# Patient Record
Sex: MALE | Race: WHITE | Employment: OTHER | URBAN - METROPOLITAN AREA
[De-identification: names, ages, dates, MRNs, and addresses within clinical notes are randomized per-mention and may not be internally consistent; named-entity substitution may affect disease eponyms.]

---

## 2017-01-12 ENCOUNTER — OFFICE VISIT (OUTPATIENT)
Dept: NEUROLOGY | Age: 67
End: 2017-01-12

## 2017-01-12 VITALS
HEART RATE: 75 BPM | HEIGHT: 70 IN | OXYGEN SATURATION: 98 % | SYSTOLIC BLOOD PRESSURE: 130 MMHG | WEIGHT: 257 LBS | BODY MASS INDEX: 36.79 KG/M2 | DIASTOLIC BLOOD PRESSURE: 70 MMHG

## 2017-01-12 DIAGNOSIS — G25.0 BENIGN ESSENTIAL TREMOR: Primary | ICD-10-CM

## 2017-01-12 RX ORDER — PRIMIDONE 250 MG/1
250 TABLET ORAL 2 TIMES DAILY
Qty: 180 TAB | Refills: 3 | Status: SHIPPED | OUTPATIENT
Start: 2017-01-12 | End: 2018-02-13 | Stop reason: SDUPTHER

## 2017-01-12 RX ORDER — CLONAZEPAM 0.5 MG/1
0.5 TABLET ORAL
Qty: 20 TAB | Refills: 5 | Status: SHIPPED | OUTPATIENT
Start: 2017-01-12 | End: 2018-03-04

## 2017-01-12 RX ORDER — GABAPENTIN 300 MG/1
600 CAPSULE ORAL 2 TIMES DAILY
Qty: 360 CAP | Refills: 1 | Status: SHIPPED | OUTPATIENT
Start: 2017-01-12 | End: 2017-03-20 | Stop reason: SDUPTHER

## 2017-01-12 NOTE — LETTER
0 [facility] Essential tremor HPI He is here for f/u of tremor. August 1st he had his hip replaced on the right. He then had a fall and broke his femur. He had to have the hip redone and steel bands on his femur. He was out of work for three months. He went back to work Nov 7th. He reports his pain is not as severe. He was on narcotics but was able to wean off of this now. He does have essential tremor. He is on primidone 250mg BID. He ran out of his meds and had a major tremor. He takes klonopin as needed. He needs refills. He is on gabapentin 600mg BID. No side effects from this. Recap: 
He says he know has a jerk like reaction of the hands when trying to text. He is also dropping a lot more. His tremor is ok. It might be more obvious when he is trying to turn a page of the paper, etc.  
Primidone 250mg BID and gabapentin 600mg qhs. Klonopin is as needed but he hasn't done this much. CURRENT MEDS Current Outpatient Prescriptions Medication Sig Dispense Refill  folic acid (FOLVITE) 1 mg tablet TAKE 1 TABLET DAILY 90 Tab 3  
 lovastatin (MEVACOR) 20 mg tablet TAKE 1 TABLET IN THE EVENING 90 Tab 0  
 lisinopril (PRINIVIL, ZESTRIL) 20 mg tablet TAKE 1 TABLET DAILY 90 Tab 2  
 ONETOUCH ULTRA TEST strip  metFORMIN ER (GLUCOPHAGE XR) 500 mg tablet Take 3 Tabs by mouth daily (with dinner). 270 Tab 0  
 melatonin 3 mg tablet Take 1 Tab by mouth daily (after dinner). For sleep. Take about 3 hours prior to bedtime 30 Tab 5  
 HYDROcodone-acetaminophen (NORCO) 5-325 mg per tablet Take 1 Tab by mouth every twelve (12) hours as needed for Pain. Max Daily Amount: 2 Tabs. 60 Tab 0  JANUVIA 100 mg tablet TAKE 1 TABLET DAILY 90 Tab 0  BD INSULIN PEN NEEDLE UF SHORT 31 gauge x 5/16\" ndle USE TO INJECT UNDER THE SKIN THREE TIMES A  Pen Needle 1  
 clobetasol (TEMOVATE) 0.05 % ointment Apply  to affected area two (2) times daily as needed.  60 g 2  
  sertraline (ZOLOFT) 100 mg tablet TAKE 1 TABLET DAILY 90 Tab 0  
 insulin glargine (LANTUS SOLOSTAR) 100 unit/mL (3 mL) pen 20 Units by SubCUTAneous route two (2) times a day. 1 Each 0  
 JARDIANCE 25 mg tablet  senna-docusate (PERICOLACE) 8.6-50 mg per tablet Take 1 Tab by mouth two (2) times daily as needed for Constipation.  DIETARY SUPPLEMENT PO Take 6 Tabs by mouth daily. CATAPLEX B (STANDARD PROCESS CO)  gabapentin (NEURONTIN) 300 mg capsule Take 600 mg by mouth two (2) times a day. Take 2 caps two ( 2 ) times a day  primidone (MYSOLINE) 250 mg tablet Take 1 Tab by mouth two (2) times a day. 180 Tab 3  
 VITAMIN D2 50,000 unit capsule TAKE 1 CAPSULE EVERY 7 DAYS (Patient taking differently: TAKE 1 CAPSULE EVERY 7 DAYS (taking on Thurs.)) 12 Cap 3  
 
 
ROS Constitutional: Denies recent weight change, fever, chills, sweats ENT/Mouth: 
Eye:  Denies hearing loss, ringing in the ears, Denies vision changes,  
Cardiovascular: 
Respiratory:   Denies chest pain/angina pectoris Denies shortness of breath, cough, wheezing Gastrointestinal: Denies nausea, vomiting, constipation, frequent diarrhea,  
Musculoskeletal:   + joint pain, stiffness/swelling Integument:   Denies rash/itching Neurological:  Per HPI, +falls Psychiatric:   Denies anxiety or depression PREVIOUS WORKUP No CNS imaging LABS Results for orders placed or performed in visit on 11/01/16 C DIFFICILE TOXIN GENE, NELLIE Result Value Ref Range C. difficile toxin gene, NELLIE Negative Negative PHYSICAL EXAM 
Exam: 
Visit Vitals  /70  Pulse 75  Ht 5' 10\" (1.778 m)  Wt 257 lb (116.6 kg)  SpO2 98%  BMI 36.88 kg/m2 Gen: 
CV: RRR Lungs: non labored breathing Abd: non distending Neuro: A&O x 3, no dysarthria or aphasia CN II-XII: PERRL, EOMI, face symmetric, tongue/palate midline Motor: strength 5/5 all four ext, no tremor today Sensory: intact to LT Gait: normal 
 
 ASSESSMENT This is a 73 y/o male who presents for fu of benign essential tremor. His tremor is bilateral and with action. He is doing well with gabapentin and primidone. He takes Klonopin as needed. He did have to go off primidone due to being out of the medication and noticed significant increase in tremor secondary to this. PLAN 1. Cont primidone to 250mg tabs BID 2. Continue gabapentin to 600mg BID 3. Klonopin prn. Refill given 4. Off all narcotics and is doing home exercises from physical therapy. F/U 6 months Louie Marques MD 
 
CC: Chris Ramires MD 
Fax: [phone] This note will not be viewable in [address] Prescription for Klonopin was faxed to [facility].

## 2017-01-12 NOTE — PROGRESS NOTES
NEUROLOGY FOLLOWUP    CHIEF COMPLAINT  Essential tremor    HPI   He is here for f/u of tremor. August 1st he had his hip replaced on the right. He then had a fall and broke his femur. He had to have the hip redone and steel bands on his femur. He was out of work for three months. He went back to work Nov 7th. He reports his pain is not as severe. He was on narcotics but was able to wean off of this now. He does have essential tremor. He is on primidone 250mg BID. He ran out of his meds and had a major tremor. He takes klonopin as needed. He needs refills. He is on gabapentin 600mg BID. No side effects from this. Recap:  He says he know has a jerk like reaction of the hands when trying to text. He is also dropping a lot more. His tremor is ok. It might be more obvious when he is trying to turn a page of the paper, etc.   Primidone 250mg BID and gabapentin 600mg qhs. Klonopin is as needed but he hasn't done this much. CURRENT MEDS  Current Outpatient Prescriptions   Medication Sig Dispense Refill    folic acid (FOLVITE) 1 mg tablet TAKE 1 TABLET DAILY 90 Tab 3    lovastatin (MEVACOR) 20 mg tablet TAKE 1 TABLET IN THE EVENING 90 Tab 0    lisinopril (PRINIVIL, ZESTRIL) 20 mg tablet TAKE 1 TABLET DAILY 90 Tab 2    ONETOUCH ULTRA TEST strip       metFORMIN ER (GLUCOPHAGE XR) 500 mg tablet Take 3 Tabs by mouth daily (with dinner). 270 Tab 0    melatonin 3 mg tablet Take 1 Tab by mouth daily (after dinner). For sleep. Take about 3 hours prior to bedtime 30 Tab 5    HYDROcodone-acetaminophen (NORCO) 5-325 mg per tablet Take 1 Tab by mouth every twelve (12) hours as needed for Pain. Max Daily Amount: 2 Tabs. 60 Tab 0    JANUVIA 100 mg tablet TAKE 1 TABLET DAILY 90 Tab 0    BD INSULIN PEN NEEDLE UF SHORT 31 gauge x 5/16\" ndle USE TO INJECT UNDER THE SKIN THREE TIMES A  Pen Needle 1    clobetasol (TEMOVATE) 0.05 % ointment Apply  to affected area two (2) times daily as needed.  60 g 2    sertraline (ZOLOFT) 100 mg tablet TAKE 1 TABLET DAILY 90 Tab 0    insulin glargine (LANTUS SOLOSTAR) 100 unit/mL (3 mL) pen 20 Units by SubCUTAneous route two (2) times a day. 1 Each 0    JARDIANCE 25 mg tablet       senna-docusate (PERICOLACE) 8.6-50 mg per tablet Take 1 Tab by mouth two (2) times daily as needed for Constipation.  DIETARY SUPPLEMENT PO Take 6 Tabs by mouth daily. CATAPLEX B (STANDARD PROCESS CO)      gabapentin (NEURONTIN) 300 mg capsule Take 600 mg by mouth two (2) times a day. Take 2 caps two ( 2 ) times a day      primidone (MYSOLINE) 250 mg tablet Take 1 Tab by mouth two (2) times a day.  180 Tab 3    VITAMIN D2 50,000 unit capsule TAKE 1 CAPSULE EVERY 7 DAYS (Patient taking differently: TAKE 1 CAPSULE EVERY 7 DAYS (taking on Thurs.)) 12 Cap 3       ROS  Constitutional: Denies recent weight change, fever, chills, sweats   ENT/Mouth:  Eye:  Denies hearing loss, ringing in the ears,   Denies vision changes,   Cardiovascular:  Respiratory:   Denies chest pain/angina pectoris  Denies shortness of breath, cough, wheezing   Gastrointestinal: Denies nausea, vomiting, constipation, frequent diarrhea,   Musculoskeletal:   + joint pain, stiffness/swelling   Integument:   Denies rash/itching   Neurological:  Per HPI, +falls   Psychiatric:   Denies anxiety or depression       PREVIOUS WORKUP  No CNS imaging    LABS  Results for orders placed or performed in visit on 11/01/16   C DIFFICILE TOXIN GENE, NELLIE   Result Value Ref Range    C. difficile toxin gene, NELLIE Negative Negative       PHYSICAL EXAM  Exam:  Visit Vitals    /70    Pulse 75    Ht 5' 10\" (1.778 m)    Wt 257 lb (116.6 kg)    SpO2 98%    BMI 36.88 kg/m2     Gen:  CV: RRR  Lungs: non labored breathing  Abd: non distending  Neuro: A&O x 3, no dysarthria or aphasia  CN II-XII: PERRL, EOMI, face symmetric, tongue/palate midline  Motor: strength 5/5 all four ext, no tremor today  Sensory: intact to LT  Gait: normal    ASSESSMENT   This is a 75 y/o male who presents for fu of benign essential tremor. His tremor is bilateral and with action. He is doing well with gabapentin and primidone. He takes Klonopin as needed. He did have to go off primidone due to being out of the medication and noticed significant increase in tremor secondary to this. PLAN  1. Cont primidone to 250mg tabs BID  2. Continue gabapentin to 600mg BID  3. Klonopin prn. Refill given  4. Off all narcotics and is doing home exercises from physical therapy. F/U 6 months    Anthony Yen MD    CC: Bola Hood MD  Fax: [phone]    This note will not be viewable in [address]

## 2017-01-26 RX ORDER — INSULIN GLARGINE 100 [IU]/ML
INJECTION, SOLUTION SUBCUTANEOUS
Qty: 45 ML | Refills: 0 | Status: SHIPPED | OUTPATIENT
Start: 2017-01-26 | End: 2017-03-21 | Stop reason: SDUPTHER

## 2017-01-26 NOTE — TELEPHONE ENCOUNTER
Received request for refill for Klonpoin for 90 day supply from [facility] .    This was sent to Dr. Cole Centers

## 2017-01-27 RX ORDER — SERTRALINE HYDROCHLORIDE 100 MG/1
TABLET, FILM COATED ORAL
Qty: 90 TAB | Refills: 1 | Status: SHIPPED | OUTPATIENT
Start: 2017-01-27 | End: 2017-03-20 | Stop reason: SDUPTHER

## 2017-02-02 ENCOUNTER — OFFICE VISIT (OUTPATIENT)
Dept: FAMILY MEDICINE CLINIC | Age: 67
End: 2017-02-02

## 2017-02-02 VITALS
TEMPERATURE: 97.7 F | WEIGHT: 258.6 LBS | BODY MASS INDEX: 37.02 KG/M2 | OXYGEN SATURATION: 97 % | SYSTOLIC BLOOD PRESSURE: 152 MMHG | HEART RATE: 77 BPM | DIASTOLIC BLOOD PRESSURE: 69 MMHG | HEIGHT: 70 IN | RESPIRATION RATE: 18 BRPM

## 2017-02-02 DIAGNOSIS — S91.209A TOENAIL AVULSION, INITIAL ENCOUNTER: ICD-10-CM

## 2017-02-02 DIAGNOSIS — I10 ESSENTIAL HYPERTENSION: ICD-10-CM

## 2017-02-02 DIAGNOSIS — E78.00 HIGH CHOLESTEROL: ICD-10-CM

## 2017-02-02 DIAGNOSIS — I35.0 AORTIC VALVE STENOSIS, UNSPECIFIED ETIOLOGY: ICD-10-CM

## 2017-02-02 DIAGNOSIS — Z79.4 CONTROLLED TYPE 2 DIABETES MELLITUS WITH OTHER SPECIFIED COMPLICATION, WITH LONG-TERM CURRENT USE OF INSULIN (HCC): Primary | ICD-10-CM

## 2017-02-02 DIAGNOSIS — E11.69 CONTROLLED TYPE 2 DIABETES MELLITUS WITH OTHER SPECIFIED COMPLICATION, WITH LONG-TERM CURRENT USE OF INSULIN (HCC): Primary | ICD-10-CM

## 2017-02-02 DIAGNOSIS — G44.209 ACUTE NON INTRACTABLE TENSION-TYPE HEADACHE: ICD-10-CM

## 2017-02-02 LAB — HBA1C MFR BLD HPLC: 7.3 %

## 2017-02-02 RX ORDER — HYDROCODONE BITARTRATE AND ACETAMINOPHEN 5; 325 MG/1; MG/1
1 TABLET ORAL
Qty: 20 TAB | Refills: 0 | Status: SHIPPED | OUTPATIENT
Start: 2017-02-02 | End: 2017-03-22

## 2017-02-02 RX ORDER — FLUTICASONE PROPIONATE 50 MCG
2 SPRAY, SUSPENSION (ML) NASAL DAILY
Qty: 1 BOTTLE | Refills: 0
Start: 2017-02-02 | End: 2017-09-18 | Stop reason: ALTCHOICE

## 2017-02-02 RX ORDER — CEPHALEXIN 500 MG/1
500 CAPSULE ORAL 4 TIMES DAILY
Qty: 40 CAP | Refills: 0 | Status: SHIPPED | OUTPATIENT
Start: 2017-02-02 | End: 2017-02-12

## 2017-02-02 RX ORDER — GUAIFENESIN 100 MG/5ML
81 LIQUID (ML) ORAL DAILY
Qty: 30 TAB | Refills: 11
Start: 2017-02-02 | End: 2020-04-06

## 2017-02-02 RX ORDER — METFORMIN HYDROCHLORIDE 500 MG/1
1000 TABLET, EXTENDED RELEASE ORAL
Qty: 180 TAB | Refills: 1 | Status: SHIPPED | OUTPATIENT
Start: 2017-02-02 | End: 2017-11-28 | Stop reason: SDUPTHER

## 2017-02-02 RX ORDER — BUTALBITAL, ACETAMINOPHEN AND CAFFEINE 50; 325; 40 MG/1; MG/1; MG/1
1 TABLET ORAL
Qty: 15 TAB | Refills: 0 | Status: SHIPPED | OUTPATIENT
Start: 2017-02-02 | End: 2017-05-04 | Stop reason: SDUPTHER

## 2017-02-02 NOTE — PROGRESS NOTES
Chief Complaint   Patient presents with    Diabetes   Discuss headaches  Left foot toenail dark  Room 5

## 2017-02-02 NOTE — PROGRESS NOTES
Subjective:     Chief Complaint   Patient presents with    Diabetes      He  is a 74 y.o. male who presents for evaluation of:  Since last appt, c/o HAs. Started with HA 2 days ago. No measured fever or chills. No URI sx. Solely in R frontal area. Had similar sx a few years ago and improved with Fioricet. Diabetes Mellitus, HTN, HLD:  Taking meds, home glucose monitoring: is performed regularly - 100-170s  Reports no polyuria or polydipsia, no chest pain, dyspnea or TIA's, no numbness, tingling or pain in extremities, last eye exam approximately < 1 yr ago. Not exercising d/t hip surgery  Compliant with diabetic diet. Avoids sodas and sweet teas. Avoids fast food. Limits carbs. Pt is a non smoker.     Lab Results   Component Value Date/Time    Hemoglobin A1c (POC) 7.3 02/02/2017 02:45 PM    Hemoglobin A1c (POC) 8.0 06/27/2016 02:39 PM    Hemoglobin A1c 6.7 10/04/2016 10:08 AM    Microalbumin/Creat ratio (mg/g creat) 13 11/01/2010 07:07 AM    Microalb/Creat ratio (ug/mg creat.) <10.5 06/27/2016 02:33 PM    Microalbumin,urine random 0.81 11/01/2010 07:07 AM    LDL, calculated 85 10/04/2016 10:08 AM    Creatinine 0.75 10/04/2016 10:08 AM      Lab Results   Component Value Date/Time    GFR est  10/04/2016 10:08 AM    GFR est non-AA 94 10/04/2016 10:08 AM      Lab Results   Component Value Date/Time    TSH 1.870 10/04/2016 10:08 AM       ROS  Gen - no fever/chills  Resp - no dyspnea or cough  CV - no chest pain or WANG  Rest per HPI    Past Medical History   Diagnosis Date    Anemia due to blood loss     Hinson's esophagus with esophagitis     Benign essential tremor     Cancer ([facility] [address] 2012     BCC    Depression     DJD (degenerative joint disease) of hip      bilat    DM (diabetes mellitus) ([facility] [address] 3/17/2010    ED (erectile dysfunction) of organic origin     Fall on or from sidewalk curb 9/24/2015    Gastritis and duodenitis     GERD (gastroesophageal reflux disease)      resolved after discontinuing diclofenac    Hematuria 6/2016    High cholesterol 3/17/2010    HTN (hypertension) 3/17/2010    Morbid obesity [facility] [address]     Murmur, cardiac 2016    PUD (peptic ulcer disease)     Shingles 6/2016     RESOLVING- NO RASH (HEAD)    Sleep apnea      CPAP     Past Surgical History   Procedure Laterality Date    Endoscopy, colon, diagnostic      Hx cholecystectomy  1995    Hx gi  1980     gastroplasty    Hx hernia repair  1995    Hx tonsillectomy  1950    Hx orthopaedic  2011     rot cuff repair    Pr total hip arthroplasty  05/2010     right    Pr total hip arthroplasty  08/01/2016     left     Current Outpatient Prescriptions on File Prior to Visit   Medication Sig Dispense Refill    sertraline (ZOLOFT) 100 mg tablet TAKE 1 TABLET DAILY 90 Tab 1    LANTUS SOLOSTAR 100 unit/mL (3 mL) pen INJECT 20 UNITS UNDER THE SKIN IN THE MORNING AND 18 UNITS AT NIGHT 45 mL 0    clonazePAM (KLONOPIN) 0.5 mg tablet Take 1 Tab by mouth nightly as needed. Max Daily Amount: 0.5 mg. 20 Tab 5    primidone (MYSOLINE) 250 mg tablet Take 1 Tab by mouth two (2) times a day. 180 Tab 3    gabapentin (NEURONTIN) 300 mg capsule Take 2 Caps by mouth two (2) times a day. Take 2 caps two ( 2 ) times a day 076 Cap 1    folic acid (FOLVITE) 1 mg tablet TAKE 1 TABLET DAILY 90 Tab 3    lovastatin (MEVACOR) 20 mg tablet TAKE 1 TABLET IN THE EVENING 90 Tab 0    lisinopril (PRINIVIL, ZESTRIL) 20 mg tablet TAKE 1 TABLET DAILY 90 Tab 2    ONETOUCH ULTRA TEST strip       melatonin 3 mg tablet Take 1 Tab by mouth daily (after dinner). For sleep. Take about 3 hours prior to bedtime 30 Tab 5    JANUVIA 100 mg tablet TAKE 1 TABLET DAILY 90 Tab 0    BD INSULIN PEN NEEDLE UF SHORT 31 gauge x 5/16\" ndle USE TO INJECT UNDER THE SKIN THREE TIMES A  Pen Needle 1    clobetasol (TEMOVATE) 0.05 % ointment Apply  to affected area two (2) times daily as needed.  60 g 2    JARDIANCE 25 mg tablet       senna-docusate (PERICOLACE) 8.6-50 mg per tablet Take 1 Tab by mouth two (2) times daily as needed for Constipation.  DIETARY SUPPLEMENT PO Take 6 Tabs by mouth daily. CATAPLEX B (STANDARD PROCESS CO)      VITAMIN D2 50,000 unit capsule TAKE 1 CAPSULE EVERY 7 DAYS (Patient taking differently: TAKE 1 CAPSULE EVERY 7 DAYS (taking on Thurs.)) 12 Cap 3     No current facility-administered medications on file prior to visit. Objective:     Vitals:    02/02/17 1436   BP: 152/69   Pulse: 77   Resp: 18   Temp: 97.7 °F (36.5 °C)   TempSrc: Oral   SpO2: 97%   Weight: 258 lb 9.6 oz (117.3 kg)   Height: 5' 10\" (1.778 m)     Physical Examination:  General appearance - alert, well appearing, and in no distress  Eyes -sclera anicteric  Neck - supple, no significant adenopathy, no thyromegaly  Chest - clear to auscultation, no wheezes, rales or rhonchi, symmetric air entry  Heart - normal rate, regular rhythm, normal S1, S2,2/6 LION rad to carotids  Neurological - alert, oriented, no focal findings or movement disorder noted  Extr -1+ pitting edema in LLE (improving), mildly ttp a ant left shin  Feet - + left foot with great toenail nearly pulled off except for medial edge. Some bleeding and bruising with mild surrounding erythema noted    Procedure:  After verbal consent was obtained, risks and benefits of the procedure were discussed with the patient and alternatives discussed. Cleansed with alcohol pads. Injected 2% lidocaine 4 ml removed medial edge of great left toenail. The procedure was well tolerated with no complications      Assessment/ Plan:   John Paul Ramos was seen today for diabetes. Diagnoses and all orders for this visit:    Controlled type 2 diabetes mellitus with other specified complication, with long-term current use of insulin ([address] - near goal.  -     AMB POC HEMOGLOBIN A1C  -      DIABETES FOOT EXAM  -     metFORMIN ER (GLUCOPHAGE XR) 500 mg tablet; Take 2 Tabs by mouth daily (with dinner).  Please disp generic form    Essential hypertension - bp too high but in pain from toenail, pt left prior to rechecking    High cholesterol    Acute non intractable tension-type headache - suspect sinus related so tx with flonase. Avoiding nsaids d/t PUD hx so will acutely use Fioricet  -     butalbital-acetaminophen-caffeine (FIORICET, ESGIC) -40 mg per tablet; Take 1 Tab by mouth every four (4) hours as needed for Pain. Max Daily Amount: 6 Tabs. -     fluticasone (FLONASE) 50 mcg/actuation nasal spray; 2 Sprays by Both Nostrils route daily. Toenail avulsion, initial encounter - removed remainder of nail today. Leidy very well.  -     HYDROcodone-acetaminophen (NORCO) 5-325 mg per tablet; Take 1 Tab by mouth every four (4) hours as needed for Pain. Max Daily Amount: 6 Tabs. -     cephALEXin (KEFLEX) 500 mg capsule; Take 1 Cap by mouth four (4) times daily for 10 days.  -     TN DESTRUC BENIGN LESION, UP TO 14 LESIONS    Aortic valve stenosis, unspecified etiology - pt never called about EULALIO after TTE was unable to visualize valves. LION still present so getting EULALIO.  -     ECHO TRANSESOPHAGEAL (EULALIO) W OR WO CONTR; Future    Other orders  -     aspirin 81 mg chewable tablet; Take 1 Tab by mouth daily. I have discussed the diagnosis with the patient and the intended plan as seen in the above orders. The patient has received an after-visit summary and questions were answered concerning future plans. I have discussed medication side effects and warnings with the patient as well. The patient verbalizes understanding and agreement with the plan. Follow-up Disposition:  Return in about 3 months (around 5/2/2017), or if symptoms worsen or fail to improve.      [facility]  OFFICE PROCEDURE PROGRESS NOTE        Chart reviewed for the following:   Mary Kay Salas MD, have reviewed the History, Physical and updated the Allergic reactions for Lyle Das     TIME OUT performed immediately prior to start of procedure:   Marvin Zee MD, have performed the following reviews on Lyle Das prior to the start of the procedure:            * Patient was identified by name and date of birth   * Agreement on procedure being performed was verified  * Risks and Benefits explained to the patient  * Procedure site verified and marked as necessary  * Patient was positioned for comfort  * Consent was signed and verified     Time: 3:20pm  Date of procedure: 2/2/2017    Procedure performed by:  Peggy High MD  Patient assisted by: self  How tolerated by patient: tolerated the procedure well with no complications  Post Procedural Pain Scale: 0 - No Hurt

## 2017-02-02 NOTE — MR AVS SNAPSHOT
Visit Information Date & Time Provider Department Dept. Phone Encounter #  
 2/2/2017  2:20 PM Peggy High MD [facility] at [address] [id number] Follow-up Instructions Return in about 3 months (around 5/2/2017), or if symptoms worsen or fail to improve. Your Appointments 7/24/2017  3:00 PM  
Follow Up with Irineo Ormond, MD  
Neurology Clinic at [facility] Appt Note: Follow up $0CP tdb 1/12/17  
 [facility], 
[address] [place] [address], [address] [address]  
[address] [facility], [address] [place] [address], [address] [facility] [address] [facility] [address] [address] Upcoming Health Maintenance Date Due COLONOSCOPY 4/14/2015 FOOT EXAM Q1 1/26/2017 HEMOGLOBIN A1C Q6M 4/4/2017 EYE EXAM RETINAL OR DILATED Q1 4/11/2017 MICROALBUMIN Q1 6/27/2017 LIPID PANEL Q1 10/4/2017 Pneumococcal 65+ Low/Medium Risk (2 of 2 - PPSV23) 12/5/2017 MEDICARE YEARLY EXAM 2/3/2018 GLAUCOMA SCREENING Q2Y 4/11/2018 DTaP/Tdap/Td series (2 - Td) 1/31/2021 Allergies as of 2/2/2017  Review Complete On: 2/2/2017 By: Peggy High MD  
  
 Severity Noted Reaction Type Reactions Nsaids (Non-steroidal Anti-inflammatory Drug) Low 01/26/2016    Other (comments) Hx of PUD and Hinson's Esophagus Current Immunizations  Reviewed on 12/5/2016 Name Date Influenza High Dose Vaccine PF 9/22/2016 Influenza Vaccine 10/3/2013 Influenza Vaccine Split 10/28/2012 TDAP Vaccine 1/31/2011 Not reviewed this visit You Were Diagnosed With   
  
 Codes Comments Controlled type 2 diabetes mellitus with other specified complication, with long-term current use of insulin ([facility]    -  Primary ICD-10-CM: E11.69, Z79.4 Essential hypertension     ICD-10-CM: I10 
ICD-9-CM: 401.9 High cholesterol     ICD-10-CM: E78.00 ICD-9-CM: 272.0 Acute non intractable tension-type headache     ICD-10-CM: I99.932 ICD-9-CM: 339.10 Toenail avulsion, initial encounter     ICD-10-CM: A46.541F ICD-9-CM: 893.0 Vitals BP Pulse Temp Resp Height(growth percentile) Weight(growth percentile) 152/69 (BP 1 Location: Left arm, BP Patient Position: Sitting) 77 97.7 °F (36.5 °C) (Oral) 18 5' 10\" (1.778 m) 258 lb 9.6 oz (117.3 kg) SpO2 BMI Smoking Status 97% 37.11 kg/m2 Former Smoker Vitals History BMI and BSA Data Body Mass Index Body Surface Area  
 37.11 kg/m 2 2.41 m 2 Preferred Pharmacy Pharmacy Name Phone [address] [facility] [phone] Your Updated Medication List  
  
   
This list is accurate as of: 2/2/17  3:26 PM.  Always use your most recent med list.  
  
  
  
  
 aspirin 81 mg chewable tablet Take 1 Tab by mouth daily. BD INSULIN PEN NEEDLE UF SHORT 31 gauge x 5/16\" Ndle Generic drug:  Insulin Needles (Disposable) USE TO INJECT UNDER THE SKIN THREE TIMES A DAY  
  
 butalbital-acetaminophen-caffeine -40 mg per tablet Commonly known as:  Terry Weiner Take 1 Tab by mouth every four (4) hours as needed for Pain. Max Daily Amount: 6 Tabs. cephALEXin 500 mg capsule Commonly known as:  Devoria Hertz Take 1 Cap by mouth four (4) times daily for 10 days. clobetasol 0.05 % ointment Commonly known as:  Catalina Lunch Apply  to affected area two (2) times daily as needed. clonazePAM 0.5 mg tablet Commonly known as:  Clark Altglenny Take 1 Tab by mouth nightly as needed. Max Daily Amount: 0.5 mg. DIETARY SUPPLEMENT PO Take 6 Tabs by mouth daily. CATAPLEX B (STANDARD PROCESS CO)  
  
 fluticasone 50 mcg/actuation nasal spray Commonly known as:  Montine Fox 2 Sprays by Both Nostrils route daily. folic acid 1 mg tablet Commonly known as:  FOLVITE  
TAKE 1 TABLET DAILY  
  
 gabapentin 300 mg capsule Commonly known as:  NEURONTIN  
 Take 2 Caps by mouth two (2) times a day. Take 2 caps two ( 2 ) times a day HYDROcodone-acetaminophen 5-325 mg per tablet Commonly known as:  Manny Phillips Take 1 Tab by mouth every four (4) hours as needed for Pain. Max Daily Amount: 6 Tabs. JANUVIA 100 mg tablet Generic drug:  SITagliptin TAKE 1 TABLET DAILY JARDIANCE 25 mg tablet Generic drug:  empagliflozin LANTUS SOLOSTAR 100 unit/mL (3 mL) pen Generic drug:  insulin glargine INJECT 20 UNITS UNDER THE SKIN IN THE MORNING AND 18 UNITS AT NIGHT  
  
 lisinopril 20 mg tablet Commonly known as:  PRINIVIL, ZESTRIL  
TAKE 1 TABLET DAILY lovastatin 20 mg tablet Commonly known as:  MEVACOR  
TAKE 1 TABLET IN THE EVENING  
  
 melatonin 3 mg tablet Take 1 Tab by mouth daily (after dinner). For sleep. Take about 3 hours prior to bedtime  
  
 metFORMIN  mg tablet Commonly known as:  GLUCOPHAGE XR Take 2 Tabs by mouth daily (with dinner). Please disp generic form ONETOUCH ULTRA TEST strip Generic drug:  glucose blood VI test strips  
  
 primidone 250 mg tablet Commonly known as: MYSOLINE Take 1 Tab by mouth two (2) times a day. senna-docusate 8.6-50 mg per tablet Commonly known as:  Magdalene Folks Take 1 Tab by mouth two (2) times daily as needed for Constipation. sertraline 100 mg tablet Commonly known as:  ZOLOFT  
TAKE 1 TABLET DAILY  
  
 VITAMIN D2 50,000 unit capsule Generic drug:  ergocalciferol TAKE 1 CAPSULE EVERY 7 DAYS Prescriptions Printed Refills  
 butalbital-acetaminophen-caffeine (FIORICET, ESGIC) -40 mg per tablet 0 Sig: Take 1 Tab by mouth every four (4) hours as needed for Pain. Max Daily Amount: 6 Tabs. Class: Print Route: Oral  
 HYDROcodone-acetaminophen (NORCO) 5-325 mg per tablet 0 Sig: Take 1 Tab by mouth every four (4) hours as needed for Pain. Max Daily Amount: 6 Tabs. Class: Print  Route: Oral  
 cephALEXin (KEFLEX) 500 mg capsule 0 Sig: Take 1 Cap by mouth four (4) times daily for 10 days. Class: Print Route: Oral  
  
Prescriptions Sent to Pharmacy Refills  
 metFORMIN ER (GLUCOPHAGE XR) 500 mg tablet 1 Sig: Take 2 Tabs by mouth daily (with dinner). Please disp generic form Class: Normal  
 Pharmacy: [address], [address] [place] [address] #: [phone] Route: Oral  
  
We Performed the Following AMB POC HEMOGLOBIN A1C [77104 CPT(R)] HM DIABETES FOOT EXAM [HM7 Custom] Follow-up Instructions Return in about 3 months (around 5/2/2017), or if symptoms worsen or fail to improve. Patient Instructions Please drop metformin down to 1 tab daily with dinner to help reduce diarrhea symptoms - try for 2 weeks & if no difference, can go back to 2 pills. Introducing [address]! Dear Charmayne Combes: Thank you for requesting a Qlika account. Our records indicate that you already have an active Qlika account. You can access your account anytime at [email or web address] Did you know that you can access your hospital and ER discharge instructions at any time in Qlika? You can also review all of your test results from your hospital stay or ER visit. Additional Information If you have questions, please visit the Frequently Asked Questions section of the Qlika website at [email or web address] Remember, Qlika is NOT to be used for urgent needs. For medical emergencies, dial 911. Now available from your iPhone and Android! Please provide this summary of care documentation to your next provider. Your primary care clinician is listed as Farideh Means. If you have any questions after today's visit, please call [phone].

## 2017-02-02 NOTE — PATIENT INSTRUCTIONS
Please drop metformin down to 1 tab daily with dinner to help reduce diarrhea symptoms - try for 2 weeks & if no difference, can go back to 2 pills.

## 2017-02-03 ENCOUNTER — TELEPHONE (OUTPATIENT)
Dept: FAMILY MEDICINE CLINIC | Age: 67
End: 2017-02-03

## 2017-02-03 DIAGNOSIS — I35.0 AORTIC VALVE STENOSIS, UNSPECIFIED ETIOLOGY: Primary | ICD-10-CM

## 2017-02-06 ENCOUNTER — TELEPHONE (OUTPATIENT)
Dept: FAMILY MEDICINE CLINIC | Age: 67
End: 2017-02-06

## 2017-02-06 NOTE — TELEPHONE ENCOUNTER
Coordination of care states they do not schedule   Echo trans . .....   That was requested     It needs to be coordinated w/Cardiology     Suresh Gomez  - doesn't need a call back as she is just in scheduling

## 2017-02-27 RX ORDER — SITAGLIPTIN 100 MG/1
TABLET, FILM COATED ORAL
Qty: 90 TAB | Refills: 1 | Status: SHIPPED | OUTPATIENT
Start: 2017-02-27 | End: 2017-08-23 | Stop reason: SDUPTHER

## 2017-03-09 ENCOUNTER — OFFICE VISIT (OUTPATIENT)
Dept: CARDIOLOGY CLINIC | Age: 67
End: 2017-03-09

## 2017-03-09 VITALS
WEIGHT: 260 LBS | OXYGEN SATURATION: 98 % | RESPIRATION RATE: 18 BRPM | DIASTOLIC BLOOD PRESSURE: 80 MMHG | HEART RATE: 70 BPM | BODY MASS INDEX: 37.22 KG/M2 | SYSTOLIC BLOOD PRESSURE: 120 MMHG | HEIGHT: 70 IN

## 2017-03-09 DIAGNOSIS — I10 ESSENTIAL HYPERTENSION: ICD-10-CM

## 2017-03-09 DIAGNOSIS — E78.00 HIGH CHOLESTEROL: ICD-10-CM

## 2017-03-09 DIAGNOSIS — R01.1 SYSTOLIC MURMUR: Primary | ICD-10-CM

## 2017-03-09 NOTE — PROGRESS NOTES
3/9/2017 10:38 AM      Subjective:     Giancarlo Manning is referred for EULALIO. Systolic murmur was noticed by PCP on exam and on TTE valves not very visualized. He denies any CP, syncope. Slight reduction in functional ability, however also had recent hip surgery and thinks it could also be related to it. Visit Vitals    /80 (BP 1 Location: Right arm)    Pulse 70    Resp 18    Ht 5' 10\" (1.778 m)    Wt 260 lb (117.9 kg)    SpO2 98%    BMI 37.31 kg/m2     Current Outpatient Prescriptions   Medication Sig    JANUVIA 100 mg tablet TAKE 1 TABLET DAILY    metFORMIN ER (GLUCOPHAGE XR) 500 mg tablet Take 2 Tabs by mouth daily (with dinner). Please disp generic form    butalbital-acetaminophen-caffeine (FIORICET, ESGIC) -40 mg per tablet Take 1 Tab by mouth every four (4) hours as needed for Pain. Max Daily Amount: 6 Tabs.  aspirin 81 mg chewable tablet Take 1 Tab by mouth daily.  sertraline (ZOLOFT) 100 mg tablet TAKE 1 TABLET DAILY    LANTUS SOLOSTAR 100 unit/mL (3 mL) pen INJECT 20 UNITS UNDER THE SKIN IN THE MORNING AND 18 UNITS AT NIGHT    clonazePAM (KLONOPIN) 0.5 mg tablet Take 1 Tab by mouth nightly as needed. Max Daily Amount: 0.5 mg.    primidone (MYSOLINE) 250 mg tablet Take 1 Tab by mouth two (2) times a day.  gabapentin (NEURONTIN) 300 mg capsule Take 2 Caps by mouth two (2) times a day. Take 2 caps two ( 2 ) times a day    folic acid (FOLVITE) 1 mg tablet TAKE 1 TABLET DAILY    lovastatin (MEVACOR) 20 mg tablet TAKE 1 TABLET IN THE EVENING    lisinopril (PRINIVIL, ZESTRIL) 20 mg tablet TAKE 1 TABLET DAILY    ONETOUCH ULTRA TEST strip     melatonin 3 mg tablet Take 1 Tab by mouth daily (after dinner). For sleep. Take about 3 hours prior to bedtime    BD INSULIN PEN NEEDLE UF SHORT 31 gauge x 5/16\" ndle USE TO INJECT UNDER THE SKIN THREE TIMES A DAY    clobetasol (TEMOVATE) 0.05 % ointment Apply  to affected area two (2) times daily as needed.     JARDIANCE 25 mg tablet     senna-docusate (PERICOLACE) 8.6-50 mg per tablet Take 1 Tab by mouth two (2) times daily as needed for Constipation.  DIETARY SUPPLEMENT PO Take 6 Tabs by mouth daily. CATAPLEX B (STANDARD PROCESS CO)    VITAMIN D2 50,000 unit capsule TAKE 1 CAPSULE EVERY 7 DAYS (Patient taking differently: TAKE 1 CAPSULE EVERY 7 DAYS (taking on Thurs. ))    fluticasone (FLONASE) 50 mcg/actuation nasal spray 2 Sprays by Both Nostrils route daily.  HYDROcodone-acetaminophen (NORCO) 5-325 mg per tablet Take 1 Tab by mouth every four (4) hours as needed for Pain. Max Daily Amount: 6 Tabs. No current facility-administered medications for this visit.           Objective:      Visit Vitals    /80 (BP 1 Location: Right arm)    Pulse 70    Resp 18    Ht 5' 10\" (1.778 m)    Wt 260 lb (117.9 kg)    SpO2 98%    BMI 37.31 kg/m2       Data Review:     EKG: Normal sinus rhythm, no acute st/t changes    Reviewed and/or ordered active problem list, medication list tests    Past Medical History:   Diagnosis Date    Anemia due to blood loss     Hinson's esophagus with esophagitis     Benign essential tremor     Cancer ([facility] 2012    BCC    Depression     DJD (degenerative joint disease) of hip     bilat    DM (diabetes mellitus) ([facility] 3/17/2010    ED (erectile dysfunction) of organic origin     Fall on or from sidewalk curb 9/24/2015    Gastritis and duodenitis     GERD (gastroesophageal reflux disease)     resolved after discontinuing diclofenac    Hematuria 6/2016    High cholesterol 3/17/2010    HTN (hypertension) 3/17/2010    Morbid obesity ([address]     Murmur, cardiac 2016    PUD (peptic ulcer disease)     Shingles 6/2016    RESOLVING- NO RASH (HEAD)    Sleep apnea     CPAP      Past Surgical History:   Procedure Laterality Date    ENDOSCOPY, COLON, DIAGNOSTIC      HX CHOLECYSTECTOMY  1995    HX GI  1980    gastroplasty    HX HERNIA REPAIR  1995    HX ORTHOPAEDIC  2011 rot cuff repair    HX TONSILLECTOMY  1950    TOTAL HIP ARTHROPLASTY  05/2010    right    TOTAL HIP ARTHROPLASTY  08/01/2016    left     Allergies   Allergen Reactions    Nsaids (Non-Steroidal Anti-Inflammatory Drug) Other (comments)     Hx of PUD and Hinson's Esophagus      Family History   Problem Relation Age of Onset    Cancer Father      multiple myeloma    Stroke Father     Dementia Mother     No Known Problems Brother     COPD Brother     Cancer Maternal Grandmother      COLON    Anesth Problems Neg Hx       Social History     Social History    Marital status:      Spouse name: N/A    Number of children: N/A    Years of education: N/A     Occupational History    Not on file. Social History Main Topics    Smoking status: Former Smoker     Packs/day: 2.00     Years: 15.00     Quit date: 3/1/1979    Smokeless tobacco: Never Used    Alcohol use No    Drug use: No    Sexual activity: Yes     Partners: Female     Birth control/ protection: None     Other Topics Concern    Not on file     Social History Narrative       Review of Systems     General: Not Present- Anorexia, Chills, Dietary Changes, Fever, Medication Changes, Night Sweats, Weight Gain > 10lbs. and Weight Loss > 10lbs. .  Skin: Not Present- Bruising and Excessive Sweating. HEENT: Not Present- Headache, Visual Loss and Vertigo. Respiratory: Not Present- Cough, Difficulty Breathing, Snoring and Wheezing. Cardiovascular: Not Present- Abnormal Blood Pressure, Chest Pain, Claudications, Difficulty Breathing On Exertion, Edema, Fainting / Blacking Out, Irregular Heart Beat, Night Cramps, Orthopnea, Palpitations, Paroxysmal Nocturnal Dyspnea, Rapid Heart Rate, Shortness of Breath and Swelling of Extremities. Gastrointestinal: Not Present- Black, Tarry Stool, Bloody Stool, Diarrhea, Hematemesis, Rectal Bleeding and Vomiting. Musculoskeletal: Not Present- Muscle Pain and Muscle Weakness.   Neurological: Not Present- Dizziness. Psychiatric: Not Present- Depression. Endocrine: Not Present- Cold Intolerance, Heat Intolerance and Thyroid Problems. Hematology: Not Present- Abnormal Bleeding, Anemia, Blood Clots and Easy Bruising.       Physical Exam   The physical exam findings are as follows:       General   Mental Status - Alert. General Appearance - Cooperative and Well groomed. Not in acute distress. Orientation - Oriented to time, Oriented to place and Oriented to person. Build & Nutrition - Well developed. HEENT  Head - Normal.  Eye - Normal.      Neck   Carotid Arteries - normal upstroke. No Bruits. Chest and Lung Exam   Inspection:   Chest Wall: - Normal. Accessory muscles - No use of accessory muscles in breathing. Auscultation:   Breath sounds: - Normal.      Cardiovascular   Inspection: Jugular vein - Bilateral - Inspection Normal.  Palpation/Percussion:   Apical Impulse: - Normal.  Auscultation: Rhythm - Regular. Heart Sounds - S1 WNL and S2 WNL. No S3 or S4. Murmurs & Other Heart Sounds: Auscultation of the heart reveals - 2/6 systolic murmur over aortic area and apex. Intact S2. Abdomen   Palpation/Percussion: Palpation and Percussion of the abdomen reveal - No Palpable abdominal masses. Auscultation: Auscultation of the abdomen reveals - Bowel sounds normal.      Peripheral Vascular   Upper Extremity: Inspection - Bilateral - No Cyanotic nailbeds or Digital clubbing. Palpation: Radial pulse - Bilateral - Normal.  Lower Extremity:   Palpation: Femoral pulse - Bilateral - Normal. Dorsalis pedis pulse - Bilateral - Normal. Posterior tibia pulse - Bilateral - Normal. Edema - Bilateral - No edema. Auscultation - No Bilateral Groin Bruits. Neurologic   Mental Status: Affect - normal.  Motor: - Normal. Gait - Normal.        Assessment:       ICD-10-CM ICD-9-CM    1. Systolic murmur D74.2 196.0 EULALIO DOPPLER   2.  Essential hypertension I10 401.9 AMB POC EKG ROUTINE W/ 12 LEADS, INTER & REP      EULALIO DOPPLER   3. High cholesterol E78.00 272.0        Plan:     1. Systolic murmur: TTE report reviewed. EULALIO d/w pt. Only symptom is slightly reduced functional capacity, especially since hip surgery. D/w pt.   2. BP controlled. 3. On statin. Last FLP reviewed.

## 2017-03-09 NOTE — MR AVS SNAPSHOT
Visit Information Date & Time Provider Department Dept. Phone Encounter #  
 3/9/2017 10:15 AM Pamela Tboar, [facility] [phone] [id number] Your Appointments 3/20/2017  4:20 PM  
ROUTINE CARE with Ravi Zafar MD  
[facility]) Appt Note: personal, rather discuss reason w/Dr. Kandis Lundborg [address] [address] [address]  
  
    
 5/4/2017  3:30 PM  
ROUTINE CARE with Ravi Zafar MD  
[facility]) Appt Note: 3m fu  
 [address] [phone]  
[phone]  
  
    
 7/24/2017  3:00 PM  
Follow Up with Josef Romero MD  
Neurology [facility] at [facility]) Appt Note: Follow up $0CP tdb 1/12/17  
 [address] [place] [address], 
[address] [place] [address], [address] [address]  
[address] [facility], [address] [place] [address], [address] [address] Upcoming Health Maintenance Date Due COLONOSCOPY 4/14/2015 EYE EXAM RETINAL OR DILATED Q1 4/11/2017 MICROALBUMIN Q1 6/27/2017 HEMOGLOBIN A1C Q6M 8/2/2017 LIPID PANEL Q1 10/4/2017 Pneumococcal 65+ Low/Medium Risk (2 of 2 - PPSV23) 12/5/2017 FOOT EXAM Q1 2/2/2018 MEDICARE YEARLY EXAM 2/3/2018 GLAUCOMA SCREENING Q2Y 4/11/2018 DTaP/Tdap/Td series (2 - Td) 1/31/2021 Allergies as of 3/9/2017  Review Complete On: 3/9/2017 By: Pamela Tobar MD  
  
 Severity Noted Reaction Type Reactions Nsaids (Non-steroidal Anti-inflammatory Drug) Low 01/26/2016    Other (comments) Hx of PUD and Hinson's Esophagus Current Immunizations  Reviewed on 12/5/2016 Name Date Influenza High Dose Vaccine PF 9/22/2016 Influenza Vaccine 10/3/2013 Influenza Vaccine Split 10/28/2012 TDAP Vaccine 1/31/2011 Not reviewed this visit You Were Diagnosed With   
  
 Codes Comments Systolic murmur    -  Primary ICD-10-CM: R01.1 ICD-9-CM: 785.2 Essential hypertension     ICD-10-CM: I10 
ICD-9-CM: 401.9 High cholesterol     ICD-10-CM: E78.00 ICD-9-CM: 272.0 Vitals BP Pulse Resp Height(growth percentile) Weight(growth percentile) SpO2  
 120/80 (BP 1 Location: Right arm) 70 18 5' 10\" (1.778 m) 260 lb (117.9 kg) 98% BMI Smoking Status 37.31 kg/m2 Former Smoker Vitals History BMI and BSA Data Body Mass Index Body Surface Area  
 37.31 kg/m 2 2.41 m 2 Preferred Pharmacy Pharmacy Name Phone [address]pez [facility] [phone] Your Updated Medication List  
  
   
This list is accurate as of: 3/9/17 10:38 AM.  Always use your most recent med list.  
  
  
  
  
 aspirin 81 mg chewable tablet Take 1 Tab by mouth daily. BD INSULIN PEN NEEDLE UF SHORT 31 gauge x 5/16\" Ndle Generic drug:  Insulin Needles (Disposable) USE TO INJECT UNDER THE SKIN THREE TIMES A DAY  
  
 butalbital-acetaminophen-caffeine -40 mg per tablet Commonly known as:  Sebas Isreal Take 1 Tab by mouth every four (4) hours as needed for Pain. Max Daily Amount: 6 Tabs. clobetasol 0.05 % ointment Commonly known as:  Mayers Siskin Apply  to affected area two (2) times daily as needed. clonazePAM 0.5 mg tablet Commonly known as:  Karrin Reamer Take 1 Tab by mouth nightly as needed. Max Daily Amount: 0.5 mg. DIETARY SUPPLEMENT PO Take 6 Tabs by mouth daily. CATAPLEX B (STANDARD PROCESS CO)  
  
 fluticasone 50 mcg/actuation nasal spray Commonly known as:  Ronni Burbank 2 Sprays by Both Nostrils route daily. folic acid 1 mg tablet Commonly known as:  FOLVITE  
TAKE 1 TABLET DAILY  
  
 gabapentin 300 mg capsule Commonly known as:  NEURONTIN  
 Take 2 Caps by mouth two (2) times a day. Take 2 caps two ( 2 ) times a day HYDROcodone-acetaminophen 5-325 mg per tablet Commonly known as:  Barnet Cuff Take 1 Tab by mouth every four (4) hours as needed for Pain. Max Daily Amount: 6 Tabs. JANUVIA 100 mg tablet Generic drug:  SITagliptin TAKE 1 TABLET DAILY JARDIANCE 25 mg tablet Generic drug:  empagliflozin LANTUS SOLOSTAR 100 unit/mL (3 mL) pen Generic drug:  insulin glargine INJECT 20 UNITS UNDER THE SKIN IN THE MORNING AND 18 UNITS AT NIGHT  
  
 lisinopril 20 mg tablet Commonly known as:  PRINIVIL, ZESTRIL  
TAKE 1 TABLET DAILY lovastatin 20 mg tablet Commonly known as:  MEVACOR  
TAKE 1 TABLET IN THE EVENING  
  
 melatonin 3 mg tablet Take 1 Tab by mouth daily (after dinner). For sleep. Take about 3 hours prior to bedtime  
  
 metFORMIN  mg tablet Commonly known as:  GLUCOPHAGE XR Take 2 Tabs by mouth daily (with dinner). Please disp generic form ONETOUCH ULTRA TEST strip Generic drug:  glucose blood VI test strips  
  
 primidone 250 mg tablet Commonly known as: MYSOLINE Take 1 Tab by mouth two (2) times a day. senna-docusate 8.6-50 mg per tablet Commonly known as:  Juanda Charlotte Take 1 Tab by mouth two (2) times daily as needed for Constipation. sertraline 100 mg tablet Commonly known as:  ZOLOFT  
TAKE 1 TABLET DAILY  
  
 VITAMIN D2 50,000 unit capsule Generic drug:  ergocalciferol TAKE 1 CAPSULE EVERY 7 DAYS We Performed the Following AMB POC EKG ROUTINE W/ 12 LEADS, INTER & REP [78782 CPT(R)] To-Do List   
 03/09/2017 ECHO:  EULALIO DOPPLER Introducing [facility]! Dear Fartun Reddy: Thank you for requesting a Simplesurance account. Our records indicate that you already have an active Simplesurance account. You can access your account anytime at [email or web address] Did you know that you can access your hospital and ER discharge instructions at any time in RidePost? You can also review all of your test results from your hospital stay or ER visit. Additional Information If you have questions, please visit the Frequently Asked Questions section of the RidePost website at [email or web address] Remember, RidePost is NOT to be used for urgent needs. For medical emergencies, dial 911. Now available from your iPhone and Android! Please provide this summary of care documentation to your next provider. Your primary care clinician is listed as Isidro Gordon. If you have any questions after today's visit, please call [phone].

## 2017-03-20 ENCOUNTER — OFFICE VISIT (OUTPATIENT)
Dept: FAMILY MEDICINE CLINIC | Age: 67
End: 2017-03-20

## 2017-03-20 VITALS
SYSTOLIC BLOOD PRESSURE: 128 MMHG | HEART RATE: 62 BPM | DIASTOLIC BLOOD PRESSURE: 61 MMHG | RESPIRATION RATE: 18 BRPM | TEMPERATURE: 97.6 F | BODY MASS INDEX: 37.39 KG/M2 | OXYGEN SATURATION: 96 % | HEIGHT: 70 IN | WEIGHT: 261.2 LBS

## 2017-03-20 DIAGNOSIS — F33.1 MODERATE EPISODE OF RECURRENT MAJOR DEPRESSIVE DISORDER (HCC): ICD-10-CM

## 2017-03-20 DIAGNOSIS — F43.21 GRIEF: Primary | ICD-10-CM

## 2017-03-20 RX ORDER — SERTRALINE HYDROCHLORIDE 100 MG/1
TABLET, FILM COATED ORAL
Qty: 135 TAB | Refills: 0 | Status: SHIPPED | OUTPATIENT
Start: 2017-03-20 | End: 2017-08-31 | Stop reason: SDUPTHER

## 2017-03-20 RX ORDER — GABAPENTIN 300 MG/1
600 CAPSULE ORAL
Qty: 1 CAP | Refills: 0
Start: 2017-03-20 | End: 2018-09-25 | Stop reason: SDUPTHER

## 2017-03-20 RX ORDER — ZOLPIDEM TARTRATE 10 MG/1
10 TABLET ORAL
Qty: 30 TAB | Refills: 0 | Status: SHIPPED | OUTPATIENT
Start: 2017-03-20 | End: 2017-05-04 | Stop reason: SDUPTHER

## 2017-03-20 NOTE — PROGRESS NOTES
Chief Complaint   Patient presents with    Depression     Worse last two months   Having problems at work ,paying bills twice  Room 2

## 2017-03-20 NOTE — MR AVS SNAPSHOT
Visit Information Date & Time Provider Department Dept. Phone Encounter #  
 3/20/2017  4:20 PM Naveed Nash MD [facility] at [address] [id number] Follow-up Instructions Return in about 4 weeks (around 4/17/2017), or if symptoms worsen or fail to improve. Your Appointments 5/4/2017  3:30 PM  
ROUTINE CARE with Naveed Nash MD  
[facility] at [facility] [address]) Appt Note: 3m fu  
 [address] [address]  
[facility]  
  
    
 7/24/2017  3:00 PM  
Follow Up with Ирина Perla MD  
[facility] at [facility] [address]) Appt Note: Follow up $0CP tdb 1/12/17  
 [address] [place] [address], 
[address], [address] [address]  
[address], [address], [address] [address] Upcoming Health Maintenance Date Due COLONOSCOPY 4/14/2015 EYE EXAM RETINAL OR DILATED Q1 4/11/2017 MICROALBUMIN Q1 6/27/2017 HEMOGLOBIN A1C Q6M 8/2/2017 LIPID PANEL Q1 10/4/2017 Pneumococcal 65+ Low/Medium Risk (2 of 2 - PPSV23) 12/5/2017 FOOT EXAM Q1 2/2/2018 MEDICARE YEARLY EXAM 2/3/2018 GLAUCOMA SCREENING Q2Y 4/11/2018 DTaP/Tdap/Td series (2 - Td) 1/31/2021 Allergies as of 3/20/2017  Review Complete On: 3/20/2017 By: Naveed Nash MD  
  
 Severity Noted Reaction Type Reactions Nsaids (Non-steroidal Anti-inflammatory Drug) Low 01/26/2016    Other (comments) Hx of PUD and Hinson's Esophagus Current Immunizations  Reviewed on 12/5/2016 Name Date Influenza High Dose Vaccine PF 9/22/2016 Influenza Vaccine 10/3/2013 Influenza Vaccine Split 10/28/2012 TDAP Vaccine 1/31/2011 Not reviewed this visit You Were Diagnosed With   
  
 Codes Comments Grief    -  Primary ICD-10-CM: N65.99 ICD-9-CM: 309.0 Moderate episode of recurrent major depressive disorder (HCC)     ICD-10-CM: F33.1 ICD-9-CM: 296.32 Vitals BP Pulse Temp Resp Height(growth percentile) Weight(growth percentile) 128/61 (BP 1 Location: Left arm, BP Patient Position: Sitting) 62 97.6 °F (36.4 °C) (Oral) 18 5' 10\" (1.778 m) 261 lb 3.2 oz (118.5 kg) SpO2 BMI Smoking Status 96% 37.48 kg/m2 Former Smoker BMI and BSA Data Body Mass Index Body Surface Area  
 37.48 kg/m 2 2.42 m 2 Preferred Pharmacy Pharmacy Name Phone [address], [facility] [phone] Your Updated Medication List  
  
   
This list is accurate as of: 3/20/17  4:58 PM.  Always use your most recent med list.  
  
  
  
  
 aspirin 81 mg chewable tablet Take 1 Tab by mouth daily. BD INSULIN PEN NEEDLE UF SHORT 31 gauge x 5/16\" Ndle Generic drug:  Insulin Needles (Disposable) USE TO INJECT UNDER THE SKIN THREE TIMES A DAY  
  
 butalbital-acetaminophen-caffeine -40 mg per tablet Commonly known as:  Kevin Arteaga Take 1 Tab by mouth every four (4) hours as needed for Pain. Max Daily Amount: 6 Tabs. clobetasol 0.05 % ointment Commonly known as:  Linzie Rubins Apply  to affected area two (2) times daily as needed. clonazePAM 0.5 mg tablet Commonly known as:  Kiko [place] Take 1 Tab by mouth nightly as needed. Max Daily Amount: 0.5 mg. DIETARY SUPPLEMENT PO Take 6 Tabs by mouth daily. CATAPLEX B (STANDARD PROCESS CO)  
  
 fluticasone 50 mcg/actuation nasal spray Commonly known as:  Marsh Fails 2 Sprays by Both Nostrils route daily. folic acid 1 mg tablet Commonly known as:  FOLVITE  
TAKE 1 TABLET DAILY  
  
 gabapentin 300 mg capsule Commonly known as:  NEURONTIN Take 2 Caps by mouth two (2) times a day. Take 2 caps two ( 2 ) times a day HYDROcodone-acetaminophen 5-325 mg per tablet Commonly known as:  Alex Brito  
 Take 1 Tab by mouth every four (4) hours as needed for Pain. Max Daily Amount: 6 Tabs. JANUVIA 100 mg tablet Generic drug:  SITagliptin TAKE 1 TABLET DAILY JARDIANCE 25 mg tablet Generic drug:  empagliflozin LANTUS SOLOSTAR 100 unit/mL (3 mL) pen Generic drug:  insulin glargine INJECT 20 UNITS UNDER THE SKIN IN THE MORNING AND 18 UNITS AT NIGHT  
  
 lisinopril 20 mg tablet Commonly known as:  PRINIVIL, ZESTRIL  
TAKE 1 TABLET DAILY lovastatin 20 mg tablet Commonly known as:  MEVACOR  
TAKE 1 TABLET IN THE EVENING  
  
 melatonin 3 mg tablet Take 1 Tab by mouth daily (after dinner). For sleep. Take about 3 hours prior to bedtime  
  
 metFORMIN  mg tablet Commonly known as:  GLUCOPHAGE XR Take 2 Tabs by mouth daily (with dinner). Please disp generic form ONETOUCH ULTRA TEST strip Generic drug:  glucose blood VI test strips  
  
 primidone 250 mg tablet Commonly known as: MYSOLINE Take 1 Tab by mouth two (2) times a day. senna-docusate 8.6-50 mg per tablet Commonly known as:  Bertha Holms Take 1 Tab by mouth two (2) times daily as needed for Constipation. sertraline 100 mg tablet Commonly known as:  ZOLOFT  
TAKE 1.5 TABLET DAILY  
  
 VITAMIN D2 50,000 unit capsule Generic drug:  ergocalciferol TAKE 1 CAPSULE EVERY 7 DAYS  
  
 zolpidem 10 mg tablet Commonly known as:  AMBIEN Take 1 Tab by mouth nightly as needed for Sleep. Max Daily Amount: 10 mg.  
  
  
  
  
Prescriptions Printed Refills  
 zolpidem (AMBIEN) 10 mg tablet 0 Sig: Take 1 Tab by mouth nightly as needed for Sleep. Max Daily Amount: 10 mg.  
 Class: Print Route: Oral  
  
Prescriptions Sent to Pharmacy Refills  
 sertraline (ZOLOFT) 100 mg tablet 0 Sig: TAKE 1.5 TABLET DAILY Class: Normal  
 Pharmacy: [address], [address] [place] [address] #: [phone] Follow-up Instructions Return in about 4 weeks (around 4/17/2017), or if symptoms worsen or fail to improve. Introducing [facility]! Dear Austin Ramirez: Thank you for requesting a [employer] account. Our records indicate that you already have an active [employer] account. You can access your account anytime at [email or web address]. [employer] Did you know that you can access your hospital and ER discharge instructions at any time in [employer]? You can also review all of your test results from your hospital stay or ER visit. Additional Information If you have questions, please visit the Frequently Asked Questions section of the [employer] website at [email or web address] Remember, [employer] is NOT to be used for urgent needs. For medical emergencies, dial 911. Now available from your iPhone and Android! Please provide this summary of care documentation to your next provider. Your primary care clinician is listed as Naveed Nash. If you have any questions after today's visit, please call [phone].

## 2017-03-20 NOTE — PROGRESS NOTES
Subjective:     Chief Complaint   Patient presents with    Depression     Worse last two months        He  is a 74 y.o. male who presents for evaluation of:  Depression - Feeling a lot of sadness recently. Having trouble at work with 2 recent problems on projects - he does not normally have these issues. Wants to sleep all day. Has ct to have feelings of grief. Anniversary and wife's birthday are coming up. Has been 1.5 yrs since wife passed away. Does report his Neurologist recently increased his gabapentin as well to help with pain control which has helped pain.     ROS  Gen - no fever/chills  Resp - no dyspnea or cough  CV - no chest pain or WANG  Rest per HPI    Past Medical History:   Diagnosis Date    Anemia due to blood loss     Hinson's esophagus with esophagitis     Benign essential tremor     [facility] 2012    BCC    Depression     DJD (degenerative joint disease) of hip     bilat    DM (diabetes mellitus) ([facility] 3/17/2010    ED (erectile dysfunction) of organic origin     Fall on or from sidewalk curb 9/24/2015    Gastritis and duodenitis     GERD (gastroesophageal reflux disease)     resolved after discontinuing diclofenac    Hematuria 6/2016    High cholesterol 3/17/2010    HTN (hypertension) 3/17/2010    Morbid obesity ([facility]     Murmur, cardiac 2016    PUD (peptic ulcer disease)     Shingles 6/2016    RESOLVING- NO RASH (HEAD)    Sleep apnea     CPAP     Past Surgical History:   Procedure Laterality Date    ENDOSCOPY, COLON, DIAGNOSTIC      HX CHOLECYSTECTOMY  1995    HX GI  1980    gastroplasty    HX HERNIA REPAIR  1995    HX ORTHOPAEDIC  2011    rot cuff repair    HX TONSILLECTOMY  1950    TOTAL HIP ARTHROPLASTY  05/2010    right    TOTAL HIP ARTHROPLASTY  08/01/2016    left     Current Outpatient Prescriptions on File Prior to Visit   Medication Sig Dispense Refill    JANUVIA 100 mg tablet TAKE 1 TABLET DAILY 90 Tab 1    metFORMIN ER (GLUCOPHAGE XR) 500 mg tablet Take 2 Tabs by mouth daily (with dinner). Please disp generic form 180 Tab 1    butalbital-acetaminophen-caffeine (FIORICET, ESGIC) -40 mg per tablet Take 1 Tab by mouth every four (4) hours as needed for Pain. Max Daily Amount: 6 Tabs. 15 Tab 0    fluticasone (FLONASE) 50 mcg/actuation nasal spray 2 Sprays by Both Nostrils route daily. 1 Bottle 0    aspirin 81 mg chewable tablet Take 1 Tab by mouth daily. 30 Tab 11    LANTUS SOLOSTAR 100 unit/mL (3 mL) pen INJECT 20 UNITS UNDER THE SKIN IN THE MORNING AND 18 UNITS AT NIGHT 45 mL 0    clonazePAM (KLONOPIN) 0.5 mg tablet Take 1 Tab by mouth nightly as needed. Max Daily Amount: 0.5 mg. 20 Tab 5    primidone (MYSOLINE) 250 mg tablet Take 1 Tab by mouth two (2) times a day. 494 Tab 3    folic acid (FOLVITE) 1 mg tablet TAKE 1 TABLET DAILY 90 Tab 3    lovastatin (MEVACOR) 20 mg tablet TAKE 1 TABLET IN THE EVENING 90 Tab 0    lisinopril (PRINIVIL, ZESTRIL) 20 mg tablet TAKE 1 TABLET DAILY 90 Tab 2    ONETOUCH ULTRA TEST strip       melatonin 3 mg tablet Take 1 Tab by mouth daily (after dinner). For sleep. Take about 3 hours prior to bedtime 30 Tab 5    BD INSULIN PEN NEEDLE UF SHORT 31 gauge x 5/16\" ndle USE TO INJECT UNDER THE SKIN THREE TIMES A  Pen Needle 1    clobetasol (TEMOVATE) 0.05 % ointment Apply  to affected area two (2) times daily as needed. 60 g 2    JARDIANCE 25 mg tablet       senna-docusate (PERICOLACE) 8.6-50 mg per tablet Take 1 Tab by mouth two (2) times daily as needed for Constipation.  DIETARY SUPPLEMENT PO Take 6 Tabs by mouth daily. CATAPLEX B (STANDARD PROCESS CO)      VITAMIN D2 50,000 unit capsule TAKE 1 CAPSULE EVERY 7 DAYS (Patient taking differently: TAKE 1 CAPSULE EVERY 7 DAYS (taking on Thurs.)) 12 Cap 3    HYDROcodone-acetaminophen (NORCO) 5-325 mg per tablet Take 1 Tab by mouth every four (4) hours as needed for Pain. Max Daily Amount: 6 Tabs.  20 Tab 0     No current facility-administered medications on file prior to visit. Objective:     Vitals:    17 1634   BP: 128/61   Pulse: 62   Resp: 18   Temp: 97.6 °F (36.4 °C)   TempSrc: Oral   SpO2: 96%   Weight: 261 lb 3.2 oz (118.5 kg)   Height: 5' 10\" (1.778 m)     Physical Examination:  General appearance - alert, well appearing, and in no distress  Eyes -sclera anicteric  Neck - supple, no significant adenopathy, no thyromegaly, no bruits  Chest - clear to auscultation, no wheezes, rales or rhonchi, symmetric air entry  Heart - normal rate, regular rhythm, normal S1, S2, no murmurs, rubs, clicks or gallops  Neurological - alert, oriented, no focal findings or movement disorder noted  Psych - tearful when talking about  wife    Assessment/ Plan:   Xi Maria was seen today for depression. Diagnoses and all orders for this visit:    Grief  Moderate episode of recurrent major depressive disorder ([facility] [address] [facility]  - Worse recently. Will incr Zoloft and use ambien acutely to help with sleep. Also suspect work and attn issues are partly related to higher dose of gabapentin during day so planning to cut this down during day to help with fatigue. -     sertraline (ZOLOFT) 100 mg tablet; TAKE 1.5 TABLET DAILY  -     zolpidem (AMBIEN) 10 mg tablet; Take 1 Tab by mouth nightly as needed for Sleep. Max Daily Amount: 10 mg.  -     gabapentin (NEURONTIN) 300 mg capsule; Take 2 Caps by mouth nightly. I spent > 50% of the 25 min visit counseling and educating about grief and depression    I have discussed the diagnosis with the patient and the intended plan as seen in the above orders. The patient has received an after-visit summary and questions were answered concerning future plans. I have discussed medication side effects and warnings with the patient as well. The patient verbalizes understanding and agreement with the plan. Follow-up Disposition:  Return in about 4 weeks (around 2017), or if symptoms worsen or fail to improve.

## 2017-03-21 RX ORDER — INSULIN GLARGINE 100 [IU]/ML
INJECTION, SOLUTION SUBCUTANEOUS
Qty: 45 ML | Refills: 0 | Status: SHIPPED | OUTPATIENT
Start: 2017-03-21 | End: 2017-07-26 | Stop reason: ALTCHOICE

## 2017-03-22 ENCOUNTER — HOSPITAL ENCOUNTER (OUTPATIENT)
Dept: NON INVASIVE DIAGNOSTICS | Age: 67
Discharge: HOME | End: 2017-03-22
Payer: COMMERCIAL

## 2017-03-22 VITALS
SYSTOLIC BLOOD PRESSURE: 151 MMHG | RESPIRATION RATE: 16 BRPM | HEART RATE: 73 BPM | OXYGEN SATURATION: 95 % | WEIGHT: 260 LBS | HEIGHT: 70 IN | BODY MASS INDEX: 37.22 KG/M2 | DIASTOLIC BLOOD PRESSURE: 62 MMHG

## 2017-03-22 DIAGNOSIS — I35.0 AORTIC VALVE STENOSIS, UNSPECIFIED ETIOLOGY: ICD-10-CM

## 2017-03-22 PROCEDURE — 99152 MOD SED SAME PHYS/QHP 5/>YRS: CPT

## 2017-03-22 PROCEDURE — 93325 DOPPLER ECHO COLOR FLOW MAPG: CPT

## 2017-03-22 PROCEDURE — 74011000250 HC RX REV CODE- 250: Performed by: INTERNAL MEDICINE

## 2017-03-22 PROCEDURE — 74011000250 HC RX REV CODE- 250

## 2017-03-22 PROCEDURE — 74011250636 HC RX REV CODE- 250/636

## 2017-03-22 RX ORDER — FENTANYL CITRATE 50 UG/ML
INJECTION, SOLUTION INTRAMUSCULAR; INTRAVENOUS
Status: COMPLETED
Start: 2017-03-22 | End: 2017-03-22

## 2017-03-22 RX ORDER — FENTANYL CITRATE 50 UG/ML
25-50 INJECTION, SOLUTION INTRAMUSCULAR; INTRAVENOUS
Status: DISCONTINUED | OUTPATIENT
Start: 2017-03-22 | End: 2017-03-22 | Stop reason: ALTCHOICE

## 2017-03-22 RX ORDER — LIDOCAINE HYDROCHLORIDE 20 MG/ML
15 SOLUTION OROPHARYNGEAL ONCE
Status: COMPLETED | OUTPATIENT
Start: 2017-03-22 | End: 2017-03-22

## 2017-03-22 RX ORDER — MIDAZOLAM HYDROCHLORIDE 1 MG/ML
.5-2 INJECTION, SOLUTION INTRAMUSCULAR; INTRAVENOUS
Status: DISCONTINUED | OUTPATIENT
Start: 2017-03-22 | End: 2017-03-22 | Stop reason: ALTCHOICE

## 2017-03-22 RX ORDER — MIDAZOLAM HYDROCHLORIDE 1 MG/ML
INJECTION, SOLUTION INTRAMUSCULAR; INTRAVENOUS
Status: COMPLETED
Start: 2017-03-22 | End: 2017-03-22

## 2017-03-22 RX ORDER — LIDOCAINE HYDROCHLORIDE 20 MG/ML
SOLUTION OROPHARYNGEAL
Status: COMPLETED
Start: 2017-03-22 | End: 2017-03-22

## 2017-03-22 RX ADMIN — FENTANYL CITRATE 50 MCG: 50 INJECTION, SOLUTION INTRAMUSCULAR; INTRAVENOUS at 11:04

## 2017-03-22 RX ADMIN — MIDAZOLAM HYDROCHLORIDE 1 MG: 1 INJECTION, SOLUTION INTRAMUSCULAR; INTRAVENOUS at 11:05

## 2017-03-22 RX ADMIN — MIDAZOLAM HYDROCHLORIDE 1 MG: 1 INJECTION, SOLUTION INTRAMUSCULAR; INTRAVENOUS at 11:09

## 2017-03-22 RX ADMIN — BENZOCAINE, BUTAMBEN, AND TETRACAINE HYDROCHLORIDE 1 SPRAY: .028; .004; .004 AEROSOL, SPRAY TOPICAL at 11:06

## 2017-03-22 RX ADMIN — MIDAZOLAM HYDROCHLORIDE 1 MG: 1 INJECTION, SOLUTION INTRAMUSCULAR; INTRAVENOUS at 11:04

## 2017-03-22 RX ADMIN — LIDOCAINE HYDROCHLORIDE 15 ML: 20 SOLUTION ORAL; TOPICAL at 11:05

## 2017-03-22 RX ADMIN — LIDOCAINE HYDROCHLORIDE 15 ML: 20 SOLUTION OROPHARYNGEAL at 11:05

## 2017-03-22 NOTE — PROGRESS NOTES
Pt awake and alert. Pt dangled and stood at bedside. Vital signs stable. No complaints of dizziness. Discharge instructions reviewed with patient and family.  Pt discharged home with family

## 2017-03-22 NOTE — PROGRESS NOTES
Patient arrived to Non-Invasive Cardiology Lab for Out Patient Procedure. Staff introduced to patient. Patient identifiers verified with Name and Date of Birth. Procedure verified with patient. Consent forms reviewed and signed by patient or authorized representative and verified. Allergies verified. Patient informed of procedure and plan of care. Questions answered with review. Patient on cardiac monitor, non-invasive blood pressure, SPO2 monitor. On RA. Patient is A&Ox3. Patient reports no complaints. Patient on stretcher, in low position, with side rails up. Patient instructed to call for assistance as needed. Family in waiting room.  ASA 2 per MD

## 2017-03-22 NOTE — DISCHARGE INSTRUCTIONS
DISCHARGE SUMMARY from Ritchie Johnson RN        The following personal items collected during your admission are returned to you:   Clothing:  Shirt    PATIENT INSTRUCTIONS:  Stay on all the same medications      Start with sips of water and advance diet as the feeling in your throat returns to normal. Avoid any scalding liquids for the next 2 hours. What to do at Home:  Recommended activity: No driving today      The discharge information has been reviewed with the PATIENT . The PATIENT  verbalized understanding.

## 2017-03-23 NOTE — PROGRESS NOTES
Switchcam message sent for echo - tiny PFO noted with R to L shunting. Mild Pulm HTN. No major concerns. Can discuss further with Dr. Devika Hutson in follow up.

## 2017-03-30 RX ORDER — LOVASTATIN 20 MG/1
TABLET ORAL
Qty: 90 TAB | Refills: 1 | Status: SHIPPED | COMMUNITY
Start: 2017-03-30 | End: 2017-09-26 | Stop reason: SDUPTHER

## 2017-04-20 RX ORDER — ERGOCALCIFEROL 1.25 MG/1
CAPSULE ORAL
Qty: 12 CAP | Refills: 2 | Status: SHIPPED | OUTPATIENT
Start: 2017-04-20 | End: 2018-06-20 | Stop reason: SDUPTHER

## 2017-05-01 ENCOUNTER — PATIENT MESSAGE (OUTPATIENT)
Dept: FAMILY MEDICINE CLINIC | Age: 67
End: 2017-05-01

## 2017-05-01 DIAGNOSIS — G44.209 ACUTE NON INTRACTABLE TENSION-TYPE HEADACHE: ICD-10-CM

## 2017-05-01 DIAGNOSIS — F33.1 MODERATE EPISODE OF RECURRENT MAJOR DEPRESSIVE DISORDER (HCC): ICD-10-CM

## 2017-05-01 DIAGNOSIS — F43.21 GRIEF: ICD-10-CM

## 2017-05-08 RX ORDER — BUTALBITAL, ACETAMINOPHEN AND CAFFEINE 50; 325; 40 MG/1; MG/1; MG/1
1 TABLET ORAL
Qty: 15 TAB | Refills: 0 | Status: SHIPPED | OUTPATIENT
Start: 2017-05-08 | End: 2017-07-05 | Stop reason: SDUPTHER

## 2017-05-08 RX ORDER — ZOLPIDEM TARTRATE 10 MG/1
10 TABLET ORAL
Qty: 30 TAB | Refills: 0 | Status: SHIPPED | OUTPATIENT
Start: 2017-05-08 | End: 2017-07-05 | Stop reason: SDUPTHER

## 2017-05-10 ENCOUNTER — DOCUMENTATION ONLY (OUTPATIENT)
Dept: FAMILY MEDICINE CLINIC | Age: 67
End: 2017-05-10

## 2017-05-12 RX ORDER — BLOOD SUGAR DIAGNOSTIC
STRIP MISCELLANEOUS
Qty: 100 STRIP | Refills: 3 | Status: SHIPPED | COMMUNITY
Start: 2017-05-12 | End: 2018-02-24

## 2017-05-29 RX ORDER — PEN NEEDLE, DIABETIC 31 GX5/16"
NEEDLE, DISPOSABLE MISCELLANEOUS
Qty: 300 PACKAGE | Refills: 1 | Status: SHIPPED | COMMUNITY
Start: 2017-05-29 | End: 2017-11-26 | Stop reason: SDUPTHER

## 2017-06-19 ENCOUNTER — OFFICE VISIT (OUTPATIENT)
Dept: NEUROLOGY | Age: 67
End: 2017-06-19

## 2017-06-19 VITALS
BODY MASS INDEX: 37.22 KG/M2 | RESPIRATION RATE: 20 BRPM | WEIGHT: 260 LBS | DIASTOLIC BLOOD PRESSURE: 70 MMHG | SYSTOLIC BLOOD PRESSURE: 150 MMHG | HEIGHT: 70 IN

## 2017-06-19 DIAGNOSIS — G25.0 BENIGN ESSENTIAL TREMOR: Primary | ICD-10-CM

## 2017-06-19 DIAGNOSIS — M54.50 ACUTE MIDLINE LOW BACK PAIN WITHOUT SCIATICA: ICD-10-CM

## 2017-06-19 NOTE — PATIENT INSTRUCTIONS
[facility]   Statement to Patients  April 1, 2014      In an effort to ensure the large volume of patient prescription refills is processed in the most efficient and expeditious manner, we are asking our patients to assist us by calling your Pharmacy for all prescription refills, this will include also your  Mail Order Pharmacy. The pharmacy will contact our office electronically to continue the refill process. Please do not wait until the last minute to call your pharmacy. We need at least 48 hours (2days) to fill prescriptions. We also encourage you to call your pharmacy before going to  your prescription to make sure it is ready. With regard to controlled substance prescription refill requests (narcotic refills) that need to be picked up at our office, we ask your cooperation by providing us with at least 72 hours (3days) notice that you will need a refill. We will not refill narcotic prescription refill requests after 4:00pm on any weekday, Monday through Thursday, or after 2:00pm on Fridays, or on the weekends. We encourage everyone to explore another way of getting your prescription refill request processed using giddy, our patient web portal through our electronic medical record system. giddy is an efficient and effective way to communicate your medication request directly to the office and  downloadable as an bhupinder on your smart phone . giddy also features a review functionality that allows you to view your medication list as well as leave messages for your physician. Are you ready to get connected? If so please review the attatched instructions or speak to any of our staff to get you set up right away! Thank you so much for your cooperation. Should you have any questions please contact our Practice Administrator. The Physicians and Staff,  [facility] [id number] [address]  What is a living will?   A living will is a legal form you use to write down the kind of care you want at the end of your life. It is used by the health professionals who will treat you if you aren't able to decide for yourself. If you put your wishes in writing, your loved ones and others will know what kind of care you want. They won't need to guess. This can ease your mind and be helpful to others. A living will is not the same as an estate or property will. An estate will explains what you want to happen with your money and property after you die. Is a living will a legal document? A living will is a legal document. Each state has its own laws about living jones. If you move to another state, make sure that your living will is legal in the state where you now live. Or you might use a universal form that has been approved by many states. This kind of form can sometimes be completed and stored online. Your electronic copy will then be available wherever you have a connection to the Internet. In most cases, doctors will respect your wishes even if you have a form from a different state. · You don't need an  to complete a living will. But legal advice can be helpful if your state's laws are unclear, your health history is complicated, or your family can't agree on what should be in your living will. · You can change your living will at any time. Some people find that their wishes about end-of-life care change as their health changes. · In addition to making a living will, think about completing a medical power of  form. This form lets you name the person you want to make end-of-life treatment decisions for you (your \"health care agent\") if you're not able to. Many hospitals and nursing homes will give you the forms you need to complete a living will and a medical power of . · Your living will is used only if you can't make or communicate decisions for yourself anymore.  If you become able to make decisions again, you can accept or refuse any treatment, no matter what you wrote in your living will. · Your state may offer an online registry. This is a place where you can store your living will online so the doctors and nurses who need to treat you can find it right away. What should you think about when creating a living will? Talk about your end-of-life wishes with your family members and your doctor. Let them know what you want. That way the people making decisions for you won't be surprised by your choices. Think about these questions as you make your living will:  · Do you know enough about life support methods that might be used? If not, talk to your doctor so you know what might be done if you can't breathe on your own, your heart stops, or you're unable to swallow. · What things would you still want to be able to do after you receive life-support methods? Would you want to be able to walk? To speak? To eat on your own? To live without the help of machines? · If you have a choice, where do you want to be cared for? In your home? At a hospital or nursing home? · Do you want certain Presybeterian practices performed if you become very ill? · If you have a choice at the end of your life, where would you prefer to die? At home? In a hospital or nursing home? Somewhere else? · Would you prefer to be buried or cremated? · Do you want your organs to be donated after you die? What should you do with your living will? · Make sure that your family members and your health care agent have copies of your living will. · Give your doctor a copy of your living will to keep in your medical record. If you have more than one doctor, make sure that each one has a copy. · You may want to put a copy of your living will where it can be easily found. Where can you learn more? Go to http://russ-kiesha.info/. Enter X309 in the search box to learn more about \"Learning About Living Perjeny. \"  Current as of: February 24, 2016  Content Version: 11.2  © 0584-8679 [employer]. Care instructions adapted under license by [employer] (which disclaims liability or warranty for this information). If you have questions about a medical condition or this instruction, always ask your healthcare professional. [facility] [address] any warranty or liability for your use of this information. Advance Directives: Care Instructions  Your Care Instructions  An advance directive is a legal way to state your wishes at the end of your life. It tells your family and your doctor what to do if you can no longer say what you want. There are two main types of advance directives. You can change them any time that your wishes change. · A living will tells your family and your doctor your wishes about life support and other treatment. · A durable power of  for health care lets you name a person to make treatment decisions for you when you can't speak for yourself. This person is called a health care agent. If you do not have an advance directive, decisions about your medical care may be made by a doctor or a  who doesn't know you. It may help to think of an advance directive as a gift to the people who care for you. If you have one, they won't have to make tough decisions by themselves. Follow-up care is a key part of your treatment and safety. Be sure to make and go to all appointments, and call your doctor if you are having problems. It's also a good idea to know your test results and keep a list of the medicines you take. How can you care for yourself at home? · Discuss your wishes with your loved ones and your doctor. This way, there are no surprises. · Many states have a unique form. Or you might use a universal form that has been approved by many states. This kind of form can sometimes be completed and stored online.  Your electronic copy will then be available wherever you have a connection to the Internet. In most cases, doctors will respect your wishes even if you have a form from a different state. · You don't need a  to do an advance directive. But you may want to get legal advice. · Think about these questions when you prepare an advance directive:  ¨ Who do you want to make decisions about your medical care if you are not able to? Many people choose a family member or close friend. ¨ Do you know enough about life support methods that might be used? If not, talk to your doctor so you understand. ¨ What are you most afraid of that might happen? You might be afraid of having pain, losing your independence, or being kept alive by machines. ¨ Where would you prefer to die? Choices include your home, a hospital, or a nursing home. ¨ Would you like to have information about hospice care to support you and your family? ¨ Do you want to donate organs when you die? ¨ Do you want certain Scientology practices performed before you die? If so, put your wishes in the advance directive. · Read your advance directive every year, and make changes as needed. When should you call for help? Be sure to contact your doctor if you have any questions. Where can you learn more? Go to http://russ-kiesha.info/. Enter R264 in the search box to learn more about \"Advance Directives: Care Instructions. \"  Current as of: November 17, 2016  Content Version: 11.2  © 5582-9286 [employer]. Care instructions adapted under license by [employer] (which disclaims liability or warranty for this information). If you have questions about a medical condition or this instruction, always ask your healthcare professional. [address] any warranty or liability for your use of this information. Acute Low Back Pain: Exercises  Your Care Instructions  Here are some examples of typical rehabilitation exercises for your condition.  Start each exercise slowly. Ease off the exercise if you start to have pain. Your doctor or physical therapist will tell you when you can start these exercises and which ones will work best for you. When you are not being active, find a comfortable position for rest. Some people are comfortable on the floor or a medium-firm bed with a small pillow under their head and another under their knees. Some people prefer to lie on their side with a pillow between their knees. Don't stay in one position for too long. Take short walks (10 to 20 minutes) every 2 to 3 hours. Avoid slopes, hills, and stairs until you feel better. Walk only distances you can manage without pain, especially leg pain. How to do the exercises  Back stretches    1. Get down on your hands and knees on the floor. 2. Relax your head and allow it to droop. Round your back up toward the ceiling until you feel a nice stretch in your upper, middle, and lower back. Hold this stretch for as long as it feels comfortable, or about 15 to 30 seconds. 3. Return to the starting position with a flat back while you are on your hands and knees. 4. Let your back sway by pressing your stomach toward the floor. Lift your buttocks toward the ceiling. 5. Hold this position for 15 to 30 seconds. 6. Repeat 2 to 4 times. Follow-up care is a key part of your treatment and safety. Be sure to make and go to all appointments, and call your doctor if you are having problems. It's also a good idea to know your test results and keep a list of the medicines you take. Where can you learn more? Go to http://russ-kiesha.info/. Enter A917 in the search box to learn more about \"Acute Low Back Pain: Exercises. \"  Current as of: May 23, 2016  Content Version: 11.2  © 2372-8633 [employer] Care instructions adapted under license by [employer] (which disclaims liability or warranty for this information).  If you have questions about a medical condition or this instruction, always ask your healthcare professional. [place] [address] any warranty or liability for your use of this information.

## 2017-06-19 NOTE — LETTER
[facility] Essential tremor HPI Pt is here for f/u of tremor. He is on primidone 250mg qhs and gabapentin 600mg at night. He reports he has gone back to grief therapy. He feels this is helping. He lost his wife 1 year and 10 months ago. He was having issues with concentrating and functioning at work. He messed up some big projects. He did take some ambien to help sleep. He cut back on his gabapentin to 600mg at night. He reports today he is having some back pain. He still gets pain on the left leg pain from his femur fracture. Recap: 
He is here for f/u of tremor. August 1st he had his hip replaced on the right. He then had a fall and broke his femur. He had to have the hip redone and steel bands on his femur. He was out of work for three months. He went back to work Nov 7th. He reports his pain is not as severe. He was on narcotics but was able to wean off of this now. He does have essential tremor. He is on primidone 250mg BID. He ran out of his meds and had a major tremor. He takes klonopin as needed. He needs refills. He is on gabapentin 600mg BID. No side effects from this. CURRENT MEDS Current Outpatient Prescriptions Medication Sig Dispense Refill  BD INSULIN PEN NEEDLE UF SHORT 31 gauge x 5/16\" ndle USE TO INJECT UNDER THE SKIN THREE TIMES A  Package 1  
 ONETOUCH ULTRA TEST strip USE AS DIRECTED BY PHYSICIAN 100 Strip 3  
 butalbital-acetaminophen-caffeine (FIORICET, ESGIC) -40 mg per tablet Take 1 Tab by mouth every four (4) hours as needed for Pain. Max Daily Amount: 6 Tabs. 15 Tab 0  
 zolpidem (AMBIEN) 10 mg tablet Take 1 Tab by mouth nightly as needed for Sleep.  Max Daily Amount: 10 mg. 30 Tab 0  
 VITAMIN D2 50,000 unit capsule TAKE 1 CAPSULE EVERY 7 DAYS 12 Cap 2  
 lovastatin (MEVACOR) 20 mg tablet TAKE 1 TABLET IN THE EVENING 90 Tab 1  
 insulin glargine (LANTUS SOLOSTAR) 100 unit/mL (3 mL) pen INJECT 20 UNITS UNDER THE SKIN IN THE MORNING AND 18 UNITS AT NIGHT 45 mL 0  
 sertraline (ZOLOFT) 100 mg tablet TAKE 1.5 TABLET DAILY 135 Tab 0  
 gabapentin (NEURONTIN) 300 mg capsule Take 2 Caps by mouth nightly. 1 Cap 0  JANUVIA 100 mg tablet TAKE 1 TABLET DAILY 90 Tab 1  
 metFORMIN ER (GLUCOPHAGE XR) 500 mg tablet Take 2 Tabs by mouth daily (with dinner). Please disp generic form 180 Tab 1  
 fluticasone (FLONASE) 50 mcg/actuation nasal spray 2 Sprays by Both Nostrils route daily. 1 Bottle 0  
 aspirin 81 mg chewable tablet Take 1 Tab by mouth daily. 30 Tab 11  
 clonazePAM (KLONOPIN) 0.5 mg tablet Take 1 Tab by mouth nightly as needed. Max Daily Amount: 0.5 mg. 20 Tab 5  primidone (MYSOLINE) 250 mg tablet Take 1 Tab by mouth two (2) times a day. 164 Tab 3  
 folic acid (FOLVITE) 1 mg tablet TAKE 1 TABLET DAILY 90 Tab 3  
 lisinopril (PRINIVIL, ZESTRIL) 20 mg tablet TAKE 1 TABLET DAILY 90 Tab 2  
 ONETOUCH ULTRA TEST strip  melatonin 3 mg tablet Take 1 Tab by mouth daily (after dinner). For sleep. Take about 3 hours prior to bedtime 30 Tab 5  clobetasol (TEMOVATE) 0.05 % ointment Apply  to affected area two (2) times daily as needed. 60 g 2  JARDIANCE 25 mg tablet  senna-docusate (PERICOLACE) 8.6-50 mg per tablet Take 1 Tab by mouth two (2) times daily as needed for Constipation.  DIETARY SUPPLEMENT PO Take 6 Tabs by mouth daily. CATAPLEX B (STANDARD PROCESS CO) ROS Constitutional: Denies recent weight change, fever, chills, sweats ENT/Mouth: 
Eye:  Denies hearing loss, ringing in the ears, Denies vision changes,  
Cardiovascular: 
Respiratory:   Denies chest pain/angina pectoris Denies shortness of breath, cough, wheezing Gastrointestinal: Denies nausea, vomiting, constipation, frequent diarrhea,  
Musculoskeletal:   + joint pain, stiffness/swelling, positive back pain Integument:   Denies rash/itching Neurological:  Per HPI, +falls Psychiatric:   Denies anxiety or depression PREVIOUS WORKUP No CNS imaging LABS Results for orders placed or performed in visit on 02/02/17 AMB POC HEMOGLOBIN A1C Result Value Ref Range Hemoglobin A1c (POC) 7.3 % PHYSICAL EXAM 
Exam: 
Visit Vitals  /70  Resp 20  
 Ht 5' 10\" (1.778 m)  Wt 117.9 kg (260 lb)  BMI 37.31 kg/m2 Gen: 
CV: RRR Lungs: non labored breathing Abd: non distending Neuro: A&O x 3, no dysarthria or aphasia CN II-XII: PERRL, EOMI, face symmetric, tongue/palate midline Motor: strength 5/5 all four ext, no tremor today Sensory: intact to LT Gait: normal 
 
ASSESSMENT This is a 73 y/o male who presents for fu of benign essential tremor. His tremor is bilateral and with action. He is doing well with gabapentin and primidone. Currently he is taking his primidone and gabapentin only at night and doing well with this. Today he has a new issue of low back pain. This seems to be musculoskeletal.  Will initiate exercises for this. PLAN 1. Cont primidone to 250mg tabs qhs 
2. Continue gabapentin to 600mg qhs 
3. Klonopin prn. Refill given 4. Low back exercises Follow-up one year or sooner if needed Monserrat Carl MD 
 
CC: Gilles Tello MD 
Fax: [phone] This note was created using voice recognition software. Despite editing, there may be syntax errors. This note will not be viewable in [address] Reviewed record in preparation for visit and have necessary documentation Pt did not bring medication to office visit for review Medication list reviewed and reconciled with patient Information was given to pt on Advanced Directives, Living Will 
opportunity was given for questions

## 2017-06-19 NOTE — MR AVS SNAPSHOT
Visit Information Date & Time Provider Department Dept. Phone Encounter #  
 6/19/2017  9:40 AM Dorene Willoughby MD [address] [place] [address] Neurology Clinic [phone] [id number] Your Appointments 7/26/2017 11:30 AM  
ROUTINE CARE with Yuli Baltazar MD  
[facility] at [facility]) Appt Note: 3m fu; Called and left vms on both numbers to reschedule 5/4/17 appt; rah 4/28/17; r/s due to provider on vacation; r/s due to provider on vacation [facility] [phone]  
[facility] Upcoming Health Maintenance Date Due COLONOSCOPY 4/14/2015 EYE EXAM RETINAL OR DILATED Q1 4/11/2017 MICROALBUMIN Q1 6/27/2017 INFLUENZA AGE 9 TO ADULT 8/1/2017 HEMOGLOBIN A1C Q6M 8/2/2017 LIPID PANEL Q1 10/4/2017 Pneumococcal 65+ Low/Medium Risk (2 of 2 - PPSV23) 12/5/2017 FOOT EXAM Q1 2/2/2018 MEDICARE YEARLY EXAM 2/3/2018 GLAUCOMA SCREENING Q2Y 4/11/2018 DTaP/Tdap/Td series (2 - Td) 1/31/2021 Allergies as of 6/19/2017  Review Complete On: 3/22/2017 By: Montine Dakin, RN Severity Noted Reaction Type Reactions Nsaids (Non-steroidal Anti-inflammatory Drug) Low 01/26/2016    Other (comments) Hx of PUD and Hinson's Esophagus Current Immunizations  Reviewed on 12/5/2016 Name Date Influenza High Dose Vaccine PF 9/22/2016 Influenza Vaccine 10/3/2013 Influenza Vaccine Split 10/28/2012 TDAP Vaccine 1/31/2011 Not reviewed this visit Vitals BP Resp Height(growth percentile) Weight(growth percentile) BMI Smoking Status 150/70 20 5' 10\" (1.778 m) 260 lb (117.9 kg) 37.31 kg/m2 Former Smoker Vitals History BMI and BSA Data Body Mass Index Body Surface Area  
 37.31 kg/m 2 2.41 m 2 [phone] Your Updated Medication List  
  
   
This list is accurate as of: 6/19/17 10:08 AM.  Always use your most recent med list.  
  
  
  
  
 aspirin 81 mg chewable tablet Take 1 Tab by mouth daily. BD INSULIN PEN NEEDLE UF SHORT 31 gauge x 5/16\" Ndle Generic drug:  Insulin Needles (Disposable) USE TO INJECT UNDER THE SKIN THREE TIMES A DAY  
  
 butalbital-acetaminophen-caffeine -40 mg per tablet Commonly known as:  Geryl Gucci Take 1 Tab by mouth every four (4) hours as needed for Pain. Max Daily Amount: 6 Tabs. clobetasol 0.05 % ointment Commonly known as:  Saratha Rook Apply  to affected area two (2) times daily as needed. clonazePAM 0.5 mg tablet Commonly known as:  Ayesha Babak Take 1 Tab by mouth nightly as needed. Max Daily Amount: 0.5 mg. DIETARY SUPPLEMENT PO Take 6 Tabs by mouth daily. CATAPLEX B (STANDARD PROCESS CO)  
  
 fluticasone 50 mcg/actuation nasal spray Commonly known as:  Jonatan Marrow 2 Sprays by Both Nostrils route daily. folic acid 1 mg tablet Commonly known as:  FOLVITE  
TAKE 1 TABLET DAILY  
  
 gabapentin 300 mg capsule Commonly known as:  NEURONTIN Take 2 Caps by mouth nightly. insulin glargine 100 unit/mL (3 mL) Inpn Commonly known as:  LANTUS SOLOSTAR INJECT 20 UNITS UNDER THE SKIN IN THE MORNING AND 18 UNITS AT NIGHT  
  
 JANUVIA 100 mg tablet Generic drug:  SITagliptin TAKE 1 TABLET DAILY JARDIANCE 25 mg tablet Generic drug:  empagliflozin  
  
 lisinopril 20 mg tablet Commonly known as:  PRINIVIL, ZESTRIL  
TAKE 1 TABLET DAILY lovastatin 20 mg tablet Commonly known as:  MEVACOR  
TAKE 1 TABLET IN THE EVENING  
  
 melatonin 3 mg tablet Take 1 Tab by mouth daily (after dinner). For sleep. Take about 3 hours prior to bedtime  
  
 metFORMIN  mg tablet Commonly known as:  GLUCOPHAGE XR  
 Take 2 Tabs by mouth daily (with dinner). Please disp generic form * ONETOUCH ULTRA TEST strip Generic drug:  glucose blood VI test strips * ONETOUCH ULTRA TEST strip Generic drug:  glucose blood VI test strips USE AS DIRECTED BY PHYSICIAN  
  
 primidone 250 mg tablet Commonly known as: MYSOLINE Take 1 Tab by mouth two (2) times a day. senna-docusate 8.6-50 mg per tablet Commonly known as:  Corona Lonnie Take 1 Tab by mouth two (2) times daily as needed for Constipation. sertraline 100 mg tablet Commonly known as:  ZOLOFT  
TAKE 1.5 TABLET DAILY  
  
 VITAMIN D2 50,000 unit capsule Generic drug:  ergocalciferol TAKE 1 CAPSULE EVERY 7 DAYS  
  
 zolpidem 10 mg tablet Commonly known as:  AMBIEN Take 1 Tab by mouth nightly as needed for Sleep. Max Daily Amount: 10 mg.  
  
 * Notice: This list has 2 medication(s) that are the same as other medications prescribed for you. Read the directions carefully, and ask your doctor or other care provider to review them with you. Patient Instructions PRESCRIPTION REFILL POLICY [facility] Statement to Patients April 1, 2014 In an effort to ensure the large volume of patient prescription refills is processed in the most efficient and expeditious manner, we are asking our patients to assist us by calling your Pharmacy for all prescription refills, this will include also your  Mail Order Pharmacy. The pharmacy will contact our office electronically to continue the refill process. Please do not wait until the last minute to call your pharmacy. We need at least 48 hours (2days) to fill prescriptions. We also encourage you to call your pharmacy before going to  your prescription to make sure it is ready.   
 
With regard to controlled substance prescription refill requests (narcotic refills) that need to be picked up at our office, we ask your cooperation by providing us with at least 72 hours (3days) notice that you will need a refill. We will not refill narcotic prescription refill requests after 4:00pm on any weekday, Monday through Thursday, or after 2:00pm on Fridays, or on the weekends. We encourage everyone to explore another way of getting your prescription refill request processed using KartMe, our patient web portal through our electronic medical record system. KartMe is an efficient and effective way to communicate your medication request directly to the office and  downloadable as an bhupinder on your smart phone . KartMe also features a review functionality that allows you to view your medication list as well as leave messages for your physician. Are you ready to get connected? If so please review the attatched instructions or speak to any of our staff to get you set up right away! Thank you so much for your cooperation. Should you have any questions please contact our Practice Administrator. The Physicians and Staff,  [facility] Dagmar Craft [address] What is a living will? A living will is a legal form you use to write down the kind of care you want at the end of your life. It is used by the health professionals who will treat you if you aren't able to decide for yourself. If you put your wishes in writing, your loved ones and others will know what kind of care you want. They won't need to guess. This can ease your mind and be helpful to others. A living will is not the same as an estate or property will. An estate will explains what you want to happen with your money and property after you die. Is a living will a legal document? A living will is a legal document. Each state has its own laws about living jones. If you move to another state, make sure that your living will is legal in the state where you now live. Or you might use a universal form that has been approved by many states.  This kind of form can sometimes be completed and stored online. Your electronic copy will then be available wherever you have a connection to the Internet. In most cases, doctors will respect your wishes even if you have a form from a different state. · You don't need an  to complete a living will. But legal advice can be helpful if your state's laws are unclear, your health history is complicated, or your family can't agree on what should be in your living will. · You can change your living will at any time. Some people find that their wishes about end-of-life care change as their health changes. · In addition to making a living will, think about completing a medical power of  form. This form lets you name the person you want to make end-of-life treatment decisions for you (your \"health care agent\") if you're not able to. Many hospitals and nursing homes will give you the forms you need to complete a living will and a medical power of . · Your living will is used only if you can't make or communicate decisions for yourself anymore. If you become able to make decisions again, you can accept or refuse any treatment, no matter what you wrote in your living will. · Your state may offer an online registry. This is a place where you can store your living will online so the doctors and nurses who need to treat you can find it right away. What should you think about when creating a living will? Talk about your end-of-life wishes with your family members and your doctor. Let them know what you want. That way the people making decisions for you won't be surprised by your choices. Think about these questions as you make your living will: · Do you know enough about life support methods that might be used? If not, talk to your doctor so you know what might be done if you can't breathe on your own, your heart stops, or you're unable to swallow.  
· What things would you still want to be able to do after you receive life-support methods? Would you want to be able to walk? To speak? To eat on your own? To live without the help of machines? · If you have a choice, where do you want to be cared for? In your home? At a hospital or nursing home? · Do you want certain Judaism practices performed if you become very ill? · If you have a choice at the end of your life, where would you prefer to die? At home? In a hospital or nursing home? Somewhere else? · Would you prefer to be buried or cremated? · Do you want your organs to be donated after you die? What should you do with your living will? · Make sure that your family members and your health care agent have copies of your living will. · Give your doctor a copy of your living will to keep in your medical record. If you have more than one doctor, make sure that each one has a copy. · You may want to put a copy of your living will where it can be easily found. Where can you learn more? Go to http://russ-kiesha.info/. Enter S352 in the search box to learn more about \"Learning About Living Galileo Pennington. \" Current as of: February 24, 2016 Content Version: 11.2 © 9306-0730 [email or web address]. Care instructions adapted under license by [employer] (which disclaims liability or warranty for this information). If you have questions about a medical condition or this instruction, always ask your healthcare professional. [place] [address] any warranty or liability for your use of this information. Advance Directives: Care Instructions Your Care Instructions An advance directive is a legal way to state your wishes at the end of your life. It tells your family and your doctor what to do if you can no longer say what you want. There are two main types of advance directives. You can change them any time that your wishes change.  
· A living will tells your family and your doctor your wishes about life support and other treatment. · A durable power of  for health care lets you name a person to make treatment decisions for you when you can't speak for yourself. This person is called a health care agent. If you do not have an advance directive, decisions about your medical care may be made by a doctor or a  who doesn't know you. It may help to think of an advance directive as a gift to the people who care for you. If you have one, they won't have to make tough decisions by themselves. Follow-up care is a key part of your treatment and safety. Be sure to make and go to all appointments, and call your doctor if you are having problems. It's also a good idea to know your test results and keep a list of the medicines you take. How can you care for yourself at home? · Discuss your wishes with your loved ones and your doctor. This way, there are no surprises. · Many states have a unique form. Or you might use a universal form that has been approved by many states. This kind of form can sometimes be completed and stored online. Your electronic copy will then be available wherever you have a connection to the Internet. In most cases, doctors will respect your wishes even if you have a form from a different state. · You don't need a  to do an advance directive. But you may want to get legal advice. · Think about these questions when you prepare an advance directive: ¨ Who do you want to make decisions about your medical care if you are not able to? Many people choose a family member or close friend. ¨ Do you know enough about life support methods that might be used? If not, talk to your doctor so you understand. ¨ What are you most afraid of that might happen? You might be afraid of having pain, losing your independence, or being kept alive by machines. ¨ Where would you prefer to die? Choices include your home, a hospital, or a nursing home. ¨ Would you like to have information about hospice care to support you and your family? ¨ Do you want to donate organs when you die? ¨ Do you want certain Faith practices performed before you die? If so, put your wishes in the advance directive. · Read your advance directive every year, and make changes as needed. When should you call for help? Be sure to contact your doctor if you have any questions. Where can you learn more? Go to http://russ-kiesha.info/. Enter R264 in the search box to learn more about \"Advance Directives: Care Instructions. \" Current as of: November 17, 2016 Content Version: 11.2 © 3386-8555 LeBUZZ. Care instructions adapted under license by [employer]aitment (which disclaims liability or warranty for this information). If you have questions about a medical condition or this instruction, always ask your healthcare professional. [place] [address] any warranty or liability for your use of this information. Acute Low Back Pain: Exercises Your Care Instructions Here are some examples of typical rehabilitation exercises for your condition. Start each exercise slowly. Ease off the exercise if you start to have pain. Your doctor or physical therapist will tell you when you can start these exercises and which ones will work best for you. When you are not being active, find a comfortable position for rest. Some people are comfortable on the floor or a medium-firm bed with a small pillow under their head and another under their knees. Some people prefer to lie on their side with a pillow between their knees. Don't stay in one position for too long. Take short walks (10 to 20 minutes) every 2 to 3 hours. Avoid slopes, hills, and stairs until you feel better. Walk only distances you can manage without pain, especially leg pain. How to do the exercises Back stretches 1. Get down on your hands and knees on the floor. 2. Relax your head and allow it to droop. Round your back up toward the ceiling until you feel a nice stretch in your upper, middle, and lower back. Hold this stretch for as long as it feels comfortable, or about 15 to 30 seconds. 3. Return to the starting position with a flat back while you are on your hands and knees. 4. Let your back sway by pressing your stomach toward the floor. Lift your buttocks toward the ceiling. 5. Hold this position for 15 to 30 seconds. 6. Repeat 2 to 4 times. Follow-up care is a key part of your treatment and safety. Be sure to make and go to all appointments, and call your doctor if you are having problems. It's also a good idea to know your test results and keep a list of the medicines you take. Where can you learn more? Go to http://russ-kiesha.info/. Enter W866 in the search box to learn more about \"Acute Low Back Pain: Exercises. \" Current as of: May 23, 2016 Content Version: 11.2 © 8173-0167 Healthwise, Incorporated. Care instructions adapted under license by [employer] (which disclaims liability or warranty for this information). If you have questions about a medical condition or this instruction, always ask your healthcare professional. [address] any warranty or liability for your use of this information. Introducing [facility]! Dear Steph Banegas: Thank you for requesting a nDreams account. Our records indicate that you already have an active nDreams account. You can access your account anytime at [email or web address] Did you know that you can access your hospital and ER discharge instructions at any time in nDreams? You can also review all of your test results from your hospital stay or ER visit. Additional Information If you have questions, please visit the Frequently Asked Questions section of the nDreams website at [email or web address] Remember, Momoxhart is NOT to be used for urgent needs. For medical emergencies, dial 911. Now available from your iPhone and Android! Please provide this summary of care documentation to your next provider. Your primary care clinician is listed as Fernanda Alfred. If you have any questions after today's visit, please call [phone].

## 2017-06-19 NOTE — PROGRESS NOTES
NEUROLOGY FOLLOWUP    CHIEF COMPLAINT  Essential tremor    HPI    Pt is here for f/u of tremor. He is on primidone 250mg qhs and gabapentin 600mg at night. He reports he has gone back to grief therapy. He feels this is helping. He lost his wife 1 year and 10 months ago. He was having issues with concentrating and functioning at work. He messed up some big projects. He did take some ambien to help sleep. He cut back on his gabapentin to 600mg at night. He reports today he is having some back pain. He still gets pain on the left leg pain from his femur fracture. Recap:  He is here for f/u of tremor. August 1st he had his hip replaced on the right. He then had a fall and broke his femur. He had to have the hip redone and steel bands on his femur. He was out of work for three months. He went back to work Nov 7th. He reports his pain is not as severe. He was on narcotics but was able to wean off of this now. He does have essential tremor. He is on primidone 250mg BID. He ran out of his meds and had a major tremor. He takes klonopin as needed. He needs refills. He is on gabapentin 600mg BID. No side effects from this. CURRENT MEDS  Current Outpatient Prescriptions   Medication Sig Dispense Refill    BD INSULIN PEN NEEDLE UF SHORT 31 gauge x 5/16\" ndle USE TO INJECT UNDER THE SKIN THREE TIMES A  Package 1    ONETOUCH ULTRA TEST strip USE AS DIRECTED BY PHYSICIAN 100 Strip 3    butalbital-acetaminophen-caffeine (FIORICET, ESGIC) -40 mg per tablet Take 1 Tab by mouth every four (4) hours as needed for Pain. Max Daily Amount: 6 Tabs. 15 Tab 0    zolpidem (AMBIEN) 10 mg tablet Take 1 Tab by mouth nightly as needed for Sleep.  Max Daily Amount: 10 mg. 30 Tab 0    VITAMIN D2 50,000 unit capsule TAKE 1 CAPSULE EVERY 7 DAYS 12 Cap 2    lovastatin (MEVACOR) 20 mg tablet TAKE 1 TABLET IN THE EVENING 90 Tab 1    insulin glargine (LANTUS SOLOSTAR) 100 unit/mL (3 mL) pen INJECT 20 UNITS UNDER THE SKIN IN THE MORNING AND 18 UNITS AT NIGHT 45 mL 0    sertraline (ZOLOFT) 100 mg tablet TAKE 1.5 TABLET DAILY 135 Tab 0    gabapentin (NEURONTIN) 300 mg capsule Take 2 Caps by mouth nightly. 1 Cap 0    JANUVIA 100 mg tablet TAKE 1 TABLET DAILY 90 Tab 1    metFORMIN ER (GLUCOPHAGE XR) 500 mg tablet Take 2 Tabs by mouth daily (with dinner). Please disp generic form 180 Tab 1    fluticasone (FLONASE) 50 mcg/actuation nasal spray 2 Sprays by Both Nostrils route daily. 1 Bottle 0    aspirin 81 mg chewable tablet Take 1 Tab by mouth daily. 30 Tab 11    clonazePAM (KLONOPIN) 0.5 mg tablet Take 1 Tab by mouth nightly as needed. Max Daily Amount: 0.5 mg. 20 Tab 5    primidone (MYSOLINE) 250 mg tablet Take 1 Tab by mouth two (2) times a day. 323 Tab 3    folic acid (FOLVITE) 1 mg tablet TAKE 1 TABLET DAILY 90 Tab 3    lisinopril (PRINIVIL, ZESTRIL) 20 mg tablet TAKE 1 TABLET DAILY 90 Tab 2    ONETOUCH ULTRA TEST strip       melatonin 3 mg tablet Take 1 Tab by mouth daily (after dinner). For sleep. Take about 3 hours prior to bedtime 30 Tab 5    clobetasol (TEMOVATE) 0.05 % ointment Apply  to affected area two (2) times daily as needed. 60 g 2    JARDIANCE 25 mg tablet       senna-docusate (PERICOLACE) 8.6-50 mg per tablet Take 1 Tab by mouth two (2) times daily as needed for Constipation.  DIETARY SUPPLEMENT PO Take 6 Tabs by mouth daily.  CATAPLEX B (STANDARD PROCESS CO)         ROS  Constitutional: Denies recent weight change, fever, chills, sweats   ENT/Mouth:  Eye:  Denies hearing loss, ringing in the ears,   Denies vision changes,   Cardiovascular:  Respiratory:   Denies chest pain/angina pectoris  Denies shortness of breath, cough, wheezing   Gastrointestinal: Denies nausea, vomiting, constipation, frequent diarrhea,   Musculoskeletal:   + joint pain, stiffness/swelling, positive back pain    Integument:   Denies rash/itching   Neurological:  Per HPI, +falls   Psychiatric:   Denies anxiety or depression       PREVIOUS WORKUP  No CNS imaging    LABS  Results for orders placed or performed in visit on 02/02/17   AMB POC HEMOGLOBIN A1C   Result Value Ref Range    Hemoglobin A1c (POC) 7.3 %       PHYSICAL EXAM  Exam:  Visit Vitals    /70    Resp 20    Ht 5' 10\" (1.778 m)    Wt 117.9 kg (260 lb)    BMI 37.31 kg/m2     Gen:  CV: RRR  Lungs: non labored breathing  Abd: non distending  Neuro: A&O x 3, no dysarthria or aphasia  CN II-XII: PERRL, EOMI, face symmetric, tongue/palate midline  Motor: strength 5/5 all four ext, no tremor today  Sensory: intact to LT  Gait: normal    ASSESSMENT   This is a 75 y/o male who presents for fu of benign essential tremor. His tremor is bilateral and with action. He is doing well with gabapentin and primidone. Currently he is taking his primidone and gabapentin only at night and doing well with this. Today he has a new issue of low back pain. This seems to be musculoskeletal.  Will initiate exercises for this. PLAN  1. Cont primidone to 250mg tabs qhs  2. Continue gabapentin to 600mg qhs  3. Klonopin prn. Refill given  4. Low back exercises    Follow-up one year or sooner if needed    Tina Smith MD    CC: Nawaf Quintana MD  Fax: [phone]    This note was created using voice recognition software. Despite editing, there may be syntax errors. This note will not be viewable in [address]

## 2017-07-03 RX ORDER — EMPAGLIFLOZIN 25 MG/1
TABLET, FILM COATED ORAL
Qty: 30 TAB | Refills: 10 | Status: SHIPPED | OUTPATIENT
Start: 2017-07-03 | End: 2018-07-10 | Stop reason: SDUPTHER

## 2017-07-05 DIAGNOSIS — F43.21 GRIEF: ICD-10-CM

## 2017-07-05 DIAGNOSIS — F33.1 MODERATE EPISODE OF RECURRENT MAJOR DEPRESSIVE DISORDER (HCC): ICD-10-CM

## 2017-07-05 DIAGNOSIS — G44.209 ACUTE NON INTRACTABLE TENSION-TYPE HEADACHE: ICD-10-CM

## 2017-07-06 RX ORDER — BUTALBITAL, ACETAMINOPHEN AND CAFFEINE 50; 325; 40 MG/1; MG/1; MG/1
TABLET ORAL
Qty: 15 TAB | Refills: 0 | Status: SHIPPED | OUTPATIENT
Start: 2017-07-06 | End: 2017-09-08 | Stop reason: SDUPTHER

## 2017-07-06 RX ORDER — ZOLPIDEM TARTRATE 10 MG/1
TABLET ORAL
Qty: 30 TAB | Refills: 0 | Status: SHIPPED | OUTPATIENT
Start: 2017-07-06 | End: 2017-08-22 | Stop reason: ALTCHOICE

## 2017-07-07 ENCOUNTER — DOCUMENTATION ONLY (OUTPATIENT)
Dept: FAMILY MEDICINE CLINIC | Age: 67
End: 2017-07-07

## 2017-07-26 ENCOUNTER — OFFICE VISIT (OUTPATIENT)
Dept: FAMILY MEDICINE CLINIC | Age: 67
End: 2017-07-26

## 2017-07-26 VITALS
TEMPERATURE: 98.2 F | RESPIRATION RATE: 18 BRPM | HEIGHT: 70 IN | BODY MASS INDEX: 38.6 KG/M2 | SYSTOLIC BLOOD PRESSURE: 125 MMHG | HEART RATE: 66 BPM | OXYGEN SATURATION: 98 % | DIASTOLIC BLOOD PRESSURE: 52 MMHG | WEIGHT: 269.6 LBS

## 2017-07-26 DIAGNOSIS — M16.12 PRIMARY OSTEOARTHRITIS OF LEFT HIP: ICD-10-CM

## 2017-07-26 DIAGNOSIS — Q21.12 PFO (PATENT FORAMEN OVALE): ICD-10-CM

## 2017-07-26 DIAGNOSIS — I10 ESSENTIAL HYPERTENSION: ICD-10-CM

## 2017-07-26 DIAGNOSIS — D64.9 ANEMIA, UNSPECIFIED TYPE: ICD-10-CM

## 2017-07-26 DIAGNOSIS — B35.6 TINEA CRURIS: ICD-10-CM

## 2017-07-26 DIAGNOSIS — F51.01 PRIMARY INSOMNIA: ICD-10-CM

## 2017-07-26 DIAGNOSIS — E78.00 HIGH CHOLESTEROL: ICD-10-CM

## 2017-07-26 LAB — HBA1C MFR BLD HPLC: 8.6 %

## 2017-07-26 RX ORDER — ACETAMINOPHEN AND CODEINE PHOSPHATE 300; 30 MG/1; MG/1
1 TABLET ORAL
Qty: 30 TAB | Refills: 0 | Status: SHIPPED | OUTPATIENT
Start: 2017-07-26 | End: 2017-09-08 | Stop reason: SDUPTHER

## 2017-07-26 NOTE — PROGRESS NOTES
Subjective:     Chief Complaint   Patient presents with    Diabetes     4 month follow-up      He  is a 69 y.o. male who presents for evaluation of:    Diabetes Mellitus, HTN, HLD:  Taking meds, home glucose monitoring: is performed regularly - 100-170s  Reports no polyuria or polydipsia, no chest pain, dyspnea or TIA's, no numbness, tingling or pain in extremities, last eye exam approximately < 1 yr ago. Not exercising d/t hip surgery  Compliant with diabetic diet. Avoids sodas and sweet teas. Avoids fast food. Limits carbs. Pt is a non smoker.     Lab Results   Component Value Date/Time    Hemoglobin A1c (POC) 8.6 07/26/2017 12:24 PM    Hemoglobin A1c (POC) 7.3 02/02/2017 02:45 PM    Hemoglobin A1c 6.7 10/04/2016 10:08 AM    Microalbumin/Creat ratio (mg/g creat) 13 11/01/2010 07:07 AM    Microalb/Creat ratio (ug/mg creat.) <8.8 07/26/2017 12:55 PM    Microalbumin,urine random 0.81 11/01/2010 07:07 AM    LDL, calculated 58 07/26/2017 01:24 PM    Creatinine 0.89 07/26/2017 01:24 PM      Lab Results   Component Value Date/Time    GFR est  07/26/2017 01:24 PM    GFR est non-AA 87 07/26/2017 01:24 PM      Lab Results   Component Value Date/Time    TSH 1.380 07/26/2017 01:24 PM       ROS  Gen - no fever/chills  Resp - no dyspnea or cough  CV - no chest pain or WANG  Rest per HPI    Past Medical History:   Diagnosis Date    Anemia due to blood loss     Hinson's esophagus with esophagitis     Benign essential tremor     Cancer ([facility] 2012    BCC    Depression     DJD (degenerative joint disease) of hip     bilat    DM (diabetes mellitus) ([facility] 3/17/2010    ED (erectile dysfunction) of organic origin     Fall on or from sidewalk curb 9/24/2015    Gastritis and duodenitis     GERD (gastroesophageal reflux disease)     resolved after discontinuing diclofenac    Hematuria 6/2016    High cholesterol 3/17/2010    HTN (hypertension) 3/17/2010    Morbid obesity ([facility]     Murmur, cardiac 2016    PFO (patent foramen ovale) 7/26/2017    PUD (peptic ulcer disease)     Shingles 6/2016    RESOLVING- NO RASH (HEAD)    Sleep apnea     CPAP     Past Surgical History:   Procedure Laterality Date    ENDOSCOPY, COLON, DIAGNOSTIC      HX CHOLECYSTECTOMY  1995    HX GI  1980    gastroplasty    HX HERNIA REPAIR  1995    HX ORTHOPAEDIC  2011    rot cuff repair    HX TONSILLECTOMY  1950    TOTAL HIP ARTHROPLASTY  05/2010    right    TOTAL HIP ARTHROPLASTY  08/01/2016    left     Current Outpatient Prescriptions on File Prior to Visit   Medication Sig Dispense Refill    zolpidem (AMBIEN) 10 mg tablet TAKE 1 TABLET BY MOUTH EACH NIGHT AT BEDTIME AS NEEDED FOR SLEEP. MAX 1 TAB PER DAY 30 Tab 0    butalbital-acetaminophen-caffeine (FIORICET, ESGIC) -40 mg per tablet TAKE 1 TABLET BY MOUTH EVERY 4 HOURS AS NEEDED FOR PAIN. MAX 6 TABS PER DAY 15 Tab 0    JARDIANCE 25 mg tablet TAKE 1 TABLET DAILY 30 Tab 10    BD INSULIN PEN NEEDLE UF SHORT 31 gauge x 5/16\" ndle USE TO INJECT UNDER THE SKIN THREE TIMES A  Package 1    ONETOUCH ULTRA TEST strip USE AS DIRECTED BY PHYSICIAN 100 Strip 3    VITAMIN D2 50,000 unit capsule TAKE 1 CAPSULE EVERY 7 DAYS 12 Cap 2    lovastatin (MEVACOR) 20 mg tablet TAKE 1 TABLET IN THE EVENING 90 Tab 1    sertraline (ZOLOFT) 100 mg tablet TAKE 1.5 TABLET DAILY 135 Tab 0    gabapentin (NEURONTIN) 300 mg capsule Take 2 Caps by mouth nightly. 1 Cap 0    JANUVIA 100 mg tablet TAKE 1 TABLET DAILY 90 Tab 1    metFORMIN ER (GLUCOPHAGE XR) 500 mg tablet Take 2 Tabs by mouth daily (with dinner). Please disp generic form 180 Tab 1    fluticasone (FLONASE) 50 mcg/actuation nasal spray 2 Sprays by Both Nostrils route daily. 1 Bottle 0    aspirin 81 mg chewable tablet Take 1 Tab by mouth daily. 30 Tab 11    clonazePAM (KLONOPIN) 0.5 mg tablet Take 1 Tab by mouth nightly as needed.  Max Daily Amount: 0.5 mg. 20 Tab 5    primidone (MYSOLINE) 250 mg tablet Take 1 Tab by mouth two (2) times a day. 566 Tab 3    folic acid (FOLVITE) 1 mg tablet TAKE 1 TABLET DAILY 90 Tab 3    lisinopril (PRINIVIL, ZESTRIL) 20 mg tablet TAKE 1 TABLET DAILY 90 Tab 2    ONETOUCH ULTRA TEST strip       melatonin 3 mg tablet Take 1 Tab by mouth daily (after dinner). For sleep. Take about 3 hours prior to bedtime 30 Tab 5    clobetasol (TEMOVATE) 0.05 % ointment Apply  to affected area two (2) times daily as needed. 60 g 2    senna-docusate (PERICOLACE) 8.6-50 mg per tablet Take 1 Tab by mouth two (2) times daily as needed for Constipation.  DIETARY SUPPLEMENT PO Take 6 Tabs by mouth daily. CATAPLEX B (STANDARD PROCESS CO)       No current facility-administered medications on file prior to visit. Objective:     Vitals:    07/26/17 1205   BP: 125/52   Pulse: 66   Resp: 18   Temp: 98.2 °F (36.8 °C)   TempSrc: Oral   SpO2: 98%   Weight: 269 lb 9.6 oz (122.3 kg)   Height: 5' 10\" (1.778 m)     Physical Examination:  General appearance - alert, well appearing, and in no distress  Eyes -sclera anicteric  Neck - supple, no significant adenopathy, no thyromegaly  Chest - clear to auscultation, no wheezes, rales or rhonchi, symmetric air entry  Heart - normal rate, regular rhythm, normal S1, S2,2/6 LION rad to carotids  Neurological - alert, oriented, no focal findings or movement disorder noted  Extr -1+ pitting edema in LLE (improving), mildly ttp a ant left shin    Assessment/ Plan:   Diagnoses and all orders for this visit:    1. Uncontrolled type 2 diabetes mellitus without complication, without long-term current use of insulin (HCC) - control much worse recently. Should be improving after making changes already. Will also switch over to [facility] to help decrease weight and limit insulin burden.   -     AMB POC HEMOGLOBIN A1C  -     MICROALBUMIN, UR, RAND W/ MICROALBUMIN/CREA RATIO  -     insulin glargine-lixisenatide (SOLIQUA 100/33) 100 unit-33 mcg/mL inpn; 40 Units by SubCUTAneous route Daily (before breakfast). -     METABOLIC PANEL, COMPREHENSIVE  -     LIPID PANEL  -     TSH 3RD GENERATION    2. Essential hypertension - controlled  -     METABOLIC PANEL, COMPREHENSIVE    3. High cholesterol - stable  -     METABOLIC PANEL, COMPREHENSIVE  -     LIPID PANEL  -     TSH 3RD GENERATION    4. Primary osteoarthritis of left hip - will try APAP #3 to help with pain control in short term. -     acetaminophen-codeine (TYLENOL #3) 300-30 mg per tablet; Take 1 Tab by mouth every six (6) hours as needed for Pain. Max Daily Amount: 4 Tabs. 5. Anemia, unspecified type - rechecking labs  -     CBC W/O DIFF    6. PFO (patent foramen ovale) - tiny PFO with R to L shunting, no sx - murmur stable    7. Primary insomnia - chronic issue. Will try Belsomra if not too $$$.  Would consider restarting Ambien since. -     suvorexant (BELSOMRA) 15 mg tablet; Take 1 Tab by mouth nightly as needed for Insomnia. Max Daily Amount: 15 mg.    8. Tinea cruris  -     ketoconazole (NIZORAL) 2 % topical cream; Apply  to affected area two (2) times a day. Other orders  -     DIABETES PATIENT EDUCATION      I have discussed the diagnosis with the patient and the intended plan as seen in the above orders. The patient has received an after-visit summary and questions were answered concerning future plans. I have discussed medication side effects and warnings with the patient as well. The patient verbalizes understanding and agreement with the plan. Follow-up Disposition:  Return in about 6 weeks (around 9/6/2017), or if symptoms worsen or fail to improve, for diabetes.

## 2017-07-26 NOTE — MR AVS SNAPSHOT
Visit Information Date & Time Provider Department Dept. Phone Encounter #  
 7/26/2017 11:30 AM Trina Sheldon MD [facility] at [address] [id number] Follow-up Instructions Return in about 6 weeks (around 9/6/2017), or if symptoms worsen or fail to improve, for diabetes. Upcoming Health Maintenance Date Due COLONOSCOPY 4/14/2015 EYE EXAM RETINAL OR DILATED Q1 4/11/2017 MICROALBUMIN Q1 6/27/2017 HEMOGLOBIN A1C Q6M 8/2/2017 INFLUENZA AGE 9 TO ADULT 8/1/2017 LIPID PANEL Q1 10/4/2017 Pneumococcal 65+ Low/Medium Risk (2 of 2 - PPSV23) 12/5/2017 FOOT EXAM Q1 2/2/2018 MEDICARE YEARLY EXAM 2/3/2018 GLAUCOMA SCREENING Q2Y 4/11/2018 DTaP/Tdap/Td series (2 - Td) 1/31/2021 Allergies as of 7/26/2017  Review Complete On: 7/26/2017 By: Kyrie Yuan LPN Severity Noted Reaction Type Reactions Nsaids (Non-steroidal Anti-inflammatory Drug) Low 01/26/2016    Other (comments) Hx of PUD and Hinson's Esophagus Current Immunizations  Reviewed on 12/5/2016 Name Date Influenza High Dose Vaccine PF 9/22/2016 Influenza Vaccine 10/3/2013 Influenza Vaccine Split 10/28/2012 TDAP Vaccine 1/31/2011 Not reviewed this visit You Were Diagnosed With   
  
 Codes Comments Uncontrolled type 2 diabetes mellitus without complication, without long-term current use of insulin ([facility]    -  Primary ICD-10-CM: E11.65 ICD-9-CM: 250.02 Essential hypertension     ICD-10-CM: I10 
ICD-9-CM: 401.9 High cholesterol     ICD-10-CM: E78.00 ICD-9-CM: 272.0 Primary osteoarthritis of left hip     ICD-10-CM: M16.12 
ICD-9-CM: 715.15 Anemia, unspecified type     ICD-10-CM: D64.9 ICD-9-CM: 473. 9 Vitals BP Pulse Temp Resp Height(growth percentile) Weight(growth percentile)  125/52 (BP 1 Location: Left arm, BP Patient Position: Sitting) 66 98.2 °F (36.8 °C) (Oral) 18 5' 10\" (1.778 m) 269 lb 9.6 oz (122.3 kg) SpO2 BMI Smoking Status 98% 38.68 kg/m2 Former Smoker Vitals History BMI and BSA Data Body Mass Index Body Surface Area  
 38.68 kg/m 2 2.46 m 2 Preferred Pharmacy [facility] [address] [place] [facility] [place] [phone] Your Updated Medication List  
  
   
This list is accurate as of: 7/26/17 12:40 PM.  Always use your most recent med list.  
  
  
  
  
 aspirin 81 mg chewable tablet Take 1 Tab by mouth daily. BD INSULIN PEN NEEDLE UF SHORT 31 gauge x 5/16\" Ndle Generic drug:  Insulin Needles (Disposable) USE TO INJECT UNDER THE SKIN THREE TIMES A DAY  
  
 butalbital-acetaminophen-caffeine -40 mg per tablet Commonly known as:  FIORICET, ESGIC  
TAKE 1 TABLET BY MOUTH EVERY 4 HOURS AS NEEDED FOR PAIN. MAX 6 TABS PER DAY  
  
 clobetasol 0.05 % ointment Commonly known as:  Armando Monterroso Apply  to affected area two (2) times daily as needed. clonazePAM 0.5 mg tablet Commonly known as:  Trang Mortimer Take 1 Tab by mouth nightly as needed. Max Daily Amount: 0.5 mg. DIETARY SUPPLEMENT PO Take 6 Tabs by mouth daily. CATAPLEX B (STANDARD PROCESS CO)  
  
 fluticasone 50 mcg/actuation nasal spray Commonly known as:  Carlee Boise 2 Sprays by Both Nostrils route daily. folic acid 1 mg tablet Commonly known as:  FOLVITE  
TAKE 1 TABLET DAILY  
  
 gabapentin 300 mg capsule Commonly known as:  NEURONTIN Take 2 Caps by mouth nightly. insulin glargine-lixisenatide 100 unit-33 mcg/mL Inpn Commonly known as:  SOLIQUA 100/33  
40 Units by SubCUTAneous route Daily (before breakfast). JANUVIA 100 mg tablet Generic drug:  SITagliptin TAKE 1 TABLET DAILY JARDIANCE 25 mg tablet Generic drug:  empagliflozin TAKE 1 TABLET DAILY  
  
 lisinopril 20 mg tablet Commonly known as:  PRINIVIL, ZESTRIL  
TAKE 1 TABLET DAILY lovastatin 20 mg tablet Commonly known as:  MEVACOR  
TAKE 1 TABLET IN THE EVENING  
  
 melatonin 3 mg tablet Take 1 Tab by mouth daily (after dinner). For sleep. Take about 3 hours prior to bedtime  
  
 metFORMIN  mg tablet Commonly known as:  GLUCOPHAGE XR Take 2 Tabs by mouth daily (with dinner). Please disp generic form * ONETOUCH ULTRA TEST strip Generic drug:  glucose blood VI test strips * ONETOUCH ULTRA TEST strip Generic drug:  glucose blood VI test strips USE AS DIRECTED BY PHYSICIAN  
  
 primidone 250 mg tablet Commonly known as: MYSOLINE Take 1 Tab by mouth two (2) times a day. senna-docusate 8.6-50 mg per tablet Commonly known as:  Cecile Tuscarora Take 1 Tab by mouth two (2) times daily as needed for Constipation. sertraline 100 mg tablet Commonly known as:  ZOLOFT  
TAKE 1.5 TABLET DAILY  
  
 VITAMIN D2 50,000 unit capsule Generic drug:  ergocalciferol TAKE 1 CAPSULE EVERY 7 DAYS  
  
 zolpidem 10 mg tablet Commonly known as:  AMBIEN  
TAKE 1 TABLET BY MOUTH EACH NIGHT AT BEDTIME AS NEEDED FOR SLEEP. MAX 1 TAB PER DAY * Notice: This list has 2 medication(s) that are the same as other medications prescribed for you. Read the directions carefully, and ask your doctor or other care provider to review them with you. Prescriptions Printed Refills  
 insulin glargine-lixisenatide (SOLIQUA 100/33) 100 unit-33 mcg/mL inpn 5 Si Units by SubCUTAneous route Daily (before breakfast). Class: Print Route: SubCUTAneous We Performed the Following AMB POC HEMOGLOBIN A1C [58536 CPT(R)] CBC W/O DIFF [16648 CPT(R)] LIPID PANEL [32133 CPT(R)] METABOLIC PANEL, COMPREHENSIVE [77540 CPT(R)] MICROALBUMIN, UR, RAND W/ MICROALBUMIN/CREA RATIO [id number] CPT(R)] TSH 3RD GENERATION [42739 CPT(R)] Follow-up Instructions  Return in about 6 weeks (around 2017), or if symptoms worsen or fail to improve, for diabetes. Introducing hospitals & HEALTH SERVICES! Dear Robbi Lance: Thank you for requesting a [employer] account. Our records indicate that you already have an active [employer] account. You can access your account anytime at [email or web address] Did you know that you can access your hospital and ER discharge instructions at any time in [employer]? You can also review all of your test results from your hospital stay or ER visit. Additional Information If you have questions, please visit the Frequently Asked Questions section of the [employer] website at [email or web address] Remember, [employer] is NOT to be used for urgent needs. For medical emergencies, dial 911. Now available from your iPhone and Android! Please provide this summary of care documentation to your next provider. Your primary care clinician is listed as Rojelio Tracey. If you have any questions after today's visit, please call [phone].

## 2017-07-26 NOTE — PROGRESS NOTES
Chief Complaint   Patient presents with    Diabetes     4 month follow-up   Discuss right hip pain saw surgeon xrays done  Discuss Insomnia  Room 5

## 2017-07-27 LAB
ALBUMIN SERPL-MCNC: 4 G/DL (ref 3.6–4.8)
ALBUMIN/CREAT UR: <8.8 MG/G CREAT (ref 0–30)
ALBUMIN/GLOB SERPL: 1.4 {RATIO} (ref 1.2–2.2)
ALP SERPL-CCNC: 101 IU/L (ref 39–117)
ALT SERPL-CCNC: 15 IU/L (ref 0–44)
AST SERPL-CCNC: 24 IU/L (ref 0–40)
BILIRUB SERPL-MCNC: 0.5 MG/DL (ref 0–1.2)
BUN SERPL-MCNC: 15 MG/DL (ref 8–27)
BUN/CREAT SERPL: 17 (ref 10–24)
CALCIUM SERPL-MCNC: 8.8 MG/DL (ref 8.6–10.2)
CHLORIDE SERPL-SCNC: 104 MMOL/L (ref 96–106)
CHOLEST SERPL-MCNC: 141 MG/DL (ref 100–199)
CO2 SERPL-SCNC: 21 MMOL/L (ref 18–29)
CREAT SERPL-MCNC: 0.89 MG/DL (ref 0.76–1.27)
CREAT UR-MCNC: 34.1 MG/DL
ERYTHROCYTE [DISTWIDTH] IN BLOOD BY AUTOMATED COUNT: 16.5 % (ref 12.3–15.4)
GLOBULIN SER CALC-MCNC: 2.9 G/DL (ref 1.5–4.5)
GLUCOSE SERPL-MCNC: 131 MG/DL (ref 65–99)
HCT VFR BLD AUTO: 35 % (ref 37.5–51)
HDLC SERPL-MCNC: 63 MG/DL
HGB BLD-MCNC: 11.7 G/DL (ref 12.6–17.7)
LDLC SERPL CALC-MCNC: 58 MG/DL (ref 0–99)
Lab: NORMAL
MCH RBC QN AUTO: 28.1 PG (ref 26.6–33)
MCHC RBC AUTO-ENTMCNC: 33.4 G/DL (ref 31.5–35.7)
MCV RBC AUTO: 84 FL (ref 79–97)
MICROALBUMIN UR-MCNC: <3 UG/ML
PLATELET # BLD AUTO: 107 X10E3/UL (ref 150–379)
POTASSIUM SERPL-SCNC: 4.2 MMOL/L (ref 3.5–5.2)
PROT SERPL-MCNC: 6.9 G/DL (ref 6–8.5)
RBC # BLD AUTO: 4.17 X10E6/UL (ref 4.14–5.8)
SODIUM SERPL-SCNC: 138 MMOL/L (ref 134–144)
TRIGL SERPL-MCNC: 98 MG/DL (ref 0–149)
TSH SERPL DL<=0.005 MIU/L-ACNC: 1.38 UIU/ML (ref 0.45–4.5)
VLDLC SERPL CALC-MCNC: 20 MG/DL (ref 5–40)
WBC # BLD AUTO: 5.4 X10E3/UL (ref 3.4–10.8)

## 2017-07-27 RX ORDER — KETOCONAZOLE 20 MG/G
CREAM TOPICAL 2 TIMES DAILY
Qty: 15 G | Refills: 0 | Status: SHIPPED | OUTPATIENT
Start: 2017-07-27 | End: 2018-03-04

## 2017-07-28 ENCOUNTER — TELEPHONE (OUTPATIENT)
Dept: FAMILY MEDICINE CLINIC | Age: 67
End: 2017-07-28

## 2017-07-28 NOTE — TELEPHONE ENCOUNTER
Pt checking on authorizations needed for 2 meds : insulin glargine-lixisenatide (SOLIQUA 100/33) 100 unit-33 mcg/mL inpn   and   suvorexant (BELSOMRA) 15 mg           Best number to reach him is [phone]

## 2017-08-14 ENCOUNTER — TELEPHONE (OUTPATIENT)
Dept: FAMILY MEDICINE CLINIC | Age: 67
End: 2017-08-14

## 2017-08-14 ENCOUNTER — OFFICE VISIT (OUTPATIENT)
Dept: NEUROLOGY | Age: 67
End: 2017-08-14

## 2017-08-14 VITALS — HEART RATE: 73 BPM | SYSTOLIC BLOOD PRESSURE: 134 MMHG | DIASTOLIC BLOOD PRESSURE: 69 MMHG

## 2017-08-14 DIAGNOSIS — G62.9 NEUROPATHY: ICD-10-CM
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
## 2017-08-14 DIAGNOSIS — F98.8 ADD (ATTENTION DEFICIT DISORDER): ICD-10-CM

## 2017-08-14 DIAGNOSIS — G25.0 ESSENTIAL TREMOR: ICD-10-CM

## 2017-08-14 RX ORDER — ATOMOXETINE 40 MG/1
40 CAPSULE ORAL DAILY
Qty: 30 CAP | Refills: 5 | Status: SHIPPED | OUTPATIENT
Start: 2017-08-14 | End: 2018-02-24

## 2017-08-14 RX ORDER — LORAZEPAM 1 MG/1
1 TABLET ORAL ONCE
Qty: 2 TAB | Refills: 0 | Status: SHIPPED | OUTPATIENT
Start: 2017-08-14 | End: 2017-08-14

## 2017-08-14 NOTE — PROGRESS NOTES
NEUROLOGY FOLLOWUP    CHIEF COMPLAINT  Essential tremor    HPI  Mr. Ida Reddy is here today on an urgent visit secondary to having new onset falls. Since patient was last seen in June he has had 2 falls. Patient reports that he fell in the bathroom for one episode. Another procedure is bending over and fell down as well. Today he says he would have fallen if his bed had not been in the way. Patient reports that all falls have been forward. He has not had any severe dizziness associated with it. He did not have any loss of consciousness or trauma when he fell. Concerned by the symptoms because they are new. The only change in patient's medications he started taking Tylenol with codeine due to his left hip pain. He takes this only at night. Recall that he still continues on primidone and gabapentin for essential tremor. Patient is taking Klonopin as needed but has not taken this in several weeks. Patient is worried also about his memory. He says his short-term memory is not good. He has been having focusing issues and missing a project at work. Patient also has numbness of the feet due to his diabetes. Stroke risk factors include hypertension and diabetes. No vision changes or focal numbness or weakness otherwise. Patient does take aspirin 81 mg daily for stroke prevention. Recap:  Pt is here for f/u of tremor. He is on primidone 250mg qhs and gabapentin 600mg at night. He reports he has gone back to grief therapy. He feels this is helping. He lost his wife 1 year and 10 months ago. He was having issues with concentrating and functioning at work. He messed up some big projects. He did take some ambien to help sleep. He cut back on his gabapentin to 600mg at night. He reports today he is having some back pain. He still gets pain on the left leg pain from his femur fracture.     CURRENT MEDS  Current Outpatient Prescriptions   Medication Sig Dispense Refill    insulin glargine-lixisenatide (SOLIQUA 100/33) 100 unit-33 mcg/mL inpn 40 Units by SubCUTAneous route Daily (before breakfast). 4 Pen 5    acetaminophen-codeine (TYLENOL #3) 300-30 mg per tablet Take 1 Tab by mouth every six (6) hours as needed for Pain. Max Daily Amount: 4 Tabs. 30 Tab 0    suvorexant (BELSOMRA) 15 mg tablet Take 1 Tab by mouth nightly as needed for Insomnia. Max Daily Amount: 15 mg. 30 Tab 2    butalbital-acetaminophen-caffeine (FIORICET, ESGIC) -40 mg per tablet TAKE 1 TABLET BY MOUTH EVERY 4 HOURS AS NEEDED FOR PAIN. MAX 6 TABS PER DAY 15 Tab 0    JARDIANCE 25 mg tablet TAKE 1 TABLET DAILY 30 Tab 10    BD INSULIN PEN NEEDLE UF SHORT 31 gauge x 5/16\" ndle USE TO INJECT UNDER THE SKIN THREE TIMES A  Package 1    ONETOUCH ULTRA TEST strip USE AS DIRECTED BY PHYSICIAN 100 Strip 3    VITAMIN D2 50,000 unit capsule TAKE 1 CAPSULE EVERY 7 DAYS 12 Cap 2    lovastatin (MEVACOR) 20 mg tablet TAKE 1 TABLET IN THE EVENING 90 Tab 1    sertraline (ZOLOFT) 100 mg tablet TAKE 1.5 TABLET DAILY 135 Tab 0    gabapentin (NEURONTIN) 300 mg capsule Take 2 Caps by mouth nightly. 1 Cap 0    JANUVIA 100 mg tablet TAKE 1 TABLET DAILY 90 Tab 1    metFORMIN ER (GLUCOPHAGE XR) 500 mg tablet Take 2 Tabs by mouth daily (with dinner). Please disp generic form 180 Tab 1    aspirin 81 mg chewable tablet Take 1 Tab by mouth daily. 30 Tab 11    clonazePAM (KLONOPIN) 0.5 mg tablet Take 1 Tab by mouth nightly as needed. Max Daily Amount: 0.5 mg. 20 Tab 5    primidone (MYSOLINE) 250 mg tablet Take 1 Tab by mouth two (2) times a day. 441 Tab 3    folic acid (FOLVITE) 1 mg tablet TAKE 1 TABLET DAILY 90 Tab 3    lisinopril (PRINIVIL, ZESTRIL) 20 mg tablet TAKE 1 TABLET DAILY 90 Tab 2    ONETOUCH ULTRA TEST strip       melatonin 3 mg tablet Take 1 Tab by mouth daily (after dinner). For sleep. Take about 3 hours prior to bedtime 30 Tab 5    clobetasol (TEMOVATE) 0.05 % ointment Apply  to affected area two (2) times daily as needed.  61 g 2    ketoconazole (NIZORAL) 2 % topical cream Apply  to affected area two (2) times a day. 15 g 0    zolpidem (AMBIEN) 10 mg tablet TAKE 1 TABLET BY MOUTH EACH NIGHT AT BEDTIME AS NEEDED FOR SLEEP. MAX 1 TAB PER DAY 30 Tab 0    fluticasone (FLONASE) 50 mcg/actuation nasal spray 2 Sprays by Both Nostrils route daily. 1 Bottle 0    senna-docusate (PERICOLACE) 8.6-50 mg per tablet Take 1 Tab by mouth two (2) times daily as needed for Constipation.  DIETARY SUPPLEMENT PO Take 6 Tabs by mouth daily.  CATAPLEX B (STANDARD PROCESS CO)         ROS  Constitutional: Denies recent weight change, fever, chills, sweats   ENT/Mouth:  Eye:  Denies hearing loss, ringing in the ears,   Denies vision changes,   Cardiovascular:  Respiratory:   Denies chest pain/angina pectoris  Denies shortness of breath, cough, wheezing   Gastrointestinal: Denies nausea, vomiting, constipation, frequent diarrhea,   Musculoskeletal:   + joint pain, stiffness/swelling, positive back pain    Integument:   Denies rash/itching   Neurological:  Per HPI, +falls   Psychiatric:   Denies anxiety or depression       PREVIOUS WORKUP  No CNS imaging    LABS  Results for orders placed or performed in visit on 07/26/17   MICROALBUMIN, UR, RAND W/ MICROALBUMIN/CREA RATIO   Result Value Ref Range    Creatinine, urine 34.1 Not Estab. mg/dL    Microalbumin, urine <3.0 Not Estab. ug/mL    Microalb/Creat ratio (ug/mg creat.) <8.8 0.0 - 30.0 mg/g creat   METABOLIC PANEL, COMPREHENSIVE   Result Value Ref Range    Glucose 131 (H) 65 - 99 mg/dL    BUN 15 8 - 27 mg/dL    Creatinine 0.89 0.76 - 1.27 mg/dL    GFR est non-AA 87 >59 mL/min/1.73    GFR est  >59 mL/min/1.73    BUN/Creatinine ratio 17 10 - 24    Sodium 138 134 - 144 mmol/L    Potassium 4.2 3.5 - 5.2 mmol/L    Chloride 104 96 - 106 mmol/L    CO2 21 18 - 29 mmol/L    Calcium 8.8 8.6 - 10.2 mg/dL    Protein, total 6.9 6.0 - 8.5 g/dL    Albumin 4.0 3.6 - 4.8 g/dL    GLOBULIN, TOTAL 2.9 1.5 - 4.5 g/dL A-G Ratio 1.4 1.2 - 2.2    Bilirubin, total 0.5 0.0 - 1.2 mg/dL    Alk. phosphatase 101 39 - 117 IU/L    AST (SGOT) 24 0 - 40 IU/L    ALT (SGPT) 15 0 - 44 IU/L   LIPID PANEL   Result Value Ref Range    Cholesterol, total 141 100 - 199 mg/dL    Triglyceride 98 0 - 149 mg/dL    HDL Cholesterol 63 >39 mg/dL    VLDL, calculated 20 5 - 40 mg/dL    LDL, calculated 58 0 - 99 mg/dL   CBC W/O DIFF   Result Value Ref Range    WBC 5.4 3.4 - 10.8 x10E3/uL    RBC 4.17 4.14 - 5.80 x10E6/uL    HGB 11.7 (L) 12.6 - 17.7 g/dL    HCT 35.0 (L) 37.5 - 51.0 %    MCV 84 79 - 97 fL    MCH 28.1 26.6 - 33.0 pg    MCHC 33.4 31.5 - 35.7 g/dL    RDW 16.5 (H) 12.3 - 15.4 %    PLATELET 717 (L) 269 - 379 x10E3/uL   TSH 3RD GENERATION   Result Value Ref Range    TSH 1.380 0.450 - 4.500 uIU/mL   DIABETES PATIENT EDUCATION   Result Value Ref Range    PDF Image Not applicable    AMB POC HEMOGLOBIN A1C   Result Value Ref Range    Hemoglobin A1c (POC) 8.6 %       PHYSICAL EXAM  Exam:  Visit Vitals    /69    Pulse 73     Gen:  CV: RRR  Lungs: non labored breathing  Abd: non distending  Neuro: A&O x 3, no dysarthria or aphasia  CN II-XII: PERRL, EOMI, face symmetric, tongue/palate midline  Motor: strength 5/5 all four ext, no tremor today  Sensory: intact to LT  Gait: Antalgic due to left hip pain and unable to tandem    ASSESSMENT   This is a 70 y/o male who presents today for a new complaint of falls. He also has a new complaint of focusing issue/memory loss. Essential tremor remained stable on primidone and gabapentin. Given his new balance issues there is concern for stroke. Will do an MRI of the brain to evaluate for this. Pending this will do further testing. Additionally, I suspect he may have ADD. Patient is planning to retire at the end of this year, and wants to make sure he can focus until that time. Will initiate medication today but also still order neuropsych testing.   I am concerned that polypharmacy may be the etiology of patient's balance symptoms. PLAN  1. Cont primidone to 250mg tabs qhs  2. Continue gabapentin to 600mg qhs  3. MRI brain with and without contrast.  Pending these results may need to do blood vessel imaging and stroke workup  4. Referral to neuropsych for evaluation of ADD  5. Start Strattera 40 mg daily. Discussed with patient. Discussed side effects. Information given. 6.  Patient will stop codeine for the next week to see if this has any benefit on his balance    [facility] [address] [facility] but will call after MRI to see if further testing is warranted. Elvie Molina MD    CC: Sujata Varela MD  Fax: [phone]    This note was created using voice recognition software. Despite editing, there may be syntax errors. This note will not be viewable in [address]

## 2017-08-14 NOTE — TELEPHONE ENCOUNTER
Pt is checking on prior auth     It has never been received per [facility]     [facility]   [facility]ated in[facility]   Address: [address] [place] [address], [address] [place] [address] Street   [phone]      Pt # is [phone]

## 2017-08-14 NOTE — PATIENT INSTRUCTIONS
10 [facility]   Statement to Patients  April 1, 2014      In an effort to ensure the large volume of patient prescription refills is processed in the most efficient and expeditious manner, we are asking our patients to assist us by calling your Pharmacy for all prescription refills, this will include also your  Mail Order Pharmacy. The pharmacy will contact our office electronically to continue the refill process. Please do not wait until the last minute to call your pharmacy. We need at least 48 hours (2days) to fill prescriptions. We also encourage you to call your pharmacy before going to  your prescription to make sure it is ready. With regard to controlled substance prescription refill requests (narcotic refills) that need to be picked up at our office, we ask your cooperation by providing us with at least 72 hours (3days) notice that you will need a refill. We will not refill narcotic prescription refill requests after 4:00pm on any weekday, Monday through Thursday, or after 2:00pm on Fridays, or on the weekends. We encourage everyone to explore another way of getting your prescription refill request processed using [employer]Genius, our patient web portal through our electronic medical record system. rateGenius is an efficient and effective way to communicate your medication request directly to the office and  downloadable as an bhupinder on your smart phone . [employer]Genius also features a review functionality that allows you to view your medication list as well as leave messages for your physician. Are you ready to get connected? If so please review the attatched instructions or speak to any of our staff to get you set up right away! Thank you so much for your cooperation. Should you have any questions please contact our Practice Administrator.     The Physicians and Staff,  [facility]

## 2017-08-14 NOTE — LETTER
Patient states his balance is off. He has fallen twice in the past 2 weeks. 0 [facility] Essential tremor HPI Mr. Leonid Carnes is here today on an urgent visit secondary to having new onset falls. Since patient was last seen in June he has had 2 falls. Patient reports that he fell in the bathroom for one episode. Another procedure is bending over and fell down as well. Today he says he would have fallen if his bed had not been in the way. Patient reports that all falls have been forward. He has not had any severe dizziness associated with it. He did not have any loss of consciousness or trauma when he fell. Concerned by the symptoms because they are new. The only change in patient's medications he started taking Tylenol with codeine due to his left hip pain. He takes this only at night. Recall that he still continues on primidone and gabapentin for essential tremor. Patient is taking Klonopin as needed but has not taken this in several weeks. Patient is worried also about his memory. He says his short-term memory is not good. He has been having focusing issues and missing a project at work. Patient also has numbness of the feet due to his diabetes. Stroke risk factors include hypertension and diabetes. No vision changes or focal numbness or weakness otherwise. Patient does take aspirin 81 mg daily for stroke prevention. Recap: 
Pt is here for f/u of tremor. He is on primidone 250mg qhs and gabapentin 600mg at night. He reports he has gone back to grief therapy. He feels this is helping. He lost his wife 1 year and 10 months ago. He was having issues with concentrating and functioning at work. He messed up some big projects. He did take some ambien to help sleep. He cut back on his gabapentin to 600mg at night. He reports today he is having some back pain. He still gets pain on the left leg pain from his femur fracture.  
 
CURRENT MEDS 
 Current Outpatient Prescriptions Medication Sig Dispense Refill  insulin glargine-lixisenatide (SOLIQUA 100/33) 100 unit-33 mcg/mL inpn 40 Units by SubCUTAneous route Daily (before breakfast). 4 Pen 5  
 acetaminophen-codeine (TYLENOL #3) 300-30 mg per tablet Take 1 Tab by mouth every six (6) hours as needed for Pain. Max Daily Amount: 4 Tabs. 30 Tab 0  
 suvorexant (BELSOMRA) 15 mg tablet Take 1 Tab by mouth nightly as needed for Insomnia. Max Daily Amount: 15 mg. 30 Tab 2  
 butalbital-acetaminophen-caffeine (FIORICET, ESGIC) -40 mg per tablet TAKE 1 TABLET BY MOUTH EVERY 4 HOURS AS NEEDED FOR PAIN. MAX 6 TABS PER DAY 15 Tab 0  JARDIANCE 25 mg tablet TAKE 1 TABLET DAILY 30 Tab 10  
 BD INSULIN PEN NEEDLE UF SHORT 31 gauge x 5/16\" ndle USE TO INJECT UNDER THE SKIN THREE TIMES A  Package 1  
 ONETOUCH ULTRA TEST strip USE AS DIRECTED BY PHYSICIAN 100 Strip 3  
 VITAMIN D2 50,000 unit capsule TAKE 1 CAPSULE EVERY 7 DAYS 12 Cap 2  
 lovastatin (MEVACOR) 20 mg tablet TAKE 1 TABLET IN THE EVENING 90 Tab 1  
 sertraline (ZOLOFT) 100 mg tablet TAKE 1.5 TABLET DAILY 135 Tab 0  
 gabapentin (NEURONTIN) 300 mg capsule Take 2 Caps by mouth nightly. 1 Cap 0  JANUVIA 100 mg tablet TAKE 1 TABLET DAILY 90 Tab 1  
 metFORMIN ER (GLUCOPHAGE XR) 500 mg tablet Take 2 Tabs by mouth daily (with dinner). Please disp generic form 180 Tab 1  
 aspirin 81 mg chewable tablet Take 1 Tab by mouth daily. 30 Tab 11  
 clonazePAM (KLONOPIN) 0.5 mg tablet Take 1 Tab by mouth nightly as needed. Max Daily Amount: 0.5 mg. 20 Tab 5  primidone (MYSOLINE) 250 mg tablet Take 1 Tab by mouth two (2) times a day. 250 Tab 3  
 folic acid (FOLVITE) 1 mg tablet TAKE 1 TABLET DAILY 90 Tab 3  
 lisinopril (PRINIVIL, ZESTRIL) 20 mg tablet TAKE 1 TABLET DAILY 90 Tab 2  
 ONETOUCH ULTRA TEST strip  melatonin 3 mg tablet Take 1 Tab by mouth daily (after dinner).  For sleep. Take about 3 hours prior to bedtime 30 Tab 5  clobetasol (TEMOVATE) 0.05 % ointment Apply  to affected area two (2) times daily as needed. 60 g 2  
 ketoconazole (NIZORAL) 2 % topical cream Apply  to affected area two (2) times a day. 15 g 0  
 zolpidem (AMBIEN) 10 mg tablet TAKE 1 TABLET BY MOUTH EACH NIGHT AT BEDTIME AS NEEDED FOR SLEEP. MAX 1 TAB PER DAY 30 Tab 0  
 fluticasone (FLONASE) 50 mcg/actuation nasal spray 2 Sprays by Both Nostrils route daily. 1 Bottle 0  
 senna-docusate (PERICOLACE) 8.6-50 mg per tablet Take 1 Tab by mouth two (2) times daily as needed for Constipation.  DIETARY SUPPLEMENT PO Take 6 Tabs by mouth daily. CATAPLEX B (STANDARD PROCESS CO) ROS Constitutional: Denies recent weight change, fever, chills, sweats ENT/Mouth: 
Eye:  Denies hearing loss, ringing in the ears, Denies vision changes,  
Cardiovascular: 
Respiratory:   Denies chest pain/angina pectoris Denies shortness of breath, cough, wheezing Gastrointestinal: Denies nausea, vomiting, constipation, frequent diarrhea,  
Musculoskeletal:   + joint pain, stiffness/swelling, positive back pain Integument:   Denies rash/itching Neurological:  Per HPI, +falls Psychiatric:   Denies anxiety or depression PREVIOUS WORKUP No CNS imaging LABS Results for orders placed or performed in visit on 07/26/17 MICROALBUMIN, UR, RAND W/ MICROALBUMIN/CREA RATIO Result Value Ref Range Creatinine, urine 34.1 Not Estab. mg/dL Microalbumin, urine <3.0 Not Estab. ug/mL Microalb/Creat ratio (ug/mg creat.) <8.8 0.0 - 30.0 mg/g creat METABOLIC PANEL, COMPREHENSIVE Result Value Ref Range Glucose 131 (H) 65 - 99 mg/dL BUN 15 8 - 27 mg/dL Creatinine 0.89 0.76 - 1.27 mg/dL GFR est non-AA 87 >59 mL/min/1.73 GFR est  >59 mL/min/1.73  
 BUN/Creatinine ratio 17 10 - 24 Sodium 138 134 - 144 mmol/L Potassium 4.2 3.5 - 5.2 mmol/L  Chloride 104 96 - 106 mmol/L  
 CO2 21 18 - 29 mmol/L Calcium 8.8 8.6 - 10.2 mg/dL Protein, total 6.9 6.0 - 8.5 g/dL Albumin 4.0 3.6 - 4.8 g/dL GLOBULIN, TOTAL 2.9 1.5 - 4.5 g/dL A-G Ratio 1.4 1.2 - 2.2 Bilirubin, total 0.5 0.0 - 1.2 mg/dL Alk. phosphatase 101 39 - 117 IU/L  
 AST (SGOT) 24 0 - 40 IU/L  
 ALT (SGPT) 15 0 - 44 IU/L  
LIPID PANEL Result Value Ref Range Cholesterol, total 141 100 - 199 mg/dL Triglyceride 98 0 - 149 mg/dL HDL Cholesterol 63 >39 mg/dL VLDL, calculated 20 5 - 40 mg/dL LDL, calculated 58 0 - 99 mg/dL CBC W/O DIFF Result Value Ref Range WBC 5.4 3.4 - 10.8 x10E3/uL  
 RBC 4.17 4.14 - 5.80 x10E6/uL HGB 11.7 (L) 12.6 - 17.7 g/dL HCT 35.0 (L) 37.5 - 51.0 % MCV 84 79 - 97 fL  
 MCH 28.1 26.6 - 33.0 pg  
 MCHC 33.4 31.5 - 35.7 g/dL  
 RDW 16.5 (H) 12.3 - 15.4 % PLATELET 295 (L) 233 - 379 x10E3/uL TSH 3RD GENERATION Result Value Ref Range TSH 1.380 0.450 - 4.500 uIU/mL DIABETES PATIENT EDUCATION Result Value Ref Range PDF Image Not applicable AMB POC HEMOGLOBIN A1C Result Value Ref Range Hemoglobin A1c (POC) 8.6 % PHYSICAL EXAM 
Exam: 
Visit Vitals  /69  Pulse 73 Gen: 
CV: RRR Lungs: non labored breathing Abd: non distending Neuro: A&O x 3, no dysarthria or aphasia CN II-XII: PERRL, EOMI, face symmetric, tongue/palate midline Motor: strength 5/5 all four ext, no tremor today Sensory: intact to LT Gait: Antalgic due to left hip pain and unable to tandem ASSESSMENT This is a 68 y/o male who presents today for a new complaint of falls. He also has a new complaint of focusing issue/memory loss. Essential tremor remained stable on primidone and gabapentin. Given his new balance issues there is concern for stroke. Will do an MRI of the brain to evaluate for this. Pending this will do further testing. Additionally, I suspect he may have ADD.   Patient is planning to retire at the end of this year, and wants to make sure he can focus until that time. Will initiate medication today but also still order neuropsych testing. I am concerned that polypharmacy may be the etiology of patient's balance symptoms. PLAN 1. Cont primidone to 250mg tabs qhs 
2. Continue gabapentin to 600mg qhs 
3. MRI brain with and without contrast.  Pending these results may need to do blood vessel imaging and stroke workup 4. Referral to neuropsych for evaluation of ADD 5. Start Strattera 40 mg daily. Discussed with patient. Discussed side effects. Information given. 6.  Patient will stop codeine for the next week to see if this has any benefit on his balance Follow-up 3 months at [facility] but will call after MRI to see if further testing is warranted. Stephanie Shane MD 
 
CC: Ethel Kaur MD 
Fax: [phone] This note was created using voice recognition software. Despite editing, there may be syntax errors. This note will not be viewable in [address]

## 2017-08-14 NOTE — MR AVS SNAPSHOT
Visit Information Date & Time Provider Department Dept. Phone Encounter #  
 8/14/2017 11:40 AM Lisa Velazquez MD [facility] [phone] [id number] Your Appointments 9/18/2017 10:30 AM  
ROUTINE CARE with Ethel Kaur MD  
[facility]) Appt Note: 6wk fu  
 [facility] [address] [address]  
Gabriela Hughes  
  
    
 11/14/2017 11:30 AM  
Follow Up with Gilbert Piña NP [facility]) Appt Note: dizziness/balance [address] [facility], 
[address] [place] [address] Avenue, [address] [phone]  
[address] , [address], [address] [address] Upcoming Health Maintenance Date Due COLONOSCOPY 4/14/2015 EYE EXAM RETINAL OR DILATED Q1 4/11/2017 INFLUENZA AGE 9 TO ADULT 8/1/2017 Pneumococcal 65+ Low/Medium Risk (2 of 2 - PPSV23) 12/5/2017 HEMOGLOBIN A1C Q6M 1/26/2018 FOOT EXAM Q1 2/2/2018 MEDICARE YEARLY EXAM 2/3/2018 GLAUCOMA SCREENING Q2Y 4/11/2018 MICROALBUMIN Q1 7/26/2018 LIPID PANEL Q1 7/26/2018 DTaP/Tdap/Td series (2 - Td) 1/31/2021 Allergies as of 8/14/2017  Review Complete On: 8/14/2017 By: Lisa Velazquez MD  
  
 Severity Noted Reaction Type Reactions Nsaids (Non-steroidal Anti-inflammatory Drug) Low 01/26/2016    Other (comments) Hx of PUD and Hinson's Esophagus Current Immunizations  Reviewed on 12/5/2016 Name Date Influenza High Dose Vaccine PF 9/22/2016 Influenza Vaccine 10/3/2013 Influenza Vaccine Split 10/28/2012 TDAP Vaccine 1/31/2011 Not reviewed this visit You Were Diagnosed With   
  
 Codes Comments Falls, initial encounter    -  Primary ICD-10-CM: W19. [place] Mention ICD-9-CM: E888.9 ADD (attention deficit disorder)     ICD-10-CM: F98.8 ICD-9-CM: 314.00 Essential tremor     ICD-10-CM: G25.0 ICD-9-CM: 333.1 Neuropathy ([address]     ICD-10-CM: G62.9 ICD-9-CM: 802. 9 Vitals BP Pulse Smoking Status 134/69 68 Former Smoker Vitals History Preferred Pharmacy Pharmacy Name Phone [facility] [address] [place] [facility] Renetta Wooten [phone] Your Updated Medication List  
  
   
This list is accurate as of: 8/14/17 12:24 PM.  Always use your most recent med list.  
  
  
  
  
 acetaminophen-codeine 300-30 mg per tablet Commonly known as:  TYLENOL #3 Take 1 Tab by mouth every six (6) hours as needed for Pain. Max Daily Amount: 4 Tabs. aspirin 81 mg chewable tablet Take 1 Tab by mouth daily. atomoxetine 40 mg capsule Commonly known as:  STRATTERA Take 1 Cap by mouth daily. BD INSULIN PEN NEEDLE UF SHORT 31 gauge x 5/16\" Ndle Generic drug:  Insulin Needles (Disposable) USE TO INJECT UNDER THE SKIN THREE TIMES A DAY  
  
 butalbital-acetaminophen-caffeine -40 mg per tablet Commonly known as:  FIORICET, ESGIC  
TAKE 1 TABLET BY MOUTH EVERY 4 HOURS AS NEEDED FOR PAIN. MAX 6 TABS PER DAY  
  
 clobetasol 0.05 % ointment Commonly known as:  Regenia Elizabeth Apply  to affected area two (2) times daily as needed. clonazePAM 0.5 mg tablet Commonly known as:  Karene Mikayla Take 1 Tab by mouth nightly as needed. Max Daily Amount: 0.5 mg. DIETARY SUPPLEMENT PO Take 6 Tabs by mouth daily. CATAPLEX B (STANDARD PROCESS CO)  
  
 fluticasone 50 mcg/actuation nasal spray Commonly known as:  Eddie Teri 2 Sprays by Both Nostrils route daily. folic acid 1 mg tablet Commonly known as:  FOLVITE  
TAKE 1 TABLET DAILY  
  
 gabapentin 300 mg capsule Commonly known as:  NEURONTIN Take 2 Caps by mouth nightly. insulin glargine-lixisenatide 100 unit-33 mcg/mL Inpn Commonly known as:  SOLIQUA 100/33 40 Units by SubCUTAneous route Daily (before breakfast). JANUVIA 100 mg tablet Generic drug:  SITagliptin TAKE 1 TABLET DAILY JARDIANCE 25 mg tablet Generic drug:  empagliflozin TAKE 1 TABLET DAILY  
  
 ketoconazole 2 % topical cream  
Commonly known as:  NIZORAL Apply  to affected area two (2) times a day. lisinopril 20 mg tablet Commonly known as:  PRINIVIL, ZESTRIL  
TAKE 1 TABLET DAILY LORazepam 1 mg tablet Commonly known as:  ATIVAN Take 1 Tab by mouth once for 1 dose. Max Daily Amount: 2 mg.  
  
 lovastatin 20 mg tablet Commonly known as:  MEVACOR  
TAKE 1 TABLET IN THE EVENING  
  
 melatonin 3 mg tablet Take 1 Tab by mouth daily (after dinner). For sleep. Take about 3 hours prior to bedtime  
  
 metFORMIN  mg tablet Commonly known as:  GLUCOPHAGE XR Take 2 Tabs by mouth daily (with dinner). Please disp generic form * ONETOUCH ULTRA TEST strip Generic drug:  glucose blood VI test strips * ONETOUCH ULTRA TEST strip Generic drug:  glucose blood VI test strips USE AS DIRECTED BY PHYSICIAN  
  
 primidone 250 mg tablet Commonly known as: MYSOLINE Take 1 Tab by mouth two (2) times a day. senna-docusate 8.6-50 mg per tablet Commonly known as:  Wagner Akin Take 1 Tab by mouth two (2) times daily as needed for Constipation. sertraline 100 mg tablet Commonly known as:  ZOLOFT  
TAKE 1.5 TABLET DAILY  
  
 suvorexant 15 mg tablet Commonly known as:  Jani Hidden Take 1 Tab by mouth nightly as needed for Insomnia. Max Daily Amount: 15 mg. VITAMIN D2 50,000 unit capsule Generic drug:  ergocalciferol TAKE 1 CAPSULE EVERY 7 DAYS  
  
 zolpidem 10 mg tablet Commonly known as:  AMBIEN  
TAKE 1 TABLET BY MOUTH EACH NIGHT AT BEDTIME AS NEEDED FOR SLEEP. MAX 1 TAB PER DAY * Notice: This list has 2 medication(s) that are the same as other medications prescribed for you.  Read the directions carefully, and ask your doctor or other care provider to review them with you. Prescriptions Printed Refills LORazepam (ATIVAN) 1 mg tablet 0 Sig: Take 1 Tab by mouth once for 1 dose. Max Daily Amount: 2 mg. Class: Print Route: Oral  
  
Prescriptions Sent to Pharmacy Refills  
 atomoxetine (STRATTERA) 40 mg capsule 5 Sig: Take 1 Cap by mouth daily. Class: Normal  
 Pharmacy: [facility] [id number] [place] [facility] #: [phone] Route: Oral  
  
We Performed the Following REFERRAL TO PSYCHOLOGY [WDD86 Custom] To-Do List   
 08/15/2017 Imaging:  MRI BRAIN W WO CONT Referral Information Referral ID Referred By Referred To  
  
 [id number] Tiffanie Taylor [address] [place] [address] [address], [address] Phone: [phone] Fax: [phone] Visits Status Start Date End Date 1 New Request 8/14/17 8/14/18 If your referral has a status of pending review or denied, additional information will be sent to support the outcome of this decision. Patient Instructions PRESCRIPTION REFILL POLICY [facility] Statement to Patients April 1, 2014 In an effort to ensure the large volume of patient prescription refills is processed in the most efficient and expeditious manner, we are asking our patients to assist us by calling your Pharmacy for all prescription refills, this will include also your  Mail Order Pharmacy. The pharmacy will contact our office electronically to continue the refill process. Please do not wait until the last minute to call your pharmacy. We need at least 48 hours (2days) to fill prescriptions. We also encourage you to call your pharmacy before going to  your prescription to make sure it is ready.   
 
With regard to controlled substance prescription refill requests (narcotic refills) that need to be picked up at our office, we ask your cooperation by providing us with at least 72 hours (3days) notice that you will need a refill. We will not refill narcotic prescription refill requests after 4:00pm on any weekday, Monday through Thursday, or after 2:00pm on Fridays, or on the weekends. We encourage everyone to explore another way of getting your prescription refill request processed using [employer], our patient web portal through our electronic medical record system. [employer]br is an efficient and effective way to communicate your medication request directly to the office and  downloadable as an bhupinder on your smart phone . [employer]br also features a review functionality that allows you to view your medication list as well as leave messages for your physician. Are you ready to get connected? If so please review the attatched instructions or speak to any of our staff to get you set up right away! Thank you so much for your cooperation. Should you have any questions please contact our Practice Administrator. The Physicians and Staff,  [facility]! Dear Sharri Sadler: Thank you for requesting a Jobbr account. Our records indicate that you already have an active Jobbr account. You can access your account anytime at [email or web address] Did you know that you can access your hospital and ER discharge instructions at any time in Jobbr? You can also review all of your test results from your hospital stay or ER visit. Additional Information If you have questions, please visit the Frequently Asked Questions section of the Jobbr website at [email or web address] Remember, Jobbr is NOT to be used for urgent needs. For medical emergencies, dial 911. Now available from your iPhone and Android! Please provide this summary of care documentation to your next provider. Your primary care clinician is listed as Kim Guillen. If you have any questions after today's visit, please call [phone].

## 2017-08-15 NOTE — TELEPHONE ENCOUNTER
----- Message from Milan Cordero sent at 8/15/2017 12:15 PM EDT -----  Regarding: Dr. Hahn/ Telephone  Pt would like a callback to discuss MRI of his head. Pt stated he will need a Rx due to being claustrophobic. Pt also stated he can have the MRI performed at the [address] [place] [address] on 8/31.      [phone]

## 2017-08-16 RX ORDER — LORAZEPAM 1 MG/1
TABLET ORAL
Qty: 2 TAB | Refills: 0 | Status: SHIPPED | OUTPATIENT
Start: 2017-08-16 | End: 2017-09-18 | Stop reason: ALTCHOICE

## 2017-08-16 NOTE — TELEPHONE ENCOUNTER
Contacted patient and informed him we can give him something to take for testing. Patient voiced understanding and appreciation.

## 2017-08-22 RX ORDER — ESZOPICLONE 2 MG/1
2 TABLET, FILM COATED ORAL
Qty: 30 TAB | Refills: 1 | Status: SHIPPED | OUTPATIENT
Start: 2017-08-22 | End: 2018-02-24

## 2017-08-22 NOTE — TELEPHONE ENCOUNTER
Pls give pt a call [phone]    He is checking to see if prior auth has been done and sent back to [facility]     Re: [address]

## 2017-08-23 RX ORDER — SITAGLIPTIN 100 MG/1
TABLET, FILM COATED ORAL
Qty: 90 TAB | Refills: 0 | Status: SHIPPED | OUTPATIENT
Start: 2017-08-23 | End: 2017-11-21 | Stop reason: SDUPTHER

## 2017-08-30 ENCOUNTER — HOSPITAL ENCOUNTER (OUTPATIENT)
Dept: MRI IMAGING | Age: 67
Discharge: HOME | End: 2017-08-30
Attending: PSYCHIATRY & NEUROLOGY
Payer: COMMERCIAL
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
## 2017-08-30 DIAGNOSIS — G25.0 ESSENTIAL TREMOR: ICD-10-CM

## 2017-08-30 DIAGNOSIS — G62.9 NEUROPATHY: ICD-10-CM

## 2017-08-30 PROCEDURE — 74011250636 HC RX REV CODE- 250/636: Performed by: PSYCHIATRY & NEUROLOGY

## 2017-08-30 PROCEDURE — 70553 MRI BRAIN STEM W/O & W/DYE: CPT

## 2017-08-30 PROCEDURE — A9576 INJ PROHANCE MULTIPACK: HCPCS | Performed by: PSYCHIATRY & NEUROLOGY

## 2017-08-30 RX ADMIN — GADOTERIDOL 20 ML: 279.3 INJECTION, SOLUTION INTRAVENOUS at 12:03

## 2017-08-31 DIAGNOSIS — F43.21 GRIEF: ICD-10-CM

## 2017-08-31 DIAGNOSIS — F33.1 MODERATE EPISODE OF RECURRENT MAJOR DEPRESSIVE DISORDER (HCC): ICD-10-CM

## 2017-08-31 RX ORDER — SERTRALINE HYDROCHLORIDE 100 MG/1
TABLET, FILM COATED ORAL
Qty: 90 TAB | Refills: 1 | Status: ON HOLD | COMMUNITY
Start: 2017-08-31 | End: 2018-02-27 | Stop reason: SDUPTHER

## 2017-09-05 RX ORDER — LISINOPRIL 20 MG/1
TABLET ORAL
Qty: 90 TAB | Refills: 2 | Status: SHIPPED | OUTPATIENT
Start: 2017-09-05 | End: 2018-06-02 | Stop reason: SDUPTHER

## 2017-09-08 DIAGNOSIS — M16.12 PRIMARY OSTEOARTHRITIS OF LEFT HIP: ICD-10-CM

## 2017-09-08 DIAGNOSIS — G44.209 ACUTE NON INTRACTABLE TENSION-TYPE HEADACHE: ICD-10-CM

## 2017-09-08 NOTE — TELEPHONE ENCOUNTER
From: Sven Boas  To: Madhuri Scott MD  Sent: 9/8/2017 11:26 AM EDT  Subject: Medication Renewal Request    Original authorizing provider: MD Angelo Saucedo Cabrera Das would like a refill of the following medications:  butalbital-acetaminophen-caffeine (FIORICET, ESGIC) -40 mg per tablet Madhuri Scott MD]  acetaminophen-codeine (TYLENOL #3) 300-30 mg per tablet Madhuri Scott MD]    Preferred pharmacy: [address]:  I'm not sure if I sent this in yesterday but I would like to get these two prescriptions filled if possible the Tylenol 3 helps some better than nothing.  I haven't spoken to the neurologist yet but my results from the MRI appear to be normal as far as I can see and I guess we can discuss that when I see you on the lyrics to the 19th I'll double-check that have a great day Eric Patel

## 2017-09-14 RX ORDER — BUTALBITAL, ACETAMINOPHEN AND CAFFEINE 50; 325; 40 MG/1; MG/1; MG/1
1 TABLET ORAL
Qty: 30 TAB | Refills: 1 | Status: SHIPPED | OUTPATIENT
Start: 2017-09-14 | End: 2017-11-03 | Stop reason: SDUPTHER

## 2017-09-14 RX ORDER — ACETAMINOPHEN AND CODEINE PHOSPHATE 300; 30 MG/1; MG/1
1 TABLET ORAL
Qty: 30 TAB | Refills: 0 | Status: SHIPPED | OUTPATIENT
Start: 2017-09-14 | End: 2017-11-03 | Stop reason: SDUPTHER

## 2017-09-14 NOTE — TELEPHONE ENCOUNTER
Prescription for Generic Tylenol #3 and Generic Fioricet faxed to [facility] [phone]

## 2017-09-18 ENCOUNTER — OFFICE VISIT (OUTPATIENT)
Dept: FAMILY MEDICINE CLINIC | Age: 67
End: 2017-09-18

## 2017-09-18 VITALS
HEIGHT: 70 IN | SYSTOLIC BLOOD PRESSURE: 126 MMHG | TEMPERATURE: 97.5 F | RESPIRATION RATE: 18 BRPM | WEIGHT: 262.2 LBS | OXYGEN SATURATION: 98 % | DIASTOLIC BLOOD PRESSURE: 56 MMHG | BODY MASS INDEX: 37.54 KG/M2 | HEART RATE: 83 BPM

## 2017-09-18 DIAGNOSIS — R06.09 DOE (DYSPNEA ON EXERTION): ICD-10-CM

## 2017-09-18 DIAGNOSIS — I10 ESSENTIAL HYPERTENSION: ICD-10-CM

## 2017-09-18 DIAGNOSIS — Z23 ENCOUNTER FOR IMMUNIZATION: ICD-10-CM

## 2017-09-18 DIAGNOSIS — Z79.899 ENCOUNTER FOR LONG-TERM (CURRENT) USE OF MEDICATIONS: ICD-10-CM

## 2017-09-18 DIAGNOSIS — M16.11 PRIMARY OSTEOARTHRITIS OF RIGHT HIP: ICD-10-CM

## 2017-09-18 NOTE — PROGRESS NOTES
Subjective:     Chief Complaint   Patient presents with    Diabetes     6 week follow-up      He  is a 69 y.o. male who presents for evaluation of:    Since last appt, had sig balance issues with falls and saw Neurology. Had MRI brain which came back nml. Stopped Tylenol #3 x 2 weeks  in case this was causing disequilibrium but made no difference. He has started this back up to help with pain control. Using exercise bike with a  and this seems to help with his balance. Diabetes Mellitus, HTN, HLD: weight down from last visit. Taking medshas been able to start on Soliqua 40 units daily, continue metformin 1000 mg per day, continue Jardiance 25 mg per day, and continue Januvia 100 mg per day.  home glucose monitoring: is performed regularly - 100-150s. Occ 200s about 1-2 x per week after eating poorly the night before. Reports no polyuria or polydipsia, no chest pain, TIA's, no numbness, tingling or pain in extremities, last eye exam approximately < 1 yr ago. Not exercising d/t hip surgery  Compliant with diabetic diet. Avoids sodas and sweet teas. Avoids fast food. Limits carbs. Pt is a non smoker.     Lab Results   Component Value Date/Time    Hemoglobin A1c (POC) 8.6 07/26/2017 12:24 PM    Hemoglobin A1c (POC) 7.3 02/02/2017 02:45 PM    Hemoglobin A1c 6.7 10/04/2016 10:08 AM    Microalbumin/Creat ratio (mg/g creat) 13 11/01/2010 07:07 AM    Microalb/Creat ratio (ug/mg creat.) <8.8 07/26/2017 12:55 PM    Microalbumin,urine random 0.81 11/01/2010 07:07 AM    LDL, calculated 58 07/26/2017 01:24 PM    Creatinine 0.89 07/26/2017 01:24 PM      Lab Results   Component Value Date/Time    GFR est  07/26/2017 01:24 PM    GFR est non-AA 87 07/26/2017 01:24 PM      Lab Results   Component Value Date/Time    TSH 1.380 07/26/2017 01:24 PM       ROS  Gen - no fever/chills  Resp - no dyspnea or cough  CV - + WANG x 2-3 months, no chest pain, resolved with resting  Rest per HPI    Past Medical History:   Diagnosis Date    Anemia due to blood loss     Hinson's esophagus with esophagitis     Benign essential tremor     [facility]    [facility]    Depression     DJD (degenerative joint disease) of hip     bilat    DM [facility] mellitus) [facility] 3/17/2010    ED (erectile dysfunction) of organic origin     Fall on or from sidewalk curb 9/24/2015    Gastritis and duodenitis     GERD (gastroesophageal reflux disease)     resolved after discontinuing diclofenac    Hematuria 6/2016    High cholesterol 3/17/2010    HTN (hypertension) 3/17/2010    Morbid obesity ([facility]     Murmur, cardiac 2016    PFO (patent foramen ovale) 7/26/2017    PUD (peptic ulcer disease)     Shingles 6/2016    RESOLVING- NO RASH (HEAD)    Sleep apnea     CPAP     Past Surgical History:   Procedure Laterality Date    ENDOSCOPY, COLON, DIAGNOSTIC      HX CHOLECYSTECTOMY  1995    HX GI  1980    gastroplasty    HX HERNIA REPAIR  1995    HX ORTHOPAEDIC  2011    rot cuff repair    HX TONSILLECTOMY  1950    TOTAL HIP ARTHROPLASTY  05/2010    right    TOTAL HIP ARTHROPLASTY  08/01/2016    left     Current Outpatient Prescriptions on File Prior to Visit   Medication Sig Dispense Refill    butalbital-acetaminophen-caffeine (FIORICET, ESGIC) -40 mg per tablet Take 1 Tab by mouth every four (4) hours as needed for Pain. Max Daily Amount: 6 Tabs. 30 Tab 1    acetaminophen-codeine (TYLENOL #3) 300-30 mg per tablet Take 1 Tab by mouth every six (6) hours as needed for Pain. Max Daily Amount: 4 Tabs. 30 Tab 0    lisinopril (PRINIVIL, ZESTRIL) 20 mg tablet TAKE 1 TABLET DAILY 90 Tab 2    sertraline (ZOLOFT) 100 mg tablet TAKE 1 TABLET DAILY (Patient taking differently: TAKE 1.5 TABLET DAILY) 90 Tab 1    JANUVIA 100 mg tablet TAKE 1 TABLET DAILY 90 Tab 0    eszopiclone (LUNESTA) 2 mg tablet Take 1 Tab by mouth nightly. Max Daily Amount: 2 mg.  Indications: INSOMNIA 30 Tab 1    atomoxetine (STRATTERA) 40 mg capsule Take 1 Cap by mouth daily. 30 Cap 5    ketoconazole (NIZORAL) 2 % topical cream Apply  to affected area two (2) times a day. 15 g 0    insulin glargine-lixisenatide (SOLIQUA 100/33) 100 unit-33 mcg/mL inpn 40 Units by SubCUTAneous route Daily (before breakfast). 4 Pen 5    JARDIANCE 25 mg tablet TAKE 1 TABLET DAILY 30 Tab 10    BD INSULIN PEN NEEDLE UF SHORT 31 gauge x 5/16\" ndle USE TO INJECT UNDER THE SKIN THREE TIMES A  Package 1    ONETOUCH ULTRA TEST strip USE AS DIRECTED BY PHYSICIAN 100 Strip 3    VITAMIN D2 50,000 unit capsule TAKE 1 CAPSULE EVERY 7 DAYS 12 Cap 2    lovastatin (MEVACOR) 20 mg tablet TAKE 1 TABLET IN THE EVENING 90 Tab 1    gabapentin (NEURONTIN) 300 mg capsule Take 2 Caps by mouth nightly. 1 Cap 0    metFORMIN ER (GLUCOPHAGE XR) 500 mg tablet Take 2 Tabs by mouth daily (with dinner). Please disp generic form 180 Tab 1    aspirin 81 mg chewable tablet Take 1 Tab by mouth daily. 30 Tab 11    clonazePAM (KLONOPIN) 0.5 mg tablet Take 1 Tab by mouth nightly as needed. Max Daily Amount: 0.5 mg. 20 Tab 5    primidone (MYSOLINE) 250 mg tablet Take 1 Tab by mouth two (2) times a day. 186 Tab 3    folic acid (FOLVITE) 1 mg tablet TAKE 1 TABLET DAILY 90 Tab 3    ONETOUCH ULTRA TEST strip       melatonin 3 mg tablet Take 1 Tab by mouth daily (after dinner). For sleep. Take about 3 hours prior to bedtime 30 Tab 5    clobetasol (TEMOVATE) 0.05 % ointment Apply  to affected area two (2) times daily as needed. 60 g 2    senna-docusate (PERICOLACE) 8.6-50 mg per tablet Take 1 Tab by mouth two (2) times daily as needed for Constipation.  DIETARY SUPPLEMENT PO Take 6 Tabs by mouth daily. CATAPLEX B ([employer] [facility])       No current facility-administered medications on file prior to visit.          Objective:     Vitals:    09/18/17 1106   BP: 126/56   Pulse: 83   Resp: 18   Temp: 97.5 °F (36.4 °C)   TempSrc: Oral   SpO2: 98%   Weight: 262 lb 3.2 oz (118.9 kg)   Height: 5' 10\" (1.778 m)     Physical Examination:  General appearance - alert, well appearing, and in no distress  Eyes -sclera anicteric  Neck - supple, no significant adenopathy, no thyromegaly  Chest - clear to auscultation, no wheezes, rales or rhonchi, symmetric air entry  Heart - normal rate, regular rhythm, normal S1, S2,2/6 LION rad to carotids  Neurological - alert, oriented, no focal findings or movement disorder noted  Extr -1+ pitting edema in LLE (improving)    Assessment/ Plan:   Diagnoses and all orders for this visit:    1. Uncontrolled type 2 diabetes mellitus without complication, without long-term current use of insulin (HCC)improving since starting [place]. Weight is down 5 pounds since last appointment and sugars are slightly better. Continue metformin, Jardiance, and Januvia. Work on exercise and diet. If blood sugars are not better at next check, would consider increasing metformin dose. -     METABOLIC PANEL, BASIC; Future  -     HEMOGLOBIN A1C WITH EAG; Future    2. Essential hypertensioncontrolled  -     METABOLIC PANEL, BASIC; Future    3. Primary osteoarthritis of right hipchronic issue, have encouraged patient to continue working on exercise and strength training. Recommend physical therapy but he declines at this point. 4. Encounter for long-term (current) use of medications  -     METABOLIC PANEL, BASIC; Future  -     HEMOGLOBIN A1C WITH EAG; Future    5. Encounter for immunization  -     Influenza virus vaccine ([address]) 72 years and older    6. WANG (dyspnea on exertion) - diabetic, numerous risk factors, obese and unable to use treadmill. Will send for nuclear stress test given overall history  -     NM CARDIAC SPECT W STRS/REST MULT; Future    I have discussed the diagnosis with the patient and the intended plan as seen in the above orders. The patient has received an after-visit summary and questions were answered concerning future plans.   I have discussed medication side effects and warnings with the patient as well. The patient verbalizes understanding and agreement with the plan. Follow-up Disposition:  Return in about 3 months (around 12/18/2017), or if symptoms worsen or fail to improve.

## 2017-09-18 NOTE — MR AVS SNAPSHOT
Visit Information Date & Time Provider Department Dept. Phone Encounter #  
 9/18/2017 10:30 AM Gian Horton MD [facility] at [address] [id number] Follow-up Instructions Return in about 3 months (around 12/18/2017), or if symptoms worsen or fail to improve. Your Appointments 10/17/2017 10:00 AM  
New Patient with Vernon Ruiz PsyD [address] ([facility]) Appt Note: new patient eval for ADD/ [place] [address] [facility] [id number] [phone]  
  
   
 Brown Aideide  
  
    
 11/14/2017 11:30 AM  
Follow Up with Rustam Aragon NP [facility] at [facility] Appt Note: dizziness/balance [address], 
[address], [address] [phone]  
[address], [address], [address] [phone] Upcoming Health Maintenance Date Due COLONOSCOPY 4/14/2015 EYE EXAM RETINAL OR DILATED Q1 4/11/2017 INFLUENZA AGE 9 TO ADULT 8/1/2017 Pneumococcal 65+ Low/Medium Risk (2 of 2 - PPSV23) 12/5/2017 HEMOGLOBIN A1C Q6M 1/26/2018 FOOT EXAM Q1 2/2/2018 MEDICARE YEARLY EXAM 2/3/2018 GLAUCOMA SCREENING Q2Y 4/11/2018 MICROALBUMIN Q1 7/26/2018 LIPID PANEL Q1 7/26/2018 DTaP/Tdap/Td series (2 - Td) 1/31/2021 Allergies as of 9/18/2017  Review Complete On: 9/18/2017 By: Galileo Davila LPN Severity Noted Reaction Type Reactions Nsaids (Non-steroidal Anti-inflammatory Drug) Low 01/26/2016    Other (comments) Hx of PUD and Hinson's Esophagus Current Immunizations  Reviewed on 12/5/2016 Name Date Influenza High Dose Vaccine PF  Incomplete, 9/22/2016 Influenza Vaccine 10/3/2013 Influenza Vaccine Split 10/28/2012 TDAP Vaccine 1/31/2011 Not reviewed this visit You Were Diagnosed With   
  
 Codes Comments Uncontrolled type 2 diabetes mellitus without complication, without long-term current use of insulin ([facility]    -  Primary ICD-10-CM: E11.65 ICD-9-CM: 250.02 Essential hypertension     ICD-10-CM: I10 
ICD-9-CM: 401.9 Primary osteoarthritis of right hip     ICD-10-CM: M16.11 
ICD-9-CM: 715.15 Encounter for long-term (current) use of medications     ICD-10-CM: Z79.899 ICD-9-CM: V58.69 Encounter for immunization     ICD-10-CM: V60 ICD-9-CM: V03.89   
 WANG (dyspnea on exertion)     ICD-10-CM: R06.09 
ICD-9-CM: 786.09 Vitals BP Pulse Temp Resp Height(growth percentile) Weight(growth percentile) 126/56 (BP 1 Location: Left arm, BP Patient Position: Sitting) 83 97.5 °F (36.4 °C) (Oral) 18 5' 10\" (1.778 m) 262 lb 3.2 oz (118.9 kg) SpO2 BMI Smoking Status 98% 37.62 kg/m2 Former Smoker Vitals History BMI and BSA Data Body Mass Index Body Surface Area  
 37.62 kg/m 2 2.42 m 2 Preferred Pharmacy Pharmacy Name Phone [facility] [address] [place] [facility] Renetta Wooten [phone] Your Updated Medication List  
  
   
This list is accurate as of: 9/18/17 12:08 PM.  Always use your most recent med list.  
  
  
  
  
 acetaminophen-codeine 300-30 mg per tablet Commonly known as:  TYLENOL #3 Take 1 Tab by mouth every six (6) hours as needed for Pain. Max Daily Amount: 4 Tabs. aspirin 81 mg chewable tablet Take 1 Tab by mouth daily. atomoxetine 40 mg capsule Commonly known as:  STRATTERA Take 1 Cap by mouth daily. BD INSULIN PEN NEEDLE UF SHORT 31 gauge x 5/16\" Ndle Generic drug:  Insulin Needles (Disposable) USE TO INJECT UNDER THE SKIN THREE TIMES A DAY  
  
 butalbital-acetaminophen-caffeine -40 mg per tablet Commonly known as:  Donnalee Sizer Take 1 Tab by mouth every four (4) hours as needed for Pain. Max Daily Amount: 6 Tabs. clobetasol 0.05 % ointment Commonly known as:  Regenia Elizabeth Apply  to affected area two (2) times daily as needed. clonazePAM 0.5 mg tablet Commonly known as:  Alfaene Mikayla Take 1 Tab by mouth nightly as needed. Max Daily Amount: 0.5 mg. DIETARY SUPPLEMENT PO Take 6 Tabs by mouth daily. CATAPLEX B ([employer] [facility])  
  
 eszopiclone 2 mg tablet Commonly known as:  Oley Erm Take 1 Tab by mouth nightly. Max Daily Amount: 2 mg. Indications: INSOMNIA  
  
 folic acid 1 mg tablet Commonly known as:  FOLVITE  
TAKE 1 TABLET DAILY  
  
 gabapentin 300 mg capsule Commonly known as:  NEURONTIN Take 2 Caps by mouth nightly. insulin glargine-lixisenatide 100 unit-33 mcg/mL Inpn Commonly known as:  SOLIQUA 100/33  
40 Units by SubCUTAneous route Daily (before breakfast). JANUVIA 100 mg tablet Generic drug:  SITagliptin TAKE 1 TABLET DAILY JARDIANCE 25 mg tablet Generic drug:  empagliflozin TAKE 1 TABLET DAILY  
  
 ketoconazole 2 % topical cream  
Commonly known as:  NIZORAL Apply  to affected area two (2) times a day. lisinopril 20 mg tablet Commonly known as:  PRINIVIL, ZESTRIL  
TAKE 1 TABLET DAILY lovastatin 20 mg tablet Commonly known as:  MEVACOR  
TAKE 1 TABLET IN THE EVENING  
  
 melatonin 3 mg tablet Take 1 Tab by mouth daily (after dinner). For sleep. Take about 3 hours prior to bedtime  
  
 metFORMIN  mg tablet Commonly known as:  GLUCOPHAGE XR Take 2 Tabs by mouth daily (with dinner). Please disp generic form * ONETOUCH ULTRA TEST strip Generic drug:  glucose blood VI test strips * ONETOUCH ULTRA TEST strip Generic drug:  glucose blood VI test strips USE AS DIRECTED BY PHYSICIAN  
  
 primidone 250 mg tablet Commonly known as: MYSOLINE Take 1 Tab by mouth two (2) times a day. senna-docusate 8.6-50 mg per tablet Commonly known as:  Steva Eric  
 Take 1 Tab by mouth two (2) times daily as needed for Constipation. sertraline 100 mg tablet Commonly known as:  ZOLOFT  
TAKE 1 TABLET DAILY  
  
 VITAMIN D2 50,000 unit capsule Generic drug:  ergocalciferol TAKE 1 CAPSULE EVERY 7 DAYS * Notice: This list has 2 medication(s) that are the same as other medications prescribed for you. Read the directions carefully, and ask your doctor or other care provider to review them with you. We Performed the Following INFLUENZA VIRUS VACCINE, HIGH DOSE SEASONAL, PRESERVATIVE FREE [81886 CPT(R)] Follow-up Instructions Return in about 3 months (around 12/18/2017), or if symptoms worsen or fail to improve. To-Do List   
 09/18/2017 Imaging:  NM CARDIAC SPECT W STRS/REST MULT   
  
 10/30/2017 Lab:  HEMOGLOBIN A1C WITH EAG   
  
 10/30/2017 Lab:  METABOLIC PANEL, BASIC Introducing [facility]! Dear Jesus Pitts: Thank you for requesting a [employer] account. Our records indicate that you already have an active Control4 account. You can access your account anytime at [email or web address] Did you know that you can access your hospital and ER discharge instructions at any time in Control[employer]? You can also review all of your test results from your hospital stay or ER visit. Additional Information If you have questions, please visit the Frequently Asked Questions section of the Control4 website at [email or web address] Remember, Control4 is NOT to be used for urgent needs. For medical emergencies, dial 911. Now available from your iPhone and Android! Please provide this summary of care documentation to your next provider. Your primary care clinician is listed as Conner Smith. If you have any questions after today's visit, please call [phone].

## 2017-09-18 NOTE — PROGRESS NOTES
Chief Complaint   Patient presents with    Diabetes     6 week follow-up   Discuss right back pain radiating to flank x three weeks  Room 8

## 2017-09-26 ENCOUNTER — HOSPITAL ENCOUNTER (OUTPATIENT)
Dept: NON INVASIVE DIAGNOSTICS | Age: 67
Discharge: HOME | End: 2017-09-26
Attending: FAMILY MEDICINE
Payer: COMMERCIAL

## 2017-09-26 ENCOUNTER — HOSPITAL ENCOUNTER (OUTPATIENT)
Dept: NUCLEAR MEDICINE | Age: 67
Discharge: HOME | End: 2017-09-26
Attending: FAMILY MEDICINE
Payer: COMMERCIAL

## 2017-09-26 DIAGNOSIS — R06.09 DYSPNEA ON EXERTION: ICD-10-CM

## 2017-09-26 DIAGNOSIS — R06.09 DOE (DYSPNEA ON EXERTION): ICD-10-CM

## 2017-09-26 LAB
ATTENDING PHYSICIAN, CST07: NORMAL
DIAGNOSIS, 93000: NORMAL
DUKE TM SCORE RESULT, CST14: NORMAL
DUKE TREADMILL SCORE, CST13: 5456
ECG INTERP BEFORE EX, CST11: NORMAL
ECG INTERP DURING EX, CST12: NORMAL
FUNCTIONAL CAPACITY, CST17: NORMAL
KNOWN CARDIAC CONDITION, CST08: NORMAL
MAX. DIASTOLIC BP, CST04: 68 MMHG
MAX. HEART RATE, CST05: 82 BPM
MAX. SYSTOLIC BP, CST03: 157 MMHG
OVERALL BP RESPONSE TO EXERCISE, CST16: NORMAL
OVERALL HR RESPONSE TO EXERCISE, CST15: NORMAL
PEAK EX METS, CST10: 1 METS
PROTOCOL NAME, CST01: NORMAL
TEST INDICATION, CST09: NORMAL

## 2017-09-26 PROCEDURE — 74011250636 HC RX REV CODE- 250/636

## 2017-09-26 PROCEDURE — 93017 CV STRESS TEST TRACING ONLY: CPT

## 2017-09-26 PROCEDURE — 78452 HT MUSCLE IMAGE SPECT MULT: CPT

## 2017-09-26 RX ORDER — LOVASTATIN 20 MG/1
TABLET ORAL
Qty: 90 TAB | Refills: 1 | Status: SHIPPED | OUTPATIENT
Start: 2017-09-26 | End: 2018-02-05 | Stop reason: SDUPTHER

## 2017-09-26 RX ORDER — SODIUM CHLORIDE 0.9 % (FLUSH) 0.9 %
SYRINGE (ML) INJECTION
Status: COMPLETED
Start: 2017-09-26 | End: 2017-09-26

## 2017-09-26 RX ADMIN — Medication 10 ML: at 11:37

## 2017-09-26 RX ADMIN — REGADENOSON 0.4 MG: 0.08 INJECTION, SOLUTION INTRAVENOUS at 11:36

## 2017-10-17 ENCOUNTER — OFFICE VISIT (OUTPATIENT)
Dept: NEUROLOGY | Age: 67
End: 2017-10-17

## 2017-10-17 DIAGNOSIS — G31.84 MILD COGNITIVE IMPAIRMENT: Primary | ICD-10-CM

## 2017-10-17 DIAGNOSIS — R41.9 DEFICIT IN COMPREHENSION: ICD-10-CM

## 2017-10-17 DIAGNOSIS — R41.3 SHORT-TERM MEMORY LOSS: ICD-10-CM
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
## 2017-10-17 DIAGNOSIS — F43.21 ADJUSTMENT DISORDER WITH DEPRESSED MOOD: ICD-10-CM

## 2017-10-17 NOTE — PROGRESS NOTES
[address] [facility]  [address]   [place] [address] [facility] [place] [address] [address]   [phone] Office   [phone] Fax      Neuropsychology    Initial Diagnostic Interview Note      Referral:  Ravi Zafar MD, Dr. IqbalDaniellenatali Varela is a 69 y.o. right handed   male who was unaccompanied to the initial clinical interview on 10/17/17. Please refer to his medical records for details pertaining to his history. Briefly, the patient reported that he completed an Associate's Degree without history of repeating a grade or major learning problems  He works for the [employer]. He has noticed increased forgetfulness with short term information. He also notices problems with his focus and attention. Some times has difficulties coming up with words. Goes into a room and then forgets and finds himself doing something else. Two incidents cited recently of this. Slower to process information. More difficult to come up with names. This has been getting worse since his spouse passed away two years two months ago yesterday. IT was a sudden and unexpected. He was quite depressed for some time. He is doing better now, and has done counseling. Not on any meds for mood right now. Balance is poor. He tends to either stumble or occasionally fall. Rhonda Deiters was not an issue before. No background stroke, meningitis/encephalitis, IVELISSE Fever, Lupus, Lyme, CVA, TBI, etc.  Appetite is quite good. No changes in sense of taste or smell. Gets up several times a night to go to the bathroom. Uses Lunesta at night. His mother has dementia and is currently in a nursing home. AD likely. No major past history of psychiatric issues. No current legal stressors. He reads for relaxation and forgets the content of books he is reading.    He is independent for driving, medications, finances, day-to-day chores, etc.  34 year old son lives with him and helps with mortgage and food and patient has company there. He has three dogs as well. No previous neuropsych.        Neuropsychological Mental Status Exam (NMSE):  Historian: Good  Praxis: No UE apraxia  R/L Orientation: Intact to self and to other  Dress: within normal limits   Weight: Overweight  Appearance/Hygiene: within normal limits   Gait: within normal limits   Assistive Devices: Glasses  Mood: within normal limits   Affect: within normal limits   Comprehension: within normal limits   Thought Process: within normal limits   Expressive Language: within normal limits   Receptive Language: within normal limits   Motor:  No cognitive or motor perseveration  ETOH: Denied  Tobacco: Quit [id number]  Illicit: Denied  SI/HI: Denied  Psychosis: Denied  Insight: Within normal limits  Judgment: Within normal limits  Other Psych:      Past Medical History:   Diagnosis Date    Anemia due to blood loss     Hinson's esophagus with esophagitis     Benign essential tremor     [facility] 2012    BCC    Depression     DJD (degenerative joint disease) of hip     bilat    DM (diabetes mellitus) ([facility] 3/17/2010    ED (erectile dysfunction) of organic origin     Fall on or from sidewalk curb 9/24/2015    Gastritis and duodenitis     GERD (gastroesophageal reflux disease)     resolved after discontinuing diclofenac    Hematuria 6/2016    High cholesterol 3/17/2010    HTN (hypertension) 3/17/2010    Morbid obesity ([facility]     Murmur, cardiac 2016    PFO (patent foramen ovale) 7/26/2017    PUD (peptic ulcer disease)     Shingles 6/2016    RESOLVING- NO RASH (HEAD)    Sleep apnea     CPAP       Past Surgical History:   Procedure Laterality Date    ENDOSCOPY, COLON, DIAGNOSTIC      HX CHOLECYSTECTOMY  1995    HX GI  1980    gastroplasty    HX HERNIA REPAIR  1995    HX ORTHOPAEDIC  2011    rot cuff repair    HX TONSILLECTOMY  1950    TOTAL HIP ARTHROPLASTY  05/2010 right    TOTAL HIP ARTHROPLASTY  08/01/2016    left       Allergies   Allergen Reactions    Nsaids (Non-Steroidal Anti-Inflammatory Drug) Other (comments)     Hx of PUD and Hinson's Esophagus       Family History   Problem Relation Age of Onset    Cancer Father      multiple myeloma    Stroke Father     Dementia Mother     No Known Problems Brother     COPD Brother     Cancer Maternal Grandmother      COLON    Anesth Problems Neg Hx        Social History   Substance Use Topics    Smoking status: Former Smoker     Packs/day: 2.00     Years: 15.00     Quit date: 3/1/1979    Smokeless tobacco: Never Used    Alcohol use No       Current Outpatient Prescriptions   Medication Sig Dispense Refill    lovastatin (MEVACOR) 20 mg tablet TAKE 1 TABLET IN THE EVENING 90 Tab 1    butalbital-acetaminophen-caffeine (FIORICET, ESGIC) -40 mg per tablet Take 1 Tab by mouth every four (4) hours as needed for Pain. Max Daily Amount: 6 Tabs. 30 Tab 1    acetaminophen-codeine (TYLENOL #3) 300-30 mg per tablet Take 1 Tab by mouth every six (6) hours as needed for Pain. Max Daily Amount: 4 Tabs. 30 Tab 0    lisinopril (PRINIVIL, ZESTRIL) 20 mg tablet TAKE 1 TABLET DAILY 90 Tab 2    sertraline (ZOLOFT) 100 mg tablet TAKE 1 TABLET DAILY (Patient taking differently: TAKE 1.5 TABLET DAILY) 90 Tab 1    JANUVIA 100 mg tablet TAKE 1 TABLET DAILY 90 Tab 0    eszopiclone (LUNESTA) 2 mg tablet Take 1 Tab by mouth nightly. Max Daily Amount: 2 mg. Indications: INSOMNIA 30 Tab 1    atomoxetine (STRATTERA) 40 mg capsule Take 1 Cap by mouth daily. 30 Cap 5    ketoconazole (NIZORAL) 2 % topical cream Apply  to affected area two (2) times a day. 15 g 0    insulin glargine-lixisenatide (SOLIQUA 100/33) 100 unit-33 mcg/mL inpn 40 Units by SubCUTAneous route Daily (before breakfast).  4 Pen 5    JARDIANCE 25 mg tablet TAKE 1 TABLET DAILY 30 Tab 10    BD INSULIN PEN NEEDLE UF SHORT 31 gauge x 5/16\" ndle USE TO INJECT UNDER THE SKIN THREE TIMES A  Package 1    ONETOUCH ULTRA TEST strip USE AS DIRECTED BY PHYSICIAN 100 Strip 3    VITAMIN D2 50,000 unit capsule TAKE 1 CAPSULE EVERY 7 DAYS 12 Cap 2    gabapentin (NEURONTIN) 300 mg capsule Take 2 Caps by mouth nightly. 1 Cap 0    metFORMIN ER (GLUCOPHAGE XR) 500 mg tablet Take 2 Tabs by mouth daily (with dinner). Please disp generic form 180 Tab 1    aspirin 81 mg chewable tablet Take 1 Tab by mouth daily. 30 Tab 11    clonazePAM (KLONOPIN) 0.5 mg tablet Take 1 Tab by mouth nightly as needed. Max Daily Amount: 0.5 mg. 20 Tab 5    primidone (MYSOLINE) 250 mg tablet Take 1 Tab by mouth two (2) times a day. 430 Tab 3    folic acid (FOLVITE) 1 mg tablet TAKE 1 TABLET DAILY 90 Tab 3    ONETOUCH ULTRA TEST strip       melatonin 3 mg tablet Take 1 Tab by mouth daily (after dinner). For sleep. Take about 3 hours prior to bedtime 30 Tab 5    clobetasol (TEMOVATE) 0.05 % ointment Apply  to affected area two (2) times daily as needed. 60 g 2    senna-docusate (PERICOLACE) 8.6-50 mg per tablet Take 1 Tab by mouth two (2) times daily as needed for Constipation.  DIETARY SUPPLEMENT PO Take 6 Tabs by mouth daily. CATAPLEX B ([employer])           Plan:  Obtain authorization for testing from insurance company. Report to follow once testing, scoring, and interpretation completed. ? Organic based neurocognitive issues versus mood disorder or combination of same. ? Problems organic, functional, or both? This note will not be viewable in [address] 69year old with cognitive and emotional changes and increased falls and balance problems. Concerns include MCI, dementia, acute CVA related concerns, mood related interference, and/or polypharmacy. Will evaluate for organicity versus functional issues or combination of same.

## 2017-10-30 DIAGNOSIS — I10 ESSENTIAL HYPERTENSION: ICD-10-CM

## 2017-10-30 DIAGNOSIS — Z79.899 ENCOUNTER FOR LONG-TERM (CURRENT) USE OF MEDICATIONS: ICD-10-CM

## 2017-10-31 ENCOUNTER — PATIENT MESSAGE (OUTPATIENT)
Dept: FAMILY MEDICINE CLINIC | Age: 67
End: 2017-10-31

## 2017-10-31 DIAGNOSIS — M16.12 PRIMARY OSTEOARTHRITIS OF LEFT HIP: ICD-10-CM

## 2017-10-31 DIAGNOSIS — G44.209 ACUTE NON INTRACTABLE TENSION-TYPE HEADACHE: ICD-10-CM

## 2017-11-02 LAB
BUN SERPL-MCNC: 11 MG/DL (ref 8–27)
BUN/CREAT SERPL: 13 (ref 10–24)
CALCIUM SERPL-MCNC: 8.8 MG/DL (ref 8.6–10.2)
CHLORIDE SERPL-SCNC: 104 MMOL/L (ref 96–106)
CO2 SERPL-SCNC: 20 MMOL/L (ref 18–29)
CREAT SERPL-MCNC: 0.82 MG/DL (ref 0.76–1.27)
EST. AVERAGE GLUCOSE BLD GHB EST-MCNC: 229 MG/DL
GFR SERPLBLD CREATININE-BSD FMLA CKD-EPI: 104 ML/MIN/1.73
GFR SERPLBLD CREATININE-BSD FMLA CKD-EPI: 90 ML/MIN/1.73
GLUCOSE SERPL-MCNC: 176 MG/DL (ref 65–99)
HBA1C MFR BLD: 9.6 % (ref 4.8–5.6)
POTASSIUM SERPL-SCNC: 4.1 MMOL/L (ref 3.5–5.2)
SODIUM SERPL-SCNC: 141 MMOL/L (ref 134–144)

## 2017-11-02 NOTE — PROGRESS NOTES
Mychart message sent for labs - diabetes much worse, needs to follow up for management of this in next 4 weeks.

## 2017-11-06 RX ORDER — BUTALBITAL, ACETAMINOPHEN AND CAFFEINE 50; 325; 40 MG/1; MG/1; MG/1
1 TABLET ORAL
Qty: 30 TAB | Refills: 1 | Status: SHIPPED | OUTPATIENT
Start: 2017-11-06 | End: 2018-03-04

## 2017-11-06 RX ORDER — ACETAMINOPHEN AND CODEINE PHOSPHATE 300; 30 MG/1; MG/1
1 TABLET ORAL
Qty: 30 TAB | Refills: 0 | Status: SHIPPED | OUTPATIENT
Start: 2017-11-06 | End: 2018-01-16 | Stop reason: SDUPTHER

## 2017-11-21 ENCOUNTER — OFFICE VISIT (OUTPATIENT)
Dept: NEUROLOGY | Age: 67
End: 2017-11-21

## 2017-11-21 DIAGNOSIS — F41.9 ANXIETY AND DEPRESSION: ICD-10-CM

## 2017-11-21 DIAGNOSIS — G31.84 MILD COGNITIVE IMPAIRMENT: Primary | ICD-10-CM

## 2017-11-21 DIAGNOSIS — F90.0 ATTENTION DEFICIT HYPERACTIVITY DISORDER (ADHD), INATTENTIVE TYPE, MILD: Chronic | ICD-10-CM

## 2017-11-21 DIAGNOSIS — F32.A ANXIETY AND DEPRESSION: ICD-10-CM

## 2017-11-23 RX ORDER — SITAGLIPTIN 100 MG/1
TABLET, FILM COATED ORAL
Qty: 90 TAB | Refills: 0 | Status: SHIPPED | OUTPATIENT
Start: 2017-11-23 | End: 2018-02-19 | Stop reason: SDUPTHER

## 2017-11-26 RX ORDER — PEN NEEDLE, DIABETIC 31 GX5/16"
NEEDLE, DISPOSABLE MISCELLANEOUS
Qty: 270 PEN NEEDLE | Refills: 1 | Status: SHIPPED | OUTPATIENT
Start: 2017-11-26 | End: 2018-02-24

## 2017-11-28 DIAGNOSIS — Z79.4 CONTROLLED TYPE 2 DIABETES MELLITUS WITH OTHER SPECIFIED COMPLICATION, WITH LONG-TERM CURRENT USE OF INSULIN (HCC): ICD-10-CM

## 2017-11-28 DIAGNOSIS — E11.69 CONTROLLED TYPE 2 DIABETES MELLITUS WITH OTHER SPECIFIED COMPLICATION, WITH LONG-TERM CURRENT USE OF INSULIN (HCC): ICD-10-CM

## 2017-12-01 ENCOUNTER — TELEPHONE (OUTPATIENT)
Dept: FAMILY MEDICINE CLINIC | Age: 67
End: 2017-12-01

## 2017-12-01 NOTE — TELEPHONE ENCOUNTER
Pt changed his apptmnt from Dec to Jan '18    He would like to speak to [facility] and let her know why     Best number to reach him is W [phone]

## 2017-12-04 ENCOUNTER — DOCUMENTATION ONLY (OUTPATIENT)
Dept: FAMILY MEDICINE CLINIC | Age: 67
End: 2017-12-04

## 2017-12-04 NOTE — PROGRESS NOTES
Patient called and advised reschedule appointment until Jan 16,2018 due to running out of leave time.

## 2017-12-04 NOTE — PROGRESS NOTES
[address] [facility],5Th Floor  [address]   [place] [address] [facility]    [phone] Office   [phone] Fax      Neuropsychological Evaluation Report      Referral:  Karo Hinojosa MD,  Junior Nash is a 69 y.o. right handed   male who was unaccompanied to the initial clinical interview on 10/17/17. Please refer to his medical records for details pertaining to his history. Briefly, the patient reported that he completed an Associate's Degree without history of repeating a grade or major learning problems  He works for the [employer]. He has noticed increased forgetfulness with short term information. He also notices problems with his focus and attention. Some times has difficulties coming up with words. Goes into a room and then forgets and finds himself doing something else. Two incidents cited recently of this. Slower to process information. More difficult to come up with names. This has been getting worse since his spouse passed away two years two months ago yesterday. IT was a sudden and unexpected. He was quite depressed for some time. He is doing better now, and has done counseling. Not on any meds for mood right now. Balance is poor. He tends to either stumble or occasionally fall. Reginold Scooby was not an issue before. No background stroke, meningitis/encephalitis, IVELISSE Fever, Lupus, Lyme, CVA, TBI, etc.  Appetite is quite good. No changes in sense of taste or smell. Gets up several times a night to go to the bathroom. Uses Lunesta at night. His mother has dementia and is currently in a nursing home. AD likely. No major past history of psychiatric issues. No current legal stressors. He reads for relaxation and forgets the content of books he is reading.    He is independent for driving, medications, finances, day-to-day chores, etc.  27year old son lives with him and helps with mortgage and food and patient has company there. He has three dogs as well. No previous neuropsych.        Neuropsychological Mental Status Exam (NMSE):  Historian: Good  Praxis: No UE apraxia  R/L Orientation: Intact to self and to other  Dress: within normal limits   Weight: Overweight  Appearance/Hygiene: within normal limits   Gait: within normal limits   Assistive Devices: Glasses  Mood: within normal limits   Affect: within normal limits   Comprehension: within normal limits   Thought Process: within normal limits   Expressive Language: within normal limits   Receptive Language: within normal limits   Motor:  No cognitive or motor perseveration  ETOH: Denied  Tobacco: Quit 2577  Illicit: Denied  SI/HI: Denied  Psychosis: Denied  Insight: Within normal limits  Judgment: Within normal limits  Other Psych:      Past Medical History:   Diagnosis Date    Anemia due to blood loss     Hinson's esophagus with esophagitis     Benign essential tremor     Cancer ([facility] [address] 2012    BCC    Depression     DJD (degenerative joint disease) of hip     bilat    DM (diabetes mellitus) ([facility] [address] 3/17/2010    ED (erectile dysfunction) of organic origin     Fall on or from sidewalk curb 9/24/2015    Gastritis and duodenitis     GERD (gastroesophageal reflux disease)     resolved after discontinuing diclofenac    Hematuria 6/2016    High cholesterol 3/17/2010    HTN (hypertension) 3/17/2010    Morbid obesity ([facility] [address]     Murmur, cardiac 2016    PFO (patent foramen ovale) 7/26/2017    PUD (peptic ulcer disease)     Shingles 6/2016    RESOLVING- NO RASH (HEAD)    Sleep apnea     CPAP       Past Surgical History:   Procedure Laterality Date    ENDOSCOPY, COLON, DIAGNOSTIC      HX CHOLECYSTECTOMY  1995    HX GI  1980    gastroplasty    HX HERNIA REPAIR  1995    HX ORTHOPAEDIC  2011    rot cuff repair    HX TONSILLECTOMY  1950    TOTAL HIP ARTHROPLASTY  05/2010    right    TOTAL HIP ARTHROPLASTY  08/01/2016    left       Allergies   Allergen Reactions    Nsaids (Non-Steroidal Anti-Inflammatory Drug) Other (comments)     Hx of PUD and Hinson's Esophagus       Family History   Problem Relation Age of Onset    Cancer Father      multiple myeloma    Stroke Father     Dementia Mother     No Known Problems Brother     COPD Brother     Cancer Maternal Grandmother      COLON    Anesth Problems Neg Hx        Social History   Substance Use Topics    Smoking status: Former Smoker     Packs/day: 2.00     Years: 15.00     Quit date: 3/1/1979    Smokeless tobacco: Never Used    Alcohol use No       Current Outpatient Prescriptions   Medication Sig Dispense Refill    BD INSULIN PEN NEEDLE UF SHORT 31 gauge x 5/16\" ndle USE TO INJECT UNDER THE SKIN THREE TIMES A  Pen Needle 1    JANUVIA 100 mg tablet TAKE 1 TABLET DAILY 90 Tab 0    acetaminophen-codeine (TYLENOL #3) 300-30 mg per tablet Take 1 Tab by mouth every six (6) hours as needed for Pain. Max Daily Amount: 4 Tabs. 30 Tab 0    butalbital-acetaminophen-caffeine (FIORICET, ESGIC) -40 mg per tablet Take 1 Tab by mouth every four (4) hours as needed for Pain. Max Daily Amount: 6 Tabs. 30 Tab 1    lovastatin (MEVACOR) 20 mg tablet TAKE 1 TABLET IN THE EVENING 90 Tab 1    lisinopril (PRINIVIL, ZESTRIL) 20 mg tablet TAKE 1 TABLET DAILY 90 Tab 2    sertraline (ZOLOFT) 100 mg tablet TAKE 1 TABLET DAILY (Patient taking differently: TAKE 1.5 TABLET DAILY) 90 Tab 1    eszopiclone (LUNESTA) 2 mg tablet Take 1 Tab by mouth nightly. Max Daily Amount: 2 mg. Indications: INSOMNIA 30 Tab 1    atomoxetine (STRATTERA) 40 mg capsule Take 1 Cap by mouth daily. 30 Cap 5    ketoconazole (NIZORAL) 2 % topical cream Apply  to affected area two (2) times a day. 15 g 0    insulin glargine-lixisenatide (SOLIQUA 100/33) 100 unit-33 mcg/mL inpn 40 Units by SubCUTAneous route Daily (before breakfast).  4 Pen 5    JARDIANCE 25 mg tablet TAKE 1 TABLET DAILY 30 Tab 10    ONETOUCH ULTRA TEST strip USE AS DIRECTED BY PHYSICIAN 100 Strip 3    VITAMIN D2 50,000 unit capsule TAKE 1 CAPSULE EVERY 7 DAYS 12 Cap 2    gabapentin (NEURONTIN) 300 mg capsule Take 2 Caps by mouth nightly. 1 Cap 0    metFORMIN ER (GLUCOPHAGE XR) 500 mg tablet Take 2 Tabs by mouth daily (with dinner). Please disp generic form 180 Tab 1    aspirin 81 mg chewable tablet Take 1 Tab by mouth daily. 30 Tab 11    clonazePAM (KLONOPIN) 0.5 mg tablet Take 1 Tab by mouth nightly as needed. Max Daily Amount: 0.5 mg. 20 Tab 5    primidone (MYSOLINE) 250 mg tablet Take 1 Tab by mouth two (2) times a day. 462 Tab 3    folic acid (FOLVITE) 1 mg tablet TAKE 1 TABLET DAILY 90 Tab 3    ONETOUCH ULTRA TEST strip       melatonin 3 mg tablet Take 1 Tab by mouth daily (after dinner). For sleep. Take about 3 hours prior to bedtime 30 Tab 5    clobetasol (TEMOVATE) 0.05 % ointment Apply  to affected area two (2) times daily as needed. 60 g 2    senna-docusate (PERICOLACE) 8.6-50 mg per tablet Take 1 Tab by mouth two (2) times daily as needed for Constipation.  DIETARY SUPPLEMENT PO Take 6 Tabs by mouth daily. CATAPLEX B (STANDARD PROCESS CO)           Plan:  Obtain authorization for testing from insurance company. Report to follow once testing, scoring, and interpretation completed. ? Organic based neurocognitive issues versus mood disorder or combination of same. ? Problems organic, functional, or both? This note will not be viewable in [address] 69year old with cognitive and emotional changes and increased falls and balance problems. Concerns include MCI, dementia, acute CVA related concerns, mood related interference, and/or polypharmacy. Will evaluate for organicity versus functional issues or combination of same.      Neuropsychological Test Results  Patient Testing 11/21/17 & 12/4/17 (YASMEEN completed) Report Completed 12/4/17  A Psychometrist Assisted w/ portions of this evaluation while under my direct supervision    The following assessment procedures were performed:      Neuropsychologist Performed, Interpreted, & Reported: Neuropsychological Mental Status Exam, Revised Memory & Behavior Checklist, Mini Mental State Exam, Clock Drawing Test, Test Of Premorbid Functioning, Marc-Melzack Pain Questionnaire,  History Taking  & Clinical Interview With The Patient,Review Of Available Records. Psychometrist Administered & Neuropsychologist Interpreted & Neuropsychologist Reported: Finger Tapping Test, Grooved Pegboard Test,  Wechsler Adult Intelligence Scale - IV, Verbal Fluency Tests, Sebastián & Sebastián - Revised, Trailmaking Test Parts A & B, [place] Verbal Learning Test - 3, Edil Complex Figure Test, Fischer Depression Inventory - II, Fischer Anxiety Inventory, Personality Assessment Inventory. Computer Administered & Neuropsychologist Interpreted & Neuropsychologist Reported: Mindy Continuous Performance Test - III      Test Findings:  Test Findings:  Note:  The patients raw data have been compared with currently available norms which include demographic corrections for age, gender, and/or education. Sometimes, the patients scores are compared to demographically similar individuals as close to the patients age, education level, etc., as possible. \"Average\" is viewed as being +/- 1 standard deviation (SD) from the stated mean for a particular test score. \"Low average\" is viewed as being between 1 and 2 SD below the mean, and above average is viewed as being 1 and 2 SD above the mean. Scores falling in the borderline range (between 1-1/2 and 2 SD below the mean) are viewed with particular attention as to whether they are normal or abnormal neurocognitive test scores. Other methods of inference in analyzing the test data are also utilized, including the pattern and range of scores in the profile, bilateral motor functions, and the presence, if any, of pathognomonic signs. Behaviorally, the patient was friendly and cooperative and appeared motivated to perform well during this examination. He was late to the testing appointment so the YASMEEN was completed and scored on a second date (12/4/17), hence the delay in the writing of this report. Within this context, the results of this evaluation are viewed as a valid reflection of the patients actual neurocognitive and emotional status. His structured word list fluency, as assessed by the FAS Test, was within the mildly impaired range with a T score of 39. Category fluency was within the below average range with a T score of 44. Confrontation naming ability, as assessed by the [facility] Test - Revised, was within the above average range at 60/60 correct (T = 75). This pattern of performance is indicative of a patient who is at increased risk for day-to-day problems with verbal fluency and confrontation naming was normal.       The patient was administered the Mindy Continuous Performance Test - III, a computer-administered test of sustained attention, and review of the subscales within this instrument revealed mild concerns for inattentiveness without impulsivity. This pattern of performance is indicative of a patient who is at increased risk for day-to-day problems with sustained visual attention/concentration. The patient was administered the Wechsler Adult Intelligence Scale - IV. There was a clinically significant difference between his average range Working Memory Index score of 102 (55th %ile) and his borderline range Processing Speed Index score of 79 (8th %ile). This pattern of performance is indicative of a patient who is at increased risk for day-to-day problems with processing speed. His Verbal Comprehension Index score of 103 was within the average range and his Perceptual Reasoning Index score of 75 was borderline.    See Appendix I (scanned into media section of this EMR) for full breakdown of IQ test scores. Processing speed as well as perceptual reasoning are statistically weaker than would be expected based on his performance on a measure assessing premorbid functioning levels. Other IQ domain scores are within the average range. The patient was administered the [place] Verbal Learning Test  - 3 and generated a normal range (and positive) learning curve over five repeated auditory word list learning trials. An interference trial was within normal limits. Free and cued, short and long delayed recall were all within normal limits. Recognition and forced choice recall were within normal limits. This pattern of performance is not  indicative of a patient who is at increased risk for day-to-day problems with auditory learning and/or memory. The patients performance on the copy portion of the Edil Complex Figure Test was within normal limits. Recall for the complex design was within normal limits after both short and long  delays. Recognition recall was within normal limits. This pattern of performance is not indicative of a patient who is at increased risk for day-to-day problems with visual organization and visual delayed memory. Simple timed visual motor sequencing (Trailmaking Test Part A) was within the mildly impaired range with a T score of 38. His performance on a similar, but more complex task of timed visual motor sequencing (Trailmaking Test Part B) was within the mildly to moderately impaired range with a T score of 33. He made only two sequencing errors on this latter completed test.  Taken together, this pattern of performance is not indicative of a patient who is at increased risk for day-to-day problems with executive functioning. Motor speed for finger tapping was within the moderately impaired range for his dominant hand and below average for his nondominant hand. Fine motor dexterity was mildly to moderately impaired bilaterally.  This is a mixed pattern of performance which does not provide strong support for a particularly focal or lateralized issue. Neurologic correlation is indicated. The patient rated his current level of pain as \"4/5- Horrible\" on the Marc-Melzack Pain Questionnaire. He reported pain in his hips and buttocks. His Fischer Depression Inventory -II score of 14 was within the mildly depressed range. His Fischer Anxiety Inventory score of 14 reflected mild anxiety. The patient was administered the Personality Assessment Inventory and generated a valid profile for interpretation. Within this context, there is support for mild depression and anxiety. The personality profile is otherwise normal.      Impressions & Recommendations: This patient generated a predominantly normal range Neuropsychological Evaluation with respect to neurocognitive functioning. In this regard, the only impairments noted were for visual attention, bilateral motor dexterity, dominant handed motor speed, and on one (of two) test of verbal fluency. His processing speed and perceptual reasoning abilities are borderline. Thankfully, his auditory learning, auditory memory, visual organization, visual memory, working memory, verbal comprehension, and executive functioning abilities remain normal.  Emotionally, the patient reported mild depression and anxiety. In my opinion, this appears to be a case of chronic ADHD exacerbated by mild emotional distress issues, pain, and normal aging. There may be attentional, mood, and medication issues impacting his day-to-day memory. There is no evidence which would suggest a dementia type concern here. I hope he is reassured by these test results. Consider treating attention issues medically, and it would be best to refer him to psychotherapy first to see if counseling can mitigate the mild mood concerns. If not, consider an appropriate medication for anxiety and depression if this is not medically contraindicated.   Stay active. I do not see him as in need of day-to-day supervision at this time. I am not concerned about driving. I am not concerned about competency. Baseline now established. Follow up prn. Clinical correlation is, of course, indicated. I will discuss these findings with the patient when he follows up with me in the near future. A follow up Neuropsychological Evaluation is indicated on a prn basis, especially if there are any cognitive and/or emotional changes. DIAGNOSES:  The Referral Diagnosis Cognitive Difficulties - IS NOT SUPPORTED     Chronic ADHD     Major Depression - Mild     Anxiety Disorder - Mild      The above information is based upon information currently available to me. If there is any additional information of which I am currently unaware, I would be more than happy to review it upon having it made available to me. Thank you for the opportunity to see this interesting individual.     Sincerely,       Annette Bradley. Kristina Jimenez, EdS        dd  CC:  Jose Childs MD, Dr. Wendi Raman        2 units -[id number]-  1.75 hours. Record review. Review of history provided by patient. Review of collaborative information. Testing by Clinician. Review of raw data. Scoring. Report writing of individual tests administered by Clinician. Integration of individual tests administered by psychometrist (that were previously reported and billed under psychometry code below) with testing by clinician and review of records/history/collaborative information. Case Conceptualization, Report writing. Coordination Of Care. 5 units  -[id number] - 4.75 hours. Psychometrist test prep, administration, and scoring under clinician's direct supervision. Clinical interpretation of individual tests administered by psychometrist .  Clinician report of individual tests administered by psychometrist.    1 unit - 86482 -  40 minutes.  Computer testing and scoring and clinician's interpretation of computer-administered test(s)    \"Unit\" is defined by CPT/National Guidelines (31 - 60 minutes). Integral services including scoring of raw data, data interpretation, case conceptualization, report writing etcetera were initiated after the patient finished testing/raw data collected and was completed on the date the report was signed.

## 2017-12-06 RX ORDER — INSULIN GLARGINE 100 [IU]/ML
INJECTION, SOLUTION SUBCUTANEOUS
Qty: 45 ML | Refills: 0 | Status: SHIPPED | OUTPATIENT
Start: 2017-12-06 | End: 2018-01-16 | Stop reason: SDUPTHER

## 2017-12-06 RX ORDER — METFORMIN HYDROCHLORIDE 500 MG/1
TABLET, EXTENDED RELEASE ORAL
Qty: 180 TAB | Refills: 1 | Status: SHIPPED | OUTPATIENT
Start: 2017-12-06 | End: 2018-03-04

## 2017-12-21 DIAGNOSIS — L30.9 ECZEMA, UNSPECIFIED TYPE: ICD-10-CM

## 2017-12-21 DIAGNOSIS — M16.12 PRIMARY OSTEOARTHRITIS OF LEFT HIP: ICD-10-CM

## 2017-12-21 RX ORDER — CLOBETASOL PROPIONATE 0.5 MG/G
OINTMENT TOPICAL
Qty: 60 G | Refills: 2 | Status: SHIPPED | OUTPATIENT
Start: 2017-12-21 | End: 2018-03-04

## 2017-12-21 RX ORDER — ACETAMINOPHEN AND CODEINE PHOSPHATE 300; 30 MG/1; MG/1
1 TABLET ORAL
Qty: 30 TAB | Refills: 0 | Status: CANCELLED | OUTPATIENT
Start: 2017-12-21

## 2017-12-29 RX ORDER — FOLIC ACID 1 MG/1
TABLET ORAL
Qty: 90 TAB | Refills: 3 | Status: SHIPPED | OUTPATIENT
Start: 2017-12-29 | End: 2018-12-24 | Stop reason: SDUPTHER

## 2018-01-05 DIAGNOSIS — M16.12 PRIMARY OSTEOARTHRITIS OF LEFT HIP: ICD-10-CM

## 2018-01-13 RX ORDER — ACETAMINOPHEN AND CODEINE PHOSPHATE 300; 30 MG/1; MG/1
1 TABLET ORAL
Qty: 30 TAB | Refills: 0 | Status: CANCELLED | OUTPATIENT
Start: 2018-01-13

## 2018-01-16 ENCOUNTER — OFFICE VISIT (OUTPATIENT)
Dept: FAMILY MEDICINE CLINIC | Age: 68
End: 2018-01-16

## 2018-01-16 VITALS
OXYGEN SATURATION: 97 % | SYSTOLIC BLOOD PRESSURE: 124 MMHG | HEART RATE: 76 BPM | BODY MASS INDEX: 37.37 KG/M2 | TEMPERATURE: 97.9 F | WEIGHT: 261 LBS | DIASTOLIC BLOOD PRESSURE: 58 MMHG | HEIGHT: 70 IN | RESPIRATION RATE: 20 BRPM

## 2018-01-16 DIAGNOSIS — F33.9 RECURRENT DEPRESSION (HCC): ICD-10-CM

## 2018-01-16 DIAGNOSIS — Q21.12 PFO (PATENT FORAMEN OVALE): ICD-10-CM

## 2018-01-16 DIAGNOSIS — I10 ESSENTIAL HYPERTENSION: ICD-10-CM

## 2018-01-16 DIAGNOSIS — M16.12 PRIMARY OSTEOARTHRITIS OF LEFT HIP: ICD-10-CM

## 2018-01-16 DIAGNOSIS — R01.1 SYSTOLIC MURMUR: ICD-10-CM

## 2018-01-16 DIAGNOSIS — E78.00 HIGH CHOLESTEROL: ICD-10-CM

## 2018-01-16 PROBLEM — E11.40 TYPE 2 DIABETES MELLITUS WITH DIABETIC NEUROPATHY (HCC): Status: ACTIVE | Noted: 2018-01-16

## 2018-01-16 RX ORDER — INSULIN GLARGINE 100 [IU]/ML
INJECTION, SOLUTION SUBCUTANEOUS
Qty: 1 ML | Refills: 0
Start: 2018-01-16 | End: 2018-02-11 | Stop reason: SDUPTHER

## 2018-01-16 RX ORDER — ACETAMINOPHEN AND CODEINE PHOSPHATE 300; 30 MG/1; MG/1
1 TABLET ORAL
Qty: 30 TAB | Refills: 0 | Status: SHIPPED | OUTPATIENT
Start: 2018-01-16 | End: 2018-02-24

## 2018-01-16 NOTE — PROGRESS NOTES
Chief Complaint   Patient presents with    Diabetes     6 month follow-up   Follow-up Left Hip Pain  1. Have you been to the ER, urgent care clinic since your last visit? Hospitalized since your last visit? No    2. Have you seen or consulted any other health care providers outside of the [address] [place] [address] since your last visit? Include any pap smears or colon screening.  Yes Where: Neourologist,Psychiatrist   Recently Diagnosis with ADD  Room 4

## 2018-01-16 NOTE — PROGRESS NOTES
Subjective:     Chief Complaint   Patient presents with    Diabetes     6 month follow-up      He  is a 69 y.o. male who presents for evaluation of:    Since last appt, doing well. Had Neuropsych eval and appeared to have ADD/ADHD and doing well on Stratera. Still having sig balance issues but no further falls. Had MRI brain which came back nml. Balance issues seem most related to hip surgery and resultant fracture with fall. Using exercise bike with a  and this seems to help with his balance. Diabetes Mellitus, HTN, HLD: weight stable. Taking meds able to start on Soliqua 40 units daily but then cost became an issue - using Lantus 20 units in am and 18 units in pm, metformin 1000 mg per day, Jardiance 25 mg per day, and Januvia 100 mg per day.  home glucose monitoring: is not performed regularly - 100-150s. Occ 200s about 1-2 x per week after eating poorly the night before. Reports no polyuria or polydipsia, no chest pain, TIA's, no numbness, tingling or pain in extremities, last eye exam approximately < 1 yr ago. Not exercising d/t hip surgery  Compliant with diabetic diet. Avoids sodas and sweet teas. Avoids fast food. Limits carbs. Pt is a non smoker.     Lab Results   Component Value Date/Time    Hemoglobin A1c (POC) 8.6 07/26/2017 12:24 PM    Hemoglobin A1c (POC) 7.3 02/02/2017 02:45 PM    Hemoglobin A1c 9.6 11/01/2017 08:37 AM    Microalbumin/Creat ratio (mg/g creat) 13 11/01/2010 07:07 AM    Microalb/Creat ratio (ug/mg creat.) <8.8 07/26/2017 12:55 PM    Microalbumin,urine random 0.81 11/01/2010 07:07 AM    LDL, calculated 58 07/26/2017 01:24 PM    Creatinine 0.82 11/01/2017 08:37 AM      Lab Results   Component Value Date/Time    GFR est  11/01/2017 08:37 AM    GFR est non-AA 90 11/01/2017 08:37 AM      Lab Results   Component Value Date/Time    TSH 1.380 07/26/2017 01:24 PM       ROS  Gen - no fever/chills  Resp - no dyspnea or cough  CV - + WANG x 2-3 months, no chest pain, resolved with resting  Rest per HPI    Past Medical History:   Diagnosis Date    ADD (attention deficit disorder)     Anemia due to blood loss     Hinson's esophagus with esophagitis     Benign essential tremor     Cancer ([facility] 2012    BCC    Depression     DJD (degenerative joint disease) of hip     bilat    DM (diabetes mellitus) ([facility] 3/17/2010    ED (erectile dysfunction) of organic origin     Fall on or from sidewalk curb 9/24/2015    Gastritis and duodenitis     GERD (gastroesophageal reflux disease)     resolved after discontinuing diclofenac    Hematuria 6/2016    High cholesterol 3/17/2010    HTN (hypertension) 3/17/2010    Morbid obesity ([facility]     Murmur, cardiac 2016    PFO (patent foramen ovale) 7/26/2017    PUD (peptic ulcer disease)     Shingles 6/2016    RESOLVING- NO RASH (HEAD)    Sleep apnea     CPAP     Past Surgical History:   Procedure Laterality Date    ENDOSCOPY, COLON, DIAGNOSTIC      HX CHOLECYSTECTOMY  1995    HX GI  1980    gastroplasty    HX HERNIA REPAIR  1995    HX ORTHOPAEDIC  2011    rot cuff repair    HX TONSILLECTOMY  1950    TOTAL HIP ARTHROPLASTY  05/2010    right    TOTAL HIP ARTHROPLASTY  08/01/2016    left     Current Outpatient Prescriptions on File Prior to Visit   Medication Sig Dispense Refill    folic acid (FOLVITE) 1 mg tablet TAKE 1 TABLET DAILY 90 Tab 3    clobetasol (TEMOVATE) 0.05 % ointment APPLY TO THE AFFECTED AREA TWICE A DAY AS NEEDED 60 g 2    metFORMIN ER (GLUCOPHAGE XR) 500 mg tablet TAKE 2 TABLETS DAILY (WITH DINNER) 180 Tab 1    BD INSULIN PEN NEEDLE UF SHORT 31 gauge x 5/16\" ndle USE TO INJECT UNDER THE SKIN THREE TIMES A  Pen Needle 1    JANUVIA 100 mg tablet TAKE 1 TABLET DAILY 90 Tab 0    butalbital-acetaminophen-caffeine (FIORICET, ESGIC) -40 mg per tablet Take 1 Tab by mouth every four (4) hours as needed for Pain. Max Daily Amount: 6 Tabs.  30 Tab 1    lovastatin (MEVACOR) 20 mg tablet TAKE 1 TABLET IN THE EVENING 90 Tab 1    lisinopril (PRINIVIL, ZESTRIL) 20 mg tablet TAKE 1 TABLET DAILY 90 Tab 2    sertraline (ZOLOFT) 100 mg tablet TAKE 1 TABLET DAILY (Patient taking differently: TAKE 1.5 TABLET DAILY) 90 Tab 1    eszopiclone (LUNESTA) 2 mg tablet Take 1 Tab by mouth nightly. Max Daily Amount: 2 mg. Indications: INSOMNIA 30 Tab 1    atomoxetine (STRATTERA) 40 mg capsule Take 1 Cap by mouth daily. 30 Cap 5    ketoconazole (NIZORAL) 2 % topical cream Apply  to affected area two (2) times a day. 15 g 0    JARDIANCE 25 mg tablet TAKE 1 TABLET DAILY 30 Tab 10    ONETOUCH ULTRA TEST strip USE AS DIRECTED BY PHYSICIAN 100 Strip 3    VITAMIN D2 50,000 unit capsule TAKE 1 CAPSULE EVERY 7 DAYS 12 Cap 2    gabapentin (NEURONTIN) 300 mg capsule Take 2 Caps by mouth nightly. 1 Cap 0    aspirin 81 mg chewable tablet Take 1 Tab by mouth daily. 30 Tab 11    clonazePAM (KLONOPIN) 0.5 mg tablet Take 1 Tab by mouth nightly as needed. Max Daily Amount: 0.5 mg. 20 Tab 5    primidone (MYSOLINE) 250 mg tablet Take 1 Tab by mouth two (2) times a day. 180 Tab 3    ONETOUCH ULTRA TEST strip       melatonin 3 mg tablet Take 1 Tab by mouth daily (after dinner). For sleep. Take about 3 hours prior to bedtime 30 Tab 5    senna-docusate (PERICOLACE) 8.6-50 mg per tablet Take 1 Tab by mouth two (2) times daily as needed for Constipation.  DIETARY SUPPLEMENT PO Take 6 Tabs by mouth daily. CATAPLEX B ([employer])       No current facility-administered medications on file prior to visit.          Objective:     Vitals:    01/16/18 1046   BP: 124/58   Pulse: 76   Resp: 20   Temp: 97.9 °F (36.6 °C)   TempSrc: Oral   SpO2: 97%   Weight: 261 lb (118.4 kg)   Height: 5' 10\" (1.778 m)     Physical Examination:  General appearance - alert, well appearing, and in no distress  Eyes -sclera anicteric  Neck - supple, no significant adenopathy, no thyromegaly  Chest - clear to auscultation, no wheezes, rales or rhonchi, symmetric air entry  Heart - normal rate, regular rhythm, normal S1, S2,2/6 LION rad to carotids  Neurological - alert, oriented, no focal findings or movement disorder noted  Extr -1+ pitting edema in LLE (improving)    Assessment/ Plan:   Diagnoses and all orders for this visit:    1. Uncontrolled type 2 diabetes mellitus without complication, without long-term current use of insulin (HCC)incr Lantus to 22 units BID, Continue metformin, Jardiance, and Januvia. Work on exercise and diet. -     insulin glargine (LANTUS SOLOSTAR) 100 unit/mL (3 mL) inpn; INJECT 22 UNITS UNDER THE SKIN IN THE MORNING AND 22 UNITS AT NIGHT  -     METABOLIC PANEL, BASIC; Future  -     HEMOGLOBIN A1C WITH EAG; Future    2. Essential hypertension - well controlled  -     METABOLIC PANEL, BASIC; Future    3. High cholesterol - stable  -     METABOLIC PANEL, BASIC; Future  -     HEMOGLOBIN A1C WITH EAG; Future    4. Primary osteoarthritis of right hipchronic issue, have encouraged patient to continue working on exercise and strength training. Recommend physical therapy but he declines at this point.  -     acetaminophen-codeine (TYLENOL #3) 300-30 mg per tablet; Take 1 Tab by mouth every six (6) hours as needed for Pain. Max Daily Amount: 4 Tabs. 5. Recurrent depression ([address] - doing better    6. Systolic murmur  7. PFO (patent foramen ovale)  - Stable with no concerns    I have discussed the diagnosis with the patient and the intended plan as seen in the above orders. The patient has received an after-visit summary and questions were answered concerning future plans. I have discussed medication side effects and warnings with the patient as well. The patient verbalizes understanding and agreement with the plan. Follow-up Disposition:  Return in about 3 months (around 4/16/2018), or if symptoms worsen or fail to improve.

## 2018-01-16 NOTE — MR AVS SNAPSHOT
[address] [facility] 
[phone] Patient: Jaquelin Roberson MRN: [id number] FDS:[birth year] Visit Information Date & Time Provider Department Dept. Phone Encounter #  
 1/16/2018 10:30 AM Brian Cash MD [facility] at [address] [id number] Follow-up Instructions Return in about 3 months (around 4/16/2018), or if symptoms worsen or fail to improve. Upcoming Health Maintenance Date Due  
 EYE EXAM RETINAL OR DILATED Q1 4/11/2017 FOOT EXAM Q1 2/2/2018 Pneumococcal 65+ Low/Medium Risk (2 of 2 - PPSV23) 2/23/2018* MEDICARE YEARLY EXAM 2/3/2018 GLAUCOMA SCREENING Q2Y 4/11/2018 HEMOGLOBIN A1C Q6M 5/1/2018 MICROALBUMIN Q1 7/26/2018 LIPID PANEL Q1 7/26/2018 DTaP/Tdap/Td series (2 - Td) 1/31/2021 *Topic was postponed. The date shown is not the original due date. Allergies as of 1/16/2018  Review Complete On: 1/16/2018 By: Brian Cash MD  
  
 Severity Noted Reaction Type Reactions Nsaids (Non-steroidal Anti-inflammatory Drug) Low 01/26/2016    Other (comments) Hx of PUD and Hinson's Esophagus Current Immunizations  Reviewed on 12/5/2016 Name Date Influenza High Dose Vaccine PF 9/18/2017, 9/22/2016 Influenza Vaccine 10/3/2013 Influenza Vaccine Split 10/28/2012 TDAP Vaccine 1/31/2011 Not reviewed this visit You Were Diagnosed With   
  
 Codes Comments Uncontrolled type 2 diabetes mellitus without complication, without long-term current use of insulin ([facility]    -  Primary ICD-10-CM: E11.65 ICD-9-CM: 250.02 Essential hypertension     ICD-10-CM: I10 
ICD-9-CM: 401.9 High cholesterol     ICD-10-CM: E78.00 ICD-9-CM: 272.0 Primary osteoarthritis of left hip     ICD-10-CM: M16.12 
ICD-9-CM: 715.15 Recurrent depression ([facility]     ICD-10-CM: F33.9 ICD-9-CM: 296.30 Systolic murmur     DWL-10-UN: R01.1 ICD-9-CM: 785.2 PFO (patent foramen ovale)     ICD-10-CM: Q21.1 ICD-9-CM: 714. 5 Vitals BP Pulse Temp Resp Height(growth percentile) Weight(growth percentile) 124/58 (BP 1 Location: Left arm, BP Patient Position: Sitting) 76 97.9 °F (36.6 °C) (Oral) 20 5' 10\" (1.778 m) 261 lb (118.4 kg) SpO2 BMI Smoking Status 97% 37.45 kg/m2 Former Smoker Vitals History BMI and BSA Data Body Mass Index Body Surface Area  
 37.45 kg/m 2 2.42 m 2 Preferred Pharmacy Pharmacy Name [address] [facility] [phone] Your Updated Medication List  
  
   
This list is accurate as of: 1/16/18 11:41 AM.  Always use your most recent med list.  
  
  
  
  
 acetaminophen-codeine 300-30 mg per tablet Commonly known as:  TYLENOL #3 Take 1 Tab by mouth every six (6) hours as needed for Pain. Max Daily Amount: 4 Tabs. aspirin 81 mg chewable tablet Take 1 Tab by mouth daily. atomoxetine 40 mg capsule Commonly known as:  STRATTERA Take 1 Cap by mouth daily. BD INSULIN PEN NEEDLE UF SHORT 31 gauge x 5/16\" Ndle Generic drug:  Insulin Needles (Disposable) USE TO INJECT UNDER THE SKIN THREE TIMES A DAY  
  
 butalbital-acetaminophen-caffeine -40 mg per tablet Commonly known as:  Glory Mussel Take 1 Tab by mouth every four (4) hours as needed for Pain. Max Daily Amount: 6 Tabs. clobetasol 0.05 % ointment Commonly known as:  TEMOVATE  
APPLY TO THE AFFECTED AREA TWICE A DAY AS NEEDED  
  
 clonazePAM 0.5 mg tablet Commonly known as:  Edgar Piano Take 1 Tab by mouth nightly as needed. Max Daily Amount: 0.5 mg. DIETARY SUPPLEMENT PO Take 6 Tabs by mouth daily. CATAPLEX B ([employer])  
  
 eszopiclone 2 mg tablet Commonly known as:  Lyssa Phenes Take 1 Tab by mouth nightly. Max Daily Amount: 2 mg. Indications: INSOMNIA  
  
 folic acid 1 mg tablet Commonly known as:  FOLVITE  
TAKE 1 TABLET DAILY  
  
 gabapentin 300 mg capsule Commonly known as:  NEURONTIN Take 2 Caps by mouth nightly. insulin glargine 100 unit/mL (3 mL) Inpn Commonly known as:  LANTUS SOLOSTAR INJECT 22 UNITS UNDER THE SKIN IN THE MORNING AND 22 UNITS AT NIGHT  
  
 JANUVIA 100 mg tablet Generic drug:  SITagliptin TAKE 1 TABLET DAILY JARDIANCE 25 mg tablet Generic drug:  empagliflozin TAKE 1 TABLET DAILY  
  
 ketoconazole 2 % topical cream  
Commonly known as:  NIZORAL Apply  to affected area two (2) times a day. lisinopril 20 mg tablet Commonly known as:  PRINIVIL, ZESTRIL  
TAKE 1 TABLET DAILY lovastatin 20 mg tablet Commonly known as:  MEVACOR  
TAKE 1 TABLET IN THE EVENING  
  
 melatonin 3 mg tablet Take 1 Tab by mouth daily (after dinner). For sleep. Take about 3 hours prior to bedtime  
  
 metFORMIN  mg tablet Commonly known as:  GLUCOPHAGE XR  
TAKE 2 TABLETS DAILY (WITH DINNER) * ONETOUCH ULTRA TEST strip Generic drug:  glucose blood VI test strips * ONETOUCH ULTRA TEST strip Generic drug:  glucose blood VI test strips USE AS DIRECTED BY PHYSICIAN  
  
 primidone 250 mg tablet Commonly known as: MYSOLINE Take 1 Tab by mouth two (2) times a day. senna-docusate 8.6-50 mg per tablet Commonly known as:  Zoila Belch Take 1 Tab by mouth two (2) times daily as needed for Constipation. sertraline 100 mg tablet Commonly known as:  ZOLOFT  
TAKE 1 TABLET DAILY  
  
 VITAMIN D2 50,000 unit capsule Generic drug:  ergocalciferol TAKE 1 CAPSULE EVERY 7 DAYS * Notice: This list has 2 medication(s) that are the same as other medications prescribed for you. Read the directions carefully, and ask your doctor or other care provider to review them with you. Prescriptions Printed  Refills  
 acetaminophen-codeine (TYLENOL #3) 300-30 mg per tablet 0  
 Sig: Take 1 Tab by mouth every six (6) hours as needed for Pain. Max Daily Amount: 4 Tabs. Class: Print Route: Oral  
  
Follow-up Instructions Return in about 3 months (around 4/16/2018), or if symptoms worsen or fail to improve. To-Do List   
 02/06/2018 Lab:  HEMOGLOBIN A1C WITH EAG   
  
 02/06/2018 Lab:  METABOLIC PANEL, BASIC Introducing [facility]! Dear Trung Streeter: Thank you for requesting a [facility] [employer] account. Our records indicate that you already have an active [facility] [employer] account. You can access your account anytime at [email or web address] Did you know that you can access your hospital and ER discharge instructions at any time in [employer]? You can also review all of your test results from your hospital stay or ER visit. Additional Information If you have questions, please visit the Frequently Asked Questions section of the [employer] website at [email or web address] Remember, [facility] [employer] is NOT to be used for urgent needs. For medical emergencies, dial 911. Now available from your iPhone and Android! Please provide this summary of care documentation to your next provider. Your primary care clinician is listed as F[place]. If you have any questions after today's visit, please call [phone].

## 2018-02-02 NOTE — TELEPHONE ENCOUNTER
Pt needs new script for: lovastatin (MEVACOR) 20 mg tablet to [facility]       And a small supply locally to:  [facility] on [address]         Best number to reach him is [phone]

## 2018-02-05 RX ORDER — LOVASTATIN 20 MG/1
TABLET ORAL
Qty: 90 TAB | Refills: 1 | Status: SHIPPED | OUTPATIENT
Start: 2018-02-05 | End: 2018-08-04 | Stop reason: SDUPTHER

## 2018-02-05 RX ORDER — LOVASTATIN 20 MG/1
TABLET ORAL
Qty: 14 TAB | Refills: 0 | Status: SHIPPED | OUTPATIENT
Start: 2018-02-05 | End: 2018-02-22

## 2018-02-06 DIAGNOSIS — E78.00 HIGH CHOLESTEROL: ICD-10-CM

## 2018-02-06 DIAGNOSIS — I10 ESSENTIAL HYPERTENSION: ICD-10-CM

## 2018-02-07 LAB
BUN SERPL-MCNC: 11 MG/DL (ref 8–27)
BUN/CREAT SERPL: 12 (ref 10–24)
CALCIUM SERPL-MCNC: 9.1 MG/DL (ref 8.6–10.2)
CHLORIDE SERPL-SCNC: 102 MMOL/L (ref 96–106)
CO2 SERPL-SCNC: 21 MMOL/L (ref 18–29)
CREAT SERPL-MCNC: 0.91 MG/DL (ref 0.76–1.27)
EST. AVERAGE GLUCOSE BLD GHB EST-MCNC: 212 MG/DL
GFR SERPLBLD CREATININE-BSD FMLA CKD-EPI: 86 ML/MIN/1.73
GFR SERPLBLD CREATININE-BSD FMLA CKD-EPI: 99 ML/MIN/1.73
GLUCOSE SERPL-MCNC: 218 MG/DL (ref 65–99)
HBA1C MFR BLD: 9 % (ref 4.8–5.6)
POTASSIUM SERPL-SCNC: 4.2 MMOL/L (ref 3.5–5.2)
SODIUM SERPL-SCNC: 140 MMOL/L (ref 134–144)

## 2018-02-11 RX ORDER — INSULIN GLARGINE 100 [IU]/ML
INJECTION, SOLUTION SUBCUTANEOUS
Qty: 45 ML | Refills: 0 | Status: ON HOLD | OUTPATIENT
Start: 2018-02-11 | End: 2018-02-24

## 2018-02-13 RX ORDER — PRIMIDONE 250 MG/1
TABLET ORAL
Qty: 180 TAB | Refills: 3 | Status: SHIPPED | OUTPATIENT
Start: 2018-02-13 | End: 2019-11-07 | Stop reason: SDUPTHER

## 2018-02-13 NOTE — PROGRESS NOTES
[email or web address] message sent for labsdiabetes poorly controlled, asked to adjust insulin and make changes to lifestyle.

## 2018-02-19 ENCOUNTER — APPOINTMENT (OUTPATIENT)
Dept: GENERAL RADIOLOGY | Age: 68
End: 2018-02-19
Attending: EMERGENCY MEDICINE
Payer: COMMERCIAL

## 2018-02-19 ENCOUNTER — HOSPITAL ENCOUNTER (EMERGENCY)
Age: 68
Discharge: HOME | End: 2018-02-20
Attending: EMERGENCY MEDICINE
Payer: COMMERCIAL

## 2018-02-19 DIAGNOSIS — R50.9 FEVER OF UNKNOWN ORIGIN: Primary | ICD-10-CM

## 2018-02-19 DIAGNOSIS — R50.9 ACUTE FEBRILE ILLNESS: ICD-10-CM

## 2018-02-19 LAB
ALBUMIN SERPL-MCNC: 2.8 G/DL (ref 3.5–5)
ALBUMIN/GLOB SERPL: 0.6 {RATIO} (ref 1.1–2.2)
ALP SERPL-CCNC: 68 U/L (ref 45–117)
ALT SERPL-CCNC: 30 U/L (ref 12–78)
ANION GAP SERPL CALC-SCNC: 8 MMOL/L (ref 5–15)
APPEARANCE UR: CLEAR
AST SERPL-CCNC: 35 U/L (ref 15–37)
BACTERIA URNS QL MICRO: NEGATIVE /HPF
BASOPHILS # BLD: 0.1 K/UL (ref 0–0.1)
BASOPHILS NFR BLD: 1 % (ref 0–1)
BILIRUB SERPL-MCNC: 0.4 MG/DL (ref 0.2–1)
BILIRUB UR QL: NEGATIVE
BUN SERPL-MCNC: 13 MG/DL (ref 6–20)
BUN/CREAT SERPL: 14 (ref 12–20)
CALCIUM SERPL-MCNC: 8.2 MG/DL (ref 8.5–10.1)
CHLORIDE SERPL-SCNC: 102 MMOL/L (ref 97–108)
CO2 SERPL-SCNC: 25 MMOL/L (ref 21–32)
COLOR UR: ABNORMAL
CREAT SERPL-MCNC: 0.92 MG/DL (ref 0.7–1.3)
DEPRECATED S PYO AG THROAT QL EIA: NEGATIVE
DIFFERENTIAL METHOD BLD: ABNORMAL
EOSINOPHIL # BLD: 0.1 K/UL (ref 0–0.4)
EOSINOPHIL NFR BLD: 2 % (ref 0–7)
EPITH CASTS URNS QL MICRO: ABNORMAL /LPF
ERYTHROCYTE [DISTWIDTH] IN BLOOD BY AUTOMATED COUNT: 13.7 % (ref 11.5–14.5)
FLUAV AG NPH QL IA: NEGATIVE
FLUBV AG NOSE QL IA: NEGATIVE
GLOBULIN SER CALC-MCNC: 4.4 G/DL (ref 2–4)
GLUCOSE SERPL-MCNC: 276 MG/DL (ref 65–100)
GLUCOSE UR STRIP.AUTO-MCNC: >1000 MG/DL
HCT VFR BLD AUTO: 33.7 % (ref 36.6–50.3)
HGB BLD-MCNC: 11 G/DL (ref 12.1–17)
HGB UR QL STRIP: NEGATIVE
HYALINE CASTS URNS QL MICRO: ABNORMAL /LPF (ref 0–5)
IMM GRANULOCYTES # BLD: 0.1 K/UL (ref 0–0.04)
IMM GRANULOCYTES NFR BLD AUTO: 1 % (ref 0–0.5)
KETONES UR QL STRIP.AUTO: NEGATIVE MG/DL
LACTATE SERPL-SCNC: 1.2 MMOL/L (ref 0.4–2)
LEUKOCYTE ESTERASE UR QL STRIP.AUTO: NEGATIVE
LYMPHOCYTES # BLD: 0.6 K/UL (ref 0.8–3.5)
LYMPHOCYTES NFR BLD: 12 % (ref 12–49)
MCH RBC QN AUTO: 29.6 PG (ref 26–34)
MCHC RBC AUTO-ENTMCNC: 32.6 G/DL (ref 30–36.5)
MCV RBC AUTO: 90.6 FL (ref 80–99)
MONOCYTES # BLD: 0.5 K/UL (ref 0–1)
MONOCYTES NFR BLD: 9 % (ref 5–13)
NEUTS SEG # BLD: 3.9 K/UL (ref 1.8–8)
NEUTS SEG NFR BLD: 75 % (ref 32–75)
NITRITE UR QL STRIP.AUTO: NEGATIVE
NRBC # BLD: 0 K/UL (ref 0–0.01)
NRBC BLD-RTO: 0 PER 100 WBC
PH UR STRIP: 6.5 [PH] (ref 5–8)
PLATELET # BLD AUTO: 171 K/UL (ref 150–400)
PMV BLD AUTO: 10.6 FL (ref 8.9–12.9)
POTASSIUM SERPL-SCNC: 4.1 MMOL/L (ref 3.5–5.1)
PROT SERPL-MCNC: 7.2 G/DL (ref 6.4–8.2)
PROT UR STRIP-MCNC: NEGATIVE MG/DL
RBC # BLD AUTO: 3.72 M/UL (ref 4.1–5.7)
RBC #/AREA URNS HPF: ABNORMAL /HPF (ref 0–5)
RBC MORPH BLD: ABNORMAL
SODIUM SERPL-SCNC: 135 MMOL/L (ref 136–145)
SP GR UR REFRACTOMETRY: 1.03 (ref 1–1.03)
UA: UC IF INDICATED,UAUC: ABNORMAL
UROBILINOGEN UR QL STRIP.AUTO: 0.2 EU/DL (ref 0.2–1)
WBC # BLD AUTO: 5.3 K/UL (ref 4.1–11.1)
WBC URNS QL MICRO: ABNORMAL /HPF (ref 0–4)

## 2018-02-19 PROCEDURE — 93005 ELECTROCARDIOGRAM TRACING: CPT

## 2018-02-19 PROCEDURE — 71046 X-RAY EXAM CHEST 2 VIEWS: CPT

## 2018-02-19 PROCEDURE — 80053 COMPREHEN METABOLIC PANEL: CPT | Performed by: EMERGENCY MEDICINE

## 2018-02-19 PROCEDURE — 74011250636 HC RX REV CODE- 250/636: Performed by: EMERGENCY MEDICINE

## 2018-02-19 PROCEDURE — 74011250637 HC RX REV CODE- 250/637: Performed by: PHYSICIAN ASSISTANT

## 2018-02-19 PROCEDURE — 36415 COLL VENOUS BLD VENIPUNCTURE: CPT | Performed by: EMERGENCY MEDICINE

## 2018-02-19 PROCEDURE — 87077 CULTURE AEROBIC IDENTIFY: CPT | Performed by: EMERGENCY MEDICINE

## 2018-02-19 PROCEDURE — 83605 ASSAY OF LACTIC ACID: CPT | Performed by: EMERGENCY MEDICINE

## 2018-02-19 PROCEDURE — 87186 SC STD MICRODIL/AGAR DIL: CPT | Performed by: EMERGENCY MEDICINE

## 2018-02-19 PROCEDURE — 99285 EMERGENCY DEPT VISIT HI MDM: CPT

## 2018-02-19 PROCEDURE — 87804 INFLUENZA ASSAY W/OPTIC: CPT | Performed by: EMERGENCY MEDICINE

## 2018-02-19 PROCEDURE — 87070 CULTURE OTHR SPECIMN AEROBIC: CPT | Performed by: EMERGENCY MEDICINE

## 2018-02-19 PROCEDURE — 87040 BLOOD CULTURE FOR BACTERIA: CPT | Performed by: EMERGENCY MEDICINE

## 2018-02-19 PROCEDURE — 87880 STREP A ASSAY W/OPTIC: CPT | Performed by: EMERGENCY MEDICINE

## 2018-02-19 PROCEDURE — 96361 HYDRATE IV INFUSION ADD-ON: CPT

## 2018-02-19 PROCEDURE — 96360 HYDRATION IV INFUSION INIT: CPT

## 2018-02-19 PROCEDURE — 85025 COMPLETE CBC W/AUTO DIFF WBC: CPT | Performed by: EMERGENCY MEDICINE

## 2018-02-19 PROCEDURE — 81001 URINALYSIS AUTO W/SCOPE: CPT | Performed by: EMERGENCY MEDICINE

## 2018-02-19 RX ORDER — SODIUM CHLORIDE 0.9 % (FLUSH) 0.9 %
5-10 SYRINGE (ML) INJECTION AS NEEDED
Status: DISCONTINUED | OUTPATIENT
Start: 2018-02-19 | End: 2018-02-20 | Stop reason: HOSPADM

## 2018-02-19 RX ORDER — SITAGLIPTIN 100 MG/1
TABLET, FILM COATED ORAL
Qty: 90 TAB | Refills: 0 | Status: SHIPPED | OUTPATIENT
Start: 2018-02-19 | End: 2018-05-20 | Stop reason: SDUPTHER

## 2018-02-19 RX ORDER — ACETAMINOPHEN 500 MG
1000 TABLET ORAL
Status: COMPLETED | OUTPATIENT
Start: 2018-02-19 | End: 2018-02-19

## 2018-02-19 RX ADMIN — SODIUM CHLORIDE 3639 ML: 900 INJECTION, SOLUTION INTRAVENOUS at 22:36

## 2018-02-19 RX ADMIN — ACETAMINOPHEN 1000 MG: 500 TABLET ORAL at 21:33

## 2018-02-19 NOTE — LETTER
[address] 
[facility] EMERGENCY DEPT 
[address] [id number] 
[phone] Work/School Note Date: 2/19/2018 To Whom It May concern: 
 
Fercho Arevalo was seen and treated today in the emergency room by the following provider(s): 
Attending Provider: Ava Loo MD.   
 
Fercho Arevalo may return to work on 2/22/18.  
 
Sincerely, 
 
 
 
 
Ava Loo MD

## 2018-02-20 VITALS
SYSTOLIC BLOOD PRESSURE: 140 MMHG | HEART RATE: 78 BPM | HEIGHT: 70 IN | RESPIRATION RATE: 16 BRPM | DIASTOLIC BLOOD PRESSURE: 58 MMHG | TEMPERATURE: 99.6 F | BODY MASS INDEX: 38.28 KG/M2 | OXYGEN SATURATION: 97 % | WEIGHT: 267.42 LBS

## 2018-02-20 LAB
ATRIAL RATE: 83 BPM
CALCULATED P AXIS, ECG09: 31 DEGREES
CALCULATED R AXIS, ECG10: -7 DEGREES
CALCULATED T AXIS, ECG11: 19 DEGREES
DIAGNOSIS, 93000: NORMAL
P-R INTERVAL, ECG05: 146 MS
Q-T INTERVAL, ECG07: 386 MS
QRS DURATION, ECG06: 88 MS
QTC CALCULATION (BEZET), ECG08: 453 MS
VENTRICULAR RATE, ECG03: 83 BPM

## 2018-02-20 PROCEDURE — 96361 HYDRATE IV INFUSION ADD-ON: CPT

## 2018-02-20 NOTE — ED PROVIDER NOTES
EMERGENCY DEPARTMENT HISTORY AND PHYSICAL EXAM      Date: 2/19/2018  Patient Name: Janina Herrera    History of Presenting Illness     Chief Complaint   Patient presents with    Fever     Pt presents to ED for fever, [facility], [facility] for fever       History Provided By: Patient    HPI: Janina Herrera is a 69 y.o. male, pmhx DM, HTN, HCL, who presents ambulatory to the ED c/o persistent, intermittent fever x10 days. Pt reports associated generalized body aches. Pt states he was evaluated at [facility] today, where he was referred to the ED for evaluation after negative UA and labs. Pt states he was febrile of 103 F PTA, prompting his visit. He reports taking Aleve for his intermittent fevers with minimal relief, noting his last dose was yesterday. Pt specifically denies any congestion, cough, shortness of breath, chest pain, abdominal pain, nausea, vomiting, diarrhea, dysuria, or urinary frequency. PCP: Elana Hinson MD    PMHx: Significant for DM, HTN, HCL, DJD, PUD, Depression, Sleep apnea, GERD  PSHx: Significant for cholecystectomy, Hernia Repair, Orthopedic   Social Hx: -tobacco (Former), -EtOH, -Illicit Drugs     There are no other complaints, changes, or physical findings at this time.      Current Facility-Administered Medications   Medication Dose Route Frequency Provider Last Rate Last Dose    sodium chloride (NS) flush 5-10 mL  5-10 mL IntraVENous PRN Alona Salas MD         Current Outpatient Prescriptions   Medication Sig Dispense Refill    JANUVIA 100 mg tablet TAKE 1 TABLET DAILY 90 Tab 0    primidone (MYSOLINE) 250 mg tablet TAKE 1 TABLET TWICE A  Tab 3    LANTUS SOLOSTAR 100 unit/mL (3 mL) inpn INJECT 20 UNITS UNDER THE SKIN IN THE MORNING AND 18 UNITS AT NIGHT 45 mL 0    lovastatin (MEVACOR) 20 mg tablet TAKE 1 TABLET IN THE EVENING 90 Tab 1    acetaminophen-codeine (TYLENOL #3) 300-30 mg per tablet Take 1 Tab by mouth every six (6) hours as needed for Pain. Max Daily Amount: 4 Tabs. 30 Tab 0    folic acid (FOLVITE) 1 mg tablet TAKE 1 TABLET DAILY 90 Tab 3    clobetasol (TEMOVATE) 0.05 % ointment APPLY TO THE AFFECTED AREA TWICE A DAY AS NEEDED 60 g 2    butalbital-acetaminophen-caffeine (FIORICET, ESGIC) -40 mg per tablet Take 1 Tab by mouth every four (4) hours as needed for Pain. Max Daily Amount: 6 Tabs. 30 Tab 1    lisinopril (PRINIVIL, ZESTRIL) 20 mg tablet TAKE 1 TABLET DAILY 90 Tab 2    sertraline (ZOLOFT) 100 mg tablet TAKE 1 TABLET DAILY (Patient taking differently: TAKE 1.5 TABLET DAILY) 90 Tab 1    eszopiclone (LUNESTA) 2 mg tablet Take 1 Tab by mouth nightly. Max Daily Amount: 2 mg. Indications: INSOMNIA 30 Tab 1    atomoxetine (STRATTERA) 40 mg capsule Take 1 Cap by mouth daily. 30 Cap 5    ketoconazole (NIZORAL) 2 % topical cream Apply  to affected area two (2) times a day. 15 g 0    JARDIANCE 25 mg tablet TAKE 1 TABLET DAILY 30 Tab 10    ONETOUCH ULTRA TEST strip USE AS DIRECTED BY PHYSICIAN 100 Strip 3    VITAMIN D2 50,000 unit capsule TAKE 1 CAPSULE EVERY 7 DAYS 12 Cap 2    gabapentin (NEURONTIN) 300 mg capsule Take 2 Caps by mouth nightly. 1 Cap 0    aspirin 81 mg chewable tablet Take 1 Tab by mouth daily. 30 Tab 11    lovastatin (MEVACOR) 20 mg tablet Take one tablet in the evening 14 Tab 0    metFORMIN ER (GLUCOPHAGE XR) 500 mg tablet TAKE 2 TABLETS DAILY (WITH DINNER) 180 Tab 1    BD INSULIN PEN NEEDLE UF SHORT 31 gauge x 5/16\" ndle USE TO INJECT UNDER THE SKIN THREE TIMES A  Pen Needle 1    clonazePAM (KLONOPIN) 0.5 mg tablet Take 1 Tab by mouth nightly as needed. Max Daily Amount: 0.5 mg. 20 Tab 5    ONETOUCH ULTRA TEST strip       melatonin 3 mg tablet Take 1 Tab by mouth daily (after dinner). For sleep.   Take about 3 hours prior to bedtime 30 Tab 5    senna-docusate (PERICOLACE) 8.6-50 mg per tablet Take 1 Tab by mouth two (2) times daily as needed for Constipation.  DIETARY SUPPLEMENT PO Take 6 Tabs by mouth daily. CATAPLEX B (STANDARD PROCESS CO)         Past History     Past Medical History:  Past Medical History:   Diagnosis Date    ADD (attention deficit disorder)     Anemia due to blood loss     Hinson's esophagus with esophagitis     Benign essential tremor     Cancer ([facility] [address] 2012    BCC    Depression     DJD (degenerative joint disease) of hip     bilat    DM (diabetes mellitus) ([facility] [address] 3/17/2010    ED (erectile dysfunction) of organic origin     Fall on or from sidewalk curb 9/24/2015    Femur fracture ([facility] [address]     Gastritis and duodenitis     GERD (gastroesophageal reflux disease)     resolved after discontinuing diclofenac    Hematuria 6/2016    High cholesterol 3/17/2010    HTN (hypertension) 3/17/2010    Morbid obesity ([facility] [address]     Murmur, cardiac 2016    PFO (patent foramen ovale) 7/26/2017    PUD (peptic ulcer disease)     Shingles 6/2016    RESOLVING- NO RASH (HEAD)    Sleep apnea     CPAP       Past Surgical History:  Past Surgical History:   Procedure Laterality Date    ENDOSCOPY, COLON, DIAGNOSTIC      HX CHOLECYSTECTOMY  1995    HX GI  1980    gastroplasty    HX HERNIA REPAIR  1995    HX HIP REPLACEMENT      Left    HX ORTHOPAEDIC  2011    rot cuff repair    HX TONSILLECTOMY  1950    TOTAL HIP ARTHROPLASTY  05/2010    right    TOTAL HIP ARTHROPLASTY  08/01/2016    left       Family History:  Family History   Problem Relation Age of Onset   Enedina Cancer Father      multiple myeloma    Stroke Father     Dementia Mother     No Known Problems Brother     COPD Brother     Cancer Maternal Grandmother      COLON    Anesth Problems Neg Hx        Social History:  Social History   Substance Use Topics    Smoking status: Former Smoker     Packs/day: 2.00     Years: 15.00     Quit date: 3/1/1979    Smokeless tobacco: Never Used    Alcohol use No       Allergies:   Allergies   Allergen Reactions    Nsaids (Non-Steroidal Anti-Inflammatory Drug) Other (comments)     Hx of PUD and Hinson's Esophagus         Review of Systems   Review of Systems   Constitutional: Positive for fever. Negative for chills. HENT: Negative. Eyes: Negative. Respiratory: Negative for cough, chest tightness and shortness of breath. Cardiovascular: Negative for chest pain and leg swelling. Gastrointestinal: Negative for abdominal pain, diarrhea, nausea and vomiting. Endocrine: Negative. Genitourinary: Negative for difficulty urinating and dysuria. Musculoskeletal: Positive for myalgias. Skin: Negative. Neurological: Negative. Psychiatric/Behavioral: Negative. All other systems reviewed and are negative. Physical Exam   Physical Exam   Constitutional: He is oriented to person, place, and time. He appears well-developed and well-nourished. No distress. obese   HENT:   Head: Normocephalic and atraumatic. Nose: Nose normal.   Mouth/Throat: No oropharyngeal exudate. Eyes: Conjunctivae and EOM are normal. Pupils are equal, round, and reactive to light. Neck: Normal range of motion and full passive range of motion without pain. Neck supple. No JVD present. No spinous process tenderness and no muscular tenderness present. No rigidity. Normal range of motion present. No Brudzinski's sign and no Kernig's sign noted. Cardiovascular: Normal rate, regular rhythm, normal heart sounds and intact distal pulses. Exam reveals no friction rub. No murmur heard. Pulmonary/Chest: Effort normal and breath sounds normal. No stridor. No respiratory distress. He has no wheezes. He has no rales. Pt able to speak in full, unlabored sentences. Abdominal: Soft. Bowel sounds are normal. He exhibits no distension and no mass. There is no tenderness. There is no rigidity, no rebound and no guarding. Musculoskeletal: Normal range of motion. He exhibits no tenderness.    Neurological: He is alert and oriented to person, place, and time. No cranial nerve deficit. Skin: Skin is warm and dry. No rash noted. He is not diaphoretic. Psychiatric: He has a normal mood and affect. His speech is normal and behavior is normal. Judgment and thought content normal. Cognition and memory are normal.   Nursing note and vitals reviewed. Diagnostic Study Results     Labs -     Recent Results (from the past 12 hour(s))   URINALYSIS W/ REFLEX CULTURE    Collection Time: 02/19/18  7:30 PM   Result Value Ref Range    Color YELLOW/STRAW      Appearance CLEAR CLEAR      Specific gravity 1.026 1.003 - 1.030      pH (UA) 6.5 5.0 - 8.0      Protein NEGATIVE  NEG mg/dL    Glucose >1000 (A) NEG mg/dL    Ketone NEGATIVE  NEG mg/dL    Bilirubin NEGATIVE  NEG      Blood NEGATIVE  NEG      Urobilinogen 0.2 0.2 - 1.0 EU/dL    Nitrites NEGATIVE  NEG      Leukocyte Esterase NEGATIVE  NEG      WBC 0-4 0 - 4 /hpf    RBC 0-5 0 - 5 /hpf    Epithelial cells FEW FEW /lpf    Bacteria NEGATIVE  NEG /hpf    UA:UC IF INDICATED CULTURE NOT INDICATED BY UA RESULT CNI      Hyaline cast 0-2 0 - 5 /lpf   CBC WITH AUTOMATED DIFF    Collection Time: 02/19/18  7:56 PM   Result Value Ref Range    WBC 5.3 4.1 - 11.1 K/uL    RBC 3.72 (L) 4.10 - 5.70 M/uL    HGB 11.0 (L) 12.1 - 17.0 g/dL    HCT 33.7 (L) 36.6 - 50.3 %    MCV 90.6 80.0 - 99.0 FL    MCH 29.6 26.0 - 34.0 PG    MCHC 32.6 30.0 - 36.5 g/dL    RDW 13.7 11.5 - 14.5 %    PLATELET 109 192 - 889 K/uL    MPV 10.6 8.9 - 12.9 FL    NRBC 0.0 0  WBC    ABSOLUTE NRBC 0.00 0.00 - 0.01 K/uL    NEUTROPHILS 75 32 - 75 %    LYMPHOCYTES 12 12 - 49 %    MONOCYTES 9 5 - 13 %    EOSINOPHILS 2 0 - 7 %    BASOPHILS 1 0 - 1 %    IMMATURE GRANULOCYTES 1 (H) 0.0 - 0.5 %    ABS. NEUTROPHILS 3.9 1.8 - 8.0 K/UL    ABS. LYMPHOCYTES 0.6 (L) 0.8 - 3.5 K/UL    ABS. MONOCYTES 0.5 0.0 - 1.0 K/UL    ABS. EOSINOPHILS 0.1 0.0 - 0.4 K/UL    ABS. BASOPHILS 0.1 0.0 - 0.1 K/UL    ABS. IMM.  GRANS. 0.1 (H) 0.00 - 0.04 K/UL    DF SMEAR SCANNED      RBC COMMENTS NORMOCYTIC, NORMOCHROMIC     METABOLIC PANEL, COMPREHENSIVE    Collection Time: 02/19/18  7:56 PM   Result Value Ref Range    Sodium 135 (L) 136 - 145 mmol/L    Potassium 4.1 3.5 - 5.1 mmol/L    Chloride 102 97 - 108 mmol/L    CO2 25 21 - 32 mmol/L    Anion gap 8 5 - 15 mmol/L    Glucose 276 (H) 65 - 100 mg/dL    BUN 13 6 - 20 MG/DL    Creatinine 0.92 0.70 - 1.30 MG/DL    BUN/Creatinine ratio 14 12 - 20      GFR est AA >60 >60 ml/min/1.73m2    GFR est non-AA >60 >60 ml/min/1.73m2    Calcium 8.2 (L) 8.5 - 10.1 MG/DL    Bilirubin, total 0.4 0.2 - 1.0 MG/DL    ALT (SGPT) 30 12 - 78 U/L    AST (SGOT) 35 15 - 37 U/L    Alk. phosphatase 68 45 - 117 U/L    Protein, total 7.2 6.4 - 8.2 g/dL    Albumin 2.8 (L) 3.5 - 5.0 g/dL    Globulin 4.4 (H) 2.0 - 4.0 g/dL    A-G Ratio 0.6 (L) 1.1 - 2.2     EKG, 12 LEAD, INITIAL    Collection Time: 02/19/18  9:49 PM   Result Value Ref Range    Ventricular Rate 83 BPM    Atrial Rate 83 BPM    P-R Interval 146 ms    QRS Duration 88 ms    Q-T Interval 386 ms    QTC Calculation (Bezet) 453 ms    Calculated P Axis 31 degrees    Calculated R Axis -7 degrees    Calculated T Axis 19 degrees    Diagnosis       Normal sinus rhythm  Minimal voltage criteria for LVH, may be normal variant  Borderline ECG  When compared with ECG of 05-JUL-2016 09:20,  No significant change was found     LACTIC ACID    Collection Time: 02/19/18  9:56 PM   Result Value Ref Range    Lactic acid 1.2 0.4 - 2.0 MMOL/L   INFLUENZA A & B AG (RAPID TEST)    Collection Time: 02/19/18  9:56 PM   Result Value Ref Range    Influenza A Antigen NEGATIVE  NEG      Influenza B Antigen NEGATIVE  NEG     STREP AG SCREEN, GROUP A    Collection Time: 02/19/18  9:56 PM   Result Value Ref Range    Group A Strep Ag ID NEGATIVE  NEG         Radiologic Studies -     CXR Results  (Last 48 hours)               02/19/18 2127  XR CHEST PA LAT Final result    Impression:   impression: No acute findings.        Narrative:  Clinical indication: Fever. Frontal and lateral views of the chest obtained, comparison to 2010. There is   some mild chronic basilar changes bilaterally. The heart size is normal. There   is no acute consolidation. Medical Decision Making   I am the first provider for this patient. I reviewed the vital signs, available nursing notes, past medical history, past surgical history, family history and social history. Vital Signs-Reviewed the patient's vital signs. Patient Vitals for the past 12 hrs:   Temp Pulse Resp BP SpO2   02/20/18 0030 99.6 °F (37.6 °C) 78 16 150/62 98 %   02/20/18 0015 - 81 - 154/70 97 %   02/19/18 2345 - 78 - 141/61 97 %   02/19/18 2330 - 79 - 144/59 96 %   02/19/18 2315 - 78 - 143/63 97 %   02/19/18 2300 - 76 18 139/65 97 %   02/19/18 2245 - 79 18 150/61 97 %   02/19/18 2237 (!) 100.8 °F (38.2 °C) 81 18 140/50 97 %   02/19/18 2215 - - - 140/50 97 %   02/19/18 2200 - - - 142/51 95 %   02/19/18 2145 - - - 141/61 97 %   02/19/18 2133 (!) 102.6 °F (39.2 °C) 86 18 137/50 97 %   02/19/18 1917 (!) 101.5 °F (38.6 °C) 84 18 137/73 98 %       Pulse Oximetry Analysis - 98% on RA    Cardiac Monitor:   Rate: 84 bpm  Rhythm: Normal Sinus Rhythm      Records Reviewed: Nursing Notes and Old Medical Records    Provider Notes (Medical Decision Making):     DDX:  Uti, pna, flu, uri, dehydration, strep, meningitis, intraabdominal infection    Plan:  Labs, cultures, ua, flu swab, strep swab    Impression:  FUO    ED Course:   Initial assessment performed. The patients presenting problems have been discussed, and they are in agreement with the care plan formulated and outlined with them. I have encouraged them to ask questions as they arise throughout their visit.     I reviewed our electronic medical record system for any past medical records that were available that may contribute to the patients current condition, the nursing notes and and vital signs from today's visit    Nursing notes will be reviewed as they become available in realtime while the pt has been in the ED. Elroy Hammans, MD    EKG interpretation 7891: NSR, nl Axis, rate 83; , QRS 88, QTc 453; no acute ischemia; Elroy Hammans, MD    I personally reviewed pt's imaging. Official read by radiology listed below. Elroy Hammans, MD    PROGRESS NOTE:  11:55 PM  Pt reevaluated. Pt with improved temperature. No source for fever determined on ua/cxr/labs. Normal wbc. Pt with full, painless ROM of neck without ridigity, very low suspicion for meningitis. Will discuss possible LP for full eval.  Written by Chay Dolan, ED Scribe, as dictated by Elroy Hammans, MD    PROGRESS NOTE:  1:25 AM  Pt reevaluated. Pt reports to be feeling better. Discussed pros and cons of LP procedure and further evaluation for meningitis, Pt acknowledged understanding of information provide and defers LP at this time. Pt reports feeling better and feels ready for discharge. Discussed lab and imaging findings with pt and/or family, specifically noting unremarkable labs. Pt will follow up as instructed. All questions have been answered, pt voiced understanding and agreement with plan. If narcotics were prescribed, pt was advised not to drive or operate heavy machinery. If abx were prescribed, pt advised that diarrhea and rash are possible side effects of the medications. Specific return precautions provided in addition to instructions for pt to return to the ED immediately should sx worsen at any time. Elroy Hammans, MD    PLAN:  1. Current Discharge Medication List        2. Follow-up Information     Follow up With Details Comments Contact Info    Silvia Granger MD Schedule an appointment as soon as possible for a visit in 2 days  [address]  [phone]          Return to ED if worse     Disposition:    1:26 AM   The patient's results have been reviewed with family and/or caregiver.  They verbally convey their understanding and agreement of the patient's signs, symptoms, diagnosis, treatment and prognosis and additionally agree to follow up as recommended in the discharge instructions or to return to the Emergency Room should the patient's condition change prior to their follow-up appointment. The family and/or caregiver verbally agrees with the care-plan and all of their questions have been answered. The discharge instructions have also been provided to the them with educational information regarding the patient's diagnosis as well a list of reasons why the patient would want to return to the ER prior to their follow-up appointment should their condition change. Catherine Santoro MD      Diagnosis     Clinical Impression:   1. Fever of unknown origin    2. Acute febrile illness        Attestations: This note is prepared by Miguel Ryan, acting as Scribe for MD Catherine Lu MD : The scribe's documentation has been prepared under my direction and personally reviewed by me in its entirety. I confirm that the note above accurately reflects all work, treatment, procedures, and medical decision making performed by me. This note will not be viewable in [address]

## 2018-02-20 NOTE — ED NOTES
Discharge instructions given to patient by MD Myra Reynolds. Verbalized understanding of instructions. Patient discharged without difficulty. Patient discharged in stable condition via ambulation, declines wheelchair, accompanied by family.

## 2018-02-20 NOTE — ED NOTES
Assumed care of pt from triage at this time. Pt arrives with c/o fever x 1 week. Pt states he [facility]. Pt states body aches, denies any other symptoms-cough, N/V/D. Pt denies CP, SOB. Pt states completing Tamiflu as rx'd to him. Pt states taking Aleve for fevers, which has only intermittently been effective for treating fevers. Pt resting comfortably on the stretcher in a position of comfort.  Pt in no acute distress at this time.  Call bell within reach.  Side rails x 2.  Cardiac monitor x 3. Pt ANO x 4  Stretcher locked in the lowest position.  Pt aware of plan to await for MD/PA-C/NP assessment, and pt/family verbalizes understanding.  Will continue to monitor fever.

## 2018-02-20 NOTE — DISCHARGE INSTRUCTIONS
Learning About Fever  What is a fever? A fever is a high body temperature. It's one way your body fights being sick. A fever shows that the body is responding to infection or other illnesses, both minor and severe. A fever is a symptom, not an illness by itself. A fever can be a sign that you are ill, but most fevers are not caused by a serious problem. You may have a fever with a minor illness, such as a cold. But sometimes a very serious infection may cause little or no fever. It is important to look at other symptoms, other conditions you have, and how you feel in general. In children, notice how they act and see what symptoms they complain of. What is a normal body temperature? A normal body temperature is about 98. 6ºF. Some people have a normal temperature that is a little higher or a little lower than this. Your temperature may be a little lower in the morning than it is later in the day. It may go up during hot weather or when you exercise, wear heavy clothes, or take a hot bath. Your temperature may also be different depending on how you take it. A temperature taken in the mouth (oral) or under the arm may be a little lower than your core temperature (rectal). What is a fever temperature? A core temperature of 100.4°F or above is considered a fever. What can cause a fever? A fever may be caused by:  · Infections. This is the most common cause of a fever. Examples of infections that can cause a fever include the flu, a kidney infection, or pneumonia. · Some medicines. · Severe trauma or injury, such as a heart attack, stroke, heatstroke, or burns. · Other medical conditions, such as arthritis and some cancers. How can you treat a fever at home? · Ask your doctor if you can take an over-the-counter pain medicine, such as acetaminophen (Tylenol), ibuprofen (Advil, Motrin), or naproxen (Aleve). Be safe with medicines. Read and follow all instructions on the label.   · To prevent dehydration, drink plenty of fluids. Choose water and other caffeine-free clear liquids until you feel better. If you have kidney, heart, or liver disease and have to limit fluids, talk with your doctor before you increase the amount of fluids you drink. Follow-up care is a key part of your treatment and safety. Be sure to make and go to all appointments, and call your doctor if you are having problems. It's also a good idea to know your test results and keep a list of the medicines you take. Where can you learn more? Go to http://russ-kiesha.info/. Enter D693 in the search box to learn more about \"Learning About Fever. \"  Current as of: March 20, 2017  Content Version: 11.4  © 9588-4013 [employer]. Care instructions adapted under license by [employer] (which disclaims liability or warranty for this information). If you have questions about a medical condition or this instruction, always ask your healthcare professional. [address] any warranty or liability for your use of this information.

## 2018-02-21 ENCOUNTER — TELEPHONE (OUTPATIENT)
Dept: FAMILY MEDICINE CLINIC | Age: 68
End: 2018-02-21

## 2018-02-21 NOTE — TELEPHONE ENCOUNTER
----- Message from Eric Pickard sent at 2/21/2018  1:13 PM EST -----  Regarding: Dr. Mitchell Spotted Telephone  Patient needs to be seen. He has been to [facility] 3 times and [facility] ER on 02/18/18. He has symptoms of fatigue, fever every afternoon and its over 102 every night for 10 days. Please call him back at [phone].

## 2018-02-21 NOTE — PROGRESS NOTES
Upon chart review, no indication of antibiotic use at discharge.   Final results and sensitivities pending

## 2018-02-22 ENCOUNTER — APPOINTMENT (OUTPATIENT)
Dept: GENERAL RADIOLOGY | Age: 68
End: 2018-02-22
Attending: FAMILY MEDICINE

## 2018-02-22 ENCOUNTER — HOSPITAL ENCOUNTER (EMERGENCY)
Age: 68
Discharge: HOME | End: 2018-02-22
Attending: FAMILY MEDICINE

## 2018-02-22 VITALS
BODY MASS INDEX: 35.56 KG/M2 | HEIGHT: 71 IN | OXYGEN SATURATION: 98 % | TEMPERATURE: 97.7 F | HEART RATE: 75 BPM | DIASTOLIC BLOOD PRESSURE: 50 MMHG | RESPIRATION RATE: 18 BRPM | SYSTOLIC BLOOD PRESSURE: 94 MMHG | WEIGHT: 254 LBS

## 2018-02-22 DIAGNOSIS — N40.1 BENIGN PROSTATIC HYPERPLASIA WITH WEAK URINARY STREAM: ICD-10-CM

## 2018-02-22 DIAGNOSIS — R50.9 FEVER, UNSPECIFIED FEVER CAUSE: Primary | ICD-10-CM

## 2018-02-22 DIAGNOSIS — R39.12 BENIGN PROSTATIC HYPERPLASIA WITH WEAK URINARY STREAM: ICD-10-CM

## 2018-02-22 DIAGNOSIS — M25.551 PAIN OF RIGHT HIP JOINT: ICD-10-CM

## 2018-02-22 LAB
BACTERIA SPEC CULT: NORMAL
SERVICE CMNT-IMP: NORMAL

## 2018-02-22 RX ORDER — METHOCARBAMOL 750 MG/1
750 TABLET, FILM COATED ORAL
Qty: 20 TAB | Refills: 0 | Status: SHIPPED | OUTPATIENT
Start: 2018-02-22 | End: 2018-03-04

## 2018-02-22 RX ORDER — TAMSULOSIN HYDROCHLORIDE 0.4 MG/1
0.4 CAPSULE ORAL DAILY
Qty: 15 CAP | Refills: 0 | Status: SHIPPED | OUTPATIENT
Start: 2018-02-22 | End: 2018-03-09

## 2018-02-22 RX ORDER — TRAMADOL HYDROCHLORIDE 50 MG/1
50 TABLET ORAL
Qty: 15 TAB | Refills: 0 | Status: SHIPPED | OUTPATIENT
Start: 2018-02-22 | End: 2018-03-04

## 2018-02-22 RX ORDER — LEVOFLOXACIN 500 MG/1
500 TABLET, FILM COATED ORAL DAILY
Qty: 10 TAB | Refills: 0 | Status: ON HOLD | OUTPATIENT
Start: 2018-02-22 | End: 2018-02-24

## 2018-02-22 NOTE — DISCHARGE INSTRUCTIONS
Hip Arthritis: Exercises  Your Care Instructions  Here are some examples of exercises for hip arthritis. Start each exercise slowly. Ease off the exercise if you start to have pain. Your doctor or physical therapist will tell you when you can start these exercises and which ones will work best for you. How to do the exercises  Straight-leg raises to the outside    1. Lie on your side, with your affected hip on top. 2. Tighten the front thigh muscles of your top leg to keep your knee straight. 3. Keep your hip and your leg straight in line with the rest of your body, and keep your knee pointing forward. Do not drop your hip back. 4. Lift your top leg straight up toward the ceiling, about 12 inches off the floor. Hold for about 6 seconds, then slowly lower your leg. 5. Repeat 8 to 12 times. 6. Switch legs and repeat steps 1 through 5, even if only one hip is sore. Straight-leg raises to the inside    1. Lie on your side with your affected hip on the floor. 2. You can either prop up your other leg on a chair, or you can bend that knee and put that foot in front of your other knee. Do not drop your hip back. 3. Tighten the muscles on the front thigh of your bottom leg to straighten that knee. 4. Keep your kneecap pointing forward and your leg straight, and lift your bottom leg up toward the ceiling about 6 inches. Hold for about 6 seconds, then lower slowly. 5. Repeat 8 to 12 times. 6. Switch legs and repeat steps 1 through 5, even if only one hip is sore. Hip hike    1. Stand sideways on the bottom step of a staircase, and hold on to the banister or wall. 2. Keeping both knees straight, lift your good leg off the step and let it hang down. Then hike your good hip up to the same level as your affected hip or a little higher. 3. Repeat 8 to 12 times. 4. Switch legs and repeat steps 1 through 3, even if only one hip is sore. Bridging    1. Lie on your back with both knees bent.  Your knees should be bent about 90 degrees. 2. Then push your feet into the floor, squeeze your buttocks, and lift your hips off the floor until your shoulders, hips, and knees are all in a straight line. 3. Hold for about 6 seconds as you continue to breathe normally, and then slowly lower your hips back down to the floor and rest for up to 10 seconds. 4. Repeat 8 to 12 times. Hamstring stretch (lying down)    1. Lie flat on your back with your legs straight. If you feel discomfort in your back, place a small towel roll under your lower back. 2. Holding the back of your affected leg, lift your leg straight up and toward your body until you feel a stretch at the back of your thigh. 3. Hold the stretch for at least 30 seconds. 4. Repeat 2 to 4 times. 5. Switch legs and repeat steps 1 through 4, even if only one hip is sore. Standing quadriceps stretch    1. If you are not steady on your feet, hold on to a chair, counter, or wall. You can also lie on your stomach or your side to do this exercise. 2. Bend the knee of the leg you want to stretch, and reach behind you to grab the front of your foot or ankle with the hand on the same side. For example, if you are stretching your right leg, use your right hand. 3. Keeping your knees next to each other, pull your foot toward your buttock until you feel a gentle stretch across the front of your hip and down the front of your thigh. Your knee should be pointed directly to the ground, and not out to the side. 4. Hold the stretch for at least 15 to 30 seconds. 5. Repeat 2 to 4 times. 6. Switch legs and repeat steps 1 through 5, even if only one hip is sore. Hip rotator stretch    1. Lie on your back with both knees bent and your feet flat on the floor. 2. Put the ankle of your affected leg on your opposite thigh near your knee. 3. Use your hand to gently push your knee away from your body until you feel a gentle stretch around your hip.   4. Hold the stretch for 15 to 30 seconds. 5. Repeat 2 to 4 times. 6. Repeat steps 1 through 5, but this time use your hand to gently pull your knee toward your opposite shoulder. 7. Switch legs and repeat steps 1 through 6, even if only one hip is sore. Knee-to-chest    1. Lie on your back with your knees bent and your feet flat on the floor. 2. Bring your affected leg to your chest, keeping the other foot flat on the floor (or keeping the other leg straight, whichever feels better on your lower back). 3. Keep your lower back pressed to the floor. Hold for at least 15 to 30 seconds. 4. Relax, and lower the knee to the starting position. 5. Repeat 2 to 4 times. 6. Switch legs and repeat steps 1 through 5, even if only one hip is sore. 7. To get more stretch, put your other leg flat on the floor while pulling your knee to your chest.  Clamshell    1. Lie on your side, with your affected hip on top. Keep your feet and knees together and your knees bent. 2. Raise your top knee, but keep your feet together. Do not let your hips roll back. Your legs should open up like a clamshell. 3. Hold for 6 seconds. 4. Slowly lower your knee back down. Rest for 10 seconds. 5. Repeat 8 to 12 times. 6. Switch legs and repeat steps 1 through 5, even if only one hip is sore. Follow-up care is a key part of your treatment and safety. Be sure to make and go to all appointments, and call your doctor if you are having problems. It's also a good idea to know your test results and keep a list of the medicines you take. Where can you learn more? Go to http://russ-kiesha.info/. Enter Y898 in the search box to learn more about \"Hip Arthritis: Exercises. \"  Current as of: March 21, 2017  Content Version: 11.4  © 8622-3180 [employer]. Care instructions adapted under license by [employer] (which disclaims liability or warranty for this information).  If you have questions about a medical condition or this instruction, always ask your healthcare professional. [address] any warranty or liability for your use of this information. Prostatitis: Care Instructions  Your Care Instructions    The prostate gland is a small, walnut-shaped organ. It lies just below a man's bladder. It surrounds the urethra, the tube that carries urine through the penis and out of the body. Prostatitis is a painful condition caused by inflammation or infection of the prostate gland. Sometimes the condition is caused by bacteria, but often the cause is not known. Prostatitis caused by bacteria usually is treated with self-care and antibiotics. Follow-up care is a key part of your treatment and safety. Be sure to make and go to all appointments, and call your doctor if you are having problems. It's also a good idea to know your test results and keep a list of the medicines you take. How can you care for yourself at home? · If your doctor prescribed antibiotics, take them as directed. Do not stop taking them just because you feel better. You need to take the full course of antibiotics. · Take an over-the-counter pain medicine, such as acetaminophen (Tylenol), ibuprofen (Advil, Motrin), or naproxen (Aleve). Be safe with medicines. Read and follow all instructions on the label. · Take warm baths to help soothe pain. · Straining to pass stools can hurt when your prostate is inflamed. Avoid constipation. ¨ Include fruits, vegetables, beans, and whole grains in your diet each day. These foods are high in fiber. ¨ Drink plenty of fluids, enough so that your urine is light yellow or clear like water. If you have kidney, heart, or liver disease and have to limit fluids, talk with your doctor before you increase the amount of fluids you drink. ¨ Get some exercise every day. Build up slowly to 30 to 60 minutes a day on 5 or more days of the week. ¨ Take a fiber supplement, such as Citrucel or Metamucil, every day if needed.  Read and follow all instructions on the label. ¨ Schedule time each day for a bowel movement. Having a daily routine may help. Take your time and do not strain when having a bowel movement. · Avoid alcohol, caffeine, and spicy foods, especially if they make your symptoms worse. When should you call for help? Call your doctor now or seek immediate medical care if:  ? · You have symptoms of a urinary tract infection. These may include:  ¨ Pain or burning when you urinate. ¨ A frequent need to urinate without being able to pass much urine. ¨ Pain in the flank, which is just below the rib cage and above the waist on either side of the back. ¨ Blood in your urine. ¨ A fever. ? Watch closely for changes in your health, and be sure to contact your doctor if:  ? · You cannot empty your bladder completely. ? · You do not get better as expected. Where can you learn more? Go to http://russ-kiesha.info/. Enter D023 in the search box to learn more about \"Prostatitis: Care Instructions. \"  Current as of: March 14, 2017  Content Version: 11.4  © 5994-5806 [employer] Care instructions adapted under license by [employer] (which disclaims liability or warranty for this information). If you have questions about a medical condition or this instruction, always ask your healthcare professional. [place] [address] any warranty or liability for your use of this information.

## 2018-02-22 NOTE — UC PROVIDER NOTE
Patient is a 69 y.o. male presenting with fever and hip pain. The history is provided by the patient. Fever    This is a new problem. The current episode started more than 1 week ago (going on x 2 weeks). The problem occurs daily (mostly in evening ). The problem has not changed since onset. The maximum temperature noted was 100 - 100.9 F. Pertinent negatives include no chest pain, no diarrhea, no congestion, no headaches, no sore throat, no muscle aches, no cough, no shortness of breath, no mental status change and no rash. Urinary symptoms: of enlarged prostate foa while but recent penile pain  He has tried nothing for the symptoms. Hip Pain    This is a new problem. The current episode started more than 1 week ago. The problem occurs daily. The problem has not changed since onset. The pain is present in the right hip. The quality of the pain is described as aching. The pain is at a severity of 7/10. The pain is moderate. Associated symptoms include limited range of motion and stiffness. The symptoms are aggravated by standing and movement. He has tried nothing for the symptoms. There has been a history of trauma (h/o fall 1 weeks befor and again this morning ).         Past Medical History:   Diagnosis Date    ADD (attention deficit disorder)     Anemia due to blood loss     Hinson's esophagus with esophagitis     Benign essential tremor     Cancer ([address] 2012    BCC    Depression     DJD (degenerative joint disease) of hip     bilat    DM (diabetes mellitus) ([address] 3/17/2010    ED (erectile dysfunction) of organic origin     Fall on or from sidewalk curb 9/24/2015    Femur fracture ([address]     Gastritis and duodenitis     GERD (gastroesophageal reflux disease)     resolved after discontinuing diclofenac    Hematuria 6/2016    High cholesterol 3/17/2010    HTN (hypertension) 3/17/2010    Morbid obesity ([address]     Murmur, cardiac 2016    PFO (patent foramen ovale) 7/26/2017    PUD (peptic ulcer disease)     Shingles 6/2016    RESOLVING- NO RASH (HEAD)    Sleep apnea     CPAP        Past Surgical History:   Procedure Laterality Date    ENDOSCOPY, COLON, DIAGNOSTIC      HX CHOLECYSTECTOMY  1995    HX GI  1980    gastroplasty    HX HERNIA REPAIR  1995    HX HIP REPLACEMENT      Left    HX ORTHOPAEDIC  2011    rot cuff repair    HX TONSILLECTOMY  1950    TOTAL HIP ARTHROPLASTY  05/2010    right    TOTAL HIP ARTHROPLASTY  08/01/2016    left         Family History   Problem Relation Age of Onset   Sol Stephani Cancer Father      multiple myeloma    Stroke Father     Dementia Mother     No Known Problems Brother     COPD Brother     Cancer Maternal Grandmother      COLON    Anesth Problems Neg Hx         Social History     Social History    Marital status:      Spouse name: N/A    Number of children: N/A    Years of education: N/A     Occupational History    Not on file. Social History Main Topics    Smoking status: Former Smoker     Packs/day: 2.00     Years: 15.00     Quit date: 3/1/1979    Smokeless tobacco: Never Used    Alcohol use No    Drug use: No    Sexual activity: Yes     Partners: Female     Birth control/ protection: None     Other Topics Concern    Not on file     Social History Narrative                ALLERGIES: Nsaids (non-steroidal anti-inflammatory drug)    Review of Systems   Constitutional: Positive for activity change (decreased ) and fatigue. Negative for appetite change, chills and fever. HENT: Negative for congestion and sore throat. Respiratory: Negative for cough and shortness of breath. Cardiovascular: Negative for chest pain. Gastrointestinal: Negative for diarrhea. Genitourinary: Positive for difficulty urinating (and not able to void completely with weak stream ), dysuria, frequency, penile pain and urgency. Negative for discharge and testicular pain. Musculoskeletal: Positive for gait problem and stiffness. Skin: Negative for rash. Neurological: Negative for headaches. All other systems reviewed and are negative. Vitals:    02/22/18 1546   BP: 94/50   Pulse: 75   Resp: 18   Temp: 97.7 °F (36.5 °C)   SpO2: 98%   Weight: 115.2 kg (254 lb)   Height: 5' 10.5\" (1.791 m)       Physical Exam   Constitutional: No distress. HENT:   Right Ear: Tympanic membrane and ear canal normal.   Left Ear: Tympanic membrane and ear canal normal.   Nose: Nose normal.   Mouth/Throat: No oropharyngeal exudate, posterior oropharyngeal edema or posterior oropharyngeal erythema. Eyes: Conjunctivae are normal. Right eye exhibits no discharge. Left eye exhibits no discharge. Neck: Neck supple. Pulmonary/Chest: Effort normal and breath sounds normal. No respiratory distress. He has no wheezes. He has no rales. Abdominal: Soft. He exhibits no distension. There is no guarding. Musculoskeletal:        Right hip: He exhibits decreased range of motion and decreased strength. Limited exam  Due to pain and not able to get to exam table   Lymphadenopathy:     He has no cervical adenopathy. Skin: No rash noted. Nursing note and vitals reviewed. MDM     Differential Diagnosis; Clinical Impression; Plan:     CLINICAL IMPRESSION:  Fever, unspecified fever cause  (primary encounter diagnosis)  Benign prostatic hyperplasia with weak urinary stream  Pain of right hip joint      DDX- suspect  Ac prostatitis / prostate abscess    Plan:    Xray of hip/ pelvis- unremarkable     Start Flomax Levaquin  Follow with urologist    Tramadol and robaxin for hip pain- follow with ortho  Amount and/or Complexity of Data Reviewed:   Clinical lab tests:  Reviewed  Tests in the radiology section of CPT®:  Reviewed and ordered   Review and summarize past medical records:  Yes  Risk of Significant Complications, Morbidity, and/or Mortality:   Presenting problems: Moderate  Diagnostic procedures: Moderate  Management options:   Moderate  Progress:   Patient progress: Stable      Procedures

## 2018-02-22 NOTE — LETTER
[facility] 
[address] [place] [address] 
[phone] Work/School Note Date: 2/22/2018 To Whom It May concern: 
 
Ankita Ram was seen and treated today in the urgent care center by the following provider(s): 
Attending Provider: Ann Suero MD.   
 
Ankita Ram may return to work on 2/27/18. Sincerely, Ann Suero MD

## 2018-02-24 ENCOUNTER — HOSPITAL ENCOUNTER (INPATIENT)
Age: 68
LOS: 8 days | Discharge: NURSING, REHAB OR LONG-TERM CARE | DRG: 466 | End: 2018-03-04
Attending: EMERGENCY MEDICINE | Admitting: INTERNAL MEDICINE
Payer: COMMERCIAL

## 2018-02-24 ENCOUNTER — APPOINTMENT (OUTPATIENT)
Dept: GENERAL RADIOLOGY | Age: 68
DRG: 466 | End: 2018-02-24
Attending: EMERGENCY MEDICINE
Payer: COMMERCIAL

## 2018-02-24 DIAGNOSIS — G47.00 INSOMNIA, UNSPECIFIED TYPE: ICD-10-CM

## 2018-02-24 DIAGNOSIS — T84.51XA INFECTION ASSOCIATED WITH INTERNAL RIGHT HIP PROSTHESIS, INITIAL ENCOUNTER (HCC): ICD-10-CM

## 2018-02-24 DIAGNOSIS — F33.9 RECURRENT DEPRESSION (HCC): ICD-10-CM

## 2018-02-24 DIAGNOSIS — R78.81 BACTEREMIA: Primary | ICD-10-CM

## 2018-02-24 PROBLEM — R50.9 FUO (FEVER OF UNKNOWN ORIGIN): Status: ACTIVE | Noted: 2018-02-24

## 2018-02-24 LAB
ANION GAP SERPL CALC-SCNC: 8 MMOL/L (ref 5–15)
APPEARANCE UR: CLEAR
BACTERIA SPEC CULT: ABNORMAL
BACTERIA URNS QL MICRO: NEGATIVE /HPF
BASOPHILS # BLD: 0 K/UL (ref 0–0.1)
BASOPHILS NFR BLD: 0 % (ref 0–1)
BILIRUB UR QL: NEGATIVE
BUN SERPL-MCNC: 22 MG/DL (ref 6–20)
BUN/CREAT SERPL: 22 (ref 12–20)
CALCIUM SERPL-MCNC: 8.2 MG/DL (ref 8.5–10.1)
CHLORIDE SERPL-SCNC: 95 MMOL/L (ref 97–108)
CO2 SERPL-SCNC: 24 MMOL/L (ref 21–32)
COLOR UR: ABNORMAL
CREAT SERPL-MCNC: 0.98 MG/DL (ref 0.7–1.3)
CRP SERPL-MCNC: 30.5 MG/DL (ref 0–0.6)
DIFFERENTIAL METHOD BLD: ABNORMAL
EOSINOPHIL # BLD: 0 K/UL (ref 0–0.4)
EOSINOPHIL NFR BLD: 0 % (ref 0–7)
EPITH CASTS URNS QL MICRO: ABNORMAL /LPF
ERYTHROCYTE [DISTWIDTH] IN BLOOD BY AUTOMATED COUNT: 14 % (ref 11.5–14.5)
ERYTHROCYTE [SEDIMENTATION RATE] IN BLOOD: 70 MM/HR (ref 0–20)
GLUCOSE BLD STRIP.AUTO-MCNC: 153 MG/DL (ref 65–100)
GLUCOSE BLD STRIP.AUTO-MCNC: 195 MG/DL (ref 65–100)
GLUCOSE SERPL-MCNC: 194 MG/DL (ref 65–100)
GLUCOSE UR STRIP.AUTO-MCNC: >1000 MG/DL
HCT VFR BLD AUTO: 31.4 % (ref 36.6–50.3)
HGB BLD-MCNC: 10.5 G/DL (ref 12.1–17)
HGB UR QL STRIP: NEGATIVE
IMM GRANULOCYTES # BLD: 0 K/UL
IMM GRANULOCYTES NFR BLD AUTO: 0 %
INR BLD: 1.4 (ref 0.9–1.2)
KETONES UR QL STRIP.AUTO: 15 MG/DL
LACTATE SERPL-SCNC: 1.5 MMOL/L (ref 0.4–2)
LEUKOCYTE ESTERASE UR QL STRIP.AUTO: NEGATIVE
LYMPHOCYTES # BLD: 0.6 K/UL (ref 0.8–3.5)
LYMPHOCYTES NFR BLD: 4 % (ref 12–49)
MCH RBC QN AUTO: 30.3 PG (ref 26–34)
MCHC RBC AUTO-ENTMCNC: 33.4 G/DL (ref 30–36.5)
MCV RBC AUTO: 90.8 FL (ref 80–99)
METAMYELOCYTES NFR BLD MANUAL: 1 %
MONOCYTES # BLD: 1.1 K/UL (ref 0–1)
MONOCYTES NFR BLD: 8 % (ref 5–13)
MYELOCYTES NFR BLD MANUAL: 1 %
NEUTS BAND NFR BLD MANUAL: 11 % (ref 0–6)
NEUTS SEG # BLD: 12.1 K/UL (ref 1.8–8)
NEUTS SEG NFR BLD: 75 % (ref 32–75)
NITRITE UR QL STRIP.AUTO: NEGATIVE
NRBC # BLD: 0 K/UL (ref 0–0.01)
NRBC BLD-RTO: 0 PER 100 WBC
PH UR STRIP: 5.5 [PH] (ref 5–8)
PLATELET # BLD AUTO: 219 K/UL (ref 150–400)
PLATELET COMMENTS,PCOM: ABNORMAL
PMV BLD AUTO: 10.7 FL (ref 8.9–12.9)
POTASSIUM SERPL-SCNC: 4 MMOL/L (ref 3.5–5.1)
PROT UR STRIP-MCNC: NEGATIVE MG/DL
RBC # BLD AUTO: 3.46 M/UL (ref 4.1–5.7)
RBC #/AREA URNS HPF: ABNORMAL /HPF (ref 0–5)
RBC MORPH BLD: ABNORMAL
SERVICE CMNT-IMP: ABNORMAL
SODIUM SERPL-SCNC: 127 MMOL/L (ref 136–145)
SP GR UR REFRACTOMETRY: 1.02 (ref 1–1.03)
UR CULT HOLD, URHOLD: NORMAL
UROBILINOGEN UR QL STRIP.AUTO: 1 EU/DL (ref 0.2–1)
WBC # BLD AUTO: 14.1 K/UL (ref 4.1–11.1)
WBC URNS QL MICRO: ABNORMAL /HPF (ref 0–4)

## 2018-02-24 PROCEDURE — 81001 URINALYSIS AUTO W/SCOPE: CPT | Performed by: EMERGENCY MEDICINE

## 2018-02-24 PROCEDURE — 83605 ASSAY OF LACTIC ACID: CPT | Performed by: EMERGENCY MEDICINE

## 2018-02-24 PROCEDURE — 85025 COMPLETE CBC W/AUTO DIFF WBC: CPT | Performed by: EMERGENCY MEDICINE

## 2018-02-24 PROCEDURE — 86140 C-REACTIVE PROTEIN: CPT | Performed by: EMERGENCY MEDICINE

## 2018-02-24 PROCEDURE — 74011250636 HC RX REV CODE- 250/636: Performed by: EMERGENCY MEDICINE

## 2018-02-24 PROCEDURE — 85652 RBC SED RATE AUTOMATED: CPT | Performed by: EMERGENCY MEDICINE

## 2018-02-24 PROCEDURE — 86900 BLOOD TYPING SEROLOGIC ABO: CPT | Performed by: ANESTHESIOLOGY

## 2018-02-24 PROCEDURE — 74011250637 HC RX REV CODE- 250/637: Performed by: SPECIALIST

## 2018-02-24 PROCEDURE — 74011250636 HC RX REV CODE- 250/636: Performed by: INTERNAL MEDICINE

## 2018-02-24 PROCEDURE — 74011636637 HC RX REV CODE- 636/637: Performed by: INTERNAL MEDICINE

## 2018-02-24 PROCEDURE — 0S993ZX DRAINAGE OF RIGHT HIP JOINT, PERCUTANEOUS APPROACH, DIAGNOSTIC: ICD-10-PCS | Performed by: RADIOLOGY

## 2018-02-24 PROCEDURE — 86923 COMPATIBILITY TEST ELECTRIC: CPT | Performed by: ANESTHESIOLOGY

## 2018-02-24 PROCEDURE — 87205 SMEAR GRAM STAIN: CPT | Performed by: EMERGENCY MEDICINE

## 2018-02-24 PROCEDURE — 36415 COLL VENOUS BLD VENIPUNCTURE: CPT | Performed by: EMERGENCY MEDICINE

## 2018-02-24 PROCEDURE — 85610 PROTHROMBIN TIME: CPT

## 2018-02-24 PROCEDURE — 87077 CULTURE AEROBIC IDENTIFY: CPT | Performed by: EMERGENCY MEDICINE

## 2018-02-24 PROCEDURE — 74011000258 HC RX REV CODE- 258: Performed by: EMERGENCY MEDICINE

## 2018-02-24 PROCEDURE — 87040 BLOOD CULTURE FOR BACTERIA: CPT | Performed by: EMERGENCY MEDICINE

## 2018-02-24 PROCEDURE — 74011250637 HC RX REV CODE- 250/637: Performed by: INTERNAL MEDICINE

## 2018-02-24 PROCEDURE — 20610 DRAIN/INJ JOINT/BURSA W/O US: CPT

## 2018-02-24 PROCEDURE — 99284 EMERGENCY DEPT VISIT MOD MDM: CPT

## 2018-02-24 PROCEDURE — 65660000001 HC RM ICU INTERMED STEPDOWN

## 2018-02-24 PROCEDURE — 82962 GLUCOSE BLOOD TEST: CPT

## 2018-02-24 PROCEDURE — 87186 SC STD MICRODIL/AGAR DIL: CPT | Performed by: EMERGENCY MEDICINE

## 2018-02-24 PROCEDURE — 74011000250 HC RX REV CODE- 250

## 2018-02-24 PROCEDURE — 80048 BASIC METABOLIC PNL TOTAL CA: CPT | Performed by: EMERGENCY MEDICINE

## 2018-02-24 RX ORDER — VANCOMYCIN 2 GRAM/500 ML IN 0.9 % SODIUM CHLORIDE INTRAVENOUS
2000 ONCE
Status: COMPLETED | OUTPATIENT
Start: 2018-02-24 | End: 2018-02-24

## 2018-02-24 RX ORDER — ENOXAPARIN SODIUM 100 MG/ML
40 INJECTION SUBCUTANEOUS EVERY 24 HOURS
Status: DISCONTINUED | OUTPATIENT
Start: 2018-02-24 | End: 2018-02-28

## 2018-02-24 RX ORDER — CLONAZEPAM 0.5 MG/1
0.5 TABLET ORAL
Status: DISCONTINUED | OUTPATIENT
Start: 2018-02-24 | End: 2018-03-04 | Stop reason: HOSPADM

## 2018-02-24 RX ORDER — OXYCODONE HYDROCHLORIDE 5 MG/1
5 TABLET ORAL
Status: DISCONTINUED | OUTPATIENT
Start: 2018-02-24 | End: 2018-02-27 | Stop reason: SDUPTHER

## 2018-02-24 RX ORDER — ATOMOXETINE 10 MG/1
40 CAPSULE ORAL DAILY
Status: DISCONTINUED | OUTPATIENT
Start: 2018-02-25 | End: 2018-02-24

## 2018-02-24 RX ORDER — LOVASTATIN 20 MG/1
20 TABLET ORAL
Status: DISCONTINUED | OUTPATIENT
Start: 2018-02-24 | End: 2018-03-04 | Stop reason: HOSPADM

## 2018-02-24 RX ORDER — GUAIFENESIN 100 MG/5ML
81 LIQUID (ML) ORAL DAILY
Status: DISCONTINUED | OUTPATIENT
Start: 2018-02-25 | End: 2018-03-04 | Stop reason: HOSPADM

## 2018-02-24 RX ORDER — FOLIC ACID 1 MG/1
1 TABLET ORAL DAILY
Status: DISCONTINUED | OUTPATIENT
Start: 2018-02-25 | End: 2018-03-04 | Stop reason: HOSPADM

## 2018-02-24 RX ORDER — LISINOPRIL 20 MG/1
20 TABLET ORAL DAILY
Status: DISCONTINUED | OUTPATIENT
Start: 2018-02-25 | End: 2018-03-04 | Stop reason: HOSPADM

## 2018-02-24 RX ORDER — DEXTROSE 50 % IN WATER (D50W) INTRAVENOUS SYRINGE
12.5-25 AS NEEDED
Status: DISCONTINUED | OUTPATIENT
Start: 2018-02-24 | End: 2018-03-04 | Stop reason: HOSPADM

## 2018-02-24 RX ORDER — PRIMIDONE 250 MG/1
250 TABLET ORAL 2 TIMES DAILY
Status: DISCONTINUED | OUTPATIENT
Start: 2018-02-24 | End: 2018-03-04 | Stop reason: HOSPADM

## 2018-02-24 RX ORDER — LIDOCAINE HYDROCHLORIDE 10 MG/ML
INJECTION INFILTRATION; PERINEURAL
Status: COMPLETED
Start: 2018-02-24 | End: 2018-02-24

## 2018-02-24 RX ORDER — TAMSULOSIN HYDROCHLORIDE 0.4 MG/1
0.4 CAPSULE ORAL DAILY
Status: DISCONTINUED | OUTPATIENT
Start: 2018-02-25 | End: 2018-03-04 | Stop reason: HOSPADM

## 2018-02-24 RX ORDER — MAGNESIUM SULFATE 100 %
4 CRYSTALS MISCELLANEOUS AS NEEDED
Status: DISCONTINUED | OUTPATIENT
Start: 2018-02-24 | End: 2018-03-04 | Stop reason: HOSPADM

## 2018-02-24 RX ORDER — INSULIN GLARGINE 100 [IU]/ML
20 INJECTION, SOLUTION SUBCUTANEOUS DAILY
Status: DISCONTINUED | OUTPATIENT
Start: 2018-02-25 | End: 2018-02-25

## 2018-02-24 RX ORDER — ACETAMINOPHEN 325 MG/1
650 TABLET ORAL
Status: DISCONTINUED | OUTPATIENT
Start: 2018-02-24 | End: 2018-03-04 | Stop reason: HOSPADM

## 2018-02-24 RX ORDER — SERTRALINE HYDROCHLORIDE 50 MG/1
100 TABLET, FILM COATED ORAL DAILY
Status: DISCONTINUED | OUTPATIENT
Start: 2018-02-25 | End: 2018-03-04 | Stop reason: HOSPADM

## 2018-02-24 RX ORDER — INSULIN GLARGINE 100 [IU]/ML
18 INJECTION, SOLUTION SUBCUTANEOUS
Status: DISCONTINUED | OUTPATIENT
Start: 2018-02-24 | End: 2018-02-25

## 2018-02-24 RX ORDER — SODIUM CHLORIDE 9 MG/ML
75 INJECTION, SOLUTION INTRAVENOUS CONTINUOUS
Status: DISCONTINUED | OUTPATIENT
Start: 2018-02-24 | End: 2018-02-27

## 2018-02-24 RX ORDER — SODIUM CHLORIDE 0.9 % (FLUSH) 0.9 %
10 SYRINGE (ML) INJECTION
Status: COMPLETED | OUTPATIENT
Start: 2018-02-24 | End: 2018-02-24

## 2018-02-24 RX ORDER — GABAPENTIN 300 MG/1
600 CAPSULE ORAL
Status: DISCONTINUED | OUTPATIENT
Start: 2018-02-24 | End: 2018-03-04 | Stop reason: HOSPADM

## 2018-02-24 RX ORDER — VANCOMYCIN HYDROCHLORIDE
1250 EVERY 12 HOURS
Status: DISCONTINUED | OUTPATIENT
Start: 2018-02-25 | End: 2018-02-24

## 2018-02-24 RX ORDER — CEFAZOLIN SODIUM/WATER 2 G/20 ML
2 SYRINGE (ML) INTRAVENOUS EVERY 8 HOURS
Status: DISCONTINUED | OUTPATIENT
Start: 2018-02-24 | End: 2018-03-04 | Stop reason: HOSPADM

## 2018-02-24 RX ORDER — INSULIN GLARGINE 100 [IU]/ML
22 INJECTION, SOLUTION SUBCUTANEOUS 2 TIMES DAILY
Status: ON HOLD | COMMUNITY
End: 2018-03-04

## 2018-02-24 RX ADMIN — LIDOCAINE HYDROCHLORIDE 10 ML: 10 INJECTION, SOLUTION INFILTRATION; PERINEURAL at 14:40

## 2018-02-24 RX ADMIN — ACETAMINOPHEN 650 MG: 325 TABLET, FILM COATED ORAL at 22:38

## 2018-02-24 RX ADMIN — PIPERACILLIN SODIUM AND TAZOBACTAM SODIUM 3.38 G: 3; .375 INJECTION, POWDER, LYOPHILIZED, FOR SOLUTION INTRAVENOUS at 15:31

## 2018-02-24 RX ADMIN — Medication 10 ML: at 14:40

## 2018-02-24 RX ADMIN — SODIUM CHLORIDE 75 ML/HR: 900 INJECTION, SOLUTION INTRAVENOUS at 16:35

## 2018-02-24 RX ADMIN — PRIMIDONE 250 MG: 250 TABLET ORAL at 22:18

## 2018-02-24 RX ADMIN — HUMAN INSULIN 2 UNITS: 100 INJECTION, SOLUTION SUBCUTANEOUS at 16:35

## 2018-02-24 RX ADMIN — ENOXAPARIN SODIUM 40 MG: 40 INJECTION SUBCUTANEOUS at 16:34

## 2018-02-24 RX ADMIN — INSULIN GLARGINE 18 UNITS: 100 INJECTION, SOLUTION SUBCUTANEOUS at 22:18

## 2018-02-24 RX ADMIN — GABAPENTIN 600 MG: 300 CAPSULE ORAL at 22:18

## 2018-02-24 RX ADMIN — LOVASTATIN 20 MG: 20 TABLET ORAL at 22:18

## 2018-02-24 RX ADMIN — PIPERACILLIN AND TAZOBACTAM 10.12 G: 3; .375 INJECTION, POWDER, FOR SOLUTION INTRAVENOUS at 17:33

## 2018-02-24 RX ADMIN — OXYCODONE HYDROCHLORIDE 5 MG: 5 TABLET ORAL at 22:18

## 2018-02-24 RX ADMIN — VANCOMYCIN HYDROCHLORIDE 2000 MG: 10 INJECTION, POWDER, LYOPHILIZED, FOR SOLUTION INTRAVENOUS at 16:39

## 2018-02-24 RX ADMIN — Medication 2 G: at 19:17

## 2018-02-24 NOTE — H&P
[address]  ACUTE CARE HISTORY AND PHYSICAL    Rajiv Perry.  MR#: [id number]  : 1950  ACCOUNT #: [de-identified]   DATE OF SERVICE: 2018    CHIEF COMPLAINT:  High fevers and chills for the last 2 weeks, positive blood cultures for Staph lugdunensis infection. HISTORY OF PRESENT ILLNESS:  The patient is a 64-year-old male with a history of diabetes, hypertension, and obstructive sleep apnea. As per the patient, about 2 weeks back on  had fallen and hit his right hip. The patient has a history of right total hip and a left total hip arthroplasty done. The patient had complained of pain at that site. Two days later had noted had started spiking fevers and chills with night sweats. The fevers were spiking up to 102 and 103. Initially, he went to the urgent care, followed by going to [facility]. Blood cultures were taken and the patient was referred to the ortho clinic. The patient continued to have significant pain and swelling and discomfort in the right hip, was seen by the ortho urgent care, and was referred to the ER for further evaluation and care. The blood cultures that were sent at [facility] on 2018 was found to have  Staphylococcus lugdunensis growing. The patient is currently being admitted for the same. Continues to have fevers and chills and night sweats. Denies complaints of any cough, denies complaints of any shortness of breath, chest tightness or wheezing. Denies having any nausea, vomiting, diarrhea. PAST MEDICAL HISTORY:  Diabetes, hypertension, history of depression, obstructive sleep apnea, attention deficit disorder, DJD, morbid obesity, history of PFO (had a complete workup done in 2017 which was negative). ALLERGIES:  NSAIDs.     PAST SURGICAL HISTORY:  Significant for history of cholecystectomy , history of right total hip arthroplasty in  and left total hip arthroplasty in 2016. History of tonsillectomy. History of orthopedic rotator cuff repair. History of hernia repair. History of gastroplasty for morbid obesity. FAMILY HISTORY:  Father  of cancer, multiple myeloma, also had stroke. Mother had dementia. Brother had COPD. Maternal grandmother had colon CA. SOCIAL HISTORY:  The patient has not smoked for the last 40 years, and he has 2-pack-years of smoking. MEDICATIONS:  Mysoline 250 mg twice a day. The patient is taking Lantus, Mevacor 20 mg p.o. every day, folic acid 1 mg p.o. every day, Fioricet 1 tablet every 4 hours as needed, Zestril 20 mg tablets p.o. every day, Zoloft 100 mg tablet p.o. every day, Lunesta 2 mg p.o. at bedtime p.r.n., Strattera 40 mg capsule p.o. every day, Jardiance 25 mg tablet 1 tablet once a day, Neurontin 300 mg 2 caps p.o. at bedtime, and Klonopin 0.5 mg tablets twice a day. REVIEW OF SYSTEMS:  CONSTITUTIONAL:  Positive chills, positive fevers, positive night sweats. EYES:  Negative visual problems. Negative eye pains. HENT:  headaches negative sore throat. Negative ear aches. Negative post nasal drip. PULMONARY:  Negative cough. Negative wheezing. Negative shortness of breath. CARDIOVASCULAR:  Negative chest pains. Negative palpitations. ABDOMEN:  Negative abdominal pain. Negative vomiting. Negative diarrhea. GENITOURINARY:  Negative urgency. Negative frequency of urination. PHYSICAL EXAMINATION:  VITAL SIGNS:  The patient's blood pressure was 142/73, temp of 99.7, pulse rate of 92, sats of 99%, respirations of 16. HEENT:  Pupils were equal, reactive to light. No pallor, no icterus. NECK:  Supple. LUNGS:  Clear. CARDIOVASCULAR:  S1, S2 audible. Pansystolic murmur was noted at the apex. ABDOMEN:  Distended. Incisional scar was noted. EXTREMITIES:  Right hip was swollen, warm and tender to touch, exquisitely tender to touch. Range of motion was limited secondary to pain.   CENTRAL NERVOUS SYSTEM:  Grossly intact. LABORATORY DATA:  Showed a WBC count of 14.1 and a hemoglobin of 10.5, left shift of 90.8. Chemistry showed sodium of 127, chloride of 95, bicarbonate of 24, glucose of 194, BUN of 22, creatinine of 0.98, calcium of 8.2. ASSESSMENT AND PLAN:    1. Fever with bacteremia, most likely source is right hip versus endocarditis. Plan:  The patient started on IV vancomycin and Zosyn. ID consult has been taken. Cardiology consult has been taken, discussed with the cardiologist.  Has agreed to come and do a EULALIO with the patient. The patient will be admitted to the ICU. 2.  Hyponatremia. We will gently hydrate with IV normal saline. 3.  Diabetes mellitus. Continue with the sliding scale. Do Accu-Cheks 4 times. 4.  Hypertension. Continue with Zestril for now. 5.  ADD. Continue with the Strattera for now. .  6. Dyslipidemia. Continue with the Mevacor for now. 7.  THE PATIENT IS CURRENTLY FULL CODE. 8.  DVT prophylaxis. Lovenox was started.       MD Sandra Price / Cathleen Santos  D: 02/24/2018 14:50     T: 02/24/2018 15:41  JOB #: [id number]

## 2018-02-24 NOTE — PROGRESS NOTES
1545 TRANSFER - IN REPORT:    Verbal report received from MARK Hahn(name) on Fartun Dover  being received from ED(unit) for routine progression of care      Report consisted of patients Situation, Background, Assessment and   Recommendations(SBAR). Information from the following report(s) SBAR, Kardex, ED Summary, Procedure Summary, Intake/Output, MAR and Recent Results was reviewed with the receiving nurse. Opportunity for questions and clarification was provided. Assessment completed upon patients arrival to unit and care assumed. 1615 Pt arrives to unit on monitor. Primary Nurse Jovanna Krishna and MARK Mendoza performed a dual skin assessment on this patient No impairment noted  Geoff score is 21.  1645 Dr. Ting Gonzalez at bedside. 1648 On call ECHO tech paged. Per Dr. Ting Gonzalez OK for ECHO in AM. ECHO tech aware. [id number] ID consulted called to Dr Deirdre Espinoza, aware of pt admission. 1900 Dr. Venancio Karimi updated on pt status. Orders received for PRN pain medication. 2000 Bedside and Verbal shift change report given to Verna Darnell RN (oncoming nurse) by Giovanny Hernandez RN (offgoing nurse). Report included the following information SBAR, Kardex, ED Summary, Procedure Summary, Intake/Output, MAR and Recent Results.

## 2018-02-24 NOTE — PROGRESS NOTES
Day #1 of Zosyn  Indication:  Bloodstream infection  Current regimen:  3.375 g once  Abx regimen: Zosyn  Recent Labs      18   1212   WBC  14.1*   CREA  0.98   BUN  22*     Est CrCl: ~90 ml/min  Temp (24hrs), Av.2 °F (37.3 °C), Min:99.2 °F (37.3 °C), Max:99.2 °F (37.3 °C)      Plan: Change to 3.375 g once, then 10.125 g IV 24-hour continuous infusion     **close i-vent after initial review. Remove this text prior to posting to note.

## 2018-02-24 NOTE — PROGRESS NOTES
Pharmacist Note - Vancomycin Dosing    Consult provided for this 69 y.o. male for indication of  bacteremia, most likely source is right hip versus endocarditis. Antibiotic regimen(s): Vanc +Zosyn    Recent Labs      18   1212   WBC  14.1*   CREA  0.98   BUN  22*     Frequency of BMP: tomorrow AM x 1  Height: 179.1 cm  Weight: 115.2 kg  Est CrCl: 91 ml/min  Temp (24hrs), Av.6 °F (37.6 °C), Min:99.2 °F (37.3 °C), Max:100 °F (37.8 °C)    Cultures:   blood - Staph Lugdunensis in  (S-Vanc JOSE ANGEL 1)   blood - pending   join fluid - pending    Goal trough = 15 - 20 mcg/mL    Therapy will be initiated with a loading dose of 2000 mg IV x 1 to be followed by a maintenance dose of 1250 mg IV every 12 hours. Pharmacy to follow patient daily and order levels / make dose adjustments as appropriate.

## 2018-02-24 NOTE — CONSULTS
2/24/2018    Cardiology Consult  Note   [facility] M.D. , [place]   --------PCP:-Haroldo Mendez MD   -----Subjective:   Ruben Pillai is a 69 y.o. male   Consult requested from Hospitalist/Internal Medicine team  for fever, murmur  Pt has fever for few weeks, flu first, then fell hit head and hip  Got fever daily, went to [facility], then ER at [address] [facility] 2/4 positive  Pt with murmur but in talking to him, the murmur has been there for some time  Had Echo and EULALIO previously    Echo 9-23-15 =65%, mild LAE  EULALIO 3-22-17= tiny PPFO mild TR and mild PA HTN  [facility] 9-26-17 EF 56% no ischemia    Denies chest pain, edema, syncope or shortness of breath at rest   Has no tachycardia , palpitations or sense of arrythmia      Medical history notable for  has a past medical history of ADD (attention deficit disorder); Anemia due to blood loss; Hinson's esophagus with esophagitis; Benign essential tremor; Cancer ([address] (2012); Depression; DJD (degenerative joint disease) of hip; DM (diabetes mellitus) ([address] (3/17/2010); ED (erectile dysfunction) of organic origin; Fall on or from sidewalk curb (9/24/2015); Femur fracture ([address] Gastritis and duodenitis; GERD (gastroesophageal reflux disease); Hematuria (6/2016); High cholesterol (3/17/2010); HTN (hypertension) (3/17/2010); Morbid obesity ([address] Murmur, cardiac (2016); PFO (patent foramen ovale) (7/26/2017); PUD (peptic ulcer disease); Shingles (6/2016); and Sleep apnea. Social history notable for. reports that he quit smoking about 39 years ago. He has a 30.00 pack-year smoking history. He has never used smokeless tobacco. He reports that he does not drink alcohol or use illicit drugs.       has a past surgical history that includes endoscopy, colon, diagnostic; hx cholecystectomy (1995); hx gi (1980); hx hernia repair (1995); hx tonsillectomy (1950); hx orthopaedic (2011); pr total hip arthroplasty (05/2010); pr total hip arthroplasty (08/01/2016); and hx hip replacement. Family history notable for family history includes COPD in his brother; Cancer in his father and maternal grandmother; Dementia in his mother; No Known Problems in his brother; Stroke in his father. There is no history of Anesth Problems. Discussion/Plan/Impression:    Murmur, r/o SBE. get echo in am, murmur is not new, prev EULALIO with PFO , that is unlikely to cause this murmur, especially since \"tiny\" more likely aortic sclerosis without stenosis   Fever work up, in progress , note infection with hyponatremia and elevated INR  following     Cardiac Studies/Hx:  No specialty comments available. Past Medical History:   Diagnosis Date    ADD (attention deficit disorder)     Anemia due to blood loss     Hinson's esophagus with esophagitis     Benign essential tremor     Cancer ([address] 2012    BCC    Depression     DJD (degenerative joint disease) of hip     bilat    DM (diabetes mellitus) ([address] 3/17/2010    ED (erectile dysfunction) of organic origin     Fall on or from sidewalk curb 9/24/2015    Femur fracture ([address]     Gastritis and duodenitis     GERD (gastroesophageal reflux disease)     resolved after discontinuing diclofenac    Hematuria 6/2016    High cholesterol 3/17/2010    HTN (hypertension) 3/17/2010    Morbid obesity ([address]     Murmur, cardiac 2016    PFO (patent foramen ovale) 7/26/2017    PUD (peptic ulcer disease)     Shingles 6/2016    RESOLVING- NO RASH (HEAD)    Sleep apnea     CPAP        ROS-pertinents  negative except as above  The pertinent portions of the medical history,physician and nursing notes, meds,vitals , labs and Ins/Outs,are reviewed in the electronic record.     Social History   Substance Use Topics    Smoking status: Former Smoker     Packs/day: 2.00     Years: 15.00     Quit date: 3/1/1979    Smokeless tobacco: Never Used    Alcohol use No      Family History   Problem Relation Age of Onset    Cancer Father      multiple myeloma    Stroke Father     Dementia Mother     No Known Problems Brother     COPD Brother     Cancer Maternal Grandmother      COLON    Anesth Problems Neg Hx       Prior to Admission Medications   Prescriptions Last Dose Informant Patient Reported? Taking? DIETARY SUPPLEMENT PO   Yes No   Sig: Take 6 Tabs by mouth daily. CATAPLEX B (STANDARD PROCESS CO)   JANUVIA 100 mg tablet   No Yes   Sig: TAKE 1 TABLET DAILY   JARDIANCE 25 mg tablet   No Yes   Sig: TAKE 1 TABLET DAILY   LANTUS SOLOSTAR 100 unit/mL (3 mL) inpn   No Yes   Sig: INJECT 20 UNITS UNDER THE SKIN IN THE MORNING AND 18 UNITS AT NIGHT   VITAMIN D2 50,000 unit capsule 2018  No Yes   Sig: TAKE 1 CAPSULE EVERY 7 DAYS   aspirin 81 mg chewable tablet   No Yes   Sig: Take 1 Tab by mouth daily. butalbital-acetaminophen-caffeine (FIORICET, ESGIC) -40 mg per tablet   No Yes   Sig: Take 1 Tab by mouth every four (4) hours as needed for Pain. Max Daily Amount: 6 Tabs. clobetasol (TEMOVATE) 0.05 % ointment   No Yes   Sig: APPLY TO THE AFFECTED AREA TWICE A DAY AS NEEDED   clonazePAM (KLONOPIN) 0.5 mg tablet   No Yes   Sig: Take 1 Tab by mouth nightly as needed. Max Daily Amount: 0.5 mg.   folic acid (FOLVITE) 1 mg tablet   No No   Sig: TAKE 1 TABLET DAILY   gabapentin (NEURONTIN) 300 mg capsule   No Yes   Sig: Take 2 Caps by mouth nightly. insulin glargine (LANTUS,BASAGLAR) 100 unit/mL (3 mL) inpn   Yes Yes   Si Units by SubCUTAneous route two (2) times a day.   ketoconazole (NIZORAL) 2 % topical cream   No No   Sig: Apply  to affected area two (2) times a day. levoFLOXacin (LEVAQUIN) 500 mg tablet   No No   Sig: Take 1 Tab by mouth daily.    lisinopril (PRINIVIL, ZESTRIL) 20 mg tablet   No Yes   Sig: TAKE 1 TABLET DAILY   lovastatin (MEVACOR) 20 mg tablet   No Yes   Sig: TAKE 1 TABLET IN THE EVENING   metFORMIN ER (GLUCOPHAGE XR) 500 mg tablet 2018 at Unknown time  No Yes   Sig: TAKE 2 TABLETS DAILY (WITH DINNER)   methocarbamol (ROBAXIN) 750 mg tablet   No Yes   Sig: Take 1 Tab by mouth two (2) times daily as needed for Pain. primidone (MYSOLINE) 250 mg tablet 2/23/2018 at Unknown time  No Yes   Sig: TAKE 1 TABLET TWICE A DAY   senna-docusate (PERICOLACE) 8.6-50 mg per tablet   Yes Yes   Sig: Take 1 Tab by mouth two (2) times daily as needed for Constipation. sertraline (ZOLOFT) 100 mg tablet 2/23/2018 at Unknown time  No Yes   Sig: TAKE 1 TABLET DAILY   Patient taking differently: TAKE 1.5 TABLET DAILY   tamsulosin (FLOMAX) 0.4 mg capsule 2/23/2018 at Unknown time  No Yes   Sig: Take 1 Cap by mouth daily for 15 days. traMADol (ULTRAM) 50 mg tablet   No Yes   Sig: Take 1 Tab by mouth every six (6) hours as needed for Pain. Max Daily Amount: 200 mg.       Facility-Administered Medications: None     Allergies   Allergen Reactions    Nsaids (Non-Steroidal Anti-Inflammatory Drug) Other (comments)     Hx of PUD and Hinson's Esophagus      Objective:    Physical Exam:   Patient Vitals for the past 12 hrs:   Temp Pulse Resp BP SpO2   02/24/18 1630 98.8 °F (37.1 °C) 92 16 - 99 %   02/24/18 1530 100 °F (37.8 °C) 90 16 149/67 98 %   02/24/18 1330 99.7 °F (37.6 °C) 92 16 142/73 99 %   02/24/18 1300 - - - 123/57 98 %   02/24/18 1230 - - - 125/56 100 %   02/24/18 1132 99.2 °F (37.3 °C) 83 20 101/52 98 %      General:  alert, cooperative, no distress, appears stated age   [de-identified], Neck:  NC,AT- no JVD   Chest Wall: inspection normal - no chest wall deformities or tenderness, respiratory effort normal   Lung:   clear to auscultation bilaterally   Heart:    normal rate and regular rhythm, S1 and S2 normal, no gallops noted, systolic murmur 2/6 at 2nd left intercostal space and at 2nd right intercostal space, no JVD   Abdomen: nondistended   Extremities: extremities normal, atraumatic, no cyanosis or edema     Last 24hr Input/Output:    Intake/Output Summary (Last 24 hours) at 02/24/18 0643  Last data filed at 02/24/18 1559   Gross per 24 hour   Intake                0 ml   Output              650 ml   Net             -650 ml        Data Review:   Recent Results (from the past 24 hour(s))   CBC WITH AUTOMATED DIFF    Collection Time: 02/24/18 12:12 PM   Result Value Ref Range    WBC 14.1 (H) 4.1 - 11.1 K/uL    RBC 3.46 (L) 4.10 - 5.70 M/uL    HGB 10.5 (L) 12.1 - 17.0 g/dL    HCT 31.4 (L) 36.6 - 50.3 %    MCV 90.8 80.0 - 99.0 FL    MCH 30.3 26.0 - 34.0 PG    MCHC 33.4 30.0 - 36.5 g/dL    RDW 14.0 11.5 - 14.5 %    PLATELET 588 485 - 487 K/uL    MPV 10.7 8.9 - 12.9 FL    NRBC 0.0 0  WBC    ABSOLUTE NRBC 0.00 0.00 - 0.01 K/uL    NEUTROPHILS 75 32 - 75 %    BAND NEUTROPHILS 11 (H) 0 - 6 %    LYMPHOCYTES 4 (L) 12 - 49 %    MONOCYTES 8 5 - 13 %    EOSINOPHILS 0 0 - 7 %    BASOPHILS 0 0 - 1 %    METAMYELOCYTES 1 (H) 0 %    MYELOCYTES 1 (H) 0 %    IMMATURE GRANULOCYTES 0 %    ABS. NEUTROPHILS 12.1 (H) 1.8 - 8.0 K/UL    ABS. LYMPHOCYTES 0.6 (L) 0.8 - 3.5 K/UL    ABS. MONOCYTES 1.1 (H) 0.0 - 1.0 K/UL    ABS. EOSINOPHILS 0.0 0.0 - 0.4 K/UL    ABS. BASOPHILS 0.0 0.0 - 0.1 K/UL    ABS. IMM.  GRANS. 0.0 K/UL    DF MANUAL      PLATELET COMMENTS Large Platelets      RBC COMMENTS ANISOCYTOSIS  1+        RBC COMMENTS OVALOCYTES  PRESENT        RBC COMMENTS POLYCHROMASIA  PRESENT       METABOLIC PANEL, BASIC    Collection Time: 02/24/18 12:12 PM   Result Value Ref Range    Sodium 127 (L) 136 - 145 mmol/L    Potassium 4.0 3.5 - 5.1 mmol/L    Chloride 95 (L) 97 - 108 mmol/L    CO2 24 21 - 32 mmol/L    Anion gap 8 5 - 15 mmol/L    Glucose 194 (H) 65 - 100 mg/dL    BUN 22 (H) 6 - 20 MG/DL    Creatinine 0.98 0.70 - 1.30 MG/DL    BUN/Creatinine ratio 22 (H) 12 - 20      GFR est AA >60 >60 ml/min/1.73m2    GFR est non-AA >60 >60 ml/min/1.73m2    Calcium 8.2 (L) 8.5 - 10.1 MG/DL   LACTIC ACID    Collection Time: 02/24/18 12:12 PM   Result Value Ref Range    Lactic acid 1.5 0.4 - 2.0 MMOL/L   C REACTIVE PROTEIN, QT    Collection Time: 02/24/18 12:12 PM   Result Value Ref Range    C-Reactive protein 30.50 (H) 0.00 - 0.60 mg/dL   SED RATE (ESR)    Collection Time: 02/24/18 12:12 PM   Result Value Ref Range    Sed rate, automated 70 (H) 0 - 20 mm/hr   URINALYSIS W/MICROSCOPIC    Collection Time: 02/24/18  1:41 PM   Result Value Ref Range    Color YELLOW/STRAW      Appearance CLEAR CLEAR      Specific gravity 1.024 1.003 - 1.030      pH (UA) 5.5 5.0 - 8.0      Protein NEGATIVE  NEG mg/dL    Glucose >1000 (A) NEG mg/dL    Ketone 15 (A) NEG mg/dL    Bilirubin NEGATIVE  NEG      Blood NEGATIVE  NEG      Urobilinogen 1.0 0.2 - 1.0 EU/dL    Nitrites NEGATIVE  NEG      Leukocyte Esterase NEGATIVE  NEG      WBC 0-4 0 - 4 /hpf    RBC 0-5 0 - 5 /hpf    Epithelial cells FEW FEW /lpf    Bacteria NEGATIVE  NEG /hpf   URINE CULTURE HOLD SAMPLE    Collection Time: 02/24/18  1:41 PM   Result Value Ref Range    Urine culture hold        URINE ON HOLD IN MICROBIOLOGY DEPT FOR 3 DAYS. IF UNPRESERVED URINE IS SUBMITTED, IT CANNOT BE USED FOR ADDITIONAL TESTING AFTER 24 HRS, RECOLLECTION WILL BE REQUIRED.    POC INR    Collection Time: 02/24/18  3:40 PM   Result Value Ref Range    INR (POC) 1.4 (H) <1.2     GLUCOSE, POC    Collection Time: 02/24/18  4:30 PM   Result Value Ref Range    Glucose (POC) 153 (H) 65 - 100 mg/dL    Performed by Aisha Cardenas       Lab Results   Component Value Date/Time    Cholesterol, total 141 07/26/2017 01:24 PM    Cholesterol, total 153 10/04/2016 10:08 AM    Cholesterol, total 147 03/10/2016 07:57 AM    Cholesterol, total 140 12/10/2015 09:04 AM    Cholesterol, total 142 09/01/2015 08:11 AM    HDL Cholesterol 63 07/26/2017 01:24 PM    HDL Cholesterol 49 10/04/2016 10:08 AM    HDL Cholesterol 73 03/10/2016 07:57 AM    HDL Cholesterol 62 12/10/2015 09:04 AM    HDL Cholesterol 67 09/01/2015 08:11 AM    LDL, calculated 58 07/26/2017 01:24 PM    LDL, calculated 85 10/04/2016 10:08 AM    LDL, calculated 57 03/10/2016 07:57 AM    LDL, calculated 62 12/10/2015 09:04 AM    LDL, calculated 58 09/01/2015 08:11 AM    Triglyceride 98 07/26/2017 01:24 PM    Triglyceride 96 10/04/2016 10:08 AM    Triglyceride 83 03/10/2016 07:57 AM    Triglyceride 81 12/10/2015 09:04 AM    Triglyceride 85 09/01/2015 08:11 AM    CHOL/HDL Ratio 3.5 11/01/2010 07:07 AM    CHOL/HDL Ratio 3.8 07/29/2010 08:44 AM    CHOL/HDL Ratio 3.7 10/28/2009 01:48 PM    CHOL/HDL Ratio 3.5 07/22/2009 01:59 PM      Mark Goode MD 2/24/2018

## 2018-02-24 NOTE — ED TRIAGE NOTES
Referred by NADIR Lowry, ortho on call, d/t bilateral hip pain, R>L. Persistent low grade fever x 12 days.

## 2018-02-24 NOTE — PROGRESS NOTES
Patient seen and examined    IMPRESSION    1. Staph lugdenensis bacteremia in 2 out of 4 bottles    2. Right hip pain    3. Elevated CRP and ESR    4. DM    5. History of PFO    6. HTN     7. Sleep apnea    PLAN    1. Change vanco and zosyn to cefazolin 2 gm q12 hours     2. Needs 2d echo to evaluate for endocarditis     3.  Await cultures done today
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
## 2018-02-24 NOTE — CONSULTS
ORTHO CONSULT NOTE    Subjective:     Date of Consultation:  February 24, 2018  Referring Physician: Dr. Danielle Horne is a 69 y.o. male who is being seen for right hip pain. Dr. Cheryle Nakayama replaced his right hip in 2010 and this has done well. Injury occurred 2 weeks ago when he slipped and fell onto his right hip. He has had right lateral hip pain and progressive difficulty ambulating since that time. He has also felt ill and has experienced fever and chills during that time as well. He has started using a walker to get around but at this point can't walk comfortably even with the walker. He went to [id number] [facility] earlier this week and his blood cultures there were positive for MSSA. He was not aware of this and has not been treated. Workup has revealed elevated WBC (14.1), ESR (70), and CRP (30.50). Xrays from 2/22/18 were normal. IR has aspirated the right hip joint and cultures are pending.     Patient Active Problem List    Diagnosis Date Noted    Bacteremia 02/24/2018    FUO (fever of unknown origin) 02/24/2018    Recurrent depression ([address] 01/16/2018    Type 2 diabetes mellitus with diabetic neuropathy ([address] 01/16/2018    PFO (patent foramen ovale) 07/53/5494    Systolic murmur 11/85/5606    Jessica-prosthetic fracture of femur following total hip arthroplasty 08/25/2016    Osteoarthritis of right hip 08/01/2016    Benign essential tremor     Diabetes mellitus type 2, uncontrolled ([address] 06/03/2013    Hypogonadism male 08/29/2012    Encounter for long-term (current) use of medications 07/29/2010    Osteoarthritis of hip     Depression     ED (erectile dysfunction) of organic origin     GERD (gastroesophageal reflux disease)     PUD (peptic ulcer disease)     Hinson's esophagus with esophagitis     HTN (hypertension) 03/17/2010    High cholesterol 03/17/2010     Family History   Problem Relation Age of Onset    Cancer Father      multiple myeloma    Stroke Father     Dementia Mother     No Known Problems Brother     COPD Brother     Cancer Maternal Grandmother      COLON    Anesth Problems Neg Hx       Social History   Substance Use Topics    Smoking status: Former Smoker     Packs/day: 2.00     Years: 15.00     Quit date: 3/1/1979    Smokeless tobacco: Never Used    Alcohol use No     Past Medical History:   Diagnosis Date    ADD (attention deficit disorder)     Anemia due to blood loss     Hinson's esophagus with esophagitis     Benign essential tremor     Cancer ([facility] 2012    BCC    Depression     DJD (degenerative joint disease) of hip     bilat    DM (diabetes mellitus) ([facility] 3/17/2010    ED (erectile dysfunction) of organic origin     Fall on or from sidewalk curb 9/24/2015    Femur fracture ([facility]     Gastritis and duodenitis     GERD (gastroesophageal reflux disease)     resolved after discontinuing diclofenac    Hematuria 6/2016    High cholesterol 3/17/2010    HTN (hypertension) 3/17/2010    Morbid obesity ([facility]     Murmur, cardiac 2016    PFO (patent foramen ovale) 7/26/2017    PUD (peptic ulcer disease)     Shingles 6/2016    RESOLVING- NO RASH (HEAD)    Sleep apnea     CPAP      Past Surgical History:   Procedure Laterality Date    ENDOSCOPY, COLON, DIAGNOSTIC      HX CHOLECYSTECTOMY  1995    HX GI  1980    gastroplasty    HX HERNIA REPAIR  1995    HX HIP REPLACEMENT      Left    HX ORTHOPAEDIC  2011    rot cuff repair    HX TONSILLECTOMY  1950    TOTAL HIP ARTHROPLASTY  05/2010    right    TOTAL HIP ARTHROPLASTY  08/01/2016    left      Prior to Admission medications    Medication Sig Start Date End Date Taking? Authorizing Provider   levoFLOXacin (LEVAQUIN) 500 mg tablet Take 1 Tab by mouth daily. 2/22/18   Che Chen MD   tamsulosin (FLOMAX) 0.4 mg capsule Take 1 Cap by mouth daily for 15 days. 2/22/18 3/9/18  Che Chen MD   traMADol (ULTRAM) 50 mg tablet Take 1 Tab by mouth every six (6) hours as needed for Pain. Max Daily Amount: 200 mg. 2/22/18   Collette Catching, MD   methocarbamol (ROBAXIN) 750 mg tablet Take 1 Tab by mouth two (2) times daily as needed for Pain. 2/22/18   Collette Catching, MD   JANUVIA 100 mg tablet TAKE 1 TABLET DAILY 2/19/18   Yamila Vega MD   primidone (MYSOLINE) 250 mg tablet TAKE 1 TABLET TWICE A DAY 2/13/18   Harris Brown MD   LANTUS SOLOSTAR 100 unit/mL (3 mL) inpn INJECT 20 UNITS UNDER THE SKIN IN THE MORNING AND 18 UNITS AT NIGHT 2/11/18   Yamila Vega MD   lovastatin (MEVACOR) 20 mg tablet TAKE 1 TABLET IN THE EVENING 2/5/18   Yamila Vega MD   acetaminophen-codeine (TYLENOL #3) 300-30 mg per tablet Take 1 Tab by mouth every six (6) hours as needed for Pain. Max Daily Amount: 4 Tabs. 1/16/18   Yamila Vega MD   folic acid (FOLVITE) 1 mg tablet TAKE 1 TABLET DAILY 12/29/17   Yamila Vega MD   clobetasol (TEMOVATE) 0.05 % ointment APPLY TO THE AFFECTED AREA TWICE A DAY AS NEEDED 12/21/17   Yamila Vega MD   metFORMIN ER (GLUCOPHAGE XR) 500 mg tablet TAKE 2 TABLETS DAILY (WITH DINNER) 12/6/17   Yamila Vega MD   butalbital-acetaminophen-caffeine (FIORICET, ESGIC) -40 mg per tablet Take 1 Tab by mouth every four (4) hours as needed for Pain. Max Daily Amount: 6 Tabs. 11/6/17   Yamila Vega MD   lisinopril (PRINIVIL, ZESTRIL) 20 mg tablet TAKE 1 TABLET DAILY 9/5/17   Yamila Vega MD   sertraline (ZOLOFT) 100 mg tablet TAKE 1 TABLET DAILY  Patient taking differently: TAKE 1.5 TABLET DAILY 8/31/17   Yamila Vega MD   eszopiclone (LUNESTA) 2 mg tablet Take 1 Tab by mouth nightly. Max Daily Amount: 2 mg. Indications: INSOMNIA 8/22/17   Yamila Vega MD   atomoxetine (STRATTERA) 40 mg capsule Take 1 Cap by mouth daily. 8/14/17   Harris Brown MD   ketoconazole (NIZORAL) 2 % topical cream Apply  to affected area two (2) times a day.  7/27/17   Yamila Vega MD   JARDIANCE 25 mg tablet TAKE 1 TABLET DAILY 7/3/17   Yamila Vega MD   [facility] ULTRA TEST strip USE AS DIRECTED BY PHYSICIAN 5/12/17   Rachel Castillo MD   VITAMIN D2 50,000 unit capsule TAKE 1 CAPSULE EVERY 7 DAYS 4/20/17   Rachel Castillo MD   gabapentin (NEURONTIN) 300 mg capsule Take 2 Caps by mouth nightly. 3/20/17   Rachel Castillo MD   aspirin 81 mg chewable tablet Take 1 Tab by mouth daily. 2/2/17   Rachel Castillo MD   clonazePAM (KLONOPIN) 0.5 mg tablet Take 1 Tab by mouth nightly as needed. Max Daily Amount: 0.5 mg. 1/12/17   Michelle Mercedes MD   ONETOUCH ULTRA TEST strip  11/15/16   Historical Provider   melatonin 3 mg tablet Take 1 Tab by mouth daily (after dinner). For sleep. Take about 3 hours prior to bedtime 12/5/16   Rachel Castillo MD   senna-docusate (PERICOLACE) 8.6-50 mg per tablet Take 1 Tab by mouth two (2) times daily as needed for Constipation. Historical Provider   DIETARY SUPPLEMENT PO Take 6 Tabs by mouth daily. CATAPLEX B (STANDARD PROCESS CO)    Historical Provider     Current Facility-Administered Medications   Medication Dose Route Frequency    piperacillin-tazobactam (ZOSYN) 3.375 g in 0.9% sodium chloride (MBP/ADV) 100 mL  3.375 g IntraVENous NOW    piperacillin-tazobactam (ZOSYN) 10.125 g in  mL continuous 24 hr infusion  10.125 g IntraVENous Q24H    0.9% sodium chloride infusion  75 mL/hr IntraVENous CONTINUOUS    enoxaparin (LOVENOX) injection 40 mg  40 mg SubCUTAneous Q24H    vancomycin (VANCOCIN) 2000 mg in  ml infusion  2,000 mg IntraVENous ONCE    [START ON 2/25/2018] tamsulosin (FLOMAX) capsule 0.4 mg  0.4 mg Oral DAILY    [START ON 2/25/2018] SITagliptin (JANUVIA) tablet 100 mg  100 mg Oral DAILY    primidone (MYSOLINE) tablet 250 mg  250 mg Oral BID    . PHARMACY TO SUBSTITUTE PER PROTOCOL    Per Protocol    lovastatin (MEVACOR) tablet 20 mg  20 mg Oral QHS    [START ON 0/16/3077] folic acid (FOLVITE) tablet 1 mg  1 mg Oral DAILY    [START ON 2/25/2018] lisinopril (PRINIVIL, ZESTRIL) tablet 20 mg  20 mg Oral DAILY    [START ON 2/25/2018] sertraline (ZOLOFT) tablet 50 mg  50 mg Oral DAILY    [START ON 2/25/2018] atomoxetine (STRATTERA) capsule 40 mg  40 mg Oral DAILY    . PHARMACY TO SUBSTITUTE PER PROTOCOL    Per Protocol    gabapentin (NEURONTIN) capsule 600 mg  600 mg Oral QHS    [START ON 2/25/2018] aspirin chewable tablet 81 mg  81 mg Oral DAILY    clonazePAM (KlonoPIN) tablet 0.5 mg  0.5 mg Oral QHS PRN    insulin regular (NOVOLIN R, HUMULIN R) injection   SubCUTAneous AC&HS    glucose chewable tablet 16 g  4 Tab Oral PRN    dextrose (D50W) injection syrg 12.5-25 g  12.5-25 g IntraVENous PRN    glucagon (GLUCAGEN) injection 1 mg  1 mg IntraMUSCular PRN     Current Outpatient Prescriptions   Medication Sig    levoFLOXacin (LEVAQUIN) 500 mg tablet Take 1 Tab by mouth daily.  tamsulosin (FLOMAX) 0.4 mg capsule Take 1 Cap by mouth daily for 15 days.  traMADol (ULTRAM) 50 mg tablet Take 1 Tab by mouth every six (6) hours as needed for Pain. Max Daily Amount: 200 mg.    methocarbamol (ROBAXIN) 750 mg tablet Take 1 Tab by mouth two (2) times daily as needed for Pain.  JANUVIA 100 mg tablet TAKE 1 TABLET DAILY    primidone (MYSOLINE) 250 mg tablet TAKE 1 TABLET TWICE A DAY    LANTUS SOLOSTAR 100 unit/mL (3 mL) inpn INJECT 20 UNITS UNDER THE SKIN IN THE MORNING AND 18 UNITS AT NIGHT    lovastatin (MEVACOR) 20 mg tablet TAKE 1 TABLET IN THE EVENING    acetaminophen-codeine (TYLENOL #3) 300-30 mg per tablet Take 1 Tab by mouth every six (6) hours as needed for Pain. Max Daily Amount: 4 Tabs.  folic acid (FOLVITE) 1 mg tablet TAKE 1 TABLET DAILY    clobetasol (TEMOVATE) 0.05 % ointment APPLY TO THE AFFECTED AREA TWICE A DAY AS NEEDED    metFORMIN ER (GLUCOPHAGE XR) 500 mg tablet TAKE 2 TABLETS DAILY (WITH DINNER)    butalbital-acetaminophen-caffeine (FIORICET, ESGIC) -40 mg per tablet Take 1 Tab by mouth every four (4) hours as needed for Pain. Max Daily Amount: 6 Tabs.     lisinopril (PRINIVIL, ZESTRIL) 20 mg tablet TAKE 1 TABLET DAILY    sertraline (ZOLOFT) 100 mg tablet TAKE 1 TABLET DAILY (Patient taking differently: TAKE 1.5 TABLET DAILY)    eszopiclone (LUNESTA) 2 mg tablet Take 1 Tab by mouth nightly. Max Daily Amount: 2 mg. Indications: INSOMNIA    atomoxetine (STRATTERA) 40 mg capsule Take 1 Cap by mouth daily.  ketoconazole (NIZORAL) 2 % topical cream Apply  to affected area two (2) times a day.  JARDIANCE 25 mg tablet TAKE 1 TABLET DAILY    ONETOUCH ULTRA TEST strip USE AS DIRECTED BY PHYSICIAN    VITAMIN D2 50,000 unit capsule TAKE 1 CAPSULE EVERY 7 DAYS    gabapentin (NEURONTIN) 300 mg capsule Take 2 Caps by mouth nightly.  aspirin 81 mg chewable tablet Take 1 Tab by mouth daily.  clonazePAM (KLONOPIN) 0.5 mg tablet Take 1 Tab by mouth nightly as needed. Max Daily Amount: 0.5 mg.    ONETOUCH ULTRA TEST strip     melatonin 3 mg tablet Take 1 Tab by mouth daily (after dinner). For sleep. Take about 3 hours prior to bedtime    senna-docusate (PERICOLACE) 8.6-50 mg per tablet Take 1 Tab by mouth two (2) times daily as needed for Constipation.  DIETARY SUPPLEMENT PO Take 6 Tabs by mouth daily. CATAPLEX B (STANDARD PROCESS CO)      Allergies   Allergen Reactions    Nsaids (Non-Steroidal Anti-Inflammatory Drug) Other (comments)     Hx of PUD and Hinson's Esophagus        Review of Systems:  A comprehensive review of systems was negative except for that written in the HPI.     Mental Status: no dementia    Objective:     Patient Vitals for the past 8 hrs:   BP Temp Pulse Resp SpO2 Height Weight   18 1530 149/67 100 °F (37.8 °C) 90 16 98 % - -   18 1330 142/73 99.7 °F (37.6 °C) 92 16 99 % - -   18 1300 123/57 - - - 98 % - -   18 1230 125/56 - - - 100 % - -   18 1132 101/52 99.2 °F (37.3 °C) 83 20 98 % 5' 10.5\" (1.791 m) 115.2 kg (254 lb)     Temp (24hrs), Av.6 °F (37.6 °C), Min:99.2 °F (37.3 °C), Max:100 °F (37.8 °C)      PHYSICAL EXAM:  General: 67yo male, complaining of right hip pain, cooperative, appears stated age   CNS: alert/oriented, normal mood  Skin: well-healed posterior-lateral right hip incision, no redness, warmth  Extremities: no leg length discrepancy, tender lateral hip over the bursa, good ROM of hip with logroll IR/ER, no groin pain, unable to lift right leg off the table, pain with active hip motion  Vascular: strong pedal pulse, Capillary refill <2 secs in right foot  Neurologic: motor/sensation intact in right leg    Imaging Review:   XR HIP RT W OR WO PELV 2-3 VWS 2/22/2018  5:03 PM  INDICATION:   fall. Acute right hip pain  COMPARISON: 9/15/2016. FINDINGS: An AP view of the pelvis and a frogleg lateral view of the right hip  demonstrate no fracture, dislocation or other acute abnormality. The patient is  status post right total hip replacement. No hardware complication. IMPRESSION:  No acute abnormality    XR INJ ASP LARGE JOINT / BURSA 2/24/2018  2:56 PM  INDICATION: Right hip pain, prosthesis, bacteremia. The risks, benefits and alternatives of fluoroscopic-guided right hip aspiration  were discussed with the patient. After all questions were answered and informed  written consent was obtained, patient is prepped and draped in the standard  sterile fashion in a supine position. 1% lidocaine was used as local anesthetic. Using fluoroscopic guidance, a 22-gauge needle was advanced to the femoral head  component of the right prosthesis. No fluid could be aspirated. Approximately 10  cc of sterile normal saline was instilled and fluid was aspirated and sent to  microbiology laboratory for further evaluation. There is no complication. There  is no significant blood loss. Fluoroscopy dose is 15.4 mGy. Patient was  transferred to the ED in stable condition.   IMPRESSION: Fluoroscopic guided right hip aspiration, as described above    Labs:   Recent Results (from the past 24 hour(s))   CBC WITH AUTOMATED DIFF    Collection Time: 02/24/18 12:12 PM Result Value Ref Range    WBC 14.1 (H) 4.1 - 11.1 K/uL    RBC 3.46 (L) 4.10 - 5.70 M/uL    HGB 10.5 (L) 12.1 - 17.0 g/dL    HCT 31.4 (L) 36.6 - 50.3 %    MCV 90.8 80.0 - 99.0 FL    MCH 30.3 26.0 - 34.0 PG    MCHC 33.4 30.0 - 36.5 g/dL    RDW 14.0 11.5 - 14.5 %    PLATELET 361 908 - 769 K/uL    MPV 10.7 8.9 - 12.9 FL    NRBC 0.0 0  WBC    ABSOLUTE NRBC 0.00 0.00 - 0.01 K/uL    NEUTROPHILS 75 32 - 75 %    BAND NEUTROPHILS 11 (H) 0 - 6 %    LYMPHOCYTES 4 (L) 12 - 49 %    MONOCYTES 8 5 - 13 %    EOSINOPHILS 0 0 - 7 %    BASOPHILS 0 0 - 1 %    METAMYELOCYTES 1 (H) 0 %    MYELOCYTES 1 (H) 0 %    IMMATURE GRANULOCYTES 0 %    ABS. NEUTROPHILS 12.1 (H) 1.8 - 8.0 K/UL    ABS. LYMPHOCYTES 0.6 (L) 0.8 - 3.5 K/UL    ABS. MONOCYTES 1.1 (H) 0.0 - 1.0 K/UL    ABS. EOSINOPHILS 0.0 0.0 - 0.4 K/UL    ABS. BASOPHILS 0.0 0.0 - 0.1 K/UL    ABS. IMM.  GRANS. 0.0 K/UL    DF MANUAL      PLATELET COMMENTS Large Platelets      RBC COMMENTS ANISOCYTOSIS  1+        RBC COMMENTS OVALOCYTES  PRESENT        RBC COMMENTS POLYCHROMASIA  PRESENT       METABOLIC PANEL, BASIC    Collection Time: 02/24/18 12:12 PM   Result Value Ref Range    Sodium 127 (L) 136 - 145 mmol/L    Potassium 4.0 3.5 - 5.1 mmol/L    Chloride 95 (L) 97 - 108 mmol/L    CO2 24 21 - 32 mmol/L    Anion gap 8 5 - 15 mmol/L    Glucose 194 (H) 65 - 100 mg/dL    BUN 22 (H) 6 - 20 MG/DL    Creatinine 0.98 0.70 - 1.30 MG/DL    BUN/Creatinine ratio 22 (H) 12 - 20      GFR est AA >60 >60 ml/min/1.73m2    GFR est non-AA >60 >60 ml/min/1.73m2    Calcium 8.2 (L) 8.5 - 10.1 MG/DL   LACTIC ACID    Collection Time: 02/24/18 12:12 PM   Result Value Ref Range    Lactic acid 1.5 0.4 - 2.0 MMOL/L   C REACTIVE PROTEIN, QT    Collection Time: 02/24/18 12:12 PM   Result Value Ref Range    C-Reactive protein 30.50 (H) 0.00 - 0.60 mg/dL   SED RATE (ESR)    Collection Time: 02/24/18 12:12 PM   Result Value Ref Range    Sed rate, automated 70 (H) 0 - 20 mm/hr   URINALYSIS W/MICROSCOPIC Collection Time: 02/24/18  1:41 PM   Result Value Ref Range    Color YELLOW/STRAW      Appearance CLEAR CLEAR      Specific gravity 1.024 1.003 - 1.030      pH (UA) 5.5 5.0 - 8.0      Protein NEGATIVE  NEG mg/dL    Glucose >1000 (A) NEG mg/dL    Ketone 15 (A) NEG mg/dL    Bilirubin NEGATIVE  NEG      Blood NEGATIVE  NEG      Urobilinogen 1.0 0.2 - 1.0 EU/dL    Nitrites NEGATIVE  NEG      Leukocyte Esterase NEGATIVE  NEG      WBC 0-4 0 - 4 /hpf    RBC 0-5 0 - 5 /hpf    Epithelial cells FEW FEW /lpf    Bacteria NEGATIVE  NEG /hpf   URINE CULTURE HOLD SAMPLE    Collection Time: 02/24/18  1:41 PM   Result Value Ref Range    Urine culture hold        URINE ON HOLD IN MICROBIOLOGY DEPT FOR 3 DAYS. IF UNPRESERVED URINE IS SUBMITTED, IT CANNOT BE USED FOR ADDITIONAL TESTING AFTER 24 HRS, RECOLLECTION WILL BE REQUIRED.    POC INR    Collection Time: 02/24/18  3:40 PM   Result Value Ref Range    INR (POC) 1.4 (H) <1.2           Impression:     Patient Active Problem List    Diagnosis Date Noted    Bacteremia 02/24/2018    FUO (fever of unknown origin) 02/24/2018    Recurrent depression ([address] 01/16/2018    Type 2 diabetes mellitus with diabetic neuropathy ([address] 01/16/2018    PFO (patent foramen ovale) 80/87/3592    Systolic murmur 33/84/5691    Jessica-prosthetic fracture of femur following total hip arthroplasty 08/25/2016    Osteoarthritis of right hip 08/01/2016    Benign essential tremor     Diabetes mellitus type 2, uncontrolled ([address] 06/03/2013    Hypogonadism male 08/29/2012    Encounter for long-term (current) use of medications 07/29/2010    Osteoarthritis of hip     Depression     ED (erectile dysfunction) of organic origin     GERD (gastroesophageal reflux disease)     PUD (peptic ulcer disease)     Hinson's esophagus with esophagitis     HTN (hypertension) 03/17/2010    High cholesterol 03/17/2010     Active Problems:    Bacteremia (2/24/2018)      FUO (fever of unknown origin) (2/24/2018)        Plan:   Mr. Duy Easton is being admitted to Hospitalist service for treatment and further work-up of septicemia of unknown source. IR has aspirated the right hip joint. They did not encounter any fluid but sterile saline was injected and then aspirated and cultures are pending. Unclear at this time whether his hip pain is from his injury 2 weeks ago or represents a septic joint. Certainly this is worrisome since he has leukocytosis and elevated inflammatory markers with hip pain and positive blood cultures. He has been started on IV antibiotics and Infectious Disease consult is pending. Will follow-up his aspiration results closely. I have discussed with Dr. Pastor Burgos and he is prepared to intervene surgically if hip aspiration warrants this.       AMAN Lange

## 2018-02-24 NOTE — IP AVS SNAPSHOT
2700 [facility] [address] [place] [address] 
[phone] Patient: Marilee Rodriguez MRN: [id number] LUM:2/2/9630 About your hospitalization You were admitted on:  February 24, 2018 You last received care in the:  [address] [facility] You were discharged on:  March 4, 2018 Why you were hospitalized Your primary diagnosis was: Infected Prosthesis Of Right Hip (Hcc) Your diagnoses also included:  Bacteremia Due To Staphylococcus, Sepsis (Hcc), Endocarditis Of Mitral Valve, Obesity, Insomnia Follow-up Information Follow up With Details Comments Contact Info 1900 [facility] 210 [facility] [address] Alize Rosa MD In 2 weeks [facility] 
[address], MD In 2 weeks Orthopedic Surgery; postop follow up 6019 [facility]  S100 [address] [place] [address] 
[phone] Ashwini Briones MD In 3 weeks Infectious diseases; hospital follow up [address] [place] [address] [address] [facility] [address] [place] [address] 
[phone] Your Scheduled Appointments Tuesday April 17, 2018  3:10 PM EDT ROUTINE CARE with Alize Rosa MD  
[facility]) [address].  
[phone] Discharge Orders None A check danny indicates which time of day the medication should be taken. My Medications START taking these medications Instructions Each Dose to Equal  
 Morning Noon Evening Bedtime ceFAZolin (ANCEF) in sterile water 2 gram/20 mL IV syringe Your last dose was: Your next dose is:    
   
   
 20 mL by IntraVENous route every eight (8) hours. 2 g  
    
   
   
   
  
 famotidine 20 mg tablet Commonly known as:  PEPCID Your last dose was: Your next dose is: Take 1 Tab by mouth two (2) times a day. 20 mg  
    
   
   
   
  
 nystatin powder Commonly known as:  MYCOSTATIN Your last dose was: Your next dose is:    
   
   
 Apply 1 g to affected area two (2) times a day. APPLY TO yeast in groin 1 g  
    
   
   
   
  
 * oxyCODONE IR 5 mg immediate release tablet Commonly known as:  [place] Your last dose was: Your next dose is: Take 1 Tab by mouth every three (3) hours as needed. Max Daily Amount: 40 mg. Moderate pain  
 5 mg * oxyCODONE IR 5 mg immediate release tablet Commonly known as:  [place] Your last dose was: Your next dose is: Take 1.5 Tabs by mouth every three (3) hours as needed. Max Daily Amount: 60 mg. Severe pain 7.5 mg  
    
   
   
   
  
 polyethylene glycol 17 gram packet Commonly known as:  Verlon Books Start taking on:  3/5/2018 Your last dose was: Your next dose is: Take 1 Packet by mouth daily. Hold for loose stools/diarrhea  
 17 g  
    
   
   
   
  
 warfarin 4 mg tablet Commonly known as:  COUMADIN Your last dose was: Your next dose is: Take 1 Tab by mouth daily for 42 days. Indications: DEEP VEIN THROMBOSIS PREVENTION  
 4 mg * Notice: This list has 2 medication(s) that are the same as other medications prescribed for you. Read the directions carefully, and ask your doctor or other care provider to review them with you. CHANGE how you take these medications Instructions Each Dose to Equal  
 Morning Noon Evening Bedtime  
 insulin glargine 100 unit/mL (3 mL) Inpn Commonly known as:  Tamiko Block What changed:   
- how much to take - Another medication with the same name was removed. Continue taking this medication, and follow the directions you see here. Your last dose was: Your next dose is:    
   
   
 30 Units by SubCUTAneous route two (2) times a day. Indications: type 2 diabetes mellitus 30 Units  
    
   
   
   
  
 senna-docusate 8.6-50 mg per tablet Commonly known as:  Jodie Gustasfon What changed:   
- when to take this 
- reasons to take this 
- additional instructions Your last dose was: Your next dose is: Take 1 Tab by mouth two (2) times a day. Hold for loose stools/diarrhea 1 Tab  
    
   
   
   
  
 sertraline 100 mg tablet Commonly known as:  ZOLOFT What changed:  See the new instructions. Your last dose was: Your next dose is: TAKE 1 TABLET DAILY CONTINUE taking these medications Instructions Each Dose to Equal  
 Morning Noon Evening Bedtime  
 aspirin 81 mg chewable tablet Your last dose was: Your next dose is: Take 1 Tab by mouth daily. 81 mg  
    
   
   
   
  
 clonazePAM 0.5 mg tablet Commonly known as:  Siobhan Dan Your last dose was: Your next dose is: Take 1 Tab by mouth nightly as needed. Max Daily Amount: 0.5 mg. Indications: insomnia  
 0.5 mg  
    
   
   
   
  
 folic acid 1 mg tablet Commonly known as:  Google Your last dose was: Your next dose is: TAKE 1 TABLET DAILY  
     
   
   
   
  
 gabapentin 300 mg capsule Commonly known as:  NEURONTIN Your last dose was: Your next dose is: Take 2 Caps by mouth nightly. 600 mg JANUVIA 100 mg tablet Generic drug:  SITagliptin Your last dose was: Your next dose is: TAKE 1 TABLET DAILY JARDIANCE 25 mg tablet Generic drug:  empagliflozin Your last dose was: Your next dose is: TAKE 1 TABLET DAILY  
     
   
   
   
  
 lisinopril 20 mg tablet Commonly known as:  Shivam Wright  
   
 Your last dose was: Your next dose is: TAKE 1 TABLET DAILY lovastatin 20 mg tablet Commonly known as:  MEVACOR Your last dose was: Your next dose is: TAKE 1 TABLET IN THE EVENING  
     
   
   
   
  
 primidone 250 mg tablet Commonly known as: MYSOLINE Your last dose was: Your next dose is: TAKE 1 TABLET TWICE A DAY  
     
   
   
   
  
 tamsulosin 0.4 mg capsule Commonly known as:  FLOMAX Your last dose was: Your next dose is: Take 1 Cap by mouth daily for 15 days. 0.4 mg  
    
   
   
   
  
 VITAMIN D2 50,000 unit capsule Generic drug:  ergocalciferol Your last dose was: Your next dose is: TAKE 1 CAPSULE EVERY 7 DAYS  
     
   
   
   
  
  
STOP taking these medications BD INSULIN PEN NEEDLE UF SHORT 31 gauge x 5/16\" Ndle Generic drug:  Insulin Needles (Disposable) butalbital-acetaminophen-caffeine -40 mg per tablet Commonly known as:  FIORICET, ESGIC  
   
  
 clobetasol 0.05 % ointment Commonly known as:  TEMOVATE  
   
  
 DIETARY SUPPLEMENT PO  
   
  
 ketoconazole 2 % topical cream  
Commonly known as:  NIZORAL  
   
  
 metFORMIN  mg tablet Commonly known as:  GLUCOPHAGE XR  
   
  
 methocarbamol 750 mg tablet Commonly known as:  ROBAXIN  
   
  
 ONETOUCH ULTRA TEST strip Generic drug:  glucose blood VI test strips  
   
  
 traMADol 50 mg tablet Commonly known as:  ULTRAM  
   
  
  
  
Where to Get Your Medications These medications were sent to [facility] [place], [place]  [address], [address] [address] Phone:  [phone]  
  warfarin 4 mg tablet These medications were sent to [address], [address] [place] [address] Phone:  [phone] sertraline 100 mg tablet Information on where to get these meds will be given to you by the nurse or doctor. ! Ask your nurse or doctor about these medications ceFAZolin (ANCEF) in sterile water 2 gram/20 mL IV syringe  
 clonazePAM 0.5 mg tablet  
 famotidine 20 mg tablet  
 insulin glargine 100 unit/mL (3 mL) Inpn  
 nystatin powder  
 oxyCODONE IR 5 mg immediate release tablet  
 oxyCODONE IR 5 mg immediate release tablet  
 polyethylene glycol 17 gram packet  
 senna-docusate 8.6-50 mg per tablet Discharge Instructions ADDITIONAL CARE RECOMMENDATIONS:  
1. Take medications as prescribed. 2. Keep appointment(s) as recommended/scheduled. 3. Accuchecks qACHS with moderate/medium correction scale insulin. 4. Orders as per Orthopedic Surgery and ID. 5. Please check INR every 48 hours for goal INR 1.7-2.2 for postop VTE prophylaxis starting Mon 3/5. Warfarin x6 weeks (end date 4/15/18) 
 
~~~~~~~~~~~~~~~~~~~~~~~~~~~ Home IV Orders 1. 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 Routine PICC  Care 3. Antibiotic:  Cefazolin 2 gm q8 hours IV 4. Lab each Monday: CBC/diff/platelets CMP 
                      ESR 
                      CRP 5. Lab each Thursday: CBC/diff/platelets BUN Creatinine 
   
6. Fax lab to Dr. Keisha Rosales @ [phone]. 
7.  Call Dr. Keisha Rosales @ [phone] for fever >100.5, infection at PICC site, diarrhea, WBC > 15 or < 3, cr > 1.8 8. Duration of therapy: last day of therapy should be April 9, 2018 Please call Dr. Keisha Rosales @ [phone] before stopping therapy. 9.  Allergies: Allergies Allergen Reactions  Nsaids (Non-Steroidal Anti-Inflammatory Drug) Other (comments)  
    Hx of PUD and Hinson's Esophagus  
  
10. Make appointment in 3 weeks ~~~~~~~~~~~~~~~~~~~~~~~~~~~~~~~~~~~~~~~~~~ 
 
DIET: Diabetic Diet and no concentrated sweets ACTIVITY: PT/OT Eval and Treat; weight bearing as tolerated WOUND CARE: routine dressing care per Ortho instructions EQUIPMENT needed: as per Ortho, PT/OT After [address] for SNF/Rehab Discharge Instructions Anterior Approach Hip Replacement- Dr. Bee Welsh Patient Name  Mathieu Reyes Date of procedure  2/26/2018 Procedure  Procedure(s): 
INCISION AND DRAINAGE OF RIGHT HIP WITH POLY EXCHANGE  (original procedure was posterior) Surgeon  Surgeon(s) and Role: Finn Quintana MD - Primary PCP:  Aretha Pires MD  
Date of discharge: Follow up appointments 
-follow up with Dr. Bee Welsh in 2 weeks. Call [phone] to make an appointment. Activity 
- weight bearing with walker or crutches; discontinue as tolerated 
-avoid extreme movement of hip to prevent hip dislocation Incision Care 
the Aquacel (brown, waterproof) surgical dressing is to remain on the hip for 7 days. On the 7th day gently peel the dressing off by carefully lifting the edge and stretching it slightly to break the adhesive seal 
-You may now leave the incision open to air. Patient will have absorbable sutures in the incision. 
-If the Aquacel dressing comes loose/off before the 7th day, you may replace it with a dry sterile gauze dressing; change it daily. Once the incision is not draining, leave it open to air 
-May take a shower with the Aquacel dressing in place. Once the Aquacel is removed,  may shower and get the incision wet but do not submerge the incision under water in a bath tub, hot tub or swimming pool for 6 weeks after surgery. Preventing blood clots Coumadin for INR approx 2.0 for 1 month postop Medications 
-continue pain medication as prescribed in the hospital 
-continue home medications per medication reconciliation - place an ice bag on the hip for 15-20 minutes after exercise and as needed Diet 
-resume usual diet; encourage fluids; provide foods high in fiber 
-provide stool softeners/laxatives as needed Rehab/SNF Protocol (to be followed by the facility) **Anticipated length of stay is 10 days. If going to exceed 10 day length of stay; call Dr. Franny Mccray office at [phone] to discuss patients progress. Nursing 
-complete head to toe assessment, vital signs 
-medication reconciliation 
-review pain management 
-manage chronic medical conditions 
-remove Aquacel dressing at 7 days post-op Physical Therapy Weight bearing status: 
Precautions at Admission: Fall, WBAT, Contact Right Side Weight Bearing: As tolerated Mobility Status: 
Supine to Sit:  (NT; seated EOB upon arrival) Sit to Stand: Contact guard assistance Sit to Supine: Moderate assistance, Additional time (unable to successfully enter bed with leg ; manual A r) Bed to Chair:  (NT) 
 
Gait: 
Distance (ft): 45 Feet (ft) (x2) Ambulation - Level of Assistance: Contact guard assistance Assistive Device: Gait belt, Walker, rolling Gait Abnormalities: Antalgic, Decreased step clearance, Step to gait (improved gait quality; increased fluidity) ADL status overall composite: Toilet Transfer : Moderate assistance, Assist x1, Additional time Physical Therapy-anticipate 10 days length of stay 
-assessment and evaluation-bed mobility; functional transfers (bed, chair, bathroom, stairs); ambulation with equipment, safety and ability to get out of facility in the event of an emergency 
-review and maintain weight bearing as tolerated with walker or crutches; avoid extreme movement of hip to prevent hip dislocation 
-discuss pain management 
-review how to do ADLs. Refer to pages 46-50 of handbook.  
 
Exercise Program-refer to pages 38-45 of handbook 
-supine exercises-quad sets, hamstring sets, gluteal sets, hip abduction/adduction to neutral, hip internal rotation, heel slides (flexing the knee) -sitting exercises-ankle pumps, bicep curls (use weights as appropriate), triceps pushups, shoulder flexion (use weights as appropriate) -standing exercises holding onto supportive counter-toe raises, heel raises, mini-squats, heel/toe touches with knee bend, marching in place, hip abduction/adduction, hip external rotation, hip extension, hip abduction/extension/external rotation (concentration on gluteus placido), heel toe taps Oxycodone, Rapid Release (By mouth) Oxycodone Hydrochloride (lm-q-YGJ-done juanito-droe-KLOR-lizette) Treats moderate to severe pain. This medicine is a narcotic pain reliever. Brand Name(s): Oxaydo, Oxy IR, Roxicodone There may be other brand names for this medicine. When This Medicine Should Not Be Used: This medicine is not right for everyone. Do not use it if you had an allergic reaction to oxycodone, codeine, hydrocodone, dihydrocodeine, or morphine, or you have a stomach or bowel blockage. How to Use This Medicine:  
Capsule, Liquid, Tablet · Take your medicine as directed. Your dose may need to be changed several times to find what works best for you. · An overdose can be dangerous. Follow directions carefully so you do not get too much medicine at one time. · Oral liquid: Measure the oral liquid medicine with a marked measuring spoon, oral syringe, or medicine cup. · Oxaydo® tablet: Swallow it whole with enough water to swallow it completely. Do not break, crush, chew, or dissolve it. Do not wet the tablet before you put it in your mouth. · This medicine should come with a Medication Guide. Ask your pharmacist for a copy if you do not have one. · Missed dose: Take a dose as soon as you remember. If it is almost time for your next dose, wait until then and take a regular dose. Do not take extra medicine to make up for a missed dose. · Store the medicine in a closed container at room temperature, away from heat, moisture, and direct light. Store the medicine in a secure place to prevent others from getting it. Ask your pharmacist about the best way to dispose of medicine you do not use. Drugs and Foods to Avoid: Ask your doctor or pharmacist before using any other medicine, including over-the-counter medicines, vitamins, and herbal products. · Do not use this medicine if you are using or have used an MAO inhibitor within the past 14 days. · Some medicines can affect how oxycodone works. Tell your doctor if you are using any of the following: ¨ Amiodarone, carbamazepine, erythromycin, ketoconazole, phenytoin, quinidine, rifampin, ritonavir ¨ Diuretic (water pill) ¨ Medicine to treat depression or anxiety ¨ Medicine to treat migraine headaches ¨ Phenothiazine medicine · Tell your doctor if you use anything else that makes you sleepy. Some examples are allergy medicine, narcotic pain medicine, and alcohol. Tell your doctor if you are using buprenorphine, butorphanol, nalbuphine, pentazocine, or a muscle relaxer. · Do not drink alcohol while you are using this medicine. Warnings While Using This Medicine: · Tell your doctor if you are pregnant or breastfeeding, or if you have kidney disease, liver disease, heart disease, low blood pressure, lung disease or breathing problems (such as asthma, COPD), scoliosis, an enlarged prostate or trouble urinating, an underactive thyroid, [place] disease, gallbladder or pancreas problems, or digestion problems. Tell your doctor if you have a history of head injury, brain tumor, mental health problems, seizures, or alcohol or drug addiction. · This medicine may cause the following problems: 
¨ High risk of overdose, which can lead to death ¨ Respiratory depression (serious breathing problem that can be life-threatening) ¨ Serotonin syndrome, when used with certain medicines · This medicine may make you dizzy, drowsy, or faint. Do not drive or do anything else that could be dangerous until you know how this medicine affects you. Sit or lie down if you feel dizzy. Stand up carefully. · This medicine can be habit-forming. Do not use more than your prescribed dose. Call your doctor if you think your medicine is not working. · Do not stop using this medicine suddenly. Your doctor will need to slowly decrease your dose before you stop it completely. · This medicine may cause constipation, especially with long-term use. Ask your doctor if you should use a laxative to prevent and treat constipation. Drink plenty of liquids to help avoid constipation. · This medicine could cause infertility. Talk with your doctor before using this medicine if you plan to have children. · Keep all medicine out of the reach of children. Never share your medicine with anyone. Possible Side Effects While Using This Medicine:  
Call your doctor right away if you notice any of these side effects: · Allergic reaction: Itching or hives, swelling in your face or hands, swelling or tingling in your mouth or throat, chest tightness, trouble breathing · Anxiety, restlessness, fast heartbeat, fever, sweating, muscle spasms, twitching, nausea, vomiting, diarrhea, seeing or hearing things that are not there · Blue lips, fingernails, or skin, trouble breathing · Extreme dizziness or weakness, shallow breathing, slow heartbeat, sweating, cold or clammy skin, seizures · Lightheadedness, dizziness, fainting · Severe constipation, stomach pain If you notice these less serious side effects, talk with your doctor: · Mild constipation · Sleepiness, tiredness If you notice other side effects that you think are caused by this medicine, tell your doctor. Call your doctor for medical advice about side effects. You may report side effects to FDA at 1-677-TQB-2642 © 2017 [facility] Information is for End User's use only and may not be sold, redistributed or otherwise used for commercial purposes. The above information is an  only. It is not intended as medical advice for individual conditions or treatments. Talk to your doctor, nurse or pharmacist before following any medical regimen to see if it is safe and effective for you. 
  
 
 
DISCHARGE SUMMARY from Nurse The discharge information has been reviewed with the patient. The patient verbalized understanding. Discharge medications reviewed with the patient and appropriate educational materials and side effects teaching were provided. ___________________________________________________________________________________________________________________________________ Heart Valve Disease: Care Instructions Your Care Instructions Your heart is a muscular pump that has four chambers and four valves. The four valves are the mitral, aortic, tricuspid, and pulmonary valves. The valves open and close to keep blood flowing in the proper direction through your heart. When something is wrong with one of the valves, the blood cannot flow in and out of the heart properly. Problems with the heart valves can cause leaks (valve regurgitation) and blockages (valve stenosis). You can be born with heart valve disease, or it can develop over a number of years. Mild cases of heart valve disease may not cause problems, but more serious cases will weaken the heart and can lead to heart failure. Treatment with medicine can help relieve symptoms, but it will not fix the valve. You may need to have surgery to replace or repair the valve. Follow-up care is a key part of your treatment and safety. Be sure to make and go to all appointments, and call your doctor if you are having problems. It's also a good idea to know your test results and keep a list of the medicines you take. How can you care for yourself at home? · Take your medicines exactly as prescribed. Call your doctor if you think you are having a problem with your medicine. You will get more details on the specific medicines your doctor prescribes. · Eat a heart-healthy diet. For example, eat more fruits, vegetables, fish, lean meats, whole grains, and other high-fiber foods. Limit sodium, sugar, and alcohol. · Be active. Ask your doctor what type and level of exercise is safe for you. Walking is a good choice. You also may want to swim, bike, or do other activities. · Stay at a healthy weight. Lose weight if you need to. · Do not smoke. Smoking can cause more heart problems. If you need help quitting, talk to your doctor about stop-smoking programs and medicines. These can increase your chances of quitting for good. · Avoid colds and flu. Get a pneumococcal vaccine shot. If you have had one before, ask your doctor if you need another dose. Get a flu vaccine every year. · Take care of your teeth and gums. Get regular dental checkups. Good dental health is important because bacteria can spread from infected teeth and gums to the heart valves. · If you have an artificial valve, you may need to take antibiotics before you have certain dental or surgical procedures. When should you call for help? Call 911 anytime you think you may need emergency care. For example, call if: 
? · You have severe trouble breathing. ? · You cough up pink, foamy mucus and you have trouble breathing. ? · You have symptoms of a heart attack. These may include: ¨ Chest pain or pressure, or a strange feeling in the chest. 
¨ Sweating. ¨ Shortness of breath. ¨ Nausea or vomiting. ¨ Pain, pressure, or a strange feeling in the back, neck, jaw, or upper belly or in one or both shoulders or arms. ¨ Lightheadedness or sudden weakness. ¨ A fast or irregular heartbeat.  
After you call 911, the  may tell you to chew 1 adult-strength or 2 to 4 low-dose aspirin. Wait for an ambulance. Do not try to drive yourself. ? · You have symptoms of a stroke. These may include: 
¨ Sudden numbness, tingling, weakness, or loss of movement in your face, arm, or leg, especially on only one side of your body. ¨ Sudden vision changes. ¨ Sudden trouble speaking. ¨ Sudden confusion or trouble understanding simple statements. ¨ Sudden problems with walking or balance. ¨ A sudden, severe headache that is different from past headaches. ? · You passed out (lost consciousness). ?Call your doctor now or seek immediate medical care if: 
? · You have new or increased shortness of breath. ? · You are dizzy or lightheaded, or you feel like you may faint. ? · You have sudden weight gain, such as more than 2 to 3 pounds in a day or 5 pounds in a week. (Your doctor may suggest a different range of weight gain.) ? · You have increased swelling in your legs, ankles, or feet. ? Watch closely for changes in your health, and be sure to contact your doctor if you have any problems. Where can you learn more? Go to http://russ-kiesha.info/. Enter E442 in the search box to learn more about \"Heart Valve Disease: Care Instructions. \" Current as of: September 21, 2016 Content Version: 11.4 © 5389-2109 [employer]. Care instructions adapted under license by [employer] (which disclaims liability or warranty for this information). If you have questions about a medical condition or this instruction, always ask your healthcare professional. [place] [address] any warranty or liability for your use of this information. [email or web address] Announcement We are excited to announce that we are making your provider's discharge notes available to you in [email or web address].   You will see these notes when they are completed and signed by the physician that discharged you from your recent hospital stay. If you have any questions or concerns about any information you see in VendorShop, please call the Health Information Department where you were seen or reach out to your Primary Care Provider for more information about your plan of care. Introducing [facility]! Dear Lelo Villagran: Thank you for requesting a [employer] account. Our records indicate that you already have an active [employer]endorShop account. You can access your account anytime at [email or web address] Did you know that you can access your hospital and ER discharge instructions at any time in [employer]? You can also review all of your test results from your hospital stay or ER visit. Additional Information If you have questions, please visit the Frequently Asked Questions section of the [employer] website at [email or web address] Remember, [employer] is NOT to be used for urgent needs. For medical emergencies, dial 911. Now available from your iPhone and Android! Providers Seen During Your Hospitalization Provider Specialty Primary office phone Drea Sutherland DO Emergency Medicine [phone] Jolie Shannon MD Internal Medicine [phone] Rachna Eckert MD Internal Medicine [phone] Richardson Amanda MD Internal Medicine [phone] Your Primary Care Physician (PCP) Primary Care Physician Office Phone Office Fax Ortega Pacheco [phone] You are allergic to the following Allergen Reactions Nsaids (Non-Steroidal Anti-Inflammatory Drug) Other (comments) Hx of PUD and Hinson's Esophagus Recent Documentation Height Weight BMI Smoking Status 1.791 m 124.3 kg 38.76 kg/m2 Former Smoker Emergency Contacts Name Discharge Info Relation Home Work Mobile Marisol Ferreira [3] Daughter [21] [phone] Psychiatric DISCHARGE CAREGIVER [3] Son [22] [phone] Patient Belongings The following personal items are in your possession at time of discharge: 
  Dental Appliances: None  Visual Aid: Glasses, With patient      Home Medications: None   Jewelry: None  Clothing:  (no belongings to preop)    Other Valuables: Eyeglasses (with daughter) Please provide this summary of care documentation to your next provider. Signatures-by signing, you are acknowledging that this After Visit Summary has been reviewed with you and you have received a copy. Patient Signature:  ____________________________________________________________ Date:  ____________________________________________________________  
  
[facility] Provider Signature:  ____________________________________________________________ Date:  ____________________________________________________________

## 2018-02-24 NOTE — PROGRESS NOTES
Admission Medication Reconciliation:    Information obtained from:  Patient, RX Query    Comments/Recommendations: Updated PTA meds    1) Meds removed:  -Atomoxetine  -Luntesta  -levaquin    2) Pt takes insulin Solostar 22 units BID    3) Patient takes Vitamin D2 50,000 units on Thursdays       Significant PMH/Disease States:   Past Medical History:   Diagnosis Date    ADD (attention deficit disorder)     Anemia due to blood loss     Hinson's esophagus with esophagitis     Benign essential tremor     Cancer ([facility] 2012    BCC    Depression     DJD (degenerative joint disease) of hip     bilat    DM (diabetes mellitus) ([address] 3/17/2010    ED (erectile dysfunction) of organic origin     Fall on or from sidewalk curb 9/24/2015    Femur fracture ([facility] [address] [facility]     Gastritis and duodenitis     GERD (gastroesophageal reflux disease)     resolved after discontinuing diclofenac    Hematuria 6/2016    High cholesterol 3/17/2010    HTN (hypertension) 3/17/2010    Morbid obesity ([facility] [address]     Murmur, cardiac 2016    PFO (patent foramen ovale) 7/26/2017    PUD (peptic ulcer disease)     Shingles 6/2016    RESOLVING- NO RASH (HEAD)    Sleep apnea     CPAP       Chief Complaint for this Admission:    Chief Complaint   Patient presents with    Referral Request    Hip Pain     bilateral     Fever       Allergies:  Nsaids (non-steroidal anti-inflammatory drug)    Prior to Admission Medications:   Prior to Admission Medications   Prescriptions Last Dose Informant Patient Reported? Taking? DIETARY SUPPLEMENT PO   Yes No   Sig: Take 6 Tabs by mouth daily. CATAPLEX B (STANDARD PROCESS CO)   JANUVIA 100 mg tablet   No Yes   Sig: TAKE 1 TABLET DAILY   JARDIANCE 25 mg tablet   No Yes   Sig: TAKE 1 TABLET DAILY   VITAMIN D2 50,000 unit capsule 2/22/2018  No Yes   Sig: TAKE 1 CAPSULE EVERY 7 DAYS   aspirin 81 mg chewable tablet   No Yes   Sig: Take 1 Tab by mouth daily.    butalbital-acetaminophen-caffeine ([place] Sorrow) -40 mg per tablet   No Yes   Sig: Take 1 Tab by mouth every four (4) hours as needed for Pain. Max Daily Amount: 6 Tabs. clobetasol (TEMOVATE) 0.05 % ointment   No Yes   Sig: APPLY TO THE AFFECTED AREA TWICE A DAY AS NEEDED   clonazePAM (KLONOPIN) 0.5 mg tablet   No Yes   Sig: Take 1 Tab by mouth nightly as needed. Max Daily Amount: 0.5 mg.   folic acid (FOLVITE) 1 mg tablet   No No   Sig: TAKE 1 TABLET DAILY   gabapentin (NEURONTIN) 300 mg capsule   No Yes   Sig: Take 2 Caps by mouth nightly. insulin glargine (LANTUS,BASAGLAR) 100 unit/mL (3 mL) inpn   Yes Yes   Si Units by SubCUTAneous route two (2) times a day.   ketoconazole (NIZORAL) 2 % topical cream   No No   Sig: Apply  to affected area two (2) times a day. lisinopril (PRINIVIL, ZESTRIL) 20 mg tablet   No Yes   Sig: TAKE 1 TABLET DAILY   lovastatin (MEVACOR) 20 mg tablet   No Yes   Sig: TAKE 1 TABLET IN THE EVENING   metFORMIN ER (GLUCOPHAGE XR) 500 mg tablet 2018 at Unknown time  No Yes   Sig: TAKE 2 TABLETS DAILY (WITH DINNER)   methocarbamol (ROBAXIN) 750 mg tablet   No Yes   Sig: Take 1 Tab by mouth two (2) times daily as needed for Pain. primidone (MYSOLINE) 250 mg tablet 2018 at Unknown time  No Yes   Sig: TAKE 1 TABLET TWICE A DAY   senna-docusate (PERICOLACE) 8.6-50 mg per tablet   Yes Yes   Sig: Take 1 Tab by mouth two (2) times daily as needed for Constipation. sertraline (ZOLOFT) 100 mg tablet 2018 at Unknown time  No Yes   Sig: TAKE 1 TABLET DAILY   Patient taking differently: TAKE 1.5 TABLET DAILY   tamsulosin (FLOMAX) 0.4 mg capsule 2018 at Unknown time  No Yes   Sig: Take 1 Cap by mouth daily for 15 days. traMADol (ULTRAM) 50 mg tablet   No Yes   Sig: Take 1 Tab by mouth every six (6) hours as needed for Pain. Max Daily Amount: 200 mg.       Facility-Administered Medications: None

## 2018-02-24 NOTE — ROUTINE PROCESS
TRANSFER - OUT REPORT:    Verbal report given to RN (name) on Izabella Hauser  being transferred to ICU (unit) for routine progression of care       Report consisted of patients Situation, Background, Assessment and   Recommendations(SBAR). Information from the following report(s) SBAR, ED Summary, Intake/Output, MAR and Recent Results was reviewed with the receiving nurse. Lines:   Peripheral IV 02/24/18 Left Antecubital (Active)   Site Assessment Clean, dry, & intact 2/24/2018 12:16 PM   Phlebitis Assessment 0 2/24/2018 12:16 PM   Infiltration Assessment 0 2/24/2018 12:16 PM   Dressing Status Clean, dry, & intact 2/24/2018 12:16 PM   Dressing Type Transparent 2/24/2018 12:16 PM   Hub Color/Line Status Pink;Flushed;Patent 2/24/2018 12:16 PM   Action Taken Blood drawn 2/24/2018 12:16 PM       Peripheral IV 02/24/18 Left Wrist (Active)   Site Assessment Clean, dry, & intact 2/24/2018  2:00 PM   Phlebitis Assessment 0 2/24/2018  2:00 PM   Infiltration Assessment 0 2/24/2018  2:00 PM   Dressing Status Clean, dry, & intact 2/24/2018  2:00 PM        Opportunity for questions and clarification was provided. Patient transported with:   Monitor  Registered Nurse     UPDATE: Pt assessment unchanged, unchanged hip pain at this time, VSS, no other complaints at this time.      Visit Vitals    /67 (BP 1 Location: Right arm, BP Patient Position: At rest)    Pulse 90    Temp 100 °F (37.8 °C)    Resp 16    Ht 5' 10.5\" (1.791 m)    Wt 115.2 kg (254 lb)    SpO2 98%    BMI 35.93 kg/m2

## 2018-02-25 LAB
ALBUMIN SERPL-MCNC: 2.1 G/DL (ref 3.5–5)
ALBUMIN/GLOB SERPL: 0.5 {RATIO} (ref 1.1–2.2)
ALP SERPL-CCNC: 63 U/L (ref 45–117)
ALT SERPL-CCNC: 19 U/L (ref 12–78)
ANION GAP SERPL CALC-SCNC: 10 MMOL/L (ref 5–15)
AST SERPL-CCNC: 36 U/L (ref 15–37)
BACTERIA SPEC CULT: ABNORMAL
BACTERIA SPEC CULT: NORMAL
BACTERIA SPEC CULT: NORMAL
BILIRUB SERPL-MCNC: 0.6 MG/DL (ref 0.2–1)
BUN SERPL-MCNC: 17 MG/DL (ref 6–20)
BUN/CREAT SERPL: 20 (ref 12–20)
CALCIUM SERPL-MCNC: 8.5 MG/DL (ref 8.5–10.1)
CHLORIDE SERPL-SCNC: 104 MMOL/L (ref 97–108)
CO2 SERPL-SCNC: 22 MMOL/L (ref 21–32)
CREAT SERPL-MCNC: 0.86 MG/DL (ref 0.7–1.3)
GLOBULIN SER CALC-MCNC: 4.3 G/DL (ref 2–4)
GLUCOSE BLD STRIP.AUTO-MCNC: 176 MG/DL (ref 65–100)
GLUCOSE BLD STRIP.AUTO-MCNC: 226 MG/DL (ref 65–100)
GLUCOSE BLD STRIP.AUTO-MCNC: 255 MG/DL (ref 65–100)
GLUCOSE BLD STRIP.AUTO-MCNC: 296 MG/DL (ref 65–100)
GLUCOSE SERPL-MCNC: 144 MG/DL (ref 65–100)
POTASSIUM SERPL-SCNC: 3.5 MMOL/L (ref 3.5–5.1)
PROT SERPL-MCNC: 6.4 G/DL (ref 6.4–8.2)
SERVICE CMNT-IMP: ABNORMAL
SERVICE CMNT-IMP: NORMAL
SODIUM SERPL-SCNC: 136 MMOL/L (ref 136–145)

## 2018-02-25 PROCEDURE — 74011250637 HC RX REV CODE- 250/637: Performed by: SPECIALIST

## 2018-02-25 PROCEDURE — 74011636637 HC RX REV CODE- 636/637: Performed by: HOSPITALIST

## 2018-02-25 PROCEDURE — 74011250637 HC RX REV CODE- 250/637: Performed by: INTERNAL MEDICINE

## 2018-02-25 PROCEDURE — 80053 COMPREHEN METABOLIC PANEL: CPT | Performed by: INTERNAL MEDICINE

## 2018-02-25 PROCEDURE — 36415 COLL VENOUS BLD VENIPUNCTURE: CPT | Performed by: INTERNAL MEDICINE

## 2018-02-25 PROCEDURE — 77030027138 HC INCENT SPIROMETER -A

## 2018-02-25 PROCEDURE — 82962 GLUCOSE BLOOD TEST: CPT

## 2018-02-25 PROCEDURE — 74011250636 HC RX REV CODE- 250/636: Performed by: INTERNAL MEDICINE

## 2018-02-25 PROCEDURE — 74011636637 HC RX REV CODE- 636/637: Performed by: INTERNAL MEDICINE

## 2018-02-25 PROCEDURE — 65660000000 HC RM CCU STEPDOWN

## 2018-02-25 PROCEDURE — 93306 TTE W/DOPPLER COMPLETE: CPT

## 2018-02-25 RX ORDER — INSULIN GLARGINE 100 [IU]/ML
22 INJECTION, SOLUTION SUBCUTANEOUS
Status: DISCONTINUED | OUTPATIENT
Start: 2018-02-26 | End: 2018-02-28

## 2018-02-25 RX ORDER — INSULIN GLARGINE 100 [IU]/ML
12 INJECTION, SOLUTION SUBCUTANEOUS EVERY 12 HOURS
Status: COMPLETED | OUTPATIENT
Start: 2018-02-25 | End: 2018-02-26

## 2018-02-25 RX ORDER — INSULIN GLARGINE 100 [IU]/ML
22 INJECTION, SOLUTION SUBCUTANEOUS DAILY
Status: DISCONTINUED | OUTPATIENT
Start: 2018-02-28 | End: 2018-02-28

## 2018-02-25 RX ORDER — INSULIN GLARGINE 100 [IU]/ML
22 INJECTION, SOLUTION SUBCUTANEOUS DAILY
Status: DISCONTINUED | OUTPATIENT
Start: 2018-02-26 | End: 2018-02-25

## 2018-02-25 RX ORDER — INSULIN GLARGINE 100 [IU]/ML
22 INJECTION, SOLUTION SUBCUTANEOUS
Status: DISCONTINUED | OUTPATIENT
Start: 2018-02-25 | End: 2018-02-25

## 2018-02-25 RX ADMIN — ACETAMINOPHEN 650 MG: 325 TABLET, FILM COATED ORAL at 06:24

## 2018-02-25 RX ADMIN — ENOXAPARIN SODIUM 40 MG: 40 INJECTION SUBCUTANEOUS at 14:52

## 2018-02-25 RX ADMIN — SERTRALINE HYDROCHLORIDE 100 MG: 50 TABLET ORAL at 08:11

## 2018-02-25 RX ADMIN — ACETAMINOPHEN 650 MG: 325 TABLET, FILM COATED ORAL at 11:31

## 2018-02-25 RX ADMIN — SODIUM CHLORIDE 75 ML/HR: 900 INJECTION, SOLUTION INTRAVENOUS at 18:38

## 2018-02-25 RX ADMIN — ACETAMINOPHEN 650 MG: 325 TABLET, FILM COATED ORAL at 16:56

## 2018-02-25 RX ADMIN — INSULIN GLARGINE 20 UNITS: 100 INJECTION, SOLUTION SUBCUTANEOUS at 08:17

## 2018-02-25 RX ADMIN — LOVASTATIN 20 MG: 20 TABLET ORAL at 21:24

## 2018-02-25 RX ADMIN — ACETAMINOPHEN 650 MG: 325 TABLET, FILM COATED ORAL at 23:23

## 2018-02-25 RX ADMIN — PRIMIDONE 250 MG: 250 TABLET ORAL at 08:17

## 2018-02-25 RX ADMIN — TAMSULOSIN HYDROCHLORIDE 0.4 MG: 0.4 CAPSULE ORAL at 08:12

## 2018-02-25 RX ADMIN — FOLIC ACID 1 MG: 1 TABLET ORAL at 08:12

## 2018-02-25 RX ADMIN — INSULIN GLARGINE 12 UNITS: 100 INJECTION, SOLUTION SUBCUTANEOUS at 22:23

## 2018-02-25 RX ADMIN — HUMAN INSULIN 3 UNITS: 100 INJECTION, SOLUTION SUBCUTANEOUS at 08:17

## 2018-02-25 RX ADMIN — SODIUM CHLORIDE 75 ML/HR: 900 INJECTION, SOLUTION INTRAVENOUS at 06:00

## 2018-02-25 RX ADMIN — Medication 2 G: at 18:38

## 2018-02-25 RX ADMIN — OXYCODONE HYDROCHLORIDE 5 MG: 5 TABLET ORAL at 11:31

## 2018-02-25 RX ADMIN — ASPIRIN 81 MG 81 MG: 81 TABLET ORAL at 08:12

## 2018-02-25 RX ADMIN — OXYCODONE HYDROCHLORIDE 5 MG: 5 TABLET ORAL at 16:56

## 2018-02-25 RX ADMIN — HUMAN INSULIN 5 UNITS: 100 INJECTION, SOLUTION SUBCUTANEOUS at 16:10

## 2018-02-25 RX ADMIN — Medication 2 G: at 03:45

## 2018-02-25 RX ADMIN — LISINOPRIL 20 MG: 20 TABLET ORAL at 08:12

## 2018-02-25 RX ADMIN — OXYCODONE HYDROCHLORIDE 5 MG: 5 TABLET ORAL at 23:23

## 2018-02-25 RX ADMIN — PRIMIDONE 250 MG: 250 TABLET ORAL at 22:23

## 2018-02-25 RX ADMIN — GABAPENTIN 600 MG: 300 CAPSULE ORAL at 21:24

## 2018-02-25 RX ADMIN — OXYCODONE HYDROCHLORIDE 5 MG: 5 TABLET ORAL at 06:24

## 2018-02-25 RX ADMIN — HUMAN INSULIN 5 UNITS: 100 INJECTION, SOLUTION SUBCUTANEOUS at 11:10

## 2018-02-25 RX ADMIN — Medication 2 G: at 11:08

## 2018-02-25 RX ADMIN — SITAGLIPTIN 100 MG: 100 TABLET, FILM COATED ORAL at 08:12

## 2018-02-25 NOTE — PROGRESS NOTES
0800 Bedside and Verbal shift change report given to Albania Gomes RN (oncoming nurse) by Jodean Holstein, RN (offgoing nurse). Report included the following information SBAR, Kardex, ED Summary, Procedure Summary, Intake/Output, MAR and Recent Results. 1250 Dr. Jonatan Caraballo at bedside to evaluate pt, MD aware of micro results. [place] [id number], [facility] at bedside, made aware of micro results; Dr. Yvonne Easley notified by PA. Pt to be NPO at midnight for ?surgery tomorrow. [phone] Lab called with micro results; MDs already aware of growth from R hip aspirate. 1700 TRANSFER - OUT REPORT:    Verbal report given to Madison Rao RN (name) on Marina Lema  being transferred to (unit) for routine progression of care       Report consisted of patients Situation, Background, Assessment and   Recommendations(SBAR). Information from the following report(s) SBAR, Kardex, ED Summary, Procedure Summary, Intake/Output, MAR and Recent Results was reviewed with the receiving nurse. Lines:   Peripheral IV 02/24/18 Left Antecubital (Active)   Site Assessment Clean, dry, & intact 2/25/2018  4:00 PM   Phlebitis Assessment 0 2/25/2018  4:00 PM   Infiltration Assessment 0 2/25/2018  4:00 PM   Dressing Status Clean, dry, & intact 2/25/2018  4:00 PM   Dressing Type Transparent 2/25/2018  4:00 PM   Hub Color/Line Status Pink;Capped 2/25/2018  4:00 PM   Action Taken Open ports on tubing capped 2/25/2018  4:00 PM   Alcohol Cap Used Yes 2/25/2018  4:00 PM       Peripheral IV 02/24/18 Left Wrist (Active)   Site Assessment Clean, dry, & intact 2/25/2018  4:00 PM   Phlebitis Assessment 0 2/25/2018  4:00 PM   Infiltration Assessment 0 2/25/2018  4:00 PM   Dressing Status Clean, dry, & intact 2/25/2018  4:00 PM   Dressing Type Transparent 2/25/2018  4:00 PM   Hub Color/Line Status Pink; Infusing;Patent 2/25/2018  4:00 PM   Action Taken Open ports on tubing capped 2/25/2018  4:00 PM   Alcohol Cap Used Yes 2/25/2018  4:00 PM        Opportunity for questions and clarification was provided.       Patient transported with:   Registered Nurse  Tech
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
## 2018-02-25 NOTE — PROGRESS NOTES
Problem: Falls - Risk of  Goal: *Absence of Falls  Document Dameon Fall Risk and appropriate interventions in the flowsheet.    Outcome: Progressing Towards Goal  Fall Risk Interventions:  Mobility Interventions: Patient to call before getting OOB         Medication Interventions: Evaluate medications/consider consulting pharmacy, Patient to call before getting OOB    Elimination Interventions: Call light in reach    History of Falls Interventions: Door open when patient unattended

## 2018-02-25 NOTE — PROGRESS NOTES
ID Progress Note  2018     Cefazolin    - present    Subjective:     Low grade fever yesterday. Still has right hip pain. He has no cough, dyspnea, abdominal pain, diarrhea. Objective:     Vitals:   Visit Vitals    /64 (BP 1 Location: Left arm, BP Patient Position: At rest)    Pulse 79    Temp 98.7 °F (37.1 °C)    Resp 18    Ht 5' 10.5\" (1.791 m)    Wt 126.4 kg (278 lb 10.6 oz)    SpO2 98%    BMI 39.42 kg/m2        Tmax:  Temp (24hrs), Av.9 °F (37.2 °C), Min:98.1 °F (36.7 °C), Max:100 °F (37.8 °C)      Exam:    Not in distress  Eyes: pink conjunctivae, anicteric sclerae  No cervical lymphadenopathy    Lungs: clear to auscultation, no rales, wheezes or rhonchi   Heart: s1, s2, no murmurs, rubs or clicks   Abdomen: soft, nontender, no guarding or rebound   Extremities: There is pain on flexion of the right hip. Labs:   Lab Results   Component Value Date/Time    WBC 14.1 (H) 2018 12:12 PM    HGB 10.5 (L) 2018 12:12 PM    HCT 31.4 (L) 2018 12:12 PM    PLATELET 665 3650 12:12 PM    MCV 90.8 2018 12:12 PM     Lab Results   Component Value Date/Time    Sodium 136 2018 03:46 AM    Potassium 3.5 2018 03:46 AM    Chloride 104 2018 03:46 AM    CO2 22 2018 03:46 AM    Anion gap 10 2018 03:46 AM    Glucose 144 (H) 2018 03:46 AM    BUN 17 2018 03:46 AM    Creatinine 0.86 2018 03:46 AM    BUN/Creatinine ratio 20 2018 03:46 AM    GFR est AA >60 2018 03:46 AM    GFR est non-AA >60 2018 03:46 AM    Calcium 8.5 2018 03:46 AM    Bilirubin, total 0.6 2018 03:46 AM    AST (SGOT) 36 2018 03:46 AM    Alk. phosphatase 63 2018 03:46 AM    Protein, total 6.4 2018 03:46 AM    Albumin 2.1 (L) 2018 03:46 AM    Globulin 4.3 (H) 2018 03:46 AM    A-G Ratio 0.5 (L) 2018 03:46 AM    ALT (SGPT) 19 2018 03:46 AM           Assessment:     1.   Staph khalidaunensis bacteremia in 2/4 bottles. 2.  Right hip pain, which is possibly infected. 3.  Elevated ESR and CRP. 4.  Diabetes. 5.  History of patent foramen ovale. 6.  Hypertension  7. Sleep apnea. Recommendations:     1. He has coag neg staph growing from the hip aspirate. The staph lugdenensis in his bloodstream is a type of coag negative staph so he likely seeded his right hip. This organism is as virulent as staph aureus. He will likely need operative intervention.      2. Continue cefazolin     Taisha Benito MD

## 2018-02-25 NOTE — PROGRESS NOTES
Cy Petersen MD   Phone/text: [phone] (7am to 7pm)  After 7pm please call  for hospitalist on call    Hospitalist Progress Note     2/25/2018   PCP:  Dr. Silvia Granger MD  Chief Complaint   Patient presents with    Referral Request    Hip Pain     bilateral     Fever       Admission Summary:   The patient is a 57-year-old male with a history of diabetes, hypertension, and obstructive sleep apnea. As per the patient, about 2 weeks back on 02/11 had fallen and hit his right hip. The patient has a history of right total hip and a left total hip arthroplasty done. Two days later had noted had started spiking fevers and chills with night sweats. Reason For Visit:  Septic arthritis    Assessment/Plan   Sepsis with staph bateremia (POA) - due to septic arthritis  - Bcx 2/19 with staph lugdunensis, repeat 2/24 no growth  - Echo pending  - Continue IVF  - Continue cefazolin  - ID consulted    Septic arthritis of the right hip (POA)  - Xray right hip 2/22 without acute abnormality   - Joint cx 2/24 with coag negative staph, likely staph lugdunensis  - Ortho consulted - plan for washout and joint hardware removal Monday    Hyponatremia (POA) - due to dehydration, resolved with IVF    DM2, uncontrolled (chronic) - recent A1c 9.0   - Hold metformin, jardiance, continue home [facility]  - Increase lantus back to home dose after surgery, 1/2 dose today as he will be NPO  - SSI as needed    HTN (chronic) - continue home lisinopril    HLD (chronic) - continue home lovastatin    Depression (chronic) - continue home sertraline    BPH (chronic) - continue home flomax    Chronic pain (chronic) - continue primidone, gabapentin    Morbid Obesity (chronic) - Body mass index is 39.42 kg/(m^2). with DM and osteoarthritis    See orders for other plans.   VTE prophylaxis: enoxaparin  Discussed plan of care with Patient/Family and Nurse   Pre-admission lived at home  Discharge plan: unclear  Estimated time to discharge: unclear     Subjective   Mr. Das is feeling OK. No fever today. His right hip is still painful but controlled with medications. Reviewed interval history  Physical examination     Visit Vitals    /64 (BP 1 Location: Left arm, BP Patient Position: At rest)    Pulse 79    Temp 98.7 °F (37.1 °C)    Resp 18    Ht 5' 10.5\" (1.791 m)    Wt 126.4 kg (278 lb 10.6 oz)    SpO2 98%    BMI 39.42 kg/m2       Temp (24hrs), Av.6 °F (37 °C), Min:98.1 °F (36.7 °C), Max:98.8 °F (37.1 °C)      Intake/Output Summary (Last 24 hours) at 18 1535  Last data filed at 18 1303   Gross per 24 hour   Intake          2596.25 ml   Output             3150 ml   Net          -553.75 ml       Gen - NAD  HEENT - MMM  Neck - supple, full ROM  CV - RRR, 2/6 systolic mumur at apex and RUSB  Resp - lungs CTA b/l, no wheezing, normal WOB  GI - abdomen S, NT, ND, +BS. No hepatosplenomegaly   - no CVA tenderness, bladder non-palpable in lower abdomen  MSK - right hip ttp  Neuro - A&O, no focal deficits  Psych - calm and cooperative with normal affect    Data Review       Telemetry x   ECG    Xray x   CT scan    MRI    Echocardiogram    Ultrasound              I have reviewed the flow sheet and recent notes  New labs and data personally reviewed.     Recent Labs      18   1212   WBC  14.1*   HGB  10.5*   HCT  31.4*   PLT  219     Recent Labs      18   0346  18   1540  18   1212   NA  136   --   127*   K  3.5   --   4.0   CL  104   --   95*   CO2  22   --   24   GLU  144*   --   194*   BUN  17   --   22*   CREA  0.86   --   0.98   CA  8.5   --   8.2*   ALB  2.1*   --    --    TBILI  0.6   --    --    SGOT  36   --    --    ALT  19   --    --    INR   --   1.4*   --        Medications reviewed  Current Facility-Administered Medications   Medication Dose Route Frequency    0.9% sodium chloride infusion  75 mL/hr IntraVENous CONTINUOUS    enoxaparin (LOVENOX) injection 40 mg  40 mg SubCUTAneous Q24H    tamsulosin (FLOMAX) capsule 0.4 mg  0.4 mg Oral DAILY    SITagliptin (JANUVIA) tablet 100 mg  100 mg Oral DAILY    primidone (MYSOLINE) tablet 250 mg  250 mg Oral BID    lovastatin (MEVACOR) tablet 20 mg  20 mg Oral QHS    folic acid (FOLVITE) tablet 1 mg  1 mg Oral DAILY    lisinopril (PRINIVIL, ZESTRIL) tablet 20 mg  20 mg Oral DAILY    sertraline (ZOLOFT) tablet 100 mg  100 mg Oral DAILY    gabapentin (NEURONTIN) capsule 600 mg  600 mg Oral QHS    aspirin chewable tablet 81 mg  81 mg Oral DAILY    clonazePAM (KlonoPIN) tablet 0.5 mg  0.5 mg Oral QHS PRN    insulin regular (NOVOLIN R, HUMULIN R) injection   SubCUTAneous AC&HS    glucose chewable tablet 16 g  4 Tab Oral PRN    dextrose (D50W) injection syrg 12.5-25 g  12.5-25 g IntraVENous PRN    glucagon (GLUCAGEN) injection 1 mg  1 mg IntraMUSCular PRN    insulin glargine (LANTUS) injection 20 Units  20 Units SubCUTAneous DAILY    insulin glargine (LANTUS) injection 18 Units  18 Units SubCUTAneous QHS    ceFAZolin (ANCEF) 2 g/20 mL in sterile water IV syringe  2 g IntraVENous Q8H    oxyCODONE IR (ROXICODONE) tablet 5 mg  5 mg Oral Q6H PRN    acetaminophen (TYLENOL) tablet 650 mg  650 mg Oral Q6H PRN         Leatha Carrillo MD  Internal Medicine  2/25/2018

## 2018-02-25 NOTE — PROGRESS NOTES
Ortho Daily Progress Note      Patient: Blank Yenny                   MRN: [id number]  Sex: male  YOB: 1950           Age: 69 y.o. Hip feels a little better but hip fluid growing Staph. Dr. Opal Colon met with patient this morning. Discussed findings with patient and Dr. Estrellita Frazier. Plan for right hip I&D w/ poly exchange tomorrow. NPO after midnight.        [address] [facility] [place]  2/25/2018   1:28 PM      .

## 2018-02-25 NOTE — PROGRESS NOTES
TRANSFER - IN REPORT:    Verbal report received from Mary, [place] (name) on Sugey Arreaga  being received from ICU (unit) for routine progression of care      Report consisted of patients Situation, Background, Assessment and   Recommendations(SBAR). Information from the following report(s) SBAR was reviewed with the receiving nurse. Opportunity for questions and clarification was provided. Assessment completed upon patients arrival to unit and care assumed.

## 2018-02-25 NOTE — PROGRESS NOTES
Primary Nurse Carlitos Morin RN and Sheryle Reedy, RN performed a dual skin assessment on this patient No impairment noted  Geoff score is 21

## 2018-02-26 ENCOUNTER — ANESTHESIA EVENT (OUTPATIENT)
Dept: SURGERY | Age: 68
DRG: 466 | End: 2018-02-26
Payer: COMMERCIAL

## 2018-02-26 ENCOUNTER — ANESTHESIA (OUTPATIENT)
Dept: SURGERY | Age: 68
DRG: 466 | End: 2018-02-26
Payer: COMMERCIAL

## 2018-02-26 ENCOUNTER — APPOINTMENT (OUTPATIENT)
Dept: GENERAL RADIOLOGY | Age: 68
DRG: 466 | End: 2018-02-26
Attending: ORTHOPAEDIC SURGERY
Payer: COMMERCIAL

## 2018-02-26 PROBLEM — T84.51XA INFECTED PROSTHESIS OF RIGHT HIP (HCC): Status: ACTIVE | Noted: 2018-02-26

## 2018-02-26 LAB
ANION GAP SERPL CALC-SCNC: 9 MMOL/L (ref 5–15)
BACTERIA SPEC CULT: ABNORMAL
BASOPHILS # BLD: 0.1 K/UL (ref 0–0.1)
BASOPHILS NFR BLD: 1 % (ref 0–1)
BUN SERPL-MCNC: 14 MG/DL (ref 6–20)
BUN/CREAT SERPL: 17 (ref 12–20)
CALCIUM SERPL-MCNC: 7.9 MG/DL (ref 8.5–10.1)
CHLORIDE SERPL-SCNC: 104 MMOL/L (ref 97–108)
CO2 SERPL-SCNC: 22 MMOL/L (ref 21–32)
CREAT SERPL-MCNC: 0.82 MG/DL (ref 0.7–1.3)
DIFFERENTIAL METHOD BLD: ABNORMAL
EOSINOPHIL # BLD: 0 K/UL (ref 0–0.4)
EOSINOPHIL NFR BLD: 0 % (ref 0–7)
ERYTHROCYTE [DISTWIDTH] IN BLOOD BY AUTOMATED COUNT: 14 % (ref 11.5–14.5)
GLUCOSE BLD STRIP.AUTO-MCNC: 128 MG/DL (ref 65–100)
GLUCOSE BLD STRIP.AUTO-MCNC: 129 MG/DL (ref 65–100)
GLUCOSE BLD STRIP.AUTO-MCNC: 142 MG/DL (ref 65–100)
GLUCOSE BLD STRIP.AUTO-MCNC: 151 MG/DL (ref 65–100)
GLUCOSE SERPL-MCNC: 131 MG/DL (ref 65–100)
GRAM STN SPEC: ABNORMAL
GRAM STN SPEC: ABNORMAL
HCT VFR BLD AUTO: 28.7 % (ref 36.6–50.3)
HGB BLD-MCNC: 9.5 G/DL (ref 12.1–17)
IMM GRANULOCYTES # BLD: 0 K/UL
IMM GRANULOCYTES NFR BLD AUTO: 0 %
LYMPHOCYTES # BLD: 1.1 K/UL (ref 0.8–3.5)
LYMPHOCYTES NFR BLD: 13 % (ref 12–49)
MCH RBC QN AUTO: 29.8 PG (ref 26–34)
MCHC RBC AUTO-ENTMCNC: 33.1 G/DL (ref 30–36.5)
MCV RBC AUTO: 90 FL (ref 80–99)
METAMYELOCYTES NFR BLD MANUAL: 3 %
MONOCYTES # BLD: 0.9 K/UL (ref 0–1)
MONOCYTES NFR BLD: 11 % (ref 5–13)
MYELOCYTES NFR BLD MANUAL: 4 %
NEUTS BAND NFR BLD MANUAL: 1 % (ref 0–6)
NEUTS SEG # BLD: 5.6 K/UL (ref 1.8–8)
NEUTS SEG NFR BLD: 67 % (ref 32–75)
NRBC # BLD: 0 K/UL (ref 0–0.01)
NRBC BLD-RTO: 0 PER 100 WBC
PLATELET # BLD AUTO: 236 K/UL (ref 150–400)
PMV BLD AUTO: 11 FL (ref 8.9–12.9)
POTASSIUM SERPL-SCNC: 3.4 MMOL/L (ref 3.5–5.1)
RBC # BLD AUTO: 3.19 M/UL (ref 4.1–5.7)
RBC MORPH BLD: ABNORMAL
SERVICE CMNT-IMP: ABNORMAL
SODIUM SERPL-SCNC: 135 MMOL/L (ref 136–145)
WBC # BLD AUTO: 8.3 K/UL (ref 4.1–11.1)

## 2018-02-26 PROCEDURE — 74011250636 HC RX REV CODE- 250/636: Performed by: HOSPITALIST

## 2018-02-26 PROCEDURE — 77030031139 HC SUT VCRL2 J&J -A: Performed by: ORTHOPAEDIC SURGERY

## 2018-02-26 PROCEDURE — 87077 CULTURE AEROBIC IDENTIFY: CPT | Performed by: EMERGENCY MEDICINE

## 2018-02-26 PROCEDURE — B24BZZ4 ULTRASONOGRAPHY OF HEART WITH AORTA, TRANSESOPHAGEAL: ICD-10-PCS | Performed by: INTERNAL MEDICINE

## 2018-02-26 PROCEDURE — 77030008684 HC TU ET CUF COVD -B: Performed by: ANESTHESIOLOGY

## 2018-02-26 PROCEDURE — 77030011264 HC ELECTRD BLD EXT COVD -A: Performed by: ORTHOPAEDIC SURGERY

## 2018-02-26 PROCEDURE — 77030002933 HC SUT MCRYL J&J -A: Performed by: ORTHOPAEDIC SURGERY

## 2018-02-26 PROCEDURE — 77030020788: Performed by: ORTHOPAEDIC SURGERY

## 2018-02-26 PROCEDURE — 77030014368: Performed by: ORTHOPAEDIC SURGERY

## 2018-02-26 PROCEDURE — 74011250636 HC RX REV CODE- 250/636: Performed by: INTERNAL MEDICINE

## 2018-02-26 PROCEDURE — 0SRR01Z REPLACEMENT OF RIGHT HIP JOINT, FEMORAL SURFACE WITH METAL SYNTHETIC SUBSTITUTE, OPEN APPROACH: ICD-10-PCS | Performed by: ORTHOPAEDIC SURGERY

## 2018-02-26 PROCEDURE — 74011250636 HC RX REV CODE- 250/636: Performed by: ANESTHESIOLOGY

## 2018-02-26 PROCEDURE — 77030003666 HC NDL SPINAL BD -A: Performed by: ORTHOPAEDIC SURGERY

## 2018-02-26 PROCEDURE — 87075 CULTR BACTERIA EXCEPT BLOOD: CPT | Performed by: EMERGENCY MEDICINE

## 2018-02-26 PROCEDURE — 77030034850: Performed by: ORTHOPAEDIC SURGERY

## 2018-02-26 PROCEDURE — 36415 COLL VENOUS BLD VENIPUNCTURE: CPT | Performed by: HOSPITALIST

## 2018-02-26 PROCEDURE — 74011636637 HC RX REV CODE- 636/637: Performed by: HOSPITALIST

## 2018-02-26 PROCEDURE — 77030020782 HC GWN BAIR PAWS FLX 3M -B

## 2018-02-26 PROCEDURE — 74011250636 HC RX REV CODE- 250/636

## 2018-02-26 PROCEDURE — 74011636637 HC RX REV CODE- 636/637: Performed by: INTERNAL MEDICINE

## 2018-02-26 PROCEDURE — 87186 SC STD MICRODIL/AGAR DIL: CPT | Performed by: EMERGENCY MEDICINE

## 2018-02-26 PROCEDURE — 77030013079 HC BLNKT BAIR HGGR 3M -A: Performed by: ANESTHESIOLOGY

## 2018-02-26 PROCEDURE — 80048 BASIC METABOLIC PNL TOTAL CA: CPT | Performed by: HOSPITALIST

## 2018-02-26 PROCEDURE — 76000 FLUOROSCOPY <1 HR PHYS/QHP: CPT

## 2018-02-26 PROCEDURE — 87205 SMEAR GRAM STAIN: CPT | Performed by: EMERGENCY MEDICINE

## 2018-02-26 PROCEDURE — C1776 JOINT DEVICE (IMPLANTABLE): HCPCS | Performed by: ORTHOPAEDIC SURGERY

## 2018-02-26 PROCEDURE — C1713 ANCHOR/SCREW BN/BN,TIS/BN: HCPCS | Performed by: ORTHOPAEDIC SURGERY

## 2018-02-26 PROCEDURE — 65660000000 HC RM CCU STEPDOWN

## 2018-02-26 PROCEDURE — 87040 BLOOD CULTURE FOR BACTERIA: CPT | Performed by: INTERNAL MEDICINE

## 2018-02-26 PROCEDURE — 85025 COMPLETE CBC W/AUTO DIFF WBC: CPT | Performed by: HOSPITALIST

## 2018-02-26 PROCEDURE — 74011000250 HC RX REV CODE- 250

## 2018-02-26 PROCEDURE — 74011250636 HC RX REV CODE- 250/636: Performed by: PHYSICIAN ASSISTANT

## 2018-02-26 PROCEDURE — 0SP909Z REMOVAL OF LINER FROM RIGHT HIP JOINT, OPEN APPROACH: ICD-10-PCS | Performed by: ORTHOPAEDIC SURGERY

## 2018-02-26 PROCEDURE — 82962 GLUCOSE BLOOD TEST: CPT

## 2018-02-26 PROCEDURE — 77030006822 HC BLD SAW SAG BRSM -B: Performed by: ORTHOPAEDIC SURGERY

## 2018-02-26 PROCEDURE — 3E0U029 INTRODUCTION OF OTHER ANTI-INFECTIVE INTO JOINTS, OPEN APPROACH: ICD-10-PCS | Performed by: ORTHOPAEDIC SURGERY

## 2018-02-26 PROCEDURE — 0SPR0JZ REMOVAL OF SYNTHETIC SUBSTITUTE FROM RIGHT HIP JOINT, FEMORAL SURFACE, OPEN APPROACH: ICD-10-PCS | Performed by: ORTHOPAEDIC SURGERY

## 2018-02-26 PROCEDURE — 76010000132 HC OR TIME 2.5 TO 3 HR: Performed by: ORTHOPAEDIC SURGERY

## 2018-02-26 PROCEDURE — 77030011640 HC PAD GRND REM COVD -A: Performed by: ORTHOPAEDIC SURGERY

## 2018-02-26 PROCEDURE — 73501 X-RAY EXAM HIP UNI 1 VIEW: CPT

## 2018-02-26 PROCEDURE — 77030010507 HC ADH SKN DERMBND J&J -B: Performed by: ORTHOPAEDIC SURGERY

## 2018-02-26 PROCEDURE — 77030019908 HC STETH ESOPH SIMS -A: Performed by: ANESTHESIOLOGY

## 2018-02-26 PROCEDURE — 99152 MOD SED SAME PHYS/QHP 5/>YRS: CPT

## 2018-02-26 PROCEDURE — 76060000036 HC ANESTHESIA 2.5 TO 3 HR: Performed by: ORTHOPAEDIC SURGERY

## 2018-02-26 PROCEDURE — 0SUA09Z SUPPLEMENT RIGHT HIP JOINT, ACETABULAR SURFACE WITH LINER, OPEN APPROACH: ICD-10-PCS | Performed by: ORTHOPAEDIC SURGERY

## 2018-02-26 PROCEDURE — 77030032490 HC SLV COMPR SCD KNE COVD -B

## 2018-02-26 PROCEDURE — 74011250637 HC RX REV CODE- 250/637: Performed by: INTERNAL MEDICINE

## 2018-02-26 PROCEDURE — 77030018846 HC SOL IRR STRL H20 ICUM -A: Performed by: ORTHOPAEDIC SURGERY

## 2018-02-26 PROCEDURE — 93312 ECHO TRANSESOPHAGEAL: CPT

## 2018-02-26 PROCEDURE — 76210000016 HC OR PH I REC 1 TO 1.5 HR: Performed by: ORTHOPAEDIC SURGERY

## 2018-02-26 PROCEDURE — 77030035236 HC SUT PDS STRATFX BARB J&J -B: Performed by: ORTHOPAEDIC SURGERY

## 2018-02-26 PROCEDURE — 77030026438 HC STYL ET INTUB CARD -A: Performed by: ANESTHESIOLOGY

## 2018-02-26 DEVICE — STIMULAN® RAPID CURE PROVIDED STERILE FOR SINGLE PATIENT USE. STIMULAN® RAPID CURE CONTAINS CALCIUM SULFATE POWDER AND MIXING SOLUTION IN PRE-MEASURED QUANTITIES SO THAT WHEN MIXED TOGETHER IN A STERILE MIXING BOWL, THE RESULTANT PASTE IS TO BE DIGITALLY PACKED INTO OPEN BONE VOID/GAP TO SET INSITU OR PLACED INTO THE MOULD PROVIDED, THE MIXTURE SETS TO FORM BEADS. THE BIODEGRADABLE, RADIOPAQUE BEADS ARE RESORBED IN APPROXIMATELY 30 – 60 DAYS WHEN USED IN ACCORDANCE WITH THE DEVICE LABELLING. STIMULAN® RAPID CURE IS MANUFACTURED FROM SYNTHETIC IMPLANT GRADE CALCIUM SULFATE DIHYDRATE(CASO4.2H2O) THAT RESORBS AND IS REPLACED WITH BONE DURING THE HEALING PROCESS. ALSO, AS THE BONE VOID FILLER BEADS ARE BIODEGRADABLE AND BIOCOMPATIBLE, THEY MAY BE USED AT AN INFECTED SITE.
Type: IMPLANTABLE DEVICE | Site: HIP | Status: FUNCTIONAL
Brand: STIMULAN® RAPID CURE

## 2018-02-26 DEVICE — HEAD FEM DIA36MM +1.5MM OFFSET 12/14 TAPR HIP MTL M-SPEC
Type: IMPLANTABLE DEVICE | Site: HIP | Status: NON-FUNCTIONAL
Removed: 2018-04-09

## 2018-02-26 RX ORDER — ACETAMINOPHEN 325 MG/1
650 TABLET ORAL EVERY 6 HOURS
Status: DISCONTINUED | OUTPATIENT
Start: 2018-02-27 | End: 2018-03-04 | Stop reason: HOSPADM

## 2018-02-26 RX ORDER — SODIUM CHLORIDE 0.9 % (FLUSH) 0.9 %
5-10 SYRINGE (ML) INJECTION EVERY 8 HOURS
Status: DISCONTINUED | OUTPATIENT
Start: 2018-02-27 | End: 2018-03-04 | Stop reason: HOSPADM

## 2018-02-26 RX ORDER — SODIUM CHLORIDE 9 MG/ML
50 INJECTION, SOLUTION INTRAVENOUS CONTINUOUS
Status: DISCONTINUED | OUTPATIENT
Start: 2018-02-26 | End: 2018-02-28

## 2018-02-26 RX ORDER — MIDAZOLAM HYDROCHLORIDE 1 MG/ML
0.5 INJECTION, SOLUTION INTRAMUSCULAR; INTRAVENOUS
Status: DISCONTINUED | OUTPATIENT
Start: 2018-02-26 | End: 2018-02-26 | Stop reason: HOSPADM

## 2018-02-26 RX ORDER — NEOSTIGMINE METHYLSULFATE 1 MG/ML
INJECTION INTRAVENOUS AS NEEDED
Status: DISCONTINUED | OUTPATIENT
Start: 2018-02-26 | End: 2018-02-26 | Stop reason: HOSPADM

## 2018-02-26 RX ORDER — VANCOMYCIN 2 GRAM/500 ML IN 0.9 % SODIUM CHLORIDE INTRAVENOUS
2000 EVERY 12 HOURS
Status: DISCONTINUED | OUTPATIENT
Start: 2018-02-27 | End: 2018-02-28

## 2018-02-26 RX ORDER — MIDAZOLAM HYDROCHLORIDE 1 MG/ML
1 INJECTION, SOLUTION INTRAMUSCULAR; INTRAVENOUS AS NEEDED
Status: DISCONTINUED | OUTPATIENT
Start: 2018-02-26 | End: 2018-02-26 | Stop reason: HOSPADM

## 2018-02-26 RX ORDER — SODIUM CHLORIDE 0.9 % (FLUSH) 0.9 %
5-10 SYRINGE (ML) INJECTION AS NEEDED
Status: DISCONTINUED | OUTPATIENT
Start: 2018-02-26 | End: 2018-02-26 | Stop reason: HOSPADM

## 2018-02-26 RX ORDER — ONDANSETRON 2 MG/ML
4 INJECTION INTRAMUSCULAR; INTRAVENOUS
Status: ACTIVE | OUTPATIENT
Start: 2018-02-26 | End: 2018-02-27

## 2018-02-26 RX ORDER — FENTANYL CITRATE 50 UG/ML
25 INJECTION, SOLUTION INTRAMUSCULAR; INTRAVENOUS
Status: DISCONTINUED | OUTPATIENT
Start: 2018-02-26 | End: 2018-02-26 | Stop reason: HOSPADM

## 2018-02-26 RX ORDER — MORPHINE SULFATE 2 MG/ML
2 INJECTION, SOLUTION INTRAMUSCULAR; INTRAVENOUS
Status: ACTIVE | OUTPATIENT
Start: 2018-02-26 | End: 2018-02-27

## 2018-02-26 RX ORDER — SODIUM CHLORIDE 9 MG/ML
25 INJECTION, SOLUTION INTRAVENOUS CONTINUOUS
Status: DISCONTINUED | OUTPATIENT
Start: 2018-02-26 | End: 2018-02-26 | Stop reason: HOSPADM

## 2018-02-26 RX ORDER — WARFARIN SODIUM 5 MG/1
10 TABLET ORAL ONCE
Status: COMPLETED | OUTPATIENT
Start: 2018-02-27 | End: 2018-02-27

## 2018-02-26 RX ORDER — FENTANYL CITRATE 50 UG/ML
INJECTION, SOLUTION INTRAMUSCULAR; INTRAVENOUS AS NEEDED
Status: DISCONTINUED | OUTPATIENT
Start: 2018-02-26 | End: 2018-02-26 | Stop reason: HOSPADM

## 2018-02-26 RX ORDER — ONDANSETRON 2 MG/ML
4 INJECTION INTRAMUSCULAR; INTRAVENOUS AS NEEDED
Status: DISCONTINUED | OUTPATIENT
Start: 2018-02-26 | End: 2018-02-26 | Stop reason: HOSPADM

## 2018-02-26 RX ORDER — VANCOMYCIN 2 GRAM/500 ML IN 0.9 % SODIUM CHLORIDE INTRAVENOUS
2000 ONCE
Status: COMPLETED | OUTPATIENT
Start: 2018-02-26 | End: 2018-02-27

## 2018-02-26 RX ORDER — SODIUM CHLORIDE 0.9 % (FLUSH) 0.9 %
5-10 SYRINGE (ML) INJECTION AS NEEDED
Status: DISCONTINUED | OUTPATIENT
Start: 2018-02-26 | End: 2018-03-04 | Stop reason: HOSPADM

## 2018-02-26 RX ORDER — SODIUM CHLORIDE 0.9 % (FLUSH) 0.9 %
10 SYRINGE (ML) INJECTION AS NEEDED
Status: DISCONTINUED | OUTPATIENT
Start: 2018-02-26 | End: 2018-02-26 | Stop reason: HOSPADM

## 2018-02-26 RX ORDER — FACIAL-BODY WIPES
10 EACH TOPICAL DAILY PRN
Status: DISCONTINUED | OUTPATIENT
Start: 2018-02-28 | End: 2018-03-04 | Stop reason: HOSPADM

## 2018-02-26 RX ORDER — GLYCOPYRROLATE 0.2 MG/ML
INJECTION INTRAMUSCULAR; INTRAVENOUS AS NEEDED
Status: DISCONTINUED | OUTPATIENT
Start: 2018-02-26 | End: 2018-02-26 | Stop reason: HOSPADM

## 2018-02-26 RX ORDER — LIDOCAINE HYDROCHLORIDE 10 MG/ML
0.1 INJECTION, SOLUTION EPIDURAL; INFILTRATION; INTRACAUDAL; PERINEURAL AS NEEDED
Status: DISCONTINUED | OUTPATIENT
Start: 2018-02-26 | End: 2018-02-26 | Stop reason: HOSPADM

## 2018-02-26 RX ORDER — ROCURONIUM BROMIDE 10 MG/ML
INJECTION, SOLUTION INTRAVENOUS AS NEEDED
Status: DISCONTINUED | OUTPATIENT
Start: 2018-02-26 | End: 2018-02-26 | Stop reason: HOSPADM

## 2018-02-26 RX ORDER — SUCCINYLCHOLINE CHLORIDE 20 MG/ML
INJECTION INTRAMUSCULAR; INTRAVENOUS AS NEEDED
Status: DISCONTINUED | OUTPATIENT
Start: 2018-02-26 | End: 2018-02-26

## 2018-02-26 RX ORDER — DIPHENHYDRAMINE HYDROCHLORIDE 50 MG/ML
12.5 INJECTION, SOLUTION INTRAMUSCULAR; INTRAVENOUS AS NEEDED
Status: DISCONTINUED | OUTPATIENT
Start: 2018-02-26 | End: 2018-02-26 | Stop reason: HOSPADM

## 2018-02-26 RX ORDER — SUCCINYLCHOLINE CHLORIDE 20 MG/ML
INJECTION INTRAMUSCULAR; INTRAVENOUS AS NEEDED
Status: DISCONTINUED | OUTPATIENT
Start: 2018-02-26 | End: 2018-02-26 | Stop reason: HOSPADM

## 2018-02-26 RX ORDER — HYDRALAZINE HYDROCHLORIDE 20 MG/ML
INJECTION INTRAMUSCULAR; INTRAVENOUS AS NEEDED
Status: DISCONTINUED | OUTPATIENT
Start: 2018-02-26 | End: 2018-02-26 | Stop reason: HOSPADM

## 2018-02-26 RX ORDER — HYDROMORPHONE HYDROCHLORIDE 1 MG/ML
0.2 INJECTION, SOLUTION INTRAMUSCULAR; INTRAVENOUS; SUBCUTANEOUS
Status: DISCONTINUED | OUTPATIENT
Start: 2018-02-26 | End: 2018-02-26 | Stop reason: HOSPADM

## 2018-02-26 RX ORDER — MIDAZOLAM HYDROCHLORIDE 1 MG/ML
.5-1 INJECTION, SOLUTION INTRAMUSCULAR; INTRAVENOUS
Status: DISCONTINUED | OUTPATIENT
Start: 2018-02-26 | End: 2018-02-26 | Stop reason: HOSPADM

## 2018-02-26 RX ORDER — ONDANSETRON 2 MG/ML
INJECTION INTRAMUSCULAR; INTRAVENOUS AS NEEDED
Status: DISCONTINUED | OUTPATIENT
Start: 2018-02-26 | End: 2018-02-26 | Stop reason: HOSPADM

## 2018-02-26 RX ORDER — NALOXONE HYDROCHLORIDE 0.4 MG/ML
0.4 INJECTION, SOLUTION INTRAMUSCULAR; INTRAVENOUS; SUBCUTANEOUS AS NEEDED
Status: DISCONTINUED | OUTPATIENT
Start: 2018-02-26 | End: 2018-03-04 | Stop reason: HOSPADM

## 2018-02-26 RX ORDER — HYDROMORPHONE HYDROCHLORIDE 2 MG/ML
INJECTION, SOLUTION INTRAMUSCULAR; INTRAVENOUS; SUBCUTANEOUS AS NEEDED
Status: DISCONTINUED | OUTPATIENT
Start: 2018-02-26 | End: 2018-02-26 | Stop reason: HOSPADM

## 2018-02-26 RX ORDER — SODIUM CHLORIDE, SODIUM LACTATE, POTASSIUM CHLORIDE, CALCIUM CHLORIDE 600; 310; 30; 20 MG/100ML; MG/100ML; MG/100ML; MG/100ML
125 INJECTION, SOLUTION INTRAVENOUS CONTINUOUS
Status: DISCONTINUED | OUTPATIENT
Start: 2018-02-26 | End: 2018-02-26 | Stop reason: HOSPADM

## 2018-02-26 RX ORDER — POTASSIUM CHLORIDE 14.9 MG/ML
10 INJECTION INTRAVENOUS
Status: DISPENSED | OUTPATIENT
Start: 2018-02-26 | End: 2018-02-26

## 2018-02-26 RX ORDER — AMOXICILLIN 250 MG
1 CAPSULE ORAL 2 TIMES DAILY
Status: DISCONTINUED | OUTPATIENT
Start: 2018-02-27 | End: 2018-03-04 | Stop reason: HOSPADM

## 2018-02-26 RX ORDER — POLYETHYLENE GLYCOL 3350 17 G/17G
17 POWDER, FOR SOLUTION ORAL DAILY
Status: DISCONTINUED | OUTPATIENT
Start: 2018-02-27 | End: 2018-03-04 | Stop reason: HOSPADM

## 2018-02-26 RX ORDER — PROPOFOL 10 MG/ML
INJECTION, EMULSION INTRAVENOUS AS NEEDED
Status: DISCONTINUED | OUTPATIENT
Start: 2018-02-26 | End: 2018-02-26 | Stop reason: HOSPADM

## 2018-02-26 RX ORDER — ATROPINE SULFATE 0.1 MG/ML
1 INJECTION INTRAVENOUS AS NEEDED
Status: DISCONTINUED | OUTPATIENT
Start: 2018-02-26 | End: 2018-02-26 | Stop reason: HOSPADM

## 2018-02-26 RX ORDER — SODIUM CHLORIDE 0.9 % (FLUSH) 0.9 %
5-10 SYRINGE (ML) INJECTION EVERY 8 HOURS
Status: DISCONTINUED | OUTPATIENT
Start: 2018-02-26 | End: 2018-02-26 | Stop reason: HOSPADM

## 2018-02-26 RX ORDER — SODIUM CHLORIDE, SODIUM LACTATE, POTASSIUM CHLORIDE, CALCIUM CHLORIDE 600; 310; 30; 20 MG/100ML; MG/100ML; MG/100ML; MG/100ML
75 INJECTION, SOLUTION INTRAVENOUS CONTINUOUS
Status: DISCONTINUED | OUTPATIENT
Start: 2018-02-26 | End: 2018-02-26 | Stop reason: HOSPADM

## 2018-02-26 RX ORDER — MORPHINE SULFATE 10 MG/ML
2 INJECTION, SOLUTION INTRAMUSCULAR; INTRAVENOUS
Status: DISCONTINUED | OUTPATIENT
Start: 2018-02-26 | End: 2018-02-26 | Stop reason: HOSPADM

## 2018-02-26 RX ORDER — FAMOTIDINE 20 MG/1
20 TABLET, FILM COATED ORAL 2 TIMES DAILY
Status: DISCONTINUED | OUTPATIENT
Start: 2018-02-27 | End: 2018-03-04 | Stop reason: HOSPADM

## 2018-02-26 RX ORDER — OXYCODONE HYDROCHLORIDE 5 MG/1
5 TABLET ORAL
Status: DISCONTINUED | OUTPATIENT
Start: 2018-02-26 | End: 2018-03-04 | Stop reason: HOSPADM

## 2018-02-26 RX ORDER — FENTANYL CITRATE 50 UG/ML
25-200 INJECTION, SOLUTION INTRAMUSCULAR; INTRAVENOUS
Status: DISCONTINUED | OUTPATIENT
Start: 2018-02-26 | End: 2018-02-26 | Stop reason: HOSPADM

## 2018-02-26 RX ORDER — ROPIVACAINE HYDROCHLORIDE 5 MG/ML
30 INJECTION, SOLUTION EPIDURAL; INFILTRATION; PERINEURAL ONCE
Status: DISCONTINUED | OUTPATIENT
Start: 2018-02-26 | End: 2018-02-26 | Stop reason: HOSPADM

## 2018-02-26 RX ORDER — LIDOCAINE HYDROCHLORIDE 20 MG/ML
INJECTION, SOLUTION EPIDURAL; INFILTRATION; INTRACAUDAL; PERINEURAL AS NEEDED
Status: DISCONTINUED | OUTPATIENT
Start: 2018-02-26 | End: 2018-02-26 | Stop reason: HOSPADM

## 2018-02-26 RX ORDER — SODIUM CHLORIDE 9 MG/ML
250 INJECTION, SOLUTION INTRAVENOUS AS NEEDED
Status: DISCONTINUED | OUTPATIENT
Start: 2018-02-26 | End: 2018-03-04 | Stop reason: HOSPADM

## 2018-02-26 RX ORDER — HYDROXYZINE HYDROCHLORIDE 10 MG/1
10 TABLET, FILM COATED ORAL
Status: DISCONTINUED | OUTPATIENT
Start: 2018-02-26 | End: 2018-03-04 | Stop reason: HOSPADM

## 2018-02-26 RX ORDER — FENTANYL CITRATE 50 UG/ML
50 INJECTION, SOLUTION INTRAMUSCULAR; INTRAVENOUS AS NEEDED
Status: DISCONTINUED | OUTPATIENT
Start: 2018-02-26 | End: 2018-02-26 | Stop reason: HOSPADM

## 2018-02-26 RX ORDER — POTASSIUM CHLORIDE 14.9 MG/ML
10 INJECTION INTRAVENOUS
Status: COMPLETED | OUTPATIENT
Start: 2018-02-26 | End: 2018-02-26

## 2018-02-26 RX ORDER — OXYCODONE HYDROCHLORIDE 5 MG/1
7.5-1 TABLET ORAL
Status: DISCONTINUED | OUTPATIENT
Start: 2018-02-26 | End: 2018-03-04 | Stop reason: HOSPADM

## 2018-02-26 RX ORDER — MIDAZOLAM HYDROCHLORIDE 1 MG/ML
INJECTION, SOLUTION INTRAMUSCULAR; INTRAVENOUS AS NEEDED
Status: DISCONTINUED | OUTPATIENT
Start: 2018-02-26 | End: 2018-02-26 | Stop reason: HOSPADM

## 2018-02-26 RX ORDER — DEXAMETHASONE SODIUM PHOSPHATE 4 MG/ML
4 INJECTION, SOLUTION INTRA-ARTICULAR; INTRALESIONAL; INTRAMUSCULAR; INTRAVENOUS; SOFT TISSUE
Status: DISCONTINUED | OUTPATIENT
Start: 2018-02-26 | End: 2018-03-03

## 2018-02-26 RX ADMIN — MIDAZOLAM HYDROCHLORIDE 2 MG: 1 INJECTION, SOLUTION INTRAMUSCULAR; INTRAVENOUS at 18:53

## 2018-02-26 RX ADMIN — HYDROMORPHONE HYDROCHLORIDE 0.5 MG: 2 INJECTION, SOLUTION INTRAMUSCULAR; INTRAVENOUS; SUBCUTANEOUS at 20:57

## 2018-02-26 RX ADMIN — SODIUM CHLORIDE 125 ML/HR: 900 INJECTION, SOLUTION INTRAVENOUS at 21:52

## 2018-02-26 RX ADMIN — INSULIN GLARGINE 22 UNITS: 100 INJECTION, SOLUTION SUBCUTANEOUS at 22:58

## 2018-02-26 RX ADMIN — PROPOFOL 150 MG: 10 INJECTION, EMULSION INTRAVENOUS at 18:59

## 2018-02-26 RX ADMIN — ROCURONIUM BROMIDE 20 MG: 10 INJECTION, SOLUTION INTRAVENOUS at 19:21

## 2018-02-26 RX ADMIN — MIDAZOLAM HYDROCHLORIDE 2 MG: 1 INJECTION, SOLUTION INTRAMUSCULAR; INTRAVENOUS at 09:45

## 2018-02-26 RX ADMIN — FENTANYL CITRATE 50 MCG: 50 INJECTION, SOLUTION INTRAMUSCULAR; INTRAVENOUS at 09:45

## 2018-02-26 RX ADMIN — ROCURONIUM BROMIDE 25 MG: 10 INJECTION, SOLUTION INTRAVENOUS at 19:07

## 2018-02-26 RX ADMIN — FENTANYL CITRATE 50 MCG: 50 INJECTION, SOLUTION INTRAMUSCULAR; INTRAVENOUS at 18:59

## 2018-02-26 RX ADMIN — LOVASTATIN 20 MG: 20 TABLET ORAL at 22:59

## 2018-02-26 RX ADMIN — GABAPENTIN 600 MG: 300 CAPSULE ORAL at 22:58

## 2018-02-26 RX ADMIN — POTASSIUM CHLORIDE 10 MEQ: 200 INJECTION, SOLUTION INTRAVENOUS at 13:01

## 2018-02-26 RX ADMIN — Medication 2 G: at 02:35

## 2018-02-26 RX ADMIN — NEOSTIGMINE METHYLSULFATE 3 MG: 1 INJECTION INTRAVENOUS at 20:51

## 2018-02-26 RX ADMIN — FENTANYL CITRATE 25 MCG: 50 INJECTION, SOLUTION INTRAMUSCULAR; INTRAVENOUS at 21:52

## 2018-02-26 RX ADMIN — FENTANYL CITRATE 50 MCG: 50 INJECTION, SOLUTION INTRAMUSCULAR; INTRAVENOUS at 18:53

## 2018-02-26 RX ADMIN — FENTANYL CITRATE 50 MCG: 50 INJECTION, SOLUTION INTRAMUSCULAR; INTRAVENOUS at 19:53

## 2018-02-26 RX ADMIN — HYDROMORPHONE HYDROCHLORIDE 0.5 MG: 2 INJECTION, SOLUTION INTRAMUSCULAR; INTRAVENOUS; SUBCUTANEOUS at 19:26

## 2018-02-26 RX ADMIN — SODIUM CHLORIDE, SODIUM LACTATE, POTASSIUM CHLORIDE, AND CALCIUM CHLORIDE: 600; 310; 30; 20 INJECTION, SOLUTION INTRAVENOUS at 20:15

## 2018-02-26 RX ADMIN — HYDROMORPHONE HYDROCHLORIDE 0.5 MG: 2 INJECTION, SOLUTION INTRAMUSCULAR; INTRAVENOUS; SUBCUTANEOUS at 20:49

## 2018-02-26 RX ADMIN — ROCURONIUM BROMIDE 10 MG: 10 INJECTION, SOLUTION INTRAVENOUS at 19:51

## 2018-02-26 RX ADMIN — POTASSIUM CHLORIDE 10 MEQ: 200 INJECTION, SOLUTION INTRAVENOUS at 14:27

## 2018-02-26 RX ADMIN — HYDRALAZINE HYDROCHLORIDE 10 MG: 20 INJECTION INTRAMUSCULAR; INTRAVENOUS at 21:07

## 2018-02-26 RX ADMIN — ONDANSETRON 8 MG: 2 INJECTION INTRAMUSCULAR; INTRAVENOUS at 20:51

## 2018-02-26 RX ADMIN — INSULIN GLARGINE 12 UNITS: 100 INJECTION, SOLUTION SUBCUTANEOUS at 11:26

## 2018-02-26 RX ADMIN — GLYCOPYRROLATE 0.5 MG: 0.2 INJECTION INTRAMUSCULAR; INTRAVENOUS at 20:51

## 2018-02-26 RX ADMIN — Medication 2 G: at 11:25

## 2018-02-26 RX ADMIN — SODIUM CHLORIDE 75 ML/HR: 900 INJECTION, SOLUTION INTRAVENOUS at 11:27

## 2018-02-26 RX ADMIN — FENTANYL CITRATE 50 MCG: 50 INJECTION, SOLUTION INTRAMUSCULAR; INTRAVENOUS at 19:25

## 2018-02-26 RX ADMIN — HYDROMORPHONE HYDROCHLORIDE 0.2 MG: 1 INJECTION, SOLUTION INTRAMUSCULAR; INTRAVENOUS; SUBCUTANEOUS at 21:45

## 2018-02-26 RX ADMIN — LIDOCAINE HYDROCHLORIDE 100 MG: 20 INJECTION, SOLUTION EPIDURAL; INFILTRATION; INTRACAUDAL; PERINEURAL at 18:59

## 2018-02-26 RX ADMIN — SUCCINYLCHOLINE CHLORIDE 140 MG: 20 INJECTION INTRAMUSCULAR; INTRAVENOUS at 18:59

## 2018-02-26 RX ADMIN — SODIUM CHLORIDE, SODIUM LACTATE, POTASSIUM CHLORIDE, AND CALCIUM CHLORIDE 125 ML/HR: 600; 310; 30; 20 INJECTION, SOLUTION INTRAVENOUS at 17:30

## 2018-02-26 RX ADMIN — HYDROMORPHONE HYDROCHLORIDE 0.2 MG: 1 INJECTION, SOLUTION INTRAMUSCULAR; INTRAVENOUS; SUBCUTANEOUS at 22:00

## 2018-02-26 RX ADMIN — FENTANYL CITRATE 25 MCG: 50 INJECTION, SOLUTION INTRAMUSCULAR; INTRAVENOUS at 21:40

## 2018-02-26 RX ADMIN — PRIMIDONE 250 MG: 250 TABLET ORAL at 23:01

## 2018-02-26 RX ADMIN — POTASSIUM CHLORIDE 10 MEQ: 200 INJECTION, SOLUTION INTRAVENOUS at 15:24

## 2018-02-26 RX ADMIN — HYDROMORPHONE HYDROCHLORIDE 0.5 MG: 2 INJECTION, SOLUTION INTRAMUSCULAR; INTRAVENOUS; SUBCUTANEOUS at 19:34

## 2018-02-26 RX ADMIN — ROCURONIUM BROMIDE 5 MG: 10 INJECTION, SOLUTION INTRAVENOUS at 18:59

## 2018-02-26 RX ADMIN — POTASSIUM CHLORIDE 10 MEQ: 200 INJECTION, SOLUTION INTRAVENOUS at 11:29

## 2018-02-26 RX ADMIN — ROCURONIUM BROMIDE 20 MG: 10 INJECTION, SOLUTION INTRAVENOUS at 20:15

## 2018-02-26 RX ADMIN — FENTANYL CITRATE 50 MCG: 50 INJECTION, SOLUTION INTRAMUSCULAR; INTRAVENOUS at 19:34

## 2018-02-26 RX ADMIN — HUMAN INSULIN 2 UNITS: 100 INJECTION, SOLUTION SUBCUTANEOUS at 13:00

## 2018-02-26 RX ADMIN — VANCOMYCIN HYDROCHLORIDE 2000 MG: 10 INJECTION, POWDER, LYOPHILIZED, FOR SOLUTION INTRAVENOUS at 22:57

## 2018-02-26 RX ADMIN — PROPOFOL 30 MG: 10 INJECTION, EMULSION INTRAVENOUS at 19:51

## 2018-02-26 NOTE — PROGRESS NOTES
TRANSFER - IN REPORT:    Verbal report received from Farhana(name) on Lyle Das  being received from Cath lab recovery (unit) for routine post - op      Report consisted of patients Situation, Background, Assessment and   Recommendations(SBAR). Information from the following report(s) SBAR was reviewed with the receiving nurse. Opportunity for questions and clarification was provided. Assessment completed upon patients arrival to unit and care assumed.

## 2018-02-26 NOTE — PROGRESS NOTES
Verbal report given to Maddi(name) on NAME  being transferred to (unit) for routine progression of care       Report consisted of patients Situation, Background, Assessment and   Recommendations(SBAR). Information from the following report(s) Procedure Summary, MAR and Recent Results was reviewed with the receiving nurse. Lines:       Opportunity for questions and clarification was provided.       Patient transported with:   Tarari

## 2018-02-26 NOTE — PROGRESS NOTES
Radha Gil MD   Phone/text: [phone] (7am to 7pm)  After 7pm please call  for hospitalist on call    Hospitalist Progress Note     2/26/2018   PCP:  Dr. Aretha Pires MD  Chief Complaint   Patient presents with    Referral Request    Hip Pain     bilateral     Fever       Admission Summary:   The patient is a 56-year-old male with a history of diabetes, hypertension, and obstructive sleep apnea. As per the patient, about 2 weeks back on 02/11 had fallen and hit his right hip. The patient has a history of right total hip and a left total hip arthroplasty done. Two days later had noted had started spiking fevers and chills with night sweats. Reason For Visit:  Septic arthritis    Assessment/Plan   Sepsis with staph bateremia (POA) - due to septic arthritis  - Bcx 2/19 with staph lugdunensis, repeat 2/24, 2/26 no growth  - Echo 2/24 with LVEF 55-60%, aortic sclerosis  - EULALIO 2/26 with LVEF 55-60%, tiny PFO, 2mm vegetation on anterior leaflet  - Continue IVF  - Continue cefazolin  - ID consulted    Septic arthritis of the right hip (POA)  - Xray right hip 2/22 without acute abnormality   - Joint cx 2/24 with staph lugdunensis  - Ortho consulted - plan for washout and hardware removal today    Hyponatremia (POA) - due to dehydration, resolved with IVF    DM2, uncontrolled (chronic) - recent A1c 9.0   - Hold metformin, jardiance, continue home [facility] [place]  - Increase lantus back to home dose after surgery, 1/2 dose today as he will be NPO  - SSI as needed    HTN (chronic) - continue home lisinopril    HLD (chronic) - continue home lovastatin    Depression (chronic) - continue home sertraline    BPH (chronic) - continue home flomax    Chronic pain (chronic) - continue primidone, gabapentin    Morbid Obesity (chronic) - Body mass index is 39.42 kg/(m^2). with DM and osteoarthritis    See orders for other plans.   VTE prophylaxis: enoxaparin  Discussed plan of care with Patient/Family and Nurse   Pre-admission lived at home  Discharge plan: SNF with IV antibiotics  Estimated time to discharge: unclear     Subjective   Mr. Cristina Kou is doing well. No fever. Pain is controlled. Ready for surgery. Reviewed interval history  Physical examination     Visit Vitals    BP (!) 136/39 (BP 1 Location: Right arm, BP Patient Position: At rest)    Pulse 86    Temp 99 °F (37.2 °C)    Resp 18    Ht 5' 10.5\" (1.791 m)    Wt 126.4 kg (278 lb 10.6 oz)    SpO2 96%    BMI 39.42 kg/m2       Temp (24hrs), Av.3 °F (37.4 °C), Min:99 °F (37.2 °C), Max:99.8 °F (37.7 °C)      Intake/Output Summary (Last 24 hours) at 18 1612  Last data filed at 18 1527   Gross per 24 hour   Intake                0 ml   Output             1775 ml   Net            -1775 ml       Gen - NAD  HEENT - MMM  Neck - supple, full ROM  CV - RRR, 2/6 systolic mumur at apex and RUSB  Resp - lungs CTA b/l, no wheezing, normal WOB  GI - abdomen S, NT, ND, +BS. No hepatosplenomegaly   - no CVA tenderness, bladder non-palpable in lower abdomen  MSK - right hip ttp  Neuro - A&O, no focal deficits  Psych - calm and cooperative with normal affect    Data Review       Telemetry x   ECG    Xray    CT scan    MRI    Echocardiogram    Ultrasound              I have reviewed the flow sheet and recent notes  New labs and data personally reviewed.     Recent Labs      18   0252  18   1212   WBC  8.3  14.1*   HGB  9.5*  10.5*   HCT  28.7*  31.4*   PLT  236  219     Recent Labs      18   0252  18   0346  18   1540  18   1212   NA  135*  136   --   127*   K  3.4*  3.5   --   4.0   CL  104  104   --   95*   CO2  22  22   --   24   GLU  131*  144*   --   194*   BUN  14  17   --   22*   CREA  0.82  0.86   --   0.98   CA  7.9*  8.5   --   8.2*   ALB   --   2.1*   --    --    TBILI   --   0.6   --    --    SGOT   --   36   --    --    ALT   --   19   --    --    INR   --    --   1.4*   --        Medications reviewed  Current Facility-Administered Medications   Medication Dose Route Frequency    potassium chloride 10 mEq in 50 ml IVPB  10 mEq IntraVENous Q1H    insulin glargine (LANTUS) injection 22 Units  22 Units SubCUTAneous QHS    [START ON 2/28/2018] insulin glargine (LANTUS) injection 22 Units  22 Units SubCUTAneous DAILY    0.9% sodium chloride infusion  75 mL/hr IntraVENous CONTINUOUS    enoxaparin (LOVENOX) injection 40 mg  40 mg SubCUTAneous Q24H    tamsulosin (FLOMAX) capsule 0.4 mg  0.4 mg Oral DAILY    SITagliptin (JANUVIA) tablet 100 mg  100 mg Oral DAILY    primidone (MYSOLINE) tablet 250 mg  250 mg Oral BID    lovastatin (MEVACOR) tablet 20 mg  20 mg Oral QHS    folic acid (FOLVITE) tablet 1 mg  1 mg Oral DAILY    lisinopril (PRINIVIL, ZESTRIL) tablet 20 mg  20 mg Oral DAILY    sertraline (ZOLOFT) tablet 100 mg  100 mg Oral DAILY    gabapentin (NEURONTIN) capsule 600 mg  600 mg Oral QHS    aspirin chewable tablet 81 mg  81 mg Oral DAILY    clonazePAM (KlonoPIN) tablet 0.5 mg  0.5 mg Oral QHS PRN    insulin regular (NOVOLIN R, HUMULIN R) injection   SubCUTAneous AC&HS    glucose chewable tablet 16 g  4 Tab Oral PRN    dextrose (D50W) injection syrg 12.5-25 g  12.5-25 g IntraVENous PRN    glucagon (GLUCAGEN) injection 1 mg  1 mg IntraMUSCular PRN    ceFAZolin (ANCEF) 2 g/20 mL in sterile water IV syringe  2 g IntraVENous Q8H    oxyCODONE IR (ROXICODONE) tablet 5 mg  5 mg Oral Q6H PRN    acetaminophen (TYLENOL) tablet 650 mg  650 mg Oral Q6H PRN         Daniela Infante MD  Internal Medicine  2/26/2018

## 2018-02-26 NOTE — ANESTHESIA PREPROCEDURE EVALUATION
Anesthetic History   No history of anesthetic complications            Review of Systems / Medical History  Patient summary reviewed, nursing notes reviewed and pertinent labs reviewed    Pulmonary  Within defined limits                 Neuro/Psych   Within defined limits           Cardiovascular    Hypertension: well controlled                   GI/Hepatic/Renal     GERD: well controlled      PUD     Endo/Other    Diabetes: well controlled, type 2    Morbid obesity     Other Findings            Physical Exam    Airway  Mallampati: II  TM Distance: > 6 cm  Neck ROM: normal range of motion   Mouth opening: Normal     Cardiovascular  Regular rate and rhythm,  S1 and S2 normal,  no murmur, click, rub, or gallop             Dental  No notable dental hx       Pulmonary  Breath sounds clear to auscultation               Abdominal  GI exam deferred       Other Findings            Anesthetic Plan    ASA: 3  Anesthesia type: general          Induction: Intravenous  Anesthetic plan and risks discussed with: Patient

## 2018-02-26 NOTE — PROGRESS NOTES
TRANSFER - IN REPORT:    Verbal report received from [facility] on Macel Eric  being received from procedural area for routine progression of care. Report consisted of patients Situation, Background, Assessment and Recommendations(SBAR). Information from the following report(s) Procedure Summary, MAR and Recent Results was reviewed with the receiving clinician. Opportunity for questions and clarification was provided. Assessment completed upon patients arrival to [address] [place] [address] and care assumed. Cardiac Cath Lab Recovery Arrival Note:    Marylu Das arrived to [facility] recovery area. Patient procedure= EULALIO. Patient on cardiac monitor, non-invasive blood pressure, SPO2 monitor. On  or O2 @ 2 lpm via NC.  IV  of NS on pump at 25 ml/hr. Patient status doing well without problems. Patient is A&Ox 3. Patient reports no c/o's.

## 2018-02-26 NOTE — PROGRESS NOTES
NUTRITION       Nutrition screening referral was triggered based on results obtained during nursing admission assessment. The patient's chart was reviewed and nutrition assessment is not indicated at this time. Per EHR pt has not lost weight recently. Plan to see patient for rescreen as indicated. Thank you.      Wt Readings from Last 10 Encounters:   02/25/18 126.4 kg (278 lb 10.6 oz)   02/22/18 115.2 kg (254 lb)   02/19/18 121.3 kg (267 lb 6.7 oz)   01/16/18 118.4 kg (261 lb)   09/18/17 118.9 kg (262 lb 3.2 oz)   07/26/17 122.3 kg (269 lb 9.6 oz)   06/19/17 117.9 kg (260 lb)   03/22/17 117.9 kg (260 lb)   03/20/17 118.5 kg (261 lb 3.2 oz)   03/09/17 117.9 kg (260 lb)   }  Sue Coreas RD

## 2018-02-26 NOTE — PROGRESS NOTES
Cardiac Cath Lab Procedure Area Arrival Note:    Fercho Arevalo arrived to Cardiac Cath Lab, Procedure Area. Patient identifiers verified with NAME and DATE OF BIRTH. Procedure verified with patient. Consent forms verified. Allergies verified. Patient informed of procedure and plan of care. Questions answered with review. Patient voiced understanding of procedure and plan of care. Patient on cardiac monitor, non-invasive blood pressure, SPO2 monitor. On   or O2 @ 2 lpm via NC.  IV of NS on pump at 25 ml/hr. Patient status doing well with some problems : hip pain. Patient is A&Ox 4. Patient reports 7/10. Patient medicated during procedure with orders obtained and verified by Dr. Liset Wall. Refer to patients Cardiac Cath Lab PROCEDURE REPORT for vital signs, assessment, status, and response during procedure, printed at end of case. Printed report on chart or scanned into chart. [id number]:  TRANSFER - OUT REPORT:    Verbal report given to Charla Adamson RN on Fercho Arevalo being transferred to  for routine progression of care       Report consisted of patients Situation, Background, Assessment and   Recommendations(SBAR). Information from the following report(s) Procedure Summary and MAR was reviewed with the receiving nurse. Opportunity for questions and clarification was provided.

## 2018-02-26 NOTE — ROUTINE PROCESS
TRANSFER - IN REPORT:    Verbal report received from [facility] RN(name) on Marina Lema  being received from [address] [place] [address](unit) for ordered procedure      Report consisted of patients Situation, Background, Assessment and   Recommendations(SBAR). Information from the following report(s) SBAR, Kardex, ED Summary, Procedure Summary, Intake/Output, MAR, Recent Results and Cardiac Rhythm SR was reviewed with the receiving nurse. Opportunity for questions and clarification was provided. Assessment completed upon patients arrival to unit and care assumed.

## 2018-02-26 NOTE — PROGRESS NOTES
Bedside and Verbal shift change report given to MARK Lazcano (oncoming nurse) by Rocio García RN (offgoing nurse). Report included the following information SBAR.

## 2018-02-26 NOTE — PROGRESS NOTES
Orthopedic Joint Progress Note    2018  Admit Date: 2018  Admit Diagnosis: FUO (fever of unknown origin)  Bacteremia  status post right hip arthroplasty    Post Op day: * No surgery date entered *    Subjective:     Ralph Das states that his right hip remains painful but no worsening. Has been down for EULALIO this morning with finding of mild MV vegetation with recommendations to treat like endocarditis. ID following. Posted for Right hip washout and poly exchange later today. NPO at this time  Denies N/V/SOB or CP      Objective:     PT/OT:     PATIENT MOBILITY                           Vital Signs:    Blood pressure (!) 136/39, pulse 86, temperature 99 °F (37.2 °C), resp. rate 18, height 5' 10.5\" (1.791 m), weight 126.4 kg (278 lb 10.6 oz), SpO2 96 %.   Temp (24hrs), Av.4 °F (37.4 °C), Min:99 °F (37.2 °C), Max:99.8 °F (37.7 °C)      Pain Control:   Pain Assessment  Pain Scale 1: Numeric (0 - 10)  Pain Intensity 1: 0  Pain Onset 1: chronic  Pain Location 1: Hip  Pain Orientation 1: Right  Pain Description 1: Aching  Pain Intervention(s) 1: Medication (see MAR)    Meds:  Current Facility-Administered Medications   Medication Dose Route Frequency    insulin glargine (LANTUS) injection 22 Units  22 Units SubCUTAneous QHS    [START ON 2018] insulin glargine (LANTUS) injection 22 Units  22 Units SubCUTAneous DAILY    0.9% sodium chloride infusion  75 mL/hr IntraVENous CONTINUOUS    enoxaparin (LOVENOX) injection 40 mg  40 mg SubCUTAneous Q24H    tamsulosin (FLOMAX) capsule 0.4 mg  0.4 mg Oral DAILY    SITagliptin (JANUVIA) tablet 100 mg  100 mg Oral DAILY    primidone (MYSOLINE) tablet 250 mg  250 mg Oral BID    lovastatin (MEVACOR) tablet 20 mg  20 mg Oral QHS    folic acid (FOLVITE) tablet 1 mg  1 mg Oral DAILY    lisinopril (PRINIVIL, ZESTRIL) tablet 20 mg  20 mg Oral DAILY    sertraline (ZOLOFT) tablet 100 mg  100 mg Oral DAILY    gabapentin (NEURONTIN) capsule 600 mg  600 mg Oral QHS    aspirin chewable tablet 81 mg  81 mg Oral DAILY    clonazePAM (KlonoPIN) tablet 0.5 mg  0.5 mg Oral QHS PRN    insulin regular (NOVOLIN R, HUMULIN R) injection   SubCUTAneous AC&HS    glucose chewable tablet 16 g  4 Tab Oral PRN    dextrose (D50W) injection syrg 12.5-25 g  12.5-25 g IntraVENous PRN    glucagon (GLUCAGEN) injection 1 mg  1 mg IntraMUSCular PRN    ceFAZolin (ANCEF) 2 g/20 mL in sterile water IV syringe  2 g IntraVENous Q8H    oxyCODONE IR (ROXICODONE) tablet 5 mg  5 mg Oral Q6H PRN    acetaminophen (TYLENOL) tablet 650 mg  650 mg Oral Q6H PRN        LAB:    Lab Results   Component Value Date/Time    INR 1.4 (H) 08/29/2016 06:01 AM    INR 1.7 (H) 08/28/2016 05:01 AM    INR 1.2 (H) 08/27/2016 02:59 AM    INR (POC) 1.4 (H) 02/24/2018 03:40 PM    INR (POC) 1.2 (H) 09/15/2016 03:32 PM    INR POC 22.1 09/29/2016 02:00 PM    INR POC 1.6 09/22/2016 10:10 AM    INR POC 1.5 09/19/2016 10:33 AM     Lab Results   Component Value Date/Time    HGB 9.5 (L) 02/26/2018 02:52 AM    HGB 10.5 (L) 02/24/2018 12:12 PM    HGB 11.0 (L) 02/19/2018 07:56 PM         Dressing:      Wound:       Physical Exam:    Neurovascular checks within normal limits  Orientation:  Oriented    Assessment:      Active Problems:    Bacteremia (2/24/2018)      FUO (fever of unknown origin) (2/24/2018)         Plan:     Remain NPO  Cont IV Abx per ID team  Bed rest for now  Consent for Washout and poly exchange signed and on chart  To OR later this afternoon    Discharge To:  pending       Signed By: GENEVIEVE Gallegos

## 2018-02-26 NOTE — PROGRESS NOTES
Problem: Falls - Risk of  Goal: *Absence of Falls  Document Dameon Fall Risk and appropriate interventions in the flowsheet. Outcome: Progressing Towards Goal  Fall Risk Interventions:  Mobility Interventions: Patient to call before getting OOB         Medication Interventions: Patient to call before getting OOB    Elimination Interventions: Patient to call for help with toileting needs, Call light in reach    History of Falls Interventions:  Investigate reason for fall, Door open when patient unattended

## 2018-02-26 NOTE — PROGRESS NOTES
Care Management Interventions  PCP Verified by CM: Yes (Dr. Mena Hicks)  Palliative Care Criteria Met (RRAT>21 & CHF Dx)?: No  Mode of Transport at Discharge: Other (see comment) (TBD)  Transition of Care Consult (CM Consult): Discharge Planning  MyChart Signup: No  Discharge Durable Medical Equipment: No  Physical Therapy Consult: No (? post op)  Occupational Therapy Consult: No (? post op)  Speech Therapy Consult: No  Current Support Network: Own Home (past provider [facility])  Confirm Follow Up Transport: Family  Plan discussed with Pt/Family/Caregiver: Yes  Freedom of Choice Offered: Yes  Discharge Location  Discharge Placement:  (TBD/IV abx will be needed)    Chart reviewed. The patient is tentatively scheduled for an I & D of the right hip today. He is post EULALIO which revealed MV vegetation. ID noted noted from Dr. Licona Mooring that the patient will need IVabx post surgery for 6 weeks. CRM will follow post op for discharge planning.  DENNIS

## 2018-02-26 NOTE — PROGRESS NOTES
Bedside and Verbal shift change report given to Vonda Peñaloza RN (oncoming nurse) by Vivian Hughes RN (offgoing nurse). Report included the following information SBAR, Kardex, Intake/Output, MAR, Accordion and Recent Results.

## 2018-02-26 NOTE — PROCEDURES
Cardiac Catheterization Procedure Note   Patient: Marina Lema  MRN: [id number]  SSN: [id number]   YOB: 1950 Age: 69 y.o. Sex: male    Date of Procedure: 2/26/2018   Pre-procedure Diagnosis: Infection  Post-procedure Diagnosis: Mitral Valve Disorder  Procedure: EULALIO  :  Dr. Yasmin Dao MD    Assistant(s):  None  Anesthesia: Moderate Sedation   Estimated Blood Loss: Less than 10 mL   Specimens Removed: None  Findings: Possible tiny MV vegetation. Recommend treat like endocarditis. Pt febrile during procedure.    Complications: None   Implants:  None  Signed by:  Yasmin Dao MD  2/26/2018  11:05 AM

## 2018-02-26 NOTE — PROGRESS NOTES
[facility] Cardiology Progress Note    2/26/2018 11:01 AM  Admit Date: 2/24/2018  Admit Diagnosis: FUO (fever of unknown origin); Bacteremia;status post right *    Assessment/Plan     Active Problems:    Bacteremia (2/24/2018)      FUO (fever of unknown origin) (2/24/2018)    BCx positive and asked to do EULALIO for bacteremia. Today still febrile. EULALIO 4 mos ago, no complications  No chest pain, no dyspnea, no difficulty swallowing  Concern for prosthetic hip infection, staph lugdunensis    Echo 9-23-15 =65%, mild LAE  EULALIO 3-22-17= tiny PPFO mild TR and mild PA HTN  Nuke 9-26-17 EF 56% no ischemia  Subjective:     Lyle Das denies chest pain, chest pressure/discomfort, dyspnea, palpitations, irregular heart beats, near-syncope.     Visit Vitals    BP (!) 136/39 (BP 1 Location: Right arm, BP Patient Position: At rest)    Pulse 86    Temp 99 °F (37.2 °C)    Resp 18    Ht 5' 10.5\" (1.791 m)    Wt 278 lb 10.6 oz (126.4 kg)    SpO2 96%    BMI 39.42 kg/m2     Current Facility-Administered Medications   Medication Dose Route Frequency    potassium chloride 10 mEq in 50 ml IVPB  10 mEq IntraVENous Q1H    atropine injection 1 mg  1 mg IntraVENous PRN    fentaNYL citrate (PF) injection  mcg   mcg IntraVENous Multiple    midazolam (VERSED) injection 0.5-10 mg  0.5-10 mg IntraVENous Multiple    saline peripheral flush soln 10 mL  10 mL InterCATHeter PRN    benzocaine (HURRICANE) 20 % spray   Mucous Membrane PRN    insulin glargine (LANTUS) injection 22 Units  22 Units SubCUTAneous QHS    [START ON 2/28/2018] insulin glargine (LANTUS) injection 22 Units  22 Units SubCUTAneous DAILY    insulin glargine (LANTUS) injection 12 Units  12 Units SubCUTAneous Q12H    0.9% sodium chloride infusion  75 mL/hr IntraVENous CONTINUOUS    enoxaparin (LOVENOX) injection 40 mg  40 mg SubCUTAneous Q24H    tamsulosin (FLOMAX) capsule 0.4 mg  0.4 mg Oral DAILY    SITagliptin (JANUVIA) tablet 100 mg  100 mg Oral DAILY    primidone (MYSOLINE) tablet 250 mg  250 mg Oral BID    lovastatin (MEVACOR) tablet 20 mg  20 mg Oral QHS    folic acid (FOLVITE) tablet 1 mg  1 mg Oral DAILY    lisinopril (PRINIVIL, ZESTRIL) tablet 20 mg  20 mg Oral DAILY    sertraline (ZOLOFT) tablet 100 mg  100 mg Oral DAILY    gabapentin (NEURONTIN) capsule 600 mg  600 mg Oral QHS    aspirin chewable tablet 81 mg  81 mg Oral DAILY    clonazePAM (KlonoPIN) tablet 0.5 mg  0.5 mg Oral QHS PRN    insulin regular (NOVOLIN R, HUMULIN R) injection   SubCUTAneous AC&HS    glucose chewable tablet 16 g  4 Tab Oral PRN    dextrose (D50W) injection syrg 12.5-25 g  12.5-25 g IntraVENous PRN    glucagon (GLUCAGEN) injection 1 mg  1 mg IntraMUSCular PRN    ceFAZolin (ANCEF) 2 g/20 mL in sterile water IV syringe  2 g IntraVENous Q8H    oxyCODONE IR (ROXICODONE) tablet 5 mg  5 mg Oral Q6H PRN    acetaminophen (TYLENOL) tablet 650 mg  650 mg Oral Q6H PRN         Objective:      Physical Exam:  Visit Vitals    BP (!) 136/39 (BP 1 Location: Right arm, BP Patient Position: At rest)    Pulse 86    Temp 99 °F (37.2 °C)    Resp 18    Ht 5' 10.5\" (1.791 m)    Wt 278 lb 10.6 oz (126.4 kg)    SpO2 96%    BMI 39.42 kg/m2     General Appearance:  Well developed, well nourished,alert and oriented x 3, and individual in no acute distress. Ears/Nose/Mouth/Throat:   Hearing grossly normal.         Neck: Supple. Chest:   Lungs clear to auscultation bilaterally. Cardiovascular:  Regular rate and rhythm, S1, S2 normal, no murmur. Abdomen:   Soft, non-tender, bowel sounds are active. Extremities: No edema bilaterally. Skin: Warm and dry.                Data Review:   Labs:    Recent Results (from the past 24 hour(s))   GLUCOSE, POC    Collection Time: 02/25/18 11:10 AM   Result Value Ref Range    Glucose (POC) 296 (H) 65 - 100 mg/dL    Performed by [address] [place] [id number], POC    Collection Time: 02/25/18  4:04 PM   Result Value Ref Range    Glucose (POC) 255 (H) 65 - 100 mg/dL    Performed by [facility], POC    Collection Time: 02/25/18  9:16 PM   Result Value Ref Range    Glucose (POC) 176 (H) 65 - 100 mg/dL    Performed by Ashley Zhao , BLOOD, PAIRED    Collection Time: 02/26/18  2:44 AM   Result Value Ref Range    Special Requests: NO SPECIAL REQUESTS      Culture result: NO GROWTH AFTER 4 HOURS     CBC WITH AUTOMATED DIFF    Collection Time: 02/26/18  2:52 AM   Result Value Ref Range    WBC 8.3 4.1 - 11.1 K/uL    RBC 3.19 (L) 4.10 - 5.70 M/uL    HGB 9.5 (L) 12.1 - 17.0 g/dL    HCT 28.7 (L) 36.6 - 50.3 %    MCV 90.0 80.0 - 99.0 FL    MCH 29.8 26.0 - 34.0 PG    MCHC 33.1 30.0 - 36.5 g/dL    RDW 14.0 11.5 - 14.5 %    PLATELET 031 383 - 487 K/uL    MPV 11.0 8.9 - 12.9 FL    NRBC 0.0 0  WBC    ABSOLUTE NRBC 0.00 0.00 - 0.01 K/uL    NEUTROPHILS 67 32 - 75 %    BAND NEUTROPHILS 1 0 - 6 %    LYMPHOCYTES 13 12 - 49 %    MONOCYTES 11 5 - 13 %    EOSINOPHILS 0 0 - 7 %    BASOPHILS 1 0 - 1 %    METAMYELOCYTES 3 (H) 0 %    MYELOCYTES 4 (H) 0 %    IMMATURE GRANULOCYTES 0 %    ABS. NEUTROPHILS 5.6 1.8 - 8.0 K/UL    ABS. LYMPHOCYTES 1.1 0.8 - 3.5 K/UL    ABS. MONOCYTES 0.9 0.0 - 1.0 K/UL    ABS. EOSINOPHILS 0.0 0.0 - 0.4 K/UL    ABS. BASOPHILS 0.1 0.0 - 0.1 K/UL    ABS. IMM.  GRANS. 0.0 K/UL    DF MANUAL      RBC COMMENTS OVALOCYTES  PRESENT       METABOLIC PANEL, BASIC    Collection Time: 02/26/18  2:52 AM   Result Value Ref Range    Sodium 135 (L) 136 - 145 mmol/L    Potassium 3.4 (L) 3.5 - 5.1 mmol/L    Chloride 104 97 - 108 mmol/L    CO2 22 21 - 32 mmol/L    Anion gap 9 5 - 15 mmol/L    Glucose 131 (H) 65 - 100 mg/dL    BUN 14 6 - 20 MG/DL    Creatinine 0.82 0.70 - 1.30 MG/DL    BUN/Creatinine ratio 17 12 - 20      GFR est AA >60 >60 ml/min/1.73m2    GFR est non-AA >60 >60 ml/min/1.73m2    Calcium 7.9 (L) 8.5 - 10.1 MG/DL   GLUCOSE, POC    Collection Time: 02/26/18  6:09 AM   Result Value Ref Range    Glucose (POC) 129 (H) 65 - 100 mg/dL    Performed by Selena Win MD

## 2018-02-26 NOTE — PROGRESS NOTES
TRANSFER - OUT REPORT:    Verbal report given to Lizz(name) on Bryan Bevels  being transferred to Pre-op(unit) for ordered procedure       Report consisted of patients Situation, Background, Assessment and   Recommendations(SBAR). Information from the following report(s) SBAR was reviewed with the receiving nurse. Lines:   Peripheral IV 02/24/18 Left Antecubital (Active)   Site Assessment Clean, dry, & intact 2/26/2018  8:00 AM   Phlebitis Assessment 0 2/26/2018  8:00 AM   Infiltration Assessment 0 2/26/2018  8:00 AM   Dressing Status Clean, dry, & intact 2/26/2018  8:00 AM   Dressing Type Transparent 2/26/2018  8:00 AM   Hub Color/Line Status Capped 2/26/2018  8:00 AM   Action Taken Open ports on tubing capped 2/25/2018  9:15 PM   Alcohol Cap Used Yes 2/26/2018  8:00 AM       Peripheral IV 02/24/18 Left Wrist (Active)   Site Assessment Clean, dry, & intact 2/26/2018  8:00 AM   Phlebitis Assessment 0 2/26/2018  8:00 AM   Infiltration Assessment 0 2/26/2018  8:00 AM   Dressing Status Clean, dry, & intact 2/26/2018  8:00 AM   Dressing Type Trach dressing 2/26/2018  8:00 AM   Hub Color/Line Status Infusing 2/26/2018  8:00 AM   Action Taken Open ports on tubing capped 2/25/2018  9:15 PM   Alcohol Cap Used Yes 2/26/2018  8:00 AM        Opportunity for questions and clarification was provided.       Patient transported with:   [employer]

## 2018-02-26 NOTE — PROGRESS NOTES
Cardiac Cath Lab Recovery Arrival Note:      Boy Rangel arrived to Cardiac Cath Lab, Recovery Area. Staff introduced to patient. Patient identifiers verified with NAME and DATE OF BIRTH. Procedure verified with patient. Consent forms reviewed and signed by patient or authorized representative and verified. Allergies verified. Patient and family oriented to department. Patient and family informed of procedure and plan of care. Questions answered with review. Patient prepped for procedure, per orders from physician, prior to arrival.    Patient on cardiac monitor, non-invasive blood pressure, SPO2 monitor. On RA. Patient is A&Ox 4. Patient reports no c/o's. Patient in stretcher, in low position, with side rails up, call bell within reach, patient instructed to call if assistance as needed. Patient prep in: [address], [place].    Patient family has pager # n/a  Family in: hospital.   Prep by: Mata Pacheco RN

## 2018-02-26 NOTE — PROGRESS NOTES
ID Progress Note  2018     Cefazolin    - present    Subjective:     Low grade fever earlier. Still has right hip pain. He has dyspnea, abdominal pain, diarrhea. EULALIO done this morning. Objective:     Vitals:   Visit Vitals    /51 (BP 1 Location: Right arm, BP Patient Position: At rest)    Pulse 79    Temp 99 °F (37.2 °C)    Resp 18    Ht 5' 10.5\" (1.791 m)    Wt 126.4 kg (278 lb 10.6 oz)    SpO2 95%    BMI 39.42 kg/m2        Tmax:  Temp (24hrs), Av.2 °F (37.3 °C), Min:98.7 °F (37.1 °C), Max:99.8 °F (37.7 °C)      Exam:    Not in distress  Eyes: pink conjunctivae, anicteric sclerae  No cervical lymphadenopathy    Lungs: clear to auscultation, no rales, wheezes or rhonchi   Heart: s1, s2, no murmurs, rubs or clicks   Abdomen: soft, nontender, no guarding or rebound   Extremities: knees not warm or tender    Labs:   Lab Results   Component Value Date/Time    WBC 8.3 2018 02:52 AM    HGB 9.5 (L) 2018 02:52 AM    HCT 28.7 (L) 2018 02:52 AM    PLATELET 985  02:52 AM    MCV 90.0 2018 02:52 AM     Lab Results   Component Value Date/Time    Sodium 135 (L) 2018 02:52 AM    Potassium 3.4 (L) 2018 02:52 AM    Chloride 104 2018 02:52 AM    CO2 22 2018 02:52 AM    Anion gap 9 2018 02:52 AM    Glucose 131 (H) 2018 02:52 AM    BUN 14 2018 02:52 AM    Creatinine 0.82 2018 02:52 AM    BUN/Creatinine ratio 17 2018 02:52 AM    GFR est AA >60 2018 02:52 AM    GFR est non-AA >60 2018 02:52 AM    Calcium 7.9 (L) 2018 02:52 AM    Bilirubin, total 0.6 2018 03:46 AM    AST (SGOT) 36 2018 03:46 AM    Alk. phosphatase 63 2018 03:46 AM    Protein, total 6.4 2018 03:46 AM    Albumin 2.1 (L) 2018 03:46 AM    Globulin 4.3 (H) 2018 03:46 AM    A-G Ratio 0.5 (L) 2018 03:46 AM    ALT (SGPT) 19 2018 03:46 AM           Assessment:     1.   Staph lugdunensis bacteremia in 2/4 bottles. 2. MV endocarditis   3. Right hip pain, which is possibly infected. 4.  Elevated ESR and CRP. 5.  Diabetes. 6.  History of patent foramen ovale. 7.  Hypertension  8. Sleep apnea. Recommendations:     1. He has coag neg staph growing from the hip aspirate. The staph lugdenensis in his bloodstream is a type of coag negative staph so he likely seeded his right hip. This organism is as virulent as staph aureus. He will have surgery possibly today    2. Continue cefazolin for at least 6 weeks from surgery. His EULALIO showed a possible vegetation on the MV.      Catalina Lagos MD

## 2018-02-27 DIAGNOSIS — F33.1 MODERATE EPISODE OF RECURRENT MAJOR DEPRESSIVE DISORDER (HCC): ICD-10-CM

## 2018-02-27 DIAGNOSIS — F43.21 GRIEF: ICD-10-CM

## 2018-02-27 PROBLEM — A41.9 SEPSIS (HCC): Status: ACTIVE | Noted: 2018-02-27

## 2018-02-27 LAB
ABO + RH BLD: NORMAL
ANION GAP SERPL CALC-SCNC: 8 MMOL/L (ref 5–15)
BLD PROD TYP BPU: NORMAL
BLD PROD TYP BPU: NORMAL
BLOOD GROUP ANTIBODIES SERPL: NORMAL
BPU ID: NORMAL
BPU ID: NORMAL
BUN SERPL-MCNC: 14 MG/DL (ref 6–20)
BUN/CREAT SERPL: 20 (ref 12–20)
CALCIUM SERPL-MCNC: 8.1 MG/DL (ref 8.5–10.1)
CHLORIDE SERPL-SCNC: 103 MMOL/L (ref 97–108)
CO2 SERPL-SCNC: 22 MMOL/L (ref 21–32)
CREAT SERPL-MCNC: 0.69 MG/DL (ref 0.7–1.3)
CROSSMATCH RESULT,%XM: NORMAL
CROSSMATCH RESULT,%XM: NORMAL
ERYTHROCYTE [DISTWIDTH] IN BLOOD BY AUTOMATED COUNT: 14.6 % (ref 11.5–14.5)
GLUCOSE BLD STRIP.AUTO-MCNC: 249 MG/DL (ref 65–100)
GLUCOSE BLD STRIP.AUTO-MCNC: 265 MG/DL (ref 65–100)
GLUCOSE BLD STRIP.AUTO-MCNC: 278 MG/DL (ref 65–100)
GLUCOSE BLD STRIP.AUTO-MCNC: 290 MG/DL (ref 65–100)
GLUCOSE BLD STRIP.AUTO-MCNC: 362 MG/DL (ref 65–100)
GLUCOSE BLD STRIP.AUTO-MCNC: 401 MG/DL (ref 65–100)
GLUCOSE SERPL-MCNC: 250 MG/DL (ref 65–100)
HCT VFR BLD AUTO: 32.9 % (ref 36.6–50.3)
HGB BLD-MCNC: 10.8 G/DL (ref 12.1–17)
INR PPP: 1.2 (ref 0.9–1.1)
MCH RBC QN AUTO: 30.4 PG (ref 26–34)
MCHC RBC AUTO-ENTMCNC: 32.8 G/DL (ref 30–36.5)
MCV RBC AUTO: 92.7 FL (ref 80–99)
NRBC # BLD: 0 K/UL (ref 0–0.01)
NRBC BLD-RTO: 0 PER 100 WBC
PLATELET # BLD AUTO: 267 K/UL (ref 150–400)
PMV BLD AUTO: 10.3 FL (ref 8.9–12.9)
POTASSIUM SERPL-SCNC: 3.8 MMOL/L (ref 3.5–5.1)
PROTHROMBIN TIME: 12.3 SEC (ref 9–11.1)
RBC # BLD AUTO: 3.55 M/UL (ref 4.1–5.7)
SERVICE CMNT-IMP: ABNORMAL
SODIUM SERPL-SCNC: 133 MMOL/L (ref 136–145)
SPECIMEN EXP DATE BLD: NORMAL
STATUS OF UNIT,%ST: NORMAL
STATUS OF UNIT,%ST: NORMAL
UNIT DIVISION, %UDIV: 0
UNIT DIVISION, %UDIV: 0
WBC # BLD AUTO: 12.3 K/UL (ref 4.1–11.1)

## 2018-02-27 PROCEDURE — 74011250637 HC RX REV CODE- 250/637: Performed by: ORTHOPAEDIC SURGERY

## 2018-02-27 PROCEDURE — 74011250636 HC RX REV CODE- 250/636: Performed by: INTERNAL MEDICINE

## 2018-02-27 PROCEDURE — 97116 GAIT TRAINING THERAPY: CPT

## 2018-02-27 PROCEDURE — 74011250637 HC RX REV CODE- 250/637: Performed by: INTERNAL MEDICINE

## 2018-02-27 PROCEDURE — 74011636637 HC RX REV CODE- 636/637: Performed by: INTERNAL MEDICINE

## 2018-02-27 PROCEDURE — 80048 BASIC METABOLIC PNL TOTAL CA: CPT | Performed by: HOSPITALIST

## 2018-02-27 PROCEDURE — 97110 THERAPEUTIC EXERCISES: CPT

## 2018-02-27 PROCEDURE — 85027 COMPLETE CBC AUTOMATED: CPT | Performed by: HOSPITALIST

## 2018-02-27 PROCEDURE — 36415 COLL VENOUS BLD VENIPUNCTURE: CPT | Performed by: HOSPITALIST

## 2018-02-27 PROCEDURE — 65270000029 HC RM PRIVATE

## 2018-02-27 PROCEDURE — 97161 PT EVAL LOW COMPLEX 20 MIN: CPT

## 2018-02-27 PROCEDURE — 74011250637 HC RX REV CODE- 250/637: Performed by: PHYSICIAN ASSISTANT

## 2018-02-27 PROCEDURE — 85610 PROTHROMBIN TIME: CPT | Performed by: PHYSICIAN ASSISTANT

## 2018-02-27 PROCEDURE — 82962 GLUCOSE BLOOD TEST: CPT

## 2018-02-27 PROCEDURE — 74011636637 HC RX REV CODE- 636/637: Performed by: HOSPITALIST

## 2018-02-27 PROCEDURE — 74011250636 HC RX REV CODE- 250/636: Performed by: PHYSICIAN ASSISTANT

## 2018-02-27 RX ORDER — SERTRALINE HYDROCHLORIDE 100 MG/1
TABLET, FILM COATED ORAL
Qty: 90 TAB | Refills: 1 | Status: SHIPPED | OUTPATIENT
Start: 2018-02-27 | End: 2018-08-26 | Stop reason: SDUPTHER

## 2018-02-27 RX ORDER — INSULIN LISPRO 100 [IU]/ML
8 INJECTION, SOLUTION INTRAVENOUS; SUBCUTANEOUS
Status: COMPLETED | OUTPATIENT
Start: 2018-02-27 | End: 2018-02-27

## 2018-02-27 RX ORDER — WARFARIN SODIUM 5 MG/1
5 TABLET ORAL ONCE
Status: COMPLETED | OUTPATIENT
Start: 2018-02-27 | End: 2018-02-27

## 2018-02-27 RX ADMIN — OXYCODONE HYDROCHLORIDE 5 MG: 5 TABLET ORAL at 14:20

## 2018-02-27 RX ADMIN — ACETAMINOPHEN 650 MG: 325 TABLET, FILM COATED ORAL at 12:02

## 2018-02-27 RX ADMIN — ACETAMINOPHEN 650 MG: 325 TABLET, FILM COATED ORAL at 00:38

## 2018-02-27 RX ADMIN — FAMOTIDINE 20 MG: 20 TABLET, FILM COATED ORAL at 09:57

## 2018-02-27 RX ADMIN — Medication 2 G: at 11:13

## 2018-02-27 RX ADMIN — LOVASTATIN 20 MG: 20 TABLET ORAL at 22:25

## 2018-02-27 RX ADMIN — Medication 10 ML: at 22:25

## 2018-02-27 RX ADMIN — WARFARIN SODIUM 10 MG: 5 TABLET ORAL at 00:38

## 2018-02-27 RX ADMIN — FOLIC ACID 1 MG: 1 TABLET ORAL at 10:05

## 2018-02-27 RX ADMIN — ACETAMINOPHEN 650 MG: 325 TABLET, FILM COATED ORAL at 06:59

## 2018-02-27 RX ADMIN — GABAPENTIN 600 MG: 300 CAPSULE ORAL at 22:25

## 2018-02-27 RX ADMIN — VANCOMYCIN HYDROCHLORIDE 2000 MG: 10 INJECTION, POWDER, LYOPHILIZED, FOR SOLUTION INTRAVENOUS at 12:03

## 2018-02-27 RX ADMIN — DOCUSATE SODIUM AND SENNOSIDES 1 TABLET: 8.6; 5 TABLET, FILM COATED ORAL at 09:58

## 2018-02-27 RX ADMIN — TAMSULOSIN HYDROCHLORIDE 0.4 MG: 0.4 CAPSULE ORAL at 09:57

## 2018-02-27 RX ADMIN — SERTRALINE HYDROCHLORIDE 100 MG: 50 TABLET ORAL at 09:57

## 2018-02-27 RX ADMIN — SITAGLIPTIN 100 MG: 100 TABLET, FILM COATED ORAL at 09:57

## 2018-02-27 RX ADMIN — INSULIN LISPRO 8 UNITS: 100 INJECTION, SOLUTION INTRAVENOUS; SUBCUTANEOUS at 22:34

## 2018-02-27 RX ADMIN — OXYCODONE HYDROCHLORIDE 5 MG: 5 TABLET ORAL at 06:59

## 2018-02-27 RX ADMIN — ASPIRIN 81 MG 81 MG: 81 TABLET ORAL at 09:57

## 2018-02-27 RX ADMIN — ENOXAPARIN SODIUM 40 MG: 40 INJECTION SUBCUTANEOUS at 14:18

## 2018-02-27 RX ADMIN — OXYCODONE HYDROCHLORIDE 5 MG: 5 TABLET ORAL at 18:02

## 2018-02-27 RX ADMIN — HUMAN INSULIN 3 UNITS: 100 INJECTION, SOLUTION SUBCUTANEOUS at 07:15

## 2018-02-27 RX ADMIN — ACETAMINOPHEN 650 MG: 325 TABLET, FILM COATED ORAL at 18:02

## 2018-02-27 RX ADMIN — LISINOPRIL 20 MG: 20 TABLET ORAL at 14:19

## 2018-02-27 RX ADMIN — OXYCODONE HYDROCHLORIDE 5 MG: 5 TABLET ORAL at 22:34

## 2018-02-27 RX ADMIN — SODIUM CHLORIDE 125 ML/HR: 900 INJECTION, SOLUTION INTRAVENOUS at 07:03

## 2018-02-27 RX ADMIN — Medication 10 ML: at 07:00

## 2018-02-27 RX ADMIN — OXYCODONE HYDROCHLORIDE 10 MG: 5 TABLET ORAL at 10:05

## 2018-02-27 RX ADMIN — DOCUSATE SODIUM AND SENNOSIDES 1 TABLET: 8.6; 5 TABLET, FILM COATED ORAL at 22:25

## 2018-02-27 RX ADMIN — Medication 2 G: at 20:10

## 2018-02-27 RX ADMIN — INSULIN GLARGINE 22 UNITS: 100 INJECTION, SOLUTION SUBCUTANEOUS at 22:25

## 2018-02-27 RX ADMIN — PRIMIDONE 250 MG: 250 TABLET ORAL at 10:05

## 2018-02-27 RX ADMIN — PRIMIDONE 250 MG: 250 TABLET ORAL at 23:01

## 2018-02-27 RX ADMIN — WARFARIN SODIUM 5 MG: 5 TABLET ORAL at 12:02

## 2018-02-27 RX ADMIN — HUMAN INSULIN 5 UNITS: 100 INJECTION, SOLUTION SUBCUTANEOUS at 16:44

## 2018-02-27 RX ADMIN — HUMAN INSULIN 5 UNITS: 100 INJECTION, SOLUTION SUBCUTANEOUS at 12:02

## 2018-02-27 RX ADMIN — Medication 2 G: at 02:35

## 2018-02-27 RX ADMIN — FAMOTIDINE 20 MG: 20 TABLET, FILM COATED ORAL at 22:25

## 2018-02-27 NOTE — PROGRESS NOTES
Bedside and Verbal shift change report given to clark (oncoming nurse) by Spike Thorne (offgoing nurse). Report included the following information SBAR, Kardex, Procedure Summary, Intake/Output, MAR and Recent Results.

## 2018-02-27 NOTE — PROGRESS NOTES
TRANSFER - IN REPORT:    Verbal report received from SRIKANTH(name) on Jose Mchugh  being received from PACU(unit) for routine post - op      Report consisted of patients Situation, Background, Assessment and   Recommendations(SBAR). Information from the following report(s) SBAR, Kardex, Procedure Summary, Intake/Output, MAR and Recent Results was reviewed with the receiving nurse. Opportunity for questions and clarification was provided. Assessment completed upon patients arrival to unit and care assumed.

## 2018-02-27 NOTE — PERIOP NOTES
TRANSFER - OUT REPORT:    Verbal report given to Alexa Yousif RN on Arlene Alcazar  being transferred to [phone] for routine post - op       Report consisted of patients Situation, Background, Assessment and   Recommendations(SBAR). Time Pre op antibiotic given:1900    Anesthesia Stop time: 2130  Hoffman Present on Transfer to floor:yes  Order for Hoffman on Chart:yes  Discharge Prescriptions with Chart:no    Information from the following report(s) SBAR, OR Summary, Intake/Output, MAR and Recent Results was reviewed with the receiving nurse. Opportunity for questions and clarification was provided. Is the patient on 02? YES       L/Min 2       Other 0    Is the patient on a monitor? NO    Is the nurse transporting with the patient? NO    Surgical Waiting Area notified of patient's transfer from PACU? NO son in room      The following personal items collected during your admission accompanied patient upon transfer:   Dental Appliance: Dental Appliances: None  Vision: Visual Aid: Glasses, At bedside  Hearing Aid:    Jewelry: Jewelry: None  Clothing: Clothing:  (no belongings to preop)  Other Valuables:  Other Valuables: Eyeglasses (with daughter)  Valuables sent to safe:

## 2018-02-27 NOTE — PROGRESS NOTES
Problem: Mobility Impaired (Adult and Pediatric)  Goal: *Acute Goals and Plan of Care (Insert Text)  Physical Therapy Goals  Initiated 2/27/2018    1. Patient will move from supine to sit and sit to supine  in bed with supervision/set-up within 4 days. 2. Patient will perform sit to stand with supervision/set-up within 4 days. 3. Patient will ambulate with supervision/set-up for 50 feet with the least restrictive device within 4 days. 4. Patient will perform home exercise program per protocol with independence within 4 days. physical Therapy EVALUATION  Patient: Mathieu Reyes (56 y.o. male)  Date: 2/27/2018  Primary Diagnosis: FUO (fever of unknown origin)  Bacteremia  status post right hip arthroplasty  Procedure(s) (LRB):  INCISION AND DRAINAGE OF RIGHT HIP WITH POLY EXCHANGE  (original procedure was posterior) (Right) 1 Day Post-Op   Precautions:  Fall, WBAT, Contact    ASSESSMENT :  Based on the objective data described below, the patient presents with generally decreased strength, balance, ROM, and increased RLE pain s/p R hip I&D with prosthesis exchange following infection. Pt agreeable following RN clearance to therex, bed mobility, transfers, brief gait training, and OOB to chair for 1 hr goal sitting. Pt progressing slowly and requires upwards of modA for getting OOB this morning. Pt planned for D/C to rehab once medically stable 2* medical POC for antibiotics at D/C. PT to progress this PM for BID session per protocol. Patient will benefit from skilled intervention to address the above impairments.   Patients rehabilitation potential is considered to be Good  Factors which may influence rehabilitation potential include:   []         None noted  []         Mental ability/status  [x]         Medical condition  []         Home/family situation and support systems  []         Safety awareness  []         Pain tolerance/management  []         Other:      PLAN :  Recommendations and Planned Interventions:  [x]           Bed Mobility Training             []    Neuromuscular Re-Education  [x]           Transfer Training                   []    Orthotic/Prosthetic Training  [x]           Gait Training                         []    Modalities  [x]           Therapeutic Exercises           []    Edema Management/Control  [x]           Therapeutic Activities            [x]    Patient and Family Training/Education  []           Other (comment):    Frequency/Duration: Patient will be followed by physical therapy  twice daily to address goals. Discharge Recommendations: Rehab  Further Equipment Recommendations for Discharge: NA     SUBJECTIVE:   Patient stated Alon Hawley a [facility] or something?   Pt asking about D/C plan. .. Discussed with CM.     OBJECTIVE DATA SUMMARY:   HISTORY:    Past Medical History:   Diagnosis Date    ADD (attention deficit disorder)     Anemia due to blood loss     Hinson's esophagus with esophagitis     Benign essential tremor     Cancer [facility] [facility] 2012    BCC    Depression     DJD (degenerative joint disease) of hip     bilat    DM (diabetes mellitus) ([facility] [address] 3/17/2010    ED (erectile dysfunction) of organic origin     Fall on or from sidewalk curb 9/24/2015    Femur fracture ([facility] [address]     Gastritis and duodenitis     GERD (gastroesophageal reflux disease)     resolved after discontinuing diclofenac    Hematuria 6/2016    High cholesterol 3/17/2010    HTN (hypertension) 3/17/2010    Morbid obesity ([address]     Murmur, cardiac 2016    PFO (patent foramen ovale) 7/26/2017    PUD (peptic ulcer disease)     Shingles 6/2016    RESOLVING- NO RASH (HEAD)    Sleep apnea     CPAP     Past Surgical History:   Procedure Laterality Date    ENDOSCOPY, COLON, DIAGNOSTIC      HX CHOLECYSTECTOMY  1995    HX GI  1980    gastroplasty    HX HERNIA REPAIR  1995    HX HIP REPLACEMENT      Left    HX ORTHOPAEDIC  2011    rot cuff repair    HX TONSILLECTOMY  1950    TOTAL HIP ARTHROPLASTY  05/2010    right    TOTAL HIP ARTHROPLASTY  08/01/2016    left     Prior Level of Function/Home Situation: indep prior to admission and working  Personal factors and/or comorbidities impacting plan of care: supportive family    [address]: Private residence  # Steps to Enter: 3  Rails to Enter: Yes  Hand Rails : Left  One/Two Story Residence: Two story, live on 1st floor  # of Interior Steps: 15  Living Alone: No  Support Systems: Child(dariusz), Family member(s)  Patient Expects to be Discharged to[de-identified] Private residence  Current DME Used/Available at Home: Raised toilet seat, Walker, rolling, Cane, straight, Shower chair, Grab bars, Commode, bedside, Adaptive bathing aides    EXAMINATION/PRESENTATION/DECISION MAKING:   Critical Behavior:  Neurologic State: Alert           Hearing: Auditory  Auditory Impairment: None  Range Of Motion:  AROM: Generally decreased, functional                       Strength:    Strength: Generally decreased, functional                    Tone & Sensation:   Tone: Normal                              Coordination:  Coordination: Generally decreased, functional  Functional Mobility:  Bed Mobility:     Supine to Sit: Moderate assistance; Additional time (HOB elevated and rail used)  Sit to Supine:  (NT; OOB to chair)  Scooting: Contact guard assistance; Additional time  Transfers:  Sit to Stand: Minimum assistance; Additional time (EOB elevated for ease)  Stand to Sit: Minimum assistance; Additional time (cues for alignment, RLE advancement, and controlled descent)                       Balance:   Sitting: Impaired; With support  Sitting - Static: Good (unsupported)  Sitting - Dynamic: Fair (occasional)  Standing: Impaired; With support  Standing - Static: Fair  Standing - Dynamic : Fair  Ambulation/Gait Training:  Distance (ft): 8 Feet (ft) (x2)  Assistive Device: Gait belt;Walker, rolling  Ambulation - Level of Assistance: Minimal assistance; Additional time Gait Description (WDL): Exceptions to WDL  Gait Abnormalities: Antalgic;Decreased step clearance; Step to gait (flexed trunk/fwd head)  Right Side Weight Bearing: As tolerated     Base of Support: Widened  Stance: Right decreased  Speed/Clarice: Slow;Pace decreased (<100 feet/min)  Step Length: Right shortened;Left shortened     Stairs:     Stairs - Level of Assistance:  (NT)        Therapeutic Exercises:   Supine therex x10 reps    Functional Measure:  Timed up and go:    Timed Get Up And Go Test:  (Unable)     Timed Up and Go and G-code impairment scale:  Percentage of Impairment CH    0%   CI    1-19% CJ    20-39% CK    40-59% CL    60-79% CM    80-99% CN     100%   Timed   Score 0-56 10 11-12 13-14 15-16 17-18 19 20       < than 10 seconds=Normal  Greater then 13.5 seconds (in elderly)=Increased fall risk   Sherrell LUCIA, Stefany Boyle. Predicting the probability for falls in community dwelling older adults using the Timed Up and Go Test. Phys Ther. 2000;80:896-903. G codes: In compliance with CMSs Claims Based Outcome Reporting, the following G-code set was chosen for this patient based on their primary functional limitation being treated: The outcome measure chosen to determine the severity of the functional limitation was the TUG with a score of Unable/x which was correlated with the impairment scale. ? Mobility - Walking and Moving Around:     - CURRENT STATUS: CN - 100% impaired, limited or restricted    - GOAL STATUS: CM - 80%-99% impaired, limited or restricted    - D/C STATUS:  ---------------To be determined---------------   Pain:  Pain Scale 1: Numeric (0 - 10)  Pain Intensity 1: 5  Pain Location 1: Hip  Pain Orientation 1: Right  Pain Description 1: Aching  Pain Intervention(s) 1: Medication (see MAR)  Activity Tolerance:   VSS supine to sit  Please refer to the flowsheet for vital signs taken during this treatment.   After treatment:   [x]         Patient left in no apparent distress sitting up in chair  []         Patient left in no apparent distress in bed  [x]         Call bell left within reach  [x]         Nursing notified  [x]         RN present  []         Bed alarm activated    COMMUNICATION/EDUCATION:   The patients plan of care was discussed with: Registered Nurse. [x]         Fall prevention education was provided and the patient/caregiver indicated understanding. [x]         Patient/family have participated as able in goal setting and plan of care. [x]         Patient/family agree to work toward stated goals and plan of care. []         Patient understands intent and goals of therapy, but is neutral about his/her participation. []         Patient is unable to participate in goal setting and plan of care.     Thank you for this referral.  Moni Lim   Time Calculation: 29 mins

## 2018-02-27 NOTE — OP NOTES
[address]  ACUTE CARE OP NOTE    Jimenez Pathak  MR#: [id number]  : 1950  ACCOUNT #: [de-identified]   DATE OF SERVICE: 2018    PREOPERATIVE DIAGNOSIS:  Infected right total hip replacement. POSTOPERATIVE DIAGNOSIS:  Infected right total hip replacement. PROCEDURES PERFORMED:  Irrigation and debridement, right hip, via anterior approach with removal of metal liner with placement of polyethylene liner and replacement of femoral head, placement of vancomycin and tobramycin impregnated absorbable beads with placement of Hemovac drain. SURGEON:  Geovanni Garcia MD    ASSISTANT:  AMAN Oconnor    ANESTHESIA:  General.    ESTIMATED BLOOD LOSS:  50 mL. SPECIMENS REMOVED:  Old metal liner and femoral head. IMPLANTS:  New polyethylene liner, new femoral head, antibiotic impregnated beads, large Hemovac drain. COMPLICATIONS:  None. INDICATIONS:  A 76-year-old gentleman underwent a right total hip replacement in  which has done extremely well. He has had absolutely no problems with the hip up until approximately one week ago when he began developing pain in his hip. He was having fevers and chills and had a syncopal episode and fell, injuring his hip. He presents to the emergency room complaining of right hip pain and fevers and chills with elevated white count. It is noted that he had positive blood cultures which had been drawn approximately one week prior to that growing Staph aureus. He underwent right hip aspiration, which was positive for staph. He is now taken to the operating room for the above listed procedure. OPERATION:  The patient was taken to the operating room and underwent general inhalational anesthesia. He was placed on the [place] table. He had a previous posterior approach; however, it was elected to go via anterior approach. Right hip and leg were prepped and draped in usual fashion.   Standard anterior approach was made to the hip down through skin and subcutaneous tissue. Fascia was incised longitudinally. Tensor muscle was dissected off the fascia and retracted posterolaterally. The rectus muscle was elevated off the anterior capsule and retracted anteriorly. The capsule was incised in a T-shaped fashion. Large amount of gross yellow purulence was noted. Cultures were obtained. The joint was irrigated out. I extended my capsulotomy circumferentially around the proximal femur. This was a metal-on-metal articulation. I dislocated the hip and removed the femoral head. Acetabular retractors were placed and cleared all the soft tissue from around the acetabulum. Throughout this process, we intermittently irrigated with antibiotic solution using pulsatile lavage system. The metal liner was removed using a bone tamp on the outer rim and suction on the metal liner. This was removed with relative ease. The Mystic hole eliminator screw was removed as well. No purulence was noted behind this. I carefully evaluated the proximal femur as well as acetabulum and there was no evidence that there was any apparent loosening or involvement of the infection. Once we had irrigated out the pus, all of the tissue appeared healthy. There was no necrotic tissue whatsoever. This was compatible with an acute infection. We then soaked the joint in dilute Betadine solution and let it sit for several minutes. This was repeated several times. Pulsatile lavage system was again used to irrigate out the joint. The acetabular component was a size 58. Therefore, I placed a +4 lipped polyethylene liner with the lip placed anteriorly. The cup was in a fair amount of anteversion. The +5, 36 mm femoral head was impacted onto the trunnion. The hip was reduced. The hip was stable at 9 degrees of external rotation. Again, the joint was extensively irrigated with the pulsatile lavage system.   During this time, we made one batch of absorbable beads with vancomycin and a second batch of beads with tobramycin. The beads were all placed in the joint space. A large Hemovac drain was placed in the joint. The capsule was left open. The fascia was repaired using #1 Stratafix in a running fashion. Skin edges were approximated using 3-0 Monocryl in running subcuticular fashion followed by Dermabond. Sterile dressing was applied. The patient is then awakened, extubated and transferred to recovery room in stable condition. There were no complications.       MD SAVANAH Farias / [place]  D: 02/26/2018 21:13     T: 02/27/2018 07:03  JOB #: [id number]

## 2018-02-27 NOTE — PROGRESS NOTES
Primary Nurse García Leavitt RN and Luly Arthur RN performed a dual skin assessment on this patient No impairment noted  Geoff score is 21

## 2018-02-27 NOTE — BRIEF OP NOTE
BRIEF OPERATIVE NOTE    Date of Procedure: 2/26/2018   Preoperative Diagnosis: infected right hip arthroplasty  Postoperative Diagnosis: infected right hip arthroplasty    Procedure(s):  INCISION AND DRAINAGE OF RIGHT HIP WITH POLY EXCHANGE , pl;acement of antibiotic beads(vancomycin, tobramycin)  Surgeon(s) and Role:     * Bee Astudillo MD - Primary         [facility]      Surgical Staff:  [place]: Joanne Kimball RN  Circ-Relief: Hung Richards RN  Scrub Tech-Relief: Kimber Roberto  Scrub RN-1: Alhaji Huddleston RN  Scrub RN-Relief: Roxanna Dominguez RN  Event Time In   Incision Start 1930   Incision Close      Anesthesia: General   Estimated Blood Loss: 50cc  Specimens:   ID Type Source Tests Collected by Time Destination   1 : RIGHT HIP JOINT FLUID Joint Fluid Joint, Hip ANAEROBIC/AEROBIC/GRAM STAIN Bee Astudillo MD 2/26/2018 2001 Microbiology      Findings: large amount gross purulence, tissue appeared healthy, no necrotic tissue   Complications: none  Implants:   Implant Name Type Inv.  Item Serial No.  Lot No. LRB No. Used Action   GRAFT BNE PASTE RAPID CUR 10ML -- STIMULAN - [id number]  GRAFT BNE PASTE RAPID CUR 10ML -- STIMULAN  [employer] [id number] Right 2 Implanted   ACETABULAR LINER   NA  [id number] Right 1 Implanted

## 2018-02-27 NOTE — PROGRESS NOTES
Problem: Falls - Risk of  Goal: *Absence of Falls  Document Dameon Fall Risk and appropriate interventions in the flowsheet. Outcome: Progressing Towards Goal  Fall Risk Interventions:  Mobility Interventions: Assess mobility with egress test, Patient to call before getting OOB, PT Consult for mobility concerns, PT Consult for assist device competence         Medication Interventions: Assess postural VS orthostatic hypotension, Patient to call before getting OOB, Teach patient to arise slowly, Utilize gait belt for transfers/ambulation    Elimination Interventions: Call light in reach, Patient to call for help with toileting needs, Urinal in reach    History of Falls Interventions: Investigate reason for fall, Door open when patient unattended        Problem: General Infection Care Plan (Adult and Pediatric)  Goal: Improvement in signs and symptoms of infection  Outcome: Progressing Towards Goal  Pt provided with handsanitizer    Problem: Surgical Pathway Post-Op Day 1  Goal: Activity/Safety  Outcome: Progressing Towards Goal  Up in chair for meals, instructed to call for assistance  Goal: Nutrition/Diet  Outcome: Resolved/Met Date Met: 02/27/18  Regular diet  Goal: Respiratory  Outcome: Progressing Towards Goal  RA.  Instructed on proper use of incentive spirometer, encouraged to use 10x/hr while awake

## 2018-02-27 NOTE — PROGRESS NOTES
Orthopedic Joint Progress Note    2018  Admit Date: 2018  Admit Diagnosis: FUO (fever of unknown origin)  Bacteremia  status post right hip arthroplasty    Post Op day: 1 Day Post-Op    Subjective:     Luis Antonio Das states that he is feeling well overall. Pain controlled on current meds. Had washout and poly swap with head exchange last night. ID following and remains on Ancef which he will stay on after discharge. No new complaints. Drain output 30cc overnight. Tolerating diet  Denies N/V/SOB or CP      Objective:     PT/OT:     PATIENT MOBILITY                           Vital Signs:    Blood pressure 128/70, pulse 78, temperature 97.8 °F (36.6 °C), resp. rate 16, height 5' 10.5\" (1.791 m), weight 126.4 kg (278 lb 10.6 oz), SpO2 96 %.   Temp (24hrs), Av.3 °F (36.8 °C), Min:97.5 °F (36.4 °C), Max:99 °F (37.2 °C)      Pain Control:   Pain Assessment  Pain Scale 1: Numeric (0 - 10)  Pain Intensity 1: 5  Pain Onset 1: post op  Pain Location 1: Hip  Pain Orientation 1: Right  Pain Description 1: Sore  Pain Intervention(s) 1: Ice    Meds:  Current Facility-Administered Medications   Medication Dose Route Frequency    warfarin - pharmacist to dose   Other Rx Dosing/Monitoring    0.9% sodium chloride infusion 250 mL  250 mL IntraVENous PRN    0.9% sodium chloride infusion  125 mL/hr IntraVENous CONTINUOUS    sodium chloride 0.9 % bolus infusion 500 mL  500 mL IntraVENous ONCE PRN    sodium chloride (NS) flush 5-10 mL  5-10 mL IntraVENous Q8H    sodium chloride (NS) flush 5-10 mL  5-10 mL IntraVENous PRN    naloxone (NARCAN) injection 0.4 mg  0.4 mg IntraVENous PRN    ondansetron (ZOFRAN) injection 4 mg  4 mg IntraVENous Q4H PRN    dexamethasone (DECADRON) 4 mg/mL injection 4 mg  4 mg IntraVENous Q6H PRN    prochlorperazine (COMPAZINE) with saline injection 5 mg  5 mg IntraVENous Q6H PRN    hydrOXYzine HCl (ATARAX) tablet 10 mg  10 mg Oral Q8H PRN    famotidine (PEPCID) tablet 20 mg  20 mg Oral BID    senna-docusate (PERICOLACE) 8.6-50 mg per tablet 1 Tab  1 Tab Oral BID    polyethylene glycol (MIRALAX) packet 17 g  17 g Oral DAILY    [START ON 2/28/2018] bisacodyl (DULCOLAX) suppository 10 mg  10 mg Rectal DAILY PRN    acetaminophen (TYLENOL) tablet 650 mg  650 mg Oral Q6H    oxyCODONE IR (ROXICODONE) tablet 5 mg  5 mg Oral Q3H PRN    oxyCODONE IR (ROXICODONE) tablet 7.5-10 mg  7.5-10 mg Oral Q3H PRN    morphine injection 2 mg  2 mg IntraVENous Q3H PRN    Vancomycin dosing per pharmacy   Other Rx Dosing/Monitoring    vancomycin (VANCOCIN) 2000 mg in  ml infusion  2,000 mg IntraVENous Q12H    insulin glargine (LANTUS) injection 22 Units  22 Units SubCUTAneous QHS    [START ON 2/28/2018] insulin glargine (LANTUS) injection 22 Units  22 Units SubCUTAneous DAILY    0.9% sodium chloride infusion  75 mL/hr IntraVENous CONTINUOUS    enoxaparin (LOVENOX) injection 40 mg  40 mg SubCUTAneous Q24H    tamsulosin (FLOMAX) capsule 0.4 mg  0.4 mg Oral DAILY    SITagliptin (JANUVIA) tablet 100 mg  100 mg Oral DAILY    primidone (MYSOLINE) tablet 250 mg  250 mg Oral BID    lovastatin (MEVACOR) tablet 20 mg  20 mg Oral QHS    folic acid (FOLVITE) tablet 1 mg  1 mg Oral DAILY    lisinopril (PRINIVIL, ZESTRIL) tablet 20 mg  20 mg Oral DAILY    sertraline (ZOLOFT) tablet 100 mg  100 mg Oral DAILY    gabapentin (NEURONTIN) capsule 600 mg  600 mg Oral QHS    aspirin chewable tablet 81 mg  81 mg Oral DAILY    clonazePAM (KlonoPIN) tablet 0.5 mg  0.5 mg Oral QHS PRN    insulin regular (NOVOLIN R, HUMULIN R) injection   SubCUTAneous AC&HS    glucose chewable tablet 16 g  4 Tab Oral PRN    dextrose (D50W) injection syrg 12.5-25 g  12.5-25 g IntraVENous PRN    glucagon (GLUCAGEN) injection 1 mg  1 mg IntraMUSCular PRN    ceFAZolin (ANCEF) 2 g/20 mL in sterile water IV syringe  2 g IntraVENous Q8H    acetaminophen (TYLENOL) tablet 650 mg  650 mg Oral Q6H PRN        LAB:    Lab Results Component Value Date/Time    INR 1.2 (H) 02/27/2018 06:24 AM    INR 1.4 (H) 08/29/2016 06:01 AM    INR 1.7 (H) 08/28/2016 05:01 AM    INR (POC) 1.4 (H) 02/24/2018 03:40 PM    INR (POC) 1.2 (H) 09/15/2016 03:32 PM    INR POC 22.1 09/29/2016 02:00 PM    INR POC 1.6 09/22/2016 10:10 AM    INR POC 1.5 09/19/2016 10:33 AM     Lab Results   Component Value Date/Time    HGB 10.8 (L) 02/27/2018 06:24 AM    HGB 9.5 (L) 02/26/2018 02:52 AM    HGB 10.5 (L) 02/24/2018 12:12 PM         Dressing:      Wound:       Physical Exam:    Dressing is clean, dry, and intact  Neurovascular checks within normal limits  Orientation:  Oriented    Assessment:      Principal Problem:    Infected prosthesis of right hip ([address] (2/26/2018)    Active Problems:    Bacteremia (2/24/2018)      Sepsis ([address] (2/27/2018)         Plan:     Continue PT/OT/Rehab  Consult: Rehab team including PT, OT, recreational therapy, and    Watch drain today  Cont oxycodone for pain  Cont coumadin for dvt prophylaxis - Pharmacy dosing  Ancef per ID team  Medical management per primary team    Discharge To:  pending       Signed By: GENEVIEVE [place] [id number]

## 2018-02-27 NOTE — PROGRESS NOTES
Bedside and Verbal shift change report given to Marie Grove (oncoming nurse) by Anton Arango (offgoing nurse). Report included the following information SBAR.

## 2018-02-27 NOTE — PROGRESS NOTES
Problem: Mobility Impaired (Adult and Pediatric)  Goal: *Acute Goals and Plan of Care (Insert Text)  Physical Therapy Goals  Initiated 2/27/2018    1. Patient will move from supine to sit and sit to supine  in bed with supervision/set-up within 4 days. 2. Patient will perform sit to stand with supervision/set-up within 4 days. 3. Patient will ambulate with supervision/set-up for 50 feet with the least restrictive device within 4 days. 4. Patient will perform home exercise program per protocol with independence within 4 days. physical Therapy TREATMENT  Patient: Rina Triana (58 y.o. male)  Date: 2/27/2018  Diagnosis: FUO (fever of unknown origin)  Bacteremia  status post right hip arthroplasty Infected prosthesis of right hip ([facility])  Procedure(s) (LRB):  INCISION AND DRAINAGE OF RIGHT HIP WITH POLY EXCHANGE  (original procedure was posterior) (Right) 1 Day Post-Op  Precautions: Fall, WBAT, Contact  Chart, physical therapy assessment, plan of care and goals were reviewed. ASSESSMENT:  Pt is progressing slowly with all goals. He remains 1* limited by difficulty exiting and entering bed, requires increased bed height to rise from support surface, and is limited by pain and efficiency for gait tolerance/distance. Pt is planned for D/C to rehab 2* antibiotics required at D/C though is appropriate for rehab from mobility standpoint at this time as well. Pt was able to successfully void in bathroom with standing and sink water running-RN informed. Pt left in NAD with all needs met following session. Progression toward goals:  []      Improving appropriately and progressing toward goals  [x]      Improving slowly and progressing toward goals  []      Not making progress toward goals and plan of care will be adjusted     PLAN:  Patient continues to benefit from skilled intervention to address the above impairments. Continue treatment per established plan of care.   Discharge Recommendations:  Rehab  Further Equipment Recommendations for Discharge:  NA     SUBJECTIVE:   Patient stated Edgard Monreal. ..  Pt joking that he is not ready for therapy [as he did in AM] though pleasant and agreeable. OBJECTIVE DATA SUMMARY:   Critical Behavior:  Neurologic State: Alert           Functional Mobility Training:  Bed Mobility:     Supine to Sit: Moderate assistance; Additional time  Sit to Supine: Minimum assistance; Additional time (gait belt assist for RLE)  Scooting: Contact guard assistance; Additional time        Transfers:  Sit to Stand: Minimum assistance; Additional time (EOB elevated for ease)  Stand to Sit: Contact guard assistance; Additional time (EOB elevated)                             Balance:  Sitting: Impaired; With support  Sitting - Static: Fair (occasional)  Sitting - Dynamic: Poor (constant support)  Standing: Impaired; With support  Standing - Static: Fair  Standing - Dynamic : Fair  Ambulation/Gait Training:  Distance (ft): 15 Feet (ft) (x2)  Assistive Device: Gait belt;Walker, rolling  Ambulation - Level of Assistance: Contact guard assistance; Additional time     Gait Description (WDL): Exceptions to WDL  Gait Abnormalities: Antalgic;Decreased step clearance; Step to gait  Right Side Weight Bearing: As tolerated     Base of Support: Widened  Stance: Right decreased  Speed/Clarice: Slow;Pace decreased (<100 feet/min)  Step Length: Right shortened;Left shortened    Stairs:     Stairs - Level of Assistance:  (Unable)       Therapeutic Exercises:   SUPINE  EXERCISES   Sets   Reps   Active Active Assist   Passive Self ROM   Comments   Ankle Pumps  10 [x]                                        []                                        []                                        []                                           Quad Sets  10 [x]                                        []                                        []                                        []                                           Heel Slides  10 [] [x]                                        []                                        []                                           Hip Abduction  10 []                                        [x]                                        []                                        []                                           Glut Sets  10 [x]                                        []                                        []                                        []                                               Pain:  Pain Scale 1: Numeric (0 - 10)  Pain Intensity 1: 7  Pain Location 1: Hip  Pain Orientation 1: Right  Pain Description 1: Aching  Pain Intervention(s) 1: Medication (see MAR)  Activity Tolerance:   NAD  After treatment:   [] Patient left in no apparent distress sitting up in chair  [x] Patient left in no apparent distress in bed  [x] Call bell left within reach  [x] Nursing notified  [] Caregiver present  [] Bed alarm activated    COMMUNICATION/COLLABORATION:   The patients plan of care was discussed with: Registered Nurse    Tonya Olsen   Time Calculation: 26 mins

## 2018-02-27 NOTE — PROGRESS NOTES
Occupational Therapy Note:     Attempted to see pt for evaluation but he declined reporting he just finished with PT intervention and would like to rest.  Will follow up tomorrow.        Dipti Newell, OT

## 2018-02-27 NOTE — PROGRESS NOTES
Hospitalist Progress Note  Jason Blanchard MD  Answering service: [phone] OR [phone] from in house phone      Date of Service:  2018  NAME:  Bridget Parekh  :  1950  MRN:  [id number]      Admission Summary:   70 yo man with DM2, HTN, HLD, PFO, DJD, depression, PUD, s/p b/l THAs, sleep apnea, GERD, and ADD presented to the ED from home on 18 with fever x12 days and right hip pain. Pt had a GLF on 18. Interval history / Subjective:   C/o pain in left hip more than right hip; d/w nurse; POD #1     Assessment & Plan:     Sepsis with staph lugdunensis bacteremia (POA) - due to septic arthritis  - BCx  staph lugdunensis,  CoNS   bottles,  NGTD  - TTE  with LVEF 55-60%, aortic sclerosis  - EULALIO  with LVEF 55-60%, tiny PFO, 2mm vegetation on anterior leaflet  - flu negative  - continue IVF  - continue cefazolin and vanc per ID; will need cefazolin x6 weeks from  due to IE (end 18)    Mitral valve infective endocarditis - as above     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)  - R hip XR  without acute abnormality   - joint Cx  staph lugdunensis  - Ortho following  - s/p I&D, removal of metal liner and placement of polyethylene liner and replacement of femoral head, placement of vanc and tobramycin beads ; pathology pending  - Hemovac drain in place     Hyponatremia, intermittent (POA) - due to dehydration, had resolved with IVF; decrease IVF     DM2, uncontrolled (chronic) - recent A1c 9  - hold metformin, jardiance  - continue home Lantus , Januvia     HTN (chronic) - continue home lisinopril     HLD (chronic) - continue home lovastatin     Depression (chronic) - continue home sertraline     BPH (chronic) - continue home Flomax     Chronic pain (chronic) - continue primidone, gabapentin     Morbid Obesity (chronic) - Body mass index is 39.42 kg/(m^2).  with DM and osteoarthritis    Code status: full  DVT prophylaxis: warfarin with enoxaparin bridge    Care Plan discussed with: Patient/Family and Nurse  Disposition: TBD. Will need ARF and prefers [facility] Problems  Date Reviewed: 2/27/2018          Codes Class Noted POA    Sepsis [facility]) ICD-10-CM: A41.9  ICD-9-CM: 038.9, 995.91  2/27/2018 Yes        * (Principal)Infected prosthesis of right hip ([address] ICD-10-CM: T84.51XA  ICD-9-CM: 996.66, V43.64  2/26/2018 Yes        Bacteremia ICD-10-CM: R78.81  ICD-9-CM: 790.7  2/24/2018 Yes            Review of Systems:   Pertinent items are noted in HPI. Vital Signs:    Last 24hrs VS reviewed since prior progress note.  Most recent are:  Visit Vitals    /72 (BP 1 Location: Right arm, BP Patient Position: At rest)    Pulse 77    Temp 97.8 °F (36.6 °C)    Resp 18    Ht 5' 10.5\" (1.791 m)    Wt 126.4 kg (278 lb 10.6 oz)    SpO2 99%    BMI 39.42 kg/m2       Intake/Output Summary (Last 24 hours) at 02/27/18 1516  Last data filed at 02/27/18 1254   Gross per 24 hour   Intake             2130 ml   Output             2250 ml   Net             -120 ml      Physical Examination:     Gen - awake, NAD, obese  HEENT - NCAT, PERRL, OP benign, MMM  Neck - supple, no masses  CV - regular rhythm, 2/6 systolic mumur at apex and RUSB  Resp - lungs CTA b/l, no wheezing  GI - +BS, soft, NT, ND, +BS  MSK - right hip ttp, Hemovac drain in place  Neuro - A&O, no focal deficits  Psych - calm and cooperative with normal affect  Skin: right hip dressing c/d/i    Data Review:    Review and/or order of clinical lab test  Review and/or order of tests in the radiology section of CPT  Review and/or order of tests in the medicine section of CPT    Labs:     Recent Labs      02/27/18   0624  02/26/18   0252   WBC  12.3*  8.3   HGB  10.8*  9.5*   HCT  32.9*  28.7*   PLT  267  236     Recent Labs      02/27/18   0624  02/26/18   0252  02/25/18   0346   NA  133*  135*  136   K  3.8  3.4*  3.5   CL  103  104  104   CO2  22 22  22   BUN  14  14  17   CREA  0.69*  0.82  0.86   GLU  250*  131*  144*   CA  8.1*  7.9*  8.5     Recent Labs      02/25/18   0346   SGOT  36   ALT  19   AP  63   TBILI  0.6   TP  6.4   ALB  2.1*   GLOB  4.3*     Recent Labs      02/27/18   0624  02/24/18   1540   INR  1.2*  1.4*   PTP  12.3*   --       No results for input(s): FE, TIBC, PSAT, FERR in the last 72 hours. Lab Results   Component Value Date/Time    Folate 9.2 03/18/2010 11:26 AM      No results for input(s): PH, PCO2, PO2 in the last 72 hours. No results for input(s): CPK, CKNDX, TROIQ in the last 72 hours.     No lab exists for component: CPKMB  Lab Results   Component Value Date/Time    Cholesterol, total 141 07/26/2017 01:24 PM    HDL Cholesterol 63 07/26/2017 01:24 PM    LDL, calculated 58 07/26/2017 01:24 PM    Triglyceride 98 07/26/2017 01:24 PM    CHOL/HDL Ratio 3.5 11/01/2010 07:07 AM     Lab Results   Component Value Date/Time    Glucose (POC) 265 (H) 02/27/2018 11:50 AM    Glucose (POC) 278 (H) 02/27/2018 11:31 AM    Glucose (POC) 249 (H) 02/27/2018 06:26 AM    Glucose (POC) 142 (H) 02/26/2018 09:44 PM    Glucose (POC) 128 (H) 02/26/2018 04:07 PM     Lab Results   Component Value Date/Time    Color YELLOW/STRAW 02/24/2018 01:41 PM    Appearance CLEAR 02/24/2018 01:41 PM    Specific gravity 1.024 02/24/2018 01:41 PM    pH (UA) 5.5 02/24/2018 01:41 PM    Protein NEGATIVE  02/24/2018 01:41 PM    Glucose >1000 (A) 02/24/2018 01:41 PM    Ketone 15 (A) 02/24/2018 01:41 PM    Bilirubin NEGATIVE  02/24/2018 01:41 PM    Urobilinogen 1.0 02/24/2018 01:41 PM    Nitrites NEGATIVE  02/24/2018 01:41 PM    Leukocyte Esterase NEGATIVE  02/24/2018 01:41 PM    Epithelial cells FEW 02/24/2018 01:41 PM    Bacteria NEGATIVE  02/24/2018 01:41 PM    WBC 0-4 02/24/2018 01:41 PM    RBC 0-5 02/24/2018 01:41 PM     Medications Reviewed:     Current Facility-Administered Medications   Medication Dose Route Frequency    warfarin - pharmacist to dose   Other Rx Dosing/Monitoring    [START ON 2/28/2018] Vancomycin trough level 2/28 @ 12:00 prior to dose administration   Other ONCE    0.9% sodium chloride infusion 250 mL  250 mL IntraVENous PRN    0.9% sodium chloride infusion  50 mL/hr IntraVENous CONTINUOUS    sodium chloride 0.9 % bolus infusion 500 mL  500 mL IntraVENous ONCE PRN    sodium chloride (NS) flush 5-10 mL  5-10 mL IntraVENous Q8H    sodium chloride (NS) flush 5-10 mL  5-10 mL IntraVENous PRN    naloxone (NARCAN) injection 0.4 mg  0.4 mg IntraVENous PRN    ondansetron (ZOFRAN) injection 4 mg  4 mg IntraVENous Q4H PRN    dexamethasone (DECADRON) 4 mg/mL injection 4 mg  4 mg IntraVENous Q6H PRN    prochlorperazine (COMPAZINE) with saline injection 5 mg  5 mg IntraVENous Q6H PRN    hydrOXYzine HCl (ATARAX) tablet 10 mg  10 mg Oral Q8H PRN    famotidine (PEPCID) tablet 20 mg  20 mg Oral BID    senna-docusate (PERICOLACE) 8.6-50 mg per tablet 1 Tab  1 Tab Oral BID    polyethylene glycol (MIRALAX) packet 17 g  17 g Oral DAILY    [START ON 2/28/2018] bisacodyl (DULCOLAX) suppository 10 mg  10 mg Rectal DAILY PRN    acetaminophen (TYLENOL) tablet 650 mg  650 mg Oral Q6H    oxyCODONE IR (ROXICODONE) tablet 5 mg  5 mg Oral Q3H PRN    oxyCODONE IR (ROXICODONE) tablet 7.5-10 mg  7.5-10 mg Oral Q3H PRN    morphine injection 2 mg  2 mg IntraVENous Q3H PRN    Vancomycin dosing per pharmacy   Other Rx Dosing/Monitoring    vancomycin (VANCOCIN) 2000 mg in  ml infusion  2,000 mg IntraVENous Q12H    insulin glargine (LANTUS) injection 22 Units  22 Units SubCUTAneous QHS    [START ON 2/28/2018] insulin glargine (LANTUS) injection 22 Units  22 Units SubCUTAneous DAILY    enoxaparin (LOVENOX) injection 40 mg  40 mg SubCUTAneous Q24H    tamsulosin (FLOMAX) capsule 0.4 mg  0.4 mg Oral DAILY    SITagliptin (JANUVIA) tablet 100 mg  100 mg Oral DAILY    primidone (MYSOLINE) tablet 250 mg  250 mg Oral BID    lovastatin (MEVACOR) tablet 20 mg  20 mg Oral QHS    folic acid (FOLVITE) tablet 1 mg  1 mg Oral DAILY    lisinopril (PRINIVIL, ZESTRIL) tablet 20 mg  20 mg Oral DAILY    sertraline (ZOLOFT) tablet 100 mg  100 mg Oral DAILY    gabapentin (NEURONTIN) capsule 600 mg  600 mg Oral QHS    aspirin chewable tablet 81 mg  81 mg Oral DAILY    clonazePAM (KlonoPIN) tablet 0.5 mg  0.5 mg Oral QHS PRN    insulin regular (NOVOLIN R, HUMULIN R) injection   SubCUTAneous AC&HS    glucose chewable tablet 16 g  4 Tab Oral PRN    dextrose (D50W) injection syrg 12.5-25 g  12.5-25 g IntraVENous PRN    glucagon (GLUCAGEN) injection 1 mg  1 mg IntraMUSCular PRN    ceFAZolin (ANCEF) 2 g/20 mL in sterile water IV syringe  2 g IntraVENous Q8H    acetaminophen (TYLENOL) tablet 650 mg  650 mg Oral Q6H PRN     ______________________________________________________________________  EXPECTED LENGTH OF STAY: 3d 16h  ACTUAL LENGTH OF STAY:          3                 Carolina Jamison MD

## 2018-02-27 NOTE — PROGRESS NOTES
Pharmacist Note  Warfarin Dosing  Consult provided for this 69 y.o. male to manage warfarin for Orthopedic Surgery (VTE prophylaxis)    INR Goal: 1.7- 2.2    Therapy Day: 2    Drugs that may increase INR:None  Drugs that may decrease INR: None  Other current anticoagulants/ drugs that may increase bleeding risk: NSAID  Risk factors: Age > 65  Daily INR ordered: YES    Recent Labs      02/27/18   0624  02/26/18   0252  02/24/18   1540  02/24/18   1212   HGB  10.8*  9.5*   --   10.5*   INR  1.2*   --   1.4*   --      Date               INR                 Dose  2/24  1.4  ---  2/26  ---  10 mg  2/27  1.2  5 mg    Assessment/ Plan: Will order warfarin 5 mg PO x 1 dose. Pharmacy will continue to monitor daily and adjust therapy as indicated. Please contact the pharmacist at  or  for discharge recommendations if needed.

## 2018-02-27 NOTE — ANESTHESIA POSTPROCEDURE EVALUATION
Post-Anesthesia Evaluation and Assessment    Patient: Beryle Rajas MRN: [id number]  SSN: [id number]    YOB: 1950  Age: 69 y.o. Sex: male       Cardiovascular Function/Vital Signs  Visit Vitals    /62    Pulse 95    Temp 36.7 °C (98 °F)    Resp 20    Ht 5' 10.5\" (1.791 m)    Wt 126.4 kg (278 lb 10.6 oz)    SpO2 96%    BMI 39.42 kg/m2       Patient is status post general anesthesia for Procedure(s):  INCISION AND DRAINAGE OF RIGHT HIP WITH POLY EXCHANGE  (original procedure was posterior). Nausea/Vomiting: None    Postoperative hydration reviewed and adequate. Pain:  Pain Scale 1: Numeric (0 - 10) (02/26/18 2207)  Pain Intensity 1: 4 (02/26/18 2207)   Managed    Neurological Status:   Neuro (WDL): Within Defined Limits (02/26/18 2207)  Neuro  Neurologic State: Sleeping (02/26/18 2207)  LUE Motor Response: Purposeful (02/26/18 2207)  LLE Motor Response: Purposeful (02/26/18 2207)  RUE Motor Response: Purposeful (02/26/18 2207)  RLE Motor Response: Purposeful (02/26/18 2207)   At baseline    Mental Status and Level of Consciousness: Arousable    Pulmonary Status:   O2 Device: Nasal cannula (02/26/18 2204)   Adequate oxygenation and airway patent    Complications related to anesthesia: None    Post-anesthesia assessment completed.  No concerns    Signed By: Madhuri Guallpa MD     February 26, 2018

## 2018-02-27 NOTE — PROGRESS NOTES
Pharmacist Note - Vancomycin Dosing    Consult provided for this 69 y.o. male for indication of bacteremia. Antibiotic regimen(s): Cefazolin and Vancomycin    Recent Labs      18   0252  18   0346  18   1212   WBC  8.3   --   14.1*   CREA  0.82  0.86  0.98   BUN  14  17  22*     Frequency of BMP: daily entered per protocol (3/2)  Height: 179.1 cm  Weight: 126.4 kg  Est CrCl: ~110-115 ml/min  Temp (24hrs), Av.7 °F (37.1 °C), Min:98 °F (36.7 °C), Max:99 °F (37.2 °C)    Cultures:   Blood:  Staph Lugdunensis   Blood:  Staph Epi   Wound:  Staph Lugdunensis   Blood:  pending      Goal trough = 15 - 20 mcg/mL at this time until Staph Aureus ruled out with newest cultures    Therapy will be initiated with a loading dose of 2000 mg IV x 1 to be followed by a maintenance dose of 2000 mg IV every 12 hours (avoiding q8 due to weight > 100kg). Pharmacy to follow patient daily and order levels / make dose adjustments as appropriate.

## 2018-02-27 NOTE — PROGRESS NOTES
ID Progress Note  2018     Cefazolin    - present      Irrigation and debridement, right hip, via anterior approach with removal of metal liner with placement of polyethylene liner and replacement of femoral head, placement of vancomycin and tobramycin  (18)    Subjective:     Afebrile. He has no dyspnea, abdominal pain, diarrhea. Had surgery yesterday and he has some hip soreness. Objective:     Vitals:   Visit Vitals    /72 (BP 1 Location: Right arm, BP Patient Position: At rest)    Pulse 77    Temp 97.8 °F (36.6 °C)    Resp 18    Ht 5' 10.5\" (1.791 m)    Wt 126.4 kg (278 lb 10.6 oz)    SpO2 99%    BMI 39.42 kg/m2        Tmax:  Temp (24hrs), Av.2 °F (36.8 °C), Min:97.5 °F (36.4 °C), Max:98.7 °F (37.1 °C)      Exam:    Not in distress  Eyes: pink conjunctivae, anicteric sclerae  No cervical lymphadenopathy    Lungs: clear to auscultation, no rales, wheezes or rhonchi   Heart: s1, s2, no murmurs, rubs or clicks   Abdomen: soft, nontender, no guarding or rebound   Extremities: right hip bandaged and with hemovac    Labs:   Lab Results   Component Value Date/Time    WBC 12.3 (H) 2018 06:24 AM    HGB 10.8 (L) 2018 06:24 AM    HCT 32.9 (L) 2018 06:24 AM    PLATELET 891  06:24 AM    MCV 92.7 2018 06:24 AM     Lab Results   Component Value Date/Time    Sodium 133 (L) 2018 06:24 AM    Potassium 3.8 2018 06:24 AM    Chloride 103 2018 06:24 AM    CO2 22 2018 06:24 AM    Anion gap 8 2018 06:24 AM    Glucose 250 (H) 2018 06:24 AM    BUN 14 2018 06:24 AM    Creatinine 0.69 (L) 2018 06:24 AM    BUN/Creatinine ratio 20 2018 06:24 AM    GFR est AA >60 2018 06:24 AM    GFR est non-AA >60 2018 06:24 AM    Calcium 8.1 (L) 2018 06:24 AM    Bilirubin, total 0.6 2018 03:46 AM    AST (SGOT) 36 2018 03:46 AM    Alk.  phosphatase 63 2018 03:46 AM    Protein, total 6.4 2018 03:46 AM    Albumin 2.1 (L) 02/25/2018 03:46 AM    Globulin 4.3 (H) 02/25/2018 03:46 AM    A-G Ratio 0.5 (L) 02/25/2018 03:46 AM    ALT (SGPT) 19 02/25/2018 03:46 AM           Assessment:     1. Staph lugdunensis bacteremia in 2/4 bottles. 2. MV endocarditis   3. right prosthetic hip infection. 4.  Elevated ESR and CRP. 5.  Diabetes. 6.  History of patent foramen ovale. 7.  Hypertension  8. Sleep apnea. Recommendations:     He will need cefazolin for at least 6 weeks from surgery. I plan to put him on oral therapy after the we finish his IV course.        Jagruti Chambers MD

## 2018-02-27 NOTE — PROGRESS NOTES
Pharmacist Note  Warfarin Dosing  Consult provided for this 69 y.o. male to manage warfarin for Orthopedic Surgery (VTE prophylaxis)    INR Goal: 1.7- 2.2    Therapy Day: 2    Drugs that may increase INR:None  Drugs that may decrease INR: None  Other current anticoagulants/ drugs that may increase bleeding risk: NSAID, lovenox  Risk factors: Age > 65  Daily INR ordered: YES    Recent Labs      02/27/18   0624  02/26/18   0252  02/24/18   1540  02/24/18   1212   HGB  10.8*  9.5*   --   10.5*   INR  1.2*   --   1.4*   --      Date               INR                 Dose  2/24  1.4  ---  2/26  ---  10 mg  2/27  1.2  5 mg    Assessment/ Plan: Will order warfarin 5 mg PO x 1 dose. Pharmacy will continue to monitor daily and adjust therapy as indicated. Please contact the pharmacist at  or  for discharge recommendations if needed.

## 2018-02-27 NOTE — PROGRESS NOTES
Chart reviewed. Therapy is recommending rehab. CRM met with the patient to offer rehab choice. The patient chose [address] [place] [address]. Referral sent via [employer]. The patient will need 6 weeks of IV abx per Dr. Mahogany Mary. CRM will send notification to [employer] via [employer] to check the insurance benefits for after rehab. CRM will follow.  DENNIS

## 2018-02-28 LAB
ANION GAP SERPL CALC-SCNC: 7 MMOL/L (ref 5–15)
APPEARANCE UR: CLEAR
BACTERIA SPEC CULT: NORMAL
BACTERIA URNS QL MICRO: NEGATIVE /HPF
BILIRUB UR QL: NEGATIVE
BUN SERPL-MCNC: 11 MG/DL (ref 6–20)
BUN/CREAT SERPL: 17 (ref 12–20)
CALCIUM SERPL-MCNC: 7.4 MG/DL (ref 8.5–10.1)
CHLORIDE SERPL-SCNC: 104 MMOL/L (ref 97–108)
CO2 SERPL-SCNC: 24 MMOL/L (ref 21–32)
COLOR UR: ABNORMAL
CREAT SERPL-MCNC: 0.65 MG/DL (ref 0.7–1.3)
EPITH CASTS URNS QL MICRO: ABNORMAL /LPF
ERYTHROCYTE [DISTWIDTH] IN BLOOD BY AUTOMATED COUNT: 14.4 % (ref 11.5–14.5)
GLUCOSE BLD STRIP.AUTO-MCNC: 187 MG/DL (ref 65–100)
GLUCOSE BLD STRIP.AUTO-MCNC: 270 MG/DL (ref 65–100)
GLUCOSE BLD STRIP.AUTO-MCNC: 296 MG/DL (ref 65–100)
GLUCOSE BLD STRIP.AUTO-MCNC: 296 MG/DL (ref 65–100)
GLUCOSE SERPL-MCNC: 217 MG/DL (ref 65–100)
GLUCOSE UR STRIP.AUTO-MCNC: >1000 MG/DL
GRAM STN SPEC: NORMAL
GRAM STN SPEC: NORMAL
HCT VFR BLD AUTO: 28.8 % (ref 36.6–50.3)
HGB BLD-MCNC: 9.6 G/DL (ref 12.1–17)
HGB UR QL STRIP: NEGATIVE
INR PPP: 2.7 (ref 0.9–1.1)
INR PPP: 2.8 (ref 0.9–1.1)
KETONES UR QL STRIP.AUTO: NEGATIVE MG/DL
LEUKOCYTE ESTERASE UR QL STRIP.AUTO: NEGATIVE
MCH RBC QN AUTO: 30.5 PG (ref 26–34)
MCHC RBC AUTO-ENTMCNC: 33.3 G/DL (ref 30–36.5)
MCV RBC AUTO: 91.4 FL (ref 80–99)
NITRITE UR QL STRIP.AUTO: NEGATIVE
NRBC # BLD: 0 K/UL (ref 0–0.01)
NRBC BLD-RTO: 0 PER 100 WBC
PH UR STRIP: 6 [PH] (ref 5–8)
PLATELET # BLD AUTO: 242 K/UL (ref 150–400)
PMV BLD AUTO: 10.2 FL (ref 8.9–12.9)
POTASSIUM SERPL-SCNC: 3.6 MMOL/L (ref 3.5–5.1)
PROT UR STRIP-MCNC: NEGATIVE MG/DL
PROTHROMBIN TIME: 27.7 SEC (ref 9–11.1)
PROTHROMBIN TIME: 29.3 SEC (ref 9–11.1)
RBC # BLD AUTO: 3.15 M/UL (ref 4.1–5.7)
RBC #/AREA URNS HPF: ABNORMAL /HPF (ref 0–5)
SERVICE CMNT-IMP: ABNORMAL
SERVICE CMNT-IMP: NORMAL
SERVICE CMNT-IMP: NORMAL
SODIUM SERPL-SCNC: 135 MMOL/L (ref 136–145)
SP GR UR REFRACTOMETRY: 1.02 (ref 1–1.03)
UR CULT HOLD, URHOLD: NORMAL
UROBILINOGEN UR QL STRIP.AUTO: 0.2 EU/DL (ref 0.2–1)
WBC # BLD AUTO: 9.2 K/UL (ref 4.1–11.1)
WBC URNS QL MICRO: ABNORMAL /HPF (ref 0–4)

## 2018-02-28 PROCEDURE — 74011636637 HC RX REV CODE- 636/637: Performed by: HOSPITALIST

## 2018-02-28 PROCEDURE — 85027 COMPLETE CBC AUTOMATED: CPT | Performed by: INTERNAL MEDICINE

## 2018-02-28 PROCEDURE — 74011250637 HC RX REV CODE- 250/637: Performed by: INTERNAL MEDICINE

## 2018-02-28 PROCEDURE — 81001 URINALYSIS AUTO W/SCOPE: CPT | Performed by: INTERNAL MEDICINE

## 2018-02-28 PROCEDURE — 74011636637 HC RX REV CODE- 636/637: Performed by: INTERNAL MEDICINE

## 2018-02-28 PROCEDURE — 97165 OT EVAL LOW COMPLEX 30 MIN: CPT

## 2018-02-28 PROCEDURE — 80048 BASIC METABOLIC PNL TOTAL CA: CPT | Performed by: PHYSICIAN ASSISTANT

## 2018-02-28 PROCEDURE — 82962 GLUCOSE BLOOD TEST: CPT

## 2018-02-28 PROCEDURE — 97116 GAIT TRAINING THERAPY: CPT

## 2018-02-28 PROCEDURE — 97535 SELF CARE MNGMENT TRAINING: CPT

## 2018-02-28 PROCEDURE — G8988 SELF CARE GOAL STATUS: HCPCS

## 2018-02-28 PROCEDURE — G8987 SELF CARE CURRENT STATUS: HCPCS

## 2018-02-28 PROCEDURE — 85610 PROTHROMBIN TIME: CPT | Performed by: PHYSICIAN ASSISTANT

## 2018-02-28 PROCEDURE — 65270000029 HC RM PRIVATE

## 2018-02-28 PROCEDURE — 74011250636 HC RX REV CODE- 250/636: Performed by: INTERNAL MEDICINE

## 2018-02-28 PROCEDURE — 97110 THERAPEUTIC EXERCISES: CPT

## 2018-02-28 PROCEDURE — 74011250636 HC RX REV CODE- 250/636: Performed by: PHYSICIAN ASSISTANT

## 2018-02-28 PROCEDURE — 36415 COLL VENOUS BLD VENIPUNCTURE: CPT | Performed by: PHYSICIAN ASSISTANT

## 2018-02-28 PROCEDURE — 74011250637 HC RX REV CODE- 250/637: Performed by: PHYSICIAN ASSISTANT

## 2018-02-28 RX ORDER — PHENAZOPYRIDINE HYDROCHLORIDE 100 MG/1
100 TABLET, FILM COATED ORAL
Status: COMPLETED | OUTPATIENT
Start: 2018-02-28 | End: 2018-03-02

## 2018-02-28 RX ORDER — INSULIN GLARGINE 100 [IU]/ML
26 INJECTION, SOLUTION SUBCUTANEOUS DAILY
Status: DISCONTINUED | OUTPATIENT
Start: 2018-03-01 | End: 2018-03-03

## 2018-02-28 RX ORDER — INSULIN GLARGINE 100 [IU]/ML
26 INJECTION, SOLUTION SUBCUTANEOUS
Status: DISCONTINUED | OUTPATIENT
Start: 2018-02-28 | End: 2018-03-03

## 2018-02-28 RX ADMIN — ACETAMINOPHEN 650 MG: 325 TABLET, FILM COATED ORAL at 01:04

## 2018-02-28 RX ADMIN — ACETAMINOPHEN 650 MG: 325 TABLET, FILM COATED ORAL at 08:55

## 2018-02-28 RX ADMIN — PHENAZOPYRIDINE HYDROCHLORIDE 100 MG: 100 TABLET ORAL at 18:50

## 2018-02-28 RX ADMIN — SITAGLIPTIN 100 MG: 100 TABLET, FILM COATED ORAL at 09:33

## 2018-02-28 RX ADMIN — Medication 10 ML: at 21:51

## 2018-02-28 RX ADMIN — Medication 2 G: at 03:49

## 2018-02-28 RX ADMIN — OXYCODONE HYDROCHLORIDE 5 MG: 5 TABLET ORAL at 16:57

## 2018-02-28 RX ADMIN — VANCOMYCIN HYDROCHLORIDE 2000 MG: 10 INJECTION, POWDER, LYOPHILIZED, FOR SOLUTION INTRAVENOUS at 01:06

## 2018-02-28 RX ADMIN — POLYETHYLENE GLYCOL 3350 17 G: 17 POWDER, FOR SOLUTION ORAL at 09:34

## 2018-02-28 RX ADMIN — OXYCODONE HYDROCHLORIDE 5 MG: 5 TABLET ORAL at 12:43

## 2018-02-28 RX ADMIN — Medication 2 G: at 18:50

## 2018-02-28 RX ADMIN — Medication 10 ML: at 15:27

## 2018-02-28 RX ADMIN — HUMAN INSULIN 3 UNITS: 100 INJECTION, SOLUTION SUBCUTANEOUS at 21:51

## 2018-02-28 RX ADMIN — HUMAN INSULIN 5 UNITS: 100 INJECTION, SOLUTION SUBCUTANEOUS at 16:56

## 2018-02-28 RX ADMIN — FAMOTIDINE 20 MG: 20 TABLET, FILM COATED ORAL at 09:34

## 2018-02-28 RX ADMIN — LISINOPRIL 20 MG: 20 TABLET ORAL at 09:33

## 2018-02-28 RX ADMIN — INSULIN GLARGINE 22 UNITS: 100 INJECTION, SOLUTION SUBCUTANEOUS at 08:55

## 2018-02-28 RX ADMIN — Medication 10 ML: at 08:56

## 2018-02-28 RX ADMIN — FOLIC ACID 1 MG: 1 TABLET ORAL at 09:33

## 2018-02-28 RX ADMIN — HUMAN INSULIN 2 UNITS: 100 INJECTION, SOLUTION SUBCUTANEOUS at 07:30

## 2018-02-28 RX ADMIN — GABAPENTIN 600 MG: 300 CAPSULE ORAL at 21:45

## 2018-02-28 RX ADMIN — LOVASTATIN 20 MG: 20 TABLET ORAL at 21:45

## 2018-02-28 RX ADMIN — PRIMIDONE 250 MG: 250 TABLET ORAL at 21:45

## 2018-02-28 RX ADMIN — ACETAMINOPHEN 650 MG: 325 TABLET, FILM COATED ORAL at 18:50

## 2018-02-28 RX ADMIN — FAMOTIDINE 20 MG: 20 TABLET, FILM COATED ORAL at 21:45

## 2018-02-28 RX ADMIN — DOCUSATE SODIUM AND SENNOSIDES 1 TABLET: 8.6; 5 TABLET, FILM COATED ORAL at 09:34

## 2018-02-28 RX ADMIN — ACETAMINOPHEN 650 MG: 325 TABLET, FILM COATED ORAL at 11:39

## 2018-02-28 RX ADMIN — Medication 2 G: at 10:44

## 2018-02-28 RX ADMIN — SERTRALINE HYDROCHLORIDE 100 MG: 50 TABLET ORAL at 09:34

## 2018-02-28 RX ADMIN — HUMAN INSULIN 5 UNITS: 100 INJECTION, SOLUTION SUBCUTANEOUS at 11:39

## 2018-02-28 RX ADMIN — PRIMIDONE 250 MG: 250 TABLET ORAL at 08:55

## 2018-02-28 RX ADMIN — ASPIRIN 81 MG 81 MG: 81 TABLET ORAL at 09:33

## 2018-02-28 RX ADMIN — INSULIN GLARGINE 26 UNITS: 100 INJECTION, SOLUTION SUBCUTANEOUS at 21:51

## 2018-02-28 RX ADMIN — OXYCODONE HYDROCHLORIDE 5 MG: 5 TABLET ORAL at 09:33

## 2018-02-28 RX ADMIN — TAMSULOSIN HYDROCHLORIDE 0.4 MG: 0.4 CAPSULE ORAL at 09:34

## 2018-02-28 RX ADMIN — Medication 10 ML: at 18:52

## 2018-02-28 RX ADMIN — DOCUSATE SODIUM AND SENNOSIDES 1 TABLET: 8.6; 5 TABLET, FILM COATED ORAL at 21:45

## 2018-02-28 RX ADMIN — OXYCODONE HYDROCHLORIDE 5 MG: 5 TABLET ORAL at 21:51

## 2018-02-28 NOTE — PROGRESS NOTES
Problem: Mobility Impaired (Adult and Pediatric)  Goal: *Acute Goals and Plan of Care (Insert Text)  Physical Therapy Goals  Initiated 2/27/2018    1. Patient will move from supine to sit and sit to supine  in bed with supervision/set-up within 4 days. 2. Patient will perform sit to stand with supervision/set-up within 4 days. 3. Patient will ambulate with supervision/set-up for 50 feet with the least restrictive device within 4 days. 4. Patient will perform home exercise program per protocol with independence within 4 days. physical Therapy TREATMENT  Patient: Fercho Arevalo (72 y.o. male)  Date: 2/28/2018  Diagnosis: FUO (fever of unknown origin)  Bacteremia  status post right hip arthroplasty Infected prosthesis of right hip (HCC)  Procedure(s) (LRB):  INCISION AND DRAINAGE OF RIGHT HIP WITH POLY EXCHANGE  (original procedure was posterior) (Right) 2 Days Post-Op  Precautions: Fall, WBAT, Contact  Chart, physical therapy assessment, plan of care and goals were reviewed. ASSESSMENT:  Patient received supine, cleared for OOB activity and ready to work with PT. Improved transition to sitting EOB via rolling to L side first and pushing up vs prior attempts to scoot legs out first and then use rails to sit straight up. Improved sit to stand as well, CGA to tiny amount of MIN A with very mild posterior lean initially. Gait training around the room with RW and CGA with improved antalgia on R and occasional step-thru pattern. Returned to bedside chair where performed LAQs, hip flex and hip abd/add x 5 reps on R and 10 reps on L. Progressing well and still appropriate/highly motivated for IP rehab to work to regain prior functional mobility independence.   Progression toward goals:  [x]      Improving appropriately and progressing toward goals  []      Improving slowly and progressing toward goals  []      Not making progress toward goals and plan of care will be adjusted     PLAN:  Patient continues to benefit from skilled intervention to address the above impairments. Continue treatment per established plan of care. Discharge Recommendations:  Inpatient Rehab  Further Equipment Recommendations for Discharge:  TBD in rehab     SUBJECTIVE:   Patient stated I have to get used to the right leg being the \"bad leg\" now.     OBJECTIVE DATA SUMMARY:   Critical Behavior:  Neurologic State: Alert  Orientation Level: Oriented X4  Cognition: Appropriate decision making, Appropriate for age attention/concentration, Appropriate safety awareness  Safety/Judgement: Awareness of environment, Insight into deficits  Functional Mobility Training:  Bed Mobility:     Supine to Sit: Minimum assistance (rolling onto L side first)     Scooting: Contact guard assistance        Transfers:  Sit to Stand: Contact guard assistance;Minimum assistance  Stand to Sit: Contact guard assistance (trouble with final decelleration)        Bed to Chair: Minimum assistance                    Balance:  Sitting: Impaired  Sitting - Static: Fair (occasional)  Sitting - Dynamic: Fair (occasional)  Standing: Impaired  Standing - Static: Good;Constant support  Standing - Dynamic : Fair  Ambulation/Gait Training:  Distance (ft): 50 Feet (ft) (multiple laps around room)  Assistive Device: Gait belt;Walker, rolling  Ambulation - Level of Assistance: Contact guard assistance        Gait Abnormalities: Antalgic;Decreased step clearance; Step to gait        Base of Support: Widened  Stance: Right decreased  Speed/Clarice: Pace decreased (<100 feet/min); Slow  Step Length: Right shortened;Left shortened                    Stairs:              Therapeutic Exercises:   SUPINE  EXERCISES   Sets   Reps   Active Active Assist   Passive Self ROM   Comments   Ankle Pumps   []                                        []                                        []                                        []                                           Quad Sets   [] []                                        []                                        []                                           Heel Slides   []                                        []                                        []                                        []                                           Hip Abduction   []                                        []                                        []                                        []                                           Glut Sets   []                                        []                                        []                                        []                                              []                                        []                                        []                                        []                                              []                                        []                                        []                                        []                                             SITTING  EXERCISES   Sets   Reps   Active Active Assist   Passive Self ROM   Comments   Hip Flex 1 5 [x]                                        []                                        []                                        []                                        Sets of 10 for all on LLE   Hip Abduction 1 5 [x]                                        []                                        []                                        []                                           LAQs 1 5 [x]                                        []                                        []                                        []                                              []                                        []                                        []                                        []                                             Pain:  Pain Scale 1: Numeric (0 - 10)  Pain Intensity 1: 1  Pain Location 1: Hip  Pain Orientation 1: Right  Pain Description 1: Aching  Pain Intervention(s) 1: Medication (see MAR)  Activity Tolerance:   Improved ambulation tolerance and tolerating sitting up in chair for 1.5-2 hours    Please refer to the flowsheet for vital signs taken during this treatment.   After treatment:   [x] Patient left in no apparent distress sitting up in chair  [] Patient left in no apparent distress in bed  [x] Call bell left within reach  [x] Nursing notified  [] Caregiver present  [] Bed alarm activated    COMMUNICATION/COLLABORATION:   The patients plan of care was discussed with: Registered Nurse    Mahad Callejas, PT   Time Calculation: 23 mins

## 2018-02-28 NOTE — PROGRESS NOTES
Hospitalist Progress Note  Edith Arvizu MD  Answering service: [phone] OR [phone] from in house phone      Date of Service:  2018  NAME:  Mathieu Shearer:  1950  MRN:  [id number]      Admission Summary:   68 yo man with DM2, HTN, HLD, PFO, DJD, depression, PUD, s/p b/l THAs, sleep apnea, GERD, and ADD presented to the ED from home on 18 with fever x12 days and right hip pain. Pt had a GLF on 18.     Interval history / Subjective:   C/o pain in right hip after working with PT/OT; still with some left hip pain; still c/o burning pain when he urinates; d/w nurse and CM; POD #2     Assessment & Plan:     Dysuria,   - thought to be due to zegn which was d/c'd already  - check UA  - add pyridium    Sepsis with staph lugdunensis bacteremia (POA) - due to septic arthritis  - BCx  staph khalidaunensis,  CoNS  bottles,  NGTD  - TTE  with LVEF 55-60%, aortic sclerosis  - EULALIO  with LVEF 55-60%, tiny PFO, 2mm vegetation on anterior leaflet  - flu negative  - continue IVF  - continue cefazolin and vanc per ID; will need cefazolin x6 weeks from  due to IE (end 18)  - will need PICC when INR <=1.8    Mitral valve infective endocarditis - as above     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)  - R hip XR  without acute abnormality   - joint Cx  staph angiensis  - Ortho following  - s/p I&D, removal of metal liner and placement of polyethylene liner and replacement of femoral head, placement of vanc and tobramycin beads ; pathology pending  - Hemovac drain in place  - warfarin for VTE Px with goal INR 1.7-2.2; s/p enoxaparin bridge     Hyponatremia, intermittent (POA) - due to dehydration, had resolved with IVF; stop IVF    DM2, uncontrolled (chronic) - recent A1c 9  - hold metformin, Jardiance  - increase home Lantus due to hyperglycemia  - continue home Januvia     HTN (chronic) - continue home lisinopril     HLD (chronic) - continue home lovastatin     Depression (chronic) - continue home sertraline     BPH (chronic) - continue home Flomax     Chronic pain (chronic) - continue primidone, gabapentin     Morbid obesity (chronic), Body mass index is 39.42 kg/(m^2). Code status: full  DVT prophylaxis: warfarin    Care Plan discussed with: Patient/Family and Nurse  Disposition: [facility] when medically cleared and bed/auth available     Hospital Problems  Date Reviewed: 2/27/2018          Codes Class Noted POA    Sepsis ([facility] [address] ICD-10-CM: A41.9  ICD-9-CM: 038.9, 995.91  2/27/2018 Yes        * (Principal)Infected prosthesis of right hip ([facility] [address] ICD-10-CM: T84.51XA  ICD-9-CM: 996.66, V43.64  2/26/2018 Yes        Bacteremia ICD-10-CM: R78.81  ICD-9-CM: 790.7  2/24/2018 Yes            Review of Systems:   Pertinent items are noted in HPI. Vital Signs:    Last 24hrs VS reviewed since prior progress note.  Most recent are:  Visit Vitals    /61    Pulse 68    Temp 98.2 °F (36.8 °C)    Resp 16    Ht 5' 10.5\" (1.791 m)    Wt 126.4 kg (278 lb 10.6 oz)    SpO2 98%    BMI 39.42 kg/m2       Intake/Output Summary (Last 24 hours) at 02/28/18 1450  Last data filed at 02/28/18 1131   Gross per 24 hour   Intake             1020 ml   Output             1955 ml   Net             -935 ml      Physical Examination:     Gen - awake, NAD, obese, sitting in chair  HEENT - NCAT, PERRL, OP benign, MMM  Neck - supple, no masses  CV - regular rhythm, 2/6 systolic mumur at apex and RUSB  Resp - lungs CTA b/l, no wheezing  GI - +BS, soft, NT, ND, +BS  MSK - right hip ttp, Hemovac drain in place  Neuro - A&O, no focal deficits  Psych - calm and cooperative with normal affect  Skin - right hip dressing c/d/i    Data Review:    Review and/or order of clinical lab test  Review and/or order of tests in the radiology section of CPT  Review and/or order of tests in the medicine section of CPT    Labs:     Recent Labs 02/28/18 0355 02/27/18 0624   WBC  9.2  12.3*   HGB  9.6*  10.8*   HCT  28.8*  32.9*   PLT  242  267     Recent Labs      02/28/18 0355 02/27/18 0624 02/26/18 0252   NA  135*  133*  135*   K  3.6  3.8  3.4*   CL  104  103  104   CO2  24  22  22   BUN  11  14  14   CREA  0.65*  0.69*  0.82   GLU  217*  250*  131*   CA  7.4*  8.1*  7.9*     No results for input(s): SGOT, GPT, ALT, AP, TBIL, TBILI, TP, ALB, GLOB, GGT, AML, LPSE in the last 72 hours. No lab exists for component: AMYP, HLPSE  Recent Labs      02/28/18 0355 02/27/18 0624   INR  2.7*  1.2*   PTP  27.7*  12.3*      No results for input(s): FE, TIBC, PSAT, FERR in the last 72 hours. Lab Results   Component Value Date/Time    Folate 9.2 03/18/2010 11:26 AM      No results for input(s): PH, PCO2, PO2 in the last 72 hours. No results for input(s): CPK, CKNDX, TROIQ in the last 72 hours.     No lab exists for component: CPKMB  Lab Results   Component Value Date/Time    Cholesterol, total 141 07/26/2017 01:24 PM    HDL Cholesterol 63 07/26/2017 01:24 PM    LDL, calculated 58 07/26/2017 01:24 PM    Triglyceride 98 07/26/2017 01:24 PM    CHOL/HDL Ratio 3.5 11/01/2010 07:07 AM     Lab Results   Component Value Date/Time    Glucose (POC) 296 (H) 02/28/2018 11:22 AM    Glucose (POC) 187 (H) 02/28/2018 06:57 AM    Glucose (POC) 362 (H) 02/27/2018 09:45 PM    Glucose (POC) 401 (H) 02/27/2018 09:43 PM    Glucose (POC) 290 (H) 02/27/2018 04:01 PM     Lab Results   Component Value Date/Time    Color YELLOW/STRAW 02/24/2018 01:41 PM    Appearance CLEAR 02/24/2018 01:41 PM    Specific gravity 1.024 02/24/2018 01:41 PM    pH (UA) 5.5 02/24/2018 01:41 PM    Protein NEGATIVE  02/24/2018 01:41 PM    Glucose >1000 (A) 02/24/2018 01:41 PM    Ketone 15 (A) 02/24/2018 01:41 PM    Bilirubin NEGATIVE  02/24/2018 01:41 PM    Urobilinogen 1.0 02/24/2018 01:41 PM    Nitrites NEGATIVE  02/24/2018 01:41 PM    Leukocyte Esterase NEGATIVE  02/24/2018 01:41 PM Epithelial cells FEW 02/24/2018 01:41 PM    Bacteria NEGATIVE  02/24/2018 01:41 PM    WBC 0-4 02/24/2018 01:41 PM    RBC 0-5 02/24/2018 01:41 PM     Medications Reviewed:     Current Facility-Administered Medications   Medication Dose Route Frequency    Hold warfarin 2/28 for INR >2   Other ONCE    insulin glargine (LANTUS) injection 26 Units  26 Units SubCUTAneous QHS    [START ON 3/1/2018] insulin glargine (LANTUS) injection 26 Units  26 Units SubCUTAneous DAILY    warfarin - pharmacist to dose   Other Rx Dosing/Monitoring    0.9% sodium chloride infusion 250 mL  250 mL IntraVENous PRN    sodium chloride 0.9 % bolus infusion 500 mL  500 mL IntraVENous ONCE PRN    sodium chloride (NS) flush 5-10 mL  5-10 mL IntraVENous Q8H    sodium chloride (NS) flush 5-10 mL  5-10 mL IntraVENous PRN    naloxone (NARCAN) injection 0.4 mg  0.4 mg IntraVENous PRN    dexamethasone (DECADRON) 4 mg/mL injection 4 mg  4 mg IntraVENous Q6H PRN    hydrOXYzine HCl (ATARAX) tablet 10 mg  10 mg Oral Q8H PRN    famotidine (PEPCID) tablet 20 mg  20 mg Oral BID    senna-docusate (PERICOLACE) 8.6-50 mg per tablet 1 Tab  1 Tab Oral BID    polyethylene glycol (MIRALAX) packet 17 g  17 g Oral DAILY    bisacodyl (DULCOLAX) suppository 10 mg  10 mg Rectal DAILY PRN    acetaminophen (TYLENOL) tablet 650 mg  650 mg Oral Q6H    oxyCODONE IR (ROXICODONE) tablet 5 mg  5 mg Oral Q3H PRN    oxyCODONE IR (ROXICODONE) tablet 7.5-10 mg  7.5-10 mg Oral Q3H PRN    tamsulosin (FLOMAX) capsule 0.4 mg  0.4 mg Oral DAILY    SITagliptin (JANUVIA) tablet 100 mg  100 mg Oral DAILY    primidone (MYSOLINE) tablet 250 mg  250 mg Oral BID    lovastatin (MEVACOR) tablet 20 mg  20 mg Oral QHS    folic acid (FOLVITE) tablet 1 mg  1 mg Oral DAILY    lisinopril (PRINIVIL, ZESTRIL) tablet 20 mg  20 mg Oral DAILY    sertraline (ZOLOFT) tablet 100 mg  100 mg Oral DAILY    gabapentin (NEURONTIN) capsule 600 mg  600 mg Oral QHS    aspirin chewable tablet 81 mg  81 mg Oral DAILY    clonazePAM (KlonoPIN) tablet 0.5 mg  0.5 mg Oral QHS PRN    insulin regular (NOVOLIN R, HUMULIN R) injection   SubCUTAneous AC&HS    glucose chewable tablet 16 g  4 Tab Oral PRN    dextrose (D50W) injection syrg 12.5-25 g  12.5-25 g IntraVENous PRN    glucagon (GLUCAGEN) injection 1 mg  1 mg IntraMUSCular PRN    ceFAZolin (ANCEF) 2 g/20 mL in sterile water IV syringe  2 g IntraVENous Q8H    acetaminophen (TYLENOL) tablet 650 mg  650 mg Oral Q6H PRN     ______________________________________________________________________  EXPECTED LENGTH OF STAY: 3d 16h  ACTUAL LENGTH OF STAY:          170 Laila Liang MD

## 2018-02-28 NOTE — PROGRESS NOTES
The patient will have 100% coverage for home IV's per [facility] [employer] [facility]. Rehab eval with [facility] pending. HONEY ORTEGA spoke with Dannial Boeck with [facility]. They have accepted and will start the insurance authorization. CRM will follow.  Honey

## 2018-02-28 NOTE — PROGRESS NOTES
Pt's BG was 401 and 362 after a redo. Called hospitalist per DR Alfonzo Hayden to administer 8 units of sliding scale insulin.  Telephone with readback orders

## 2018-02-28 NOTE — PROGRESS NOTES
Bedside and Verbal shift change report given to Krystal Armstrong (oncoming nurse) by Hailey Rosas (offgoing nurse). Report included the following information SBAR, Kardex, Procedure Summary, Intake/Output and MAR.

## 2018-02-28 NOTE — PROGRESS NOTES
ID Progress Note  2018     Cefazolin    - present      Irrigation and debridement, right hip, via anterior approach with removal of metal liner with placement of polyethylene liner and replacement of femoral head, placement of vancomycin and tobramycin  (18)    Subjective:     Afebrile. He has no dyspnea, abdominal pain, diarrhea, sore throat    Objective:     Vitals:   Visit Vitals    /82 (BP 1 Location: Right arm, BP Patient Position: At rest)    Pulse 82    Temp 98.5 °F (36.9 °C)    Resp 16    Ht 5' 10.5\" (1.791 m)    Wt 126.4 kg (278 lb 10.6 oz)    SpO2 98%    BMI 39.42 kg/m2        Tmax:  Temp (24hrs), Av.2 °F (36.8 °C), Min:97.8 °F (36.6 °C), Max:98.5 °F (36.9 °C)      Exam:    Not in distress  Eyes: pink conjunctivae, anicteric sclerae  No cervical lymphadenopathy    Lungs: clear to auscultation, no rales, wheezes or rhonchi   Heart: s1, s2, no murmurs, rubs or clicks   Abdomen: soft, nontender, no guarding or rebound   Extremities: right hip bandaged and with hemovac    Labs:   Lab Results   Component Value Date/Time    WBC 9.2 2018 03:55 AM    HGB 9.6 (L) 2018 03:55 AM    HCT 28.8 (L) 2018 03:55 AM    PLATELET 957  03:55 AM    MCV 91.4 2018 03:55 AM     Lab Results   Component Value Date/Time    Sodium 135 (L) 2018 03:55 AM    Potassium 3.6 2018 03:55 AM    Chloride 104 2018 03:55 AM    CO2 24 2018 03:55 AM    Anion gap 7 2018 03:55 AM    Glucose 217 (H) 2018 03:55 AM    BUN 11 2018 03:55 AM    Creatinine 0.65 (L) 2018 03:55 AM    BUN/Creatinine ratio 17 2018 03:55 AM    GFR est AA >60 2018 03:55 AM    GFR est non-AA >60 2018 03:55 AM    Calcium 7.4 (L) 2018 03:55 AM    Bilirubin, total 0.6 2018 03:46 AM    AST (SGOT) 36 2018 03:46 AM    Alk.  phosphatase 63 2018 03:46 AM    Protein, total 6.4 2018 03:46 AM    Albumin 2.1 (L) 2018 03:46 AM Globulin 4.3 (H) 02/25/2018 03:46 AM    A-G Ratio 0.5 (L) 02/25/2018 03:46 AM    ALT (SGPT) 19 02/25/2018 03:46 AM           Assessment:     1. Staph lugdunensis bacteremia in 2/4 bottles. 2. MV endocarditis   3. right prosthetic hip infection. 4.  Elevated ESR and CRP. 5.  Diabetes. 6.  History of patent foramen ovale. 7.  Hypertension  8. Sleep apnea. Recommendations:     He will need cefazolin for at least 6 weeks from surgery. I plan to put him on oral therapy after the we finish his IV course.      36 [facility] Linda Rodriguez MD

## 2018-02-28 NOTE — PROGRESS NOTES
Problem: Self Care Deficits Care Plan (Adult)  Goal: *Acute Goals and Plan of Care (Insert Text)  Occupational Therapy Goals  Initiated 2/28/2018  1. Patient will perform lower body ADLs with AE modified independence within 7 day(s). 2.  Patient will perform upper body ADLs standing 5 mins without fatigue or LOB with modified independence within 7 day(s). 3.  Patient will perform toilet transfer with modified independence within 7 day(s). 4.  Patient will perform all aspects of toileting with modified independence within 7 day(s). 5.  Patient will participate in upper extremity therapeutic exercise/activities with independence for 10 minutes within 7 day(s). 6.  Patient will utilize energy conservation techniques during functional activities without cues within 7 day(s). Occupational Therapy EVALUATION  Patient: Rina Triana (49 y.o. male)  Date: 2/28/2018  Primary Diagnosis: FUO (fever of unknown origin)  Bacteremia  status post right hip arthroplasty  Procedure(s) (LRB):  INCISION AND DRAINAGE OF RIGHT HIP WITH POLY EXCHANGE  (original procedure was posterior) (Right) 2 Days Post-Op   Precautions:   Fall, WBAT, Contact    ASSESSMENT :  Based on the objective data described below, the patient presents with decreased balance and pain s/p R hip I&D, femur head replacement, with drain, presenting with mod A x2 for bed mobility, min A to mod A for sit to stand transfers to RW, able to ambulate to raised toilet with mod A For transfer, mod A for hygiene and clothing mangement, currently total A for LB dressing and min A to setup for UB dressing. Limited by pain this session 8/10 RN notified, ice replaced. Patient with multiple hip surgeries, uses AE for LB dressing at baseline, would benefit from inpatient rehab to maximize independence with ADLs and return home with family to assist, excellent candidate, can tolerate 3 hours of therapy.     Patient will benefit from skilled intervention to address the above impairments. Patients rehabilitation potential is considered to be Excellent  Factors which may influence rehabilitation potential include:   []                None noted  []                Mental ability/status  []                Medical condition  []                Home/family situation and support systems  []                Safety awareness  [x]                Pain tolerance/management  []                Other:      PLAN :  Recommendations and Planned Interventions:  [x]                  Self Care Training                  [x]           Therapeutic Activities  [x]                  Functional Mobility Training    []           Cognitive Retraining  [x]                  Therapeutic Exercises           [x]           Endurance Activities  [x]                  Balance Training                   []           Neuromuscular Re-Education  []                  Visual/Perceptual Training     []      Home Safety Training  [x]                  Patient Education                 []           Family Training/Education  []                  Other (comment):    Frequency/Duration: Patient will be followed by occupational therapy 5 times a week to address goals. Discharge Recommendations: Inpatient Rehab  Further Equipment Recommendations for Discharge: TBD at rehab     SUBJECTIVE:   Patient stated I use those tools all the time for my dressing.     OBJECTIVE DATA SUMMARY:   HISTORY:   Past Medical History:   Diagnosis Date    ADD (attention deficit disorder)     Anemia due to blood loss     Hinson's esophagus with esophagitis     Benign essential tremor     [facility]    Depression     DJD (degenerative joint disease) of hip     bilat    DM (diabetes mellitus) ([facility] 3/17/2010    ED (erectile dysfunction) of organic origin     Fall on or from sidewalk curb 9/24/2015    Femur fracture (HCC)     Gastritis and duodenitis     GERD (gastroesophageal reflux disease)     resolved after discontinuing diclofenac  Hematuria 6/2016    High cholesterol 3/17/2010    HTN (hypertension) 3/17/2010    Morbid obesity ([facility]     Murmur, cardiac 2016    PFO (patent foramen ovale) 7/26/2017    PUD (peptic ulcer disease)     Shingles 6/2016    RESOLVING- NO RASH (HEAD)    Sleep apnea     CPAP     Past Surgical History:   Procedure Laterality Date    ENDOSCOPY, COLON, DIAGNOSTIC      HX CHOLECYSTECTOMY  1995    HX GI  1980    gastroplasty    HX HERNIA REPAIR  1995    HX HIP REPLACEMENT      Left    HX ORTHOPAEDIC  2011    rot cuff repair    HX TONSILLECTOMY  1950    TOTAL HIP ARTHROPLASTY  05/2010    right    TOTAL HIP ARTHROPLASTY  08/01/2016    left       Prior Level of Function/Environment/Context: was mod I to I at baseline, working, used AE for [employer] in which the patient is/was successful, what are the barriers preventing that success:   Performance Patterns (routines, roles, habits, and rituals):   Personal Interests and/or values:   Expanded or extensive additional review of patient history: multiple hip surgeries    Home Situation  Home Environment: Private residence  # Steps to Enter: 3  Rails to Enter: Yes  Hand Rails : Left  One/Two Story Residence: Two story, live on 1st floor  # of Interior Steps: 15  Living Alone: No  Support Systems: Child(dariusz), Family member(s)  Patient Expects to be Discharged to[de-identified] Private residence  Current DME Used/Available at Home: Raised toilet seat, Walker, rolling, Cane, straight, Shower chair, Grab bars, Commode, bedside, Adaptive bathing aides  [x]  Right hand dominant   []  Left hand dominant    EXAMINATION OF PERFORMANCE DEFICITS:  Cognitive/Behavioral Status:  Neurologic State: Alert  Orientation Level: Oriented X4  Cognition: Appropriate decision making; Appropriate for age attention/concentration; Appropriate safety awareness  Perception: Appears intact  Perseveration: No perseveration noted  Safety/Judgement: Awareness of environment; Insight into deficits    Skin: intact, wound drain intact    Edema: Moderate R hip    Hearing: Auditory  Auditory Impairment: None    Vision/Perceptual:                           Acuity: Within Defined Limits;Able to read employee name badge without difficulty         Range of Motion:    AROM: Within functional limits (B UE)                         Strength:  B UE 4/5  Strength: Generally decreased, functional                Coordination:  Coordination: Within functional limits  Fine Motor Skills-Upper: Left Intact; Right Intact         Tone & Sensation:  B UE   Tone: Normal                         Balance:  Sitting: Impaired  Sitting - Static: Fair (occasional)  Sitting - Dynamic: Fair (occasional)  Standing: Impaired  Standing - Static: Good;Constant support  Standing - Dynamic : Fair    Functional Mobility and Transfers for ADLs:  Bed Mobility:  Supine to Sit: Moderate assistance; Adaptive equipment; Additional time  Scooting: Contact guard assistance    Transfers:  Sit to Stand: Minimum assistance;Assist x1 (elevated bed slightly)  Stand to Sit: Contact guard assistance  Bed to Chair: Minimum assistance  Toilet Transfer : Moderate assistance;Assist x1;Additional time    ADL Assessment:  Feeding: Independent    Oral Facial Hygiene/Grooming: Setup    Bathing: Moderate assistance    Upper Body Dressing: Minimum assistance    Lower Body Dressing: Total assistance    Toileting: Moderate assistance (for hygiene and clothnig management)                ADL Intervention and task modifications:                                     Cognitive Retraining  Safety/Judgement: Awareness of environment; Insight into deficits    Educated patient on energy conservation techniques, strategies to maximize quality of life and decrease stress/anxiety. 1. Deep breathing  2. Educated on pacing and making sure he/she takes short frequent breaks (e.g. In the shower wash the upper body, rest for 1 minute, then wash the lower body, etc)  3.  Educated on using cooler water in the shower so as to not get fatigued from the heat  4. Educated on drying off by using a lynsey cloth robe  5. Educated on re-arranging his/her routine to allow for rest breaks in the morning routine  6. Educated on using a mantra and medication to decrease feelings of anxiety, especially when short of breath  7. Educated on looking at the consequences of his/her actions before deciding he/she needs to take on a task (e.g not getting down on one's hands and knees to wash floors because it will take all of one's energy for the day and result in exhaustion). 8. Educated on DME used to help conserve energy, such as a shower seat, a stool or chair in the kitchen, and pushing or pulling items instead of carrying them. 9. Educated on fall alert necklaces/bracelets to increase safety      Standing: Patient instructed and demonstrated to walk up to sink/counter top/surfaces, step into walker to increase safety of joint and fall prevention with Minimum assistance, Additional time and Assist x1. Patient educated about hip anatomy verbally and with pictures and educated to avoid internal rotation of R LE. Instructed to apply concept to ADLs within the home (no reaching across body to L  side, square off while using objects, slide objects along surfaces). Patient instructed to increase amount of time standing, observe standing position during ADLs in order to increase even weight bearing through bilateral LEs in order to increase independence with ADLs. Goal to be reached 30 days post - op, per orthopedic surgeon or per PT. Patient indicated understanding.     Therapeutic Exercise:      Functional Measure:  Barthel Index:    Bathin  Bladder: 10  Bowels: 10  Groomin  Dressin  Feeding: 10  Mobility: 10  Stairs: 0  Toilet Use: 5  Transfer (Bed to Chair and Back): 5  Total: 60       Barthel and G-code impairment scale:  Percentage of impairment CH  0% CI  1-19% CJ  20-39% CK  40-59% CL  60-79% CM  80-99% CN  100%   Barthel Score 0-100 100 99-80 79-60 59-40 20-39 1-19   0   Barthel Score 0-20 20 17-19 13-16 9-12 5-8 1-4 0      The Barthel ADL Index: Guidelines  1. The index should be used as a record of what a patient does, not as a record of what a patient could do. 2. The main aim is to establish degree of independence from any help, physical or verbal, however minor and for whatever reason. 3. The need for supervision renders the patient not independent. 4. A patient's performance should be established using the best available evidence. Asking the patient, friends/relatives and nurses are the usual sources, but direct observation and common sense are also important. However direct testing is not needed. 5. Usually the patient's performance over the preceding 24-48 hours is important, but occasionally longer periods will be relevant. 6. Middle categories imply that the patient supplies over 50 per cent of the effort. 7. Use of aids to be independent is allowed. Sammy Schulz., Barthel, D.W. ([id number]). Functional evaluation: the Barthel Index. [facility] (14)2. DAISY BalderramaF, Jose Rocha., Onelia Lezama., [place], [facility] (1999). Measuring the change indisability after inpatient rehabilitation; comparison of the responsiveness of the Barthel Index and Functional [place] Measure. Journal of Neurology, Neurosurgery, and Psychiatry, 66(4), 984-515. Dayan Okeefe, N.JOSE.A, SHILPI Payan, & Shantel Kingsley MBRANDON. (2004.) Assessment of post-stroke quality of life in cost-effectiveness studies: The usefulness of the Barthel Index and the EuroQoL-5D. Quality of Life Research, 13, 442-03       G codes: In compliance with CMSs Claims Based Outcome Reporting, the following G-code set was chosen for this patient based on their primary functional limitation being treated:     The outcome measure chosen to determine the severity of the functional limitation was the barthel index with a score of 60/100 which was correlated with the impairment scale. ? Self Care:     - CURRENT STATUS: CJ - 20%-39% impaired, limited or restricted    - GOAL STATUS: CI - 1%-19% impaired, limited or restricted    - D/C STATUS:  ---------------To be determined---------------     Occupational Therapy Evaluation Charge Determination   History Examination Decision-Making   MEDIUM Complexity : Expanded review of history including physical, cognitive and psychosocial  history  MEDIUM Complexity : 3-5 performance deficits relating to physical, cognitive , or psychosocial skils that result in activity limitations and / or participation restrictions MEDIUM Complexity : Patient may present with comorbidities that affect occupational performnce. Miniml to moderate modification of tasks or assistance (eg, physical or verbal ) with assesment(s) is necessary to enable patient to complete evaluation       Based on the above components, the patient evaluation is determined to be of the following complexity level: MEDIUM    Pain:  Pain Scale 1: Numeric (0 - 10)  Pain Intensity 1: 5  Pain Location 1: Hip  Pain Orientation 1: Right  Pain Description 1: Aching  Pain Intervention(s) 1: Medication (see MAR)  Activity Tolerance:   fair  Please refer to the flowsheet for vital signs taken during this treatment. After treatment:   [x] Patient left in no apparent distress sitting up in chair  [] Patient left in no apparent distress in bed  [x] Call bell left within reach  [x] Nursing notified  [] Caregiver present  [] Bed alarm activated    COMMUNICATION/EDUCATION:   The patients plan of care was discussed with: Physical Therapist and Registered Nurse. [x]    Home safety education was provided and the patient/caregiver indicated understanding. [x]    Patient/family have participated as able in goal setting and plan of care. [x]    Patient/family agree to work toward stated goals and plan of care.   []    Patient understands intent and goals of therapy, but is neutral about his/her participation. []    Patient is unable to participate in goal setting and plan of care. This patients plan of care is appropriate for delegation to FLORIAN.     Thank you for this referral.  Colleen Faulkner, OT  Time Calculation: 32 mins

## 2018-02-28 NOTE — DIABETES MGMT
DTC Progress Note    Recommendations/ Comments: Chart reviewed due to hyperglycemia. Noted Lantus 22 units in the morning started today. If appropriate, please consider changing correction scale to Humalog resistant scale. DTC to continue to follow. Current hospital DM medication: Lantus 22 units BID, Januvia 100 mg and correction scale Regular insulin, normal sensitivity. Chart reviewed on Lyle Das. Patient is a 69 y.o. male with known diabetes on Basaglar 22 units BID, Januvia 100 mg, Metformin 500 mg at dinner and Jardiance 25 mg at home. A1c:   Lab Results   Component Value Date/Time    Hemoglobin A1c 9.0 (H) 02/06/2018 08:27 AM    Hemoglobin A1c 9.6 (H) 11/01/2017 08:37 AM       Recent Glucose Results: Lab Results   Component Value Date/Time     (H) 02/28/2018 03:55 AM    GLUCPOC 187 (H) 02/28/2018 06:57 AM    GLUCPOC 362 (H) 02/27/2018 09:45 PM    GLUCPOC 401 (H) 02/27/2018 09:43 PM        Lab Results   Component Value Date/Time    Creatinine 0.65 (L) 02/28/2018 03:55 AM     Estimated Creatinine Clearance: 144.3 mL/min (based on Cr of 0.65). Active Orders   Diet    DIET DIABETIC CONSISTENT CARB Regular        PO intake: Patient Vitals for the past 72 hrs:   % Diet Eaten   02/27/18 1802 100 %   02/27/18 1254 100 %   02/27/18 0948 100 %       Will continue to follow as needed.     Thank you  Jhonny Judge RD, CDE

## 2018-02-28 NOTE — PROGRESS NOTES
Problem: Falls - Risk of  Goal: *Absence of Falls  Document Dameon Fall Risk and appropriate interventions in the flowsheet.    Outcome: Progressing Towards Goal  Fall Risk Interventions:  Mobility Interventions: Patient to call before getting OOB, PT Consult for mobility concerns, PT Consult for assist device competence         Medication Interventions: Patient to call before getting OOB    Elimination Interventions: Call light in reach, Patient to call for help with toileting needs    History of Falls Interventions: Door open when patient unattended, Investigate reason for fall

## 2018-02-28 NOTE — PROGRESS NOTES
Bedside shift change report given to [phone] (oncoming nurse) by Wm De Jesus (offgoing nurse). Report included the following information SBAR, Kardex, Intake/Output and MAR.

## 2018-02-28 NOTE — PROGRESS NOTES
Problem: Mobility Impaired (Adult and Pediatric)  Goal: *Acute Goals and Plan of Care (Insert Text)  Physical Therapy Goals  Initiated 2/27/2018    1. Patient will move from supine to sit and sit to supine  in bed with supervision/set-up within 4 days. 2. Patient will perform sit to stand with supervision/set-up within 4 days. 3. Patient will ambulate with supervision/set-up for 50 feet with the least restrictive device within 4 days. 4. Patient will perform home exercise program per protocol with independence within 4 days. physical Therapy TREATMENT  Patient: Marina Lema (89 y.o. male)  Date: 2/28/2018  Diagnosis: FUO (fever of unknown origin)  Bacteremia  status post right hip arthroplasty Infected prosthesis of right hip (HCC)  Procedure(s) (LRB):  INCISION AND DRAINAGE OF RIGHT HIP WITH POLY EXCHANGE  (original procedure was posterior) (Right) 2 Days Post-Op  Precautions: Fall, WBAT, Contact  Chart, physical therapy assessment, plan of care and goals were reviewed. ASSESSMENT:  Patient received supine, agreeable to and cleared to work with therapy by RN. Patient continues to have more difficulty with bed mobility and getting to EOB with his size and reduced trunk strength playing a role. Noted good active movement of RLE in the bed to prepare to come to sitting. MIN A for sit to stand with mild elevation of bed and improved antalgic limp with gait training today. Patient quickly fatigues but improved distance with addition of toileting at end of gait training (OT present and assisted with this). Therex reviewed in supine and performed 5 reps of quad sets, heel slides and ankle pumps prior to getting up. Will instruct in seated therex this afternoon. Still recommend rehab as patient well below baseline level and unable to go home safely in his current mobility dependence level.   Progression toward goals:  [x]      Improving appropriately and progressing toward goals  []      Improving slowly and progressing toward goals  []      Not making progress toward goals and plan of care will be adjusted     PLAN:  Patient continues to benefit from skilled intervention to address the above impairments. Continue treatment per established plan of care. Discharge Recommendations:  Rehab  Further Equipment Recommendations for Discharge:  TBD in rehab     SUBJECTIVE:   Patient stated I'm feeling better, just getting ahead of the pain.     OBJECTIVE DATA SUMMARY:   Critical Behavior:  Neurologic State: Alert  Orientation Level: Oriented X4  Cognition: Appropriate decision making, Appropriate for age attention/concentration, Appropriate safety awareness  Safety/Judgement: Awareness of environment, Insight into deficits  Functional Mobility Training:  Bed Mobility:     Supine to Sit: Moderate assistance; Adaptive equipment; Additional time     Scooting: Contact guard assistance        Transfers:  Sit to Stand: Minimum assistance;Assist x1 (elevated bed slightly)  Stand to Sit: Contact guard assistance        Bed to Chair: Minimum assistance                    Balance:  Sitting: Impaired  Sitting - Static: Fair (occasional)  Sitting - Dynamic: Fair (occasional)  Standing: Impaired  Standing - Static: Good;Constant support  Standing - Dynamic : Fair  Ambulation/Gait Training:  Distance (ft): 25 Feet (ft) (then 10' into and out of bathroom)  Assistive Device: Gait belt;Walker, rolling  Ambulation - Level of Assistance: Contact guard assistance        Gait Abnormalities: Antalgic;Decreased step clearance; Step to gait        Base of Support: Widened  Stance: Right decreased  Speed/Clarice: Pace decreased (<100 feet/min); Slow  Step Length: Right shortened;Left shortened                    Stairs:              Therapeutic Exercises:   SUPINE  EXERCISES   Sets   Reps   Active Active Assist   Passive Self ROM   Comments   Ankle Pumps 1 10 [x]                                        []                                        [] []                                           Quad Sets 1 10 [x]                                        []                                        []                                        []                                           Heel Slides 1 10 [x]                                        []                                        []                                        []                                           Hip Abduction 1 5 []                                        []                                        []                                        []                                           Glut Sets   []                                        []                                        []                                        []                                              []                                        []                                        []                                        []                                              []                                        []                                        []                                        []                                             STANDING  EXERCISES   Sets   Reps   Active Active Assist   Passive Self ROM   Comments   Heel Raises   []                                        []                                        []                                        []                                           Hip Abduction   []                                        []                                        []                                        []                                              []                                        []                                        []                                        []                                              []                                        []                                        []                                        [] Pain:  Pain Scale 1: Numeric (0 - 10)  Pain Intensity 1: 5  Pain Location 1: Hip  Pain Orientation 1: Right  Pain Description 1: Aching  Pain Intervention(s) 1: Medication (see MAR)  Activity Tolerance:   Improved ambulation tolerance, 25' and then back to bathroom x 10 more feet    Please refer to the flowsheet for vital signs taken during this treatment.   After treatment:   [x] Patient left in no apparent distress sitting up in chair  [] Patient left in no apparent distress in bed  [x] Call bell left within reach  [x] Nursing notified  [] Caregiver present  [] Bed alarm activated    COMMUNICATION/COLLABORATION:   The patients plan of care was discussed with: Registered Nurse    Chan Rivas PT   Time Calculation: 19 mins

## 2018-02-28 NOTE — PROGRESS NOTES
Pharmacist Note  Warfarin Dosing  Consult provided for this 69 y.o. male to manage warfarin for Orthopedic Surgery (VTE prophylaxis)    INR Goal: 1.7- 2.2    Therapy Day: 3    Drugs that may increase INR:None  Drugs that may decrease INR: None  Other current anticoagulants/ drugs that may increase bleeding risk: NSAID, lovenox  Risk factors: Age > 65  Daily INR ordered: YES    Recent Labs      02/28/18   0355  02/27/18   0624  02/26/18   0252   HGB  9.6*  10.8*  9.5*   INR  2.7*  1.2*   --      Date               INR                 Dose  2/24  1.4  ---  2/26  ---  10 mg  2/27  1.2  5 mg  2/28  2.7  HOLD    Assessment/ Plan: Will hold warfarin today for INR 2.7. Pharmacy will continue to monitor daily and adjust therapy as indicated. Please contact the pharmacist at  or  for discharge recommendations if needed.

## 2018-03-01 LAB
ANION GAP SERPL CALC-SCNC: 9 MMOL/L (ref 5–15)
BACTERIA SPEC CULT: ABNORMAL
BACTERIA SPEC CULT: NORMAL
BACTERIA SPEC CULT: NORMAL
BASOPHILS # BLD: 0 K/UL (ref 0–0.1)
BASOPHILS NFR BLD: 0 % (ref 0–1)
BUN SERPL-MCNC: 8 MG/DL (ref 6–20)
BUN/CREAT SERPL: 12 (ref 12–20)
CALCIUM SERPL-MCNC: 7.9 MG/DL (ref 8.5–10.1)
CHLORIDE SERPL-SCNC: 103 MMOL/L (ref 97–108)
CO2 SERPL-SCNC: 24 MMOL/L (ref 21–32)
CREAT SERPL-MCNC: 0.69 MG/DL (ref 0.7–1.3)
DIFFERENTIAL METHOD BLD: ABNORMAL
EOSINOPHIL # BLD: 0.3 K/UL (ref 0–0.4)
EOSINOPHIL NFR BLD: 4 % (ref 0–7)
ERYTHROCYTE [DISTWIDTH] IN BLOOD BY AUTOMATED COUNT: 14.4 % (ref 11.5–14.5)
GLUCOSE BLD STRIP.AUTO-MCNC: 135 MG/DL (ref 65–100)
GLUCOSE BLD STRIP.AUTO-MCNC: 244 MG/DL (ref 65–100)
GLUCOSE BLD STRIP.AUTO-MCNC: 262 MG/DL (ref 65–100)
GLUCOSE BLD STRIP.AUTO-MCNC: 280 MG/DL (ref 65–100)
GLUCOSE BLD STRIP.AUTO-MCNC: 300 MG/DL (ref 65–100)
GLUCOSE BLD STRIP.AUTO-MCNC: 415 MG/DL (ref 65–100)
GLUCOSE BLD STRIP.AUTO-MCNC: 427 MG/DL (ref 65–100)
GLUCOSE SERPL-MCNC: 205 MG/DL (ref 65–100)
GRAM STN SPEC: ABNORMAL
GRAM STN SPEC: ABNORMAL
HCT VFR BLD AUTO: 29.5 % (ref 36.6–50.3)
HGB BLD-MCNC: 9.9 G/DL (ref 12.1–17)
IMM GRANULOCYTES # BLD: 0 K/UL
IMM GRANULOCYTES NFR BLD AUTO: 0 %
INR PPP: 1.9 (ref 0.9–1.1)
LYMPHOCYTES # BLD: 0.6 K/UL (ref 0.8–3.5)
LYMPHOCYTES NFR BLD: 7 % (ref 12–49)
MCH RBC QN AUTO: 30.3 PG (ref 26–34)
MCHC RBC AUTO-ENTMCNC: 33.6 G/DL (ref 30–36.5)
MCV RBC AUTO: 90.2 FL (ref 80–99)
METAMYELOCYTES NFR BLD MANUAL: 2 %
MONOCYTES # BLD: 0.3 K/UL (ref 0–1)
MONOCYTES NFR BLD: 4 % (ref 5–13)
MYELOCYTES NFR BLD MANUAL: 2 %
NEUTS BAND NFR BLD MANUAL: 3 % (ref 0–6)
NEUTS SEG # BLD: 6.4 K/UL (ref 1.8–8)
NEUTS SEG NFR BLD: 78 % (ref 32–75)
NRBC # BLD: 0 K/UL (ref 0–0.01)
NRBC BLD-RTO: 0 PER 100 WBC
PLATELET # BLD AUTO: 241 K/UL (ref 150–400)
PMV BLD AUTO: 10.3 FL (ref 8.9–12.9)
POTASSIUM SERPL-SCNC: 3.7 MMOL/L (ref 3.5–5.1)
PROTHROMBIN TIME: 19.7 SEC (ref 9–11.1)
RBC # BLD AUTO: 3.27 M/UL (ref 4.1–5.7)
RBC MORPH BLD: ABNORMAL
RBC MORPH BLD: ABNORMAL
SERVICE CMNT-IMP: ABNORMAL
SERVICE CMNT-IMP: NORMAL
SERVICE CMNT-IMP: NORMAL
SODIUM SERPL-SCNC: 136 MMOL/L (ref 136–145)
WBC # BLD AUTO: 7.9 K/UL (ref 4.1–11.1)

## 2018-03-01 PROCEDURE — 97110 THERAPEUTIC EXERCISES: CPT

## 2018-03-01 PROCEDURE — 74011636637 HC RX REV CODE- 636/637: Performed by: INTERNAL MEDICINE

## 2018-03-01 PROCEDURE — 80048 BASIC METABOLIC PNL TOTAL CA: CPT | Performed by: INTERNAL MEDICINE

## 2018-03-01 PROCEDURE — 36415 COLL VENOUS BLD VENIPUNCTURE: CPT | Performed by: INTERNAL MEDICINE

## 2018-03-01 PROCEDURE — 65270000029 HC RM PRIVATE

## 2018-03-01 PROCEDURE — 85610 PROTHROMBIN TIME: CPT | Performed by: INTERNAL MEDICINE

## 2018-03-01 PROCEDURE — 77030020847 HC STATLOK BARD -A

## 2018-03-01 PROCEDURE — 77030020365 HC SOL INJ SOD CL 0.9% 50ML

## 2018-03-01 PROCEDURE — 02HV33Z INSERTION OF INFUSION DEVICE INTO SUPERIOR VENA CAVA, PERCUTANEOUS APPROACH: ICD-10-PCS | Performed by: INTERNAL MEDICINE

## 2018-03-01 PROCEDURE — 77030018719 HC DRSG PTCH ANTIMIC J&J -A

## 2018-03-01 PROCEDURE — 97116 GAIT TRAINING THERAPY: CPT

## 2018-03-01 PROCEDURE — 82962 GLUCOSE BLOOD TEST: CPT

## 2018-03-01 PROCEDURE — 36569 INSJ PICC 5 YR+ W/O IMAGING: CPT | Performed by: INTERNAL MEDICINE

## 2018-03-01 PROCEDURE — 85025 COMPLETE CBC W/AUTO DIFF WBC: CPT | Performed by: INTERNAL MEDICINE

## 2018-03-01 PROCEDURE — 74011250636 HC RX REV CODE- 250/636: Performed by: INTERNAL MEDICINE

## 2018-03-01 PROCEDURE — 74011250637 HC RX REV CODE- 250/637: Performed by: INTERNAL MEDICINE

## 2018-03-01 PROCEDURE — 74011250637 HC RX REV CODE- 250/637: Performed by: ORTHOPAEDIC SURGERY

## 2018-03-01 PROCEDURE — 74011250637 HC RX REV CODE- 250/637: Performed by: PHYSICIAN ASSISTANT

## 2018-03-01 PROCEDURE — 76937 US GUIDE VASCULAR ACCESS: CPT

## 2018-03-01 PROCEDURE — C1751 CATH, INF, PER/CENT/MIDLINE: HCPCS

## 2018-03-01 RX ORDER — SODIUM CHLORIDE 0.9 % (FLUSH) 0.9 %
10 SYRINGE (ML) INJECTION EVERY 8 HOURS
Status: DISCONTINUED | OUTPATIENT
Start: 2018-03-01 | End: 2018-03-04 | Stop reason: HOSPADM

## 2018-03-01 RX ORDER — INSULIN LISPRO 100 [IU]/ML
15 INJECTION, SOLUTION INTRAVENOUS; SUBCUTANEOUS ONCE
Status: COMPLETED | OUTPATIENT
Start: 2018-03-01 | End: 2018-03-01

## 2018-03-01 RX ORDER — BACITRACIN 500 UNIT/G
1 PACKET (EA) TOPICAL AS NEEDED
Status: DISCONTINUED | OUTPATIENT
Start: 2018-03-01 | End: 2018-03-04 | Stop reason: HOSPADM

## 2018-03-01 RX ORDER — INSULIN LISPRO 100 [IU]/ML
INJECTION, SOLUTION INTRAVENOUS; SUBCUTANEOUS
Status: DISCONTINUED | OUTPATIENT
Start: 2018-03-01 | End: 2018-03-04 | Stop reason: HOSPADM

## 2018-03-01 RX ORDER — SODIUM CHLORIDE 0.9 % (FLUSH) 0.9 %
10 SYRINGE (ML) INJECTION EVERY 24 HOURS
Status: DISCONTINUED | OUTPATIENT
Start: 2018-03-01 | End: 2018-03-04 | Stop reason: HOSPADM

## 2018-03-01 RX ORDER — SODIUM CHLORIDE 0.9 % (FLUSH) 0.9 %
10 SYRINGE (ML) INJECTION AS NEEDED
Status: DISCONTINUED | OUTPATIENT
Start: 2018-03-01 | End: 2018-03-04 | Stop reason: HOSPADM

## 2018-03-01 RX ORDER — SODIUM CHLORIDE 0.9 % (FLUSH) 0.9 %
20 SYRINGE (ML) INJECTION AS NEEDED
Status: DISCONTINUED | OUTPATIENT
Start: 2018-03-01 | End: 2018-03-04 | Stop reason: HOSPADM

## 2018-03-01 RX ORDER — INSULIN LISPRO 100 [IU]/ML
10 INJECTION, SOLUTION INTRAVENOUS; SUBCUTANEOUS ONCE
Status: COMPLETED | OUTPATIENT
Start: 2018-03-01 | End: 2018-03-01

## 2018-03-01 RX ADMIN — INSULIN LISPRO 10 UNITS: 100 INJECTION, SOLUTION INTRAVENOUS; SUBCUTANEOUS at 18:42

## 2018-03-01 RX ADMIN — OXYCODONE HYDROCHLORIDE 10 MG: 5 TABLET ORAL at 21:45

## 2018-03-01 RX ADMIN — PHENAZOPYRIDINE HYDROCHLORIDE 100 MG: 100 TABLET ORAL at 17:22

## 2018-03-01 RX ADMIN — OXYCODONE HYDROCHLORIDE 10 MG: 5 TABLET ORAL at 14:41

## 2018-03-01 RX ADMIN — Medication 10 ML: at 13:35

## 2018-03-01 RX ADMIN — Medication 2 G: at 11:00

## 2018-03-01 RX ADMIN — Medication 10 ML: at 06:44

## 2018-03-01 RX ADMIN — LOVASTATIN 20 MG: 20 TABLET ORAL at 21:45

## 2018-03-01 RX ADMIN — FAMOTIDINE 20 MG: 20 TABLET, FILM COATED ORAL at 21:45

## 2018-03-01 RX ADMIN — INSULIN GLARGINE 26 UNITS: 100 INJECTION, SOLUTION SUBCUTANEOUS at 09:40

## 2018-03-01 RX ADMIN — PHENAZOPYRIDINE HYDROCHLORIDE 100 MG: 100 TABLET ORAL at 09:39

## 2018-03-01 RX ADMIN — FOLIC ACID 1 MG: 1 TABLET ORAL at 09:39

## 2018-03-01 RX ADMIN — PHENAZOPYRIDINE HYDROCHLORIDE 100 MG: 100 TABLET ORAL at 12:35

## 2018-03-01 RX ADMIN — OXYCODONE HYDROCHLORIDE 10 MG: 5 TABLET ORAL at 11:00

## 2018-03-01 RX ADMIN — Medication 2 G: at 03:09

## 2018-03-01 RX ADMIN — DOCUSATE SODIUM AND SENNOSIDES 1 TABLET: 8.6; 5 TABLET, FILM COATED ORAL at 21:45

## 2018-03-01 RX ADMIN — DOCUSATE SODIUM AND SENNOSIDES 1 TABLET: 8.6; 5 TABLET, FILM COATED ORAL at 09:40

## 2018-03-01 RX ADMIN — ASPIRIN 81 MG 81 MG: 81 TABLET ORAL at 09:40

## 2018-03-01 RX ADMIN — Medication 2 G: at 18:41

## 2018-03-01 RX ADMIN — FAMOTIDINE 20 MG: 20 TABLET, FILM COATED ORAL at 09:40

## 2018-03-01 RX ADMIN — ACETAMINOPHEN 650 MG: 325 TABLET, FILM COATED ORAL at 17:22

## 2018-03-01 RX ADMIN — POLYETHYLENE GLYCOL 3350 17 G: 17 POWDER, FOR SOLUTION ORAL at 09:39

## 2018-03-01 RX ADMIN — LISINOPRIL 20 MG: 20 TABLET ORAL at 09:39

## 2018-03-01 RX ADMIN — Medication 10 ML: at 21:47

## 2018-03-01 RX ADMIN — OXYCODONE HYDROCHLORIDE 10 MG: 5 TABLET ORAL at 19:02

## 2018-03-01 RX ADMIN — SERTRALINE HYDROCHLORIDE 100 MG: 50 TABLET ORAL at 09:40

## 2018-03-01 RX ADMIN — INSULIN LISPRO 15 UNITS: 100 INJECTION, SOLUTION INTRAVENOUS; SUBCUTANEOUS at 17:22

## 2018-03-01 RX ADMIN — PRIMIDONE 250 MG: 250 TABLET ORAL at 21:46

## 2018-03-01 RX ADMIN — PRIMIDONE 250 MG: 250 TABLET ORAL at 06:44

## 2018-03-01 RX ADMIN — SITAGLIPTIN 100 MG: 100 TABLET, FILM COATED ORAL at 09:40

## 2018-03-01 RX ADMIN — WARFARIN SODIUM 3 MG: 2 TABLET ORAL at 12:36

## 2018-03-01 RX ADMIN — TAMSULOSIN HYDROCHLORIDE 0.4 MG: 0.4 CAPSULE ORAL at 09:40

## 2018-03-01 RX ADMIN — GABAPENTIN 600 MG: 300 CAPSULE ORAL at 21:45

## 2018-03-01 RX ADMIN — ACETAMINOPHEN 650 MG: 325 TABLET, FILM COATED ORAL at 06:43

## 2018-03-01 RX ADMIN — HUMAN INSULIN 5 UNITS: 100 INJECTION, SOLUTION SUBCUTANEOUS at 12:35

## 2018-03-01 RX ADMIN — OXYCODONE HYDROCHLORIDE 5 MG: 5 TABLET ORAL at 06:45

## 2018-03-01 RX ADMIN — HUMAN INSULIN 5 UNITS: 100 INJECTION, SOLUTION SUBCUTANEOUS at 06:43

## 2018-03-01 RX ADMIN — ACETAMINOPHEN 650 MG: 325 TABLET, FILM COATED ORAL at 00:00

## 2018-03-01 RX ADMIN — ACETAMINOPHEN 650 MG: 325 TABLET, FILM COATED ORAL at 12:36

## 2018-03-01 RX ADMIN — INSULIN GLARGINE 26 UNITS: 100 INJECTION, SOLUTION SUBCUTANEOUS at 21:46

## 2018-03-01 NOTE — PROGRESS NOTES
1615- page MD Blair Spence regarding hyperglycemia  [phone] - repage  Student spoke with MD per note.     1830 - Informed MD of , MD to add order     1930 - BG retaken 244

## 2018-03-01 NOTE — PROGRESS NOTES
Problem: Falls - Risk of  Goal: *Absence of Falls  Document Dameon Fall Risk and appropriate interventions in the flowsheet.    Outcome: Progressing Towards Goal  Fall Risk Interventions:  Mobility Interventions: Patient to call before getting OOB         Medication Interventions: Patient to call before getting OOB    Elimination Interventions: Call light in reach, Patient to call for help with toileting needs, Toileting schedule/hourly rounds    History of Falls Interventions: Bed/chair exit alarm

## 2018-03-01 NOTE — PROGRESS NOTES
No complaints  afeb  Hip OK  Drain still putting out a fair amount  Plan: D/C to rehab            D/C drain prior to discharge(sutured in)           Continue coumadin until PICC comes out(? 6 weeks)           F/U w/ me when conveinent

## 2018-03-01 NOTE — DIABETES MGMT
DTC Progress Note    Recommendations/ Comments: Chart reviewed due to hyperglycemia. Noted Lantus increased to 26 units BID today. If appropriate, please consider changing correction scale to Humalog resistant scale. DTC to continue to follow. Current hospital DM medication: Lantus 26 units BID, Januvia 100 mg and correction scale Regular insulin, normal sensitivity. Chart reviewed on Lyle Das. Patient is a 69 y.o. male with known diabetes on Basaglar 22 units BID, Januvia 100 mg, Metformin 500 mg at dinner and Jardiance 25 mg at home. A1c:   Lab Results   Component Value Date/Time    Hemoglobin A1c 9.0 (H) 02/06/2018 08:27 AM    Hemoglobin A1c 9.6 (H) 11/01/2017 08:37 AM       Recent Glucose Results:   Lab Results   Component Value Date/Time     (H) 03/01/2018 03:15 AM    GLUCPOC 280 (H) 03/01/2018 11:21 AM    GLUCPOC 262 (H) 03/01/2018 06:22 AM    GLUCPOC 270 (H) 02/28/2018 09:34 PM        Lab Results   Component Value Date/Time    Creatinine 0.69 (L) 03/01/2018 03:15 AM     Estimated Creatinine Clearance: 135.9 mL/min (based on Cr of 0.69). Active Orders   Diet    DIET DIABETIC CONSISTENT CARB Regular        PO intake:   Patient Vitals for the past 72 hrs:   % Diet Eaten   02/28/18 1703 100 %   02/28/18 1049 100 %   02/27/18 1802 100 %   02/27/18 1254 100 %   02/27/18 0948 100 %       Will continue to follow as needed.     Thank you  Kelsey Purvis RD, CDE

## 2018-03-01 NOTE — PROGRESS NOTES
56 - Paged MD Scott regarding hyperglycemia. MD ordered to give 15 units humalog and continue to give lantus tonight.

## 2018-03-01 NOTE — PROGRESS NOTES
Bedside and Verbal shift change report given to Gaudencio Allen (oncoming nurse) by Antonio Puente (offgoing nurse). Report included the following information SBAR, Kardex, Intake/Output, MAR, Accordion and Recent Results.

## 2018-03-01 NOTE — PROGRESS NOTES
Bedside and Verbal shift change report given to Chau Levine (oncoming nurse) by Hannah (offgoing nurse). Report included the following information SBAR.

## 2018-03-01 NOTE — PROGRESS NOTES
4: 30pm. CRM spoke with Margie Leal. The patient's insurance has denied acute rehab. The # for Peer to Peer is [phone], Dr. Gianna Hunter. JORDAN will follow up tomorrow to speak with the patient and to see if the doctor will do a peer to peer.  DENNIS

## 2018-03-01 NOTE — PROGRESS NOTES
Problem: Mobility Impaired (Adult and Pediatric)  Goal: *Acute Goals and Plan of Care (Insert Text)  Physical Therapy Goals  Initiated 2/27/2018    1. Patient will move from supine to sit and sit to supine  in bed with supervision/set-up within 4 days. 2. Patient will perform sit to stand with supervision/set-up within 4 days. 3. Patient will ambulate with supervision/set-up for 50 feet with the least restrictive device within 4 days. 4. Patient will perform home exercise program per protocol with independence within 4 days. physical Therapy TREATMENT  Patient: Mehnaz Nathan (80 y.o. male)  Date: 3/1/2018  Diagnosis: FUO (fever of unknown origin)  Bacteremia  status post right hip arthroplasty Infected prosthesis of right hip (HCC)  Procedure(s) (LRB):  INCISION AND DRAINAGE OF RIGHT HIP WITH POLY EXCHANGE  (original procedure was posterior) (Right) 3 Days Post-Op  Precautions: Fall, WBAT, Contact  Chart, physical therapy assessment, plan of care and goals were reviewed. ASSESSMENT:  Pt seen following RN clearance. PICC placed earlier today and imaging cleared placement. Pt rsting in bed upon arrival and requesting to perform bed therex only. PT agreeable but encouraged pt to be OOB to chair for dinner and to complete walk at that time with staff. Pt completed supine therex with assist x1 and is in NAD following session with all needs met. Pt remains appropriate for D/C to rehab once medically stable. Progression toward goals:  [x]      Improving appropriately and progressing toward goals  [x]      Improving slowly and progressing toward goals  []      Not making progress toward goals and plan of care will be adjusted     PLAN:  Patient continues to benefit from skilled intervention to address the above impairments. Continue treatment per established plan of care.   Discharge Recommendations:  Rehab  Further Equipment Recommendations for Discharge:  NA     SUBJECTIVE:   Patient stated Thanks for taking it easy one me. ..    OBJECTIVE DATA SUMMARY:   Critical Behavior:  Neurologic State: Alert  Orientation Level: Oriented X4  Cognition: Appropriate decision making, Appropriate for age attention/concentration, Appropriate safety awareness, Follows commands  Safety/Judgement: Awareness of environment, Insight into deficits    Therapeutic Exercises:   SUPINE  EXERCISES   Sets   Reps   Active Active Assist   Passive Self ROM   Comments   Ankle Pumps  10 [x]                                        []                                        []                                        []                                           Quad Sets  10 [x]                                        []                                        []                                        []                                           Heel Slides  10 [x]                                        []                                        []                                        []                                           Hip Abduction  10 []                                        [x]                                        []                                        []                                           Glut Sets  10 [x]                                        []                                        []                                        []                                               Pain:  Pain Scale 1: Numeric (0 - 10)  Pain Intensity 1: 8  Pain Location 1: Hip  Pain Orientation 1: Right  Pain Description 1: Aching  Pain Intervention(s) 1: Medication (see MAR)  Activity Tolerance:   NAD  After treatment:   [] Patient left in no apparent distress sitting up in chair  [x] Patient left in no apparent distress in bed  [x] Call bell left within reach  [x] Nursing notified  [x] Caregiver present  [] Bed alarm activated    COMMUNICATION/COLLABORATION:   The patients plan of care was discussed with: Registered Nurse    Shruthi Ortega Time Calculation: 13 mins

## 2018-03-01 NOTE — PROGRESS NOTES
Occupational Therapy  Chart reviewed, attempted to see patient this morning, patient recently back in bed and post PT session, wishing to rest, wait on PICC and have lunch. Will follow up this afternoon for OT session. 2nd attempt, patient asleep in bed easily aroused, declining OT, up with PT and completed PICC and now \"exhausted\" was in the bathroom earlier and participated in self care, patient encouraged for OOB after rest for dinner, sill pending discharge to rehab, will follow up tomorrow for OT. Saroj Walter.  Kaushik, MS OTR/L

## 2018-03-01 NOTE — PROGRESS NOTES
Problem: Mobility Impaired (Adult and Pediatric)  Goal: *Acute Goals and Plan of Care (Insert Text)  Physical Therapy Goals  Initiated 2/27/2018    1. Patient will move from supine to sit and sit to supine  in bed with supervision/set-up within 4 days. 2. Patient will perform sit to stand with supervision/set-up within 4 days. 3. Patient will ambulate with supervision/set-up for 50 feet with the least restrictive device within 4 days. 4. Patient will perform home exercise program per protocol with independence within 4 days. physical Therapy TREATMENT  Patient: Harish Melo (40 y.o. male)  Date: 3/1/2018  Diagnosis: FUO (fever of unknown origin)  Bacteremia  status post right hip arthroplasty Infected prosthesis of right hip (HCC)  Procedure(s) (LRB):  INCISION AND DRAINAGE OF RIGHT HIP WITH POLY EXCHANGE  (original procedure was posterior) (Right) 3 Days Post-Op  Precautions: Fall, WBAT  Chart, physical therapy assessment, plan of care and goals were reviewed. ASSESSMENT:  Pt seen following RN clearance. Pt agreeable to transfers, gait, bed mobility, and therex. Pt back to bed in NAD when left following session with RN student present for medication admin. Pt demonstrates improved functional mobility though continues to struggle greatest with bed mobility, despite leg  device assist, pt unable to get into bed without physical assist from PT. Pt remains appropriate for D/C to rehab once medically stable pending progress (pt to get antibiotics and will require placement). Will f/u this PM for BID session. Progression toward goals:  []      Improving appropriately and progressing toward goals  [x]      Improving slowly and progressing toward goals  []      Not making progress toward goals and plan of care will be adjusted     PLAN:  Patient continues to benefit from skilled intervention to address the above impairments. Continue treatment per established plan of care.   Discharge Recommendations:  Rehab  Further Equipment Recommendations for Discharge:  NA     SUBJECTIVE:   Patient stated I think I'm just waiting on the PICC line and to get this drain [hemoav] out. ..    OBJECTIVE DATA SUMMARY:   Critical Behavior:  Neurologic State: Alert  Orientation Level: Oriented X4  Cognition: Appropriate decision making, Appropriate for age attention/concentration, Appropriate safety awareness  Safety/Judgement: Awareness of environment, Insight into deficits  Functional Mobility Training:  Bed Mobility:     Supine to Sit:  (NT; seated EOB upon arrival)  Sit to Supine: Moderate assistance; Additional time (unable to successfully enter bed with leg ; manual A r)  Scooting: Supervision (seated EOB)        Transfers:  Sit to Stand: Contact guard assistance  Stand to Sit: Supervision        Bed to Chair:  (NT)                    Balance:  Sitting: Intact  Sitting - Static: Good (unsupported)  Sitting - Dynamic: Fair (occasional)  Standing: Intact; With support  Standing - Static: Fair  Standing - Dynamic : Fair  Ambulation/Gait Training:  Distance (ft): 45 Feet (ft) (x2)  Assistive Device: Gait belt;Walker, rolling  Ambulation - Level of Assistance: Contact guard assistance        Gait Abnormalities: Antalgic;Decreased step clearance; Step to gait (improved gait quality; increased fluidity)  Right Side Weight Bearing: As tolerated     Base of Support: Widened  Stance: Left decreased  Speed/Clarice: Slow  Step Length: Right shortened;Left shortened    Stairs:     Stairs - Level of Assistance:  (NT)       Therapeutic Exercises:   SUPINE  EXERCISES   Sets   Reps   Active Active Assist   Passive Self ROM   Comments   Ankle Pumps  10 [x]                                        []                                        []                                        []                                           Quad Sets  10 [x]                                        []                                        [] []                                           Heel Slides  10 [x]                                        []                                        []                                        []                                           Hip Abduction  10 []                                        [x]                                        []                                        []                                           Glut Sets  10 [x]                                        []                                        []                                        []                                               Pain:  Pain Scale 1: Numeric (0 - 10)  Pain Intensity 1: 6  Pain Location 1: Hip  Pain Orientation 1: Right; Anterior  Pain Description 1: Aching  Pain Intervention(s) 1: Rest  Activity Tolerance:   NAD  After treatment:   [] Patient left in no apparent distress sitting up in chair  [x] Patient left in no apparent distress in bed  [x] Call bell left within reach  [x] Nursing notified  [x] RN student present  [] Bed alarm activated    COMMUNICATION/COLLABORATION:   The patients plan of care was discussed with: Registered Nurse    Vargas Claire   Time Calculation: 19 mins

## 2018-03-01 NOTE — PROCEDURES
PICC Placement Note : Order received and consent obtained from patient after explaining the reason for  PICC placement, the procedure,risks,benefits and potential complications. An opportunity was provided to ask questions and relay concerns. 4 fr Power Solo PICC was placed using sterile technique and max barrier precautions at patients bedside. Modified Seldinger Technique with ultrasound guidance used to locate the vein. Vein accessed with 21G Introducer Safety Needle and guidewire easily thread. [employer] PICC tip  device used to determine PICC tip direction. PRE-PROCEDURE VERIFICATION  Correct Procedure: yes  Correct Site:  yes  Temperature: Temp: 98.2 °F (36.8 °C), Temperature Source: Temp Source: Oral  Recent Labs      03/01/18   0315   BUN  8   CREA  0.69*   PLT  241   INR  1.9*   WBC  7.9     Allergies: Nsaids (non-steroidal anti-inflammatory drug)  Education materials, including PICC Booklet, for PICC Care given to patient: yes. A copy of Home Care of the PICC instructions given to patient with patient voicing good understanding  See Patient Education activity for further details. PROCEDURE DETAIL  A single lumen PICC line was started for antibiotic therapy, desire for reliable access and Home IV Therapy. The following documentation is in addition to the PICC properties in the lines/airways flowsheet :  Lot #: [id number]  Was xylocaine 1% used intradermally:  yes 1 ml used. Catheter Length: 47 (cm)  Vein Selection for PICC:left basilic  Central Line Bundle followed yes  Complication Related to Insertion: none    The placement was verified by ECG technology. PICC is in the SVC per ECG waveform. Waveform documented in the paper chart. Handoff done with Lorraine Chandler RN and PICC placement discussed.     Line is okay to use: yes    Pritesh Winkler RN

## 2018-03-01 NOTE — PROGRESS NOTES
Pharmacist Note  Warfarin Dosing  Consult provided for this 69 y.o. male to manage warfarin for Orthopedic Surgery (VTE prophylaxis)    INR Goal: 1.7- 2.2    Drugs that may increase INR:None  Drugs that may decrease INR: None  Other current anticoagulants/ drugs that may increase bleeding risk: NSAID  Risk factors: Age > 65  Daily INR ordered: YES    Recent Labs      03/01/18   0315  02/28/18   1607  02/28/18   0355  02/27/18   0624   HGB  9.9*   --   9.6*  10.8*   INR  1.9*  2.8*  2.7*  1.2*     Date               INR                 Dose  2/24  1.4  ---  2/26  ---  10 mg  2/27  1.2  5 mg  2/28  2.7  HOLD  3/1  1.9  3 mg            Assessment/ Plan: Will order warfarin 3 mg PO x 1 dose. Pharmacy will continue to monitor daily and adjust therapy as indicated. Please contact the pharmacist at  or  for discharge recommendations if needed.

## 2018-03-01 NOTE — PROGRESS NOTES
Hospitalist Progress Note  Kina Frazier MD  Answering service: [phone] OR [phone] from in house phone      Date of Service:  3/1/2018  NAME:  Arlene Alcazar  :  1950  MRN:  [id number]      Admission Summary:   70 yo man with DM2, HTN, HLD, PFO, DJD, depression, PUD, s/p b/l THAs, sleep apnea, GERD, and ADD presented to the ED from home on 18 with fever x12 days and right hip pain. Pt had a GLF on 18.     Interval history / Subjective:   C/o pain in right hip after working with PT/OT; PICC placed today; d/w nurse and CM; POD #3     Assessment & Plan:     Dysuria,   - thought to be due to zeng which was d/c'd already  - UA  unremarkable except for significant glucosuria  - seems to have resolved with Pyridium    Sepsis with staph lugdunensis bacteremia (POA) - due to septic arthritis  - BCx  staph lugdunensis,  CoNS  bottles,  NGTD  - TTE  with LVEF 55-60%, aortic sclerosis  - EULALIO  with LVEF 55-60%, tiny PFO, 2mm vegetation on anterior leaflet  - flu negative  - continue IVF  - continue cefazolin and vanc per ID; will need cefazolin x6 weeks from  due to IE (end 18)  - s/p PICC placement 3/1    Mitral valve infective endocarditis - as above     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)  - R hip XR  without acute abnormality   - joint Cx  staph lugdunensis  - Ortho following  - s/p I&D, removal of metal liner and placement of polyethylene liner and replacement of femoral head, placement of vanc and tobramycin beads ; pathology pending  - Hemovac drain in place  - warfarin for VTE Px with goal INR 1.7-2.2; s/p enoxaparin bridge     Hyponatremia, intermittent (POA) - due to dehydration, had resolved with IVF; stop IVF    DM2, uncontrolled (chronic) - recent A1c 9  - hold metformin, Jardiance  - increase home Lantus due to hyperglycemia  - continue home Januvia     HTN (chronic) - continue home lisinopril     HLD (chronic) - continue home lovastatin     Depression (chronic) - continue home sertraline     BPH (chronic) - continue home Flomax     Chronic pain (chronic) - continue primidone, gabapentin     Morbid obesity (chronic), Body mass index is 39.42 kg/(m^2). Code status: full  DVT prophylaxis: warfarin    Care Plan discussed with: Patient/Family and Nurse  Disposition: [address] [place] [address] Road when medically cleared and bed/auth available     Hospital Problems  Date Reviewed: 2/27/2018          Codes Class Noted POA    Sepsis ([facility] [address] ICD-10-CM: A41.9  ICD-9-CM: 038.9, 995.91  2/27/2018 Yes        * (Principal)Infected prosthesis of right hip ([facility] [address] ICD-10-CM: T84.51XA  ICD-9-CM: 996.66, V43.64  2/26/2018 Yes        Bacteremia ICD-10-CM: R78.81  ICD-9-CM: 790.7  2/24/2018 Yes            Review of Systems:   Pertinent items are noted in HPI. Vital Signs:    Last 24hrs VS reviewed since prior progress note.  Most recent are:  Visit Vitals    /59 (BP 1 Location: Right arm)    Pulse 83    Temp 98.5 °F (36.9 °C)    Resp 16    Ht 5' 10.5\" (1.791 m)    Wt 126.4 kg (278 lb 10.6 oz)    SpO2 98%    BMI 39.42 kg/m2       Intake/Output Summary (Last 24 hours) at 03/01/18 1432  Last data filed at 03/01/18 1056   Gross per 24 hour   Intake              400 ml   Output             2570 ml   Net            -2170 ml      Physical Examination:     Gen - awake, NAD, obese  HEENT - NCAT, PERRL, OP benign, MMM  Neck - supple, no masses  CV - regular rhythm, 2/6 systolic mumur at apex and RUSB  Resp - lungs CTA b/l, no wheezing  GI - +BS, soft, NT, ND, +BS  MSK - right hip ttp, Hemovac drain in place  Neuro - A&O, no focal deficits  Psych - calm and cooperative with normal affect  Skin - right hip dressing c/d/i    Data Review:    Review and/or order of clinical lab test  Review and/or order of tests in the radiology section of CPT  Review and/or order of tests in the medicine section of CPT    Labs:     Recent Labs      03/01/18 0315  02/28/18   0355   WBC  7.9  9.2   HGB  9.9*  9.6*   HCT  29.5*  28.8*   PLT  241  242     Recent Labs      03/01/18   0315  02/28/18   0355  02/27/18   0624   NA  136  135*  133*   K  3.7  3.6  3.8   CL  103  104  103   CO2  24  24  22   BUN  8  11  14   CREA  0.69*  0.65*  0.69*   GLU  205*  217*  250*   CA  7.9*  7.4*  8.1*     No results for input(s): SGOT, GPT, ALT, AP, TBIL, TBILI, TP, ALB, GLOB, GGT, AML, LPSE in the last 72 hours. No lab exists for component: AMYP, HLPSE  Recent Labs      03/01/18 0315 02/28/18   1607  02/28/18 0355   INR  1.9*  2.8*  2.7*   PTP  19.7*  29.3*  27.7*      No results for input(s): FE, TIBC, PSAT, FERR in the last 72 hours. Lab Results   Component Value Date/Time    Folate 9.2 03/18/2010 11:26 AM      No results for input(s): PH, PCO2, PO2 in the last 72 hours. No results for input(s): CPK, CKNDX, TROIQ in the last 72 hours.     No lab exists for component: CPKMB  Lab Results   Component Value Date/Time    Cholesterol, total 141 07/26/2017 01:24 PM    HDL Cholesterol 63 07/26/2017 01:24 PM    LDL, calculated 58 07/26/2017 01:24 PM    Triglyceride 98 07/26/2017 01:24 PM    CHOL/HDL Ratio 3.5 11/01/2010 07:07 AM     Lab Results   Component Value Date/Time    Glucose (POC) 280 (H) 03/01/2018 11:21 AM    Glucose (POC) 262 (H) 03/01/2018 06:22 AM    Glucose (POC) 270 (H) 02/28/2018 09:34 PM    Glucose (POC) 296 (H) 02/28/2018 04:35 PM    Glucose (POC) 296 (H) 02/28/2018 11:22 AM     Lab Results   Component Value Date/Time    Color YELLOW/STRAW 02/28/2018 05:01 PM    Appearance CLEAR 02/28/2018 05:01 PM    Specific gravity 1.020 02/28/2018 05:01 PM    Specific gravity 1.024 02/24/2018 01:41 PM    pH (UA) 6.0 02/28/2018 05:01 PM    Protein NEGATIVE  02/28/2018 05:01 PM    Glucose >1000 (A) 02/28/2018 05:01 PM    Ketone NEGATIVE  02/28/2018 05:01 PM    Bilirubin NEGATIVE  02/28/2018 05:01 PM    Urobilinogen 0.2 02/28/2018 05:01 PM    Nitrites NEGATIVE  02/28/2018 05:01 PM    Leukocyte Esterase NEGATIVE  02/28/2018 05:01 PM    Epithelial cells FEW 02/28/2018 05:01 PM    Bacteria NEGATIVE  02/28/2018 05:01 PM    WBC 0-4 02/28/2018 05:01 PM    RBC 0-5 02/28/2018 05:01 PM     Medications Reviewed:     Current Facility-Administered Medications   Medication Dose Route Frequency    sodium chloride (NS) flush 20 mL  20 mL InterCATHeter PRN    sodium chloride (NS) flush 10 mL  10 mL InterCATHeter Q24H    sodium chloride (NS) flush 10 mL  10 mL InterCATHeter PRN    sodium chloride (NS) flush 10 mL  10 mL InterCATHeter Q8H    alteplase (CATHFLO) 1 mg in sterile water (preservative free) 1 mL injection  1 mg InterCATHeter PRN    bacitracin 500 unit/gram packet 1 Packet  1 Packet Topical PRN    insulin lispro (HUMALOG) injection   SubCUTAneous AC&HS    insulin glargine (LANTUS) injection 26 Units  26 Units SubCUTAneous QHS    insulin glargine (LANTUS) injection 26 Units  26 Units SubCUTAneous DAILY    phenazopyridine (PYRIDIUM) tablet 100 mg  100 mg Oral TIDPC    warfarin - pharmacist to dose   Other Rx Dosing/Monitoring    0.9% sodium chloride infusion 250 mL  250 mL IntraVENous PRN    sodium chloride 0.9 % bolus infusion 500 mL  500 mL IntraVENous ONCE PRN    sodium chloride (NS) flush 5-10 mL  5-10 mL IntraVENous Q8H    sodium chloride (NS) flush 5-10 mL  5-10 mL IntraVENous PRN    naloxone (NARCAN) injection 0.4 mg  0.4 mg IntraVENous PRN    dexamethasone (DECADRON) 4 mg/mL injection 4 mg  4 mg IntraVENous Q6H PRN    hydrOXYzine HCl (ATARAX) tablet 10 mg  10 mg Oral Q8H PRN    famotidine (PEPCID) tablet 20 mg  20 mg Oral BID    senna-docusate (PERICOLACE) 8.6-50 mg per tablet 1 Tab  1 Tab Oral BID    polyethylene glycol (MIRALAX) packet 17 g  17 g Oral DAILY    bisacodyl (DULCOLAX) suppository 10 mg  10 mg Rectal DAILY PRN    acetaminophen (TYLENOL) tablet 650 mg  650 mg Oral Q6H    oxyCODONE IR (ROXICODONE) tablet 5 mg  5 mg Oral Q3H PRN  oxyCODONE IR (ROXICODONE) tablet 7.5-10 mg  7.5-10 mg Oral Q3H PRN    tamsulosin (FLOMAX) capsule 0.4 mg  0.4 mg Oral DAILY    SITagliptin (JANUVIA) tablet 100 mg  100 mg Oral DAILY    primidone (MYSOLINE) tablet 250 mg  250 mg Oral BID    lovastatin (MEVACOR) tablet 20 mg  20 mg Oral QHS    folic acid (FOLVITE) tablet 1 mg  1 mg Oral DAILY    lisinopril (PRINIVIL, ZESTRIL) tablet 20 mg  20 mg Oral DAILY    sertraline (ZOLOFT) tablet 100 mg  100 mg Oral DAILY    gabapentin (NEURONTIN) capsule 600 mg  600 mg Oral QHS    aspirin chewable tablet 81 mg  81 mg Oral DAILY    clonazePAM (KlonoPIN) tablet 0.5 mg  0.5 mg Oral QHS PRN    glucose chewable tablet 16 g  4 Tab Oral PRN    dextrose (D50W) injection syrg 12.5-25 g  12.5-25 g IntraVENous PRN    glucagon (GLUCAGEN) injection 1 mg  1 mg IntraMUSCular PRN    ceFAZolin (ANCEF) 2 g/20 mL in sterile water IV syringe  2 g IntraVENous Q8H    acetaminophen (TYLENOL) tablet 650 mg  650 mg Oral Q6H PRN     ______________________________________________________________________  EXPECTED LENGTH OF STAY: 6d 7h  ACTUAL LENGTH OF STAY:          5                 Rosa Murray MD

## 2018-03-02 LAB
ANION GAP SERPL CALC-SCNC: 8 MMOL/L (ref 5–15)
BUN SERPL-MCNC: 5 MG/DL (ref 6–20)
BUN/CREAT SERPL: 9 (ref 12–20)
CALCIUM SERPL-MCNC: 8.3 MG/DL (ref 8.5–10.1)
CHLORIDE SERPL-SCNC: 104 MMOL/L (ref 97–108)
CO2 SERPL-SCNC: 27 MMOL/L (ref 21–32)
CREAT SERPL-MCNC: 0.55 MG/DL (ref 0.7–1.3)
ERYTHROCYTE [DISTWIDTH] IN BLOOD BY AUTOMATED COUNT: 14.5 % (ref 11.5–14.5)
GLUCOSE BLD STRIP.AUTO-MCNC: 110 MG/DL (ref 65–100)
GLUCOSE BLD STRIP.AUTO-MCNC: 193 MG/DL (ref 65–100)
GLUCOSE BLD STRIP.AUTO-MCNC: 195 MG/DL (ref 65–100)
GLUCOSE BLD STRIP.AUTO-MCNC: 246 MG/DL (ref 65–100)
GLUCOSE SERPL-MCNC: 119 MG/DL (ref 65–100)
HCT VFR BLD AUTO: 29.9 % (ref 36.6–50.3)
HGB BLD-MCNC: 9.8 G/DL (ref 12.1–17)
INR PPP: 1.5 (ref 0.9–1.1)
MCH RBC QN AUTO: 29.5 PG (ref 26–34)
MCHC RBC AUTO-ENTMCNC: 32.8 G/DL (ref 30–36.5)
MCV RBC AUTO: 90.1 FL (ref 80–99)
NRBC # BLD: 0 K/UL (ref 0–0.01)
NRBC BLD-RTO: 0 PER 100 WBC
PLATELET # BLD AUTO: 229 K/UL (ref 150–400)
PMV BLD AUTO: 9.9 FL (ref 8.9–12.9)
POTASSIUM SERPL-SCNC: 3.8 MMOL/L (ref 3.5–5.1)
PROTHROMBIN TIME: 15.8 SEC (ref 9–11.1)
RBC # BLD AUTO: 3.32 M/UL (ref 4.1–5.7)
SERVICE CMNT-IMP: ABNORMAL
SODIUM SERPL-SCNC: 139 MMOL/L (ref 136–145)
WBC # BLD AUTO: 9.2 K/UL (ref 4.1–11.1)

## 2018-03-02 PROCEDURE — 80048 BASIC METABOLIC PNL TOTAL CA: CPT | Performed by: INTERNAL MEDICINE

## 2018-03-02 PROCEDURE — 85610 PROTHROMBIN TIME: CPT | Performed by: PHYSICIAN ASSISTANT

## 2018-03-02 PROCEDURE — 74011636637 HC RX REV CODE- 636/637: Performed by: INTERNAL MEDICINE

## 2018-03-02 PROCEDURE — 65270000029 HC RM PRIVATE

## 2018-03-02 PROCEDURE — 85027 COMPLETE CBC AUTOMATED: CPT | Performed by: INTERNAL MEDICINE

## 2018-03-02 PROCEDURE — 36415 COLL VENOUS BLD VENIPUNCTURE: CPT | Performed by: PHYSICIAN ASSISTANT

## 2018-03-02 PROCEDURE — 74011250637 HC RX REV CODE- 250/637: Performed by: INTERNAL MEDICINE

## 2018-03-02 PROCEDURE — 74011250637 HC RX REV CODE- 250/637: Performed by: PHYSICIAN ASSISTANT

## 2018-03-02 PROCEDURE — 74011250636 HC RX REV CODE- 250/636: Performed by: INTERNAL MEDICINE

## 2018-03-02 PROCEDURE — 74011250637 HC RX REV CODE- 250/637: Performed by: ORTHOPAEDIC SURGERY

## 2018-03-02 PROCEDURE — 97530 THERAPEUTIC ACTIVITIES: CPT

## 2018-03-02 PROCEDURE — 97116 GAIT TRAINING THERAPY: CPT

## 2018-03-02 PROCEDURE — 82962 GLUCOSE BLOOD TEST: CPT

## 2018-03-02 RX ORDER — WARFARIN 4 MG/1
4 TABLET ORAL ONCE
Status: COMPLETED | OUTPATIENT
Start: 2018-03-02 | End: 2018-03-02

## 2018-03-02 RX ADMIN — SERTRALINE HYDROCHLORIDE 100 MG: 50 TABLET ORAL at 09:09

## 2018-03-02 RX ADMIN — OXYCODONE HYDROCHLORIDE 10 MG: 5 TABLET ORAL at 13:37

## 2018-03-02 RX ADMIN — INSULIN GLARGINE 26 UNITS: 100 INJECTION, SOLUTION SUBCUTANEOUS at 09:24

## 2018-03-02 RX ADMIN — OXYCODONE HYDROCHLORIDE 10 MG: 5 TABLET ORAL at 07:29

## 2018-03-02 RX ADMIN — Medication 10 ML: at 22:00

## 2018-03-02 RX ADMIN — Medication 10 ML: at 12:12

## 2018-03-02 RX ADMIN — ASPIRIN 81 MG 81 MG: 81 TABLET ORAL at 09:10

## 2018-03-02 RX ADMIN — PRIMIDONE 250 MG: 250 TABLET ORAL at 21:45

## 2018-03-02 RX ADMIN — ACETAMINOPHEN 650 MG: 325 TABLET, FILM COATED ORAL at 19:05

## 2018-03-02 RX ADMIN — Medication 2 G: at 02:31

## 2018-03-02 RX ADMIN — INSULIN LISPRO 3 UNITS: 100 INJECTION, SOLUTION INTRAVENOUS; SUBCUTANEOUS at 16:22

## 2018-03-02 RX ADMIN — Medication 10 ML: at 14:00

## 2018-03-02 RX ADMIN — ACETAMINOPHEN 650 MG: 325 TABLET, FILM COATED ORAL at 12:11

## 2018-03-02 RX ADMIN — LOVASTATIN 20 MG: 20 TABLET ORAL at 21:08

## 2018-03-02 RX ADMIN — INSULIN GLARGINE 26 UNITS: 100 INJECTION, SOLUTION SUBCUTANEOUS at 21:08

## 2018-03-02 RX ADMIN — DOCUSATE SODIUM AND SENNOSIDES 1 TABLET: 8.6; 5 TABLET, FILM COATED ORAL at 09:10

## 2018-03-02 RX ADMIN — PRIMIDONE 250 MG: 250 TABLET ORAL at 07:32

## 2018-03-02 RX ADMIN — POLYETHYLENE GLYCOL 3350 17 G: 17 POWDER, FOR SOLUTION ORAL at 09:09

## 2018-03-02 RX ADMIN — INSULIN LISPRO 2 UNITS: 100 INJECTION, SOLUTION INTRAVENOUS; SUBCUTANEOUS at 21:15

## 2018-03-02 RX ADMIN — DOCUSATE SODIUM AND SENNOSIDES 1 TABLET: 8.6; 5 TABLET, FILM COATED ORAL at 21:08

## 2018-03-02 RX ADMIN — FAMOTIDINE 20 MG: 20 TABLET, FILM COATED ORAL at 09:10

## 2018-03-02 RX ADMIN — SITAGLIPTIN 100 MG: 100 TABLET, FILM COATED ORAL at 09:24

## 2018-03-02 RX ADMIN — OXYCODONE HYDROCHLORIDE 10 MG: 5 TABLET ORAL at 23:37

## 2018-03-02 RX ADMIN — OXYCODONE HYDROCHLORIDE 10 MG: 5 TABLET ORAL at 16:22

## 2018-03-02 RX ADMIN — Medication 2 G: at 11:16

## 2018-03-02 RX ADMIN — WARFARIN SODIUM 4 MG: 4 TABLET ORAL at 12:11

## 2018-03-02 RX ADMIN — LISINOPRIL 20 MG: 20 TABLET ORAL at 09:10

## 2018-03-02 RX ADMIN — ACETAMINOPHEN 650 MG: 325 TABLET, FILM COATED ORAL at 23:37

## 2018-03-02 RX ADMIN — FOLIC ACID 1 MG: 1 TABLET ORAL at 09:09

## 2018-03-02 RX ADMIN — OXYCODONE HYDROCHLORIDE 10 MG: 5 TABLET ORAL at 10:27

## 2018-03-02 RX ADMIN — GABAPENTIN 600 MG: 300 CAPSULE ORAL at 21:08

## 2018-03-02 RX ADMIN — OXYCODONE HYDROCHLORIDE 10 MG: 5 TABLET ORAL at 19:44

## 2018-03-02 RX ADMIN — TAMSULOSIN HYDROCHLORIDE 0.4 MG: 0.4 CAPSULE ORAL at 09:10

## 2018-03-02 RX ADMIN — INSULIN LISPRO 3 UNITS: 100 INJECTION, SOLUTION INTRAVENOUS; SUBCUTANEOUS at 12:12

## 2018-03-02 RX ADMIN — PHENAZOPYRIDINE HYDROCHLORIDE 100 MG: 100 TABLET ORAL at 12:11

## 2018-03-02 RX ADMIN — ACETAMINOPHEN 650 MG: 325 TABLET, FILM COATED ORAL at 07:29

## 2018-03-02 RX ADMIN — PHENAZOPYRIDINE HYDROCHLORIDE 100 MG: 100 TABLET ORAL at 09:10

## 2018-03-02 RX ADMIN — Medication 2 G: at 19:05

## 2018-03-02 RX ADMIN — Medication 10 ML: at 21:08

## 2018-03-02 RX ADMIN — FAMOTIDINE 20 MG: 20 TABLET, FILM COATED ORAL at 21:08

## 2018-03-02 NOTE — PROGRESS NOTES
Spiritual Care Partner Volunteer visited patient in room 557/01 on 3.02.18. Documented by: : Rev. Kayla Teague Nurse; [facility], to contact [phone] [address] call: [phone]

## 2018-03-02 NOTE — PROGRESS NOTES
Ortho POD# 4  Feels better  afeb  Dressing intact  No pain with ROM  Drain output 40cc  Plan: IV abx per Dr Natalia Villagomez           F/U w/ me 2-3 weeks           D/C drain

## 2018-03-02 NOTE — PROGRESS NOTES
Problem: Mobility Impaired (Adult and Pediatric)  Goal: *Acute Goals and Plan of Care (Insert Text)  Physical Therapy Goals  Initiated 2/27/2018    1. Patient will move from supine to sit and sit to supine  in bed with supervision/set-up within 4 days. 2. Patient will perform sit to stand with supervision/set-up within 4 days. 3. Patient will ambulate with supervision/set-up for 50 feet with the least restrictive device within 4 days. 4. Patient will perform home exercise program per protocol with independence within 4 days. physical Therapy TREATMENT  Patient: Bridget Lamar (53 y.o. male)  Date: 3/2/2018  Diagnosis: FUO (fever of unknown origin)  Bacteremia  status post right hip arthroplasty Infected prosthesis of right hip (HCC)  Procedure(s) (LRB):  INCISION AND DRAINAGE OF RIGHT HIP WITH POLY EXCHANGE  (original procedure was posterior) (Right) 4 Days Post-Op  Precautions: Fall, WBAT, Contact  Chart, physical therapy assessment, plan of care and goals were reviewed. ASSESSMENT:  Pt experiencing increased pain this afternoon, reporting 8/10 pain w/ gait training and bed mobility. Needed increased time to transfer to EOB and Min A for RLE to EOB despite use of leg lift. Pt was able to improve gait distance this afternoon despite pain (remained as 8/10), amb approx 100FT total. Noted step through gait though w/ short step length HARJINDER. Pt returned to chair at bedside, ice pack provided and items in reach;physician present at this time. Will continue to follow daily at this time d/t POD4 status. Continue to recommend short stay at rehab prior to returning home to maximize pt strength and mobility towards independence.    Progression toward goals:  []      Improving appropriately and progressing toward goals  [x]      Improving slowly and progressing toward goals  []      Not making progress toward goals and plan of care will be adjusted     PLAN:  Patient continues to benefit from skilled intervention to address the above impairments. Continue treatment per established plan of care. Discharge Recommendations:  Rehab  Further Equipment Recommendations for Discharge:  TBD     SUBJECTIVE:   Patient stated I thought of an excuse to get out of therapy: I have a bone in my leg.     OBJECTIVE DATA SUMMARY:   Functional Mobility Training:  Bed Mobility:     Supine to Sit: Minimum assistance; Moderate assistance; Additional time;Assist x1 (using bedrail (L); having increased difficulty d/t p!)  Sit to Supine:  (pt returned to chair at bedside)            Transfers:  Sit to Stand: Contact guard assistance;Assist x1 (bed slightly elevated)  Stand to Sit: Contact guard assistance                             Ambulation/Gait Training:  Distance (ft): 100 Feet (ft)  Assistive Device: Gait belt;Walker, rolling  Ambulation - Level of Assistance: Contact guard assistance; Additional time;Assist x1        Gait Abnormalities: Antalgic;Decreased step clearance; Other (flexed posture )  Right Side Weight Bearing: As tolerated     Base of Support: Widened  Stance: Right decreased  Speed/Clarice: Pace decreased (<100 feet/min)  Step Length: Left shortened;Right shortened                             Therapeutic Exercises:   Exercises performed per protocol. See morning treatment note for description. Pain:  Pain Scale 1: Numeric (0 - 10)  Pain Intensity 1: 2  Pain Location 1: Hip  Pain Orientation 1: Right  Pain Description 1: Aching  Pain Intervention(s) 1: Repositioned; Rest  Activity Tolerance:   Pt reporting 8/10 p!w/ gait and bed mobility  Please refer to the flowsheet for vital signs taken during this treatment.   After treatment:   [x] Patient left in no apparent distress sitting up in chair  [] Patient left in no apparent distress in bed  [x] Call bell left within reach  [x] Nursing notified  [] Caregiver present  [] Bed alarm activated    COMMUNICATION/COLLABORATION:   The patients plan of care was discussed with: Registered Nurse    Glendy LUCIA Means,PTA   Time Calculation: 23 mins

## 2018-03-02 NOTE — PROGRESS NOTES
JORDAN spoke with Dr. Jung Krishna. She has done the peer to peer with insurance company for acute rehab. The insurance company feel that the patient is too high level for this level of care. CRM met with the patient to discuss. The patient is not comfortable going home. He states that he still has trouble getting up form the bed. He lives alone, has 3 dogs and would feel better in a SNF with the IV abx's. CRM offered SNF choice. The patient chose [facility]. CRM sent referral via [facility] [address] Will follow. [facility] has accepted the patient for SNF and will start the insurance authorization.  DENNIS

## 2018-03-02 NOTE — PROGRESS NOTES
Problem: Falls - Risk of  Goal: *Absence of Falls  Document Dameon Fall Risk and appropriate interventions in the flowsheet.    Outcome: Progressing Towards Goal  Fall Risk Interventions:  Mobility Interventions: Patient to call before getting OOB         Medication Interventions: Patient to call before getting OOB    Elimination Interventions: Patient to call for help with toileting needs, Call light in reach    History of Falls Interventions: Bed/chair exit alarm

## 2018-03-02 NOTE — PROGRESS NOTES
ID Progress Note  3/2/2018     Cefazolin    - present      Irrigation and debridement, right hip, via anterior approach with removal of metal liner with placement of polyethylene liner and replacement of femoral head, placement of vancomycin and tobramycin  (18)    Subjective:     Afebrile. No complaints. Objective:     Vitals:   Visit Vitals    /74 (BP 1 Location: Right arm, BP Patient Position: At rest)    Pulse 86    Temp 98.6 °F (37 °C)    Resp 16    Ht 5' 10.5\" (1.791 m)    Wt 126.4 kg (278 lb 10.6 oz)    SpO2 96%    BMI 39.42 kg/m2        Tmax:  Temp (24hrs), Av.4 °F (36.9 °C), Min:98.2 °F (36.8 °C), Max:98.6 °F (37 °C)      Exam:    Not in distress  Lungs: clear to auscultation, no rales, wheezes or rhonchi   Heart: s1, s2, no murmurs, rubs or clicks   Abdomen: soft, nontender, no guarding or rebound   Extremities: right hip bandaged and with hemovac    Labs:   Lab Results   Component Value Date/Time    WBC 9.2 2018 04:42 AM    HGB 9.8 (L) 2018 04:42 AM    HCT 29.9 (L) 2018 04:42 AM    PLATELET 598  04:42 AM    MCV 90.1 2018 04:42 AM     Lab Results   Component Value Date/Time    Sodium 139 2018 04:42 AM    Potassium 3.8 2018 04:42 AM    Chloride 104 2018 04:42 AM    CO2 27 2018 04:42 AM    Anion gap 8 2018 04:42 AM    Glucose 119 (H) 2018 04:42 AM    BUN 5 (L) 2018 04:42 AM    Creatinine 0.55 (L) 2018 04:42 AM    BUN/Creatinine ratio 9 (L) 2018 04:42 AM    GFR est AA >60 2018 04:42 AM    GFR est non-AA >60 2018 04:42 AM    Calcium 8.3 (L) 2018 04:42 AM    Bilirubin, total 0.6 2018 03:46 AM    AST (SGOT) 36 2018 03:46 AM    Alk.  phosphatase 63 2018 03:46 AM    Protein, total 6.4 2018 03:46 AM    Albumin 2.1 (L) 2018 03:46 AM    Globulin 4.3 (H) 2018 03:46 AM    A-G Ratio 0.5 (L) 2018 03:46 AM    ALT (SGPT) 19 2018 03:46 AM Assessment:     1. Staph lugdunensis bacteremia in 2/4 bottles. 2. MV endocarditis   3. right prosthetic hip infection. 4.  Elevated ESR and CRP. 5.  Diabetes. 6.  History of patent foramen ovale. 7.  Hypertension  8. Sleep apnea. Recommendations:     He will need cefazolin for at least 6 weeks from surgery. I plan to put him on oral therapy after the we finish his IV course. Orders written.            Jazmine Martin MD

## 2018-03-02 NOTE — PROGRESS NOTES
ID Progress Note  3/1/2018     Cefazolin    - present      Irrigation and debridement, right hip, via anterior approach with removal of metal liner with placement of polyethylene liner and replacement of femoral head, placement of vancomycin and tobramycin  (18)    Subjective:     Afebrile. No complaints. Objective:     Vitals:   Visit Vitals    /59 (BP 1 Location: Right arm)    Pulse 83    Temp 98.5 °F (36.9 °C)    Resp 16    Ht 5' 10.5\" (1.791 m)    Wt 126.4 kg (278 lb 10.6 oz)    SpO2 98%    BMI 39.42 kg/m2        Tmax:  Temp (24hrs), Av.6 °F (37 °C), Min:98.2 °F (36.8 °C), Max:98.8 °F (37.1 °C)      Exam:    Not in distress  Lungs: clear to auscultation, no rales, wheezes or rhonchi   Heart: s1, s2, no murmurs, rubs or clicks   Abdomen: soft, nontender, no guarding or rebound   Extremities: right hip bandaged and with hemovac    Labs:   Lab Results   Component Value Date/Time    WBC 7.9 2018 03:15 AM    HGB 9.9 (L) 2018 03:15 AM    HCT 29.5 (L) 2018 03:15 AM    PLATELET 154  03:15 AM    MCV 90.2 2018 03:15 AM     Lab Results   Component Value Date/Time    Sodium 136 2018 03:15 AM    Potassium 3.7 2018 03:15 AM    Chloride 103 2018 03:15 AM    CO2 24 2018 03:15 AM    Anion gap 9 2018 03:15 AM    Glucose 205 (H) 2018 03:15 AM    BUN 8 2018 03:15 AM    Creatinine 0.69 (L) 2018 03:15 AM    BUN/Creatinine ratio 12 2018 03:15 AM    GFR est AA >60 2018 03:15 AM    GFR est non-AA >60 2018 03:15 AM    Calcium 7.9 (L) 2018 03:15 AM    Bilirubin, total 0.6 2018 03:46 AM    AST (SGOT) 36 2018 03:46 AM    Alk. phosphatase 63 2018 03:46 AM    Protein, total 6.4 2018 03:46 AM    Albumin 2.1 (L) 2018 03:46 AM    Globulin 4.3 (H) 2018 03:46 AM    A-G Ratio 0.5 (L) 2018 03:46 AM    ALT (SGPT) 19 2018 03:46 AM           Assessment:     1.   Juany danielgdunensis bacteremia in 2/4 bottles. 2. MV endocarditis   3. right prosthetic hip infection. 4.  Elevated ESR and CRP. 5.  Diabetes. 6.  History of patent foramen ovale. 7.  Hypertension  8. Sleep apnea. Recommendations:     He will need cefazolin for at least 6 weeks from surgery. I plan to put him on oral therapy after the we finish his IV course.            Sreekanth Dobbs MD

## 2018-03-02 NOTE — PROGRESS NOTES
Bedside shift change report given to [address] [facility] (oncoming nurse) by Maykel Menon (offgoing nurse). Report included the following information SBAR, Kardex, Intake/Output, MAR and Recent Results.

## 2018-03-02 NOTE — PROGRESS NOTES
Bedside shift change report given to Alonso Shannon (oncoming nurse) by Trae Soliz (offgoing nurse). Report included the following information SBAR, Kardex, Procedure Summary, Intake/Output and MAR.

## 2018-03-02 NOTE — PROGRESS NOTES
Problem: Mobility Impaired (Adult and Pediatric)  Goal: *Acute Goals and Plan of Care (Insert Text)  Physical Therapy Goals  Initiated 2/27/2018    1. Patient will move from supine to sit and sit to supine  in bed with supervision/set-up within 4 days. 2. Patient will perform sit to stand with supervision/set-up within 4 days. 3. Patient will ambulate with supervision/set-up for 50 feet with the least restrictive device within 4 days. 4. Patient will perform home exercise program per protocol with independence within 4 days. physical Therapy TREATMENT  Patient: Ruben Pillai (89 y.o. male)  Date: 3/2/2018  Diagnosis: FUO (fever of unknown origin)  Bacteremia  status post right hip arthroplasty Infected prosthesis of right hip (HCC)  Procedure(s) (LRB):  INCISION AND DRAINAGE OF RIGHT HIP WITH POLY EXCHANGE  (original procedure was posterior) (Right) 4 Days Post-Op  Precautions: Fall, WBAT, Contact  Chart, physical therapy assessment, plan of care and goals were reviewed. ASSESSMENT:  Pt slowly progressing gait distance this AM, amb approx 70 FT using RW (CGA). Amb w/ slow spee/aniya and mild antalgic gait. Noted pt able to stand w/ hands off walker to use urinal to void w/ good static balance. Pt continues to require additional time for bed mobility, needing HOB elevated to assist w/ transfer and using leg  to assist w/ RLE w/ descend to floor. Pt returned to chair at sessions end w/ all needs in reach and met. Progression toward goals:  []      Improving appropriately and progressing toward goals  [x]      Improving slowly and progressing toward goals  []      Not making progress toward goals and plan of care will be adjusted     PLAN:  Patient continues to benefit from skilled intervention to address the above impairments. Continue treatment per established plan of care.   Discharge Recommendations:  Rehab as pt lives alone w/ limited assistance  Further Equipment Recommendations for Discharge:  TBD     SUBJECTIVE:   Patient stated I just couldn't wait for PT. (sarcasm)    OBJECTIVE DATA SUMMARY:   Critical Behavior:  Neurologic State: Alert  Orientation Level: Oriented X4  Cognition: Appropriate decision making, Appropriate for age attention/concentration, Appropriate safety awareness, Follows commands  Safety/Judgement: Awareness of environment, Insight into deficits  Functional Mobility Training:  Bed Mobility:     Supine to Sit: Stand-by assistance;Contact guard assistance; Additional time;Assist x1  Sit to Supine:  (pt returned to chair)            Transfers:  Sit to Stand: Contact guard assistance; Additional time;Assist x1 (bed slightly elevated)  Stand to Sit: Contact guard assistance; Additional time;Assist x1                             Balance:  Sitting: Intact  Sitting - Static: Fair (occasional)  Sitting - Dynamic: Fair (occasional)  Standing: Intact; With support (able stand at RW to use urinal w/ both hands when voiding)  Ambulation/Gait Training:  Distance (ft): 70 Feet (ft)  Assistive Device: Gait belt;Walker, rolling  Ambulation - Level of Assistance: Contact guard assistance; Additional time;Assist x1        Gait Abnormalities: Antalgic;Decreased step clearance; Step to gait; Other (continues to demonstrate flexed posture)  Right Side Weight Bearing: As tolerated     Base of Support: Widened  Stance: Right decreased  Speed/Clarice: Pace decreased (<100 feet/min); Slow  Step Length: Left shortened;Right shortened                          Therapeutic Exercises:   SUPINE  EXERCISES   Sets   Reps   Active Active Assist   Passive Self ROM   Comments   Ankle Pumps  10 [x]                                  []                                  []                                  []                                     Quad Sets  10 [x]                                  []                                  []                                  []                                     Heel Slides  10 [x]                                  []                                  []                                  []                                     Hip Abduction  10 [x]                                  []                                  []                                  []                                     Hip External Rotation   []                                  []                                  []                                  []                                     Glut Sets   []                                  []                                  []                                  []                                        []                                  []                                  []                                  []                                        []                                  []                                  []                                  []                                       STANDING  EXERCISES   Sets   Reps   Active Active Assist   Passive Self ROM   Comments   Heel Raises   []                                  []                                  []                                  []                                     Hip Abduction   []                                  []                                  []                                  []                                     Hip External Rotation   []                                  []                                  []                                  []                                     Hip Flexor Stretch   []                                  []                                  []                                  []                                     Mini squats   []                                  []                                  []                                  []                                     Hamstring Curl   []                                  [] []                                  []                                       Pain:  Pain Scale 1: Numeric (0 - 10)  Pain Intensity 1: 4  Pain Location 1: Hip  Pain Orientation 1: Right  Pain Description 1: Aching  Pain Intervention(s) 1: Repositioned; Rest  Activity Tolerance:   Limited  Please refer to the flowsheet for vital signs taken during this treatment.   After treatment:   [x] Patient left in no apparent distress sitting up in chair  [] Patient left in no apparent distress in bed  [x] Call bell left within reach  [x] Nursing notified  [] Caregiver present  [] Bed alarm activated    COMMUNICATION/COLLABORATION:   The patients plan of care was discussed with: Registered Nurse    Annie LUCIA Means,PTA   Time Calculation: 21 mins

## 2018-03-02 NOTE — PROGRESS NOTES
Hospitalist Progress Note  Greg Krueger MD  Answering service: [phone] OR [phone] from in house phone      Date of Service:  3/2/2018  NAME:  Matt Ring  :  1950  MRN:  [id number]      Admission Summary:   68 yo man with DM2, HTN, HLD, PFO, DJD, depression, PUD, s/p b/l THAs, sleep apnea, GERD, and ADD presented to the ED from home on 18 with fever x12 days and right hip pain. Pt had a GLF on 18. Interval history / Subjective:   Just walked with PT/OT and advised to ease into sitting/supine positions; d/w nurse and CM; POD #4    Peer to peer done with Dr Herson Pagan of [facility] 3/2 as pt was denied dc to inpt rehab; as he is doing better and does not meet acute rehab criteria, pt approved for SNF placement;  [facility] has initiated auth     Assessment & Plan:     Dysuria,   - thought to be due to zeng which was d/c'd already  - UA  unremarkable except for significant glucosuria  - resolved with Pyridium    Sepsis with staph lugdunensis bacteremia (POA) - due to septic arthritis  - BCx  staph alexisis,  CoNS  bottles,  NGTD  - TTE  with LVEF 55-60%, aortic sclerosis  - EULALIO  with LVEF 55-60%, tiny PFO, 2mm vegetation on anterior leaflet  - flu negative  - continue cefazolin and vanc per ID; will need cefazolin x6 weeks from  due to IE (end 18)  - s/p PICC placement 3/1    Mitral valve infective endocarditis - as above     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)  - R hip XR  without acute abnormality   - joint Cx  staph angiensis  - Ortho following  - s/p I&D, removal of metal liner and placement of polyethylene liner and replacement of femoral head, placement of vanc and tobramycin beads ; pathology pending  - Hemovac drain in place  - warfarin for VTE Px with goal INR 1.7-2.2; s/p enoxaparin bridge     Hyponatremia, intermittent (POA) - due to dehydration, had resolved with IVF; stop IVF    DM2, uncontrolled (chronic) - recent A1c 9  - hold metformin, Jardiance  - increase home Lantus due to hyperglycemia  - continue home Januvia     HTN (chronic) - continue home lisinopril     HLD (chronic) - continue home lovastatin     Depression (chronic) - continue home sertraline     BPH (chronic) - continue home Flomax     Chronic pain (chronic) - continue primidone, gabapentin     Morbid obesity (chronic), Body mass index is 39.42 kg/(m^2). Code status: full  DVT prophylaxis: warfarin    Care Plan discussed with: Patient/Family, Nurse and   Disposition: [facility] when bed/auth available     Hospital Problems  Date Reviewed: 2/27/2018          Codes Class Noted POA    Sepsis ([facility] [address] ICD-10-CM: A41.9  ICD-9-CM: 038.9, 995.91  2/27/2018 Yes        * (Principal)Infected prosthesis of right hip ([facility] [address] ICD-10-CM: T84.51XA  ICD-9-CM: 996.66, V43.64  2/26/2018 Yes        Bacteremia ICD-10-CM: R78.81  ICD-9-CM: 790.7  2/24/2018 Yes            Review of Systems:   Pertinent items are noted in HPI. Vital Signs:    Last 24hrs VS reviewed since prior progress note.  Most recent are:  Visit Vitals    /65 (BP 1 Location: Right arm)    Pulse 72    Temp 98.5 °F (36.9 °C)    Resp 16    Ht 5' 10.5\" (1.791 m)    Wt 126.4 kg (278 lb 10.6 oz)    SpO2 97%    BMI 39.42 kg/m2       Intake/Output Summary (Last 24 hours) at 03/02/18 1451  Last data filed at 03/02/18 1116   Gross per 24 hour   Intake                0 ml   Output             2795 ml   Net            -2795 ml      Physical Examination:     Gen - awake, NAD, obese  HEENT - NCAT, PERRL, OP benign, MMM  Neck - supple, no masses  CV - regular rhythm, 2/6 systolic mumur at apex and RUSB  Resp - lungs CTA b/l, no wheezing  GI - +BS, soft, NT, ND, +BS  MSK - right hip ttp, Hemovac drain in place  Neuro - A&O, no focal deficits  Psych - calm and cooperative with normal affect  Skin - right hip dressing c/d/i    Data Review:    Review and/or order of clinical lab test  Review and/or order of tests in the radiology section of CPT  Review and/or order of tests in the medicine section of CPT    Labs:     Recent Labs      03/02/18 0442 03/01/18   0315   WBC  9.2  7.9   HGB  9.8*  9.9*   HCT  29.9*  29.5*   PLT  229  241     Recent Labs      03/02/18 0442 03/01/18 0315 02/28/18   0355   NA  139  136  135*   K  3.8  3.7  3.6   CL  104  103  104   CO2  27  24  24   BUN  5*  8  11   CREA  0.55*  0.69*  0.65*   GLU  119*  205*  217*   CA  8.3*  7.9*  7.4*     No results for input(s): SGOT, GPT, ALT, AP, TBIL, TBILI, TP, ALB, GLOB, GGT, AML, LPSE in the last 72 hours. No lab exists for component: AMYP, HLPSE  Recent Labs      03/02/18 0442 03/01/18 0315 02/28/18   1607   INR  1.5*  1.9*  2.8*   PTP  15.8*  19.7*  29.3*      No results for input(s): FE, TIBC, PSAT, FERR in the last 72 hours. Lab Results   Component Value Date/Time    Folate 9.2 03/18/2010 11:26 AM      No results for input(s): PH, PCO2, PO2 in the last 72 hours. No results for input(s): CPK, CKNDX, TROIQ in the last 72 hours.     No lab exists for component: CPKMB  Lab Results   Component Value Date/Time    Cholesterol, total 141 07/26/2017 01:24 PM    HDL Cholesterol 63 07/26/2017 01:24 PM    LDL, calculated 58 07/26/2017 01:24 PM    Triglyceride 98 07/26/2017 01:24 PM    CHOL/HDL Ratio 3.5 11/01/2010 07:07 AM     Lab Results   Component Value Date/Time    Glucose (POC) 195 (H) 03/02/2018 11:29 AM    Glucose (POC) 110 (H) 03/02/2018 06:47 AM    Glucose (POC) 135 (H) 03/01/2018 09:18 PM    Glucose (POC) 244 (H) 03/01/2018 07:29 PM    Glucose (POC) 300 (H) 03/01/2018 06:22 PM     Lab Results   Component Value Date/Time    Color YELLOW/STRAW 02/28/2018 05:01 PM    Appearance CLEAR 02/28/2018 05:01 PM    Specific gravity 1.020 02/28/2018 05:01 PM    Specific gravity 1.024 02/24/2018 01:41 PM    pH (UA) 6.0 02/28/2018 05:01 PM    Protein NEGATIVE 02/28/2018 05:01 PM    Glucose >1000 (A) 02/28/2018 05:01 PM    Ketone NEGATIVE  02/28/2018 05:01 PM    Bilirubin NEGATIVE  02/28/2018 05:01 PM    Urobilinogen 0.2 02/28/2018 05:01 PM    Nitrites NEGATIVE  02/28/2018 05:01 PM    Leukocyte Esterase NEGATIVE  02/28/2018 05:01 PM    Epithelial cells FEW 02/28/2018 05:01 PM    Bacteria NEGATIVE  02/28/2018 05:01 PM    WBC 0-4 02/28/2018 05:01 PM    RBC 0-5 02/28/2018 05:01 PM     Medications Reviewed:     Current Facility-Administered Medications   Medication Dose Route Frequency    sodium chloride (NS) flush 20 mL  20 mL InterCATHeter PRN    sodium chloride (NS) flush 10 mL  10 mL InterCATHeter Q24H    sodium chloride (NS) flush 10 mL  10 mL InterCATHeter PRN    sodium chloride (NS) flush 10 mL  10 mL InterCATHeter Q8H    alteplase (CATHFLO) 1 mg in sterile water (preservative free) 1 mL injection  1 mg InterCATHeter PRN    bacitracin 500 unit/gram packet 1 Packet  1 Packet Topical PRN    insulin lispro (HUMALOG) injection   SubCUTAneous AC&HS    insulin glargine (LANTUS) injection 26 Units  26 Units SubCUTAneous QHS    insulin glargine (LANTUS) injection 26 Units  26 Units SubCUTAneous DAILY    warfarin - pharmacist to dose   Other Rx Dosing/Monitoring    0.9% sodium chloride infusion 250 mL  250 mL IntraVENous PRN    sodium chloride 0.9 % bolus infusion 500 mL  500 mL IntraVENous ONCE PRN    sodium chloride (NS) flush 5-10 mL  5-10 mL IntraVENous Q8H    sodium chloride (NS) flush 5-10 mL  5-10 mL IntraVENous PRN    naloxone (NARCAN) injection 0.4 mg  0.4 mg IntraVENous PRN    dexamethasone (DECADRON) 4 mg/mL injection 4 mg  4 mg IntraVENous Q6H PRN    hydrOXYzine HCl (ATARAX) tablet 10 mg  10 mg Oral Q8H PRN    famotidine (PEPCID) tablet 20 mg  20 mg Oral BID    senna-docusate (PERICOLACE) 8.6-50 mg per tablet 1 Tab  1 Tab Oral BID    polyethylene glycol (MIRALAX) packet 17 g  17 g Oral DAILY    bisacodyl (DULCOLAX) suppository 10 mg  10 mg Rectal DAILY PRN    acetaminophen (TYLENOL) tablet 650 mg  650 mg Oral Q6H    oxyCODONE IR (ROXICODONE) tablet 5 mg  5 mg Oral Q3H PRN    oxyCODONE IR (ROXICODONE) tablet 7.5-10 mg  7.5-10 mg Oral Q3H PRN    tamsulosin (FLOMAX) capsule 0.4 mg  0.4 mg Oral DAILY    SITagliptin (JANUVIA) tablet 100 mg  100 mg Oral DAILY    primidone (MYSOLINE) tablet 250 mg  250 mg Oral BID    lovastatin (MEVACOR) tablet 20 mg  20 mg Oral QHS    folic acid (FOLVITE) tablet 1 mg  1 mg Oral DAILY    lisinopril (PRINIVIL, ZESTRIL) tablet 20 mg  20 mg Oral DAILY    sertraline (ZOLOFT) tablet 100 mg  100 mg Oral DAILY    gabapentin (NEURONTIN) capsule 600 mg  600 mg Oral QHS    aspirin chewable tablet 81 mg  81 mg Oral DAILY    clonazePAM (KlonoPIN) tablet 0.5 mg  0.5 mg Oral QHS PRN    glucose chewable tablet 16 g  4 Tab Oral PRN    dextrose (D50W) injection syrg 12.5-25 g  12.5-25 g IntraVENous PRN    glucagon (GLUCAGEN) injection 1 mg  1 mg IntraMUSCular PRN    ceFAZolin (ANCEF) 2 g/20 mL in sterile water IV syringe  2 g IntraVENous Q8H    acetaminophen (TYLENOL) tablet 650 mg  650 mg Oral Q6H PRN     ______________________________________________________________________  EXPECTED LENGTH OF STAY: 6d 7h  ACTUAL LENGTH OF STAY:          6                 Trace Ocampo MD

## 2018-03-02 NOTE — DISCHARGE INSTRUCTIONS
ADDITIONAL CARE RECOMMENDATIONS:   1. Take medications as prescribed. 2. Keep appointment(s) as recommended/scheduled. 3. Accuchecks qACHS with moderate/medium correction scale insulin. 4. Orders as per Orthopedic Surgery and ID.  5. Please check INR every 48 hours for goal INR 1.7-2.2 for postop VTE prophylaxis starting Mon 3/5. Warfarin x6 weeks (end date 4/15/18)    ~~~~~~~~~~~~~~~~~~~~~~~~~~~  Home IV Orders  1. 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 Routine PICC  Care  3. Antibiotic:  Cefazolin 2 gm q8 hours IV  4. Lab each Monday:                        CBC/diff/platelets                        CMP                        ESR                        CRP  5. Lab each Thursday:                        CBC/diff/platelets                        BUN                        Creatinine      6. Fax lab to Dr. Abhilash Tapia @ [phone].  7.  Call Dr. Abhilash Tapia @ [phone] for fever >100.5, infection at PICC site, diarrhea, WBC > 15 or < 3, cr > 1.8  8. Duration of therapy: last day of therapy should be April 9, 2018                        Please call Dr. Abhilash Tapia @ [phone] before stopping therapy. 9.  Allergies: Allergies   Allergen Reactions    Nsaids (Non-Steroidal Anti-Inflammatory Drug) Other (comments)       Hx of PUD and Hinson's Esophagus      10.  Make appointment in 3 weeks  ~~~~~~~~~~~~~~~~~~~~~~~~~~~~~~~~~~~~~~~~~~    DIET: Diabetic Diet and no concentrated sweets    ACTIVITY: PT/OT Eval and Treat; weight bearing as tolerated    WOUND CARE: routine dressing care per Ortho instructions    EQUIPMENT needed: as per Ortho, PT/OT        After [address] for SNF/Rehab    Discharge Instructions Anterior Approach Hip Replacement- Dr. Missy Price    Patient Name  Edgar Ku  Date of procedure  2/26/2018    Procedure  Procedure(s):  INCISION AND DRAINAGE OF RIGHT HIP WITH POLY EXCHANGE  (original procedure was posterior)  Surgeon Surgeon(s) and Role:     * Carlos Sánchez MD - Primary      PCP:  Janet Ramon MD   Date of discharge: Follow up appointments  -follow up with Dr. Maryann Cabrera in 2 weeks. Call [phone] to make an appointment. Activity  - weight bearing with walker or crutches; discontinue as tolerated  -avoid extreme movement of hip to prevent hip dislocation    Incision Care  the Aquacel (brown, waterproof) surgical dressing is to remain on the hip for 7 days. On the 7th day gently peel the dressing off by carefully lifting the edge and stretching it slightly to break the adhesive seal  -You may now leave the incision open to air. Patient will have absorbable sutures in the incision.  -If the Aquacel dressing comes loose/off before the 7th day, you may replace it with a dry sterile gauze dressing; change it daily. Once the incision is not draining, leave it open to air  -May take a shower with the Aquacel dressing in place. Once the Aquacel is removed,  may shower and get the incision wet but do not submerge the incision under water in a bath tub, hot tub or swimming pool for 6 weeks after surgery. Preventing blood clots  Coumadin for INR approx 2.0 for 1 month postop    Medications  -continue pain medication as prescribed in the hospital  -continue home medications per medication reconciliation  - place an ice bag on the hip for 15-20 minutes after exercise and as needed    Diet  -resume usual diet; encourage fluids; provide foods high in fiber  -provide stool softeners/laxatives as needed    Rehab/SNF Protocol (to be followed by the facility)    **Anticipated length of stay is 10 days. If going to exceed 10 day length of stay; call Dr. Yanick Marcus office at [phone] to discuss patients progress.     Nursing  -complete head to toe assessment, vital signs  -medication reconciliation  -review pain management  -manage chronic medical conditions  -remove Aquacel dressing at 7 days post-op    Physical Therapy  Weight bearing status:  Precautions at Admission: Fall, WBAT, Contact     Right Side Weight Bearing: As tolerated    Mobility Status:  Supine to Sit:  (NT; seated EOB upon arrival)  Sit to Stand: Contact guard assistance  Sit to Supine: Moderate assistance, Additional time (unable to successfully enter bed with leg ; manual A r)  Bed to Chair:  (NT)    Gait:  Distance (ft): 45 Feet (ft) (x2)  Ambulation - Level of Assistance: Contact guard assistance  Assistive Device: Gait belt, Walker, rolling  Gait Abnormalities: Antalgic, Decreased step clearance, Step to gait (improved gait quality; increased fluidity)    ADL status overall composite: Toilet Transfer : Moderate assistance, Assist x1, Additional time    Physical Therapy-anticipate 10 days length of stay  -assessment and evaluation-bed mobility; functional transfers (bed, chair, bathroom, stairs); ambulation with equipment, safety and ability to get out of facility in the event of an emergency  -review and maintain weight bearing as tolerated with walker or crutches; avoid extreme movement of hip to prevent hip dislocation  -discuss pain management  -review how to do ADLs. Refer to pages 46-50 of handbook.     Exercise Program-refer to pages 38-45 of handbook  -supine exercises-quad sets, hamstring sets, gluteal sets, hip abduction/adduction to neutral, hip internal rotation, heel slides (flexing the knee)  -sitting exercises-ankle pumps, bicep curls (use weights as appropriate), triceps pushups, shoulder flexion (use weights as appropriate)  -standing exercises holding onto supportive counter-toe raises, heel raises, mini-squats, heel/toe touches with knee bend, marching in place, hip abduction/adduction, hip external rotation, hip extension, hip abduction/extension/external rotation (concentration on gluteus placido), heel toe taps    Oxycodone, Rapid Release (By mouth)   Oxycodone Hydrochloride (zv-n-CUJ-done juanito-droe-KLOR-lizette)  Treats moderate to severe pain. This medicine is a narcotic pain reliever. Brand Name(s): Oxaydo, Oxy IR, Roxicodone   There may be other brand names for this medicine. When This Medicine Should Not Be Used: This medicine is not right for everyone. Do not use it if you had an allergic reaction to oxycodone, codeine, hydrocodone, dihydrocodeine, or morphine, or you have a stomach or bowel blockage. How to Use This Medicine:   Capsule, Liquid, Tablet  · Take your medicine as directed. Your dose may need to be changed several times to find what works best for you. · An overdose can be dangerous. Follow directions carefully so you do not get too much medicine at one time. · Oral liquid: Measure the oral liquid medicine with a marked measuring spoon, oral syringe, or medicine cup. · Oxaydo® tablet: Swallow it whole with enough water to swallow it completely. Do not break, crush, chew, or dissolve it. Do not wet the tablet before you put it in your mouth. · This medicine should come with a Medication Guide. Ask your pharmacist for a copy if you do not have one. · Missed dose: Take a dose as soon as you remember. If it is almost time for your next dose, wait until then and take a regular dose. Do not take extra medicine to make up for a missed dose. · Store the medicine in a closed container at room temperature, away from heat, moisture, and direct light. Store the medicine in a secure place to prevent others from getting it. Ask your pharmacist about the best way to dispose of medicine you do not use. Drugs and Foods to Avoid:   Ask your doctor or pharmacist before using any other medicine, including over-the-counter medicines, vitamins, and herbal products. · Do not use this medicine if you are using or have used an MAO inhibitor within the past 14 days. · Some medicines can affect how oxycodone works.  Tell your doctor if you are using any of the following:  ¨ Amiodarone, carbamazepine, erythromycin, ketoconazole, phenytoin, quinidine, rifampin, ritonavir  ¨ Diuretic (water pill)  ¨ Medicine to treat depression or anxiety  ¨ Medicine to treat migraine headaches  ¨ Phenothiazine medicine  · Tell your doctor if you use anything else that makes you sleepy. Some examples are allergy medicine, narcotic pain medicine, and alcohol. Tell your doctor if you are using buprenorphine, butorphanol, nalbuphine, pentazocine, or a muscle relaxer. · Do not drink alcohol while you are using this medicine. Warnings While Using This Medicine:   · Tell your doctor if you are pregnant or breastfeeding, or if you have kidney disease, liver disease, heart disease, low blood pressure, lung disease or breathing problems (such as asthma, COPD), scoliosis, an enlarged prostate or trouble urinating, an underactive thyroid, Hartley disease, gallbladder or pancreas problems, or digestion problems. Tell your doctor if you have a history of head injury, brain tumor, mental health problems, seizures, or alcohol or drug addiction. · This medicine may cause the following problems:  ¨ High risk of overdose, which can lead to death  ¨ Respiratory depression (serious breathing problem that can be life-threatening)  ¨ Serotonin syndrome, when used with certain medicines  · This medicine may make you dizzy, drowsy, or faint. Do not drive or do anything else that could be dangerous until you know how this medicine affects you. Sit or lie down if you feel dizzy. Stand up carefully. · This medicine can be habit-forming. Do not use more than your prescribed dose. Call your doctor if you think your medicine is not working. · Do not stop using this medicine suddenly. Your doctor will need to slowly decrease your dose before you stop it completely. · This medicine may cause constipation, especially with long-term use. Ask your doctor if you should use a laxative to prevent and treat constipation.  Drink plenty of liquids to help avoid constipation. · This medicine could cause infertility. Talk with your doctor before using this medicine if you plan to have children. · Keep all medicine out of the reach of children. Never share your medicine with anyone. Possible Side Effects While Using This Medicine:   Call your doctor right away if you notice any of these side effects:  · Allergic reaction: Itching or hives, swelling in your face or hands, swelling or tingling in your mouth or throat, chest tightness, trouble breathing  · Anxiety, restlessness, fast heartbeat, fever, sweating, muscle spasms, twitching, nausea, vomiting, diarrhea, seeing or hearing things that are not there  · Blue lips, fingernails, or skin, trouble breathing  · Extreme dizziness or weakness, shallow breathing, slow heartbeat, sweating, cold or clammy skin, seizures  · Lightheadedness, dizziness, fainting  · Severe constipation, stomach pain  If you notice these less serious side effects, talk with your doctor:   · Mild constipation  · Sleepiness, tiredness  If you notice other side effects that you think are caused by this medicine, tell your doctor. Call your doctor for medical advice about side effects. You may report side effects to FDA at 2-462-FDA-4817  © 2017 [address] Information is for End User's use only and may not be sold, redistributed or otherwise used for commercial purposes. The above information is an  only. It is not intended as medical advice for individual conditions or treatments. Talk to your doctor, nurse or pharmacist before following any medical regimen to see if it is safe and effective for you.         DISCHARGE SUMMARY from Nurse  The discharge information has been reviewed with the patient. The patient verbalized understanding.   Discharge medications reviewed with the patient and appropriate educational materials and side effects teaching were provided. ___________________________________________________________________________________________________________________________________     Heart Valve Disease: Care Instructions  Your Care Instructions    Your heart is a muscular pump that has four chambers and four valves. The four valves are the mitral, aortic, tricuspid, and pulmonary valves. The valves open and close to keep blood flowing in the proper direction through your heart. When something is wrong with one of the valves, the blood cannot flow in and out of the heart properly. Problems with the heart valves can cause leaks (valve regurgitation) and blockages (valve stenosis). You can be born with heart valve disease, or it can develop over a number of years. Mild cases of heart valve disease may not cause problems, but more serious cases will weaken the heart and can lead to heart failure. Treatment with medicine can help relieve symptoms, but it will not fix the valve. You may need to have surgery to replace or repair the valve. Follow-up care is a key part of your treatment and safety. Be sure to make and go to all appointments, and call your doctor if you are having problems. It's also a good idea to know your test results and keep a list of the medicines you take. How can you care for yourself at home? · Take your medicines exactly as prescribed. Call your doctor if you think you are having a problem with your medicine. You will get more details on the specific medicines your doctor prescribes. · Eat a heart-healthy diet. For example, eat more fruits, vegetables, fish, lean meats, whole grains, and other high-fiber foods. Limit sodium, sugar, and alcohol. · Be active. Ask your doctor what type and level of exercise is safe for you. Walking is a good choice. You also may want to swim, bike, or do other activities. · Stay at a healthy weight. Lose weight if you need to. · Do not smoke. Smoking can cause more heart problems.  If you need help quitting, talk to your doctor about stop-smoking programs and medicines. These can increase your chances of quitting for good. · Avoid colds and flu. Get a pneumococcal vaccine shot. If you have had one before, ask your doctor if you need another dose. Get a flu vaccine every year. · Take care of your teeth and gums. Get regular dental checkups. Good dental health is important because bacteria can spread from infected teeth and gums to the heart valves. · If you have an artificial valve, you may need to take antibiotics before you have certain dental or surgical procedures. When should you call for help? Call 911 anytime you think you may need emergency care. For example, call if:  ? · You have severe trouble breathing. ? · You cough up pink, foamy mucus and you have trouble breathing. ? · You have symptoms of a heart attack. These may include:  ¨ Chest pain or pressure, or a strange feeling in the chest.  ¨ Sweating. ¨ Shortness of breath. ¨ Nausea or vomiting. ¨ Pain, pressure, or a strange feeling in the back, neck, jaw, or upper belly or in one or both shoulders or arms. ¨ Lightheadedness or sudden weakness. ¨ A fast or irregular heartbeat. After you call 911, the  may tell you to chew 1 adult-strength or 2 to 4 low-dose aspirin. Wait for an ambulance. Do not try to drive yourself. ? · You have symptoms of a stroke. These may include:  ¨ Sudden numbness, tingling, weakness, or loss of movement in your face, arm, or leg, especially on only one side of your body. ¨ Sudden vision changes. ¨ Sudden trouble speaking. ¨ Sudden confusion or trouble understanding simple statements. ¨ Sudden problems with walking or balance. ¨ A sudden, severe headache that is different from past headaches. ? · You passed out (lost consciousness). ?Call your doctor now or seek immediate medical care if:  ? · You have new or increased shortness of breath.    ? · You are dizzy or lightheaded, or you feel like you may faint. ? · You have sudden weight gain, such as more than 2 to 3 pounds in a day or 5 pounds in a week. (Your doctor may suggest a different range of weight gain.)   ? · You have increased swelling in your legs, ankles, or feet. ? Watch closely for changes in your health, and be sure to contact your doctor if you have any problems. Where can you learn more? Go to http://russ-kiesha.info/. Enter I310 in the search box to learn more about \"Heart Valve Disease: Care Instructions. \"  Current as of: September 21, 2016  Content Version: 11.4  © 7656-3095 [employer]. Care instructions adapted under license by [employer] (which disclaims liability or warranty for this information). If you have questions about a medical condition or this instruction, always ask your healthcare professional. [place] [address] any warranty or liability for your use of this information.

## 2018-03-02 NOTE — PROGRESS NOTES
Home IV Orders  1. 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 Routine PICC  Care  3. Antibiotic:  Cefazolin 2 gm q8 hours IV  4. Lab each Monday:   CBC/diff/platelets   CMP   ESR   CRP  5. Lab each Thursday:   CBC/diff/platelets   BUN   Creatinine     6. Fax lab to Dr. Kayley Kang @ [phone].  7.  Call Dr. Kayley Kang @ [phone] for fever >100.5, infection at PICC site, diarrhea, WBC > 15 or < 3, cr > 1.8  8. Duration of therapy: last day of therapy should be April 9, 2018   Please call Dr. Kayley Kang @ [phone] before stopping therapy. 9.  Allergies: Allergies   Allergen Reactions    Nsaids (Non-Steroidal Anti-Inflammatory Drug) Other (comments)     Hx of PUD and Hinson's Esophagus     10.  Make appointment in 3 weeks    Mynor Diop MD

## 2018-03-02 NOTE — PROGRESS NOTES
[id number] - Page MD Scott regarding Lantus/Januvia. Reynold - Informed MD of BG   Per cinthya Miller to give Januvia and Lantus.

## 2018-03-02 NOTE — PROGRESS NOTES
Pharmacist Note  Warfarin Dosing  Consult provided for this 69 y.o. male to manage warfarin for Orthopedic Surgery (VTE prophylaxis)    INR Goal: 1.7- 2.2    Drugs that may increase INR:None  Drugs that may decrease INR: None  Other current anticoagulants/ drugs that may increase bleeding risk: NSAID  Risk factors: Age > 65  Daily INR ordered: YES    Recent Labs      03/02/18   0442  03/01/18   0315  02/28/18   1607  02/28/18   0355   HGB  9.8*  9.9*   --   9.6*   INR  1.5*  1.9*  2.8*  2.7*     Date               INR                 Dose  2/24  1.4  ---  2/26  ---  10 mg  2/27  1.2  5 mg  2/28  2.7  HOLD  3/1  1.9  3 mg  3/2                   1.5                  4 mg            Assessment/ Plan: Will order warfarin 4 mg PO x 1 dose. Pharmacy will continue to monitor daily and adjust therapy as indicated. Please contact the pharmacist at  or  for discharge recommendations if needed.      Gaudencio Castro, PharmD

## 2018-03-03 LAB
ANION GAP SERPL CALC-SCNC: 7 MMOL/L (ref 5–15)
BACTERIA SPEC CULT: NORMAL
BUN SERPL-MCNC: 8 MG/DL (ref 6–20)
BUN/CREAT SERPL: 14 (ref 12–20)
CALCIUM SERPL-MCNC: 8.4 MG/DL (ref 8.5–10.1)
CHLORIDE SERPL-SCNC: 102 MMOL/L (ref 97–108)
CO2 SERPL-SCNC: 29 MMOL/L (ref 21–32)
CREAT SERPL-MCNC: 0.57 MG/DL (ref 0.7–1.3)
ERYTHROCYTE [DISTWIDTH] IN BLOOD BY AUTOMATED COUNT: 14.5 % (ref 11.5–14.5)
GLUCOSE BLD STRIP.AUTO-MCNC: 165 MG/DL (ref 65–100)
GLUCOSE BLD STRIP.AUTO-MCNC: 242 MG/DL (ref 65–100)
GLUCOSE BLD STRIP.AUTO-MCNC: 262 MG/DL (ref 65–100)
GLUCOSE BLD STRIP.AUTO-MCNC: 268 MG/DL (ref 65–100)
GLUCOSE SERPL-MCNC: 187 MG/DL (ref 65–100)
HCT VFR BLD AUTO: 27.9 % (ref 36.6–50.3)
HGB BLD-MCNC: 9.3 G/DL (ref 12.1–17)
INR PPP: 1.7 (ref 0.9–1.1)
MCH RBC QN AUTO: 30.3 PG (ref 26–34)
MCHC RBC AUTO-ENTMCNC: 33.3 G/DL (ref 30–36.5)
MCV RBC AUTO: 90.9 FL (ref 80–99)
NRBC # BLD: 0 K/UL (ref 0–0.01)
NRBC BLD-RTO: 0 PER 100 WBC
PLATELET # BLD AUTO: 204 K/UL (ref 150–400)
PMV BLD AUTO: 10.2 FL (ref 8.9–12.9)
POTASSIUM SERPL-SCNC: 3.7 MMOL/L (ref 3.5–5.1)
PROTHROMBIN TIME: 17.4 SEC (ref 9–11.1)
RBC # BLD AUTO: 3.07 M/UL (ref 4.1–5.7)
SERVICE CMNT-IMP: ABNORMAL
SERVICE CMNT-IMP: NORMAL
SODIUM SERPL-SCNC: 138 MMOL/L (ref 136–145)
WBC # BLD AUTO: 7.6 K/UL (ref 4.1–11.1)

## 2018-03-03 PROCEDURE — 74011250637 HC RX REV CODE- 250/637: Performed by: PHYSICIAN ASSISTANT

## 2018-03-03 PROCEDURE — 85027 COMPLETE CBC AUTOMATED: CPT | Performed by: INTERNAL MEDICINE

## 2018-03-03 PROCEDURE — 82962 GLUCOSE BLOOD TEST: CPT

## 2018-03-03 PROCEDURE — 74011636637 HC RX REV CODE- 636/637: Performed by: INTERNAL MEDICINE

## 2018-03-03 PROCEDURE — 85610 PROTHROMBIN TIME: CPT | Performed by: PHYSICIAN ASSISTANT

## 2018-03-03 PROCEDURE — 65270000029 HC RM PRIVATE

## 2018-03-03 PROCEDURE — 97116 GAIT TRAINING THERAPY: CPT

## 2018-03-03 PROCEDURE — 74011250636 HC RX REV CODE- 250/636: Performed by: INTERNAL MEDICINE

## 2018-03-03 PROCEDURE — 36415 COLL VENOUS BLD VENIPUNCTURE: CPT | Performed by: PHYSICIAN ASSISTANT

## 2018-03-03 PROCEDURE — 74011250637 HC RX REV CODE- 250/637: Performed by: INTERNAL MEDICINE

## 2018-03-03 PROCEDURE — 97530 THERAPEUTIC ACTIVITIES: CPT

## 2018-03-03 PROCEDURE — 80048 BASIC METABOLIC PNL TOTAL CA: CPT | Performed by: INTERNAL MEDICINE

## 2018-03-03 RX ORDER — ONDANSETRON 2 MG/ML
4 INJECTION INTRAMUSCULAR; INTRAVENOUS
Status: DISCONTINUED | OUTPATIENT
Start: 2018-03-03 | End: 2018-03-04 | Stop reason: HOSPADM

## 2018-03-03 RX ORDER — INSULIN GLARGINE 100 [IU]/ML
30 INJECTION, SOLUTION SUBCUTANEOUS
Status: DISCONTINUED | OUTPATIENT
Start: 2018-03-03 | End: 2018-03-04 | Stop reason: HOSPADM

## 2018-03-03 RX ORDER — NYSTATIN 100000 [USP'U]/G
POWDER TOPICAL 2 TIMES DAILY
Status: DISCONTINUED | OUTPATIENT
Start: 2018-03-03 | End: 2018-03-04 | Stop reason: HOSPADM

## 2018-03-03 RX ORDER — INSULIN GLARGINE 100 [IU]/ML
30 INJECTION, SOLUTION SUBCUTANEOUS DAILY
Status: DISCONTINUED | OUTPATIENT
Start: 2018-03-03 | End: 2018-03-04 | Stop reason: HOSPADM

## 2018-03-03 RX ADMIN — POLYETHYLENE GLYCOL 3350 17 G: 17 POWDER, FOR SOLUTION ORAL at 09:18

## 2018-03-03 RX ADMIN — OXYCODONE HYDROCHLORIDE 5 MG: 5 TABLET ORAL at 17:20

## 2018-03-03 RX ADMIN — OXYCODONE HYDROCHLORIDE 10 MG: 5 TABLET ORAL at 05:32

## 2018-03-03 RX ADMIN — Medication 10 ML: at 17:07

## 2018-03-03 RX ADMIN — Medication 2 G: at 02:06

## 2018-03-03 RX ADMIN — PRIMIDONE 250 MG: 250 TABLET ORAL at 22:58

## 2018-03-03 RX ADMIN — ASPIRIN 81 MG 81 MG: 81 TABLET ORAL at 09:18

## 2018-03-03 RX ADMIN — GABAPENTIN 600 MG: 300 CAPSULE ORAL at 23:57

## 2018-03-03 RX ADMIN — DOCUSATE SODIUM AND SENNOSIDES 1 TABLET: 8.6; 5 TABLET, FILM COATED ORAL at 09:18

## 2018-03-03 RX ADMIN — ACETAMINOPHEN 650 MG: 325 TABLET, FILM COATED ORAL at 23:55

## 2018-03-03 RX ADMIN — TAMSULOSIN HYDROCHLORIDE 0.4 MG: 0.4 CAPSULE ORAL at 09:18

## 2018-03-03 RX ADMIN — INSULIN LISPRO 4 UNITS: 100 INJECTION, SOLUTION INTRAVENOUS; SUBCUTANEOUS at 23:59

## 2018-03-03 RX ADMIN — SITAGLIPTIN 100 MG: 100 TABLET, FILM COATED ORAL at 09:18

## 2018-03-03 RX ADMIN — INSULIN GLARGINE 30 UNITS: 100 INJECTION, SOLUTION SUBCUTANEOUS at 12:22

## 2018-03-03 RX ADMIN — FAMOTIDINE 20 MG: 20 TABLET, FILM COATED ORAL at 09:18

## 2018-03-03 RX ADMIN — OXYCODONE HYDROCHLORIDE 7.5 MG: 5 TABLET ORAL at 13:56

## 2018-03-03 RX ADMIN — LISINOPRIL 20 MG: 20 TABLET ORAL at 09:18

## 2018-03-03 RX ADMIN — ACETAMINOPHEN 650 MG: 325 TABLET, FILM COATED ORAL at 17:08

## 2018-03-03 RX ADMIN — OXYCODONE HYDROCHLORIDE 10 MG: 5 TABLET ORAL at 23:55

## 2018-03-03 RX ADMIN — PRIMIDONE 250 MG: 250 TABLET ORAL at 11:57

## 2018-03-03 RX ADMIN — Medication 10 ML: at 05:32

## 2018-03-03 RX ADMIN — FAMOTIDINE 20 MG: 20 TABLET, FILM COATED ORAL at 23:58

## 2018-03-03 RX ADMIN — Medication 10 ML: at 20:02

## 2018-03-03 RX ADMIN — INSULIN LISPRO 7 UNITS: 100 INJECTION, SOLUTION INTRAVENOUS; SUBCUTANEOUS at 17:09

## 2018-03-03 RX ADMIN — ACETAMINOPHEN 650 MG: 325 TABLET, FILM COATED ORAL at 05:32

## 2018-03-03 RX ADMIN — LOVASTATIN 20 MG: 20 TABLET ORAL at 23:58

## 2018-03-03 RX ADMIN — WARFARIN SODIUM 3 MG: 2 TABLET ORAL at 11:56

## 2018-03-03 RX ADMIN — Medication 2 G: at 20:02

## 2018-03-03 RX ADMIN — INSULIN GLARGINE 30 UNITS: 100 INJECTION, SOLUTION SUBCUTANEOUS at 22:50

## 2018-03-03 RX ADMIN — OXYCODONE HYDROCHLORIDE 7.5 MG: 5 TABLET ORAL at 09:28

## 2018-03-03 RX ADMIN — INSULIN LISPRO 3 UNITS: 100 INJECTION, SOLUTION INTRAVENOUS; SUBCUTANEOUS at 06:41

## 2018-03-03 RX ADMIN — NYSTATIN: 100000 POWDER TOPICAL at 20:40

## 2018-03-03 RX ADMIN — Medication 2 G: at 11:58

## 2018-03-03 RX ADMIN — FOLIC ACID 1 MG: 1 TABLET ORAL at 17:08

## 2018-03-03 RX ADMIN — ACETAMINOPHEN 650 MG: 325 TABLET, FILM COATED ORAL at 11:57

## 2018-03-03 RX ADMIN — SERTRALINE HYDROCHLORIDE 100 MG: 50 TABLET ORAL at 09:18

## 2018-03-03 NOTE — PROGRESS NOTES
Problem: Mobility Impaired (Adult and Pediatric)  Goal: *Acute Goals and Plan of Care (Insert Text)  Physical Therapy Goals  Initiated 2/27/2018    1. Patient will move from supine to sit and sit to supine  in bed with supervision/set-up within 4 days. 2. Patient will perform sit to stand with supervision/set-up within 4 days. 3. Patient will ambulate with supervision/set-up for 50 feet with the least restrictive device within 4 days. 4. Patient will perform home exercise program per protocol with independence within 4 days. physical Therapy TREATMENT  Patient: Edgar Ku (82 y.o. male)  Date: 3/3/2018  Diagnosis: FUO (fever of unknown origin)  Bacteremia  status post right hip arthroplasty Infected prosthesis of right hip (HCC)  Procedure(s) (LRB):  INCISION AND DRAINAGE OF RIGHT HIP WITH POLY EXCHANGE  (original procedure was posterior) (Right) 5 Days Post-Op  Precautions: Fall, WBAT, Contact  Chart, physical therapy assessment, plan of care and goals were reviewed. ASSESSMENT:  Patient cleared and willing to participate with PT. He requested to use the bathroom prior to starting treatment, ambulating to the bathroom. He reports he continues to fatigue easily, but feels his health continues to improve and he is feeling better today than yesterday. He was able to ambulate up to 100' today and willing to continue to sit in chair following ambulation. He will continue to be followed daily at this point as he is now POD 5. Progression toward goals:  []    Improving appropriately and progressing toward goals  [x]    Improving slowly and progressing toward goals  []    Not making progress toward goals and plan of care will be adjusted     PLAN:  Patient continues to benefit from skilled intervention to address the above impairments. Continue treatment per established plan of care. Discharge Recommendations:  Rehab  Further Equipment Recommendations for Discharge:  TBD @ rehab.       SUBJECTIVE: Patient stated Allan Walker would like to make a stop in the bathroom. Maximilianosedadevin Fajardo    OBJECTIVE DATA SUMMARY:   Critical Behavior:  Neurologic State: Alert, Appropriate for age  Orientation Level: Oriented X4  Cognition: Appropriate decision making  Safety/Judgement: Insight into deficits  Functional Mobility Training:  Bed Mobility:     Supine to Sit: Minimum assistance (For RLE)              Transfers:  Sit to Stand: Contact guard assistance  Stand to Sit: Supervision        Bed to Chair: Supervision                    Balance:  Sitting - Static: Good (unsupported)  Standing: With support  Standing - Static: Fair  Standing - Dynamic : Fair  Ambulation/Gait Training:  Distance (ft): 100 Feet (ft)  Assistive Device: Gait belt;Walker, rolling  Ambulation - Level of Assistance: Contact guard assistance     Gait Description (WDL): Exceptions to WDL  Gait Abnormalities: Antalgic  Right Side Weight Bearing: As tolerated     Base of Support: Shift to left  Stance: Right decreased  Speed/Clarice: Slow  Step Length: Left shortened;Right shortened                  Pain:  Pain Scale 1: Numeric (0 - 10)  Pain Intensity 1: 6  Pain Location 1: Hip  Pain Orientation 1: Right  Pain Description 1: Aching;Constant; Throbbing     Activity Tolerance:   Fair    Please refer to the flowsheet for vital signs taken during this treatment.   After treatment:   [x]    Patient left in no apparent distress sitting up in chair  []    Patient left in no apparent distress in bed  [x]    Call bell left within reach  [x]    Nursing notified  []    Caregiver present  []    Bed alarm activated    COMMUNICATION/COLLABORATION:   The patients plan of care was discussed with: Registered Nurse    Andrew Garcia, PT   Time Calculation: 25 mins

## 2018-03-03 NOTE — PROGRESS NOTES
Pharmacist Note  Warfarin Dosing  Consult provided for this 69 y.o. male to manage warfarin for Orthopedic Surgery (VTE prophylaxis)    INR Goal: 1.7- 2.2    Drugs that may increase INR:None  Drugs that may decrease INR: None  Other current anticoagulants/ drugs that may increase bleeding risk: NSAID  Risk factors: Age > 65  Daily INR ordered: YES    Recent Labs      03/03/18   0205  03/02/18   0442  03/01/18   0315   HGB  9.3*  9.8*  9.9*   INR  1.7*  1.5*  1.9*     Date               INR                 Dose  2/24  1.4  ---  2/26  ---  10 mg  2/27  1.2  5 mg  2/28  2.7  HOLD  3/1  1.9  3 mg  3/2                   1.5                  4 mg  3/3                   1.7                  3 mg          Assessment/ Plan: Will order warfarin 3 mg PO x 1 dose. Pharmacy will continue to monitor daily and adjust therapy as indicated. Please contact the pharmacist at  for discharge recommendations if needed.

## 2018-03-03 NOTE — PROGRESS NOTES
Cm was informed that patient will be ready for discharge when authorization is secured from [facility]. CM talked with Paulina England, in admissions, at [facility], and she confirmed that authorization was received and patient can be admitted tomorrow. CM secured the PICC line report and final IV abx orders. These were faxed to [phone] and CM confirmed with [facility], nursing supervisor, that the fax was received. The IV abx will be ordered from the pharmacy for patient. Patient's IV abx schedule is 11 am/ 7 pm and 3 am.      QUINTON talked with patient's daughter Gabriel Zafar [phone], and confirmed that a family member will transport tomorrow. She said that her brother, Getachew Koo, [phone] will transport patient tomorrow between 1:30 pm - 2pm.  Patient will have 11 am dose of IV abx . Paulina England said she can secure the discharge summary from [address] [place] [address]. Nurse to call report to [phone]  Envelope is on chart. CM will follow until patient is discharged.

## 2018-03-03 NOTE — PROGRESS NOTES
Bedside and Verbal shift change report given to Jhonathan Morales RN (oncoming nurse) by Marry Gonzalez RN (offgoing nurse). Report included the following information SBAR, Kardex, MAR and Recent Results.

## 2018-03-03 NOTE — PROGRESS NOTES
Orthopedic Joint Progress Note    March 3, 2018  Admit Date: 2018  Admit Diagnosis: FUO (fever of unknown origin)  Bacteremia  status post right hip arthroplasty    Post Op day: 5 Days Post-Op    Subjective:     Jose Alfredo Das states that he is feeling well. No pain. No new issues. Has PICC line. Awaiting transfer to [facility] [address]? Tolerating diet  Denies N/V/SOB or CP      Objective:     PT/OT:     PATIENT MOBILITY    Bed Mobility Training  Supine to Sit: Minimum assistance, Moderate assistance, Additional time, Assist x1 (using bedrail (L); having increased difficulty d/t p!)  Sit to Supine:  (pt returned to chair at bedside)  Scooting: Supervision (seated EOB)  Transfer Training  Sit to Stand: Contact guard assistance, Assist x1 (bed slightly elevated)  Stand to Sit: Contact guard assistance  Bed to Chair:  (NT)      Gait Training  Assistive Device: Gait belt, Walker, rolling  Ambulation - Level of Assistance: Contact guard assistance, Additional time, Assist x1  Distance (ft): 100 Feet (ft)  Stairs - Level of Assistance:  (NT)   Weight Bearing Status  Right Side Weight Bearing: As tolerated        Vital Signs:    Blood pressure 125/67, pulse 68, temperature 98.1 °F (36.7 °C), resp. rate 16, height 5' 10.5\" (1.791 m), weight 126.4 kg (278 lb 10.6 oz), SpO2 93 %.   Temp (24hrs), Av.6 °F (37 °C), Min:98.1 °F (36.7 °C), Max:99.2 °F (37.3 °C)      Pain Control:   Pain Assessment  Pain Scale 1: Numeric (0 - 10)  Pain Intensity 1: 7  Pain Onset 1: Post op  Pain Location 1: Hip, Incisional  Pain Orientation 1: Right  Pain Description 1: Aching  Pain Intervention(s) 1: Rest, Repositioned    Meds:  Current Facility-Administered Medications   Medication Dose Route Frequency    warfarin (COUMADIN) tablet 3 mg  3 mg Oral ONCE    ondansetron (ZOFRAN) injection 4 mg  4 mg IntraVENous Q4H PRN    insulin glargine (LANTUS) injection 30 Units  30 Units SubCUTAneous QHS    insulin glargine (LANTUS) injection 30 Units  30 Units SubCUTAneous DAILY    sodium chloride (NS) flush 20 mL  20 mL InterCATHeter PRN    sodium chloride (NS) flush 10 mL  10 mL InterCATHeter Q24H    sodium chloride (NS) flush 10 mL  10 mL InterCATHeter PRN    sodium chloride (NS) flush 10 mL  10 mL InterCATHeter Q8H    alteplase (CATHFLO) 1 mg in sterile water (preservative free) 1 mL injection  1 mg InterCATHeter PRN    bacitracin 500 unit/gram packet 1 Packet  1 Packet Topical PRN    insulin lispro (HUMALOG) injection   SubCUTAneous AC&HS    warfarin - pharmacist to dose   Other Rx Dosing/Monitoring    0.9% sodium chloride infusion 250 mL  250 mL IntraVENous PRN    sodium chloride 0.9 % bolus infusion 500 mL  500 mL IntraVENous ONCE PRN    sodium chloride (NS) flush 5-10 mL  5-10 mL IntraVENous Q8H    sodium chloride (NS) flush 5-10 mL  5-10 mL IntraVENous PRN    naloxone (NARCAN) injection 0.4 mg  0.4 mg IntraVENous PRN    hydrOXYzine HCl (ATARAX) tablet 10 mg  10 mg Oral Q8H PRN    famotidine (PEPCID) tablet 20 mg  20 mg Oral BID    senna-docusate (PERICOLACE) 8.6-50 mg per tablet 1 Tab  1 Tab Oral BID    polyethylene glycol (MIRALAX) packet 17 g  17 g Oral DAILY    bisacodyl (DULCOLAX) suppository 10 mg  10 mg Rectal DAILY PRN    acetaminophen (TYLENOL) tablet 650 mg  650 mg Oral Q6H    oxyCODONE IR (ROXICODONE) tablet 5 mg  5 mg Oral Q3H PRN    oxyCODONE IR (ROXICODONE) tablet 7.5-10 mg  7.5-10 mg Oral Q3H PRN    tamsulosin (FLOMAX) capsule 0.4 mg  0.4 mg Oral DAILY    SITagliptin (JANUVIA) tablet 100 mg  100 mg Oral DAILY    primidone (MYSOLINE) tablet 250 mg  250 mg Oral BID    lovastatin (MEVACOR) tablet 20 mg  20 mg Oral QHS    folic acid (FOLVITE) tablet 1 mg  1 mg Oral DAILY    lisinopril (PRINIVIL, ZESTRIL) tablet 20 mg  20 mg Oral DAILY    sertraline (ZOLOFT) tablet 100 mg  100 mg Oral DAILY    gabapentin (NEURONTIN) capsule 600 mg  600 mg Oral QHS    aspirin chewable tablet 81 mg  81 mg Oral DAILY    clonazePAM (KlonoPIN) tablet 0.5 mg  0.5 mg Oral QHS PRN    glucose chewable tablet 16 g  4 Tab Oral PRN    dextrose (D50W) injection syrg 12.5-25 g  12.5-25 g IntraVENous PRN    glucagon (GLUCAGEN) injection 1 mg  1 mg IntraMUSCular PRN    ceFAZolin (ANCEF) 2 g/20 mL in sterile water IV syringe  2 g IntraVENous Q8H    acetaminophen (TYLENOL) tablet 650 mg  650 mg Oral Q6H PRN        LAB:    Lab Results   Component Value Date/Time    INR 1.7 (H) 03/03/2018 02:05 AM    INR 1.5 (H) 03/02/2018 04:42 AM    INR 1.9 (H) 03/01/2018 03:15 AM     Lab Results   Component Value Date/Time    HGB 9.3 (L) 03/03/2018 02:05 AM    HGB 9.8 (L) 03/02/2018 04:42 AM    HGB 9.9 (L) 03/01/2018 03:15 AM         Dressing:      Wound:       Physical Exam:    Dressing is clean, dry, and intact  Neurovascular checks within normal limits  Orientation:  Oriented    Assessment:      Principal Problem:    Infected prosthesis of right hip ([address] (2/26/2018)    Active Problems:    Bacteremia (2/24/2018)      Sepsis ([address] (2/27/2018)         Plan:     Continue PT/OT/Rehab  Consult: Rehab team including PT, OT, recreational therapy, and    Abx per ID team  Will need coumadin on discharge for at least 6wks  Cont 10mg oxycodone for pain  Ice packs to hip PRN  Medical management per primary team  Case management for disposition planning - ins auth still pending? Discharge To:    [place] when authorized     Signed By: GENEVIEVE Moses

## 2018-03-03 NOTE — PROGRESS NOTES
Hospitalist Progress Note  Avel Hinkle MD  Answering service: [phone] OR [phone] from in house phone      Date of Service:  3/3/2018  NAME:  Brianne Liriano  :  1950  MRN:  [id number]      Admission Summary:   68 yo man with DM2, HTN, HLD, PFO, DJD, depression, PUD, s/p b/l THAs, sleep apnea, GERD, and ADD presented to the ED from home on 18 with fever x12 days and right hip pain. Pt had a GLF on 18. Interval history / Subjective:   Just walked with PT/OT and having right hip/thigh pain; seen and d/w nurse and CM; POD #5    Peer to peer done with Dr Britt Vidales of [facility] 3/2 as pt was denied dc to inpt rehab; as he is doing better and does not meet acute rehab criteria, pt approved for SNF placement;  [place] has initiated auth     Assessment & Plan:     Dysuria,   - thought to be due to zeng which was d/c'd already  - UA  unremarkable except for significant glucosuria  - resolved with Pyridium    Sepsis with staph lugdunensis bacteremia (POA) - due to septic arthritis  - BCx  staph lugdunensis,  CoNS  bottles,  NGTD  - TTE  with LVEF 55-60%, aortic sclerosis  - EULALIO  with LVEF 55-60%, tiny PFO, 2mm vegetation on anterior leaflet  - flu negative  - continue cefazolin and vanc per ID; will need cefazolin x6 weeks from  due to IE (end 18)  - s/p PICC placement 3/1    Mitral valve infective endocarditis - as above     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)  - R hip XR  without acute abnormality   - joint Cx  staph lugdunensis  - Ortho following  - s/p I&D, removal of metal liner and placement of polyethylene liner and replacement of femoral head, placement of vanc and tobramycin beads ; pathology pending  - Hemovac drain in place  - warfarin for VTE Px with goal INR 1.7-2.2; s/p enoxaparin bridge     Hyponatremia, intermittent (POA) - due to dehydration, had resolved with IVF; stop IVF    DM2, uncontrolled (chronic) - recent A1c 9  - hold metformin, Jardiance  - increase home Lantus due to hyperglycemia  - continue home Januvia     HTN (chronic) - continue home lisinopril     HLD (chronic) - continue home lovastatin     Depression (chronic) - continue home sertraline     BPH (chronic) - continue home Flomax     Chronic pain (chronic) - continue primidone, gabapentin     Morbid obesity (chronic), Body mass index is 38.76 kg/(m^2). Code status: full  DVT prophylaxis: warfarin    Care Plan discussed with: Patient/Family, Nurse and   Disposition: [facility] when bed/auth available. Patient is medically stable and cleared by consultants for discharge to SNF. Hospital Problems  Date Reviewed: 2/27/2018          Codes Class Noted POA    Sepsis [facility]) ICD-10-CM: A41.9  ICD-9-CM: 038.9, 995.91  2/27/2018 Yes        * (Principal)Infected prosthesis of right hip ([address] ICD-10-CM: T84.51XA  ICD-9-CM: 996.66, V43.64  2/26/2018 Yes        Bacteremia ICD-10-CM: R78.81  ICD-9-CM: 790.7  2/24/2018 Yes            Review of Systems:   Pertinent items are noted in HPI. Vital Signs:    Last 24hrs VS reviewed since prior progress note.  Most recent are:  Visit Vitals    /69 (BP 1 Location: Right arm)    Pulse 71    Temp 98.3 °F (36.8 °C)    Resp 16    Ht 5' 10.5\" (1.791 m)    Wt 124.3 kg (274 lb)    SpO2 98%    BMI 38.76 kg/m2       Intake/Output Summary (Last 24 hours) at 03/03/18 1031  Last data filed at 03/03/18 0528   Gross per 24 hour   Intake                0 ml   Output             1825 ml   Net            -1825 ml      Physical Examination:     Gen - awake, NAD, obese  HEENT - NCAT, PERRL, OP benign, MMM  Neck - supple, no masses  CV - regular rhythm, 2/6 systolic mumur at apex and RUSB  Resp - lungs CTA b/l, no wheezing  GI - +BS, soft, NT, ND, +BS  MSK - right hip ttp, Hemovac drain in place  Neuro - A&O, no focal deficits  Psych - calm and cooperative with normal affect  Skin - right hip dressing c/d/i    Data Review:    Review and/or order of clinical lab test  Review and/or order of tests in the radiology section of CPT  Review and/or order of tests in the medicine section of CPT    Labs:     Recent Labs      03/03/18 0205 03/02/18 0442   WBC  7.6  9.2   HGB  9.3*  9.8*   HCT  27.9*  29.9*   PLT  204  229     Recent Labs      03/03/18 0205 03/02/18 0442 03/01/18 0315   NA  138  139  136   K  3.7  3.8  3.7   CL  102  104  103   CO2  29  27  24   BUN  8  5*  8   CREA  0.57*  0.55*  0.69*   GLU  187*  119*  205*   CA  8.4*  8.3*  7.9*     No results for input(s): SGOT, GPT, ALT, AP, TBIL, TBILI, TP, ALB, GLOB, GGT, AML, LPSE in the last 72 hours. No lab exists for component: AMYP, HLPSE  Recent Labs      03/03/18 0205 03/02/18 0442 03/01/18 0315   INR  1.7*  1.5*  1.9*   PTP  17.4*  15.8*  19.7*      No results for input(s): FE, TIBC, PSAT, FERR in the last 72 hours. Lab Results   Component Value Date/Time    Folate 9.2 03/18/2010 11:26 AM      No results for input(s): PH, PCO2, PO2 in the last 72 hours. No results for input(s): CPK, CKNDX, TROIQ in the last 72 hours.     No lab exists for component: CPKMB  Lab Results   Component Value Date/Time    Cholesterol, total 141 07/26/2017 01:24 PM    HDL Cholesterol 63 07/26/2017 01:24 PM    LDL, calculated 58 07/26/2017 01:24 PM    Triglyceride 98 07/26/2017 01:24 PM    CHOL/HDL Ratio 3.5 11/01/2010 07:07 AM     Lab Results   Component Value Date/Time    Glucose (POC) 165 (H) 03/03/2018 06:12 AM    Glucose (POC) 246 (H) 03/02/2018 09:01 PM    Glucose (POC) 193 (H) 03/02/2018 04:05 PM    Glucose (POC) 195 (H) 03/02/2018 11:29 AM    Glucose (POC) 110 (H) 03/02/2018 06:47 AM     Lab Results   Component Value Date/Time    Color YELLOW/STRAW 02/28/2018 05:01 PM    Appearance CLEAR 02/28/2018 05:01 PM    Specific gravity 1.020 02/28/2018 05:01 PM    Specific gravity 1.024 02/24/2018 01:41 PM    pH (UA) 6.0 02/28/2018 05:01 PM    Protein NEGATIVE  02/28/2018 05:01 PM    Glucose >1000 (A) 02/28/2018 05:01 PM    Ketone NEGATIVE  02/28/2018 05:01 PM    Bilirubin NEGATIVE  02/28/2018 05:01 PM    Urobilinogen 0.2 02/28/2018 05:01 PM    Nitrites NEGATIVE  02/28/2018 05:01 PM    Leukocyte Esterase NEGATIVE  02/28/2018 05:01 PM    Epithelial cells FEW 02/28/2018 05:01 PM    Bacteria NEGATIVE  02/28/2018 05:01 PM    WBC 0-4 02/28/2018 05:01 PM    RBC 0-5 02/28/2018 05:01 PM     Medications Reviewed:     Current Facility-Administered Medications   Medication Dose Route Frequency    warfarin (COUMADIN) tablet 3 mg  3 mg Oral ONCE    ondansetron (ZOFRAN) injection 4 mg  4 mg IntraVENous Q4H PRN    insulin glargine (LANTUS) injection 30 Units  30 Units SubCUTAneous QHS    insulin glargine (LANTUS) injection 30 Units  30 Units SubCUTAneous DAILY    sodium chloride (NS) flush 20 mL  20 mL InterCATHeter PRN    sodium chloride (NS) flush 10 mL  10 mL InterCATHeter Q24H    sodium chloride (NS) flush 10 mL  10 mL InterCATHeter PRN    sodium chloride (NS) flush 10 mL  10 mL InterCATHeter Q8H    alteplase (CATHFLO) 1 mg in sterile water (preservative free) 1 mL injection  1 mg InterCATHeter PRN    bacitracin 500 unit/gram packet 1 Packet  1 Packet Topical PRN    insulin lispro (HUMALOG) injection   SubCUTAneous AC&HS    warfarin - pharmacist to dose   Other Rx Dosing/Monitoring    0.9% sodium chloride infusion 250 mL  250 mL IntraVENous PRN    sodium chloride 0.9 % bolus infusion 500 mL  500 mL IntraVENous ONCE PRN    sodium chloride (NS) flush 5-10 mL  5-10 mL IntraVENous Q8H    sodium chloride (NS) flush 5-10 mL  5-10 mL IntraVENous PRN    naloxone (NARCAN) injection 0.4 mg  0.4 mg IntraVENous PRN    hydrOXYzine HCl (ATARAX) tablet 10 mg  10 mg Oral Q8H PRN    famotidine (PEPCID) tablet 20 mg  20 mg Oral BID    senna-docusate (PERICOLACE) 8.6-50 mg per tablet 1 Tab  1 Tab Oral BID    polyethylene glycol (MIRALAX) packet 17 g  17 g Oral DAILY    bisacodyl (DULCOLAX) suppository 10 mg  10 mg Rectal DAILY PRN    acetaminophen (TYLENOL) tablet 650 mg  650 mg Oral Q6H    oxyCODONE IR (ROXICODONE) tablet 5 mg  5 mg Oral Q3H PRN    oxyCODONE IR (ROXICODONE) tablet 7.5-10 mg  7.5-10 mg Oral Q3H PRN    tamsulosin (FLOMAX) capsule 0.4 mg  0.4 mg Oral DAILY    SITagliptin (JANUVIA) tablet 100 mg  100 mg Oral DAILY    primidone (MYSOLINE) tablet 250 mg  250 mg Oral BID    lovastatin (MEVACOR) tablet 20 mg  20 mg Oral QHS    folic acid (FOLVITE) tablet 1 mg  1 mg Oral DAILY    lisinopril (PRINIVIL, ZESTRIL) tablet 20 mg  20 mg Oral DAILY    sertraline (ZOLOFT) tablet 100 mg  100 mg Oral DAILY    gabapentin (NEURONTIN) capsule 600 mg  600 mg Oral QHS    aspirin chewable tablet 81 mg  81 mg Oral DAILY    clonazePAM (KlonoPIN) tablet 0.5 mg  0.5 mg Oral QHS PRN    glucose chewable tablet 16 g  4 Tab Oral PRN    dextrose (D50W) injection syrg 12.5-25 g  12.5-25 g IntraVENous PRN    glucagon (GLUCAGEN) injection 1 mg  1 mg IntraMUSCular PRN    ceFAZolin (ANCEF) 2 g/20 mL in sterile water IV syringe  2 g IntraVENous Q8H    acetaminophen (TYLENOL) tablet 650 mg  650 mg Oral Q6H PRN     ______________________________________________________________________  EXPECTED LENGTH OF STAY: 6d 7h  ACTUAL LENGTH OF STAY:          7                 Jeanine Simmons MD

## 2018-03-04 VITALS
TEMPERATURE: 99.4 F | HEART RATE: 77 BPM | SYSTOLIC BLOOD PRESSURE: 146 MMHG | BODY MASS INDEX: 38.36 KG/M2 | DIASTOLIC BLOOD PRESSURE: 59 MMHG | RESPIRATION RATE: 16 BRPM | OXYGEN SATURATION: 96 % | WEIGHT: 274.03 LBS | HEIGHT: 71 IN

## 2018-03-04 PROBLEM — I05.9 ENDOCARDITIS OF MITRAL VALVE: Status: ACTIVE | Noted: 2018-03-04

## 2018-03-04 PROBLEM — E66.9 OBESITY: Status: ACTIVE | Noted: 2018-03-04

## 2018-03-04 PROBLEM — B95.8 BACTEREMIA DUE TO STAPHYLOCOCCUS: Status: ACTIVE | Noted: 2018-02-24

## 2018-03-04 PROBLEM — G47.00 INSOMNIA: Status: ACTIVE | Noted: 2018-03-04

## 2018-03-04 LAB
ANION GAP SERPL CALC-SCNC: 6 MMOL/L (ref 5–15)
BUN SERPL-MCNC: 8 MG/DL (ref 6–20)
BUN/CREAT SERPL: 14 (ref 12–20)
CALCIUM SERPL-MCNC: 9.3 MG/DL (ref 8.5–10.1)
CHLORIDE SERPL-SCNC: 101 MMOL/L (ref 97–108)
CO2 SERPL-SCNC: 28 MMOL/L (ref 21–32)
CREAT SERPL-MCNC: 0.58 MG/DL (ref 0.7–1.3)
ERYTHROCYTE [DISTWIDTH] IN BLOOD BY AUTOMATED COUNT: 14.4 % (ref 11.5–14.5)
GLUCOSE BLD STRIP.AUTO-MCNC: 145 MG/DL (ref 65–100)
GLUCOSE BLD STRIP.AUTO-MCNC: 233 MG/DL (ref 65–100)
GLUCOSE SERPL-MCNC: 160 MG/DL (ref 65–100)
HCT VFR BLD AUTO: 28.4 % (ref 36.6–50.3)
HGB BLD-MCNC: 9.3 G/DL (ref 12.1–17)
INR PPP: 1.5 (ref 0.9–1.1)
MCH RBC QN AUTO: 29.9 PG (ref 26–34)
MCHC RBC AUTO-ENTMCNC: 32.7 G/DL (ref 30–36.5)
MCV RBC AUTO: 91.3 FL (ref 80–99)
NRBC # BLD: 0 K/UL (ref 0–0.01)
NRBC BLD-RTO: 0 PER 100 WBC
PLATELET # BLD AUTO: 186 K/UL (ref 150–400)
PMV BLD AUTO: 10.3 FL (ref 8.9–12.9)
POTASSIUM SERPL-SCNC: 3.9 MMOL/L (ref 3.5–5.1)
PROTHROMBIN TIME: 14.9 SEC (ref 9–11.1)
RBC # BLD AUTO: 3.11 M/UL (ref 4.1–5.7)
SERVICE CMNT-IMP: ABNORMAL
SERVICE CMNT-IMP: ABNORMAL
SODIUM SERPL-SCNC: 135 MMOL/L (ref 136–145)
WBC # BLD AUTO: 8.1 K/UL (ref 4.1–11.1)

## 2018-03-04 PROCEDURE — 74011250637 HC RX REV CODE- 250/637: Performed by: INTERNAL MEDICINE

## 2018-03-04 PROCEDURE — 74011250637 HC RX REV CODE- 250/637: Performed by: PHYSICIAN ASSISTANT

## 2018-03-04 PROCEDURE — 74011636637 HC RX REV CODE- 636/637: Performed by: INTERNAL MEDICINE

## 2018-03-04 PROCEDURE — 74011250636 HC RX REV CODE- 250/636: Performed by: INTERNAL MEDICINE

## 2018-03-04 PROCEDURE — 85610 PROTHROMBIN TIME: CPT | Performed by: PHYSICIAN ASSISTANT

## 2018-03-04 PROCEDURE — 82962 GLUCOSE BLOOD TEST: CPT

## 2018-03-04 PROCEDURE — 74011250637 HC RX REV CODE- 250/637: Performed by: SPECIALIST

## 2018-03-04 PROCEDURE — 85027 COMPLETE CBC AUTOMATED: CPT | Performed by: INTERNAL MEDICINE

## 2018-03-04 PROCEDURE — 36415 COLL VENOUS BLD VENIPUNCTURE: CPT | Performed by: PHYSICIAN ASSISTANT

## 2018-03-04 PROCEDURE — 80048 BASIC METABOLIC PNL TOTAL CA: CPT | Performed by: INTERNAL MEDICINE

## 2018-03-04 RX ORDER — CLONAZEPAM 0.5 MG/1
0.5 TABLET ORAL
Qty: 5 TAB | Refills: 0 | Status: SHIPPED | OUTPATIENT
Start: 2018-03-04 | End: 2018-09-25 | Stop reason: ALTCHOICE

## 2018-03-04 RX ORDER — INSULIN GLARGINE 100 [IU]/ML
30 INJECTION, SOLUTION SUBCUTANEOUS 2 TIMES DAILY
Qty: 1 PEN | Refills: 0 | Status: SHIPPED
Start: 2018-03-04 | End: 2018-05-12 | Stop reason: SDUPTHER

## 2018-03-04 RX ORDER — OXYCODONE HYDROCHLORIDE 5 MG/1
7.5 TABLET ORAL
Qty: 5 TAB | Refills: 0 | Status: SHIPPED | OUTPATIENT
Start: 2018-03-04 | End: 2018-04-17 | Stop reason: SDUPTHER

## 2018-03-04 RX ORDER — AMOXICILLIN 250 MG
1 CAPSULE ORAL 2 TIMES DAILY
Qty: 60 TAB | Refills: 0 | Status: SHIPPED
Start: 2018-03-04 | End: 2018-09-25 | Stop reason: ALTCHOICE

## 2018-03-04 RX ORDER — POLYETHYLENE GLYCOL 3350 17 G/17G
17 POWDER, FOR SOLUTION ORAL DAILY
Qty: 30 PACKET | Refills: 0 | Status: SHIPPED
Start: 2018-03-05 | End: 2018-09-25 | Stop reason: ALTCHOICE

## 2018-03-04 RX ORDER — WARFARIN 4 MG/1
4 TABLET ORAL DAILY
Qty: 1 TAB | Refills: 0 | Status: SHIPPED
Start: 2018-03-04 | End: 2018-03-04

## 2018-03-04 RX ORDER — OXYCODONE HYDROCHLORIDE 5 MG/1
5 TABLET ORAL
Qty: 5 TAB | Refills: 0 | Status: SHIPPED | OUTPATIENT
Start: 2018-03-04 | End: 2018-04-03 | Stop reason: SDUPTHER

## 2018-03-04 RX ORDER — WARFARIN 4 MG/1
4 TABLET ORAL DAILY
Qty: 1 TAB | Refills: 0 | Status: ON HOLD | OUTPATIENT
Start: 2018-03-04 | End: 2018-04-11

## 2018-03-04 RX ORDER — CEFAZOLIN SODIUM/WATER 2 G/20 ML
2 SYRINGE (ML) INTRAVENOUS EVERY 8 HOURS
Qty: 1 ML | Refills: 0 | Status: SHIPPED
Start: 2018-03-04 | End: 2018-04-11

## 2018-03-04 RX ORDER — FAMOTIDINE 20 MG/1
20 TABLET, FILM COATED ORAL 2 TIMES DAILY
Qty: 60 TAB | Refills: 0 | Status: SHIPPED
Start: 2018-03-04 | End: 2018-09-25 | Stop reason: ALTCHOICE

## 2018-03-04 RX ORDER — WARFARIN 4 MG/1
4 TABLET ORAL ONCE
Status: COMPLETED | OUTPATIENT
Start: 2018-03-04 | End: 2018-03-04

## 2018-03-04 RX ORDER — NYSTATIN 100000 [USP'U]/G
1 POWDER TOPICAL 2 TIMES DAILY
Qty: 1 BOTTLE | Refills: 0 | Status: SHIPPED
Start: 2018-03-04 | End: 2018-05-16 | Stop reason: ALTCHOICE

## 2018-03-04 RX ADMIN — SITAGLIPTIN 100 MG: 100 TABLET, FILM COATED ORAL at 09:34

## 2018-03-04 RX ADMIN — Medication 2 G: at 03:21

## 2018-03-04 RX ADMIN — Medication 10 ML: at 00:01

## 2018-03-04 RX ADMIN — ACETAMINOPHEN 650 MG: 325 TABLET, FILM COATED ORAL at 08:18

## 2018-03-04 RX ADMIN — TAMSULOSIN HYDROCHLORIDE 0.4 MG: 0.4 CAPSULE ORAL at 09:37

## 2018-03-04 RX ADMIN — SERTRALINE HYDROCHLORIDE 100 MG: 50 TABLET ORAL at 09:37

## 2018-03-04 RX ADMIN — Medication 10 ML: at 10:16

## 2018-03-04 RX ADMIN — ACETAMINOPHEN 650 MG: 325 TABLET, FILM COATED ORAL at 11:20

## 2018-03-04 RX ADMIN — OXYCODONE HYDROCHLORIDE 10 MG: 5 TABLET ORAL at 03:20

## 2018-03-04 RX ADMIN — INSULIN GLARGINE 30 UNITS: 100 INJECTION, SOLUTION SUBCUTANEOUS at 09:37

## 2018-03-04 RX ADMIN — INSULIN LISPRO 4 UNITS: 100 INJECTION, SOLUTION INTRAVENOUS; SUBCUTANEOUS at 12:08

## 2018-03-04 RX ADMIN — OXYCODONE HYDROCHLORIDE 10 MG: 5 TABLET ORAL at 11:20

## 2018-03-04 RX ADMIN — NYSTATIN: 100000 POWDER TOPICAL at 09:39

## 2018-03-04 RX ADMIN — LISINOPRIL 20 MG: 20 TABLET ORAL at 09:37

## 2018-03-04 RX ADMIN — INSULIN LISPRO 3 UNITS: 100 INJECTION, SOLUTION INTRAVENOUS; SUBCUTANEOUS at 08:18

## 2018-03-04 RX ADMIN — OXYCODONE HYDROCHLORIDE 10 MG: 5 TABLET ORAL at 08:19

## 2018-03-04 RX ADMIN — FAMOTIDINE 20 MG: 20 TABLET, FILM COATED ORAL at 09:37

## 2018-03-04 RX ADMIN — Medication 10 ML: at 08:25

## 2018-03-04 RX ADMIN — FOLIC ACID 1 MG: 1 TABLET ORAL at 09:34

## 2018-03-04 RX ADMIN — Medication 2 G: at 10:15

## 2018-03-04 RX ADMIN — WARFARIN SODIUM 4 MG: 4 TABLET ORAL at 11:21

## 2018-03-04 RX ADMIN — Medication 10 ML: at 07:24

## 2018-03-04 RX ADMIN — ASPIRIN 81 MG 81 MG: 81 TABLET ORAL at 09:37

## 2018-03-04 RX ADMIN — PRIMIDONE 250 MG: 250 TABLET ORAL at 09:37

## 2018-03-04 NOTE — PROGRESS NOTES
Hospital to [facility] SBAR Handoff - Lyle Das                                                                        69 y.o.   male    [address]   Room: [address] [place] [address]  Unit Phone# :  [phone]      [address] [facility] [address]  Dept: [address]: [phone]                    SITUATION     Admitted:  2/24/2018         Attending Provider:  Livia Mark MD       Consultations:  IP CONSULT TO ORTHOPEDIC SURGERY  IP CONSULT TO INTERVENTIONAL RADIOLOGY  IP CONSULT TO INFECTIOUS DISEASES  IP CONSULT TO HOSPITALIST  IP CONSULT TO CARDIOLOGY    PCP:  Rajat Stack MD   [phone]    Treatment Team: Attending Provider: Livia Mark MD; Consulting Provider: Venecia Tena MD; Consulting Provider: Andrew Jarvis MD; Physician: Julianna Jackman MD; Consulting Provider: Lucia Rice MD; Consulting Provider: AMAN Mayberry; Consulting Provider: Leonid Hughes MD; Care Manager: JORDAN Guerrero; Consulting Provider: Elizabeth Stone MD    Admitting Dx:  FUO (fever of unknown origin)  Bacteremia  status post right hip arthroplasty       Principal Problem: Infected prosthesis of right hip ([address]    6 Days Post-Op of   Procedure(s):  INCISION AND DRAINAGE OF RIGHT HIP WITH POLY EXCHANGE  (original procedure was posterior)   BY: Leonid Hughes MD             ON: 2/26/2018                  Code Status: Full Code                Advance Directives:   Advance Care Planning 2/24/2018   Patient's Healthcare Decision Maker is: Verbal statement (Legal Next of Kin remains as decision maker)   Primary Decision Maker Name Gypsy Roberson   Primary Decision Maker Phone Number [phone]   Primary Decision Maker Relationship to Patient -   Confirm Advance Directive Yes, on file   Does the patient have other document types -    (Send w/patient)   Yes Not W Pt       Isolation:  There are currently no Active Isolations       MDRO: No current active infections    Pain Medications given:  Oxycodone  Last dose: 3/4/18 at  1121    Special Equipment needed: yes  Type of equipment: Walker         BACKGROUND     Allergies: Allergies   Allergen Reactions    Nsaids (Non-Steroidal Anti-Inflammatory Drug) Other (comments)     Hx of PUD and Hinson's Esophagus       Past Medical History:   Diagnosis Date    ADD (attention deficit disorder)     Anemia due to blood loss     Hinson's esophagus with esophagitis     Benign essential tremor     Cancer ([facility] 2012    BCC    Depression     DJD (degenerative joint disease) of hip     bilat    DM (diabetes mellitus) ([address] 3/17/2010    ED (erectile dysfunction) of organic origin     Fall on or from sidewalk curb 9/24/2015    Femur fracture ([facility]     Gastritis and duodenitis     GERD (gastroesophageal reflux disease)     resolved after discontinuing diclofenac    Hematuria 6/2016    High cholesterol 3/17/2010    HTN (hypertension) 3/17/2010    Morbid obesity ([facility]     Murmur, cardiac 2016    PFO (patent foramen ovale) 7/26/2017    PUD (peptic ulcer disease)     Shingles 6/2016    RESOLVING- NO RASH (HEAD)    Sleep apnea     CPAP       Past Surgical History:   Procedure Laterality Date    ENDOSCOPY, COLON, DIAGNOSTIC      HX CHOLECYSTECTOMY  1995    HX GI  1980    gastroplasty    HX HERNIA REPAIR  1995    HX HIP REPLACEMENT      Left    HX ORTHOPAEDIC  2011    rot cuff repair    HX TONSILLECTOMY  1950    TOTAL HIP ARTHROPLASTY  05/2010    right    TOTAL HIP ARTHROPLASTY  08/01/2016    left       Prescriptions Prior to Admission   Medication Sig    tamsulosin (FLOMAX) 0.4 mg capsule Take 1 Cap by mouth daily for 15 days.  traMADol (ULTRAM) 50 mg tablet Take 1 Tab by mouth every six (6) hours as needed for Pain. Max Daily Amount: 200 mg.    methocarbamol (ROBAXIN) 750 mg tablet Take 1 Tab by mouth two (2) times daily as needed for Pain.     JANUVIA 100 mg tablet TAKE 1 TABLET DAILY    primidone (MYSOLINE) 250 mg tablet TAKE 1 TABLET TWICE A DAY    lovastatin (MEVACOR) 20 mg tablet TAKE 1 TABLET IN THE EVENING    metFORMIN ER (GLUCOPHAGE XR) 500 mg tablet TAKE 2 TABLETS DAILY (WITH DINNER)    lisinopril (PRINIVIL, ZESTRIL) 20 mg tablet TAKE 1 TABLET DAILY    JARDIANCE 25 mg tablet TAKE 1 TABLET DAILY    VITAMIN D2 50,000 unit capsule TAKE 1 CAPSULE EVERY 7 DAYS    gabapentin (NEURONTIN) 300 mg capsule Take 2 Caps by mouth nightly.  aspirin 81 mg chewable tablet Take 1 Tab by mouth daily.  folic acid (FOLVITE) 1 mg tablet TAKE 1 TABLET DAILY    clobetasol (TEMOVATE) 0.05 % ointment APPLY TO THE AFFECTED AREA TWICE A DAY AS NEEDED    butalbital-acetaminophen-caffeine (FIORICET, ESGIC) -40 mg per tablet Take 1 Tab by mouth every four (4) hours as needed for Pain. Max Daily Amount: 6 Tabs.  ketoconazole (NIZORAL) 2 % topical cream Apply  to affected area two (2) times a day.  clonazePAM (KLONOPIN) 0.5 mg tablet Take 1 Tab by mouth nightly as needed. Max Daily Amount: 0.5 mg.    senna-docusate (PERICOLACE) 8.6-50 mg per tablet Take 1 Tab by mouth two (2) times daily as needed for Constipation.  DIETARY SUPPLEMENT PO Take 6 Tabs by mouth daily. CATAPLEX B ([employer] PROCESS CO)       Hard scripts included in transfer packet yes    Vaccinations:    Immunization History   Administered Date(s) Administered    Influenza High Dose Vaccine PF 09/22/2016, 09/18/2017    Influenza Vaccine 10/03/2013    Influenza Vaccine Split 10/28/2012    TDAP Vaccine 01/31/2011       Readmission Risks:    Known Risks: Falls        The Charlson CoMorbitiy Index tool is an evidenced based tool that has more automatic generated information. The tool looks at many different items such as the age of the patient, how many times they were admitted in the last calendar year, current length of stay in the hospital and their diagnosis.  All of these items are pulled automatically from information documented in the chart from various places and will generate a score that predicts whether a patient is at low (less than 13), medium (13-20) or high (21 or greater) risk of being readmitted. ASSESSMENT                Temp: 99.4 °F (37.4 °C) (03/04/18 0848) Pulse (Heart Rate): 77 (03/04/18 0848)     Resp Rate: 16 (03/04/18 0848)           BP: 146/59 (03/04/18 0848)     O2 Sat (%): 96 % (03/04/18 0848)     Weight: 124.3 kg (274 lb 0.5 oz)    Height: 5' 10.5\" (179.1 cm) (02/24/18 1132)       If above not within 1 hour of discharge:    BP:_____  P:____  R:____ T:_____ O2 Sat: ___%  O2: ______    Active Orders   Diet    DIET DIABETIC CONSISTENT CARB Regular; No Conc.  Sweets         Orientation: oriented to time, place, person and situation     Active Behaviors: None                                   Active Lines/Drains:  (Peg Tube / Hoffman / CL or S/L?): yes    Urinary Status: Voiding, Bathroom privileges     Last BM: Last Bowel Movement Date: 03/04/18     Skin Integrity: Incision (comment)   Wound Hip Right-DRESSING STATUS: Clean, dry, and intact    Wound Hip Right-DRESSING TYPE: Dry dressing, Silver products    Mobility: Slightly limited   Weight Bearing Status: WBAT (Weight Bearing as Tolerated)      Gait Training  Assistive Device: Gait belt, Walker, rolling  Ambulation - Level of Assistance: Contact guard assistance  Distance (ft): 100 Feet (ft)  Stairs - Level of Assistance:  (NT)         Lab Results   Component Value Date/Time    Glucose 160 (H) 03/04/2018 03:43 AM    Hemoglobin A1c 9.0 (H) 02/06/2018 08:27 AM    INR 1.5 (H) 03/04/2018 03:43 AM    INR 1.7 (H) 03/03/2018 02:05 AM    HGB 9.3 (L) 03/04/2018 03:43 AM    HGB 9.3 (L) 03/03/2018 02:05 AM        RECOMMENDATION     See After Visit Summary (AVS) for:  · Discharge instructions  · After [address]   · Special equipment needed (entered pre-discharge by Care Management)  · Medication Reconciliation    · Follow up Appointment(s)         Report given/sent by:  Sharon Burnham RN                    Verbal report given to: [address]  FAXED to:  NA         Estimated discharge time:  3/4/2018 at [facility]

## 2018-03-04 NOTE — PROGRESS NOTES
[address] FRACTURE PROGRESS NOTE    2018  Admit Date:   2018    Post Op day: 6 Days Post-Op    Subjective:    Rina Triana states that he is doing well overall. Ready to discharge to [facility]. Ins Auth obtained. Will be transported by his brother later today. No new complaints. Tolerating diet  Denies N/V/SOB or CP     PT/OT:   Gait:  Gait  Base of Support: Shift to left  Speed/Clarice: Slow  Step Length: Left shortened, Right shortened  Stance: Right decreased  Gait Abnormalities: Antalgic  Ambulation - Level of Assistance: Contact guard assistance  Distance (ft): 100 Feet (ft)  Assistive Device: Gait belt, Walker, rolling  Stairs - Level of Assistance:  (NT)                 Vital Signs:    Patient Vitals for the past 8 hrs:   BP Temp Pulse Resp SpO2   18 0322 156/76 98 °F (36.7 °C) 77 18 96 %     Temp (24hrs), Av.8 °F (37.1 °C), Min:98 °F (36.7 °C), Max:99.9 °F (37.7 °C)      Pain Control:   Pain Assessment  Pain Scale 1: Numeric (0 - 10)  Pain Intensity 1: 6  Pain Onset 1: post op  Pain Location 1: Hip, Incisional  Pain Orientation 1:  Inner, Right  Pain Description 1: Aching, Intermittent  Pain Intervention(s) 1: Medication (see MAR), Ice    Meds:    Current Facility-Administered Medications   Medication Dose Route Frequency    warfarin (COUMADIN) tablet 4 mg  4 mg Oral ONCE    ondansetron (ZOFRAN) injection 4 mg  4 mg IntraVENous Q4H PRN    insulin glargine (LANTUS) injection 30 Units  30 Units SubCUTAneous QHS    insulin glargine (LANTUS) injection 30 Units  30 Units SubCUTAneous DAILY    nystatin (MYCOSTATIN) 100,000 unit/gram powder   Topical BID    sodium chloride (NS) flush 20 mL  20 mL InterCATHeter PRN    sodium chloride (NS) flush 10 mL  10 mL InterCATHeter Q24H    sodium chloride (NS) flush 10 mL  10 mL InterCATHeter PRN    sodium chloride (NS) flush 10 mL  10 mL InterCATHeter Q8H    alteplase (CATHFLO) 1 mg in sterile water (preservative free) 1 mL injection  1 mg InterCATHeter PRN    bacitracin 500 unit/gram packet 1 Packet  1 Packet Topical PRN    insulin lispro (HUMALOG) injection   SubCUTAneous AC&HS    warfarin - pharmacist to dose   Other Rx Dosing/Monitoring    0.9% sodium chloride infusion 250 mL  250 mL IntraVENous PRN    sodium chloride 0.9 % bolus infusion 500 mL  500 mL IntraVENous ONCE PRN    sodium chloride (NS) flush 5-10 mL  5-10 mL IntraVENous Q8H    sodium chloride (NS) flush 5-10 mL  5-10 mL IntraVENous PRN    naloxone (NARCAN) injection 0.4 mg  0.4 mg IntraVENous PRN    hydrOXYzine HCl (ATARAX) tablet 10 mg  10 mg Oral Q8H PRN    famotidine (PEPCID) tablet 20 mg  20 mg Oral BID    senna-docusate (PERICOLACE) 8.6-50 mg per tablet 1 Tab  1 Tab Oral BID    polyethylene glycol (MIRALAX) packet 17 g  17 g Oral DAILY    bisacodyl (DULCOLAX) suppository 10 mg  10 mg Rectal DAILY PRN    acetaminophen (TYLENOL) tablet 650 mg  650 mg Oral Q6H    oxyCODONE IR (ROXICODONE) tablet 5 mg  5 mg Oral Q3H PRN    oxyCODONE IR (ROXICODONE) tablet 7.5-10 mg  7.5-10 mg Oral Q3H PRN    tamsulosin (FLOMAX) capsule 0.4 mg  0.4 mg Oral DAILY    SITagliptin (JANUVIA) tablet 100 mg  100 mg Oral DAILY    primidone (MYSOLINE) tablet 250 mg  250 mg Oral BID    lovastatin (MEVACOR) tablet 20 mg  20 mg Oral QHS    folic acid (FOLVITE) tablet 1 mg  1 mg Oral DAILY    lisinopril (PRINIVIL, ZESTRIL) tablet 20 mg  20 mg Oral DAILY    sertraline (ZOLOFT) tablet 100 mg  100 mg Oral DAILY    gabapentin (NEURONTIN) capsule 600 mg  600 mg Oral QHS    aspirin chewable tablet 81 mg  81 mg Oral DAILY    clonazePAM (KlonoPIN) tablet 0.5 mg  0.5 mg Oral QHS PRN    glucose chewable tablet 16 g  4 Tab Oral PRN    dextrose (D50W) injection syrg 12.5-25 g  12.5-25 g IntraVENous PRN    glucagon (GLUCAGEN) injection 1 mg  1 mg IntraMUSCular PRN    ceFAZolin (ANCEF) 2 g/20 mL in sterile water IV syringe  2 g IntraVENous Q8H    acetaminophen (TYLENOL) tablet 650 mg  650 mg Oral Q6H PRN       LAB:    Recent Labs      03/04/18   0343   HCT  28.4*   HGB  9.3*   INR  1.5*       Transfuse PRBC's:      Assessment & Physician's Comment:  Dressing is clean, dry, and intact  Neurovascular checks within normal limits  Orientation:  Oriented    Principal Problem:    Infected prosthesis of right hip ([facility] (2/26/2018)    Active Problems:    Bacteremia (2/24/2018)      Sepsis ([facility] (2/27/2018)        Plan:    Cont Pt/OT  Cont est pain management  Cont coumadin for DVT prophylaxis  ID placed Abx orders  Case Management for any discharge planning needs  Discharging to [place] this afternoon    [employer]EV[employer]E [employer]

## 2018-03-04 NOTE — PROGRESS NOTES
Pharmacist Note  Warfarin Dosing  Consult provided for this 69 y.o. male to manage warfarin for Orthopedic Surgery (VTE prophylaxis)    INR Goal: 1.7- 2.2    Drugs that may increase INR:None  Drugs that may decrease INR: primidone  Other current anticoagulants/ drugs that may increase bleeding risk: NSAID  Risk factors: Age > 65  Daily INR ordered: YES    Recent Labs      03/04/18   0343  03/03/18   0205  03/02/18   0442   HGB  9.3*  9.3*  9.8*   INR  1.5*  1.7*  1.5*     Date               INR                 Dose  2/24  1.4  ---  2/26  ---  10 mg  2/27  1.2  5 mg  2/28  2.7  HOLD  3/1  1.9  3 mg  3/2                   1.5                  4 mg  3/3                   1.7                  3 mg          3/4  1.5  4 mg  Assessment/ Plan: Will order warfarin 4 mg PO x 1 dose. Pharmacy will continue to monitor daily and adjust therapy as indicated. Please contact the pharmacist at  for discharge recommendations if needed.

## 2018-03-04 NOTE — PROGRESS NOTES
Patient discharged to SNF. PICC left in place. Discharge instructions reviewed with patient. Patient verbalized understanding of all. Signed copy placed on chart. No s/s of distress noted.

## 2018-03-04 NOTE — DISCHARGE SUMMARY
Discharge Summary       PATIENT ID: Ruben Pillai  MRN: [id number]   YOB: 1950    DATE OF ADMISSION: 2/24/2018 11:47 AM    DATE OF DISCHARGE: 3/4/18   PRIMARY CARE PROVIDER: Lars Lopez MD     ATTENDING PHYSICIAN: Rosa Murray MD, S  DISCHARGING PROVIDER: Rosa Murray MD, S    To contact this individual call [phone] and ask the  to page. If unavailable ask to be transferred the Adult Hospitalist Department. CONSULTATIONS: IP CONSULT TO ORTHOPEDIC SURGERY  IP CONSULT TO INTERVENTIONAL RADIOLOGY  IP CONSULT TO INFECTIOUS DISEASES  IP CONSULT TO HOSPITALIST  IP CONSULT TO CARDIOLOGY    PROCEDURES/SURGERIES: Procedure(s):  2/26 Irrigation and debridement, right hip, via anterior approach with removal of metal liner with placement of polyethylene liner and replacement of femoral head, placement of vancomycin and tobramycin impregnated absorbable beads with placement of Hemovac drain. 3/1 PICC  2/26 EULALIO  2/24 TTE    ADMITTING DIAGNOSES & HOSPITAL COURSE:   70 yo man with DM2, HTN, HLD, PFO, DJD, depression, PUD, s/p b/l THAs, sleep apnea, GERD, and ADD presented to the ED from home on 2/24/18 with fever x12 days and right hip pain. Pt had a GLF on 2/11/18.     Dysuria, 2/27  - thought to be due to zeng which was d/c'd already  - UA 2/28 unremarkable except for significant glucosuria  - resolved with Pyridium     Sepsis with staph lugdunensis bacteremia (POA) - due to septic arthritis  - BCx 2/19 staph lugdunensis, 2/24 CoNS 1/2 bottles, 2/26 NGTD  - TTE 2/24 with LVEF 55-60%, aortic sclerosis  - EULALIO 2/26 with LVEF 55-60%, tiny PFO, 2mm vegetation on anterior leaflet  - flu negative  - continue cefazolin and vanc per ID; will need cefazolin x6 weeks from 2/26 due to IE (end 4/9/18)  - s/p PICC placement 3/1     Mitral valve infective endocarditis - as above      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 Diagnosis:  MV endocarditis and right hip prosthetic infection with Staph khalidaenensis   2. Routine PICC  Care  3. Antibiotic:  Cefazolin 2 gm q8 hours IV  4. Lab each Monday:                        CBC/diff/platelets                        CMP                        ESR                        CRP  5. Lab each Thursday:                        CBC/diff/platelets                        BUN                        Creatinine      6. Fax lab to Dr. Shaneka Lind @ [phone].  7.  Call Dr. Shaneka Lind @ [phone] for fever >100.5, infection at PICC site, diarrhea, WBC > 15 or < 3, cr > 1.8  8. Duration of therapy: last day of therapy should be April 9, 2018                        Please call Dr. Shaneka Lind @ [phone] before stopping therapy. 9.  Allergies: Allergies   Allergen Reactions    Nsaids (Non-Steroidal Anti-Inflammatory Drug) Other (comments)       Hx of PUD and Hinson's Esophagus      10. Make appointment in 3 weeks  ~~~~~~~~~~~~~~~~~~~~~~~~~~~~~~~~~~~~~~~~~~~~~~~~~~      Hyponatremia, intermittent (POA) - due to dehydration, had resolved with IVF; stop IVF     DM2, uncontrolled (chronic) - recent A1c 9  - hold metformin  - increase home Lantus due to hyperglycemia  - continue home Januvia, Jardiance      HTN (chronic) - continue home lisinopril      HLD (chronic) - continue home lovastatin      Depression (chronic) - continue home sertraline      BPH (chronic) - continue home Flomax      Chronic pain (chronic) - continue primidone, gabapentin      Morbid obesity (chronic), Body mass index is 38.76 kg/(m^2).     DISCHARGE DIAGNOSES / PLAN:      Stable for discharge for discharge to [facility]. Follow up with PCP, Orthopedic Surgery, Infectious Diseases       PENDING TEST RESULTS:   At the time of discharge the following test results are still pending: none    FOLLOW UP APPOINTMENTS:    Follow-up Information     Follow up With Details Comments Contact Info    [address]  snf for rehab [address]    Danie Parra MD In 2 weeks Hospital follow up [address] [facility] [address]  [phone]      Bonita Spivey MD In 2 weeks Orthopedic Surgery; postop follow up [address]       Kathie Lacy MD In 3 weeks Infectious diseases; hospital follow up [address] [facility]  [phone]             ADDITIONAL CARE RECOMMENDATIONS:   1. Take medications as prescribed. 2. Keep appointment(s) as recommended/scheduled. 3. Accuchecks qACHS with moderate/medium correction scale insulin. 4. Orders as per Orthopedic Surgery and ID.  5. Please check INR every 48 hours for goal INR 1.7-2.2 for postop VTE prophylaxis starting Mon 3/5. Warfarin x6 weeks (end date 4/15/18)    ~~~~~~~~~~~~~~~~~~~~~~~~~~~  Home IV Orders  1. 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 Routine PICC  Care  3. Antibiotic:  Cefazolin 2 gm q8 hours IV  4. Lab each Monday:                        CBC/diff/platelets                        CMP                        ESR                        CRP  5. Lab each Thursday:                        CBC/diff/platelets                        BUN                        Creatinine      6. Fax lab to Dr. Abhilash Tapia @ [phone].  7.  Call Dr. Abhilash Tapia @ [phone] for fever >100.5, infection at PICC site, diarrhea, WBC > 15 or < 3, cr > 1.8  8.   Duration of therapy: last day of therapy should be April 9, 2018                        Please call Dr. Petar Britton @ [phone] before stopping therapy. 9.  Allergies: Allergies   Allergen Reactions    Nsaids (Non-Steroidal Anti-Inflammatory Drug) Other (comments)       Hx of PUD and Hinson's Esophagus      10. Make appointment in 3 weeks  ~~~~~~~~~~~~~~~~~~~~~~~~~~~~~~~~~~~~~~~~~~    DIET: Diabetic Diet and no concentrated sweets    ACTIVITY: PT/OT Eval and Treat; weight bearing as tolerated    WOUND CARE: routine dressing care per Ortho instructions    EQUIPMENT needed: as per Ortho, PT/OT      DISCHARGE MEDICATIONS:  Current Discharge Medication List      START taking these medications    Details   CEFAZOLIN SODIUM/WATER (CEFAZOLIN, ANCEF, IN STERILE WATER) 2 gram/20 mL IV syringe 20 mL by IntraVENous route every eight (8) hours. Qty: 1 mL, Refills: 0      famotidine (PEPCID) 20 mg tablet Take 1 Tab by mouth two (2) times a day. Qty: 60 Tab, Refills: 0      nystatin (MYCOSTATIN) powder Apply 1 g to affected area two (2) times a day. APPLY TO yeast in groin  Qty: 1 Bottle, Refills: 0      !! oxyCODONE IR (ROXICODONE) 5 mg immediate release tablet Take 1 Tab by mouth every three (3) hours as needed. Max Daily Amount: 40 mg. Moderate pain  Qty: 5 Tab, Refills: 0    Associated Diagnoses: Infection associated with internal right hip prosthesis, initial encounter ([facility]      ! ! oxyCODONE IR (ROXICODONE) 5 mg immediate release tablet Take 1.5 Tabs by mouth every three (3) hours as needed. Max Daily Amount: 60 mg. Severe pain  Qty: 5 Tab, Refills: 0    Associated Diagnoses: Infection associated with internal right hip prosthesis, initial encounter ([address]      polyethylene glycol (MIRALAX) 17 gram packet Take 1 Packet by mouth daily. Hold for loose stools/diarrhea  Qty: 30 Packet, Refills: 0      warfarin (COUMADIN) 4 mg tablet Take 1 Tab by mouth daily.  Indications: DEEP VEIN THROMBOSIS PREVENTION  Qty: 1 Tab, Refills: 0       !! - Potential duplicate medications found. Please discuss with provider. CONTINUE these medications which have CHANGED    Details   clonazePAM (KLONOPIN) 0.5 mg tablet Take 1 Tab by mouth nightly as needed. Max Daily Amount: 0.5 mg. Indications: insomnia  Qty: 5 Tab, Refills: 0    Associated Diagnoses: Insomnia, unspecified type      insulin glargine (LANTUS,BASAGLAR) 100 unit/mL (3 mL) inpn 30 Units by SubCUTAneous route two (2) times a day. Indications: type 2 diabetes mellitus  Qty: 1 Pen, Refills: 0      senna-docusate (PERICOLACE) 8.6-50 mg per tablet Take 1 Tab by mouth two (2) times a day. Hold for loose stools/diarrhea  Qty: 60 Tab, Refills: 0         CONTINUE these medications which have NOT CHANGED    Details   tamsulosin (FLOMAX) 0.4 mg capsule Take 1 Cap by mouth daily for 15 days. Qty: 15 Cap, Refills: 0    Associated Diagnoses: Benign prostatic hyperplasia with weak urinary stream      JANUVIA 100 mg tablet TAKE 1 TABLET DAILY  Qty: 90 Tab, Refills: 0      primidone (MYSOLINE) 250 mg tablet TAKE 1 TABLET TWICE A DAY  Qty: 180 Tab, Refills: 3      lovastatin (MEVACOR) 20 mg tablet TAKE 1 TABLET IN THE EVENING  Qty: 90 Tab, Refills: 1      lisinopril (PRINIVIL, ZESTRIL) 20 mg tablet TAKE 1 TABLET DAILY  Qty: 90 Tab, Refills: 2      JARDIANCE 25 mg tablet TAKE 1 TABLET DAILY  Qty: 30 Tab, Refills: 10    Associated Diagnoses: Diabetes mellitus type 2, uncontrolled (HCC)      VITAMIN D2 50,000 unit capsule TAKE 1 CAPSULE EVERY 7 DAYS  Qty: 12 Cap, Refills: 2      gabapentin (NEURONTIN) 300 mg capsule Take 2 Caps by mouth nightly. Qty: 1 Cap, Refills: 0      aspirin 81 mg chewable tablet Take 1 Tab by mouth daily. Qty: 30 Tab, Refills: 11      sertraline (ZOLOFT) 100 mg tablet TAKE 1 TABLET DAILY  Qty: 90 Tab, Refills: 1    Associated Diagnoses: Grief;  Moderate episode of recurrent major depressive disorder (HCC)      folic acid (FOLVITE) 1 mg tablet TAKE 1 TABLET DAILY  Qty: 90 Tab, Refills: 3         STOP taking these medications       traMADol (ULTRAM) 50 mg tablet Comments:   Reason for Stopping:         methocarbamol (ROBAXIN) 750 mg tablet Comments:   Reason for Stopping:         metFORMIN ER (GLUCOPHAGE XR) 500 mg tablet Comments:   Reason for Stopping:         clobetasol (TEMOVATE) 0.05 % ointment Comments:   Reason for Stopping:         BD INSULIN PEN NEEDLE UF SHORT 31 gauge x 5/16\" ndle Comments:   Reason for Stopping:         butalbital-acetaminophen-caffeine (FIORICET, ESGIC) -40 mg per tablet Comments:   Reason for Stopping:         ketoconazole (NIZORAL) 2 % topical cream Comments:   Reason for Stopping:         ONETOUCH ULTRA TEST strip Comments:   Reason for Stopping:         DIETARY SUPPLEMENT PO Comments:   Reason for Stopping:             NOTIFY YOUR PHYSICIAN FOR ANY OF THE FOLLOWING:   Fever over 101 degrees for 24 hours. Chest pain, shortness of breath, fever, chills, nausea, vomiting, diarrhea, change in mentation, falling, weakness, bleeding. Severe pain or pain not relieved by medications. Or, any other signs or symptoms that you may have questions about.     DISPOSITION:    Home With:   OT  PT  HH  RN      [place] [facility]    Independent/assisted living    Hospice    Other:       PATIENT CONDITION AT DISCHARGE:     Functional status    Poor    x Deconditioned     Independent      Cognition   x  Lucid     Forgetful     Dementia      Catheters/lines (plus indication)    Hoffman    x LUE PICC placed 3/1 and to be removed after last dose of IV abx per ID instructions    PEG     None      Code status   x  Full code     DNR      PHYSICAL EXAMINATION AT DISCHARGE:  Visit Vitals    /59 (BP 1 Location: Right arm, BP Patient Position: At rest)    Pulse 77    Temp 99.4 °F (37.4 °C)    Resp 16    Ht 5' 10.5\" (1.791 m)    Wt 124.3 kg (274 lb 0.5 oz)    SpO2 96%    BMI 38.76 kg/m2     Gen - awake, NAD, obese  HEENT - NCAT, PERRL, OP benign, MMM  Neck - supple, no masses  CV - regular rhythm, 2/6 systolic mumur at apex and RUSB  Resp - lungs CTA b/l, no wheezing  GI - +BS, soft, NT, ND, +BS  MSK - right hip ttp  Neuro - A&O, no focal deficits  Psych - calm and cooperative with normal affect  Skin - right hip dressing c/d/i    Labs  Recent Results (from the past 24 hour(s))   GLUCOSE, POC    Collection Time: 03/03/18 11:30 AM   Result Value Ref Range    Glucose (POC) 242 (H) 65 - 100 mg/dL    Performed by Parisa , POC    Collection Time: 03/03/18  3:54 PM   Result Value Ref Range    Glucose (POC) 268 (H) 65 - 100 mg/dL    Performed by [address] [place] [address], POC    Collection Time: 03/03/18 10:26 PM   Result Value Ref Range    Glucose (POC) 262 (H) 65 - 100 mg/dL    Performed by Trena Barnes    PROTHROMBIN TIME + INR    Collection Time: 03/04/18  3:43 AM   Result Value Ref Range    INR 1.5 (H) 0.9 - 1.1      Prothrombin time 14.9 (H) 9.0 - 11.1 sec   CBC W/O DIFF    Collection Time: 03/04/18  3:43 AM   Result Value Ref Range    WBC 8.1 4.1 - 11.1 K/uL    RBC 3.11 (L) 4.10 - 5.70 M/uL    HGB 9.3 (L) 12.1 - 17.0 g/dL    HCT 28.4 (L) 36.6 - 50.3 %    MCV 91.3 80.0 - 99.0 FL    MCH 29.9 26.0 - 34.0 PG    MCHC 32.7 30.0 - 36.5 g/dL    RDW 14.4 11.5 - 14.5 %    PLATELET 421 602 - 571 K/uL    MPV 10.3 8.9 - 12.9 FL    NRBC 0.0 0  WBC    ABSOLUTE NRBC 0.00 0.00 - 9.39 K/uL   METABOLIC PANEL, BASIC    Collection Time: 03/04/18  3:43 AM   Result Value Ref Range    Sodium 135 (L) 136 - 145 mmol/L    Potassium 3.9 3.5 - 5.1 mmol/L    Chloride 101 97 - 108 mmol/L    CO2 28 21 - 32 mmol/L    Anion gap 6 5 - 15 mmol/L    Glucose 160 (H) 65 - 100 mg/dL    BUN 8 6 - 20 MG/DL    Creatinine 0.58 (L) 0.70 - 1.30 MG/DL    BUN/Creatinine ratio 14 12 - 20      GFR est AA >60 >60 ml/min/1.73m2    GFR est non-AA >60 >60 ml/min/1.73m2    Calcium 9.3 8.5 - 10.1 MG/DL   GLUCOSE, POC    Collection Time: 03/04/18  6:27 AM   Result Value Ref Range    Glucose (POC) 145 (H) 65 - 100 mg/dL Performed by [address] [place] [address]:  Problem List as of 3/4/2018  Date Reviewed: 3/4/2018          Codes Class Noted - Resolved    Endocarditis of mitral valve ICD-10-CM: I05.8  ICD-9-CM: 394.9  3/4/2018 - Present        Obesity ICD-10-CM: E66.9  ICD-9-CM: 278.00  3/4/2018 - Present        Insomnia ICD-10-CM: G47.00  ICD-9-CM: 780.52  3/4/2018 - Present        Sepsis ([facility] ICD-10-CM: A41.9  ICD-9-CM: 038.9, 995.91  2/27/2018 - Present        * (Principal)Infected prosthesis of right hip ([facility] ICD-10-CM: T84.51XA  ICD-9-CM: 996.66, V43.64  2/26/2018 - Present        Bacteremia due to Staphylococcus ICD-10-CM: R78.81  ICD-9-CM: 790.7, 041.10  2/24/2018 - Present        Recurrent depression ([facility] ICD-10-CM: F33.9  ICD-9-CM: 296.30  1/16/2018 - Present        Type 2 diabetes mellitus with diabetic neuropathy ([facility] ICD-10-CM: E11.40  ICD-9-CM: 250.60, 357.2  1/16/2018 - Present        PFO (patent foramen ovale) ICD-10-CM: Q21.1  ICD-9-CM: 745.5  7/26/2017 - Present        [address]: R01.1  ICD-9-CM: 785.2  3/9/2017 - Present        Jessica-prosthetic fracture of femur following total hip arthroplasty ICD-10-CM: M97. Rogene Hun [id number]  ICD-9-CM: 996.44, V43.64  8/25/2016 - Present        Osteoarthritis of right hip ICD-10-CM: M16.11  ICD-9-CM: 715.95  8/1/2016 - Present        Benign essential tremor ICD-10-CM: G25.0  ICD-9-CM: 333.1  Unknown - Present        Diabetes mellitus type 2, uncontrolled ([facility] ICD-10-CM: E11.65  ICD-9-CM: 250.02  6/3/2013 - Present        Hypogonadism male ICD-10-CM: E29.1  ICD-9-CM: 257.2  8/29/2012 - Present        Encounter for long-term (current) use of medications ICD-10-CM: Z79.899  ICD-9-CM: V58.69  7/29/2010 - Present        Osteoarthritis of hip ICD-10-CM: M16.9  ICD-9-CM: 715.95  Unknown - Present    Overview Signed 4/29/2010  6:55 AM by Garima Low     bilat             Depression ICD-10-CM: F32.9  ICD-9-CM: 766  Unknown - Present        ED (erectile dysfunction) of organic origin ICD-10-CM: N52.9  ICD-9-CM: 607.84  Unknown - Present        GERD (gastroesophageal reflux disease) ICD-10-CM: K21.9  ICD-9-CM: 530.81  Unknown - Present        PUD (peptic ulcer disease) ICD-10-CM: K27.9  ICD-9-CM: 533.90  Unknown - Present        Hinson's esophagus with esophagitis ICD-10-CM: K22.70, K20.9  ICD-9-CM: 530.85, 530.10  Unknown - Present        HTN (hypertension) ICD-10-CM: I10  ICD-9-CM: 401.9  3/17/2010 - Present        High cholesterol ICD-10-CM: E78.00  ICD-9-CM: 272.0  3/17/2010 - Present        RESOLVED: Essential tremor ICD-10-CM: G25.0  ICD-9-CM: 333.1  8/28/2013 - 6/1/2015              Greater than 30 minutes were spent with the patient on counseling and coordination of care.     Signed:   Sushma Hein MD  3/4/2018  10:11 AM

## 2018-03-04 NOTE — PROGRESS NOTES
Problem: Falls - Risk of  Goal: *Absence of Falls  Document Dameon Fall Risk and appropriate interventions in the flowsheet.    Outcome: Progressing Towards Goal  Fall Risk Interventions:  Mobility Interventions: Patient to call before getting OOB         Medication Interventions: Patient to call before getting OOB    Elimination Interventions: Patient to call for help with toileting needs    History of Falls Interventions: Door open when patient unattended

## 2018-03-04 NOTE — PROGRESS NOTES
Patient is being dischargusted today to [facility]. Son will transport around pm.  QUINTON talked with Latasha Torre, nursing supervisor this am to confirm that patient can be admitted this afternoon-- She has the final IV abx orders and PICC line report. As requested, Quinton faxed discharge summary and instructions to her [phone]. Envelope on chart-- Nurse to call report to [phone]. . Nurse will add discharge instructions MARS and kardex to the envelope. No other transition of care needs identified at this time.   Patient will have medical follow up

## 2018-03-05 ENCOUNTER — PATIENT OUTREACH (OUTPATIENT)
Dept: FAMILY MEDICINE CLINIC | Age: 68
End: 2018-03-05

## 2018-03-05 NOTE — PROGRESS NOTES
Patient listed on discharge report on 3/5/18. Patient discharged from [facility] for sepsis. [address] [facility] @ [phone] to perform post hospital discharge assessment. Message with this writer contact information left on voicemail of , Samuel joiner. Requested updates when available and MAR be faxed. Patient is scheduled for 6 weeks of IV ABX per ID at this time.

## 2018-03-20 ENCOUNTER — APPOINTMENT (OUTPATIENT)
Dept: GENERAL RADIOLOGY | Age: 68
End: 2018-03-20
Attending: EMERGENCY MEDICINE
Payer: COMMERCIAL

## 2018-03-20 ENCOUNTER — HOSPITAL ENCOUNTER (EMERGENCY)
Age: 68
Discharge: NURSING, REHAB OR LONG-TERM CARE | End: 2018-03-21
Attending: EMERGENCY MEDICINE
Payer: COMMERCIAL

## 2018-03-20 DIAGNOSIS — T84.020A DISLOCATION OF INTERNAL RIGHT HIP PROSTHESIS, INITIAL ENCOUNTER (HCC): Primary | ICD-10-CM

## 2018-03-20 PROBLEM — T84.029A DISLOCATION OF PROSTHETIC JOINT (HCC): Status: ACTIVE | Noted: 2018-03-20

## 2018-03-20 LAB
ALBUMIN SERPL-MCNC: 2.4 G/DL (ref 3.5–5)
ALBUMIN/GLOB SERPL: 0.5 {RATIO} (ref 1.1–2.2)
ALP SERPL-CCNC: 79 U/L (ref 45–117)
ALT SERPL-CCNC: 11 U/L (ref 12–78)
ANION GAP SERPL CALC-SCNC: 9 MMOL/L (ref 5–15)
APTT PPP: 38.9 SEC (ref 22.1–32)
AST SERPL-CCNC: 48 U/L (ref 15–37)
BASOPHILS # BLD: 0 K/UL (ref 0–0.1)
BASOPHILS NFR BLD: 0 % (ref 0–1)
BILIRUB SERPL-MCNC: 0.6 MG/DL (ref 0.2–1)
BUN SERPL-MCNC: 12 MG/DL (ref 6–20)
BUN/CREAT SERPL: 13 (ref 12–20)
CALCIUM SERPL-MCNC: 8.1 MG/DL (ref 8.5–10.1)
CHLORIDE SERPL-SCNC: 103 MMOL/L (ref 97–108)
CO2 SERPL-SCNC: 24 MMOL/L (ref 21–32)
CREAT SERPL-MCNC: 0.9 MG/DL (ref 0.7–1.3)
CRP SERPL-MCNC: 11.4 MG/DL (ref 0–0.6)
DIFFERENTIAL METHOD BLD: ABNORMAL
EOSINOPHIL # BLD: 0.2 K/UL (ref 0–0.4)
EOSINOPHIL NFR BLD: 5 % (ref 0–7)
ERYTHROCYTE [DISTWIDTH] IN BLOOD BY AUTOMATED COUNT: 14.9 % (ref 11.5–14.5)
ERYTHROCYTE [SEDIMENTATION RATE] IN BLOOD: 67 MM/HR (ref 0–20)
GLOBULIN SER CALC-MCNC: 4.7 G/DL (ref 2–4)
GLUCOSE SERPL-MCNC: 207 MG/DL (ref 65–100)
HCT VFR BLD AUTO: 32 % (ref 36.6–50.3)
HGB BLD-MCNC: 10.3 G/DL (ref 12.1–17)
IMM GRANULOCYTES # BLD: 0 K/UL
IMM GRANULOCYTES NFR BLD AUTO: 0 %
INR PPP: 1.4 (ref 0.9–1.1)
LACTATE SERPL-SCNC: 1.6 MMOL/L (ref 0.4–2)
LYMPHOCYTES # BLD: 0.4 K/UL (ref 0.8–3.5)
LYMPHOCYTES NFR BLD: 10 % (ref 12–49)
MCH RBC QN AUTO: 29.3 PG (ref 26–34)
MCHC RBC AUTO-ENTMCNC: 32.2 G/DL (ref 30–36.5)
MCV RBC AUTO: 90.9 FL (ref 80–99)
MONOCYTES # BLD: 0.5 K/UL (ref 0–1)
MONOCYTES NFR BLD: 12 % (ref 5–13)
NEUTS BAND NFR BLD MANUAL: 1 % (ref 0–6)
NEUTS SEG # BLD: 3.3 K/UL (ref 1.8–8)
NEUTS SEG NFR BLD: 72 % (ref 32–75)
NRBC # BLD: 0 K/UL (ref 0–0.01)
NRBC BLD-RTO: 0 PER 100 WBC
PLATELET # BLD AUTO: 172 K/UL (ref 150–400)
PMV BLD AUTO: 11 FL (ref 8.9–12.9)
POTASSIUM SERPL-SCNC: 4.1 MMOL/L (ref 3.5–5.1)
PROT SERPL-MCNC: 7.1 G/DL (ref 6.4–8.2)
PROTHROMBIN TIME: 14.4 SEC (ref 9–11.1)
RBC # BLD AUTO: 3.52 M/UL (ref 4.1–5.7)
RBC MORPH BLD: ABNORMAL
SODIUM SERPL-SCNC: 136 MMOL/L (ref 136–145)
THERAPEUTIC RANGE,PTTT: ABNORMAL SECS (ref 58–77)
WBC # BLD AUTO: 4.4 K/UL (ref 4.1–11.1)

## 2018-03-20 PROCEDURE — 80053 COMPREHEN METABOLIC PANEL: CPT | Performed by: EMERGENCY MEDICINE

## 2018-03-20 PROCEDURE — 85652 RBC SED RATE AUTOMATED: CPT | Performed by: PHYSICIAN ASSISTANT

## 2018-03-20 PROCEDURE — 96374 THER/PROPH/DIAG INJ IV PUSH: CPT

## 2018-03-20 PROCEDURE — 85730 THROMBOPLASTIN TIME PARTIAL: CPT | Performed by: EMERGENCY MEDICINE

## 2018-03-20 PROCEDURE — 96375 TX/PRO/DX INJ NEW DRUG ADDON: CPT

## 2018-03-20 PROCEDURE — 75810000301 HC ER LEVEL 1 CLOSED TREATMNT FRACTURE/DISLOCATION

## 2018-03-20 PROCEDURE — 85025 COMPLETE CBC W/AUTO DIFF WBC: CPT | Performed by: EMERGENCY MEDICINE

## 2018-03-20 PROCEDURE — 96361 HYDRATE IV INFUSION ADD-ON: CPT

## 2018-03-20 PROCEDURE — 83605 ASSAY OF LACTIC ACID: CPT | Performed by: EMERGENCY MEDICINE

## 2018-03-20 PROCEDURE — 99285 EMERGENCY DEPT VISIT HI MDM: CPT

## 2018-03-20 PROCEDURE — 73502 X-RAY EXAM HIP UNI 2-3 VIEWS: CPT

## 2018-03-20 PROCEDURE — 73501 X-RAY EXAM HIP UNI 1 VIEW: CPT

## 2018-03-20 PROCEDURE — 87040 BLOOD CULTURE FOR BACTERIA: CPT | Performed by: PHYSICIAN ASSISTANT

## 2018-03-20 PROCEDURE — 86140 C-REACTIVE PROTEIN: CPT | Performed by: PHYSICIAN ASSISTANT

## 2018-03-20 PROCEDURE — 36415 COLL VENOUS BLD VENIPUNCTURE: CPT | Performed by: EMERGENCY MEDICINE

## 2018-03-20 PROCEDURE — 74011250636 HC RX REV CODE- 250/636: Performed by: EMERGENCY MEDICINE

## 2018-03-20 PROCEDURE — 85610 PROTHROMBIN TIME: CPT | Performed by: EMERGENCY MEDICINE

## 2018-03-20 RX ORDER — PROPOFOL 10 MG/ML
200 INJECTION, EMULSION INTRAVENOUS
Status: COMPLETED | OUTPATIENT
Start: 2018-03-20 | End: 2018-03-20

## 2018-03-20 RX ORDER — HYDROMORPHONE HYDROCHLORIDE 2 MG/ML
0.5 INJECTION, SOLUTION INTRAMUSCULAR; INTRAVENOUS; SUBCUTANEOUS
Status: COMPLETED | OUTPATIENT
Start: 2018-03-20 | End: 2018-03-20

## 2018-03-20 RX ORDER — MORPHINE SULFATE 2 MG/ML
2 INJECTION, SOLUTION INTRAMUSCULAR; INTRAVENOUS
Status: COMPLETED | OUTPATIENT
Start: 2018-03-20 | End: 2018-03-20

## 2018-03-20 RX ORDER — HYDROMORPHONE HYDROCHLORIDE 2 MG/ML
0.5 INJECTION, SOLUTION INTRAMUSCULAR; INTRAVENOUS; SUBCUTANEOUS
Status: DISCONTINUED | OUTPATIENT
Start: 2018-03-20 | End: 2018-03-21 | Stop reason: HOSPADM

## 2018-03-20 RX ADMIN — PROPOFOL 100 MG: 10 INJECTION, EMULSION INTRAVENOUS at 23:01

## 2018-03-20 RX ADMIN — HYDROMORPHONE HYDROCHLORIDE 0.5 MG: 2 INJECTION INTRAMUSCULAR; INTRAVENOUS; SUBCUTANEOUS at 21:46

## 2018-03-20 RX ADMIN — SODIUM CHLORIDE 1000 ML: 900 INJECTION, SOLUTION INTRAVENOUS at 22:50

## 2018-03-20 RX ADMIN — MORPHINE SULFATE 2 MG: 2 INJECTION, SOLUTION INTRAMUSCULAR; INTRAVENOUS at 20:50

## 2018-03-21 VITALS
RESPIRATION RATE: 24 BRPM | TEMPERATURE: 99.7 F | HEART RATE: 84 BPM | WEIGHT: 263 LBS | BODY MASS INDEX: 37.2 KG/M2 | OXYGEN SATURATION: 98 % | DIASTOLIC BLOOD PRESSURE: 64 MMHG | SYSTOLIC BLOOD PRESSURE: 160 MMHG

## 2018-03-21 PROCEDURE — 74011250637 HC RX REV CODE- 250/637

## 2018-03-21 RX ORDER — OXYCODONE AND ACETAMINOPHEN 5; 325 MG/1; MG/1
2 TABLET ORAL
Status: COMPLETED | OUTPATIENT
Start: 2018-03-21 | End: 2018-03-21

## 2018-03-21 RX ORDER — OXYCODONE AND ACETAMINOPHEN 5; 325 MG/1; MG/1
TABLET ORAL
Status: DISCONTINUED
Start: 2018-03-21 | End: 2018-03-21 | Stop reason: HOSPADM

## 2018-03-21 RX ADMIN — OXYCODONE AND ACETAMINOPHEN 2 TABLET: 5; 325 TABLET ORAL at 01:54

## 2018-03-21 NOTE — DISCHARGE INSTRUCTIONS
Prosthetic Hip Dislocation: Care Instructions  Your Care Instructions  Your prosthetic hip is a large and fairly stable joint. Usually it takes a hard fall, a car crash, or something else of great force to make the thighbone slip out of its socket (dislocate). But since you have had hip replacement surgery, your hip can more easily slip out of position. This is more common during the first few months after the surgery. After your doctor puts your hip back into normal position, you will need to use a walking aid and may also have a hip brace for several weeks or months while the hip heals. You will need to follow special precautions to avoid dislocating your hip again. Exercise and physical therapy can help your hip get strong and move normally again. Rest and home care can help you heal.  If your hip becomes dislocated again, contact your doctor. You will need to go to a hospital or clinic to have your hip put back in position. You may have had a sedative to help you relax. You may be unsteady after getting a sedative. It may take a few hours for the medicine's effects to wear off. Common side effects of sedation include nausea, vomiting, and feeling sleepy or tired. The doctor has checked you carefully, but problems can develop later. If you notice any problems or new symptoms, get medical treatment right away. Follow-up care is a key part of your treatment and safety. Be sure to make and go to all appointments, and call your doctor if you are having problems. It's also a good idea to know your test results and keep a list of the medicines you take. How can you care for yourself at home? · If the doctor gave you a sedative:  ¨ For 24 hours, don't do anything that requires attention to detail. It takes time for the medicine's effects to completely wear off. ¨ For your safety, do not drive or operate any machinery that could be dangerous.  Wait until the medicine wears off and you can think clearly and react easily. · Your doctor will give you safety precautions to keep your hip centered in its socket during the healing period. Be sure to follow these precautions. ¨ Keep your knees and toes pointed forward when you sit, walk, or stand. ¨ Do not sit with your legs crossed. ¨ Do not bend at the waist more than 90º. Be careful when you lean or when you move in bed. Keep your legs as straight ahead as possible. · If you have a hip brace, wear it as directed. Do not remove it unless your doctor says you can. If you remove the brace to shower, be very careful. Follow hip precautions to limit hip movement. · Rest your hip as much as you can. You will need to change your activities to avoid movements that irritate the hip. · If your hip is swollen, put ice or a cold pack on it for 10 to 20 minutes at a time. Try to do this every 1 to 2 hours for the next 3 days (when you are awake) or until the swelling goes down. Put a thin cloth between the ice and your skin. · Be safe with medicines. Read and follow all instructions on the label. ¨ If the doctor gave you a prescription medicine for pain, take it as prescribed. ¨ If you are not taking a prescription pain medicine, ask your doctor if you can take an over-the-counter medicine. · If your doctor recommends exercises, do them as directed. When should you call for help? Call 911 anytime you think you may need emergency care. For example, call if:  ? · You have sudden chest pain and shortness of breath, or you cough up blood. ? · You have trouble breathing. ? · You passed out (lost consciousness). ?Call your doctor now or seek immediate medical care if:  ? · You have new or worse nausea or vomiting. ? · You have signs that your hip may be dislocated again. These signs include:  ¨ Severe pain. ¨ A crooked leg that looks like the hip bone is out of position. ¨ Not being able to bend or straighten your leg.    ? · Your leg or foot turns cold or changes color.   ? · You can't feel or move your leg. ? · You have signs of a blood clot, such as:  ¨ Pain in your calf, back of the knee, thigh, or groin. ¨ Redness and swelling in your leg or groin. ? Watch closely for changes in your health, and be sure to contact your doctor if:  ? · You have problems wearing your hip brace. ? · You do not get better as expected. Where can you learn more? Go to http://russ-kiesha.info/. Enter N762 in the search box to learn more about \"Prosthetic Hip Dislocation: Care Instructions. \"  Current as of: March 21, 2017  Content Version: 11.4  © 0225-8998 [employer], [employer]. Care instructions adapted under license by [employer] (which disclaims liability or warranty for this information). If you have questions about a medical condition or this instruction, always ask your healthcare professional. [address] any warranty or liability for your use of this information.

## 2018-03-21 NOTE — ED NOTES
The documentation for this period is being entered following the guidelines as defined in the [facility] policy by Laure Acosta.

## 2018-03-21 NOTE — CONSULTS
FRACTURE CONSULT NOTE    Subjective:     Date of Consultation:  March 20, 2018      Ruben Pillai is a 69 y.o. male who is being seen for right total hip arthroplasty dislocation. Injury occurred today at [facility] where he is undergoing rehabilitative treatment S/P resection arthroplasty for infected CIERRA on 2/23/18 by Dr. Shannan Montoya. Pt. Describes sitting on a commode and when he went to get up he felt a pop sensation in his right hip and was unable to bear any weight and felt severe pain the right hip. He fears dislocation is the result. Workup has revealed a dislocated right total hip arthroplasty.    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    Obesity 03/04/2018    Insomnia 03/04/2018    Sepsis ([address] 02/27/2018    Infected prosthesis of right hip ([address] 02/26/2018    Bacteremia due to Staphylococcus 02/24/2018    Recurrent depression ([address] 01/16/2018    Type 2 diabetes mellitus with diabetic neuropathy ([address] 01/16/2018    PFO (patent foramen ovale) 46/56/8343    Systolic murmur 51/93/9419    Jessica-prosthetic fracture of femur following total hip arthroplasty 08/25/2016    Osteoarthritis of right hip 08/01/2016    Benign essential tremor     Diabetes mellitus type 2, uncontrolled ([address] 06/03/2013    Hypogonadism male 08/29/2012    Encounter for long-term (current) use of medications 07/29/2010    Osteoarthritis of hip     Depression     ED (erectile dysfunction) of organic origin     GERD (gastroesophageal reflux disease)     PUD (peptic ulcer disease)     Hinson's esophagus with esophagitis     HTN (hypertension) 03/17/2010    High cholesterol 03/17/2010     Family History   Problem Relation Age of Onset    Cancer Father      multiple myeloma    Stroke Father     Dementia Mother     No Known Problems Brother     COPD Brother     Cancer Maternal Grandmother      COLON    Anesth Problems Neg Hx       Social History   Substance Use Topics    Smoking status: Former Smoker     Packs/day: 2.00     Years: 15.00     Quit date: 3/1/1979    Smokeless tobacco: Never Used    Alcohol use No     Past Medical History:   Diagnosis Date    ADD (attention deficit disorder)     Anemia due to blood loss     Hinson's esophagus with esophagitis     Benign essential tremor     Cancer [facility] 2012    BCC    Depression     DJD (degenerative joint disease) of hip     bilat    DM (diabetes mellitus) ([facility] 3/17/2010    ED (erectile dysfunction) of organic origin     Fall on or from sidewalk curb 9/24/2015    Femur fracture ([facility]     Gastritis and duodenitis     GERD (gastroesophageal reflux disease)     resolved after discontinuing diclofenac    Hematuria 6/2016    High cholesterol 3/17/2010    HTN (hypertension) 3/17/2010    Morbid obesity ([facility]     Murmur, cardiac 2016    PFO (patent foramen ovale) 7/26/2017    PUD (peptic ulcer disease)     Shingles 6/2016    RESOLVING- NO RASH (HEAD)    Sleep apnea     CPAP      Past Surgical History:   Procedure Laterality Date    ENDOSCOPY, COLON, DIAGNOSTIC      HX CHOLECYSTECTOMY  1995    HX GI  1980    gastroplasty    HX HERNIA REPAIR  1995    HX HIP REPLACEMENT      Left    HX ORTHOPAEDIC  2011    rot cuff repair    HX TONSILLECTOMY  1950    TOTAL HIP ARTHROPLASTY  05/2010    right    TOTAL HIP ARTHROPLASTY  08/01/2016    left      Prior to Admission medications    Medication Sig Start Date End Date Taking? Authorizing Provider   clonazePAM (KLONOPIN) 0.5 mg tablet Take 1 Tab by mouth nightly as needed. Max Daily Amount: 0.5 mg. Indications: insomnia 3/4/18   Evander Hodgkin, MD   insulin glargine (LANTUS,BASAGLAR) 100 unit/mL (3 mL) inpn 30 Units by SubCUTAneous route two (2) times a day.  Indications: type 2 diabetes mellitus 3/4/18   Evander Hodgkin, MD   CEFAZOLIN SODIUM/WATER (CEFAZOLIN, ANCEF, IN STERILE WATER) 2 gram/20 mL IV syringe 20 mL by IntraVENous route every eight (8) hours. 3/4/18   Lukas Vasques MD   famotidine (PEPCID) 20 mg tablet Take 1 Tab by mouth two (2) times a day. 3/4/18   Lukas Vasques MD   nystatin (MYCOSTATIN) powder Apply 1 g to affected area two (2) times a day. APPLY TO yeast in groin 3/4/18   Lukas Vasques MD   oxyCODONE IR (ROXICODONE) 5 mg immediate release tablet Take 1 Tab by mouth every three (3) hours as needed. Max Daily Amount: 40 mg. Moderate pain 3/4/18   Lukas Vasques MD   oxyCODONE IR (ROXICODONE) 5 mg immediate release tablet Take 1.5 Tabs by mouth every three (3) hours as needed. Max Daily Amount: 60 mg. Severe pain 3/4/18   Lukas Vasques MD   polyethylene glycol (MIRALAX) 17 gram packet Take 1 Packet by mouth daily. Hold for loose stools/diarrhea 3/5/18   Lukas Vasques MD   senna-docusate (PERICOLACE) 8.6-50 mg per tablet Take 1 Tab by mouth two (2) times a day. Hold for loose stools/diarrhea 3/4/18   Lukas Vasques MD   warfarin (COUMADIN) 4 mg tablet Take 1 Tab by mouth daily for 42 days. Indications: DEEP VEIN THROMBOSIS PREVENTION 3/4/18 4/15/18  Lukas Vasques MD   sertraline (ZOLOFT) 100 mg tablet TAKE 1 TABLET DAILY 2/27/18   Dallas Fuller MD   JANUVIA 100 mg tablet TAKE 1 TABLET DAILY 2/19/18   Dallas Fuller MD   primidone (MYSOLINE) 250 mg tablet TAKE 1 TABLET TWICE A DAY 2/13/18   Aniya Gaspar MD   lovastatin (MEVACOR) 20 mg tablet TAKE 1 TABLET IN THE EVENING 2/5/18   Dallas Fuller MD   folic acid (FOLVITE) 1 mg tablet TAKE 1 TABLET DAILY 12/29/17   Dallas Fuller MD   lisinopril (PRINIVIL, ZESTRIL) 20 mg tablet TAKE 1 TABLET DAILY 9/5/17   Dallas Fuller MD   JARDIANCE 25 mg tablet TAKE 1 TABLET DAILY 7/3/17   Dallas Fuller MD   VITAMIN D2 50,000 unit capsule TAKE 1 CAPSULE EVERY 7 DAYS 4/20/17   Dallas Fuller MD   gabapentin (NEURONTIN) 300 mg capsule Take 2 Caps by mouth nightly.  3/20/17   Dallas Fuller MD   aspirin 81 mg chewable tablet Take 1 Tab by mouth daily. 2/2/17   Yamila Vega MD     Current Facility-Administered Medications   Medication Dose Route Frequency    HYDROmorphone (DILAUDID) injection 0.5 mg  0.5 mg IntraVENous NOW     Current Outpatient Prescriptions   Medication Sig    clonazePAM (KLONOPIN) 0.5 mg tablet Take 1 Tab by mouth nightly as needed. Max Daily Amount: 0.5 mg. Indications: insomnia    insulin glargine (LANTUS,BASAGLAR) 100 unit/mL (3 mL) inpn 30 Units by SubCUTAneous route two (2) times a day. Indications: type 2 diabetes mellitus    CEFAZOLIN SODIUM/WATER (CEFAZOLIN, ANCEF, IN STERILE WATER) 2 gram/20 mL IV syringe 20 mL by IntraVENous route every eight (8) hours.  famotidine (PEPCID) 20 mg tablet Take 1 Tab by mouth two (2) times a day.  nystatin (MYCOSTATIN) powder Apply 1 g to affected area two (2) times a day. APPLY TO yeast in groin    oxyCODONE IR (ROXICODONE) 5 mg immediate release tablet Take 1 Tab by mouth every three (3) hours as needed. Max Daily Amount: 40 mg. Moderate pain    oxyCODONE IR (ROXICODONE) 5 mg immediate release tablet Take 1.5 Tabs by mouth every three (3) hours as needed. Max Daily Amount: 60 mg. Severe pain    polyethylene glycol (MIRALAX) 17 gram packet Take 1 Packet by mouth daily. Hold for loose stools/diarrhea    senna-docusate (PERICOLACE) 8.6-50 mg per tablet Take 1 Tab by mouth two (2) times a day. Hold for loose stools/diarrhea    warfarin (COUMADIN) 4 mg tablet Take 1 Tab by mouth daily for 42 days.  Indications: DEEP VEIN THROMBOSIS PREVENTION    sertraline (ZOLOFT) 100 mg tablet TAKE 1 TABLET DAILY    JANUVIA 100 mg tablet TAKE 1 TABLET DAILY    primidone (MYSOLINE) 250 mg tablet TAKE 1 TABLET TWICE A DAY    lovastatin (MEVACOR) 20 mg tablet TAKE 1 TABLET IN THE EVENING    folic acid (FOLVITE) 1 mg tablet TAKE 1 TABLET DAILY    lisinopril (PRINIVIL, ZESTRIL) 20 mg tablet TAKE 1 TABLET DAILY    JARDIANCE 25 mg tablet TAKE 1 TABLET DAILY    VITAMIN D2 50,000 unit capsule TAKE 1 CAPSULE EVERY 7 DAYS    gabapentin (NEURONTIN) 300 mg capsule Take 2 Caps by mouth nightly.  aspirin 81 mg chewable tablet Take 1 Tab by mouth daily. Allergies   Allergen Reactions    Nsaids (Non-Steroidal Anti-Inflammatory Drug) Other (comments)     Hx of PUD and Hinson's Esophagus        Review of Systems:  A comprehensive review of systems was negative except for that written in the HPI. Mental Status:     Objective:     Patient Vitals for the past 8 hrs:   BP Temp Pulse Resp SpO2 Weight   18 2326 (!) 161/96 100.3 °F (37.9 °C) 84 18 100 % -   18 2315 (!) 109/97 - 76 15 99 % -   18 2309 - - 77 24 98 % -   18 2304 180/64 - 82 18 97 % -   18 2303 187/84 - 84 17 100 % -   18 2253 178/77 100.3 °F (37.9 °C) 80 21 - -   18 2249 - - - - 100 % -   18 2230 181/74 - - - 99 % -   18 2216 173/85 - 78 - 100 % -   18 2147 - - 74 16 100 % -   18 2034 135/52 99.3 °F (37.4 °C) 75 16 100 % 119.3 kg (263 lb)     Temp (24hrs), Av °F (37.8 °C), Min:99.3 °F (37.4 °C), Max:100.3 °F (37.9 °C)      EXAM: alert, cooperative, severe distress, appears stated age, morbidly obese. Neuro:  no focal deficits. Extremities:  RLE is foreshortened, pain with any motion or rotation. Internally rotated. Mild erythema and warmth to anterolateral thigh. No signs of infection. Thigh soft. Incision well healed   Capillary refill <2 secs in right foot   Sensation intact in right foot    Imaging Review:EXAM:  XR HIP RT W OR WO PELV 2-3 VWS     INDICATION:   Trauma.     COMPARISON: Operative study of 2018.     FINDINGS: An AP view of the pelvis and a frogleg lateral view of the right hip  demonstrate a dislocated right hip prosthesis. .     IMPRESSION  IMPRESSION:  Dislocation of right hip prosthesis. .    Labs:   Recent Results (from the past 24 hour(s))   CBC WITH AUTOMATED DIFF    Collection Time: 18  8:58 PM   Result Value Ref Range    WBC 4.4 4.1 - 11.1 K/uL    RBC 3.52 (L) 4.10 - 5.70 M/uL    HGB 10.3 (L) 12.1 - 17.0 g/dL    HCT 32.0 (L) 36.6 - 50.3 %    MCV 90.9 80.0 - 99.0 FL    MCH 29.3 26.0 - 34.0 PG    MCHC 32.2 30.0 - 36.5 g/dL    RDW 14.9 (H) 11.5 - 14.5 %    PLATELET 535 830 - 947 K/uL    MPV 11.0 8.9 - 12.9 FL    NRBC 0.0 0  WBC    ABSOLUTE NRBC 0.00 0.00 - 0.01 K/uL    NEUTROPHILS 72 32 - 75 %    BAND NEUTROPHILS 1 0 - 6 %    LYMPHOCYTES 10 (L) 12 - 49 %    MONOCYTES 12 5 - 13 %    EOSINOPHILS 5 0 - 7 %    BASOPHILS 0 0 - 1 %    IMMATURE GRANULOCYTES 0 %    ABS. NEUTROPHILS 3.3 1.8 - 8.0 K/UL    ABS. LYMPHOCYTES 0.4 (L) 0.8 - 3.5 K/UL    ABS. MONOCYTES 0.5 0.0 - 1.0 K/UL    ABS. EOSINOPHILS 0.2 0.0 - 0.4 K/UL    ABS. BASOPHILS 0.0 0.0 - 0.1 K/UL    ABS. IMM. GRANS. 0.0 K/UL    DF MANUAL      RBC COMMENTS ANISOCYTOSIS  1+       METABOLIC PANEL, COMPREHENSIVE    Collection Time: 03/20/18  8:58 PM   Result Value Ref Range    Sodium 136 136 - 145 mmol/L    Potassium 4.1 3.5 - 5.1 mmol/L    Chloride 103 97 - 108 mmol/L    CO2 24 21 - 32 mmol/L    Anion gap 9 5 - 15 mmol/L    Glucose 207 (H) 65 - 100 mg/dL    BUN 12 6 - 20 MG/DL    Creatinine 0.90 0.70 - 1.30 MG/DL    BUN/Creatinine ratio 13 12 - 20      GFR est AA >60 >60 ml/min/1.73m2    GFR est non-AA >60 >60 ml/min/1.73m2    Calcium 8.1 (L) 8.5 - 10.1 MG/DL    Bilirubin, total 0.6 0.2 - 1.0 MG/DL    ALT (SGPT) 11 (L) 12 - 78 U/L    AST (SGOT) 48 (H) 15 - 37 U/L    Alk.  phosphatase 79 45 - 117 U/L    Protein, total 7.1 6.4 - 8.2 g/dL    Albumin 2.4 (L) 3.5 - 5.0 g/dL    Globulin 4.7 (H) 2.0 - 4.0 g/dL    A-G Ratio 0.5 (L) 1.1 - 2.2     PROTHROMBIN TIME + INR    Collection Time: 03/20/18  8:58 PM   Result Value Ref Range    INR 1.4 (H) 0.9 - 1.1      Prothrombin time 14.4 (H) 9.0 - 11.1 sec   PTT    Collection Time: 03/20/18  8:58 PM   Result Value Ref Range    aPTT 38.9 (H) 22.1 - 32.0 sec    aPTT, therapeutic range     58.0 - 77.0 SECS         Impression:     Patient Active Problem List    Diagnosis Date Noted    [facility]location [facility] 03/20/2018    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 02/27/2018    Infected prosthesis of right hip ([facility] 02/26/2018    Bacteremia due to Staphylococcus 02/24/2018    Recurrent depression ([address] 01/16/2018    Type 2 diabetes mellitus with diabetic neuropathy ([facility] 01/16/2018    PFO (patent foramen ovale) 00/29/0136    Systolic murmur 26/78/5373    Jessica-prosthetic fracture of femur following total hip arthroplasty 08/25/2016    Osteoarthritis of right hip 08/01/2016    Benign essential tremor     Diabetes mellitus type 2, uncontrolled ([facility] 06/03/2013    Hypogonadism male 08/29/2012    Encounter for long-term (current) use of medications 07/29/2010    Osteoarthritis of hip     Depression     ED (erectile dysfunction) of organic origin     GERD (gastroesophageal reflux disease)     PUD (peptic ulcer disease)     Hinson's esophagus with esophagitis     HTN (hypertension) 03/17/2010    High cholesterol 03/17/2010     Active Problems:    Dislocation of prosthetic joint ([facility] (3/20/2018)      Overview: Right CIERRA        Plan:   Pt. Stable   Consent signed for conscious sedation and closed reduction  Conscious sedation by ED attending using propofol, which the Pt. tolerated well  Closed reduction of right hip  using traction and countertraction. Pt. tolerated procedure well w/o complications.   Knee immobilizer placed. Pt.neurovascularly intact with sensation intact and capillary refill <2secs p procedure in right leg  Post reduction films reveal restoration of joint congruency and acceptable anatomic alignment. No fracture.    Pt. awake and alert. Pain meds per ED team.  F/u with Dr. Alexandra Robert tomorrow at 56   Dr. Senia Roman aware and agrees with above plan.       AMAN Levin

## 2018-03-21 NOTE — ED NOTES
Pt awaiting [facility]. Pt reports that his leg feels better than he felt prior to arrival . Pt provided with water and appears to be tolerating it well. Pt repositioned on stretcher for comfort.

## 2018-03-21 NOTE — PROGRESS NOTES
Case discussed w/ NN Beryle Seltzer informed patient is a resident at [facility] - facility will need AVS from 3/20/18 visit. Document printed and faxed.

## 2018-03-21 NOTE — ED NOTES
Transport set up with [facility] for transport back to [facility] with ETA 0045. Reference # [id number]  Once pt is done being observed after conscious sedation. Pt is maintaining vs and tolerating PO. Discharge instructions given to patient by MD and nurse. Pt has been given counseling on medication use and verbalizes understanding. Report called back to [address] [place] [address] and [facility]. Bedside shift change report given to Brooke Isabel RN (oncoming nurse) by Nadia Escalona RN (offgoing nurse). Report included the following information SBAR, ED Summary, Intake/Output, MAR and Recent Results.

## 2018-03-21 NOTE — ED TRIAGE NOTES
TRIAGE: Pt arrives via EMS from [facility] rehab after hearing 'pop in R hip when trying to get up off toilet', denies falling. Pt was recently admitted for R septic hip and endocarditis.  Shortened R leg, with + PVS

## 2018-03-21 NOTE — ED PROVIDER NOTES
HPI Comments: 69 y.o. male with past medical history significant for DM, HTN, high cholesterol, DJD, depression, anemia, gastritis, PUD, essential tremor, sleep apnea, BCC, shingles, hematuria, GERD, femur fx, cholecystectomy, and hernia repair who presents from [facility] via EMS with chief complaint of R hip pain. The pt reports that he got up from the toilet after a bowel movement, heard a pop, fell, and started to have R hip pain. The pt has not been able to ambulate since the fall. The pt also reports that it is now painful to ROM the R hip and a low grade fever. The pt reports that his R hip has been erythematous for the last 3 days without pain. The pt has been doing well and ambulating prior to his fall tonight. The pt's legs are chronically restless. There are no other acute medical concerns at this time. Social hx: former smoker, no EtOH use  PCP: Vianey Torres MD    Note written by Jonathan Davis, as dictated by Reta Richards MD 8:41 PM      The history is provided by the patient. No  was used.         Past Medical History:   Diagnosis Date    ADD (attention deficit disorder)     Anemia due to blood loss     Hinson's esophagus with esophagitis     Benign essential tremor     Cancer ([address] 2012    BCC    Depression     DJD (degenerative joint disease) of hip     bilat    DM (diabetes mellitus) ([address] 3/17/2010    ED (erectile dysfunction) of organic origin     Fall on or from sidewalk curb 9/24/2015    Femur fracture ([address]     Gastritis and duodenitis     GERD (gastroesophageal reflux disease)     resolved after discontinuing diclofenac    Hematuria 6/2016    High cholesterol 3/17/2010    HTN (hypertension) 3/17/2010    Morbid obesity ([address]     Murmur, cardiac 2016    PFO (patent foramen ovale) 7/26/2017    PUD (peptic ulcer disease)     Shingles 6/2016    RESOLVING- NO RASH (HEAD)    Sleep apnea     CPAP       Past Surgical History:   Procedure Laterality Date    ENDOSCOPY, COLON, DIAGNOSTIC      HX CHOLECYSTECTOMY  1995    HX GI  1980    gastroplasty    HX HERNIA REPAIR  1995    HX HIP REPLACEMENT      Left    HX ORTHOPAEDIC  2011    rot cuff repair    HX TONSILLECTOMY  1950    TOTAL HIP ARTHROPLASTY  05/2010    right    TOTAL HIP ARTHROPLASTY  08/01/2016    left         Family History:   Problem Relation Age of Onset   [address] [facility] [address] Cancer Father      multiple myeloma    Stroke Father     Dementia Mother     No Known Problems Brother     COPD Brother     Cancer Maternal Grandmother      COLON    Anesth Problems Neg Hx        Social History     Social History    Marital status:      Spouse name: N/A    Number of children: N/A    Years of education: N/A     Occupational History    Not on file. Social History Main Topics    Smoking status: Former Smoker     Packs/day: 2.00     Years: 15.00     Quit date: 3/1/1979    Smokeless tobacco: Never Used    Alcohol use No    Drug use: No    Sexual activity: Yes     Partners: Female     Birth control/ protection: None     Other Topics Concern    Not on file     Social History Narrative         ALLERGIES: Nsaids (non-steroidal anti-inflammatory drug)    Review of Systems   Constitutional: Positive for fever. Negative for activity change and chills. HENT: Negative for nosebleeds, sore throat, trouble swallowing and voice change. Eyes: Negative for visual disturbance. Respiratory: Negative for shortness of breath. Cardiovascular: Negative for chest pain and palpitations. Gastrointestinal: Negative for abdominal pain, constipation, diarrhea and nausea. Genitourinary: Negative for difficulty urinating, dysuria, hematuria and urgency. Musculoskeletal: Negative for back pain, neck pain and neck stiffness. R hip pain   Skin: Negative for color change. Erythema to R hip   Allergic/Immunologic: Negative for immunocompromised state.    Neurological: Negative for dizziness, seizures, syncope, weakness, light-headedness, numbness and headaches. Psychiatric/Behavioral: Negative for behavioral problems, confusion, hallucinations, self-injury and suicidal ideas. All other systems reviewed and are negative. There were no vitals filed for this visit. Physical Exam   Constitutional: He is oriented to person, place, and time. He appears well-developed and well-nourished. No distress. Obese;     HENT:   Head: Normocephalic and atraumatic. Eyes: Pupils are equal, round, and reactive to light. Neck: Normal range of motion. Neck supple. Cardiovascular: Normal rate, regular rhythm and normal heart sounds. Exam reveals no gallop and no friction rub. No murmur heard. Pulmonary/Chest: Effort normal and breath sounds normal. No respiratory distress. He has no wheezes. Abdominal: Soft. Bowel sounds are normal. He exhibits no distension. There is no tenderness. There is no rebound and no guarding. Musculoskeletal: Normal range of motion. Shortened RLE;  Reduced ROM of RLE secondary to pain;     Neurological: He is alert and oriented to person, place, and time. PMS intact in RLE;     Skin: Skin is warm. No rash noted. He is not diaphoretic. There is erythema (mild over the R proximal thigh;). Well healing closed surgical scar;     Psychiatric: He has a normal mood and affect. His behavior is normal. Judgment and thought content normal.   Nursing note and vitals reviewed. Note written by Jonathan Yusuf, as dictated by Brit Macedo MD 8:44 PM      [facility]      ED Course     This is a 78-year-old male with past medical history, review of systems, physical exam as above, presenting with complaints of sudden onset right hip pain. Patient history significant for recent removal, limitation of antibiotic beads, for septic joint, secondary to replacement.  Patient states he's had mild erythema over the right without significant comfort, fevers, or other symptoms. He this evening he was rising from the toilet, when he turned, felt a pop, and went immediately to the floor with right hip pain. Patient presents awake and alert, with shortening of the right lower extremity, concern for dislocation, distal pulse motor and sensation intact. Plan to provide pain control, obtain plain films, CMP, CBC, coags, and we'll likely consult orthopedics for assistance. Make a disposition based on the patient's diagnostics and response to therapy. Procedural Sedation  Date/Time: 3/20/2018 11:14 PM  Performed by: Jan Nicole by: Bang Morgan     Consent:     Consent obtained:  Written    Consent given by:  Patient    Risks discussed:   Allergic reaction, dysrhythmia, inadequate sedation, nausea, prolonged hypoxia resulting in organ damage, prolonged sedation necessitating reversal, respiratory compromise necessitating ventilatory assistance and intubation and vomiting    Alternatives discussed:  Analgesia without sedation  Indications:     Procedure performed:  Fracture reduction    Procedure necessitating sedation performed by:  Different physician    Intended level of sedation:  Moderate (conscious sedation)  Pre-sedation assessment:     Time since last food or drink:  5h    ASA classification: class 3 - patient with severe systemic disease      Neck mobility: normal      Mouth opening:  3 or more finger widths    Thyromental distance:  4 finger widths    Mallampati score:  I - soft palate, uvula, fauces, pillars visible    Pre-sedation assessments completed and reviewed: airway patency, cardiovascular function, hydration status, mental status, nausea/vomiting, pain level and respiratory function      History of difficult intubation: no    Immediate pre-procedure details:     Reassessment: Patient reassessed immediately prior to procedure      Reviewed: vital signs, relevant labs/tests and NPO status      Verified: bag valve mask available, emergency equipment available, intubation equipment available, IV patency confirmed, oxygen available and suction available    Procedure details (see MAR for exact dosages):     Sedation start time:  3/20/2018 11:00 PM    Preoxygenation:  Nasal cannula    Sedation:  Propofol    Analgesia:  Hydromorphone    Intra-procedure monitoring:  Blood pressure monitoring, cardiac monitor, continuous capnometry, continuous pulse oximetry, frequent LOC assessments and frequent vital sign checks    Intra-procedure events: none      Sedation end time:  3/20/2018 11:16 PM  Post-procedure details:     Attendance: Constant attendance by certified staff until patient recovered      Recovery: Patient returned to pre-procedure baseline      Estimated blood loss (see I/O flowsheets): no              11:16 PM  Patient sedation without difficulty, reduction by Orthopedics, post reduction film pending, placed in knee imobilizer. Ortho recs DC home to follow with Dr. Magnolia Powell tomorrow as scheduled, patient seen by ID earlier today. Hip successfully reduced by plain films. Patient without elevated WBC. ESR, CRP, cultures pending. Will DC back to rehab per Ortho recs.

## 2018-03-21 NOTE — ED NOTES
Bedside and Verbal shift change report received from Nataly Mckinley RN(offgoing nurse). Report included the following information SBAR, ED Summary, MAR and Recent Results.

## 2018-03-21 NOTE — ED NOTES
Awaiting Ortho PA, preparing for conscious sedation. Consents obtained. Airway support, code cart and suction at bedside. Family at bedside. Pt on monitor x3 with CO2 monitoring. Pt and family aware of plan of care and deny any questions at this time. 1100  Time out called at 1100. Karine Raza and Dr Kimberly Turner at bedside. Emergency equipment at bedside. Family in waiting room. 11:07 PM  Procedure ended, and tolerated well. Knee immbolizer placed by ortho    11:29 PM  Pt aware and maintaining oxygen on RA, and vs stable. Pt denies nausea and moving all extremities.  Drinking PO and tolerating well

## 2018-03-21 NOTE — CALL BACK NOTE
[facility] Emergency Department Follow Up Call Record    Discharged to : Home/Family Home/Home Health/Skilled Facility/Rehab/Assisted Living/Other__[facility]_____  1) Did you receive your discharge instructions? No. Nurse Sarah Solorio reports, 'I dont believe we have received the Discharge instructions\". 2) Do you understand them? Yes, after review. Also , Nurse Sarah Solorio did report she was aware of appointment patient had with Dr. Jackie Joseph today 3/21/18 . 3) Are you able to follow them? Yes          If NO, what can I clarify for you? 4) Do you understand your diagnosis? Yes         5) Do you know which symptoms should prompt you to call the doctor? Yes   Nurse Sarah Solorio reports Mr. Das has left facility for follow up with Dr. Jackie Joseph, [address] 03/21/18. 6) Were you able to fill and  any medications that were prescribed? Yes     7) You were prescribed __n/a_________for ____________________. Common side effects of this medication are____________________. This is not a complete list so please review the forms given from the pharmacy for a complete list.      8) Are there any questions about your medications? No            Have you scheduled any recommended doctors appointments (specialty, PCP) YES  If NO, what barriers are you encountering (transportation/lost contact info/cost/  didnt think necessary/no PCP  9) If discharged with Home Health, has the agency contacted you to schedule visit?n/a YES   10) Is there anyone available to help you at home (meals, errands, transportation    monitoring) (adult children, neighbors, private duty companions) YES   11) Are you on a special diet? Yes DM        If YES, do you understand the requirements for this diet? YES      Education provided? 12) If presented with cough, bronchitis, COPD, asthma, is it ok to ask that the   respiratory disease management educator call you?  Not applicable      13)  A) If presented with fall, were you issued an assistive device in the ED    Are you using? Yes  Patient currently in immobilizer device to affected leg and is using walker and wheelchair to aid in mobility. B) If given RX for device, have you obtained? Yes       If NO, barriers? C) Therapist recommended: NO   Are you able to implement the suggestions? No  Right leg immobilized due to Right hip prosthesis        If NO, barriers to implementation? D) Are you having any difficulties with mobility inside your home?     (steps, bed, tub)Not applicable   If YES, ask if the [facility] PT can contact patient and good time and number?  14)  At the end of your discharge instructions, there is information about accessing [facility], have you had a chance to review those? Not applicable         Do you have any questions about signing up for this service? N/a   We encourage our patients to be active participants in their healthcare and this site is one of the ways to do that. It will allow you to access parts of your medical record, email your doctors office, schedule appointments, and request medications refills . 15) Are there any other questions that I can answer for you regarding    your Emergency department visit? YES. Nurse Sibley requests that [facility] Inst[facility] .               Estimated Call Time:_____1:48 PM  ______________ Date/Time:_______________

## 2018-03-22 ENCOUNTER — PATIENT OUTREACH (OUTPATIENT)
Dept: FAMILY MEDICINE CLINIC | Age: 68
End: 2018-03-22

## 2018-03-22 NOTE — PROGRESS NOTES
Spoke with Dalia Rodriguez, , with [facility]. Kendell reports at this time patient is to discharge on 3/28/18. Will be speaking with insurance company attempting to extend due to IV ABX via PICC line. IF he is not extended will be going home with family support and home health. This writer will follow up on 3/27/18 to update on final discharge plan.

## 2018-03-26 LAB
BACTERIA SPEC CULT: NORMAL
BACTERIA SPEC CULT: NORMAL
SERVICE CMNT-IMP: NORMAL
SERVICE CMNT-IMP: NORMAL

## 2018-03-27 ENCOUNTER — PATIENT OUTREACH (OUTPATIENT)
Dept: FAMILY MEDICINE CLINIC | Age: 68
End: 2018-03-27

## 2018-04-02 ENCOUNTER — TELEPHONE (OUTPATIENT)
Dept: FAMILY MEDICINE CLINIC | Age: 68
End: 2018-04-02

## 2018-04-02 ENCOUNTER — PATIENT OUTREACH (OUTPATIENT)
Dept: FAMILY MEDICINE CLINIC | Age: 68
End: 2018-04-02

## 2018-04-02 NOTE — TELEPHONE ENCOUNTER
----- Message from Wagner Rollins sent at 4/2/2018  2:01 PM EDT -----  Regarding: Dr. Valencia Leyden, \"[facility] is requesting a call back in reference to a \"Verbal Authorization\" for the pt's [facility]. \" P [phone]

## 2018-04-02 NOTE — TELEPHONE ENCOUNTER
Returned call  [phone])-spoke with Mahendra Granger at [facility] [employer] and given verbal Authoriztion given for [address]. Notified that as per Dr. Heladio Graham he does not mind signing off on the patients orders just as long as the patient keeps his upcoming appointment for 4/17/18 because he has not seen the patient in a while. She verbalized understanding.

## 2018-04-02 NOTE — PROGRESS NOTES
Nurse Navigator Note-attempting to contact [facility] for patient admit to [facility] -3/4 for 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 then to [facility]. Called and LMTCB for Kendell CM at [facility] to find out patient plan and ? D/C date. Attempted to reach Kendell BLACK both yesterday and today and LMTCB. Called back and asked the  if patient is still here and she stated no, patient was D/C . Called patient and he is currently with At [id number] [place] [address], I will call back later. Hospital Discharge Follow-Up      Date/Time:  4/3/2018 4:19 PM    Patient was admitted to [facility] on  and discharged on 3/4 for Septic arthritis for (R) hip prosthetic, patient then to [facility] until . The physician discharge summary was available at the time of outreach. Patient was contacted within 2 business days of discharge. Inpatient RRAT score: 20    Top Challenges reviewed with the provider   Labs, Roxicodonene usage 10 mg Q4H PRN? Is on coumadin and we do not know who is monitoring it, should be on until . Was this a readmission? no   Patient stated reason for the readmission: N/A    Method of communication with provider :chart routing    Nurse Navigator (NN) contacted the patient by telephone to perform post hospital discharge assessment. Verified name and  with patient as identifiers. Provided introduction to self, and explanation of the Nurse Navigator role. Reviewed discharge instructions and red flags with  patient who verbalizes understanding. Patient given an opportunity to ask questions and does not have any further questions or concerns at this time. The patient agrees to contact the PCP office for questions related to their healthcare. NN provided contact information for future reference. Summary of patients top problems:  1. S/P admission for 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 [facility] -3/4 then to [facility] until .   On IV ABX  2. DM type 2 with A1C of 9.0 on 2/7/2018 on insulin  3. Endocarditis of Mitral Valve - to F/U with Cardiology, patient to schedule appt. Home Health orders at discharge: Yes  [address]: At [facility]  Date of initial visit: 4/3/2018    Durable Medical Equipment ordered/company: None  Durable Medical Equipment received: None    Barriers to care? lack of knowledge about disease, medication management- On IV ABX    Advance Care Planning:   Does patient have an Advance Directive:  not on file, patient states he has and just needs to bring in to be scanned into chart. Medication:     New Medications at Discharge: Cefazolin, Pepcid, Mycostatin, Roxicodone, Miralax, Coumadin  Changed Medications at Discharge: Klonopin, Lantus, Pericolace  Discontinued Medications at Discharge: Tramadol, Robaxin, Metformin, Temovate, Fioricet, Nizoral.    Medication reconciliation was performed with patient, who verbalizes understanding of administration of home medications. There were no barriers to obtaining medications identified at this time. Referral to Pharm D needed: no     Prior to Admission medications    Medication Sig Start Date End Date Taking? Authorizing Provider   clonazePAM (KLONOPIN) 0.5 mg tablet Take 1 Tab by mouth nightly as needed. Max Daily Amount: 0.5 mg. Indications: insomnia 3/4/18  Yes Raymon Holloway MD   insulin glargine (LANTUS,BASAGLAR) 100 unit/mL (3 mL) inpn 30 Units by SubCUTAneous route two (2) times a day. Indications: type 2 diabetes mellitus 3/4/18  Yes Raymon Holloway MD   CEFAZOLIN SODIUM/WATER (CEFAZOLIN, ANCEF, IN STERILE WATER) 2 gram/20 mL IV syringe 20 mL by IntraVENous route every eight (8) hours. 3/4/18  Yes Raymon Holloway MD   famotidine (PEPCID) 20 mg tablet Take 1 Tab by mouth two (2) times a day. 3/4/18  Yes Raymon Holloway MD   oxyCODONE IR (ROXICODONE) 5 mg immediate release tablet Take 1.5 Tabs by mouth every three (3) hours as needed. Max Daily Amount: 60 mg.  Severe pain 3/4/18  Yes Tracy MD Tyler   polyethylene glycol (MIRALAX) 17 gram packet Take 1 Packet by mouth daily. Hold for loose stools/diarrhea 3/5/18  Yes Dariel Subramanian MD   senna-docusate (PERICOLACE) 8.6-50 mg per tablet Take 1 Tab by mouth two (2) times a day. Hold for loose stools/diarrhea 3/4/18  Yes Dariel Subramanian MD   warfarin (COUMADIN) 4 mg tablet Take 1 Tab by mouth daily for 42 days. Indications: DEEP VEIN THROMBOSIS PREVENTION 3/4/18 4/15/18 Yes Dariel Subramanian MD   sertraline (ZOLOFT) 100 mg tablet TAKE 1 TABLET DAILY 2/27/18  Yes Lisa Crocker MD   JANUVIA 100 mg tablet TAKE 1 TABLET DAILY 2/19/18  Yes Lisa Crocker MD   primidone (MYSOLINE) 250 mg tablet TAKE 1 TABLET TWICE A DAY 2/13/18  Yes Juliana Schwarz MD   lovastatin (MEVACOR) 20 mg tablet TAKE 1 TABLET IN THE EVENING 2/5/18  Yes Lisa Crocker MD   folic acid (FOLVITE) 1 mg tablet TAKE 1 TABLET DAILY 12/29/17  Yes Lisa Crocker MD   lisinopril (PRINIVIL, ZESTRIL) 20 mg tablet TAKE 1 TABLET DAILY 9/5/17  Yes Lisa Crocker MD   JARDIANCE 25 mg tablet TAKE 1 TABLET DAILY 7/3/17  Yes Lisa Crocker MD   VITAMIN D2 50,000 unit capsule TAKE 1 CAPSULE EVERY 7 DAYS 4/20/17  Yes Lisa Crocker MD   gabapentin (NEURONTIN) 300 mg capsule Take 2 Caps by mouth nightly. 3/20/17  Yes Lisa Crocker MD   aspirin 81 mg chewable tablet Take 1 Tab by mouth daily. 2/2/17  Yes Lisa Crocker MD   nystatin (MYCOSTATIN) powder Apply 1 g to affected area two (2) times a day. APPLY TO yeast in groin 3/4/18   Dariel Subramanian MD       Medications Discontinued During This Encounter   Medication Reason    oxyCODONE IR (ROXICODONE) 5 mg immediate release tablet Duplicate Order       PCP/Specialist follow up: Future Appointments  Date Time Provider Nabor Valdez   4/4/2018 11:10 AM Lisa Crocker MD [id number]   4/17/2018 3:10 PM Lisa Crocker MD [address] [facility] Attends follow-up appointments as directed.             Patient ordinally scheduled with Dr Dot Raman on 4/17 which is 16 days out. Patient son is off on Wed and appt made for 4/4 at 11:10 for YARELY. Patient already has an appt with Dr Yang Paredes, needs to make an appt with Cardiology-gave him [id number]  [phone] and Dr Prince Berman # [phone] to make an appt. On next sahara monitor status of patient F/U appt. [address] [place] [address]        Understands red flags post discharge. Patient to monitor for fever, redness, increased pain, rapid heart rate or rapid resp. Monitor for theses S/S on next call.   [address] [place] [address]

## 2018-04-04 ENCOUNTER — OFFICE VISIT (OUTPATIENT)
Dept: FAMILY MEDICINE CLINIC | Age: 68
End: 2018-04-04

## 2018-04-04 ENCOUNTER — PATIENT OUTREACH (OUTPATIENT)
Dept: FAMILY MEDICINE CLINIC | Age: 68
End: 2018-04-04

## 2018-04-04 ENCOUNTER — TELEPHONE (OUTPATIENT)
Dept: FAMILY MEDICINE CLINIC | Age: 68
End: 2018-04-04

## 2018-04-04 VITALS
RESPIRATION RATE: 16 BRPM | HEART RATE: 64 BPM | SYSTOLIC BLOOD PRESSURE: 131 MMHG | TEMPERATURE: 98.4 F | DIASTOLIC BLOOD PRESSURE: 55 MMHG | BODY MASS INDEX: 33.82 KG/M2 | OXYGEN SATURATION: 97 % | WEIGHT: 241.6 LBS | HEIGHT: 71 IN

## 2018-04-04 DIAGNOSIS — Z79.01 ANTICOAGULATED ON COUMADIN: ICD-10-CM

## 2018-04-04 DIAGNOSIS — R79.82 ELEVATED C-REACTIVE PROTEIN (CRP): ICD-10-CM

## 2018-04-04 DIAGNOSIS — Z09 HOSPITAL DISCHARGE FOLLOW-UP: Primary | ICD-10-CM

## 2018-04-04 DIAGNOSIS — Q21.12 PFO (PATENT FORAMEN OVALE): ICD-10-CM

## 2018-04-04 DIAGNOSIS — I10 ESSENTIAL HYPERTENSION: ICD-10-CM

## 2018-04-04 DIAGNOSIS — R01.1 SYSTOLIC MURMUR: ICD-10-CM

## 2018-04-04 DIAGNOSIS — D64.9 ANEMIA, UNSPECIFIED TYPE: ICD-10-CM

## 2018-04-04 DIAGNOSIS — T84.51XA INFECTION ASSOCIATED WITH INTERNAL RIGHT HIP PROSTHESIS, INITIAL ENCOUNTER (HCC): ICD-10-CM

## 2018-04-04 DIAGNOSIS — I05.9 ENDOCARDITIS OF MITRAL VALVE: ICD-10-CM

## 2018-04-04 DIAGNOSIS — I33.0 MITRAL VALVE VEGETATION: ICD-10-CM

## 2018-04-04 LAB
INR BLD: 1.9
PT POC: NORMAL SECONDS
VALID INTERNAL CONTROL?: YES

## 2018-04-04 RX ORDER — ATOMOXETINE 40 MG/1
CAPSULE ORAL
Refills: 5 | COMMUNITY
Start: 2018-02-08 | End: 2018-07-02 | Stop reason: SDUPTHER

## 2018-04-04 RX ORDER — PEN NEEDLE, DIABETIC 31 GX5/16"
NEEDLE, DISPOSABLE MISCELLANEOUS
COMMUNITY
Start: 2018-02-27 | End: 2019-05-08 | Stop reason: SDUPTHER

## 2018-04-04 RX ORDER — ESZOPICLONE 2 MG/1
2 TABLET, FILM COATED ORAL
Status: ON HOLD | COMMUNITY
Start: 2017-08-22 | End: 2018-05-02

## 2018-04-04 RX ORDER — WARFARIN 2.5 MG/1
2.5 TABLET ORAL DAILY
COMMUNITY
End: 2018-04-11

## 2018-04-04 RX ORDER — DEXTROMETHORPHAN HYDROBROMIDE, GUAIFENESIN 5; 100 MG/5ML; MG/5ML
650 LIQUID ORAL
COMMUNITY
End: 2018-04-11

## 2018-04-04 RX ORDER — IPRATROPIUM BROMIDE AND ALBUTEROL SULFATE 2.5; .5 MG/3ML; MG/3ML
3 SOLUTION RESPIRATORY (INHALATION)
COMMUNITY
End: 2018-12-17 | Stop reason: SDUPTHER

## 2018-04-04 RX ORDER — LISINOPRIL 20 MG/1
TABLET ORAL
COMMUNITY
Start: 2017-12-04 | End: 2018-04-04 | Stop reason: SDUPTHER

## 2018-04-04 RX ORDER — CLOBETASOL PROPIONATE 0.5 MG/G
OINTMENT TOPICAL AS NEEDED
COMMUNITY
Start: 2017-12-21 | End: 2018-12-17 | Stop reason: ALTCHOICE

## 2018-04-04 RX ORDER — WARFARIN 10 MG/1
10 TABLET ORAL DAILY
COMMUNITY
End: 2018-04-11

## 2018-04-04 RX ORDER — WARFARIN 2 MG/1
2 TABLET ORAL DAILY
COMMUNITY
End: 2018-04-11

## 2018-04-04 RX ORDER — OXYCODONE HYDROCHLORIDE 10 MG/1
10 TABLET ORAL
COMMUNITY
Start: 2018-03-04 | End: 2018-04-11

## 2018-04-04 RX ORDER — METFORMIN HYDROCHLORIDE 500 MG/1
1000 TABLET, EXTENDED RELEASE ORAL
COMMUNITY
Start: 2018-02-11 | End: 2018-05-12 | Stop reason: SDUPTHER
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
## 2018-04-04 NOTE — MR AVS SNAPSHOT
Sebastian Terrell [address] [facility] 
[phone] Patient: Gely Yen MRN: [id number] H:0[birth year] Visit Information Date & Time Provider Department Dept. Phone Encounter #  
 4/4/2018 11:10 AM Ethel Kaur MD [facility] at [address] [id number] Follow-up Instructions Return in about 4 weeks (around 5/2/2018) for Regular Follow up. Your Appointments 4/17/2018  3:10 PM  
ROUTINE CARE with Ethel Kaur MD  
[facility] at [facility] [address]) Appt Note: 3m fu  
 [phone] [phone]  
[facility] Upcoming Health Maintenance Date Due  
 EYE EXAM RETINAL OR DILATED Q1 4/11/2017 Pneumococcal 65+ Low/Medium Risk (2 of 2 - PPSV23) 12/5/2017 FOOT EXAM Q1 2/2/2018 GLAUCOMA SCREENING Q2Y 4/11/2018 MICROALBUMIN Q1 7/26/2018 LIPID PANEL Q1 7/26/2018 HEMOGLOBIN A1C Q6M 8/6/2018 DTaP/Tdap/Td series (2 - Td) 1/31/2021 Allergies as of 4/4/2018  Review Complete On: 4/4/2018 By: Thien Dial Severity Noted Reaction Type Reactions Nsaids (Non-steroidal Anti-inflammatory Drug) Low 01/26/2016    Other (comments) Hx of PUD and Hinson's Esophagus Current Immunizations  Reviewed on 12/5/2016 Name Date Influenza High Dose Vaccine PF 9/18/2017, 9/22/2016 Influenza Vaccine 10/3/2013 Influenza Vaccine Split 10/28/2012 TDAP Vaccine 1/31/2011 Not reviewed this visit You Were Diagnosed With   
  
 Codes Comments Hospital discharge follow-up    -  Primary ICD-10-CM: [address] ICD-9-CM: V67.59 Infection associated with internal right hip prosthesis, [facility]     ICD-10-CM: M45.36VH ICD-9-CM: 996.66, V43.64  Anticoagulated on Coumadin     ICD-10-CM: Z51.81, Z79.01 
ICD-9-CM: V58.83, V58.61   
 Mitral valve vegetation     ICD-10-CM: I33.0 ICD-9-CM: 421.0 Uncontrolled type 2 diabetes mellitus without complication, without long-term current use of insulin ([facility]     ICD-10-CM: E11.65 ICD-9-CM: 250.02 Essential hypertension     ICD-10-CM: I10 
ICD-9-CM: 401.9 Systolic murmur     [address]: R01.1 ICD-9-CM: 785.2 PFO (patent foramen ovale)     ICD-10-CM: Q21.1 ICD-9-CM: 745.5 Endocarditis of mitral valve     ICD-10-CM: I05.8 ICD-9-CM: 394.9 Anemia, unspecified type     ICD-10-CM: D64.9 ICD-9-CM: 285.9 Elevated C-reactive protein (CRP)     ICD-10-CM: J53.16 ICD-9-CM: 790.95 Vitals BP Pulse Temp Resp Height(growth percentile) Weight(growth percentile) 131/55 (BP 1 Location: Right arm, BP Patient Position: Sitting) 64 98.4 °F (36.9 °C) (Oral) 16 5' 10.5\" (1.791 m) 241 lb 9.6 oz (109.6 kg) SpO2 BMI Smoking Status 97% 34.18 kg/m2 Former Smoker Vitals History BMI and BSA Data Body Mass Index Body Surface Area  
 34.18 kg/m 2 2.33 m 2 Preferred Pharmacy Pharmacy Name Phone [facility] [phone] Your Updated Medication List  
  
   
This list is accurate as of 4/4/18  1:28 PM.  Always use your most recent med list.  
  
  
  
  
 acetaminophen 650 mg Tber Commonly known as:  TYLENOL Take 650 mg by mouth every six (6) hours as needed. albuterol-ipratropium 2.5 mg-0.5 mg/3 ml Nebu Commonly known as:  DUO-NEB  
3 mL by Nebulization route every six (6) hours as needed. aspirin 81 mg chewable tablet Take 1 Tab by mouth daily. atomoxetine 40 mg capsule Commonly known as:  STRATTERA TAKE ONE CAPSULE BY MOUTH EVERY DAY  
  
 BD INSULIN PEN NEEDLE UF SHORT 31 gauge x 5/16\" Ndle Generic drug:  Insulin Needles (Disposable) ceFAZolin (ANCEF) in sterile water 2 gram/20 mL IV syringe 20 mL by IntraVENous route every eight (8) hours. clobetasol 0.05 % ointment Commonly known as:  Alonna Gear  
as needed. clonazePAM 0.5 mg tablet Commonly known as:  Maria Elena Cord Take 1 Tab by mouth nightly as needed. Max Daily Amount: 0.5 mg. Indications: insomnia  
  
 eszopiclone 2 mg tablet Commonly known as:  Author Daunt Take 2 mg by mouth nightly as needed. famotidine 20 mg tablet Commonly known as:  PEPCID Take 1 Tab by mouth two (2) times a day. folic acid 1 mg tablet Commonly known as:  FOLVITE  
TAKE 1 TABLET DAILY  
  
 gabapentin 300 mg capsule Commonly known as:  NEURONTIN Take 2 Caps by mouth nightly. insulin glargine 100 unit/mL (3 mL) Inpn Commonly known as:  LANTUSBASAGLAR  
30 Units by SubCUTAneous route two (2) times a day. Indications: type 2 diabetes mellitus JANUVIA 100 mg tablet Generic drug:  SITagliptin TAKE 1 TABLET DAILY JARDIANCE 25 mg tablet Generic drug:  empagliflozin TAKE 1 TABLET DAILY  
  
 lisinopril 20 mg tablet Commonly known as:  PRINIVIL ZESTRIL  
TAKE 1 TABLET DAILY lovastatin 20 mg tablet Commonly known as:  MEVACOR  
TAKE 1 TABLET IN THE EVENING  
  
 metFORMIN  mg tablet Commonly known as:  GLUCOPHAGE XR Take 1,000 mg by mouth. Indications: taking two 500 mg tabs. daily  
  
 nystatin powder Commonly known as:  MYCOSTATIN Apply 1 g to affected area two (2) times a day. APPLY TO yeast in groin * oxyCODONE IR 5 mg immediate release tablet Commonly known as:  Seldon Breslow Take 1.5 Tabs by mouth every three (3) hours as needed. Max Daily Amount: 60 mg. Severe pain * oxyCODONE IR 10 mg Tab immediate release tablet Commonly known as:  Seldon Breslow Take 10 mg by mouth.  
  
 polyethylene glycol 17 gram packet Commonly known as:  Veronica Cancel Take 1 Packet by mouth daily. Hold for loose stools/diarrhea  
  
 primidone 250 mg tablet Commonly known as:   MYSOLINE  
 TAKE 1 TABLET TWICE A DAY  
  
 senna-docusate 8.6-50 mg per tablet Commonly known as:  Moisés Blander Take 1 Tab by mouth two (2) times a day. Hold for loose stools/diarrhea  
  
 sertraline 100 mg tablet Commonly known as:  ZOLOFT  
TAKE 1 TABLET DAILY  
  
 VITAMIN D2 50,000 unit capsule Generic drug:  ergocalciferol TAKE 1 CAPSULE EVERY 7 DAYS * warfarin 2 mg tablet Commonly known as:  COUMADIN Take 2 mg by mouth daily. * warfarin 2.5 mg tablet Commonly known as:  COUMADIN Take 2.5 mg by mouth daily. * warfarin 10 mg tablet Commonly known as:  COUMADIN Take 10 mg by mouth daily. * warfarin 4 mg tablet Commonly known as:  COUMADIN Take 1 Tab by mouth daily for 42 days. Indications: DEEP VEIN THROMBOSIS PREVENTION  
  
 * Notice: This list has 6 medication(s) that are the same as other medications prescribed for you. Read the directions carefully, and ask your doctor or other care provider to review them with you. We Performed the Following AMB POC PT/INR [64690 CPT(R)] C REACTIVE PROTEIN, QT [18515 CPT(R)] CBC W/O DIFF [16961 CPT(R)] METABOLIC PANEL, COMPREHENSIVE [28406 CPT(R)] REFERRAL TO CARDIOLOGY [YXI54 Custom] Comments: MV vegetation with endocarditis Follow-up Instructions Return in about 4 weeks (around 5/2/2018) for Regular Follow up. Referral Information Referral ID Referred By Referred To  
  
 [id number] Jeannie SRIVASTAVA MD   
   [address], [address] Phone: [phone] Fax: [phone] Visits Status Start Date End Date 1 New Request 4/4/18 4/4/19 If your referral has a status of pending review or denied, additional information will be sent to support the outcome of this decision. Introducing [facility]! Dear Seb Hinds: Thank you for requesting a [facility] account.   Our records indicate that you already have an active [employer] account. You can access your account anytime at [email or web address] Did you know that you can access your hospital and ER discharge instructions at any time in [employer]? You can also review all of your test results from your hospital stay or ER visit. Additional Information If you have questions, please visit the Frequently Asked Questions section of the [employer] website at [email or web address] Remember, [employer] is NOT to be used for urgent needs. For medical emergencies, dial 911. Now available from your iPhone and Android! Please provide this summary of care documentation to your next provider. Your primary care clinician is listed as Gian Horton. If you have any questions after today's visit, please call [phone].

## 2018-04-04 NOTE — Clinical Note
pls call and see how he is doing after having another hip dislocation. Also, can let him know that he can stop the coumadin on 4/15/18. Thanks!

## 2018-04-04 NOTE — PROGRESS NOTES
Chief Complaint   Patient presents with   [facility] Follow Up   1. Have you been to the ER, urgent care clinic since your last visit? Hospitalized since your last visit? Yes When: 2/19/19 ED [facility] ED-Fever, 2/22/18 UC-Fever & Fall/right Hip Pain, 2/24/18-[facility] ED Admitted for Sepsis Right Hip Prosthesis Discharged 3/4/18, 3/5-4/2/18 Rehab. while in rehab. sent to ED 3/20/18 Right Hip Dislocation    2. Have you seen or consulted any other health care providers outside of the [address] [place] [address] since your last visit? Include any pap smears or colon screening.  Yes When:  2/2018 [facility] for Fevers-diagnosed with Flu    Patient has left Arm PIC Line and doing self home Antibiotic Infusions  Room #9

## 2018-04-04 NOTE — PROGRESS NOTES
Returned patient call, patient thought I was the one coming to see him today. He is to have At [address] come and see him at 1pm, they did not call and leave message so he is going to call them back when he gets home. Patient has a referral with Dr Mary Wilkins and is to [place] appt with Dr Shawna Darnell. Patient with no other concerns.

## 2018-04-04 NOTE — TELEPHONE ENCOUNTER
----- Message from Berneta Fothergill sent at 4/4/2018  2:00 PM EDT -----  Regarding: Dr. Rafal Morton   Pt is returning a call from [facility], and would like a list of specialists.  Best contact number [phone]

## 2018-04-05 ENCOUNTER — APPOINTMENT (OUTPATIENT)
Dept: GENERAL RADIOLOGY | Age: 68
End: 2018-04-05
Attending: EMERGENCY MEDICINE
Payer: COMMERCIAL

## 2018-04-05 ENCOUNTER — HOSPITAL ENCOUNTER (EMERGENCY)
Age: 68
Discharge: HOME | End: 2018-04-05
Attending: EMERGENCY MEDICINE | Admitting: EMERGENCY MEDICINE
Payer: COMMERCIAL

## 2018-04-05 ENCOUNTER — APPOINTMENT (OUTPATIENT)
Dept: GENERAL RADIOLOGY | Age: 68
End: 2018-04-05
Attending: PHYSICIAN ASSISTANT
Payer: COMMERCIAL

## 2018-04-05 ENCOUNTER — TELEPHONE (OUTPATIENT)
Dept: FAMILY MEDICINE CLINIC | Age: 68
End: 2018-04-05

## 2018-04-05 ENCOUNTER — TELEPHONE (OUTPATIENT)
Dept: NEUROLOGY | Age: 68
End: 2018-04-05

## 2018-04-05 VITALS
RESPIRATION RATE: 12 BRPM | DIASTOLIC BLOOD PRESSURE: 53 MMHG | OXYGEN SATURATION: 97 % | HEART RATE: 78 BPM | TEMPERATURE: 99.5 F | SYSTOLIC BLOOD PRESSURE: 139 MMHG

## 2018-04-05 DIAGNOSIS — S73.004A DISLOCATION OF RIGHT HIP, INITIAL ENCOUNTER (HCC): Primary | ICD-10-CM

## 2018-04-05 LAB
ALBUMIN SERPL-MCNC: 3.2 G/DL (ref 3.6–4.8)
ALBUMIN/GLOB SERPL: 0.8 {RATIO} (ref 1.2–2.2)
ALP SERPL-CCNC: 83 IU/L (ref 39–117)
ALT SERPL-CCNC: 7 IU/L (ref 0–44)
AST SERPL-CCNC: 39 IU/L (ref 0–40)
BASOPHILS # BLD: 0.1 K/UL (ref 0–0.1)
BASOPHILS NFR BLD: 1 % (ref 0–1)
BILIRUB SERPL-MCNC: 0.5 MG/DL (ref 0–1.2)
BUN SERPL-MCNC: 13 MG/DL (ref 8–27)
BUN/CREAT SERPL: 17 (ref 10–24)
CALCIUM SERPL-MCNC: 8.7 MG/DL (ref 8.6–10.2)
CHLORIDE SERPL-SCNC: 103 MMOL/L (ref 96–106)
CO2 SERPL-SCNC: 24 MMOL/L (ref 18–29)
CREAT SERPL-MCNC: 0.77 MG/DL (ref 0.76–1.27)
CRP SERPL-MCNC: 11.5 MG/L (ref 0–4.9)
CRP SERPL-MCNC: 2.53 MG/DL (ref 0–0.6)
DIFFERENTIAL METHOD BLD: ABNORMAL
EOSINOPHIL # BLD: 0.3 K/UL (ref 0–0.4)
EOSINOPHIL NFR BLD: 5 % (ref 0–7)
ERYTHROCYTE [DISTWIDTH] IN BLOOD BY AUTOMATED COUNT: 15.9 % (ref 11.5–14.5)
ERYTHROCYTE [DISTWIDTH] IN BLOOD BY AUTOMATED COUNT: 16.6 % (ref 12.3–15.4)
ERYTHROCYTE [SEDIMENTATION RATE] IN BLOOD: 24 MM/HR (ref 0–20)
GFR SERPLBLD CREATININE-BSD FMLA CKD-EPI: 107 ML/MIN/1.73
GFR SERPLBLD CREATININE-BSD FMLA CKD-EPI: 93 ML/MIN/1.73
GLOBULIN SER CALC-MCNC: 3.8 G/DL (ref 1.5–4.5)
GLUCOSE SERPL-MCNC: 132 MG/DL (ref 65–99)
HCT VFR BLD AUTO: 32.9 % (ref 37.5–51)
HCT VFR BLD AUTO: 35 % (ref 36.6–50.3)
HGB BLD-MCNC: 10.4 G/DL (ref 13–17.7)
HGB BLD-MCNC: 10.9 G/DL (ref 12.1–17)
IMM GRANULOCYTES # BLD: 0 K/UL (ref 0–0.04)
IMM GRANULOCYTES NFR BLD AUTO: 0 % (ref 0–0.5)
LYMPHOCYTES # BLD: 0.7 K/UL (ref 0.8–3.5)
LYMPHOCYTES NFR BLD: 13 % (ref 12–49)
MCH RBC QN AUTO: 27 PG (ref 26.6–33)
MCH RBC QN AUTO: 28.3 PG (ref 26–34)
MCHC RBC AUTO-ENTMCNC: 31.1 G/DL (ref 30–36.5)
MCHC RBC AUTO-ENTMCNC: 31.6 G/DL (ref 31.5–35.7)
MCV RBC AUTO: 86 FL (ref 79–97)
MCV RBC AUTO: 90.9 FL (ref 80–99)
MONOCYTES # BLD: 0.5 K/UL (ref 0–1)
MONOCYTES NFR BLD: 9 % (ref 5–13)
NEUTS SEG # BLD: 4 K/UL (ref 1.8–8)
NEUTS SEG NFR BLD: 72 % (ref 32–75)
NRBC # BLD: 0 K/UL (ref 0–0.01)
NRBC BLD-RTO: 0 PER 100 WBC
PLATELET # BLD AUTO: 159 K/UL (ref 150–400)
PLATELET # BLD AUTO: 211 X10E3/UL (ref 150–379)
PMV BLD AUTO: 11.1 FL (ref 8.9–12.9)
POTASSIUM SERPL-SCNC: 4.6 MMOL/L (ref 3.5–5.2)
PROT SERPL-MCNC: 7 G/DL (ref 6–8.5)
RBC # BLD AUTO: 3.85 M/UL (ref 4.1–5.7)
RBC # BLD AUTO: 3.85 X10E6/UL (ref 4.14–5.8)
RBC MORPH BLD: ABNORMAL
RBC MORPH BLD: ABNORMAL
SODIUM SERPL-SCNC: 141 MMOL/L (ref 134–144)
WBC # BLD AUTO: 5.6 K/UL (ref 4.1–11.1)
WBC # BLD AUTO: 6.4 X10E3/UL (ref 3.4–10.8)

## 2018-04-05 PROCEDURE — 73501 X-RAY EXAM HIP UNI 1 VIEW: CPT

## 2018-04-05 PROCEDURE — 94762 N-INVAS EAR/PLS OXIMTRY CONT: CPT

## 2018-04-05 PROCEDURE — 36415 COLL VENOUS BLD VENIPUNCTURE: CPT | Performed by: EMERGENCY MEDICINE

## 2018-04-05 PROCEDURE — 85025 COMPLETE CBC W/AUTO DIFF WBC: CPT | Performed by: EMERGENCY MEDICINE

## 2018-04-05 PROCEDURE — 96360 HYDRATION IV INFUSION INIT: CPT

## 2018-04-05 PROCEDURE — 86140 C-REACTIVE PROTEIN: CPT | Performed by: EMERGENCY MEDICINE

## 2018-04-05 PROCEDURE — 74011250636 HC RX REV CODE- 250/636: Performed by: EMERGENCY MEDICINE

## 2018-04-05 PROCEDURE — 85652 RBC SED RATE AUTOMATED: CPT | Performed by: EMERGENCY MEDICINE

## 2018-04-05 PROCEDURE — 99285 EMERGENCY DEPT VISIT HI MDM: CPT

## 2018-04-05 PROCEDURE — 73502 X-RAY EXAM HIP UNI 2-3 VIEWS: CPT

## 2018-04-05 PROCEDURE — 75810000301 HC ER LEVEL 1 CLOSED TREATMNT FRACTURE/DISLOCATION

## 2018-04-05 PROCEDURE — 99152 MOD SED SAME PHYS/QHP 5/>YRS: CPT

## 2018-04-05 RX ORDER — SODIUM CHLORIDE 9 MG/ML
100 INJECTION, SOLUTION INTRAVENOUS CONTINUOUS
Status: DISCONTINUED | OUTPATIENT
Start: 2018-04-05 | End: 2018-04-05 | Stop reason: HOSPADM

## 2018-04-05 RX ORDER — MORPHINE SULFATE 4 MG/ML
4 INJECTION, SOLUTION INTRAMUSCULAR; INTRAVENOUS
Status: COMPLETED | OUTPATIENT
Start: 2018-04-05 | End: 2018-04-05

## 2018-04-05 RX ORDER — PROPOFOL 10 MG/ML
100 INJECTION, EMULSION INTRAVENOUS
Status: COMPLETED | OUTPATIENT
Start: 2018-04-05 | End: 2018-04-05

## 2018-04-05 RX ADMIN — PROPOFOL 65 MG: 10 INJECTION, EMULSION INTRAVENOUS at 19:24

## 2018-04-05 RX ADMIN — MORPHINE SULFATE 4 MG: 4 INJECTION, SOLUTION INTRAMUSCULAR; INTRAVENOUS at 19:06

## 2018-04-05 RX ADMIN — SODIUM CHLORIDE 100 ML/HR: 900 INJECTION, SOLUTION INTRAVENOUS at 19:44

## 2018-04-05 NOTE — TELEPHONE ENCOUNTER
----- Message from [facility] sent at 4/5/2018  1:53 PM EDT -----  Regarding: Dr. Elvia Lewis  Pt [phone] would like to know if he should take atomoxetine.  They did not give him this in the hospital.

## 2018-04-05 NOTE — TELEPHONE ENCOUNTER
----- Message from Boby Elmore sent at 4/5/2018 12:28 PM EDT -----  Regarding: Dr Nieto Stacks  Pt requesting call back about lisinopril and ADD med., and what dose of alantis should he be on now?   His number is [phone]

## 2018-04-05 NOTE — TELEPHONE ENCOUNTER
Patient left message clarification on medication is calling about for dosage. Left message to call office.

## 2018-04-05 NOTE — TELEPHONE ENCOUNTER
Spoke with Tona regarding patient appointment with AURELIANO Jeter for 4/19/18 @ 10 am.Requested later time for patient due to transportation issues. Tona will call patient for scheduling due to transportation issues.

## 2018-04-05 NOTE — ED PROVIDER NOTES
HPI Comments: 69 y.o. male with extensive past medical history, please see list, significant for DM, HTN, high cholesterol, DJD of hip, anemia due to blood loss, gastritis and duodenitis, PUD, morbid obesity, BCC, cardiac murmur, shingles, Hinson's esophagus and PFO who presents from home via EMS with chief complaint of hip pain. Pt is 10 years s/p R-total hip replacement. He was admitted on 2/24 for infected R-total hip and had impregnated beads placed with I&D. Pt has been receiving Abx daily through PICC line to the E. He had a routine appt with his home health nurse today, and as he was getting up out of his chair to answer the door, his foot got caught, spun, and he heard a slight pop in the R-hip. He was seen in the ED 3/20/18 for hip dislocation and he states that the pop was not as severe today as last time. Pt was sedated with Propofol and had his hip reduced in the ED. Pt is due for his next dose of Abx in ~5 hours. His last PO was ~5 hours ago but was drinking water up until EMS arrival. Pt denies head injury or LOC. Per EMS, pt was given 200 mg of Fentanyl en route. There are no other acute medical concerns at this time. Old Chart Review: s/p I&D, removal of metal liner and placement of polyethylene liner and replacement of femoral head, placement of vanc and tobramycin beads 2/26    Social hx: former tobacco smoker (2 packs/day for 15 years, quit 39 years ago); no EtOH use  PCP: Sujata Varela MD  Orthopedic Surgery: Viraj Martinez MD    Note written by Jonathan Velazquez, as dictated by Domingo Tamayo MD 6:22 PM    The history is provided by the patient and the EMS personnel. No  was used.         Past Medical History:   Diagnosis Date    ADD (attention deficit disorder)     Anemia due to blood loss     Hinson's esophagus with esophagitis     Benign essential tremor     Cancer ([facility] [address] 2012    BCC    Depression     DJD (degenerative joint disease) of hip bilat    DM (diabetes mellitus) ([facility] [address] 3/17/2010    ED (erectile dysfunction) of organic origin     Fall on or from sidewalk curb 9/24/2015    Femur fracture ([facility] [address]     Gastritis and duodenitis     GERD (gastroesophageal reflux disease)     resolved after discontinuing diclofenac    Hematuria 6/2016    High cholesterol 3/17/2010    HTN (hypertension) 3/17/2010    Morbid obesity ([facility] [address]     Murmur, cardiac 2016    PFO (patent foramen ovale) 7/26/2017    PUD (peptic ulcer disease)     Shingles 6/2016    RESOLVING- NO RASH (HEAD)    Sleep apnea     CPAP       Past Surgical History:   Procedure Laterality Date    ENDOSCOPY, COLON, DIAGNOSTIC      HX CHOLECYSTECTOMY  1995    HX GI  1980    gastroplasty    HX HERNIA REPAIR  1995    HX HIP REPLACEMENT      Left    HX ORTHOPAEDIC  2011    rot cuff repair    HX TONSILLECTOMY  1950    HX VASCULAR ACCESS      picc line    TOTAL HIP ARTHROPLASTY  05/2010    right    TOTAL HIP ARTHROPLASTY  08/01/2016    left         Family History:   Problem Relation Age of Onset    Cancer Father      multiple myeloma    Stroke Father     Dementia Mother     No Known Problems Brother     COPD Brother     Cancer Maternal Grandmother      COLON    Anesth Problems Neg Hx        Social History     Social History    Marital status:      Spouse name: N/A    Number of children: N/A    Years of education: N/A     Occupational History    Not on file. Social History Main Topics    Smoking status: Former Smoker     Packs/day: 2.00     Years: 15.00     Quit date: 3/1/1979    Smokeless tobacco: Never Used    Alcohol use No    Drug use: No    Sexual activity: Yes     Partners: Female     Birth control/ protection: None     Other Topics Concern    Not on file     Social History Narrative         ALLERGIES: Nsaids (non-steroidal anti-inflammatory drug)    Review of Systems   Constitutional: Negative for chills and fever.    Respiratory: Negative for shortness of breath. Cardiovascular: Negative for chest pain. Gastrointestinal: Negative for abdominal pain. Musculoskeletal: Positive for arthralgias (R-hip). All other systems reviewed and are negative. Vitals:    04/05/18 1951 04/05/18 1952 04/05/18 1953 04/05/18 1954   BP: (!) 226/184   (!) 237/84   Pulse: 73 73 73 74   Resp: 16 19 23 17   Temp:       SpO2: 100% 99% 100% 100%            Physical Exam   Nursing note and vitals reviewed. Constitutional: appears well-developed and well-nourished. No distress. HENT:   Head: Normocephalic and atraumatic. Sclera anicteric  Nose: No rhinorrhea  Mouth/Throat: Oropharynx is clear and moist. Pharynx normal  Eyes: Conjunctivae are normal. Pupils are equal, round, and reactive to light. Right eye exhibits no discharge. Left eye exhibits no discharge. No scleral icterus. Neck: Painless normal range of motion. Supple  Cardiovascular: Normal rate, regular rhythm, normal heart sounds and intact distal pulses. Exam reveals no gallop and no friction rub. No murmur heard. Pulmonary/Chest: Effort normal and breath sounds normal. No respiratory distress. no wheezes. no rales. Abdominal: Soft. Bowel sounds are normal. Exhibits no distension and no mass. No tenderness. No guarding. Musculoskeletal: RLE shortened but not externally rotated. R-hip tenderness. PICC line in place in LUE. Lymphadenopathy:   No cervical adenopathy. Neurological:  Alert and oriented to person, place, and time. Coordination normal. CN 2-12 intact. Moving all extremities. Skin: Skin is warm and dry. No rash noted. No pallor. Note written by Jonathan Tello, as dictated by Anastacia Whitlock MD 6:22 PM    [facility]      ED Course       Procedural Sedation  Date/Time: 4/5/2018 7:42 PM  Performed by: Yvan Schmitt by: Barbara Narayan     Consent:     Consent obtained:  Written    Consent given by:  Patient    Risks discussed:   Allergic reaction, dysrhythmia, inadequate sedation, nausea, vomiting, respiratory compromise necessitating ventilatory assistance and intubation, prolonged hypoxia resulting in organ damage and prolonged sedation necessitating reversal    Alternatives discussed:  Analgesia without sedation  Indications:     Procedure performed:  Dislocation reduction    Procedure necessitating sedation performed by:  Different physician (ortho PA)    Intended level of sedation:  Moderate (conscious sedation)  Pre-sedation assessment:     Time since last food or drink:  2 hours    NPO status caution: urgency dictates proceeding with non-ideal NPO status      ASA classification: class 2 - patient with mild systemic disease      Neck mobility: normal      Mouth opening:  3 or more finger widths    Thyromental distance:  4 finger widths    Mallampati score:  I - soft palate, uvula, fauces, pillars visible    Pre-sedation assessments completed and reviewed: airway patency, cardiovascular function, hydration status, mental status, nausea/vomiting, pain level, respiratory function and temperature      History of difficult intubation: no      Pre-sedation assessment completed:  4/5/2018 6:50 PM  Immediate pre-procedure details:     Reassessment: Patient reassessed immediately prior to procedure      Reviewed: vital signs, relevant labs/tests and NPO status      Verified: bag valve mask available, emergency equipment available, intubation equipment available, IV patency confirmed, oxygen available, reversal medications available and suction available    Procedure details (see MAR for exact dosages):     Sedation start time:  4/5/2018 7:24 PM    Preoxygenation:  Nasal cannula    Sedation:  Propofol    Analgesia:  Morphine    Intra-procedure monitoring:  Blood pressure monitoring, cardiac monitor, continuous pulse oximetry, continuous capnometry, frequent LOC assessments and frequent vital sign checks    Intra-procedure events comment:  Transient apnea which resolvedw without bvm    Sedation end time:  4/5/2018 7:45 PM    Total sedation time (minutes):  21  Post-procedure details:     Post-sedation assessment completed:  4/5/2018 8:02 PM    Attendance: Constant attendance by certified staff until patient recovered      Recovery: Patient returned to pre-procedure baseline      Estimated blood loss (see I/O flowsheets): no      Complications:  None    Post-sedation assessments completed and reviewed: airway patency, cardiovascular function, hydration status, mental status, nausea/vomiting, pain level, respiratory function and temperature      Specimens recovered:  None    Patient is stable for discharge or admission: yes      Patient tolerance: Tolerated well, no immediate complications          PROGRESS NOTE:  6:49 PM  Pt consented to sedation. CONSULT NOTE:  6:50 PM Shari Weeks MD spoke with Dr. Chandni Taylor, Consult for Orthopedics. Discussed available diagnostic tests and clinical findings. Dr. Chandni Taylor will come see the pt in the ED. CONSULT NOTE:  8:19 PM Shari Weeks MD spoke with Pierce Wood. Consult for Orthopedics. Discussed available diagnostic tests and clinical findings. Pierce Wood states that the pt is ok for discharge home. He can weight bear as tolerated on the R-leg. Will need to wear knee-immobilizer. Dr. Chano Park will schedule appt for f/u.        Recent Results (from the past 24 hour(s))   CBC WITH AUTOMATED DIFF    Collection Time: 04/05/18  7:45 PM   Result Value Ref Range    WBC 5.6 4.1 - 11.1 K/uL    RBC 3.85 (L) 4.10 - 5.70 M/uL    HGB 10.9 (L) 12.1 - 17.0 g/dL    HCT 35.0 (L) 36.6 - 50.3 %    MCV 90.9 80.0 - 99.0 FL    MCH 28.3 26.0 - 34.0 PG    MCHC 31.1 30.0 - 36.5 g/dL    RDW 15.9 (H) 11.5 - 14.5 %    PLATELET 520 815 - 279 K/uL    MPV 11.1 8.9 - 12.9 FL    NRBC 0.0 0  WBC    ABSOLUTE NRBC 0.00 0.00 - 0.01 K/uL    NEUTROPHILS 72 32 - 75 %    LYMPHOCYTES 13 12 - 49 %    MONOCYTES 9 5 - 13 %    EOSINOPHILS 5 0 - 7 % BASOPHILS 1 0 - 1 %    IMMATURE GRANULOCYTES 0 0.0 - 0.5 %    ABS. NEUTROPHILS 4.0 1.8 - 8.0 K/UL    ABS. LYMPHOCYTES 0.7 (L) 0.8 - 3.5 K/UL    ABS. MONOCYTES 0.5 0.0 - 1.0 K/UL    ABS. EOSINOPHILS 0.3 0.0 - 0.4 K/UL    ABS. BASOPHILS 0.1 0.0 - 0.1 K/UL    ABS. IMM. GRANS. 0.0 0.00 - 0.04 K/UL    DF SMEAR SCANNED      RBC COMMENTS ANISOCYTOSIS  1+        RBC COMMENTS OVALOCYTES  1+       SED RATE (ESR)    Collection Time: 04/05/18  7:45 PM   Result Value Ref Range    Sed rate, automated 24 (H) 0 - 20 mm/hr   C REACTIVE PROTEIN, QT    Collection Time: 04/05/18  7:45 PM   Result Value Ref Range    C-Reactive protein 2.53 (H) 0.00 - 0.60 mg/dL       Xr Hip Rt W Or Wo Pelv 2-3 Vws    Result Date: 4/5/2018  INDICATION:  s/p reduction AP view of the pelvis and lateral view of the right hip. There are bilateral hip prostheses. Right hip prosthesis is in near anatomic alignment, status post closed reduction. No acute fracture is visualized. Osseous structures are diffusely demineralized. IMPRESSION: Right hip prosthesis in near anatomic alignment, status post closed reduction. Xr Hip Rt W Or Wo Pelv  1 Vw    Result Date: 4/5/2018  INDICATION: Right hip pain, history of total hip replacement, infection. Single AP supine view of the right hip performed. IMPRESSION: The right proximal femoral prosthetic component is superolaterally dislocated with respect to the acetabular component. No acute fracture is visualized.

## 2018-04-05 NOTE — ED TRIAGE NOTES
Patient had 200 mcg of Fentanyl in route. R hip replacement x 10 years ago, was recently diagnosed with sepsis [facility] to [facility] round of [facility] treatment today, knocked on the door, and the patient went to get out of the chair, felt a pop, and fell back into the chair- no LOC. Rates pain at a 6/10 currently. [place] [facility]

## 2018-04-05 NOTE — CONSULTS
ORTHOPAEDIC CONSULT NOTE    Subjective:     Date of Consultation:  April 5, 2018  Referring Physician:  Jace Gaytan is a 69 y.o. male with PMH significant for DM,. 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 States he was standing up from the chair when he felt the hip pop and become unstable and fell to the ground. He was here approx one week ago for a hip dislocation as well. At this time he complains of right groin pain and inability to bear weight. He denies back, ankle or ankle pain. He was brought to the ED and found to have a recurrent prosthetic hip dislocation.     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    Obesity 03/04/2018    Insomnia 03/04/2018    Sepsis ([address] 02/27/2018    Infected prosthesis of right hip ([address] 02/26/2018    Bacteremia due to Staphylococcus 02/24/2018    Recurrent depression ([address] 01/16/2018    Type 2 diabetes mellitus with diabetic neuropathy ([address] 01/16/2018    PFO (patent foramen ovale) 86/20/0937    Systolic murmur 18/43/2981    Jessica-prosthetic fracture of femur following total hip arthroplasty 08/25/2016    Osteoarthritis of right hip 08/01/2016    Benign essential tremor     Diabetes mellitus type 2, uncontrolled ([address] 06/03/2013    Hypogonadism male 08/29/2012    Encounter for long-term (current) use of medications 07/29/2010    Osteoarthritis of hip     Depression     ED (erectile dysfunction) of organic origin     GERD (gastroesophageal reflux disease)     PUD (peptic ulcer disease)     Hinson's esophagus with esophagitis     HTN (hypertension) 03/17/2010    High cholesterol 03/17/2010     Family History   Problem Relation Age of Onset  Cancer Father      multiple myeloma    Stroke Father     Dementia Mother     No Known Problems Brother     COPD Brother     Cancer Maternal Grandmother      COLON    Anesth Problems Neg Hx       Social History   Substance Use Topics    Smoking status: Former Smoker     Packs/day: 2.00     Years: 15.00     Quit date: 3/1/1979    Smokeless tobacco: Never Used    Alcohol use No     Past Medical History:   Diagnosis Date    ADD (attention deficit disorder)     Anemia due to blood loss     Hinson's esophagus with esophagitis     Benign essential tremor     Cancer ([facility] [address] 2012    BCC    Depression     DJD (degenerative joint disease) of hip     bilat    DM (diabetes mellitus) ([facility] [address] 3/17/2010    ED (erectile dysfunction) of organic origin     Fall on or from sidewalk curb 9/24/2015    Femur fracture ([facility] [address]     Gastritis and duodenitis     GERD (gastroesophageal reflux disease)     resolved after discontinuing diclofenac    Hematuria 6/2016    High cholesterol 3/17/2010    HTN (hypertension) 3/17/2010    Morbid obesity ([facility] [address]     Murmur, cardiac 2016    PFO (patent foramen ovale) 7/26/2017    PUD (peptic ulcer disease)     Shingles 6/2016    RESOLVING- NO RASH (HEAD)    Sleep apnea     CPAP      Past Surgical History:   Procedure Laterality Date    ENDOSCOPY, COLON, DIAGNOSTIC      HX CHOLECYSTECTOMY  1995    HX GI  1980    gastroplasty    HX HERNIA REPAIR  1995    HX HIP REPLACEMENT      Left    HX ORTHOPAEDIC  2011    rot cuff repair    HX TONSILLECTOMY  1950    HX VASCULAR ACCESS      picc line    TOTAL HIP ARTHROPLASTY  05/2010    right    TOTAL HIP ARTHROPLASTY  08/01/2016    left      Prior to Admission medications    Medication Sig Start Date End Date Taking? Authorizing Provider   albuterol-ipratropium (DUO-NEB) 2.5 mg-0.5 mg/3 ml nebu 3 mL by Nebulization route every six (6) hours as needed. Historical Provider   warfarin (COUMADIN) 2 mg tablet Take 2 mg by mouth daily. Historical Provider   warfarin (COUMADIN) 2.5 mg tablet Take 2.5 mg by mouth daily. Historical Provider   warfarin (COUMADIN) 10 mg tablet Take 10 mg by mouth daily. Historical Provider   oxyCODONE IR (ROXICODONE) 10 mg tab immediate release tablet Take 10 mg by mouth. 3/4/18   Historical Provider   atomoxetine (STRATTERA) 40 mg capsule TAKE ONE CAPSULE BY MOUTH EVERY DAY 2/8/18   Historical Provider   eszopiclone (LUNESTA) 2 mg tablet Take 2 mg by mouth nightly as needed. 8/22/17   Historical Provider   metFORMIN ER (GLUCOPHAGE XR) 500 mg tablet Take 1,000 mg by mouth. Indications: taking two 500 mg tabs. daily 2/11/18   Historical Provider   clobetasol (TEMOVATE) 0.05 % ointment as needed. 12/21/17   Historical Provider   BD INSULIN PEN NEEDLE UF SHORT 31 gauge x 5/16\" ndle  2/27/18   Historical Provider   acetaminophen (TYLENOL) 650 mg TbER Take 650 mg by mouth every six (6) hours as needed. Historical Provider   clonazePAM (KLONOPIN) 0.5 mg tablet Take 1 Tab by mouth nightly as needed. Max Daily Amount: 0.5 mg. Indications: insomnia 3/4/18   Analia Downs MD   insulin glargine (LANTUS,BASAGLAR) 100 unit/mL (3 mL) inpn 30 Units by SubCUTAneous route two (2) times a day. Indications: type 2 diabetes mellitus 3/4/18   Analia Downs MD   CEFAZOLIN SODIUM/WATER (CEFAZOLIN, ANCEF, IN STERILE WATER) 2 gram/20 mL IV syringe 20 mL by IntraVENous route every eight (8) hours. 3/4/18   Analia Downs MD   famotidine (PEPCID) 20 mg tablet Take 1 Tab by mouth two (2) times a day. 3/4/18   Analia Downs MD   nystatin (MYCOSTATIN) powder Apply 1 g to affected area two (2) times a day. APPLY TO yeast in groin 3/4/18   Analia Downs MD   oxyCODONE IR (ROXICODONE) 5 mg immediate release tablet Take 1.5 Tabs by mouth every three (3) hours as needed. Max Daily Amount: 60 mg. Severe pain  Patient taking differently: Take 10 mg by mouth every three (3) hours as needed (taking 2 5mg tabs as needed).  Severe pain 3/4/18   Tracy MD Tyler   polyethylene glycol (MIRALAX) 17 gram packet Take 1 Packet by mouth daily. Hold for loose stools/diarrhea 3/5/18   Dariel Subramanian MD   senna-docusate (PERICOLACE) 8.6-50 mg per tablet Take 1 Tab by mouth two (2) times a day. Hold for loose stools/diarrhea 3/4/18   Dariel Subramanian MD   warfarin (COUMADIN) 4 mg tablet Take 1 Tab by mouth daily for 42 days. Indications: DEEP VEIN THROMBOSIS PREVENTION 3/4/18 4/15/18  Dariel Subramanian MD   sertraline (ZOLOFT) 100 mg tablet TAKE 1 TABLET DAILY 2/27/18   Lisa Crocker MD   JANUVIA 100 mg tablet TAKE 1 TABLET DAILY 2/19/18   Lisa Crocker MD   primidone (MYSOLINE) 250 mg tablet TAKE 1 TABLET TWICE A DAY 2/13/18   Juliana Schwarz MD   lovastatin (MEVACOR) 20 mg tablet TAKE 1 TABLET IN THE EVENING 2/5/18   Lisa Crocker MD   folic acid (FOLVITE) 1 mg tablet TAKE 1 TABLET DAILY 12/29/17   Lisa Crocker MD   lisinopril (PRINIVIL, ZESTRIL) 20 mg tablet TAKE 1 TABLET DAILY 9/5/17   Lisa Crocker MD   JARDIANCE 25 mg tablet TAKE 1 TABLET DAILY 7/3/17   Lisa Crocker MD   VITAMIN D2 50,000 unit capsule TAKE 1 CAPSULE EVERY 7 DAYS 4/20/17   Lisa Crocker MD   gabapentin (NEURONTIN) 300 mg capsule Take 2 Caps by mouth nightly. 3/20/17   Lisa Crocker MD   aspirin 81 mg chewable tablet Take 1 Tab by mouth daily. 2/2/17   Lisa Crocker MD     Current Facility-Administered Medications   Medication Dose Route Frequency    0.9% sodium chloride infusion  100 mL/hr IntraVENous CONTINUOUS     Current Outpatient Prescriptions   Medication Sig    albuterol-ipratropium (DUO-NEB) 2.5 mg-0.5 mg/3 ml nebu 3 mL by Nebulization route every six (6) hours as needed.  warfarin (COUMADIN) 2 mg tablet Take 2 mg by mouth daily.  warfarin (COUMADIN) 2.5 mg tablet Take 2.5 mg by mouth daily.  warfarin (COUMADIN) 10 mg tablet Take 10 mg by mouth daily.  oxyCODONE IR (ROXICODONE) 10 mg tab immediate release tablet Take 10 mg by mouth.     atomoxetine (STRATTERA) 40 mg capsule TAKE ONE CAPSULE BY MOUTH EVERY DAY    eszopiclone (LUNESTA) 2 mg tablet Take 2 mg by mouth nightly as needed.  metFORMIN ER (GLUCOPHAGE XR) 500 mg tablet Take 1,000 mg by mouth. Indications: taking two 500 mg tabs. daily    clobetasol (TEMOVATE) 0.05 % ointment as needed.  BD INSULIN PEN NEEDLE UF SHORT 31 gauge x 5/16\" ndle     acetaminophen (TYLENOL) 650 mg TbER Take 650 mg by mouth every six (6) hours as needed.  clonazePAM (KLONOPIN) 0.5 mg tablet Take 1 Tab by mouth nightly as needed. Max Daily Amount: 0.5 mg. Indications: insomnia    insulin glargine (LANTUS,BASAGLAR) 100 unit/mL (3 mL) inpn 30 Units by SubCUTAneous route two (2) times a day. Indications: type 2 diabetes mellitus    CEFAZOLIN SODIUM/WATER (CEFAZOLIN, ANCEF, IN STERILE WATER) 2 gram/20 mL IV syringe 20 mL by IntraVENous route every eight (8) hours.  famotidine (PEPCID) 20 mg tablet Take 1 Tab by mouth two (2) times a day.  nystatin (MYCOSTATIN) powder Apply 1 g to affected area two (2) times a day. APPLY TO yeast in groin    oxyCODONE IR (ROXICODONE) 5 mg immediate release tablet Take 1.5 Tabs by mouth every three (3) hours as needed. Max Daily Amount: 60 mg. Severe pain (Patient taking differently: Take 10 mg by mouth every three (3) hours as needed (taking 2 5mg tabs as needed). Severe pain)    polyethylene glycol (MIRALAX) 17 gram packet Take 1 Packet by mouth daily. Hold for loose stools/diarrhea    senna-docusate (PERICOLACE) 8.6-50 mg per tablet Take 1 Tab by mouth two (2) times a day. Hold for loose stools/diarrhea    warfarin (COUMADIN) 4 mg tablet Take 1 Tab by mouth daily for 42 days.  Indications: DEEP VEIN THROMBOSIS PREVENTION    sertraline (ZOLOFT) 100 mg tablet TAKE 1 TABLET DAILY    JANUVIA 100 mg tablet TAKE 1 TABLET DAILY    primidone (MYSOLINE) 250 mg tablet TAKE 1 TABLET TWICE A DAY    lovastatin (MEVACOR) 20 mg tablet TAKE 1 TABLET IN THE EVENING    folic acid (FOLVITE) 1 mg tablet TAKE 1 TABLET DAILY    lisinopril (PRINIVIL, ZESTRIL) 20 mg tablet TAKE 1 TABLET DAILY    JARDIANCE 25 mg tablet TAKE 1 TABLET DAILY    VITAMIN D2 50,000 unit capsule TAKE 1 CAPSULE EVERY 7 DAYS    gabapentin (NEURONTIN) 300 mg capsule Take 2 Caps by mouth nightly.  aspirin 81 mg chewable tablet Take 1 Tab by mouth daily. Allergies   Allergen Reactions    Nsaids (Non-Steroidal Anti-Inflammatory Drug) Other (comments)     Hx of PUD and Hinson's Esophagus        Review of Systems:  Pertinent items are noted in HPI. Mental Status: no dementia    Objective:     Patient Vitals for the past 8 hrs:   BP Temp Pulse Resp SpO2   186 (!) 179/119 99 °F (37.2 °C) 65 20 100 %     Temp (24hrs), Av °F (37.2 °C), Min:99 °F (37.2 °C), Max:99 °F (37.2 °C)      EXAM: Awake and alert lying on stretcher; appears uncomfortable; agreeable to exam  Right leg in neutral position but shortened by approx 2 inches compared to the left  Internal rotation produces no pain but external rotation is met with a firm endpoint and increased pain  Unable to range joint  Distal sensory function grossly intact  5/5 strength for ankle plantar and dorsiflexion  Distal pulses palpable    Imaging Review: INDICATION: Right hip pain, history of total hip replacement, infection.     Single AP supine view of the right hip performed.     IMPRESSION  IMPRESSION: The right proximal femoral prosthetic component is superolaterally  dislocated with respect to the acetabular component. No acute fracture is  visualized. Labs: No results found for this or any previous visit (from the past 24 hour(s)). Impression:     Active Problems:    * No active hospital problems.  *      Plan:     Acute recurrent right prosthetic hip dislocation - will require reduction  ED attending to provide procedural sedation  Will place in knee immobilizer following reduction to help prevent further dislocations  Infection lab panel to determine current infectious status  WBAT on hip with crutches or walker if needed  REmain in knee immobilizer at all times except when changing  Will need outpatient follow-up with Dr Livia Brand to determine next steps    Dr Livia Brand has seen and examined the patient and agrees with plan of care    Anselmo Krabbe, PA-C [place]

## 2018-04-05 NOTE — TELEPHONE ENCOUNTER
Left message on VM to call office. Medication was not prescribed by Dr. Jaime Hamilton. PCP Sudheer Norton prescribed med. Will try to call back again.

## 2018-04-05 NOTE — TELEPHONE ENCOUNTER
----- Message from Crow Mallory sent at 4/4/2018  5:28 PM EDT -----  Regarding: /Marco Carbone, [facility], called for Simone and would like for her to give her a call back in the morning after 08:30.     Best Contact: [phone]

## 2018-04-06 ENCOUNTER — TELEPHONE (OUTPATIENT)
Dept: FAMILY MEDICINE CLINIC | Age: 68
End: 2018-04-06

## 2018-04-06 ENCOUNTER — APPOINTMENT (OUTPATIENT)
Dept: GENERAL RADIOLOGY | Age: 68
DRG: 468 | End: 2018-04-06
Attending: STUDENT IN AN ORGANIZED HEALTH CARE EDUCATION/TRAINING PROGRAM
Payer: COMMERCIAL

## 2018-04-06 ENCOUNTER — HOSPITAL ENCOUNTER (INPATIENT)
Age: 68
LOS: 4 days | Discharge: HOME HEALTH | DRG: 468 | End: 2018-04-11
Attending: STUDENT IN AN ORGANIZED HEALTH CARE EDUCATION/TRAINING PROGRAM | Admitting: INTERNAL MEDICINE
Payer: COMMERCIAL

## 2018-04-06 DIAGNOSIS — T84.029D DISLOCATION OF PROSTHETIC JOINT, SUBSEQUENT ENCOUNTER: Primary | ICD-10-CM

## 2018-04-06 PROCEDURE — 75810000301 HC ER LEVEL 1 CLOSED TREATMNT FRACTURE/DISLOCATION

## 2018-04-06 PROCEDURE — 99284 EMERGENCY DEPT VISIT MOD MDM: CPT

## 2018-04-06 PROCEDURE — 96374 THER/PROPH/DIAG INJ IV PUSH: CPT

## 2018-04-06 PROCEDURE — 99152 MOD SED SAME PHYS/QHP 5/>YRS: CPT

## 2018-04-06 PROCEDURE — 96376 TX/PRO/DX INJ SAME DRUG ADON: CPT

## 2018-04-06 PROCEDURE — L1830 KO IMMOB CANVAS LONG PRE OTS: HCPCS

## 2018-04-06 PROCEDURE — 74011250636 HC RX REV CODE- 250/636: Performed by: STUDENT IN AN ORGANIZED HEALTH CARE EDUCATION/TRAINING PROGRAM

## 2018-04-06 PROCEDURE — 72170 X-RAY EXAM OF PELVIS: CPT

## 2018-04-06 RX ORDER — HYDROMORPHONE HYDROCHLORIDE 2 MG/ML
1 INJECTION, SOLUTION INTRAMUSCULAR; INTRAVENOUS; SUBCUTANEOUS ONCE
Status: COMPLETED | OUTPATIENT
Start: 2018-04-06 | End: 2018-04-06

## 2018-04-06 RX ORDER — HYDROMORPHONE HYDROCHLORIDE 2 MG/ML
1 INJECTION, SOLUTION INTRAMUSCULAR; INTRAVENOUS; SUBCUTANEOUS
Status: COMPLETED | OUTPATIENT
Start: 2018-04-06 | End: 2018-04-07

## 2018-04-06 RX ORDER — PROPOFOL 10 MG/ML
100 INJECTION, EMULSION INTRAVENOUS
Status: COMPLETED | OUTPATIENT
Start: 2018-04-06 | End: 2018-04-07

## 2018-04-06 RX ADMIN — HYDROMORPHONE HYDROCHLORIDE 1 MG: 2 INJECTION INTRAMUSCULAR; INTRAVENOUS; SUBCUTANEOUS at 22:49

## 2018-04-06 NOTE — IP AVS SNAPSHOT
[address] 
[phone] Patient: Merary Alvarez MRN: [id number] BXP:7/9/1893 A check danny indicates which time of day the medication should be taken. My Medications CHANGE how you take these medications Instructions Each Dose to Equal  
 Morning Noon Evening Bedtime  
 oxyCODONE IR 5 mg immediate release tablet Commonly known as:  Santo Bers What changed:   
- how much to take 
- reasons to take this 
- additional instructions - Another medication with the same name was removed. Continue taking this medication, and follow the directions you see here. Your last dose was: Your next dose is: Take 1.5 Tabs by mouth every three (3) hours as needed. Max Daily Amount: 60 mg. Severe pain 7.5 mg  
    
   
   
   
  
 warfarin 4 mg tablet Commonly known as:  COUMADIN What changed:   
- how much to take 
- how to take this - when to take this 
- additional instructions - Another medication with the same name was removed. Continue taking this medication, and follow the directions you see here. Your last dose was: Your next dose is: Take 4 mg tab this pm and then follow instructions from Dr. Sukhwinder Murray office after [address] Charly Mantilla RN checks lab work. You will likely take this medication every day but the dosing may change based on you lab draws. Indications: DEEP VEIN THROMBOSIS PREVENTION  
     
   
   
   
  
  
CONTINUE taking these medications Instructions Each Dose to Equal  
 Morning Noon Evening Bedtime  
 albuterol-ipratropium 2.5 mg-0.5 mg/3 ml Nebu Commonly known as:  Ann Dugganim Your last dose was: Your next dose is:    
   
   
 3 mL by Nebulization route every six (6) hours as needed. 3 mL  
    
   
   
   
  
 aspirin 81 mg chewable tablet Your last dose was: Your next dose is: Take 1 Tab by mouth daily. 81 mg  
    
   
   
   
  
 atomoxetine 40 mg capsule Commonly known as:  STRATTERA Your last dose was: Your next dose is: TAKE ONE CAPSULE BY MOUTH EVERY DAY  
     
   
   
   
  
 BD INSULIN PEN NEEDLE UF SHORT 31 gauge x 5/16\" Ndle Generic drug:  Insulin Needles (Disposable) Your last dose was: Your next dose is:    
   
   
      
   
   
   
  
 clobetasol 0.05 % ointment Commonly known as:  Jackelyn Ray Your last dose was: Your next dose is:    
   
   
 as needed. clonazePAM 0.5 mg tablet Commonly known as:  Trenton Jackman Your last dose was: Your next dose is: Take 1 Tab by mouth nightly as needed. Max Daily Amount: 0.5 mg. Indications: insomnia  
 0.5 mg  
    
   
   
   
  
 eszopiclone 2 mg tablet Commonly known as:  Med Triana Your last dose was: Your next dose is: Take 2 mg by mouth nightly as needed. 2 mg  
    
   
   
   
  
 famotidine 20 mg tablet Commonly known as:  PEPCID Your last dose was: Your next dose is: Take 1 Tab by mouth two (2) times a day. 20 mg  
    
   
   
   
  
 folic acid 1 mg tablet Commonly known as:  Eric Your last dose was: Your next dose is: TAKE 1 TABLET DAILY  
     
   
   
   
  
 gabapentin 300 mg capsule Commonly known as:  NEURONTIN Your last dose was: Your next dose is: Take 2 Caps by mouth nightly. 600 mg  
    
   
   
   
  
 insulin glargine 100 unit/mL (3 mL) Inpn Commonly known as:  Leonora Verde Your last dose was: Your next dose is:    
   
   
 30 Units by SubCUTAneous route two (2) times a day. Indications: type 2 diabetes mellitus 30 Units JANUVIA 100 mg tablet Generic drug:  SITagliptin Your last dose was: Your next dose is: TAKE 1 TABLET DAILY JARDIANCE 25 mg tablet Generic drug:  empagliflozin Your last dose was: Your next dose is: TAKE 1 TABLET DAILY  
     
   
   
   
  
 lisinopril 20 mg tablet Commonly known as:  Beverly Bradley Your last dose was: Your next dose is: TAKE 1 TABLET DAILY lovastatin 20 mg tablet Commonly known as:  MEVACOR Your last dose was: Your next dose is: TAKE 1 TABLET IN THE EVENING  
     
   
   
   
  
 metFORMIN  mg tablet Commonly known as:  GLUCOPHAGE XR Your last dose was: Your next dose is: Take 1,000 mg by mouth. Indications: taking two 500 mg tabs. daily 1000 mg  
    
   
   
   
  
 nystatin powder Commonly known as:  MYCOSTATIN Your last dose was: Your next dose is:    
   
   
 Apply 1 g to affected area two (2) times a day. APPLY TO yeast in groin 1 g  
    
   
   
   
  
 polyethylene glycol 17 gram packet Commonly known as:  Son Castillo Your last dose was: Your next dose is: Take 1 Packet by mouth daily. Hold for loose stools/diarrhea  
 17 g  
    
   
   
   
  
 primidone 250 mg tablet Commonly known as: MYSOLINE Your last dose was: Your next dose is: TAKE 1 TABLET TWICE A DAY  
     
   
   
   
  
 senna-docusate 8.6-50 mg per tablet Commonly known as:  Donte Ra Your last dose was: Your next dose is: Take 1 Tab by mouth two (2) times a day. Hold for loose stools/diarrhea 1 Tab  
    
   
   
   
  
 sertraline 100 mg tablet Commonly known as:  ZOLOFT Your last dose was: Your next dose is: TAKE 1 TABLET DAILY  
     
   
   
   
  
 VITAMIN D2 50,000 unit capsule Generic drug:  ergocalciferol Your last dose was: Your next dose is: TAKE 1 CAPSULE EVERY 7 DAYS  
     
   
   
   
  
  
STOP taking these medications   
 acetaminophen 650 mg Tber Commonly known as:  TYLENOL  
   
  
 ceFAZolin (ANCEF) in sterile water 2 gram/20 mL IV syringe Where to Get Your Medications Information on where to get these meds will be given to you by the nurse or doctor. ! Ask your nurse or doctor about these medications  
  warfarin 4 mg tablet

## 2018-04-06 NOTE — TELEPHONE ENCOUNTER
Called and spoke with patient explained that the medication was prescribed by his PCP, so he will have to contact him. Patient stated understanding.

## 2018-04-06 NOTE — TELEPHONE ENCOUNTER
Yasmin Christensen w/[facility]     States pt has popped out of his hip twice in rehab  She has concerns and wants to make sure he f/u with Dr. Uzma Long number to reach her is [phone]

## 2018-04-06 NOTE — ROUTINE PROCESS
Reviewed discharge instructions with the patient and son who verbalized understanding and denied having any further questions. PIV d/toño, discontinued with PICC line in place.

## 2018-04-06 NOTE — PROCEDURES
PROCEDURE NOTE - FRACTURE REDUCTION: The patient was induced with propofol for procedrual sedation via the emergency department MD. After it was confirmed that appropriate sedation had been reached, hip was drawn up into 90-90 position with longitudinal traction applied along the femoral axis while in internal rotation. Joint was felt to reduce with external rotation. The patient was aroused from anesthesia and tolerated the procedure well. Post-reduction plain films reviewed with confirmation of a satisfactory reduction of the dislocation. Patient was placed in a knee immobilizer to prevent the knee from bending to the point that hip could redislocate. The extremity was neurovascularly intact post reduction and splint placement. Patient tolerated procedure well.     Reina Delgado PA-C  [facility]

## 2018-04-06 NOTE — Clinical Note
Status[de-identified] Inpatient [101] Type of Bed: Medical [8] Inpatient Hospitalization Certified Necessary for the Following Reasons: 3. Patient receiving treatment that can only be provided in an inpatient setting (further clarification in H&P documentation) Admitting Diagnosis: Dislocation of prosthetic joint, subsequent encounter [[id number]] Admitting Physician: Samuel Sullivan Attending Physician: Samuel Sullivan Estimated Length of Stay: 3-4 Midnights Discharge Plan[de-identified] Home with Office Follow-up

## 2018-04-06 NOTE — DISCHARGE INSTRUCTIONS
WEIGHT BEAR AS TOLERATED ON THE RIGHT LEG    MUST WEAR THE RIGHT KNEE IMMOBILIZER    DR. RAMIREZ'S OFFICE WILL CALL YOU FOR ADDITIONAL FOLLOWUP AND PLANS. Dislocated Hip: Care Instructions  Your Care Instructions    Your hip is a large and fairly stable joint. Normally it takes a hard fall, a car accident, or something else of great force to make the thighbone slip out of its socket (dislocate). But if you have had hip replacement surgery, your hip can more easily slip out of position. This is more common during the first few months after the surgery. After your doctor puts your dislocated hip back into normal position, you will need to use a walking aid or hip brace for several weeks or months while the hip heals. You will need to follow special hip precautions to avoid dislocating your hip again. Your doctor may recommend exercises to strengthen the hip joint and your legs. Rest and home treatment can help you heal.  If your hip becomes dislocated again, contact your doctor. You will need to go to a hospital or clinic to have your hip put back in position. You may have had a sedative to help you relax. You may be unsteady after having sedation. It can take a few hours for the medicine's effects to wear off. Common side effects of sedation include nausea, vomiting, and feeling sleepy or tired. The doctor has checked you carefully, but problems can develop later. If you notice any problems or new symptoms, get medical treatment right away. Follow-up care is a key part of your treatment and safety. Be sure to make and go to all appointments, and call your doctor if you are having problems. It's also a good idea to know your test results and keep a list of the medicines you take. How can you care for yourself at home? · If the doctor gave you a sedative:  ¨ For 24 hours, don't do anything that requires attention to detail. It takes time for the medicine's effects to completely wear off.   ¨ For your safety, do not drive or operate any machinery that could be dangerous. Wait until the medicine wears off and you can think clearly and react easily. · Your doctor will give you safety precautions to keep your hip centered in its socket during the healing period. Be sure to follow these precautions. ¨ Keep your knees and toes pointed forward when you sit in a chair, walk, or stand. ¨ Do not sit with your legs crossed. ¨ Do not bend at the waist more than 90º. Be careful when leaning or when moving in bed to keep your legs as straight ahead as possible. · If you have a hip brace, wear it as directed. Do not remove it unless your doctor says you can. If you remove the brace to shower, be extremely careful. Follow hip precautions to limit hip movement. · Rest your hip as much as you can. You will need to change your activities to avoid movements that irritate the hip. · If your hip is swollen, put ice or a cold pack on it for 10 to 20 minutes at a time. Try to do this every 1 to 2 hours for the next 3 days (when you are awake) or until the swelling goes down. Put a thin cloth between the ice and your skin. · Take your medicines exactly as prescribed. Call your doctor if you think you are having a problem with your medicine. · Ask your doctor if you can take an over-the-counter pain medicine, such as acetaminophen (Tylenol), ibuprofen (Advil, Motrin), or naproxen (Aleve). Be safe with medicines. Read and follow all instructions on the label. · If your doctor recommends exercises, do them as directed. When should you call for help? Call 911 anytime you think you may need emergency care. For example, call if:  ? · You have chest pain, are short of breath, or cough up blood. ? · You have trouble breathing. ? · You passed out (lost consciousness). ?Call your doctor now or seek immediate medical care if:  ? · You have new or worse nausea or vomiting. ? · You have new or worse pain.    ? · Your foot is cool or pale or changes color. ? · You have tingling, weakness, or numbness in your foot or toes. ? · You have signs of a blood clot in your leg (called a deep vein thrombosis), such as:  ¨ Pain in your calf, back of the knee, thigh, or groin. ¨ Redness or swelling in your leg. ? Watch closely for changes in your health, and be sure to contact your doctor if:  ? · You do not get better as expected. Where can you learn more? Go to http://russ-kiesha.info/. Enter T527 in the search box to learn more about \"Dislocated Hip: Care Instructions. \"  Current as of: March 21, 2017  Content Version: 11.4  © 1390-5280 [employer]. Care instructions adapted under license by [employer] (which disclaims liability or warranty for this information). If you have questions about a medical condition or this instruction, always ask your healthcare professional. [place] [address] any warranty or liability for your use of this information.

## 2018-04-06 NOTE — IP AVS SNAPSHOT
2700 [facility] [address] [place] [address] 
[phone] Patient: Marjan Ba MRN: [id number] About your hospitalization You were admitted on:  April 7, 2018 [facility] You were discharged on:  April 11, 2018 Why you were hospitalized Your primary diagnosis was:  Dislocation Of Prosthetic Joint (Hcc) Follow-up Information Follow up With Details Comments Contact Info Hetal 40  For home IV antibiotics 2801 [facility]. [address] [place] [address] [phone] AT [facility] for IV antibiotics and physical and occupational therapy. 777 [facility] [address] [address], [place] Schedule an appointment as soon as possible for a visit in 2 [facility] [address] [place] [address] [address] [place] [address] 
[phone] Kevin Fuller MD   50 [facility] [address] 
[phone] Your Scheduled Appointments Tuesday April 17, 2018  3:10 PM EDT ROUTINE CARE with Kevin Fuller MD  
[facility] at [address]) [address] 203 [facility]  
[phone] Thursday April 19, 2018 10:00 AM EDT  
ESTABLISHED PATIENT with Dawson Kim NP [facility] [address] [place] [address]) [address] [facility]  
[phone] Wednesday May 02, 2018  1:00 PM EDT ROUTINE CARE with Kevin Fuller MD  
[facility] at [address]) [address] [facility]  
[phone] Discharge Orders None A check danny indicates which time of day the medication should be taken. My Medications CHANGE how you take these medications Instructions Each Dose to Equal  
 Morning Noon Evening Bedtime oxyCODONE IR 5 mg immediate release tablet Commonly known as:  Mariana Knott What changed:   
- how much to take 
- reasons to take this 
- additional instructions - Another medication with the same name was removed. Continue taking this medication, and follow the directions you see here. Your last dose was: Your next dose is: Take 1.5 Tabs by mouth every three (3) hours as needed. Max Daily Amount: 60 mg. Severe pain 7.5 mg  
    
   
   
   
  
 warfarin 4 mg tablet Commonly known as:  COUMADIN What changed:   
- how much to take 
- how to take this - when to take this 
- additional instructions - Another medication with the same name was removed. Continue taking this medication, and follow the directions you see here. Your last dose was: Your next dose is: Take 4 mg tab this pm and then follow instructions from Dr. Sweta Baca office after [address] Charly Mantilla RN checks lab work. You will likely take this medication every day but the dosing may change based on you lab draws. Indications: DEEP VEIN THROMBOSIS PREVENTION  
     
   
   
   
  
  
CONTINUE taking these medications Instructions Each Dose to Equal  
 Morning Noon Evening Bedtime  
 albuterol-ipratropium 2.5 mg-0.5 mg/3 ml Nebu Commonly known as:  Izola Cons Your last dose was: Your next dose is:    
   
   
 3 mL by Nebulization route every six (6) hours as needed. 3 mL  
    
   
   
   
  
 aspirin 81 mg chewable tablet Your last dose was: Your next dose is: Take 1 Tab by mouth daily. 81 mg  
    
   
   
   
  
 atomoxetine 40 mg capsule Commonly known as:  STRATTERA Your last dose was: Your next dose is: TAKE ONE CAPSULE BY MOUTH EVERY DAY  
     
   
   
   
  
 BD INSULIN PEN NEEDLE UF SHORT 31 gauge x 5/16\" Ndle Generic drug:  Insulin Needles (Disposable) Your last dose was: Your next dose is:    
   
   
      
   
   
   
  
 clobetasol 0.05 % ointment Commonly known as:  Jackelyn Ray Your last dose was: Your next dose is:    
   
   
 as needed. clonazePAM 0.5 mg tablet Commonly known as:  Trenton Jackman Your last dose was: Your next dose is: Take 1 Tab by mouth nightly as needed. Max Daily Amount: 0.5 mg. Indications: insomnia  
 0.5 mg  
    
   
   
   
  
 eszopiclone 2 mg tablet Commonly known as:  Med Triana Your last dose was: Your next dose is: Take 2 mg by mouth nightly as needed. 2 mg  
    
   
   
   
  
 famotidine 20 mg tablet Commonly known as:  PEPCID Your last dose was: Your next dose is: Take 1 Tab by mouth two (2) times a day. 20 mg  
    
   
   
   
  
 folic acid 1 mg tablet Commonly known as:  Eric Your last dose was: Your next dose is: TAKE 1 TABLET DAILY  
     
   
   
   
  
 gabapentin 300 mg capsule Commonly known as:  NEURONTIN Your last dose was: Your next dose is: Take 2 Caps by mouth nightly. 600 mg  
    
   
   
   
  
 insulin glargine 100 unit/mL (3 mL) Inpn Commonly known as:  Leonora Verde Your last dose was: Your next dose is:    
   
   
 30 Units by SubCUTAneous route two (2) times a day. Indications: type 2 diabetes mellitus 30 Units JANUVIA 100 mg tablet Generic drug:  SITagliptin Your last dose was: Your next dose is: TAKE 1 TABLET DAILY JARDIANCE 25 mg tablet Generic drug:  empagliflozin Your last dose was: Your next dose is: TAKE 1 TABLET DAILY  
     
   
   
   
  
 lisinopril 20 mg tablet Commonly known as:  Key Bethea Your last dose was: Your next dose is: TAKE 1 TABLET DAILY lovastatin 20 mg tablet Commonly known as:  MEVACOR Your last dose was: Your next dose is: TAKE 1 TABLET IN THE EVENING  
     
   
   
   
  
 metFORMIN  mg tablet Commonly known as:  GLUCOPHAGE XR Your last dose was: Your next dose is: Take 1,000 mg by mouth. Indications: taking two 500 mg tabs. daily 1000 mg  
    
   
   
   
  
 nystatin powder Commonly known as:  MYCOSTATIN Your last dose was: Your next dose is:    
   
   
 Apply 1 g to affected area two (2) times a day. APPLY TO yeast in groin 1 g  
    
   
   
   
  
 polyethylene glycol 17 gram packet Commonly known as:  Neal Morin Your last dose was: Your next dose is: Take 1 Packet by mouth daily. Hold for loose stools/diarrhea  
 17 g  
    
   
   
   
  
 primidone 250 mg tablet Commonly known as: MYSOLINE Your last dose was: Your next dose is: TAKE 1 TABLET TWICE A DAY  
     
   
   
   
  
 senna-docusate 8.6-50 mg per tablet Commonly known as:  Moisés Salinas Your last dose was: Your next dose is: Take 1 Tab by mouth two (2) times a day. Hold for loose stools/diarrhea 1 Tab  
    
   
   
   
  
 sertraline 100 mg tablet Commonly known as:  ZOLOFT Your last dose was: Your next dose is: TAKE 1 TABLET DAILY  
     
   
   
   
  
 VITAMIN D2 50,000 unit capsule Generic drug:  ergocalciferol Your last dose was: Your next dose is: TAKE 1 CAPSULE EVERY 7 DAYS  
     
   
   
   
  
  
STOP taking these medications   
 acetaminophen 650 mg Tber Commonly known as:  TYLENOL  
   
  
 ceFAZolin (ANCEF) in sterile water 2 gram/20 mL IV syringe Where to Get Your Medications Information on where to get these meds will be given to you by the nurse or doctor. ! Ask your nurse or doctor about these medications  
  warfarin 4 mg tablet Opioid Education Prescription Opioids: What You Need to Know: 
 
  
5.  Lab each Thursday: CBC/diff/platelets BUN Creatinine 
  
6. Fax lab to Dr. Thee Dallas @ [phone]. 
7.  Call Dr. Thee Dallas @ [phone] for fever >100.5, infection at PICC site, diarrhea, WBC > 15 or < 3, cr > 1.8 8. Duration of therapy: last day of therapy should be April 23, 2018 Please call Dr. Thee Dallas @ [phone] before stopping therapy. 9.  Allergies: Allergies Allergen Reactions  Nsaids (Non-Steroidal Anti-Inflammatory Drug) Other (comments)  
    Hx of PUD and Hinson's Esophagus  
  
  
11. Make appointment in 10 days 
  
Lynne Eckert MD 
 
After [facility] Plan/Discharge Instructions Anterior Approach Hip Replacement- Dr. Eron Sykes Patient Name  Jennyfer Murguia Date of procedure  4/9/2018 Procedure  Procedure(s): 
I & D REVISION FEMORAL HEAD AND POLYLINER RIGHT HIP Surgeon  Surgeon(s) and Role: Thao Phillips MD - Primary PCP: Genny Kamara MD 
Date of discharge: 4/11/18 Follow up appointments ? Follow up with Dr. Eron Sykes in 2 weeks. Call [phone] to make an appointment.  
 
? If home health has been arranged for you the agency will contact you to arrange dates/times for visits. Please call them if you do not hear from them within 24 hours after you are discharged When to call your Orthopaedic Surgeon: Call [phone]. If you call after 5pm or on a weekend, the on call physician will be contacted ? Increased hip pain, unrelieved pain or if you have difficulty or are unable to walk ? Signs of infection-if your incision is red; continues to have drainage; drainage has a foul odor or if you have a persistent fever over 101 degrees ? Signs of a blood clot in your leg-calf pain, tenderness, redness, swelling of lower leg When to call your Primary Care Physician: 
? Concerns about medical conditions such as diabetes, high blood pressure, asthma, congestive heart failure Call if blood sugars are elevated, persistent headache or dizziness, coughing or congestion, constipation or diarrhea, burning with urination, abnormal heart rate When to call 787ond go to the nearest emergency room ? Acute onset of chest pain, shortness of breath, difficulty breathing Activity ? Weight bearing as tolerated with walker or crutches. Refer to pages 23-33 of your handbook for instructions and pictures ? Complete your Home Exercise Program daily as instructed by your therapist.  Refer to pages 36-42 of your handbook for instructions and pictures ? Get up every one hour and walk (except at night when sleeping) ? AVOID sudden and extreme movement of your hip (surgical leg) ? Do not drive or operate heavy machinery Incision Care ? The Aquacel (brown, waterproof) surgical dressing is to remain on your hip for 7 days. On the 7th day have someone gently peel the dressing off by carefully lifting the edge and stretching it slightly to break the adhesive seal 
? You may now leave your incision open to air. You will have absorbable sutures in your incision ?  If your Aquacel dressing comes loose/off before the 7th day, you may replace it with a dry sterile gauze dressing; change it daily. Once your incision is not draining, you may leave it open to air ? You may take a shower with the Aquacel dressing in place. Once the Aquacel is removed, you may shower and get your incision wet but do not submerge your incision under water in a bath tub, hot tub or swimming pool for 6 weeks after surgery. Preventing blood clots Take Coumadin daily prescribed by Dr. Syd Swann. The home health RN will check your labs and Dr. Meghan Ren office will let you know if you should take additional doses or hold dosing based on your INR Pain management ? Take pain medication as prescribed; decrease the amount you use as your pain lessens ? Avoid alcoholic beverages while taking pain medication ? Please be aware that many medications contain Tylenol. We do not want you to over medicate so please read the information below as a guide. Do not take more than 4 Grams of Tylenol in a 24 hour period. (There are 1000 milligrams in one Gram) o Percocet contains 325 mg of Tylenol per tablet (do not take more than 12 tablets in 24 hours) 
o  
? You may place an ice bag on your hip for 15-20 minutes after exercising and as needed though out the day and night Diet ? Resume usual diet; drink plenty of fluids; eat foods high in fiber ? You may want to take a stool softener (such as Senokot-S or Colace) to prevent constipation while you are taking pain medication. If constipation occurs, take a laxative (such as Dulcolax tablets, Milk of Magnesia, or a suppository) Home Health Care Protocol (to be followed by [facility]) Nursing-per physicians order ? Complete head to toe assessment, vital signs ? Medication reconciliation ? Review pain management ? Manage chronic medical conditions Physical Therapy-per physicians order Weight bearing status: 
Precautions at Admission: Fall, WBAT, Other (comment) (strict posterior precautions) Mobility Status: 
Supine to Sit: Additional time, Moderate assistance Sit to Stand: Contact guard assistance Sit to Supine:  (remained in chair) Bed to Chair: Contact guard assistance Gait: 
Distance (ft): 25 Feet (ft) Ambulation - Level of Assistance: Contact guard assistance Assistive Device: Gait belt, Walker, rolling Gait Abnormalities: Antalgic, Decreased step clearance, Step to gait ADL status overall composite: Toilet Transfer : Contact guard assistance Physical Therapy ? Assessment and evaluation-bed mobility; functional transfers (bed, chair, bathroom, stairs); ambulation with equipment, car transfers, shower transfers, safety and ability to get out of house in the event of an emergency ? AVOID sudden and extreme movement of the hip (surgical leg) ? Discuss pain management ? Review how to do ADLs. Refer to pages 43-47 of handbook Home Exercise Program-refer to pages 36-42 of handbook Discharge Instructions PATIENT ID: Gerber Arteaga MRN: [id number] YOB: 1950 DATE OF ADMISSION: 4/6/2018 10:36 PM   
DATE OF DISCHARGE: 4/11/2018 PRIMARY CARE PROVIDER: Alfonso Hoffmann MD  
 
 
DISCHARGING PHYSICIAN: Christiano Cortes NP To contact this individual call [phone] and ask the  to page. If unavailable ask to be transferred the Adult Hospitalist Department. DISCHARGE DIAGNOSES Dislocation of right hip/ Previous Sepsis and endocarditis CONSULTATIONS: IP CONSULT TO INFECTIOUS DISEASES PROCEDURES/SURGERIES: Procedure(s): 
I & D REVISION FEMORAL HEAD AND POLYLINER RIGHT HIP 
 
PENDING TEST RESULTS:  
At the time of discharge the following test results are still pending: final cultures FOLLOW UP APPOINTMENTS:  
Follow-up Information Follow up With Details Comments Contact Luis Antonio Barton  For home IV antibiotics 9559 [facility]. [address] [place] [address] [phone] AT [facility] skilled nursing for IV antibiotics and physical and occupational therapy. 7 [facility] [address] 
[address], [place] Schedule an appointment as soon as possible for a visit in 2 weeks [facility] [address] 
[phone] Madhuri Scott MD   [address] [facility] [address] 
[phone] ADDITIONAL CARE RECOMMENDATIONS:  
1. Dr. Kuldip Brown will manage your antibiotics which you will take until 4/23 2. Take a probiotic or a yogurt daily to help prevent infection 3. Resume your Coumadin 4 mg daily as instructed by Dr. Qamar Carrillo 4. Follow up with Dr. Qamar Carrillo in 2 weeks DIET:low carb, low sugar ACTIVITY: As per orthopedics instructions, PT and OT are ordered for you at home WOUND CARE: per ortho instructions above DISCHARGE MEDICATIONS: 
 See Medication Reconciliation Form · It is important that you take the medication exactly as they are prescribed. · Keep your medication in the bottles provided by the pharmacist and keep a list of the medication names, dosages, and times to be taken in your wallet. · Do not take other medications without consulting your doctor. NOTIFY YOUR PHYSICIAN FOR ANY OF THE FOLLOWING:  
Fever over 101 degrees for 24 hours. Chest pain, shortness of breath, fever, chills, nausea, vomiting, diarrhea, change in mentation, falling, weakness, bleeding. Severe pain or pain not relieved by medications. Or, any other signs or symptoms that you may have questions about. DISPOSITION: 
x  Home With: 
x OT x PT  HH x RN  
  
 SNF/Inpatient Rehab/LTAC Independent/assisted living Hospice Other: CDMP Checked:  
Yes x Signed:  
Mariana Barr NP 
4/11/2018 
1:47 PM 
 
  
  
  
Percy Announcement  We are excited to announce that we are making your provider's discharge notes available to you in [employer]. You will see these notes when they are completed and signed by the physician that discharged you from your recent hospital stay. If you have any questions or concerns about any information you see in [employer], please call the Health Information Department where you were seen or reach out to your Primary Care Provider for more information about your plan of care. Introducing [facility]! Dear Seb Hinds: Thank you for requesting a [employer] account. Our records indicate that you already have an active GenV[employer] account. You can access your account anytime at [email or web address]. [employer] Did you know that you can access your hospital and ER discharge instructions at any time in [employer]? You can also review all of your test results from your hospital stay or ER visit. Additional Information If you have questions, please visit the Frequently Asked Questions section of the Gen[employer] website at [email or web address] Remember, GenV[employer] is NOT to be used for urgent needs. For medical emergencies, dial 911. Now available from your iPhone and Android! Introducing Sonido Francis As a [facility] patient, I wanted to make you aware of our electronic visit tool called Sonido Francis. [facility] 24/7 allows you to connect within minutes with a medical provider 24 hours a day, seven days a week via a mobile device or tablet or logging into a secure website from your computer. You can access Sonido Francis from anywhere in the [place]. A virtual visit might be right for you when you have a simple condition and feel like you just dont want to get out of bed, or cant get away from work for an appointment, when your regular [facility] provider is not available (evenings, weekends or holidays), or when youre out of town and need minor care.   Electronic visits cost only $49 and if the [facility] 24/7 provider determines a prescription is needed to treat your condition, one can be electronically transmitted to a nearby pharmacy*. Please take a moment to enroll today if you have not already done so. The enrollment process is free and takes just a few minutes. To enroll, please download the [employer] 24/7 bhupinder to your tablet or phone, or visit [email or web address] to enroll on your computer. And, as an [address] [place] [address] patient with a [employer] account, the results of your visits will be scanned into your electronic medical record and your primary care provider will be able to view the scanned results. We urge you to continue to see your regular [employer] provider for your ongoing medical care. And while your primary care provider may not be the one available when you seek a [employer] virtual visit, the peace of mind you get from getting a real diagnosis real time can be priceless. For more information on [employer], view our Frequently Asked Questions (FAQs) at [email or web address]. Sincerely, 
 
Julianna Salas MD 
Chief Medical Officer [facility] Jolly López *:  certain medications cannot be prescribed via [employer] Unresulted Labs-Please follow up with your PCP about these lab tests Order Current Status CULTURE, ANAEROBIC Preliminary result CULTURE, ANAEROBIC Preliminary result CULTURE, ANAEROBIC Preliminary result CULTURE, ANAEROBIC Preliminary result CULTURE, BODY FLUID W GRAM STAIN Preliminary result CULTURE, TISSUE W GRAM STAIN Preliminary result CULTURE, WOUND W GRAM STAIN Preliminary result CULTURE, WOUND W GRAM STAIN Preliminary result Providers Seen During Your Hospitalization Provider Specialty Primary office phone Ashley White MD Emergency Medicine [phone] Magdalene Tran MD Internal Medicine [phone] Peggy Lawler MD Internal Medicine [phone] Your Primary Care Physician (PCP) Primary Care Physician Office Phone Office Fax Deanne Cardenas [phone] You are allergic to the following Allergen Reactions Nsaids (Non-Steroidal Anti-Inflammatory Drug) Other (comments) Hx of PUD and Hinson's Esophagus Recent Documentation Height Weight BMI Smoking Status 1.778 m 109.3 kg 34.58 kg/m2 Former Smoker Emergency Contacts Name Discharge Info Relation Home Work Mobile [address]ordon CAREGIVER [3] Daughter [21] [phone] [facility] DISCHARGE CAREGIVER [3] Son [22] [phone] Patient Belongings The following personal items are in your possession at time of discharge: 
  Dental Appliances: None  Visual Aid: Glasses, At bedside, With patient      Home Medications: None   Jewelry: None  Clothing: None    Other Valuables: None Please provide this summary of care documentation to your next provider. Signatures-by signing, you are acknowledging that this After Visit Summary has been reviewed with you and you have received a copy. Patient Signature:  ____________________________________________________________ Date:  ____________________________________________________________  
  
Mariana [place] Provider Signature:  ____________________________________________________________ Date:  ____________________________________________________________

## 2018-04-06 NOTE — CALL BACK NOTE
[facility] Emergency Department Follow Up Call Record    Discharged to : Home/Family Home/Home Health/Skilled Facility/Rehab/Assisted Living/Other__Home_____  1) Did you receive your discharge instructions? Yes        2) Do you understand them? Yes     This writer spoke with Shabbir Gallegos. 3) Are you able to follow them? Yes          If NO, what can I clarify for you? 4) Do you understand your diagnosis? Yes         5) Do you know which symptoms should prompt you to call the doctor? Yes     6) Were you able to fill and  any medications that were prescribed? Not applicable     7) You were prescribed ___n/a________for ____________________. Common side effects of this medication are____________________. This is not a complete list so please review the forms given from the pharmacy for a complete list.      8) Are there any questions about your medications? Not applicable            Have you scheduled any recommended doctors appointments (specialty, PCP) YES. Mr. Bj Philip will see Orthopedist next 18. If NO, what barriers are you encountering (transportation/lost contact info/cost/  didnt think necessary/no PCP  9) If discharged with Home Health, has the agency contacted you to schedule visit? N/A  10) Is there anyone available to help you at home (meals, errands, transportation    monitoring) (adult children, neighbors, private duty companions) Yes    11) Are you on a special diet? No         If YES, do you understand the requirements for this diet? Education provided? 12) If presented with cough, bronchitis, COPD, asthma, is it ok to ask that the   respiratory disease management educator call you? Not applicable      13)  A) If presented with fall, were you issued an assistive device in the ED    Are you using? Yes using an immobilizer on the right leg to prevent hip  dislocation. B) If given RX for device, have you obtained? Yes       If NO, barriers?   C) Therapist recommended: This patient sees PT/OT with [facility] [address]   Are you able to implement the suggestions? Today, this patient has increased pain in the right hip area , and is taking prescribed pain medication for. Patient reports he has \"slowed down quite a bit with the walking. \" He is using his walker. If NO, barriers to implementation? Right hip dislocation 4/05/18. Now on mobility precautions, hip precautions. D) Are you having any difficulties with mobility inside your home?     (steps, bed, tub)Yes   If YES, ask if the [facility] PT can contact patient and good time and number?  14)  At the end of your discharge instructions, there is information about accessing [facility], have you had a chance to review those? Yes         Do you have any questions about signing up for this service? We encourage our patients to be active participants in their healthcare and this site is one of the ways to do that. It will allow you to access parts of your medical record, email your doctors office, schedule appointments, and request medications refills . 15) Are there any other questions that I can answer for you regarding    your Emergency department visit?  NO             Estimated Call Time:___2:13 PM  ________________ Date/Time:_______________

## 2018-04-06 NOTE — TELEPHONE ENCOUNTER
Left message for [address] patient has follow-up scheduled with Dr Carli Feldman and patient was just seen by Dr New Moe  4/4/18

## 2018-04-07 ENCOUNTER — APPOINTMENT (OUTPATIENT)
Dept: GENERAL RADIOLOGY | Age: 68
DRG: 468 | End: 2018-04-07
Attending: INTERNAL MEDICINE
Payer: COMMERCIAL

## 2018-04-07 ENCOUNTER — APPOINTMENT (OUTPATIENT)
Dept: GENERAL RADIOLOGY | Age: 68
DRG: 468 | End: 2018-04-07
Attending: PHYSICIAN ASSISTANT
Payer: COMMERCIAL

## 2018-04-07 ENCOUNTER — APPOINTMENT (OUTPATIENT)
Dept: GENERAL RADIOLOGY | Age: 68
DRG: 468 | End: 2018-04-07
Attending: STUDENT IN AN ORGANIZED HEALTH CARE EDUCATION/TRAINING PROGRAM
Payer: COMMERCIAL

## 2018-04-07 PROBLEM — T84.019D: Status: ACTIVE | Noted: 2018-04-07

## 2018-04-07 LAB
ALBUMIN SERPL-MCNC: 2.2 G/DL (ref 3.5–5)
ALBUMIN/GLOB SERPL: 0.6 {RATIO} (ref 1.1–2.2)
ALP SERPL-CCNC: 66 U/L (ref 45–117)
ALT SERPL-CCNC: 7 U/L (ref 12–78)
ANION GAP SERPL CALC-SCNC: 7 MMOL/L (ref 5–15)
APPEARANCE UR: CLEAR
AST SERPL-CCNC: 22 U/L (ref 15–37)
ATRIAL RATE: 67 BPM
BACTERIA URNS QL MICRO: NEGATIVE /HPF
BASOPHILS # BLD: 0 K/UL (ref 0–0.1)
BASOPHILS NFR BLD: 1 % (ref 0–1)
BILIRUB SERPL-MCNC: 0.6 MG/DL (ref 0.2–1)
BILIRUB UR QL: NEGATIVE
BUN SERPL-MCNC: 14 MG/DL (ref 6–20)
BUN/CREAT SERPL: 18 (ref 12–20)
CALCIUM SERPL-MCNC: 7.5 MG/DL (ref 8.5–10.1)
CALCULATED P AXIS, ECG09: 38 DEGREES
CALCULATED R AXIS, ECG10: -9 DEGREES
CALCULATED T AXIS, ECG11: 9 DEGREES
CHLORIDE SERPL-SCNC: 107 MMOL/L (ref 97–108)
CO2 SERPL-SCNC: 24 MMOL/L (ref 21–32)
COLOR UR: ABNORMAL
CREAT SERPL-MCNC: 0.78 MG/DL (ref 0.7–1.3)
DIAGNOSIS, 93000: NORMAL
DIFFERENTIAL METHOD BLD: ABNORMAL
EOSINOPHIL # BLD: 0.2 K/UL (ref 0–0.4)
EOSINOPHIL NFR BLD: 3 % (ref 0–7)
EPITH CASTS URNS QL MICRO: ABNORMAL /LPF
ERYTHROCYTE [DISTWIDTH] IN BLOOD BY AUTOMATED COUNT: 16 % (ref 11.5–14.5)
EST. AVERAGE GLUCOSE BLD GHB EST-MCNC: 140 MG/DL
GLOBULIN SER CALC-MCNC: 3.8 G/DL (ref 2–4)
GLUCOSE BLD STRIP.AUTO-MCNC: 148 MG/DL (ref 65–100)
GLUCOSE BLD STRIP.AUTO-MCNC: 169 MG/DL (ref 65–100)
GLUCOSE BLD STRIP.AUTO-MCNC: 94 MG/DL (ref 65–100)
GLUCOSE BLD STRIP.AUTO-MCNC: 99 MG/DL (ref 65–100)
GLUCOSE SERPL-MCNC: 99 MG/DL (ref 65–100)
GLUCOSE UR STRIP.AUTO-MCNC: >1000 MG/DL
HBA1C MFR BLD: 6.5 % (ref 4.2–6.3)
HCT VFR BLD AUTO: 30 % (ref 36.6–50.3)
HGB BLD-MCNC: 9.4 G/DL (ref 12.1–17)
HGB UR QL STRIP: ABNORMAL
HYALINE CASTS URNS QL MICRO: ABNORMAL /LPF (ref 0–5)
IMM GRANULOCYTES # BLD: 0 K/UL (ref 0–0.04)
IMM GRANULOCYTES NFR BLD AUTO: 1 % (ref 0–0.5)
INR PPP: 2.9 (ref 0.9–1.1)
KETONES UR QL STRIP.AUTO: NEGATIVE MG/DL
LEUKOCYTE ESTERASE UR QL STRIP.AUTO: NEGATIVE
LYMPHOCYTES # BLD: 0.9 K/UL (ref 0.8–3.5)
LYMPHOCYTES NFR BLD: 17 % (ref 12–49)
MAGNESIUM SERPL-MCNC: 2.1 MG/DL (ref 1.6–2.4)
MCH RBC QN AUTO: 28.6 PG (ref 26–34)
MCHC RBC AUTO-ENTMCNC: 31.3 G/DL (ref 30–36.5)
MCV RBC AUTO: 91.2 FL (ref 80–99)
MONOCYTES # BLD: 0.5 K/UL (ref 0–1)
MONOCYTES NFR BLD: 10 % (ref 5–13)
NEUTS SEG # BLD: 3.6 K/UL (ref 1.8–8)
NEUTS SEG NFR BLD: 68 % (ref 32–75)
NITRITE UR QL STRIP.AUTO: NEGATIVE
NRBC # BLD: 0 K/UL (ref 0–0.01)
NRBC BLD-RTO: 0 PER 100 WBC
P-R INTERVAL, ECG05: 156 MS
PH UR STRIP: 6 [PH] (ref 5–8)
PHOSPHATE SERPL-MCNC: 3 MG/DL (ref 2.6–4.7)
PLATELET # BLD AUTO: 123 K/UL (ref 150–400)
PMV BLD AUTO: 11 FL (ref 8.9–12.9)
POTASSIUM SERPL-SCNC: 3.7 MMOL/L (ref 3.5–5.1)
PROT SERPL-MCNC: 6 G/DL (ref 6.4–8.2)
PROT UR STRIP-MCNC: NEGATIVE MG/DL
PROTHROMBIN TIME: 29.6 SEC (ref 9–11.1)
Q-T INTERVAL, ECG07: 456 MS
QRS DURATION, ECG06: 94 MS
QTC CALCULATION (BEZET), ECG08: 481 MS
RBC # BLD AUTO: 3.29 M/UL (ref 4.1–5.7)
RBC #/AREA URNS HPF: ABNORMAL /HPF (ref 0–5)
SERVICE CMNT-IMP: ABNORMAL
SERVICE CMNT-IMP: ABNORMAL
SERVICE CMNT-IMP: NORMAL
SERVICE CMNT-IMP: NORMAL
SODIUM SERPL-SCNC: 138 MMOL/L (ref 136–145)
SP GR UR REFRACTOMETRY: 1.02 (ref 1–1.03)
TSH SERPL DL<=0.05 MIU/L-ACNC: 2.24 UIU/ML (ref 0.36–3.74)
UROBILINOGEN UR QL STRIP.AUTO: 0.2 EU/DL (ref 0.2–1)
VENTRICULAR RATE, ECG03: 67 BPM
WBC # BLD AUTO: 5.3 K/UL (ref 4.1–11.1)
WBC URNS QL MICRO: ABNORMAL /HPF (ref 0–4)

## 2018-04-07 PROCEDURE — 71045 X-RAY EXAM CHEST 1 VIEW: CPT

## 2018-04-07 PROCEDURE — 84443 ASSAY THYROID STIM HORMONE: CPT | Performed by: INTERNAL MEDICINE

## 2018-04-07 PROCEDURE — 82962 GLUCOSE BLOOD TEST: CPT

## 2018-04-07 PROCEDURE — 74011250637 HC RX REV CODE- 250/637: Performed by: INTERNAL MEDICINE

## 2018-04-07 PROCEDURE — 87075 CULTR BACTERIA EXCEPT BLOOD: CPT | Performed by: INTERNAL MEDICINE

## 2018-04-07 PROCEDURE — 87205 SMEAR GRAM STAIN: CPT | Performed by: INTERNAL MEDICINE

## 2018-04-07 PROCEDURE — 36415 COLL VENOUS BLD VENIPUNCTURE: CPT | Performed by: INTERNAL MEDICINE

## 2018-04-07 PROCEDURE — 83735 ASSAY OF MAGNESIUM: CPT | Performed by: INTERNAL MEDICINE

## 2018-04-07 PROCEDURE — 74011250636 HC RX REV CODE- 250/636: Performed by: INTERNAL MEDICINE

## 2018-04-07 PROCEDURE — 74011636637 HC RX REV CODE- 636/637: Performed by: INTERNAL MEDICINE

## 2018-04-07 PROCEDURE — 93005 ELECTROCARDIOGRAM TRACING: CPT

## 2018-04-07 PROCEDURE — 0S993ZX DRAINAGE OF RIGHT HIP JOINT, PERCUTANEOUS APPROACH, DIAGNOSTIC: ICD-10-PCS | Performed by: RADIOLOGY

## 2018-04-07 PROCEDURE — 73501 X-RAY EXAM HIP UNI 1 VIEW: CPT

## 2018-04-07 PROCEDURE — 85610 PROTHROMBIN TIME: CPT | Performed by: INTERNAL MEDICINE

## 2018-04-07 PROCEDURE — 85025 COMPLETE CBC W/AUTO DIFF WBC: CPT | Performed by: INTERNAL MEDICINE

## 2018-04-07 PROCEDURE — 74011250636 HC RX REV CODE- 250/636: Performed by: STUDENT IN AN ORGANIZED HEALTH CARE EDUCATION/TRAINING PROGRAM

## 2018-04-07 PROCEDURE — 65270000029 HC RM PRIVATE

## 2018-04-07 PROCEDURE — 80053 COMPREHEN METABOLIC PANEL: CPT | Performed by: INTERNAL MEDICINE

## 2018-04-07 PROCEDURE — 84100 ASSAY OF PHOSPHORUS: CPT | Performed by: INTERNAL MEDICINE

## 2018-04-07 PROCEDURE — 77002 NEEDLE LOCALIZATION BY XRAY: CPT

## 2018-04-07 PROCEDURE — 88112 CYTOPATH CELL ENHANCE TECH: CPT | Performed by: INTERNAL MEDICINE

## 2018-04-07 PROCEDURE — 81001 URINALYSIS AUTO W/SCOPE: CPT | Performed by: INTERNAL MEDICINE

## 2018-04-07 PROCEDURE — 83036 HEMOGLOBIN GLYCOSYLATED A1C: CPT | Performed by: INTERNAL MEDICINE

## 2018-04-07 RX ORDER — PRIMIDONE 50 MG/1
250 TABLET ORAL 2 TIMES DAILY
Status: DISCONTINUED | OUTPATIENT
Start: 2018-04-07 | End: 2018-04-11 | Stop reason: HOSPADM

## 2018-04-07 RX ORDER — INSULIN LISPRO 100 [IU]/ML
INJECTION, SOLUTION INTRAVENOUS; SUBCUTANEOUS
Status: DISCONTINUED | OUTPATIENT
Start: 2018-04-07 | End: 2018-04-11 | Stop reason: HOSPADM

## 2018-04-07 RX ORDER — POLYETHYLENE GLYCOL 3350 17 G/17G
17 POWDER, FOR SOLUTION ORAL DAILY
Status: DISCONTINUED | OUTPATIENT
Start: 2018-04-07 | End: 2018-04-11 | Stop reason: HOSPADM

## 2018-04-07 RX ORDER — MAGNESIUM SULFATE 100 %
4 CRYSTALS MISCELLANEOUS AS NEEDED
Status: DISCONTINUED | OUTPATIENT
Start: 2018-04-07 | End: 2018-04-11 | Stop reason: HOSPADM

## 2018-04-07 RX ORDER — IPRATROPIUM BROMIDE AND ALBUTEROL SULFATE 2.5; .5 MG/3ML; MG/3ML
3 SOLUTION RESPIRATORY (INHALATION)
Status: DISCONTINUED | OUTPATIENT
Start: 2018-04-07 | End: 2018-04-11 | Stop reason: HOSPADM

## 2018-04-07 RX ORDER — FAMOTIDINE 20 MG/1
20 TABLET, FILM COATED ORAL 2 TIMES DAILY
Status: DISCONTINUED | OUTPATIENT
Start: 2018-04-07 | End: 2018-04-11 | Stop reason: HOSPADM

## 2018-04-07 RX ORDER — ONDANSETRON 2 MG/ML
4 INJECTION INTRAMUSCULAR; INTRAVENOUS
Status: DISCONTINUED | OUTPATIENT
Start: 2018-04-07 | End: 2018-04-11 | Stop reason: HOSPADM

## 2018-04-07 RX ORDER — LIDOCAINE HYDROCHLORIDE 10 MG/ML
5 INJECTION, SOLUTION EPIDURAL; INFILTRATION; INTRACAUDAL; PERINEURAL
Status: DISPENSED | OUTPATIENT
Start: 2018-04-07 | End: 2018-04-08

## 2018-04-07 RX ORDER — OXYCODONE HYDROCHLORIDE 5 MG/1
7.5 TABLET ORAL
Status: DISCONTINUED | OUTPATIENT
Start: 2018-04-07 | End: 2018-04-09

## 2018-04-07 RX ORDER — SODIUM CHLORIDE 0.9 % (FLUSH) 0.9 %
5-10 SYRINGE (ML) INJECTION EVERY 8 HOURS
Status: DISCONTINUED | OUTPATIENT
Start: 2018-04-07 | End: 2018-04-11 | Stop reason: HOSPADM

## 2018-04-07 RX ORDER — AMOXICILLIN 250 MG
1 CAPSULE ORAL 2 TIMES DAILY
Status: DISCONTINUED | OUTPATIENT
Start: 2018-04-07 | End: 2018-04-11 | Stop reason: HOSPADM

## 2018-04-07 RX ORDER — LISINOPRIL 20 MG/1
20 TABLET ORAL DAILY
Status: DISCONTINUED | OUTPATIENT
Start: 2018-04-07 | End: 2018-04-11 | Stop reason: HOSPADM

## 2018-04-07 RX ORDER — FOLIC ACID 1 MG/1
1 TABLET ORAL DAILY
Status: DISCONTINUED | OUTPATIENT
Start: 2018-04-07 | End: 2018-04-11 | Stop reason: HOSPADM

## 2018-04-07 RX ORDER — HYDROMORPHONE HYDROCHLORIDE 1 MG/ML
0.5 INJECTION, SOLUTION INTRAMUSCULAR; INTRAVENOUS; SUBCUTANEOUS
Status: DISCONTINUED | OUTPATIENT
Start: 2018-04-07 | End: 2018-04-11 | Stop reason: HOSPADM

## 2018-04-07 RX ORDER — LOVASTATIN 20 MG/1
20 TABLET ORAL
Status: DISCONTINUED | OUTPATIENT
Start: 2018-04-07 | End: 2018-04-11 | Stop reason: HOSPADM

## 2018-04-07 RX ORDER — SODIUM CHLORIDE 9 MG/ML
75 INJECTION, SOLUTION INTRAVENOUS CONTINUOUS
Status: DISCONTINUED | OUTPATIENT
Start: 2018-04-07 | End: 2018-04-10

## 2018-04-07 RX ORDER — INSULIN GLARGINE 100 [IU]/ML
30 INJECTION, SOLUTION SUBCUTANEOUS 2 TIMES DAILY
Status: DISCONTINUED | OUTPATIENT
Start: 2018-04-07 | End: 2018-04-11 | Stop reason: HOSPADM

## 2018-04-07 RX ORDER — LIDOCAINE HYDROCHLORIDE 10 MG/ML
5 INJECTION, SOLUTION EPIDURAL; INFILTRATION; INTRACAUDAL; PERINEURAL
Status: ACTIVE | OUTPATIENT
Start: 2018-04-07 | End: 2018-04-08

## 2018-04-07 RX ORDER — MIDAZOLAM HYDROCHLORIDE 1 MG/ML
5 INJECTION, SOLUTION INTRAMUSCULAR; INTRAVENOUS
Status: DISCONTINUED | OUTPATIENT
Start: 2018-04-07 | End: 2018-04-07

## 2018-04-07 RX ORDER — SERTRALINE HYDROCHLORIDE 50 MG/1
100 TABLET, FILM COATED ORAL DAILY
Status: DISCONTINUED | OUTPATIENT
Start: 2018-04-07 | End: 2018-04-11 | Stop reason: HOSPADM

## 2018-04-07 RX ORDER — CEFAZOLIN SODIUM/WATER 2 G/20 ML
2 SYRINGE (ML) INTRAVENOUS EVERY 8 HOURS
Status: DISCONTINUED | OUTPATIENT
Start: 2018-04-07 | End: 2018-04-11 | Stop reason: HOSPADM

## 2018-04-07 RX ORDER — CLOBETASOL PROPIONATE 0.5 MG/G
OINTMENT TOPICAL AS NEEDED
Status: DISCONTINUED | OUTPATIENT
Start: 2018-04-07 | End: 2018-04-11 | Stop reason: HOSPADM

## 2018-04-07 RX ORDER — SODIUM CHLORIDE 0.9 % (FLUSH) 0.9 %
10 SYRINGE (ML) INJECTION
Status: ACTIVE | OUTPATIENT
Start: 2018-04-07 | End: 2018-04-08

## 2018-04-07 RX ORDER — DEXTROSE 50 % IN WATER (D50W) INTRAVENOUS SYRINGE
12.5-25 AS NEEDED
Status: DISCONTINUED | OUTPATIENT
Start: 2018-04-07 | End: 2018-04-11 | Stop reason: HOSPADM

## 2018-04-07 RX ORDER — LIDOCAINE HYDROCHLORIDE 10 MG/ML
5 INJECTION INFILTRATION; PERINEURAL
Status: ACTIVE | OUTPATIENT
Start: 2018-04-07 | End: 2018-04-08

## 2018-04-07 RX ORDER — ACETAMINOPHEN 325 MG/1
650 TABLET ORAL
Status: DISCONTINUED | OUTPATIENT
Start: 2018-04-07 | End: 2018-04-11 | Stop reason: HOSPADM

## 2018-04-07 RX ORDER — BACITRACIN 500 UNIT/G
1 PACKET (EA) TOPICAL AS NEEDED
Status: DISCONTINUED | OUTPATIENT
Start: 2018-04-07 | End: 2018-04-11 | Stop reason: HOSPADM

## 2018-04-07 RX ORDER — FENTANYL CITRATE 50 UG/ML
200 INJECTION, SOLUTION INTRAMUSCULAR; INTRAVENOUS
Status: DISCONTINUED | OUTPATIENT
Start: 2018-04-07 | End: 2018-04-07

## 2018-04-07 RX ORDER — SODIUM CHLORIDE 9 MG/ML
500 INJECTION, SOLUTION INTRAVENOUS CONTINUOUS
Status: DISCONTINUED | OUTPATIENT
Start: 2018-04-07 | End: 2018-04-07

## 2018-04-07 RX ORDER — GABAPENTIN 300 MG/1
600 CAPSULE ORAL
Status: DISCONTINUED | OUTPATIENT
Start: 2018-04-07 | End: 2018-04-11 | Stop reason: HOSPADM

## 2018-04-07 RX ORDER — SODIUM CHLORIDE 0.9 % (FLUSH) 0.9 %
5-10 SYRINGE (ML) INJECTION AS NEEDED
Status: DISCONTINUED | OUTPATIENT
Start: 2018-04-07 | End: 2018-04-11 | Stop reason: HOSPADM

## 2018-04-07 RX ADMIN — Medication 2 G: at 22:02

## 2018-04-07 RX ADMIN — Medication 1 CAPSULE: at 11:56

## 2018-04-07 RX ADMIN — FAMOTIDINE 20 MG: 20 TABLET, FILM COATED ORAL at 18:19

## 2018-04-07 RX ADMIN — GABAPENTIN 600 MG: 300 CAPSULE ORAL at 06:24

## 2018-04-07 RX ADMIN — SERTRALINE HYDROCHLORIDE 100 MG: 50 TABLET ORAL at 11:56

## 2018-04-07 RX ADMIN — FAMOTIDINE 20 MG: 20 TABLET, FILM COATED ORAL at 11:56

## 2018-04-07 RX ADMIN — INSULIN GLARGINE 30 UNITS: 100 INJECTION, SOLUTION SUBCUTANEOUS at 11:56

## 2018-04-07 RX ADMIN — FOLIC ACID 1 MG: 1 TABLET ORAL at 11:56

## 2018-04-07 RX ADMIN — SODIUM CHLORIDE 75 ML/HR: 900 INJECTION, SOLUTION INTRAVENOUS at 06:25

## 2018-04-07 RX ADMIN — STANDARDIZED SENNA CONCENTRATE AND DOCUSATE SODIUM 1 TABLET: 8.6; 5 TABLET, FILM COATED ORAL at 18:19

## 2018-04-07 RX ADMIN — Medication 10 ML: at 22:02

## 2018-04-07 RX ADMIN — GABAPENTIN 600 MG: 300 CAPSULE ORAL at 22:02

## 2018-04-07 RX ADMIN — LISINOPRIL 20 MG: 20 TABLET ORAL at 11:56

## 2018-04-07 RX ADMIN — Medication 10 ML: at 06:25

## 2018-04-07 RX ADMIN — PROPOFOL 100 MG: 10 INJECTION, EMULSION INTRAVENOUS at 00:21

## 2018-04-07 RX ADMIN — INSULIN GLARGINE 30 UNITS: 100 INJECTION, SOLUTION SUBCUTANEOUS at 18:19

## 2018-04-07 RX ADMIN — HYDROMORPHONE HYDROCHLORIDE 1 MG: 2 INJECTION INTRAMUSCULAR; INTRAVENOUS; SUBCUTANEOUS at 00:19

## 2018-04-07 RX ADMIN — Medication 2 G: at 18:19

## 2018-04-07 RX ADMIN — LOVASTATIN 20 MG: 20 TABLET ORAL at 22:02

## 2018-04-07 RX ADMIN — LOVASTATIN 20 MG: 20 TABLET ORAL at 06:25

## 2018-04-07 RX ADMIN — PRIMIDONE 250 MG: 50 TABLET ORAL at 18:18

## 2018-04-07 RX ADMIN — Medication 2 G: at 06:25

## 2018-04-07 NOTE — PROGRESS NOTES
Patient seen and examined    IMPRESSION    1. 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 MV endocarditis     3. Dislocated right hip    4. DM    5. History of patent foramen ovale     6. Hypertension     7. JACOB    PLAN     Continue cefazolin     Await right hip culture results.

## 2018-04-07 NOTE — ED PROVIDER NOTES
HPI Comments: 69 y.o. male with past medical history significant for HTN, DM, XOL, DJD, s/p right total hip replacement in 2010 with subsequent removal of metal liner/placement of polyethylene liner and replacement of femoral head 2/26/18, who presents via EMS chief complaint of right hip pain. Patient is 8 years status post right total hip replacement. He was more recently admitted to the hospital 2/24-3/4/2018 for septic arthritis of the right hip. Underwent irrigation and debridement of the right hip with removal of metal liner and placement of polyethylene liner, replacement of femoral head, and placement of vancomycin and tobramycin impregnated absorbable beads with placement of Hemovac drain on 2/26/2018 by Dr. Michelle Brar. Also had a PICC line placed 3/1/2018 and received cefazolin and vanc while admitted. Was discharged with PICC and instructions to continue cefazolin for six weeks (scheduled to end 4/9/2018). Has been seen in the ED on two different occasions since then for dislocation of the right hip prosthesis. Most recent visit was yesterday (4/5/2018), where he underwent conscious sedation and right hip reduction. Was discharged after the procedure with instructions for weight-bearing as tolerated. Was also instructed to wear a knee-immobilizer on the RLE and follow-up with his surgeon on an outpatient basis. Pt is here again tonight because he states that was sitting on his bed tonight when he reached over to the night-stand and felt a \"sudden pop\" in the right hip, associated with immediate severe pain. Has been unable to weight-bear since this occurred, and he ranks his initial level of discomfort in the right hip as a 10/10 in severity. His current pain level is a 7/10 after a dose of pain medication. Pt's RLE is also noted to be shorter than the LLE in the ED, and pt feels like his right hip prosthesis is dislocated once again.  While waiting for EMS prior to arrival, pt's son called the orthopedic surgeon on-call and made them aware of what happened. Pt notes that his last PO intake was \"around lunchtime\" because he was not hungry for dinner tonight. He specifically denies any fevers or chills. There are no other acute medical concerns at this time. Social hx: Negative for Tobacco use (former smoker); Negative for EtOH use; Negative for Illicit Drug Abuse    PCP: Lisa Crocker MD  Orthopedic Surgery: Dr. Renate Trotter      Note written by Erica Peña. Dana Page, as dictated by Oleg Barillas MD 10:54 PM     The history is provided by the patient, medical records and a relative (son/caregiver). No  was used.         Past Medical History:   Diagnosis Date    ADD (attention deficit disorder)     Anemia due to blood loss     Hinson's esophagus with esophagitis     Benign essential tremor     Cancer ([address] 2012    BCC    Depression     DJD (degenerative joint disease) of hip     bilat    DM (diabetes mellitus) ([address] 3/17/2010    ED (erectile dysfunction) of organic origin     Fall on or from sidewalk curb 9/24/2015    Femur fracture ([address]     Gastritis and duodenitis     GERD (gastroesophageal reflux disease)     resolved after discontinuing diclofenac    Hematuria 6/2016    High cholesterol 3/17/2010    HTN (hypertension) 3/17/2010    Morbid obesity ([address]     Murmur, cardiac 2016    PFO (patent foramen ovale) 7/26/2017    PUD (peptic ulcer disease)     Shingles 6/2016    RESOLVING- NO RASH (HEAD)    Sleep apnea     CPAP       Past Surgical History:   Procedure Laterality Date    ENDOSCOPY, COLON, DIAGNOSTIC      HX CHOLECYSTECTOMY  1995    HX GI  1980    gastroplasty    HX HERNIA REPAIR  1995    HX HIP REPLACEMENT      Left    HX ORTHOPAEDIC  2011    rot cuff repair    HX TONSILLECTOMY  1950    HX VASCULAR ACCESS      picc line    TOTAL HIP ARTHROPLASTY  05/2010    right    TOTAL HIP ARTHROPLASTY  08/01/2016    left         Family History:   Problem Relation Age of Onset    Cancer Father      multiple myeloma    Stroke Father     Dementia Mother     No Known Problems Brother     COPD Brother     Cancer Maternal Grandmother      COLON    Anesth Problems Neg Hx        Social History     Social History    Marital status:      Spouse name: N/A    Number of children: N/A    Years of education: N/A     Occupational History    Not on file. Social History Main Topics    Smoking status: Former Smoker     Packs/day: 2.00     Years: 15.00     Quit date: 3/1/1979    Smokeless tobacco: Never Used    Alcohol use No    Drug use: No    Sexual activity: Yes     Partners: Female     Birth control/ protection: None     Other Topics Concern    Not on file     Social History Narrative         ALLERGIES: Nsaids (non-steroidal anti-inflammatory drug)    Review of Systems   Constitutional: Negative for chills, diaphoresis, fatigue and fever. HENT: Negative for congestion, rhinorrhea, sinus pressure, sore throat, trouble swallowing and voice change. Eyes: Negative for photophobia and visual disturbance. Respiratory: Negative for cough, chest tightness and shortness of breath. Cardiovascular: Negative for chest pain, palpitations and leg swelling. Gastrointestinal: Negative for abdominal pain, blood in stool, constipation, diarrhea, nausea and vomiting. Musculoskeletal: Positive for arthralgias (right hip). Negative for myalgias and neck pain. Neurological: Negative for dizziness, weakness, light-headedness, numbness and headaches. Vitals:    04/06/18 2237 04/07/18 0040 04/07/18 0045   BP: 155/70 154/57 127/84   Pulse: 84 72    Resp: 20 20    Temp: 99.3 °F (37.4 °C) 99.2 °F (37.3 °C)    SpO2: 100% 96% 97%   Weight: 109.3 kg (241 lb)     Height: 5' 10\" (1.778 m)              Physical Exam   Constitutional: He is oriented to person, place, and time. He appears well-developed and well-nourished. No distress.    HENT:   Head: Normocephalic and atraumatic. Nose: Nose normal.   Mouth/Throat: Oropharynx is clear and moist. No oropharyngeal exudate. Eyes: Conjunctivae and EOM are normal. Right eye exhibits no discharge. Left eye exhibits no discharge. No scleral icterus. Neck: Normal range of motion. Neck supple. No JVD present. No tracheal deviation present. No thyromegaly present. Cardiovascular: Normal rate, regular rhythm, normal heart sounds and intact distal pulses. Exam reveals no gallop and no friction rub. No murmur heard. Pulmonary/Chest: Effort normal and breath sounds normal. No stridor. No respiratory distress. He has no wheezes. He has no rales. He exhibits no tenderness. Abdominal: Bowel sounds are normal. He exhibits no distension and no mass. There is no tenderness. There is no rebound. Musculoskeletal:   Pulse, motor, and sensation are intact in the RLE; right leg is 4 cm shorter than the left leg    There is a PICC line in place in the left upper bicep; insertion site is clean, dry, and intact   Lymphadenopathy:     He has no cervical adenopathy. Neurological: He is alert and oriented to person, place, and time. No cranial nerve deficit. Coordination normal.   Skin: Skin is warm and dry. No rash noted. He is not diaphoretic. No erythema. No pallor. Psychiatric: He has a normal mood and affect. His behavior is normal. Judgment and thought content normal.   Note written by Juan Covarrubias, as dictated by Vamshi Gonzáles MD 10:54 AM        MDM  Number of Diagnoses or Management Options  Dislocation of prosthetic joint, subsequent encounter:         ED Course       CONSULT NOTE:  11:48 PM Vamshi Gonzáles MD communicated with Dr. Bc West and Pauline Neil PA-C, Consults for orthopedic surgery via [facility] Text. Discussed available diagnostic tests and clinical findings. Dr. Bc West recommends joint reduction in the ED. States that patient can be admitted and fitted for knee abduction brace.       Procedural Sedation  Date/Time: 4/7/2018 12:49 AM  Performed by: Gunnar Erickson by: Meli Roth     Consent:     Consent obtained:  Written    Consent given by:  Patient    Risks discussed:   Allergic reaction, prolonged hypoxia resulting in organ damage, prolonged sedation necessitating reversal and inadequate sedation  Indications:     Procedure performed:  Dislocation reduction    Procedure necessitating sedation performed by:  Physician performing sedation    Intended level of sedation:  Moderate (conscious sedation)  Pre-sedation assessment:     Time since last food or drink:  6 pm    ASA classification: class 2 - patient with mild systemic disease      Neck mobility: normal      Mouth opening:  3 or more finger widths    Thyromental distance:  4 finger widths    Mallampati score:  II - soft palate, uvula, fauces visible    Pre-sedation assessments completed and reviewed: airway patency, cardiovascular function, hydration status, mental status, nausea/vomiting, pain level, respiratory function and temperature      History of difficult intubation: no    Immediate pre-procedure details:     Reassessment: Patient reassessed immediately prior to procedure      Reviewed: vital signs, relevant labs/tests and NPO status      Verified: bag valve mask available, emergency equipment available, intubation equipment available, IV patency confirmed, oxygen available and suction available    Procedure details (see MAR for exact dosages):     Preoxygenation:  Nasal cannula    Sedation:  Propofol (60)    Intra-procedure monitoring:  Blood pressure monitoring, continuous capnometry, continuous pulse oximetry, cardiac monitor, frequent LOC assessments and frequent vital sign checks    Intra-procedure events: none      Total sedation time (minutes):  10  Post-procedure details:     Attendance: Constant attendance by certified staff until patient recovered      Recovery: Patient returned to pre-procedure baseline Post-sedation assessments completed and reviewed: airway patency, cardiovascular function, hydration status, mental status, nausea/vomiting, pain level, respiratory function and temperature      Patient tolerance: Tolerated well, no immediate complications  Reduction of Joint  Date/Time: 4/7/2018 12:57 AM  Performed by: Vaibhav Stover  Authorized by: Vaibhav Stover     Consent:     Consent obtained:  Written    Consent given by:  Patient    Risks discussed:  Nerve damage, pain and vascular damage    Alternatives discussed:  Delayed treatment  Injury:     Injury location:  Hip    Hip injury location:  R hip    Hip fracture type: proximal femoral head    Pre-procedure assessment:     Neurological function: normal      Distal perfusion: normal      Range of motion: reduced    Sedation:     Sedation type: Moderate (conscious) sedation  Anesthesia (see MAR for exact dosages): Anesthesia method:  None  Procedure details:     Manipulation performed: yes      Skin traction used: no      Skeletal traction used: yes      Pin inserted: no      Reduction successful: yes      X-ray confirmed reduction: yes      MAST used: no      Immobilization:  Brace    Supplies used:  Knee immobilizer  Post-procedure assessment:     Neurological function: normal      Distal perfusion: normal      Range of motion: normal      Patient tolerance of procedure: Tolerated well, no immediate complications      CONSULT NOTE:  1:14 AM Aziza Vanegas MD spoke with Dr. Nallely Castillo, Consult for Hospitalist.  Discussed available diagnostic tests and clinical findings. Dr. Nallely Castillo will evaluate the patient for admission to the hospital.     6:04 AM  Patient is being admitted to the hospital.  The results of their tests and reasons for their admission have been discussed with them and/or available family. They convey agreement and understanding for the need to be admitted and for their admission diagnosis.   Consultation has been made with the inpatient physician specialist for hospitalization. LABORATORY TESTS:  No results found for this or any previous visit (from the past 12 hour(s)). IMAGING RESULTS:  XR HIP RT W OR WO PELV  1 VW   Final Result      XR PELV AP ONLY   Final Result      XR CHEST PORT    (Results Pending)     Xr Pelv Ap Only    Result Date: 4/6/2018  INDICATION: Right hip pain COMPARISON: April 5, 2018 FINDINGS: AP imaging of the pelvis demonstrates dislocation of the right hip prosthesis. . Bilateral arthroplasty hardware is otherwise intact. No acute fracture is evident. The SI joints and pubic symphysis appear intact. No soft tissue abnormalities are seen. IMPRESSION: Dislocated right hip prosthesis. Xr Hip Rt W Or Wo Pelv  1 Vw    Result Date: 4/7/2018  EXAM:  XR HIP RT W OR WO PELV  1 VW INDICATION:   post reduction. COMPARISON: April 6, 2018. FINDINGS: A single view of the right hip demonstrates successful reduction of the previously seen hip dislocation. IMPRESSION: Successful reduction of dislocated right hip prosthesis.  INTERPRETATION PROVIDED FOR COMPLIANCE ONLY AT NO CHARGE      MEDICATIONS GIVEN:  Medications   acetaminophen (TYLENOL) tablet 650 mg (not administered)   HYDROmorphone (PF) (DILAUDID) injection 0.5 mg (not administered)   albuterol-ipratropium (DUO-NEB) 2.5 MG-0.5 MG/3 ML (not administered)   ceFAZolin (ANCEF) 2 g/20 mL in sterile water IV syringe (not administered)   clobetasol (TEMOVATE) 0.05 % ointment (not administered)   famotidine (PEPCID) tablet 20 mg (not administered)   folic acid (FOLVITE) tablet 1 mg (not administered)   gabapentin (NEURONTIN) capsule 600 mg (not administered)   insulin glargine (LANTUS) injection 30 Units (not administered)   lisinopril (PRINIVIL, ZESTRIL) tablet 20 mg (not administered)   lovastatin (MEVACOR) tablet 20 mg (not administered)   oxyCODONE IR (ROXICODONE) tablet 7.5 mg (not administered)   polyethylene glycol (MIRALAX) packet 17 g (not administered) primidone (MYSOLINE) tablet 250 mg (not administered)   senna-docusate (PERICOLACE) 8.6-50 mg per tablet 1 Tab (not administered)   sertraline (ZOLOFT) tablet 100 mg (not administered)   sodium chloride (NS) flush 5-10 mL (not administered)   sodium chloride (NS) flush 5-10 mL (not administered)   0.9% sodium chloride infusion (not administered)   ondansetron (ZOFRAN) injection 4 mg (not administered)   lactobac ac& pc-s.therm-b.anim (WESLEY Q/RISAQUAD) (not administered)   insulin lispro (HUMALOG) injection (not administered)   glucose chewable tablet 16 g (not administered)   dextrose (D50W) injection syrg 12.5-25 g (not administered)   glucagon (GLUCAGEN) injection 1 mg (not administered)   HYDROmorphone (DILAUDID) injection 1 mg (1 mg IntraVENous Given 4/6/18 5859)   propofol (DIPRIVAN) 10 mg/mL injection 100 mg (100 mg IntraVENous Given by Provider 4/7/18 0021)   HYDROmorphone (DILAUDID) injection 1 mg (1 mg IntraVENous Given 4/7/18 0019)       IMPRESSION:  1. Dislocation of prosthetic joint, subsequent encounter        PLAN:  1.  Admit to [facility] MD

## 2018-04-07 NOTE — CONSULTS
Ortho Progress Note      Patient: Bertha Lemon                   MRN: [id number]  Sex: male  YOB: 1950           Age: 69 y.o. Chief Complaint: Recurrent right hip dislocation    History of Present Illness: 67yo male we are asked to see for management of recurrent dislocations of his right total hip replacement. Initially had right hip replaced by Dr. Ramakrishna javier 5/13/2010. This did well until about 2 months ago when he developed endocarditis which then seeded the right hip joint. He underwent I&D and poly exchange on 2/26/2018. He remains on antibiotics and is being followed by Dr. Lisa Yuan with infectious disease. Unfortunately he has dislocated his hip each of the past 2 nights requiring closed reduction in the ER each night. He was admitted to [facility] last night for further management of this problem     Visit Vitals    /64 (BP 1 Location: Right arm, BP Patient Position: At rest)    Pulse 69    Temp 98.1 °F (36.7 °C)    Resp 15    Ht 5' 10\" (1.778 m)    Wt 109.3 kg (241 lb)    SpO2 97%    BMI 34.58 kg/m2        Lab Results:  HGB   Date/Time Value Ref Range Status   04/07/2018 06:50 AM 9.4 (L) 12.1 - 17.0 g/dL Final     INR   Date/Time Value Ref Range Status   04/07/2018 06:50 AM 2.9 (H) 0.9 - 1.1   Final     Comment:     A single therapeutic range for Vit K antagonists may not be optimal for all indications - see June, 2008 issue of Chest, American College of Chest Physicians Evidence-Based Clinical Practice Guidelines, 8th Edition.        Physical Exam:    GENERAL: Alert, cooperative, no distress, appears stated age  WOUND: Well-healed right hip incision  SWELLING: mild  NEUROLOGICAL: intact  PULSE:yes   MOTION: Normal right hip ROM, right knee in immobilizer  DVT Exam: No evidence of DVT seen on physical exam.      Plan:    Discussed with Dr. Ramakrishna javier  Will need IR to aspirate right hip to rule-out recurrent infection  Plan for either I&D or revision to constrained liner on Monday pending aspiration results  Will need to hold Coumadin today and tomorrow in preparation for surgery (INR 2.9 today)  Will make him NPO after midnight on Sunday  Will notify Dr. Haley Yanez of patient's admission   Remain in knee [address] [place] [address]  4/7/2018   11:16 AM      .

## 2018-04-07 NOTE — CONSULTS
[address] [facility]  MR#: [id number]  : 1950  ACCOUNT #: [de-identified]   DATE OF SERVICE: 2018    REQUESTING PHYSICIAN:  Dr. Janina Little:  Right prosthetic hip infection. 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 He also was bacteremic with the aforementioned organism. A EULALIO revealed mitral valve endocarditis. He was placed on cefazolin and the plan was to keep him on antibiotics until at least . After that, he would need suppressive therapy with oral antibiotics. Over the last 2 days, he has dislocated his hip 3 times. The first 2 times, he underwent closed reduction in the Emergency Room. After the 3rd dislocation though, he was admitted by the hospitalist service for definitive treatment of the right hip dislocation. He was sent to interventional radiology today to have the right hip aspirate to rule out recurrent infection. Cultures are now pending. He remains on cefazolin. He tells me that the right hip incision is clean. There is no redness. There is no dehiscence. He does not have any fever, diarrhea, dyspnea, abdominal pain or dysuria. ALLERGIES:  NSAIDS. CURRENT MEDICATIONS:  1. Cefazolin. 2.  Pepcid. 3.  Folvite. 4.  Neurontin. 5.  Lantus. 6.  Humalog. 7.  Lactobacillus. 8.  Lisinopril. 9.  Lovastatin. 10.  MiraLAX. 11.  Mysoline. 12.  Jessica-Colace. 13.  Zoloft. REVIEW OF SYSTEMS:  He does not have any headache or sore throat. He does not have any otalgia. He does not have any hematuria or hematochezia. Aside from the things mentioned previously, the rest of the review of systems is negative. PAST MEDICAL HISTORY:  1.  Diabetes. 2.  Hypertension. 3.  Depression. 4.  Obstructive sleep apnea. 5.  ADD. 6.  Degenerative disk disease. 7.  Morbid obesity.   8.  History of patent foramen ovale. PAST SURGICAL HISTORY:  1. Cholecystectomy. 2.  Right total hip arthroplasty and left total hip arthroplasty. 3.  History of tonsillectomy  4. Right rotator cuff repair. 5.  Hernia repair. 6.  Gastroplasty. 7.  Irrigation and debridement of the right hip with removal of metal liner with placement of polyethylene liner and replacement of a femoral head. FAMILY HISTORY:  His father passed away from multiple myeloma. He also had a stroke. His mother had dementia. His brother has dementia. Another brother has COPD. His maternal grandmother had colon cancer. SOCIAL HISTORY:  He is a former smoker, does not drink alcohol or use recreational drug use. PHYSICAL EXAMINATION:  GENERAL:  He is not in respiratory distress. VITAL SIGNS:  Temperature is 98.1, pulse 69, blood pressure 113/64, satting 97% on room air, respiration 16. HEENT:  He has pink conjunctivae, anicteric sclerae, no JVD. No cervical lymphadenopathy. External ears are normal.    LUNGS:  Clear to auscultation. No rales, wheezes or rhonchi. HEART:  No murmurs, rubs or clicks. ABDOMEN:  Soft, nontender. No guarding or rebound. GENITOURINARY:  No flank tenderness. MUSCULOSKELETAL:  The right hip incision is clean. There is no dehiscence or erythema. PSYCHIATRIC:  No disturbance of thought. Mood and affect are appropriate. NEUROLOGIC:  He has full use of his extraocular muscles. Tongue midline. No facial asymmetry. Hand  is 5/5. LABORATORY DATA:  White blood cell count 5.3, hemoglobin 9.4, platelets 164. ESR is 24. CRP is 2.53, creatinine 0.78, total bilirubin 0.6, AST 22, ALT 7, alk phos 66. Fluid culture is still pending. IMPRESSION:    1. 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 2.  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 3.  Dislocated right hip. 4.  Diabetes. 5.  History of patent foramen ovale. 6.  Hypertension.   7.  Obstructive sleep apnea. PLAN:  I will continue cefazolin through 04/10. I would await right hip culture results. If these are still positive, he will need surgical management. It is reassuring to see that the sed rate and CRP are trending down. Thank you for the consult.       Ovidio Libman, MD AG / SN  D: 04/07/2018 14:36     T: 04/07/2018 15:07  JOB #: [id number]

## 2018-04-07 NOTE — PROGRESS NOTES
Primary Nurse Annalee De Luna and Gary Burnett RN performed a dual skin assessment on this patient No impairment noted  Geoff score is 20

## 2018-04-07 NOTE — PROGRESS NOTES
Bedside shift change report given to Randy Sears (oncoming nurse) by Beth Quezada (offgoing nurse). Report included the following information SBAR, Kardex, Intake/Output, MAR and Recent Results.

## 2018-04-07 NOTE — ED NOTES
7375:  Time out performed per William Selby and Mayra Witt MD    1977:  Propofol 50mg administered at this time by Mayar Witt, [address] [facility], [facility]: Total of 60mg administered at this time by Mayra Witt MD, procedure performed successfully. Knee immobilizer put in place, x-ray at bedside for post reduction film. Patient is resting comfortably. Will monitor. 0102:  Patient is easily rousable, A&O x3.  O2 at 2L, patient tolerating well. Will continue to monitor at bedside. 0015:  Patient is A&O x3, talking and answers questions appropriately. Son at bedside. Patient asking for ice chips or water, advised that MD will be notified. Will continue to monitor. 0120:  Hospitalist at bedside with patient and son at this time. Will continue to monitor. 0140:  Patient is tolerating ice chips, HOB at 45%. A&O x3, talking with son. Will continue to monitor. 0218:  Patient provided urinal at this time. 9054: Attempted to call report, nurse helping patient to rest room.

## 2018-04-07 NOTE — PROGRESS NOTES
TRANSFER - IN REPORT:    Verbal report received from [facility] (name) on Marjan Ba  being received from ED (unit) for routine progression of care      Report consisted of patients Situation, Background, Assessment and   Recommendations(SBAR). Information from the following report(s) SBAR, Kardex, Intake/Output, MAR and Recent Results was reviewed with the receiving nurse. Opportunity for questions and clarification was provided. Assessment completed upon patients arrival to unit and care assumed.

## 2018-04-07 NOTE — ED TRIAGE NOTES
Patient was diagnosed with sepsis early March in his RIGHT hip (which was repalced in 2010) and his mitral valve. Patient eventually discharged with IV ABX. Since then he has dislocated RIGHT hip x 3, most recently yesterday. He comes in via EMS stating he was sitting on the edge of his bed and the RIGHThip popped out again.

## 2018-04-07 NOTE — H&P
[address] [facility]  ACUTE CARE HISTORY AND PHYSICAL    Alex Sánchez  MR#: [id number]  : 1950  ACCOUNT #: [de-identified]   DATE OF SERVICE: 2018    PRIMARY CARE PHYSICIAN:  Dr. Yancy Resendez. SOURCE OF INFORMATION:  The patient and the patient's son, who was present at the bedside. CHIEF COMPLAINT:  Right hip pain. HISTORY OF PRESENT ILLNESS:  This is a 76-year-old man with a past medical history significant for type 2 diabetes, dyslipidemia, benign essential tremor, depression, hypertension, obstructive sleep apnea, who was in his usual state of health until the day of presentation at the emergency room, when the patient developed sudden onset of right hip pain. The patient was sitting at the edge of his bed when he developed the sudden onset of right hip pain. The pain is constant, worse with movement. The patient is unable to bear weight on the right leg. The pain is sharp, 10/10 in severity. No known aggravating or relieving factors. The patient had right hip replacement done in . Since then, the patient has had more surgery for the right hip as a result of a complication. The patient was admitted to this hospital from 2018 to 2018 because of infected left hip prosthesis. The patient underwent irrigation and debridement of the right hip, was diagnosed with septic arthritis of the prosthetic hip. The patient was also diagnosed with mitral valve endocarditis, was discharged home on Ancef to complete a 6-week course. The 6-week course of the antibiotics will come to an end on 2018. The patient has been taking the antibiotics 2 grams every 8 hours at home. The patient was told to infuse the antibiotics at home before leaving the hospital.  Since the patient was discharged from the hospital, he has been to the emergency room 3 times with dislocated right hip.   The patient was in the emergency room yesterday, his dislocated the hip was reduced, and the patient was discharged home. This is the third visit today. Evaluation of the right hip pain reviewed that the patient has a dislocated right prosthetic hip joint. This was reduced in the emergency room. The emergency room physician consulted the orthopedic surgeon on call. Admission to the hospital for definitive treatment was advised. The patient was referred to the hospitalist service for that purpose. No history of fever, no rigors, and no chills. PAST MEDICAL HISTORY:    1. Type 2 diabetes. 2.  Dyslipidemia. 3.  Benign essential tremor. 4.  Depression. 5.  Obstructive sleep apnea. 6.  Hypertension. 7.  Recurrent right hip dislocation. ALLERGIES:  THE PATIENT IS ALLERGIC TO NONSTEROIDAL ANTI-INFLAMMATORY DRUGS. MEDICATIONS:    1. Tylenol 650 mg every 6 hours as needed. 2.  DuoNeb inhalation every 6 hours as needed. 3.  Aspirin 81 mg daily. 4.  Klonopin 0.5 mg daily at bedtime as needed. 5.  Pepcid 20 mg twice daily. 6.  Folic acid 1 mg daily. 7.  Neurontin 300 mg 2 capsules daily at bedtime. 8.  Lantus insulin 30 units subcutaneously twice daily. 9.  Januvia 100 mg daily. 10.  Jardiance 25 mg daily. 11.  Lisinopril 20 mg daily. 12.  Mevacor 20 mg daily. 13.  Glucophage 1000 mg daily. 14.  Oxycodone IR, 10 mg 3 times daily as needed. 15.  Mysoline 250 mg twice daily. 16.  Zoloft 100 mg daily. 17.  Coumadin dosage as directed. FAMILY HISTORY:  This was reviewed. His father had multiple myeloma and a stroke. PAST SURGICAL HISTORY:    Significant for:  1. Cholecystectomy. 2.  Gastroplasty. 3.  Left hip replacement. 4.  Right hip replacement. SOCIAL HISTORY:  The patient is a former smoker. Quit tobacco abuse in 03/1979. No history of alcohol abuse. REVIEW OF SYSTEMS:  HEENT:  No headache, no dizziness, no blurring of vision, no photophobia. RESPIRATORY:  No cough, no shortness of breath, no hemoptysis.   CARDIOVASCULAR:  No chest pain, no orthopnea, no palpitations. GASTROINTESTINAL:  No nausea and vomiting, no diarrhea, no constipation. GENITOURINARY:  No dysuria, no urgency and no frequency. All other systems are reviewed and they are negative. PHYSICAL EXAMINATION:  GENERAL:  The patient appeared ill and in moderate distress. VITAL SIGNS:  On arrival at the emergency room, temperature 99.3, pulse 84, respiratory rate 20, blood pressure 155/70, oxygen saturation 100% on room air. HEAD:  Normocephalic, atraumatic. EYES:  Normal eye movements. No redness, no drainage, no discharge. EARS:  Normal external ears with no evidence of drainage. NOSE:  No deformity and no drainage. MOUTH AND THROAT:  No visible oral lesion. NECK:  Supple, no JVD, no thyromegaly. CHEST:  Clear breath sounds. No wheezing, no crackles. HEART:  Normal S1 and S2, regular. No clinically appreciable murmur. ABDOMEN:  Soft, obese, nontender, normal bowel sounds. CENTRAL NERVOUS SYSTEM:  Alert, oriented x3. No gross focal neurological deficit. EXTREMITIES:  No edema. Pulses 2+ bilaterally. MUSCULOSKELETAL SYSTEM:  Mild tenderness right hip with well-healed surgical wounds noted. SKIN:  No acute skin lesions seen in the exposed part of the body. PICC line placement left upper extremity. PSYCHIATRY:  Normal mood and affect. LYMPHATIC SYSTEM:  No cervical lymphadenopathy. DIAGNOSTIC DATA:  X-ray of the pelvis shows dislocation of the prosthetic right hip joint. LABORATORY DATA:  Hematology done on 04/05/2018: WBC 5.6, hemoglobin 10.9, hematocrit 35.0, platelet 395. Chemistry done on 04/04/2018:  Sodium 141, potassium 4.6, chloride 103, CO2 of 24, glucose 132, BUN 13, creatinine 0.77, calcium 8.7, bilirubin total 0.5, total protein 7.0, albumin level 3.2, ALT 7, AST 39, alkaline phosphatase 83. INR on 04/04/2018 was 1.9. ASSESSMENT:  1. Dislocation of prosthetic right hip joint. 2.  Type 2 diabetes.   3. Dyslipidemia. 4.  Sepsis present on admission. 5.  Benign essential tremor. 6.  Depression. 7.  Obstructive sleep apnea on CPAP. 8.  Hypertension. PLAN:  1. Dislocation of prosthetic right hip joint. We will admit the patient for further evaluation and treatment. We will carry out pain control. We will await definitive treatment from the orthopedic service. The orthopedic service has already been consulted by the emergency room physician. 2.  Type 2 diabetes. We will place the patient on sliding scale with insulin coverage. We will check hemoglobin A1c level. We will hold oral agent until the time of discharge from the hospital.  3.  Dyslipidemia. We will continue with the preadmission medication. 4.  Sepsis. This is on ongoing problem, present on admission. The sepsis is as a result of septic arthritis of the right hip prosthesis as well as a mitral valve endocarditis. We will complete the course of cefazolin 2 grams every 8 hours. 5.  Benign essential tremor. We will continue with the preadmission medication. 6.  Depression. We will resume the home medication. 7.  Obstructive sleep apnea on CPAP. We will continue with the CPAP with home setting. 8.  Hypertension. We will monitor the patient's blood pressure closely and resume preadmission medication. OTHER ISSUES:  CODE STATUS:  THE PATIENT IS FULL CODE. The patient is on Coumadin. Because of that, there is no need for deep vein thrombosis prophylaxis with Lovenox.       Claudia Macias MD       RE / PN  D: 04/07/2018 04:00     T: 04/07/2018 06:11  JOB #: [id number]  CC: David Mendoza MD

## 2018-04-07 NOTE — PROGRESS NOTES
11:10 AM    IR has been called to coordinate aspiration of Right Hip; IR is going to speak with their team and return the call to discuss plan for patient.

## 2018-04-07 NOTE — PROGRESS NOTES
Pt presented to the ED with sudden onset of right hip pain. The pain was constant, worse with movement and unable to bear weight on right leg. The pain was described as sharp, 10/10 in severity - no known aggravating or relieving factors. Pt reports a hx of R hip replacement done 2010. He was recently admitted at [facility] from 02/24-03/04/2018 due to an infected R hip prosthesis and underwent irrigation and debridement of the right hip. He was also diagnosed with MV endocarditis and was discharged home on Ancef to complete a 6-week course (to end 4/10/2018). Since the patient was discharged from the hospital, he has been to the ED 3 times with a dislocated right hip. Pmhx: type 2 diabetes, dyslipidemia, benign essential tremor, depression, hypertension, obstructive sleep apnea    /64 (BP 1 Location: Right arm, BP Patient Position: At rest)  Pulse 69  Temp 98.1 °F (36.7 °C)  Resp 15  Ht 5' 10\" (1.778 m)  Wt 109.3 kg (241 lb)  SpO2 97%  BMI 34.58 kg/m2    Pt reports no pain at present. Hungry but verbalizes understanding of NPO for procedure at this time. General: well nourished male, no acute distress  CV: regular rate, + murmur  Lungs: CTA  GI: active bowel sounds  Extremities: + pulses bilaterally  Neuro: A/O x 3, no focal deficits  Psych: calm and cooperative    Dislocation of prosthetic right hip (POA):. - plans for IR aspiration of R hip today, may eat after procedure  - pain control prn    Hx Sepsis (septic arthritis and MV endocarditis):  - was being treated for staph lugdunensis bacteremia. Per pt, treatment to continue through 4/10  - s/p PICC placement 3/1 but as per IV team PICC was pulled almost completely out per xray on admit and was recommended to be removed. PICC taken out 4/7 and peripheral line was placed.    - Ancef has been ordered per outpatient regimen  - ID has been consulted      Hx Type 2 diabetes:    - blood glucose 94 today, will continue with home insulin/SSI  - hold oral agent until the time of discharge from the hospital.    Hx Dyslipidemia: We will continue with the preadmission medication. Hx Benign Essential Tremor: We will continue with the preadmission medication. Hx Depression: We will resume the home medication. Hx JACOB on CPAP:  We will continue with the CPAP with home setting. Hx Hypertension:  BP controlled, continue home meds.       DVT ppx: SCDs for now.  Pt was on coumadin for DVT ppx after discharge x 6 weeks and was to have completed this 4/15  Code Status: Full  Care Plan: discussed with Ortho, nursing, IV team, attending MD and patient  Dispo: to be determined

## 2018-04-07 NOTE — ROUTINE PROCESS
TRANSFER - OUT REPORT:    Verbal report given to Ananth Cantu RN(name) on Hong Hoyos  being transferred to (unit) for routine progression of care       Report consisted of patients Situation, Background, Assessment and   Recommendations(SBAR). Information from the following report(s) SBAR, Kardex, ED Summary and Recent Results was reviewed with the receiving nurse. Lines:   PICC Single Lumen 28/88/82 Left;Basilic (Active)        Opportunity for questions and clarification was provided. Patient transported with:   Kellie Phillips:  Chart updated, report given to Ananth Cantu RN. Patient is ready for transport.

## 2018-04-08 LAB
ANION GAP SERPL CALC-SCNC: 7 MMOL/L (ref 5–15)
BUN SERPL-MCNC: 13 MG/DL (ref 6–20)
BUN/CREAT SERPL: 17 (ref 12–20)
CALCIUM SERPL-MCNC: 7.7 MG/DL (ref 8.5–10.1)
CHLORIDE SERPL-SCNC: 109 MMOL/L (ref 97–108)
CO2 SERPL-SCNC: 23 MMOL/L (ref 21–32)
CREAT SERPL-MCNC: 0.76 MG/DL (ref 0.7–1.3)
GLUCOSE BLD STRIP.AUTO-MCNC: 130 MG/DL (ref 65–100)
GLUCOSE BLD STRIP.AUTO-MCNC: 161 MG/DL (ref 65–100)
GLUCOSE BLD STRIP.AUTO-MCNC: 194 MG/DL (ref 65–100)
GLUCOSE BLD STRIP.AUTO-MCNC: 86 MG/DL (ref 65–100)
GLUCOSE SERPL-MCNC: 88 MG/DL (ref 65–100)
POTASSIUM SERPL-SCNC: 3.6 MMOL/L (ref 3.5–5.1)
SERVICE CMNT-IMP: ABNORMAL
SERVICE CMNT-IMP: NORMAL
SODIUM SERPL-SCNC: 139 MMOL/L (ref 136–145)

## 2018-04-08 PROCEDURE — 74011636637 HC RX REV CODE- 636/637: Performed by: INTERNAL MEDICINE

## 2018-04-08 PROCEDURE — 65270000029 HC RM PRIVATE

## 2018-04-08 PROCEDURE — 74011250637 HC RX REV CODE- 250/637: Performed by: INTERNAL MEDICINE

## 2018-04-08 PROCEDURE — 74011250636 HC RX REV CODE- 250/636: Performed by: INTERNAL MEDICINE

## 2018-04-08 PROCEDURE — 36415 COLL VENOUS BLD VENIPUNCTURE: CPT | Performed by: NURSE PRACTITIONER

## 2018-04-08 PROCEDURE — 80048 BASIC METABOLIC PNL TOTAL CA: CPT | Performed by: NURSE PRACTITIONER

## 2018-04-08 PROCEDURE — 82962 GLUCOSE BLOOD TEST: CPT

## 2018-04-08 RX ADMIN — ACETAMINOPHEN 650 MG: 325 TABLET, FILM COATED ORAL at 10:36

## 2018-04-08 RX ADMIN — Medication 10 ML: at 05:27

## 2018-04-08 RX ADMIN — INSULIN GLARGINE 30 UNITS: 100 INJECTION, SOLUTION SUBCUTANEOUS at 10:37

## 2018-04-08 RX ADMIN — Medication 1 CAPSULE: at 10:36

## 2018-04-08 RX ADMIN — LISINOPRIL 20 MG: 20 TABLET ORAL at 10:36

## 2018-04-08 RX ADMIN — PRIMIDONE 250 MG: 50 TABLET ORAL at 17:03

## 2018-04-08 RX ADMIN — Medication 10 ML: at 22:36

## 2018-04-08 RX ADMIN — OXYCODONE HYDROCHLORIDE 5 MG: 5 TABLET ORAL at 12:42

## 2018-04-08 RX ADMIN — Medication 2 G: at 05:27

## 2018-04-08 RX ADMIN — FOLIC ACID 1 MG: 1 TABLET ORAL at 10:36

## 2018-04-08 RX ADMIN — INSULIN GLARGINE 30 UNITS: 100 INJECTION, SOLUTION SUBCUTANEOUS at 20:55

## 2018-04-08 RX ADMIN — FAMOTIDINE 20 MG: 20 TABLET, FILM COATED ORAL at 10:37

## 2018-04-08 RX ADMIN — INSULIN LISPRO 2 UNITS: 100 INJECTION, SOLUTION INTRAVENOUS; SUBCUTANEOUS at 16:30

## 2018-04-08 RX ADMIN — Medication 2 G: at 12:42

## 2018-04-08 RX ADMIN — SERTRALINE HYDROCHLORIDE 100 MG: 50 TABLET ORAL at 10:36

## 2018-04-08 RX ADMIN — PRIMIDONE 250 MG: 50 TABLET ORAL at 10:36

## 2018-04-08 RX ADMIN — OXYCODONE HYDROCHLORIDE 7.5 MG: 5 TABLET ORAL at 23:51

## 2018-04-08 RX ADMIN — ACETAMINOPHEN 650 MG: 325 TABLET, FILM COATED ORAL at 17:04

## 2018-04-08 RX ADMIN — Medication 2 G: at 21:36

## 2018-04-08 RX ADMIN — FAMOTIDINE 20 MG: 20 TABLET, FILM COATED ORAL at 17:04

## 2018-04-08 RX ADMIN — STANDARDIZED SENNA CONCENTRATE AND DOCUSATE SODIUM 1 TABLET: 8.6; 5 TABLET, FILM COATED ORAL at 17:04

## 2018-04-08 RX ADMIN — STANDARDIZED SENNA CONCENTRATE AND DOCUSATE SODIUM 1 TABLET: 8.6; 5 TABLET, FILM COATED ORAL at 10:36

## 2018-04-08 RX ADMIN — LOVASTATIN 20 MG: 20 TABLET ORAL at 22:07

## 2018-04-08 RX ADMIN — GABAPENTIN 600 MG: 300 CAPSULE ORAL at 22:07

## 2018-04-08 NOTE — PROGRESS NOTES
ID Progress Note  2018    Subjective:     Afebrile. No dyspnea, abdominal pain, nausea, diarrhea, headache or sore throat. Objective:     Vitals:   Visit Vitals    /62 (BP 1 Location: Left arm, BP Patient Position: Head of bed elevated (Comment degrees))  Comment (BP Patient Position): 45    Pulse 71    Temp 97.8 °F (36.6 °C)    Resp 14    Ht 5' 10\" (1.778 m)    Wt 109.3 kg (241 lb)    SpO2 95%    BMI 34.58 kg/m2        Tmax:  Temp (24hrs), Av.1 °F (36.7 °C), Min:97.8 °F (36.6 °C), Max:98.3 °F (36.8 °C)      Exam:    Not in distress  Eyes: pink conjunctivae, anicteric sclerae  No cervical lypmhadenopathy   Lungs: clear to auscultation, no rales wheezes or rhonchi   Heart: s1, s2, no murmurs, rubs or clicks   Abdomen: soft, nontender, no guarding or rebound   Extremities: right hip incision is clean     Labs:   Lab Results   Component Value Date/Time    WBC 5.3 2018 06:50 AM    HGB 9.4 (L) 2018 06:50 AM    HCT 30.0 (L) 2018 06:50 AM    PLATELET 980 (L)  06:50 AM    MCV 91.2 2018 06:50 AM     Lab Results   Component Value Date/Time    Sodium 139 2018 05:35 AM    Potassium 3.6 2018 05:35 AM    Chloride 109 (H) 2018 05:35 AM    CO2 23 2018 05:35 AM    Anion gap 7 2018 05:35 AM    Glucose 88 2018 05:35 AM    BUN 13 2018 05:35 AM    Creatinine 0.76 2018 05:35 AM    BUN/Creatinine ratio 17 2018 05:35 AM    GFR est AA >60 2018 05:35 AM    GFR est non-AA >60 2018 05:35 AM    Calcium 7.7 (L) 2018 05:35 AM    Bilirubin, total 0.6 2018 06:50 AM    AST (SGOT) 22 2018 06:50 AM    Alk. phosphatase 66 2018 06:50 AM    Protein, total 6.0 (L) 2018 06:50 AM    Albumin 2.2 (L) 2018 06:50 AM    Globulin 3.8 2018 06:50 AM    A-G Ratio 0.6 (L) 2018 06:50 AM    ALT (SGPT) 7 (L) 2018 06:50 AM             Assessment:     1.   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 2.  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 3.  Dislocated right hip. 4.  Diabetes. 5.  History of patent foramen ovale. 6.  Hypertension. 7.  Obstructive sleep apnea.     PLAN:     Recommendations:      I will continue cefazolin at least through 04/10.   I would await right hip culture results    Esr and crp on Tuesday     Loni Rodriguez MD

## 2018-04-08 NOTE — PROGRESS NOTES
Hospitalist Progress Note  Emelia Hernandez NP  Answering service: [phone] OR [phone] from in house phone  Cell: [phone]      Date of Service:  2018  NAME:  Rey Granger  :  1950  MRN:  [id number]    Admission Summary:   Pt presented to the ED with sudden onset of right hip pain.  The pain was constant, worse with movement and unable to bear weight on right leg. The pain was described as sharp, 10/10 in severity - no known aggravating or relieving factors.  Pt reports a hx of R hip replacement done . He was recently admitted at [facility] from -2018 due to an infected R hip prosthesis and underwent irrigation and debridement of the right hip. He was also diagnosed with MV endocarditis and was discharged home on Ancef to complete a 6-week course (to end 4/10/2018).  Since the patient was discharged from the hospital, he has been to the ED 3 times with a dislocated right hip.       Pmhx: type 2 diabetes, dyslipidemia, benign essential tremor, depression, hypertension, obstructive sleep apnea     Interval history / Subjective:   Pt in bed eating breakfast. No new complaints and denies pain. Has been up with walker into bathroom. Assessment & Plan:     Dislocation of prosthetic right hip (POA):. - s/p IR aspiration of R hip , awaiting results  - pain control prn  - further plans per Ortho     Hx Sepsis (septic arthritis and MV endocarditis):  - was being treated for staph lugdunensis bacteremia. Per pt, treatment to continue through 4/10 (confirmed by ID)  - s/p PICC placement 3/1 but as per IV team PICC was pulled almost completely out per xray on admit and was recommended to be removed. PICC taken out  and peripheral line was placed.    - Ancef has been ordered per outpatient regimen  - ID now following     Hx Type 2 diabetes:    - blood glucose , continue with home insulin/SSI  - hold oral agent until the time of discharge from the hospital.     Hx Dyslipidemia:  Mevacor.     Hx Benign Essential Tremor:  Primidone.     Hx Depression:  Zoloft.     Hx JACOB on CPAP:  We will continue with the CPAP with home setting.     Hx Hypertension:  BP controlled, continue home meds.        DVT ppx: SCDs for now. Pt was on coumadin for DVT ppx after discharge x 6 weeks and was to have completed this 4/15  Code Status: Full  Care Plan: spoke with attending MD and patient  Dispo: to be determined    Pt would like / Jerson Conner (PCP) to be updated on his condition/plan. Will do in am 4/9     Hospital Problems  Date Reviewed: 4/7/2018          Codes Class Noted POA    * (Principal)Dislocation of prosthetic joint [facility]) ICD-10-CM: S15.239W  ICD-9-CM: 996.42  3/20/2018 Yes    Overview Signed 3/20/2018 11:37 PM by AMAN Arteaga     Right CIERRA                 Review of Systems:   Denies HA. No chest pain, pressure. No respiratory complaints or abdominal pain. Vital Signs:    Last 24hrs VS reviewed since prior progress note. Most recent are:  Visit Vitals    /64 (BP 1 Location: Right arm, BP Patient Position: At rest)    Pulse 65    Temp 98 °F (36.7 °C)    Resp 16    Ht 5' 10\" (1.778 m)    Wt 109.3 kg (241 lb)    SpO2 99%    BMI 34.58 kg/m2       Intake/Output Summary (Last 24 hours) at 04/08/18 0840  Last data filed at 04/08/18 7386   Gross per 24 hour   Intake                0 ml   Output             1975 ml   Net            -1975 ml      Physical Examination:        Constitutional:  No acute distress, cooperative, pleasant    ENT:  Oral mucous membranes moist, oropharynx benign. Resp:  No accessory muscle use and on RA   CV:  Has a murmur. GI:  Obese, non distended    Musculoskeletal:  No edema, warm, 2+ pulses throughout. Leg immobilizer on R leg. Pulses palpable. Neurologic:  Moves all extremities.   AAOx3       Data Review:    Review and/or order of clinical lab test  Review and/or order of tests in the medicine section of CPT      Labs:     Recent Labs      04/07/18   0650  04/05/18   1945   WBC  5.3  5.6   HGB  9.4*  10.9*   HCT  30.0*  35.0*   PLT  123*  159     Recent Labs      04/08/18   0535  04/07/18   0650   NA  139  138   K  3.6  3.7   CL  109*  107   CO2  23  24   BUN  13  14   CREA  0.76  0.78   GLU  88  99   CA  7.7*  7.5*   MG   --   2.1   PHOS   --   3.0     Recent Labs      04/07/18   0650   SGOT  22   ALT  7*   AP  66   TBILI  0.6   TP  6.0*   ALB  2.2*   GLOB  3.8     Recent Labs      04/07/18   0650   INR  2.9*   PTP  29.6*      No results for input(s): FE, TIBC, PSAT, FERR in the last 72 hours. Lab Results   Component Value Date/Time    Folate 9.2 03/18/2010 11:26 AM      No results for input(s): PH, PCO2, PO2 in the last 72 hours. No results for input(s): CPK, CKNDX, TROIQ in the last 72 hours.     No lab exists for component: CPKMB  Lab Results   Component Value Date/Time    Cholesterol, total 141 07/26/2017 01:24 PM    HDL Cholesterol 63 07/26/2017 01:24 PM    LDL, calculated 58 07/26/2017 01:24 PM    Triglyceride 98 07/26/2017 01:24 PM    CHOL/HDL Ratio 3.5 11/01/2010 07:07 AM     Lab Results   Component Value Date/Time    Glucose (POC) 86 04/08/2018 06:24 AM    Glucose (POC) 169 (H) 04/07/2018 09:34 PM    Glucose (POC) 148 (H) 04/07/2018 04:17 PM    Glucose (POC) 94 04/07/2018 12:11 PM    Glucose (POC) 99 04/07/2018 06:10 AM     Lab Results   Component Value Date/Time    Color YELLOW/STRAW 04/07/2018 06:50 AM    Appearance CLEAR 04/07/2018 06:50 AM    Specific gravity 1.021 04/07/2018 06:50 AM    Specific gravity 1.020 02/28/2018 05:01 PM    pH (UA) 6.0 04/07/2018 06:50 AM    Protein NEGATIVE  04/07/2018 06:50 AM    Glucose >1000 (A) 04/07/2018 06:50 AM    Ketone NEGATIVE  04/07/2018 06:50 AM    Bilirubin NEGATIVE  04/07/2018 06:50 AM    Urobilinogen 0.2 04/07/2018 06:50 AM    Nitrites NEGATIVE  04/07/2018 06:50 AM    Leukocyte Esterase NEGATIVE  04/07/2018 06:50 AM    Epithelial cells FEW 04/07/2018 06:50 AM    Bacteria NEGATIVE  04/07/2018 06:50 AM    WBC 0-4 04/07/2018 06:50 AM    RBC 0-5 04/07/2018 06:50 AM         Medications Reviewed:     Current Facility-Administered Medications   Medication Dose Route Frequency    acetaminophen (TYLENOL) tablet 650 mg  650 mg Oral Q4H PRN    HYDROmorphone (PF) (DILAUDID) injection 0.5 mg  0.5 mg IntraVENous Q4H PRN    albuterol-ipratropium (DUO-NEB) 2.5 MG-0.5 MG/3 ML  3 mL Nebulization Q4H PRN    ceFAZolin (ANCEF) 2 g/20 mL in sterile water IV syringe  2 g IntraVENous Q8H    clobetasol (TEMOVATE) 0.05 % ointment   Topical PRN    famotidine (PEPCID) tablet 20 mg  20 mg Oral BID    folic acid (FOLVITE) tablet 1 mg  1 mg Oral DAILY    gabapentin (NEURONTIN) capsule 600 mg  600 mg Oral QHS    insulin glargine (LANTUS) injection 30 Units  30 Units SubCUTAneous BID    lisinopril (PRINIVIL, ZESTRIL) tablet 20 mg  20 mg Oral DAILY    lovastatin (MEVACOR) tablet 20 mg  20 mg Oral QHS    oxyCODONE IR (ROXICODONE) tablet 7.5 mg  7.5 mg Oral Q3H PRN    polyethylene glycol (MIRALAX) packet 17 g  17 g Oral DAILY    primidone (MYSOLINE) tablet 250 mg  250 mg Oral BID    senna-docusate (PERICOLACE) 8.6-50 mg per tablet 1 Tab  1 Tab Oral BID    sertraline (ZOLOFT) tablet 100 mg  100 mg Oral DAILY    sodium chloride (NS) flush 5-10 mL  5-10 mL IntraVENous Q8H    sodium chloride (NS) flush 5-10 mL  5-10 mL IntraVENous PRN    0.9% sodium chloride infusion  75 mL/hr IntraVENous CONTINUOUS    ondansetron (ZOFRAN) injection 4 mg  4 mg IntraVENous Q4H PRN    lactobac ac& pc-s.therm-b.anim (WESLEY Q/RISAQUAD)  1 Cap Oral DAILY    insulin lispro (HUMALOG) injection   SubCUTAneous AC&HS    glucose chewable tablet 16 g  4 Tab Oral PRN    dextrose (D50W) injection syrg 12.5-25 g  12.5-25 g IntraVENous PRN    glucagon (GLUCAGEN) injection 1 mg  1 mg IntraMUSCular PRN    bacitracin 500 unit/gram packet 1 Packet  1 Packet Topical PRN    alteplase (CATHFLO) 1 mg in sterile water (preservative free) 1 mL injection  1 mg InterCATHeter PRN   ______________________________________________________________________  EXPECTED LENGTH OF STAY: - - -  ACTUAL LENGTH OF STAY:          [address] [facility],

## 2018-04-08 NOTE — PROGRESS NOTES
Ortho Daily Progress Note      Patient: João Thao                   MRN: [id number]  Sex: male  YOB: 1950           Age: 69 y.o.     Feeling well this morning, no hip pain, remains in knee immobilizer  Aspiration of hip yesterday, cultures negative so far  Plan for revision to constrained liner tomorrow assuming cultures remain negative  NPO after midnight  Hold Coumadin    Javad Labor AMAN WASHINGTON  4/8/2018   11:13 AM      .

## 2018-04-08 NOTE — PROGRESS NOTES
[facility]  Progress Note    Maria Ines Asif Patient Status:  Inpatient    1932 MRN [id number]   [facility] 8NE-A Attending Lexie Bolaños MD   Hosp Day # 3 PCP Li Wolf MD     Assessment:  1.   Syncope - sounds vasovagal in Bedside shift change report given to Randy S Doyle Sears (oncoming nurse) by Pacheco Louise (offgoing nurse). Report included the following information SBAR, Kardex, Intake/Output, MAR and Recent Results. 129 lb (58.514 kg)      Physical Exam:    General: Alert and oriented x 3,  No apparent distress. HEENT: No focal deficits. Neck: No JVD, carotids 2+ no bruits.   Cardiac: Regular rate and rhythm, S1, S2 normal, no murmur  Lungs: Clear without wheezes, ra

## 2018-04-09 ENCOUNTER — APPOINTMENT (OUTPATIENT)
Dept: GENERAL RADIOLOGY | Age: 68
DRG: 468 | End: 2018-04-09
Attending: HOSPITALIST
Payer: COMMERCIAL

## 2018-04-09 ENCOUNTER — ANESTHESIA (OUTPATIENT)
Dept: SURGERY | Age: 68
DRG: 468 | End: 2018-04-09
Payer: COMMERCIAL

## 2018-04-09 ENCOUNTER — ANESTHESIA EVENT (OUTPATIENT)
Dept: SURGERY | Age: 68
DRG: 468 | End: 2018-04-09
Payer: COMMERCIAL

## 2018-04-09 LAB
ABO + RH BLD: NORMAL
BLOOD GROUP ANTIBODIES SERPL: NORMAL
GLUCOSE BLD STRIP.AUTO-MCNC: 157 MG/DL (ref 65–100)
GLUCOSE BLD STRIP.AUTO-MCNC: 83 MG/DL (ref 65–100)
GLUCOSE BLD STRIP.AUTO-MCNC: 86 MG/DL (ref 65–100)
INR BLD: 1.3 (ref 0.9–1.2)
SERVICE CMNT-IMP: ABNORMAL
SERVICE CMNT-IMP: NORMAL
SERVICE CMNT-IMP: NORMAL
SPECIMEN EXP DATE BLD: NORMAL

## 2018-04-09 PROCEDURE — 74011250637 HC RX REV CODE- 250/637: Performed by: INTERNAL MEDICINE

## 2018-04-09 PROCEDURE — 74011250636 HC RX REV CODE- 250/636

## 2018-04-09 PROCEDURE — 74011250636 HC RX REV CODE- 250/636: Performed by: INTERNAL MEDICINE

## 2018-04-09 PROCEDURE — 77030008684 HC TU ET CUF COVD -B: Performed by: ANESTHESIOLOGY

## 2018-04-09 PROCEDURE — C1713 ANCHOR/SCREW BN/BN,TIS/BN: HCPCS | Performed by: ORTHOPAEDIC SURGERY

## 2018-04-09 PROCEDURE — 74011250636 HC RX REV CODE- 250/636: Performed by: ANESTHESIOLOGY

## 2018-04-09 PROCEDURE — 73501 X-RAY EXAM HIP UNI 1 VIEW: CPT

## 2018-04-09 PROCEDURE — 74011250636 HC RX REV CODE- 250/636: Performed by: ORTHOPAEDIC SURGERY

## 2018-04-09 PROCEDURE — 74011000250 HC RX REV CODE- 250: Performed by: ORTHOPAEDIC SURGERY

## 2018-04-09 PROCEDURE — 77030019908 HC STETH ESOPH SIMS -A: Performed by: ANESTHESIOLOGY

## 2018-04-09 PROCEDURE — 77030032490 HC SLV COMPR SCD KNE COVD -B

## 2018-04-09 PROCEDURE — 77030010507 HC ADH SKN DERMBND J&J -B: Performed by: ORTHOPAEDIC SURGERY

## 2018-04-09 PROCEDURE — 74011250637 HC RX REV CODE- 250/637: Performed by: PHYSICIAN ASSISTANT

## 2018-04-09 PROCEDURE — 76060000034 HC ANESTHESIA 1.5 TO 2 HR: Performed by: ORTHOPAEDIC SURGERY

## 2018-04-09 PROCEDURE — 77030013079 HC BLNKT BAIR HGGR 3M -A: Performed by: ANESTHESIOLOGY

## 2018-04-09 PROCEDURE — 77030026438 HC STYL ET INTUB CARD -A: Performed by: ANESTHESIOLOGY

## 2018-04-09 PROCEDURE — 77030020274 HC MISC IMPL ORTHOPEDIC: Performed by: ORTHOPAEDIC SURGERY

## 2018-04-09 PROCEDURE — 77030002933 HC SUT MCRYL J&J -A: Performed by: ORTHOPAEDIC SURGERY

## 2018-04-09 PROCEDURE — 65270000029 HC RM PRIVATE

## 2018-04-09 PROCEDURE — 87205 SMEAR GRAM STAIN: CPT | Performed by: INTERNAL MEDICINE

## 2018-04-09 PROCEDURE — 77030020788: Performed by: ORTHOPAEDIC SURGERY

## 2018-04-09 PROCEDURE — 87075 CULTR BACTERIA EXCEPT BLOOD: CPT | Performed by: INTERNAL MEDICINE

## 2018-04-09 PROCEDURE — 77030032490 HC SLV COMPR SCD KNE COVD -B: Performed by: ORTHOPAEDIC SURGERY

## 2018-04-09 PROCEDURE — 76010000153 HC OR TIME 1.5 TO 2 HR: Performed by: ORTHOPAEDIC SURGERY

## 2018-04-09 PROCEDURE — 77030003666 HC NDL SPINAL BD -A: Performed by: ORTHOPAEDIC SURGERY

## 2018-04-09 PROCEDURE — 74011250637 HC RX REV CODE- 250/637: Performed by: ORTHOPAEDIC SURGERY

## 2018-04-09 PROCEDURE — 76000 FLUOROSCOPY <1 HR PHYS/QHP: CPT

## 2018-04-09 PROCEDURE — 0SRR0JA REPLACEMENT OF RIGHT HIP JOINT, FEMORAL SURFACE WITH SYNTHETIC SUBSTITUTE, UNCEMENTED, OPEN APPROACH: ICD-10-PCS | Performed by: ORTHOPAEDIC SURGERY

## 2018-04-09 PROCEDURE — C1776 JOINT DEVICE (IMPLANTABLE): HCPCS | Performed by: ORTHOPAEDIC SURGERY

## 2018-04-09 PROCEDURE — 77030031139 HC SUT VCRL2 J&J -A: Performed by: ORTHOPAEDIC SURGERY

## 2018-04-09 PROCEDURE — 0SUA09Z SUPPLEMENT RIGHT HIP JOINT, ACETABULAR SURFACE WITH LINER, OPEN APPROACH: ICD-10-PCS | Performed by: ORTHOPAEDIC SURGERY

## 2018-04-09 PROCEDURE — 77030011640 HC PAD GRND REM COVD -A: Performed by: ORTHOPAEDIC SURGERY

## 2018-04-09 PROCEDURE — 82962 GLUCOSE BLOOD TEST: CPT

## 2018-04-09 PROCEDURE — 36415 COLL VENOUS BLD VENIPUNCTURE: CPT | Performed by: ORTHOPAEDIC SURGERY

## 2018-04-09 PROCEDURE — 74011000250 HC RX REV CODE- 250

## 2018-04-09 PROCEDURE — 77030018836 HC SOL IRR NACL ICUM -A: Performed by: ORTHOPAEDIC SURGERY

## 2018-04-09 PROCEDURE — 86900 BLOOD TYPING SEROLOGIC ABO: CPT | Performed by: ORTHOPAEDIC SURGERY

## 2018-04-09 PROCEDURE — 0SP909Z REMOVAL OF LINER FROM RIGHT HIP JOINT, OPEN APPROACH: ICD-10-PCS | Performed by: ORTHOPAEDIC SURGERY

## 2018-04-09 PROCEDURE — 77030012935 HC DRSG AQUACEL BMS -B: Performed by: ORTHOPAEDIC SURGERY

## 2018-04-09 PROCEDURE — 85610 PROTHROMBIN TIME: CPT

## 2018-04-09 PROCEDURE — 77030018846 HC SOL IRR STRL H20 ICUM -A: Performed by: ORTHOPAEDIC SURGERY

## 2018-04-09 PROCEDURE — 0SPR0JZ REMOVAL OF SYNTHETIC SUBSTITUTE FROM RIGHT HIP JOINT, FEMORAL SURFACE, OPEN APPROACH: ICD-10-PCS | Performed by: ORTHOPAEDIC SURGERY

## 2018-04-09 PROCEDURE — 76210000016 HC OR PH I REC 1 TO 1.5 HR: Performed by: ORTHOPAEDIC SURGERY

## 2018-04-09 PROCEDURE — 77030034850: Performed by: ORTHOPAEDIC SURGERY

## 2018-04-09 PROCEDURE — 74011636637 HC RX REV CODE- 636/637: Performed by: INTERNAL MEDICINE

## 2018-04-09 PROCEDURE — 77030035236 HC SUT PDS STRATFX BARB J&J -B: Performed by: ORTHOPAEDIC SURGERY

## 2018-04-09 PROCEDURE — 77030011264 HC ELECTRD BLD EXT COVD -A: Performed by: ORTHOPAEDIC SURGERY

## 2018-04-09 PROCEDURE — 77030008467 HC STPLR SKN COVD -B: Performed by: ORTHOPAEDIC SURGERY

## 2018-04-09 DEVICE — SELF CENTERING BI-POLAR HEAD 28MM ID 40MM OD
Type: IMPLANTABLE DEVICE | Site: HIP | Status: NON-FUNCTIONAL
Brand: SELF CENTERING
Removed: 2020-08-14

## 2018-04-09 DEVICE — ARTICUL/EZE FEMORAL HEAD DIAMETER 28MM +8.5 12/14 TAPER
Type: IMPLANTABLE DEVICE | Site: HIP | Status: FUNCTIONAL
Brand: ARTICUL/EZE

## 2018-04-09 DEVICE — STIMULAN® RAPID CURE PROVIDED STERILE FOR SINGLE PATIENT USE. STIMULAN® RAPID CURE CONTAINS CALCIUM SULFATE POWDER AND MIXING SOLUTION IN PRE-MEASURED QUANTITIES SO THAT WHEN MIXED TOGETHER IN A STERILE MIXING BOWL, THE RESULTANT PASTE IS TO BE DIGITALLY PACKED INTO OPEN BONE VOID/GAP TO SET INSITU OR PLACED INTO THE MOULD PROVIDED, THE MIXTURE SETS TO FORM BEADS. THE BIODEGRADABLE, RADIOPAQUE BEADS ARE RESORBED IN APPROXIMATELY 30 – 60 DAYS WHEN USED IN ACCORDANCE WITH THE DEVICE LABELLING. STIMULAN® RAPID CURE IS MANUFACTURED FROM SYNTHETIC IMPLANT GRADE CALCIUM SULFATE DIHYDRATE(CASO4.2H2O) THAT RESORBS AND IS REPLACED WITH BONE DURING THE HEALING PROCESS. ALSO, AS THE BONE VOID FILLER BEADS ARE BIODEGRADABLE AND BIOCOMPATIBLE, THEY MAY BE USED AT AN INFECTED SITE.
Type: IMPLANTABLE DEVICE | Site: HIP | Status: FUNCTIONAL
Brand: STIMULAN® RAPID CURE

## 2018-04-09 DEVICE — PINNACLE HIP SOLUTIONS ALTRX LD POLYETHYLENE ACETABULAR LINER +4 10 DEGREE 40MM ID 58MM OD
Type: IMPLANTABLE DEVICE | Site: HIP | Status: FUNCTIONAL
Brand: PINNACLE ALTRX

## 2018-04-09 RX ORDER — EPHEDRINE SULFATE 50 MG/ML
5 INJECTION, SOLUTION INTRAVENOUS AS NEEDED
Status: DISCONTINUED | OUTPATIENT
Start: 2018-04-09 | End: 2018-04-09 | Stop reason: HOSPADM

## 2018-04-09 RX ORDER — SODIUM CHLORIDE, SODIUM LACTATE, POTASSIUM CHLORIDE, CALCIUM CHLORIDE 600; 310; 30; 20 MG/100ML; MG/100ML; MG/100ML; MG/100ML
INJECTION, SOLUTION INTRAVENOUS
Status: DISCONTINUED | OUTPATIENT
Start: 2018-04-09 | End: 2018-04-09 | Stop reason: HOSPADM

## 2018-04-09 RX ORDER — MIDAZOLAM HYDROCHLORIDE 1 MG/ML
INJECTION, SOLUTION INTRAMUSCULAR; INTRAVENOUS AS NEEDED
Status: DISCONTINUED | OUTPATIENT
Start: 2018-04-09 | End: 2018-04-09 | Stop reason: HOSPADM

## 2018-04-09 RX ORDER — SODIUM CHLORIDE 9 MG/ML
25 INJECTION, SOLUTION INTRAVENOUS CONTINUOUS
Status: DISCONTINUED | OUTPATIENT
Start: 2018-04-09 | End: 2018-04-10

## 2018-04-09 RX ORDER — PHENYLEPHRINE HCL IN 0.9% NACL 0.4MG/10ML
SYRINGE (ML) INTRAVENOUS AS NEEDED
Status: DISCONTINUED | OUTPATIENT
Start: 2018-04-09 | End: 2018-04-09 | Stop reason: HOSPADM

## 2018-04-09 RX ORDER — FENTANYL CITRATE 50 UG/ML
25 INJECTION, SOLUTION INTRAMUSCULAR; INTRAVENOUS
Status: COMPLETED | OUTPATIENT
Start: 2018-04-09 | End: 2018-04-09

## 2018-04-09 RX ORDER — ALBUTEROL SULFATE 0.83 MG/ML
2.5 SOLUTION RESPIRATORY (INHALATION) AS NEEDED
Status: DISCONTINUED | OUTPATIENT
Start: 2018-04-09 | End: 2018-04-09 | Stop reason: HOSPADM

## 2018-04-09 RX ORDER — ROCURONIUM BROMIDE 10 MG/ML
INJECTION, SOLUTION INTRAVENOUS AS NEEDED
Status: DISCONTINUED | OUTPATIENT
Start: 2018-04-09 | End: 2018-04-09 | Stop reason: HOSPADM

## 2018-04-09 RX ORDER — NEOSTIGMINE METHYLSULFATE 1 MG/ML
INJECTION INTRAVENOUS AS NEEDED
Status: DISCONTINUED | OUTPATIENT
Start: 2018-04-09 | End: 2018-04-09 | Stop reason: HOSPADM

## 2018-04-09 RX ORDER — MIDAZOLAM HYDROCHLORIDE 1 MG/ML
1 INJECTION, SOLUTION INTRAMUSCULAR; INTRAVENOUS AS NEEDED
Status: DISCONTINUED | OUTPATIENT
Start: 2018-04-09 | End: 2018-04-11 | Stop reason: HOSPADM

## 2018-04-09 RX ORDER — GLYCOPYRROLATE 0.2 MG/ML
INJECTION INTRAMUSCULAR; INTRAVENOUS AS NEEDED
Status: DISCONTINUED | OUTPATIENT
Start: 2018-04-09 | End: 2018-04-09 | Stop reason: HOSPADM

## 2018-04-09 RX ORDER — GLYCOPYRROLATE 0.2 MG/ML
0.2 INJECTION INTRAMUSCULAR; INTRAVENOUS
Status: DISCONTINUED | OUTPATIENT
Start: 2018-04-09 | End: 2018-04-11 | Stop reason: HOSPADM

## 2018-04-09 RX ORDER — SUCCINYLCHOLINE CHLORIDE 20 MG/ML
INJECTION INTRAMUSCULAR; INTRAVENOUS AS NEEDED
Status: DISCONTINUED | OUTPATIENT
Start: 2018-04-09 | End: 2018-04-09 | Stop reason: HOSPADM

## 2018-04-09 RX ORDER — LIDOCAINE HYDROCHLORIDE 10 MG/ML
0.1 INJECTION, SOLUTION EPIDURAL; INFILTRATION; INTRACAUDAL; PERINEURAL AS NEEDED
Status: DISCONTINUED | OUTPATIENT
Start: 2018-04-09 | End: 2018-04-11 | Stop reason: HOSPADM

## 2018-04-09 RX ORDER — ONDANSETRON 2 MG/ML
4 INJECTION INTRAMUSCULAR; INTRAVENOUS AS NEEDED
Status: DISCONTINUED | OUTPATIENT
Start: 2018-04-09 | End: 2018-04-09 | Stop reason: HOSPADM

## 2018-04-09 RX ORDER — SODIUM CHLORIDE, SODIUM LACTATE, POTASSIUM CHLORIDE, CALCIUM CHLORIDE 600; 310; 30; 20 MG/100ML; MG/100ML; MG/100ML; MG/100ML
1000 INJECTION, SOLUTION INTRAVENOUS CONTINUOUS
Status: DISCONTINUED | OUTPATIENT
Start: 2018-04-09 | End: 2018-04-09 | Stop reason: HOSPADM

## 2018-04-09 RX ORDER — FENTANYL CITRATE 50 UG/ML
INJECTION, SOLUTION INTRAMUSCULAR; INTRAVENOUS AS NEEDED
Status: DISCONTINUED | OUTPATIENT
Start: 2018-04-09 | End: 2018-04-09 | Stop reason: HOSPADM

## 2018-04-09 RX ORDER — WARFARIN 4 MG/1
4 TABLET ORAL ONCE
Status: COMPLETED | OUTPATIENT
Start: 2018-04-09 | End: 2018-04-09

## 2018-04-09 RX ORDER — HYDROMORPHONE HYDROCHLORIDE 2 MG/ML
INJECTION, SOLUTION INTRAMUSCULAR; INTRAVENOUS; SUBCUTANEOUS AS NEEDED
Status: DISCONTINUED | OUTPATIENT
Start: 2018-04-09 | End: 2018-04-09 | Stop reason: HOSPADM

## 2018-04-09 RX ORDER — ONDANSETRON 2 MG/ML
INJECTION INTRAMUSCULAR; INTRAVENOUS AS NEEDED
Status: DISCONTINUED | OUTPATIENT
Start: 2018-04-09 | End: 2018-04-09 | Stop reason: HOSPADM

## 2018-04-09 RX ORDER — EPHEDRINE SULFATE 50 MG/ML
INJECTION, SOLUTION INTRAVENOUS AS NEEDED
Status: DISCONTINUED | OUTPATIENT
Start: 2018-04-09 | End: 2018-04-09 | Stop reason: HOSPADM

## 2018-04-09 RX ORDER — MORPHINE SULFATE 10 MG/ML
2 INJECTION, SOLUTION INTRAMUSCULAR; INTRAVENOUS
Status: DISCONTINUED | OUTPATIENT
Start: 2018-04-09 | End: 2018-04-09 | Stop reason: HOSPADM

## 2018-04-09 RX ORDER — FENTANYL CITRATE 50 UG/ML
50 INJECTION, SOLUTION INTRAMUSCULAR; INTRAVENOUS AS NEEDED
Status: DISCONTINUED | OUTPATIENT
Start: 2018-04-09 | End: 2018-04-11 | Stop reason: HOSPADM

## 2018-04-09 RX ORDER — TOBRAMYCIN 1.2 G/30ML
INJECTION, POWDER, LYOPHILIZED, FOR SOLUTION INTRAVENOUS AS NEEDED
Status: DISCONTINUED | OUTPATIENT
Start: 2018-04-09 | End: 2018-04-09 | Stop reason: HOSPADM

## 2018-04-09 RX ORDER — MIDAZOLAM HYDROCHLORIDE 1 MG/ML
0.5 INJECTION, SOLUTION INTRAMUSCULAR; INTRAVENOUS
Status: DISCONTINUED | OUTPATIENT
Start: 2018-04-09 | End: 2018-04-09 | Stop reason: HOSPADM

## 2018-04-09 RX ORDER — DIPHENHYDRAMINE HYDROCHLORIDE 50 MG/ML
12.5 INJECTION, SOLUTION INTRAMUSCULAR; INTRAVENOUS AS NEEDED
Status: DISCONTINUED | OUTPATIENT
Start: 2018-04-09 | End: 2018-04-09 | Stop reason: HOSPADM

## 2018-04-09 RX ORDER — LIDOCAINE HYDROCHLORIDE 20 MG/ML
INJECTION, SOLUTION EPIDURAL; INFILTRATION; INTRACAUDAL; PERINEURAL AS NEEDED
Status: DISCONTINUED | OUTPATIENT
Start: 2018-04-09 | End: 2018-04-09 | Stop reason: HOSPADM

## 2018-04-09 RX ORDER — HYDROCODONE BITARTRATE AND ACETAMINOPHEN 5; 325 MG/1; MG/1
1 TABLET ORAL AS NEEDED
Status: DISCONTINUED | OUTPATIENT
Start: 2018-04-09 | End: 2018-04-09 | Stop reason: HOSPADM

## 2018-04-09 RX ORDER — OXYCODONE HYDROCHLORIDE 5 MG/1
10 TABLET ORAL
Status: DISCONTINUED | OUTPATIENT
Start: 2018-04-09 | End: 2018-04-11 | Stop reason: HOSPADM

## 2018-04-09 RX ORDER — PROPOFOL 10 MG/ML
INJECTION, EMULSION INTRAVENOUS AS NEEDED
Status: DISCONTINUED | OUTPATIENT
Start: 2018-04-09 | End: 2018-04-09 | Stop reason: HOSPADM

## 2018-04-09 RX ORDER — SODIUM CHLORIDE 9 MG/ML
25 INJECTION, SOLUTION INTRAVENOUS CONTINUOUS
Status: DISCONTINUED | OUTPATIENT
Start: 2018-04-09 | End: 2018-04-09 | Stop reason: HOSPADM

## 2018-04-09 RX ORDER — DEXMEDETOMIDINE HYDROCHLORIDE 4 UG/ML
INJECTION, SOLUTION INTRAVENOUS AS NEEDED
Status: DISCONTINUED | OUTPATIENT
Start: 2018-04-09 | End: 2018-04-09 | Stop reason: HOSPADM

## 2018-04-09 RX ORDER — SODIUM CHLORIDE, SODIUM LACTATE, POTASSIUM CHLORIDE, CALCIUM CHLORIDE 600; 310; 30; 20 MG/100ML; MG/100ML; MG/100ML; MG/100ML
1000 INJECTION, SOLUTION INTRAVENOUS CONTINUOUS
Status: DISCONTINUED | OUTPATIENT
Start: 2018-04-09 | End: 2018-04-10

## 2018-04-09 RX ORDER — ROPIVACAINE HYDROCHLORIDE 5 MG/ML
30 INJECTION, SOLUTION EPIDURAL; INFILTRATION; PERINEURAL AS NEEDED
Status: DISCONTINUED | OUTPATIENT
Start: 2018-04-09 | End: 2018-04-11 | Stop reason: HOSPADM

## 2018-04-09 RX ADMIN — FENTANYL CITRATE 25 MCG: 50 INJECTION, SOLUTION INTRAMUSCULAR; INTRAVENOUS at 15:04

## 2018-04-09 RX ADMIN — ACETAMINOPHEN 650 MG: 325 TABLET, FILM COATED ORAL at 22:27

## 2018-04-09 RX ADMIN — SERTRALINE HYDROCHLORIDE 100 MG: 50 TABLET ORAL at 10:11

## 2018-04-09 RX ADMIN — Medication 80 MCG: at 12:17

## 2018-04-09 RX ADMIN — EPHEDRINE SULFATE 5 MG: 50 INJECTION, SOLUTION INTRAVENOUS at 13:11

## 2018-04-09 RX ADMIN — ROCURONIUM BROMIDE 10 MG: 10 INJECTION, SOLUTION INTRAVENOUS at 12:28

## 2018-04-09 RX ADMIN — Medication 10 ML: at 09:01

## 2018-04-09 RX ADMIN — SODIUM CHLORIDE, SODIUM LACTATE, POTASSIUM CHLORIDE, CALCIUM CHLORIDE: 600; 310; 30; 20 INJECTION, SOLUTION INTRAVENOUS at 13:45

## 2018-04-09 RX ADMIN — EPHEDRINE SULFATE 5 MG: 50 INJECTION, SOLUTION INTRAVENOUS at 12:36

## 2018-04-09 RX ADMIN — ROCURONIUM BROMIDE 10 MG: 10 INJECTION, SOLUTION INTRAVENOUS at 12:47

## 2018-04-09 RX ADMIN — DEXMEDETOMIDINE HYDROCHLORIDE 2 MCG: 4 INJECTION, SOLUTION INTRAVENOUS at 13:35

## 2018-04-09 RX ADMIN — NEOSTIGMINE METHYLSULFATE 4 MG: 1 INJECTION INTRAVENOUS at 13:36

## 2018-04-09 RX ADMIN — FENTANYL CITRATE 50 MCG: 50 INJECTION, SOLUTION INTRAMUSCULAR; INTRAVENOUS at 12:52

## 2018-04-09 RX ADMIN — LOVASTATIN 20 MG: 20 TABLET ORAL at 22:28

## 2018-04-09 RX ADMIN — HYDROMORPHONE HYDROCHLORIDE 0.4 MG: 2 INJECTION, SOLUTION INTRAMUSCULAR; INTRAVENOUS; SUBCUTANEOUS at 13:46

## 2018-04-09 RX ADMIN — LISINOPRIL 20 MG: 20 TABLET ORAL at 10:11

## 2018-04-09 RX ADMIN — ACETAMINOPHEN 650 MG: 325 TABLET, FILM COATED ORAL at 12:03

## 2018-04-09 RX ADMIN — FENTANYL CITRATE 25 MCG: 50 INJECTION, SOLUTION INTRAMUSCULAR; INTRAVENOUS at 14:40

## 2018-04-09 RX ADMIN — WARFARIN SODIUM 4 MG: 4 TABLET ORAL at 22:00

## 2018-04-09 RX ADMIN — MIDAZOLAM HYDROCHLORIDE 2 MG: 1 INJECTION, SOLUTION INTRAMUSCULAR; INTRAVENOUS at 12:07

## 2018-04-09 RX ADMIN — FAMOTIDINE 20 MG: 20 TABLET, FILM COATED ORAL at 17:29

## 2018-04-09 RX ADMIN — Medication 10 ML: at 22:29

## 2018-04-09 RX ADMIN — GLYCOPYRROLATE 0.7 MG: 0.2 INJECTION INTRAMUSCULAR; INTRAVENOUS at 13:36

## 2018-04-09 RX ADMIN — OXYCODONE HYDROCHLORIDE 10 MG: 5 TABLET ORAL at 18:09

## 2018-04-09 RX ADMIN — DEXMEDETOMIDINE HYDROCHLORIDE 2 MCG: 4 INJECTION, SOLUTION INTRAVENOUS at 13:40

## 2018-04-09 RX ADMIN — EPHEDRINE SULFATE 5 MG: 50 INJECTION, SOLUTION INTRAVENOUS at 12:40

## 2018-04-09 RX ADMIN — ROCURONIUM BROMIDE 25 MG: 10 INJECTION, SOLUTION INTRAVENOUS at 12:20

## 2018-04-09 RX ADMIN — PRIMIDONE 250 MG: 50 TABLET ORAL at 10:09

## 2018-04-09 RX ADMIN — STANDARDIZED SENNA CONCENTRATE AND DOCUSATE SODIUM 1 TABLET: 8.6; 5 TABLET, FILM COATED ORAL at 17:30

## 2018-04-09 RX ADMIN — FENTANYL CITRATE 100 MCG: 50 INJECTION, SOLUTION INTRAMUSCULAR; INTRAVENOUS at 12:16

## 2018-04-09 RX ADMIN — ROCURONIUM BROMIDE 5 MG: 10 INJECTION, SOLUTION INTRAVENOUS at 12:16

## 2018-04-09 RX ADMIN — PRIMIDONE 250 MG: 50 TABLET ORAL at 17:29

## 2018-04-09 RX ADMIN — DEXMEDETOMIDINE HYDROCHLORIDE 2 MCG: 4 INJECTION, SOLUTION INTRAVENOUS at 13:39

## 2018-04-09 RX ADMIN — Medication 80 MCG: at 13:11

## 2018-04-09 RX ADMIN — LIDOCAINE HYDROCHLORIDE 80 MG: 20 INJECTION, SOLUTION EPIDURAL; INFILTRATION; INTRACAUDAL; PERINEURAL at 12:16

## 2018-04-09 RX ADMIN — ONDANSETRON 4 MG: 2 INJECTION INTRAMUSCULAR; INTRAVENOUS at 13:36

## 2018-04-09 RX ADMIN — OXYCODONE HYDROCHLORIDE 10 MG: 5 TABLET ORAL at 22:27

## 2018-04-09 RX ADMIN — Medication 2 G: at 12:32

## 2018-04-09 RX ADMIN — FENTANYL CITRATE 25 MCG: 50 INJECTION, SOLUTION INTRAMUSCULAR; INTRAVENOUS at 14:13

## 2018-04-09 RX ADMIN — HYDROMORPHONE HYDROCHLORIDE 0.2 MG: 2 INJECTION, SOLUTION INTRAMUSCULAR; INTRAVENOUS; SUBCUTANEOUS at 13:28

## 2018-04-09 RX ADMIN — INSULIN GLARGINE 30 UNITS: 100 INJECTION, SOLUTION SUBCUTANEOUS at 17:30

## 2018-04-09 RX ADMIN — PROPOFOL 180 MG: 10 INJECTION, EMULSION INTRAVENOUS at 12:16

## 2018-04-09 RX ADMIN — DEXMEDETOMIDINE HYDROCHLORIDE 2 MCG: 4 INJECTION, SOLUTION INTRAVENOUS at 13:48

## 2018-04-09 RX ADMIN — Medication 2 G: at 05:10

## 2018-04-09 RX ADMIN — DEXMEDETOMIDINE HYDROCHLORIDE 4 MCG: 4 INJECTION, SOLUTION INTRAVENOUS at 13:44

## 2018-04-09 RX ADMIN — SODIUM CHLORIDE 75 ML/HR: 900 INJECTION, SOLUTION INTRAVENOUS at 04:01

## 2018-04-09 RX ADMIN — Medication 80 MCG: at 13:03

## 2018-04-09 RX ADMIN — OXYCODONE HYDROCHLORIDE 7.5 MG: 5 TABLET ORAL at 03:57

## 2018-04-09 RX ADMIN — FOLIC ACID 1 MG: 1 TABLET ORAL at 10:11

## 2018-04-09 RX ADMIN — EPHEDRINE SULFATE 5 MG: 50 INJECTION, SOLUTION INTRAVENOUS at 12:46

## 2018-04-09 RX ADMIN — FENTANYL CITRATE 100 MCG: 50 INJECTION, SOLUTION INTRAMUSCULAR; INTRAVENOUS at 12:41

## 2018-04-09 RX ADMIN — Medication 80 MCG: at 12:33

## 2018-04-09 RX ADMIN — Medication 80 MCG: at 12:20

## 2018-04-09 RX ADMIN — OXYCODONE HYDROCHLORIDE 7.5 MG: 5 TABLET ORAL at 09:01

## 2018-04-09 RX ADMIN — FENTANYL CITRATE 25 MCG: 50 INJECTION, SOLUTION INTRAMUSCULAR; INTRAVENOUS at 14:21

## 2018-04-09 RX ADMIN — ROCURONIUM BROMIDE 10 MG: 10 INJECTION, SOLUTION INTRAVENOUS at 13:28

## 2018-04-09 RX ADMIN — Medication 2 G: at 21:04

## 2018-04-09 RX ADMIN — SUCCINYLCHOLINE CHLORIDE 140 MG: 20 INJECTION INTRAMUSCULAR; INTRAVENOUS at 12:17

## 2018-04-09 RX ADMIN — ROCURONIUM BROMIDE 10 MG: 10 INJECTION, SOLUTION INTRAVENOUS at 13:03

## 2018-04-09 RX ADMIN — GABAPENTIN 600 MG: 300 CAPSULE ORAL at 22:26

## 2018-04-09 RX ADMIN — SODIUM CHLORIDE, SODIUM LACTATE, POTASSIUM CHLORIDE, CALCIUM CHLORIDE: 600; 310; 30; 20 INJECTION, SOLUTION INTRAVENOUS at 12:09

## 2018-04-09 RX ADMIN — HYDROMORPHONE HYDROCHLORIDE 0.4 MG: 2 INJECTION, SOLUTION INTRAMUSCULAR; INTRAVENOUS; SUBCUTANEOUS at 13:36

## 2018-04-09 NOTE — BRIEF OP NOTE
BRIEF OPERATIVE NOTE    Date of Procedure: 4/9/2018   Preoperative Diagnosis: * No pre-op diagnosis entered *  Postoperative Diagnosis: * No post-op diagnosis entered *    Procedure(s):  I & D REVISION FEMORAL HEAD AND POLYLINER RIGHT HIP  Surgeon(s) and Role:     * Bill Peterson MD - Primary         Assistant Staff: Physician Assistant: Pierce Morgan      Surgical Staff:  Circ-1: Deborah Chavez  Circ-Relief: Bridget Sharp RN  Physician Assistant: AMAN Morgan  Scrub RN-1: Sohail Adamson RN  Scrub RN-Relief: Amanda Phillips RN  Surg Asst-1: Antoinette Chinchilla  Event Time In   Incision Start 1241   Incision Close      Anesthesia: General   Estimated Blood Loss: 200mL  Specimens:   ID Type Source Tests Collected by Time Destination   1 : right hip joint #1 Wound Hip, right CULTURE, ANAEROBIC, CULTURE, WOUND W GRAM STAIN Bill Petreson MD 4/9/2018 1246 Microbiology   2 : right hip joint #2 Wound Hip, right CULTURE, ANAEROBIC, CULTURE, WOUND W GRAM STAIN Bill Peterson MD 4/9/2018 1247 Microbiology   3 : right hip joint #3 Wound Hip, right CULTURE, ANAEROBIC, CULTURE, WOUND W GRAM STAIN Bill Peterson MD 4/9/2018 1248 Microbiology      Findings: recurrent dislocation of right total hip   Complications: none  Implants:   Implant Name Type Inv.  Item Serial No.  Lot No. LRB No. Used Action   HEAD FEM SLF-CENTER [id number] RUST --  - SNA  HEAD FEM SLF-CENTER [id number] RUST --  NA [facility] ORTHOPEDICS [id number] Right 1 Implanted   HEAD FEM 28MM +8.5MM NK --  - SNA  HEAD FEM 28MM +8.5MM NK --  [facility] [id number] Right 1 Implanted   LINER ACET +4 10D [id number] -- ALTRX - SNA  LINER ACET +4 10D [id number] -- ALTRX NA [facility] ORTHOPEDICS [id number] Right 1 Implanted   GRAFT BNE PASTE RAPID CUR 10ML -- STIMULAN - SNA   GRAFT BNE PASTE RAPID CUR 10ML -- STIMULAN NA [employer] [id number]/17-[id number] Right 1 Implanted

## 2018-04-09 NOTE — BRIEF OP NOTE
BRIEF OPERATIVE NOTE    Date of Procedure: 4/9/2018   Preoperative Diagnosis: Recurrent dislocation of right hip  Postoperative Diagnosis: Recurrent dislocation of right hip    Procedure(s):  I & D REVISION FEMORAL HEAD AND POLYLINER RIGHT HIP  Surgeon(s) and Role:     Liliana Brito MD - Primary         Assistant Staff: Physician Assistant: AMAN Gonzalez      Surgical Staff:  Circ-1: Wendy Gupta  Circ-Relief: Grupo Noonan RN  Physician Assistant: AMAN Gonzalez  Scrub RN-1: Dora Villegas RN  Scrub RN-Relief: Esther Alexander RN  Surg Asst-1: Scooter Marci  Event Time In   Incision Start 1241   Incision Close 1348     Anesthesia: General   Estimated Blood Loss: 100cc  Specimens:   ID Type Source Tests Collected by Time Destination   1 : right hip joint #1 Wound Hip, right CULTURE, ANAEROBIC, CULTURE, WOUND W GRAM STAIN Darron Bautista MD 4/9/2018 1246 Microbiology   2 : right hip joint #2 Wound Hip, right CULTURE, ANAEROBIC, CULTURE, WOUND W GRAM STAIN Darron Bautista MD 4/9/2018 1247 Microbiology   3 : right hip joint #3 Wound Hip, right CULTURE, ANAEROBIC, CULTURE, WOUND W GRAM STAIN Darron Bautista MD 4/9/2018 1248 Microbiology      Findings: no evidence for infection, cultures sent   Complications: none  Implants:   Implant Name Type Inv.  Item Serial No.  Lot No. LRB No. Used Action   HEAD FEM SLF-CENTER [id number] RUST --  - SNA  HEAD FEM SLF-CENTER [id number] RUST --  NA [facility] ORTHOPEDICS [id number] Right 1 Implanted   HEAD FEM 28MM +8.5MM NK --  - SNA  HEAD FEM 28MM +8.5MM NK --  NA [facility] ORTHOPEDICS [id number] Right 1 Implanted   LINER ACET +4 10D [id number] -- ALTRX - SNA  LINER ACET +4 10D [id number] -- ALTRX NA [facility] ORTHOPEDICS [id number] Right 1 Implanted   GRAFT BNE PASTE RAPID CUR 10ML -- STIMULAN - SNA  GRAFT BNE PASTE RAPID CUR 10ML -- STIMULAN NA [employer] 08/17-R351/[id number] Right 1 Implanted   LINER ACET PINN +4 10D [id number] -- ALTRX    NA  [id number] Right 1 Explanted   HEAD FEM 36MM +1.5MM NK --  - SNA   HEAD FEM 36MM +1.5MM NK --  [facility] [id number] Right 1 Explanted

## 2018-04-09 NOTE — PROGRESS NOTES
Hospitalist Progress Note  Pako Pena NP  Answering service: [phone] OR [phone] from in house phone  Cell: [phone]      Date of Service:  2018  NAME:  Gely Yen  :  1950  MRN:  [id number]    Admission Summary:   Pt presented to the ED with sudden onset of right hip pain.  The pain was constant, worse with movement and unable to bear weight on right leg. The pain was described as sharp, 10/10 in severity - no known aggravating or relieving factors.  Pt reports a hx of R hip replacement done . He was recently admitted at [facility] from -2018 due to an infected R hip prosthesis and underwent irrigation and debridement of the right hip. He was also diagnosed with MV endocarditis and was discharged home on Ancef to complete a 6-week course (to end 4/10/2018).  Since the patient was discharged from the hospital, he has been to the ED 3 times with a dislocated right hip.       Pmhx: type 2 diabetes, dyslipidemia, benign essential tremor, depression, hypertension, obstructive sleep apnea     Interval history / Subjective:   Pt in bed resting with eyes closed - says he had trouble sleeping last pm (unsure why). Mild R leg pain relieved by oxycodone today. Assessment & Plan:     Dislocation of prosthetic right hip (POA):. - s/p IR aspiration of R hip , awaiting results - no growth thus far  - pain control prn  - further plans per Ortho - planned for procedure today. Pt is NPO     Hx Sepsis (septic arthritis and MV endocarditis): complete treatment 4/10  - was being treated for staph lugdunensis bacteremia (PTA). Per pt, treatment to continue through 4/10 (confirmed by ID)  - s/p PICC placement 3/1 but as per IV team PICC was pulled almost completely out per xray on admit and was recommended to be removed. PICC taken out  and peripheral line was placed.    - Ancef has been ordered per outpatient regimen  - ID following     Hx Type 2 diabetes:    - blood glucose , continue with home insulin/SSI  - hold oral agent until the time of discharge from the hospital.     Hx Dyslipidemia:  Mevacor.     Hx Benign Essential Tremor:  Primidone.     Hx Depression:  Zoloft.     Hx JACOB on CPAP:  We will continue with the CPAP with home setting.     Hx Hypertension:  BP controlled, continue home meds.      Obese: BMI 34.58    Restless Legs: consider for requip after surgery      DVT ppx: SCDs for now. Pt was on coumadin for DVT ppx after discharge x 6 weeks and was to have completed this 4/15  Code Status: Full  Care Plan: spoke with pt this am  Dispo: to be determined - depending on response with PT/OT pst-surgery. He lives at home with his son but son works. Pt would like / Dot Raman (PCP) to be updated on his condition/plan. Will do today 4/9 ++ Addendum 0900: called PCP office to update them on pts admit     Hospital Problems  Date Reviewed: 4/7/2018          Codes Class Noted POA    * (Principal)Dislocation of prosthetic joint [facility]) ICD-10-CM: T25.235Y  ICD-9-CM: 996.42  3/20/2018 Yes    Overview Signed 3/20/2018 11:37 PM by AMAN Landaverde     Right CIERRA                 Review of Systems:   Denies HA. No chest pain, pressure. No respiratory complaints or abdominal pain. Mild R leg pain, relieved by pain meds. Vital Signs:    Last 24hrs VS reviewed since prior progress note. Most recent are:  Visit Vitals    /49 (BP 1 Location: Right arm, BP Patient Position: At rest)    Pulse 63    Temp 98.4 °F (36.9 °C)    Resp 17    Ht 5' 10\" (1.778 m)    Wt 109.3 kg (241 lb)    SpO2 97%    BMI 34.58 kg/m2     No intake or output data in the 24 hours ending 04/09/18 0725   Physical Examination:        Constitutional:  No acute distress, cooperative, pleasant    ENT:  Oral mucous membranes dry, oropharynx benign. Resp:  No accessory muscle use and on RA   CV:  Has a murmur.  Rate is regular    GI:  Obese, non distended, active bowel sounds    Musculoskeletal:  R thigh and flank/hip edema, warm. Leg immobilizer on R leg. Pulses palpable. Neurologic:  Moves all extremities. AAOx3       Data Review:    Review and/or order of clinical lab test  Review and/or order of tests in the medicine section of [facility]      Labs:     Recent Labs      04/07/18   0650   WBC  5.3   HGB  9.4*   HCT  30.0*   PLT  123*     Recent Labs      04/08/18   0535  04/07/18   0650   NA  139  138   K  3.6  3.7   CL  109*  107   CO2  23  24   BUN  13  14   CREA  0.76  0.78   GLU  88  99   CA  7.7*  7.5*   MG   --   2.1   PHOS   --   3.0     Recent Labs      04/07/18   0650   SGOT  22   ALT  7*   AP  66   TBILI  0.6   TP  6.0*   ALB  2.2*   GLOB  3.8     Recent Labs      04/07/18   0650   INR  2.9*   PTP  29.6*      No results for input(s): FE, TIBC, PSAT, FERR in the last 72 hours. Lab Results   Component Value Date/Time    Folate 9.2 03/18/2010 11:26 AM      No results for input(s): PH, PCO2, PO2 in the last 72 hours. No results for input(s): CPK, CKNDX, TROIQ in the last 72 hours.     No lab exists for component: CPKMB  Lab Results   Component Value Date/Time    Cholesterol, total 141 07/26/2017 01:24 PM    HDL Cholesterol 63 07/26/2017 01:24 PM    LDL, calculated 58 07/26/2017 01:24 PM    Triglyceride 98 07/26/2017 01:24 PM    CHOL/HDL Ratio 3.5 11/01/2010 07:07 AM     Lab Results   Component Value Date/Time    Glucose (POC) 161 (H) 04/08/2018 09:25 PM    Glucose (POC) 194 (H) 04/08/2018 04:43 PM    Glucose (POC) 130 (H) 04/08/2018 11:50 AM    Glucose (POC) 86 04/08/2018 06:24 AM    Glucose (POC) 169 (H) 04/07/2018 09:34 PM     Lab Results   Component Value Date/Time    Color YELLOW/STRAW 04/07/2018 06:50 AM    Appearance CLEAR 04/07/2018 06:50 AM    Specific gravity 1.021 04/07/2018 06:50 AM    Specific gravity 1.020 02/28/2018 05:01 PM    pH (UA) 6.0 04/07/2018 06:50 AM    Protein NEGATIVE  04/07/2018 06:50 AM    Glucose >1000 (A) 04/07/2018 06:50 AM    Ketone NEGATIVE  04/07/2018 06:50 AM    Bilirubin NEGATIVE  04/07/2018 06:50 AM    Urobilinogen 0.2 04/07/2018 06:50 AM    Nitrites NEGATIVE  04/07/2018 06:50 AM    Leukocyte Esterase NEGATIVE  04/07/2018 06:50 AM    Epithelial cells FEW 04/07/2018 06:50 AM    Bacteria NEGATIVE  04/07/2018 06:50 AM    WBC 0-4 04/07/2018 06:50 AM    RBC 0-5 04/07/2018 06:50 AM         Medications Reviewed:     Current Facility-Administered Medications   Medication Dose Route Frequency    acetaminophen (TYLENOL) tablet 650 mg  650 mg Oral Q4H PRN    HYDROmorphone (PF) (DILAUDID) injection 0.5 mg  0.5 mg IntraVENous Q4H PRN    albuterol-ipratropium (DUO-NEB) 2.5 MG-0.5 MG/3 ML  3 mL Nebulization Q4H PRN    ceFAZolin (ANCEF) 2 g/20 mL in sterile water IV syringe  2 g IntraVENous Q8H    clobetasol (TEMOVATE) 0.05 % ointment   Topical PRN    famotidine (PEPCID) tablet 20 mg  20 mg Oral BID    folic acid (FOLVITE) tablet 1 mg  1 mg Oral DAILY    gabapentin (NEURONTIN) capsule 600 mg  600 mg Oral QHS    insulin glargine (LANTUS) injection 30 Units  30 Units SubCUTAneous BID    lisinopril (PRINIVIL, ZESTRIL) tablet 20 mg  20 mg Oral DAILY    lovastatin (MEVACOR) tablet 20 mg  20 mg Oral QHS    oxyCODONE IR (ROXICODONE) tablet 7.5 mg  7.5 mg Oral Q3H PRN    polyethylene glycol (MIRALAX) packet 17 g  17 g Oral DAILY    primidone (MYSOLINE) tablet 250 mg  250 mg Oral BID    senna-docusate (PERICOLACE) 8.6-50 mg per tablet 1 Tab  1 Tab Oral BID    sertraline (ZOLOFT) tablet 100 mg  100 mg Oral DAILY    sodium chloride (NS) flush 5-10 mL  5-10 mL IntraVENous Q8H    sodium chloride (NS) flush 5-10 mL  5-10 mL IntraVENous PRN    0.9% sodium chloride infusion  75 mL/hr IntraVENous CONTINUOUS    ondansetron (ZOFRAN) injection 4 mg  4 mg IntraVENous Q4H PRN    lactobac ac& pc-s.therm-b.anim (WESLEY Q/RISAQUAD)  1 Cap Oral DAILY    insulin lispro (HUMALOG) injection   SubCUTAneous AC&HS    glucose chewable tablet 16 g 4 Tab Oral PRN    dextrose (D50W) injection syrg 12.5-25 g  12.5-25 g IntraVENous PRN    glucagon (GLUCAGEN) injection 1 mg  1 mg IntraMUSCular PRN    bacitracin 500 unit/gram packet 1 Packet  1 Packet Topical PRN    alteplase (CATHFLO) 1 mg in sterile water (preservative free) 1 mL injection  1 mg InterCATHeter PRN   ______________________________________________________________________  EXPECTED LENGTH OF STAY: - - -  ACTUAL LENGTH OF STAY:          2              Washington Lemus NP

## 2018-04-09 NOTE — CDMP QUERY
After further study, do you concur with the findings of Sepsis noted in the H&P? If you do not concur please rule out the diagnosis of Sepsis in your next progress note. =>Other Explanation of the clinical findings  =>Unable to Determine (no explanation for clinical findings)    The medical record reflects the following clinical findings, risk factors and treatment:     Risk Factors:  Septic Arthritis    Clinical Indicators:     H&P  \" Sepsis present on admission. This is on ongoing problem, present on admission. The sepsis is as a result of septic arthritis of the right hip prosthesis as well as a mitral valve endocarditis. \"    WBC 5.3  Temp: 99.3   HR: 84  Resp: 22    Treatment: Antibiotics     Please clarify and document your clinical opinion in the progress notes and discharge summary including the definitive and/or presumptive diagnosis, (suspected or probable), related to the above clinical findings. Please include clinical findings supporting your diagnosis.     Thank you  Justina Ramos  [facility]  [phone]

## 2018-04-09 NOTE — PROGRESS NOTES
3:49 PM  TRANSFER - IN REPORT:    Verbal report received from Sheyla RN(name) on Yvonne Santamaria  being received from PACU (unit) for routine post - op      Report consisted of patients Situation, Background, Assessment and   Recommendations(SBAR). Information from the following report(s) SBAR, Kardex, OR Summary, Intake/Output and MAR was reviewed with the receiving nurse. Opportunity for questions and clarification was provided. Assessment completed upon patients arrival to unit and care assumed.

## 2018-04-09 NOTE — PROGRESS NOTES
11:10 AM  TRANSFER - OUT REPORT:    Verbal report given to Nikky Mcdonald RN(name) on Rebeca Ramirez  being transferred to OR (unit) for ordered procedure       Report consisted of patients Situation, Background, Assessment and   Recommendations(SBAR). Information from the following report(s) SBAR, Kardex, Intake/Output, MAR and Recent Results was reviewed with the receiving nurse. Lines:   Peripheral IV 04/07/18 Left; Lower Cephalic (Active)   Site Assessment Clean, dry, & intact 4/8/2018  8:42 AM   Phlebitis Assessment 0 4/8/2018  8:42 AM   Infiltration Assessment 0 4/8/2018  8:42 AM   Dressing Status Clean, dry, & intact 4/8/2018  8:42 AM   Dressing Type Transparent;Tape 4/8/2018  8:42 AM   Hub Color/Line Status Patent; Infusing 4/8/2018  8:42 AM   Action Taken Open ports on tubing capped 4/8/2018  8:42 AM   Alcohol Cap Used Yes 4/8/2018  8:42 AM        Opportunity for questions and clarification was provided.       Patient transported with:   [email or web address]

## 2018-04-09 NOTE — PROGRESS NOTES
ID Progress Note  2018    Subjective:     Afebrile. No dyspnea, abdominal pain, nausea, diarrhea, headache or sore throat. Objective:     Vitals:   Visit Vitals    /49 (BP 1 Location: Right arm, BP Patient Position: At rest)    Pulse 63    Temp 98.4 °F (36.9 °C)    Resp 17    Ht 5' 10\" (1.778 m)    Wt 109.3 kg (241 lb)    SpO2 97%    BMI 34.58 kg/m2        Tmax:  Temp (24hrs), Av.3 °F (36.8 °C), Min:98.1 °F (36.7 °C), Max:98.4 °F (36.9 °C)      Exam:    Not in distress  Lungs: clear to auscultation, no rales wheezes or rhonchi   Heart: s1, s2, no murmurs, rubs or clicks   Abdomen: soft, nontender, no guarding or rebound   Extremities: right hip incision is clean     Labs:   Lab Results   Component Value Date/Time    WBC 5.3 2018 06:50 AM    HGB 9.4 (L) 2018 06:50 AM    HCT 30.0 (L) 2018 06:50 AM    PLATELET 026 (L) 10/9002 06:50 AM    MCV 91.2 2018 06:50 AM     Lab Results   Component Value Date/Time    Sodium 139 2018 05:35 AM    Potassium 3.6 2018 05:35 AM    Chloride 109 (H) 2018 05:35 AM    CO2 23 2018 05:35 AM    Anion gap 7 2018 05:35 AM    Glucose 88 2018 05:35 AM    BUN 13 2018 05:35 AM    Creatinine 0.76 2018 05:35 AM    BUN/Creatinine ratio 17 2018 05:35 AM    GFR est AA >60 2018 05:35 AM    GFR est non-AA >60 2018 05:35 AM    Calcium 7.7 (L) 2018 05:35 AM    Bilirubin, total 0.6 2018 06:50 AM    AST (SGOT) 22 2018 06:50 AM    Alk. phosphatase 66 2018 06:50 AM    Protein, total 6.0 (L) 2018 06:50 AM    Albumin 2.2 (L) 2018 06:50 AM    Globulin 3.8 2018 06:50 AM    A-G Ratio 0.6 (L) 2018 06:50 AM    ALT (SGPT) 7 (L) 2018 06:50 AM             Assessment:     1. 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 2.  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 3.  Dislocated right hip. 4.  Diabetes.   5. History of patent foramen ovale. 6.  Hypertension. 7.  Obstructive sleep apnea.     PLAN:     Recommendations:      I will continue cefazolin at least through 04/10.   I would await right hip culture results    Esr and crp on Tuesday     Yvonne Hester MD

## 2018-04-09 NOTE — PROGRESS NOTES
Pharmacist Note  Warfarin Dosing  Consult provided for this 69 y.o. male to manage warfarin for VTE prophylaxis (s/p orthopedic surgery)    INR Goal: 1.7- 2.2    Preop Dose: none    Drugs that may increase INR: None  Drugs that may decrease INR: primidone  Other current anticoagulants/ drugs that may increase bleeding risk: None  Risk factors: Age > 65  Daily INR ordered: YES    Recent Labs      04/09/18   1147  04/07/18   0650   HGB   --   9.4*   INR  1.3*  2.9*       Date               INR                 Dose  4/9  1.3  4 mg    Assessment/ Plan: Will order warfarin 4 mg PO x 1 dose. Pharmacy will continue to monitor daily and adjust therapy as indicated.

## 2018-04-09 NOTE — CDMP QUERY
Please clarify if this patient carries a current diagnosis of Obesity in the setting of BMI 34.58.    =>Obese (BMI 30 - 39.9)  =>Overweight (BMI 25 - 29.9)  =>Other explanation of clinical findings  =>Unable to determine (no explanation for clinical findings)    Presentation:   BMI: 34.58  Ht. 5'10\"  Wt. 241 Lbs. REFERENCE:  The [address] [place] [address] has issued a statement indicating that, \"Individuals who are overweight, obese, or morbidly obese are at an increased risk for certain medical conditions when compared to persons of normal weight. Therefore, these conditions are always clinically significant and reportable when documented by the provider. Please clarify and document your clinical opinion in the progress notes and discharge summary, including the definitive and or presumptive diagnosis, (suspected or probable), related to the above clinical findings. Please include clinical findings supporting your diagnosis.      Thank you  David Degroot  [facility]  [phone]

## 2018-04-09 NOTE — ANESTHESIA PREPROCEDURE EVALUATION
Anesthetic History   No history of anesthetic complications            Review of Systems / Medical History  Patient summary reviewed, nursing notes reviewed and pertinent labs reviewed    Pulmonary        Sleep apnea           Neuro/Psych         Psychiatric history     Cardiovascular    Hypertension: well controlled              Exercise tolerance: <4 METS     GI/Hepatic/Renal     GERD: well controlled           Endo/Other    Diabetes: type 2, using insulin    Morbid obesity and anemia     Other Findings              Physical Exam    Airway  Mallampati: II  TM Distance: > 6 cm  Neck ROM: normal range of motion   Mouth opening: Normal     Cardiovascular  Regular rate and rhythm,  S1 and S2 normal,  no murmur, click, rub, or gallop             Dental  No notable dental hx       Pulmonary  Breath sounds clear to auscultation               Abdominal  GI exam deferred       Other Findings            Anesthetic Plan    ASA: 3  Anesthesia type: general          Induction: Intravenous  Anesthetic plan and risks discussed with: Patient

## 2018-04-09 NOTE — PERIOP NOTES
TRANSFER - OUT REPORT:    Verbal report given to Sharon(name) on Bertha Ion  being transferred to 572(unit) for routine post - op       Report consisted of patients Situation, Background, Assessment and   Recommendations(SBAR). Time Pre op antibiotic given:1232  Anesthesia Stop time: na  Hoffman Present on Transfer to floor:yes  Order for Hoffman on Chart:yes  Discharge Prescriptions with Chart:no    Information from the following report(s) SBAR, Kardex, OR Summary, Procedure Summary, Intake/Output, MAR, Recent Results and Med Rec Status was reviewed with the receiving nurse. Opportunity for questions and clarification was provided. Is the patient on 02? no       L/Min        Other     Is the patient on a monitor? NO    Is the nurse transporting with the patient? NO    Surgical Waiting Area notified of patient's transfer from PACU? YES      The following personal items collected during your admission accompanied patient upon transfer:   Dental Appliance: Dental Appliances: None  Vision: Visual Aid: Glasses, With patient  Hearing Aid:    Jewelry: Jewelry: None  Clothing: Clothing: None  Other Valuables: Other Valuables: None  Valuables sent to safe:        Glasses on patient.

## 2018-04-10 LAB
ALBUMIN SERPL-MCNC: 1.7 G/DL (ref 3.5–5)
ALBUMIN/GLOB SERPL: 0.5 {RATIO} (ref 1.1–2.2)
ALP SERPL-CCNC: 59 U/L (ref 45–117)
ALT SERPL-CCNC: 10 U/L (ref 12–78)
ANION GAP SERPL CALC-SCNC: 8 MMOL/L (ref 5–15)
AST SERPL-CCNC: 54 U/L (ref 15–37)
BASOPHILS # BLD: 0 K/UL (ref 0–0.1)
BASOPHILS NFR BLD: 1 % (ref 0–1)
BILIRUB SERPL-MCNC: 0.3 MG/DL (ref 0.2–1)
BUN SERPL-MCNC: 12 MG/DL (ref 6–20)
BUN/CREAT SERPL: 17 (ref 12–20)
CALCIUM SERPL-MCNC: 7.7 MG/DL (ref 8.5–10.1)
CHLORIDE SERPL-SCNC: 107 MMOL/L (ref 97–108)
CO2 SERPL-SCNC: 22 MMOL/L (ref 21–32)
CREAT SERPL-MCNC: 0.69 MG/DL (ref 0.7–1.3)
CRP SERPL-MCNC: 4.88 MG/DL (ref 0–0.6)
DIFFERENTIAL METHOD BLD: ABNORMAL
EOSINOPHIL # BLD: 0.2 K/UL (ref 0–0.4)
EOSINOPHIL NFR BLD: 4 % (ref 0–7)
ERYTHROCYTE [DISTWIDTH] IN BLOOD BY AUTOMATED COUNT: 16.1 % (ref 11.5–14.5)
ERYTHROCYTE [SEDIMENTATION RATE] IN BLOOD: 33 MM/HR (ref 0–20)
GLOBULIN SER CALC-MCNC: 3.3 G/DL (ref 2–4)
GLUCOSE BLD STRIP.AUTO-MCNC: 104 MG/DL (ref 65–100)
GLUCOSE BLD STRIP.AUTO-MCNC: 138 MG/DL (ref 65–100)
GLUCOSE BLD STRIP.AUTO-MCNC: 169 MG/DL (ref 65–100)
GLUCOSE BLD STRIP.AUTO-MCNC: 175 MG/DL (ref 65–100)
GLUCOSE SERPL-MCNC: 107 MG/DL (ref 65–100)
HCT VFR BLD AUTO: 28 % (ref 36.6–50.3)
HGB BLD-MCNC: 8.1 G/DL (ref 12.1–17)
IMM GRANULOCYTES # BLD: 0 K/UL (ref 0–0.04)
IMM GRANULOCYTES NFR BLD AUTO: 1 % (ref 0–0.5)
INR PPP: 1.3 (ref 0.9–1.1)
LYMPHOCYTES # BLD: 0.7 K/UL (ref 0.8–3.5)
LYMPHOCYTES NFR BLD: 15 % (ref 12–49)
MCH RBC QN AUTO: 27.8 PG (ref 26–34)
MCHC RBC AUTO-ENTMCNC: 28.9 G/DL (ref 30–36.5)
MCV RBC AUTO: 96.2 FL (ref 80–99)
MONOCYTES # BLD: 0.5 K/UL (ref 0–1)
MONOCYTES NFR BLD: 10 % (ref 5–13)
NEUTS SEG # BLD: 3.3 K/UL (ref 1.8–8)
NEUTS SEG NFR BLD: 69 % (ref 32–75)
NRBC # BLD: 0 K/UL (ref 0–0.01)
NRBC BLD-RTO: 0 PER 100 WBC
PLATELET # BLD AUTO: 120 K/UL (ref 150–400)
PMV BLD AUTO: 11.3 FL (ref 8.9–12.9)
POTASSIUM SERPL-SCNC: 3.9 MMOL/L (ref 3.5–5.1)
PROT SERPL-MCNC: 5 G/DL (ref 6.4–8.2)
PROTHROMBIN TIME: 13.2 SEC (ref 9–11.1)
RBC # BLD AUTO: 2.91 M/UL (ref 4.1–5.7)
RBC MORPH BLD: ABNORMAL
SERVICE CMNT-IMP: ABNORMAL
SODIUM SERPL-SCNC: 137 MMOL/L (ref 136–145)
WBC # BLD AUTO: 4.7 K/UL (ref 4.1–11.1)

## 2018-04-10 PROCEDURE — 77030011256 HC DRSG MEPILEX <16IN NO BORD MOLN -A

## 2018-04-10 PROCEDURE — 65270000029 HC RM PRIVATE

## 2018-04-10 PROCEDURE — 74011250637 HC RX REV CODE- 250/637: Performed by: INTERNAL MEDICINE

## 2018-04-10 PROCEDURE — 82962 GLUCOSE BLOOD TEST: CPT

## 2018-04-10 PROCEDURE — 97165 OT EVAL LOW COMPLEX 30 MIN: CPT

## 2018-04-10 PROCEDURE — 85652 RBC SED RATE AUTOMATED: CPT | Performed by: INTERNAL MEDICINE

## 2018-04-10 PROCEDURE — 85610 PROTHROMBIN TIME: CPT | Performed by: INTERNAL MEDICINE

## 2018-04-10 PROCEDURE — 97116 GAIT TRAINING THERAPY: CPT | Performed by: PHYSICAL THERAPIST

## 2018-04-10 PROCEDURE — 51798 US URINE CAPACITY MEASURE: CPT

## 2018-04-10 PROCEDURE — 97535 SELF CARE MNGMENT TRAINING: CPT

## 2018-04-10 PROCEDURE — 74011636637 HC RX REV CODE- 636/637: Performed by: INTERNAL MEDICINE

## 2018-04-10 PROCEDURE — 97161 PT EVAL LOW COMPLEX 20 MIN: CPT | Performed by: PHYSICAL THERAPIST

## 2018-04-10 PROCEDURE — 86140 C-REACTIVE PROTEIN: CPT | Performed by: INTERNAL MEDICINE

## 2018-04-10 PROCEDURE — 74011250637 HC RX REV CODE- 250/637: Performed by: ORTHOPAEDIC SURGERY

## 2018-04-10 PROCEDURE — 77030012935 HC DRSG AQUACEL BMS -B

## 2018-04-10 PROCEDURE — 74011250637 HC RX REV CODE- 250/637: Performed by: HOSPITALIST

## 2018-04-10 PROCEDURE — 36415 COLL VENOUS BLD VENIPUNCTURE: CPT | Performed by: INTERNAL MEDICINE

## 2018-04-10 PROCEDURE — 80053 COMPREHEN METABOLIC PANEL: CPT | Performed by: INTERNAL MEDICINE

## 2018-04-10 PROCEDURE — G8988 SELF CARE GOAL STATUS: HCPCS

## 2018-04-10 PROCEDURE — 85025 COMPLETE CBC W/AUTO DIFF WBC: CPT | Performed by: INTERNAL MEDICINE

## 2018-04-10 PROCEDURE — G8987 SELF CARE CURRENT STATUS: HCPCS

## 2018-04-10 PROCEDURE — 74011250636 HC RX REV CODE- 250/636: Performed by: INTERNAL MEDICINE

## 2018-04-10 RX ORDER — WARFARIN 4 MG/1
4 TABLET ORAL ONCE
Status: COMPLETED | OUTPATIENT
Start: 2018-04-10 | End: 2018-04-10

## 2018-04-10 RX ADMIN — INSULIN GLARGINE 30 UNITS: 100 INJECTION, SOLUTION SUBCUTANEOUS at 17:52

## 2018-04-10 RX ADMIN — LOVASTATIN 20 MG: 20 TABLET ORAL at 21:00

## 2018-04-10 RX ADMIN — ACETAMINOPHEN 650 MG: 325 TABLET, FILM COATED ORAL at 15:07

## 2018-04-10 RX ADMIN — OXYCODONE HYDROCHLORIDE 10 MG: 5 TABLET ORAL at 20:58

## 2018-04-10 RX ADMIN — OXYCODONE HYDROCHLORIDE 10 MG: 5 TABLET ORAL at 17:53

## 2018-04-10 RX ADMIN — WARFARIN SODIUM 4 MG: 4 TABLET ORAL at 11:52

## 2018-04-10 RX ADMIN — FOLIC ACID 1 MG: 1 TABLET ORAL at 08:51

## 2018-04-10 RX ADMIN — INSULIN GLARGINE 30 UNITS: 100 INJECTION, SOLUTION SUBCUTANEOUS at 08:50

## 2018-04-10 RX ADMIN — OXYCODONE HYDROCHLORIDE 10 MG: 5 TABLET ORAL at 04:52

## 2018-04-10 RX ADMIN — FAMOTIDINE 20 MG: 20 TABLET, FILM COATED ORAL at 08:50

## 2018-04-10 RX ADMIN — OXYCODONE HYDROCHLORIDE 10 MG: 5 TABLET ORAL at 08:51

## 2018-04-10 RX ADMIN — Medication 2 G: at 21:00

## 2018-04-10 RX ADMIN — GABAPENTIN 600 MG: 300 CAPSULE ORAL at 21:00

## 2018-04-10 RX ADMIN — ACETAMINOPHEN 650 MG: 325 TABLET, FILM COATED ORAL at 04:52

## 2018-04-10 RX ADMIN — OXYCODONE HYDROCHLORIDE 10 MG: 5 TABLET ORAL at 11:52

## 2018-04-10 RX ADMIN — STANDARDIZED SENNA CONCENTRATE AND DOCUSATE SODIUM 1 TABLET: 8.6; 5 TABLET, FILM COATED ORAL at 08:51

## 2018-04-10 RX ADMIN — STANDARDIZED SENNA CONCENTRATE AND DOCUSATE SODIUM 1 TABLET: 8.6; 5 TABLET, FILM COATED ORAL at 17:53

## 2018-04-10 RX ADMIN — OXYCODONE HYDROCHLORIDE 10 MG: 5 TABLET ORAL at 15:07

## 2018-04-10 RX ADMIN — PRIMIDONE 250 MG: 50 TABLET ORAL at 08:50

## 2018-04-10 RX ADMIN — INSULIN LISPRO 2 UNITS: 100 INJECTION, SOLUTION INTRAVENOUS; SUBCUTANEOUS at 16:39

## 2018-04-10 RX ADMIN — Medication 2 G: at 12:00

## 2018-04-10 RX ADMIN — Medication 10 ML: at 21:00

## 2018-04-10 RX ADMIN — Medication 2 G: at 04:52

## 2018-04-10 RX ADMIN — Medication 1 CAPSULE: at 08:51

## 2018-04-10 RX ADMIN — PRIMIDONE 250 MG: 50 TABLET ORAL at 18:23

## 2018-04-10 RX ADMIN — FAMOTIDINE 20 MG: 20 TABLET, FILM COATED ORAL at 17:53

## 2018-04-10 RX ADMIN — SERTRALINE HYDROCHLORIDE 100 MG: 50 TABLET ORAL at 08:50

## 2018-04-10 NOTE — PROGRESS NOTES
Hospitalist Progress Note  Gerry Juarez NP  Answering service: [phone] from in house phone  Cell: [phone]      Date of Service:  4/10/2018  NAME:  Rey Granger  :  1950  MRN:  [id number]    Admission Summary:   Pt presented to the ED with sudden onset of right hip pain.  The pain was constant, worse with movement and unable to bear weight on right leg. The pain was described as sharp, 10/10 in severity - no known aggravating or relieving factors.  Pt reports a hx of R hip replacement done . He was recently admitted at [facility] from -2018 due to an infected R hip prosthesis and underwent irrigation and debridement of the right hip. He was also diagnosed with MV endocarditis and was discharged home on Ancef to complete a 6-week course (to end 4/10/2018).  Since the patient was discharged from the hospital, he has been to the ED 3 times with a dislocated right hip.       Pmhx: type 2 diabetes, dyslipidemia, benign essential tremor, depression, hypertension, obstructive sleep apnea     Interval history / Subjective:   Mr. Chrystal Mott is hoping that PT will be in soon. He is hopeful he can go home tomorrow. Assessment & Plan:     Dislocation of prosthetic right hip (POA):. - s/p IR aspiration of R hip , awaiting results - NGTD (holding for 14 days)  - pain control prn  - further plans per Ortho - s/p I and D on      Hx Sepsis (septic arthritis and MV endocarditis): complete treatment 4/10  - was being treated for staph lugdunensis bacteremia (PTA). Per pt, treatment to continue through 4/10 (confirmed by ID)  - s/p PICC placement 3/1 but as per IV team PICC was pulled almost completely out per xray on admit and was recommended to be removed. PICC taken out  and peripheral line was placed.    - Ancef has been ordered per outpatient regimen  - ID following     Hx Type 2 diabetes:    - blood glucose , continue with home insulin/SSI  - hold oral agent until the time of discharge from the hospital.     Hx Dyslipidemia:  Mevacor.     Hx Benign Essential Tremor:  Primidone.     Hx Depression:  Zoloft.     Hx JACOB on CPAP:  We will continue with the CPAP with home setting.     Hx Hypertension:  BP controlled, continue home meds.      Obese: BMI 34.58    Restless Legs: consider for requip after surgery      DVT ppx: SCDs for now. Pt was on coumadin for DVT ppx after discharge x 6 weeks and was to have completed this 4/15  Code Status: Full  Care Plan: TBD once seen by therapy  Dispo: to be determined - depending on response with PT/OT pst-surgery. He lives at home with his son but son works. Pt would like / Heladio Graham (PCP)updated on 4-9     Hospital Problems  Date Reviewed: 4/7/2018          Codes Class Noted POA    * (Principal)Dislocation of prosthetic joint [facility]) ICD-10-CM: P47.053F  ICD-9-CM: 996.42  3/20/2018 Yes    Overview Signed 3/20/2018 11:37 PM by AMAN Mattson     Right CIERRA                 Review of Systems:   Denies HA. No chest pain, pressure. No respiratory complaints or abdominal pain. Mild R leg pain, relieved by pain meds. Vital Signs:    Last 24hrs VS reviewed since prior progress note. Most recent are:  Visit Vitals    BP 91/53    Pulse 67    Temp 97.8 °F (36.6 °C)    Resp 15    Ht 5' 10\" (1.778 m)    Wt 109.3 kg (241 lb)    SpO2 98%    BMI 34.58 kg/m2       Intake/Output Summary (Last 24 hours) at 04/10/18 1408  Last data filed at 04/10/18 0645   Gross per 24 hour   Intake                0 ml   Output               50 ml   Net              -50 ml      Physical Examination:        Constitutional:  No acute distress, cooperative, pleasant    ENT:  Oral mucous membranes dry, oropharynx benign. Resp:  No accessory muscle use and on RA   CV:  Has a murmur. Rate is regular    GI:  Obese, non distended, active bowel sounds    Musculoskeletal:  R thigh and flank/hip edema, warm.  Leg immobilizer on R leg. Pulses palpable. Neurologic:  Moves all extremities. AAOx3     Skin: Right thigh/hip dressing intact with bloody drainage noted  Data Review:    Review and/or order of clinical lab test  Review and/or order of tests in the medicine section of [facility]      Labs:     Recent Labs      04/10/18   0509   WBC  4.7   HGB  8.1*   HCT  28.0*   PLT  120*     Recent Labs      04/10/18   0509  04/08/18   0535   NA  137  139   K  3.9  3.6   CL  107  109*   CO2  22  23   BUN  12  13   CREA  0.69*  0.76   GLU  107*  88   CA  7.7*  7.7*     Recent Labs      04/10/18   0509   SGOT  54*   ALT  10*   AP  59   TBILI  0.3   TP  5.0*   ALB  1.7*   GLOB  3.3     Recent Labs      04/10/18   0509  04/09/18   1147   INR  1.3*  1.3*   PTP  13.2*   --       No results for input(s): FE, TIBC, PSAT, FERR in the last 72 hours. Lab Results   Component Value Date/Time    Folate 9.2 03/18/2010 11:26 AM      No results for input(s): PH, PCO2, PO2 in the last 72 hours. No results for input(s): CPK, CKNDX, TROIQ in the last 72 hours.     No lab exists for component: CPKMB  Lab Results   Component Value Date/Time    Cholesterol, total 141 07/26/2017 01:24 PM    HDL Cholesterol 63 07/26/2017 01:24 PM    LDL, calculated 58 07/26/2017 01:24 PM    Triglyceride 98 07/26/2017 01:24 PM    CHOL/HDL Ratio 3.5 11/01/2010 07:07 AM     Lab Results   Component Value Date/Time    Glucose (POC) 138 (H) 04/10/2018 12:03 PM    Glucose (POC) 104 (H) 04/10/2018 06:47 AM    Glucose (POC) 157 (H) 04/09/2018 09:54 PM    Glucose (POC) 83 04/09/2018 03:08 PM    Glucose (POC) 86 04/09/2018 09:10 AM     Lab Results   Component Value Date/Time    Color YELLOW/STRAW 04/07/2018 06:50 AM    Appearance CLEAR 04/07/2018 06:50 AM    Specific gravity 1.021 04/07/2018 06:50 AM    Specific gravity 1.020 02/28/2018 05:01 PM    pH (UA) 6.0 04/07/2018 06:50 AM    Protein NEGATIVE  04/07/2018 06:50 AM    Glucose >1000 (A) 04/07/2018 06:50 AM    Ketone NEGATIVE 04/07/2018 06:50 AM    Bilirubin NEGATIVE  04/07/2018 06:50 AM    Urobilinogen 0.2 04/07/2018 06:50 AM    Nitrites NEGATIVE  04/07/2018 06:50 AM    Leukocyte Esterase NEGATIVE  04/07/2018 06:50 AM    Epithelial cells FEW 04/07/2018 06:50 AM    Bacteria NEGATIVE  04/07/2018 06:50 AM    WBC 0-4 04/07/2018 06:50 AM    RBC 0-5 04/07/2018 06:50 AM         Medications Reviewed:     Current Facility-Administered Medications   Medication Dose Route Frequency    lidocaine (PF) (XYLOCAINE) 10 mg/mL (1 %) injection 0.1 mL  0.1 mL SubCUTAneous PRN    fentaNYL citrate (PF) injection 50 mcg  50 mcg IntraVENous PRN    midazolam (VERSED) injection 1 mg  1 mg IntraVENous PRN    midazolam (VERSED) injection 1 mg  1 mg IntraVENous PRN    glycopyrrolate (ROBINUL) injection 0.2 mg  0.2 mg IntraVENous ONCE PRN    ropivacaine (PF) (NAROPIN) 5 mg/mL (0.5 %) injection 150 mg  30 mL Peripheral Nerve Block PRN    oxyCODONE IR (ROXICODONE) tablet 10 mg  10 mg Oral Q3H PRN    Warfarin - pharmacy to dose   Other Rx Dosing/Monitoring    acetaminophen (TYLENOL) tablet 650 mg  650 mg Oral Q4H PRN    HYDROmorphone (PF) (DILAUDID) injection 0.5 mg  0.5 mg IntraVENous Q4H PRN    albuterol-ipratropium (DUO-NEB) 2.5 MG-0.5 MG/3 ML  3 mL Nebulization Q4H PRN    ceFAZolin (ANCEF) 2 g/20 mL in sterile water IV syringe  2 g IntraVENous Q8H    clobetasol (TEMOVATE) 0.05 % ointment   Topical PRN    famotidine (PEPCID) tablet 20 mg  20 mg Oral BID    folic acid (FOLVITE) tablet 1 mg  1 mg Oral DAILY    gabapentin (NEURONTIN) capsule 600 mg  600 mg Oral QHS    insulin glargine (LANTUS) injection 30 Units  30 Units SubCUTAneous BID    lisinopril (PRINIVIL, ZESTRIL) tablet 20 mg  20 mg Oral DAILY    lovastatin (MEVACOR) tablet 20 mg  20 mg Oral QHS    polyethylene glycol (MIRALAX) packet 17 g  17 g Oral DAILY    primidone (MYSOLINE) tablet 250 mg  250 mg Oral BID    senna-docusate (PERICOLACE) 8.6-50 mg per tablet 1 Tab  1 Tab Oral BID    sertraline (ZOLOFT) tablet 100 mg  100 mg Oral DAILY    sodium chloride (NS) flush 5-10 mL  5-10 mL IntraVENous Q8H    sodium chloride (NS) flush 5-10 mL  5-10 mL IntraVENous PRN    ondansetron (ZOFRAN) injection 4 mg  4 mg IntraVENous Q4H PRN    lactobac ac& pc-s.therm-b.anim (WESLEY Q/RISAQUAD)  1 Cap Oral DAILY    insulin lispro (HUMALOG) injection   SubCUTAneous AC&HS    glucose chewable tablet 16 g  4 Tab Oral PRN    dextrose (D50W) injection syrg 12.5-25 g  12.5-25 g IntraVENous PRN    glucagon (GLUCAGEN) injection 1 mg  1 mg IntraMUSCular PRN    bacitracin 500 unit/gram packet 1 Packet  1 Packet Topical PRN    alteplase (CATHFLO) 1 mg in sterile water (preservative free) 1 mL injection  1 mg InterCATHeter PRN   ______________________________________________________________________  EXPECTED LENGTH OF STAY: 2d 9h  ACTUAL LENGTH OF STAY:          Dagmar CHAUDHRY 25, NP

## 2018-04-10 NOTE — PROGRESS NOTES
Pharmacist Note  Warfarin Dosing  Consult provided for this 69 y.o. male to manage warfarin for VTE prophylaxis  (s/p orthopedic surgery)    INR Goal: 1.7- 2.2    Preop Dose: none    Drugs that may increase INR: None  Drugs that may decrease INR: primidone  Other current anticoagulants/ drugs that may increase bleeding risk: None  Risk factors: Age > 65  Daily INR ordered: YES    Recent Labs      04/10/18   0509  04/09/18   1147   HGB  8.1*   --    INR  1.3*  1.3*       Date               INR                 Dose  4/9  1.3  4 mg  4/10  1.3  4 mg    Assessment/ Plan: Will order warfarin 4 mg PO x 1 dose. Pharmacy will continue to monitor daily and adjust therapy as indicated.

## 2018-04-10 NOTE — PROGRESS NOTES
Bedside shift change report given to [employer] , RN (oncoming nurse) by Zachery Dover RN (offgoing nurse). Report given with SBAR, Kardex, Intake/Output and MAR.

## 2018-04-10 NOTE — PROGRESS NOTES
ID Progress Note  4/10/2018      Revision of acetabular liner and femoral head to a tripolar head 18       Subjective:     Afebrile. Feels good. Objective:     Vitals:   Visit Vitals    /51 (BP 1 Location: Right arm)    Pulse 62    Temp 98.7 °F (37.1 °C)    Resp 16    Ht 5' 10\" (1.778 m)    Wt 109.3 kg (241 lb)    SpO2 99%    BMI 34.58 kg/m2        Tmax:  Temp (24hrs), Av °F (36.7 °C), Min:97.5 °F (36.4 °C), Max:98.7 °F (37.1 °C)      Exam:    Not in distress  Lungs: clear to auscultation, no rales wheezes or rhonchi   Heart: s1, s2, no murmurs, rubs or clicks   Abdomen: soft, nontender, no guarding or rebound       Labs:   Lab Results   Component Value Date/Time    WBC 4.7 04/10/2018 05:09 AM    HGB 8.1 (L) 04/10/2018 05:09 AM    HCT 28.0 (L) 04/10/2018 05:09 AM    PLATELET 187 (L)  05:09 AM    MCV 96.2 04/10/2018 05:09 AM     Lab Results   Component Value Date/Time    Sodium 137 04/10/2018 05:09 AM    Potassium 3.9 04/10/2018 05:09 AM    Chloride 107 04/10/2018 05:09 AM    CO2 22 04/10/2018 05:09 AM    Anion gap 8 04/10/2018 05:09 AM    Glucose 107 (H) 04/10/2018 05:09 AM    BUN 12 04/10/2018 05:09 AM    Creatinine 0.69 (L) 04/10/2018 05:09 AM    BUN/Creatinine ratio 17 04/10/2018 05:09 AM    GFR est AA >60 04/10/2018 05:09 AM    GFR est non-AA >60 04/10/2018 05:09 AM    Calcium 7.7 (L) 04/10/2018 05:09 AM    Bilirubin, total 0.3 04/10/2018 05:09 AM    AST (SGOT) 54 (H) 04/10/2018 05:09 AM    Alk. phosphatase 59 04/10/2018 05:09 AM    Protein, total 5.0 (L) 04/10/2018 05:09 AM    Albumin 1.7 (L) 04/10/2018 05:09 AM    Globulin 3.3 04/10/2018 05:09 AM    A-G Ratio 0.5 (L) 04/10/2018 05:09 AM    ALT (SGPT) 10 (L) 04/10/2018 05:09 AM             Assessment:     1. 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 2.  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 3.  Dislocated right hip. 4.  Diabetes. 5.  History of patent foramen ovale.   6. Hypertension. 7.  Obstructive sleep apnea.         Recommendations:     Await right hip culture results. Will extend therapy until 4/23 as this will be the day that the culture results will be finalize if we do not grow any bacteria.          Sourav Brennan MD

## 2018-04-10 NOTE — PROGRESS NOTES
Pt's left heel is red and blanchable. Pt has RLS and digs heels into the mattress and sheets. He says he does this at home too. Mepilex applied. Feet elevated.

## 2018-04-10 NOTE — PROGRESS NOTES
Spiritual Care Partner Volunteer visited patient in Rm 572 on 4/10/18.   Documented by:  Dedrick Claude, Chaplain, MDiv, MS, [address] ([id number])

## 2018-04-10 NOTE — PROGRESS NOTES
9:32 PM  Bedside and Verbal shift change report given to Wayna Landau, RN (oncoming nurse) by Doris Witt RN (offgoing nurse). Report included the following information SBAR, Kardex, OR Summary, Intake/Output, MAR and Recent Results.

## 2018-04-10 NOTE — PROGRESS NOTES
Problem: Self Care Deficits Care Plan (Adult)  Goal: *Acute Goals and Plan of Care (Insert Text)  Occupational Therapy Goals  Initiated: 4/10/2018    1. Patient will perform grooming with supervision/set-up standing at sink within 3 day(s). 2.  Patient will perform bathing with supervision/set-up from chair within 3 day(s). 3.  Patient will perform upper body dressing and lower body dressing with supervision/set-up within 3 day(s). 4.  Patient will perform toilet transfers with supervision/set-up within 3 day(s). 5.  Patient will perform all aspects of toileting with supervision/set-up within 3 day(s). 6.  Patient will be independent with strict posterior hip precautions within 3 days. Occupational Therapy EVALUATION  Patient: Carin Soto (69 y.o. male)  Date: 4/10/2018  Primary Diagnosis: Dislocation of prosthetic joint, subsequent encounter  Breakage (fracture) of prosthetic joint, subsequent encounter  unknown  Procedure(s) (LRB):  I & D REVISION FEMORAL HEAD AND POLYLINER RIGHT HIP (Right) 1 Day Post-Op   Precautions:   Fall, WBAT, Other (comment) (strict posterior precautions)    ASSESSMENT :  Based on the objective data described below, the patient presents with decreased independence with self care and functional mobility following admission for right hip dislocation and then surgical polyliner exchange. Received orders late this afternoon to progress with mobility and self care tasks. Pt was able to progress OOB to bathroom with cues and education for posterior precautions. Pt did well with all tasks and received education and training for precautions during mobility tasks. Recommend home following continued training and education. Patient will benefit from skilled intervention to address the above impairments.   Patients rehabilitation potential is considered to be Good  Factors which may influence rehabilitation potential include:   [x]             None noted  []             Mental ability/status  []             Medical condition  []             Home/family situation and support systems  []             Safety awareness  []             Pain tolerance/management  []             Other:      PLAN :  Recommendations and Planned Interventions:  [x]               Self Care Training                  [x]        Therapeutic Activities  [x]               Functional Mobility Training    []        Cognitive Retraining  [x]               Therapeutic Exercises           [x]        Endurance Activities  [x]               Balance Training                   []        Neuromuscular Re-Education  []               Visual/Perceptual Training     [x]   Home Safety Training  [x]               Patient Education                 [x]        Family Training/Education  []               Other (comment):    Frequency/Duration: Patient will be followed by occupational therapy 5 times a week to address goals. Discharge Recommendations: None  Further Equipment Recommendations for Discharge: none     SUBJECTIVE:   Patient stated I am feeling alright this afternoon.     OBJECTIVE DATA SUMMARY:   HISTORY:   Past Medical History:   Diagnosis Date    ADD (attention deficit disorder)     Anemia due to blood loss     Hinson's esophagus with esophagitis     Benign essential tremor     Cancer ([facility] [address] 2012    BCC    Depression     DJD (degenerative joint disease) of hip     bilat    DM (diabetes mellitus) ([facility] [address] 3/17/2010    ED (erectile dysfunction) of organic origin     Fall on or from sidewalk curb 9/24/2015    Femur fracture ([facility] [address]     Gastritis and duodenitis     GERD (gastroesophageal reflux disease)     resolved after discontinuing diclofenac    Hematuria 6/2016    High cholesterol 3/17/2010    HTN (hypertension) 3/17/2010    Morbid obesity ([facility] [address] [facility]     Murmur, cardiac 2016    PFO (patent foramen ovale) 7/26/2017    PUD (peptic ulcer disease)     Shingles 6/2016    RESOLVING- NO RASH (HEAD)    Sleep apnea CPAP     Past Surgical History:   Procedure Laterality Date    ENDOSCOPY, COLON, DIAGNOSTIC      HX CHOLECYSTECTOMY  1995    HX GI  1980    gastroplasty    HX HERNIA REPAIR  1995    HX HIP REPLACEMENT      Left    HX ORTHOPAEDIC  2011    rot cuff repair    HX TONSILLECTOMY  1950    HX VASCULAR ACCESS      picc line    TOTAL HIP ARTHROPLASTY  05/2010    right    TOTAL HIP ARTHROPLASTY  08/01/2016    left       Prior Level of Function/Environment/Context: pt was independent prior to surgery  Occupations in which the patient is/was successful, what are the barriers preventing that success:   Performance Patterns (routines, roles, habits, and rituals):   Personal Interests and/or values:   Expanded or extensive additional review of patient history:     Home Situation  Home Environment: Private residence  # Steps to Enter: 2  Rails to Enter: Yes  One/Two Story Residence: Two story, live on 1st floor  Living Alone: No  Support Systems: Child(dariusz)  Patient Expects to be Discharged to[de-identified] Private residence  Current DME Used/Available at Home: [address] [facility], [address], straight, [address] [facility], Raised toilet seat, Walker, rolling  Tub or Shower Type: Shower  [x]  Right hand dominant   []  Left hand dominant    EXAMINATION OF PERFORMANCE DEFICITS:  Cognitive/Behavioral Status:  Neurologic State: Alert  Orientation Level: Oriented X4  Cognition: Appropriate for age attention/concentration  Perception: Appears intact  Perseveration: No perseveration noted  Safety/Judgement: Good awareness of safety precautions    Skin: see nursing notes    Edema: none noted    Hearing: Auditory  Auditory Impairment: None    Vision/Perceptual:                           Acuity: Within Defined Limits         Range of Motion:    AROM: Within functional limits  PROM: Within functional limits                      Strength:    Strength:  Within functional limits                Coordination:  Coordination: Within functional limits  Fine Motor Skills-Upper: Right Intact; Left Intact    Gross Motor Skills-Upper: Right Intact; Left Intact    Tone & Sensation:    Tone: Normal  Sensation: Intact                      Balance:  Sitting: Intact  Standing: Intact; With support    Functional Mobility and Transfers for ADLs:  Bed Mobility:  Supine to Sit: Additional time; Moderate assistance  Sit to Supine:  (remained in chair)    Transfers:  Sit to Stand: Contact guard assistance  Stand to Sit: Contact guard assistance  Bed to Chair: Contact guard assistance  Toilet Transfer : Contact guard assistance    ADL Assessment:  Feeding: Supervision    Oral Facial Hygiene/Grooming: Supervision    Bathing: Moderate assistance    Upper Body Dressing: Supervision    Lower Body Dressing: Maximum assistance    Toileting: Contact guard assistance                ADL Intervention and task modifications:    pt educated for  posterior precautions with mobility and self care tasks. Wrote precautions on board during session. Pt provided with education for safe toilet transfer. Pt educated for safe technique with use of walker. Returned to chair following. Cognitive Retraining  Safety/Judgement: Good awareness of safety precautions     Functional Measure:  Barthel Index:    Bathin  Bladder: 10  Bowels: 10  Groomin  Dressin  Feeding: 10  Mobility: 5  Stairs: 0  Toilet Use: 5  Transfer (Bed to Chair and Back): 10  Total: 60       Barthel and G-code impairment scale:  Percentage of impairment CH  0% CI  1-19% CJ  20-39% CK  40-59% CL  60-79% CM  80-99% CN  100%   Barthel Score 0-100 100 99-80 79-60 59-40 20-39 1-19   0   Barthel Score 0-20 20 17-19 13-16 9-12 5-8 1-4 0      The Barthel ADL Index: Guidelines  1. The index should be used as a record of what a patient does, not as a record of what a patient could do. 2. The main aim is to establish degree of independence from any help, physical or verbal, however minor and for whatever reason.   3. The need for supervision renders the patient not independent. 4. A patient's performance should be established using the best available evidence. Asking the patient, friends/relatives and nurses are the usual sources, but direct observation and common sense are also important. However direct testing is not needed. 5. Usually the patient's performance over the preceding 24-48 hours is important, but occasionally longer periods will be relevant. 6. Middle categories imply that the patient supplies over 50 per cent of the effort. 7. Use of aids to be independent is allowed. Alex Del Rio., Barthel, D.W. (7536). Functional evaluation: the Barthel Index. [address] [facility] (14)2. [place] Andrade gray DAISY PowersF, Kellen Schreiber, Talat Juarez., Chuy, [address] [place] [address] (1999). Measuring the change indisability after inpatient rehabilitation; comparison of the responsiveness of the Barthel Index and Functional [place] Measure. Journal of Neurology, Neurosurgery, and Psychiatry, 66(4), 593-243. [place] Patience, NTHERON.KHARI, SHILPI Payan, & Yang Lazaro M.A. (2004.) Assessment of post-stroke quality of life in cost-effectiveness studies: The usefulness of the Barthel Index and the EuroQoL-5D. Quality of Life Research, 13, 048-97     G codes: In compliance with CMSs Claims Based Outcome Reporting, the following G-code set was chosen for this patient based on their primary functional limitation being treated: The outcome measure chosen to determine the severity of the functional limitation was the Barthel Index with a score of 60/100 which was correlated with the impairment scale. ?  Self Care:     - CURRENT STATUS: CJ - 20%-39% impaired, limited or restricted    - GOAL STATUS: CI - 1%-19% impaired, limited or restricted    - D/C STATUS:  ---------------To be determined---------------     Occupational Therapy Evaluation Charge Determination   History Examination Decision-Making   LOW Complexity : Brief history review  MEDIUM Complexity : 3-5 performance deficits relating to physical, cognitive , or psychosocial skils that result in activity limitations and / or participation restrictions MEDIUM Complexity : Patient may present with comorbidities that affect occupational performnce. Miniml to moderate modification of tasks or assistance (eg, physical or verbal ) with assesment(s) is necessary to enable patient to complete evaluation       Based on the above components, the patient evaluation is determined to be of the following complexity level: LOW   Pain:  Pain Scale 1: Numeric (0 - 10)  Pain Intensity 1: 2           Pain Intervention(s) 1: Medication (see MAR)  Activity Tolerance:   VSS throughout session. After treatment:   [x] Patient left in no apparent distress sitting up in chair  [] Patient left in no apparent distress in bed  [x] Call bell left within reach  [x] Nursing notified  [] Caregiver present  [] Bed alarm activated    COMMUNICATION/EDUCATION:   The patients plan of care was discussed with: Physical Therapist and Registered Nurse. [x] Home safety education was provided and the patient/caregiver indicated understanding. [] Patient/family have participated as able in goal setting and plan of care. [x] Patient/family agree to work toward stated goals and plan of care. [] Patient understands intent and goals of therapy, but is neutral about his/her participation. [] Patient is unable to participate in goal setting and plan of care. This patients plan of care is appropriate for delegation to [facility].     Thank you for this referral.  Simran Uribe OT  Time Calculation: 40 mins

## 2018-04-10 NOTE — OP NOTES
[address]   ACUTE CARE OP NOTE    Savanna Pfeiffer  MR#: [id number]  : 1950  ACCOUNT #: [de-identified]   DATE OF SERVICE: 2018    ATTENDING PHYSICIAN:  Yris Christensen MD    ASSISTANT:  AMAN Manjarrez    PREOPERATIVE DIAGNOSIS:  Recurrent dislocation, right hip with possible infection. POSTOPERATIVE DIAGNOSIS:  Recurrent dislocation, right hip with no obvious signs of infection. PROCEDURE PERFORMED:  Revision of acetabular liner and femoral head to a tripolar head. ANESTHESIA:  General.    ESTIMATED BLOOD LOSS:  100 mL. SPECIMENS REMOVED:  Old femoral head and liner. IMPLANTS:  Size 58, 40 mm inner diameter polyethylene liner with 10-degree lip and an 8.5 mm head with 40 mm tripolar ball. COMPLICATIONS:  none. INDICATIONS:  This is a 57-year-old gentleman who underwent a right total hip replacement approximately 10 years ago with good results. 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 He underwent anterior approach, irrigation, debridement and removal of the polyethylene liner with revision of the poly liner and femoral head. He has been on IV antibiotics since that time. I did place tobramycin- impregnated absorbable cement beads at the time of that surgery. He has developed recurrent posterior dislocation of the hip and was admitted this weekend. His sed rate and C-reactive protein have come down to near normal.  Due to recurrent dislocation, it was elected to proceed with exploration of the hip and revision of the femoral head and liner as well as to obtain intraoperative cultures for the possibility of ongoing infection. OPERATION:  The patient was taken to the operating room and underwent general inhalational anesthesia. He was placed on the Garfield table. The right hip and leg were prepped and draped in the usual fashion.   The original anterior incision was utilized and taken down through skin and subcutaneous tissue. Fascia was incised longitudinally. The tensor muscle was dissected and retracted posterolaterally. Cultures were obtained. No purulence was noted. There did not appear to be active infection. There was a fair amount of old blood due to the recurrent dislocations while on anticoagulation. This was all irrigated out. The hip was dislocated anteriorly, and the femoral head was removed. Acetabular retractors were placed. The acetabular liner was removed. This was all irrigated with antibiotic solution using the pulsatile lavage system. A size 58, 40 mm inner diameter polyethylene liner was inserted. This had a 10-degree lip which was rotated posterolaterally. I then did a trial reduction with an 8.5 mm head. There was no anterior instability. While maintaining sterility, the right leg was taken out of the valentin by the circulating nurse and the hip was flexed and internally rotated with my fingers in the joint. No posterior stability was noted. Fluoroscopy was used to assess leg lengths. It did appear that we had lengthened him slightly. This was anticipated and felt to be necessary in order to improve stability. In order to gain more stability, I elected to go with a tripolar head. I, therefore, assembled an 8.5, 28 mm head with a 40 mm tripolar component and impacted this onto the stem. The hip was reduced. Again, excellent stability was noted. Fluoroscopy was used and again it appeared we had lengthened him slightly. The joint was extensively irrigated with antibiotic solution. Again, put tobramycin-impregnated absorbable cement beads in the joint space. Fascia was repaired using #1 Stratafix in a running fashion. Subcutaneous tissue was approximated using 2-0 Vicryl in interrupted fashion. Skin edges were approximated using stapling apparatus.   Bulky sterile dressing was applied and the patient was awakened, extubated, and transferred to the recovery room in stable condition. There were no complications.       MD SAVANAH Beauchamp Sa / CASSAI  D: 04/09/2018 17:34     T: 04/09/2018 20:10  JOB #: [id number]

## 2018-04-10 NOTE — PROGRESS NOTES
Problem: Mobility Impaired (Adult and Pediatric)  Goal: *Acute Goals and Plan of Care (Insert Text)  Physical Therapy Goals  Initiated 4/10/2018    1. Patient will move from supine to sit and sit to supine  in bed with modified independence within 4 days. 2. Patient will perform sit to stand with modified independence within 4 days. 3. Patient will ambulate with modified independence for 200 feet with the least restrictive device within 4 days. 4. Patient will ascend/descend 4 stairs with 1 handrail(s) with modified independence within 4 days. 5. Patient will verbalize and demonstrate understanding of THR precautions per protocol within 4 days. 6. Patient will perform THR home exercise program per protocol with modified independence within 4 days. physical Therapy EVALUATION  Patient: Forest Veloz (20 y.o. male)  Date: 4/10/2018  Primary Diagnosis: Dislocation of prosthetic joint, subsequent encounter  Breakage (fracture) of prosthetic joint, subsequent encounter  unknown  Procedure(s) (LRB):  I & D REVISION FEMORAL HEAD AND POLYLINER RIGHT HIP (Right) 1 Day Post-Op   Precautions:   Fall, WBAT, Other (comment) (strict posterior precautions)    ASSESSMENT :  Based on the objective data described below, the patient presents with decreased functional mobility from baseline level of function. Prior to admit patient was independent with mobility. Lives in a 2 level home but stays on the 1st floor. Son lives on 2nd floor. Patient normally works and is active. Patient currently needs modA for supine to sit transfer. Sit to stand with Yury-CGA and extra time. Amb approx 25 feet with RW and CGA with step-to pattern and slow aniya overall. Patient returned to room and positioned up in chair with needs in reach. Recommend  PT at NH to continue mobility progression. Will continue to follow for mobility progression.     Patient will benefit from skilled intervention to address the above impairments. Patients rehabilitation potential is considered to be Good  Factors which may influence rehabilitation potential include:   [x]         None noted  []         Mental ability/status  []         Medical condition  []         Home/family situation and support systems  []         Safety awareness  []         Pain tolerance/management  []         Other:      PLAN :  Recommendations and Planned Interventions:  [x]           Bed Mobility Training             []    Neuromuscular Re-Education  [x]           Transfer Training                   []    Orthotic/Prosthetic Training  [x]           Gait Training                         []    Modalities  [x]           Therapeutic Exercises           []    Edema Management/Control  [x]           Therapeutic Activities            [x]    Patient and Family Training/Education  []           Other (comment):    Frequency/Duration: Patient will be followed by physical therapy  twice daily to address goals. Discharge Recommendations: Home Health  Further Equipment Recommendations for Discharge: TBD     SUBJECTIVE:   Patient stated I'm happy to get out of the bed.     OBJECTIVE DATA SUMMARY:   HISTORY:    Past Medical History:   Diagnosis Date    ADD (attention deficit disorder)     Anemia due to blood loss     Hinson's esophagus with esophagitis     Benign essential tremor     Cancer [facility] [address] 2012    BCC    Depression     DJD (degenerative joint disease) of hip     bilat    DM (diabetes mellitus) ([facility] [address] 3/17/2010    ED (erectile dysfunction) of organic origin     Fall on or from sidewalk curb 9/24/2015    Femur fracture ([facility] [address]     Gastritis and duodenitis     GERD (gastroesophageal reflux disease)     resolved after discontinuing diclofenac    Hematuria 6/2016    High cholesterol 3/17/2010    HTN (hypertension) 3/17/2010    Morbid obesity ([facility] [address]     Murmur, cardiac 2016    PFO (patent foramen ovale) 7/26/2017    PUD (peptic ulcer disease)     Shingles 6/2016 RESOLVING- NO RASH (HEAD)    Sleep apnea     CPAP     Past Surgical History:   Procedure Laterality Date    ENDOSCOPY, COLON, DIAGNOSTIC      HX CHOLECYSTECTOMY  1995    HX GI  1980    gastroplasty    HX HERNIA REPAIR  1995    HX HIP REPLACEMENT      Left    HX ORTHOPAEDIC  2011    rot cuff repair    HX TONSILLECTOMY  1950    HX VASCULAR ACCESS      picc line    TOTAL HIP ARTHROPLASTY  05/2010    right    TOTAL HIP ARTHROPLASTY  08/01/2016    left     Prior Level of Function/Home Situation: Independent with mobility. Normally works for [employer]. Lives with son  Personal factors and/or comorbidities impacting plan of care:     Home Situation  Home Environment: Private residence  # Steps to Enter: 2  Rails to Enter: Yes  One/Two Story Residence: Two story, live on 1st floor  Living Alone: No  Support Systems: Child(dariusz)  Patient Expects to be Discharged to[de-identified] Private residence  Current DME Used/Available at Home: Cane, straight, Crutches, Raised toilet seat, Walker, rolling  Tub or Shower Type: Shower    EXAMINATION/PRESENTATION/DECISION MAKING:   Critical Behavior:  Neurologic State: Alert  Orientation Level: Oriented X4  Cognition: Appropriate for age attention/concentration  Safety/Judgement: Good awareness of safety precautions  Hearing: Auditory  Auditory Impairment: None    Range Of Motion:  AROM: Within functional limits           PROM: Within functional limits           Strength:    Strength: Within functional limits                    Tone & Sensation:   Tone: Normal              Sensation: Intact               Coordination:  Coordination: Within functional limits  Vision:   Acuity: Within Defined Limits  Functional Mobility:  Bed Mobility:     Supine to Sit: Additional time; Moderate assistance  Sit to Supine:  (remained in chair)     Transfers:  Sit to Stand: Contact guard assistance  Stand to Sit: Contact guard assistance        Bed to Chair: Contact guard assistance Balance:   Sitting: Intact  Standing: Intact; With support  Ambulation/Gait Training:  Distance (ft): 25 Feet (ft)  Assistive Device: Gait belt;Walker, rolling  Ambulation - Level of Assistance: Contact guard assistance        Gait Abnormalities: Antalgic;Decreased step clearance; Step to gait        Base of Support: Widened  Stance: Right decreased  Speed/Clarice: Pace decreased (<100 feet/min); Slow  Step Length: Left shortened;Right shortened       Functional Measure:  Barthel Index:    Bathin  Bladder: 10  Bowels: 10  Groomin  Dressin  Feeding: 10  Mobility: 5  Stairs: 0  Toilet Use: 5  Transfer (Bed to Chair and Back): 10  Total: 60       Barthel and G-code impairment scale:  Percentage of impairment CH  0% CI  1-19% CJ  20-39% CK  40-59% CL  60-79% CM  80-99% CN  100%   Barthel Score 0-100 100 99-80 79-60 59-40 20-39 1-19   0   Barthel Score 0-20 20 17-19 13-16 9-12 5-8 1-4 0      The Barthel ADL Index: Guidelines  1. The index should be used as a record of what a patient does, not as a record of what a patient could do. 2. The main aim is to establish degree of independence from any help, physical or verbal, however minor and for whatever reason. 3. The need for supervision renders the patient not independent. 4. A patient's performance should be established using the best available evidence. Asking the patient, friends/relatives and nurses are the usual sources, but direct observation and common sense are also important. However direct testing is not needed. 5. Usually the patient's performance over the preceding 24-48 hours is important, but occasionally longer periods will be relevant. 6. Middle categories imply that the patient supplies over 50 per cent of the effort. 7. Use of aids to be independent is allowed. Esther Israel., Barthel, D.W. (6299). Functional evaluation: the Barthel Index. [facility] (14)2.   MAX Benjamin, Paulina Rushing., Aimee Mcgee.Сергей. (1999). Measuring the change indisability after inpatient rehabilitation; comparison of the responsiveness of the Barthel Index and Functional [place] Measure. Journal of Neurology, Neurosurgery, and Psychiatry, 66(4), 281-423. TAJ Christensen, SHILPI Payan, & Brian Niño M.A. (2004.) Assessment of post-stroke quality of life in cost-effectiveness studies: The usefulness of the Barthel Index and the EuroQoL-5D. Quality of Life Research, 13, 580-77         G codes: In compliance with CMSs Claims Based Outcome Reporting, the following G-code set was chosen for this patient based on their primary functional limitation being treated: The outcome measure chosen to determine the severity of the functional limitation was the Barthel with a score of 60/100 which was correlated with the impairment scale. ? Mobility - Walking and Moving Around:     - CURRENT STATUS: CJ - 20%-39% impaired, limited or restricted    - GOAL STATUS: CI - 1%-19% impaired, limited or restricted    - D/C STATUS:  ---------------To be determined---------------     Pain:  Pain Scale 1: Numeric (0 - 10)  Pain Intensity 1: 7           Pain Intervention(s) 1: Medication (see MAR)  Activity Tolerance:   VSS  Please refer to the flowsheet for vital signs taken during this treatment. After treatment:   [x]         Patient left in no apparent distress sitting up in chair  []         Patient left in no apparent distress in bed  [x]         Call bell left within reach  [x]         Nursing notified  []         Caregiver present  []         Bed alarm activated    COMMUNICATION/EDUCATION:   The patients plan of care was discussed with: Physical Therapist, Occupational Therapist and Registered Nurse. [x]         Fall prevention education was provided and the patient/caregiver indicated understanding. [x]         Patient/family have participated as able in goal setting and plan of care.   [x]         Patient/family agree to work toward stated goals and plan of care. []         Patient understands intent and goals of therapy, but is neutral about his/her participation. []         Patient is unable to participate in goal setting and plan of care.     Thank you for this referral.  Kelvin Jackson, PT, DPT   Time Calculation: 30 mins

## 2018-04-10 NOTE — PROGRESS NOTES
[facility] FRACTURE PROGRESS NOTE    April 10, 2018  Admit Date:   2018    Post Op day: 1 Day Post-Op    Subjective:    Larissa Pal states that he is feeling well overall. Pain controlled at this time. S/p revision right hip. REmains on IV antibiotics for endocarditis and is followed by ID. No new complaints. On warfarin for DVT prophylaxis  Tolerating diet  Denies N/V/SOBor CP     PT/OT:   Gait:                    Vital Signs:    Patient Vitals for the past 8 hrs:   BP Temp Pulse Resp SpO2   04/10/18 0837 91/53 97.8 °F (36.6 °C) 67 15 98 %   04/10/18 0446 94/58 - - 16 98 %   04/10/18 0346 96/54 - 64 - -     Temp (24hrs), Av.9 °F (36.6 °C), Min:97.3 °F (36.3 °C), Max:98.3 °F (36.8 °C)      Pain Control:   Pain Assessment  Pain Scale 1: Numeric (0 - 10)  Pain Intensity 1: 3  Pain Onset 1: POST OP  Pain Location 1: Incisional, Hip  Pain Orientation 1:  Inner, Right  Pain Description 1: Aching, Sore  Pain Intervention(s) 1: Medication (see MAR)    Meds:    Current Facility-Administered Medications   Medication Dose Route Frequency    warfarin (COUMADIN) tablet 4 mg  4 mg Oral ONCE    lidocaine (PF) (XYLOCAINE) 10 mg/mL (1 %) injection 0.1 mL  0.1 mL SubCUTAneous PRN    fentaNYL citrate (PF) injection 50 mcg  50 mcg IntraVENous PRN    midazolam (VERSED) injection 1 mg  1 mg IntraVENous PRN    midazolam (VERSED) injection 1 mg  1 mg IntraVENous PRN    glycopyrrolate (ROBINUL) injection 0.2 mg  0.2 mg IntraVENous ONCE PRN    ropivacaine (PF) (NAROPIN) 5 mg/mL (0.5 %) injection 150 mg  30 mL Peripheral Nerve Block PRN    oxyCODONE IR (ROXICODONE) tablet 10 mg  10 mg Oral Q3H PRN    Warfarin - pharmacy to dose   Other Rx Dosing/Monitoring    acetaminophen (TYLENOL) tablet 650 mg  650 mg Oral Q4H PRN    HYDROmorphone (PF) (DILAUDID) injection 0.5 mg  0.5 mg IntraVENous Q4H PRN    albuterol-ipratropium (DUO-NEB) 2.5 MG-0.5 MG/3 ML  3 mL Nebulization Q4H PRN    ceFAZolin (ANCEF) 2 g/20 mL in sterile water IV syringe  2 g IntraVENous Q8H    clobetasol (TEMOVATE) 0.05 % ointment   Topical PRN    famotidine (PEPCID) tablet 20 mg  20 mg Oral BID    folic acid (FOLVITE) tablet 1 mg  1 mg Oral DAILY    gabapentin (NEURONTIN) capsule 600 mg  600 mg Oral QHS    insulin glargine (LANTUS) injection 30 Units  30 Units SubCUTAneous BID    lisinopril (PRINIVIL, ZESTRIL) tablet 20 mg  20 mg Oral DAILY    lovastatin (MEVACOR) tablet 20 mg  20 mg Oral QHS    polyethylene glycol (MIRALAX) packet 17 g  17 g Oral DAILY    primidone (MYSOLINE) tablet 250 mg  250 mg Oral BID    senna-docusate (PERICOLACE) 8.6-50 mg per tablet 1 Tab  1 Tab Oral BID    sertraline (ZOLOFT) tablet 100 mg  100 mg Oral DAILY    sodium chloride (NS) flush 5-10 mL  5-10 mL IntraVENous Q8H    sodium chloride (NS) flush 5-10 mL  5-10 mL IntraVENous PRN    ondansetron (ZOFRAN) injection 4 mg  4 mg IntraVENous Q4H PRN    lactobac ac& pc-s.therm-b.anim (WESLEY Q/RISAQUAD)  1 Cap Oral DAILY    insulin lispro (HUMALOG) injection   SubCUTAneous AC&HS    glucose chewable tablet 16 g  4 Tab Oral PRN    dextrose (D50W) injection syrg 12.5-25 g  12.5-25 g IntraVENous PRN    glucagon (GLUCAGEN) injection 1 mg  1 mg IntraMUSCular PRN    bacitracin 500 unit/gram packet 1 Packet  1 Packet Topical PRN    alteplase (CATHFLO) 1 mg in sterile water (preservative free) 1 mL injection  1 mg InterCATHeter PRN       LAB:    Recent Labs      04/10/18   0509   HCT  28.0*   HGB  8.1*   INR  1.3*       Transfuse PRBC's:      Assessment & Physician's Comment:  Dressing is clean, dry, and intact  Neurovascular checks within normal limits  Orientation:  Oriented  Acute postoperative blood loss - denies SOB, CP or lightheadedness but has not been up for PT    Principal Problem:    Dislocation of prosthetic joint ([address] (3/20/2018)      Overview: Right CIERRA        Plan:    PT/OT orders in - WBAT but maintain strict posterior precautions  Ice packs to hip PRN  Cont oxycodone for pain  Warfarin for DVT prophylaxis  Abx per ID team  Medical management per primary team  Likely can go home with home health pending therapy progress    GENEVIEVE [employer]

## 2018-04-10 NOTE — ANESTHESIA POSTPROCEDURE EVALUATION
Post-Anesthesia Evaluation and Assessment    Patient: Rebeca Ramirez MRN: [id number]  SSN: [id number]    YOB: 1950  Age: 69 y.o. Sex: male       Cardiovascular Function/Vital Signs  Visit Vitals    BP 94/58    Pulse 64    Temp 36.8 °C (98.3 °F)    Resp 16    Ht 5' 10\" (1.778 m)    Wt 109.3 kg (241 lb)    SpO2 98%    BMI 34.58 kg/m2       Patient is status post general anesthesia for Procedure(s):  I & D REVISION FEMORAL HEAD AND POLYLINER RIGHT HIP. Nausea/Vomiting: None    Postoperative hydration reviewed and adequate. Pain:  Pain Scale 1: Numeric (0 - 10) (04/09/18 2227)  Pain Intensity 1: 8 (04/09/18 2227)   Managed    Neurological Status:   Neuro (WDL): Within Defined Limits (04/09/18 1414)   At baseline    Mental Status and Level of Consciousness: Arousable    Pulmonary Status:   O2 Device: Room air (04/09/18 1414)   Adequate oxygenation and airway patent    Complications related to anesthesia: None    Post-anesthesia assessment completed.  No concerns    Signed By: Gayle Dumont MD     April 10, 2018

## 2018-04-11 VITALS
BODY MASS INDEX: 34.5 KG/M2 | HEART RATE: 60 BPM | OXYGEN SATURATION: 96 % | SYSTOLIC BLOOD PRESSURE: 98 MMHG | HEIGHT: 70 IN | TEMPERATURE: 98.7 F | WEIGHT: 241 LBS | RESPIRATION RATE: 16 BRPM | DIASTOLIC BLOOD PRESSURE: 48 MMHG

## 2018-04-11 LAB
GLUCOSE BLD STRIP.AUTO-MCNC: 161 MG/DL (ref 65–100)
GLUCOSE BLD STRIP.AUTO-MCNC: 175 MG/DL (ref 65–100)
INR PPP: 1.3 (ref 0.9–1.1)
PROTHROMBIN TIME: 13.3 SEC (ref 9–11.1)
SERVICE CMNT-IMP: ABNORMAL
SERVICE CMNT-IMP: ABNORMAL

## 2018-04-11 PROCEDURE — 74011250637 HC RX REV CODE- 250/637: Performed by: HOSPITALIST

## 2018-04-11 PROCEDURE — 74011636637 HC RX REV CODE- 636/637: Performed by: INTERNAL MEDICINE

## 2018-04-11 PROCEDURE — 77030020365 HC SOL INJ SOD CL 0.9% 50ML

## 2018-04-11 PROCEDURE — C1751 CATH, INF, PER/CENT/MIDLINE: HCPCS

## 2018-04-11 PROCEDURE — 97530 THERAPEUTIC ACTIVITIES: CPT | Performed by: PHYSICAL THERAPIST

## 2018-04-11 PROCEDURE — 02HV33Z INSERTION OF INFUSION DEVICE INTO SUPERIOR VENA CAVA, PERCUTANEOUS APPROACH: ICD-10-PCS

## 2018-04-11 PROCEDURE — 97116 GAIT TRAINING THERAPY: CPT | Performed by: PHYSICAL THERAPIST

## 2018-04-11 PROCEDURE — 77030018719 HC DRSG PTCH ANTIMIC J&J -A

## 2018-04-11 PROCEDURE — 74011250637 HC RX REV CODE- 250/637: Performed by: ORTHOPAEDIC SURGERY

## 2018-04-11 PROCEDURE — 74011250637 HC RX REV CODE- 250/637: Performed by: INTERNAL MEDICINE

## 2018-04-11 PROCEDURE — 74011250636 HC RX REV CODE- 250/636: Performed by: INTERNAL MEDICINE

## 2018-04-11 PROCEDURE — 36415 COLL VENOUS BLD VENIPUNCTURE: CPT | Performed by: PHYSICIAN ASSISTANT

## 2018-04-11 PROCEDURE — 74011250637 HC RX REV CODE- 250/637: Performed by: NURSE PRACTITIONER

## 2018-04-11 PROCEDURE — 82962 GLUCOSE BLOOD TEST: CPT

## 2018-04-11 PROCEDURE — 36569 INSJ PICC 5 YR+ W/O IMAGING: CPT | Performed by: NURSE PRACTITIONER

## 2018-04-11 PROCEDURE — 97535 SELF CARE MNGMENT TRAINING: CPT

## 2018-04-11 PROCEDURE — 77030020847 HC STATLOK BARD -A

## 2018-04-11 PROCEDURE — 76937 US GUIDE VASCULAR ACCESS: CPT

## 2018-04-11 PROCEDURE — 85610 PROTHROMBIN TIME: CPT | Performed by: PHYSICIAN ASSISTANT

## 2018-04-11 RX ORDER — SODIUM CHLORIDE 0.9 % (FLUSH) 0.9 %
10 SYRINGE (ML) INJECTION EVERY 24 HOURS
Status: DISCONTINUED | OUTPATIENT
Start: 2018-04-11 | End: 2018-04-11 | Stop reason: HOSPADM

## 2018-04-11 RX ORDER — SODIUM CHLORIDE 0.9 % (FLUSH) 0.9 %
20 SYRINGE (ML) INJECTION AS NEEDED
Status: DISCONTINUED | OUTPATIENT
Start: 2018-04-11 | End: 2018-04-11 | Stop reason: HOSPADM

## 2018-04-11 RX ORDER — WARFARIN SODIUM 5 MG/1
5 TABLET ORAL ONCE
Status: COMPLETED | OUTPATIENT
Start: 2018-04-11 | End: 2018-04-11

## 2018-04-11 RX ORDER — WARFARIN 4 MG/1
TABLET ORAL
Qty: 42 TAB | Refills: 0 | Status: SHIPPED | OUTPATIENT
Start: 2018-04-11 | End: 2018-05-16 | Stop reason: ALTCHOICE

## 2018-04-11 RX ORDER — SODIUM CHLORIDE 0.9 % (FLUSH) 0.9 %
10 SYRINGE (ML) INJECTION AS NEEDED
Status: DISCONTINUED | OUTPATIENT
Start: 2018-04-11 | End: 2018-04-11 | Stop reason: HOSPADM

## 2018-04-11 RX ORDER — SODIUM CHLORIDE 0.9 % (FLUSH) 0.9 %
10 SYRINGE (ML) INJECTION EVERY 8 HOURS
Status: DISCONTINUED | OUTPATIENT
Start: 2018-04-11 | End: 2018-04-11 | Stop reason: HOSPADM

## 2018-04-11 RX ADMIN — OXYCODONE HYDROCHLORIDE 10 MG: 5 TABLET ORAL at 15:28

## 2018-04-11 RX ADMIN — FAMOTIDINE 20 MG: 20 TABLET, FILM COATED ORAL at 09:36

## 2018-04-11 RX ADMIN — OXYCODONE HYDROCHLORIDE 10 MG: 5 TABLET ORAL at 09:36

## 2018-04-11 RX ADMIN — POLYETHYLENE GLYCOL 3350 17 G: 17 POWDER, FOR SOLUTION ORAL at 09:36

## 2018-04-11 RX ADMIN — PRIMIDONE 250 MG: 50 TABLET ORAL at 09:36

## 2018-04-11 RX ADMIN — INSULIN GLARGINE 30 UNITS: 100 INJECTION, SOLUTION SUBCUTANEOUS at 09:36

## 2018-04-11 RX ADMIN — Medication 2 G: at 05:58

## 2018-04-11 RX ADMIN — SERTRALINE HYDROCHLORIDE 100 MG: 50 TABLET ORAL at 09:37

## 2018-04-11 RX ADMIN — INSULIN LISPRO 2 UNITS: 100 INJECTION, SOLUTION INTRAVENOUS; SUBCUTANEOUS at 12:11

## 2018-04-11 RX ADMIN — ACETAMINOPHEN 650 MG: 325 TABLET, FILM COATED ORAL at 09:36

## 2018-04-11 RX ADMIN — FOLIC ACID 1 MG: 1 TABLET ORAL at 09:36

## 2018-04-11 RX ADMIN — Medication 10 ML: at 12:07

## 2018-04-11 RX ADMIN — Medication 1 CAPSULE: at 09:36

## 2018-04-11 RX ADMIN — INSULIN LISPRO 2 UNITS: 100 INJECTION, SOLUTION INTRAVENOUS; SUBCUTANEOUS at 07:34

## 2018-04-11 RX ADMIN — WARFARIN SODIUM 5 MG: 5 TABLET ORAL at 12:06

## 2018-04-11 RX ADMIN — Medication 2 G: at 12:06

## 2018-04-11 RX ADMIN — LACTULOSE 30 G: 20 SOLUTION ORAL at 09:36

## 2018-04-11 RX ADMIN — STANDARDIZED SENNA CONCENTRATE AND DOCUSATE SODIUM 1 TABLET: 8.6; 5 TABLET, FILM COATED ORAL at 09:36

## 2018-04-11 NOTE — PROGRESS NOTES
Home IV Orders  1. 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 Routine PICC Eber Portacat Care  3. Antibiotic:  Cefazolin 2 gm q8 hours IV   4. Lab each Monday:   CBC/diff/platelets   CMP   ESR   CRP    5. Lab each Thursday:   CBC/diff/platelets   BUN   Creatinine    6. Fax lab to Dr. Sera Montoya @ [phone].  7.  Call Dr. Sera Montoya @ [phone] for fever >100.5, infection at PICC site, diarrhea, WBC > 15 or < 3, cr > 1.8  8. Duration of therapy: last day of therapy should be April 23, 2018   Please call Dr. Sera Montoya @ [phone] before stopping therapy. 9.  Allergies: Allergies   Allergen Reactions    Nsaids (Non-Steroidal Anti-Inflammatory Drug) Other (comments)     Hx of PUD and Hinson's Esophagus       11.  Make appointment in 10 days    Xochitl Marques MD

## 2018-04-11 NOTE — DISCHARGE SUMMARY
Discharge Summary       PATIENT ID: Stacie Oscar  MRN: [id number]   YOB: 1950    DATE OF ADMISSION: 4/6/2018 10:36 PM    DATE OF DISCHARGE: 4/11/2018  PRIMARY CARE PROVIDER: Everette Corona MD       DISCHARGING PHYSICIAN: Abhishek Garcia NP    To contact this individual call [phone] and ask the  to page. If unavailable ask to be transferred the Adult Hospitalist Department. CONSULTATIONS: IP CONSULT TO INFECTIOUS DISEASES    PROCEDURES/SURGERIES: Procedure(s):  I & D REVISION FEMORAL HEAD AND POLYLINER RIGHT HIP    ADMITTING DIAGNOSES & HOSPITAL COURSE:     Pt presented to the ED with sudden onset of right hip pain.  The pain was constant, worse with movement and unable to bear weight on right leg. The pain was described as sharp, 10/10 in severity - no known aggravating or relieving factors.  Pt reports a hx of R hip replacement done 2010. He was recently admitted at [facility] 02/24-03/04/2018 due to an infected R hip prosthesis and underwent irrigation and debridement of the right hip. He was also diagnosed with MV endocarditis and was discharged home on Ancef to complete a 6-week course (to end 4/10/2018).  Since the patient was discharged from the hospital, he has been to the ED 3 times with a dislocated right hip.        Pmhx: type 2 diabetes, dyslipidemia, benign essential tremor, depression, hypertension, obstructive sleep apnea    See more detailed hospital stay under plan. He was discharged in stable condition to home. DISCHARGE DIAGNOSES / PLAN:      Dislocation of prosthetic right hip (POA):. - s/p IR aspiration of R hip 4/7, awaiting results - NGTD still (holding for 14 days)  - pain control prn  - further plans per Ortho - s/p I and D on 4-9      Hx Sepsis (septic arthritis and MV endocarditis): complete treatment 4/10  - was being treated for staph lugdunensis bacteremia (PTA).  Per pt, treatment to continue through 4/10 (confirmed by ID)  - s/p PICC placement 3/1 but as per IV team PICC was pulled almost completely out per xray on admit and was recommended to be removed. PICC taken out 4/7 and peripheral line was placed. - Ancef has been ordered per outpatient regimen  - ID following, discussed with Dr. Bobby Timmons, PICC line 4/11 and will complete ABX on 4/23      Hx Type 2 diabetes:    - blood glucose control reasonable, continue with home insulin/SSI  - hold oral agent until the time of discharge from the hospital.      Hx Dyslipidemia:  Mevacor.      Hx Benign Essential Tremor:  Primidone.      Hx Depression:  Zoloft.      Hx JACOB on CPAP:  We will continue with the CPAP with home setting.      Hx Hypertension:  BP controlled, continue home meds.       Obese: BMI 34.58     Restless Legs: consider for requip after surgery      DVT ppx: SCDs for now. Pt was on coumadin for DVT ppx after discharge x 6 weeks and was to have completed this 4/15, has been subtherapeutic here-pharmacy dosing  Code Status: Full  Care Plan: [place] PT/OT recommended  Dispo: HHPT/OT/RN- depending on response with PT/OT pst-surgery. He lives at home with his son but son works.          PENDING TEST RESULTS:   At the time of discharge the following test results are still pending: final wound cultures from 4/9    FOLLOW UP APPOINTMENTS:    Follow-up Information     Follow up With Details Comments [address] [place] [address]  For home IV antibiotics [facility] [facility]. [address] [place] [address]  [phone]    AT [address] [place] [address] for IV antibiotics and physical and occupational therapy. [address] [place] [address] Jonh Merritt MD Schedule an appointment as soon as possible for a visit in [facility] [id number] [phone]           Home IV Orders  1.   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 Routine PICC [facility] Care  3. Antibiotic:  Cefazolin 2 gm q8 hours IV   4. Lab each Monday:                        CBC/diff/platelets                        CMP                        ESR                        CRP     5.  Lab each Thursday:                        CBC/diff/platelets                        BUN                        Creatinine     6. Fax lab to Dr. Thee Dallas @ [phone].  7.  Call Dr. Thee Dallas @ [phone] for fever >100.5, infection at PICC site, diarrhea, WBC > 15 or < 3, cr > 1.8  8. Duration of therapy: last day of therapy should be April 23, 2018                        Please call Dr. Thee Dallas @ [phone] before stopping therapy. 9.  Allergies: Allergies   Allergen Reactions    Nsaids (Non-Steroidal Anti-Inflammatory Drug) Other (comments)       Hx of PUD and Hinson's Esophagus         11. Make appointment in 10 days     Lynne Eckert MD    After [address] Plan/Discharge Instructions   Anterior Approach Hip Replacement- Dr. Eron Sykes    Patient Name  Jennyfer Murguia  Date of procedure  4/9/2018    Procedure  Procedure(s):  I & D REVISION FEMORAL HEAD AND POLYLINER RIGHT HIP  Surgeon  Surgeon(s) and Role:     * Tapan Brown MD - Primary  PCP: Genny Kamara MD  Date of discharge: 4/11/18  Follow up appointments   Follow up with Dr. Eron Sykes in 2 weeks. Call [phone] to make an appointment.  If home health has been arranged for you the agency will contact you to arrange dates/times for visits. Please call them if you do not hear from them within 24 hours after you are discharged    When to call your Orthopaedic Surgeon: Call [phone].  If you call after 5pm or on a weekend, the on call physician will be contacted   Increased hip pain, unrelieved pain or if you have difficulty or are unable to walk   Signs of infection-if your incision is red; continues to have drainage; drainage has a foul odor or if you have a persistent fever over 101 degrees   Signs of a blood clot in your leg-calf pain, tenderness, redness, swelling of lower leg    When to call your Primary Care Physician:   Concerns about medical conditions such as diabetes, high blood pressure, asthma, congestive heart failure  Call if blood sugars are elevated, persistent headache or dizziness, coughing or congestion, constipation or diarrhea, burning with urination, abnormal heart rate     When to call 911and go to the nearest emergency room   Acute onset of chest pain, shortness of breath, difficulty breathing    Activity   Weight bearing as tolerated with walker or crutches. Refer to pages 23-33 of your handbook for instructions and pictures   Complete your Home Exercise Program daily as instructed by your therapist.  Refer to pages 36-42 of your handbook for instructions and pictures   Get up every one hour and walk (except at night when sleeping)   AVOID sudden and extreme movement of your hip (surgical leg)   Do not drive or operate heavy machinery    Incision Care   The Aquacel (brown, waterproof) surgical dressing is to remain on your hip for 7 days. On the 7th day have someone gently peel the dressing off by carefully lifting the edge and stretching it slightly to break the adhesive seal   You may now leave your incision open to air. You will have absorbable sutures in your incision   If your Aquacel dressing comes loose/off before the 7th day, you may replace it with a dry sterile gauze dressing; change it daily. Once your incision is not draining, you may leave it open to air   You may take a shower with the Aquacel dressing in place. Once the Aquacel is removed, you may shower and get your incision wet but do not submerge your incision under water in a bath tub, hot tub or swimming pool for 6 weeks after surgery. Preventing blood clots  Take Coumadin daily prescribed by Dr. Estela Burns.  The home health RN will check your labs and Dr. Stephen Montoya office will let you know if you should take additional doses or hold dosing based on your INR    Pain management   Take pain medication as prescribed; decrease the amount you use as your pain lessens   Avoid alcoholic beverages while taking pain medication   Please be aware that many medications contain Tylenol. We do not want you to over medicate so please read the information below as a guide. Do not take more than 4 Grams of Tylenol in a 24 hour period. (There are 1000 milligrams in one Gram)  o Percocet contains 325 mg of Tylenol per tablet (do not take more than 12 tablets in 24 hours)  o    You may place an ice bag on your hip for 15-20 minutes after exercising and as needed though out the day and night    Diet   Resume usual diet; drink plenty of fluids; eat foods high in fiber   You may want to take a stool softener (such as Senokot-S or Colace) to prevent constipation while you are taking pain medication. If constipation occurs, take a laxative (such as Dulcolax tablets, Milk of Magnesia, or a suppository)    [facility] Protocol (to [facility] followed [facility])    Nursing-per physicians order   Complete head to toe assessment, vital signs   Medication reconciliation   Review pain management   Manage chronic medical conditions    Physical Therapy-per physicians order    Weight bearing status:  Precautions at Admission: Fall, WBAT, Other (comment) (strict posterior precautions)            Mobility Status:  Supine to Sit: Additional time, Moderate assistance  Sit to Stand: Contact guard assistance  Sit to Supine:  (remained in chair)  Bed to Chair: Contact guard assistance    Gait:  Distance (ft): 25 Feet (ft)  Ambulation - Level of Assistance: Contact guard assistance  Assistive Device: Gait belt, Walker, rolling  Gait Abnormalities: Antalgic, Decreased step clearance, Step to gait    ADL status overall composite:            Toilet Transfer : Contact guard assistance    Physical Therapy   Assessment and evaluation-bed mobility; functional transfers (bed, chair, bathroom, stairs); ambulation with equipment, car transfers, shower transfers, safety and ability to get out of house in the event of an emergency   AVOID sudden and extreme movement of the hip (surgical leg)   Discuss pain management   Review how to do ADLs. Refer to pages 43-47 of handbook    Home Exercise Program-refer to pages 36-42 of handbook     Discharge Instructions       PATIENT ID: Yvonne Santamaria  MRN: [id number]   [facility]: 1950    DATE OF ADMISSION: 4/6/2018 10:36 PM    DATE OF DISCHARGE: 4/11/2018    PRIMARY CARE PROVIDER: Carly Lopez MD       DISCHARGING PHYSICIAN: Rhina Mcintosh NP    To contact this individual call [phone] and ask the  to page. If unavailable ask to be transferred the Adult Hospitalist Department. DISCHARGE DIAGNOSES Dislocation of right hip/ Previous Sepsis and endocarditis    CONSULTATIONS: IP CONSULT TO INFECTIOUS DISEASES    PROCEDURES/SURGERIES: Procedure(s):  I & D REVISION FEMORAL HEAD AND POLYLINER RIGHT HIP    PENDING TEST RESULTS:   At the time of discharge the following test results are still pending: final cultures    FOLLOW UP APPOINTMENTS:   Follow-up Information     Follow up With Details Comments [facility]  For home IV antibiotics [facility]. [facility]  [phone]    AT [address] [place] [address] for IV antibiotics and physical and occupational therapy. [address] [place] [address] Vianney Rodriguez MD Schedule an appointment as soon as possible for a visit in 2 weeks [address]  [phone]             ADDITIONAL CARE RECOMMENDATIONS:   1. Dr. Peggy Perkins will manage your antibiotics which you will take until 4/23  2. Take a probiotic or a yogurt daily to help prevent infection  3. Resume your Coumadin 4 mg daily as instructed by Dr. Leatha Pena  4. Follow up with Dr. Leatha Pena in 2 weeks        DIET:low carb, low sugar    ACTIVITY: As per orthopedics instructions, PT and OT are ordered for you at home    WOUND CARE: per ortho instructions above          DISCHARGE MEDICATIONS:   See Medication Reconciliation Form    · It is important that you take the medication exactly as they are prescribed. · Keep your medication in the bottles provided by the pharmacist and keep a list of the medication names, dosages, and times to be taken in your wallet. · Do not take other medications without consulting your doctor. NOTIFY YOUR PHYSICIAN FOR ANY OF THE FOLLOWING:   Fever over 101 degrees for 24 hours. Chest pain, shortness of breath, fever, chills, nausea, vomiting, diarrhea, change in mentation, falling, weakness, bleeding. Severe pain or pain not relieved by medications. Or, any other signs or symptoms that you may have questions about. DISPOSITION:  x  Home With:  x OT x PT  HH x RN       SNF/Inpatient Rehab/LTAC    Independent/assisted living    Hospice    Other:     CDMP Checked:   Yes x       Signed:   Tawny Manning NP  4/11/2018  1:47 PM      DISCHARGE MEDICATIONS:  Current Discharge Medication List      CONTINUE these medications which have NOT CHANGED    Details   albuterol-ipratropium (DUO-NEB) 2.5 mg-0.5 mg/3 ml nebu 3 mL by Nebulization route every six (6) hours as needed. !! oxyCODONE IR (ROXICODONE) 10 mg tab immediate release tablet Take 10 mg by mouth. atomoxetine (STRATTERA) 40 mg capsule TAKE ONE CAPSULE BY MOUTH EVERY DAY  Refills: 5      eszopiclone (LUNESTA) 2 mg tablet Take 2 mg by mouth nightly as needed. metFORMIN ER (GLUCOPHAGE XR) 500 mg tablet Take 1,000 mg by mouth. Indications: taking two 500 mg tabs. daily      clobetasol (TEMOVATE) 0.05 % ointment as needed.       BD INSULIN PEN NEEDLE UF SHORT 31 gauge x 5/16\" ndle       acetaminophen (TYLENOL) 650 mg TbER Take 650 mg by mouth every six (6) hours as needed. clonazePAM (KLONOPIN) 0.5 mg tablet Take 1 Tab by mouth nightly as needed. Max Daily Amount: 0.5 mg. Indications: insomnia  Qty: 5 Tab, Refills: 0    Associated Diagnoses: Insomnia, unspecified type      insulin glargine (LANTUS,BASAGLAR) 100 unit/mL (3 mL) inpn 30 Units by SubCUTAneous route two (2) times a day. Indications: type 2 diabetes mellitus  Qty: 1 Pen, Refills: 0      famotidine (PEPCID) 20 mg tablet Take 1 Tab by mouth two (2) times a day. Qty: 60 Tab, Refills: 0      nystatin (MYCOSTATIN) powder Apply 1 g to affected area two (2) times a day. APPLY TO yeast in groin  Qty: 1 Bottle, Refills: 0      !! oxyCODONE IR (ROXICODONE) 5 mg immediate release tablet Take 1.5 Tabs by mouth every three (3) hours as needed. Max Daily Amount: 60 mg. Severe pain  Qty: 5 Tab, Refills: 0    Associated Diagnoses: Infection associated with internal right hip prosthesis, initial encounter ([facility]      polyethylene glycol (MIRALAX) 17 gram packet Take 1 Packet by mouth daily. Hold for loose stools/diarrhea  Qty: 30 Packet, Refills: 0      senna-docusate (PERICOLACE) 8.6-50 mg per tablet Take 1 Tab by mouth two (2) times a day. Hold for loose stools/diarrhea  Qty: 60 Tab, Refills: 0      warfarin (COUMADIN) 4 mg tablet Take 1 Tab by mouth daily for 42 days. Indications: DEEP VEIN THROMBOSIS PREVENTION  Qty: 1 Tab, Refills: 0      sertraline (ZOLOFT) 100 mg tablet TAKE 1 TABLET DAILY  Qty: 90 Tab, Refills: 1    Associated Diagnoses: Grief;  Moderate episode of recurrent major depressive disorder (HCC)      JANUVIA 100 mg tablet TAKE 1 TABLET DAILY  Qty: 90 Tab, Refills: 0      primidone (MYSOLINE) 250 mg tablet TAKE 1 TABLET TWICE A DAY  Qty: 180 Tab, Refills: 3      lovastatin (MEVACOR) 20 mg tablet TAKE 1 TABLET IN THE EVENING  Qty: 90 Tab, Refills: 1      folic acid (FOLVITE) 1 mg tablet TAKE 1 TABLET DAILY  Qty: 90 Tab, Refills: 3      lisinopril (PRINIVIL, ZESTRIL) 20 mg tablet TAKE 1 TABLET DAILY  Qty: 90 Tab, Refills: 2      JARDIANCE 25 mg tablet TAKE 1 TABLET DAILY  Qty: 30 Tab, Refills: 10    Associated Diagnoses: Diabetes mellitus type 2, uncontrolled (HCC)      VITAMIN D2 50,000 unit capsule TAKE 1 CAPSULE EVERY 7 DAYS  Qty: 12 Cap, Refills: 2      gabapentin (NEURONTIN) 300 mg capsule Take 2 Caps by mouth nightly. Qty: 1 Cap, Refills: 0      aspirin 81 mg chewable tablet Take 1 Tab by mouth daily. Qty: 30 Tab, Refills: 11       !! - Potential duplicate medications found. Please discuss with provider. STOP taking these medications       CEFAZOLIN SODIUM/WATER (CEFAZOLIN, ANCEF, IN STERILE WATER) 2 gram/20 mL IV syringe Comments:   Reason for Stopping:                 NOTIFY YOUR PHYSICIAN FOR ANY OF THE FOLLOWING:   Fever over 101 degrees for 24 hours. Chest pain, shortness of breath, fever, chills, nausea, vomiting, diarrhea, change in mentation, falling, weakness, bleeding. Severe pain or pain not relieved by medications. Or, any other signs or symptoms that you may have questions about. DISPOSITION:  x  Home With:  x OT x PT x HH x RN       Long term SNF/Inpatient Rehab    Independent/assisted living    Hospice    Other:       PATIENT CONDITION AT DISCHARGE:     Functional status    Poor     Deconditioned    x Independent      Cognition   x  Lucid     Forgetful     Dementia      Catheters/lines (plus indication)    Hoffman     PICC     PEG    x None      Code status    x Full code     DNR      PHYSICAL EXAMINATION AT DISCHARGE:  Constitutional:  No acute distress, cooperative, pleasant    ENT:  Oral mucous membranes dry, oropharynx benign. Resp:  No accessory muscle use and on RA   CV:  Has a murmur. Rate is regular    GI:  Obese, non distended, active bowel sounds    Musculoskeletal:  R thigh and flank/hip edema, warm. Leg immobilizer on R leg. Pulses palpable. Neurologic:  Moves all extremities. AAOx3                                             Skin: Right thigh/hip dressing intact with bloody drainage noted  Visit Vitals    /62    Pulse 65    Temp 98.5 °F (36.9 °C)    Resp 15    Ht 5' 10\" (1.778 m)    Wt 109.3 kg (241 lb)    SpO2 96%    BMI 34.58 kg/m2           CHRONIC MEDICAL DIAGNOSES:  Problem List as of 4/11/2018  Date Reviewed: 4/11/2018          Codes Class Noted - Resolved    * (Principal)Dislocation of prosthetic joint ([facility] ICD-10-CM: V23.467L  ICD-9-CM: 996.42  3/20/2018 - Present    Overview Signed 3/20/2018 11:37 PM by AMAN Grant     Right CIERRA             Endocarditis of mitral valve ICD-10-CM: I05.8  ICD-9-CM: 394.9  3/4/2018 - Present        Obesity ICD-10-CM: E66.9  ICD-9-CM: 278.00  3/4/2018 - Present        Insomnia ICD-10-CM: G47.00  ICD-9-CM: 780.52  3/4/2018 - Present        Sepsis ([facility] ICD-10-CM: A41.9  ICD-9-CM: 038.9, 995.91  2/27/2018 - Present        Infected prosthesis of right hip ([facility] ICD-10-CM: T84.51XA  ICD-9-CM: 996.66, V43.64  2/26/2018 - Present        Bacteremia due to Staphylococcus ICD-10-CM: R78.81  ICD-9-CM: 790.7, 041.10  2/24/2018 - Present        Recurrent depression ([facility] ICD-10-CM: F33.9  ICD-9-CM: 296.30  1/16/2018 - Present        Type 2 diabetes mellitus with diabetic neuropathy ([facility] ICD-10-CM: E11.40  ICD-9-CM: 250.60, 357.2  1/16/2018 - Present        PFO (patent foramen ovale) ICD-10-CM: Q21.1  ICD-9-CM: 745.5  7/26/2017 - Present        Systolic murmur BGE-59-EK: R01.1  ICD-9-CM: 785.2  3/9/2017 - Present        Jessica-prosthetic fracture of femur following total hip arthroplasty ICD-10-CM: M97. Krystal Le, [id number]  ICD-9-CM: 996.44, V43.64  8/25/2016 - Present        Osteoarthritis of right hip ICD-10-CM: M16.11  ICD-9-CM: 715.95  8/1/2016 - Present        Benign essential tremor ICD-10-CM: G25.0  ICD-9-CM: 333.1  Unknown - Present        Diabetes mellitus type 2, uncontrolled ([facility] ICD-10-CM: E11.65  ICD-9-CM: 250.02  6/3/2013 - Present        Hypogonadism male ICD-10-CM: E29.1  ICD-9-CM: 257.2  8/29/2012 - Present        Encounter for long-term (current) use of medications ICD-10-CM: Z79.899  ICD-9-CM: V58.69  7/29/2010 - Present        Osteoarthritis of hip ICD-10-CM: M16.9  ICD-9-CM: 715.95  Unknown - Present    Overview Signed 4/29/2010  6:55 AM by Mindi hernandez             Depression ICD-10-CM: F32.9  ICD-9-CM: 810  Unknown - Present        ED (erectile dysfunction) of organic origin ICD-10-CM: N52.9  ICD-9-CM: 607.84  Unknown - Present        GERD (gastroesophageal reflux disease) ICD-10-CM: K21.9  ICD-9-CM: 530.81  Unknown - Present        PUD (peptic ulcer disease) ICD-10-CM: K27.9  ICD-9-CM: 533.90  Unknown - Present        Hinson's esophagus with esophagitis ICD-10-CM: K22.70, K20.9  ICD-9-CM: 530.85, 530.10  Unknown - Present        HTN (hypertension) ICD-10-CM: I10  ICD-9-CM: 401.9  3/17/2010 - Present        High cholesterol ICD-10-CM: E78.00  ICD-9-CM: 272.0  3/17/2010 - Present        RESOLVED: Essential tremor ICD-10-CM: G25.0  ICD-9-CM: 333.1  8/28/2013 - 6/1/2015              Greater than 30 minutes were spent with the patient on counseling and coordination of care    Signed:   Tawny Manning NP  4/11/2018  1:52 PM

## 2018-04-11 NOTE — PROGRESS NOTES
Care Management Interventions  PCP Verified by CM: Yes Marley Estrada MD)  Palliative Care Criteria Met (RRAT>21 & CHF Dx)?: Yes  Palliative Consult Recommended?: No  Reason Palliative Care Not Recommended?: Provider preference to wait  Mode of Transport at Discharge: Other (see comment) (family)  Transition of Care Consult (CM Consult): [address]: No  Reason Outside [place]: Patient already serviced by other home care/hospice agency (patient already open to At [address])  [address]: No  Discharge Durable Medical Equipment: No (Patient owns walker)  Physical Therapy Consult: Yes  Occupational Therapy Consult: Yes  Speech Therapy Consult: No  Current Support Network: Own Home, Other (Son lives with him)  Confirm Follow Up Transport: Family  Plan discussed with Pt/Family/Caregiver: Yes  Freedom of Choice Offered: Yes   Resource Information Provided?: No  Discharge Location  Discharge Placement: Home with home health (and IV abx ([facility]))     Reason for Admission:    Right hip prosthetic infection with staph lugdenensis      RRAT Score:  26         Resources/supports as identified by patient/family: patient lives with son Colleen Robb. Top Challenges facing patient (as identified by patient/family and CM): None  Finances/Medication cost? Patient works for [employer]. Transportation? No barriers, family will assist        Support system or lack thereof? Supportive family          Living arrangements? Lives with son       Self-care/ADLs/Cognition? Independent PTA         Current Advanced Care Plan:  Full code. Plan for utilizing home health:  Patient was open with At [address] PTA, both are willing to resume services. Likelihood of readmission:    high   Transition of Care Plan: Plan is to discharge home today with home health and IV abx. Son will transport patient home at discharge.     Referrals were sent to [facility] and [facility] via [employer]. Spoke with Bola Branch with HCP. Patient is on the same drug as before. They will just need PICC report once available. Plan is for PICC to be placed today. CM sent in-basket message to nurse navigator Heather Hamilton with hospital discharge hand-off. At [address] are all set to resume services at discharge. RN is aware. Patient has PICC line. 2:12 PM: Noted orders for home health to do lab work tomorrow. CM sent updated orders to [facility] via [employer].     Seble Bailey, KADENW/CRM

## 2018-04-11 NOTE — MED STUDENT NOTES
Ortho Daily Progress Note    4/11/2018  9:28 AM    POD:  2 Days Post-Op  S/P:  Procedure(s):  I & D REVISION FEMORAL HEAD AND POLYLINER RIGHT HIP    Afebrile last temp 98.5. VSS  Doing well without complaints of nausea  Pain well controlled. Compartments in thigh and calf soft/NTTP Bilaterally  Erythema and mild blisters around dressing from tape. No drainage or dehiscence from incision. Dressing clean and dry  Moving lower extremities well. Restless leg syndrome. Mild erythema on heels. Neurocirculatory exam intact and within normal range. Lab Results   Component Value Date/Time    HGB 8.1 (L) 04/10/2018 05:09 AM    INR 1.3 (H) 04/11/2018 04:58 AM         PLAN: Cultures still pending. DVT prophylaxis: Warfarin 10 mg pharmacy to dose. WBAT with PT-mobilization  Pain Control: Oxycodone 10mg Q3hrs  Plan to D/C TBD      Jaleel BRYANT 2    Pt seen and examined with above student. Agree with findings and treatment plan.   Paco Biswas PA-C

## 2018-04-11 NOTE — PROGRESS NOTES
I have reviewed discharge instructions and RX  with the patient and son. RX will be filled at their own pharmacy. The patient and son verbalized understanding. Opportunities for questions were provided. Vital signs are stable. Adequate I/Os. Pt will be discharged with PICC line for home antibiotics. All belongings are with the patient. Discharge via wheelchair with volunteer.

## 2018-04-11 NOTE — PROGRESS NOTES
Bedside shift change report given to guillermo (oncoming nurse) by Senia Bagley (offgoing nurse). Report included the following information SBAR.

## 2018-04-11 NOTE — PROGRESS NOTES
Physical Therapy  Patient currently awaiting PICC placement and PICC team in room. Will check back at later time for mobility progression.   Betsy Mi, PT, DPT

## 2018-04-11 NOTE — PROGRESS NOTES
Bedside and Verbal shift change report given to Valencia Sears (oncoming nurse) by Chuck aYn (offgoing nurse). Report included the following information SBAR, Kardex, Procedure Summary, Intake/Output, MAR and Recent Results.

## 2018-04-11 NOTE — PROGRESS NOTES
Problem: Mobility Impaired (Adult and Pediatric)  Goal: *Acute Goals and Plan of Care (Insert Text)  Physical Therapy Goals  Initiated 4/10/2018    1. Patient will move from supine to sit and sit to supine  in bed with modified independence within 4 days. 2. Patient will perform sit to stand with modified independence within 4 days. 3. Patient will ambulate with modified independence for 200 feet with the least restrictive device within 4 days. 4. Patient will ascend/descend 4 stairs with 1 handrail(s) with modified independence within 4 days. 5. Patient will verbalize and demonstrate understanding of THR precautions per protocol within 4 days. 6. Patient will perform THR home exercise program per protocol with modified independence within 4 days. physical Therapy TREATMENT  Patient: Saadia Dutta (61 y.o. male)  Date: 4/11/2018  Diagnosis: Dislocation of prosthetic joint, subsequent encounter  Breakage (fracture) of prosthetic joint, subsequent encounter  unknown Dislocation of prosthetic joint ([address]  Procedure(s) (LRB):  I & D REVISION FEMORAL HEAD AND POLYLINER RIGHT HIP (Right) 2 Days Post-Op  Precautions: Fall, WBAT, Other (comment) (strict posterior precautions)  Chart, physical therapy assessment, plan of care and goals were reviewed. ASSESSMENT:  Patient making steady progress toward goals. Patient received supine in bed and agreeable to PT. Supine to sit with supervision and extra time to complete task. Sit to stand with CGA and cues for technique. Amb approx 150 feet with RW and CGA with slow aniya but no overt LOB. Up/down 4 stairs with 1 rail and SPC with CGA. No difficulty with stairs and son will be present with him. Returned to room and positioned up in chair. Patient is cleared to DC from a mobility standpoint.   Progression toward goals:  [x]      Improving appropriately and progressing toward goals  []      Improving slowly and progressing toward goals  []      Not making progress toward goals and plan of care will be adjusted     PLAN:  Patient continues to benefit from skilled intervention to address the above impairments. Continue treatment per established plan of care. Discharge Recommendations:  Home Health  Further Equipment Recommendations for Discharge:  none     SUBJECTIVE:   Patient stated I've been up to the bathroom a few times and that went well.     OBJECTIVE DATA SUMMARY:   Critical Behavior:  Neurologic State: Alert  Orientation Level: Oriented X4  Cognition: Follows commands  Safety/Judgement: Good awareness of safety precautions  Functional Mobility Training:  Bed Mobility:     Supine to Sit: Contact guard assistance; Additional time              Transfers:  Sit to Stand: Contact guard assistance  Stand to Sit: Contact guard assistance                             Balance:  Sitting: Intact  Standing: Intact; With support  Ambulation/Gait Training:  Distance (ft): 150 Feet (ft)  Assistive Device: Gait belt;Walker, rolling  Ambulation - Level of Assistance: Contact guard assistance        Gait Abnormalities: Antalgic;Decreased step clearance; Step to gait        Base of Support: Widened  Stance: Right decreased  Speed/Clarice: Pace decreased (<100 feet/min); Slow  Step Length: Left shortened;Right shortened          Stairs:  Number of Stairs Trained: 4  Stairs - Level of Assistance: Contact guard assistance  Rail Use: Left  (SPC in R UE)      Pain:  Pain Scale 1: Numeric (0 - 10)  Pain Intensity 1: 2           Pain Intervention(s) 1: Medication (see MAR)  Activity Tolerance:   VSS  Please refer to the flowsheet for vital signs taken during this treatment.   After treatment:   [x] Patient left in no apparent distress sitting up in chair  [] Patient left in no apparent distress in bed  [x] Call bell left within reach  [x] Nursing notified  [] Caregiver present  [] Bed alarm activated    COMMUNICATION/COLLABORATION:   The patients plan of care was discussed with: Physical Therapist, Occupational Therapist and Registered Nurse    Michela Toledo, PT, DPT   Time Calculation: 27 mins

## 2018-04-11 NOTE — PROGRESS NOTES
Bedside shift change report given to Lele Cooley RN (oncoming nurse) by [employer], RN (offgoing nurse). Report included the following information SBAR, Kardex and Recent Results.

## 2018-04-11 NOTE — DISCHARGE INSTRUCTIONS
Home IV Orders  1. 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 Routine PICC Eber Portacat Care  3. Antibiotic:  Cefazolin 2 gm q8 hours IV   4. Lab each Monday:                        CBC/diff/platelets                        CMP                        ESR                        CRP     5.  Lab each Thursday:                        CBC/diff/platelets                        BUN                        Creatinine     6. Fax lab to Dr. Mariola Hauser @ [phone].  7.  Call Dr. Mariola Hauser @ [phone] for fever >100.5, infection at PICC site, diarrhea, WBC > 15 or < 3, cr > 1.8  8. Duration of therapy: last day of therapy should be April 23, 2018                        Please call Dr. Mariola Hauser @ [phone] before stopping therapy. 9.  Allergies: Allergies   Allergen Reactions    Nsaids (Non-Steroidal Anti-Inflammatory Drug) Other (comments)       Hx of PUD and Hinson's Esophagus         11. Make appointment in 10 days     Curtis Hubbard MD    After [facility] Plan/Discharge Instructions   Anterior Approach Hip Replacement- Dr. Chano Park    Patient Name  Saadia Dutta  Date of procedure  4/9/2018    Procedure  Procedure(s):  I & D REVISION FEMORAL HEAD AND POLYLINER RIGHT HIP  Surgeon  Surgeon(s) and Role:     * Moody Francis MD - Primary  PCP: Gian Horton MD  Date of discharge: 4/11/18  Follow up appointments   Follow up with Dr. Chano Park in 2 weeks. Call [phone] to make an appointment.  If home health has been arranged for you the agency will contact you to arrange dates/times for visits. Please call them if you do not hear from them within 24 hours after you are discharged    When to call your Orthopaedic Surgeon: Call [phone].  If you call after 5pm or on a weekend, the on call physician will be contacted   Increased hip pain, unrelieved pain or if you have difficulty or are unable to walk   Signs of infection-if your incision is red; continues to have drainage; drainage has a foul odor or if you have a persistent fever over 101 degrees   Signs of a blood clot in your leg-calf pain, tenderness, redness, swelling of lower leg    When to call your Primary Care Physician:   Concerns about medical conditions such as diabetes, high blood pressure, asthma, congestive heart failure  Call if blood sugars are elevated, persistent headache or dizziness, coughing or congestion, constipation or diarrhea, burning with urination, abnormal heart rate     When to call 911and go to the nearest emergency room   Acute onset of chest pain, shortness of breath, difficulty breathing    Activity   Weight bearing as tolerated with walker or crutches. Refer to pages 23-33 of your handbook for instructions and pictures   Complete your Home Exercise Program daily as instructed by your therapist.  Refer to pages 36-42 of your handbook for instructions and pictures   Get up every one hour and walk (except at night when sleeping)   AVOID sudden and extreme movement of your hip (surgical leg)   Do not drive or operate heavy machinery    Incision Care   The Aquacel (brown, waterproof) surgical dressing is to remain on your hip for 7 days. On the 7th day have someone gently peel the dressing off by carefully lifting the edge and stretching it slightly to break the adhesive seal   You may now leave your incision open to air. You will have absorbable sutures in your incision   If your Aquacel dressing comes loose/off before the 7th day, you may replace it with a dry sterile gauze dressing; change it daily. Once your incision is not draining, you may leave it open to air   You may take a shower with the Aquacel dressing in place. Once the Aquacel is removed, you may shower and get your incision wet but do not submerge your incision under water in a bath tub, hot tub or swimming pool for 6 weeks after surgery.     Preventing blood clots  Take Coumadin daily prescribed by Dr. Hector Atkinson. The home health RN will check your labs and Dr. Kelli Marquez office will let you know if you should take additional doses or hold dosing based on your INR    Pain management   Take pain medication as prescribed; decrease the amount you use as your pain lessens   Avoid alcoholic beverages while taking pain medication   Please be aware that many medications contain Tylenol. We do not want you to over medicate so please read the information below as a guide. Do not take more than 4 Grams of Tylenol in a 24 hour period. (There are 1000 milligrams in one Gram)  o Percocet contains 325 mg of Tylenol per tablet (do not take more than 12 tablets in 24 hours)  o    You may place an ice bag on your hip for 15-20 minutes after exercising and as needed though out the day and night    Diet   Resume usual diet; drink plenty of fluids; eat foods high in fiber   You may want to take a stool softener (such as Senokot-S or Colace) to prevent constipation while you are taking pain medication.   If constipation occurs, take a laxative (such as Dulcolax tablets, Milk of Magnesia, or a suppository)    [facility] Protocol (to be followed by [address])    Nursing-per physicians order   Complete head to toe assessment, vital signs   Medication reconciliation   Review pain management   Manage chronic medical conditions    Physical Therapy-per physicians order    Weight bearing status:  Precautions at Admission: Fall, WBAT, Other (comment) (strict posterior precautions)            Mobility Status:  Supine to Sit: Additional time, Moderate assistance  Sit to Stand: Contact guard assistance  Sit to Supine:  (remained in chair)  Bed to Chair: Contact guard assistance    Gait:  Distance (ft): 25 Feet (ft)  Ambulation - Level of Assistance: Contact guard assistance  Assistive Device: Gait belt, Walker, rolling  Gait Abnormalities: Antalgic, Decreased step clearance, Step to gait    ADL status overall composite: Toilet Transfer : Contact guard assistance    Physical Therapy   Assessment and evaluation-bed mobility; functional transfers (bed, chair, bathroom, stairs); ambulation with equipment, car transfers, shower transfers, safety and ability to get out of house in the event of an emergency   AVOID sudden and extreme movement of the hip (surgical leg)   Discuss pain management   Review how to do ADLs. Refer to pages 43-47 of handbook    Home Exercise Program-refer to pages 36-42 of handbook     Discharge Instructions       PATIENT ID: Saadia Dutta  MRN: [id number]   [facility]: 1950    DATE OF ADMISSION: 4/6/2018 10:36 PM    DATE OF DISCHARGE: 4/11/2018    PRIMARY CARE PROVIDER: Gian Horton MD       DISCHARGING PHYSICIAN: Eva Frederick NP    To contact this individual call [phone] and ask the  to page. If unavailable ask to be transferred the Adult Hospitalist Department. DISCHARGE DIAGNOSES Dislocation of right hip/ Previous Sepsis and endocarditis    CONSULTATIONS: IP CONSULT TO INFECTIOUS DISEASES    PROCEDURES/SURGERIES: Procedure(s):  I & D REVISION FEMORAL HEAD AND POLYLINER RIGHT HIP    PENDING TEST RESULTS:   At the time of discharge the following test results are still pending: final cultures    FOLLOW UP APPOINTMENTS:   Follow-up Information     Follow up With Details Comments [facility]  For home IV antibiotics [address]. [address] [place] [address]  [phone]    AT [address] [place] [address] for IV antibiotics and physical and occupational therapy.  [address] [place] [address] Ashlyn Grant MD Schedule an appointment as soon as possible for a visit in 2 weeks [address]      Gian Horton, [address] [place] [phone]             ADDITIONAL CARE RECOMMENDATIONS:   1.  Bernadette Johnson will manage your antibiotics which you will take until 4/23  2. Take a probiotic or a yogurt daily to help prevent infection  3. Resume your Coumadin 4 mg daily as instructed by Dr. Francine Pinedo  4. Follow up with Dr. Francine Pinedo in 2 weeks        DIET:low carb, low sugar    ACTIVITY: As per orthopedics instructions, PT and OT are ordered for you at home    WOUND CARE: per ortho instructions above          DISCHARGE MEDICATIONS:   See Medication Reconciliation Form    · It is important that you take the medication exactly as they are prescribed. · Keep your medication in the bottles provided by the pharmacist and keep a list of the medication names, dosages, and times to be taken in your wallet. · Do not take other medications without consulting your doctor. NOTIFY YOUR PHYSICIAN FOR ANY OF THE FOLLOWING:   Fever over 101 degrees for 24 hours. Chest pain, shortness of breath, fever, chills, nausea, vomiting, diarrhea, change in mentation, falling, weakness, bleeding. Severe pain or pain not relieved by medications. Or, any other signs or symptoms that you may have questions about.       DISPOSITION:  x  Home With:  x OT x PT  HH x RN       SNF/Inpatient Rehab/LTAC    Independent/assisted living    Hospice    Other:     CDMP Checked:   Yes x       Signed:   Cheree Favre, NP  4/11/2018  1:47 PM

## 2018-04-11 NOTE — PROGRESS NOTES
Problem: Self Care Deficits Care Plan (Adult)  Goal: *Acute Goals and Plan of Care (Insert Text)  Occupational Therapy Goals  Initiated: 4/10/2018    1. Patient will perform grooming with supervision/set-up standing at sink within 3 day(s). 2.  Patient will perform bathing with supervision/set-up from chair within 3 day(s). 3.  Patient will perform upper body dressing and lower body dressing with supervision/set-up within 3 day(s). 4.  Patient will perform toilet transfers with supervision/set-up within 3 day(s). 5.  Patient will perform all aspects of toileting with supervision/set-up within 3 day(s). 6.  Patient will be independent with strict posterior hip precautions within 3 days. Occupational Therapy TREATMENT  Patient: Alyssa Quezada (68 y.o. male)  Date: 4/11/2018  Diagnosis: Dislocation of prosthetic joint, subsequent encounter  Breakage (fracture) of prosthetic joint, subsequent encounter  unknown Dislocation of prosthetic joint ([facility] [address]  Procedure(s) (LRB):  I & D REVISION FEMORAL HEAD AND POLYLINER RIGHT HIP (Right) 2 Days Post-Op  Precautions: Fall, WBAT, Other (comment) (strict posterior precautions)  Chart, occupational therapy assessment, plan of care, and goals were reviewed. ASSESSMENT:  Patient received seated in chair, reporting he was planning for d/c home this evening. Patient provided with handout regarding ADL modifications, posterior hip precautions, and energy conservation techniques. Patient indicating understanding. Reviewed AE use, states he has all use. Discussed car transfer technique modifications, reports he is familiar as he has been having precautions for few months. Patient reporting he was waiting for son to get dressed prior to d/c. Will continue to follow if d/c is held.   Progression toward goals:  [x]       Improving appropriately and progressing toward goals  []       Improving slowly and progressing toward goals  []       Not making progress toward goals and plan of care will be adjusted     PLAN:  Patient continues to benefit from skilled intervention to address the above impairments. Continue treatment per established plan of care. Discharge Recommendations:  None  Further Equipment Recommendations for Discharge:  None     SUBJECTIVE:   Patient stated I'm hoping to go home with my son today.     OBJECTIVE DATA SUMMARY:   Cognitive/Behavioral Status:  Neurologic State: Alert  Orientation Level: Oriented X4  Cognition: Appropriate decision making; Appropriate for age attention/concentration; Appropriate safety awareness; Follows commands  Perception: Appears intact  Perseveration: No perseveration noted  Safety/Judgement: Awareness of environment; Fall prevention; Insight into deficits;Home safety;Good awareness of safety precautions    Functional Mobility and Transfers for ADLs:  Bed Mobility:  Supine to Sit: Contact guard assistance; Additional time    Transfers:  Sit to Stand: Contact guard assistance          Balance:  Sitting: Intact  Standing: Intact; With support    ADL Intervention:    Educated patient on energy conservation techniques, strategies to maximize quality of life and decrease stress/anxiety. 1. Deep breathing  2. Educated on pacing and making sure he/she takes short frequent breaks (e.g. In the shower wash the upper body, rest for 1 minute, then wash the lower body, etc)  3. Educated on using cooler water in the shower so as to not get fatigued from the heat  4. Educated on drying off by using a lynsey cloth robe  5. Educated on re-arranging his/her routine to allow for rest breaks in the morning routine  6.  Educated on using a mantra and medication to decrease feelings of anxiety, especially when short of breath  7. Educated on looking at the consequences of his/her actions before deciding he/she needs to take on a task (e.g not getting down on one's hands and knees to wash floors because it will take all of one's energy for the day and result in exhaustion). 8. Educated on DME used to help conserve energy, such as a shower seat, a stool or chair in the kitchen, and pushing or pulling items instead of carrying them. 9. Educated on fall alert necklaces/bracelets to increase safety      Handout provided for posterior hip precautions and ADLs, patient indicating understanding and able to state 3/3 precautions. Cognitive Retraining  Safety/Judgement: Awareness of environment; Fall prevention; Insight into deficits;Home safety;Good awareness of safety precautions    Pain:  Pain Scale 1: Numeric (0 - 10)  Pain Intensity 1: 2           Pain Intervention(s) 1: Medication (see MAR)  Activity Tolerance:   Good. Please refer to the flowsheet for vital signs taken during this treatment.   After treatment:   [x] Patient left in no apparent distress sitting up in chair  [] Patient left in no apparent distress in bed  [x] Call bell left within reach  [x] Nursing notified  [] Caregiver present  [] Bed alarm activated    COMMUNICATION/COLLABORATION:   The patients plan of care was discussed with: Physical Therapist and Registered Nurse    Terry Steiner OT  Time Calculation: 11 mins

## 2018-04-11 NOTE — PROGRESS NOTES
Ortho Daily Progress Note      Patient: Bianca Keller                   MRN: [id number]  Sex: male  YOB: 1950           Age: 69 y.o.    2 Days Post-Op    Procedure(s): I & D REVISION FEMORAL HEAD AND POLYLINER RIGHT HIP     Visit Vitals    /62    Pulse 65    Temp 98.5 °F (36.9 °C)    Resp 15    Ht 5' 10\" (1.778 m)    Wt 109.3 kg (241 lb)    SpO2 96%    BMI 34.58 kg/m2        Lab Results:  HGB   Date/Time Value Ref Range Status   04/10/2018 05:09 AM 8.1 (L) 12.1 - 17.0 g/dL Final     INR   Date/Time Value Ref Range Status   04/11/2018 04:58 AM 1.3 (H) 0.9 - 1.1   Final     Comment:     A single therapeutic range for Vit K antagonists may not be optimal for all indications - see June, 2008 issue of Chest, American College [employer] Chest [employer] Evidence-Based Clinical Practice Guidelines, 8th Edition.        Physical Exam:    Getting PICC this morning    Plan:    Home today  ABX per Dr. Sera Montoya  Resume Coumadin  Remove Aquacel 1 week post-op  F/U With Dr. Michelle Brar in 2 weeks    Cain AMAN Richard  4/11/2018   11:29 AM      .

## 2018-04-11 NOTE — PROGRESS NOTES
Hospitalist Progress Note  Lupis Kim NP  Answering service: [phone] from in house phone  Cell: [phone]      Date of Service:  2018  NAME:  Carin Soto  :  1950  MRN:  [id number]    Admission Summary:   Pt presented to the ED with sudden onset of right hip pain.  The pain was constant, worse with movement and unable to bear weight on right leg. The pain was described as sharp, 10/10 in severity - no known aggravating or relieving factors.  Pt reports a hx of R hip replacement done . He was recently admitted at [facility] from -2018 due to an infected R hip prosthesis and underwent irrigation and debridement of the right hip. He was also diagnosed with MV endocarditis and was discharged home on Ancef to complete a 6-week course (to end 4/10/2018).  Since the patient was discharged from the hospital, he has been to the ED 3 times with a dislocated right hip.       Pmhx: type 2 diabetes, dyslipidemia, benign essential tremor, depression, hypertension, obstructive sleep apnea     Interval history / Subjective:      Mr Gabi rBitton wants to go home. HH has been recommended, will discuss plan with ID for disposition plan  Assessment & Plan:     Dislocation of prosthetic right hip (POA):. - s/p IR aspiration of R hip , awaiting results - NGTD still (holding for 14 days)  - pain control prn  - further plans per Ortho - s/p I and D on      Hx Sepsis (septic arthritis and MV endocarditis): complete treatment 4/10  - was being treated for staph lugdunensis bacteremia (PTA). Per pt, treatment to continue through 4/10 (confirmed by ID)  - s/p PICC placement 3/1 but as per IV team PICC was pulled almost completely out per xray on admit and was recommended to be removed. PICC taken out  and peripheral line was placed.    - Ancef has been ordered per outpatient regimen  - ID following, will discuss with  Kyara Alva     Hx Type 2 diabetes:    - blood glucose control reasonable, continue with home insulin/SSI  - hold oral agent until the time of discharge from the hospital.     Hx Dyslipidemia:  Mevacor.     Hx Benign Essential Tremor:  Primidone.     Hx Depression:  Zoloft.     Hx JACOB on CPAP:  We will continue with the CPAP with home setting.     Hx Hypertension:  BP controlled, continue home meds.      Obese: BMI 34.58    Restless Legs: consider for requip after surgery      DVT ppx: SCDs for now. Pt was on coumadin for DVT ppx after discharge x 6 weeks and was to have completed this 4/15, has been subtherapeutic here-pharmacy dosing  Code Status: Full  Care Plan: [facility] PT/OT recommended  Dispo: to be determined - depending on response with PT/OT pst-surgery. He lives at home with his son but son works. Pt would like / Ede Saravia (PCP)updated on 4-9     Hospital Problems  Date Reviewed: 4/7/2018          Codes Class Noted POA    * (Principal)Dislocation of prosthetic joint [facility]) ICD-10-CM: S76.516I  ICD-9-CM: 996.42  3/20/2018 Yes    Overview Signed 3/20/2018 11:37 PM by AMAN Ragland     Right CIERRA                 Review of Systems:   Denies HA. No chest pain, pressure. No respiratory complaints or abdominal pain. Mild R leg pain, relieved by pain meds. Vital Signs:    Last 24hrs VS reviewed since prior progress note. Most recent are:  Visit Vitals    /62    Pulse 65    Temp 98.5 °F (36.9 °C)    Resp 15    Ht 5' 10\" (1.778 m)    Wt 109.3 kg (241 lb)    SpO2 96%    BMI 34.58 kg/m2       Intake/Output Summary (Last 24 hours) at 04/11/18 0839  Last data filed at 04/11/18 0735   Gross per 24 hour   Intake              450 ml   Output             1850 ml   Net            -1400 ml      Physical Examination:        Constitutional:  No acute distress, cooperative, pleasant    ENT:  Oral mucous membranes dry, oropharynx benign. Resp:  No accessory muscle use and on RA   CV:  Has a murmur.  Rate is regular    GI:  Obese, non distended, active bowel sounds    Musculoskeletal:  R thigh and flank/hip edema, warm. Leg immobilizer on R leg. Pulses palpable. Neurologic:  Moves all extremities. AAOx3     Skin: Right thigh/hip dressing intact with bloody drainage noted  Data Review:    Review and/or order of clinical lab test  Review and/or order of tests in the medicine section of [facility]      Labs:     Recent Labs      04/10/18   0509   WBC  4.7   HGB  8.1*   HCT  28.0*   PLT  120*     Recent Labs      04/10/18   0509   NA  137   K  3.9   CL  107   CO2  22   BUN  12   CREA  0.69*   GLU  107*   CA  7.7*     Recent Labs      04/10/18   0509   SGOT  54*   ALT  10*   AP  59   TBILI  0.3   TP  5.0*   ALB  1.7*   GLOB  3.3     Recent Labs      04/11/18   0458  04/10/18   0509  04/09/18   1147   INR  1.3*  1.3*  1.3*   PTP  13.3*  13.2*   --       No results for input(s): FE, TIBC, PSAT, FERR in the last 72 hours. Lab Results   Component Value Date/Time    Folate 9.2 03/18/2010 11:26 AM      No results for input(s): PH, PCO2, PO2 in the last 72 hours. No results for input(s): CPK, CKNDX, TROIQ in the last 72 hours.     No lab exists for component: CPKMB  Lab Results   Component Value Date/Time    Cholesterol, total 141 07/26/2017 01:24 PM    HDL Cholesterol 63 07/26/2017 01:24 PM    LDL, calculated 58 07/26/2017 01:24 PM    Triglyceride 98 07/26/2017 01:24 PM    CHOL/HDL Ratio 3.5 11/01/2010 07:07 AM     Lab Results   Component Value Date/Time    Glucose (POC) 161 (H) 04/11/2018 07:02 AM    Glucose (POC) 169 (H) 04/10/2018 09:48 PM    Glucose (POC) 175 (H) 04/10/2018 04:28 PM    Glucose (POC) 138 (H) 04/10/2018 12:03 PM    Glucose (POC) 104 (H) 04/10/2018 06:47 AM     Lab Results   Component Value Date/Time    Color YELLOW/STRAW 04/07/2018 06:50 AM    Appearance CLEAR 04/07/2018 06:50 AM    Specific gravity 1.021 04/07/2018 06:50 AM    Specific gravity 1.020 02/28/2018 05:01 PM    pH (UA) 6.0 04/07/2018 06:50 AM Protein NEGATIVE  04/07/2018 06:50 AM    Glucose >1000 (A) 04/07/2018 06:50 AM    Ketone NEGATIVE  04/07/2018 06:50 AM    Bilirubin NEGATIVE  04/07/2018 06:50 AM    Urobilinogen 0.2 04/07/2018 06:50 AM    Nitrites NEGATIVE  04/07/2018 06:50 AM    Leukocyte Esterase NEGATIVE  04/07/2018 06:50 AM    Epithelial cells FEW 04/07/2018 06:50 AM    Bacteria NEGATIVE  04/07/2018 06:50 AM    WBC 0-4 04/07/2018 06:50 AM    RBC 0-5 04/07/2018 06:50 AM         Medications Reviewed:     Current Facility-Administered Medications   Medication Dose Route Frequency    lidocaine (PF) (XYLOCAINE) 10 mg/mL (1 %) injection 0.1 mL  0.1 mL SubCUTAneous PRN    fentaNYL citrate (PF) injection 50 mcg  50 mcg IntraVENous PRN    midazolam (VERSED) injection 1 mg  1 mg IntraVENous PRN    midazolam (VERSED) injection 1 mg  1 mg IntraVENous PRN    glycopyrrolate (ROBINUL) injection 0.2 mg  0.2 mg IntraVENous ONCE PRN    ropivacaine (PF) (NAROPIN) 5 mg/mL (0.5 %) injection 150 mg  30 mL Peripheral Nerve Block PRN    oxyCODONE IR (ROXICODONE) tablet 10 mg  10 mg Oral Q3H PRN    Warfarin - pharmacy to dose   Other Rx Dosing/Monitoring    acetaminophen (TYLENOL) tablet 650 mg  650 mg Oral Q4H PRN    HYDROmorphone (PF) (DILAUDID) injection 0.5 mg  0.5 mg IntraVENous Q4H PRN    albuterol-ipratropium (DUO-NEB) 2.5 MG-0.5 MG/3 ML  3 mL Nebulization Q4H PRN    ceFAZolin (ANCEF) 2 g/20 mL in sterile water IV syringe  2 g IntraVENous Q8H    clobetasol (TEMOVATE) 0.05 % ointment   Topical PRN    famotidine (PEPCID) tablet 20 mg  20 mg Oral BID    folic acid (FOLVITE) tablet 1 mg  1 mg Oral DAILY    gabapentin (NEURONTIN) capsule 600 mg  600 mg Oral QHS    insulin glargine (LANTUS) injection 30 Units  30 Units SubCUTAneous BID    lisinopril (PRINIVIL, ZESTRIL) tablet 20 mg  20 mg Oral DAILY    lovastatin (MEVACOR) tablet 20 mg  20 mg Oral QHS    polyethylene glycol (MIRALAX) packet 17 g  17 g Oral DAILY    primidone (MYSOLINE) tablet 250 mg  250 mg Oral BID    senna-docusate (PERICOLACE) 8.6-50 mg per tablet 1 Tab  1 Tab Oral BID    sertraline (ZOLOFT) tablet 100 mg  100 mg Oral DAILY    sodium chloride (NS) flush 5-10 mL  5-10 mL IntraVENous Q8H    sodium chloride (NS) flush 5-10 mL  5-10 mL IntraVENous PRN    ondansetron (ZOFRAN) injection 4 mg  4 mg IntraVENous Q4H PRN    lactobac ac& pc-s.therm-b.anim (WESLEY Q/RISAQUAD)  1 Cap Oral DAILY    insulin lispro (HUMALOG) injection   SubCUTAneous AC&HS    glucose chewable tablet 16 g  4 Tab Oral PRN    dextrose (D50W) injection syrg 12.5-25 g  12.5-25 g IntraVENous PRN    glucagon (GLUCAGEN) injection 1 mg  1 mg IntraMUSCular PRN    bacitracin 500 unit/gram packet 1 Packet  1 Packet Topical PRN    alteplase (CATHFLO) 1 mg in sterile water (preservative free) 1 mL injection  1 mg InterCATHeter PRN   ______________________________________________________________________  EXPECTED LENGTH OF STAY: 2d 9h  ACTUAL LENGTH OF STAY:          [address] hCarles Pratt NP

## 2018-04-11 NOTE — PROCEDURES
PICC Placement Note    PRE-PROCEDURE VERIFICATION  Correct Procedure: yes  Correct Site:  yes  Temperature: Temp: 98.5 °F (36.9 °C), Temperature Source: Temp Source: Oral  Recent Labs      04/11/18   0458  04/10/18   0509   BUN   --   12   CREA   --   0.69*   PLT   --   120*   INR  1.3*  1.3*   WBC   --   4.7       Allergies: Nsaids (non-steroidal anti-inflammatory drug)    Education materials, including PICC Booklet and written information regarding central catheter related bloodstream infection and prevention given to patient. See Patient Education activity for further details. PROCEDURE DETAIL  A single lumen PICC line was started for Home IV Antibiotic Therapy. The following documentation is in addition to the PICC properties in the lines/airways flowsheet :  Lot #: [id number]  Xylocaine 1% used intradermally:  yes  Total Catheter Length: 42 (cm)  Internal Catheter Length: 42 (cm)  Vein Selection for PICC: right cephalic  Central Line Bundle followed: yes  Complication Related to Insertion: none    The placement was verified by ECG technology. The PICC is on the right side and the tip overlies the superior vena cava. ECG verification documentation is on the patient's paper chart. Line is ready for immediate use. Report to Merissa.     KADEN BasilioN, RN, VA-BC   Vascular Access Team

## 2018-04-11 NOTE — PROGRESS NOTES
ID Progress Note  2018      Revision of acetabular liner and femoral head to a tripolar head 18       Subjective:     Afebrile. Feels good. Objective:     Vitals:   Visit Vitals    /62    Pulse 65    Temp 98.5 °F (36.9 °C)    Resp 15    Ht 5' 10\" (1.778 m)    Wt 109.3 kg (241 lb)    SpO2 96%    BMI 34.58 kg/m2        Tmax:  Temp (24hrs), Av.5 °F (36.9 °C), Min:98.1 °F (36.7 °C), Max:98.7 °F (37.1 °C)      Exam:    Not in distress  Lungs: clear to auscultation, no rales wheezes or rhonchi   Heart: s1, s2, no murmurs, rubs or clicks   Abdomen: soft, nontender, no guarding or rebound       Labs:   Lab Results   Component Value Date/Time    WBC 4.7 04/10/2018 05:09 AM    HGB 8.1 (L) 04/10/2018 05:09 AM    HCT 28.0 (L) 04/10/2018 05:09 AM    PLATELET 292 (L)  05:09 AM    MCV 96.2 04/10/2018 05:09 AM     Lab Results   Component Value Date/Time    Sodium 137 04/10/2018 05:09 AM    Potassium 3.9 04/10/2018 05:09 AM    Chloride 107 04/10/2018 05:09 AM    CO2 22 04/10/2018 05:09 AM    Anion gap 8 04/10/2018 05:09 AM    Glucose 107 (H) 04/10/2018 05:09 AM    BUN 12 04/10/2018 05:09 AM    Creatinine 0.69 (L) 04/10/2018 05:09 AM    BUN/Creatinine ratio 17 04/10/2018 05:09 AM    GFR est AA >60 04/10/2018 05:09 AM    GFR est non-AA >60 04/10/2018 05:09 AM    Calcium 7.7 (L) 04/10/2018 05:09 AM    Bilirubin, total 0.3 04/10/2018 05:09 AM    AST (SGOT) 54 (H) 04/10/2018 05:09 AM    Alk. phosphatase 59 04/10/2018 05:09 AM    Protein, total 5.0 (L) 04/10/2018 05:09 AM    Albumin 1.7 (L) 04/10/2018 05:09 AM    Globulin 3.3 04/10/2018 05:09 AM    A-G Ratio 0.5 (L) 04/10/2018 05:09 AM    ALT (SGPT) 10 (L) 04/10/2018 05:09 AM             Assessment:     1. 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 2.  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 3.  Dislocated right hip. 4.  Diabetes. 5.  History of patent foramen ovale. 6.  Hypertension.   7.  Obstructive sleep apnea.         Recommendations:     Await right hip culture results. Will extend therapy until 4/23 as this will be the day that the culture results will be finalize if we do not grow any bacteria. I will write orders.          Mai Villatoro MD

## 2018-04-11 NOTE — PROGRESS NOTES
Orthopedic Service Discharge Summary    Patient ID:  Carin Soto  [id number]  male  69 y.o.  1950    Admit date: 4/6/2018    Discharge date and time: 04/11/2018     Admitting Physician: Arlene Sanchez MD     Discharge Physician: Arlene Sanchez MD    Consulting Physician(s): Treatment Team: Attending Provider: Marcos Ndiaye MD; Consulting Provider: Arlene Sanchez MD; Consulting Provider: Gilles Rangel MD; Consulting Provider: AMAN Becker; Consulting Provider: Susie Waggoner MD; Utilization Review: Jose R Blankenship RN; Utilization Review: Mireille Smith RN; Care Manager: Alex Hagen    Date of Surgery: 4/9/2018     Preoperative Diagnosis:  Recurrent dislocation of right hip    Postoperative Diagnosis: Recurrent dislocation of right hip    Procedure(s): Procedure(s):  I & D REVISION FEMORAL HEAD AND POLYLINER RIGHT HIP    Surgeon: Surgeon(s) and Role:     Abbie Sharma MD - Primary      Anesthesia:  General    Preoperative Medical Clearance:                           HPI:  Pt is a 69 y.o. male who has a history of Dislocation of prosthetic joint, subsequent encounter  Breakage (fracture) of prosthetic joint, subsequent encounter  unknown  with pain and limitations of activities of daily living who presents at this time for a right CIERRA revision following recurrent hip dislocations.   PMH: Recurrent right hip instability with dislocations  Past Medical History:   Diagnosis Date    ADD (attention deficit disorder)     Anemia due to blood loss     Hinson's esophagus with esophagitis     Benign essential tremor     Cancer ([facility] [address] 2012    BCC    Depression     DJD (degenerative joint disease) of hip     bilat    DM (diabetes mellitus) ([facility] [address] 3/17/2010    ED (erectile dysfunction) of organic origin     Fall on or from sidewalk curb 9/24/2015    Femur fracture (HCC)     Gastritis and duodenitis     GERD (gastroesophageal reflux disease)     resolved after discontinuing diclofenac  Hematuria 2016    High cholesterol 3/17/2010    HTN (hypertension) 3/17/2010    [facility] obesity [facility] [address]     Murmur, cardiac     PFO (patent foramen ovale) 2017    PUD (peptic ulcer disease)     Shingles 2016    RESOLVING- NO RASH (HEAD)    Sleep apnea     CPAP       Medications upon admission :   Prior to Admission Medications   Prescriptions Last Dose Informant Patient Reported? Taking? BD INSULIN PEN NEEDLE UF SHORT 31 gauge x \" ndle   Yes No   CEFAZOLIN SODIUM/WATER (CEFAZOLIN, ANCEF, IN STERILE WATER) 2 gram/20 mL IV syringe   No No   Si mL by IntraVENous route every eight (8) hours. JANUVIA 100 mg tablet   No No   Sig: TAKE 1 TABLET DAILY   JARDIANCE 25 mg tablet   No No   Sig: TAKE 1 TABLET DAILY   VITAMIN D2 50,000 unit capsule   No No   Sig: TAKE 1 CAPSULE EVERY 7 DAYS   acetaminophen (TYLENOL) 650 mg TbER   Yes No   Sig: Take 650 mg by mouth every six (6) hours as needed. albuterol-ipratropium (DUO-NEB) 2.5 mg-0.5 mg/3 ml nebu   Yes No   Sig: 3 mL by Nebulization route every six (6) hours as needed. aspirin 81 mg chewable tablet   No No   Sig: Take 1 Tab by mouth daily. atomoxetine (STRATTERA) 40 mg capsule   Yes No   Sig: TAKE ONE CAPSULE BY MOUTH EVERY DAY   clobetasol (TEMOVATE) 0.05 % ointment   Yes No   Sig: as needed. clonazePAM (KLONOPIN) 0.5 mg tablet   No No   Sig: Take 1 Tab by mouth nightly as needed. Max Daily Amount: 0.5 mg. Indications: insomnia   eszopiclone (LUNESTA) 2 mg tablet   Yes No   Sig: Take 2 mg by mouth nightly as needed. famotidine (PEPCID) 20 mg tablet   No No   Sig: Take 1 Tab by mouth two (2) times a day. folic acid (FOLVITE) 1 mg tablet   No No   Sig: TAKE 1 TABLET DAILY   gabapentin (NEURONTIN) 300 mg capsule   No No   Sig: Take 2 Caps by mouth nightly. insulin glargine (LANTUS,BASAGLAR) 100 unit/mL (3 mL) inpn   No No   Si Units by SubCUTAneous route two (2) times a day.  Indications: type 2 diabetes mellitus   lisinopril (PRINIVIL, ZESTRIL) 20 mg tablet   No No   Sig: TAKE 1 TABLET DAILY   lovastatin (MEVACOR) 20 mg tablet   No No   Sig: TAKE 1 TABLET IN THE EVENING   metFORMIN ER (GLUCOPHAGE XR) 500 mg tablet   Yes No   Sig: Take 1,000 mg by mouth. Indications: taking two 500 mg tabs. daily   nystatin (MYCOSTATIN) powder   No No   Sig: Apply 1 g to affected area two (2) times a day. APPLY TO yeast in groin   oxyCODONE IR (ROXICODONE) 10 mg tab immediate release tablet   Yes No   Sig: Take 10 mg by mouth. oxyCODONE IR (ROXICODONE) 5 mg immediate release tablet   No No   Sig: Take 1.5 Tabs by mouth every three (3) hours as needed. Max Daily Amount: 60 mg. Severe pain   Patient taking differently: Take 10 mg by mouth every three (3) hours as needed (taking 2 5mg tabs as needed). Severe pain   polyethylene glycol (MIRALAX) 17 gram packet   No No   Sig: Take 1 Packet by mouth daily. Hold for loose stools/diarrhea   primidone (MYSOLINE) 250 mg tablet   No No   Sig: TAKE 1 TABLET TWICE A DAY   senna-docusate (PERICOLACE) 8.6-50 mg per tablet   No No   Sig: Take 1 Tab by mouth two (2) times a day. Hold for loose stools/diarrhea   sertraline (ZOLOFT) 100 mg tablet   No No   Sig: TAKE 1 TABLET DAILY   warfarin (COUMADIN) 10 mg tablet   Yes No   Sig: Take 10 mg by mouth daily. warfarin (COUMADIN) 2 mg tablet   Yes No   Sig: Take 2 mg by mouth daily. warfarin (COUMADIN) 2.5 mg tablet   Yes No   Sig: Take 2.5 mg by mouth daily. warfarin (COUMADIN) 4 mg tablet   No No   Sig: Take 1 Tab by mouth daily for 42 days. Indications: DEEP VEIN THROMBOSIS PREVENTION      Facility-Administered Medications: None        Allergies: Allergies   Allergen Reactions    Nsaids (Non-Steroidal Anti-Inflammatory Drug) Other (comments)     Hx of PUD and Hinson's Esophagus        Hospital Course: The patient underwent surgery. Complications:  None; patient tolerated the procedure well.  Was taken to the PACU in stable condition and then transferred to the Orthopedics floor. Condition on discharge:  Stable, improved    Perioperative Antibiotics:  Ancef     Postoperative Pain Management:  Oxycodone 5-10mg , Tylenol    DVT Prophylaxis: Warfarin     Postoperative transfusions:     none    Post Op complications: none    Hemoglobin at discharge:    Lab Results   Component Value Date/Time    HGB 8.1 (L) 04/10/2018 05:09 AM    INR 1.3 (H) 04/11/2018 04:58 AM       Dressing was changed on POD # 2. Incision - clean, dry and intact. No significant erythema or swelling. Neurovascular exam within normal limits. Wound appears to be healing without any evidence of infection. Physical Therapy started on the day following surgery and progressed to ambulation with the aid of a rolling walker for distances of 25 feet. At the time of discharge, able to go up and down stairs and had understanding of precautions needed following surgery. Discharged to: Home. Discharge instructions:  -See Full Summary of discharge instructions attached  -Anticoagulate with Warfarin  -Resume pre hospital diet            -Resume home medications   Current Discharge Medication List      CONTINUE these medications which have NOT CHANGED    Details   albuterol-ipratropium (DUO-NEB) 2.5 mg-0.5 mg/3 ml nebu 3 mL by Nebulization route every six (6) hours as needed. !! oxyCODONE IR (ROXICODONE) 10 mg tab immediate release tablet Take 10 mg by mouth. atomoxetine (STRATTERA) 40 mg capsule TAKE ONE CAPSULE BY MOUTH EVERY DAY  Refills: 5      eszopiclone (LUNESTA) 2 mg tablet Take 2 mg by mouth nightly as needed. metFORMIN ER (GLUCOPHAGE XR) 500 mg tablet Take 1,000 mg by mouth. Indications: taking two 500 mg tabs. daily      clobetasol (TEMOVATE) 0.05 % ointment as needed. BD INSULIN PEN NEEDLE UF SHORT 31 gauge x 5/16\" ndle       acetaminophen (TYLENOL) 650 mg TbER Take 650 mg by mouth every six (6) hours as needed.       clonazePAM (KLONOPIN) 0.5 mg tablet Take 1 Tab by mouth nightly as needed. Max Daily Amount: 0.5 mg. Indications: insomnia  Qty: 5 Tab, Refills: 0    Associated Diagnoses: Insomnia, unspecified type      insulin glargine (LANTUS,BASAGLAR) 100 unit/mL (3 mL) inpn 30 Units by SubCUTAneous route two (2) times a day. Indications: type 2 diabetes mellitus  Qty: 1 Pen, Refills: 0      famotidine (PEPCID) 20 mg tablet Take 1 Tab by mouth two (2) times a day. Qty: 60 Tab, Refills: 0      nystatin (MYCOSTATIN) powder Apply 1 g to affected area two (2) times a day. APPLY TO yeast in groin  Qty: 1 Bottle, Refills: 0      !! oxyCODONE IR (ROXICODONE) 5 mg immediate release tablet Take 1.5 Tabs by mouth every three (3) hours as needed. Max Daily Amount: 60 mg. Severe pain  Qty: 5 Tab, Refills: 0    Associated Diagnoses: Infection associated with internal right hip prosthesis, [facility]      polyethylene glycol (MIRALAX) 17 gram packet Take 1 Packet by mouth daily. Hold for loose stools/diarrhea  Qty: 30 Packet, Refills: 0      senna-docusate (PERICOLACE) 8.6-50 mg per tablet Take 1 Tab by mouth two (2) times a day. Hold for loose stools/diarrhea  Qty: 60 Tab, Refills: 0      warfarin (COUMADIN) 4 mg tablet Take 1 Tab by mouth daily for 42 days. Indications: DEEP VEIN THROMBOSIS PREVENTION  Qty: 1 Tab, Refills: 0      sertraline (ZOLOFT) 100 mg tablet TAKE 1 TABLET DAILY  Qty: 90 Tab, Refills: 1    Associated Diagnoses: Grief;  Moderate episode of recurrent major depressive disorder (HCC)      JANUVIA 100 mg tablet TAKE 1 TABLET DAILY  Qty: 90 Tab, Refills: 0      primidone (MYSOLINE) 250 mg tablet TAKE 1 TABLET TWICE A DAY  Qty: 180 Tab, Refills: 3      lovastatin (MEVACOR) 20 mg tablet TAKE 1 TABLET IN THE EVENING  Qty: 90 Tab, Refills: 1      folic acid (FOLVITE) 1 mg tablet TAKE 1 TABLET DAILY  Qty: 90 Tab, Refills: 3      lisinopril (PRINIVIL, ZESTRIL) 20 mg tablet TAKE 1 TABLET DAILY  Qty: 90 Tab, Refills: 2      JARDIANCE 25 mg tablet TAKE 1 TABLET DAILY  Qty: 30 Tab, Refills: 10    Associated Diagnoses: Diabetes mellitus type 2, uncontrolled (HCC)      VITAMIN D2 50,000 unit capsule TAKE 1 CAPSULE EVERY 7 DAYS  Qty: 12 Cap, Refills: 2      gabapentin (NEURONTIN) 300 mg capsule Take 2 Caps by mouth nightly. Qty: 1 Cap, Refills: 0      aspirin 81 mg chewable tablet Take 1 Tab by mouth daily. Qty: 30 Tab, Refills: 11       !! - Potential duplicate medications found. Please discuss with provider. STOP taking these medications       CEFAZOLIN SODIUM/WATER (CEFAZOLIN, ANCEF, IN STERILE WATER) 2 gram/20 mL IV syringe Comments:   Reason for Stopping:            per medical continuation form  -Discharge activity: Activity as tolerated and No heavy lifting, pushing, pulling with the implant side for 2 months  -Ambulate with Walkers, Type: Rolling Walker, appropriate total joint protocol  -Wound Care Keep wound clean and dry, Reinforce dressing PRN, Ice to area for comfort and As directed  -Follow up in office in 2 weeks      Signed:  Juventino Tesfaye  4/11/2018  1:24 PM        Sophia Sauceda MD        Seen and examined with above student.  Agree with findings and treatment recomendations

## 2018-04-11 NOTE — PROGRESS NOTES
Pharmacist Note  Warfarin Dosing  Consult provided for this 69 y.o. male to manage warfarin for VTE prophylaxis  (s/p orthopedic surgery)    INR Goal: 1.7- 2.2    Preop Dose: none    Drugs that may increase INR: None  Drugs that may decrease INR: primidone  Other current anticoagulants/ drugs that may increase bleeding risk: None  Risk factors: Age > 65  Daily INR ordered: YES    Recent Labs      04/11/18   0458  04/10/18   0509  04/09/18   1147   HGB   --   8.1*   --    INR  1.3*  1.3*  1.3*       Date               INR                 Dose  4/9  1.3  4 mg  4/10  1.3  4 mg  4/11  1.3  5 mg    Assessment/ Plan: Will order warfarin 5 mg PO x 1 dose. Pharmacy will continue to monitor daily and adjust therapy as indicated.

## 2018-04-17 ENCOUNTER — OFFICE VISIT (OUTPATIENT)
Dept: FAMILY MEDICINE CLINIC | Age: 68
End: 2018-04-17

## 2018-04-17 VITALS
OXYGEN SATURATION: 100 % | DIASTOLIC BLOOD PRESSURE: 47 MMHG | HEIGHT: 70 IN | WEIGHT: 252.6 LBS | TEMPERATURE: 98 F | RESPIRATION RATE: 18 BRPM | HEART RATE: 65 BPM | BODY MASS INDEX: 36.16 KG/M2 | SYSTOLIC BLOOD PRESSURE: 103 MMHG

## 2018-04-17 DIAGNOSIS — I05.9 ENDOCARDITIS OF MITRAL VALVE: ICD-10-CM

## 2018-04-17 DIAGNOSIS — Z79.01 ANTICOAGULATED ON COUMADIN: ICD-10-CM

## 2018-04-17 DIAGNOSIS — I33.0 MITRAL VALVE VEGETATION: ICD-10-CM

## 2018-04-17 DIAGNOSIS — I10 ESSENTIAL HYPERTENSION: ICD-10-CM

## 2018-04-17 DIAGNOSIS — T84.51XA INFECTION ASSOCIATED WITH INTERNAL RIGHT HIP PROSTHESIS, INITIAL ENCOUNTER (HCC): ICD-10-CM

## 2018-04-17 DIAGNOSIS — T84.029D DISLOCATION OF PROSTHETIC JOINT, SUBSEQUENT ENCOUNTER: ICD-10-CM

## 2018-04-17 DIAGNOSIS — Z09 HOSPITAL DISCHARGE FOLLOW-UP: Primary | ICD-10-CM

## 2018-04-17 PROBLEM — E66.01 SEVERE OBESITY (BMI 35.0-39.9) WITH COMORBIDITY (HCC): Status: ACTIVE | Noted: 2018-04-17

## 2018-04-17 RX ORDER — OXYCODONE HYDROCHLORIDE 5 MG/1
5-10 TABLET ORAL
Qty: 56 TAB | Refills: 0 | Status: SHIPPED | OUTPATIENT
Start: 2018-04-17 | End: 2018-05-16 | Stop reason: SDUPTHER

## 2018-04-17 NOTE — PROGRESS NOTES
Chief Complaint   Patient presents with    Follow-up     [facility] ED/HOSP. ADMISS. 1. Have you been to the ER, urgent care clinic since your last visit? Hospitalized since your last visit? Yes When: [address] [facility]. ED 4/5/18 & ED/ADMISS. 4/6/2018    2. Have you seen or consulted any other health care providers outside of the [address] [place] [address] since your last visit? Include any pap smears or colon screening.  No   Room #4

## 2018-04-17 NOTE — MR AVS SNAPSHOT
[address] [facility] 
[phone] Patient: Bravo Buckner MRN: [id number] OWU:[birth year] Visit Information Date & Time Provider Department Dept. Phone Encounter #  
 4/17/2018  3:10 PM Virginia Melendez MD [facility] at [address] [id number] Follow-up Instructions Return in about 4 weeks (around 5/15/2018), or if symptoms worsen or fail to improve, for Regular Follow up. Your Appointments 4/19/2018 10:00 AM  
ESTABLISHED PATIENT with Whitley Boyd NP [facility]) Appt Note: FOLLOW UP VISIT FOR REHAB DIS - CHARGE PER MARCIAL @ [place] [address]  
 [address] [facility]  
[phone] [address] [facility] [address] [address]  
  
    
 5/2/2018  1:00 PM  
ROUTINE CARE with Virginia Melendez MD  
[facility]) Appt Note: 4  wk fu  
 [address] [facility] [address] [address]  
[facility] Upcoming Health Maintenance Date Due  
 EYE EXAM RETINAL OR DILATED Q1 4/11/2017 Pneumococcal 65+ Low/Medium Risk (2 of 2 - PPSV23) 12/5/2017 FOOT EXAM Q1 2/2/2018 MEDICARE YEARLY EXAM 4/7/2018 GLAUCOMA SCREENING Q2Y 4/11/2018 MICROALBUMIN Q1 7/26/2018 LIPID PANEL Q1 7/26/2018 HEMOGLOBIN A1C Q6M 10/7/2018 DTaP/Tdap/Td series (2 - Td) 1/31/2021 Allergies as of 4/17/2018  Review Complete On: 4/17/2018 By: Say Pellant Severity Noted Reaction Type Reactions Nsaids (Non-steroidal Anti-inflammatory Drug) Low 01/26/2016    Other (comments) Hx of PUD and Hinson's Esophagus Current Immunizations  Reviewed on 12/5/2016 Name Date Influenza High Dose Vaccine PF 9/18/2017, 9/22/2016 Influenza Vaccine 10/3/2013 Influenza Vaccine Split 10/28/2012 TDAP Vaccine 1/31/2011 Not reviewed this visit You Were Diagnosed With   
  
 Codes Comments Hospital discharge follow-up    -  Primary ICD-10-CM: [address] ICD-9-CM: V67.59 Dislocation of prosthetic joint, subsequent encounter     ICD-10-CM: T84.029D ICD-9-CM: V58.89 Endocarditis of mitral valve     ICD-10-CM: I05.8 ICD-9-CM: 394.9 Mitral valve vegetation     ICD-10-CM: I33.0 ICD-9-CM: 421.0 Essential hypertension     ICD-10-CM: I10 
ICD-9-CM: 401.9 Uncontrolled type 2 diabetes mellitus without complication, without long-term current use of insulin ([address]     ICD-10-CM: E11.65 ICD-9-CM: 250.02 Anticoagulated on Coumadin     ICD-10-CM: Z51.81, Z79.01 
ICD-9-CM: V58.83, V58.61 Infection associated with internal right hip prosthesis, initial encounter ([address]     ICD-10-CM: I24.74WT ICD-9-CM: 996.66, V43.64 Vitals BP Resp Height(growth percentile) Weight(growth percentile) BMI Smoking Status 105/65 (BP 1 Location: Left arm, BP Patient Position: Sitting) 18 5' 10\" (1.778 m) 252 lb 9.6 oz (114.6 kg) 36.24 kg/m2 Former Smoker Vitals History BMI and BSA Data Body Mass Index Body Surface Area  
 36.24 kg/m 2 2.38 m 2 Preferred Pharmacy Pharmacy Name Phone Lewis Daley, [facility] [phone] Your Updated Medication List  
  
   
This list is accurate as of 4/17/18  4:04 PM.  Always use your most recent med list.  
  
  
  
  
 albuterol-ipratropium 2.5 mg-0.5 mg/3 ml Nebu Commonly known as:  DUO-NEB  
3 mL by Nebulization route every six (6) hours as needed. aspirin 81 mg chewable tablet Take 1 Tab by mouth daily. atomoxetine 40 mg capsule Commonly known as:  STRATTERA TAKE ONE CAPSULE BY MOUTH EVERY DAY  
  
 BD INSULIN PEN NEEDLE UF SHORT 31 gauge x 5/16\" Ndle Generic drug:  Insulin Needles (Disposable) clobetasol 0.05 % ointment Commonly known as:  Alfredia Rubinstein  
as needed. clonazePAM 0.5 mg tablet Commonly known as:  Rock Pickett Take 1 Tab by mouth nightly as needed. Max Daily Amount: 0.5 mg. Indications: insomnia  
  
 eszopiclone 2 mg tablet Commonly known as:  [place] Noun Take 2 mg by mouth nightly as needed. famotidine 20 mg tablet Commonly known as:  PEPCID Take 1 Tab by mouth two (2) times a day. folic acid 1 mg tablet Commonly known as:  FOLVITE  
TAKE 1 TABLET DAILY  
  
 gabapentin 300 mg capsule Commonly known as:  NEURONTIN Take 2 Caps by mouth nightly. insulin glargine 100 unit/mL (3 mL) Inpn Commonly known as:  LANTUSBASAGLAR  
30 Units by SubCUTAneous route two (2) times a day. Indications: type 2 diabetes mellitus JANUVIA 100 mg tablet Generic drug:  SITagliptin TAKE 1 TABLET DAILY JARDIANCE 25 mg tablet Generic drug:  empagliflozin TAKE 1 TABLET DAILY  
  
 lisinopril 20 mg tablet Commonly known as:  PRINIVIL, ZESTRIL  
TAKE 1 TABLET DAILY lovastatin 20 mg tablet Commonly known as:  MEVACOR  
TAKE 1 TABLET IN THE EVENING  
  
 metFORMIN  mg tablet Commonly known as:  GLUCOPHAGE XR Take 1,000 mg by mouth. Indications: taking two 500 mg tabs. daily  
  
 nystatin powder Commonly known as:  MYCOSTATIN Apply 1 g to affected area two (2) times a day. APPLY TO yeast in groin  
  
 oxyCODONE IR 5 mg immediate release tablet Commonly known as:  Wyvonnia Scott Take 1-2 Tabs by mouth every six (6) hours as needed (taking 2 5mg tabs as needed). Max Daily Amount: 40 mg. Severe pain  
  
 polyethylene glycol 17 gram packet Commonly known as:  Gregery Sprung Take 1 Packet by mouth daily. Hold for loose stools/diarrhea  
  
 primidone 250 mg tablet Commonly known as: MYSOLINE  
TAKE 1 TABLET TWICE A DAY  
  
 senna-docusate 8.6-50 mg per tablet Commonly known as:  So Ramirez  
 Take 1 Tab by mouth two (2) times a day. Hold for loose stools/diarrhea  
  
 sertraline 100 mg tablet Commonly known as:  ZOLOFT  
TAKE 1 TABLET DAILY  
  
 VITAMIN D2 50,000 unit capsule Generic drug:  ergocalciferol TAKE 1 CAPSULE EVERY 7 DAYS  
  
 warfarin 4 mg tablet Commonly known as:  COUMADIN Take 4 mg tab this pm and then follow instructions from Dr. Katrina Jenkins office after [facility] RN checks lab work. You will likely take this medication every day but the dosing may change based on you lab draws. Indications: DEEP VEIN THROMBOSIS PREVENTION Prescriptions Printed Refills  
 oxyCODONE IR (ROXICODONE) 5 mg immediate release tablet 0 Sig: Take 1-2 Tabs by mouth every six (6) hours as needed (taking 2 5mg tabs as needed). Max Daily Amount: 40 mg. Severe pain  
 Class: Print Route: Oral  
  
Follow-up Instructions Return in about 4 weeks (around 5/15/2018), or if symptoms worsen or fail to improve, for Regular Follow up. Introducing [facility] & HEALTH SERVICES! Dear Anastasia Jack: Thank you for requesting a [employer] account. Our records indicate that you already have an active [employer] account. You can access your account anytime at [email or web address] Did you know that you can access your hospital and ER discharge instructions at any time in [employer]? You can also review all of your test results from your hospital stay or ER visit. Additional Information If you have questions, please visit the Frequently Asked Questions section of the [employer] website at [email or web address] Remember, [employer] is NOT to be used for urgent needs. For medical emergencies, dial 911. Now available from your iPhone and Android! Please provide this summary of care documentation to your next provider. Your primary care clinician is listed as So Ansari. If you have any questions after today's visit, please call [phone].
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
## 2018-04-19 ENCOUNTER — TELEPHONE (OUTPATIENT)
Dept: FAMILY MEDICINE CLINIC | Age: 68
End: 2018-04-19

## 2018-04-19 ENCOUNTER — OFFICE VISIT (OUTPATIENT)
Dept: CARDIOLOGY CLINIC | Age: 68
End: 2018-04-19

## 2018-04-19 VITALS
OXYGEN SATURATION: 99 % | HEIGHT: 70 IN | SYSTOLIC BLOOD PRESSURE: 112 MMHG | WEIGHT: 252.1 LBS | HEART RATE: 69 BPM | BODY MASS INDEX: 36.09 KG/M2 | RESPIRATION RATE: 18 BRPM | DIASTOLIC BLOOD PRESSURE: 60 MMHG

## 2018-04-19 DIAGNOSIS — B95.8 BACTEREMIA DUE TO STAPHYLOCOCCUS: ICD-10-CM

## 2018-04-19 DIAGNOSIS — Q21.12 PFO (PATENT FORAMEN OVALE): ICD-10-CM

## 2018-04-19 DIAGNOSIS — I05.9 ENDOCARDITIS OF MITRAL VALVE: Primary | ICD-10-CM

## 2018-04-19 DIAGNOSIS — R78.81 BACTEREMIA DUE TO STAPHYLOCOCCUS: ICD-10-CM

## 2018-04-19 DIAGNOSIS — E78.00 HIGH CHOLESTEROL: ICD-10-CM

## 2018-04-19 NOTE — MR AVS SNAPSHOT
[address] 
[phone] Patient: Tavo Joy MRN: [id number] PIB:6/[birth year] Visit Information Date & Time Provider Department Dept. Phone Encounter #  
 4/19/2018 10:00 AM Trace Romero NP [facility] [id number] Follow-up Instructions Return in about 4 weeks (around 5/17/2018). Routing History Follow-up and Disposition History Your Appointments 5/16/2018 10:10 AM  
ROUTINE CARE with Matthew Prince MD  
[facility] at [facility] [address] [facility]) Appt Note: 4 wk f/u  
 [facility] [address] [phone]  
[place] [address] Upcoming Health Maintenance Date Due  
 EYE EXAM RETINAL OR DILATED Q1 4/11/2017 Pneumococcal 65+ Low/Medium Risk (2 of 2 - PPSV23) 12/5/2017 FOOT EXAM Q1 2/2/2018 MEDICARE YEARLY EXAM 4/7/2018 GLAUCOMA SCREENING Q2Y 4/11/2018 MICROALBUMIN Q1 7/26/2018 LIPID PANEL Q1 7/26/2018 HEMOGLOBIN A1C Q6M 10/7/2018 DTaP/Tdap/Td series (2 - Td) 1/31/2021 Allergies as of 4/19/2018  Review Complete On: 4/19/2018 By: Trace Romero NP Severity Noted Reaction Type Reactions Nsaids (Non-steroidal Anti-inflammatory Drug) Low 01/26/2016    Other (comments) Hx of PUD and Hinson's Esophagus Current Immunizations  Reviewed on 12/5/2016 Name Date Influenza High Dose Vaccine PF 9/18/2017, 9/22/2016 Influenza Vaccine 10/3/2013 Influenza Vaccine Split 10/28/2012 TDAP Vaccine 1/31/2011 Not reviewed this visit You Were Diagnosed With   
  
 Codes Comments Endocarditis of mitral valve    -  Primary ICD-10-CM: I05.8 ICD-9-CM: 394.9 High cholesterol     ICD-10-CM: E78.00 ICD-9-CM: 272.0   
 PFO (patent foramen ovale)     ICD-10-CM: Q21.1 ICD-9-CM: 745.5 Bacteremia due to Staphylococcus     ICD-10-CM: R78.81 ICD-9-CM: 790.7, 041.10 Vitals BP Pulse Resp Height(growth percentile) Weight(growth percentile) SpO2  
 112/60 (BP 1 Location: Left arm, BP Patient Position: Sitting) 69 18 5' 10\" (1.778 m) 252 lb 1.6 oz (114.4 kg) 99% BMI Smoking Status 36.17 kg/m2 Former Smoker Vitals History BMI and BSA Data Body Mass Index Body Surface Area  
 36.17 kg/m 2 2.38 m 2 Preferred Pharmacy Pharmacy Name Phone Lewis Daley, [facility] [phone] Your Updated Medication List  
  
   
This list is accurate as of 4/19/18 11:49 AM.  Always use your most recent med list.  
  
  
  
  
 albuterol-ipratropium 2.5 mg-0.5 mg/3 ml Nebu Commonly known as:  DUO-NEB  
3 mL by Nebulization route every six (6) hours as needed. aspirin 81 mg chewable tablet Take 1 Tab by mouth daily. atomoxetine 40 mg capsule Commonly known as:  STRATTERA TAKE ONE CAPSULE BY MOUTH EVERY DAY  
  
 BD INSULIN PEN NEEDLE UF SHORT 31 gauge x 5/16\" Ndle Generic drug:  Insulin Needles (Disposable)  
  
 clobetasol 0.05 % ointment Commonly known as:  Raji Neal  
as needed. clonazePAM 0.5 mg tablet Commonly known as:  Raydell Kurt Take 1 Tab by mouth nightly as needed. Max Daily Amount: 0.5 mg. Indications: insomnia  
  
 eszopiclone 2 mg tablet Commonly known as:  Serge Bless Take 2 mg by mouth nightly as needed. famotidine 20 mg tablet Commonly known as:  PEPCID Take 1 Tab by mouth two (2) times a day. folic acid 1 mg tablet Commonly known as:  FOLVITE  
TAKE 1 TABLET DAILY  
  
 gabapentin 300 mg capsule Commonly known as:  NEURONTIN Take 2 Caps by mouth nightly. insulin glargine 100 unit/mL (3 mL) Inpn Commonly known as:  LANJANESBASAGLAR  
30 Units by SubCUTAneous route two (2) times a day. Indications: type 2 diabetes mellitus JANUVIA 100 mg tablet Generic drug:  SITagliptin TAKE 1 TABLET DAILY JARDIANCE 25 mg tablet Generic drug:  empagliflozin TAKE 1 TABLET DAILY  
  
 lisinopril 20 mg tablet Commonly known as:  PRINIVIL, ZESTRIL  
TAKE 1 TABLET DAILY lovastatin 20 mg tablet Commonly known as:  MEVACOR  
TAKE 1 TABLET IN THE EVENING  
  
 metFORMIN  mg tablet Commonly known as:  GLUCOPHAGE XR Take 1,000 mg by mouth. Indications: taking two 500 mg tabs. daily  
  
 nystatin powder Commonly known as:  MYCOSTATIN Apply 1 g to affected area two (2) times a day. APPLY TO yeast in groin  
  
 oxyCODONE IR 5 mg immediate release tablet Commonly known as:  [place] Take 1-2 Tabs by mouth every six (6) hours as needed (taking 2 5mg tabs as needed). Max Daily Amount: 40 mg. Severe pain  
  
 polyethylene glycol 17 gram packet Commonly known as:  Carlee Hodgkin Take 1 Packet by mouth daily. Hold for loose stools/diarrhea  
  
 primidone 250 mg tablet Commonly known as: MYSOLINE  
TAKE 1 TABLET TWICE A DAY  
  
 senna-docusate 8.6-50 mg per tablet Commonly known as:  Herb Fought Take 1 Tab by mouth two (2) times a day. Hold for loose stools/diarrhea  
  
 sertraline 100 mg tablet Commonly known as:  ZOLOFT  
TAKE 1 TABLET DAILY  
  
 VITAMIN D2 50,000 unit capsule Generic drug:  ergocalciferol TAKE 1 CAPSULE EVERY 7 DAYS  
  
 warfarin 4 mg tablet Commonly known as:  COUMADIN Take 4 mg tab this pm and then follow instructions from Dr. Delia Barroso office after [address] RN checks lab work. You will likely take this medication every day but the dosing may change based on you lab draws. Indications: DEEP VEIN THROMBOSIS PREVENTION We Performed the Following AMB POC EKG ROUTINE W/ 12 LEADS, INTER & REP [07587 CPT(R)] Follow-up Instructions Return in about 4 weeks (around 5/17/2018). To-Do List   
 04/25/2018 ECHO:  EULALIO DOPPLER Introducing [facility] & HEALTH SERVICES! Dear Claudia Ervin: Thank you for requesting a [employer] account. Our records indicate that you already have an active [employer] account. You can access your account anytime at [email or web address] Did you know that you can access your hospital and ER discharge instructions at any time in [employer]? You can also review all of your test results from your hospital stay or ER visit. Additional Information If you have questions, please visit the Frequently Asked Questions section of the [employer] website at [email or web address] Remember, [employer] is NOT to be used for urgent needs. For medical emergencies, dial 911. Now available from your iPhone and Android! Please provide this summary of care documentation to your next provider. Your primary care clinician is listed as Mariann Thorne. If you have any questions after today's visit, please call [phone].

## 2018-04-19 NOTE — TELEPHONE ENCOUNTER
----- Message from Kala Merlin sent at 4/19/2018  3:14 PM EDT -----  Regarding: /Telephone  Pt is requesting a call back in reference to \"Lisinitril\" and \"Atomoxetinc\" . Best contact number is [phone].

## 2018-04-19 NOTE — PROGRESS NOTES
Jaz Lewis DNP, ANP-BC  Subjective/HPI:     Sobeida Marin is a 69 y.o. male is here for hospital follow-up patient admitted for infected prosthetic joint/sepsis, EULALIO indicated vegetative growth on anterior leaflet of the mitral valve. He has been on Ancef initially for 6 weeks and per patient has just been extended by [facility] Edm[facility] until 4/28/2018. Patient denies fever, denies shortness of breath or dyspnea on exertion. Has postoperative pain in the right hip. He is presently living at home, son lives with him for assistance. PICC line right upper extremity denies pain tenderness or swelling.       PCP Provider  Mariann Thorne MD  Past Medical History:   Diagnosis Date    ADD (attention deficit disorder)     Anemia due to blood loss     Hinson's esophagus with esophagitis     Benign essential tremor     Cancer ([address] 2012    BCC    Depression     DJD (degenerative joint disease) of hip     bilat    DM (diabetes mellitus) ([address] 3/17/2010    ED (erectile dysfunction) of organic origin     Fall on or from sidewalk curb 9/24/2015    Femur fracture ([address]     Gastritis and duodenitis     GERD (gastroesophageal reflux disease)     resolved after discontinuing diclofenac    Hematuria 6/2016    High cholesterol 3/17/2010    HTN (hypertension) 3/17/2010    Morbid obesity ([address]     Murmur, cardiac 2016    PFO (patent foramen ovale) 7/26/2017    PUD (peptic ulcer disease)     Shingles 6/2016    RESOLVING- NO RASH (HEAD)    Sleep apnea     CPAP      Past Surgical History:   Procedure Laterality Date    ENDOSCOPY, COLON, DIAGNOSTIC      HX CHOLECYSTECTOMY  1995    HX GI  1980    gastroplasty    HX HERNIA REPAIR  1995    HX HIP REPLACEMENT      Left    HX ORTHOPAEDIC  2011    rot cuff repair    HX TONSILLECTOMY  1950    HX VASCULAR ACCESS      picc line    TOTAL HIP ARTHROPLASTY  05/2010    right    TOTAL HIP ARTHROPLASTY  08/01/2016    left     Allergies Allergen Reactions    Nsaids (Non-Steroidal Anti-Inflammatory Drug) Other (comments)     Hx of PUD and Hinson's Esophagus      Family History   Problem Relation Age of Onset    Cancer Father      multiple myeloma    Stroke Father     Dementia Mother     No Known Problems Brother     COPD Brother     Cancer Maternal Grandmother      COLON    Anesth Problems Neg Hx       Current Outpatient Prescriptions   Medication Sig    oxyCODONE IR (ROXICODONE) 5 mg immediate release tablet Take 1-2 Tabs by mouth every six (6) hours as needed (taking 2 5mg tabs as needed). Max Daily Amount: 40 mg. Severe pain    warfarin (COUMADIN) 4 mg tablet Take 4 mg tab this pm and then follow instructions from Dr. Katrina Jenkins office after [address] checks lab work. You will likely take this medication every day but the dosing may change based on you lab draws. Indications: DEEP VEIN THROMBOSIS PREVENTION (Patient taking differently: Take 6 mg by mouth daily. Take 4 mg tab this pm and then follow instructions from Dr. Katrina Jenkins office after [address] RN checks lab work. You will likely take this medication every day but the dosing may change based on you lab draws. Indications: DEEP VEIN THROMBOSIS PREVENTION)    albuterol-ipratropium (DUO-NEB) 2.5 mg-0.5 mg/3 ml nebu 3 mL by Nebulization route every six (6) hours as needed.  atomoxetine (STRATTERA) 40 mg capsule TAKE ONE CAPSULE BY MOUTH EVERY DAY    clobetasol (TEMOVATE) 0.05 % ointment as needed.  BD INSULIN PEN NEEDLE UF SHORT 31 gauge x 5/16\" ndle     insulin glargine (LANTUS,BASAGLAR) 100 unit/mL (3 mL) inpn 30 Units by SubCUTAneous route two (2) times a day. Indications: type 2 diabetes mellitus    famotidine (PEPCID) 20 mg tablet Take 1 Tab by mouth two (2) times a day.  polyethylene glycol (MIRALAX) 17 gram packet Take 1 Packet by mouth daily.  Hold for loose stools/diarrhea    senna-docusate (PERICOLACE) 8.6-50 mg per tablet Take 1 Tab by mouth two (2) times a day. Hold for loose stools/diarrhea    sertraline (ZOLOFT) 100 mg tablet TAKE 1 TABLET DAILY    JANUVIA 100 mg tablet TAKE 1 TABLET DAILY    primidone (MYSOLINE) 250 mg tablet TAKE 1 TABLET TWICE A DAY    lovastatin (MEVACOR) 20 mg tablet TAKE 1 TABLET IN THE EVENING    folic acid (FOLVITE) 1 mg tablet TAKE 1 TABLET DAILY    lisinopril (PRINIVIL, ZESTRIL) 20 mg tablet TAKE 1 TABLET DAILY    JARDIANCE 25 mg tablet TAKE 1 TABLET DAILY    VITAMIN D2 50,000 unit capsule TAKE 1 CAPSULE EVERY 7 DAYS    gabapentin (NEURONTIN) 300 mg capsule Take 2 Caps by mouth nightly.  aspirin 81 mg chewable tablet Take 1 Tab by mouth daily.  eszopiclone (LUNESTA) 2 mg tablet Take 2 mg by mouth nightly as needed.  metFORMIN ER (GLUCOPHAGE XR) 500 mg tablet Take 1,000 mg by mouth. Indications: taking two 500 mg tabs. daily    clonazePAM (KLONOPIN) 0.5 mg tablet Take 1 Tab by mouth nightly as needed. Max Daily Amount: 0.5 mg. Indications: insomnia    nystatin (MYCOSTATIN) powder Apply 1 g to affected area two (2) times a day. APPLY TO yeast in groin     No current facility-administered medications for this visit. Vitals:    04/19/18 1016   BP: 112/60   Pulse: 69   Resp: 18   SpO2: 99%   Weight: 252 lb 1.6 oz (114.4 kg)   Height: 5' 10\" (1.778 m)     Social History     Social History    Marital status:      Spouse name: N/A    Number of children: N/A    Years of education: N/A     Occupational History    Not on file.      Social History Main Topics    Smoking status: Former Smoker     Packs/day: 2.00     Years: 15.00     Quit date: 3/1/1979    Smokeless tobacco: Never Used    Alcohol use No    Drug use: No    Sexual activity: Yes     Partners: Female     Birth control/ protection: None     Other Topics Concern    Not on file     Social History Narrative       I have reviewed the nurses notes, vitals, problem list, allergy list, medical history, family, social history and medications. Review of Symptoms:    General: Pt denies excessive weight gain or loss. Has some limitations in activities of daily life secondary to chronic conditions  HEENT: Denies blurred vision, headaches, epistaxis and difficulty swallowing. Respiratory: Denies shortness of breath, WANG, wheezing or stridor. Cardiovascular: Denies precordial pain, palpitations, + edema denies PND  Gastrointestinal: Denies poor appetite, indigestion, abdominal pain or blood in stool  Musculoskeletal: Right hip pain postoperative, left leg discomfort secondary to favoring due to right leg pain  Neurologic: Denies tremor, paresthesias, or sensory motor disturbance  Skin: Denies rash, itching or texture change. Physical Exam:      General: Well developed, in no acute distress, cooperative and alert  HEENT: No carotid bruits, no JVD, trach is midline. Neck Supple, PEERL, EOM intact. Heart:  Normal S1/S2 negative S3 or S4. Regular, 1/6 systolic murmur, no gallop or rub.   Respiratory: Clear bilaterally x 4, no wheezing or rales  Abdomen:   Soft, non-tender, no masses, bowel sounds are active.   Extremities: +1 right ankle edema, trace left ankle edema normal cap refill, no cyanosis, atraumatic. Neuro: A&Ox3, speech clear, gait slow and cautious assisted with walker  Skin: Skin color is normal. No rashes or lesions.  Non diaphoretic  Vascular: 2+ pulses symmetric in all extremities    Cardiographics    ECG: Sinus  Results for orders placed or performed during the hospital encounter of 04/06/18   EKG, 12 LEAD, INITIAL   Result Value Ref Range    Ventricular Rate 67 BPM    Atrial Rate 67 BPM    P-R Interval 156 ms    QRS Duration 94 ms    Q-T Interval 456 ms    QTC Calculation (Bezet) 481 ms    Calculated P Axis 38 degrees    Calculated R Axis -9 degrees    Calculated T Axis 9 degrees    Diagnosis       Normal sinus rhythm  Prolonged QT  When compared with ECG of 19-FEB-2018 21:49,  No significant change was found  Confirmed by Ashwini Abreu MD ([id number]) on 4/7/2018 11:29:03 AM           Cardiology Labs:  Lab Results   Component Value Date/Time    Cholesterol, total 141 07/26/2017 01:24 PM    HDL Cholesterol 63 07/26/2017 01:24 PM    LDL, calculated 58 07/26/2017 01:24 PM    Triglyceride 98 07/26/2017 01:24 PM    CHOL/HDL Ratio 3.5 11/01/2010 07:07 AM       Lab Results   Component Value Date/Time    Sodium 137 04/10/2018 05:09 AM    Potassium 3.9 04/10/2018 05:09 AM    Chloride 107 04/10/2018 05:09 AM    CO2 22 04/10/2018 05:09 AM    Anion gap 8 04/10/2018 05:09 AM    Glucose 107 (H) 04/10/2018 05:09 AM    BUN 12 04/10/2018 05:09 AM    Creatinine 0.69 (L) 04/10/2018 05:09 AM    BUN/Creatinine ratio 17 04/10/2018 05:09 AM    GFR est AA >60 04/10/2018 05:09 AM    GFR est non-AA >60 04/10/2018 05:09 AM    Calcium 7.7 (L) 04/10/2018 05:09 AM    Bilirubin, total 0.3 04/10/2018 05:09 AM    AST (SGOT) 54 (H) 04/10/2018 05:09 AM    Alk. phosphatase 59 04/10/2018 05:09 AM    Protein, total 5.0 (L) 04/10/2018 05:09 AM    Albumin 1.7 (L) 04/10/2018 05:09 AM    Globulin 3.3 04/10/2018 05:09 AM    A-G Ratio 0.5 (L) 04/10/2018 05:09 AM    ALT (SGPT) 10 (L) 04/10/2018 05:09 AM           Assessment:     Assessment:     Diagnoses and all orders for this visit:    1. Endocarditis of mitral valve  -     EULALIO DOPPLER; Future    2. High cholesterol  -     AMB POC EKG ROUTINE W/ 12 LEADS, INTER & REP  -     EULALIO DOPPLER; Future    3. PFO (patent foramen ovale)  -     EULALIO DOPPLER; Future    4. Bacteremia due to Staphylococcus  -     EULALIO DOPPLER; Future        ICD-10-CM ICD-9-CM    1. Endocarditis of mitral valve I05.8 394.9 EULALIO DOPPLER   2. High cholesterol E78.00 272.0 AMB POC EKG ROUTINE W/ 12 LEADS, INTER & REP      EULALIO DOPPLER   3. PFO (patent foramen ovale) Q21.1 745.5 EULALIO DOPPLER   4.  Bacteremia due to Staphylococcus R78.81 790.7 EULALIO DOPPLER     041.10      Orders Placed This Encounter    AMB POC EKG ROUTINE W/ 12 LEADS, INTER & REP     Order Specific Question:   Reason for Exam:     Answer:   Routine    EULALIO DOPPLER     Standing Status:   Future     Standing Expiration Date:   10/19/2018     Order Specific Question:   Reason for Exam:     Answer:   MV endocarditis nearing end of ABX therapy/        Plan:     Patient is a 68-year-old male post admission for total right hip replacement subsequent joint infection with sepsis, mitral valve endocarditis. At present remains on Ancef antibiotic therapy with plans discontinuation at the end of the month. We will arrange for EULALIO to be performed next Wednesday prior to scheduled discontinuation of antibiotics to ensure complete resolution of vegetative endocarditis growth on the anterior leaflet of mitral valve. Follow-up with Dr. Patricia Redmond for routine visit/post EULALIO visit. Marion Sneed NP    This note was created using voice recognition software. Despite editing, there may be syntax errors.

## 2018-04-19 NOTE — PROGRESS NOTES
1. Have you been to the ER, urgent care clinic since your last visit? Hospitalized since your last visit? Yes, R hip surgery and sepsis x 2 mos ago at [facility]    2. Have you seen or consulted any other health care providers outside of the [address] [place] [address] since your last visit? Include any pap smears or colon screening.  No    Chief Complaint   Patient presents with    Cholesterol Problem     follow up    Hypertension     \"    Ankle swelling     mild swelling to ankles

## 2018-04-20 ENCOUNTER — TELEPHONE (OUTPATIENT)
Dept: FAMILY MEDICINE CLINIC | Age: 68
End: 2018-04-20

## 2018-04-20 NOTE — TELEPHONE ENCOUNTER
Patient advised Lisinopril was discontinued when he was in Rehab. And to check with Dr Salina Ojeda regarding Strattera. Was advised to bring all medication to next appointment for review with Dr Manju Carson.

## 2018-04-20 NOTE — TELEPHONE ENCOUNTER
Williams Paul w/[facility] wants to speak to the nurse about BP     She is there for a visit and BP is 160/80      Best number to reach her is [phone]

## 2018-04-20 NOTE — TELEPHONE ENCOUNTER
Spoke with Williams Paul and patient regarding BP. He is to monitor blood pressure twice daily and notify Dr Candace Garibay if it remains elevated.

## 2018-04-21 LAB
BACTERIA SPEC CULT: NORMAL
BACTERIA SPEC CULT: NORMAL
GRAM STN SPEC: NORMAL
GRAM STN SPEC: NORMAL
SERVICE CMNT-IMP: NORMAL
SERVICE CMNT-IMP: NORMAL

## 2018-04-23 LAB
BACTERIA SPEC CULT: NORMAL
GRAM STN SPEC: NORMAL
SERVICE CMNT-IMP: 2
SERVICE CMNT-IMP: 3
SERVICE CMNT-IMP: NORMAL

## 2018-04-24 NOTE — PROGRESS NOTES
Spoke with patient and he has follow-up with Dr Tiera Plaza next week and will discuss discontinuing Coumadin. Was advised Dr Kia Cortez was okay with discontinuing medication. He states his BP has been elevated but does not have readings except for yesterday 148/80. Will document readings and call office as needed.

## 2018-04-26 ENCOUNTER — DOCUMENTATION ONLY (OUTPATIENT)
Dept: FAMILY MEDICINE CLINIC | Age: 68
End: 2018-04-26

## 2018-04-26 NOTE — PROGRESS NOTES
Patient advised to check BP prior to taking Lisinopril per Dr Ifeoma Hill and if BP low do not take medication. Follow-up as scheduled.

## 2018-04-27 ENCOUNTER — DOCUMENTATION ONLY (OUTPATIENT)
Dept: FAMILY MEDICINE CLINIC | Age: 68
End: 2018-04-27

## 2018-05-02 ENCOUNTER — HOSPITAL ENCOUNTER (OUTPATIENT)
Dept: CARDIAC CATH/INVASIVE PROCEDURES | Age: 68
Discharge: HOME | End: 2018-05-02
Attending: INTERNAL MEDICINE | Admitting: INTERNAL MEDICINE
Payer: COMMERCIAL

## 2018-05-02 VITALS
SYSTOLIC BLOOD PRESSURE: 131 MMHG | OXYGEN SATURATION: 95 % | WEIGHT: 252 LBS | DIASTOLIC BLOOD PRESSURE: 67 MMHG | HEIGHT: 71 IN | TEMPERATURE: 97.6 F | BODY MASS INDEX: 35.28 KG/M2 | HEART RATE: 68 BPM | RESPIRATION RATE: 20 BRPM

## 2018-05-02 DIAGNOSIS — I38 ENDOCARDITIS: ICD-10-CM

## 2018-05-02 PROCEDURE — 74011000250 HC RX REV CODE- 250

## 2018-05-02 PROCEDURE — 99152 MOD SED SAME PHYS/QHP 5/>YRS: CPT

## 2018-05-02 PROCEDURE — 93325 DOPPLER ECHO COLOR FLOW MAPG: CPT

## 2018-05-02 PROCEDURE — 74011000250 HC RX REV CODE- 250: Performed by: INTERNAL MEDICINE

## 2018-05-02 PROCEDURE — 74011250636 HC RX REV CODE- 250/636

## 2018-05-02 RX ORDER — LIDOCAINE HYDROCHLORIDE 20 MG/ML
15 SOLUTION OROPHARYNGEAL ONCE
Status: COMPLETED | OUTPATIENT
Start: 2018-05-02 | End: 2018-05-02

## 2018-05-02 RX ORDER — FENTANYL CITRATE 50 UG/ML
25-50 INJECTION, SOLUTION INTRAMUSCULAR; INTRAVENOUS
Status: DISCONTINUED | OUTPATIENT
Start: 2018-05-02 | End: 2018-05-02

## 2018-05-02 RX ORDER — MIDAZOLAM HYDROCHLORIDE 1 MG/ML
INJECTION, SOLUTION INTRAMUSCULAR; INTRAVENOUS
Status: COMPLETED
Start: 2018-05-02 | End: 2018-05-02

## 2018-05-02 RX ORDER — LIDOCAINE HYDROCHLORIDE 20 MG/ML
SOLUTION OROPHARYNGEAL
Status: COMPLETED
Start: 2018-05-02 | End: 2018-05-02

## 2018-05-02 RX ORDER — FENTANYL CITRATE 50 UG/ML
INJECTION, SOLUTION INTRAMUSCULAR; INTRAVENOUS
Status: COMPLETED
Start: 2018-05-02 | End: 2018-05-02

## 2018-05-02 RX ORDER — MIDAZOLAM HYDROCHLORIDE 1 MG/ML
.5-2 INJECTION, SOLUTION INTRAMUSCULAR; INTRAVENOUS
Status: DISCONTINUED | OUTPATIENT
Start: 2018-05-02 | End: 2018-05-02

## 2018-05-02 RX ADMIN — LIDOCAINE HYDROCHLORIDE 15 ML: 20 SOLUTION ORAL; TOPICAL at 14:25

## 2018-05-02 RX ADMIN — MIDAZOLAM HYDROCHLORIDE 1 MG: 1 INJECTION, SOLUTION INTRAMUSCULAR; INTRAVENOUS at 14:30

## 2018-05-02 RX ADMIN — FENTANYL CITRATE 25 MCG: 50 INJECTION, SOLUTION INTRAMUSCULAR; INTRAVENOUS at 14:26

## 2018-05-02 RX ADMIN — MIDAZOLAM HYDROCHLORIDE 1 MG: 1 INJECTION, SOLUTION INTRAMUSCULAR; INTRAVENOUS at 14:24

## 2018-05-02 RX ADMIN — FENTANYL CITRATE 25 MCG: 50 INJECTION, SOLUTION INTRAMUSCULAR; INTRAVENOUS at 14:31

## 2018-05-02 RX ADMIN — BENZOCAINE, BUTAMBEN, AND TETRACAINE HYDROCHLORIDE 1 SPRAY: .028; .004; .004 AEROSOL, SPRAY TOPICAL at 14:27

## 2018-05-02 RX ADMIN — LIDOCAINE HYDROCHLORIDE 15 ML: 20 SOLUTION OROPHARYNGEAL at 14:25

## 2018-05-02 NOTE — PROGRESS NOTES
Discharge instructions reviewed with patient and son, Girma Conn. Allowed adequate time to ask questions, all questions answered. Printed copy of AVS given to patient. All belongings gathered, tele discontinued. Transported via wheelchair to main entrance and into care of family.

## 2018-05-02 NOTE — PROGRESS NOTES
ASA 3 and Mallampati 3 per Dr. Rae Claire    Pt sedated with 2mg Versed and 50mcg Fentanyl for EULALIO (monitored sedation from 1424 to 1441)

## 2018-05-02 NOTE — DISCHARGE INSTRUCTIONS
DISCHARGE SUMMARY from [facility], RN        The following personal items collected during your admission are returned to you:   Vision:  glasses  Clothing:  Shirt and cane        PATIENT INSTRUCTIONS: Continue taking all the same medications. You had a transesophageal echocardiogram, your throat was numbed for the procedure, so start with sips of water and advance diet as swallowing returns to normal. Avoid any scalding hot beverages for the next 2 hours. Call to make an appointment with Dr. Servando Felder in 1 month [phone]. What to do at Home:  Recommended activity: No driving today      The discharge information has been reviewed with the PATIENT . The PATIENT  verbalized understanding.

## 2018-05-02 NOTE — PROGRESS NOTES
Patient arrived to Non-Invasive Cardiology Lab for Out Patient EULALIO Procedure. Staff introduced to patient. Patient identifiers verified with Name and Date of Birth. Procedure verified with patient. Consent forms reviewed and signed by patient or authorized representative and verified. Allergies verified. Patient informed of procedure and plan of care. Questions answered with review. Patient on cardiac monitor, non-invasive blood pressure, SPO2 monitor. On room air. Patient is A&Ox3. Patient reports no complaints. Patient on stretcher, in low position, with side rails up. Patient instructed to call for assistance as needed. Family in waiting room.

## 2018-05-14 RX ORDER — METFORMIN HYDROCHLORIDE 500 MG/1
TABLET, EXTENDED RELEASE ORAL
Qty: 180 TAB | Refills: 1 | Status: SHIPPED | OUTPATIENT
Start: 2018-05-14 | End: 2018-11-08 | Stop reason: SDUPTHER

## 2018-05-14 RX ORDER — INSULIN GLARGINE 100 [IU]/ML
INJECTION, SOLUTION SUBCUTANEOUS
Qty: 45 ML | Refills: 0 | Status: SHIPPED | OUTPATIENT
Start: 2018-05-14 | End: 2018-08-10 | Stop reason: SDUPTHER

## 2018-05-16 ENCOUNTER — OFFICE VISIT (OUTPATIENT)
Dept: FAMILY MEDICINE CLINIC | Age: 68
End: 2018-05-16

## 2018-05-16 ENCOUNTER — DOCUMENTATION ONLY (OUTPATIENT)
Dept: FAMILY MEDICINE CLINIC | Age: 68
End: 2018-05-16

## 2018-05-16 VITALS
WEIGHT: 233 LBS | HEIGHT: 71 IN | DIASTOLIC BLOOD PRESSURE: 50 MMHG | OXYGEN SATURATION: 99 % | BODY MASS INDEX: 32.62 KG/M2 | SYSTOLIC BLOOD PRESSURE: 103 MMHG | TEMPERATURE: 97.8 F | HEART RATE: 76 BPM | RESPIRATION RATE: 20 BRPM

## 2018-05-16 DIAGNOSIS — T84.51XA INFECTION ASSOCIATED WITH INTERNAL RIGHT HIP PROSTHESIS, INITIAL ENCOUNTER (HCC): ICD-10-CM

## 2018-05-16 DIAGNOSIS — G57.02 PYRIFORMIS SYNDROME, LEFT: Primary | ICD-10-CM

## 2018-05-16 DIAGNOSIS — T84.029D DISLOCATION OF PROSTHETIC JOINT, SUBSEQUENT ENCOUNTER: ICD-10-CM

## 2018-05-16 RX ORDER — OXYCODONE HYDROCHLORIDE 5 MG/1
5 TABLET ORAL
Qty: 14 TAB | Refills: 0 | Status: SHIPPED | OUTPATIENT
Start: 2018-05-16 | End: 2018-06-27 | Stop reason: ALTCHOICE

## 2018-05-16 RX ORDER — CEPHALEXIN 500 MG/1
CAPSULE ORAL
Refills: 1 | COMMUNITY
Start: 2018-04-30 | End: 2018-12-17 | Stop reason: ALTCHOICE

## 2018-05-16 NOTE — PROGRESS NOTES
Chief Complaint   Patient presents with    Annual Wellness Visit     (Medicare)   Eye Exam-Patient to sched. Appt.     Complaining of onset of left hip pain since mid April 2018  Room #8

## 2018-05-16 NOTE — PATIENT INSTRUCTIONS
Piriformis Syndrome: Care Instructions  Your Care Instructions    The piriformis muscle is deep under your rear end (buttock). One end of the muscle connects deep inside the pelvic area, and the other end attaches to the top of the thighbone. This muscle can press on the sciatic nerve that runs from your spine down your leg. When this happens, you may have pain, numbness, and tingling in the buttock and down the back of your leg. This is called piriformis syndrome. The pain may get worse when you sit for a long time or climb stairs. Also, you may be more likely to develop piriformis syndrome if you run or walk often. Your doctor will check for other causes of your pain before treating this syndrome. Treatment may include stretching exercises, massage, and medicine for the pain and swelling. If these do not help, you may get a shot of steroid medicine. Until the pain is gone, you may need to rest the muscle and limit activities like running. Exercises and a change in how you move and sit may be enough to stop the pressure on the nerve. Follow-up care is a key part of your treatment and safety. Be sure to make and go to all appointments, and call your doctor if you are having problems. It's also a good idea to know your test results and keep a list of the medicines you take. How can you care for yourself at home? · If your doctor thinks that strenuous exercise is causing your problem, stop or cut back on activities such as running. You may find swimming to be a good exercise for a while. · Stretch the piriformis muscle. Rehan Ebbing on your back. ¨ Bend one leg at the knee and keep the other leg flat on the ground. ¨ Raise your bent knee up and then move it across your body. Hold the outside of the knee with the opposite hand. ¨ Gently pull the knee with your hand toward the opposite shoulder. ¨ Hold the stretch for at least 15 to 30 seconds. Switch legs. ¨ Do the stretch several times each day.   · Massage the muscle to relieve pressure. ¨ Sit on the floor. Lean to one side so that the hip on your sore side is off the ground. Put a tennis ball under your buttock on that side. ¨ As you put weight onto the tennis ball, you may find spots that are especially sore. Move gently so that the tennis ball gently massages each of the sore spots. · Use ice or heat to help reduce pain. Put ice or a cold pack or a heating pad set on low or a warm cloth on the sore area for 10 to 20 minutes at a time. Put a thin cloth between the ice pack or heating pad and your skin. · Ask your doctor if you can take an over-the-counter pain medicine, such as acetaminophen (Tylenol), ibuprofen (Advil, Motrin), or naproxen (Aleve). Be safe with medicines. Read and follow all instructions on the label. · Have your doctor or a physical therapist watch how you move. You may need physical therapy or special inserts in your shoes (orthotics) to help you move in a way that does not put pressure on your nerves. When should you call for help? Watch closely for changes in your health, and be sure to contact your doctor if:  ? · You do not feel better after several weeks of home care. ? · Your pain gets worse. ? · Your leg becomes weak or numb. Where can you learn more? Go to http://russ-kiesha.info/. Enter N849 in the search box to learn more about \"Piriformis Syndrome: Care Instructions. \"  Current as of: March 21, 2017  Content Version: 11.4  © 9572-0197 [employer]. Care instructions adapted under license by [employer] (which disclaims liability or warranty for this information). If you have questions about a medical condition or this instruction, always ask your healthcare professional. [address] any warranty or liability for your use of this information.

## 2018-05-16 NOTE — MR AVS SNAPSHOT
Sebastian Terrell [address] [facility] 
[phone] Patient: Jennyfer Murguia MRN: [id number]:[id number] Visit Information Date & Time Provider Department Dept. Phone Encounter #  
 5/16/2018 10:10 AM Genny Kamara MD [facility] at [address] [id number] Follow-up Instructions Return in about 4 weeks (around 6/13/2018), or if symptoms worsen or fail to improve, for Regular Follow up. Your Appointments 6/4/2018  9:15 AM  
ESTABLISHED PATIENT with Kenan Weaver MD  
[facility] [address] [place] [address]) Appt Note: MONTHLY FOLLOW UP-  [facility]  
[phone] [address] [place] [address] Upcoming Health Maintenance Date Due  
 EYE EXAM RETINAL OR DILATED Q1 4/11/2017 Pneumococcal 65+ Low/Medium Risk (2 of 2 - PPSV23) 12/5/2017 FOOT EXAM Q1 2/2/2018 MEDICARE YEARLY EXAM 4/7/2018 GLAUCOMA SCREENING Q2Y 4/11/2018 MICROALBUMIN Q1 7/26/2018 LIPID PANEL Q1 7/26/2018 Influenza Age 5 to Adult 8/1/2018 HEMOGLOBIN A1C Q6M 10/7/2018 DTaP/Tdap/Td series (2 - Td) 1/31/2021 Allergies as of 5/16/2018  Review Complete On: 5/16/2018 By: Genny Kamara MD  
  
 Severity Noted Reaction Type Reactions Nsaids (Non-steroidal Anti-inflammatory Drug) Low 01/26/2016    Other (comments) Hx of PUD and Hinson's Esophagus Current Immunizations  Reviewed on 12/5/2016 Name Date Influenza High Dose Vaccine PF 9/18/2017, 9/22/2016 Influenza Vaccine 10/3/2013 Influenza Vaccine Split 10/28/2012 TDAP Vaccine 1/31/2011 Not reviewed this visit You Were Diagnosed With   
  
 Codes Comments Pyriformis syndrome, left    -  Primary ICD-10-CM: G57.02 
ICD-9-CM: 355.0  Infection associated with internal right hip prosthesis, initial encounter [facility])     ICD-10-CM: F51.64FC ICD-9-CM: 996.66, V43.64 Dislocation of prosthetic joint, subsequent encounter     ICD-10-CM: T84.029D ICD-9-CM: V58.89 Vitals BP Pulse Temp Resp Height(growth percentile) Weight(growth percentile) 103/50 (BP 1 Location: Right arm, BP Patient Position: Sitting) 76 97.8 °F (36.6 °C) (Oral) 20 5' 10.5\" (1.791 m) 233 lb (105.7 kg) SpO2 BMI Smoking Status 99% 32.96 kg/m2 Former Smoker Vitals History BMI and BSA Data Body Mass Index Body Surface Area  
 32.96 kg/m 2 2.29 m 2 Preferred Pharmacy Pharmacy Name Phone Lewis Daley, [facility] [phone] Your Updated Medication List  
  
   
This list is accurate as of 5/16/18 11:45 AM.  Always use your most recent med list.  
  
  
  
  
 albuterol-ipratropium 2.5 mg-0.5 mg/3 ml Nebu Commonly known as:  DUO-NEB  
3 mL by Nebulization route every six (6) hours as needed. aspirin 81 mg chewable tablet Take 1 Tab by mouth daily. atomoxetine 40 mg capsule Commonly known as:  STRATTERA TAKE ONE CAPSULE BY MOUTH EVERY DAY  
  
 BD ULTRA-FINE SHORT PEN NEEDLE 31 gauge x 5/16\" Ndle Generic drug:  Insulin Needles (Disposable) cephALEXin 500 mg capsule Commonly known as:  Polo [place] TAKE 4 CAPSULES BY MOUTH EVERY DAY  
  
 clobetasol 0.05 % ointment Commonly known as:  Theopolis Ismael  
as needed. clonazePAM 0.5 mg tablet Commonly known as:  Cherylyn Rafter Take 1 Tab by mouth nightly as needed. Max Daily Amount: 0.5 mg. Indications: insomnia  
  
 famotidine 20 mg tablet Commonly known as:  PEPCID Take 1 Tab by mouth two (2) times a day. folic acid 1 mg tablet Commonly known as:  FOLVITE  
TAKE 1 TABLET DAILY  
  
 gabapentin 300 mg capsule Commonly known as:  NEURONTIN Take 2 Caps by mouth nightly. JANUVIA 100 mg tablet Generic drug:  SITagliptin TAKE 1 TABLET DAILY JARDIANCE 25 mg tablet Generic drug:  empagliflozin TAKE 1 TABLET DAILY  
  
 LANTUS SOLOSTAR U-100 INSULIN 100 unit/mL (3 mL) Inpn Generic drug:  insulin glargine INJECT 20 UNITS UNDER THE SKIN IN THE MORNING AND 18 UNITS AT NIGHT  
  
 lisinopril 20 mg tablet Commonly known as:  PRINIVIL, ZESTRIL  
TAKE 1 TABLET DAILY lovastatin 20 mg tablet Commonly known as:  MEVACOR  
TAKE 1 TABLET IN THE EVENING  
  
 metFORMIN  mg tablet Commonly known as:  GLUCOPHAGE XR  
TAKE 2 TABLETS DAILY (WITH DINNER) oxyCODONE IR 5 mg immediate release tablet Commonly known as:  Wellington Ira Take 1 Tab by mouth every twelve (12) hours as needed. Max Daily Amount: 10 mg. Severe pain  
  
 polyethylene glycol 17 gram packet Commonly known as:  Claudeen Cast Take 1 Packet by mouth daily. Hold for loose stools/diarrhea  
  
 primidone 250 mg tablet Commonly known as: MYSOLINE  
TAKE 1 TABLET TWICE A DAY  
  
 senna-docusate 8.6-50 mg per tablet Commonly known as:  Adi Sita Take 1 Tab by mouth two (2) times a day. Hold for loose stools/diarrhea  
  
 sertraline 100 mg tablet Commonly known as:  ZOLOFT  
TAKE 1 TABLET DAILY  
  
 VITAMIN D2 50,000 unit capsule Generic drug:  ergocalciferol TAKE 1 CAPSULE EVERY 7 DAYS Prescriptions Printed Refills  
 oxyCODONE IR (ROXICODONE) 5 mg immediate release tablet 0 Sig: Take 1 Tab by mouth every twelve (12) hours as needed. Max Daily Amount: 10 mg. Severe pain  
 Class: Print Route: Oral  
  
May 2018 Details Sun Mon Tue Wed Thu Fri Sat  
    1  
  
  
  
   2  
  
  
  
   3  
  
  
  
   4  
  
  
  
   5  
  
  
  
  
  6  
  
  
  
   7  
  
  
  
   8  
  
  
  
   9  
  
  
  
   10  
  
  
  
   11  
  
  
  
   12  
  
  
  
  
  13  
  
  
  
   14  
  
  
  
   15  
  
  
  
   16  
  
15 mg See details    17  
  
15 mg  
  
   18  
  
15 mg  
  
   19  
  
15 mg  
  
  
  20  
  
15 mg  
  
   21  
  
15 mg  
 22  
  
15 mg  
  
   23  
  
15 mg  
  
   24  
  
15 mg  
  
   25  
  
15 mg  
  
   26  
  
15 mg  
  
  
  27  
  
15 mg  
  
   28  
  
15 mg  
  
   29  
  
15 mg  
  
   30  
  
15 mg  
  
   31  
  
15 mg Date Details 05/16 This INR check How to take your warfarin dose To take:  15 mg Take three of the 5 mg tablets. June 2018 Details Shantal Rodrigues Tue Wed Thu Fri Sat 1  
  
15 mg  
  
   2  
  
15 mg  
  
  
  3  
  
15 mg  
  
   4  
  
15 mg  
  
   5  
  
15 mg  
  
   6  
  
15 mg  
  
   7  
  
15 mg  
  
   8  
  
15 mg  
  
   9  
  
15 mg  
  
  
  10  
  
15 mg  
  
   11  
  
15 mg  
  
   12  
  
15 mg  
  
   13  
  
15 mg  
  
   14  
  
  
  
   15  
  
  
  
   16  
  
  
  
  
  17  
  
  
  
   18  
  
  
  
   19  
  
  
  
   20  
  
  
  
   21  
  
  
  
   22  
  
  
  
   23  
  
  
  
  
  24  
  
  
  
   25  
  
  
  
   26  
  
  
  
   27  
  
  
  
   28  
  
  
  
   29  
  
  
  
   30  
  
  
  
  
 Date Details No additional details Date of next INR:  6/13/2018 How to take your warfarin dose To take:  15 mg Take three of the 5 mg tablets. Follow-up Instructions Return in about 4 weeks (around 6/13/2018), or if symptoms worsen or fail to improve, for Regular Follow up. Patient Instructions Piriformis Syndrome: Care Instructions Your Care Instructions The piriformis muscle is deep under your rear end (buttock). One end of the muscle connects deep inside the pelvic area, and the other end attaches to the top of the thighbone. This muscle can press on the sciatic nerve that runs from your spine down your leg. When this happens, you may have pain, numbness, and tingling in the buttock and down the back of your leg. This is called piriformis syndrome. The pain may get worse when you sit for a long time or climb stairs.  Also, you may be more likely to develop piriformis syndrome if you run or walk often. Your doctor will check for other causes of your pain before treating this syndrome. Treatment may include stretching exercises, massage, and medicine for the pain and swelling. If these do not help, you may get a shot of steroid medicine. Until the pain is gone, you may need to rest the muscle and limit activities like running. Exercises and a change in how you move and sit may be enough to stop the pressure on the nerve. Follow-up care is a key part of your treatment and safety. Be sure to make and go to all appointments, and call your doctor if you are having problems. It's also a good idea to know your test results and keep a list of the medicines you take. How can you care for yourself at home? · If your doctor thinks that strenuous exercise is causing your problem, stop or cut back on activities such as running. You may find swimming to be a good exercise for a while. · Stretch the piriformis muscle. Naseem Cutting on your back. ¨ Bend one leg at the knee and keep the other leg flat on the ground. ¨ Raise your bent knee up and then move it across your body. Hold the outside of the knee with the opposite hand. ¨ Gently pull the knee with your hand toward the opposite shoulder. ¨ Hold the stretch for at least 15 to 30 seconds. Switch legs. ¨ Do the stretch several times each day. · Massage the muscle to relieve pressure. ¨ Sit on the floor. Lean to one side so that the hip on your sore side is off the ground. Put a tennis ball under your buttock on that side. ¨ As you put weight onto the tennis ball, you may find spots that are especially sore. Move gently so that the tennis ball gently massages each of the sore spots. · Use ice or heat to help reduce pain. Put ice or a cold pack or a heating pad set on low or a warm cloth on the sore area for 10 to 20 minutes at a time. Put a thin cloth between the ice pack or heating pad and your skin. · Ask your doctor if you can take an over-the-counter pain medicine, such as acetaminophen (Tylenol), ibuprofen (Advil, Motrin), or naproxen (Aleve). Be safe with medicines. Read and follow all instructions on the label. · Have your doctor or a physical therapist watch how you move. You may need physical therapy or special inserts in your shoes (orthotics) to help you move in a way that does not put pressure on your nerves. When should you call for help? Watch closely for changes in your health, and be sure to contact your doctor if: 
? · You do not feel better after several weeks of home care. ? · Your pain gets worse. ? · Your leg becomes weak or numb. Where can you learn more? Go to http://russ-kiseha.info/. Enter T171 in the search box to learn more about \"Piriformis Syndrome: Care Instructions. \" Current as of: March 21, 2017 Content Version: 11.4 © 6905-5686 [employer]. Care instructions adapted under license by [employer] (which disclaims liability or warranty for this information). If you have questions about a medical condition or this instruction, always ask your healthcare professional. [address] any warranty or liability for your use of this information. Introducing [facility] & HEALTH SERVICES! Dear Magdalena Obrien: Thank you for requesting a Kavalia account. Our records indicate that you already have an active Kavalia account. You can access your account anytime at [email or web address] Did you know that you can access your hospital and ER discharge instructions at any time in Kavalia? You can also review all of your test results from your hospital stay or ER visit. Additional Information If you have questions, please visit the Frequently Asked Questions section of the Kavalia website at [email or web address] Remember, Kavalia is NOT to be used for urgent needs.  For medical emergencies, dial 911. Now available from your iPhone and Android! Please provide this summary of care documentation to your next provider. Your primary care clinician is listed as Daryle Adolf. If you have any questions after today's visit, please call [phone].

## 2018-05-16 NOTE — PROGRESS NOTES
Subjective:     Chief Complaint   Patient presents with    Annual Wellness Visit     (Medicare)        He  is a 69 y.o. male who presents for evaluation of:  Left hip with some mild pain and coccyx now having pain. Working with PT until 2 weeks ago and now doing this on his own. Tried heat and cold with no relief. Dr. Evelia Gaspar has cleared him and Dr. Louie Tbaares will see him later today. Had PICC line pulled about 2 weeks ago. Will see Dr. Kathe Kim soon for MV vegetation.     ROS  Gen - no fever/chills  Resp - no dyspnea or cough  CV - no chest pain or WANG  Rest per HPI    Past Medical History:   Diagnosis Date    ADD (attention deficit disorder)     Anemia due to blood loss     Hinson's esophagus with esophagitis     Benign essential tremor     Cancer ([address] 2012    BCC    Depression     DJD (degenerative joint disease) of hip     bilat    DM (diabetes mellitus) ([address] 3/17/2010    ED (erectile dysfunction) of organic origin     Fall on or from sidewalk curb 9/24/2015    Femur fracture ([address]     Gastritis and duodenitis     GERD (gastroesophageal reflux disease)     resolved after discontinuing diclofenac    Hematuria 6/2016    High cholesterol 3/17/2010    HTN (hypertension) 3/17/2010    Morbid obesity ([address]     Murmur, cardiac 2016    PFO (patent foramen ovale) 7/26/2017    PUD (peptic ulcer disease)     Shingles 6/2016    RESOLVING- NO RASH (HEAD)    Sleep apnea     CPAP     Past Surgical History:   Procedure Laterality Date    ENDOSCOPY, COLON, DIAGNOSTIC      HX CHOLECYSTECTOMY  1995    HX GI  1980    gastroplasty    HX HERNIA REPAIR  1995    HX HIP REPLACEMENT      Left    HX ORTHOPAEDIC  2011    rot cuff repair    HX TONSILLECTOMY  1950    HX VASCULAR ACCESS      picc line    TOTAL HIP ARTHROPLASTY  05/2010    right    TOTAL HIP ARTHROPLASTY  08/01/2016    left     Current Outpatient Prescriptions on File Prior to Visit   Medication Sig Dispense Refill    metFORMIN ER (GLUCOPHAGE XR) 500 mg tablet TAKE 2 TABLETS DAILY (WITH DINNER) 180 Tab 1    LANTUS SOLOSTAR U-100 INSULIN 100 unit/mL (3 mL) inpn INJECT 20 UNITS UNDER THE SKIN IN THE MORNING AND 18 UNITS AT NIGHT 45 mL 0    albuterol-ipratropium (DUO-NEB) 2.5 mg-0.5 mg/3 ml nebu 3 mL by Nebulization route every six (6) hours as needed.  atomoxetine (STRATTERA) 40 mg capsule TAKE ONE CAPSULE BY MOUTH EVERY DAY  5    clobetasol (TEMOVATE) 0.05 % ointment as needed.  BD INSULIN PEN NEEDLE UF SHORT 31 gauge x 5/16\" ndle       clonazePAM (KLONOPIN) 0.5 mg tablet Take 1 Tab by mouth nightly as needed. Max Daily Amount: 0.5 mg. Indications: insomnia 5 Tab 0    famotidine (PEPCID) 20 mg tablet Take 1 Tab by mouth two (2) times a day. 60 Tab 0    polyethylene glycol (MIRALAX) 17 gram packet Take 1 Packet by mouth daily. Hold for loose stools/diarrhea 30 Packet 0    senna-docusate (PERICOLACE) 8.6-50 mg per tablet Take 1 Tab by mouth two (2) times a day. Hold for loose stools/diarrhea 60 Tab 0    sertraline (ZOLOFT) 100 mg tablet TAKE 1 TABLET DAILY 90 Tab 1    JANUVIA 100 mg tablet TAKE 1 TABLET DAILY 90 Tab 0    primidone (MYSOLINE) 250 mg tablet TAKE 1 TABLET TWICE A  Tab 3    lovastatin (MEVACOR) 20 mg tablet TAKE 1 TABLET IN THE EVENING 90 Tab 1    folic acid (FOLVITE) 1 mg tablet TAKE 1 TABLET DAILY 90 Tab 3    lisinopril (PRINIVIL, ZESTRIL) 20 mg tablet TAKE 1 TABLET DAILY 90 Tab 2    JARDIANCE 25 mg tablet TAKE 1 TABLET DAILY 30 Tab 10    VITAMIN D2 50,000 unit capsule TAKE 1 CAPSULE EVERY 7 DAYS 12 Cap 2    gabapentin (NEURONTIN) 300 mg capsule Take 2 Caps by mouth nightly. 1 Cap 0    aspirin 81 mg chewable tablet Take 1 Tab by mouth daily. 30 Tab 11     No current facility-administered medications on file prior to visit.          Objective:     Vitals:    05/16/18 1046 05/16/18 1105 05/16/18 1106   BP: 92/42 98/50 103/50   Pulse: 77  76   Resp: 20     Temp: 97.8 °F (36.6 °C)     TempSrc: Oral SpO2: 99%     Weight: 233 lb (105.7 kg)     Height: 5' 10.5\" (1.791 m)       Physical Examination:  General appearance - alert, well appearing, and in no distress  Eyes -sclera anicteric  Neck - supple, no significant adenopathy, no thyromegaly  Chest - CTAB, no w/r/r  Heart - normal rate, regular rhythm, normal S1, S2, 2/6 LION (chronic)  Neurological - alert, oriented, no focal findings or movement disorder noted  Extr - bilat 1+ edema    Assessment/ Plan:   Diagnoses and all orders for this visit:    1. Pyriformis syndrome, left - encouraged stretches and working with PT    2. Infection associated with internal right hip prosthesis, initial encounter ([facility]  3. Dislocation of prosthetic joint, subsequent encounter  - weaning down on oxycodone and will plan to stop after he runs out  -     oxyCODONE IR (ROXICODONE) 5 mg immediate release tablet; Take 1 Tab by mouth every twelve (12) hours as needed. Max Daily Amount: 10 mg. Severe pain    I have discussed the diagnosis with the patient and the intended plan as seen in the above orders. The patient has received an after-visit summary and questions were answered concerning future plans. I have discussed medication side effects and warnings with the patient as well. The patient verbalizes understanding and agreement with the plan. Follow-up Disposition:  Return in about 4 weeks (around 6/13/2018), or if symptoms worsen or fail to improve, for Regular Follow up.

## 2018-05-22 RX ORDER — SITAGLIPTIN 100 MG/1
TABLET, FILM COATED ORAL
Qty: 90 TAB | Refills: 0 | Status: SHIPPED | OUTPATIENT
Start: 2018-05-22 | End: 2018-08-18 | Stop reason: SDUPTHER

## 2018-05-25 RX ORDER — PEN NEEDLE, DIABETIC 31 GX5/16"
NEEDLE, DISPOSABLE MISCELLANEOUS
Qty: 270 PEN NEEDLE | Refills: 1 | Status: SHIPPED | OUTPATIENT
Start: 2018-05-25 | End: 2018-11-21 | Stop reason: SDUPTHER

## 2018-06-04 ENCOUNTER — OFFICE VISIT (OUTPATIENT)
Dept: CARDIOLOGY CLINIC | Age: 68
End: 2018-06-04

## 2018-06-04 VITALS
BODY MASS INDEX: 33.18 KG/M2 | WEIGHT: 231.8 LBS | SYSTOLIC BLOOD PRESSURE: 136 MMHG | HEIGHT: 70 IN | DIASTOLIC BLOOD PRESSURE: 64 MMHG | OXYGEN SATURATION: 98 % | HEART RATE: 87 BPM

## 2018-06-04 DIAGNOSIS — R01.1 SYSTOLIC MURMUR: Primary | ICD-10-CM

## 2018-06-04 DIAGNOSIS — I05.9 ENDOCARDITIS OF MITRAL VALVE: ICD-10-CM

## 2018-06-04 DIAGNOSIS — I10 ESSENTIAL HYPERTENSION: ICD-10-CM

## 2018-06-04 DIAGNOSIS — E78.2 MIXED HYPERLIPIDEMIA: ICD-10-CM

## 2018-06-04 DIAGNOSIS — Q21.12 PFO (PATENT FORAMEN OVALE): ICD-10-CM

## 2018-06-04 RX ORDER — LISINOPRIL 20 MG/1
TABLET ORAL
Qty: 90 TAB | Refills: 2 | Status: SHIPPED | OUTPATIENT
Start: 2018-06-04 | End: 2019-06-04 | Stop reason: SDUPTHER

## 2018-06-04 NOTE — MR AVS SNAPSHOT
[address] [facility] 
[phone] Patient: Benjamin Phan MRN: [id number] NJS:[id number] Visit Information Date & Time Provider Department Dept. Phone Encounter #  
 6/4/2018  9:15 AM Demond Evans, [address] [facility] [phone] [id number] Follow-up Instructions Return in about 1 year (around 6/4/2019). Routing History Follow-up and Disposition History Your Appointments 6/27/2018 11:50 AM  
ROUTINE CARE with Josh Swanson MD  
[facility] [address] [place] [address]) Appt Note: 6 w/k f/u  
 [address] [id number]  
[address]  
  
    
 6/11/2019  9:00 AM  
ESTABLISHED PATIENT with Demond Evans MD  
[facility] [address] [place] [address]) [address] [facility]  
[phone] [address] [facility] Upcoming Health Maintenance Date Due  
 EYE EXAM RETINAL OR DILATED Q1 4/11/2017 Pneumococcal 65+ Low/Medium Risk (2 of 2 - PPSV23) 12/5/2017 FOOT EXAM Q1 2/2/2018 MEDICARE YEARLY EXAM 4/7/2018 GLAUCOMA SCREENING Q2Y 4/11/2018 MICROALBUMIN Q1 7/26/2018 LIPID PANEL Q1 7/26/2018 Influenza Age 5 to Adult 8/1/2018 HEMOGLOBIN A1C Q6M 10/7/2018 DTaP/Tdap/Td series (2 - Td) 1/31/2021 Allergies as of 6/4/2018  Review Complete On: 6/4/2018 By: Demond Evans MD  
  
 Severity Noted Reaction Type Reactions Nsaids (Non-steroidal Anti-inflammatory Drug) Low 01/26/2016    Other (comments) Hx of PUD and Hinson's Esophagus Current Immunizations  Reviewed on 12/5/2016 Name Date Influenza High Dose Vaccine PF 9/18/2017, 9/22/2016 Influenza Vaccine 10/3/2013 Influenza Vaccine Split 10/28/2012 TDAP Vaccine 1/31/2011 Not reviewed this visit You Were Diagnosed With   
  
 Codes Comments Systolic murmur    -  Primary ICD-10-CM: R01.1 ICD-9-CM: 785.2 Endocarditis of mitral valve     ICD-10-CM: I05.8 ICD-9-CM: 394.9   
 PFO (patent foramen ovale)     ICD-10-CM: Q21.1 ICD-9-CM: 745.5 Essential hypertension     ICD-10-CM: I10 
ICD-9-CM: 401.9 Mixed hyperlipidemia     ICD-10-CM: E78.2 ICD-9-CM: 272.2 Vitals BP Pulse Height(growth percentile) Weight(growth percentile) SpO2 BMI  
 136/64 (BP 1 Location: Right arm, BP Patient Position: Sitting) 87 5' 10\" (1.778 m) 231 lb 12.8 oz (105.1 kg) 98% 33.26 kg/m2 Smoking Status Former Smoker Vitals History BMI and BSA Data Body Mass Index Body Surface Area  
 33.26 kg/m 2 2.28 m 2 Preferred Pharmacy Pharmacy Name Phone [address] [facility] [phone] Your Updated Medication List  
  
   
This list is accurate as of 6/4/18  9:57 AM.  Always use your most recent med list.  
  
  
  
  
 albuterol-ipratropium 2.5 mg-0.5 mg/3 ml Nebu Commonly known as:  DUO-NEB  
3 mL by Nebulization route every six (6) hours as needed. aspirin 81 mg chewable tablet Take 1 Tab by mouth daily. atomoxetine 40 mg capsule Commonly known as:  STRATTERA TAKE ONE CAPSULE BY MOUTH EVERY DAY  
  
 * BD ULTRA-FINE SHORT PEN NEEDLE 31 gauge x 5/16\" Ndle Generic drug:  Insulin Needles (Disposable) * BD ULTRA-FINE SHORT PEN NEEDLE 31 gauge x 5/16\" Ndle Generic drug:  Insulin Needles (Disposable) USE TO INJECT UNDER THE SKIN THREE TIMES A DAY  
  
 cephALEXin 500 mg capsule Commonly known as:  Ladonna Pucker TAKE 4 CAPSULES BY MOUTH EVERY DAY  
  
 clobetasol 0.05 % ointment Commonly known as:  Lenette Mary Ellen  
as needed. clonazePAM 0.5 mg tablet Commonly known as:  Jsoef Velazquez Take 1 Tab by mouth nightly as needed.  Max Daily Amount: 0.5 mg. Indications: insomnia  
  
 famotidine 20 mg tablet Commonly known as:  PEPCID Take 1 Tab by mouth two (2) times a day. folic acid 1 mg tablet Commonly known as:  FOLVITE  
TAKE 1 TABLET DAILY  
  
 gabapentin 300 mg capsule Commonly known as:  NEURONTIN Take 2 Caps by mouth nightly. JANUVIA 100 mg tablet Generic drug:  SITagliptin TAKE 1 TABLET DAILY JARDIANCE 25 mg tablet Generic drug:  empagliflozin TAKE 1 TABLET DAILY  
  
 LANTUS SOLOSTAR U-100 INSULIN 100 unit/mL (3 mL) Inpn Generic drug:  insulin glargine INJECT 20 UNITS UNDER THE SKIN IN THE MORNING AND 18 UNITS AT NIGHT  
  
 lisinopril 20 mg tablet Commonly known as:  PRINIVIL, ZESTRIL  
TAKE 1 TABLET DAILY lovastatin 20 mg tablet Commonly known as:  MEVACOR  
TAKE 1 TABLET IN THE EVENING  
  
 metFORMIN  mg tablet Commonly known as:  GLUCOPHAGE XR  
TAKE 2 TABLETS DAILY (WITH DINNER) oxyCODONE IR 5 mg immediate release tablet Commonly known as:  Lazaro Horacio Take 1 Tab by mouth every twelve (12) hours as needed. Max Daily Amount: 10 mg. Severe pain  
  
 polyethylene glycol 17 gram packet Commonly known as:  Andrew Coma Take 1 Packet by mouth daily. Hold for loose stools/diarrhea  
  
 primidone 250 mg tablet Commonly known as: MYSOLINE  
TAKE 1 TABLET TWICE A DAY  
  
 senna-docusate 8.6-50 mg per tablet Commonly known as:  Mary Sandi Take 1 Tab by mouth two (2) times a day. Hold for loose stools/diarrhea  
  
 sertraline 100 mg tablet Commonly known as:  ZOLOFT  
TAKE 1 TABLET DAILY  
  
 VITAMIN D2 50,000 unit capsule Generic drug:  ergocalciferol TAKE 1 CAPSULE EVERY 7 DAYS * Notice: This list has 2 medication(s) that are the same as other medications prescribed for you. Read the directions carefully, and ask your doctor or other care provider to review them with you. We Performed the Following AMB POC EKG ROUTINE W/ 12 LEADS, INTER & REP [47472 CPT(R)] Follow-up Instructions Return in about 1 year (around 6/4/2019). Introducing [facility] & HEALTH SERVICES! Dear Peggy Truong: Thank you for requesting a Smartdate account. Our records indicate that you already have an active Smartdate account. You can access your account anytime at [email or web address] Did you know that you can access your hospital and ER discharge instructions at any time in Smartdate? You can also review all of your test results from your hospital stay or ER visit. Additional Information If you have questions, please visit the Frequently Asked Questions section of the Smartdate website at [email or web address] Remember, Smartdate is NOT to be used for urgent needs. For medical emergencies, dial 911. Now available from your iPhone and Android! Please provide this summary of care documentation to your next provider. Your primary care clinician is listed as Evelina Siu. If you have any questions after today's visit, please call [phone].

## 2018-06-04 NOTE — PROGRESS NOTES
Chief Complaint   Patient presents with    Hip Pain     PT. C/O RT. HIP PAIN     1. Have you been to the ER, urgent care clinic since your last visit? Hospitalized since your last visit? YES, ED [facility] ON 4/18 FOR RT. HIP PAIN    2. Have you seen or consulted any other health care providers outside of the [address] [place] [address] since your last visit? Include any pap smears or colon screening.  NO

## 2018-06-04 NOTE — PROGRESS NOTES
6/4/2018 9:50 AM      Subjective:     Denise Schafer is here for f/u visit. Doing very well from CV standpoint. He denies chest pain, chest pressure/discomfort, dyspnea, palpitations, irregular heart beats, near-syncope, syncope, fatigue, orthopnea, paroxysmal nocturnal dyspnea, exertional chest pressure/discomfort, claudication, lower extremity edema, tachypnea. Visit Vitals    /64 (BP 1 Location: Right arm, BP Patient Position: Sitting)    Pulse 87    Ht 5' 10\" (1.778 m)    Wt 231 lb 12.8 oz (105.1 kg)    SpO2 98%    BMI 33.26 kg/m2     Current Outpatient Prescriptions   Medication Sig    lisinopril (PRINIVIL, ZESTRIL) 20 mg tablet TAKE 1 TABLET DAILY    BD ULTRA-FINE SHORT PEN NEEDLE 31 gauge x 5/16\" ndle USE TO INJECT UNDER THE SKIN THREE TIMES A DAY    JANUVIA 100 mg tablet TAKE 1 TABLET DAILY    metFORMIN ER (GLUCOPHAGE XR) 500 mg tablet TAKE 2 TABLETS DAILY (WITH DINNER)    LANTUS SOLOSTAR U-100 INSULIN 100 unit/mL (3 mL) inpn INJECT 20 UNITS UNDER THE SKIN IN THE MORNING AND 18 UNITS AT NIGHT    albuterol-ipratropium (DUO-NEB) 2.5 mg-0.5 mg/3 ml nebu 3 mL by Nebulization route every six (6) hours as needed.  atomoxetine (STRATTERA) 40 mg capsule TAKE ONE CAPSULE BY MOUTH EVERY DAY    clobetasol (TEMOVATE) 0.05 % ointment as needed.  BD INSULIN PEN NEEDLE UF SHORT 31 gauge x 5/16\" ndle     clonazePAM (KLONOPIN) 0.5 mg tablet Take 1 Tab by mouth nightly as needed. Max Daily Amount: 0.5 mg. Indications: insomnia    sertraline (ZOLOFT) 100 mg tablet TAKE 1 TABLET DAILY    primidone (MYSOLINE) 250 mg tablet TAKE 1 TABLET TWICE A DAY    lovastatin (MEVACOR) 20 mg tablet TAKE 1 TABLET IN THE EVENING    folic acid (FOLVITE) 1 mg tablet TAKE 1 TABLET DAILY    JARDIANCE 25 mg tablet TAKE 1 TABLET DAILY    VITAMIN D2 50,000 unit capsule TAKE 1 CAPSULE EVERY 7 DAYS    gabapentin (NEURONTIN) 300 mg capsule Take 2 Caps by mouth nightly.     aspirin 81 mg chewable tablet Take 1 Tab by mouth daily.  cephALEXin (KEFLEX) 500 mg capsule TAKE 4 CAPSULES BY MOUTH EVERY DAY    oxyCODONE IR (ROXICODONE) 5 mg immediate release tablet Take 1 Tab by mouth every twelve (12) hours as needed. Max Daily Amount: 10 mg. Severe pain    famotidine (PEPCID) 20 mg tablet Take 1 Tab by mouth two (2) times a day.  polyethylene glycol (MIRALAX) 17 gram packet Take 1 Packet by mouth daily. Hold for loose stools/diarrhea    senna-docusate (PERICOLACE) 8.6-50 mg per tablet Take 1 Tab by mouth two (2) times a day. Hold for loose stools/diarrhea     No current facility-administered medications for this visit.           Objective:      Visit Vitals    /64 (BP 1 Location: Right arm, BP Patient Position: Sitting)    Pulse 87    Ht 5' 10\" (1.778 m)    Wt 231 lb 12.8 oz (105.1 kg)    SpO2 98%    BMI 33.26 kg/m2       Data Review:     EKG: Normal sinus rhythm, no acute st/t changes    Reviewed and/or ordered active problem list, medication list tests    Past Medical History:   Diagnosis Date    ADD (attention deficit disorder)     Anemia due to blood loss     Hinson's esophagus with esophagitis     Benign essential tremor     Cancer ([facility] [address] 2012    BCC    Depression     DJD (degenerative joint disease) of hip     bilat    DM (diabetes mellitus) ([facility] [address] 3/17/2010    ED (erectile dysfunction) of organic origin     Fall on or from sidewalk curb 9/24/2015    Femur fracture ([facility] [address]     Gastritis and duodenitis     GERD (gastroesophageal reflux disease)     resolved after discontinuing diclofenac    Hematuria 6/2016    High cholesterol 3/17/2010    HTN (hypertension) 3/17/2010    Morbid obesity ([address]     Murmur, cardiac 2016    PFO (patent foramen ovale) 7/26/2017    PUD (peptic ulcer disease)     Shingles 6/2016    RESOLVING- NO RASH (HEAD)    Sleep apnea     CPAP      Past Surgical History:   Procedure Laterality Date    ENDOSCOPY, COLON, DIAGNOSTIC      HX CHOLECYSTECTOMY  1995    HX GI  1980    gastroplasty    HX HERNIA REPAIR  1995    HX HIP REPLACEMENT      Left    HX ORTHOPAEDIC  2011    rot cuff repair    HX TONSILLECTOMY  1950    HX VASCULAR ACCESS      picc line    TOTAL HIP ARTHROPLASTY  05/2010    right    TOTAL HIP ARTHROPLASTY  08/01/2016    left     Allergies   Allergen Reactions    Nsaids (Non-Steroidal Anti-Inflammatory Drug) Other (comments)     Hx of PUD and Hinson's Esophagus      Family History   Problem Relation Age of Onset    Cancer Father      multiple myeloma    Stroke Father     Dementia Mother     No Known Problems Brother     COPD Brother     Cancer Maternal Grandmother      COLON    Anesth Problems Neg Hx       Social History     Social History    Marital status:      Spouse name: N/A    Number of children: N/A    Years of education: N/A     Occupational History    Not on file. Social History Main Topics    Smoking status: Former Smoker     Packs/day: 2.00     Years: 15.00     Quit date: 3/1/1979    Smokeless tobacco: Never Used    Alcohol use No    Drug use: No    Sexual activity: Yes     Partners: Female     Birth control/ protection: None     Other Topics Concern    Not on file     Social History Narrative         Review of Systems     General: Not Present- Anorexia, Chills, Dietary Changes, Fatigue, Fever, Medication Changes, Night Sweats, Weight Gain > 10lbs. and Weight Loss > 10lbs. .  Skin: Not Present- Bruising and Excessive Sweating. HEENT: Not Present- Headache, Visual Loss and Vertigo. Respiratory: Not Present- Cough, Decreased Exercise Tolerance, Difficulty Breathing, Snoring and Wheezing.   Cardiovascular: Not Present- Abnormal Blood Pressure, Chest Pain, Claudications, Difficulty Breathing On Exertion, Edema, Fainting / Blacking Out, Irregular Heart Beat, Night Cramps, Orthopnea, Palpitations, Paroxysmal Nocturnal Dyspnea, Rapid Heart Rate, Shortness of Breath and Swelling of Extremities. Gastrointestinal: Not Present- Black, Tarry Stool, Bloody Stool, Diarrhea, Hematemesis, Rectal Bleeding and Vomiting. Musculoskeletal: Not Present- Muscle Pain and Muscle Weakness. Neurological: Not Present- Dizziness. Psychiatric: Not Present- Depression. Endocrine: Not Present- Cold Intolerance, Heat Intolerance and Thyroid Problems. Hematology: Not Present- Abnormal Bleeding, Anemia, Blood Clots and Easy Bruising.       Physical Exam   The physical exam findings are as follows:       General   Mental Status - Alert. General Appearance - Not in acute distress. Chest and Lung Exam   Inspection: Accessory muscles - No use of accessory muscles in breathing. Auscultation:   Breath sounds: - Normal.      Cardiovascular   Inspection: Jugular vein - Bilateral - Inspection Normal.  Palpation/Percussion:   Apical Impulse: - Normal.  Auscultation: Rhythm - Regular. Heart Sounds - S1 WNL and S2 WNL. No S3 or S4. Murmurs & Other Heart Sounds: Auscultation of the heart reveals - 2/6 soft systolic murmur LSB. Carotid arteries - No Carotid bruit. Peripheral Vascular   Upper Extremity: Inspection - Bilateral - No Cyanotic nailbeds or Digital clubbing. Lower Extremity:   Palpation: Dorsalis pedis pulse - Bilateral - Normal. Posterior tibia pulse - Bilateral - Normal. Edema - Bilateral - No edema. Assessment:       ICD-10-CM ICD-9-CM    1. Systolic murmur V95.2 759.6 AMB POC EKG ROUTINE W/ 12 LEADS, INTER & REP   2. 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 PFO (patent foramen ovale) Q21.1 745.5    4. Essential hypertension I10 401.9    5. Mixed hyperlipidemia E78.2 272.2        Plan:     1. On recent EULALIO no evidence of vegetation. Still on abx. F/u with ortho and Dr. Day Greenberg. 2. BP controlled. 3. On statin. Last FLP reviewed. 4. Possible tiny PFO with small right to left shunt on recent EULALIO. Clinically asymptomatic. No h/o TIA or CVA.

## 2018-06-20 ENCOUNTER — TELEPHONE (OUTPATIENT)
Dept: FAMILY MEDICINE CLINIC | Age: 68
End: 2018-06-20

## 2018-06-20 RX ORDER — ERGOCALCIFEROL 1.25 MG/1
CAPSULE ORAL
Qty: 12 CAP | Refills: 2 | Status: SHIPPED | OUTPATIENT
Start: 2018-06-20 | End: 2019-04-24 | Stop reason: SDUPTHER

## 2018-06-20 NOTE — TELEPHONE ENCOUNTER
Nori abreu/Jackelin Case Management     Wants to notify you that pt has completed a case management course with her   She mailed out a letter about a month ago     Best number to reach her is [phone]  Ext [phone]  Only need to call if there are any questions

## 2018-06-27 ENCOUNTER — OFFICE VISIT (OUTPATIENT)
Dept: FAMILY MEDICINE CLINIC | Age: 68
End: 2018-06-27

## 2018-06-27 VITALS
HEART RATE: 67 BPM | DIASTOLIC BLOOD PRESSURE: 64 MMHG | TEMPERATURE: 97.7 F | HEIGHT: 70 IN | WEIGHT: 239.4 LBS | BODY MASS INDEX: 34.27 KG/M2 | SYSTOLIC BLOOD PRESSURE: 110 MMHG | OXYGEN SATURATION: 98 % | RESPIRATION RATE: 18 BRPM

## 2018-06-27 DIAGNOSIS — B35.6 TINEA CRURIS: ICD-10-CM

## 2018-06-27 DIAGNOSIS — M16.12 PRIMARY OSTEOARTHRITIS OF LEFT HIP: ICD-10-CM

## 2018-06-27 DIAGNOSIS — G25.81 RLS (RESTLESS LEGS SYNDROME): Primary | ICD-10-CM

## 2018-06-27 DIAGNOSIS — G57.02 PYRIFORMIS SYNDROME, LEFT: ICD-10-CM

## 2018-06-27 RX ORDER — DICLOFENAC SODIUM 10 MG/G
GEL TOPICAL 4 TIMES DAILY
Qty: 100 G | Refills: 2 | Status: SHIPPED | OUTPATIENT
Start: 2018-06-27 | End: 2018-10-26 | Stop reason: SDUPTHER

## 2018-06-27 RX ORDER — CLOTRIMAZOLE AND BETAMETHASONE DIPROPIONATE 10; .64 MG/G; MG/G
CREAM TOPICAL
Qty: 15 G | Refills: 0 | Status: SHIPPED | OUTPATIENT
Start: 2018-06-27 | End: 2019-06-17 | Stop reason: CLARIF

## 2018-06-27 RX ORDER — PRAMIPEXOLE DIHYDROCHLORIDE 0.25 MG/1
0.25 TABLET ORAL
Qty: 30 TAB | Refills: 0 | Status: SHIPPED | OUTPATIENT
Start: 2018-06-27 | End: 2018-07-24 | Stop reason: SDUPTHER

## 2018-06-27 NOTE — MR AVS SNAPSHOT
[address] [facility] 
[phone] Patient: Jaxson Chavira MRN: [id number] Visit Information Date & Time Provider Department Dept. Phone Encounter #  
 6/27/2018 11:50 AM Edwin Gardner MD [facility] at [address] [id number] Follow-up Instructions Return in about 3 months (around 9/27/2018), or if symptoms worsen or fail to improve, for Regular Follow up. Your Appointments 6/11/2019  9:00 AM  
ESTABLISHED PATIENT with Bill Mosqueda MD  
[facility]) [address]  
[phone] [address] Upcoming Health Maintenance Date Due  
 EYE EXAM RETINAL OR DILATED Q1 4/11/2017 Pneumococcal 65+ Low/Medium Risk (2 of 2 - PPSV23) 12/5/2017 FOOT EXAM Q1 2/2/2018 GLAUCOMA SCREENING Q2Y 4/11/2018 MEDICARE YEARLY EXAM 6/27/2018 MICROALBUMIN Q1 7/26/2018 LIPID PANEL Q1 7/26/2018 Influenza Age 5 to Adult 8/1/2018 HEMOGLOBIN A1C Q6M 10/7/2018 DTaP/Tdap/Td series (2 - Td) 1/31/2021 Allergies as of 6/27/2018  Review Complete On: 6/27/2018 By: Edwin Gardner MD  
  
 Severity Noted Reaction Type Reactions Nsaids (Non-steroidal Anti-inflammatory Drug) Low 01/26/2016    Other (comments) Hx of PUD and Hinson's Esophagus Current Immunizations  Reviewed on 12/5/2016 Name Date Influenza High Dose Vaccine PF 9/18/2017, 9/22/2016 Influenza Vaccine 10/3/2013 Influenza Vaccine Split 10/28/2012 TDAP Vaccine 1/31/2011 Not reviewed this visit You Were Diagnosed With   
  
 Codes Comments RLS (restless legs syndrome)    -  Primary ICD-10-CM: G25.81 ICD-9-CM: 333.94 Pyriformis syndrome, left     ICD-10-CM: G57.02 
ICD-9-CM: 355.0 Primary osteoarthritis of left hip     ICD-10-CM: M16.12 
ICD-9-CM: 715.15 Tinea cruris     ICD-10-CM: B35.6 ICD-9-CM: 110.3 Vitals BP Pulse Temp Resp Height(growth percentile) Weight(growth percentile) 110/64 (BP 1 Location: Left arm, BP Patient Position: Sitting) 67 97.7 °F (36.5 °C) (Oral) 18 5' 10\" (1.778 m) 239 lb 6.4 oz (108.6 kg) SpO2 BMI Smoking Status 98% 34.35 kg/m2 Former Smoker Vitals History BMI and BSA Data Body Mass Index Body Surface Area  
 34.35 kg/m 2 2.32 m 2 Preferred Pharmacy Pharmacy Name Phone [facility] [address] [place] [facility] Renetta Romo [phone] Your Updated Medication List  
  
   
This list is accurate as of 6/27/18  1:18 PM.  Always use your most recent med list.  
  
  
  
  
 albuterol-ipratropium 2.5 mg-0.5 mg/3 ml Nebu Commonly known as:  DUO-NEB  
3 mL by Nebulization route every six (6) hours as needed. aspirin 81 mg chewable tablet Take 1 Tab by mouth daily. atomoxetine 40 mg capsule Commonly known as:  STRATTERA TAKE ONE CAPSULE BY MOUTH EVERY DAY  
  
 * BD ULTRA-FINE SHORT PEN NEEDLE 31 gauge x 5/16\" Ndle Generic drug:  Insulin Needles (Disposable) * BD ULTRA-FINE SHORT PEN NEEDLE 31 gauge x 5/16\" Ndle Generic drug:  Insulin Needles (Disposable) USE TO INJECT UNDER THE SKIN THREE TIMES A DAY  
  
 cephALEXin 500 mg capsule Commonly known as:  Jon Burleson TAKE 4 CAPSULES BY MOUTH EVERY DAY  
  
 clobetasol 0.05 % ointment Commonly known as:  Keke Nestle  
as needed. clonazePAM 0.5 mg tablet Commonly known as:  Melburn End Take 1 Tab by mouth nightly as needed. Max Daily Amount: 0.5 mg. Indications: insomnia  
  
 clotrimazole-betamethasone topical cream  
Commonly known as:  Mary Carmen Kendal Apply to affected area twice daily  
  
 diclofenac 1 % Gel Commonly known as:  VOLTAREN Apply  to affected area four (4) times daily. famotidine 20 mg tablet Commonly known as:  PEPCID Take 1 Tab by mouth two (2) times a day. folic acid 1 mg tablet Commonly known as:  FOLVITE  
TAKE 1 TABLET DAILY  
  
 gabapentin 300 mg capsule Commonly known as:  NEURONTIN Take 2 Caps by mouth nightly. JANUVIA 100 mg tablet Generic drug:  SITagliptin TAKE 1 TABLET DAILY JARDIANCE 25 mg tablet Generic drug:  empagliflozin TAKE 1 TABLET DAILY  
  
 LANTUS SOLOSTAR U-100 INSULIN 100 unit/mL (3 mL) Inpn Generic drug:  insulin glargine INJECT 20 UNITS UNDER THE SKIN IN THE MORNING AND 18 UNITS AT NIGHT  
  
 lisinopril 20 mg tablet Commonly known as:  PRINIVIL, ZESTRIL  
TAKE 1 TABLET DAILY lovastatin 20 mg tablet Commonly known as:  MEVACOR  
TAKE 1 TABLET IN THE EVENING  
  
 metFORMIN  mg tablet Commonly known as:  GLUCOPHAGE XR  
TAKE 2 TABLETS DAILY (WITH DINNER)  
  
 polyethylene glycol 17 gram packet Commonly known as:  Henrey Lynnette Take 1 Packet by mouth daily. Hold for loose stools/diarrhea  
  
 pramipexole 0.25 mg tablet Commonly known as:  MIRAPEX Take 1 Tab by mouth nightly. Take 2 hours prior to bedtime  Indications: Restless Legs Syndrome  
  
 primidone 250 mg tablet Commonly known as: MYSOLINE  
TAKE 1 TABLET TWICE A DAY  
  
 senna-docusate 8.6-50 mg per tablet Commonly known as:  Bertha Holms Take 1 Tab by mouth two (2) times a day. Hold for loose stools/diarrhea  
  
 sertraline 100 mg tablet Commonly known as:  ZOLOFT  
TAKE 1 TABLET DAILY  
  
 VITAMIN D2 50,000 unit capsule Generic drug:  ergocalciferol TAKE 1 CAPSULE EVERY 7 DAYS * Notice: This list has 2 medication(s) that are the same as other medications prescribed for you. Read the directions carefully, and ask your doctor or other care provider to review them with you. Prescriptions Sent to Pharmacy Refills  
 pramipexole (MIRAPEX) 0.25 mg tablet 0 Sig: Take 1 Tab by mouth nightly.  Take 2 hours prior to bedtime Indications: Restless Legs Syndrome Class: Normal  
 [facility] [id number] [place] [facility] Ph #: [phone] Route: Oral  
 diclofenac (VOLTAREN) 1 % gel 2 Sig: Apply  to affected area four (4) times daily. Class: Normal  
 [facility]acy[facility] [id number] [place] [facility] Ph #: [phone] Route: Topical  
 clotrimazole-betamethasone (LOTRISONE) topical cream 0 Sig: Apply to affected area twice daily Class: Normal  
 Pharmacy: [facility] [id number] [place] [facility] Ph #: [phone] Follow-up Instructions Return in about 3 months (around 9/27/2018), or if symptoms worsen or fail to improve, for Regular Follow up. Introducing [facility]! Dear Saul Montelongo: Thank you for requesting a [employer] account. Our records indicate that you already have an active [employer] account. You can access your account anytime at [email or web address] Did you know that you can access your hospital and ER discharge instructions at any time in [employer]? You can also review all of your test results from your hospital stay or ER visit. Additional Information If you have questions, please visit the Frequently Asked Questions section of the [employer] website at [email or web address] Remember, [employer] is NOT to be used for urgent needs. For medical emergencies, dial 911. Now available from your iPhone and Android! Please provide this summary of care documentation to your next provider. Your primary care clinician is listed as Damian Horta. If you have any questions after today's visit, please call [phone].

## 2018-06-27 NOTE — PROGRESS NOTES
Subjective:     Chief Complaint   Patient presents with    Follow-up     Pyriformis syndrome, left         He  is a 77 y.o. male who presents for evaluation of:  Pyriformis syndrome - Seems to be getting better. Left hip with some mild pain and coccyx now having pain. Working with PT until 2 weeks ago and now doing this on his own. Tried heat and cold. Walking without cane. Dr. Kimberley Pritchett has released him and Dr. Sabina Chin will see him 1 more time. RLS - kicking regularly at night. On Gabapentin 600 mg QHS which does not help. Will see Dr. Maria Luisa Browning soon for MV vegetation.     ROS  Gen - no fever/chills  Resp - no dyspnea or cough  CV - no chest pain or WANG  Rest per HPI    Past Medical History:   Diagnosis Date    ADD (attention deficit disorder)     Anemia due to blood loss     Hinson's esophagus with esophagitis     Benign essential tremor     Cancer ([address] 2012    BCC    Depression     DJD (degenerative joint disease) of hip     bilat    DM (diabetes mellitus) ([address] 3/17/2010    ED (erectile dysfunction) of organic origin     Fall on or from sidewalk curb 9/24/2015    Femur fracture ([address]     Gastritis and duodenitis     GERD (gastroesophageal reflux disease)     resolved after discontinuing diclofenac    Hematuria 6/2016    High cholesterol 3/17/2010    HTN (hypertension) 3/17/2010    Morbid obesity ([address]     Murmur, cardiac 2016    PFO (patent foramen ovale) 7/26/2017    PUD (peptic ulcer disease)     Shingles 6/2016    RESOLVING- NO RASH (HEAD)    Sleep apnea     CPAP     Past Surgical History:   Procedure Laterality Date    ENDOSCOPY, COLON, DIAGNOSTIC      HX CHOLECYSTECTOMY  1995    HX GI  1980    gastroplasty    HX HERNIA REPAIR  1995    HX HIP REPLACEMENT      Left    HX ORTHOPAEDIC  2011    rot cuff repair    HX TONSILLECTOMY  1950    HX VASCULAR ACCESS      picc line    TOTAL HIP ARTHROPLASTY  05/2010    right    TOTAL HIP ARTHROPLASTY  08/01/2016    left Current Outpatient Prescriptions on File Prior to Visit   Medication Sig Dispense Refill    VITAMIN D2 50,000 unit capsule TAKE 1 CAPSULE EVERY 7 DAYS 12 Cap 2    lisinopril (PRINIVIL, ZESTRIL) 20 mg tablet TAKE 1 TABLET DAILY 90 Tab 2    BD ULTRA-FINE SHORT PEN NEEDLE 31 gauge x 5/16\" ndle USE TO INJECT UNDER THE SKIN THREE TIMES A  Pen Needle 1    JANUVIA 100 mg tablet TAKE 1 TABLET DAILY 90 Tab 0    cephALEXin (KEFLEX) 500 mg capsule TAKE 4 CAPSULES BY MOUTH EVERY DAY  1    metFORMIN ER (GLUCOPHAGE XR) 500 mg tablet TAKE 2 TABLETS DAILY (WITH DINNER) 180 Tab 1    LANTUS SOLOSTAR U-100 INSULIN 100 unit/mL (3 mL) inpn INJECT 20 UNITS UNDER THE SKIN IN THE MORNING AND 18 UNITS AT NIGHT 45 mL 0    albuterol-ipratropium (DUO-NEB) 2.5 mg-0.5 mg/3 ml nebu 3 mL by Nebulization route every six (6) hours as needed.  clobetasol (TEMOVATE) 0.05 % ointment as needed.  BD INSULIN PEN NEEDLE UF SHORT 31 gauge x 5/16\" ndle       clonazePAM (KLONOPIN) 0.5 mg tablet Take 1 Tab by mouth nightly as needed. Max Daily Amount: 0.5 mg. Indications: insomnia 5 Tab 0    famotidine (PEPCID) 20 mg tablet Take 1 Tab by mouth two (2) times a day. 60 Tab 0    polyethylene glycol (MIRALAX) 17 gram packet Take 1 Packet by mouth daily. Hold for loose stools/diarrhea 30 Packet 0    senna-docusate (PERICOLACE) 8.6-50 mg per tablet Take 1 Tab by mouth two (2) times a day. Hold for loose stools/diarrhea 60 Tab 0    sertraline (ZOLOFT) 100 mg tablet TAKE 1 TABLET DAILY 90 Tab 1    primidone (MYSOLINE) 250 mg tablet TAKE 1 TABLET TWICE A  Tab 3    lovastatin (MEVACOR) 20 mg tablet TAKE 1 TABLET IN THE EVENING 90 Tab 1    folic acid (FOLVITE) 1 mg tablet TAKE 1 TABLET DAILY 90 Tab 3    JARDIANCE 25 mg tablet TAKE 1 TABLET DAILY 30 Tab 10    gabapentin (NEURONTIN) 300 mg capsule Take 2 Caps by mouth nightly. 1 Cap 0    aspirin 81 mg chewable tablet Take 1 Tab by mouth daily.  (Patient taking differently: Take 81 mg by mouth every other day.) 30 Tab 11     No current facility-administered medications on file prior to visit. Objective:     Vitals:    06/27/18 1201   BP: 110/64   Pulse: 67   Resp: 18   Temp: 97.7 °F (36.5 °C)   TempSrc: Oral   SpO2: 98%   Weight: 239 lb 6.4 oz (108.6 kg)   Height: 5' 10\" (1.778 m)     Physical Examination:  General appearance - alert, well appearing, and in no distress  Eyes -sclera anicteric  Neck - supple, no significant adenopathy, no thyromegaly  Chest - CTAB, no w/r/r  Heart - normal rate, regular rhythm, normal S1, S2, 2/6 LION (chronic)  Neurological - alert, oriented, no focal findings or movement disorder noted  Extr - bilat 1+ edema, ttp over left pyriformis  Skin- + tinea cruris noted    Assessment/ Plan:   Diagnoses and all orders for this visit:    1. RLS (restless legs syndrome)-still having trouble with this even on gabapentin. Will try pramipexole and taper up on dose over next few visits as needed  -     pramipexole (MIRAPEX) 0.25 mg tablet; Take 1 Tab by mouth nightly. Take 2 hours prior to bedtime  Indications: Restless Legs Syndrome    2. Pyriformis syndrome, left   3. Primary osteoarthritis of left hip  - encouraged stretches and working with PT  -     diclofenac (VOLTAREN) 1 % gel; Apply  to affected area four (4) times daily. 4. Tinea cruris- treating again with Lotrisone, may end up needing p.o. agent  -     clotrimazole-betamethasone (LOTRISONE) topical cream; Apply to affected area twice daily    I have discussed the diagnosis with the patient and the intended plan as seen in the above orders. The patient has received an after-visit summary and questions were answered concerning future plans. I have discussed medication side effects and warnings with the patient as well. The patient verbalizes understanding and agreement with the plan.     Follow-up Disposition:  Return in about 3 months (around 9/27/2018), or if symptoms worsen or fail to improve, for Regular Follow up.

## 2018-06-27 NOTE — PROGRESS NOTES
Chief Complaint   Patient presents with    Follow-up     Pyriformis syndrome, left    1. Have you been to the ER, urgent care clinic since your last visit? Hospitalized since your last visit? No    2. Have you seen or consulted any other health care providers outside of the [facility] since your last visit? Include any pap smears or colon screening.  5/16/16 Dr Nestor Ashford  Discuss Restless leg syndrome,lesion right leg below knee and pain medication  Room 8

## 2018-07-02 RX ORDER — ATOMOXETINE 40 MG/1
CAPSULE ORAL
Qty: 30 CAP | Refills: 5 | Status: SHIPPED | OUTPATIENT
Start: 2018-07-02 | End: 2018-07-02 | Stop reason: SDUPTHER

## 2018-07-02 RX ORDER — ATOMOXETINE 40 MG/1
CAPSULE ORAL
Qty: 30 CAP | Refills: 5 | Status: SHIPPED | OUTPATIENT
Start: 2018-07-02 | End: 2018-11-02 | Stop reason: SDUPTHER

## 2018-07-11 RX ORDER — EMPAGLIFLOZIN 25 MG/1
TABLET, FILM COATED ORAL
Qty: 30 TAB | Refills: 10 | Status: SHIPPED | OUTPATIENT
Start: 2018-07-11 | End: 2019-03-13 | Stop reason: SDUPTHER

## 2018-07-24 DIAGNOSIS — G25.81 RLS (RESTLESS LEGS SYNDROME): ICD-10-CM

## 2018-07-24 RX ORDER — PRAMIPEXOLE DIHYDROCHLORIDE 0.25 MG/1
TABLET ORAL
Qty: 30 TAB | Refills: 0 | Status: SHIPPED | OUTPATIENT
Start: 2018-07-24 | End: 2018-11-01 | Stop reason: ALTCHOICE

## 2018-07-29 RX ORDER — BUTALBITAL, ACETAMINOPHEN AND CAFFEINE 50; 325; 40 MG/1; MG/1; MG/1
TABLET ORAL
Qty: 30 TAB | Refills: 1 | Status: SHIPPED | OUTPATIENT
Start: 2018-07-29 | End: 2018-09-25 | Stop reason: SDUPTHER

## 2018-08-04 RX ORDER — LOVASTATIN 20 MG/1
TABLET ORAL
Qty: 90 TAB | Refills: 1 | Status: SHIPPED | COMMUNITY
Start: 2018-08-04 | End: 2019-03-13 | Stop reason: ALTCHOICE

## 2018-08-18 RX ORDER — SITAGLIPTIN 100 MG/1
TABLET, FILM COATED ORAL
Qty: 90 TAB | Refills: 0 | Status: SHIPPED | OUTPATIENT
Start: 2018-08-18 | End: 2018-11-16 | Stop reason: SDUPTHER

## 2018-08-26 DIAGNOSIS — F33.1 MODERATE EPISODE OF RECURRENT MAJOR DEPRESSIVE DISORDER (HCC): ICD-10-CM

## 2018-08-26 DIAGNOSIS — F43.21 GRIEF: ICD-10-CM

## 2018-08-27 RX ORDER — SERTRALINE HYDROCHLORIDE 100 MG/1
TABLET, FILM COATED ORAL
Qty: 90 TAB | Refills: 1 | Status: SHIPPED | OUTPATIENT
Start: 2018-08-27 | End: 2019-10-10 | Stop reason: SDUPTHER

## 2018-09-05 ENCOUNTER — TELEPHONE (OUTPATIENT)
Dept: FAMILY MEDICINE CLINIC | Age: 68
End: 2018-09-05

## 2018-09-05 NOTE — TELEPHONE ENCOUNTER
----- Message from Leandra Goins sent at 9/5/2018 12:45 PM EDT -----  Regarding: Dr. Dilma Lynch  The patient is requesting a call back from the doctor with a recommendation to a physical therapist that accepts [place] P[employer] and is in the [address].  (a)[phone]

## 2018-09-25 ENCOUNTER — OFFICE VISIT (OUTPATIENT)
Dept: FAMILY MEDICINE CLINIC | Age: 68
End: 2018-09-25

## 2018-09-25 VITALS
TEMPERATURE: 97.5 F | WEIGHT: 250 LBS | DIASTOLIC BLOOD PRESSURE: 54 MMHG | RESPIRATION RATE: 18 BRPM | OXYGEN SATURATION: 100 % | HEIGHT: 70 IN | SYSTOLIC BLOOD PRESSURE: 125 MMHG | BODY MASS INDEX: 35.79 KG/M2 | HEART RATE: 66 BPM

## 2018-09-25 DIAGNOSIS — Z01.818 PREOP EXAMINATION: Primary | ICD-10-CM

## 2018-09-25 DIAGNOSIS — Z79.899 ENCOUNTER FOR LONG-TERM (CURRENT) USE OF MEDICATIONS: ICD-10-CM

## 2018-09-25 DIAGNOSIS — F51.01 PRIMARY INSOMNIA: ICD-10-CM

## 2018-09-25 DIAGNOSIS — G25.81 RLS (RESTLESS LEGS SYNDROME): ICD-10-CM

## 2018-09-25 DIAGNOSIS — D64.9 ANEMIA, UNSPECIFIED TYPE: ICD-10-CM

## 2018-09-25 RX ORDER — LIDOCAINE HYDROCHLORIDE 20 MG/ML
SOLUTION ORAL; TOPICAL
Refills: 1 | COMMUNITY
Start: 2018-09-10 | End: 2018-11-01 | Stop reason: ALTCHOICE

## 2018-09-25 RX ORDER — NEPAFENAC 3 MG/ML
SUSPENSION OPHTHALMIC
COMMUNITY
Start: 2018-09-20 | End: 2018-12-17 | Stop reason: ALTCHOICE

## 2018-09-25 RX ORDER — OFLOXACIN 3 MG/ML
SOLUTION/ DROPS OPHTHALMIC
COMMUNITY
Start: 2018-09-20 | End: 2018-12-17 | Stop reason: ALTCHOICE

## 2018-09-25 RX ORDER — BUTALBITAL, ACETAMINOPHEN AND CAFFEINE 50; 325; 40 MG/1; MG/1; MG/1
TABLET ORAL
Qty: 30 TAB | Refills: 1 | Status: SHIPPED | OUTPATIENT
Start: 2018-09-25 | End: 2019-01-28 | Stop reason: SDUPTHER

## 2018-09-25 RX ORDER — DUREZOL 0.5 MG/ML
EMULSION OPHTHALMIC
COMMUNITY
Start: 2018-09-20 | End: 2018-12-17 | Stop reason: ALTCHOICE

## 2018-09-25 RX ORDER — GABAPENTIN 300 MG/1
600 CAPSULE ORAL
Qty: 180 CAP | Refills: 1 | Status: SHIPPED | OUTPATIENT
Start: 2018-09-25 | End: 2019-04-24 | Stop reason: SDUPTHER

## 2018-09-25 NOTE — PROGRESS NOTES
Subjective: Chief Complaint Patient presents with  Pre-op Exam  
  Dr Dominguez Norris scheduled for 10/9/18 and 10/16/18 He  is a 77 y.o. male who presents for evaluation of: 
Preop - will have Cataract  Surgeries on 10/9/18 and 10/16/18 with Dr Berlin Lesch. No new concerns. No issues with anesthesia in past.  Ready for surgery. Recently had a fall while trying to put air in his tire. He has recovered from this. Diabetes Mellitus, HTN, HLD: weight up about 10 lbs. Not paying attention to diet much Taking meds  able to start on Soliqua 40 units daily but then cost became an issue - using Lantus 30 units in am and 30 units in pm, metformin 1000 mg per day, Jardiance 25 mg per day, and Januvia 100 mg per day. 
home glucose monitoring: is not performed daily but occ -  Most fastings < 150. Reports no polyuria or polydipsia, no chest pain, TIA's, some numbness in left leg that comes and goes (did have left femur fracture during a fall; had R hip issues and needed sgy), last eye exam approximately < 1 yr ago. Not exercising d/t hip surgery Pt is a non smoker. Lab Results Component Value Date/Time Hemoglobin A1c (POC) 8.6 07/26/2017 12:24 PM  
 Hemoglobin A1c (POC) 7.3 02/02/2017 02:45 PM  
 Hemoglobin A1c 6.5 (H) 04/07/2018 06:50 AM  
 Microalbumin/Creat ratio (mg/g creat) 13 11/01/2010 07:07 AM  
 Microalb/Creat ratio (ug/mg creat.) <8.8 07/26/2017 12:55 PM  
 Microalbumin,urine random 0.81 11/01/2010 07:07 AM  
 LDL, calculated 58 07/26/2017 01:24 PM  
 Creatinine 0.69 (L) 04/10/2018 05:09 AM  
  
Lab Results Component Value Date/Time GFR est AA >60 04/10/2018 05:09 AM  
 GFR est non-AA >60 04/10/2018 05:09 AM  
  
Lab Results Component Value Date/Time TSH 2.24 04/07/2018 06:50 AM  
   
ROS Gen - no fever/chills Resp - no dyspnea or cough CV - no chest pain or WANG Rest per HPI Past Medical History:  
Diagnosis Date  ADD (attention deficit disorder)  Anemia due to blood loss  Hinson's esophagus with esophagitis  Benign essential tremor  Cancer [facility]) 2012 [address]  Depression  DJD (degenerative joint disease) of hip   
 bilat  DM (diabetes mellitus) ([facility] 3/17/2010  ED (erectile dysfunction) of organic origin  Fall on or from sidewalk curb 9/24/2015  Femur fracture ([facility]  Gastritis and duodenitis  GERD (gastroesophageal reflux disease)   
 resolved after discontinuing diclofenac  Hematuria 6/2016  High cholesterol 3/17/2010  
 HTN (hypertension) 3/17/2010  Morbid obesity ([facility]  Murmur, cardiac 2016  
 PFO (patent foramen ovale) 7/26/2017  PUD (peptic ulcer disease)  Shingles 6/2016 RESOLVING- NO RASH (HEAD)  Sleep apnea CPAP Past Surgical History:  
Procedure Laterality Date  ENDOSCOPY, COLON, DIAGNOSTIC    
 HX CHOLECYSTECTOMY  1995  HX GI  1980  
 gastroplasty [address]  HX HIP REPLACEMENT Left  HX ORTHOPAEDIC  2011  
 rot cuff repair [address]  HX VASCULAR ACCESS    
 picc line [address] [facility] ARTHROPLASTY  05/2010  
 right  TOTAL HIP ARTHROPLASTY  08/01/2016  
 left Current Outpatient Prescriptions on File Prior to Visit Medication Sig Dispense Refill  sertraline (ZOLOFT) 100 mg tablet TAKE 1 TABLET DAILY 90 Tab 1  JANUVIA 100 mg tablet TAKE 1 TABLET DAILY 90 Tab 0  
 LANTUS SOLOSTAR U-100 INSULIN 100 unit/mL (3 mL) inpn INJECT 20 UNITS UNDER THE SKIN IN THE MORNING AND 18 UNITS AT NIGHT (Patient taking differently: INJECT 30  UNITS UNDER THE SKIN IN THE MORNING AND 30 UNITS AT NIGHT) 45 mL 1  
 lovastatin (MEVACOR) 20 mg tablet TAKE 1 TABLET IN THE EVENING 90 Tab 1  pramipexole (MIRAPEX) 0.25 mg tablet TAKE 1 TAB BY MOUTH NIGHTLY. TAKE 2 HOURS PRIOR TO BEDTIME 30 Tab 0  JARDIANCE 25 mg tablet TAKE 1 TABLET DAILY 30 Tab 10  
 atomoxetine (STRATTERA) 40 mg capsule TAKE ONE CAPSULE BY MOUTH EVERY DAY 30 Cap 5  
 diclofenac (VOLTAREN) 1 % gel Apply  to affected area four (4) times daily. 100 g 2  clotrimazole-betamethasone (LOTRISONE) topical cream Apply to affected area twice daily 15 g 0  
 VITAMIN D2 50,000 unit capsule TAKE 1 CAPSULE EVERY 7 DAYS 12 Cap 2  
 lisinopril (PRINIVIL, ZESTRIL) 20 mg tablet TAKE 1 TABLET DAILY 90 Tab 2  BD ULTRA-FINE SHORT PEN NEEDLE 31 gauge x 5/16\" ndle USE TO INJECT UNDER THE SKIN THREE TIMES A  Pen Needle 1  
 cephALEXin (KEFLEX) 500 mg capsule TAKE 2 CAPSULES BY MOUTH EVERY DAY  1  
 metFORMIN ER (GLUCOPHAGE XR) 500 mg tablet TAKE 2 TABLETS DAILY (WITH DINNER) 180 Tab 1  
 albuterol-ipratropium (DUO-NEB) 2.5 mg-0.5 mg/3 ml nebu 3 mL by Nebulization route every six (6) hours as needed.  clobetasol (TEMOVATE) 0.05 % ointment as needed.  BD INSULIN PEN NEEDLE UF SHORT 31 gauge x 5/16\" ndle  primidone (MYSOLINE) 250 mg tablet TAKE 1 TABLET TWICE A  Tab 3  
 folic acid (FOLVITE) 1 mg tablet TAKE 1 TABLET DAILY 90 Tab 3  
 aspirin 81 mg chewable tablet Take 1 Tab by mouth daily. (Patient taking differently: Take 81 mg by mouth every other day.) 30 Tab 11 No current facility-administered medications on file prior to visit. Objective:  
 
Vitals:  
 09/25/18 1525 BP: 125/54 Pulse: 66 Resp: 18 Temp: 97.5 °F (36.4 °C) TempSrc: Oral  
SpO2: 100% Weight: 250 lb (113.4 kg) Height: 5' 10\" (1.778 m) Physical Examination: 
General appearance - alert, well appearing, and in no distress Eyes -sclera anicteric Neck - supple, no significant adenopathy, no thyromegaly, no bruits Chest - clear to auscultation, no wheezes, rales or rhonchi, symmetric air entry Heart - normal rate, regular rhythm, normal S1, S2, no murmurs, rubs, clicks or gallops Neurological - alert, oriented, no focal findings or movement disorder noted Extremitiesno edema Psychnormal mood and affect Diabetic foot exam:  
 
Left Foot: Visual Exam: normal ,mild onychomycosis Pulse DP: 2+ (normal) Filament test: normal sensation Vibratory sensation: normal 
   
Right Foot: 
 Visual Exam: normal  
 Pulse DP: 2+ (normal) Filament test: normal sensation Vibratory sensation: normal 
 
Assessment/ Plan:  
Diagnoses and all orders for this visit: 1. Preop examination - cleared for surgery 2. Controlled type 2 diabetes mellitus without complication, without long-term current use of insulin ([address] - last A1C at goal, rechecking soon -     MICROALBUMIN, UR, RAND W/ MICROALB/CREAT RATIO 
-      DIABETES FOOT EXAM 
-     butalbital-acetaminophen-caffeine (FIORICET, ESGIC) -40 mg per tablet; TAKE 1 TABLET EVERY 4 HOURS AS NEEDED FOR PAIN 
-     METABOLIC PANEL, COMPREHENSIVE; Future -     LIPID PANEL; Future 
-     HEMOGLOBIN A1C WITH EAG; Future 3. Anemia, unspecified type - mildly anemic at last check so rechecking labs -     CBC W/O DIFF; Future 4. Primary insomnia -will use Belsomra to avoid medication interactions, risk of falls, and addiction potential but allow for improved sleep 
-     suvorexant (BELSOMRA) 15 mg tablet; Take 1 Tab by mouth nightly as needed for Insomnia. Max Daily Amount: 15 mg. 
 
5. RLS (restless legs syndrome) - patient would like to continue gabapentin. He understands the risk of increased falls with this but feels it does not contribute to grogginess for him 
-     gabapentin (NEURONTIN) 300 mg capsule; Take 2 Caps by mouth nightly. 6. Encounter for long-term (current) use of medications -     METABOLIC PANEL, COMPREHENSIVE; Future -     LIPID PANEL; Future 
-     HEMOGLOBIN A1C WITH EAG; Future -     CBC W/O DIFF; Future I have discussed the diagnosis with the patient and the intended plan as seen in the above orders. The patient has received an after-visit summary and questions were answered concerning future plans.   I have discussed medication side effects and warnings with the patient as well. The patient verbalizes understanding and agreement with the plan. Follow-up Disposition: 
Return in about 3 months (around 12/25/2018) for Regular Follow up.

## 2018-09-25 NOTE — MR AVS SNAPSHOT
Sebastian Barnett [address] 
[phone] Patient: London Eubanks MRN: [id number] FTR:[birth year] Visit Information Date & Time Provider Department Dept. Phone Encounter #  
 9/25/2018  3:00 PM Peggy High MD [facility] at [address] [id number] Follow-up Instructions Return in about 3 months (around 12/25/2018) for Regular Follow up. Your Appointments 6/11/2019  9:00 AM  
ESTABLISHED PATIENT with Natan Puentes MD  
[facility] [address] [place] [address]) [address]  
[phone] [address] Upcoming Health Maintenance Date Due Shingrix Vaccine Age 50> (1 of 2) 7/3/1998 Pneumococcal 65+ Low/Medium Risk (2 of 2 - PPSV23) 12/5/2017 FOOT EXAM Q1 2/2/2018 MICROALBUMIN Q1 7/26/2018 LIPID PANEL Q1 7/26/2018 HEMOGLOBIN A1C Q6M 10/7/2018 EYE EXAM RETINAL OR DILATED Q1 8/13/2019 GLAUCOMA SCREENING Q2Y 8/13/2020 DTaP/Tdap/Td series (2 - Td) 1/31/2021 Allergies as of 9/25/2018  Review Complete On: 9/25/2018 By: Peggy High MD  
  
 Severity Noted Reaction Type Reactions Nsaids (Non-steroidal Anti-inflammatory Drug) Low 01/26/2016    Other (comments) Hx of PUD and Hinson's Esophagus Current Immunizations  Reviewed on 12/5/2016 Name Date Influenza High Dose Vaccine PF 9/24/2018, 9/18/2017, 9/22/2016 Influenza Vaccine 10/3/2013 Influenza Vaccine Split 10/28/2012 TDAP Vaccine 1/31/2011 Not reviewed this visit You Were Diagnosed With   
  
 Codes Comments Preop examination    -  Primary ICD-10-CM: Y08.928 ICD-9-CM: V72.84 Uncontrolled type 2 diabetes mellitus without complication, without long-term current use of insulin ([facility]     ICD-10-CM: E11.65 ICD-9-CM: 250.02   
 Anemia, unspecified type     ICD-10-CM: D64.9 ICD-9-CM: 285.9 Primary insomnia     ICD-10-CM: F51.01 
ICD-9-CM: 307.42   
 RLS (restless legs syndrome)     ICD-10-CM: G25.81 ICD-9-CM: 333.94 Encounter for long-term (current) use of medications     ICD-10-CM: Z79.899 ICD-9-CM: V58.69 Vitals BP Pulse Temp Resp Height(growth percentile) Weight(growth percentile) 125/54 (BP 1 Location: Right arm, BP Patient Position: Sitting) 66 97.5 °F (36.4 °C) (Oral) 18 5' 10\" (1.778 m) 250 lb (113.4 kg) SpO2 BMI Smoking Status 100% 35.87 kg/m2 Former Smoker Vitals History BMI and BSA Data Body Mass Index Body Surface Area  
 35.87 kg/m 2 2.37 m 2 Preferred Pharmacy Pharmacy Name Phone [facility], [facility] [phone] Your Updated Medication List  
  
   
This list is accurate as of 9/25/18  4:19 PM.  Always use your most recent med list.  
  
  
  
  
 albuterol-ipratropium 2.5 mg-0.5 mg/3 ml Nebu Commonly known as:  DUO-NEB  
3 mL by Nebulization route every six (6) hours as needed. aspirin 81 mg chewable tablet Take 1 Tab by mouth daily. atomoxetine 40 mg capsule Commonly known as:  STRATTERA TAKE ONE CAPSULE BY MOUTH EVERY DAY  
  
 * BD ULTRA-FINE SHORT PEN NEEDLE 31 gauge x 5/16\" Ndle Generic drug:  Insulin Needles (Disposable) * BD ULTRA-FINE SHORT PEN NEEDLE 31 gauge x 5/16\" Ndle Generic drug:  Insulin Needles (Disposable) USE TO INJECT UNDER THE SKIN THREE TIMES A DAY  
  
 butalbital-acetaminophen-caffeine -40 mg per tablet Commonly known as:  FIORICET, ESGIC  
TAKE 1 TABLET EVERY 4 HOURS AS NEEDED FOR PAIN  
  
 cephALEXin 500 mg capsule Commonly known as:  Larayne Jordan TAKE 2 CAPSULES BY MOUTH EVERY DAY  
  
 clobetasol 0.05 % ointment Commonly known as:  Beola Dorian  
as needed.   
  
 clotrimazole-betamethasone topical cream  
 Commonly known as:  Valla Bail Apply to affected area twice daily  
  
 diclofenac 1 % Gel Commonly known as:  VOLTAREN Apply  to affected area four (4) times daily. DUREZOL 0.05 % ophthalmic emulsion Generic drug:  Difluprednate  
  
 folic acid 1 mg tablet Commonly known as:  FOLVITE  
TAKE 1 TABLET DAILY  
  
 gabapentin 300 mg capsule Commonly known as:  NEURONTIN Take 2 Caps by mouth nightly. ILEVRO 0.3 % Drps Generic drug:  nepafenac JANUVIA 100 mg tablet Generic drug:  SITagliptin TAKE 1 TABLET DAILY JARDIANCE 25 mg tablet Generic drug:  empagliflozin TAKE 1 TABLET DAILY  
  
 LANTUS SOLOSTAR U-100 INSULIN 100 unit/mL (3 mL) Inpn Generic drug:  insulin glargine INJECT 20 UNITS UNDER THE SKIN IN THE MORNING AND 18 UNITS AT NIGHT  
  
 LIDOCAINE VISCOUS 2 % solution Generic drug:  lidocaine TRIVALENT MOUTHWASH - RINSE AND SPIT 1 OR 2 TEASPOONFULS EVERY 2 HOURS IF NEEDED  
  
 lisinopril 20 mg tablet Commonly known as:  PRINIVIL, ZESTRIL  
TAKE 1 TABLET DAILY lovastatin 20 mg tablet Commonly known as:  MEVACOR  
TAKE 1 TABLET IN THE EVENING  
  
 metFORMIN  mg tablet Commonly known as:  GLUCOPHAGE XR  
TAKE 2 TABLETS DAILY (WITH DINNER)  
  
 ofloxacin 0.3 % ophthalmic solution Commonly known as:  FLOXIN  
  
 pramipexole 0.25 mg tablet Commonly known as:  MIRAPEX TAKE 1 TAB BY MOUTH NIGHTLY. TAKE 2 HOURS PRIOR TO BEDTIME  
  
 primidone 250 mg tablet Commonly known as: MYSOLINE  
TAKE 1 TABLET TWICE A DAY  
  
 sertraline 100 mg tablet Commonly known as:  ZOLOFT  
TAKE 1 TABLET DAILY  
  
 suvorexant 15 mg tablet Commonly known as:  Priest Alexi Take 1 Tab by mouth nightly as needed for Insomnia. Max Daily Amount: 15 mg. VITAMIN D2 50,000 unit capsule Generic drug:  ergocalciferol TAKE 1 CAPSULE EVERY 7 DAYS * Notice:   This list has 2 medication(s) that are the same as other medications prescribed for you. Read the directions carefully, and ask your doctor or other care provider to review them with you. Prescriptions Printed Refills  
 butalbital-acetaminophen-caffeine (FIORICET, ESGIC) -40 mg per tablet 1 Sig: TAKE 1 TABLET EVERY 4 HOURS AS NEEDED FOR PAIN Class: Print  
 suvorexant (BELSOMRA) 15 mg tablet 2 Sig: Take 1 Tab by mouth nightly as needed for Insomnia. Max Daily Amount: 15 mg.  
 Class: Print Route: Oral  
  
Prescriptions Sent to Pharmacy Refills  
 gabapentin (NEURONTIN) 300 mg capsule 1 Sig: Take 2 Caps by mouth nightly. Class: Normal  
 Pharmacy: [facility], [address] [place] [address] #: [phone] Route: Oral  
  
We Performed the Following  DIABETES FOOT EXAM [[facility]] MICROALBUMIN, UR, RAND W/ MICROALB/CREAT RATIO [id number] CPT(R)] Follow-up Instructions Return in about 3 months (around 12/25/2018) for Regular Follow up. To-Do List   
 09/25/2018 Lab:  CBC W/O DIFF   
  
 09/25/2018 Lab:  HEMOGLOBIN A1C WITH EAG   
  
 09/25/2018 Lab:  LIPID PANEL   
  
 09/25/2018 Lab:  METABOLIC PANEL, COMPREHENSIVE [facility]! Dear Graciela Weinberg: Thank you for requesting a [employer] account. Our records indicate that you already have an active [employer] account. You can access your account anytime at [email or web address] Did you know that you can access your hospital and ER discharge instructions at any time in [employer]? You can also review all of your test results from your hospital stay or ER visit. Additional Information If you have questions, please visit the Frequently Asked Questions section of the Socialcast website at [email or web address] Remember, Socialcast is NOT to be used for urgent needs. For medical emergencies, dial 911. Now available from your iPhone and Android! Please provide this summary of care documentation to your next provider. Your primary care clinician is listed as Peggy High. If you have any questions after today's visit, please call [phone].

## 2018-09-25 NOTE — PROGRESS NOTES
Chief Complaint Patient presents with  Pre-op Exam  
  Dr Terrence Lai scheduled for 10/9/18 and 10/16/18 Follow-up Diabetes 3 month follow-up 1. Have you been to the ER, urgent care clinic since your last visit? Hospitalized since your last visit? No 
 
2. Have you seen or consulted any other health care providers outside of the [address] [place] [address] since your last visit? Include any pap smears or colon screening. Yes Where: Dr Elmo Chin surgeon Room 4

## 2018-09-26 LAB
ALBUMIN/CREAT UR: <17.9 MG/G CREAT (ref 0–30)
CREAT UR-MCNC: 16.8 MG/DL
MICROALBUMIN UR-MCNC: <3 UG/ML

## 2018-10-03 ENCOUNTER — TELEPHONE (OUTPATIENT)
Dept: FAMILY MEDICINE CLINIC | Age: 68
End: 2018-10-03

## 2018-10-03 NOTE — TELEPHONE ENCOUNTER
Pt checking on pre-op form for Dr. Ziola Echols he left it at last visit       Best number to reach him is [phone] or W [phone]

## 2018-10-24 LAB
ALBUMIN SERPL-MCNC: 3.4 G/DL (ref 3.5–4.8)
ALBUMIN/GLOB SERPL: 1.1 {RATIO} (ref 1.2–2.2)
ALP SERPL-CCNC: 98 IU/L (ref 39–117)
ALT SERPL-CCNC: 14 IU/L (ref 0–44)
AST SERPL-CCNC: 19 IU/L (ref 0–40)
BILIRUB SERPL-MCNC: 0.2 MG/DL (ref 0–1.2)
BUN SERPL-MCNC: 15 MG/DL (ref 8–27)
BUN/CREAT SERPL: 19 (ref 10–24)
CALCIUM SERPL-MCNC: 8.5 MG/DL (ref 8.6–10.2)
CHLORIDE SERPL-SCNC: 96 MMOL/L (ref 96–106)
CHOLEST SERPL-MCNC: 136 MG/DL (ref 100–199)
CO2 SERPL-SCNC: 24 MMOL/L (ref 20–29)
CREAT SERPL-MCNC: 0.78 MG/DL (ref 0.76–1.27)
ERYTHROCYTE [DISTWIDTH] IN BLOOD BY AUTOMATED COUNT: 16.3 % (ref 12.3–15.4)
EST. AVERAGE GLUCOSE BLD GHB EST-MCNC: 212 MG/DL
GLOBULIN SER CALC-MCNC: 3 G/DL (ref 1.5–4.5)
GLUCOSE SERPL-MCNC: 331 MG/DL (ref 65–99)
HBA1C MFR BLD: 9 % (ref 4.8–5.6)
HCT VFR BLD AUTO: 26.6 % (ref 37.5–51)
HDLC SERPL-MCNC: 77 MG/DL
HGB BLD-MCNC: 8.7 G/DL (ref 13–17.7)
LDLC SERPL CALC-MCNC: 45 MG/DL (ref 0–99)
MCH RBC QN AUTO: 26.3 PG (ref 26.6–33)
MCHC RBC AUTO-ENTMCNC: 32.7 G/DL (ref 31.5–35.7)
MCV RBC AUTO: 80 FL (ref 79–97)
PLATELET # BLD AUTO: 151 X10E3/UL (ref 150–379)
POTASSIUM SERPL-SCNC: 4.2 MMOL/L (ref 3.5–5.2)
PROT SERPL-MCNC: 6.4 G/DL (ref 6–8.5)
RBC # BLD AUTO: 3.31 X10E6/UL (ref 4.14–5.8)
SODIUM SERPL-SCNC: 133 MMOL/L (ref 134–144)
TRIGL SERPL-MCNC: 69 MG/DL (ref 0–149)
VLDLC SERPL CALC-MCNC: 14 MG/DL (ref 5–40)
WBC # BLD AUTO: 4.1 X10E3/UL (ref 3.4–10.8)

## 2018-10-26 DIAGNOSIS — D64.9 ANEMIA, UNSPECIFIED TYPE: Primary | ICD-10-CM

## 2018-10-26 DIAGNOSIS — G57.02 PYRIFORMIS SYNDROME, LEFT: ICD-10-CM

## 2018-10-26 DIAGNOSIS — M16.12 PRIMARY OSTEOARTHRITIS OF LEFT HIP: ICD-10-CM

## 2018-10-26 NOTE — PROGRESS NOTES
[facility] message sent for labs-Please call with message too:     Concerned both about uncontrolled diabetes also significant anemia that has been up and down for years largely in relation to surgeries. At this point, we need to do further blood work and ensure that he has had a colonoscopy. I am also starting iron and will send this prescription to his pharmacy.   Thanks

## 2018-10-29 RX ORDER — DICLOFENAC SODIUM 10 MG/G
GEL TOPICAL 4 TIMES DAILY
Qty: 100 G | Refills: 2 | Status: SHIPPED | OUTPATIENT
Start: 2018-10-29 | End: 2019-04-24 | Stop reason: SDUPTHER

## 2018-11-01 ENCOUNTER — OFFICE VISIT (OUTPATIENT)
Dept: FAMILY MEDICINE CLINIC | Age: 68
End: 2018-11-01

## 2018-11-01 VITALS
HEIGHT: 70 IN | WEIGHT: 260.2 LBS | SYSTOLIC BLOOD PRESSURE: 146 MMHG | HEART RATE: 81 BPM | DIASTOLIC BLOOD PRESSURE: 66 MMHG | TEMPERATURE: 97.9 F | RESPIRATION RATE: 18 BRPM | BODY MASS INDEX: 37.25 KG/M2 | OXYGEN SATURATION: 98 %

## 2018-11-01 DIAGNOSIS — Z01.818 PREOP EXAMINATION: Primary | ICD-10-CM

## 2018-11-01 DIAGNOSIS — D64.9 NORMOCYTIC ANEMIA: ICD-10-CM

## 2018-11-01 DIAGNOSIS — F51.01 PRIMARY INSOMNIA: ICD-10-CM

## 2018-11-01 RX ORDER — INSULIN GLARGINE 100 [IU]/ML
35 INJECTION, SOLUTION SUBCUTANEOUS 2 TIMES DAILY
Qty: 63 ML | Refills: 0 | Status: SHIPPED | OUTPATIENT
Start: 2018-11-01 | End: 2019-01-28 | Stop reason: SDUPTHER

## 2018-11-01 RX ORDER — TRAZODONE HYDROCHLORIDE 50 MG/1
50 TABLET ORAL
Qty: 30 TAB | Refills: 2 | Status: SHIPPED | OUTPATIENT
Start: 2018-11-01 | End: 2018-12-27 | Stop reason: SDUPTHER

## 2018-11-01 NOTE — PROGRESS NOTES
Subjective: Chief Complaint Patient presents with  Pre-op Exam  
  Dr Josue Uriarte Left Cataract surgery 11/6/18 He  is a 77 y.o. male who presents for evaluation of: 
Preop - will have Cataract surgery on 11/6/18 with Dr Josue Uriarte. No new concerns. No issues with anesthesia in past.  Ready for surgery. Did excellent with last cataract surgery on L eye last month and is very happy with results. Anemia - ct to be an issue. Hgb down to 8.7 (up slightly from 4/18 at 8.1). Hx of Hinson's Eso and no recent EGD in past 5 yrs. Colonoscopy around 7 yrs ago with no concerns. Will get labs drawn today for Fe profile and Ferritin and planning for GI eval soon. Hgb has been up and down over the years. Ordered Fe but has not started on this yesterday. Diabetes Mellitus, HTN, HLD: weight up about 10 lbs since last month. Not paying attention to diet much Taking meds  able to start on Soliqua 40 units daily but then cost became an issue - using Lantus 30 units in am and 30 units in pm, metformin 1000 mg per day, Jardiance 25 mg per day, and Januvia 100 mg per day. Reports no polyuria or polydipsia, no chest pain, TIA's, some numbness in left leg that comes and goes (did have left femur fracture during a fall; had R hip issues and needed sgy), last eye exam approximately < 1 yr ago. Not exercising d/t hip surgery Pt is a non smoker. Lab Results Component Value Date/Time Hemoglobin A1c (POC) 8.6 07/26/2017 12:24 PM  
 Hemoglobin A1c (POC) 7.3 02/02/2017 02:45 PM  
 Hemoglobin A1c 9.0 (H) 10/23/2018 04:45 PM  
 Microalbumin/Creat ratio (mg/g creat) 13 11/01/2010 07:07 AM  
 Microalb/Creat ratio (ug/mg creat.) <17.9 09/25/2018 03:30 PM  
 Microalbumin,urine random 0.81 11/01/2010 07:07 AM  
 LDL, calculated 45 10/23/2018 04:45 PM  
 Creatinine 0.78 10/23/2018 04:45 PM  
  
Lab Results Component Value Date/Time  GFR est  10/23/2018 04:45 PM  
 GFR est non-AA 91 10/23/2018 04:45 PM  
 Lab Results Component Value Date/Time TSH 2.24 04/07/2018 06:50 AM  
   
ROS Gen - no fever/chills Resp - no dyspnea or cough CV - no chest pain or WANG 
 - nocturia, had nml prostate exam at [facility] with BPH or concerning nodules. Rest per HPI Past Medical History:  
Diagnosis Date  ADD (attention deficit disorder)  Anemia due to blood loss  Hinson's esophagus with esophagitis  Benign essential tremor  Cancer [facility]) 2012 [address]  Depression  DJD (degenerative joint disease) of hip   
 bilat  DM (diabetes mellitus) ([address] 3/17/2010  ED (erectile dysfunction) of organic origin  Fall on or from sidewalk curb 9/24/2015  Femur fracture ([address]  Gastritis and duodenitis  GERD (gastroesophageal reflux disease)   
 resolved after discontinuing diclofenac  Hematuria 6/2016  High cholesterol 3/17/2010  
 HTN (hypertension) 3/17/2010  Morbid obesity ([address]  Murmur, cardiac 2016  
 PFO (patent foramen ovale) 7/26/2017  PUD (peptic ulcer disease)  Shingles 6/2016 RESOLVING- NO RASH (HEAD)  Sleep apnea CPAP Past Surgical History:  
Procedure Laterality Date  ENDOSCOPY, COLON, DIAGNOSTIC    
 HX CHOLECYSTECTOMY  1995  HX GI  1980  
 gastroplasty [address]  HX HIP REPLACEMENT Left  HX ORTHOPAEDIC  2011  
 rot cuff repair [address]  HX VASCULAR ACCESS    
 picc line 235 [facility] ARTHROPLASTY  05/2010  
 right  TOTAL HIP ARTHROPLASTY  08/01/2016  
 left Current Outpatient Medications on File Prior to Visit Medication Sig Dispense Refill  ONETOUCH ULTRA BLUE TEST STRIP strip USE AS DIRECTED BY PHYSICIAN 100 Strip 3  
 diclofenac (VOLTAREN) 1 % gel Apply  to affected area four (4) times daily. 100 g 2  
 ILEVRO 0.3 % drps  DUREZOL 0.05 % ophthalmic emulsion  butalbital-acetaminophen-caffeine (FIORICET, ESGIC) -40 mg per tablet TAKE 1 TABLET EVERY 4 HOURS AS NEEDED FOR PAIN 30 Tab 1  
 gabapentin (NEURONTIN) 300 mg capsule Take 2 Caps by mouth nightly. 180 Cap 1  
 sertraline (ZOLOFT) 100 mg tablet TAKE 1 TABLET DAILY 90 Tab 1  JANUVIA 100 mg tablet TAKE 1 TABLET DAILY 90 Tab 0  
 lovastatin (MEVACOR) 20 mg tablet TAKE 1 TABLET IN THE EVENING 90 Tab 1  JARDIANCE 25 mg tablet TAKE 1 TABLET DAILY 30 Tab 10  
 atomoxetine (STRATTERA) 40 mg capsule TAKE ONE CAPSULE BY MOUTH EVERY DAY 30 Cap 5  clotrimazole-betamethasone (LOTRISONE) topical cream Apply to affected area twice daily 15 g 0  
 VITAMIN D2 50,000 unit capsule TAKE 1 CAPSULE EVERY 7 DAYS 12 Cap 2  
 lisinopril (PRINIVIL, ZESTRIL) 20 mg tablet TAKE 1 TABLET DAILY 90 Tab 2  BD ULTRA-FINE SHORT PEN NEEDLE 31 gauge x 5/16\" ndle USE TO INJECT UNDER THE SKIN THREE TIMES A  Pen Needle 1  
 cephALEXin (KEFLEX) 500 mg capsule TAKE 2 CAPSULES BY MOUTH EVERY DAY  1  
 metFORMIN ER (GLUCOPHAGE XR) 500 mg tablet TAKE 2 TABLETS DAILY (WITH DINNER) 180 Tab 1  
 albuterol-ipratropium (DUO-NEB) 2.5 mg-0.5 mg/3 ml nebu 3 mL by Nebulization route every six (6) hours as needed.  clobetasol (TEMOVATE) 0.05 % ointment as needed.  BD INSULIN PEN NEEDLE UF SHORT 31 gauge x 5/16\" ndle  primidone (MYSOLINE) 250 mg tablet TAKE 1 TABLET TWICE A  Tab 3  
 folic acid (FOLVITE) 1 mg tablet TAKE 1 TABLET DAILY 90 Tab 3  Ferrous Sulfate (SLOW FE) 47.5 mg iron TbER tablet Take 1 Tab by mouth daily. 30 Tab 5  
 ofloxacin (FLOXIN) 0.3 % ophthalmic solution  aspirin 81 mg chewable tablet Take 1 Tab by mouth daily. (Patient taking differently: Take 81 mg by mouth every other day.) 30 Tab 11 No current facility-administered medications on file prior to visit. Objective:  
 
Vitals:  
 11/01/18 0805 11/01/18 9879 BP: 153/72 146/66 Pulse: 81 Resp: 18 Temp: 97.9 °F (36.6 °C) TempSrc: Oral   
SpO2: 98% Weight: 260 lb 3.2 oz (118 kg) Height: 5' 10\" (1.778 m) Physical Examination: 
General appearance - alert, well appearing, and in no distress Eyes -sclera anicteric Neck - supple, no significant adenopathy, no thyromegaly, no bruits Chest - clear to auscultation, no wheezes, rales or rhonchi, symmetric air entry Heart - normal rate, regular rhythm, normal S1, S2, 2/6 LION (nml Echo) Neurological - alert, oriented, no focal findings or movement disorder noted Extremitiesno edema Psychnormal mood and affect Assessment/ Plan:  
Diagnoses and all orders for this visit: 1. Preop examination - cleared for surgery 2. Normocytic anemia -  Getting labs with Ferritin and Iron Profile. Also getting hemoccult and sending for EGD and colonoscopy. -     AMB POC FECAL BLOOD, OCCULT, QL 3 CARDS 3. Uncontrolled type 2 diabetes mellitus without complication, without long-term current use of insulin ([address] - need to aggressively work on diet and increasing insulin slightly. Discussed starting mealtime insulin at next appt. -     insulin glargine (LANTUS SOLOSTAR U-100 INSULIN) 100 unit/mL (3 mL) inpn; 35 Units by SubCUTAneous route two (2) times a day. 4. Primary insomnia - unable to get Belsomra covered. Will try Trazodone. -     traZODone (DESYREL) 50 mg tablet; Take 1 Tab by mouth nightly. BP slightly high and will recheck at next appt. I have discussed the diagnosis with the patient and the intended plan as seen in the above orders. The patient has received an after-visit summary and questions were answered concerning future plans. I have discussed medication side effects and warnings with the patient as well. The patient verbalizes understanding and agreement with the plan. Follow-up Disposition: 
Return in about 3 months (around 2/1/2019).

## 2018-11-01 NOTE — PROGRESS NOTES
Chief Complaint Patient presents with  Pre-op Exam  
  Dr Lauri Pitt Left Cataract surgery 11/6/18 1. Have you been to the ER, urgent care clinic since your last visit? Hospitalized since your last visit? No 
 
2. Have you seen or consulted any other health care providers outside of the [address] [place] [address] since your last visit? Include any pap smears or colon screening. Yes Where: Dr Lauri Pitt Fax  Pre-op Exam to [phone] Attention Maddi Rubalcava

## 2018-11-02 RX ORDER — ATOMOXETINE 40 MG/1
CAPSULE ORAL
Qty: 90 CAP | Refills: 0 | Status: SHIPPED | OUTPATIENT
Start: 2018-11-02 | End: 2019-01-13 | Stop reason: SDUPTHER

## 2018-11-08 RX ORDER — METFORMIN HYDROCHLORIDE 500 MG/1
TABLET, EXTENDED RELEASE ORAL
Qty: 180 TAB | Refills: 1 | Status: SHIPPED | OUTPATIENT
Start: 2018-11-08 | End: 2019-03-13 | Stop reason: SDUPTHER

## 2018-11-21 RX ORDER — PEN NEEDLE, DIABETIC 31 GX5/16"
NEEDLE, DISPOSABLE MISCELLANEOUS
Qty: 270 PEN NEEDLE | Refills: 1 | Status: SHIPPED | OUTPATIENT
Start: 2018-11-21 | End: 2019-05-08 | Stop reason: SDUPTHER

## 2018-12-13 LAB
FERRITIN SERPL-MCNC: 80 NG/ML (ref 30–400)
IRON SATN MFR SERPL: 12 % (ref 15–55)
IRON SERPL-MCNC: 37 UG/DL (ref 38–169)
TIBC SERPL-MCNC: 299 UG/DL (ref 250–450)
UIBC SERPL-MCNC: 262 UG/DL (ref 111–343)

## 2018-12-17 ENCOUNTER — OFFICE VISIT (OUTPATIENT)
Dept: FAMILY MEDICINE CLINIC | Age: 68
End: 2018-12-17

## 2018-12-17 VITALS
DIASTOLIC BLOOD PRESSURE: 52 MMHG | RESPIRATION RATE: 18 BRPM | OXYGEN SATURATION: 99 % | HEART RATE: 79 BPM | TEMPERATURE: 98.3 F | HEIGHT: 70 IN | WEIGHT: 257 LBS | SYSTOLIC BLOOD PRESSURE: 119 MMHG | BODY MASS INDEX: 36.79 KG/M2

## 2018-12-17 DIAGNOSIS — T84.51XS INFECTION ASSOCIATED WITH INTERNAL RIGHT HIP PROSTHESIS, SEQUELA: ICD-10-CM
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
## 2018-12-17 DIAGNOSIS — R01.1 SYSTOLIC MURMUR: ICD-10-CM

## 2018-12-17 DIAGNOSIS — G25.81 RLS (RESTLESS LEGS SYNDROME): ICD-10-CM

## 2018-12-17 DIAGNOSIS — Z00.00 WELL ADULT EXAM: Primary | ICD-10-CM

## 2018-12-17 DIAGNOSIS — E78.00 HIGH CHOLESTEROL: ICD-10-CM

## 2018-12-17 DIAGNOSIS — D64.9 NORMOCYTIC ANEMIA: ICD-10-CM

## 2018-12-17 DIAGNOSIS — F33.9 RECURRENT DEPRESSION (HCC): ICD-10-CM

## 2018-12-17 DIAGNOSIS — I10 ESSENTIAL HYPERTENSION: ICD-10-CM

## 2018-12-17 DIAGNOSIS — M16.11 PRIMARY OSTEOARTHRITIS OF RIGHT HIP: ICD-10-CM

## 2018-12-17 DIAGNOSIS — D64.9 ANEMIA, UNSPECIFIED TYPE: ICD-10-CM

## 2018-12-17 DIAGNOSIS — K22.719 BARRETT'S ESOPHAGUS WITH DYSPLASIA: ICD-10-CM

## 2018-12-17 DIAGNOSIS — M16.12 PRIMARY OSTEOARTHRITIS OF LEFT HIP: ICD-10-CM

## 2018-12-17 RX ORDER — PRENATAL VIT 75/IRON/FOLIC/OM3 28-800-440
2 COMBINATION PACKAGE (EA) ORAL 2 TIMES DAILY
Status: ON HOLD | COMMUNITY
End: 2021-01-01

## 2018-12-17 NOTE — PROGRESS NOTES
Chief Complaint   Patient presents with    Complete Physical     Annual   1. Have you been to the ER, urgent care clinic since your last visit? Hospitalized since your last visit? No    2. Have you seen or consulted any other health care providers outside of the [address] [place] [address] since your last visit? Include any pap smears or colon screening.  Yes Where: Cataract Surgery 10/2018   Discuss falls

## 2018-12-17 NOTE — PROGRESS NOTES
Subjective:     Chief Complaint   Patient presents with    Complete Physical     Annual      He  is a 77 y.o. male who presents for evaluation of: CPE  Had cataract sgy and doing well. Concerned with balance issues and leg cramps. Mik Valle recently d/t turning quickly while taking trash out and had light headedness. Had ER w/u for this that came back nml. No chest pain or palpitations. Generally, no low blood sugars. Of note, he is on Primidone and kiersten will apentin for essential tremor and Jardiance for Diabetes. Prev on Klonopin to help with sleep but off of this. He is followed by Dr. Jono Holloway. Leg cramps - worse at night anastasia when going to bathroom. Hx of RLS and on gabapentin and Fe. Also taking K and Citracal.    Anemia - ct to be an issue. Hgb down to 8.7 (up slightly from 4/18 at 8.1). Hx of Hinson's Eso and no recent EGD in past 5 yrs. Colonoscopy around 7 yrs ago with no concerns. Had Fe profile and Ferritin with some Fe def and planning for GI eval soon. Hgb has been up and down over the years. Diabetes Mellitus, HTN, HLD: weight up about 10 lbs since last month. Not paying attention to diet much  Taking meds  able to start on Soliqua 40 units daily but then cost became an issue - using Lantus 30 units in am and 30 units in pm, metformin 1000 mg per day, Jardiance 25 mg per day, and Januvia 100 mg per day. Reports no polyuria or polydipsia, no chest pain, TIA's, some numbness in left leg that comes and goes (did have left femur fracture during a fall; had R hip issues and needed sgy), last eye exam approximately < 1 yr ago. Not exercising d/t hip surgery  Pt is a non smoker.     Lab Results   Component Value Date/Time    Hemoglobin A1c (POC) 8.6 07/26/2017 12:24 PM    Hemoglobin A1c (POC) 7.3 02/02/2017 02:45 PM    Hemoglobin A1c 9.0 (H) 10/23/2018 04:45 PM    Microalbumin/Creat ratio (mg/g creat) 13 11/01/2010 07:07 AM    Microalb/Creat ratio (ug/mg creat.) <17.9 09/25/2018 03:30 PM Microalbumin,urine random 0.81 11/01/2010 07:07 AM    LDL, calculated 45 10/23/2018 04:45 PM    Creatinine 0.78 10/23/2018 04:45 PM      Lab Results   Component Value Date/Time    GFR est  10/23/2018 04:45 PM    GFR est non-AA 91 10/23/2018 04:45 PM      Lab Results   Component Value Date/Time    TSH 2.24 04/07/2018 06:50 AM      ROS:  Constitutional: negative except for fevers, chills and fatigue  Eyes: negative for visual disturbance  Ears, nose, mouth, throat, and face: negative for hearing loss and sore throat  Respiratory: negative for cough or dyspnea on exertion  Cardiovascular: negative for chest pain, dyspnea, palpitations, fatigue  Gastrointestinal: negative for nausea, vomiting, change in bowel habits, diarrhea and abdominal pain  Genitourinary:negative for frequency and dysuria  Integument/breast: negative for rash and skin lesion(s)  Hematologic/lymphatic: negative for easy bruising and bleeding  Musculoskeletal:negative for myalgias and muscle weakness  Neurological: negative for headaches and dizziness  Behavioral/Psych: recurrent depression and flaring during holidays    Past Medical History:   Diagnosis Date    ADD (attention deficit disorder)     Anemia due to blood loss     Hinson's esophagus with esophagitis     Benign essential tremor     Cancer ([facility] 2012    B[facility]    Depression     DJD (degenerative joint disease) of hip     bilat    DM (diabetes mellitus) ([facility] 3/17/2010    ED (erectile dysfunction) of organic origin     Fall on or from sidewalk curb 9/24/2015    Femur fracture ([facility]     Gastritis and duodenitis     GERD (gastroesophageal reflux disease)     resolved after discontinuing diclofenac    Hematuria 6/2016    High cholesterol 3/17/2010    HTN (hypertension) 3/17/2010    Morbid obesity ([facility]     Murmur, cardiac 2016    PFO (patent foramen ovale) 7/26/2017    PUD (peptic ulcer disease)     Shingles 6/2016    RESOLVING- NO RASH (HEAD)    Sleep apnea CPAP     Past Surgical History:   Procedure Laterality Date    ENDOSCOPY, COLON, DIAGNOSTIC      HX CHOLECYSTECTOMY  1995    HX GI  1980    gastroplasty    HX HERNIA REPAIR  1995    HX HIP REPLACEMENT      Left    HX ORTHOPAEDIC  2011    rot cuff repair    HX TONSILLECTOMY  1950    HX VASCULAR ACCESS      picc line    TOTAL HIP ARTHROPLASTY  05/2010    right    TOTAL HIP ARTHROPLASTY  08/01/2016    left     Current Outpatient Medications on File Prior to Visit   Medication Sig Dispense Refill    potassium 99 mg tablet Take 99 mg by mouth daily.  calcium citrate-vitamin D3 (CITRACAL + D) tablet Take  by mouth two (2) times a day.  BD ULTRA-FINE SHORT PEN NEEDLE 31 gauge x 5/16\" ndle USE TO INJECT UNDER THE SKIN THREE TIMES A  Pen Needle 1    JANUVIA 100 mg tablet TAKE 1 TABLET DAILY 90 Tab 1    metFORMIN ER (GLUCOPHAGE XR) 500 mg tablet TAKE 2 TABLETS DAILY (WITH DINNER) 180 Tab 1    atomoxetine (STRATTERA) 40 mg capsule TAKE ONE CAPSULE BY MOUTH EVERY DAY 90 Cap 0    ONETOUCH ULTRA BLUE TEST STRIP strip USE AS DIRECTED BY PHYSICIAN 100 Strip 3    insulin glargine (LANTUS SOLOSTAR U-100 INSULIN) 100 unit/mL (3 mL) inpn 35 Units by SubCUTAneous route two (2) times a day. 63 mL 0    traZODone (DESYREL) 50 mg tablet Take 1 Tab by mouth nightly. 30 Tab 2    diclofenac (VOLTAREN) 1 % gel Apply  to affected area four (4) times daily. 100 g 2    butalbital-acetaminophen-caffeine (FIORICET, ESGIC) -40 mg per tablet TAKE 1 TABLET EVERY 4 HOURS AS NEEDED FOR PAIN 30 Tab 1    gabapentin (NEURONTIN) 300 mg capsule Take 2 Caps by mouth nightly.  180 Cap 1    sertraline (ZOLOFT) 100 mg tablet TAKE 1 TABLET DAILY 90 Tab 1    lovastatin (MEVACOR) 20 mg tablet TAKE 1 TABLET IN THE EVENING 90 Tab 1    JARDIANCE 25 mg tablet TAKE 1 TABLET DAILY 30 Tab 10    clotrimazole-betamethasone (LOTRISONE) topical cream Apply to affected area twice daily 15 g 0    VITAMIN D2 50,000 unit capsule TAKE 1 CAPSULE EVERY 7 DAYS 12 Cap 2    lisinopril (PRINIVIL, ZESTRIL) 20 mg tablet TAKE 1 TABLET DAILY 90 Tab 2    BD INSULIN PEN NEEDLE UF SHORT 31 gauge x 5/16\" ndle       primidone (MYSOLINE) 250 mg tablet TAKE 1 TABLET TWICE A  Tab 3    aspirin 81 mg chewable tablet Take 1 Tab by mouth daily. (Patient taking differently: Take 81 mg by mouth every other day.) 30 Tab 11     No current facility-administered medications on file prior to visit. Objective:     Vitals:    12/17/18 0738   BP: 119/52   Pulse: 79   Resp: 18   Temp: 98.3 °F (36.8 °C)   TempSrc: Oral   SpO2: 99%   Weight: 257 lb (116.6 kg)   Height: 5' 10\" (1.778 m)     Physical Examination:  General appearance - alert, well appearing, and in no distress  Eyes -sclera anicteric  Ears - nml TMs bilat  Mouth - nml oropharynx  Neck - supple, no significant adenopathy, no thyromegaly, no bruits  Chest - clear to auscultation, no wheezes, rales or rhonchi, symmetric air entry  Heart - normal rate, regular rhythm, normal S1, S2, 2/6 LION (nml Echo)  Abd - soft, ND, NT  Neurological - alert, oriented, no focal findings or movement disorder noted  Msk - bilat knees with surgical scars noted  Extremitiesno edema  Psychnormal mood and affect    Assessment/ Plan:   Diagnoses and all orders for this visit:    1. Well adult exam  -     METABOLIC PANEL, COMPREHENSIVE; Future  -     HEMOGLOBIN A1C WITH EAG; Future  -     TSH 3RD GENERATION; Future  -     CBC WITH AUTOMATED DIFF; Future    2. Uncontrolled type 2 diabetes mellitus without complication, without long-term current use of insulin ([facility] - need to aggressively work on diet and increasing insulin slightly. Discussed starting mealtime insulin at next appt. -     REFERRAL TO ENDOCRINOLOGY  -     METABOLIC PANEL, COMPREHENSIVE; Future  -     HEMOGLOBIN A1C WITH EAG; Future  -     TSH 3RD GENERATION; Future    3.  Normocytic anemia - some element of fe def anemia so restarting Fe def  -     CBC WITH AUTOMATED DIFF; Future  -     REFERRAL TO GASTROENTEROLOGY    4. Essential hypertension - controlled  -     METABOLIC PANEL, COMPREHENSIVE; Future    5. High cholesterol - stable  -     METABOLIC PANEL, COMPREHENSIVE; Future  -     HEMOGLOBIN A1C WITH EAG; Future  -     TSH 3RD GENERATION; Future    6. RLS (restless legs syndrome) - wonder if worse recently d/t fe def anemia so encouraged Fe again  -     CBC WITH AUTOMATED DIFF; Future    7. Recurrent depression (HCC) - stable    8. Fall, initial encounter    9. Anemia, unspecified type - as above  -     ferrous sulfate (SLOW FE) 47.5 mg iron TbER tablet; Take 1 Tab by mouth two (2) times a day. 10. Systolic murmur - chronic, Echo ok    11. Primary osteoarthritis of left hip  12. Primary osteoarthritis of right hip  13. Infection associated with internal right hip prosthesis, sequela  - chronic issues with sig surgical hx and followed by ortho    14. Hinson's esophagus with dysplasia - to GI again given Hinson's hx and anemia  -     REFERRAL TO GASTROENTEROLOGY    For PNA vaccine - Had Pneumovax 2 yrs ago. Prevnar last year at pharm    I have discussed the diagnosis with the patient and the intended plan as seen in the above orders. The patient has received an after-visit summary and questions were answered concerning future plans. I have discussed medication side effects and warnings with the patient as well. The patient verbalizes understanding and agreement with the plan. Follow-up Disposition:  Return in about 6 weeks (around 1/28/2019) for Regular Follow up.

## 2018-12-19 DIAGNOSIS — D64.9 ANEMIA, UNSPECIFIED TYPE: ICD-10-CM

## 2018-12-26 RX ORDER — FOLIC ACID 1 MG/1
TABLET ORAL
Qty: 90 TAB | Refills: 3 | Status: SHIPPED | OUTPATIENT
Start: 2018-12-26 | End: 2020-03-26 | Stop reason: SDUPTHER

## 2018-12-27 DIAGNOSIS — F51.01 PRIMARY INSOMNIA: ICD-10-CM

## 2018-12-30 RX ORDER — TRAZODONE HYDROCHLORIDE 50 MG/1
50 TABLET ORAL
Qty: 90 TAB | Refills: 0 | Status: SHIPPED | OUTPATIENT
Start: 2018-12-30 | End: 2019-05-01 | Stop reason: SDUPTHER

## 2019-01-14 RX ORDER — ATOMOXETINE 40 MG/1
CAPSULE ORAL
Qty: 90 CAP | Refills: 0 | Status: SHIPPED | OUTPATIENT
Start: 2019-01-14 | End: 2019-09-30 | Stop reason: ALTCHOICE

## 2019-01-17 DIAGNOSIS — I10 ESSENTIAL HYPERTENSION: ICD-10-CM

## 2019-01-17 DIAGNOSIS — G25.81 RLS (RESTLESS LEGS SYNDROME): ICD-10-CM

## 2019-01-17 DIAGNOSIS — E78.00 HIGH CHOLESTEROL: ICD-10-CM

## 2019-01-17 DIAGNOSIS — Z00.00 WELL ADULT EXAM: ICD-10-CM

## 2019-01-17 DIAGNOSIS — D64.9 NORMOCYTIC ANEMIA: ICD-10-CM

## 2019-01-23 LAB
ALBUMIN SERPL-MCNC: 3.5 G/DL (ref 3.5–4.8)
ALBUMIN/GLOB SERPL: 0.9 {RATIO} (ref 1.2–2.2)
ALP SERPL-CCNC: 85 IU/L (ref 39–117)
ALT SERPL-CCNC: 14 IU/L (ref 0–44)
AST SERPL-CCNC: 21 IU/L (ref 0–40)
BASOPHILS # BLD AUTO: 0 X10E3/UL (ref 0–0.2)
BASOPHILS NFR BLD AUTO: 1 %
BILIRUB SERPL-MCNC: 0.4 MG/DL (ref 0–1.2)
BUN SERPL-MCNC: 14 MG/DL (ref 8–27)
BUN/CREAT SERPL: 13 (ref 10–24)
CALCIUM SERPL-MCNC: 9.2 MG/DL (ref 8.6–10.2)
CHLORIDE SERPL-SCNC: 103 MMOL/L (ref 96–106)
CO2 SERPL-SCNC: 24 MMOL/L (ref 20–29)
CREAT SERPL-MCNC: 1.04 MG/DL (ref 0.76–1.27)
EOSINOPHIL # BLD AUTO: 0.3 X10E3/UL (ref 0–0.4)
EOSINOPHIL NFR BLD AUTO: 6 %
ERYTHROCYTE [DISTWIDTH] IN BLOOD BY AUTOMATED COUNT: 17.8 % (ref 12.3–15.4)
EST. AVERAGE GLUCOSE BLD GHB EST-MCNC: 235 MG/DL
GLOBULIN SER CALC-MCNC: 3.8 G/DL (ref 1.5–4.5)
GLUCOSE SERPL-MCNC: 203 MG/DL (ref 65–99)
HBA1C MFR BLD: 9.8 % (ref 4.8–5.6)
HCT VFR BLD AUTO: 34 % (ref 37.5–51)
HGB BLD-MCNC: 11.1 G/DL (ref 13–17.7)
IMM GRANULOCYTES # BLD AUTO: 0 X10E3/UL (ref 0–0.1)
IMM GRANULOCYTES NFR BLD AUTO: 0 %
LYMPHOCYTES # BLD AUTO: 0.8 X10E3/UL (ref 0.7–3.1)
LYMPHOCYTES NFR BLD AUTO: 15 %
MCH RBC QN AUTO: 26.9 PG (ref 26.6–33)
MCHC RBC AUTO-ENTMCNC: 32.6 G/DL (ref 31.5–35.7)
MCV RBC AUTO: 82 FL (ref 79–97)
MONOCYTES # BLD AUTO: 0.4 X10E3/UL (ref 0.1–0.9)
MONOCYTES NFR BLD AUTO: 7 %
NEUTROPHILS # BLD AUTO: 3.8 X10E3/UL (ref 1.4–7)
NEUTROPHILS NFR BLD AUTO: 71 %
PLATELET # BLD AUTO: 166 X10E3/UL (ref 150–379)
POTASSIUM SERPL-SCNC: 5.3 MMOL/L (ref 3.5–5.2)
PROT SERPL-MCNC: 7.3 G/DL (ref 6–8.5)
RBC # BLD AUTO: 4.13 X10E6/UL (ref 4.14–5.8)
SODIUM SERPL-SCNC: 141 MMOL/L (ref 134–144)
TSH SERPL DL<=0.005 MIU/L-ACNC: 2.93 UIU/ML (ref 0.45–4.5)
WBC # BLD AUTO: 5.4 X10E3/UL (ref 3.4–10.8)

## 2019-01-28 ENCOUNTER — OFFICE VISIT (OUTPATIENT)
Dept: FAMILY MEDICINE CLINIC | Age: 69
End: 2019-01-28

## 2019-01-28 VITALS
OXYGEN SATURATION: 99 % | HEIGHT: 70 IN | RESPIRATION RATE: 16 BRPM | TEMPERATURE: 97.8 F | SYSTOLIC BLOOD PRESSURE: 129 MMHG | HEART RATE: 76 BPM | WEIGHT: 254 LBS | DIASTOLIC BLOOD PRESSURE: 51 MMHG | BODY MASS INDEX: 36.36 KG/M2

## 2019-01-28 DIAGNOSIS — R51.9 NONINTRACTABLE HEADACHE, UNSPECIFIED CHRONICITY PATTERN, UNSPECIFIED HEADACHE TYPE: ICD-10-CM

## 2019-01-28 DIAGNOSIS — I10 ESSENTIAL HYPERTENSION: ICD-10-CM

## 2019-01-28 DIAGNOSIS — G25.0 BENIGN ESSENTIAL TREMOR: ICD-10-CM

## 2019-01-28 DIAGNOSIS — R29.6 RECURRENT FALLS: ICD-10-CM

## 2019-01-28 DIAGNOSIS — D64.9 NORMOCYTIC ANEMIA: ICD-10-CM

## 2019-01-28 DIAGNOSIS — E78.00 HIGH CHOLESTEROL: ICD-10-CM

## 2019-01-28 DIAGNOSIS — G25.81 RLS (RESTLESS LEGS SYNDROME): ICD-10-CM
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
## 2019-01-28 RX ORDER — POLYETHYLENE GLYCOL-3350 AND ELECTROLYTES WITH FLAVOR PACK 240; 5.84; 2.98; 6.72; 22.72 G/278.26G; G/278.26G; G/278.26G; G/278.26G; G/278.26G
POWDER, FOR SOLUTION ORAL
Refills: 0 | COMMUNITY
Start: 2018-12-31 | End: 2019-07-02

## 2019-01-28 RX ORDER — BUTALBITAL, ACETAMINOPHEN AND CAFFEINE 50; 325; 40 MG/1; MG/1; MG/1
TABLET ORAL
Qty: 30 TAB | Refills: 1 | Status: SHIPPED | OUTPATIENT
Start: 2019-01-28 | End: 2019-04-24 | Stop reason: SDUPTHER

## 2019-01-28 NOTE — PATIENT INSTRUCTIONS
For your diabetes, your control looks worse and we will need to start mealtime insulin. Please try to schedule your appointment with Dr. Sherrie Bettencourt: 
 
Jaelyn Rodriguez MD  Endocrinology 1/28/19 Start: 01/28/2019 End: End  
  
Phone: [phone]; Fax: [phone]

## 2019-01-28 NOTE — PROGRESS NOTES
Chief Complaint Patient presents with [facility] Annual Wellness Visit Medicare 1. Have you been to the ER, urgent care clinic since your last visit? Hospitalized since your last visit? No 
 
2. Have you seen or consulted any other health care providers outside of the [address] [place] [address] since your last visit? Include any pap smears or colon screening. No  
Discuss new neurologist and referral,Current physician leaving BS Fall yesterday at [address] in [place] while visiting mother Leg cramps

## 2019-01-28 NOTE — PROGRESS NOTES
Subjective: Chief Complaint Patient presents with [facility] Annual Wellness Visit Medicare He  is a 77 y.o. male who presents for evaluation of:  
 
Liss Ye recently over a manhole cover that was slightly off while he was visiting his mother in the nursing home. Liss Ye forward and braced himself with right hand. He did not go to ER. In some mild pain with bruising. No chest pain or palpitations. Generally, no low blood sugars. Of note, he is on Primidone and gabapentin at night for essential tremor and Jardiance for Diabetes. Prev on Klonopin to help with sleep but off of this. He is followed by Dr. Gigi Sloan. Leg cramps - worse at night anastasia when going to bathroom. Hx of RLS and on gabapentin, primidone, and Fe. Also taking K and Citracal. 
 
Anemia - ct to be an issue but Hgb up on recent labs with Fe use. Hx of Hinson's Eso and no recent EGD in past 5 yrs. Colonoscopy around 7 yrs ago with no concerns. Had Fe profile and Ferritin with some Fe def and planning for GI eval soon. Hgb has been up and down over the years. Diabetes Mellitus, HTN, HLD: weight down 4 lbs since last month. Not paying attention to diet much Taking meds  able to start on Soliqua 40 units daily but then cost became an issue - using Lantus 35 units in am and 35 units in pm, metformin 1000 mg per day, Jardiance 25 mg per day, and Januvia 100 mg per day. Reports no polyuria or polydipsia, no chest pain, TIA's, some numbness in left leg that comes and goes (did have left femur fracture during a fall; had R hip issues and needed sgy), last eye exam approximately < 1 yr ago. Not exercising d/t hip surgery Pt is a non smoker. Lab Results Component Value Date/Time  Hemoglobin A1c (POC) 8.6 07/26/2017 12:24 PM  
 Hemoglobin A1c (POC) 7.3 02/02/2017 02:45 PM  
 Hemoglobin A1c 9.8 (H) 01/22/2019 09:17 AM  
 Microalbumin/Creat ratio (mg/g creat) 13 11/01/2010 07:07 AM  
 Microalb/Creat ratio (ug/mg creat.) <17.9 09/25/2018 03:30 PM  
 Microalbumin,urine random 0.81 11/01/2010 07:07 AM  
 LDL, calculated 45 10/23/2018 04:45 PM  
 Creatinine 1.04 01/22/2019 09:17 AM  
  
Lab Results Component Value Date/Time GFR est AA 84 01/22/2019 09:17 AM  
 GFR est non-AA 72 01/22/2019 09:17 AM  
  
Lab Results Component Value Date/Time TSH 2.930 01/22/2019 09:17 AM  
  
ROS Gen - no fever/chills Resp - no dyspnea or cough CV - no chest pain or WANG Rest per HPI Past Medical History:  
Diagnosis Date  ADD (attention deficit disorder)  Anemia due to blood loss  Hinson's esophagus with esophagitis  Benign essential tremor  Cancer [facility]) 2012 [facility]  Depression  DJD (degenerative joint disease) of hip   
 bilat  DM (diabetes mellitus) ([address] 3/17/2010  ED (erectile dysfunction) of organic origin  Fall on or from sidewalk curb 9/24/2015  Femur fracture ([address]  Gastritis and duodenitis  GERD (gastroesophageal reflux disease)   
 resolved after discontinuing diclofenac  Hematuria 6/2016  High cholesterol 3/17/2010  
 HTN (hypertension) 3/17/2010  Morbid obesity ([address]  Murmur, cardiac 2016  
 PFO (patent foramen ovale) 7/26/2017  PUD (peptic ulcer disease)  Shingles 6/2016 RESOLVING- NO RASH (HEAD)  Sleep apnea CPAP Past Surgical History:  
Procedure Laterality Date  ENDOSCOPY, COLON, DIAGNOSTIC    
 HX CHOLECYSTECTOMY  1995  HX GI  1980  
 gastroplasty [address]  HX HIP REPLACEMENT Left  HX ORTHOPAEDIC  2011  
 rot cuff repair [address]  HX VASCULAR ACCESS    
 picc line [facility] ARTHROPLASTY  05/2010  
 right  TOTAL HIP ARTHROPLASTY  08/01/2016  
 left Current Outpatient Medications on File Prior to Visit Medication Sig Dispense Refill  atomoxetine (STRATTERA) 40 mg capsule TAKE 1 CAPSULE DAILY (NEED A FOLLOW UP APPOINTMENT FOR ADDITIONAL REFILLS) (Patient taking differently: TAKE 1 CAPSULE DAILY) 90 Cap 0  
 traZODone (DESYREL) 50 mg tablet Take 1 Tab by mouth nightly. 90 Tab 0  
 folic acid (FOLVITE) 1 mg tablet TAKE 1 TABLET DAILY 90 Tab 3  
 ferrous sulfate 140 mg (45 mg iron) TbER ER tablet Take 1 Tab by mouth Daily (before breakfast). 90 Tab 1  potassium 99 mg tablet Take 99 mg by mouth daily.  calcium citrate-vitamin D3 (CITRACAL + D) tablet Take  by mouth two (2) times a day.  BD ULTRA-FINE SHORT PEN NEEDLE 31 gauge x 5/16\" ndle USE TO INJECT UNDER THE SKIN THREE TIMES A  Pen Needle 1  
 JANUVIA 100 mg tablet TAKE 1 TABLET DAILY 90 Tab 1  
 metFORMIN ER (GLUCOPHAGE XR) 500 mg tablet TAKE 2 TABLETS DAILY (WITH DINNER) 180 Tab 1  
 ONETOUCH ULTRA BLUE TEST STRIP strip USE AS DIRECTED BY PHYSICIAN 100 Strip 3  
 diclofenac (VOLTAREN) 1 % gel Apply  to affected area four (4) times daily. 100 g 2  
 gabapentin (NEURONTIN) 300 mg capsule Take 2 Caps by mouth nightly. 180 Cap 1  
 sertraline (ZOLOFT) 100 mg tablet TAKE 1 TABLET DAILY 90 Tab 1  
 lovastatin (MEVACOR) 20 mg tablet TAKE 1 TABLET IN THE EVENING 90 Tab 1  JARDIANCE 25 mg tablet TAKE 1 TABLET DAILY 30 Tab 10  
 clotrimazole-betamethasone (LOTRISONE) topical cream Apply to affected area twice daily 15 g 0  
 VITAMIN D2 50,000 unit capsule TAKE 1 CAPSULE EVERY 7 DAYS 12 Cap 2  
 lisinopril (PRINIVIL, ZESTRIL) 20 mg tablet TAKE 1 TABLET DAILY 90 Tab 2  BD INSULIN PEN NEEDLE UF SHORT 31 gauge x 5/16\" ndle  primidone (MYSOLINE) 250 mg tablet TAKE 1 TABLET TWICE A  Tab 3  
 aspirin 81 mg chewable tablet Take 1 Tab by mouth daily. (Patient taking differently: Take 81 mg by mouth every other day.) 30 Tab 11  
 GAVILYTE-C 240-22.72-6.72 -5.84 gram solution TAKE 1 (ONE) KIT CONTAINER MILLILITER AS DIRECTED BY PHYSICIAN  0 No current facility-administered medications on file prior to visit. Objective:  
 
Vitals:  
 01/28/19 3652 01/28/19 7673 BP: 146/63 129/51 Pulse: 76 Resp: 16 Temp: 97.8 °F (36.6 °C) TempSrc: Oral   
SpO2: 99% Weight: 254 lb (115.2 kg) Height: 5' 10\" (1.778 m) Physical Examination: 
General appearance - alert, well appearing, and in no distress Eyes -sclera anicteric Neck - supple, no significant adenopathy, no thyromegaly, no bruits Chest - clear to auscultation, no wheezes, rales or rhonchi, symmetric air entry Heart - normal rate, regular rhythm, normal S1, S2, 2/6 LION (nml Echo) Neurological - alert, oriented, no focal findings or movement disorder noted Extremitiesno edema Skin - + mild bruising on skin over right wrist and knees Psychnormal mood and affect Assessment/ Plan:  
Diagnoses and all orders for this visit: 
 
1. Uncontrolled type 2 diabetes mellitus without complication, without long-term current use of insulin ([address] - needs to start mealtime insulin but pt declines today. Will see Endo soon. Increased Lantus to 40 units BID and encouraged aggressive work on diet and exercise. 2. Normocytic anemia - improved with Fe replacement. To see GI. 
 
3. Essential hypertension - controlled 4. High cholesterol - stable 5. Fall, initial encounter - mild bruising, no x-rays warranted today 6. Nonintractable headache, unspecified chronicity pattern, unspecified headache type -     butalbital-acetaminophen-caffeine (FIORICET, ESGIC) -40 mg per tablet; TAKE 1 TABLET EVERY 4 HOURS AS NEEDED FOR PAIN 
 
7. Benign essential tremor - to f/u with Neuro 
-     REFERRAL TO NEUROLOGY 8. RLS (restless legs syndrome) - may improve further with tx of Fe def anemia. Ct primidone and Gabapentin 
-     REFERRAL TO NEUROLOGY 9. Recurrent falls 
-     REFERRAL TO NEUROLOGY I have discussed the diagnosis with the patient and the intended plan as seen in the above orders.   The patient has received an after-visit summary and questions were answered concerning future plans. I have discussed medication side effects and warnings with the patient as well. The patient verbalizes understanding and agreement with the plan. Follow-up Disposition: 
Return in about 6 weeks (around 3/11/2019) for Regular Follow up.

## 2019-02-01 ENCOUNTER — TELEPHONE (OUTPATIENT)
Dept: NEUROLOGY | Age: 69
End: 2019-02-01

## 2019-02-01 NOTE — TELEPHONE ENCOUNTER
----- Message from Chaitanya Cole sent at 2/1/2019  2:23 PM EST -----  Regarding: /Telephone  Pt is requesting a call back in reference to being seen by Anny Solorzano. Best contact number is [phone].

## 2019-02-06 NOTE — TELEPHONE ENCOUNTER
Called and LM on VM gave patient the number to [facility] and that he can make the appt.

## 2019-02-11 ENCOUNTER — TELEPHONE (OUTPATIENT)
Dept: NEUROLOGY | Age: 69
End: 2019-02-11

## 2019-02-11 NOTE — TELEPHONE ENCOUNTER
----- Message from Adilson Valenzuela sent at 2/8/2019  4:17 PM EST -----  Regarding: Darren Cox/Telephone  Pt is requesting a call back ASAP in reference to scheduling a appointment with Melissa Esquivel. Pt is a former pt of . This is his third attempt. Best contact number is (c) [phone].

## 2019-02-11 NOTE — TELEPHONE ENCOUNTER
Left message for patient to call back.  Advised that Dr Roxi Hampton is scheduling in July/August and can see any of the providers in the office as he is switching care from Dr Milagro Chacon

## 2019-03-13 ENCOUNTER — OFFICE VISIT (OUTPATIENT)
Dept: FAMILY MEDICINE CLINIC | Age: 69
End: 2019-03-13

## 2019-03-13 VITALS
OXYGEN SATURATION: 99 % | SYSTOLIC BLOOD PRESSURE: 162 MMHG | WEIGHT: 251 LBS | HEIGHT: 70 IN | TEMPERATURE: 98.5 F | DIASTOLIC BLOOD PRESSURE: 72 MMHG | BODY MASS INDEX: 35.93 KG/M2 | HEART RATE: 72 BPM | RESPIRATION RATE: 16 BRPM

## 2019-03-13 DIAGNOSIS — R51.9 NONINTRACTABLE HEADACHE, UNSPECIFIED CHRONICITY PATTERN, UNSPECIFIED HEADACHE TYPE: ICD-10-CM

## 2019-03-13 DIAGNOSIS — M16.11 PRIMARY OSTEOARTHRITIS OF RIGHT HIP: ICD-10-CM

## 2019-03-13 DIAGNOSIS — K22.719 BARRETT'S ESOPHAGUS WITH DYSPLASIA: ICD-10-CM

## 2019-03-13 DIAGNOSIS — I10 ESSENTIAL HYPERTENSION: ICD-10-CM

## 2019-03-13 DIAGNOSIS — K21.9 GASTROESOPHAGEAL REFLUX DISEASE, ESOPHAGITIS PRESENCE NOT SPECIFIED: ICD-10-CM

## 2019-03-13 DIAGNOSIS — Z12.11 COLON CANCER SCREENING: ICD-10-CM

## 2019-03-13 RX ORDER — METFORMIN HYDROCHLORIDE 500 MG/1
TABLET, EXTENDED RELEASE ORAL
Qty: 60 TAB | Refills: 2 | Status: SHIPPED | OUTPATIENT
Start: 2019-03-13 | End: 2019-06-03 | Stop reason: SDUPTHER

## 2019-03-13 RX ORDER — ACETAMINOPHEN AND CODEINE PHOSPHATE 300; 30 MG/1; MG/1
1 TABLET ORAL
Qty: 30 TAB | Refills: 0 | Status: SHIPPED | OUTPATIENT
Start: 2019-03-13 | End: 2019-04-24 | Stop reason: SDUPTHER

## 2019-03-13 NOTE — PROGRESS NOTES
Chief Complaint   Patient presents with    Follow-up     6 week follow-up   1. Have you been to the ER, urgent care clinic since your last visit? Hospitalized since your last visit? No    2. Have you seen or consulted any other health care providers outside of the [address] [place] [address] since your last visit? Include any pap smears or colon screening.  No   Endocrinologist and Neurologist never got appointment scheduled  Discuss Lovastatin and falls  Needs medication for Jock itch

## 2019-03-13 NOTE — PROGRESS NOTES
Subjective:     Chief Complaint   Patient presents with    Follow-up     6 week follow-up      He  is a 77 y.o. male who presents for evaluation of:     Diabetes Mellitus, HTN, HLD:   Taking meds - able to start on Soliqua 40 units daily but then cost became an issue - using Lantus 35 units in am and 35 units in pm, metformin 1000 mg per day, Jardiance 25 mg per day, and Januvia 100 mg per day. Reports no polyuria or polydipsia, no chest pain, TIA's, some numbness in left leg that comes and goes (did have left femur fracture during a fall; had R hip issues and needed sgy), last eye exam approximately < 1 yr ago. Not exercising  Not paying attention to diet much  Pt is a non smoker. Weight is down slightly from last check. Lab Results   Component Value Date/Time    Hemoglobin A1c (POC) 8.6 07/26/2017 12:24 PM    Hemoglobin A1c (POC) 7.3 02/02/2017 02:45 PM    Hemoglobin A1c 9.8 (H) 01/22/2019 09:17 AM    Microalbumin/Creat ratio (mg/g creat) 13 11/01/2010 07:07 AM    Microalb/Creat ratio (ug/mg creat.) <17.9 09/25/2018 03:30 PM    Microalbumin,urine random 0.81 11/01/2010 07:07 AM    LDL, calculated 45 10/23/2018 04:45 PM    Creatinine 1.04 01/22/2019 09:17 AM      Lab Results   Component Value Date/Time    GFR est AA 84 01/22/2019 09:17 AM    GFR est non-AA 72 01/22/2019 09:17 AM      Lab Results   Component Value Date/Time    TSH 2.930 01/22/2019 09:17 AM      Anemia - ct to be an issue but Hgb up on recent labs with Fe use. Hx of Hinson's Eso and no recent EGD in past 5 yrs. Colonoscopy around 7 yrs ago with no concerns. Had Fe profile and Ferritin with some Fe def and planning for GI eval soon. Hgb has been up and down over the years.     ROS  Gen - no fever/chills  Resp - no dyspnea or cough  CV - no chest pain or WANG  Rest per HPI    Past Medical History:   Diagnosis Date    ADD (attention deficit disorder)     Anemia due to blood loss     Hinson's esophagus with esophagitis     Benign essential tremor     Cancer ([facility] [address] 2012    BCC    Depression     DJD (degenerative joint disease) of hip     bilat    DM (diabetes mellitus) ([facility] [address] 3/17/2010    ED (erectile dysfunction) of organic origin     Fall on or from sidewalk curb 9/24/2015    Femur fracture ([facility] [address]     Gastritis and duodenitis     GERD (gastroesophageal reflux disease)     resolved after discontinuing diclofenac    Hematuria 6/2016    High cholesterol 3/17/2010    HTN (hypertension) 3/17/2010    Morbid obesity ([facility] [address]     Murmur, cardiac 2016    PFO (patent foramen ovale) 7/26/2017    PUD (peptic ulcer disease)     Shingles 6/2016    RESOLVING- NO RASH (HEAD)    Sleep apnea     CPAP     Past Surgical History:   Procedure Laterality Date    ENDOSCOPY, COLON, DIAGNOSTIC      HX CHOLECYSTECTOMY  1995    HX GI  1980    gastroplasty    HX HERNIA REPAIR  1995    HX HIP REPLACEMENT      Left    HX ORTHOPAEDIC  2011    rot cuff repair    HX TONSILLECTOMY  1950    HX VASCULAR ACCESS      picc line    TOTAL HIP ARTHROPLASTY  05/2010    right    TOTAL HIP ARTHROPLASTY  08/01/2016    left     Current Outpatient Medications on File Prior to Visit   Medication Sig Dispense Refill    insulin glargine (LANTUS SOLOSTAR U-100 INSULIN) 100 unit/mL (3 mL) inpn 40 Units by SubCUTAneous route two (2) times a day. (Patient taking differently: 35 Units by SubCUTAneous route two (2) times a day.) 72 mL 0    butalbital-acetaminophen-caffeine (FIORICET, ESGIC) -40 mg per tablet TAKE 1 TABLET EVERY 4 HOURS AS NEEDED FOR PAIN 30 Tab 1    atomoxetine (STRATTERA) 40 mg capsule TAKE 1 CAPSULE DAILY (NEED A FOLLOW UP APPOINTMENT FOR ADDITIONAL REFILLS) (Patient taking differently: TAKE 1 CAPSULE DAILY) 90 Cap 0    traZODone (DESYREL) 50 mg tablet Take 1 Tab by mouth nightly.  90 Tab 0    folic acid (FOLVITE) 1 mg tablet TAKE 1 TABLET DAILY 90 Tab 3    ferrous sulfate 140 mg (45 mg iron) TbER ER tablet Take 1 Tab by mouth Daily (before breakfast). 90 Tab 1    potassium 99 mg tablet Take 99 mg by mouth daily.  calcium citrate-vitamin D3 (CITRACAL + D) tablet Take  by mouth two (2) times a day.  BD ULTRA-FINE SHORT PEN NEEDLE 31 gauge x 5/16\" ndle USE TO INJECT UNDER THE SKIN THREE TIMES A  Pen Needle 1    JANUVIA 100 mg tablet TAKE 1 TABLET DAILY 90 Tab 1    ONETOUCH ULTRA BLUE TEST STRIP strip USE AS DIRECTED BY PHYSICIAN 100 Strip 3    diclofenac (VOLTAREN) 1 % gel Apply  to affected area four (4) times daily. 100 g 2    gabapentin (NEURONTIN) 300 mg capsule Take 2 Caps by mouth nightly. 180 Cap 1    sertraline (ZOLOFT) 100 mg tablet TAKE 1 TABLET DAILY 90 Tab 1    clotrimazole-betamethasone (LOTRISONE) topical cream Apply to affected area twice daily 15 g 0    VITAMIN D2 50,000 unit capsule TAKE 1 CAPSULE EVERY 7 DAYS 12 Cap 2    lisinopril (PRINIVIL, ZESTRIL) 20 mg tablet TAKE 1 TABLET DAILY 90 Tab 2    BD INSULIN PEN NEEDLE UF SHORT 31 gauge x 5/16\" ndle       primidone (MYSOLINE) 250 mg tablet TAKE 1 TABLET TWICE A  Tab 3    aspirin 81 mg chewable tablet Take 1 Tab by mouth daily. (Patient taking differently: Take 81 mg by mouth every other day.) 30 Tab 11    GAVILYTE-C 240-22.72-6.72 -5.84 gram solution TAKE 1 (ONE) KIT CONTAINER MILLILITER AS DIRECTED BY PHYSICIAN  0     No current facility-administered medications on file prior to visit.          Objective:     Vitals:    03/13/19 1412 03/13/19 1453   BP: 171/72 162/72   Pulse: 72    Resp: 16    Temp: 98.5 °F (36.9 °C)    TempSrc: Oral    SpO2: 99%    Weight: 251 lb (113.9 kg)    Height: 5' 10\" (1.778 m)      Physical Examination:  General appearance - alert, well appearing, and in no distress  Eyes -sclera anicteric  Neck - supple, no significant adenopathy, no thyromegaly, no bruits  Chest - clear to auscultation, no wheezes, rales or rhonchi, symmetric air entry  Heart - normal rate, regular rhythm, normal S1, S2, 2/6 LION (nml Echo)  Neurological - alert, oriented, no focal findings or movement disorder noted  Extremities-no edema  Skin - + mild bruising on skin over right wrist and knees  Psych-normal mood and affect    Assessment/ Plan:   Diagnoses and all orders for this visit:    1. Diabetes mellitus type 2, uncontrolled ([address]- needs to start mealtime insulin but pt declines today. Will see Endo soon. Ct Lantus 40 units BID, Jardiance, Januvia, Metformin and encouraged aggressive work on diet and exercise. -     empagliflozin (JARDIANCE) 25 mg tablet; TAKE 1 TABLET DAILY  -     metFORMIN ER (GLUCOPHAGE XR) 500 mg tablet; TAKE 2 TABLETS DAILY (WITH DINNER)    2. Nonintractable headache, unspecified chronicity pattern, unspecified headache type - mild, encouraged improved sleep    3. Essential hypertension - BP too high today, will recheck in 6 weeks before adjusting meds    4. Colon cancer screening  5. Hinson's esophagus with dysplasia  6. Gastroesophageal reflux disease, esophagitis presence not specified  -     REFERRAL TO GASTROENTEROLOGY    7. Primary osteoarthritis of right hip  -     acetaminophen-codeine (TYLENOL-CODEINE #3) 300-30 mg per tablet; Take 1 Tab by mouth every four (4) hours as needed for Pain for up to 5 days. Max Daily Amount: 6 Tabs. I have discussed the diagnosis with the patient and the intended plan as seen in the above orders. The patient has received an after-visit summary and questions were answered concerning future plans. I have discussed medication side effects and warnings with the patient as well. The patient verbalizes understanding and agreement with the plan. Follow-up and Dispositions    · Return in about 6 weeks (around 4/24/2019), or if symptoms worsen or fail to improve.

## 2019-04-24 ENCOUNTER — OFFICE VISIT (OUTPATIENT)
Dept: FAMILY MEDICINE CLINIC | Age: 69
End: 2019-04-24

## 2019-04-24 ENCOUNTER — HOSPITAL ENCOUNTER (OUTPATIENT)
Dept: LAB | Age: 69
Discharge: HOME | End: 2019-04-24
Payer: MEDICARE

## 2019-04-24 VITALS
HEIGHT: 70 IN | HEART RATE: 67 BPM | RESPIRATION RATE: 18 BRPM | DIASTOLIC BLOOD PRESSURE: 53 MMHG | BODY MASS INDEX: 35.07 KG/M2 | WEIGHT: 245 LBS | SYSTOLIC BLOOD PRESSURE: 114 MMHG | TEMPERATURE: 98.5 F | OXYGEN SATURATION: 98 %

## 2019-04-24 DIAGNOSIS — G57.02 PYRIFORMIS SYNDROME, LEFT: ICD-10-CM

## 2019-04-24 DIAGNOSIS — R51.9 NONINTRACTABLE HEADACHE, UNSPECIFIED CHRONICITY PATTERN, UNSPECIFIED HEADACHE TYPE: ICD-10-CM

## 2019-04-24 DIAGNOSIS — F33.9 RECURRENT DEPRESSION (HCC): ICD-10-CM

## 2019-04-24 DIAGNOSIS — M16.11 PRIMARY OSTEOARTHRITIS OF RIGHT HIP: ICD-10-CM

## 2019-04-24 DIAGNOSIS — M16.12 PRIMARY OSTEOARTHRITIS OF LEFT HIP: ICD-10-CM

## 2019-04-24 DIAGNOSIS — E11.65 UNCONTROLLED TYPE 2 DIABETES MELLITUS WITH HYPERGLYCEMIA (HCC): Primary | ICD-10-CM

## 2019-04-24 DIAGNOSIS — G25.81 RLS (RESTLESS LEGS SYNDROME): ICD-10-CM

## 2019-04-24 PROCEDURE — 80048 BASIC METABOLIC PNL TOTAL CA: CPT

## 2019-04-24 PROCEDURE — 36415 COLL VENOUS BLD VENIPUNCTURE: CPT

## 2019-04-24 PROCEDURE — 83036 HEMOGLOBIN GLYCOSYLATED A1C: CPT

## 2019-04-24 RX ORDER — BUPROPION HYDROCHLORIDE 150 MG/1
150 TABLET, EXTENDED RELEASE ORAL DAILY
Qty: 90 TAB | Refills: 0 | Status: SHIPPED | OUTPATIENT
Start: 2019-04-24 | End: 2019-05-08

## 2019-04-24 RX ORDER — ERGOCALCIFEROL 1.25 MG/1
CAPSULE ORAL
Qty: 12 CAP | Refills: 2 | Status: SHIPPED | OUTPATIENT
Start: 2019-04-24 | End: 2020-01-03 | Stop reason: SDUPTHER

## 2019-04-24 RX ORDER — BUTALBITAL, ACETAMINOPHEN AND CAFFEINE 50; 325; 40 MG/1; MG/1; MG/1
TABLET ORAL
Qty: 30 TAB | Refills: 1 | Status: SHIPPED | OUTPATIENT
Start: 2019-04-24 | End: 2019-10-10 | Stop reason: ALTCHOICE

## 2019-04-24 RX ORDER — GABAPENTIN 300 MG/1
600 CAPSULE ORAL
Qty: 180 CAP | Refills: 1 | Status: SHIPPED | OUTPATIENT
Start: 2019-04-24 | End: 2019-08-03 | Stop reason: SDUPTHER

## 2019-04-24 RX ORDER — DICLOFENAC SODIUM 10 MG/G
GEL TOPICAL 4 TIMES DAILY
Qty: 100 G | Refills: 2 | Status: SHIPPED | OUTPATIENT
Start: 2019-04-24 | End: 2019-06-04 | Stop reason: SDUPTHER

## 2019-04-24 RX ORDER — ACETAMINOPHEN AND CODEINE PHOSPHATE 300; 30 MG/1; MG/1
1 TABLET ORAL
Qty: 30 TAB | Refills: 0 | Status: SHIPPED | OUTPATIENT
Start: 2019-04-24 | End: 2019-04-29

## 2019-04-24 NOTE — PROGRESS NOTES
Chief Complaint   Patient presents with    Diabetes     4 week follow-up   1. Have you been to the ER, urgent care clinic since your last visit? Hospitalized since your last visit? No    2. Have you seen or consulted any other health care providers outside of the [address] [place] [address] since your last visit? Include any pap smears or colon screening.  No   Discuss hip pain and continuing headaches  Due for Medicare Wellness Initial Visit

## 2019-04-24 NOTE — PROGRESS NOTES
Subjective:     Chief Complaint   Patient presents with    Diabetes     4 week follow-up      He  is a 77 y.o. male who presents for evaluation of:     Diabetes Mellitus, HTN, HLD:   Taking meds - able to start on Soliqua 40 units daily but then cost became an issue - using Lantus 35 units in am and 35 units in pm, metformin 1000 mg per day, Jardiance 25 mg per day, and Januvia 100 mg per day. Reports no polyuria or polydipsia, no chest pain, TIA's, some numbness in left leg that comes and goes (did have left femur fracture during a fall; had R hip issues and needed sgy), last eye exam approximately < 1 yr ago. Not exercising  Not paying attention to diet much  Pt is a non smoker. Weight is down slightly from last check. Lab Results   Component Value Date/Time    Hemoglobin A1c (POC) 8.6 07/26/2017 12:24 PM    Hemoglobin A1c (POC) 7.3 02/02/2017 02:45 PM    Hemoglobin A1c 9.8 (H) 01/22/2019 09:17 AM    Microalbumin/Creat ratio (mg/g creat) 13 11/01/2010 07:07 AM    Microalb/Creat ratio (ug/mg creat.) <17.9 09/25/2018 03:30 PM    Microalbumin,urine random 0.81 11/01/2010 07:07 AM    LDL, calculated 45 10/23/2018 04:45 PM    Creatinine 1.04 01/22/2019 09:17 AM      Lab Results   Component Value Date/Time    GFR est AA 84 01/22/2019 09:17 AM    GFR est non-AA 72 01/22/2019 09:17 AM      Lab Results   Component Value Date/Time    TSH 2.930 01/22/2019 09:17 AM      Anemia - ct to be an issue but Hgb up on recent labs with Fe use. Hx of Hinson's Eso and no recent EGD in past 5 yrs. Colonoscopy around 7 yrs ago with no concerns. Had Fe profile and Ferritin with some Fe def and planning for GI eval soon. Hgb has been up and down over the years. ROS  Gen - no fever/chills  Resp - no dyspnea or cough  CV - no chest pain or WANG  Psych - Depression ct to be a chronic issue. Still having sig trouble with concentration/attention and sleep. Anhedonia still present.   Also worse with his mother in nursing home d/t Alz Dementia. Rest per HPI    Past Medical History:   Diagnosis Date    ADD (attention deficit disorder)     Anemia due to blood loss     Hinson's esophagus with esophagitis     Benign essential tremor     Cancer ([facility] [address] 2012    BCC    Depression     DJD (degenerative joint disease) of hip     bilat    DM (diabetes mellitus) ([facility] [address] 3/17/2010    ED (erectile dysfunction) of organic origin     Fall on or from sidewalk curb 9/24/2015    Femur fracture ([facility] [address]     Gastritis and duodenitis     GERD (gastroesophageal reflux disease)     resolved after discontinuing diclofenac    Hematuria 6/2016    High cholesterol 3/17/2010    HTN (hypertension) 3/17/2010    Morbid obesity ([facility] [address]     Murmur, cardiac 2016    PFO (patent foramen ovale) 7/26/2017    PUD (peptic ulcer disease)     Shingles 6/2016    RESOLVING- NO RASH (HEAD)    Sleep apnea     CPAP     Past Surgical History:   Procedure Laterality Date    ENDOSCOPY, COLON, DIAGNOSTIC      HX CHOLECYSTECTOMY  1995    HX GI  1980    gastroplasty    HX HERNIA REPAIR  1995    HX HIP REPLACEMENT      Left    HX ORTHOPAEDIC  2011    rot cuff repair    HX TONSILLECTOMY  1950    HX VASCULAR ACCESS      picc line    TOTAL HIP ARTHROPLASTY  05/2010    right    TOTAL HIP ARTHROPLASTY  08/01/2016    left     Current Outpatient Medications on File Prior to Visit   Medication Sig Dispense Refill    empagliflozin (JARDIANCE) 25 mg tablet TAKE 1 TABLET DAILY 30 Tab 2    metFORMIN ER (GLUCOPHAGE XR) 500 mg tablet TAKE 2 TABLETS DAILY (WITH DINNER) 60 Tab 2    insulin glargine (LANTUS SOLOSTAR U-100 INSULIN) 100 unit/mL (3 mL) inpn 40 Units by SubCUTAneous route two (2) times a day.  (Patient taking differently: 35 Units by SubCUTAneous route two (2) times a day.) 72 mL 0    atomoxetine (STRATTERA) 40 mg capsule TAKE 1 CAPSULE DAILY (NEED A FOLLOW UP APPOINTMENT FOR ADDITIONAL REFILLS) (Patient taking differently: TAKE 1 CAPSULE DAILY) 90 Cap 0    traZODone (DESYREL) 50 mg tablet Take 1 Tab by mouth nightly. 90 Tab 0    folic acid (FOLVITE) 1 mg tablet TAKE 1 TABLET DAILY 90 Tab 3    ferrous sulfate 140 mg (45 mg iron) TbER ER tablet Take 1 Tab by mouth Daily (before breakfast). 90 Tab 1    potassium 99 mg tablet Take 99 mg by mouth daily.  calcium citrate-vitamin D3 (CITRACAL + D) tablet Take  by mouth two (2) times a day.  BD ULTRA-FINE SHORT PEN NEEDLE 31 gauge x 5/16\" ndle USE TO INJECT UNDER THE SKIN THREE TIMES A  Pen Needle 1    JANUVIA 100 mg tablet TAKE 1 TABLET DAILY 90 Tab 1    sertraline (ZOLOFT) 100 mg tablet TAKE 1 TABLET DAILY 90 Tab 1    clotrimazole-betamethasone (LOTRISONE) topical cream Apply to affected area twice daily 15 g 0    lisinopril (PRINIVIL, ZESTRIL) 20 mg tablet TAKE 1 TABLET DAILY 90 Tab 2    BD INSULIN PEN NEEDLE UF SHORT 31 gauge x 5/16\" ndle       primidone (MYSOLINE) 250 mg tablet TAKE 1 TABLET TWICE A  Tab 3    aspirin 81 mg chewable tablet Take 1 Tab by mouth daily. (Patient taking differently: Take 81 mg by mouth every other day.) 30 Tab 11    GAVILYTE-C 240-22.72-6.72 -5.84 gram solution TAKE 1 (ONE) KIT CONTAINER MILLILITER AS DIRECTED BY PHYSICIAN  0     No current facility-administered medications on file prior to visit. Objective:     Vitals:    04/24/19 1059   BP: 114/53   Pulse: 67   Resp: 18   Temp: 98.5 °F (36.9 °C)   TempSrc: Oral   SpO2: 98%   Weight: 245 lb (111.1 kg)   Height: 5' 10\" (1.778 m)     Physical Examination:  General appearance - alert, well appearing, and in no distress  Eyes -sclera anicteric  Neck - supple, no significant adenopathy, no thyromegaly  Chest - clear to auscultation, no wheezes, rales or rhonchi, symmetric air entry  Heart - normal rate, regular rhythm, normal S1, S2, 2/6 LION (nml Echo)  Neurological - alert, oriented, no focal findings or movement disorder noted.   Using cane for abnormal gait  Extremities-no edema, RLE with hair loss and some atrophy of LE musculature  Psych-normal mood and affect    Assessment/ Plan:   Diagnoses and all orders for this visit:    1. Uncontrolled type 2 diabetes mellitus with hyperglycemia (HCC)  - A1C 9.8% on 1/22/19- goal is < 7.0%  - Lantus 40 units BID  -  Never started mealtime insulin and never saw Endo (set up with Dr. Trent Miranda). - Ct Jardiance, Januvia, Metformin   - Encouraged aggressive work on diet and staying active. - UTD on foot exam and microalbumin  -     METABOLIC PANEL, BASIC  -     HEMOGLOBIN A1C WITH EAG  -     glucose blood VI test strips (ONETOUCH ULTRA BLUE TEST STRIP) strip; Test blood sugars twice daily    2. Nonintractable headache, unspecified chronicity pattern, unspecified headache type  -     butalbital-acetaminophen-caffeine (FIORICET, ESGIC) -40 mg per tablet; TAKE 1 TABLET EVERY 4 HOURS AS NEEDED FOR PAIN    3. RLS (restless legs syndrome)  -     gabapentin (NEURONTIN) 300 mg capsule; Take 2 Caps by mouth nightly. 4. Pyriformis syndrome, left  5. Primary osteoarthritis of left hip  -     diclofenac (VOLTAREN) 1 % gel; Apply  to affected area four (4) times daily. 6. Primary osteoarthritis of right hip  -     acetaminophen-codeine (TYLENOL-CODEINE #3) 300-30 mg per tablet; Take 1 Tab by mouth every four (4) hours as needed for Pain for up to 5 days. Max Daily Amount: 6 Tabs. Other orders  -     ergocalciferol (VITAMIN D2) 50,000 unit capsule; TAKE 1 CAPSULE EVERY 7 DAYS    7. Recurrent depression ([facility] [address] - major issue, spent sig time discussing this and will ct Zoloft and start wellbutrin. Need to work on staying active, social support, and consider therapy. -     buPROPion SR (WELLBUTRIN SR) 150 mg SR tablet; Take 1 Tab by mouth daily. I have discussed the diagnosis with the patient and the intended plan as seen in the above orders. The patient has received an after-visit summary and questions were answered concerning future plans.   I have discussed medication side effects and warnings with the patient as well. The patient verbalizes understanding and agreement with the plan. Follow-up and Dispositions    · Return in about 6 weeks (around 6/5/2019), or if symptoms worsen or fail to improve.

## 2019-04-25 DIAGNOSIS — E11.65 UNCONTROLLED TYPE 2 DIABETES MELLITUS WITH HYPERGLYCEMIA (HCC): Primary | ICD-10-CM

## 2019-04-25 LAB
BUN SERPL-MCNC: 15 MG/DL (ref 8–27)
BUN/CREAT SERPL: 16 (ref 10–24)
CALCIUM SERPL-MCNC: 9.1 MG/DL (ref 8.6–10.2)
CHLORIDE SERPL-SCNC: 101 MMOL/L (ref 96–106)
CO2 SERPL-SCNC: 22 MMOL/L (ref 20–29)
CREAT SERPL-MCNC: 0.91 MG/DL (ref 0.76–1.27)
EST. AVERAGE GLUCOSE BLD GHB EST-MCNC: 194 MG/DL
GLUCOSE SERPL-MCNC: 94 MG/DL (ref 65–99)
HBA1C MFR BLD: 8.4 % (ref 4.8–5.6)
POTASSIUM SERPL-SCNC: 4.8 MMOL/L (ref 3.5–5.2)
SODIUM SERPL-SCNC: 139 MMOL/L (ref 134–144)

## 2019-04-25 NOTE — PROGRESS NOTES
Patient advised per Dr Ladonna Dugganigham  sugars are a little better but you still have work to do. [facility] would like to have you start mealtime insulin 5 units just with dinner. [facility] sent over prescription for this.  Please continue your Lantus and other oral medications.  Stay aggressive with working on your diet and stay active as possible.  Call with any questions.      Thanks,   Remigio Badillo MD

## 2019-05-01 DIAGNOSIS — F51.01 PRIMARY INSOMNIA: ICD-10-CM

## 2019-05-01 RX ORDER — TRAZODONE HYDROCHLORIDE 50 MG/1
TABLET ORAL
Qty: 90 TAB | Refills: 0 | Status: SHIPPED | OUTPATIENT
Start: 2019-05-01 | End: 2019-08-04 | Stop reason: SDUPTHER

## 2019-05-01 RX ORDER — INSULIN GLARGINE 100 [IU]/ML
INJECTION, SOLUTION SUBCUTANEOUS
Qty: 75 ADJUSTABLE DOSE PRE-FILLED PEN SYRINGE | Refills: 0 | Status: SHIPPED | OUTPATIENT
Start: 2019-05-01 | End: 2019-10-16 | Stop reason: SDUPTHER

## 2019-05-08 NOTE — TELEPHONE ENCOUNTER
Regarding: Non-Urgent Medical Question  Contact: [phone]  ----- Message from [address] [place] [address] [address], Generic sent at 5/7/2019 11:24 PM EDT -----    I think I will continue the sertralin since I have been on it so long. I will definitely stop the bupropion since it makes me feel really weird. The pharmacist it [facility] suggested that you should send a prescription for the needles for the new insulin so she can run it through [facility].    Thanks  Isidro Palafox

## 2019-05-08 NOTE — TELEPHONE ENCOUNTER
MAR updated and refill for BD pen needles pending for ME approval.  My chart message sent to pt informing him of this.

## 2019-05-09 RX ORDER — PEN NEEDLE, DIABETIC 30 GX3/16"
NEEDLE, DISPOSABLE MISCELLANEOUS
Qty: 270 PEN NEEDLE | Refills: 1 | Status: SHIPPED | OUTPATIENT
Start: 2019-05-09 | End: 2019-07-09 | Stop reason: SDUPTHER

## 2019-05-11 ENCOUNTER — HOSPITAL ENCOUNTER (EMERGENCY)
Age: 69
Discharge: HOME | End: 2019-05-11
Attending: EMERGENCY MEDICINE | Admitting: EMERGENCY MEDICINE
Payer: MEDICARE

## 2019-05-11 VITALS
HEART RATE: 78 BPM | HEIGHT: 70 IN | WEIGHT: 245 LBS | DIASTOLIC BLOOD PRESSURE: 51 MMHG | OXYGEN SATURATION: 100 % | RESPIRATION RATE: 18 BRPM | BODY MASS INDEX: 35.07 KG/M2 | TEMPERATURE: 98.5 F | SYSTOLIC BLOOD PRESSURE: 140 MMHG

## 2019-05-11 DIAGNOSIS — S01.112A LACERATION OF LEFT EYEBROW, INITIAL ENCOUNTER: ICD-10-CM

## 2019-05-11 DIAGNOSIS — S20.212A CONTUSION OF LEFT CHEST WALL, INITIAL ENCOUNTER: ICD-10-CM

## 2019-05-11 DIAGNOSIS — S00.83XA CONTUSION OF FACE, INITIAL ENCOUNTER: Primary | ICD-10-CM

## 2019-05-11 PROCEDURE — 99283 EMERGENCY DEPT VISIT LOW MDM: CPT

## 2019-05-11 PROCEDURE — 75810000293 HC SIMP/SUPERF WND  RPR

## 2019-05-11 PROCEDURE — 77030002974 HC SUT PLN J&J -A

## 2019-05-11 PROCEDURE — 77030018836 HC SOL IRR NACL ICUM -A

## 2019-05-11 PROCEDURE — 74011000250 HC RX REV CODE- 250: Performed by: EMERGENCY MEDICINE

## 2019-05-11 RX ORDER — LIDOCAINE HYDROCHLORIDE AND EPINEPHRINE 20; 10 MG/ML; UG/ML
10 INJECTION, SOLUTION INFILTRATION; PERINEURAL ONCE
Status: COMPLETED | OUTPATIENT
Start: 2019-05-11 | End: 2019-05-11

## 2019-05-11 RX ORDER — ACETAMINOPHEN 500 MG
1000 TABLET ORAL
Qty: 20 TAB | Refills: 0 | Status: SHIPPED | OUTPATIENT
Start: 2019-05-11 | End: 2019-10-07

## 2019-05-11 RX ADMIN — LIDOCAINE HYDROCHLORIDE,EPINEPHRINE BITARTRATE 200 MG: 20; .01 INJECTION, SOLUTION INFILTRATION; PERINEURAL at 14:32

## 2019-05-11 NOTE — ED TRIAGE NOTES
Patient arrives via EMS from home after slipping on a wet puppy pad, landed on chest and forehead, laceration above LEFT eye with bleeding controlled. Denies LOC, not on blood thinners. Tetanus up to date. Denies n/v, denies HA/dizziness, denies pain or injury to any other area.

## 2019-05-11 NOTE — ED NOTES
Pt provided with discharge instruction and no questions. Pt with complaint of chest tenderness from the fall - pain with palpation only. Pt ambulating out of the department on his own without difficultly.

## 2019-05-11 NOTE — ED PROVIDER NOTES
77 y.o. male with past medical history significant for DM, HTN, high cholesterol, DJD, gastritis, duodenitis, PUD, sleep apnea, BCC, cardiac murmur, shingles, Barett's esophagus, PFO, and femur fracture who presents from home via EMS with chief complaint of a fall. EMS reports Pt was walking to his door this morning to let his dogs in when he slipped on a wet puppy pad, fell forward, landed on his chest, and hit his face against the floor. Per EMS, Pt sustained a laceration to his L eyebrow at that time with severe initial bleeding, but the bleeding was controlled when they arrived to his home. He also c/o initial 4/10 headache which has resolved since then. Pt currently c/o 3/10 \"soreness\" over his L chest. Pt was able to stand and walk on his own after the fall. Pt denies anticoagulants, but notes he takes 81 mg ASA every 3 days. Per EMS, Pt's BP was 129/71 w/ HR 76. Pt denies LOC. There are no other acute medical concerns at this time. PCP: Alec Paulino MD 
 
Note written by Jonathan Dooley, as dictated by Mich Pryor MD 2:03 PM 
 
 
  
 
Past Medical History:  
Diagnosis Date  ADD (attention deficit disorder)  Anemia due to blood loss  Hinson's esophagus with esophagitis  Benign essential tremor  Cancer [facility]) 2012 [address]  Depression  DJD (degenerative joint disease) of hip   
 bilat  DM (diabetes mellitus) ([address] 3/17/2010  ED (erectile dysfunction) of organic origin  Fall on or from sidewalk curb 9/24/2015  Femur fracture ([address]  Gastritis and duodenitis  GERD (gastroesophageal reflux disease)   
 resolved after discontinuing diclofenac  Hematuria 6/2016  High cholesterol 3/17/2010  
 HTN (hypertension) 3/17/2010  Morbid obesity ([address]  Murmur, cardiac 2016  
 PFO (patent foramen ovale) 7/26/2017  PUD (peptic ulcer disease)  Shingles 6/2016 RESOLVING- NO RASH (HEAD)  Sleep apnea CPAP Past Surgical History: Procedure Laterality Date  ENDOSCOPY, COLON, DIAGNOSTIC    
 HX CHOLECYSTECTOMY    HX GI  1980  
 gastroplasty [address]  HX HIP REPLACEMENT Left  HX ORTHOPAEDIC    
 rot cuff repair [address] [facility]  HX VASCULAR ACCESS    
 picc line [address] [facility] ARTHROPLASTY  2010  
 right  TOTAL HIP ARTHROPLASTY  2016  
 left Family History:  
Problem Relation Age of Onset  Cancer Father   
     multiple myeloma  Stroke Father  Dementia Mother  No Known Problems Brother  COPD Brother  Cancer Maternal Grandmother COLON  Anesth Problems Neg Hx Social History Socioeconomic History  Marital status:  Spouse name: Not on file  Number of children: Not on file  Years of education: Not on file  Highest education level: Not on file Occupational History  Not on file Social Needs  Financial resource strain: Not on file  Food insecurity:  
  Worry: Not on file Inability: Not on file  Transportation needs:  
  Medical: Not on file Non-medical: Not on file Tobacco Use  Smoking status: Former Smoker Packs/day: 2.00 Years: 15.00 Pack years: 30.00 Last attempt to quit: 3/1/1979 Years since quittin.2  Smokeless tobacco: Never Used Substance and Sexual Activity  Alcohol use: No  
  Alcohol/week: 0.0 oz  Drug use: No  
 Sexual activity: Never Lifestyle  Physical activity:  
  Days per week: Not on file Minutes per session: Not on file  Stress: Not on file Relationships  Social connections:  
  Talks on phone: Not on file Gets together: Not on file Attends Islam service: Not on file Active member of club or organization: Not on file Attends meetings of clubs or organizations: Not on file Relationship status: Not on file  Intimate partner violence:  
  Fear of current or ex partner: Not on file Emotionally abused: Not on file Physically abused: Not on file Forced sexual activity: Not on file Other Topics Concern  Not on file Social History Narrative  Not on file ALLERGIES: Nsaids (non-steroidal anti-inflammatory drug) Review of Systems Cardiovascular: Positive for chest pain (from fall). Musculoskeletal: Negative for gait problem. Skin: Positive for wound. Neurological: Positive for headaches. Negative for syncope. All other systems reviewed and are negative. Vitals:  
 05/11/19 1408 05/11/19 1430 BP: 151/67 149/54 Pulse: 82 Resp: 17 Temp: 98.5 °F (36.9 °C) SpO2: 100% 95% Weight: 111.1 kg (245 lb) Height: 5' 10\" (1.778 m) Physical Exam  
Constitutional: He appears well-developed and well-nourished. No distress. HENT:  
Head: Normocephalic. 1 cm linear laceration over L lateral eyebrow, hemostatic, w/ underlying contusion and ecchymosis. Eyes: Conjunctivae are normal.  
Neck: Neck supple. No tracheal deviation present. Cardiovascular: Normal rate and regular rhythm. Pulmonary/Chest: Effort normal. No respiratory distress. Abdominal: He exhibits no distension. Musculoskeletal: Normal range of motion. He exhibits no deformity. Neurological: He is alert. No cranial nerve deficit. Skin: Skin is warm and dry. Psychiatric: His behavior is normal.  
Nursing note and vitals reviewed. Note written by Jonathan Braxton, as dictated by Patricia Maria MD 2:03 PM 
 
MDM 
  
77 y.o. male presents with laceration to left eyebrow with associated contusion from mechanical fall. Also has mild soreness of left anterior chest after fall but no clinical evidence of rib fx. Laceration was irrigated, repaired primarily with good approximation as documented in procedure portion of note. No evidence of foreign body or non-viable tissue involvement in approximation.  Pt counseled on proper management of closed wound and will not return for suture removal.  
 
Wound Repair 
Date/Time: 5/11/2019 3:37 PM 
Performed by: attendingPreparation: skin prepped with alcohol Location details: left eyebrow Wound length:2.5 cm or less Anesthesia: local infiltration Anesthesia: 
Local Anesthetic: lidocaine 1% with epinephrine Foreign bodies: no foreign bodies Wound skin closure material used: 5-0 fast gut. Number of sutures: 3 Technique: simple and interrupted Approximation: close Patient tolerance: Patient tolerated the procedure well with no immediate complications My total time at bedside, performing this procedure was 1-15 minutes.  
 
Note written by Jonathan Warren, as dictated by Fatimah Graham MD 3:39 PM

## 2019-05-20 DIAGNOSIS — E11.65 UNCONTROLLED TYPE 2 DIABETES MELLITUS WITH HYPERGLYCEMIA (HCC): ICD-10-CM

## 2019-05-20 RX ORDER — HUMAN INSULIN 100 [IU]/ML
INJECTION, SOLUTION SUBCUTANEOUS
Qty: 10 ML | Refills: 0 | Status: SHIPPED | OUTPATIENT
Start: 2019-05-20 | End: 2019-07-09 | Stop reason: SDUPTHER

## 2019-06-03 RX ORDER — METFORMIN HYDROCHLORIDE 500 MG/1
TABLET, EXTENDED RELEASE ORAL
Qty: 60 TAB | Refills: 2 | Status: SHIPPED | OUTPATIENT
Start: 2019-06-03 | End: 2019-08-31 | Stop reason: SDUPTHER

## 2019-06-04 DIAGNOSIS — G57.02 PYRIFORMIS SYNDROME, LEFT: ICD-10-CM

## 2019-06-04 DIAGNOSIS — M16.12 PRIMARY OSTEOARTHRITIS OF LEFT HIP: ICD-10-CM

## 2019-06-06 RX ORDER — LISINOPRIL 20 MG/1
TABLET ORAL
Qty: 90 TAB | Refills: 1 | Status: SHIPPED | OUTPATIENT
Start: 2019-06-06 | End: 2019-11-27 | Stop reason: SDUPTHER

## 2019-06-06 RX ORDER — DICLOFENAC SODIUM 10 MG/G
GEL TOPICAL 4 TIMES DAILY
Qty: 100 G | Refills: 2 | Status: SHIPPED | OUTPATIENT
Start: 2019-06-06 | End: 2019-12-03 | Stop reason: SDUPTHER

## 2019-06-11 ENCOUNTER — OFFICE VISIT (OUTPATIENT)
Dept: CARDIOLOGY CLINIC | Age: 69
End: 2019-06-11

## 2019-06-11 VITALS
HEIGHT: 70 IN | RESPIRATION RATE: 16 BRPM | SYSTOLIC BLOOD PRESSURE: 130 MMHG | HEART RATE: 69 BPM | DIASTOLIC BLOOD PRESSURE: 68 MMHG | WEIGHT: 256.9 LBS | BODY MASS INDEX: 36.78 KG/M2 | OXYGEN SATURATION: 98 %

## 2019-06-11 DIAGNOSIS — R29.898 LEG HEAVINESS: ICD-10-CM

## 2019-06-11 DIAGNOSIS — Q21.12 PFO (PATENT FORAMEN OVALE): ICD-10-CM

## 2019-06-11 DIAGNOSIS — R25.2 LEG CRAMPS: ICD-10-CM

## 2019-06-11 DIAGNOSIS — I10 HYPERTENSION, UNSPECIFIED TYPE: Primary | ICD-10-CM

## 2019-06-11 DIAGNOSIS — E78.00 HIGH CHOLESTEROL: ICD-10-CM

## 2019-06-11 RX ORDER — CHOLECALCIFEROL (VITAMIN D3) 125 MCG
10 CAPSULE ORAL
COMMUNITY
End: 2020-06-30

## 2019-06-11 RX ORDER — CEPHALEXIN 500 MG/1
CAPSULE ORAL
Refills: 6 | COMMUNITY
Start: 2019-04-24 | End: 2019-10-07

## 2019-06-11 RX ORDER — ZINC GLUCONATE 10 MG
500 LOZENGE ORAL DAILY
COMMUNITY
End: 2020-08-10 | Stop reason: ALTCHOICE

## 2019-06-11 NOTE — PROGRESS NOTES
6/11/2019 9:23 AM 
 
 
Subjective:  
 
Uyen Canela is here for f/u visit. Main complaint is LE cramps, heaviness and restless legs. He denies chest pain, chest pressure/discomfort, dyspnea, palpitations, irregular heart beats, near-syncope, syncope, fatigue, orthopnea, paroxysmal nocturnal dyspnea, exertional chest pressure/discomfort, tachypnea. Visit Vitals /68 (BP 1 Location: Left arm, BP Patient Position: Sitting) Pulse 69 Resp 16 Ht 5' 10\" (1.778 m) Wt 256 lb 14.4 oz (116.5 kg) SpO2 98% BMI 36.86 kg/m² Current Outpatient Medications Medication Sig  
 magnesium 250 mg tab Take  by mouth.  melatonin tab tablet Take 10 mg by mouth nightly.  cephALEXin (KEFLEX) 500 mg capsule TAKE 1 CAPSULE BY MOUTH 4 TIMES A DAILY  lisinopril (PRINIVIL, ZESTRIL) 20 mg tablet TAKE 1 TABLET DAILY  diclofenac (VOLTAREN) 1 % gel Apply  to affected area four (4) times daily.  empagliflozin (JARDIANCE) 25 mg tablet TAKE 1 TABLET BY MOUTH EVERY DAY  metFORMIN ER (GLUCOPHAGE XR) 500 mg tablet TAKE 2 TABLETS DAILY (WITH DINNER)  NOVOLIN R REGULAR U-100 INSULN 100 unit/mL injection INJECT 5 UNITS UNDER THE SKIN DAILY (WITH DINNER).  acetaminophen (TYLENOL) 500 mg tablet Take 2 Tabs by mouth every six (6) hours as needed for Pain.  Insulin Needles, Disposable, (BD ULTRA-FINE SHORT PEN NEEDLE) 31 gauge x 5/16\" ndle USE TO INJECT UNDER THE SKIN THREE TIMES A DAY  LANTUS SOLOSTAR U-100 INSULIN 100 unit/mL (3 mL) inpn INJECT 40 UNITS UNDER THE SKIN TWO TIMES A DAY  butalbital-acetaminophen-caffeine (FIORICET, ESGIC) -40 mg per tablet TAKE 1 TABLET EVERY 4 HOURS AS NEEDED FOR PAIN  
 glucose blood VI test strips (ONETOUCH ULTRA BLUE TEST STRIP) strip Test blood sugars twice daily  gabapentin (NEURONTIN) 300 mg capsule Take 2 Caps by mouth nightly.  ergocalciferol (VITAMIN D2) 50,000 unit capsule TAKE 1 CAPSULE EVERY 7 DAYS  atomoxetine (STRATTERA) 40 mg capsule TAKE 1 CAPSULE DAILY (NEED A FOLLOW UP APPOINTMENT FOR ADDITIONAL REFILLS) (Patient taking differently: TAKE 1 CAPSULE DAILY)  folic acid (FOLVITE) 1 mg tablet TAKE 1 TABLET DAILY  potassium 99 mg tablet Take 99 mg by mouth daily.  calcium citrate-vitamin D3 (CITRACAL + D) tablet Take  by mouth two (2) times a day.  JANUVIA 100 mg tablet TAKE 1 TABLET DAILY  sertraline (ZOLOFT) 100 mg tablet TAKE 1 TABLET DAILY  clotrimazole-betamethasone (LOTRISONE) topical cream Apply to affected area twice daily  primidone (MYSOLINE) 250 mg tablet TAKE 1 TABLET TWICE A DAY  aspirin 81 mg chewable tablet Take 1 Tab by mouth daily. (Patient taking differently: Take 81 mg by mouth every other day.)  traZODone (DESYREL) 50 mg tablet TAKE 1 TABLET BY MOUTH EVERY DAY AT NIGHT  GAVILYTE-C 240-22.72-6.72 -5.84 gram solution TAKE 1 (ONE) KIT CONTAINER MILLILITER AS DIRECTED BY PHYSICIAN  
 ferrous sulfate 140 mg (45 mg iron) TbER ER tablet Take 1 Tab by mouth Daily (before breakfast). No current facility-administered medications for this visit. Objective:  
  
Visit Vitals /68 (BP 1 Location: Left arm, BP Patient Position: Sitting) Pulse 69 Resp 16 Ht 5' 10\" (1.778 m) Wt 256 lb 14.4 oz (116.5 kg) SpO2 98% BMI 36.86 kg/m² Data Review: 
 
EKG: Normal sinus rhythm, no acute st/t changes Reviewed and/or ordered active problem list, medication list tests Past Medical History:  
Diagnosis Date  ADD (attention deficit disorder)  Anemia due to blood loss  Hinson's esophagus with esophagitis  Benign essential tremor  Cancer [facility]) 2012 [address]  Depression  DJD (degenerative joint disease) of hip   
 bilat  DM (diabetes mellitus) [address] 3/17/2010  ED (erectile dysfunction) of organic origin  Fall on or from sidewalk curb 9/24/2015  Femur fracture [address]  Gastritis and duodenitis  GERD (gastroesophageal reflux disease)   
 resolved after discontinuing diclofenac  Hematuria 2016  High cholesterol 3/17/2010  
 HTN (hypertension) 3/17/2010  Morbid obesity ([address]  Murmur, cardiac   
 PFO (patent foramen ovale) 2017  PUD (peptic ulcer disease)  Shingles 2016 RESOLVING- NO RASH (HEAD)  Sleep apnea CPAP Past Surgical History:  
Procedure Laterality Date  ENDOSCOPY, COLON, DIAGNOSTIC    
 HX CHOLECYSTECTOMY    HX GI    
 gastroplasty [address]  HX HIP REPLACEMENT Left  HX ORTHOPAEDIC    
 rot cuff repair [address]  HX VASCULAR ACCESS    
 picc line [address] [facility] ARTHROPLASTY  2010  
 right  TOTAL HIP ARTHROPLASTY  2016  
 left Allergies Allergen Reactions  Nsaids (Non-Steroidal Anti-Inflammatory Drug) Other (comments) Hx of PUD and Hinson's Esophagus Family History Problem Relation Age of Onset  Cancer Father   
     multiple myeloma  Stroke Father  Dementia Mother  No Known Problems Brother  COPD Brother  Cancer Maternal Grandmother COLON  Anesth Problems Neg Hx Social History Socioeconomic History  Marital status:  Spouse name: Not on file  Number of children: Not on file  Years of education: Not on file  Highest education level: Not on file Occupational History  Not on file Social Needs  Financial resource strain: Not on file  Food insecurity:  
  Worry: Not on file Inability: Not on file  Transportation needs:  
  Medical: Not on file Non-medical: Not on file Tobacco Use  Smoking status: Former Smoker Packs/day: 2.00 Years: 15.00 Pack years: 30.00 Last attempt to quit: 3/1/1979 Years since quittin.3  Smokeless tobacco: Never Used Substance and Sexual Activity  Alcohol use: No  
  Alcohol/week: 0.0 oz  Drug use:  No  
  Sexual activity: Never Lifestyle  Physical activity:  
  Days per week: Not on file Minutes per session: Not on file  Stress: Not on file Relationships  Social connections:  
  Talks on phone: Not on file Gets together: Not on file Attends Voodoo service: Not on file Active member of club or organization: Not on file Attends meetings of clubs or organizations: Not on file Relationship status: Not on file  Intimate partner violence:  
  Fear of current or ex partner: Not on file Emotionally abused: Not on file Physically abused: Not on file Forced sexual activity: Not on file Other Topics Concern  Not on file Social History Narrative  Not on file Review of Systems  
 
General: Not Present- Anorexia, Chills, Dietary Changes, Fatigue, Fever, Medication Changes, Night Sweats, Weight Gain > 10lbs. and Weight Loss > 10lbs. [place] Skin: Not Present- Bruising and Excessive Sweating. HEENT: Not Present- Headache, Visual Loss and Vertigo. Respiratory: Not Present- Cough, Decreased Exercise Tolerance, Difficulty Breathing, Snoring and Wheezing. Cardiovascular: Not Present- Abnormal Blood Pressure, Chest Pain, Difficulty Breathing On Exertion, Fainting / Blacking Out, Irregular Heart Beat, Orthopnea, Palpitations, Paroxysmal Nocturnal Dyspnea, Rapid Heart Rate, Shortness of Breath. Gastrointestinal: Not Present- Black, Tarry Stool, Bloody Stool, Diarrhea, Hematemesis, Rectal Bleeding and Vomiting. Musculoskeletal: Not Present- Muscle Pain and Muscle Weakness. Neurological: Not Present- Dizziness. Psychiatric: Not Present- Depression. Endocrine: Not Present- Cold Intolerance, Heat Intolerance and Thyroid Problems. Hematology: Not Present- Abnormal Bleeding, Anemia, Blood Clots and Easy Bruising. 
 
  
Physical Exam  
The physical exam findings are as follows: 
 
  
General  
Mental Status - Alert. General Appearance - Not in acute distress. Chest and Lung Exam  Inspection: Accessory muscles - No use of accessory muscles in breathing. Auscultation:  
Breath sounds: - Normal. 
 
 
Cardiovascular  
Inspection: Jugular vein - Bilateral - Inspection Normal. 
Palpation/Percussion:  
Apical Impulse: - Normal. 
Auscultation: Rhythm - Regular. Heart Sounds - S1 WNL and S2 WNL. No S3 or S4. Murmurs & Other Heart Sounds: Auscultation of the heart reveals - No Murmurs. Carotid arteries - No Carotid bruit. Peripheral Vascular  
Upper Extremity: Inspection - Bilateral - No Cyanotic nailbeds or Digital clubbing. Lower Extremity:  
Palpation: Dorsalis pedis pulse - Bilateral - Normal. Posterior tibia pulse - Bilateral - Normal. Edema - Bilateral - No edema. Assessment: ICD-10-CM ICD-9-CM 1. Hypertension, unspecified type I10 401.9 AMB POC EKG ROUTINE W/ 12 LEADS, INTER & REP ANKLE BRACHIAL INDEX DUPLEX LOWER EXT VENOUS BILAT 2. High cholesterol E78.00 272.0 ANKLE BRACHIAL INDEX DUPLEX LOWER EXT VENOUS BILAT 3. PFO (patent foramen ovale) Q21.1 745. 5 ANKLE BRACHIAL INDEX DUPLEX LOWER EXT VENOUS BILAT 4. Leg cramps R25.2 729.82 ANKLE BRACHIAL INDEX DUPLEX LOWER EXT VENOUS BILAT 5. Leg heaviness R29.898 729.89 ANKLE BRACHIAL INDEX DUPLEX LOWER EXT VENOUS BILAT Plan: 1. leg heaviness, cramps and restless leg: check DAMARIS and venous reflux study. 2. BP controlled. 3. On statin. Last FLP reviewed. 4. Possible tiny PFO with small right to left shunt on recent EULALIO. Clinically asymptomatic. No h/o TIA or CVA.

## 2019-06-11 NOTE — PROGRESS NOTES
Chief Complaint Patient presents with  Hypertension  
  annual follow up 1. Have you been to the ER, urgent care clinic since your last visit? Hospitalized since your last visit? yes [facility] ED  5/11/2019 due to fall 2. Have you seen or consulted any other health care providers outside of the [address] [place] [address] since your last visit? Include any pap smears or colon screening.  NO

## 2019-06-17 RX ORDER — LOVASTATIN 20 MG/1
20 TABLET ORAL
COMMUNITY
End: 2019-09-30 | Stop reason: ALTCHOICE

## 2019-06-18 ENCOUNTER — ANESTHESIA (OUTPATIENT)
Dept: ENDOSCOPY | Age: 69
End: 2019-06-18
Payer: MEDICARE

## 2019-06-18 ENCOUNTER — HOSPITAL ENCOUNTER (OUTPATIENT)
Age: 69
Setting detail: OUTPATIENT SURGERY
Discharge: HOME | End: 2019-06-18
Attending: INTERNAL MEDICINE | Admitting: INTERNAL MEDICINE
Payer: MEDICARE

## 2019-06-18 ENCOUNTER — ANESTHESIA EVENT (OUTPATIENT)
Dept: ENDOSCOPY | Age: 69
End: 2019-06-18
Payer: MEDICARE

## 2019-06-18 VITALS
DIASTOLIC BLOOD PRESSURE: 72 MMHG | WEIGHT: 253.38 LBS | RESPIRATION RATE: 10 BRPM | SYSTOLIC BLOOD PRESSURE: 150 MMHG | OXYGEN SATURATION: 100 % | HEIGHT: 70 IN | BODY MASS INDEX: 36.27 KG/M2 | TEMPERATURE: 97.7 F | HEART RATE: 65 BPM

## 2019-06-18 LAB
COLONOSCOPY, EXTERNAL: NORMAL
GLUCOSE BLD STRIP.AUTO-MCNC: 161 MG/DL (ref 65–100)
SERVICE CMNT-IMP: ABNORMAL

## 2019-06-18 PROCEDURE — 74011250637 HC RX REV CODE- 250/637: Performed by: INTERNAL MEDICINE

## 2019-06-18 PROCEDURE — 82962 GLUCOSE BLOOD TEST: CPT

## 2019-06-18 PROCEDURE — 74011250636 HC RX REV CODE- 250/636: Performed by: INTERNAL MEDICINE

## 2019-06-18 PROCEDURE — 88305 TISSUE EXAM BY PATHOLOGIST: CPT

## 2019-06-18 PROCEDURE — 77030039825 HC MSK NSL PAP SUPERNO2VA VYRM -B: Performed by: ANESTHESIOLOGY

## 2019-06-18 PROCEDURE — 77030019988 HC FCPS ENDOSC DISP BSC -B: Performed by: INTERNAL MEDICINE

## 2019-06-18 PROCEDURE — 74011250636 HC RX REV CODE- 250/636

## 2019-06-18 PROCEDURE — 76060000032 HC ANESTHESIA 0.5 TO 1 HR: Performed by: INTERNAL MEDICINE

## 2019-06-18 PROCEDURE — 76040000007: Performed by: INTERNAL MEDICINE

## 2019-06-18 RX ORDER — FLUMAZENIL 0.1 MG/ML
0.2 INJECTION INTRAVENOUS
Status: DISCONTINUED | OUTPATIENT
Start: 2019-06-18 | End: 2019-06-18 | Stop reason: HOSPADM

## 2019-06-18 RX ORDER — FENTANYL CITRATE 50 UG/ML
25 INJECTION, SOLUTION INTRAMUSCULAR; INTRAVENOUS
Status: DISCONTINUED | OUTPATIENT
Start: 2019-06-18 | End: 2019-06-18 | Stop reason: HOSPADM

## 2019-06-18 RX ORDER — EPINEPHRINE 0.1 MG/ML
1 INJECTION INTRACARDIAC; INTRAVENOUS
Status: DISCONTINUED | OUTPATIENT
Start: 2019-06-18 | End: 2019-06-18 | Stop reason: HOSPADM

## 2019-06-18 RX ORDER — ATROPINE SULFATE 0.1 MG/ML
0.5 INJECTION INTRAVENOUS
Status: DISCONTINUED | OUTPATIENT
Start: 2019-06-18 | End: 2019-06-18 | Stop reason: HOSPADM

## 2019-06-18 RX ORDER — SODIUM CHLORIDE 0.9 % (FLUSH) 0.9 %
5-40 SYRINGE (ML) INJECTION AS NEEDED
Status: DISCONTINUED | OUTPATIENT
Start: 2019-06-18 | End: 2019-06-18 | Stop reason: HOSPADM

## 2019-06-18 RX ORDER — NALOXONE HYDROCHLORIDE 0.4 MG/ML
0.4 INJECTION, SOLUTION INTRAMUSCULAR; INTRAVENOUS; SUBCUTANEOUS
Status: DISCONTINUED | OUTPATIENT
Start: 2019-06-18 | End: 2019-06-18 | Stop reason: HOSPADM

## 2019-06-18 RX ORDER — MIDAZOLAM HYDROCHLORIDE 1 MG/ML
.25-5 INJECTION, SOLUTION INTRAMUSCULAR; INTRAVENOUS
Status: DISCONTINUED | OUTPATIENT
Start: 2019-06-18 | End: 2019-06-18 | Stop reason: HOSPADM

## 2019-06-18 RX ORDER — DEXTROMETHORPHAN/PSEUDOEPHED 2.5-7.5/.8
1.2 DROPS ORAL
Status: DISCONTINUED | OUTPATIENT
Start: 2019-06-18 | End: 2019-06-18 | Stop reason: HOSPADM

## 2019-06-18 RX ORDER — INSULIN GLARGINE 100 [IU]/ML
40 INJECTION, SOLUTION SUBCUTANEOUS 2 TIMES DAILY
COMMUNITY
End: 2019-07-09 | Stop reason: SDUPTHER

## 2019-06-18 RX ORDER — SODIUM CHLORIDE 9 MG/ML
75 INJECTION, SOLUTION INTRAVENOUS CONTINUOUS
Status: DISCONTINUED | OUTPATIENT
Start: 2019-06-18 | End: 2019-06-18 | Stop reason: HOSPADM

## 2019-06-18 RX ORDER — PROPOFOL 10 MG/ML
INJECTION, EMULSION INTRAVENOUS AS NEEDED
Status: DISCONTINUED | OUTPATIENT
Start: 2019-06-18 | End: 2019-06-18 | Stop reason: HOSPADM

## 2019-06-18 RX ORDER — LIDOCAINE HYDROCHLORIDE 20 MG/ML
INJECTION, SOLUTION EPIDURAL; INFILTRATION; INTRACAUDAL; PERINEURAL AS NEEDED
Status: DISCONTINUED | OUTPATIENT
Start: 2019-06-18 | End: 2019-06-18 | Stop reason: HOSPADM

## 2019-06-18 RX ORDER — SODIUM CHLORIDE 0.9 % (FLUSH) 0.9 %
5-40 SYRINGE (ML) INJECTION EVERY 8 HOURS
Status: DISCONTINUED | OUTPATIENT
Start: 2019-06-18 | End: 2019-06-18 | Stop reason: HOSPADM

## 2019-06-18 RX ADMIN — PROPOFOL 50 MG: 10 INJECTION, EMULSION INTRAVENOUS at 09:02

## 2019-06-18 RX ADMIN — PROPOFOL 50 MG: 10 INJECTION, EMULSION INTRAVENOUS at 08:46

## 2019-06-18 RX ADMIN — SODIUM CHLORIDE 75 ML/HR: 900 INJECTION, SOLUTION INTRAVENOUS at 07:50

## 2019-06-18 RX ADMIN — LIDOCAINE HYDROCHLORIDE 20 MG: 20 INJECTION, SOLUTION EPIDURAL; INFILTRATION; INTRACAUDAL; PERINEURAL at 08:44

## 2019-06-18 RX ADMIN — PROPOFOL 50 MG: 10 INJECTION, EMULSION INTRAVENOUS at 08:50

## 2019-06-18 RX ADMIN — SIMETHICONE 80 MG: 20 SUSPENSION/ DROPS ORAL at 08:59

## 2019-06-18 RX ADMIN — PROPOFOL 50 MG: 10 INJECTION, EMULSION INTRAVENOUS at 08:48

## 2019-06-18 RX ADMIN — PROPOFOL 50 MG: 10 INJECTION, EMULSION INTRAVENOUS at 08:55

## 2019-06-18 RX ADMIN — PROPOFOL 50 MG: 10 INJECTION, EMULSION INTRAVENOUS at 08:44

## 2019-06-18 RX ADMIN — SODIUM CHLORIDE 75 ML/HR: 900 INJECTION, SOLUTION INTRAVENOUS at 07:46

## 2019-06-18 NOTE — ANESTHESIA POSTPROCEDURE EVALUATION
Procedure(s): 
COLONOSCOPY AND EGD 
ESOPHAGOGASTRODUODENOSCOPY (EGD) ESOPHAGOGASTRODUODENAL (EGD) BIOPSY. total IV anesthesia, general 
 
Anesthesia Post Evaluation Patient location during evaluation: PACU Note status: Adequate. Level of consciousness: responsive to verbal stimuli and sleepy but conscious Pain management: satisfactory to patient Airway patency: patent Anesthetic complications: no 
Cardiovascular status: acceptable Respiratory status: acceptable Hydration status: acceptable Comments: +Post-Anesthesia Evaluation and Assessment Patient: Delia Jackson MRN: [id number]  SSN: [id number] YOB: 1950  Age: 77 y.o. Sex: male Cardiovascular Function/Vital Signs /56   Pulse 66   Temp 36.7 °C (98 °F)   Resp 14   Ht 5' 10\" (1.778 m)   Wt 114.9 kg (253 lb 6 oz)   SpO2 99%   BMI 36.36 kg/m² Patient is status post Procedure(s): 
COLONOSCOPY AND EGD 
ESOPHAGOGASTRODUODENOSCOPY (EGD) ESOPHAGOGASTRODUODENAL (EGD) BIOPSY. Nausea/Vomiting: Controlled. Postoperative hydration reviewed and adequate. Pain: 
Pain Scale 1: Numeric (0 - 10) (06/18/19 0744) Pain Intensity 1: 0 (06/18/19 0744) Managed. Neurological Status: At baseline. Mental Status and Level of Consciousness: Arousable. Pulmonary Status:  
O2 Device: Nasal cannula (06/18/19 0909) Adequate oxygenation and airway patent. Complications related to anesthesia: None Post-anesthesia assessment completed. No concerns. Signed By: Mac Perla MD  
 6/18/2019 Post anesthesia nausea and vomiting:  controlled Vitals Value Taken Time /56 6/18/2019  9:09 AM  
Temp Pulse 66 6/18/2019  9:09 AM  
Resp 14 6/18/2019  9:09 AM  
SpO2 99 % 6/18/2019  9:09 AM

## 2019-06-18 NOTE — PROCEDURES
NAME:  [facility] :   1950 MRN:   [id number] Date/Time:  2019 9:19 AMEsophagogastroduodenoscopy (EGD) Procedure Note Procedure: Esophagogastroduodenoscopy with biopsy Indication:  Hinson's/esophageal ulcer Pre-operative Diagnosis: see indication above Post-operative Diagnosis: see findings below :  Ursula Lui MD 
Referring Provider:   Leland Jacobsen MD 
 
Exam: Airway: clear, no airway problems anticipated Heart: RRR, without gallops or rubs Lungs: clear bilaterally without wheezes, crackles, or rhonchi Abdomen: soft, nontender, nondistended, bowel sounds present Mental Status: awake, alert and oriented to person, place and time Anethesia/Sedation:  MAC anesthesia Propofol as per colonoscopy Procedure Details After informed consent was obtained for the procedure, with all risks and benefits of procedure explained the patient was taken to the endoscopy suite and placed in the left lateral decubitus position. Following sequential administration of sedation as per above, the [id number] gastroscope was inserted into the mouth and advanced under direct vision to second portion of the duodenum. A careful inspection was made as the gastroscope was withdrawn, including a retroflexed view of the proximal stomach; findings and interventions are described below. Findings:   
-Large 3+ esophageal varices as at least 3 distinct cords extending from 30-40cm 
-No obvious Hinson's-type esophagus changes seen at gastroesopahgeal junction; biopsies are obtained there 
-Medium sized hiatal hernia from 40-45cm 
-Diffuse mosaic-like pattern of gastric mucosa suggestive of portal hypertensive gastropathy; biopsied 
-Normal duodenum Therapies:  biopsy of esophagus; biopsy of stomach Specimens: #1 gastric; #2 g-e jxn EBL:  None. Complications:   None; patient tolerated the procedure well. Impression: -Large 3+ esophageal varices as at least 3 distinct cords extending from 30-40cm 
-No obvious Hinson's-type esophagus changes seen at gastroesopahgeal junction; biopsies are obtained there 
-Medium sized hiatal hernia from 40-45cm 
-Diffuse mosaic-like pattern of gastric mucosa suggestive of portal hypertensive gastropathy; biopsied 
-Normal duodenum Recommendations: 
-Await pathology. , -Follow symptoms. , -You need an office ffollow-up with me in next 2-3 weeks Discharge disposition:  Home in the company of  when able to ambulate after colonoscopy Shagufta Bryson MD

## 2019-06-18 NOTE — H&P
Gastroenterology Outpatient History and Physical 
 
Patient: Charlotte Gruber Physician: Boris Hess MD 
 
Chief Complaint: fam hx colon polyps, pers hx Hinson's esophagus History of Present Illness: 65yo M with fam hx colon polyps, pers hx Hinson's esophagus. Last EGD 2010, last colonosocpy 2010 History: 
Past Medical History:  
Diagnosis Date  ADD (attention deficit disorder)  Anemia due to blood loss  Hinson's esophagus with esophagitis  Benign essential tremor  Cancer [facility] 2012 [facility]  Depression  DJD (degenerative joint disease) of hip   
 bilat  DM (diabetes mellitus) ([address] 3/17/2010  ED (erectile dysfunction) of organic origin  Fall on or from sidewalk curb 9/24/2015  Femur fracture ([facility]  Gastritis and duodenitis  GERD (gastroesophageal reflux disease)   
 resolved after discontinuing diclofenac  Hematuria 6/2016  High cholesterol 03/17/2010  
 pt denies 6/19  
 HTN (hypertension) 3/17/2010  Morbid obesity ([facility]  Murmur, cardiac 2016  
 PFO (patent foramen ovale) 7/26/2017  PUD (peptic ulcer disease)  Shingles 6/2016 RESOLVING- NO RASH (HEAD)  Sleep apnea   
 doesnt use CPAP anymore Past Surgical History:  
Procedure Laterality Date  ENDOSCOPY, COLON, DIAGNOSTIC    
 HX CHOLECYSTECTOMY  1995  HX GI  1980  
 gastroplasty [address]  HX HIP REPLACEMENT Left  HX ORTHOPAEDIC Right 2011  
 rot cuff repair  HX ORTHOPAEDIC  2016  
 left broken femur [address]  HX VASCULAR ACCESS    
 picc line and removed after sepsis resolved [address] [facility] ARTHROPLASTY  05/2010  
 right  TOTAL HIP ARTHROPLASTY  08/01/2016  
 left Social History Socioeconomic History  Marital status:  Spouse name: Not on file  Number of children: Not on file  Years of education: Not on file  Highest education level: Not on file Tobacco Use  
  Smoking status: Former Smoker Packs/day: 2.00 Years: 15.00 Pack years: 30.00 Last attempt to quit: 3/1/1979 Years since quittin.3  Smokeless tobacco: Never Used Substance and Sexual Activity  Alcohol use: No  
  Alcohol/week: 0.0 oz  Drug use: No  
 Sexual activity: Never Family History Problem Relation Age of Onset  Cancer Father   
     multiple myeloma  Stroke Father  Dementia Mother  No Known Problems Brother  COPD Brother  Cancer Maternal Grandmother COLON  Anesth Problems Neg Hx Patient Active Problem List  
Diagnosis Code  
 HTN (hypertension) I10  
 High cholesterol E78.00  
 Osteoarthritis of hip M16.9  Depression F32.9  ED (erectile dysfunction) of organic origin N52.9  GERD (gastroesophageal reflux disease) K21.9  PUD (peptic ulcer disease) K27.9  Hinson's esophagus with esophagitis K22.70, K20.9  Encounter for long-term (current) use of medications Z79.899  Hypogonadism male E29.1  Diabetes mellitus type 2, uncontrolled ([address] E11.65  Benign essential tremor G25.0  Osteoarthritis of right hip M16.11  
 Jessica-prosthetic fracture of femur following total hip arthroplasty M97. Pixie Fontana  Systolic murmur I38.1  PFO (patent foramen ovale) Q21.1  Recurrent depression ([address] F33.9  Type 2 diabetes mellitus with diabetic neuropathy (HCC) E11.40  Bacteremia due to Staphylococcus R78.81  Infected prosthesis of right hip ([address] T84.51XA  Sepsis ([address] A41.9  Endocarditis of mitral valve I05.8  Obesity E66.9  
 Insomnia G47.00  Dislocation of prosthetic joint ([address] T84.029A  Severe obesity (BMI 35.0-39. 9) with comorbidity ([address] E66.01  
 Essential hypertension I10  
 Mixed hyperlipidemia E78.2  Leg cramps R25.2  Leg heaviness R29.898 Allergies: Allergies Allergen Reactions  Nsaids (Non-Steroidal Anti-Inflammatory Drug) Other (comments) Hx of PUD and Hinson's Esophagus Medications:  
Prior to Admission medications Medication Sig Start Date End Date Taking? Authorizing Provider  
insulin glargine (LANTUS U-100 INSULIN) 100 unit/mL injection by SubCUTAneous route nightly. Yes Provider, Historical  
lovastatin (MEVACOR) 20 mg tablet Take 20 mg by mouth nightly. Yes Provider, Historical  
magnesium 250 mg tab Take 500 mg by mouth daily. Yes Provider, Historical  
melatonin tab tablet Take 10 mg by mouth nightly as needed. Yes Provider, Historical  
traZODone (DESYREL) 50 mg tablet TAKE 1 TABLET BY MOUTH EVERY DAY AT NIGHT Patient taking differently: TAKE 1 TABLET BY MOUTH EVERY DAY AT NIGHT as needed 5/1/19  Yes Rosie Jovel MD  
ferrous sulfate 140 mg (45 mg iron) TbER ER tablet Take 1 Tab by mouth Daily (before breakfast). 12/20/18  Yes Rosie Jovel MD  
potassium 99 mg tablet Take 99 mg by mouth daily. Yes Provider, Historical  
cephALEXin (KEFLEX) 500 mg capsule TAKE 1 CAPSULE BY MOUTH 2 TIMES A DAILY 4/24/19   Provider, Historical  
lisinopril (PRINIVIL, ZESTRIL) 20 mg tablet TAKE 1 TABLET DAILY 6/6/19   Rosie Jovel MD  
diclofenac (VOLTAREN) 1 % gel Apply  to affected area four (4) times daily. 6/6/19   Rosie Jovel MD  
empagliflozin (JARDIANCE) 25 mg tablet TAKE 1 TABLET BY MOUTH EVERY DAY 6/3/19   Rosie Jovel MD  
metFORMIN ER (GLUCOPHAGE XR) 500 mg tablet TAKE 2 TABLETS DAILY (WITH DINNER) 6/3/19   Rosie Jovel MD  
NOVOLIN R REGULAR U-100 INSULN 100 unit/mL injection INJECT 5 UNITS UNDER THE SKIN DAILY (WITH DINNER). 5/20/19   Rosie Jovel MD  
acetaminophen (TYLENOL) 500 mg tablet Take 2 Tabs by mouth every six (6) hours as needed for Pain.  5/11/19   Suresh Gomes MD  
Insulin Needles, Disposable, (BD ULTRA-FINE SHORT PEN NEEDLE) 31 gauge x 5/16\" ndle USE TO INJECT UNDER THE SKIN THREE TIMES A DAY 5/9/19   Rosie Jovel MD  
 LANTUS SOLOSTAR U-100 INSULIN 100 unit/mL (3 mL) inpn INJECT 40 UNITS UNDER THE SKIN TWO TIMES A DAY 5/1/19   Lorenzo Ramires MD  
butalbital-acetaminophen-caffeine (FIORICET, ESGIC) -40 mg per tablet TAKE 1 TABLET EVERY 4 HOURS AS NEEDED FOR PAIN 4/24/19   Lorenzo Ramires MD  
glucose blood VI test strips (ONETOUCH ULTRA BLUE TEST STRIP) strip Test blood sugars twice daily 4/24/19   Lorenzo Ramires MD  
gabapentin (NEURONTIN) 300 mg capsule Take 2 Caps by mouth nightly. 4/24/19   Lorenzo Ramires MD  
ergocalciferol (VITAMIN D2) 50,000 unit capsule TAKE 1 CAPSULE EVERY 7 DAYS 4/24/19   Lorenzo Ramires MD  
GAVILYTE-C 288-44.49-1.34 -5.84 gram solution TAKE 1 (ONE) KIT CONTAINER MILLILITER AS DIRECTED BY PHYSICIAN 12/31/18   Provider, Historical  
atomoxetine (STRATTERA) 40 mg capsule TAKE 1 CAPSULE DAILY (NEED A FOLLOW UP APPOINTMENT FOR ADDITIONAL REFILLS) Patient taking differently: TAKE 1 CAPSULE DAILY 1/14/19   Libra Hahn MD  
folic acid (FOLVITE) 1 mg tablet TAKE 1 TABLET DAILY 12/26/18   oLrenzo Ramires MD  
calcium citrate-vitamin D3 (CITRACAL + D) tablet Take  by mouth two (2) times a day. Provider, Historical  
JANUVIA 100 mg tablet TAKE 1 TABLET DAILY 11/16/18   Lorenzo Ramires MD  
sertraline (ZOLOFT) 100 mg tablet TAKE 1 TABLET DAILY 8/27/18   Lorenzo Ramires MD  
primidone (MYSOLINE) 250 mg tablet TAKE 1 TABLET TWICE A DAY 2/13/18   Libra Hahn MD  
aspirin 81 mg chewable tablet Take 1 Tab by mouth daily. Patient taking differently: Take 81 mg by mouth every other day. 2/2/17   Lorenzo Ramires MD  
 
Physical Exam:  
Vital Signs: Blood pressure 120/61, pulse 79, temperature 98 °F (36.7 °C), resp. rate 19, height 5' 10\" (1.778 m), weight 114.9 kg (253 lb 6 oz), SpO2 100 %. General: well developed, well nourished HEENT: unremarkable Heart: regular rhythm no mumur Lungs: clear Abdominal:  benign Neurological: unremarkable Extremities: no edema Findings/Diagnosis: fam hx colon polyps, pers hx Hinson's esophagus Plan of Care/Planned Procedure: EGD/Colonosocpy with conscious/deep sedation Signed: 
Shaheen Gutierrez MD 6/18/2019

## 2019-06-18 NOTE — ROUTINE PROCESS
Lyle Jack 1950 
[id number] Situation: 
Verbal report received from: [facility] Procedure: Procedure(s): 
COLONOSCOPY AND EGD 
ESOPHAGOGASTRODUODENOSCOPY (EGD) ESOPHAGOGASTRODUODENAL (EGD) BIOPSY Background: 
 
Preoperative diagnosis: FAMILY HX OF POLYPS AND COLON CANCER, ANEMIA, BARRETTS Postoperative diagnosis: EGD - gastritis, hiatal hernia, esophageal varicies Colon - Family history of colon polyps, internal hemorrhoids :  Dr. Rema Mayorga Assistant(s): Endoscopy Technician-1: Samy Peterson Endoscopy RN-1: Tien Hatfield RN Specimens:  
ID Type Source Tests Collected by Time Destination 1 : Stomach biopsy Preservative   Diana Avilez MD 6/18/2019 6504 Pathology 2 : GE Junction biopsy Preservative   Diana Avilez MD 6/18/2019 0917 Pathology H. Pylori  no Assessment: 
Intra-procedure medications Anesthesia gave intra-procedure sedation and medications, see anesthesia flow sheet yes Intravenous fluids: NS@ Gal[place] Comes Vital signs stable Abdominal assessment: round and soft Recommendation: 
Discharge patient per MD order. Family or Friend Brother Stephen Crump Permission to share finding with family or friend yes

## 2019-06-18 NOTE — ANESTHESIA PREPROCEDURE EVALUATION
Anesthetic History No history of anesthetic complications Review of Systems / Medical History Patient summary reviewed, nursing notes reviewed and pertinent labs reviewed Pulmonary Sleep apnea: No treatment Neuro/Psych Psychiatric history Comments: Depression Benign essential tremor  Cardiovascular Hypertension Hyperlipidemia Exercise tolerance: >4 METS 
  
GI/Hepatic/Renal 
  
GERD: well controlled PUD Comments: Hinson's esophagus with esophagitis  Endo/Other Diabetes Obesity and arthritis Other Findings Comments: DJD Hx BCCA 
ADD (attention deficit disorder) Physical Exam 
 
Airway Mallampati: II 
TM Distance: > 6 cm Neck ROM: normal range of motion Mouth opening: Normal 
 
 Cardiovascular Regular rate and rhythm,  S1 and S2 normal,  no murmur, click, rub, or gallop Dental 
No notable dental hx Pulmonary Breath sounds clear to auscultation Abdominal 
GI exam deferred Other Findings Anesthetic Plan ASA: 3 Anesthesia type: total IV anesthesia Induction: Intravenous Anesthetic plan and risks discussed with: Patient

## 2019-06-18 NOTE — PROCEDURES
NAME:  Sandy Harrison :   1950 MRN:   [id number] Date/Time:  2019 9:23 AMColonoscopy Operative Report Procedure Type:   Colonoscopy --screening Indications:     Family history of coloretal adenoma  (screening only) Pre-operative Diagnosis: see indication above Post-operative Diagnosis:  See findings below :  Shaheen Gutierrez MD 
Referring Provider: Jackie Burrows MD 
 
Exam: Airway: clear, no airway problems anticipated Heart: RRR, without gallops or rubs Lungs: clear bilaterally without wheezes, crackles, or rhonchi Abdomen: soft, nontender, nondistended, bowel sounds present Mental Status: awake, alert and oriented to person, place and time Sedation:  MAC anesthesia Propofol 300mg IV Procedure Details:  After informed consent was obtained with all risks and benefits of procedure explained and preoperative exam completed, the patient was taken to the endoscopy suite and placed in the left lateral decubitus position. Upon sequential sedation as per above, a digital rectal exam was performed demonstrating internal hemorrhoids. The Olympus videocolonoscope  was inserted in the rectum and carefully advanced to the cecum, which was identified by the ileocecal valve and appendiceal orifice. The quality of preparation was adequate. The colonoscope was slowly withdrawn with careful evaluation between folds. Retroflexion in the rectum was completed demonstrating internal hemorrhoids. Findings:    
-Normal colonic mucosa without mass or polyp 
-Small internal hemorrhoids Specimen Removed:  None. Complications: None. EBL:  None. Impression:   
-Normal colonic mucosa without mass or polyp 
-Small internal hemorrhoids Recommendations: --Repeat colonoscopy in 5 years. High fiber diet. Resume normal medication(s). Discharge Disposition:  Home in the company of a  when able to ambulate.  
 
Shaheen Gutierrez MD

## 2019-06-18 NOTE — DISCHARGE INSTRUCTIONS
Jazz Tapia  [id number]  1950    EGD/COLON DISCHARGE INSTRUCTIONS  Discomfort:  Redness at IV site- apply warm compress to area; if redness or soreness persist- contact your physician  There may be a slight amount of blood passed from the rectum  Gaseous discomfort- walking, belching will help relieve any discomfort  You may not operate a vehicle for 12 hours  You may not engage in an occupation involving machinery or appliances for rest of today  You may not drink alcoholic beverages for at least 12 hours  Avoid making any critical decisions for at least 24 hour  DIET:   High fiber diet. - however -  remember your colon is empty and a heavy meal will produce gas. Avoid these foods:  vegetables, fried / greasy foods, carbonated drinks for today  MEDICATION:         ACTIVITY:  You may not resume your normal daily activities until tomorrow AM; it is recommended that you spend the remainder of the day resting -  avoid any strenuous activity. CALL M.D.   ANY SIGN OF:   Increasing pain, nausea, vomiting  Abdominal distension (swelling)  New increased bleeding (oral or rectal)  Fever (chills)  Pain in chest area  Bloody discharge from nose or mouth  Shortness of breath    IMPRESSION:  -Large 3+ esophageal varices as at least 3 distinct cords extending from 30-40cm  -No obvious Hinson's-type esophagus changes seen at gastroesopahgeal junction; biopsies are obtained there  -Medium sized hiatal hernia from 40-45cm  -Diffuse mosaic-like pattern of gastric mucosa suggestive of portal hypertensive gastropathy; biopsied  -Normal duodenum  -Normal colonic mucosa without mass or polyp  -Small internal hemorrhoids    Follow-up Instructions:   Call Dr. Veronica Aleman for the results of procedure / biopsy in 7-10 days  Appointment in 2-3 weeks  Telephone # [phone]  Repeat colonoscopy in 5 years    Jeanne Livingston MD

## 2019-06-26 ENCOUNTER — TELEPHONE (OUTPATIENT)
Dept: CARDIOLOGY CLINIC | Age: 69
End: 2019-06-26

## 2019-06-26 NOTE — TELEPHONE ENCOUNTER
Spoke to patient using 2 identifiers. Pt would like to know the results of both the duplex to LE and LE ext art PVR. Please advise.

## 2019-06-26 NOTE — TELEPHONE ENCOUNTER
Spoke to patient using 2 identifiers. Per Dr. Candi Russell, pt was made aware his DAMARIS is normal and has left sided superficial venous insufficiency. Pt requested an explanation of duplex results and would like to know what steps to take next. Please advise.

## 2019-07-01 NOTE — TELEPHONE ENCOUNTER
Called pt,verified pt with two pt identifiers, told pt that I see where he had further questions regarding his results note. Advised he does have an appt on 7/2/19 at 3:30 pm to discuss with . Advised that  will discuss his results at that appt. Pt verbalized understanding.

## 2019-07-02 ENCOUNTER — OFFICE VISIT (OUTPATIENT)
Dept: CARDIOLOGY CLINIC | Age: 69
End: 2019-07-02

## 2019-07-02 VITALS
OXYGEN SATURATION: 98 % | DIASTOLIC BLOOD PRESSURE: 74 MMHG | HEART RATE: 66 BPM | RESPIRATION RATE: 18 BRPM | WEIGHT: 261.2 LBS | BODY MASS INDEX: 37.48 KG/M2 | SYSTOLIC BLOOD PRESSURE: 132 MMHG

## 2019-07-02 DIAGNOSIS — I10 ESSENTIAL HYPERTENSION: ICD-10-CM

## 2019-07-02 DIAGNOSIS — E78.00 HIGH CHOLESTEROL: ICD-10-CM

## 2019-07-02 DIAGNOSIS — I87.2 VENOUS INSUFFICIENCY OF LEFT LOWER EXTREMITY: Primary | ICD-10-CM

## 2019-07-02 NOTE — PROGRESS NOTES
7/2/2019 3:53 PM 
 
 
Subjective:  
 
Padilla Castro is a 77 y.o. male who is here to discuss venous doppler studies. He has a hx of DM2, HTN, HLD. He had DAMARIS and venous doppler done due to complaints of leg cramps and leg edema. DAMARIS (6/21/19): The right resting DAMARIS is normal. The left resting DAMARIS is normal. Exercise Study: toe raises. Symptoms: Some leg discomfort during 2 minutes of toe raises. Immediately post exercise, the right DAMARIS is normal. 
Immediately post exercise, the left DAMARIS is normal.  
 
Venous Doppler BLE (6/14/19): · No evidence of acute deep vein thrombosis in the right common femoral, superficial femoral, popliteal, posterior tibial, and peroneal veins. The veins were imaged in the transverse and longitudinal planes. The vessels showed normal color filling and compressibility. Doppler interrogation showed phasic and spontaneous flow. · No evidence of acute deep vein thrombosis in the left common femoral, superficial femoral, popliteal, posterior tibial, and peroneal veins. The veins were imaged in the transverse and longitudinal planes. The vessels showed normal color filling and compressibility. Doppler interrogation showed phasic and spontaneous flow. · Left great saphenous vein displays venous insufficiency. 1.  Have you ever had vein stripping surgery NO If yes, when and which leg? 2. Have you ever had vein injections? NO If yes, which leg and where on the leg? 3. Have you ever had a blood clot? NO If yes, which leg and when? 4. Have you ever had phlebitis? NO If yes, which leg and when? Does anyone in your family have (or used to have) varicose veins, spider veins, leg ulcers or swollen legs? Father  NO Mother NO Brother(s) NO Sister(s) NO Other NO        
 
1. Do you experience any of the following in your legs? Aching/pain? [] One leg [x] Both legs Heaviness? [] One leg [x] Both legs Tiredness/fatigue? [] One leg [x] Both legs Itching/burning? [] One leg [x] Both legs Swollen ankles? [] One leg [x] Both legs Leg cramps? [] One leg [x] Both legs Restless legs? [] One leg [x] Both legs Throbbing? [] One leg [] Both legs Other? 2.  Have your veins gotten worse in recent months? NO Describe: 3. Do you take any medication for pain (i.e., Advil, Motrin)  NO If yes, what medication do you take and how many times/mgs per day? 4. Do you elevate your legs to relieve discomfort? YES If yes, how long per day do you elevate and does it provide relief? 1-2 hours 5. Do you exercise? YES If yes, what kind of exercise and how often? Pain with walking 6. Do you wear prescription compression stockings? NO If yes, what type and gradient? How long have you worn them? If yes, what is the name of the physician who prescribed your compression                         stockings and when were they prescribed? 7. Do you wear light support hose (i.e., [employer])? NO If yes, do they provide relief? NO  
 
 8. Do you have any problem walking? YES If yes, describe how it interferes with your activities of daily living, which            Activities? Walking with pain 9.   What type of work do you do? Retired How long do you stand (hours per day) at work? At home? 4-5 hours 10. Have you ever had any test(s) done on your veins? NO     
  
 11. Were you diagnosed with saphenous vein reflux? NO Visit Vitals /74 (BP 1 Location: Right arm, BP Patient Position: Sitting) Pulse 66 Resp 18 Wt 261 lb 3.2 oz (118.5 kg) SpO2 98% BMI 37.48 kg/m² Current Outpatient Medications Medication Sig  
 B.infantis-B.ani-B.long-B.bifi (PROBIOTIC 4X) 10-15 mg TbEC Take  by mouth.  
 insulin glargine (LANTUS U-100 INSULIN) 100 unit/mL injection 40 Units by SubCUTAneous route two (2) times a day.  lovastatin (MEVACOR) 20 mg tablet Take 20 mg by mouth nightly.  magnesium 250 mg tab Take 500 mg by mouth daily.  melatonin tab tablet Take 10 mg by mouth nightly as needed.  cephALEXin (KEFLEX) 500 mg capsule TAKE 1 CAPSULE BY MOUTH 2 TIMES A DAILY  lisinopril (PRINIVIL, ZESTRIL) 20 mg tablet TAKE 1 TABLET DAILY  diclofenac (VOLTAREN) 1 % gel Apply  to affected area four (4) times daily.  empagliflozin (JARDIANCE) 25 mg tablet TAKE 1 TABLET BY MOUTH EVERY DAY  metFORMIN ER (GLUCOPHAGE XR) 500 mg tablet TAKE 2 TABLETS DAILY (WITH DINNER)  NOVOLIN R REGULAR U-100 INSULN 100 unit/mL injection INJECT 5 UNITS UNDER THE SKIN DAILY (WITH DINNER).  acetaminophen (TYLENOL) 500 mg tablet Take 2 Tabs by mouth every six (6) hours as needed for Pain.  Insulin Needles, Disposable, (BD ULTRA-FINE SHORT PEN NEEDLE) 31 gauge x 5/16\" ndle USE TO INJECT UNDER THE SKIN THREE TIMES A DAY  traZODone (DESYREL) 50 mg tablet TAKE 1 TABLET BY MOUTH EVERY DAY AT NIGHT (Patient taking differently: TAKE 1 TABLET BY MOUTH EVERY DAY AT NIGHT as needed)  LANTUS SOLOSTAR U-100 INSULIN 100 unit/mL (3 mL) inpn INJECT 40 UNITS UNDER THE SKIN TWO TIMES A DAY  butalbital-acetaminophen-caffeine (FIORICET, ESGIC) -40 mg per tablet TAKE 1 TABLET EVERY 4 HOURS AS NEEDED FOR PAIN  
 glucose blood VI test strips (ONETOUCH ULTRA BLUE TEST STRIP) strip Test blood sugars twice daily  gabapentin (NEURONTIN) 300 mg capsule Take 2 Caps by mouth nightly.  ergocalciferol (VITAMIN D2) 50,000 unit capsule TAKE 1 CAPSULE EVERY 7 DAYS  atomoxetine (STRATTERA) 40 mg capsule TAKE 1 CAPSULE DAILY (NEED A FOLLOW UP APPOINTMENT FOR ADDITIONAL REFILLS) (Patient taking differently: TAKE 1 CAPSULE DAILY)  folic acid (FOLVITE) 1 mg tablet TAKE 1 TABLET DAILY  potassium 99 mg tablet Take 99 mg by mouth daily.  calcium citrate-vitamin D3 (CITRACAL + D) tablet Take  by mouth two (2) times a day.  JANUVIA 100 mg tablet TAKE 1 TABLET DAILY  sertraline (ZOLOFT) 100 mg tablet TAKE 1 TABLET DAILY  primidone (MYSOLINE) 250 mg tablet TAKE 1 TABLET TWICE A DAY  aspirin 81 mg chewable tablet Take 1 Tab by mouth daily. (Patient taking differently: Take 81 mg by mouth every other day.) No current facility-administered medications for this visit. Objective:  
  
Visit Vitals /74 (BP 1 Location: Right arm, BP Patient Position: Sitting) Pulse 66 Resp 18 Wt 261 lb 3.2 oz (118.5 kg) SpO2 98% BMI 37.48 kg/m² Data Review:  
Labs:  No results found for this or any previous visit (from the past 24 hour(s)). Past Medical History:  
Diagnosis Date  ADD (attention deficit disorder)  Anemia due to blood loss  Hinson's esophagus with esophagitis  Benign essential tremor  Cancer [facility]) 2012 [address]  Depression  DJD (degenerative joint disease) of hip   
 bilat  DM (diabetes mellitus) ([address] 3/17/2010  ED (erectile dysfunction) of organic origin  Fall on or from sidewalk curb 9/24/2015  Femur fracture ([address]  Gastritis and duodenitis  GERD (gastroesophageal reflux disease)   
 resolved after discontinuing diclofenac  Hematuria 6/2016  High cholesterol 03/17/2010  
 pt denies 6/19  
 HTN (hypertension) 3/17/2010  Morbid obesity ([facility] [address]  Murmur, cardiac 2016  
 PFO (patent foramen ovale) 7/26/2017  PUD (peptic ulcer disease)  Shingles 6/2016 RESOLVING- NO RASH (HEAD)  Sleep apnea   
 doesnt use CPAP anymore Past Surgical History:  
Procedure Laterality Date  COLONOSCOPY N/A 6/18/2019 COLONOSCOPY AND EGD performed by [facility]as [facility] at [facility] ENDOSCOPY  COLONOSCOPY,DIAGNOSTIC  6/18/2019  ENDOSCOPY, COLON, DIAGNOSTIC    
 HX CHOLECYSTECTOMY  1995  HX GI    
 gastroplasty [address]  HX HIP REPLACEMENT Left  HX ORTHOPAEDIC Right   
 rot cuff repair  HX ORTHOPAEDIC  2016  
 left broken femur [address]  HX VASCULAR ACCESS    
 picc line and removed after sepsis resolved [address] [facility] ARTHROPLASTY  2010  
 right  TOTAL HIP ARTHROPLASTY  2016  
 left  UPPER GI ENDOSCOPY,BIOPSY  2019 Allergies Allergen Reactions  Nsaids (Non-Steroidal Anti-Inflammatory Drug) Other (comments) Hx of PUD and Hinson's Esophagus Family History Problem Relation Age of Onset  Cancer Father   
     multiple myeloma  Stroke Father  Dementia Mother  No Known Problems Brother  COPD Brother  Cancer Maternal Grandmother COLON  Anesth Problems Neg Hx Social History Socioeconomic History  Marital status:  Spouse name: Not on file  Number of children: Not on file  Years of education: Not on file  Highest education level: Not on file Occupational History  Not on file Social Needs  Financial resource strain: Not on file  Food insecurity:  
  Worry: Not on file Inability: Not on file  Transportation needs:  
  Medical: Not on file Non-medical: Not on file Tobacco Use  Smoking status: Former Smoker Packs/day: 2.00 Years: 15.00 Pack years: 30.00 Last attempt to quit: 3/1/1979 Years since quittin.3  Smokeless tobacco: Never Used Substance and Sexual Activity  Alcohol use: No  
  Alcohol/week: 0.0 oz  Drug use: No  
 Sexual activity: Never Lifestyle  Physical activity:  
  Days per week: Not on file Minutes per session: Not on file  Stress: Not on file Relationships  Social connections:  
  Talks on phone: Not on file Gets together: Not on file Attends Jainism service: Not on file Active member of club or organization: Not on file Attends meetings of clubs or organizations: Not on file Relationship status: Not on file  Intimate partner violence:  
  Fear of current or ex partner: Not on file Emotionally abused: Not on file Physically abused: Not on file Forced sexual activity: Not on file Other Topics Concern  Not on file Social History Narrative  Not on file Review of Systems  
 
General: Not Present- Anorexia, Chills, Dietary Changes, Fatigue, Fever, Medication Changes, Night Sweats, Weight Gain > 10lbs. and Weight Loss > 10lbs. Darnell Challenger Skin: Not Present- Bruising and Excessive Sweating. HEENT: Not Present- Headache, Visual Loss and Vertigo. Respiratory: Not Present- Cough, Decreased Exercise Tolerance, Difficulty Breathing, Snoring and Wheezing. Cardiovascular: Not Present- Abnormal Blood Pressure, Chest Pain, Difficulty Breathing On Exertion, Fainting / Blacking Out, Irregular Heart Beat, Night Cramps, Orthopnea, Palpitations, Paroxysmal Nocturnal Dyspnea, Rapid Heart Rate, Shortness of Breath Gastrointestinal: Not Present- Black, Tarry Stool, Bloody Stool, Diarrhea, Hematemesis, Rectal Bleeding and Vomiting. Musculoskeletal: Not Present- Muscle Pain and Muscle Weakness. Neurological: Not Present- Dizziness. Psychiatric: Not Present- Depression. Endocrine: Not Present- Cold Intolerance, Heat Intolerance and Thyroid Problems. Hematology: Not Present- Abnormal Bleeding, Anemia, Blood Clots and Easy Bruising. Physical Exam  
The physical exam findings are as follows: 
  
General  
Mental Status - Alert. General Appearance - Cooperative and Well groomed. Not in acute distress. Orientation - Oriented to time, Oriented to place and Oriented to person. Build & Nutrition - Well developed. Skin  
General: - Normal. 
 
 
HEENT Head - Normal. 
Eye - Normal. 
Mouth & Throat - Normal. 
 
 
Neck  
Carotid Arteries - normal upstroke. No Bruits. Thyroid: Gland - Normal size and consistency. Chest and Lung Exam  Inspection:  
Chest Wall: - Normal. Accessory muscles - No use of accessory muscles in breathing. Auscultation:  
Breath sounds: - Normal. 
 
 
Cardiovascular  
Inspection: Jugular vein - Bilateral - Inspection Normal. 
Palpation/Percussion:  
Apical Impulse: - Normal. 
Auscultation: Rhythm - Regular. Heart Sounds - S1 WNL and S2 WNL. No S3 or S4. Murmurs & Other Heart Sounds: Auscultation of the heart reveals - No Murmurs. Abdomen  
Palpation/Percussion: Palpation and Percussion of the abdomen reveal - No Palpable abdominal masses. Liver: - Normal. 
Spleen: - Normal. 
Auscultation: Auscultation of the abdomen reveals - Bowel sounds normal. 
 
 
Neurologic  
Mental Status: Affect - normal. 
Motor: - Normal. Gait - Normal. 
 
 
 
   PHYSICIAN TO COMPLETE BELOW THIS POINT Date of Initial Physician Evaluation:__7/2/19__ Check all that apply: 
[x] Reviewed Venous history 
[x] Physical examination of the affected leg(s) [x] Duplex or Doppler Scan results reviewed. Duplex or Doppler Ultrasound of the venous system demonstrate: 
[x] Absence of deep venous thrombosis [x] Greater and/or lesser saphenous vein or  valvular incompetence/reflux that correlates with  
     patients symptoms 
[]   valvular incompetence/reflux that correlates with patients symptoms 
 
[x] Graduated, elasticized compression stockings (20-30 mmHg), has been prescribed. [x] Standing Photos taken of leg(s)  [] Front     [] Back     [x] Front and back [x] Other causes of patients leg(s) symptoms have been ruled out Assessment: ICD-10-CM ICD-9-CM 1. Venous insufficiency of left lower extremity I87.2 459.81   
2. Essential hypertension I10 401.9 3. High cholesterol E78.00 272.0   
 
 
CEAP class:  
 
 []C1 []C2 [x]C3 []C4 []4a []4b []C5 []C6 Plan: 1. Venous insufficiency of left lower extremity ABIs normal 
 Venous duplex with LLE GSV insufficiency which is mild; no insufficiency on RLE. Will prescribe bilateral thigh high compression stockings, 20-30 mmHg for 2 months F/u in 2 months to reassess symptoms. If improved, can proceed with LLE GSV RF ablation. 2. Essential hypertension BP controlled. Continue anti-hypertensive therapy and low sodium diet 3. High cholesterol Continue statin therapy and low fat, low cholesterol diet Lipids managed by PCP 
 
1.  - Instruction given on daily leg elevation 2.  - Instruction given for mild exercise 3.  - Instruction given for weight reduction Georgina Parks NP 
7/2/2019 Patient seen and examined by me with nurse practitioner in vascular clinic.  I personally performed all components of the history, physical, and medical decision making and agree with the assessment and plan with minor modifications as noted.  
 
Leticia Baker MD

## 2019-07-02 NOTE — PROGRESS NOTES
Chief Complaint Patient presents with  Results 6-14=bilateral le duplex/6-21=le Seg pressure Visit Vitals /74 (BP 1 Location: Right arm, BP Patient Position: Sitting) Pulse 66 Resp 18 Wt 261 lb 3.2 oz (118.5 kg) SpO2 98% BMI 37.48 kg/m² 1. Have you been to the ER, urgent care clinic since your last visit? Hospitalized since your last visit? ED [facility] ER 5/11/19 for head laceration following a fall, required 3 stitches above left eye. 
 
2. Have you seen or consulted any other health care providers outside of the [address] [place] [address] since your last visit? Include any pap smears or colon screening. Last appt 4/24/19-Dr. Lashaun Henao PCP. Follow up appt with 7-9-19 with Dr. Lashaun Henao. Colonoscopy/Endoscopy 5-88-31 with Dr. Wu Gilliam.

## 2019-07-09 ENCOUNTER — OFFICE VISIT (OUTPATIENT)
Dept: FAMILY MEDICINE CLINIC | Age: 69
End: 2019-07-09

## 2019-07-09 VITALS
TEMPERATURE: 98.5 F | SYSTOLIC BLOOD PRESSURE: 133 MMHG | WEIGHT: 256.6 LBS | RESPIRATION RATE: 18 BRPM | DIASTOLIC BLOOD PRESSURE: 59 MMHG | BODY MASS INDEX: 36.73 KG/M2 | HEART RATE: 67 BPM | HEIGHT: 70 IN | OXYGEN SATURATION: 98 %

## 2019-07-09 DIAGNOSIS — E11.65 UNCONTROLLED TYPE 2 DIABETES MELLITUS WITH HYPERGLYCEMIA (HCC): ICD-10-CM

## 2019-07-09 DIAGNOSIS — M16.11 PRIMARY OSTEOARTHRITIS OF RIGHT HIP: ICD-10-CM

## 2019-07-09 DIAGNOSIS — I10 ESSENTIAL HYPERTENSION: ICD-10-CM

## 2019-07-09 DIAGNOSIS — Z71.89 ADVANCED DIRECTIVES, COUNSELING/DISCUSSION: ICD-10-CM

## 2019-07-09 DIAGNOSIS — Z00.00 MEDICARE ANNUAL WELLNESS VISIT, SUBSEQUENT: Primary | ICD-10-CM

## 2019-07-09 DIAGNOSIS — K22.719 BARRETT'S ESOPHAGUS WITH DYSPLASIA: ICD-10-CM

## 2019-07-09 DIAGNOSIS — I87.2 VENOUS INSUFFICIENCY OF LEFT LEG: ICD-10-CM

## 2019-07-09 DIAGNOSIS — K44.9 HIATAL HERNIA: ICD-10-CM

## 2019-07-09 DIAGNOSIS — Z13.39 SCREENING FOR ALCOHOLISM: ICD-10-CM

## 2019-07-09 DIAGNOSIS — G25.81 RLS (RESTLESS LEGS SYNDROME): ICD-10-CM

## 2019-07-09 DIAGNOSIS — Z13.31 SCREENING FOR DEPRESSION: ICD-10-CM

## 2019-07-09 RX ORDER — PEN NEEDLE, DIABETIC 30 GX3/16"
NEEDLE, DISPOSABLE MISCELLANEOUS
Qty: 270 PEN NEEDLE | Refills: 1 | Status: SHIPPED | OUTPATIENT
Start: 2019-07-09 | End: 2020-04-06

## 2019-07-09 RX ORDER — BLOOD-GLUCOSE METER
EACH MISCELLANEOUS
Qty: 1 EACH | Refills: 1 | Status: ON HOLD | OUTPATIENT
Start: 2019-07-09 | End: 2020-08-15

## 2019-07-09 RX ORDER — ACETAMINOPHEN AND CODEINE PHOSPHATE 300; 30 MG/1; MG/1
1 TABLET ORAL
Qty: 30 TAB | Refills: 0 | Status: SHIPPED | OUTPATIENT
Start: 2019-07-09 | End: 2019-10-10 | Stop reason: SDUPTHER

## 2019-07-09 NOTE — PROGRESS NOTES
Chief Complaint Patient presents with [facility] Annual Wellness Visit Medicare 1. Have you been to the ER, urgent care clinic since your last visit? [facility] since your last visit? [facility] 2. Have you seen or consulted any other health care providers outside of the [address] [place] [address] since your last visit? Include any pap smears or colon screening. No  
Discuss colonoscopy and leaking vein

## 2019-07-09 NOTE — PATIENT INSTRUCTIONS
For your diabetes, I would like to have you see a diabetes specialist to help improve your control. 
 
- Lantus 40 units twice daily 
- start Novolin at 7 units around dinner - try to take Lantus at the same time. - Metformin  mg x 2 tabs daily (1000 mg total because of diarrhea at higher dose) - Januvia 100 mg daily - Jardiance 25 mg daily - Ct working on diet - goal of 10 lbs weight loss by next appt. - Try to check sugars 2-3 x per day before meals and bring to next appointment

## 2019-07-09 NOTE — ACP (ADVANCE CARE PLANNING)
Advance Care Planning Advance Care Planning (ACP) Provider [facility] Date of ACP Conversation: 07/15/19 Persons included in Conversation:  patient Length of ACP Conversation in minutes:  <16 minutes (Non-Billable) Authorized Decision Maker (if patient is incapable of making informed decisions): This person is:  
Healthcare Agent/Medical Power of  under Advance Directive For Patients with Decision Making Capacity:  
Values/Goals: Exploration of values, goals, and preferences if recovery is not expected, even with continued medical treatment in the event of:  Imminent death Conversation Outcomes / Follow-Up Plan:  
Recommended completion of Advance Directive form after review of ACP materials and conversation with prospective healthcare agent

## 2019-07-09 NOTE — PROGRESS NOTES
Subjective: Chief Complaint Patient presents with [facility] Annual Wellness Visit Medicare He  is a 68 y.o. male who presents for evaluation of:  
Since last appt, had 2 falls. On 6/11/19, slipped on a pad and fell on chest and head. Went to ER and had 3 sutures placed. 2-3 weeks after this, fell in garage after tripping. Johnice Part on chest again. Still having chest discomfort from this. Diabetes Mellitus, HTN, HLD:  
Ct to have a lot of trouble getting his DM to goal.  Never saw Endo. Currently taking Lantus 40 units BID, Metformin ER 1000 mg (trouble titrating up beyond this d/t diarrhea), Januvia 100 mg, and Jardiance 25 mg. He has not been consistent taking his short acting insulin. A1C was actually down slightly to 8.4% but has not been controlled for years. Ct to struggle with his diet Reports no polyuria or polydipsia, no chest pain, TIA's, some numbness in left leg that comes and goes (did have left femur fracture during a fall; had R hip issues and needed sgy), last eye exam approximately < 1 yr ago. Not exercising Not paying attention to diet much Pt is a non smoker. Lab Results Component Value Date/Time Hemoglobin A1c (POC) 8.6 07/26/2017 12:24 PM  
 Hemoglobin A1c (POC) 7.3 02/02/2017 02:45 PM  
 Hemoglobin A1c 8.4 (H) 04/24/2019 12:50 PM  
 Microalbumin/Creat ratio (mg/g creat) 13 11/01/2010 07:07 AM  
 Microalb/Creat ratio (ug/mg creat.) <17.9 09/25/2018 03:30 PM  
 Microalbumin,urine random 0.81 11/01/2010 07:07 AM  
 LDL, calculated 45 10/23/2018 04:45 PM  
 Creatinine 0.91 04/24/2019 12:50 PM  
  
Lab Results Component Value Date/Time GFR est AA 98 04/24/2019 12:50 PM  
 GFR est non-AA 85 04/24/2019 12:50 PM  
  
Lab Results Component Value Date/Time TSH 2.930 01/22/2019 09:17 AM  
  
Has Hinson's and hiatal hernia - recently had EGD with Dr William Decker. Venous insuff of LLE found by Dr. Spike Box last week. ROS Gen - no fever/chills Resp - no dyspnea or cough CV - no chest pain or WANG Psych - Depression ct to be a chronic issue. Still having sig trouble with concentration/attention and sleep. Anhedonia still present. Also worse with his mother in nursing home d/t Alz Dementia. Rest per HPI Past Medical History:  
Diagnosis Date  ADD (attention deficit disorder)  Anemia due to blood loss  Hinson's esophagus with esophagitis  Benign essential tremor  Cancer [facility]) 2012 [address]  Depression  DJD (degenerative joint disease) of hip   
 bilat  DM (diabetes mellitus) ([address] 3/17/2010  ED (erectile dysfunction) of organic origin  Fall on or from sidewalk curb 9/24/2015  Femur fracture [address]  Gastritis and duodenitis  GERD (gastroesophageal reflux disease)   
 resolved after discontinuing diclofenac  Hematuria 6/2016  High cholesterol 03/17/2010  
 pt denies 6/19  
 HTN (hypertension) 3/17/2010  Morbid obesity ([facility]  Murmur, cardiac 2016  
 PFO (patent foramen ovale) 7/26/2017  PUD (peptic ulcer disease)  Shingles 6/2016 RESOLVING- NO RASH (HEAD)  Sleep apnea   
 doesnt use CPAP anymore Past Surgical History:  
Procedure Laterality Date  COLONOSCOPY N/A 6/18/2019 COLONOSCOPY AND EGD performed by Reba Granados MD at [facility] ENDOSCOPY  COLONOSCOPY,DIAGNOSTIC  6/18/2019  ENDOSCOPY, COLON, DIAGNOSTIC    
 HX CHOLECYSTECTOMY  1995  HX GI  1980  
 gastroplasty [address]  HX HIP REPLACEMENT Left  HX ORTHOPAEDIC Right 2011  
 rot cuff repair  HX ORTHOPAEDIC  2016  
 left broken femur [address]  HX VASCULAR ACCESS    
 picc line and removed after sepsis resolved [address] [facility] ARTHROPLASTY  05/2010  
 right  TOTAL HIP ARTHROPLASTY  08/01/2016  
 left  UPPER GI ENDOSCOPY,BIOPSY  6/18/2019 Current Outpatient Medications on File Prior to Visit Medication Sig Dispense Refill  B.infantis-B.ani-B.long-B.bifi (PROBIOTIC 4X) 10-15 mg TbEC Take  by mouth.  lovastatin (MEVACOR) 20 mg tablet Take 20 mg by mouth nightly.  magnesium 250 mg tab Take 500 mg by mouth daily.  melatonin tab tablet Take 10 mg by mouth nightly as needed.  cephALEXin (KEFLEX) 500 mg capsule TAKE 1 CAPSULE BY MOUTH 2 TIMES A DAILY  6  
 lisinopril (PRINIVIL, ZESTRIL) 20 mg tablet TAKE 1 TABLET DAILY 90 Tab 1  
 diclofenac (VOLTAREN) 1 % gel Apply  to affected area four (4) times daily. 100 g 2  
 empagliflozin (JARDIANCE) 25 mg tablet TAKE 1 TABLET BY MOUTH EVERY DAY 30 Tab 1  
 metFORMIN ER (GLUCOPHAGE XR) 500 mg tablet TAKE 2 TABLETS DAILY (WITH DINNER) 60 Tab 2  
 acetaminophen (TYLENOL) 500 mg tablet Take 2 Tabs by mouth every six (6) hours as needed for Pain. 20 Tab 0  
 traZODone (DESYREL) 50 mg tablet TAKE 1 TABLET BY MOUTH EVERY DAY AT NIGHT (Patient taking differently: TAKE 1 TABLET BY MOUTH EVERY DAY AT NIGHT as needed) 90 Tab 0  
 LANTUS SOLOSTAR U-100 INSULIN 100 unit/mL (3 mL) inpn INJECT 40 UNITS UNDER THE SKIN TWO TIMES A DAY 75 Adjustable Dose Pre-filled Pen Syringe 0  
 butalbital-acetaminophen-caffeine (FIORICET, ESGIC) -40 mg per tablet TAKE 1 TABLET EVERY 4 HOURS AS NEEDED FOR PAIN 30 Tab 1  
 gabapentin (NEURONTIN) 300 mg capsule Take 2 Caps by mouth nightly. 180 Cap 1  
 ergocalciferol (VITAMIN D2) 50,000 unit capsule TAKE 1 CAPSULE EVERY 7 DAYS 12 Cap 2  
 atomoxetine (STRATTERA) 40 mg capsule TAKE 1 CAPSULE DAILY (NEED A FOLLOW UP APPOINTMENT FOR ADDITIONAL REFILLS) (Patient taking differently: TAKE 1 CAPSULE DAILY AS NEEDED) 90 Cap 0  
 folic acid (FOLVITE) 1 mg tablet TAKE 1 TABLET DAILY 90 Tab 3  potassium 99 mg tablet Take 99 mg by mouth daily.  calcium citrate-vitamin D3 (CITRACAL + D) tablet Take  by mouth two (2) times a day.     
 JANUVIA 100 mg tablet TAKE 1 TABLET DAILY 90 Tab 1  
  sertraline (ZOLOFT) 100 mg tablet TAKE 1 TABLET DAILY 90 Tab 1  primidone (MYSOLINE) 250 mg tablet TAKE 1 TABLET TWICE A  Tab 3  
 aspirin 81 mg chewable tablet Take 1 Tab by mouth daily. (Patient taking differently: Take 81 mg by mouth every other day.) 30 Tab 11 No current facility-administered medications on file prior to visit. Objective:  
 
Vitals:  
 07/09/19 1042 07/09/19 1115 BP: 144/56 133/59 Pulse: 67 Resp: 18 Temp: 98.5 °F (36.9 °C) TempSrc: Oral   
SpO2: 98% Weight: 256 lb 9.6 oz (116.4 kg) Height: 5' 10\" (1.778 m) Physical Examination: 
General appearance - alert, well appearing, and in no distress Eyes -sclera anicteric Neck - supple, no significant adenopathy, no thyromegaly Chest - clear to auscultation, no wheezes, rales or rhonchi, symmetric air entry Heart - normal rate, regular rhythm, normal S1, S2, 2/6 LION (nml Echo) Neurological - alert, oriented, no focal findings or movement disorder noted. Using cane for abnormal gait Extremitiesno edema, RLE with hair loss and some atrophy of LE musculature Psychnormal mood and affect Assessment/ Plan:  
Diagnoses and all orders for this visit: 
 
1. Medicare annual wellness visit, subsequent 2. Advanced directives, counseling/discussion 3. Screening for alcoholism -     IN ANNUAL ALCOHOL SCREEN 15 MIN 4. Screening for depression 
-     [place] [id number] 5. Uncontrolled type 2 diabetes mellitus with hyperglycemia ([facility] -     Blood-Glucose Meter (ACCU-CHEK SOWMYA PLUS METER) misc; Test blood sugar twice daily Diagnosis E 11.9 
-     glucose blood VI test strips (ACCU-CHEK SOWMYA PLUS TEST STRP) strip; Test blood sugar twice daily Diagnosis E 11.9 
-     Blood Glucose Control High&Low (ACCU-CHEK SOWMYA CONTROL SOLN) soln; Test blood sugar twice daily Diagnosis E 11.9 
-     HEMOGLOBIN A1C WITH EAG; Future -     METABOLIC PANEL, BASIC; Future 
-     REFERRAL TO ENDOCRINOLOGY -     insulin regular (NOVOLIN R REGULAR U-100 INSULN) 100 unit/mL injection; INJECT 7 UNITS UNDER THE SKIN DAILY (WITH DINNER). -     Insulin Needles, Disposable, (BD ULTRA-FINE SHORT PEN NEEDLE) 31 gauge x 5/16\" ndle; USE TO INJECT UNDER THE SKIN THREE TIMES A DAY 6. RLS (restless legs syndrome) 7. Hinson's esophagus with dysplasia 8. Essential hypertension 9. Hiatal hernia 10. Venous insufficiency of left leg 11. Primary osteoarthritis of right hip 
-     acetaminophen-codeine (TYLENOL-CODEINE #3) 300-30 mg per tablet; Take 1 Tab by mouth every four (4) hours as needed for Pain for up to 5 days. Max Daily Amount: 6 Tabs. I have discussed the diagnosis with the patient and the intended plan as seen in the above orders. The patient has received an after-visit summary and questions were answered concerning future plans. I have discussed medication side effects and warnings with the patient as well. The patient verbalizes understanding and agreement with the plan. Follow-up and Dispositions · Return in about 3 months (around 10/9/2019), or if symptoms worsen or fail to improve. This is the Subsequent Medicare Annual Wellness Exam, performed 12 months or more after the Initial AWV or the last Subsequent AWV I have reviewed the patient's medical history in detail and updated the computerized patient record. History Past Medical History:  
Diagnosis Date  ADD (attention deficit disorder)  Anemia due to blood loss  Hinson's esophagus with esophagitis  Benign essential tremor  Cancer [facility]) 2012 [facility]  Depression  DJD (degenerative joint disease) of hip   
 bilat  DM (diabetes mellitus) ([facility] 3/17/2010  ED (erectile dysfunction) of organic origin  Fall on or from sidewalk curb 9/24/2015  Femur fracture ([facility]  Gastritis and duodenitis  GERD (gastroesophageal reflux disease)   
 resolved after discontinuing diclofenac  Hematuria 6/2016  High cholesterol 03/17/2010  
 pt denies 6/19  
 HTN (hypertension) 3/17/2010  Morbid obesity ([address]  Murmur, cardiac 2016  
 PFO (patent foramen ovale) 7/26/2017  PUD (peptic ulcer disease)  Shingles 6/2016 RESOLVING- NO RASH (HEAD)  Sleep apnea   
 doesnt use CPAP anymore Past Surgical History:  
Procedure Laterality Date  COLONOSCOPY N/A 6/18/2019 COLONOSCOPY AND EGD performed by Jackie Blancas MD at [facility] ENDOSCOPY  COLONOSCOPY,DIAGNOSTIC  6/18/2019  ENDOSCOPY, COLON, DIAGNOSTIC    
 HX CHOLECYSTECTOMY  1995  HX GI  1980  
 gastroplasty [facility]  HX HIP REPLACEMENT Left  HX ORTHOPAEDIC Right 2011  
 rot cuff repair  HX ORTHOPAEDIC  2016  
 left broken femur [address] [facility]  HX VASCULAR ACCESS    
 picc line and removed after sepsis resolved [facility] ARTHROPLASTY  05/2010  
 right  TOTAL HIP ARTHROPLASTY  08/01/2016  
 left  UPPER GI ENDOSCOPY,BIOPSY  6/18/2019 Current Outpatient Medications Medication Sig Dispense Refill  Blood-Glucose Meter (ACCU-CHEK SOWMYA PLUS METER) misc Test blood sugar twice daily Diagnosis E 11.9 1 Each 1  
 glucose blood VI test strips (ACCU-CHEK SOWMYA PLUS TEST STRP) strip Test blood sugar twice daily Diagnosis E 11.9 200 Strip 5  Blood Glucose Control High&Low (ACCU-CHEK SOWMYA CONTROL SOLN) soln Test blood sugar twice daily Diagnosis E 11.9 1 Bottle 3  
 insulin regular (NOVOLIN R REGULAR U-100 INSULN) 100 unit/mL injection INJECT 7 UNITS UNDER THE SKIN DAILY (WITH DINNER). 10 mL 0  
 Insulin Needles, Disposable, (BD ULTRA-FINE SHORT PEN NEEDLE) 31 gauge x 5/16\" ndle USE TO INJECT UNDER THE SKIN THREE TIMES A  Pen Needle 1  
 B.infantis-B.ani-B.long-B.bifi (PROBIOTIC 4X) 10-15 mg TbEC Take  by mouth.  lovastatin (MEVACOR) 20 mg tablet Take 20 mg by mouth nightly.  magnesium 250 mg tab Take 500 mg by mouth daily.  melatonin tab tablet Take 10 mg by mouth nightly as needed.  cephALEXin (KEFLEX) 500 mg capsule TAKE 1 CAPSULE BY MOUTH 2 TIMES A DAILY  6  
 lisinopril (PRINIVIL, ZESTRIL) 20 mg tablet TAKE 1 TABLET DAILY 90 Tab 1  
 diclofenac (VOLTAREN) 1 % gel Apply  to affected area four (4) times daily. 100 g 2  
 empagliflozin (JARDIANCE) 25 mg tablet TAKE 1 TABLET BY MOUTH EVERY DAY 30 Tab 1  
 metFORMIN ER (GLUCOPHAGE XR) 500 mg tablet TAKE 2 TABLETS DAILY (WITH DINNER) 60 Tab 2  
 acetaminophen (TYLENOL) 500 mg tablet Take 2 Tabs by mouth every six (6) hours as needed for Pain. 20 Tab 0  
 traZODone (DESYREL) 50 mg tablet TAKE 1 TABLET BY MOUTH EVERY DAY AT NIGHT (Patient taking differently: TAKE 1 TABLET BY MOUTH EVERY DAY AT NIGHT as needed) 90 Tab 0  
 LANTUS SOLOSTAR U-100 INSULIN 100 unit/mL (3 mL) inpn INJECT 40 UNITS UNDER THE SKIN TWO TIMES A DAY 75 Adjustable Dose Pre-filled Pen Syringe 0  
 butalbital-acetaminophen-caffeine (FIORICET, ESGIC) -40 mg per tablet TAKE 1 TABLET EVERY 4 HOURS AS NEEDED FOR PAIN 30 Tab 1  
 gabapentin (NEURONTIN) 300 mg capsule Take 2 Caps by mouth nightly. 180 Cap 1  
 ergocalciferol (VITAMIN D2) 50,000 unit capsule TAKE 1 CAPSULE EVERY 7 DAYS 12 Cap 2  
 atomoxetine (STRATTERA) 40 mg capsule TAKE 1 CAPSULE DAILY (NEED A FOLLOW UP APPOINTMENT FOR ADDITIONAL REFILLS) (Patient taking differently: TAKE 1 CAPSULE DAILY AS NEEDED) 90 Cap 0  
 folic acid (FOLVITE) 1 mg tablet TAKE 1 TABLET DAILY 90 Tab 3  potassium 99 mg tablet Take 99 mg by mouth daily.  calcium citrate-vitamin D3 (CITRACAL + D) tablet Take  by mouth two (2) times a day.  JANUVIA 100 mg tablet TAKE 1 TABLET DAILY 90 Tab 1  
 sertraline (ZOLOFT) 100 mg tablet TAKE 1 TABLET DAILY 90 Tab 1  primidone (MYSOLINE) 250 mg tablet TAKE 1 TABLET TWICE A  Tab 3  
 aspirin 81 mg chewable tablet Take 1 Tab by mouth daily.  (Patient taking differently: Take 81 mg by mouth every other day.) 30 Tab 11 Allergies Allergen Reactions  Nsaids (Non-Steroidal Anti-Inflammatory Drug) Other (comments) Hx of PUD and Hinson's Esophagus Family History Problem Relation Age of Onset  Cancer Father   
     multiple myeloma  Stroke Father  Dementia Mother  No Known Problems Brother  COPD Brother  Cancer Maternal Grandmother COLON  Anesth Problems Neg Hx Social History Tobacco Use  Smoking status: Former Smoker Packs/day: 2.00 Years: 15.00 Pack years: 30.00 Last attempt to quit: 3/1/1979 Years since quittin.4  Smokeless tobacco: Never Used Substance Use Topics  Alcohol use: No  
  Alcohol/week: 0.0 oz Patient Active Problem List  
Diagnosis Code  
 HTN (hypertension) I10  
 High cholesterol E78.00  
 Osteoarthritis of hip M16.9  Depression F32.9  ED (erectile dysfunction) of organic origin N52.9  GERD (gastroesophageal reflux disease) K21.9  PUD (peptic ulcer disease) K27.9  Hinson's esophagus with esophagitis K22.70, K20.9  Encounter for long-term (current) use of medications Z79.899  Hypogonadism male E29.1  Diabetes mellitus type 2, uncontrolled ([address] E11.65  Benign essential tremor G25.0  Osteoarthritis of right hip M16.11  
 Jessica-prosthetic fracture of femur following total hip arthroplasty M97. 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  
 Insomnia G47.00  Dislocation of prosthetic joint ([address] T84.029A  Severe obesity (BMI 35.0-39. 9) with comorbidity ([address] E66.01  
 Essential hypertension I10  
  Mixed hyperlipidemia E78.2  Leg cramps R25.2  Leg heaviness R29.898 Depression Risk Factor Screening:  
 
3 most recent PHQ Screens 7/2/2019 PHQ Not Done - Little interest or pleasure in doing things Not at all Feeling down, depressed, irritable, or hopeless Not at all Total Score PHQ 2 0 Alcohol Risk Factor Screening: You do not drink alcohol or very rarely. Functional Ability and Level of Safety:  
Hearing Loss Hearing is good. Activities of Daily Living The home contains: no safety equipment. Patient does total self care Fall Risk Fall Risk Assessment, last 12 mths 7/2/2019 Able to walk? Yes Fall in past 12 months? Yes Fall with injury? Yes  
Number of falls in past 12 months 1 Fall Risk Score 2 Abuse Screen Patient is not abused Cognitive Screening Evaluation of Cognitive Function: 
Has your family/caregiver stated any concerns about your memory: no 
Normal, Verbal Fluency Test 
 
Patient Care Team  
Patient Care Team: 
Taisha Stanley MD as PCP - [facility]) Reji Riojas MD as Physician (Sleep Medicine) Iliana Orantes MD (Orthopedic Surgery) Leonie Claude, MD (Ophthalmology) Get Brady NP (Nurse Practitioner) Sadie Baker MD (Neurology) Candance Schlein, MD (Endocrinology) Rufina Hinojosa MD as Physician (Cardiology) Assessment/Plan Education and counseling provided: 
Are appropriate based on today's review and evaluation Diagnoses and all orders for this visit: 
 
1. Medicare annual wellness visit, subsequent 2. Advanced directives, counseling/discussion 3. Screening for alcoholism -     NM ANNUAL ALCOHOL SCREEN 15 MIN 4. Screening for depression 
-     Arna[place]yn[place] 5. Uncontrolled type 2 diabetes mellitus with hyperglycemia (HCC) - rechecking labs, adjusting meds Encourage patient to see endo again. 
- Lantus 40 units twice daily - start Novolin at 7 units around dinner - try to take Lantus at the same time. - Metformin  mg x 2 tabs daily (1000 mg total because of diarrhea at higher dose) - Januvia 100 mg daily - Jardiance 25 mg daily - Ct working on diet - goal of 10 lbs weight loss by next appt. - Try to check sugars 2-3 x per day before meals and bring to next appointment -     Blood-Glucose Meter (ACCU-CHEK SOWMYA PLUS METER) misc; Test blood sugar twice daily Diagnosis E 11.9 
-     glucose blood VI test strips (ACCU-CHEK SOWMYA PLUS TEST STRP) strip; Test blood sugar twice daily Diagnosis E 11.9 
-     Blood Glucose Control High&Low (ACCU-CHEK SOWMYA CONTROL SOLN) soln; Test blood sugar twice daily Diagnosis E 11.9 
-     HEMOGLOBIN A1C WITH EAG; Future -     METABOLIC PANEL, BASIC; Future 
-     REFERRAL TO ENDOCRINOLOGY 
-     insulin regular (NOVOLIN R REGULAR U-100 INSULN) 100 unit/mL injection; INJECT 7 UNITS UNDER THE SKIN DAILY (WITH DINNER). -     Insulin Needles, Disposable, (BD ULTRA-FINE SHORT PEN NEEDLE) 31 gauge x 5/16\" ndle; USE TO INJECT UNDER THE SKIN THREE TIMES A DAY 6. RLS (restless legs syndrome) - stable 7. Hinson's esophagus with dysplasia - followed by GI 
 
8. Essential hypertensioncontrolled 9. Hiatal hernia 10. Venous insufficiency of left leg 11. Primary osteoarthritis of right hipcontinues to flareup. Will use Tylenol 3 acutely 
-     acetaminophen-codeine (TYLENOL-CODEINE #3) 300-30 mg per tablet; Take 1 Tab by mouth every four (4) hours as needed for Pain for up to 5 days. Max Daily Amount: 6 Tabs. Health Maintenance Due Topic Date Due  
 AAA Screening 73-67 YO Male Smoking Patients  07/03/2013  Pneumococcal 65+ years (1 of 2 - PCV13) 07/03/2013

## 2019-07-20 DIAGNOSIS — F33.9 RECURRENT DEPRESSION (HCC): ICD-10-CM

## 2019-07-22 RX ORDER — BUPROPION HYDROCHLORIDE 150 MG/1
TABLET, EXTENDED RELEASE ORAL
Qty: 90 TAB | Refills: 0 | Status: SHIPPED | OUTPATIENT
Start: 2019-07-22 | End: 2019-09-30

## 2019-07-24 ENCOUNTER — HOSPITAL ENCOUNTER (OUTPATIENT)
Dept: ULTRASOUND IMAGING | Age: 69
Discharge: HOME | End: 2019-07-24
Attending: INTERNAL MEDICINE
Payer: MEDICARE

## 2019-07-24 DIAGNOSIS — I10 ESSENTIAL HYPERTENSION: ICD-10-CM

## 2019-07-24 DIAGNOSIS — K74.60 CIRRHOSIS (HCC): ICD-10-CM

## 2019-07-24 DIAGNOSIS — E11.65 UNCONTROLLED TYPE 2 DIABETES MELLITUS WITH HYPERGLYCEMIA (HCC): ICD-10-CM

## 2019-07-24 DIAGNOSIS — K74.60 HEPATIC CIRRHOSIS, UNSPECIFIED HEPATIC CIRRHOSIS TYPE, UNSPECIFIED WHETHER ASCITES PRESENT (HCC): ICD-10-CM

## 2019-07-24 DIAGNOSIS — E11.65 UNCONTROLLED TYPE 2 DIABETES MELLITUS WITH HYPERGLYCEMIA (HCC): Primary | ICD-10-CM

## 2019-07-24 DIAGNOSIS — D64.9 NORMOCYTIC ANEMIA: ICD-10-CM

## 2019-07-24 DIAGNOSIS — Z79.899 ENCOUNTER FOR LONG-TERM (CURRENT) USE OF MEDICATIONS: ICD-10-CM

## 2019-07-24 PROCEDURE — 76700 US EXAM ABDOM COMPLETE: CPT

## 2019-07-25 LAB
AFP-TM SERPL-MCNC: 1.9 NG/ML (ref 0–8.3)
ALBUMIN SERPL-MCNC: 3.5 G/DL (ref 3.5–4.8)
ALBUMIN/GLOB SERPL: 1 {RATIO} (ref 1.2–2.2)
ALP SERPL-CCNC: 78 IU/L (ref 39–117)
ALT SERPL-CCNC: 10 IU/L (ref 0–44)
AST SERPL-CCNC: 17 IU/L (ref 0–40)
BILIRUB SERPL-MCNC: 0.3 MG/DL (ref 0–1.2)
BUN SERPL-MCNC: 16 MG/DL (ref 8–27)
BUN/CREAT SERPL: 16 (ref 10–24)
CALCIUM SERPL-MCNC: 8.5 MG/DL (ref 8.6–10.2)
CHLORIDE SERPL-SCNC: 103 MMOL/L (ref 96–106)
CO2 SERPL-SCNC: 24 MMOL/L (ref 20–29)
CREAT SERPL-MCNC: 1.01 MG/DL (ref 0.76–1.27)
ERYTHROCYTE [DISTWIDTH] IN BLOOD BY AUTOMATED COUNT: 16.8 % (ref 12.3–15.4)
EST. AVERAGE GLUCOSE BLD GHB EST-MCNC: 235 MG/DL
GLOBULIN SER CALC-MCNC: 3.6 G/DL (ref 1.5–4.5)
GLUCOSE SERPL-MCNC: 146 MG/DL (ref 65–99)
HBA1C MFR BLD: 9.8 % (ref 4.8–5.6)
HCT VFR BLD AUTO: 30.9 % (ref 37.5–51)
HGB BLD-MCNC: 9.6 G/DL (ref 13–17.7)
INR PPP: 1.1 (ref 0.8–1.2)
MCH RBC QN AUTO: 26 PG (ref 26.6–33)
MCHC RBC AUTO-ENTMCNC: 31.1 G/DL (ref 31.5–35.7)
MCV RBC AUTO: 84 FL (ref 79–97)
PLATELET # BLD AUTO: 184 X10E3/UL (ref 150–450)
POTASSIUM SERPL-SCNC: 4.5 MMOL/L (ref 3.5–5.2)
PROT SERPL-MCNC: 7.1 G/DL (ref 6–8.5)
PROTHROMBIN TIME: 11 SEC (ref 9.1–12)
RBC # BLD AUTO: 3.69 X10E6/UL (ref 4.14–5.8)
SODIUM SERPL-SCNC: 140 MMOL/L (ref 134–144)
WBC # BLD AUTO: 5 X10E3/UL (ref 3.4–10.8)

## 2019-08-03 DIAGNOSIS — G25.0 ESSENTIAL TREMOR: Primary | ICD-10-CM

## 2019-08-03 DIAGNOSIS — G25.81 RLS (RESTLESS LEGS SYNDROME): ICD-10-CM

## 2019-08-03 RX ORDER — PRIMIDONE 250 MG/1
TABLET ORAL
Qty: 180 TAB | Refills: 3 | OUTPATIENT
Start: 2019-08-03

## 2019-08-04 DIAGNOSIS — F51.01 PRIMARY INSOMNIA: ICD-10-CM

## 2019-08-05 RX ORDER — TRAZODONE HYDROCHLORIDE 50 MG/1
TABLET ORAL
Qty: 90 TAB | Refills: 1 | Status: SHIPPED | OUTPATIENT
Start: 2019-08-05 | End: 2019-10-10

## 2019-08-07 RX ORDER — GABAPENTIN 300 MG/1
600 CAPSULE ORAL
Qty: 180 CAP | Refills: 1 | Status: SHIPPED | OUTPATIENT
Start: 2019-08-07 | End: 2020-03-08 | Stop reason: SDUPTHER

## 2019-09-03 RX ORDER — METFORMIN HYDROCHLORIDE 500 MG/1
TABLET, EXTENDED RELEASE ORAL
Qty: 180 TAB | Refills: 0 | Status: SHIPPED | OUTPATIENT
Start: 2019-09-03 | End: 2019-09-30 | Stop reason: SDUPTHER

## 2019-09-30 ENCOUNTER — OFFICE VISIT (OUTPATIENT)
Dept: ENDOCRINOLOGY | Age: 69
End: 2019-09-30

## 2019-09-30 VITALS
HEART RATE: 66 BPM | BODY MASS INDEX: 35.68 KG/M2 | HEIGHT: 70 IN | WEIGHT: 249.2 LBS | DIASTOLIC BLOOD PRESSURE: 44 MMHG | SYSTOLIC BLOOD PRESSURE: 119 MMHG

## 2019-09-30 DIAGNOSIS — E11.65 UNCONTROLLED TYPE 2 DIABETES MELLITUS WITH HYPERGLYCEMIA (HCC): Primary | ICD-10-CM

## 2019-09-30 RX ORDER — OMEPRAZOLE 20 MG/1
CAPSULE, DELAYED RELEASE ORAL
Refills: 12 | COMMUNITY
Start: 2019-07-15 | End: 2020-04-06

## 2019-09-30 RX ORDER — ATORVASTATIN CALCIUM 40 MG/1
40 TABLET, FILM COATED ORAL DAILY
Qty: 90 TAB | Refills: 3 | Status: SHIPPED | OUTPATIENT
Start: 2019-09-30 | End: 2020-01-03 | Stop reason: SDUPTHER

## 2019-09-30 RX ORDER — METFORMIN HYDROCHLORIDE 500 MG/1
TABLET, EXTENDED RELEASE ORAL
Qty: 360 TAB | Refills: 3 | Status: SHIPPED | OUTPATIENT
Start: 2019-09-30 | End: 2020-02-12 | Stop reason: SDUPTHER

## 2019-09-30 NOTE — PATIENT INSTRUCTIONS
Stop Jardiuance when Rx completed    Stop Januvia    Increase metformin XR to 500 mg two tablets  twice daily    Continue Lantus and regular insulin    Begin atorvastatin 40 mg daily    Return in  3 months

## 2019-09-30 NOTE — PROGRESS NOTES
Chief Complaint   Patient presents with    New Patient     PCP and Pharmacy verified    Diabetes     Last A1c 9.8% (7/24/19)    Diabetic Foot Exam     Due    Other     Checks blood sugars every morning before breakfast       HPI  This is a 68-year-old white male with a history of degenerative joint disease status post bilateral hip replacement diagnosed with diabetes in 1995 and referred for evaluation and management. Patient has been on a number of medications for his diabetes. Currently he takes metformin 500 mg twice daily, Jardiance 25 mg daily, Lantus insulin 40 units twice daily, and regular insulin 7 units at dinner. He had been on Januvia but the price of that made it unaffordable for him so is not been taking it. He had an A1c in July of 9.8%. In April 2018 he had an A1c of 6.5% when he lost weight while in a rehab facility when he did not eat. Since that time his A1c is increased to 8.4 and then again most recently 9.8%. Because of the A1c of 9.8, he has started doing what he describes as an intermittent fast which is from him 6 PM to 10 AM without food. Prior to that, he was eating a lot of snacks at night including cakes and donuts and other sweets. Currently he wakes up in the morning on his new regimen with a blood sugar of . He will have eggs and corral and perhaps fruit in the morning. He will have a protein bar and a salad at 1:00. Wayne Oliveros is usually chicken without vegetables or occasionally he buys broccoli and cauliflower which he steams. He is not eating any of the sweets anymore and is not eating any snacks at night. Because of his hip he is on a unable to exercise. He tells us that the left hip is giving him increasing pain.   ROS  Review of Systems - General ROS: negative  Psychological ROS: negative  Ophthalmic ROS: negative  ENT ROS: negative  Respiratory ROS: no cough, shortness of breath, or wheezing  Cardiovascular ROS: no chest pain or dyspnea on exertion  Gastrointestinal ROS: no abdominal pain, change in bowel habits, or black or bloody stools  Genito-Urinary ROS: no dysuria, trouble voiding, or hematuria  Neurological ROS: no TIA or stroke symptoms    Family History   Problem Relation Age of Onset    Cancer Father         multiple myeloma    Stroke Father     Dementia Mother     No Known Problems Brother     COPD Brother     Cancer Maternal Grandmother         COLON    Anesth Problems Neg Hx         Social History     Socioeconomic History    Marital status:      Spouse name: Not on file    Number of children: Not on file    Years of education: Not on file    Highest education level: Not on file   Tobacco Use    Smoking status: Former Smoker     Packs/day: 2.00     Years: 15.00     Pack years: 30.00     Last attempt to quit: 3/1/1979     Years since quittin.6    Smokeless tobacco: Never Used   Substance and Sexual Activity    Alcohol use: No     Alcohol/week: 0.0 standard drinks    Drug use: No    Sexual activity: Never        Vitals:    19 0922   BP: 119/44   Pulse: 66   Weight: 249 lb 3.2 oz (113 kg)   Height: 5' 10\" (1.778 m)   PainSc:   5        Physical Examination: General appearance - alert, well appearing, and in no distress  Mental status - alert, oriented to person, place, and time  Neck - supple, no significant adenopathy, thyroid exam: thyroid is normal in size without nodules or tenderness  Chest - clear to auscultation, no wheezes, rales or rhonchi, symmetric air entry  Heart - normal rate, regular rhythm, normal S1, S2, no murmurs, rubs, clicks or gallops  Abdomen - soft, nontender, nondistended, no masses or organomegaly  Extremities - peripheral pulses normal, no pedal edema, no clubbing or cyanosis  Skin - normal coloration and turgor, no rashes, no suspicious skin lesions noted    Diabetic foot exam:     Left: Reflexes 2+     Vibratory sensation diminished    Filament test normal sensation with micro filament   Pulse DP: 2+ (normal)   Pulse PT: 2+ (normal)   Deformities: None  Right: Reflexes 2+   Vibratory sensation diminished   Filament test normal sensation with micro filament   Pulse DP: 2+ (normal)   Pulse PT: 2+ (normal)   Deformities: None      ASSESSMENT & PLAN  This gentleman has type 2 diabetes mellitus with an A1c in July of 9.8% but with blood sugars now in a much more acceptable range almost entirely due to the fact that he is eliminated the snacks and sweets at night. He tells us that the Januvia is not affordable and neither is the [place]. We have done the followin. I increase the Metformin to 2 tablets twice daily  2. Discontinue the Jardiance  3. We have continued the Lantus at 40 units twice daily and the regular at 7 units with dinner  4. I wrote a prescription for atorvastatin which she can get at [address] [facility] for $6.  He does tell us that he never filled the prescription for statin because of cost and hopefully this will solve that issue for him. 5.  We will see him back in 3 months. We anticipate his blood sugar and A1c being much better now that he is focusing his attention on the food.

## 2019-10-07 ENCOUNTER — APPOINTMENT (OUTPATIENT)
Dept: CT IMAGING | Age: 69
End: 2019-10-07
Attending: EMERGENCY MEDICINE
Payer: MEDICARE

## 2019-10-07 ENCOUNTER — HOSPITAL ENCOUNTER (EMERGENCY)
Age: 69
Discharge: HOME | End: 2019-10-07
Attending: EMERGENCY MEDICINE
Payer: MEDICARE

## 2019-10-07 VITALS
OXYGEN SATURATION: 98 % | RESPIRATION RATE: 18 BRPM | SYSTOLIC BLOOD PRESSURE: 131 MMHG | TEMPERATURE: 98.6 F | DIASTOLIC BLOOD PRESSURE: 57 MMHG | HEART RATE: 79 BPM

## 2019-10-07 DIAGNOSIS — R26.89 FUNCTIONAL GAIT ABNORMALITY: ICD-10-CM

## 2019-10-07 DIAGNOSIS — S00.83XA CONTUSION OF FACE, INITIAL ENCOUNTER: ICD-10-CM

## 2019-10-07 DIAGNOSIS — S16.1XXA STRAIN OF NECK MUSCLE, INITIAL ENCOUNTER: ICD-10-CM
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
## 2019-10-07 DIAGNOSIS — S09.90XA INJURY OF HEAD, INITIAL ENCOUNTER: ICD-10-CM

## 2019-10-07 DIAGNOSIS — S01.21XA LACERATION OF NOSE, INITIAL ENCOUNTER: ICD-10-CM

## 2019-10-07 DIAGNOSIS — T07.XXXA ABRASIONS OF MULTIPLE SITES: ICD-10-CM

## 2019-10-07 DIAGNOSIS — S02.2XXA CLOSED FRACTURE OF NASAL BONE, INITIAL ENCOUNTER: ICD-10-CM

## 2019-10-07 LAB
GLUCOSE BLD STRIP.AUTO-MCNC: 157 MG/DL (ref 65–100)
SERVICE CMNT-IMP: ABNORMAL

## 2019-10-07 PROCEDURE — 75810000293 HC SIMP/SUPERF WND  RPR

## 2019-10-07 PROCEDURE — 99284 EMERGENCY DEPT VISIT MOD MDM: CPT

## 2019-10-07 PROCEDURE — 74011250637 HC RX REV CODE- 250/637: Performed by: EMERGENCY MEDICINE

## 2019-10-07 PROCEDURE — 82962 GLUCOSE BLOOD TEST: CPT

## 2019-10-07 PROCEDURE — 70486 CT MAXILLOFACIAL W/O DYE: CPT

## 2019-10-07 PROCEDURE — 72125 CT NECK SPINE W/O DYE: CPT

## 2019-10-07 PROCEDURE — 70450 CT HEAD/BRAIN W/O DYE: CPT

## 2019-10-07 PROCEDURE — 74011000250 HC RX REV CODE- 250: Performed by: EMERGENCY MEDICINE

## 2019-10-07 RX ORDER — CEPHALEXIN 500 MG/1
500 CAPSULE ORAL 2 TIMES DAILY
Qty: 20 CAP | Refills: 6 | Status: SHIPPED | OUTPATIENT
Start: 2019-10-07 | End: 2020-02-24 | Stop reason: SDUPTHER

## 2019-10-07 RX ORDER — OXYCODONE AND ACETAMINOPHEN 5; 325 MG/1; MG/1
1 TABLET ORAL
Status: COMPLETED | OUTPATIENT
Start: 2019-10-07 | End: 2019-10-07

## 2019-10-07 RX ORDER — BACITRACIN 500 UNIT/G
2 PACKET (EA) TOPICAL
Status: COMPLETED | OUTPATIENT
Start: 2019-10-07 | End: 2019-10-07

## 2019-10-07 RX ORDER — ACETAMINOPHEN 500 MG
1000 TABLET ORAL
Qty: 30 TAB | Refills: 0 | Status: SHIPPED | OUTPATIENT
Start: 2019-10-07

## 2019-10-07 RX ADMIN — BACITRACIN 2 PACKET: 500 OINTMENT TOPICAL at 03:10

## 2019-10-07 RX ADMIN — OXYCODONE HYDROCHLORIDE AND ACETAMINOPHEN 1 TABLET: 5; 325 TABLET ORAL at 02:59

## 2019-10-07 NOTE — ED PROVIDER NOTES
The patient is a 68-year-old male with a past medical history significant for restless leg syndrome, gait abnormality with frequent fall, DJD, diabetes, erectile dysfunction, depression chronic anemia, Hinson's esophagus who presents to the ED after sustaining a mechanical fall while attempting to move a chair and fixing his bed. The patient fell face forward and struck his face against furniture sustaining multiple injury to his face and head. The patient is complaining of headache and facial pain. He was dazed but denies any loss of consciousness, nausea, vomiting, abdominal pain, blurry vision, neck and back pain, chest pain, shortness of breath, dizziness, extremity weakness and numbness. His tetanus injection is unknown. Past Medical History:  
Diagnosis Date  ADD (attention deficit disorder)  Anemia due to blood loss  Hinson's esophagus with esophagitis  Benign essential tremor  Cancer [facility] 2012 [address]  Depression  DJD (degenerative joint disease) of hip   
 bilat  DM (diabetes mellitus) ([address] 3/17/2010  ED (erectile dysfunction) of organic origin  Fall on or from sidewalk curb 9/24/2015  Femur fracture ([address]  Gastritis and duodenitis  GERD (gastroesophageal reflux disease)   
 resolved after discontinuing diclofenac  Hematuria 6/2016  High cholesterol 03/17/2010  
 pt denies 6/19  
 HTN (hypertension) 3/17/2010  Morbid obesity ([address]  Murmur, cardiac 2016  
 PFO (patent foramen ovale) 7/26/2017  PUD (peptic ulcer disease)  Shingles 6/2016 RESOLVING- NO RASH (HEAD)  Sleep apnea   
 doesnt use CPAP anymore Past Surgical History:  
Procedure Laterality Date  COLONOSCOPY N/A 6/18/2019 COLONOSCOPY AND EGD performed by Shanique Morales MD [facility]  COLONOSCOPY,DIAGNOSTIC  6/18/2019  ENDOSCOPY, COLON, DIAGNOSTIC    
 HX CHOLECYSTECTOMY  1995  HX GI  1980  
 gastroplasty [address]  HX HIP REPLACEMENT Left  HX ORTHOPAEDIC Right   
 rot cuff repair  HX ORTHOPAEDIC  2016  
 left broken femur [address]  HX VASCULAR ACCESS    
 picc line and removed after sepsis resolved [facility] ARTHROPLASTY  2010  
 right  TOTAL HIP ARTHROPLASTY  2016  
 left  UPPER GI ENDOSCOPY,BIOPSY  2019 Family History:  
Problem Relation Age of Onset  Cancer Father   
     multiple myeloma  Stroke Father  Dementia Mother  No Known Problems Brother  COPD Brother  Cancer Maternal Grandmother COLON  Anesth Problems Neg Hx Social History Socioeconomic History  Marital status:  Spouse name: Not on file  Number of children: Not on file  Years of education: Not on file  Highest education level: Not on file Occupational History  Not on file Social Needs  Financial resource strain: Not on file  Food insecurity:  
  Worry: Not on file Inability: Not on file  Transportation needs:  
  Medical: Not on file Non-medical: Not on file Tobacco Use  Smoking status: Former Smoker Packs/day: 2.00 Years: 15.00 Pack years: 30.00 Last attempt to quit: 3/1/1979 Years since quittin.6  Smokeless tobacco: Never Used Substance and Sexual Activity  Alcohol use: No  
  Alcohol/week: 0.0 standard drinks  Drug use: No  
 Sexual activity: Never Lifestyle  Physical activity:  
  Days per week: Not on file Minutes per session: Not on file  Stress: Not on file Relationships  Social connections:  
  Talks on phone: Not on file Gets together: Not on file Attends Mandaeism service: Not on file Active member of club or organization: Not on file Attends meetings of clubs or organizations: Not on file Relationship status: Not on file  Intimate partner violence: Fear of current or ex partner: Not on file Emotionally abused: Not on file Physically abused: Not on file Forced sexual activity: Not on file Other Topics Concern  Not on file Social History Narrative  Not on file ALLERGIES: Nsaids (non-steroidal anti-inflammatory drug) Review of Systems Vitals:  
 10/07/19 0116 BP: 133/52 Pulse: 79 Resp: 18 Temp: 98.6 °F (37 °C) SpO2: 100% Physical Exam  
Nursing note and vitals reviewed. CONSTITUTIONAL: Well-appearing; well-nourished; in mild distress HEAD: Normocephalic; swelling of the forehead, bridge of the nose, right cheek and upper lip. EYES: PERRL; EOM intact; conjunctiva and sclera are clear bilaterally. ENT: No rhinorrhea; normal pharynx with no tonsillar hypertrophy; mucous membranes pink/moist, no erythema, no exudate. 1 cm superficial stellate laceration of the bridge of the nose NECK: Supple; non-tender; no cervical lymphadenopathy CARD: Normal S1, S2; no murmurs, rubs, or gallops. Regular rate and rhythm. RESP: Normal respiratory effort; breath sounds clear and equal bilaterally; no wheezes, rhonchi, or rales. ABD: Normal bowel sounds; non-distended; non-tender; no palpable organomegaly, no masses, no bruits. Back Exam: Normal inspection; no vertebral point tenderness, no CVA tenderness. Normal range of motion. EXT: Normal ROM in all four extremities; non-tender to palpation; no swelling or deformity; distal pulses are normal, no edema. SKIN: Warm; dry; multiple superficial abrasion to the face, both legs, right and left elbows. [facility] NEURO:Alert and oriented x 3, coherent, SEVERO-XII grossly intact, sensory and motor are non-focal. 
 
 
 
MDM Number of Diagnoses or Management Options Diagnosis management comments: Assessment: 42-year-old male with history of restless leg, gait abnormality and the frequent fall who presents to the ED for evaluation status post mechanical fall with head injury, cervical spine and maxillofacial bone injury rule out fracture Plan: Lab/analgesia/CT scan of the maxillofacial bone/CT scan of the head/CT scan of the cervical spine/education, reassurance, symptomatic treatment/ Monitor and Reevaluate. Amount and/or Complexity of Data Reviewed Clinical lab tests: ordered and reviewed Tests in the radiology section of CPT®: ordered and reviewed Tests in the medicine section of CPT®: reviewed and ordered Discussion of test results with the performing providers: yes Decide to obtain previous medical records or to obtain history from someone other than the patient: yes Obtain history from someone other than the patient: yes Review and summarize past medical records: yes Discuss the patient with other providers: yes Independent visualization of images, tracings, or specimens: yes Risk of Complications, Morbidity, and/or Mortality Presenting problems: moderate Diagnostic procedures: moderate Management options: moderate Wound Closure by Adhesive Date/Time: 10/7/2019 3:21 AM 
Performed by: Randall Dela Cruz MD 
Authorized by: Randall Dela Cruz MD  
 
Consent:  
  Consent obtained:  Verbal 
  Consent given by:  Patient Risks discussed:  Pain, infection, poor cosmetic result and poor wound healing Alternatives discussed:  No treatment Anesthesia (see MAR for exact dosages): Anesthesia method:  None Laceration details: Location: Bridge of the nose. Length (cm):  1 Laceration depth: Superficial. 
Repair type:  
  Repair type: Intermediate Pre-procedure details: Preparation:  Patient was prepped and draped in usual sterile fashion Exploration:  
  Contaminated: no   
Treatment:  
  Area cleansed with:  Ney Amount of cleaning:  Standard Irrigation solution:  Sterile saline Irrigation method:  Syringe Visualized foreign bodies/material removed: no   
Skin repair:  
  Repair method:  Tissue adhesive and Steri-Strips Number of Steri-Strips:  5 Approximation:  
  Approximation:  Close Post-procedure details:  
  Dressing:  Bulky dressing Patient tolerance of procedure: Tolerated well, no immediate complications Progress Note:  
Pt has been reexamined by Dimitris Bernstein MD. Pt is feeling much better. Symptoms have improved. All available results have been reviewed with pt and any available family. Pt understands sx, dx, and tx in ED. Care plan has been outlined and questions have been answered. Pt is ready to go home. Will send home on Fall/head injury/laceration of the nose/contusion of the face/cervical strain/fracture nose instruction. Prescription of Tylenol and Keflex. Outpatient referral with PCP as needed. Written by Dimitris Bernstein MD,3:20 AM 
 
. Ramonita Crandall

## 2019-10-07 NOTE — DISCHARGE INSTRUCTIONS
Patient Education        Neck Strain: Care Instructions  Your Care Instructions    You have strained the muscles and ligaments in your neck. A sudden, awkward movement can strain the neck. This often occurs with falls or car accidents or during certain sports. Everyday activities like working on a computer or sleeping can also cause neck strain if they force you to hold your neck in an awkward position for a long time. It is common for neck pain to get worse for a day or two after an injury, but it should start to feel better after that. You may have more pain and stiffness for several days before it gets better. This is expected. It may take a few weeks or longer for it to heal completely. Good home treatment can help you get better faster and avoid future neck problems. Follow-up care is a key part of your treatment and safety. Be sure to make and go to all appointments, and call your doctor if you are having problems. It's also a good idea to know your test results and keep a list of the medicines you take. How can you care for yourself at home? · If you were given a neck brace (cervical collar) to limit neck motion, wear it as instructed for as many days as your doctor tells you to. Do not wear it longer than you were told to. Wearing a brace for too long can make neck stiffness worse and weaken the neck muscles. · You can try using heat or ice to see if it helps. ? Try using a heating pad on a low or medium setting for 15 to 20 minutes every 2 to 3 hours. Try a warm shower in place of one session with the heating pad. You can also buy single-use heat wraps that last up to 8 hours. ? You can also try an ice pack for 10 to 15 minutes every 2 to 3 hours. · Take pain medicines exactly as directed. ? If the doctor gave you a prescription medicine for pain, take it as prescribed. ? If you are not taking a prescription pain medicine, ask your doctor if you can take an over-the-counter medicine.   · Gently rub the area to relieve pain and help with blood flow. Do not massage the area if it hurts to do so. · Do not do anything that makes the pain worse. Take it easy for a couple of days. You can do your usual activities if they do not hurt your neck or put it at risk for more stress or injury. · Try sleeping on a special neck pillow. Place it under your neck, not under your head. Placing a tightly rolled-up towel under your neck while you sleep will also work. If you use a neck pillow or rolled towel, do not use your regular pillow at the same time. · To prevent future neck pain, do exercises to stretch and strengthen your neck and back. Learn how to use good posture, safe lifting techniques, and proper body mechanics. When should you call for help? Call 911 anytime you think you may need emergency care. For example, call if:    · You are unable to move an arm or a leg at all.   [facility] your doctor now or seek immediate medical care if:    · You have new or worse symptoms in your arms, legs, chest, belly, or buttocks. Symptoms may include:  ? Numbness or tingling. ? Weakness. ? Pain.     · You lose bladder or bowel control.    Watch closely for changes in your health, and be sure to contact your doctor if:    · You are not getting better as expected. Where can you learn more? Go to http://russ-kiesha.info/. Enter M253 in the search box to learn more about \"Neck Strain: Care Instructions. \"  Current as of: June 26, 2019  Content Version: 12.2  © 4678-1731 [employer], Incorporated. Care instructions adapted under license by [employer] (which disclaims liability or warranty for this information). If you have questions about a medical condition or this instruction, always ask your healthcare professional. [place] [address] any warranty or liability for your use of this information.          Patient Education        Facial Fracture: Care Instructions  Your Care Instructions  You have broken (fractured) one or more bones in your face. Swelling and bruising from the injury are likely to get worse over the first couple of days. After that, the swelling should steadily improve until it is gone. If you have bruises on your face, they may change as they heal. The skin may turn from black and blue to green to yellow or brown before it returns to its normal color. It may take several weeks for your injury to heal.  It is very important that you get follow-up care as directed so that the injury heals properly and does not lead to problems. The kind of care and treatment you will need depends on the specific type of break (or breaks) you have. You heal best when you take good care of yourself. Eat a variety of healthy foods, and don't smoke. Follow-up care is a key part of your treatment and safety. Be sure to make and go to all appointments, and call your doctor if you are having problems. It's also a good idea to know your test results and keep a list of the medicines you take. How can you care for yourself at home? · Put ice or a cold pack on your injury for 10 to 20 minutes at a time. Try to do this every 1 to 2 hours for the next 3 days (when you are awake) or until the swelling goes down. Put a thin cloth between the ice pack and your skin. · Go to all follow-up appointments with your doctor. Your doctor will determine whether you need further treatment, including surgery. · Take your medicines exactly as prescribed. Call your doctor if you think you are having a problem with your medicine. You will get more details on the specific medicines your doctor prescribes. · If your doctor prescribed antibiotics, take them exactly as directed. Do not stop taking them just because you feel better. You need to take the full course of antibiotics. · Be safe with medicines. Read and follow all instructions on the label.   ? If the doctor gave you a prescription medicine for pain, take it as prescribed. ? If you are not taking a prescription pain medicine, ask your doctor if you can take an over-the-counter medicine. · Keep your head elevated when you sleep. · Eat soft food to decrease jaw pain. · Do not blow your nose. Dab it with a tissue if you need to. When should you call for help? Call 911 anytime you think you may need emergency care. For example, call if:    · You have a seizure.     · You passed out (lost consciousness).     · You have tingling, weakness, or numbness on one side of your body.    Call your doctor now or seek immediate medical care if:    · You have a severe headache.     · You develop double vision.     · You have a fever and stiff neck.     · Clear, watery fluid drains from your nose.     · You feel dizzy or lightheaded.     · You have new eye pain or changes in your vision, such as blurring.     · You have new ear pain, ringing in your ears, or trouble hearing.     · You are confused, irritable, or not acting normally.     · You have a hard time standing, walking, or talking.     · You have new mouth or tooth pain, or you have trouble chewing.     · You have increasing pain even after you have taken your pain medicine.    Watch closely for changes in your health, and be sure to contact your doctor if:    · You develop a cough, cold, or sinus infection.     · The symptoms from your injury are not steadily improving. Where can you learn more? Go to http://russ-kiesha.info/. Enter 0664 [id number] [id number] in the search box to learn more about \"Facial Fracture: Care Instructions. \"  Current as of: March 28, 2019  Content Version: 12.2  © 9559-5175 [employer]. Care instructions adapted under license by [employer] (which disclaims liability or warranty for this information).  If you have questions about a medical condition or this instruction, always ask your healthcare professional. Camilla Robertson disclaims any warranty or liability for your use of this information. Patient Education        Head Injury: Care Instructions  Your Care Instructions    Most injuries to the head are minor. Bumps, cuts, and scrapes on the head and face usually heal well and can be treated the same as injuries to other parts of the body. Although it's rare, once in a while a more serious problem shows up after you are home. So it's good to be on the lookout for symptoms for a day or two. Follow-up care is a key part of your treatment and safety. Be sure to make and go to all appointments, and call your doctor if you are having problems. It's also a good idea to know your test results and keep a list of the medicines you take. How can you care for yourself at home? · Follow your doctor's instructions. He or she will tell you if you need someone to watch you closely for the next 24 hours or longer. · Take it easy for the next few days or more if you are not feeling well. · Ask your doctor when it's okay for you to go back to activities like driving a car, riding a bike, or operating machinery. When should you call for help? Call 911 anytime you think you may need emergency care. For example, call if:    · You have a seizure.     · You passed out (lost consciousness).     · You are confused or can't stay awake.    Call your doctor now or seek immediate medical care if:    · You have new or worse vomiting.     · You feel less alert.     · You have new weakness or numbness in any part of your body.    Watch closely for changes in your health, and be sure to contact your doctor if:    · You do not get better as expected.     · You have new symptoms, such as headaches, trouble concentrating, or changes in mood. Where can you learn more? Go to http://russ-kiesha.info/. Enter Q688 in the search box to learn more about \"Head Injury: Care Instructions. \"  Current as of: March 28, 2019  Content Version: 12.2  © 2631-5321 [employer]. Care instructions adapted under license by [employer] (which disclaims liability or warranty for this information). If you have questions about a medical condition or this instruction, always ask your healthcare professional. [address] any warranty or liability for your use of this information.

## 2019-10-07 NOTE — ED TRIAGE NOTES
Pt arrives with cane accompanied by son after an unwitnessed GLF where pt lost his balance trying to move a chair. Pt is A&Ox4, denies LOC. Pt has lacerations to R eyebrow, nose, and R cheek and abrasions to bilateral lower legs.

## 2019-10-10 ENCOUNTER — OFFICE VISIT (OUTPATIENT)
Dept: FAMILY MEDICINE CLINIC | Age: 69
End: 2019-10-10

## 2019-10-10 VITALS
HEIGHT: 70 IN | RESPIRATION RATE: 18 BRPM | DIASTOLIC BLOOD PRESSURE: 59 MMHG | OXYGEN SATURATION: 100 % | BODY MASS INDEX: 35.36 KG/M2 | SYSTOLIC BLOOD PRESSURE: 133 MMHG | TEMPERATURE: 97.9 F | HEART RATE: 74 BPM | WEIGHT: 247 LBS
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
## 2019-10-10 DIAGNOSIS — Z96.649 PERIPROSTHETIC FRACTURE OF FEMUR FOLLOWING TOTAL REPLACEMENT OF HIP, SEQUELA: ICD-10-CM

## 2019-10-10 DIAGNOSIS — S02.2XXD: ICD-10-CM

## 2019-10-10 DIAGNOSIS — M16.11 PRIMARY OSTEOARTHRITIS OF RIGHT HIP: ICD-10-CM

## 2019-10-10 DIAGNOSIS — I10 ESSENTIAL HYPERTENSION: ICD-10-CM

## 2019-10-10 DIAGNOSIS — M97.8XXS PERIPROSTHETIC FRACTURE OF FEMUR FOLLOWING TOTAL REPLACEMENT OF HIP, SEQUELA: ICD-10-CM

## 2019-10-10 DIAGNOSIS — F43.21 GRIEF: ICD-10-CM

## 2019-10-10 DIAGNOSIS — F33.9 RECURRENT DEPRESSION (HCC): ICD-10-CM

## 2019-10-10 DIAGNOSIS — R29.6 RECURRENT FALLS: ICD-10-CM

## 2019-10-10 DIAGNOSIS — E11.65 UNCONTROLLED TYPE 2 DIABETES MELLITUS WITH HYPERGLYCEMIA (HCC): Primary | ICD-10-CM

## 2019-10-10 DIAGNOSIS — S00.03XD HEMATOMA OF SCALP, SUBSEQUENT ENCOUNTER: ICD-10-CM

## 2019-10-10 RX ORDER — ACETAMINOPHEN AND CODEINE PHOSPHATE 300; 30 MG/1; MG/1
1 TABLET ORAL
Qty: 30 TAB | Refills: 0 | Status: SHIPPED | OUTPATIENT
Start: 2019-10-10 | End: 2019-10-15

## 2019-10-10 RX ORDER — SERTRALINE HYDROCHLORIDE 100 MG/1
TABLET, FILM COATED ORAL
Qty: 135 TAB | Refills: 1 | Status: SHIPPED | OUTPATIENT
Start: 2019-10-10 | End: 2019-10-10 | Stop reason: SDUPTHER

## 2019-10-10 RX ORDER — BUPROPION HYDROCHLORIDE 150 MG/1
TABLET, EXTENDED RELEASE ORAL
Qty: 90 TAB | Refills: 0 | Status: SHIPPED | OUTPATIENT
Start: 2019-10-10 | End: 2019-10-10 | Stop reason: SINTOL

## 2019-10-10 RX ORDER — SERTRALINE HYDROCHLORIDE 100 MG/1
TABLET, FILM COATED ORAL
Qty: 45 TAB | Refills: 5 | Status: SHIPPED | OUTPATIENT
Start: 2019-10-10 | End: 2020-02-12 | Stop reason: SDUPTHER

## 2019-10-10 NOTE — PROGRESS NOTES
Subjective: Chief Complaint Patient presents with  Diabetes 3 month follow-up He  is a 68 y.o. male who presents for evaluation of: 
Since last appt, had 1 major fall. ER notes reviewed: \"mechanical fall while attempting to move a chair and fixing his bed. The patient fell face forward and struck his face against furniture sustaining multiple injury to his face and head. \" 
Ended up requiring sutures and CTs. CT head: \"Right supraorbital scalp hematoma. No acute intracranial hemorrhage. \" 
CT maxillofacial: \"Paranasal and right supraorbital soft tissue swelling. Nondisplaced nasal bone Fractures. \" 
CT C spine: \"No acute fracture. \" 
 
Sent home on Keflex. He is improving. Diabetes Mellitus, HTN, HLD:  
Ct to have a lot of trouble getting his DM to goal.  Working with Endo now. Currently taking Lantus 40 units BID, Reg insulin 7 units with dinner, Metformin ER 1000 mg BID. He recently ran out of Januvia 100 mg and Jardiance 25 mg as he is now in the donut hole. Not controlled for years. Doing much better with intermittent fasting for his diet. He also brings his glucometer showing excellent control over the last 3 weeks with most sugars in the 100-150 range. Reports no polyuria or polydipsia, no chest pain, TIA's, some numbness in left leg that comes and goes (did have left femur fracture during a fall; had R hip issues and needed sgy), last eye exam approximately < 1 yr ago. Exercisetrying to walk at lives regularly using a cart to help prevent falls Dietintermittent fasting Pt is a non smoker. Lab Results Component Value Date/Time  Hemoglobin A1c (POC) 8.6 07/26/2017 12:24 PM  
 Hemoglobin A1c (POC) 7.3 02/02/2017 02:45 PM  
 Hemoglobin A1c 9.8 (H) 07/24/2019 12:31 PM  
 Microalbumin/Creat ratio (mg/g creat) 13 11/01/2010 07:07 AM  
 Microalb/Creat ratio (ug/mg creat.) <17.9 09/25/2018 03:30 PM  
 Microalbumin,urine random 0.81 11/01/2010 07:07 AM  
 LDL, calculated 45 10/23/2018 04:45 PM  
 Creatinine 1.01 07/24/2019 12:31 PM  
  
Lab Results Component Value Date/Time GFR est AA 86 07/24/2019 12:31 PM  
 GFR est non-AA 74 07/24/2019 12:31 PM  
  
Lab Results Component Value Date/Time TSH 2.930 01/22/2019 09:17 AM  
   
ROS Gen - no fever/chills Resp - no dyspnea or cough CV - no chest pain or WANG Psych - Depression ct to be a chronic issue. Still having sig trouble with concentration/attention and sleep. Anhedonia still present. Recently slightly worse with loss of his mom who passed away from Alzheimer's dementia. Rest per HPI Past Medical History:  
Diagnosis Date  ADD (attention deficit disorder)  Anemia due to blood loss  Hinson's esophagus with esophagitis  Benign essential tremor  Cancer [facility]) 2012 [address]  Depression  DJD (degenerative joint disease) of hip   
 bilat  DM (diabetes mellitus) ([address] 3/17/2010  ED (erectile dysfunction) of organic origin  Fall on or from sidewalk curb 9/24/2015  Femur fracture ([address]  Gastritis and duodenitis  GERD (gastroesophageal reflux disease)   
 resolved after discontinuing diclofenac  Hematuria 6/2016  High cholesterol 03/17/2010  
 pt denies 6/19  
 HTN (hypertension) 3/17/2010  Morbid obesity ([address]  Murmur, cardiac 2016  
 PFO (patent foramen ovale) 7/26/2017  PUD (peptic ulcer disease)  Shingles 6/2016 RESOLVING- NO RASH (HEAD)  Sleep apnea   
 doesnt use CPAP anymore Past Surgical History:  
Procedure Laterality Date  COLONOSCOPY N/A 6/18/2019 COLONOSCOPY AND EGD performed by Georgina Abdul MD at [facility] ENDOSCOPY  COLONOSCOPY,DIAGNOSTIC  6/18/2019  ENDOSCOPY, COLON, DIAGNOSTIC    
 HX CHOLECYSTECTOMY  1995  HX GI  1980  
 gastroplasty [address]  HX HIP REPLACEMENT Left  HX ORTHOPAEDIC Right 2011  
 rot cuff repair  HX ORTHOPAEDIC  2016  
 left broken femur [address]  HX VASCULAR ACCESS    
 picc line and removed after sepsis resolved [facility] ARTHROPLASTY  05/2010  
 right  TOTAL HIP ARTHROPLASTY  08/01/2016  
 left  UPPER GI ENDOSCOPY,BIOPSY  6/18/2019 Current Outpatient Medications on File Prior to Visit Medication Sig Dispense Refill  acetaminophen (TYLENOL) 500 mg tablet Take 2 Tabs by mouth every six (6) hours as needed for Pain. 30 Tab 0  cephALEXin (KEFLEX) 500 mg capsule Take 1 Cap by mouth two (2) times a day. 20 Cap 6  
 omeprazole (PRILOSEC) 20 mg capsule TAKE 1 CAPSULE BY MOUTH EVERY DAY  12  
 metFORMIN ER (GLUCOPHAGE XR) 500 mg tablet 2 tablets twice daily 360 Tab 3  
 atorvastatin (LIPITOR) 40 mg tablet Take 1 Tab by mouth daily. 90 Tab 3  
 gabapentin (NEURONTIN) 300 mg capsule Take 2 Caps by mouth nightly. 180 Cap 1  Blood-Glucose Meter (ACCU-CHEK SOWMYA PLUS METER) misc Test blood sugar twice daily Diagnosis E 11.9 1 Each 1  
 glucose blood VI test strips (ACCU-CHEK SOWMYA PLUS TEST STRP) strip Test blood sugar twice daily Diagnosis E 11.9 200 Strip 5  Blood Glucose Control High&Low (ACCU-CHEK SOWMYA CONTROL SOLN) soln Test blood sugar twice daily Diagnosis E 11.9 1 Bottle 3  
 insulin regular (NOVOLIN R REGULAR U-100 INSULN) 100 unit/mL injection INJECT 7 UNITS UNDER THE SKIN DAILY (WITH DINNER). 10 mL 0  
 Insulin Needles, Disposable, (BD ULTRA-FINE SHORT PEN NEEDLE) 31 gauge x 5/16\" ndle USE TO INJECT UNDER THE SKIN THREE TIMES A  Pen Needle 1  
 B.infantis-B.ani-B.long-B.bifi (PROBIOTIC 4X) 10-15 mg TbEC Take  by mouth.  magnesium 250 mg tab Take 500 mg by mouth daily.  melatonin tab tablet Take 10 mg by mouth nightly as needed.  lisinopril (PRINIVIL, ZESTRIL) 20 mg tablet TAKE 1 TABLET DAILY 90 Tab 1  
 diclofenac (VOLTAREN) 1 % gel Apply  to affected area four (4) times daily.  100 g 2  
 LANTUS SOLOSTAR U-100 INSULIN 100 unit/mL (3 mL) inpn INJECT 40 UNITS UNDER THE SKIN TWO TIMES A DAY 75 Adjustable Dose Pre-filled Pen Syringe 0  
 ergocalciferol (VITAMIN D2) 50,000 unit capsule TAKE 1 CAPSULE EVERY 7 DAYS 12 Cap 2  
 folic acid (FOLVITE) 1 mg tablet TAKE 1 TABLET DAILY 90 Tab 3  potassium 99 mg tablet Take 99 mg by mouth daily.  calcium citrate-vitamin D3 (CITRACAL + D) tablet Take  by mouth two (2) times a day.  primidone (MYSOLINE) 250 mg tablet TAKE 1 TABLET TWICE A  Tab 3  
 aspirin 81 mg chewable tablet Take 1 Tab by mouth daily. (Patient taking differently: Take 81 mg by mouth every other day.) 30 Tab 11  
 traZODone (DESYREL) 50 mg tablet TAKE 1 TABLET BY MOUTH EVERY DAY AT NIGHT 90 Tab 1  [DISCONTINUED] acetaminophen-codeine (TYLENOL-CODEINE #3) 300-30 mg per tablet Take 1 Tab by mouth every four (4) hours as needed for Pain for up to 5 days. Max Daily Amount: 6 Tabs. 30 Tab 0  [DISCONTINUED] butalbital-acetaminophen-caffeine (FIORICET, ESGIC) -40 mg per tablet TAKE 1 TABLET EVERY 4 HOURS AS NEEDED FOR PAIN 30 Tab 1  [DISCONTINUED] sertraline (ZOLOFT) 100 mg tablet TAKE 1 TABLET DAILY 90 Tab 1 No current facility-administered medications on file prior to visit. Objective:  
 
Vitals:  
 10/10/19 1016 BP: 133/59 Pulse: 74 Resp: 18 Temp: 97.9 °F (36.6 °C) TempSrc: Oral  
SpO2: 100% Weight: 247 lb (112 kg) Height: 5' 10\" (1.778 m) Physical Examination: 
General appearance - alert, in no distress Eyes -sclera anicteric Neck - supple, no significant adenopathy, no thyromegaly Chest - clear to auscultation, no wheezes, rales or rhonchi, symmetric air entry Heart - normal rate, regular rhythm, normal S1, S2, 2/6 systolic ejection murmur (previously worked up) Neurological - alert, oriented, no focal findings or movement disorder noted Extremities bilateral trace edema Psychnormal mood and affect Skin large hematoma above right eye, numerous abrasions noted, laceration across nasal bridge, numerous small lacerations along right lower leg Assessment/ Plan:  
Diagnoses and all orders for this visit: 
 
1. Uncontrolled type 2 diabetes mellitus with hyperglycemia (HCC)significantly improved sugars with recent changes to his diet and good compliance with medications. He continues to work with Endo. Having trouble with being in the donut hole and getting medications so sending to our pharmacist, Dr. Philip Rebolledo for help on this. -     REFERRAL TO PHARMACIST 2. Essential hypertensioncontrolled 3. Fall, subsequent encounter 4. Hematoma of scalp, subsequent encounter 5. Closed nondisplaced fracture of nasal bone with routine healing, subsequent encounter  Significant fall and seems to already be improving with nasal fracture and scalp hematoma. -     acetaminophen-codeine (TYLENOL-CODEINE #3) 300-30 mg per tablet; Take 1 Tab by mouth every four (4) hours as needed for Pain for up to 5 days. Max Daily Amount: 6 Tabs. 6. Grief 7. Recurrent depression ([address] Feels like he is doing okay with this so will continue Zoloft 100 mg give patient the option of going up to 150 mg daily given recent loss of his mother and continued chronic grief from loss of his wife and depression. 
-     sertraline (ZOLOFT) 100 mg tablet; TAKE 1.5 TABLETS DAILY 8. Recurrent falls had a long discussion about concerns with frequent falls over the past year and considering moving towards use of a walker or wheelchair especially if this continues. 9. Periprosthetic fracture of femur following total replacement of hip, sequela 10. Primary osteoarthritis of right hip  Very sparing use of Tylenol 3 
-     acetaminophen-codeine (TYLENOL-CODEINE #3) 300-30 mg per tablet; Take 1 Tab by mouth every four (4) hours as needed for Pain for up to 5 days. Max Daily Amount: 6 Tabs.  
 
I spent > 50% of the 40 min visit counseling and educating about frequent falls, diabetes, hypertension, depression, 
 I have discussed the diagnosis with the patient and the intended plan as seen in the above orders. The patient has received an after-visit summary and questions were answered concerning future plans. I have discussed medication side effects and warnings with the patient as well. The patient verbalizes understanding and agreement with the plan. Follow-up and Dispositions · Return in about 4 weeks (around 11/7/2019), or if symptoms worsen or fail to improve, for Regular Follow up.

## 2019-10-10 NOTE — PROGRESS NOTES
Chief Complaint Patient presents with  Diabetes 3 month follow-up 1. Have you been to the ER, urgent care clinic since your last visit? Hospitalized since your last visit? Yes Where: [facility]'s ER Fall three days ago 2. Have you seen or consulted any other health care providers outside of the [address] [place] [address] since your last visit? Include any pap smears or colon screening. No  
Mother passed away in August 2019 Continues to have discomfort on leg,left hip and face Discuss insulin dosage and change due to donut hole and cost

## 2019-10-16 ENCOUNTER — OFFICE VISIT (OUTPATIENT)
Dept: FAMILY MEDICINE CLINIC | Age: 69
End: 2019-10-16

## 2019-10-16 RX ORDER — INSULIN GLARGINE 100 [IU]/ML
INJECTION, SOLUTION SUBCUTANEOUS
Qty: 8 PEN | Refills: 0 | Status: SHIPPED | COMMUNITY
Start: 2019-10-16 | End: 2019-11-13 | Stop reason: SDUPTHER

## 2019-10-16 NOTE — PROGRESS NOTES
Pt presents for a diabetes, medication access visit, initial, as referred by Dr. Yamile Bowman. In coverage gap and not able to get diabetes medications. Was on januvia but not now; Endocrine note states will stop jardiance after runs out to see how sugar does off of it given cost - has a few weeks left    SMBGs:  No log   Checks in the morning mostly   121 this AM  80s, 90s recalls  Highest recall  121, lowest 80    No signs of low sugar; keeps glucose tablets at bedside if feel shaky - has happened in past long time ago     Diet intermittent fasting, no food 16 hours, eats during 8 hours - overall  knowledgeable about low carb diet and eats fairly well overall - he is doing well with this    Medications reviewed and reconciled    Diabetes medications:   Novolin R, once daily 7 units with dinner, may forget and if so will give 7 units with the 40 units of Lantus at bedtime around 10 PM  40 units of Lantus also in the AM when he wakes up in the 8 AM to 9 AM time frame  Metformin increased from 1000 mg daily to 2000 mg daily at endocrine visit     Coverage gap one month ago   Lantus up to $600 from $90 - unable to pay this  [employer] Part D - meeting with group that helps review plans and make sure he's getting benefits he should for meds this week, which is great    Discussed medicare part D and coverage gap in detail with patient. SMBGs are controlled by patient report; advised to not give Regular insulin if forgets with dinner as could be risk for lows overnight / early AM (pt falls risk; lives alone). Advised to check sugar at bedtime also and log for follow up visits with providers and with me. Per Dr. Yamile Bowman, will provide patient with 8 sample lantus pens for one month today. Samples logged, documented and signed out per policy. Will follow up in one month to re assess coverage and consider if needs more samples and check in on SMBGs.     Gave pt all of his upcoming appt dates to put in his calendar. Reviewed s/s of hypoglycemia and what to do. Patient verbalized understanding of information presented. Answered all of the patient's questions.       Everton Christianson, PHARMD, CDE

## 2019-11-07 ENCOUNTER — OFFICE VISIT (OUTPATIENT)
Dept: FAMILY MEDICINE CLINIC | Age: 69
End: 2019-11-07

## 2019-11-07 ENCOUNTER — HOSPITAL ENCOUNTER (OUTPATIENT)
Dept: LAB | Age: 69
Discharge: HOME | End: 2019-11-07
Payer: MEDICARE

## 2019-11-07 VITALS
OXYGEN SATURATION: 99 % | BODY MASS INDEX: 37.39 KG/M2 | RESPIRATION RATE: 18 BRPM | HEIGHT: 70 IN | SYSTOLIC BLOOD PRESSURE: 118 MMHG | WEIGHT: 261.2 LBS | DIASTOLIC BLOOD PRESSURE: 47 MMHG | TEMPERATURE: 98.6 F | HEART RATE: 70 BPM

## 2019-11-07 DIAGNOSIS — M16.11 PRIMARY OSTEOARTHRITIS OF RIGHT HIP: ICD-10-CM

## 2019-11-07 DIAGNOSIS — Z96.649 PERIPROSTHETIC FRACTURE OF FEMUR FOLLOWING TOTAL REPLACEMENT OF HIP, SEQUELA: ICD-10-CM

## 2019-11-07 DIAGNOSIS — G25.0 BENIGN ESSENTIAL TREMOR: ICD-10-CM

## 2019-11-07 DIAGNOSIS — M97.8XXS PERIPROSTHETIC FRACTURE OF FEMUR FOLLOWING TOTAL REPLACEMENT OF HIP, SEQUELA: ICD-10-CM

## 2019-11-07 DIAGNOSIS — I10 ESSENTIAL HYPERTENSION: ICD-10-CM

## 2019-11-07 PROCEDURE — 82043 UR ALBUMIN QUANTITATIVE: CPT

## 2019-11-07 PROCEDURE — 80061 LIPID PANEL: CPT

## 2019-11-07 PROCEDURE — 36415 COLL VENOUS BLD VENIPUNCTURE: CPT

## 2019-11-07 PROCEDURE — 80053 COMPREHEN METABOLIC PANEL: CPT

## 2019-11-07 PROCEDURE — 83036 HEMOGLOBIN GLYCOSYLATED A1C: CPT

## 2019-11-07 RX ORDER — PRIMIDONE 250 MG/1
TABLET ORAL
Qty: 180 TAB | Refills: 3 | Status: SHIPPED | OUTPATIENT
Start: 2019-11-07 | End: 2021-01-01

## 2019-11-07 NOTE — PROGRESS NOTES
Subjective:     Chief Complaint   Patient presents with    Diabetes     6 week follow-up        He  is a 68 y.o. male who presents for evaluation of:  Diabetes Mellitus, HTN, HLD: Ct working with Dr. Brianne Prince and Dr. Suma Domínguez on this. Doing well on meds. Doing much better with intermittent fasting for his diet. He also brings his glucometer showing excellent control over the last 6 weeks with most sugars in the 100-150 range. Reports no polyuria or polydipsia, no chest pain, TIA's, some numbness in left leg that comes and goes (did have left femur fracture during a fall; had R hip issues and needed sgy), last eye exam approximately < 1 yr ago. Exercise-trying to walk at lives regularly using a cart to help prevent falls  Diet-intermittent fasting  Pt is a non smoker.     Lab Results   Component Value Date/Time    Hemoglobin A1c (POC) 8.6 07/26/2017 12:24 PM    Hemoglobin A1c (POC) 7.3 02/02/2017 02:45 PM    Hemoglobin A1c 9.8 (H) 07/24/2019 12:31 PM    Microalbumin/Creat ratio (mg/g creat) 13 11/01/2010 07:07 AM    Microalb/Creat ratio (ug/mg creat.) <17.9 09/25/2018 03:30 PM    Microalbumin,urine random 0.81 11/01/2010 07:07 AM    LDL, calculated 45 10/23/2018 04:45 PM    Creatinine 1.01 07/24/2019 12:31 PM      Lab Results   Component Value Date/Time    GFR est AA 86 07/24/2019 12:31 PM    GFR est non-AA 74 07/24/2019 12:31 PM      Lab Results   Component Value Date/Time    TSH 2.930 01/22/2019 09:17 AM       ROS  Gen - no fever/chills  Resp - no dyspnea or cough  CV - no chest pain or WANG  Neuro - essential tremor doing well on Primidone  Msk - ct to have some pain and weakness of L hip after his THR and fracture after fall  Rest per HPI    Past Medical History:   Diagnosis Date    ADD (attention deficit disorder)     Anemia due to blood loss     Hinson's esophagus with esophagitis     Benign essential tremor     [facility] 2012    BCC    Depression     DJD (degenerative joint disease) of hip     bilat  DM (diabetes mellitus) ([facility] 3/17/2010    ED (erectile dysfunction) of organic origin     Fall on or from sidewalk curb 9/24/2015    Femur fracture ([facility]     Gastritis and duodenitis     GERD (gastroesophageal reflux disease)     resolved after discontinuing diclofenac    Hematuria 6/2016    High cholesterol 03/17/2010    pt denies 6/19    HTN (hypertension) 3/17/2010    Morbid obesity [facility]     Murmur, cardiac 2016    PFO (patent foramen ovale) 7/26/2017    PUD (peptic ulcer disease)     Shingles 6/2016    RESOLVING- NO RASH (HEAD)    Sleep apnea     doesnt use CPAP anymore     Past Surgical History:   Procedure Laterality Date    COLONOSCOPY N/A 6/18/2019    COLONOSCOPY AND EGD performed by Bel Avalos MD at [facility]  6/18/2019         ENDOSCOPY, COLON, DIAGNOSTIC      HX CHOLECYSTECTOMY  1995    HX GI  1980    gastroplasty    HX HERNIA REPAIR  1995    HX HIP REPLACEMENT      Left    HX ORTHOPAEDIC Right 2011    rot cuff repair    HX ORTHOPAEDIC  2016    left broken femur    HX TONSILLECTOMY  1950    HX VASCULAR ACCESS      picc line and removed after sepsis resolved    TOTAL HIP ARTHROPLASTY  05/2010    right    TOTAL HIP ARTHROPLASTY  08/01/2016    left    UPPER GI ENDOSCOPY,BIOPSY  6/18/2019          Current Outpatient Medications on File Prior to Visit   Medication Sig Dispense Refill    insulin glargine (LANTUS SOLOSTAR U-100 INSULIN) 100 unit/mL (3 mL) inpn INJECT 40 UNITS UNDER THE SKIN TWO TIMES A DAY 8 Pen 0    sertraline (ZOLOFT) 100 mg tablet TAKE 1.5 TABLETS DAILY (Patient taking differently: Take 100 mg by mouth daily. TAKE 1.5 TABLETS DAILY) 45 Tab 5    acetaminophen (TYLENOL) 500 mg tablet Take 2 Tabs by mouth every six (6) hours as needed for Pain. 30 Tab 0    cephALEXin (KEFLEX) 500 mg capsule Take 1 Cap by mouth two (2) times a day.  20 Cap 6    omeprazole (PRILOSEC) 20 mg capsule TAKE 1 CAPSULE BY MOUTH EVERY DAY  12    metFORMIN ER (GLUCOPHAGE XR) 500 mg tablet 2 tablets twice daily 360 Tab 3    atorvastatin (LIPITOR) 40 mg tablet Take 1 Tab by mouth daily. 90 Tab 3    gabapentin (NEURONTIN) 300 mg capsule Take 2 Caps by mouth nightly. 180 Cap 1    Blood-Glucose Meter (ACCU-CHEK SOWMYA PLUS METER) misc Test blood sugar twice daily Diagnosis E 11.9 1 Each 1    glucose blood VI test strips (ACCU-CHEK SOWMYA PLUS TEST STRP) strip Test blood sugar twice daily Diagnosis E 11.9 200 Strip 5    Blood Glucose Control High&Low (ACCU-CHEK SOWMYA CONTROL SOLN) soln Test blood sugar twice daily Diagnosis E 11.9 1 Bottle 3    insulin regular (NOVOLIN R REGULAR U-100 INSULN) 100 unit/mL injection INJECT 7 UNITS UNDER THE SKIN DAILY (WITH DINNER). 10 mL 0    Insulin Needles, Disposable, (BD ULTRA-FINE SHORT PEN NEEDLE) 31 gauge x 5/16\" ndle USE TO INJECT UNDER THE SKIN THREE TIMES A  Pen Needle 1    B.infantis-B.ani-B.long-B.bifi (PROBIOTIC 4X) 10-15 mg TbEC Take  by mouth.  magnesium 250 mg tab Take 500 mg by mouth daily.  melatonin tab tablet Take 10 mg by mouth nightly as needed.  lisinopril (PRINIVIL, ZESTRIL) 20 mg tablet TAKE 1 TABLET DAILY 90 Tab 1    diclofenac (VOLTAREN) 1 % gel Apply  to affected area four (4) times daily. 100 g 2    ergocalciferol (VITAMIN D2) 50,000 unit capsule TAKE 1 CAPSULE EVERY 7 DAYS 12 Cap 2    folic acid (FOLVITE) 1 mg tablet TAKE 1 TABLET DAILY 90 Tab 3    potassium 99 mg tablet Take 99 mg by mouth daily.  calcium citrate-vitamin D3 (CITRACAL + D) tablet Take 2 Tabs by mouth daily.  primidone (MYSOLINE) 250 mg tablet TAKE 1 TABLET TWICE A DAY (Patient taking differently: Take 250 mg by mouth daily. ) 180 Tab 3    aspirin 81 mg chewable tablet Take 1 Tab by mouth daily. (Patient taking differently: Take 81 mg by mouth every other day.) 30 Tab 11     No current facility-administered medications on file prior to visit.          Objective:     Vitals:    11/07/19 0907   BP: 118/47   Pulse: 70   Resp: 18   Temp: 98.6 °F (37 °C)   TempSrc: Oral   SpO2: 99%   Weight: 261 lb 3.2 oz (118.5 kg)   Height: 5' 10\" (1.778 m)     Physical Examination:  General appearance - alert, in no distress  Eyes -sclera anicteric  Neck - supple, no significant adenopathy, no thyromegaly  Chest - clear to auscultation, no wheezes, rales or rhonchi, symmetric air entry  Heart - normal rate, regular rhythm, normal S1, S2, 2/6 systolic ejection murmur (previously worked up)  Neurological - alert, oriented, no focal findings or movement disorder noted  Extremities- bilateral trace edema  Psych-normal mood and affect  Msk - L hip with weakness on flexion  Extr - no edmea  Skin - bruising resolved    Assessment/ Plan:   Diagnoses and all orders for this visit:    1. Uncontrolled type 2 diabetes mellitus with hyperglycemia (HCC)-significantly improved sugars with recent changes to his diet and good compliance with medications.  -     METABOLIC PANEL, COMPREHENSIVE  -     LIPID PANEL  -     HEMOGLOBIN A1C WITH EAG  -     MICROALBUMIN, UR, RAND W/ MICROALB/CREAT RATIO    2. Essential hypertension - controlled    3. Periprosthetic fracture of femur following total replacement of hip, sequela  4. Primary osteoarthritis of right hip  - for PT to strengthen quads and hamstrings and hopefully, decr falls  -     REFERRAL TO PHYSICAL THERAPY    5. Benign essential tremor - stable,  Ct primidone BID  -     primidone (MYSOLINE) 250 mg tablet; TAKE 1 TABLET TWICE A DAY    I have discussed the diagnosis with the patient and the intended plan as seen in the above orders. The patient has received an after-visit summary and questions were answered concerning future plans. I have discussed medication side effects and warnings with the patient as well. The patient verbalizes understanding and agreement with the plan.     Follow-up and Dispositions    · Return in about 6 weeks (around 12/19/2019), or if symptoms worsen or fail to improve.

## 2019-11-07 NOTE — PROGRESS NOTES
Chief Complaint   Patient presents with    Diabetes     6 week follow-up   1. Have you been to the ER, urgent care clinic since your last visit? Hospitalized since your last visit? No    2. Have you seen or consulted any other health care providers outside of the [address] [place] [address] since your last visit? Include any pap smears or colon screening.  No

## 2019-11-08 LAB
ALBUMIN SERPL-MCNC: 3.3 G/DL (ref 3.5–4.8)
ALBUMIN/CREAT UR: <4.8 MG/G CREAT (ref 0–30)
ALBUMIN/GLOB SERPL: 1.1 {RATIO} (ref 1.2–2.2)
ALP SERPL-CCNC: 113 IU/L (ref 39–117)
ALT SERPL-CCNC: 26 IU/L (ref 0–44)
AST SERPL-CCNC: 37 IU/L (ref 0–40)
BILIRUB SERPL-MCNC: <0.2 MG/DL (ref 0–1.2)
BUN SERPL-MCNC: 18 MG/DL (ref 8–27)
BUN/CREAT SERPL: 22 (ref 10–24)
CALCIUM SERPL-MCNC: 8.4 MG/DL (ref 8.6–10.2)
CHLORIDE SERPL-SCNC: 105 MMOL/L (ref 96–106)
CHOLEST SERPL-MCNC: 121 MG/DL (ref 100–199)
CO2 SERPL-SCNC: 18 MMOL/L (ref 20–29)
CREAT SERPL-MCNC: 0.83 MG/DL (ref 0.76–1.27)
CREAT UR-MCNC: 63.1 MG/DL
EST. AVERAGE GLUCOSE BLD GHB EST-MCNC: 189 MG/DL
GLOBULIN SER CALC-MCNC: 3 G/DL (ref 1.5–4.5)
GLUCOSE SERPL-MCNC: 170 MG/DL (ref 65–99)
HBA1C MFR BLD: 8.2 % (ref 4.8–5.6)
HDLC SERPL-MCNC: 68 MG/DL
LDLC SERPL CALC-MCNC: 40 MG/DL (ref 0–99)
MICROALBUMIN UR-MCNC: <3 UG/ML
POTASSIUM SERPL-SCNC: 4.7 MMOL/L (ref 3.5–5.2)
PROT SERPL-MCNC: 6.3 G/DL (ref 6–8.5)
SODIUM SERPL-SCNC: 141 MMOL/L (ref 134–144)
TRIGL SERPL-MCNC: 66 MG/DL (ref 0–149)
VLDLC SERPL CALC-MCNC: 13 MG/DL (ref 5–40)

## 2019-11-13 ENCOUNTER — OFFICE VISIT (OUTPATIENT)
Dept: FAMILY MEDICINE CLINIC | Age: 69
End: 2019-11-13

## 2019-11-13 RX ORDER — INSULIN GLARGINE 100 [IU]/ML
INJECTION, SOLUTION SUBCUTANEOUS
Qty: 5 PEN | Refills: 0 | Status: SHIPPED | COMMUNITY
Start: 2019-11-13 | End: 2020-03-05 | Stop reason: SDUPTHER

## 2019-11-27 RX ORDER — LISINOPRIL 20 MG/1
TABLET ORAL
Qty: 90 TAB | Refills: 1 | Status: SHIPPED | OUTPATIENT
Start: 2019-11-27 | End: 2020-01-27

## 2019-12-02 ENCOUNTER — PATIENT MESSAGE (OUTPATIENT)
Dept: FAMILY MEDICINE CLINIC | Age: 69
End: 2019-12-02

## 2019-12-02 DIAGNOSIS — G57.02 PYRIFORMIS SYNDROME, LEFT: ICD-10-CM

## 2019-12-02 DIAGNOSIS — M16.12 PRIMARY OSTEOARTHRITIS OF LEFT HIP: ICD-10-CM

## 2019-12-03 ENCOUNTER — TELEPHONE (OUTPATIENT)
Dept: FAMILY MEDICINE CLINIC | Age: 69
End: 2019-12-03

## 2019-12-03 NOTE — TELEPHONE ENCOUNTER
----- Message from Malcom Ramirez sent at 12/2/2019  7:03 PM EST -----  Regarding: Dr. Harshad De Jesus first and last name:      Reason for call:Meter      Callback required yes/no and why:Yes, pt needs Reese Schwarz to call him back he is having a problem with his glucose meter, he is needing a call back before 1:30p tomorrow 12/3/19 because he is having PT at 2p       Best contact number(s):[phone]      Details to clarify the request:      Malcom Ramirez

## 2019-12-03 NOTE — TELEPHONE ENCOUNTER
Called pt and left a message when he did not answer; asked him to call back.   Damien Lucas, RACHELLED, CDE

## 2019-12-04 RX ORDER — DICLOFENAC SODIUM 10 MG/G
GEL TOPICAL 4 TIMES DAILY
Qty: 100 G | Refills: 2 | Status: SHIPPED | OUTPATIENT
Start: 2019-12-04 | End: 2020-03-26 | Stop reason: SDUPTHER

## 2019-12-05 DIAGNOSIS — E11.65 UNCONTROLLED TYPE 2 DIABETES MELLITUS WITH HYPERGLYCEMIA (HCC): ICD-10-CM

## 2019-12-10 ENCOUNTER — PATIENT MESSAGE (OUTPATIENT)
Dept: FAMILY MEDICINE CLINIC | Age: 69
End: 2019-12-10

## 2019-12-10 DIAGNOSIS — M16.11 OSTEOARTHRITIS OF RIGHT HIP, UNSPECIFIED OSTEOARTHRITIS TYPE: Primary | ICD-10-CM

## 2019-12-12 RX ORDER — ACETAMINOPHEN AND CODEINE PHOSPHATE 300; 30 MG/1; MG/1
1 TABLET ORAL
Qty: 21 TAB | Refills: 0 | Status: SHIPPED | OUTPATIENT
Start: 2019-12-12 | End: 2019-12-15

## 2019-12-30 ENCOUNTER — OFFICE VISIT (OUTPATIENT)
Dept: ENDOCRINOLOGY | Age: 69
End: 2019-12-30

## 2019-12-30 VITALS
DIASTOLIC BLOOD PRESSURE: 53 MMHG | HEIGHT: 70 IN | WEIGHT: 263 LBS | SYSTOLIC BLOOD PRESSURE: 131 MMHG | BODY MASS INDEX: 37.65 KG/M2 | HEART RATE: 71 BPM

## 2019-12-30 DIAGNOSIS — E11.65 UNCONTROLLED TYPE 2 DIABETES MELLITUS WITH HYPERGLYCEMIA (HCC): Primary | ICD-10-CM

## 2019-12-30 RX ORDER — INSULIN GLARGINE 100 [IU]/ML
INJECTION, SOLUTION SUBCUTANEOUS
Qty: 2 ADJUSTABLE DOSE PRE-FILLED PEN SYRINGE | Refills: 0 | Status: SHIPPED | COMMUNITY
Start: 2019-12-30 | End: 2020-01-03 | Stop reason: SDUPTHER

## 2019-12-30 NOTE — PROGRESS NOTES
This is a 49-year-old white male with a history of degenerative joint disease status post bilateral hip replacement diagnosed with diabetes in 1995 and referred for evaluation and management. Patient has been on a number of medications for his diabetes. Currently he takes metformin 500 mg twice daily, Jardiance 25 mg daily, Lantus insulin 40 units twice daily, and regular insulin 7 units at dinner. He had been on Januvia but the price of that made it unaffordable for him so is not been taking it. He had an A1c in July of 9.8%. In April 2018 he had an A1c of 6.5% when he lost weight while in a rehab facility when he did not eat. Since that time his A1c is increased to 8.4 and then again most recently 9.8%. Because of the A1c of 9.8, he has started doing what he describes as an intermittent fast which is from him 6 PM to 10 AM without food. Prior to that, he was eating a lot of snacks at night including cakes and donuts and other sweets. At our first visit, I did the following:  I increase the Metformin to 2 tablets twice daily  2. Discontinue the Jardiance  3. We have continued the Lantus at 40 units twice daily and the regular at 7 units with dinner    He returns today with an A1c of 8.2%. He recently ran out of the Lantus but up until 2 days ago was taking metformin 2 tablets twice daily and Lantus insulin 40 units twice daily. His morning blood sugars are consistently less than 150 and his bedtime blood sugars less than 200. He is trying to go with a intermittent fasting diet which is an 18-hour fast from 6 PM to noon. Lunch is usually a frozen meal such as pot pies or hot pockets. Supper is a bagel or a meat and a starch. He occasionally has very mild hypoglycemia overnight but this is few and far between.     Examination  Blood pressure 131/53  Pulse 71  Weight 263    Impression type 2 diabetes mellitus with a modest improvement in glucose control currently on full dose metformin which he is tolerating and Lantus insulin 40 units twice daily. Plan: We continue the current regimen. I did give him 2 samples of Lantus insulin until he begins the new year. He is now on silver scripts and hopefully the Lantus will be more affordable for him. He is very happy to be off the [place] which was not affordable.   We will see him back in 4 months at his request.    We spent more than 25 mintutes face to face, more than 50% was in counseling regarding diet, exercise and carbohydrate intake.

## 2020-01-01 ENCOUNTER — TRANSCRIBE ORDER (OUTPATIENT)
Dept: INTERNAL MEDICINE CLINIC | Age: 70
End: 2020-01-01

## 2020-01-01 ENCOUNTER — TRANSCRIBE ORDER (OUTPATIENT)
Dept: SCHEDULING | Age: 70
End: 2020-01-01

## 2020-01-01 ENCOUNTER — HOSPITAL ENCOUNTER (OUTPATIENT)
Dept: MRI IMAGING | Age: 70
Discharge: HOME | End: 2020-11-12
Attending: ORTHOPAEDIC SURGERY
Payer: MEDICARE

## 2020-01-01 ENCOUNTER — TELEPHONE (OUTPATIENT)
Dept: FAMILY MEDICINE CLINIC | Age: 70
End: 2020-01-01

## 2020-01-01 ENCOUNTER — PATIENT OUTREACH (OUTPATIENT)
Dept: CASE MANAGEMENT | Age: 70
End: 2020-01-01

## 2020-01-01 DIAGNOSIS — M54.16 LUMBAR RADICULOPATHY: ICD-10-CM

## 2020-01-01 DIAGNOSIS — M47.26 OSTEOARTHRITIS OF SPINE WITH RADICULOPATHY, LUMBAR REGION: ICD-10-CM

## 2020-01-01 DIAGNOSIS — M54.50 LOW BACK PAIN, UNSPECIFIED BACK PAIN LATERALITY, UNSPECIFIED CHRONICITY, UNSPECIFIED WHETHER SCIATICA PRESENT: ICD-10-CM

## 2020-01-01 DIAGNOSIS — M21.371 RIGHT FOOT DROP: ICD-10-CM

## 2020-01-01 DIAGNOSIS — M43.16 SPONDYLOLISTHESIS OF LUMBAR REGION: ICD-10-CM

## 2020-01-01 DIAGNOSIS — M48.061 FORAMINAL STENOSIS OF LUMBAR REGION: ICD-10-CM

## 2020-01-01 DIAGNOSIS — M48.07 SPINAL STENOSIS, LUMBOSACRAL REGION: ICD-10-CM

## 2020-01-01 DIAGNOSIS — M54.50 LOW BACK PAIN, UNSPECIFIED BACK PAIN LATERALITY, UNSPECIFIED CHRONICITY, UNSPECIFIED WHETHER SCIATICA PRESENT: Primary | ICD-10-CM

## 2020-01-01 PROCEDURE — 72148 MRI LUMBAR SPINE W/O DYE: CPT

## 2020-01-03 DIAGNOSIS — E11.65 UNCONTROLLED TYPE 2 DIABETES MELLITUS WITH HYPERGLYCEMIA (HCC): ICD-10-CM

## 2020-01-04 RX ORDER — ATORVASTATIN CALCIUM 40 MG/1
40 TABLET, FILM COATED ORAL DAILY
Qty: 90 TAB | Refills: 3 | Status: SHIPPED | OUTPATIENT
Start: 2020-01-04 | End: 2021-01-01

## 2020-01-04 RX ORDER — INSULIN GLARGINE 100 [IU]/ML
INJECTION, SOLUTION SUBCUTANEOUS
Qty: 25 ADJUSTABLE DOSE PRE-FILLED PEN SYRINGE | Refills: 3 | Status: SHIPPED | OUTPATIENT
Start: 2020-01-04 | End: 2020-02-12 | Stop reason: SDUPTHER

## 2020-01-06 RX ORDER — ERGOCALCIFEROL 1.25 MG/1
CAPSULE ORAL
Qty: 12 CAP | Refills: 2 | Status: SHIPPED | OUTPATIENT
Start: 2020-01-06

## 2020-01-27 ENCOUNTER — OFFICE VISIT (OUTPATIENT)
Dept: FAMILY MEDICINE CLINIC | Age: 70
End: 2020-01-27

## 2020-01-27 VITALS
HEART RATE: 82 BPM | HEIGHT: 70 IN | BODY MASS INDEX: 40.31 KG/M2 | RESPIRATION RATE: 18 BRPM | TEMPERATURE: 98.8 F | OXYGEN SATURATION: 99 % | DIASTOLIC BLOOD PRESSURE: 68 MMHG | SYSTOLIC BLOOD PRESSURE: 134 MMHG | WEIGHT: 281.6 LBS

## 2020-01-27 DIAGNOSIS — K74.60 ESOPHAGEAL VARICES IN CIRRHOSIS (HCC): ICD-10-CM

## 2020-01-27 DIAGNOSIS — E11.65 UNCONTROLLED TYPE 2 DIABETES MELLITUS WITH HYPERGLYCEMIA (HCC): ICD-10-CM

## 2020-01-27 DIAGNOSIS — J18.9 PNEUMONIA DUE TO INFECTIOUS ORGANISM, UNSPECIFIED LATERALITY, UNSPECIFIED PART OF LUNG: Primary | ICD-10-CM

## 2020-01-27 DIAGNOSIS — R18.8 CIRRHOSIS OF LIVER WITH ASCITES, UNSPECIFIED HEPATIC CIRRHOSIS TYPE (HCC): ICD-10-CM

## 2020-01-27 DIAGNOSIS — I10 ESSENTIAL HYPERTENSION: ICD-10-CM

## 2020-01-27 DIAGNOSIS — K74.60 CIRRHOSIS OF LIVER WITH ASCITES, UNSPECIFIED HEPATIC CIRRHOSIS TYPE (HCC): ICD-10-CM

## 2020-01-27 DIAGNOSIS — I85.10 ESOPHAGEAL VARICES IN CIRRHOSIS (HCC): ICD-10-CM

## 2020-01-27 RX ORDER — LISINOPRIL 5 MG/1
TABLET ORAL
Qty: 60 TAB | Refills: 0 | Status: SHIPPED | OUTPATIENT
Start: 2020-01-27 | End: 2020-03-26 | Stop reason: SDUPTHER

## 2020-01-27 RX ORDER — AMOXICILLIN AND CLAVULANATE POTASSIUM 875; 125 MG/1; MG/1
TABLET, FILM COATED ORAL EVERY 12 HOURS
COMMUNITY
End: 2020-03-02 | Stop reason: ALTCHOICE

## 2020-01-27 RX ORDER — FUROSEMIDE 40 MG/1
TABLET ORAL
Qty: 45 TAB | Refills: 0 | Status: ON HOLD | OUTPATIENT
Start: 2020-01-27 | End: 2020-04-06 | Stop reason: SDUPTHER

## 2020-01-27 NOTE — PROGRESS NOTES
Chief Complaint Patient presents with  [facility] 1. Have you been to the ER, urgent care clinic since your last visit? Hospitalized since your last visit? Yes [facility]: [facility] Diagnosis with Pneumonia 2. Have you seen or consulted any other health care providers outside of the [address] [place] [address] since your last visit? Include any pap smears or colon screening. No  
First diagnosis with URI on 21 days ago Has gained 20 lbs since 12/30/19 Dr Nola Ordoñez and abdomen distended.

## 2020-01-27 NOTE — PROGRESS NOTES
Subjective: Chief Complaint Patient presents with  [facility] He  is a 68y.o. year old male who presents for evaluation of: 
Since last appointment, patient was seen at [facility] x2 and ended up being diagnosed with pneumonia. He was initially put on doxycycline. Did not improve so was put on Augmentin and symptoms are nearly resolved. In addition, will be reviewed patient's significant weight gain - up 32 lbs from 9/2019. He feels like his weight is largely in his abdomen. Going back to a CT scan in 2016 for hematuria there was concern for contour changes consistent with cirrhosis. He has been seeing GI intermittently. Last saw Dr. Lizzie Cruz on 6/2019. LFTs have been normal.  During the past 4 years, patient has been admitted to the hospital about 6 times for numerous issues largely related to total hip replacement that became fractured and then infection of prosthesis with eventual endocarditis. Currently not on beta-blocker or diuretics. On EGD on 6/18/2019, patient was noted to have portal hypertensive gastropathy and large 3+ esophageal varices without bleeding. Patient was recommended to follow-up in 2 to 3 weeks but was unaware of this/did not follow-up. CT abd/pelv in 2016: 
\"3. Nodular liver contour consistent with cirrhosis. 4. Marked splenomegaly and occlusion of the splenic vein with gastric, omental and anterior abdominal wall varices. \" For his diabetes, he has continued working with Dr. [place] Stinson with significant adjustments to his insulin regimen and diabetes diet. Last A1c was 8.2% on 11/7/2019. Reports having occ hypoglycemia with Lantus 40 units BID and novolin 7 units with dinner. ROS: 
Constitutional: per HPI Eyes: negative for visual disturbance Ears, nose, mouth, throat, and face: per HPI Respiratory: per HPI Cardiovascular: negative for chest pain, dyspnea, palpitations Gastrointestinal: negative for nausea, vomiting, diarrhea and abdominal pain Integument/breast: negative for rash Objective:  
 
Vitals:  
 20 1432 BP: 134/68 Pulse: 82 Resp: 18 Temp: 98.8 °F (37.1 °C) TempSrc: Oral  
SpO2: 99% Weight: 281 lb 9.6 oz (127.7 kg) Height: 5' 10\" (1.778 m) Physical Examination:  
Gen - NAD Eyes - sclera anicteric ENT - turbinates inflamed bilat, no sinus tenderness, post pharynx erythematous with no exudate Resp - CTAB, no w/r/r 
CV - rrr, no m/r/g Abdomen soft, nontender, distended Extremities trace edema with L> R Allergies Allergen Reactions  Nsaids (Non-Steroidal Anti-Inflammatory Drug) Other (comments) Hx of PUD and Hinson's Esophagus Social History Socioeconomic History  Marital status:  Spouse name: Not on file  Number of children: Not on file  Years of education: Not on file  Highest education level: Not on file Tobacco Use  Smoking status: Former Smoker Packs/day: 2.00 Years: 15.00 Pack years: 30.00 Last attempt to quit: 3/1/1979 Years since quittin.9  Smokeless tobacco: Never Used Substance and Sexual Activity  Alcohol use: No  
  Alcohol/week: 0.0 standard drinks  Drug use: No  
 Sexual activity: Never Family History Problem Relation Age of Onset  Cancer Father   
     multiple myeloma  Stroke Father  Dementia Mother  No Known Problems Brother  COPD Brother  Cancer Maternal Grandmother COLON  Anesth Problems Neg Hx Past Surgical History:  
Procedure Laterality Date  COLONOSCOPY N/A 2019 COLONOSCOPY AND EGD performed by Montse Milton MD at [facility] ENDOSCOPY  COLONOSCOPY,DIAGNOSTIC  2019  ENDOSCOPY, COLON, DIAGNOSTIC    
 HX CHOLECYSTECTOMY    HX GI    
 gastroplasty [address]  HX HIP REPLACEMENT Left  HX ORTHOPAEDIC Right   
 rot cuff repair  HX ORTHOPAEDIC  2016  
 left broken femur [address]  HX VASCULAR ACCESS    
 picc line and removed after sepsis resolved [facility] ARTHROPLASTY  05/2010  
 right  TOTAL HIP ARTHROPLASTY  08/01/2016  
 left  UPPER GI ENDOSCOPY,BIOPSY  6/18/2019 Past Medical History:  
Diagnosis Date  ADD (attention deficit disorder)  Anemia due to blood loss  Hinson's esophagus with esophagitis  Benign essential tremor  Cancer [facility]) 2012 [facility]  Depression  DJD (degenerative joint disease) of hip   
 bilat  DM (diabetes mellitus) ([address] 3/17/2010  ED (erectile dysfunction) of organic origin  Fall on or from sidewalk curb 9/24/2015  Femur fracture ([address]  Gastritis and duodenitis  GERD (gastroesophageal reflux disease)   
 resolved after discontinuing diclofenac  Hematuria 6/2016  High cholesterol 03/17/2010  
 pt denies 6/19  
 HTN (hypertension) 3/17/2010  Morbid obesity ([facility]  Murmur, cardiac 2016  
 PFO (patent foramen ovale) 7/26/2017  PUD (peptic ulcer disease)  Shingles 6/2016 RESOLVING- NO RASH (HEAD)  Sleep apnea   
 doesnt use CPAP anymore Current Outpatient Medications Medication Sig Dispense Refill  amoxicillin-clavulanate (AUGMENTIN) 875-125 mg per tablet Take  by mouth every twelve (12) hours.  furosemide (LASIX) 40 mg tablet Take 2 tabs by mouth daily x 1 week and then increase to 1 tab daily 45 Tab 0  
 lisinopril (PRINIVIL, ZESTRIL) 5 mg tablet TAKE 2 TABLETS BY MOUTH EVERY DAY 60 Tab 0  
 ergocalciferol (VITAMIN D2) 50,000 unit capsule TAKE 1 CAPSULE EVERY 7 DAYS 12 Cap 2  
 atorvastatin (LIPITOR) 40 mg tablet Take 1 Tab by mouth daily.  90 Tab 3  
 insulin glargine (LANTUS SOLOSTAR U-100 INSULIN) 100 unit/mL (3 mL) inpn 40 units twice daily 25 Adjustable Dose Pre-filled Pen Syringe 3  
 glucose blood VI test strips (ASCENSIA AUTODISC VI, ONE TOUCH ULTRA TEST VI) strip Test blood sugar twice daily Diagnosis E 11.9. Can use [employer] Brand 200 Strip 4  
 glucose blood VI test strips (ACCU-CHEK SOWMYA PLUS TEST STRP) strip Test blood sugar twice daily Diagnosis E 11.9 200 Strip 5  
 diclofenac (VOLTAREN) 1 % gel Apply  to affected area four (4) times daily. 100 g 2  
 insulin glargine (LANTUS SOLOSTAR U-100 INSULIN) 100 unit/mL (3 mL) inpn INJECT 40 UNITS UNDER THE SKIN TWO TIMES A DAY 5 Pen 0  
 primidone (MYSOLINE) 250 mg tablet TAKE 1 TABLET TWICE A  Tab 3  
 sertraline (ZOLOFT) 100 mg tablet TAKE 1.5 TABLETS DAILY (Patient taking differently: Take 100 mg by mouth daily. TAKE 1.5 TABLETS DAILY) 45 Tab 5  
 acetaminophen (TYLENOL) 500 mg tablet Take 2 Tabs by mouth every six (6) hours as needed for Pain. 30 Tab 0  cephALEXin (KEFLEX) 500 mg capsule Take 1 Cap by mouth two (2) times a day. 20 Cap 6  
 omeprazole (PRILOSEC) 20 mg capsule TAKE 1 CAPSULE BY MOUTH EVERY DAY  12  
 metFORMIN ER (GLUCOPHAGE XR) 500 mg tablet 2 tablets twice daily 360 Tab 3  
 gabapentin (NEURONTIN) 300 mg capsule Take 2 Caps by mouth nightly. 180 Cap 1  Blood-Glucose Meter (ACCU-CHEK SOWMYA PLUS METER) misc Test blood sugar twice daily Diagnosis E 11.9 1 Each 1  Blood Glucose Control High&Low (ACCU-CHEK SOWMYA CONTROL SOLN) soln Test blood sugar twice daily Diagnosis E 11.9 1 Bottle 3  
 insulin regular (NOVOLIN R REGULAR U-100 INSULN) 100 unit/mL injection INJECT 7 UNITS UNDER THE SKIN DAILY (WITH DINNER). 10 mL 0  
 Insulin Needles, Disposable, (BD ULTRA-FINE SHORT PEN NEEDLE) 31 gauge x 5/16\" ndle USE TO INJECT UNDER THE SKIN THREE TIMES A  Pen Needle 1  
 B.infantis-B.ani-B.long-B.bifi (PROBIOTIC 4X) 10-15 mg TbEC Take  by mouth.  magnesium 250 mg tab Take 500 mg by mouth daily.  melatonin tab tablet Take 10 mg by mouth nightly as needed.     
 folic acid (FOLVITE) 1 mg tablet TAKE 1 TABLET DAILY 90 Tab 3  
  potassium 99 mg tablet Take 99 mg by mouth daily.  calcium citrate-vitamin D3 (CITRACAL + D) tablet Take 2 Tabs by mouth daily.  aspirin 81 mg chewable tablet Take 1 Tab by mouth daily. (Patient taking differently: Take 81 mg by mouth every other day.) 30 Tab 11 Assessment/ Plan:  
Diagnoses and all orders for this visit: 1. Pneumonia due to infectious organism, unspecified laterality, unspecified part of lungresolving 2. Cirrhosis of liver with ascites, unspecified hepatic cirrhosis type (HCC) largely has had compensated cirrhosis. Starting Lasix to help with newer issue of ascites and significant weight gain and will follow-up in 1 week. Given overall picture, sending patient to hepatology though lower risk by Child Crain classification (technically class b by last labs) & MELD score -     REFERRAL TO LIVER HEPATOLOGY 
-     furosemide (LASIX) 40 mg tablet; Take 2 tabs by mouth daily x 1 week and then increase to 1 tab daily 3. Esophageal varices in cirrhosis (HCC) - no bleeding 
-     REFERRAL TO LIVER HEPATOLOGY 
-     furosemide (LASIX) 40 mg tablet; Take 2 tabs by mouth daily x 1 week and then increase to 1 tab daily 4. Uncontrolled type 2 diabetes mellitus with hyperglycemia ([facility] - improving but likely needing larger doses of insulin d/t cirrhosis 5. Essential hypertension - controlled but starting Lasix so we will decrease lisinopril dose. -     furosemide (LASIX) 40 mg tablet; Take 2 tabs by mouth daily x 1 week and then increase to 1 tab daily 
-     lisinopril (PRINIVIL, ZESTRIL) 5 mg tablet; TAKE 2 TABLETS BY MOUTH EVERY DAY I have discussed the diagnosis with the patient and the intended plan as seen in the above orders. The patient has received an after-visit summary and questions were answered concerning future plans. I have discussed medication side effects and warnings with the patient as well.    The patient verbalizes understanding and agreement with the plan. Follow-up and Dispositions · Return in about 1 week (around 2/3/2020), or if symptoms worsen or fail to improve.

## 2020-01-30 ENCOUNTER — OFFICE VISIT (OUTPATIENT)
Dept: HEMATOLOGY | Age: 70
End: 2020-01-30

## 2020-01-30 VITALS
TEMPERATURE: 99.6 F | HEART RATE: 81 BPM | OXYGEN SATURATION: 94 % | DIASTOLIC BLOOD PRESSURE: 58 MMHG | BODY MASS INDEX: 39.57 KG/M2 | HEIGHT: 70 IN | SYSTOLIC BLOOD PRESSURE: 150 MMHG | WEIGHT: 276.4 LBS

## 2020-01-30 DIAGNOSIS — Z11.59 ENCOUNTER FOR SCREENING FOR OTHER VIRAL DISEASES: ICD-10-CM

## 2020-01-30 DIAGNOSIS — K74.60 CIRRHOSIS OF LIVER WITH ASCITES, UNSPECIFIED HEPATIC CIRRHOSIS TYPE (HCC): Primary | ICD-10-CM

## 2020-01-30 DIAGNOSIS — R18.8 CIRRHOSIS OF LIVER WITH ASCITES, UNSPECIFIED HEPATIC CIRRHOSIS TYPE (HCC): Primary | ICD-10-CM

## 2020-01-30 NOTE — PROGRESS NOTES
[address] [facility] Con[address]pcion Lisa Myers MD, Anita Walker [facility] MD Katty Aguirre PA-ANTHONY Graham, [facility] April S Christa, [facility]   Lorne Lawler, FNP-C Daríoclay Gaitan, [facility] Dagmar Deputado [address] 
  at [facility] 
  [address], [address] [place] [address] [facility] [address]. 
  [phone] FAX: [address] [place] [address] Avenue 
  [facility] 
  [address], [address] [facility], [address] 
  [phone] FAX: [phone] Patient Care Team: 
Rosie Jovel MD as PCP - [facility]) Rosie Jovel MD as PCP - [address] [place] [address] Provider Dulce Galaviz MD as Physician (Sleep Medicine) Boaz Perez MD (Orthopedic Surgery) Livia Armenta MD (Ophthalmology) Edie Estrada NP (Nurse Practitioner) Alex Miller MD (Neurology) Brayden Scott MD (Endocrinology) Kev Montalvo MD as Physician (Cardiology) Problem List  Date Reviewed: 2/9/2020 Codes Class Noted Dislocation of prosthetic joint [facility]) ICD-10-CM: O46.510I 
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 Channing Soliz ICD-9-CM: 996.44, V43.64  8/25/2016 Osteoarthritis of right hip ICD-10-CM: M16.11 
ICD-9-CM: 715.95  8/1/2016 Benign essential tremor ICD-10-CM: G25.0 ICD-9-CM: 333.1  Unknown Diabetes mellitus type 2, uncontrolled [facility] ICD-10-CM: E11.65 ICD-9-CM: 250.02  6/3/2013 Hypogonadism male ICD-10-CM: E29.1 ICD-9-CM: 257.2  8/29/2012 Encounter for long-term (current) use of medications ICD-10-CM: Z79.899 ICD-9-CM: V58.69  7/29/2010 Osteoarthritis of hip ICD-10-CM: M16.9 ICD-9-CM: 715.95  Unknown Overview Signed 4/29/2010  6:55 AM by Nign Yap Depression ICD-10-CM: F32.9 ICD-9-CM: 046  Unknown  
   
 ED (erectile dysfunction) of organic origin ICD-10-CM: N52.9 ICD-9-CM: 607.84  Unknown GERD (gastroesophageal reflux disease) ICD-10-CM: K21.9 ICD-9-CM: 530.81  Unknown  
   
 PUD (peptic ulcer disease) ICD-10-CM: K27.9 ICD-9-CM: 533.90  Unknown Hinson's esophagus with esophagitis ICD-10-CM: K22.70, K20.9 ICD-9-CM: 530.85, 530.10  Unknown HTN (hypertension) ICD-10-CM: I10 
ICD-9-CM: 401.9  3/17/2010 Hypercholesterolemia ICD-10-CM: E78.00 ICD-9-CM: 272.0  3/17/2010 The clinicians listed above have asked me to see Elio Flores in consultation regarding management of  
cirrhosis that is presumed secondary to BREWER. All medical records sent by the referring physicians were reviewed including imaging studies The patient is a 68 y.o.  male who was found to have chronic liver disease and cirrhosis in 7/2019 when esophageal varices were found on EGD. An assessment of liver fibrosis with biopsy or elastography has not been performed. Serologic evaluation for markers of chronic liver disease has either not been performed or the results are not available. The patient has developed the following complications of cirrhosis: esophageal varices, The patient has the following symptoms which are thought to be due to the liver disease:  fatigue, swelling of the lower extremities The patient is not currently experiencing the following symptoms of liver disease:  pain in the right side over the liver, swelling of abdomen, hematemesis, hematochezia. The patient completes all daily activities without any functional limitations. ASSESSMENT AND PLAN: 
Cirrhosis Cirrhosis is presumed secondary to BREWER. The diagnosis of cirrhosis is based upon imaging, laboratory studies, Have performed laboratory testing to monitor liver function and degree of liver injury. This included BMP, hepatic panel, CBC with platelet count, INR. Liver transaminases are normal.  ALP is normal.  Liver function is normal.  Serum albumin is depressed. The platelet count is depressed. Serologic testing for causes of chronic liver disease were ordered. All were negative The patient has normal liver function. The patient has never developed any complications of cirrhosis to date. The CTP is 6. Child class A. The MELD score is 9. Lower extremity edema Edema has resolved with current dose of diuretics. Will continue the current dose of diuretics at lasix 80 mg, aldactone 25 mg every day. Screening for Esophageal varices The patient has esophageal varices without prior bleeding. Varices have not been banded The patient is not on a beta-blocker. Will schedule for EGD to assess for varices and need for banding. Hepatic encephalopathy Overt HE has not developed to date. There is no need for treatment with lactulose and/or Xifaxan at this time. There is no need to restrict dietary protein at this time. Thrombocytopenia This is secondary to cirrhosis. There is no evidence of overt bleeding. No treatment is required. The platelet count is adequate for the patient to undergo procedures without the need for platelet transfusion or platelet growth factors. Anemia This is due to multifactorial causes including portal hypertension with chronic GI blood loss, chronic systemic disease Will obtain FE panel to assess for iron stores. The serum ferritin is at the lower end of normal. 
The FE saturation is depressed FE panel suggests the patient has FE deficiency. Will administer intravenous iron. Will schedule for EGD to assess for UGI blood loss. Screening for Hepatocellular Carcinoma [facility] [address] screening has not been not been performed since 7/2019. AFP was ordered today and ultrasound will be scheduled. Treatment of other medical problems in patients with chronic liver disease There are no contraindications for the patient to take most medications that are necessary for treatment of other medical issues. The patient has cirrhrosis and should avoid taking NSAIDs which are associated with a higher rate of developing ROXANE. The patient can take Any medications utilized for treatment of DM, Statins to treat hypercholesterolemia The patient does not comsume alcohol on a daily basis. Normal doses of acetaminophen, as recommended on the label of the bottle, are not hepatotoxic except in the setting of daily alcohol use, even in patients with cirrhosis and can be utilized for pain. Counseling for alcohol in patients with chronic liver disease The patient was counseled regarding alcohol consumption and the effect of alcohol on chronic liver disease. The patient has not consumed alcohol since 1979. Osteoporosis The risk of osteoporosis is increased in patients with cirrhosis. DEXA bone density to assess for osteoporosis has not been performed. This should be ordered by the patients primary care physician. Vaccinations Vaccination for viral hepatitis A and B is recommended since the patient has no serologic evidence of previous exposure or vaccination with immunity.  
Routine vaccinations against other bacterial and viral agents can be performed as indicated. Annual flu vaccination should be administered if indicated. ALLERGIES Allergies Allergen Reactions  Nsaids (Non-Steroidal Anti-Inflammatory Drug) Other (comments) Hx of PUD and Hinson's Esophagus MEDICATIONS Current Outpatient Medications Medication Sig  
 amoxicillin-clavulanate (AUGMENTIN) 875-125 mg per tablet Take  by mouth every twelve (12) hours.  furosemide (LASIX) 40 mg tablet Take 2 tabs by mouth daily x 1 week and then increase to 1 tab daily  lisinopril (PRINIVIL, ZESTRIL) 5 mg tablet TAKE 2 TABLETS BY MOUTH EVERY DAY  ergocalciferol (VITAMIN D2) 50,000 unit capsule TAKE 1 CAPSULE EVERY 7 DAYS  atorvastatin (LIPITOR) 40 mg tablet Take 1 Tab by mouth daily.  insulin glargine (LANTUS SOLOSTAR U-100 INSULIN) 100 unit/mL (3 mL) inpn 40 units twice daily  glucose blood VI test strips (ASCENSIA AUTODISC VI, ONE TOUCH ULTRA TEST VI) strip Test blood sugar twice daily Diagnosis E 11.9. Can use [employer]  glucose blood VI test strips (ACCU-CHEK SOWMYA PLUS TEST STRP) strip Test blood sugar twice daily Diagnosis E 11.9  
 diclofenac (VOLTAREN) 1 % gel Apply  to affected area four (4) times daily.  insulin glargine (LANTUS SOLOSTAR U-100 INSULIN) 100 unit/mL (3 mL) inpn INJECT 40 UNITS UNDER THE SKIN TWO TIMES A DAY  primidone (MYSOLINE) 250 mg tablet TAKE 1 TABLET TWICE A DAY  sertraline (ZOLOFT) 100 mg tablet TAKE 1.5 TABLETS DAILY (Patient taking differently: Take 100 mg by mouth daily. TAKE 1.5 TABLETS DAILY)  acetaminophen (TYLENOL) 500 mg tablet Take 2 Tabs by mouth every six (6) hours as needed for Pain.  cephALEXin (KEFLEX) 500 mg capsule Take 1 Cap by mouth two (2) times a day.  omeprazole (PRILOSEC) 20 mg capsule TAKE 1 CAPSULE BY MOUTH EVERY DAY  metFORMIN ER (GLUCOPHAGE XR) 500 mg tablet 2 tablets twice daily  gabapentin (NEURONTIN) 300 mg capsule Take 2 Caps by mouth nightly.  Blood-Glucose Meter (ACCU-CHEK SOWMYA PLUS METER) misc Test blood sugar twice daily Diagnosis E 11.9  Blood Glucose Control High&Low (ACCU-CHEK SOWMYA CONTROL SOLN) soln Test blood sugar twice daily Diagnosis E 11.9  
 insulin regular (NOVOLIN R REGULAR U-100 INSULN) 100 unit/mL injection INJECT 7 UNITS UNDER THE SKIN DAILY (WITH DINNER).  Insulin Needles, Disposable, (BD ULTRA-FINE SHORT PEN NEEDLE) 31 gauge x 5/16\" ndle USE TO INJECT UNDER THE SKIN THREE TIMES A DAY  
 B.infantis-B.ani-B.long-B.bifi (PROBIOTIC 4X) 10-15 mg TbEC Take  by mouth.  magnesium 250 mg tab Take 500 mg by mouth daily.  melatonin tab tablet Take 10 mg by mouth nightly as needed.  folic acid (FOLVITE) 1 mg tablet TAKE 1 TABLET DAILY  potassium 99 mg tablet Take 99 mg by mouth two (2) times a day.  calcium citrate-vitamin D3 (CITRACAL + D) tablet Take 2 Tabs by mouth daily.  aspirin 81 mg chewable tablet Take 1 Tab by mouth daily. (Patient taking differently: Take 81 mg by mouth every other day.)  spironolactone (ALDACTONE) 25 mg tablet Take 1 Tab by mouth daily.  ferrous sulfate 140 mg (45 mg iron) TbER ER tablet Take 1 Tab by mouth Daily (before breakfast). No current facility-administered medications for this visit. SYSTEM REVIEW NOT RELATED TO LIVER DISEASE OR REVIEWED ABOVE: 
Constitution systems: Negative for fever, chills, weight gain, weight loss. Eyes: Negative for visual changes. ENT: Negative for sore throat, painful swallowing. Respiratory: Negative for cough, hemoptysis, SOB. Cardiology: Negative for chest pain, palpitations. GI:  Negative for constipation or diarrhea. : Negative for urinary frequency, dysuria, hematuria, nocturia. Skin: Negative for rash. Hematology: Negative for easy bruising, blood clots. Musculo-skelatal: Negative for back pain, muscle pain, weakness. Neurologic: Negative for headaches, dizziness, vertigo, memory problems not related to HE. Psychology: Negative for anxiety, depression. FAMILY HISTORY: 
The father  of Multiple myeloma. The mother  of dementia. There is no family history of liver disease. SOCIAL HISTORY: 
The patient is . The patient has 3 children, and 6 grandchildren. The patient stopped using tobacco products in . The patient has been abstinent from alcohol since . The patient used to work for the [employer]. PHYSICAL EXAMINATION: 
Visit Vitals /58 (BP 1 Location: Left arm, BP Patient Position: Sitting) Pulse 81 Temp 99.6 °F (37.6 °C) (Tympanic) Ht 5' 10\" (1.778 m) Wt 276 lb 6.4 oz (125.4 kg) SpO2 94% BMI 39.66 kg/m² General: No acute distress. Eyes: Sclera anicteric. ENT: No oral lesions. Thyroid normal. 
Nodes: No adenopathy. Skin: No spider angiomata. No jaundice. No palmar erythema. Respiratory: Lungs clear to auscultation. Cardiovascular: Regular heart rate. No murmurs. No JVD. Abdomen: Soft non-tender. Liver size normal to percussion/palpation. Spleen not palpable. No obvious ascites. Extremities: No edema. No muscle wasting. No gross arthritic changes. Neurologic: Alert and oriented. Cranial nerves grossly intact. No asterixis. LABORATORY STUDIES: 
Liver [place] [address]  [address] & Units 2020 WBC 3.4 - 10.8 x10E3/uL 4.6 ANC 1.4 - 7.0 x10E3/uL 3.1 HGB 13.0 - 17.7 g/dL 8.6 (L)  - 450 x10E3/uL 134 (L) INR 0.8 - 1.2 1.1 AST 0 - 40 IU/L 31 ALT 0 - 44 IU/L 21 Alk Phos 39 - 117 IU/L 95 Bili, Total 0.0 - 1.2 mg/dL 0.3 Bili, Direct 0.00 - 0.40 mg/dL 0.15 Albumin 3.7 - 4.7 g/dL 3.3 (L) BUN 8 - 27 mg/dL 14 Creat 0.76 - 1.27 mg/dL 0.88 Na 134 - 144 mmol/L 139  
K 3.5 - 5.2 mmol/L 4.1 Cl 96 - 106 mmol/L 104 CO2 20 - 29 mmol/L 21 Glucose 65 - 99 mg/dL 175 (H) Magnesium 1.6 - 2.4 mg/dL SEROLOGIES: 
Serologies Latest Ref Rng & Units 2020 Hep A Ab, Total Negative Negative Hep B Surface Ag Negative Negative Hep B Core Ab, Total Negative Negative Hep B Surface AB QL  Non Reactive Hep C Ab 0.0 - 0.9 s/co ratio <0.1 Ferritin 30 - 400 ng/mL 35 Iron % Saturation 15 - 55 % 10 (L) Alpha-1 antitrypsin level 101 - 187 mg/dL 183 LIVER HISTOLOGY: 
Not available or performed ENDOSCOPIC PROCEDURES: 
6/2019. EGD by Radha Arroyo. Large esophageal varices. No banding perfomred. 2019. Colonoscopy by Radha Arroyo. Normal 
 
RADIOLOGY: 
7/2016. Triple phase CT scan. Changes consistent with cirrhosis. No liver mass lesions. No dilated bile ducts. Splenomegaly. No ascites. 7/2019. Ultrasound of liver. Echogenic consistent with cirrhosis. No liver mass lesions. No dilated bile ducts. No ascites. OTHER TESTING: 
Not available or performed FOLLOW-UP: 
All of the issues listed above in the Assessment and Plan were discussed with the patient. All questions were answered. The patient expressed a clear understanding of the above. Over 60 minutes was spent with the patient reviewing the medical history, performing the examination, explaining the natural history of cirrhosis, the complications of cirrhosis, treatment options and various issues related to liver transplantation. [address] [place] [address] in 4 weeks for [facility] to review all data and determine the treatment plan. MD Dagmar [facility] Alan De Tavares [address] [address], [address] [facility] [address]. 
[phone] [address] [facility]

## 2020-01-30 NOTE — Clinical Note
2/9/20 Patient: Reinaldo Rosales YOB: 1950 Date of Visit: 1/30/2020 Keira Pacheco MD 
 [place] [id number] [address] In Basket Dear Keira Pacheco MD, Thank you for referring Mr. Gisella Scherer to [address] for evaluation. My notes for this consultation are attached. If you have questions, please do not hesitate to call me. I look forward to following your patient along with you. Sincerely, Sushant Cody MD

## 2020-01-30 NOTE — PROGRESS NOTES
Roger De La Cruz is a 68 y.o. male Chief Complaint Patient presents with  New Patient  
  cirrhosis of liver Visit Vitals /58 (BP 1 Location: Left arm, BP Patient Position: Sitting) Pulse 81 Temp 99.6 °F (37.6 °C) (Tympanic) Ht 5' 10\" (1.778 m) Wt 276 lb 6.4 oz (125.4 kg) SpO2 94% BMI 39.66 kg/m² /58 pt has no sx , 
Provider notified via exam room white board. 3 most recent PHQ Screens 7/2/2019 PHQ Not Done - Little interest or pleasure in doing things Not at all Feeling down, depressed, irritable, or hopeless Not at all Total Score PHQ 2 0 Learning Assessment 7/2/2019 PRIMARY LEARNER Patient HIGHEST LEVEL OF EDUCATION - PRIMARY LEARNER  SOME COLLEGE  
BARRIERS PRIMARY LEARNER -  
CO-LEARNER CAREGIVER -  
PRIMARY LANGUAGE ENGLISH  
LEARNER PREFERENCE PRIMARY DEMONSTRATION  
ANSWERED BY patient RELATIONSHIP SELF Abuse Screening Questionnaire 7/2/2019 Do you ever feel afraid of your partner? Adams Elder Are you in a relationship with someone who physically or mentally threatens you? Elder Elder Is it safe for you to go home?  Antonia Mayfield

## 2020-01-31 ENCOUNTER — TELEPHONE (OUTPATIENT)
Dept: HEMATOLOGY | Age: 70
End: 2020-01-31

## 2020-01-31 LAB
A1AT SERPL-MCNC: 183 MG/DL (ref 101–187)
AFP L3 MFR SERPL: NORMAL % (ref 0–9.9)
AFP SERPL-MCNC: 2.1 NG/ML (ref 0–8)
ALBUMIN SERPL-MCNC: 3.3 G/DL (ref 3.7–4.7)
ALP SERPL-CCNC: 95 IU/L (ref 39–117)
ALT SERPL-CCNC: 21 IU/L (ref 0–44)
AST SERPL-CCNC: 31 IU/L (ref 0–40)
BASOPHILS # BLD AUTO: 0 X10E3/UL (ref 0–0.2)
BASOPHILS NFR BLD AUTO: 1 %
BILIRUB DIRECT SERPL-MCNC: 0.15 MG/DL (ref 0–0.4)
BILIRUB SERPL-MCNC: 0.3 MG/DL (ref 0–1.2)
BUN SERPL-MCNC: 14 MG/DL (ref 8–27)
BUN/CREAT SERPL: 16 (ref 10–24)
CALCIUM SERPL-MCNC: 8 MG/DL (ref 8.6–10.2)
CHLORIDE SERPL-SCNC: 104 MMOL/L (ref 96–106)
CO2 SERPL-SCNC: 21 MMOL/L (ref 20–29)
COMMENT, 144067: NORMAL
CREAT SERPL-MCNC: 0.88 MG/DL (ref 0.76–1.27)
EOSINOPHIL # BLD AUTO: 0.3 X10E3/UL (ref 0–0.4)
EOSINOPHIL NFR BLD AUTO: 6 %
ERYTHROCYTE [DISTWIDTH] IN BLOOD BY AUTOMATED COUNT: 16.4 % (ref 11.6–15.4)
FERRITIN SERPL-MCNC: 35 NG/ML (ref 30–400)
GLUCOSE SERPL-MCNC: 175 MG/DL (ref 65–99)
HAV AB SER QL IA: NEGATIVE
HBV CORE AB SERPL QL IA: NEGATIVE
HBV SURFACE AB SER QL: NON REACTIVE
HBV SURFACE AG SERPL QL IA: NEGATIVE
HCT VFR BLD AUTO: 27.4 % (ref 37.5–51)
HCV AB S/CO SERPL IA: <0.1 S/CO RATIO (ref 0–0.9)
HGB BLD-MCNC: 8.6 G/DL (ref 13–17.7)
IMM GRANULOCYTES # BLD AUTO: 0 X10E3/UL (ref 0–0.1)
IMM GRANULOCYTES NFR BLD AUTO: 0 %
INR PPP: 1.1 (ref 0.8–1.2)
IRON SATN MFR SERPL: 10 % (ref 15–55)
IRON SERPL-MCNC: 31 UG/DL (ref 38–169)
LYMPHOCYTES # BLD AUTO: 0.8 X10E3/UL (ref 0.7–3.1)
LYMPHOCYTES NFR BLD AUTO: 17 %
MCH RBC QN AUTO: 25.9 PG (ref 26.6–33)
MCHC RBC AUTO-ENTMCNC: 31.4 G/DL (ref 31.5–35.7)
MCV RBC AUTO: 83 FL (ref 79–97)
MONOCYTES # BLD AUTO: 0.4 X10E3/UL (ref 0.1–0.9)
MONOCYTES NFR BLD AUTO: 8 %
NEUTROPHILS # BLD AUTO: 3.1 X10E3/UL (ref 1.4–7)
NEUTROPHILS NFR BLD AUTO: 68 %
PLATELET # BLD AUTO: 134 X10E3/UL (ref 150–450)
POTASSIUM SERPL-SCNC: 4.1 MMOL/L (ref 3.5–5.2)
PROT SERPL-MCNC: 6.3 G/DL (ref 6–8.5)
PROTHROMBIN TIME: 11.4 SEC (ref 9.1–12)
RBC # BLD AUTO: 3.32 X10E6/UL (ref 4.14–5.8)
SODIUM SERPL-SCNC: 139 MMOL/L (ref 134–144)
TIBC SERPL-MCNC: 315 UG/DL (ref 250–450)
UIBC SERPL-MCNC: 284 UG/DL (ref 111–343)
WBC # BLD AUTO: 4.6 X10E3/UL (ref 3.4–10.8)

## 2020-01-31 NOTE — TELEPHONE ENCOUNTER
----- Message from Doyle Patterson sent at 1/31/2020  9:09 AM EST -----  Regarding: Dr. Danae Tomlin first and last name: Sarmad Das/pt      Reason for call: requesting a call back in regards to something that was suppose to be called in to  [employer] [facility] to go with his Lasix      Callback required yes/no and why: yes      Best contact number(s): [phone]      Details to clarify the request:      Doyle Patterson

## 2020-02-04 ENCOUNTER — OFFICE VISIT (OUTPATIENT)
Dept: FAMILY MEDICINE CLINIC | Age: 70
End: 2020-02-04

## 2020-02-04 VITALS
BODY MASS INDEX: 40.03 KG/M2 | SYSTOLIC BLOOD PRESSURE: 148 MMHG | DIASTOLIC BLOOD PRESSURE: 74 MMHG | WEIGHT: 279.6 LBS | OXYGEN SATURATION: 98 % | HEART RATE: 78 BPM | TEMPERATURE: 98.9 F | HEIGHT: 70 IN | RESPIRATION RATE: 16 BRPM

## 2020-02-04 DIAGNOSIS — R18.8 CIRRHOSIS OF LIVER WITH ASCITES, UNSPECIFIED HEPATIC CIRRHOSIS TYPE (HCC): Primary | ICD-10-CM

## 2020-02-04 DIAGNOSIS — D50.9 IRON DEFICIENCY ANEMIA, UNSPECIFIED IRON DEFICIENCY ANEMIA TYPE: ICD-10-CM

## 2020-02-04 DIAGNOSIS — I85.10 ESOPHAGEAL VARICES IN CIRRHOSIS (HCC): ICD-10-CM

## 2020-02-04 DIAGNOSIS — E11.65 UNCONTROLLED TYPE 2 DIABETES MELLITUS WITH HYPERGLYCEMIA (HCC): ICD-10-CM

## 2020-02-04 DIAGNOSIS — K74.60 ESOPHAGEAL VARICES IN CIRRHOSIS (HCC): ICD-10-CM

## 2020-02-04 DIAGNOSIS — K74.60 CIRRHOSIS OF LIVER WITH ASCITES, UNSPECIFIED HEPATIC CIRRHOSIS TYPE (HCC): Primary | ICD-10-CM

## 2020-02-04 RX ORDER — SPIRONOLACTONE 25 MG/1
25 TABLET ORAL DAILY
Qty: 30 TAB | Refills: 1 | Status: SHIPPED | OUTPATIENT
Start: 2020-02-04 | End: 2020-07-15

## 2020-02-04 NOTE — PROGRESS NOTES
Subjective: Chief Complaint Patient presents with  Follow-up 1 week He  is a 68y.o. year old male who presents for evaluation of: 
Since last appointment, saw Dr. Gilbert Youssef for his his cirrhosis presumably from  [facility]. Notes reviewed and appreciate his input. At last appointment, started Lasix and decreased lisinopril dose to tolerate this change and weight is down about 2 pounds over the past week on our scale. Last saw Dr. Maryse Pineda on 2019. On EGD on 2019, patient was noted to have portal hypertensive gastropathy and large 3+ esophageal varices without bleeding. Patient was recommended to follow-up in 2 to 3 weeks but was unaware of this/did not follow-up. CT abd/pelv in 2016: 
\"3. Nodular liver contour consistent with cirrhosis. 4. Marked splenomegaly and occlusion of the splenic vein with gastric, omental and anterior abdominal wall varices. \" 
 
ROS Gen - no fever/chills Resp - no dyspnea or cough CV - no chest pain or WANG Rest per HPI Objective:  
 
Vitals:  
 20 1200 BP: 148/74 Pulse: 78 Resp: 16 Temp: 98.9 °F (37.2 °C) TempSrc: Oral  
SpO2: 98% Weight: 279 lb 9.6 oz (126.8 kg) Height: 5' 10\" (1.778 m) Physical Examination:  
Gen - NAD Eyes - sclera anicteric Resp - CTAB, no w/r/r 
CV - rrr, no m/r/g Abdomen soft, nontender, mildly distended Extremities trace edema with L> R Allergies Allergen Reactions  Nsaids (Non-Steroidal Anti-Inflammatory Drug) Other (comments) Hx of PUD and Hinson's Esophagus Social History Socioeconomic History  Marital status:  Spouse name: Not on file  Number of children: Not on file  Years of education: Not on file  Highest education level: Not on file Tobacco Use  Smoking status: Former Smoker Packs/day: 2.00 Years: 15.00 Pack years: 30.00 Last attempt to quit: 3/1/1979 Years since quittin.9  Smokeless tobacco: Never Used Substance and Sexual Activity  Alcohol use: No  
  Alcohol/week: 0.0 standard drinks  Drug use: No  
 Sexual activity: Never Family History Problem Relation Age of Onset  Cancer Father   
     multiple myeloma  Stroke Father  Dementia Mother  No Known Problems Brother  COPD Brother  Cancer Maternal Grandmother COLON  Anesth Problems Neg Hx Past Surgical History:  
Procedure Laterality Date  COLONOSCOPY N/A 6/18/2019 COLONOSCOPY AND EGD performed by Padmini Arellano MD at [facility] ENDOSCOPY  COLONOSCOPY,DIAGNOSTIC  6/18/2019  ENDOSCOPY, COLON, DIAGNOSTIC    
 HX CHOLECYSTECTOMY  1995  HX GI  1980  
 gastroplasty [facility]  HX HIP REPLACEMENT Left  HX ORTHOPAEDIC Right 2011  
 rot cuff repair  HX ORTHOPAEDIC  2016  
 left broken femur [address]  HX VASCULAR ACCESS    
 picc line and removed after sepsis resolved [facility] ARTHROPLASTY  05/2010  
 right  TOTAL HIP ARTHROPLASTY  08/01/2016  
 left  UPPER GI ENDOSCOPY,BIOPSY  6/18/2019 Past Medical History:  
Diagnosis Date  ADD (attention deficit disorder)  Anemia due to blood loss  Hinson's esophagus with esophagitis  Benign essential tremor  Cancer [facility]  Cirrhosis ([address]  Depression  DJD (degenerative joint disease) of hip   
 bilat  DM (diabetes mellitus) ([address] 3/17/2010  ED (erectile dysfunction) of organic origin  Esophageal varices determined by endoscopy ([address]  Fall on or from sidewalk curb 9/24/2015  Femur fracture ([address]  Gastritis and duodenitis  GERD (gastroesophageal reflux disease)   
 resolved after discontinuing diclofenac  Hematuria 6/2016  High cholesterol 03/17/2010  
 pt denies 6/19  
 HTN (hypertension) 3/17/2010  Morbid obesity ([address]  Murmur, cardiac 2016  
 PFO (patent foramen ovale) 7/26/2017  PUD (peptic ulcer disease)  Shingles 6/2016 RESOLVING- NO RASH (HEAD)  Sleep apnea   
 doesnt use CPAP anymore Current Outpatient Medications Medication Sig Dispense Refill  spironolactone (ALDACTONE) 25 mg tablet Take 1 Tab by mouth daily. 30 Tab 1  
 ferrous sulfate 140 mg (45 mg iron) TbER ER tablet Take 1 Tab by mouth Daily (before breakfast). 90 Tab 1  
 amoxicillin-clavulanate (AUGMENTIN) 875-125 mg per tablet Take  by mouth every twelve (12) hours.  furosemide (LASIX) 40 mg tablet Take 2 tabs by mouth daily x 1 week and then increase to 1 tab daily 45 Tab 0  
 lisinopril (PRINIVIL, ZESTRIL) 5 mg tablet TAKE 2 TABLETS BY MOUTH EVERY DAY 60 Tab 0  
 ergocalciferol (VITAMIN D2) 50,000 unit capsule TAKE 1 CAPSULE EVERY 7 DAYS 12 Cap 2  
 atorvastatin (LIPITOR) 40 mg tablet Take 1 Tab by mouth daily. 90 Tab 3  
 insulin glargine (LANTUS SOLOSTAR U-100 INSULIN) 100 unit/mL (3 mL) inpn 40 units twice daily 25 Adjustable Dose Pre-filled Pen Syringe 3  
 glucose blood VI test strips (ASCENSIA AUTODISC VI, ONE TOUCH ULTRA TEST VI) strip Test blood sugar twice daily Diagnosis E 11.9. Can use [employer] Brand 200 Strip 4  
 glucose blood VI test strips (ACCU-CHEK SOWMYA PLUS TEST STRP) strip Test blood sugar twice daily Diagnosis E 11.9 200 Strip 5  
 diclofenac (VOLTAREN) 1 % gel Apply  to affected area four (4) times daily. 100 g 2  
 insulin glargine (LANTUS SOLOSTAR U-100 INSULIN) 100 unit/mL (3 mL) inpn INJECT 40 UNITS UNDER THE SKIN TWO TIMES A DAY 5 Pen 0  
 primidone (MYSOLINE) 250 mg tablet TAKE 1 TABLET TWICE A  Tab 3  
 sertraline (ZOLOFT) 100 mg tablet TAKE 1.5 TABLETS DAILY (Patient taking differently: Take 100 mg by mouth daily. TAKE 1.5 TABLETS DAILY) 45 Tab 5  
 acetaminophen (TYLENOL) 500 mg tablet Take 2 Tabs by mouth every six (6) hours as needed for Pain. 30 Tab 0  cephALEXin (KEFLEX) 500 mg capsule Take 1 Cap by mouth two (2) times a day. 20 Cap 6  omeprazole (PRILOSEC) 20 mg capsule TAKE 1 CAPSULE BY MOUTH EVERY DAY  12  
 metFORMIN ER (GLUCOPHAGE XR) 500 mg tablet 2 tablets twice daily 360 Tab 3  
 gabapentin (NEURONTIN) 300 mg capsule Take 2 Caps by mouth nightly. 180 Cap 1  Blood-Glucose Meter (ACCU-CHEK SOWMYA PLUS METER) misc Test blood sugar twice daily Diagnosis E 11.9 1 Each 1  Blood Glucose Control High&Low (ACCU-CHEK SOWMYA CONTROL SOLN) soln Test blood sugar twice daily Diagnosis E 11.9 1 Bottle 3  
 insulin regular (NOVOLIN R REGULAR U-100 INSULN) 100 unit/mL injection INJECT 7 UNITS UNDER THE SKIN DAILY (WITH DINNER). 10 mL 0  
 Insulin Needles, Disposable, (BD ULTRA-FINE SHORT PEN NEEDLE) 31 gauge x 5/16\" ndle USE TO INJECT UNDER THE SKIN THREE TIMES A  Pen Needle 1  
 B.infantis-B.ani-B.long-B.bifi (PROBIOTIC 4X) 10-15 mg TbEC Take  by mouth.  magnesium 250 mg tab Take 500 mg by mouth daily.  melatonin tab tablet Take 10 mg by mouth nightly as needed.  folic acid (FOLVITE) 1 mg tablet TAKE 1 TABLET DAILY 90 Tab 3  potassium 99 mg tablet Take 99 mg by mouth two (2) times a day.  calcium citrate-vitamin D3 (CITRACAL + D) tablet Take 2 Tabs by mouth daily.  aspirin 81 mg chewable tablet Take 1 Tab by mouth daily. (Patient taking differently: Take 81 mg by mouth every other day.) 30 Tab 11 Assessment/ Plan:  
Diagnoses and all orders for this visit: 1. Cirrhosis of liver with ascites, unspecified hepatic cirrhosis type (HCC) largely compensated cirrhosis. Continue Lasix and adding on spironolactone for the short-term. Appreciate hepatology recommendations -     spironolactone (ALDACTONE) 25 mg tablet; Take 1 Tab by mouth daily. 
-     REFERRAL TO GASTROENTEROLOGY 2. Esophageal varices in cirrhosis (HCC) needs new EGD so sending back to GI as well. Discussed potentially starting propanolol and may consider this at next appointment but will defer to GI and hepatology -     REFERRAL TO GASTROENTEROLOGY 3. Uncontrolled type 2 diabetes mellitus with hyperglycemia (HCC)likely more difficult to control due to cirrhosis 4. Iron deficiency anemia, unspecified iron deficiency anemia typeongoing issue and restarting iron as well as sending back to GI. 
-     REFERRAL TO GASTROENTEROLOGY 
-     ferrous sulfate 140 mg (45 mg iron) TbER ER tablet; Take 1 Tab by mouth Daily (before breakfast). I have discussed the diagnosis with the patient and the intended plan as seen in the above orders. The patient has received an after-visit summary and questions were answered concerning future plans. I have discussed medication side effects and warnings with the patient as well. The patient verbalizes understanding and agreement with the plan. Follow-up and Dispositions · Return in about 4 weeks (around 3/3/2020), or if symptoms worsen or fail to improve.

## 2020-02-04 NOTE — PROGRESS NOTES
Chief Complaint Patient presents with  Follow-up 1 week 1. Have you been to the ER, urgent care clinic since your last visit? Hospitalized since your last visit? No 
 
2. Have you seen or consulted any other health care providers outside of the [address] [place] [address] since your last visit? Include any pap smears or colon screening.  No

## 2020-02-09 PROBLEM — B95.8 BACTEREMIA DUE TO STAPHYLOCOCCUS: Status: RESOLVED | Noted: 2018-02-24 | Resolved: 2020-02-09

## 2020-02-09 PROBLEM — I10 ESSENTIAL HYPERTENSION: Status: RESOLVED | Noted: 2018-06-04 | Resolved: 2020-02-09

## 2020-02-09 PROBLEM — E11.40 TYPE 2 DIABETES MELLITUS WITH DIABETIC NEUROPATHY (HCC): Status: RESOLVED | Noted: 2018-01-16 | Resolved: 2020-02-09

## 2020-02-09 PROBLEM — A41.9 SEPSIS (HCC): Status: RESOLVED | Noted: 2018-02-27 | Resolved: 2020-02-09

## 2020-02-09 PROBLEM — E66.01 SEVERE OBESITY (BMI 35.0-39.9) WITH COMORBIDITY (HCC): Status: RESOLVED | Noted: 2018-04-17 | Resolved: 2020-02-09

## 2020-02-09 PROBLEM — R78.81 BACTEREMIA DUE TO STAPHYLOCOCCUS: Status: RESOLVED | Noted: 2018-02-24 | Resolved: 2020-02-09

## 2020-02-09 PROBLEM — R29.898 LEG HEAVINESS: Status: RESOLVED | Noted: 2019-06-11 | Resolved: 2020-02-09

## 2020-02-09 PROBLEM — E78.2 MIXED HYPERLIPIDEMIA: Status: RESOLVED | Noted: 2018-06-04 | Resolved: 2020-02-09

## 2020-02-09 PROBLEM — F33.9 RECURRENT DEPRESSION (HCC): Status: RESOLVED | Noted: 2018-01-16 | Resolved: 2020-02-09

## 2020-02-09 PROBLEM — R25.2 LEG CRAMPS: Status: RESOLVED | Noted: 2019-06-11 | Resolved: 2020-02-09

## 2020-02-12 DIAGNOSIS — F33.9 RECURRENT DEPRESSION (HCC): ICD-10-CM

## 2020-02-12 DIAGNOSIS — F43.21 GRIEF: ICD-10-CM

## 2020-02-12 DIAGNOSIS — E11.65 UNCONTROLLED TYPE 2 DIABETES MELLITUS WITH HYPERGLYCEMIA (HCC): ICD-10-CM

## 2020-02-13 RX ORDER — INSULIN GLARGINE 100 [IU]/ML
INJECTION, SOLUTION SUBCUTANEOUS
Qty: 25 ADJUSTABLE DOSE PRE-FILLED PEN SYRINGE | Refills: 3 | Status: SHIPPED | OUTPATIENT
Start: 2020-02-13 | End: 2020-03-26 | Stop reason: SDUPTHER

## 2020-02-13 RX ORDER — METFORMIN HYDROCHLORIDE 500 MG/1
TABLET, EXTENDED RELEASE ORAL
Qty: 360 TAB | Refills: 3 | Status: SHIPPED | OUTPATIENT
Start: 2020-02-13 | End: 2020-04-06

## 2020-02-14 RX ORDER — SERTRALINE HYDROCHLORIDE 100 MG/1
TABLET, FILM COATED ORAL
Qty: 45 TAB | Refills: 5 | Status: SHIPPED | OUTPATIENT
Start: 2020-02-14 | End: 2020-02-20 | Stop reason: SDUPTHER

## 2020-02-14 RX ORDER — BUTALBITAL, ACETAMINOPHEN AND CAFFEINE 50; 325; 40 MG/1; MG/1; MG/1
1 TABLET ORAL
Qty: 30 TAB | Refills: 1 | Status: SHIPPED | OUTPATIENT
Start: 2020-02-14 | End: 2020-03-05 | Stop reason: SDUPTHER

## 2020-02-20 ENCOUNTER — PATIENT MESSAGE (OUTPATIENT)
Dept: FAMILY MEDICINE CLINIC | Age: 70
End: 2020-02-20

## 2020-02-20 DIAGNOSIS — F33.9 RECURRENT DEPRESSION (HCC): ICD-10-CM

## 2020-02-20 DIAGNOSIS — F43.21 GRIEF: ICD-10-CM

## 2020-02-21 RX ORDER — SERTRALINE HYDROCHLORIDE 100 MG/1
TABLET, FILM COATED ORAL
Qty: 45 TAB | Refills: 5 | Status: SHIPPED | OUTPATIENT
Start: 2020-02-21 | End: 2020-07-15

## 2020-02-24 DIAGNOSIS — R19.7 DIARRHEA, UNSPECIFIED TYPE: Primary | ICD-10-CM

## 2020-02-24 RX ORDER — CEPHALEXIN 500 MG/1
500 CAPSULE ORAL 2 TIMES DAILY
Qty: 30 CAP | Refills: 6 | Status: SHIPPED | OUTPATIENT
Start: 2020-02-24 | End: 2021-01-01

## 2020-02-24 NOTE — PROGRESS NOTES
Patient advised stool specimen was ordered and he will come by office tomorrow to  lab slips and obtain containers from [facility].

## 2020-02-27 ENCOUNTER — HOSPITAL ENCOUNTER (OUTPATIENT)
Dept: LAB | Age: 70
Discharge: HOME | End: 2020-02-27
Payer: MEDICARE

## 2020-02-27 PROCEDURE — 89055 LEUKOCYTE ASSESSMENT FECAL: CPT

## 2020-02-27 PROCEDURE — 87329 GIARDIA AG IA: CPT

## 2020-02-27 PROCEDURE — 87493 C DIFF AMPLIFIED PROBE: CPT

## 2020-02-28 ENCOUNTER — HOSPITAL ENCOUNTER (OUTPATIENT)
Dept: LAB | Age: 70
Discharge: HOME | End: 2020-02-28
Payer: MEDICARE

## 2020-02-28 PROCEDURE — 89055 LEUKOCYTE ASSESSMENT FECAL: CPT

## 2020-02-28 PROCEDURE — 87329 GIARDIA AG IA: CPT

## 2020-02-28 PROCEDURE — 87493 C DIFF AMPLIFIED PROBE: CPT

## 2020-03-02 ENCOUNTER — OFFICE VISIT (OUTPATIENT)
Dept: HEMATOLOGY | Age: 70
End: 2020-03-02

## 2020-03-02 VITALS
DIASTOLIC BLOOD PRESSURE: 52 MMHG | HEART RATE: 76 BPM | HEIGHT: 70 IN | TEMPERATURE: 97.9 F | SYSTOLIC BLOOD PRESSURE: 147 MMHG | BODY MASS INDEX: 38.08 KG/M2 | OXYGEN SATURATION: 95 % | WEIGHT: 266 LBS

## 2020-03-02 DIAGNOSIS — K74.60 CIRRHOSIS OF LIVER WITHOUT ASCITES, UNSPECIFIED HEPATIC CIRRHOSIS TYPE (HCC): Primary | ICD-10-CM

## 2020-03-02 LAB
ERYTHROCYTE [DISTWIDTH] IN BLOOD BY AUTOMATED COUNT: 16.7 % (ref 11.6–15.4)
HCT VFR BLD AUTO: 30.6 % (ref 37.5–51)
HGB BLD-MCNC: 9.7 G/DL (ref 13–17.7)
MCH RBC QN AUTO: 25.9 PG (ref 26.6–33)
MCHC RBC AUTO-ENTMCNC: 31.7 G/DL (ref 31.5–35.7)
MCV RBC AUTO: 82 FL (ref 79–97)
PLATELET # BLD AUTO: 184 X10E3/UL (ref 150–450)
RBC # BLD AUTO: 3.75 X10E6/UL (ref 4.14–5.8)
WBC # BLD AUTO: 5.1 X10E3/UL (ref 3.4–10.8)

## 2020-03-02 NOTE — PROGRESS NOTES
[address] Concepcion Edilson Chris MD, Edi [facility] Ada Apgar, MD Garold Connor, PA-ANTHONY Arciniega, [facility] April S Christa, [facility]   Gokul Reynaldo, [facility]- Maxrenny Lopez, [facility] Dagmar [facility] [address] 
  at [place] [id number], [address] Anya Barnard  22. 
  [phone] FAX: [address] [place] [address] 
  [address], [address] [facility], [address] 
  [phone] FAX: [phone] Patient Care Team: 
Thierry Cordova MD as PCP - [facility]) Thierry Cordova MD as PCP - [facility] H[facility] Tracey Michel MD as Physician (Sleep Medicine) Maddi Santos MD (Orthopedic Surgery) Alma Mercado MD (Ophthalmology) Malinda Cuevas NP (Nurse Practitioner) Lisa Wick MD (Neurology) Sebastian Pelayo MD (Endocrinology) Eduardo Enriquez MD as Physician (Cardiology) Zoraida Ziegler MD (Gastroenterology) Problem List  Date Reviewed: 3/2/2020 Codes Class Noted Dislocation of prosthetic joint [facility]) ICD-10-CM: O93.708E 
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 Paige Husain ICD-9-CM: 996.44, V43.64  8/25/2016 Osteoarthritis of right hip ICD-10-CM: M16.11 
ICD-9-CM: 715.95  8/1/2016 Benign essential tremor ICD-10-CM: G25.0 ICD-9-CM: 333.1  Unknown Diabetes mellitus type 2, uncontrolled ([address] ICD-10-CM: E11.65 ICD-9-CM: 250.02  6/3/2013 Hypogonadism male ICD-10-CM: E29.1 ICD-9-CM: 257.2  8/29/2012 Encounter for long-term (current) use of medications ICD-10-CM: Z79.899 ICD-9-CM: V58.69  7/29/2010 Osteoarthritis of hip ICD-10-CM: M16.9 ICD-9-CM: 715.95  Unknown Overview Signed 4/29/2010  6:55 AM by Lizbeth Walker Depression ICD-10-CM: F32.9 ICD-9-CM: 190  Unknown  
   
 ED (erectile dysfunction) of organic origin ICD-10-CM: N52.9 ICD-9-CM: 607.84  Unknown GERD (gastroesophageal reflux disease) ICD-10-CM: K21.9 ICD-9-CM: 530.81  Unknown  
   
 PUD (peptic ulcer disease) ICD-10-CM: K27.9 ICD-9-CM: 533.90  Unknown Hinson's esophagus with esophagitis ICD-10-CM: K22.70, K20.9 ICD-9-CM: 530.85, 530.10  Unknown HTN (hypertension) ICD-10-CM: I10 
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 The active problem list, all pertinent past medical history, medications, radiologic findings and laboratory findings related to the liver disorder were reviewed with the patient. The patient is a 68 y.o.  male who was found to have chronic liver disease and cirrhosis in 7/2019 when esophageal varices were found on EGD. An assessment of liver fibrosis with biopsy or elastography has not been performed. Elastography is planned for today's office visit. Serologic evaluation for markers of chronic liver disease were negative. The patient has developed the following complications of cirrhosis: esophageal varices, mild anemia. The patient has the following symptoms which are thought to be due to the liver disease:  fatigue, swelling of the lower extremities The patient is not currently experiencing the following symptoms of liver disease:  pain in the right side over the liver, swelling of abdomen, hematemesis, hematochezia. The patient completes all daily activities without any functional limitations. ASSESSMENT AND PLAN: 
Cirrhosis Cirrhosis is presumed secondary to BREWER. The diagnosis of cirrhosis is based upon imaging, laboratory studies and now findings on the Fibroscan. I have reviewed these finding in detail with the patient and the natural history and progression of fatty liver. Patient understands that this will consistent of routine monitoring of function and screening for complications of advanced disease. We have also discussed the importance of tight control of DM, BP, cholesterol, and weight loss. We have set a target goal weight loss of ~20# over the course of the next year or so. Have performed laboratory testing to monitor liver function and degree of liver injury. This included BMP, hepatic panel, CBC with platelet count, INR. Liver transaminases are normal.  ALP is normal.  Liver function is normal.  Serum albumin is depressed. The platelet count is depressed. Serologic testing for causes of chronic liver disease were ordered. All were negative The patient has normal liver function. The patient has never developed any complications of cirrhosis to date. The CTP is 6. Child class A. The MELD score is 9. Lower extremity edema Edema has resolved with current dose of diuretics. Will continue the current dose of diuretics at lasix 40 mg, aldactone 25 mg every day. I have also reviewed with him the importance of maintaining a low sodium diet to reduce fluid retention. Screening for Esophageal varices The patient has esophageal varices without prior bleeding. Varices have not been banded. The patient is not on a beta-blocker. Will schedule for EGD to assess for varices and need for banding if this is not scheduled within the week. Patient reports that he has upcoming appointment with Dr Hakan Persaud and we would recommend that this be booked as soon as is possible. He denies evidence of UGI bleeding, but is continuing to have anemia. Hepatic encephalopathy Overt HE has not developed to date. There is no need for treatment with lactulose and/or Xifaxan at this time. There is no need to restrict dietary protein at this time. Thrombocytopenia This is secondary to cirrhosis. There is no evidence of overt bleeding. No treatment is required. The platelet count is adequate for the patient to undergo procedures without the need for platelet transfusion or platelet growth factors. Anemia There was suggestion of low iron on last labs and he has remained on oral iron supplementation. Will recheck this study and current CBC. If this remains low, would advocate and order IV iron to replete as he is often symptomatic of fatigue and orthostatic symptoms. Will schedule for EGD to assess for UGI blood loss if not scheduled with Dr Shalini Steve in the near future. Screening for Hepatocellular Carcinoma [address] screening has not been not been performed since 7/2019. AFP was within normal limits on recent testing and ultrasound will be scheduled in the near future. Treatment of other medical problems in patients with chronic liver disease There are no contraindications for the patient to take most medications that are necessary for treatment of other medical issues. The patient has cirrhrosis and should avoid taking NSAIDs which are associated with a higher rate of developing ROXANE. The patient can take Any medications utilized for treatment of DM, Statins to treat hypercholesterolemia The patient does not comsume alcohol on a daily basis. Normal doses of acetaminophen, as recommended on the label of the bottle, are not hepatotoxic except in the setting of daily alcohol use, even in patients with cirrhosis and can be utilized for pain. Counseling for alcohol in patients with chronic liver disease The patient was counseled regarding alcohol consumption and the effect of alcohol on chronic liver disease. The patient has not consumed alcohol since 1979. Osteoporosis The risk of osteoporosis is increased in patients with cirrhosis. DEXA bone density to assess for osteoporosis has not been performed. This should be ordered by the patients primary care physician. Vaccinations Vaccination for viral hepatitis A and B is recommended since the patient has no serologic evidence of previous exposure or vaccination with immunity. Routine vaccinations against other bacterial and viral agents can be performed as indicated. Annual flu vaccination should be administered if indicated. ALLERGIES Allergies Allergen Reactions  Nsaids (Non-Steroidal Anti-Inflammatory Drug) Other (comments) Hx of PUD and Hinson's Esophagus MEDICATIONS Current Outpatient Medications Medication Sig  cephALEXin (KEFLEX) 500 mg capsule Take 1 Cap by mouth two (2) times a day.  sertraline (ZOLOFT) 100 mg tablet TAKE 1.5 TABLETS DAILY  butalbital-acetaminophen-caffeine (FIORICET, ESGIC) -40 mg per tablet Take 1 Tab by mouth every six (6) hours as needed for Headache.  metFORMIN ER (GLUCOPHAGE XR) 500 mg tablet 2 tablets twice daily  insulin glargine (LANTUS SOLOSTAR U-100 INSULIN) 100 unit/mL (3 mL) inpn 40 units twice daily  spironolactone (ALDACTONE) 25 mg tablet Take 1 Tab by mouth daily.   
 ferrous sulfate 140 mg (45 mg iron) TbER ER tablet Take 1 Tab by mouth Daily (before breakfast).  furosemide (LASIX) 40 mg tablet Take 2 tabs by mouth daily x 1 week and then increase to 1 tab daily  lisinopril (PRINIVIL, ZESTRIL) 5 mg tablet TAKE 2 TABLETS BY MOUTH EVERY DAY  ergocalciferol (VITAMIN D2) 50,000 unit capsule TAKE 1 CAPSULE EVERY 7 DAYS  atorvastatin (LIPITOR) 40 mg tablet Take 1 Tab by mouth daily.  glucose blood VI test strips (ASCENSIA AUTODISC VI, ONE TOUCH ULTRA TEST VI) strip Test blood sugar twice daily Diagnosis E 11.9. Can use [employer]  glucose blood VI test strips (ACCU-CHEK SOWMYA PLUS TEST STRP) strip Test blood sugar twice daily Diagnosis E 11.9  
 diclofenac (VOLTAREN) 1 % gel Apply  to affected area four (4) times daily.  insulin glargine (LANTUS SOLOSTAR U-100 INSULIN) 100 unit/mL (3 mL) inpn INJECT 40 UNITS UNDER THE SKIN TWO TIMES A DAY  primidone (MYSOLINE) 250 mg tablet TAKE 1 TABLET TWICE A DAY  acetaminophen (TYLENOL) 500 mg tablet Take 2 Tabs by mouth every six (6) hours as needed for Pain.  omeprazole (PRILOSEC) 20 mg capsule TAKE 1 CAPSULE BY MOUTH EVERY DAY  gabapentin (NEURONTIN) 300 mg capsule Take 2 Caps by mouth nightly.  Blood-Glucose Meter (ACCU-CHEK SOWMYA PLUS METER) misc Test blood sugar twice daily Diagnosis E 11.9  Blood Glucose Control High&Low (ACCU-CHEK SOWMYA CONTROL SOLN) soln Test blood sugar twice daily Diagnosis E 11.9  
 insulin regular (NOVOLIN R REGULAR U-100 INSULN) 100 unit/mL injection INJECT 7 UNITS UNDER THE SKIN DAILY (WITH DINNER).  Insulin Needles, Disposable, (BD ULTRA-FINE SHORT PEN NEEDLE) 31 gauge x 5/16\" ndle USE TO INJECT UNDER THE SKIN THREE TIMES A DAY  
 B.infantis-B.ani-B.long-B.bifi (PROBIOTIC 4X) 10-15 mg TbEC Take  by mouth.  magnesium 250 mg tab Take 500 mg by mouth daily.  melatonin tab tablet Take 10 mg by mouth nightly as needed.  folic acid (FOLVITE) 1 mg tablet TAKE 1 TABLET DAILY  potassium 99 mg tablet Take 99 mg by mouth two (2) times a day.  calcium citrate-vitamin D3 (CITRACAL + D) tablet Take 2 Tabs by mouth daily.  aspirin 81 mg chewable tablet Take 1 Tab by mouth daily. (Patient taking differently: Take 81 mg by mouth every other day.) No current facility-administered medications for this visit. SYSTEM REVIEW NOT RELATED TO LIVER DISEASE OR REVIEWED ABOVE: 
Constitution systems: Negative for fever, chills, weight gain, weight loss. Eyes: Negative for visual changes. ENT: Negative for sore throat, painful swallowing. Respiratory: Negative for cough, hemoptysis, SOB. Cardiology: Negative for chest pain, palpitations. GI:  Negative for constipation or diarrhea. : Negative for urinary frequency, dysuria, hematuria, nocturia. Skin: Negative for rash. Hematology: Negative for easy bruising, blood clots. Musculo-skeletal: Negative for back pain, muscle pain, weakness. Neurologic: Negative for headaches, dizziness, vertigo, memory problems not related to HE. Psychology: Negative for anxiety, depression. FAMILY HISTORY: 
The father  of Multiple myeloma. The mother  of dementia. There is no family history of liver disease. SOCIAL HISTORY: 
The patient is . The patient has 3 children, and 6 grandchildren. The patient stopped using tobacco products in . The patient has been abstinent from alcohol since . The patient used to work for the [employer]. PHYSICAL EXAMINATION: 
Visit Vitals /52 (BP 1 Location: Left arm, BP Patient Position: Sitting) Pulse 76 Temp 97.9 °F (36.6 °C) (Tympanic) Ht 5' 10\" (1.778 m) Wt 266 lb (120.7 kg) SpO2 95% BMI 38.17 kg/m² General: No acute distress. Eyes: Sclera anicteric. ENT: No oral lesions. Thyroid normal. 
Nodes: No adenopathy. Skin: No spider angiomata. No jaundice. No palmar erythema. Respiratory: Lungs clear to auscultation. Cardiovascular: Regular heart rate. No murmurs. No JVD. Abdomen: Soft non-tender. Liver size normal to percussion/palpation. Spleen not palpable. No obvious ascites. Extremities: No edema. No muscle wasting. No gross arthritic changes. Neurologic: Alert and oriented. Cranial nerves grossly intact. No asterixis. LABORATORY STUDIES: 
[place] [address] [address] & Units 1/30/2020 11/7/2019 WBC 3.4 - 10.8 x10E3/uL 4.6 ANC 1.4 - 7.0 x10E3/uL 3.1 HGB 13.0 - 17.7 g/dL 8.6 (L)  - 450 x10E3/uL 134 (L) INR 0.8 - 1.2 1.1 AST 0 - 40 IU/L 31 37 ALT 0 - 44 IU/L 21 26 Alk Phos 39 - 117 IU/L 95 113 Bili, Total 0.0 - 1.2 mg/dL 0.3 <0.2 Bili, Direct 0.00 - 0.40 mg/dL 0.15 Albumin 3.7 - 4.7 g/dL 3.3 (L) 3.3 (L) BUN 8 - 27 mg/dL 14 18 Creat 0.76 - 1.27 mg/dL 0.88 0.83 Na 134 - 144 mmol/L 139 141  
K 3.5 - 5.2 mmol/L 4.1 4.7 Cl 96 - 106 mmol/L 104 105 CO2 20 - 29 mmol/L 21 18 (L) Glucose 65 - 99 mg/dL 175 (H) 170 (H) Magnesium 1.6 - 2.4 mg/dL [place] [address] [address] Units 7/24/2019 WBC 3.4 - 10.8 x10E3/uL 5.0 ANC 1.4 - 7.0 x10E3/uL HGB 13.0 - 17.7 g/dL 9.6 (L)  - 450 x10E3/uL 184 INR 0.8 - 1.2 1.1 AST 0 - 40 IU/L 17 ALT 0 - 44 IU/L 10 Alk Phos 39 - 117 IU/L 78 Bili, Total 0.0 - 1.2 mg/dL 0.3 Bili, Direct 0.00 - 0.40 mg/dL Albumin 3.7 - 4.7 g/dL 3.5 BUN 8 - 27 mg/dL 16  
Creat 0.76 - 1.27 mg/dL 1.01 Na 134 - 144 mmol/L 140  
K 3.5 - 5.2 mmol/L 4.5 Cl 96 - 106 mmol/L 103 CO2 20 - 29 mmol/L 24 Glucose 65 - 99 mg/dL 146 (H) Magnesium 1.6 - 2.4 mg/dL Cancer Screening Latest Ref Rng & Units 1/30/2020 7/24/2019 AFP Tumor Marker 0.0 - 8.3 ng/mL  1.9  
AFP, Serum 0.0 - 8.0 ng/mL 2.1 AFP-L3% 0.0 - 9.9 % Comment Additional lab values drawn at today's office visit are pending at the time of documentation.  
 
SEROLOGIES: 
 Serologies Latest Ref Rng & Units 1/30/2020 12/12/2018 Hep A Ab, Total Negative Negative Hep B Surface Ag Negative Negative Hep B Core Ab, Total Negative Negative Hep B Surface AB QL  Non Reactive Hep C Ab 0.0 - 0.9 s/co ratio <0.1 Ferritin 30 - 400 ng/mL 35 80 Iron % Saturation 15 - 55 % 10 (L) 12 (L) Alpha-1 antitrypsin level 101 - 187 mg/dL 183 LIVER HISTOLOGY: 
3/2020. FibroScan performed at [address] EkPa was 19. Suggested fibrosis level is F4. CAP score was 350, this is consistent with steatosis. ENDOSCOPIC PROCEDURES: 
6/2019. EGD by Kevin Marking. Large esophageal varices. No banding performed. 2019. Colonoscopy by Kevin Marking. Normal 
 
RADIOLOGY: 
7/2016. Triple phase CT scan. Changes consistent with cirrhosis. No liver mass lesions. No dilated bile ducts. Splenomegaly. No ascites. 7/2019. Ultrasound of liver. Echogenic consistent with cirrhosis. No liver mass lesions. No dilated bile ducts. No ascites. OTHER TESTING: 
Not available or performed FOLLOW-UP: 
All of the issues listed above in the Assessment and Plan were discussed with the patient. All questions were answered. The patient expressed a clear understanding of the above. Over 60 minutes was spent with the patient reviewing the medical history, performing the examination, explaining the natural history of cirrhosis, the complications of cirrhosis, treatment options and various issues related to liver disease, progression/cirrhosis. [address] [place] [address] in 3 months for monitoring. EGD and US in the meantime. Gregory Young PA-C [facility] [place] [address], [address] Anya Pacheco  22. 
[phone] [address] [place] [address]

## 2020-03-02 NOTE — PROGRESS NOTES
Damaris Barroso is a 68 y.o. male Chief Complaint Patient presents with  Follow-up  
  fibro scan Visit Vitals /52 (BP 1 Location: Left arm, BP Patient Position: Sitting) Pulse 76 Temp 97.9 °F (36.6 °C) (Tympanic) Ht 5' 10\" (1.778 m) Wt 266 lb (120.7 kg) SpO2 95% BMI 38.17 kg/m² /52  patient reports no current symptoms Provider notified via exam room white board. 3 most recent PHQ Screens 7/2/2019 PHQ Not Done - Little interest or pleasure in doing things Not at all Feeling down, depressed, irritable, or hopeless Not at all Total Score PHQ 2 0 Learning Assessment 7/2/2019 PRIMARY LEARNER Patient HIGHEST LEVEL OF EDUCATION - PRIMARY LEARNER  SOME COLLEGE  
BARRIERS PRIMARY LEARNER -  
CO-LEARNER CAREGIVER -  
PRIMARY LANGUAGE ENGLISH  
LEARNER PREFERENCE PRIMARY DEMONSTRATION  
ANSWERED BY patient RELATIONSHIP SELF Abuse Screening Questionnaire 7/2/2019 Do you ever feel afraid of your partner? Lesly Franco Are you in a relationship with someone who physically or mentally threatens you? Lesly Franco Is it safe for you to go home? Y  
 
 
 
1. Have you been to the ER, urgent care clinic since your last visit? Hospitalized since your last visit? No 
 
2. Have you seen or consulted any other health care providers outside of the [address] [place] [address] since your last visit? Include any pap smears or colon screening.  No

## 2020-03-03 LAB
ALBUMIN SERPL-MCNC: 3.2 G/DL (ref 3.7–4.7)
ALBUMIN/GLOB SERPL: 1 {RATIO} (ref 1.2–2.2)
ALP SERPL-CCNC: 82 IU/L (ref 39–117)
ALT SERPL-CCNC: 27 IU/L (ref 0–44)
AST SERPL-CCNC: 42 IU/L (ref 0–40)
BILIRUB SERPL-MCNC: 0.5 MG/DL (ref 0–1.2)
BUN SERPL-MCNC: 10 MG/DL (ref 8–27)
BUN/CREAT SERPL: 11 (ref 10–24)
CALCIUM SERPL-MCNC: 8.2 MG/DL (ref 8.6–10.2)
CHLORIDE SERPL-SCNC: 107 MMOL/L (ref 96–106)
CO2 SERPL-SCNC: 21 MMOL/L (ref 20–29)
CREAT SERPL-MCNC: 0.93 MG/DL (ref 0.76–1.27)
GLOBULIN SER CALC-MCNC: 3.3 G/DL (ref 1.5–4.5)
GLUCOSE SERPL-MCNC: 77 MG/DL (ref 65–99)
IRON SATN MFR SERPL: 12 % (ref 15–55)
IRON SERPL-MCNC: 38 UG/DL (ref 38–169)
POTASSIUM SERPL-SCNC: 3.9 MMOL/L (ref 3.5–5.2)
PROT SERPL-MCNC: 6.5 G/DL (ref 6–8.5)
SODIUM SERPL-SCNC: 141 MMOL/L (ref 134–144)
TIBC SERPL-MCNC: 324 UG/DL (ref 250–450)
UIBC SERPL-MCNC: 286 UG/DL (ref 111–343)

## 2020-03-03 NOTE — PROGRESS NOTES
Pt notified of stable liver functions, he remains low HGb and iron levels. Will plan for IV iron infusion, order sent.

## 2020-03-04 LAB
C DIFF TOX GENS STL QL NAA+PROBE: NEGATIVE
CRYPTOSP AG STL QL IA: NEGATIVE
G LAMBLIA AG STL QL IA: NEGATIVE
WBC STL QL MICRO: NORMAL

## 2020-03-05 ENCOUNTER — HOSPITAL ENCOUNTER (OUTPATIENT)
Dept: ULTRASOUND IMAGING | Age: 70
Discharge: HOME | End: 2020-03-05
Attending: PHYSICIAN ASSISTANT
Payer: MEDICARE

## 2020-03-05 ENCOUNTER — OFFICE VISIT (OUTPATIENT)
Dept: FAMILY MEDICINE CLINIC | Age: 70
End: 2020-03-05

## 2020-03-05 VITALS
HEART RATE: 72 BPM | DIASTOLIC BLOOD PRESSURE: 72 MMHG | TEMPERATURE: 97.9 F | RESPIRATION RATE: 16 BRPM | HEIGHT: 70 IN | BODY MASS INDEX: 38.94 KG/M2 | OXYGEN SATURATION: 99 % | WEIGHT: 272 LBS | SYSTOLIC BLOOD PRESSURE: 128 MMHG

## 2020-03-05 DIAGNOSIS — E66.01 SEVERE OBESITY (HCC): ICD-10-CM

## 2020-03-05 DIAGNOSIS — I85.10 ESOPHAGEAL VARICES IN CIRRHOSIS (HCC): ICD-10-CM

## 2020-03-05 DIAGNOSIS — K74.60 ESOPHAGEAL VARICES IN CIRRHOSIS (HCC): ICD-10-CM

## 2020-03-05 DIAGNOSIS — M81.0 OSTEOPOROSIS, UNSPECIFIED OSTEOPOROSIS TYPE, UNSPECIFIED PATHOLOGICAL FRACTURE PRESENCE: ICD-10-CM

## 2020-03-05 DIAGNOSIS — R18.8 CIRRHOSIS OF LIVER WITH ASCITES, UNSPECIFIED HEPATIC CIRRHOSIS TYPE (HCC): Primary | ICD-10-CM

## 2020-03-05 DIAGNOSIS — S72.92XS CLOSED FRACTURE OF LEFT FEMUR, UNSPECIFIED FRACTURE MORPHOLOGY, UNSPECIFIED PORTION OF FEMUR, SEQUELA: ICD-10-CM

## 2020-03-05 DIAGNOSIS — D50.9 IRON DEFICIENCY ANEMIA, UNSPECIFIED IRON DEFICIENCY ANEMIA TYPE: ICD-10-CM

## 2020-03-05 DIAGNOSIS — K74.60 CIRRHOSIS OF LIVER WITH ASCITES, UNSPECIFIED HEPATIC CIRRHOSIS TYPE (HCC): Primary | ICD-10-CM

## 2020-03-05 DIAGNOSIS — K74.60 CIRRHOSIS OF LIVER WITHOUT ASCITES, UNSPECIFIED HEPATIC CIRRHOSIS TYPE (HCC): ICD-10-CM

## 2020-03-05 DIAGNOSIS — E11.65 UNCONTROLLED TYPE 2 DIABETES MELLITUS WITH HYPERGLYCEMIA (HCC): ICD-10-CM

## 2020-03-05 LAB — HBA1C MFR BLD HPLC: 6.5 %

## 2020-03-05 PROCEDURE — 76700 US EXAM ABDOM COMPLETE: CPT

## 2020-03-05 NOTE — PROGRESS NOTES
Subjective: Chief Complaint Patient presents with  Follow-up 4 week/3 month He  is a 68y.o. year old male who presents for evaluation of: 
Since last appointment, saw AMAN Bingham for f/u on his cirrhosis presumably from BREWER/NAFLD and found to fe def anemia so set up for IV iron (Hgb 9.7 up from 8.6, Fe% sat slightly low, Fe and TIBC were normal). Had Fibroscan confirming Fatty Liver and Cirrhosis. Weight down 7 lbs on our scale over 1 month. DM - glu readings improving and staying < 200 generally. Last saw Dr. Edil Garrison on 6/2019. On EGD on 6/18/2019, patient was noted to have portal hypertensive gastropathy and large 3+ esophageal varices without bleeding. Patient was recommended to follow-up in 2 to 3 weeks but was unaware of this/did not follow-up. CT abd/pelv in 2016: 
\"3. Nodular liver contour consistent with cirrhosis. 4. Marked splenomegaly and occlusion of the splenic vein with gastric, omental and anterior abdominal wall varices. \" To see Dr. Neal Schmidt for EGD on 3/9/2020. Pt hoping to have varices banded. US 3/5/2020 \"IMPRESSION:  Cirrhosis with coarsened echotexture and possible left portal vein 
thrombosis. Additional incidental findings as above. \" 
 
ROS Gen - no fever/chills Resp - no dyspnea or cough CV - no chest pain or WANG Rest per HPI Objective:  
 
Vitals:  
 03/05/20 1331 BP: 128/72 Pulse: 72 Resp: 16 Temp: 97.9 °F (36.6 °C) TempSrc: Oral  
SpO2: 99% Weight: 272 lb (123.4 kg) Height: 5' 10\" (1.778 m) Physical Examination:  
Gen - NAD Eyes - sclera anicteric Resp - CTAB, no w/r/r 
CV - rrr, no m/r/g Abdomen soft, nontender, mildly distended Extremities trace edema with L> R Allergies Allergen Reactions  Nsaids (Non-Steroidal Anti-Inflammatory Drug) Other (comments) Hx of PUD and Hinson's Esophagus Social History Socioeconomic History  Marital status:  Spouse name: Not on file  Number of children: Not on file  Years of education: Not on file  Highest education level: Not on file Tobacco Use  Smoking status: Former Smoker Packs/day: 2.00 Years: 15.00 Pack years: 30.00 Last attempt to quit: 3/1/1979 Years since quittin.0  Smokeless tobacco: Never Used Substance and Sexual Activity  Alcohol use: No  
  Alcohol/week: 0.0 standard drinks  Drug use: No  
 Sexual activity: Never Family History Problem Relation Age of Onset  Cancer Father   
     multiple myeloma  Stroke Father  Dementia Mother  No Known Problems Brother  COPD Brother  Cancer Maternal Grandmother COLON  Anesth Problems Neg Hx Past Surgical History:  
Procedure Laterality Date  COLONOSCOPY N/A 2019 COLONOSCOPY AND EGD performed by John Kebede MD at [facility] ENDOSCOPY  COLONOSCOPY,DIAGNOSTIC  2019  ENDOSCOPY, COLON, DIAGNOSTIC    
 HX CHOLECYSTECTOMY    HX GI  1980  
 gastroplasty [address]  HX HIP REPLACEMENT Left  HX ORTHOPAEDIC Right   
 rot cuff repair  HX ORTHOPAEDIC  2016  
 left broken femur [address]  HX VASCULAR ACCESS    
 picc line and removed after sepsis resolved [facility] ARTHROPLASTY  2010  
 right  TOTAL HIP ARTHROPLASTY  2016  
 left  UPPER GI ENDOSCOPY,BIOPSY  2019 Past Medical History:  
Diagnosis Date  ADD (attention deficit disorder)  Anemia due to blood loss  Hinson's esophagus with esophagitis  Benign essential tremor  Cancer [facility]  [facility]  Cirrhosis ([address]  Depression  DJD (degenerative joint disease) of hip   
 bilat  DM (diabetes mellitus) ([address] 3/17/2010  ED (erectile dysfunction) of organic origin  Esophageal varices determined by endoscopy ([address]  Fall on or from sidewalk curb 2015  Femur fracture ([address]  Gastritis and duodenitis  GERD (gastroesophageal reflux disease)   
 resolved after discontinuing diclofenac  Hematuria 6/2016  High cholesterol 03/17/2010  
 pt denies 6/19  
 HTN (hypertension) 3/17/2010  Morbid obesity ([address]  Murmur, cardiac 2016  
 PFO (patent foramen ovale) 7/26/2017  PUD (peptic ulcer disease)  Shingles 6/2016 RESOLVING- NO RASH (HEAD)  Sleep apnea   
 doesnt use CPAP anymore Current Outpatient Medications Medication Sig Dispense Refill  cephALEXin (KEFLEX) 500 mg capsule Take 1 Cap by mouth two (2) times a day. 30 Cap 6  sertraline (ZOLOFT) 100 mg tablet TAKE 1.5 TABLETS DAILY 45 Tab 5  
 butalbital-acetaminophen-caffeine (FIORICET, ESGIC) -40 mg per tablet Take 1 Tab by mouth every six (6) hours as needed for Headache. 30 Tab 1  
 metFORMIN ER (GLUCOPHAGE XR) 500 mg tablet 2 tablets twice daily 360 Tab 3  
 insulin glargine (LANTUS SOLOSTAR U-100 INSULIN) 100 unit/mL (3 mL) inpn 40 units twice daily 25 Adjustable Dose Pre-filled Pen Syringe 3  
 spironolactone (ALDACTONE) 25 mg tablet Take 1 Tab by mouth daily. 30 Tab 1  
 ferrous sulfate 140 mg (45 mg iron) TbER ER tablet Take 1 Tab by mouth Daily (before breakfast). 90 Tab 1  
 furosemide (LASIX) 40 mg tablet Take 2 tabs by mouth daily x 1 week and then increase to 1 tab daily 45 Tab 0  
 lisinopril (PRINIVIL, ZESTRIL) 5 mg tablet TAKE 2 TABLETS BY MOUTH EVERY DAY 60 Tab 0  
 ergocalciferol (VITAMIN D2) 50,000 unit capsule TAKE 1 CAPSULE EVERY 7 DAYS 12 Cap 2  
 atorvastatin (LIPITOR) 40 mg tablet Take 1 Tab by mouth daily. 90 Tab 3  
 glucose blood VI test strips (ASCENSIA AUTODISC VI, ONE TOUCH ULTRA TEST VI) strip Test blood sugar twice daily Diagnosis E 11.9.  Can use [employer] 200 Strip 4  
 glucose blood VI test strips (ACCU-CHEK SOWMYA PLUS TEST STRP) strip Test blood sugar twice daily Diagnosis E 11.9 200 Strip 5  
 diclofenac (VOLTAREN) 1 % gel Apply  to affected area four (4) times daily. 100 g 2  primidone (MYSOLINE) 250 mg tablet TAKE 1 TABLET TWICE A  Tab 3  
 acetaminophen (TYLENOL) 500 mg tablet Take 2 Tabs by mouth every six (6) hours as needed for Pain. 30 Tab 0  
 omeprazole (PRILOSEC) 20 mg capsule TAKE 1 CAPSULE BY MOUTH EVERY DAY  12  
 gabapentin (NEURONTIN) 300 mg capsule Take 2 Caps by mouth nightly. 180 Cap 1  Blood-Glucose Meter (ACCU-CHEK SOWMYA PLUS METER) misc Test blood sugar twice daily Diagnosis E 11.9 1 Each 1  Blood Glucose Control High&Low (ACCU-CHEK SOWMYA CONTROL SOLN) soln Test blood sugar twice daily Diagnosis E 11.9 1 Bottle 3  
 insulin regular (NOVOLIN R REGULAR U-100 INSULN) 100 unit/mL injection INJECT 7 UNITS UNDER THE SKIN DAILY (WITH DINNER). 10 mL 0  
 Insulin Needles, Disposable, (BD ULTRA-FINE SHORT PEN NEEDLE) 31 gauge x 5/16\" ndle USE TO INJECT UNDER THE SKIN THREE TIMES A  Pen Needle 1  
 B.infantis-B.ani-B.long-B.bifi (PROBIOTIC 4X) 10-15 mg TbEC Take  by mouth.  magnesium 250 mg tab Take 500 mg by mouth daily.  melatonin tab tablet Take 10 mg by mouth nightly as needed.  folic acid (FOLVITE) 1 mg tablet TAKE 1 TABLET DAILY 90 Tab 3  potassium 99 mg tablet Take 99 mg by mouth two (2) times a day.  calcium citrate-vitamin D3 (CITRACAL + D) tablet Take 2 Tabs by mouth daily.  aspirin 81 mg chewable tablet Take 1 Tab by mouth daily. (Patient taking differently: Take 81 mg by mouth every other day.) 30 Tab 11 Assessment/ Plan:  
Diagnoses and all orders for this visit: 1. Cirrhosis of liver with ascites, unspecified hepatic cirrhosis type ([facility] - working with hepatology and GI 
-     DEXA BONE DENSITY STUDY AXIAL; Future 2. Esophageal varices in cirrhosis [facility]) - for EGD on 3/9/20 with Dr. Bethel Callejas 
 
3. Uncontrolled type 2 diabetes mellitus with hyperglycemia (HCC) - rechecking A1C, seems to be improving based on pt report -     AMB POC HEMOGLOBIN A1C 
 
 4. Iron deficiency anemia, unspecified iron deficiency anemia type - improving with restarting PO iron but Hgb still low and Fe% sat low. 5. Closed fracture of left femur, unspecified fracture morphology, unspecified portion of femur, sequela 6. Osteoporosis, unspecified osteoporosis type, unspecified pathological fracture presence - Given cirrhosis, setting up for DEXA. Suspect osteopenia or osteoporosis with prev femur fracture -     DEXA BONE DENSITY STUDY AXIAL; Future 7. Severe obesity (HCC) - weight improving Pt also mentioned some mild congestion as we were wrapping up. Encouraged flonase and can f/u with me earlier if this is not improving. Also has Advanced Directive so copied and being scanned into ACP info. I have discussed the diagnosis with the patient and the intended plan as seen in the above orders. The patient has received an after-visit summary and questions were answered concerning future plans. I have discussed medication side effects and warnings with the patient as well. The patient verbalizes understanding and agreement with the plan. Follow-up and Dispositions · Return in about 3 months (around 6/5/2020).

## 2020-03-05 NOTE — PROGRESS NOTES
Chief Complaint Patient presents with  Follow-up 4 week/3 month 1. Have you been to the ER, urgent care clinic since your last visit? Hospitalized since your last visit? No 
 
2. Have you seen or consulted any other health care providers outside of the [address] [place] [address] since your last visit? Include any pap smears or colon screening.  No

## 2020-03-06 RX ORDER — BUTALBITAL, ACETAMINOPHEN AND CAFFEINE 50; 325; 40 MG/1; MG/1; MG/1
1 TABLET ORAL
Qty: 30 TAB | Refills: 1 | Status: ON HOLD | OUTPATIENT
Start: 2020-03-06 | End: 2021-01-01

## 2020-03-08 DIAGNOSIS — G25.81 RLS (RESTLESS LEGS SYNDROME): ICD-10-CM

## 2020-03-09 LAB
C DIFF TOX GENS STL QL NAA+PROBE: NORMAL
CRYPTOSP AG STL QL IA: NEGATIVE
G LAMBLIA AG STL QL IA: NEGATIVE
WBC STL QL MICRO: NORMAL

## 2020-03-11 ENCOUNTER — HOSPITAL ENCOUNTER (OUTPATIENT)
Age: 70
Setting detail: OUTPATIENT SURGERY
Discharge: HOME | End: 2020-03-11
Attending: INTERNAL MEDICINE | Admitting: INTERNAL MEDICINE
Payer: MEDICARE

## 2020-03-11 ENCOUNTER — ANESTHESIA EVENT (OUTPATIENT)
Dept: ENDOSCOPY | Age: 70
End: 2020-03-11
Payer: MEDICARE

## 2020-03-11 ENCOUNTER — ANESTHESIA (OUTPATIENT)
Dept: ENDOSCOPY | Age: 70
End: 2020-03-11
Payer: MEDICARE

## 2020-03-11 VITALS
HEIGHT: 70 IN | SYSTOLIC BLOOD PRESSURE: 150 MMHG | OXYGEN SATURATION: 97 % | WEIGHT: 267.44 LBS | RESPIRATION RATE: 17 BRPM | TEMPERATURE: 97.8 F | HEART RATE: 76 BPM | BODY MASS INDEX: 38.29 KG/M2 | DIASTOLIC BLOOD PRESSURE: 69 MMHG

## 2020-03-11 LAB
GLUCOSE BLD STRIP.AUTO-MCNC: 152 MG/DL (ref 65–100)
SERVICE CMNT-IMP: ABNORMAL

## 2020-03-11 PROCEDURE — 74011000250 HC RX REV CODE- 250: Performed by: NURSE ANESTHETIST, CERTIFIED REGISTERED

## 2020-03-11 PROCEDURE — 74011250636 HC RX REV CODE- 250/636: Performed by: INTERNAL MEDICINE

## 2020-03-11 PROCEDURE — 77030039825 HC MSK NSL PAP SUPERNO2VA VYRM -B: Performed by: NURSE ANESTHETIST, CERTIFIED REGISTERED

## 2020-03-11 PROCEDURE — 82962 GLUCOSE BLOOD TEST: CPT

## 2020-03-11 PROCEDURE — 76040000019: Performed by: INTERNAL MEDICINE

## 2020-03-11 PROCEDURE — 74011250637 HC RX REV CODE- 250/637: Performed by: INTERNAL MEDICINE

## 2020-03-11 PROCEDURE — 76060000031 HC ANESTHESIA FIRST 0.5 HR: Performed by: INTERNAL MEDICINE

## 2020-03-11 PROCEDURE — 74011250636 HC RX REV CODE- 250/636: Performed by: NURSE ANESTHETIST, CERTIFIED REGISTERED

## 2020-03-11 PROCEDURE — 77030014243 HC BND LIG VRCES BSC -D: Performed by: INTERNAL MEDICINE

## 2020-03-11 RX ORDER — DEXTROMETHORPHAN/PSEUDOEPHED 2.5-7.5/.8
1.2 DROPS ORAL
Status: DISCONTINUED | OUTPATIENT
Start: 2020-03-11 | End: 2020-03-11 | Stop reason: HOSPADM

## 2020-03-11 RX ORDER — LIDOCAINE HYDROCHLORIDE 20 MG/ML
INJECTION, SOLUTION EPIDURAL; INFILTRATION; INTRACAUDAL; PERINEURAL AS NEEDED
Status: DISCONTINUED | OUTPATIENT
Start: 2020-03-11 | End: 2020-03-11 | Stop reason: HOSPADM

## 2020-03-11 RX ORDER — SODIUM CHLORIDE 9 MG/ML
INJECTION, SOLUTION INTRAVENOUS
Status: DISCONTINUED | OUTPATIENT
Start: 2020-03-11 | End: 2020-03-11 | Stop reason: HOSPADM

## 2020-03-11 RX ORDER — SODIUM CHLORIDE 9 MG/ML
75 INJECTION, SOLUTION INTRAVENOUS CONTINUOUS
Status: DISCONTINUED | OUTPATIENT
Start: 2020-03-11 | End: 2020-03-11 | Stop reason: HOSPADM

## 2020-03-11 RX ORDER — EPINEPHRINE 0.1 MG/ML
1 INJECTION INTRACARDIAC; INTRAVENOUS
Status: DISCONTINUED | OUTPATIENT
Start: 2020-03-11 | End: 2020-03-11 | Stop reason: HOSPADM

## 2020-03-11 RX ORDER — MIDAZOLAM HYDROCHLORIDE 1 MG/ML
.25-5 INJECTION, SOLUTION INTRAMUSCULAR; INTRAVENOUS
Status: DISCONTINUED | OUTPATIENT
Start: 2020-03-11 | End: 2020-03-11 | Stop reason: HOSPADM

## 2020-03-11 RX ORDER — SODIUM CHLORIDE 0.9 % (FLUSH) 0.9 %
5-40 SYRINGE (ML) INJECTION AS NEEDED
Status: DISCONTINUED | OUTPATIENT
Start: 2020-03-11 | End: 2020-03-11 | Stop reason: HOSPADM

## 2020-03-11 RX ORDER — NALOXONE HYDROCHLORIDE 0.4 MG/ML
0.4 INJECTION, SOLUTION INTRAMUSCULAR; INTRAVENOUS; SUBCUTANEOUS
Status: DISCONTINUED | OUTPATIENT
Start: 2020-03-11 | End: 2020-03-11 | Stop reason: HOSPADM

## 2020-03-11 RX ORDER — PROPOFOL 10 MG/ML
INJECTION, EMULSION INTRAVENOUS AS NEEDED
Status: DISCONTINUED | OUTPATIENT
Start: 2020-03-11 | End: 2020-03-11 | Stop reason: HOSPADM

## 2020-03-11 RX ORDER — FLUMAZENIL 0.1 MG/ML
0.2 INJECTION INTRAVENOUS
Status: DISCONTINUED | OUTPATIENT
Start: 2020-03-11 | End: 2020-03-11 | Stop reason: HOSPADM

## 2020-03-11 RX ORDER — ATROPINE SULFATE 0.1 MG/ML
0.5 INJECTION INTRAVENOUS
Status: DISCONTINUED | OUTPATIENT
Start: 2020-03-11 | End: 2020-03-11 | Stop reason: HOSPADM

## 2020-03-11 RX ORDER — SODIUM CHLORIDE 0.9 % (FLUSH) 0.9 %
5-40 SYRINGE (ML) INJECTION EVERY 8 HOURS
Status: DISCONTINUED | OUTPATIENT
Start: 2020-03-11 | End: 2020-03-11 | Stop reason: HOSPADM

## 2020-03-11 RX ADMIN — SODIUM CHLORIDE 75 ML/HR: 900 INJECTION, SOLUTION INTRAVENOUS at 11:54

## 2020-03-11 RX ADMIN — PROPOFOL 50 MG: 10 INJECTION, EMULSION INTRAVENOUS at 12:20

## 2020-03-11 RX ADMIN — PROPOFOL 50 MG: 10 INJECTION, EMULSION INTRAVENOUS at 12:26

## 2020-03-11 RX ADMIN — SODIUM CHLORIDE: 900 INJECTION, SOLUTION INTRAVENOUS at 12:14

## 2020-03-11 RX ADMIN — PROPOFOL 100 MG: 10 INJECTION, EMULSION INTRAVENOUS at 12:14

## 2020-03-11 RX ADMIN — Medication 80 MG: at 12:21

## 2020-03-11 RX ADMIN — LIDOCAINE HYDROCHLORIDE 60 MG: 20 INJECTION, SOLUTION EPIDURAL; INFILTRATION; INTRACAUDAL; PERINEURAL at 12:14

## 2020-03-11 NOTE — PROCEDURES
Reading Office: [phone] Esophagogastroduodenoscopy Procedure Note Lyle Boles 1950 
[id number] Indication: Hx of non alcoholic cirrhosis with esophageal varices : Aracely Lao MD 
 
Referring Provider:  Rosie Jovel MD 
 
Sedation:  MAC anesthesia Propofol Procedure Details: After detailed informed consent was obtained for the procedure, with all risks and benefits of procedure explained the patient was taken to the endoscopy suite and placed in the left lateral decubitus position. Following sequential administration of sedation as per above, the endoscope was inserted into the mouth and advanced under direct vision to second portion of the duodenum. A careful inspection was made as the gastroscope was withdrawn, including a retroflexed view of the proximal stomach; findings and interventions are described below. Findings:  
 
Esophagus: The esophageal mucosa in the proximal  esophagus is normal.  
Starting in mid to distal esophagus 3 columns of grade III esophageal varices are noted. One varix had a non specific ulcer/erosion on it. The squamo-columnar junction is at 42 cm where the Z-line was noted. Using the [employer] 7 BS Banding shooter 6 bands were deployed with variceal decompression starting above the GE junction without complications Stomach: The gastric mucosa has mosaic patterned erythema in the body/fundus c/w portal hypertensive gastropathy. The fundus was found to be normal with no lesions noted on retroflexion. No gastric varices noted. The angularis is normal as well. Duodenum:  
The bulb and post bulbar mucosa is normal in appearance. The duodenal folds are normal.  
 
Therapies:  esophageal banding done with 6 bands deployed Specimen: none Complications:   None were encountered during the procedure. EBL:  None. Recommendations:  
 
-Continue acid suppression. ,  
 -Repeat EGD with banding in 3-4 weeks' time. -Report chest pain or hematemeses/melena. 
-Beta blockers suggested. Thank you for entrusting me with this patient's care. Please do not hesitate to contact me with any questions or if I can be of assistance with any of your other patients' GI needs. Richard Shankar MD 
3/11/2020  12:30 PM

## 2020-03-11 NOTE — PERIOP NOTES
Medications Medication Rate/Dose/Volume Action Route Date Time   Administering User Audit  
 propofol 10 mg/mL (mg) 100 mg Given IntraVENous 03/11/20  1214  Estrada Luong CRNA 50 mg Given IntraVENous  1220  Isma Gordon CRNA 50 mg Given IntraVENous  1226  Judie Luong CRNA   
 lidocaine (PF) 2% (mg) 60 mg Given IntraVENous 03/11/20  1214  Estrada Luong CRNA   
 NS (mL)  New Bag IntraVENous 03/11/20  1214  Isma Gordon CRNA   
  300 mL Anesthesia Volume Adjustment IntraVENous  1230  Isma Gordon CRNA Endoscope was pre-cleaned at bedside immediately following procedure by CAMILO Espinoza

## 2020-03-11 NOTE — ANESTHESIA PREPROCEDURE EVALUATION
Anesthetic History No history of anesthetic complications Review of Systems / Medical History Patient summary reviewed, nursing notes reviewed and pertinent labs reviewed Pulmonary Sleep apnea: No treatment Neuro/Psych Psychiatric history Comments: Depression Benign essential tremor  Cardiovascular Hypertension Hyperlipidemia Exercise tolerance: >4 METS 
  
GI/Hepatic/Renal 
  
GERD: well controlled PUD Comments: Hinson's esophagus with esophagitis  Endo/Other Diabetes Obesity and arthritis Other Findings Comments: DJD Hx BCCA 
ADD (attention deficit disorder) Physical Exam 
 
Airway Mallampati: II 
TM Distance: > 6 cm Neck ROM: normal range of motion Mouth opening: Normal 
 
 Cardiovascular Regular rate and rhythm,  S1 and S2 normal,  no murmur, click, rub, or gallop Dental 
No notable dental hx Pulmonary Breath sounds clear to auscultation Abdominal 
GI exam deferred Other Findings Anesthetic Plan ASA: 3 Anesthesia type: total IV anesthesia and general 
 
 
 
 
Induction: Intravenous Anesthetic plan and risks discussed with: Patient Propofol MAC/Supernova

## 2020-03-11 NOTE — H&P
Pre-endoscopy H and P The patient was seen and examined in the room/pre-op holding area. The airway was assessed and documented. The problem list, past medical history, and medications were reviewed. Patient Active Problem List  
Diagnosis Code  
 HTN (hypertension) I10  
 Hypercholesterolemia E78.00  
 Osteoarthritis of hip M16.9  Depression F32.9  ED (erectile dysfunction) of organic origin N52.9  GERD (gastroesophageal reflux disease) K21.9  PUD (peptic ulcer disease) K27.9  Hinson's esophagus with esophagitis K22.70, K20.9  Encounter for long-term (current) use of medications Z79.899  Hypogonadism male E29.1  Diabetes mellitus type 2, uncontrolled ([address] E11.65  Benign essential tremor G25.0  Osteoarthritis of right hip M16.11  
 Jessica-prosthetic fracture of femur following total hip arthroplasty M97. 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  
 Insomnia G47.00  Dislocation of prosthetic joint ([address] T84.029A  Severe obesity (HCC) E66.01 Social History Socioeconomic History  Marital status:  Spouse name: Not on file  Number of children: Not on file  Years of education: Not on file  Highest education level: Not on file Occupational History  Not on file Social Needs  Financial resource strain: Not on file  Food insecurity Worry: Not on file Inability: Not on file  Transportation needs Medical: Not on file Non-medical: Not on file Tobacco Use  Smoking status: Former Smoker Packs/day: 2.00 Years: 15.00 Pack years: 30.00 Last attempt to quit: 3/1/1979 Years since quittin.0  Smokeless tobacco: Never Used Substance and Sexual Activity  Alcohol use: No  
  Alcohol/week: 0.0 standard drinks  Drug use:  No  
  Sexual activity: Never Lifestyle  Physical activity Days per week: Not on file Minutes per session: Not on file  Stress: Not on file Relationships  Social connections Talks on phone: Not on file Gets together: Not on file Attends [employer] service: Not on file Active member of club or organization: Not on file Attends meetings of clubs or organizations: Not on file Relationship status: Not on file  Intimate partner violence Fear of current or ex partner: Not on file Emotionally abused: Not on file Physically abused: Not on file Forced sexual activity: Not on file Other Topics Concern  Not on file Social History Narrative  Not on file Past Medical History:  
Diagnosis Date  ADD (attention deficit disorder)  Anemia due to blood loss  Hinson's esophagus with esophagitis  Benign essential tremor  Cancer [facility]) 2012 [address]  Cirrhosis ([facility] [address] [address]  Depression  DJD (degenerative joint disease) of hip   
 bilat  DM (diabetes mellitus) ([facility] [address] [address] 3/17/2010  ED (erectile dysfunction) of organic origin  Esophageal varices determined by endoscopy ([facility] [address] [address]  Fall on or from sidewalk curb 9/24/2015  Femur fracture ([facility] [address] [address]  Gastritis and duodenitis  GERD (gastroesophageal reflux disease)   
 resolved after discontinuing diclofenac  Hematuria 6/2016  High cholesterol 03/17/2010  
 pt denies 6/19  
 HTN (hypertension) 3/17/2010  Morbid obesity ([address]  Murmur, cardiac 2016  
 PFO (patent foramen ovale) 7/26/2017  PUD (peptic ulcer disease)  Shingles 6/2016 RESOLVING- NO RASH (HEAD)  Sleep apnea   
 doesnt use CPAP anymore Prior to Admission Medications Prescriptions Last Dose Informant Patient Reported? Taking? B.infantis-B.ani-B.long-B.bifi (PROBIOTIC 4X) 10-15 mg TbEC 3/10/2020 at Unknown time  Yes Yes Sig: Take  by mouth. Blood Glucose Control High&Low (ACCU-CHEK SOWMYA CONTROL SOLN) soln 3/11/2020 at Unknown time  No Yes Sig: Test blood sugar twice daily Diagnosis E 11.9 Blood-Glucose Meter (ACCU-CHEK SOWMYA PLUS METER) mis 3/11/2020 at Unknown time  No Yes Sig: Test blood sugar twice daily Diagnosis E 11.9 Insulin Needles, Disposable, (BD ULTRA-FINE SHORT PEN NEEDLE) 31 gauge x 5/16\" ndle 3/11/2020 at Unknown time  No Yes Sig: USE TO INJECT UNDER THE SKIN THREE TIMES A DAY  
acetaminophen (TYLENOL) 500 mg tablet 3/4/2020 at Unknown time  No Yes Sig: Take 2 Tabs by mouth every six (6) hours as needed for Pain. aspirin 81 mg chewable tablet 2/11/2020 at Unknown time  No Yes Sig: Take 1 Tab by mouth daily. Patient taking differently: Take 81 mg by mouth every other day. atorvastatin (LIPITOR) 40 mg tablet 3/10/2020 at Unknown time  No Yes Sig: Take 1 Tab by mouth daily. butalbital-acetaminophen-caffeine (FIORICET, ESGIC) -40 mg per tablet Unknown at Unknown time  No No  
Sig: Take 1 Tab by mouth every six (6) hours as needed for Headache.  
calcium citrate-vitamin D3 (CITRACAL + D) tablet Unknown at Unknown time  Yes No  
Sig: Take 2 Tabs by mouth two (2) times a day. cephALEXin (KEFLEX) 500 mg capsule 3/10/2020 at Unknown time  No Yes Sig: Take 1 Cap by mouth two (2) times a day. diclofenac (VOLTAREN) 1 % gel 3/4/2020 at Unknown time  No Yes Sig: Apply  to affected area four (4) times daily. ergocalciferol (VITAMIN D2) 50,000 unit capsule 3/5/2020  No Yes Sig: TAKE 1 CAPSULE EVERY 7 DAYS  
ferrous sulfate 140 mg (45 mg iron) TbER ER tablet 3/10/2020 at Unknown time  No Yes Sig: Take 1 Tab by mouth Daily (before breakfast). folic acid (FOLVITE) 1 mg tablet 3/10/2020 at Unknown time  No Yes Sig: TAKE 1 TABLET DAILY  
furosemide (LASIX) 40 mg tablet 3/6/2020  No Yes Sig: Take 2 tabs by mouth daily x 1 week and then increase to 1 tab daily gabapentin (NEURONTIN) 300 mg capsule 3/10/2020 at Unknown time  No Yes Sig: Take 2 Caps by mouth nightly. glucose blood VI test strips (ACCU-CHEK SOWMYA PLUS TEST STRP) strip 3/11/2020 at Unknown time  No Yes Sig: Test blood sugar twice daily Diagnosis E 11.9  
glucose blood VI test strips (ASCENSIA AUTODISC VI, ONE TOUCH ULTRA TEST VI) strip 3/11/2020 at Unknown time  No Yes Sig: Test blood sugar twice daily Diagnosis E 11.9. Can use [employer]  
insulin glargine (LANTUS SOLOSTAR U-100 INSULIN) 100 unit/mL (3 mL) inpn 3/11/2020 at Unknown time  No Yes Si units twice daily  
insulin regular (NOVOLIN R REGULAR U-100 INSULN) 100 unit/mL injection Unknown at Unknown time  No No  
Sig: INJECT 7 UNITS UNDER THE SKIN DAILY (WITH DINNER). Patient taking differently: INJECT 7 UNITS UNDER THE SKIN DAILY (WITH DINNER). only takes if BS is elevated. lisinopril (PRINIVIL, ZESTRIL) 5 mg tablet 3/10/2020 at Unknown time  No Yes Sig: TAKE 2 TABLETS BY MOUTH EVERY DAY  
magnesium 250 mg tab 3/10/2020 at Unknown time  Yes Yes Sig: Take 500 mg by mouth daily. melatonin tab tablet 3/4/2020 at Unknown time  Yes Yes Sig: Take 10 mg by mouth nightly as needed. metFORMIN ER (GLUCOPHAGE XR) 500 mg tablet 3/10/2020 at Unknown time  No Yes Si tablets twice daily  
omeprazole (PRILOSEC) 20 mg capsule 3/10/2020 at Unknown time  Yes Yes Sig: TAKE 1 CAPSULE BY MOUTH EVERY DAY  
potassium 99 mg tablet 3/10/2020 at Unknown time  Yes Yes Sig: Take 99 mg by mouth two (2) times a day. primidone (MYSOLINE) 250 mg tablet 3/10/2020 at Unknown time  No Yes Sig: TAKE 1 TABLET TWICE A DAY  
sertraline (ZOLOFT) 100 mg tablet 3/10/2020 at Unknown time  No Yes Sig: TAKE 1.5 TABLETS DAILY  
spironolactone (ALDACTONE) 25 mg tablet 3/10/2020 at Unknown time  No Yes Sig: Take 1 Tab by mouth daily. Facility-Administered Medications: None The review of systems is:  Negative  for shortness of breath or chest pain The heart, lungs, and mental status were satisfactory for the administration of deep sedation and for the procedure. I discussed with the patient the objectives, risks, consequences and alternatives to the procedure.    
 
Guillaume Weathers MD 
3/11/2020 
12:13 PM

## 2020-03-11 NOTE — ANESTHESIA POSTPROCEDURE EVALUATION
Procedure(s): ESOPHAGOGASTRODUODENOSCOPY (EGD) WITH BANDING 
ENDOSCOPIC BANDING OR LIGATION. total IV anesthesia, general 
 
Anesthesia Post Evaluation Patient location during evaluation: PACU Note status: Adequate. Level of consciousness: responsive to verbal stimuli and sleepy but conscious Pain management: satisfactory to patient Airway patency: patent Anesthetic complications: no 
Cardiovascular status: acceptable Respiratory status: acceptable Hydration status: acceptable Comments: +Post-Anesthesia Evaluation and Assessment Patient: Chet Ortiz MRN: [id number]  SSN: [id number] YOB: 1950  Age: 68 y.o. Sex: male Cardiovascular Function/Vital Signs /68   Pulse 76   Temp 36.6 °C (97.8 °F)   Resp 17   Ht 5' 10\" (1.778 m)   Wt 121.3 kg (267 lb 7 oz)   SpO2 97%   BMI 38.37 kg/m² Patient is status post Procedure(s) with comments: 
ESOPHAGOGASTRODUODENOSCOPY (EGD) WITH BANDING 
ENDOSCOPIC BANDING OR LIGATION - x6 bands. Nausea/Vomiting: Controlled. Postoperative hydration reviewed and adequate. Pain: 
Pain Scale 1: Numeric (0 - 10) (03/11/20 1245) Pain Intensity 1: 0 (03/11/20 1245) Managed. Neurological Status: At baseline. Mental Status and Level of Consciousness: Arousable. Pulmonary Status:  
O2 Device: Room air (03/11/20 1252) Adequate oxygenation and airway patent. Complications related to anesthesia: None Post-anesthesia assessment completed. No concerns. Signed By: Alex Marley MD  
 3/11/2020 Post anesthesia nausea and vomiting:  controlled Vitals Value Taken Time /69 3/11/2020  1:01 PM  
Temp 36.6 °C (97.8 °F) 3/11/2020 12:45 PM  
Pulse 75 3/11/2020  1:01 PM  
Resp 16 3/11/2020  1:01 PM  
SpO2 98 % 3/11/2020  1:01 PM  
Vitals shown include unvalidated device data.

## 2020-03-11 NOTE — DISCHARGE INSTRUCTIONS
[place] Office: [phone]    [facility]  [id number]  1950    EGD/COLONOSCOPY DISCHARGE INSTRUCTIONS  Discomfort:  Sore throat- throat lozenges or warm salt water gargle  redness at IV site- apply warm compress to area; if redness or soreness persist- contact your physician  Gaseous discomfort- walking, belching will help relieve any discomfort  You may not operate a vehicle for 12 hours  You may not engage in an occupation involving machinery or appliances for rest of today. You may not drink alcoholic beverages for at least 12 hours  Avoid making any critical decisions for at least 24 hour  DIET  You may resume your regular diet - however -  remember your colon is empty and a heavy meal will produce gas. Avoid these foods:  fried / greasy foods, excessive carbonated drinks or too much caffeine  MEDICATIONS   Regarding Aspirin or Nonsteroidal medications specifically, please see below. ACTIVITY  You may resume your normal daily activities. Spend the remainder of the day resting -  avoid any strenuous activity. CALL M.D. ANY SIGN OF   Increasing pain, nausea, vomiting  Abdominal distension (swelling)  New increased bleeding (oral or rectal)  Fever (chills)  Pain in chest area  Bloody discharge from nose or mouth  Shortness of breath    You may not take any Advil, Aspirin, Ibuprofen, Motrin, Aleve, or Goodys for 7 days, ONLY  Tylenol as needed for pain. Follow-up Instructions:   Call  Richard Theodore MD for any questions or concerns   Telephone # [phone]      Follow-up Information    None

## 2020-03-11 NOTE — ROUTINE PROCESS
Lyle Kang 1950 
[id number] Situation: 
Verbal report received from: Suhail Brown RN Procedure: Procedure(s) with comments: 
ESOPHAGOGASTRODUODENOSCOPY (EGD) WITH BANDING 
ENDOSCOPIC BANDING OR LIGATION - x6 bands Background: 
 
Preoperative diagnosis: ANEMIA, CIRRHOSIS OF LIVER NOT DUE TO ALCOHOL Postoperative diagnosis: Esophageal Varices, Portal Hypertension gastropathy, Distal esphogeal ulcer :  Dr. Flex Nunez 
Assistant(s): Endoscopy Technician-1: Sujatha Martin Endoscopy RN-1: Cinda Meza Specimens: * No specimens in log * H. Pylori  no Assessment: 
Intra-procedure medications Anesthesia gave intra-procedure sedation and medications, see anesthesia flow sheet yes Intravenous fluids: NS@ [place] Vital signs stable Abdominal assessment: round and soft Recommendation: 
Discharge patient per MD order. Family or Dioni Dolan Son Permission to share finding with family or friend yes

## 2020-03-16 RX ORDER — SODIUM CHLORIDE 9 MG/ML
25 INJECTION, SOLUTION INTRAVENOUS CONTINUOUS
Status: DISPENSED | OUTPATIENT
Start: 2020-03-19 | End: 2020-03-19

## 2020-03-16 RX ORDER — GABAPENTIN 300 MG/1
600 CAPSULE ORAL
Qty: 180 CAP | Refills: 1 | Status: ON HOLD | OUTPATIENT
Start: 2020-03-16 | End: 2021-01-01

## 2020-03-19 ENCOUNTER — HOSPITAL ENCOUNTER (OUTPATIENT)
Dept: INFUSION THERAPY | Age: 70
Discharge: HOME | End: 2020-03-19

## 2020-03-20 ENCOUNTER — HOSPITAL ENCOUNTER (OUTPATIENT)
Dept: MRI IMAGING | Age: 70
Discharge: HOME | End: 2020-03-20
Attending: INTERNAL MEDICINE
Payer: MEDICARE

## 2020-03-20 DIAGNOSIS — E66.9 OBESITY, UNSPECIFIED: ICD-10-CM

## 2020-03-20 DIAGNOSIS — K74.60 CIRRHOSIS OF LIVER NOT DUE TO ALCOHOL (HCC): ICD-10-CM

## 2020-03-20 DIAGNOSIS — I85.10 SECONDARY ESOPHAGEAL VARICES WITHOUT BLEEDING (HCC): ICD-10-CM

## 2020-03-20 DIAGNOSIS — D64.9 ANEMIA: ICD-10-CM

## 2020-03-20 PROCEDURE — 74011250636 HC RX REV CODE- 250/636: Performed by: INTERNAL MEDICINE

## 2020-03-20 PROCEDURE — 74011000258 HC RX REV CODE- 258: Performed by: INTERNAL MEDICINE

## 2020-03-20 PROCEDURE — 74183 MRI ABD W/O CNTR FLWD CNTR: CPT

## 2020-03-20 PROCEDURE — A9585 GADOBUTROL INJECTION: HCPCS | Performed by: INTERNAL MEDICINE

## 2020-03-20 RX ORDER — SODIUM CHLORIDE 0.9 % (FLUSH) 0.9 %
10 SYRINGE (ML) INJECTION
Status: COMPLETED | OUTPATIENT
Start: 2020-03-20 | End: 2020-03-20

## 2020-03-20 RX ADMIN — SODIUM CHLORIDE 100 ML: 900 INJECTION, SOLUTION INTRAVENOUS at 21:29

## 2020-03-20 RX ADMIN — Medication 10 ML: at 21:29

## 2020-03-20 RX ADMIN — GADOBUTROL 10 ML: 604.72 INJECTION INTRAVENOUS at 21:28

## 2020-03-26 ENCOUNTER — TELEPHONE (OUTPATIENT)
Dept: FAMILY MEDICINE CLINIC | Age: 70
End: 2020-03-26

## 2020-03-26 DIAGNOSIS — E11.65 UNCONTROLLED TYPE 2 DIABETES MELLITUS WITH HYPERGLYCEMIA (HCC): ICD-10-CM

## 2020-03-26 RX ORDER — INSULIN GLARGINE 100 [IU]/ML
INJECTION, SOLUTION SUBCUTANEOUS
Qty: 25 ADJUSTABLE DOSE PRE-FILLED PEN SYRINGE | Refills: 3 | Status: SHIPPED | OUTPATIENT
Start: 2020-03-26 | End: 2020-04-06

## 2020-03-26 NOTE — TELEPHONE ENCOUNTER
Called patient back and asked if he already has an advanced medical directive which he does. Explained that he would need to speak directly to a provider regarding his concerns and offered him a virtual doxy appointment with NP tomeka Reese which he accepted. Explained that she will send him a text 15 minutes prior to his appointment and he would just need to open the text and click the link to be connected. He expressed his understanding and then asked if there would be a copay. I told him I think it would be his usual copay and he said he has medicare and does not usually have a copy but will worry about that later.

## 2020-03-26 NOTE — TELEPHONE ENCOUNTER
Pt would like to speak to someone about his advanced medical directive     Wants to know what would happen if he contracted Corona virus and needed a ventilator    Best number to   [phone]

## 2020-03-27 ENCOUNTER — HOSPITAL ENCOUNTER (INPATIENT)
Age: 70
LOS: 9 days | Discharge: NURSING, REHAB OR LONG-TERM CARE | DRG: 405 | End: 2020-04-06
Attending: EMERGENCY MEDICINE | Admitting: INTERNAL MEDICINE
Payer: MEDICARE

## 2020-03-27 ENCOUNTER — VIRTUAL VISIT (OUTPATIENT)
Dept: FAMILY MEDICINE CLINIC | Age: 70
End: 2020-03-27

## 2020-03-27 DIAGNOSIS — K92.2 ACUTE UPPER GI BLEED: ICD-10-CM

## 2020-03-27 DIAGNOSIS — I10 ESSENTIAL HYPERTENSION: ICD-10-CM

## 2020-03-27 DIAGNOSIS — K74.60 ESOPHAGEAL VARICES IN CIRRHOSIS (HCC): ICD-10-CM

## 2020-03-27 DIAGNOSIS — R18.8 CIRRHOSIS OF LIVER WITH ASCITES, UNSPECIFIED HEPATIC CIRRHOSIS TYPE (HCC): ICD-10-CM

## 2020-03-27 DIAGNOSIS — Z71.89 ADVANCE DIRECTIVE DISCUSSED WITH PATIENT: Primary | ICD-10-CM

## 2020-03-27 DIAGNOSIS — E11.65 UNCONTROLLED TYPE 2 DIABETES MELLITUS WITH HYPERGLYCEMIA (HCC): ICD-10-CM

## 2020-03-27 DIAGNOSIS — I85.01 BLEEDING ESOPHAGEAL VARICES, UNSPECIFIED ESOPHAGEAL VARICES TYPE (HCC): ICD-10-CM

## 2020-03-27 DIAGNOSIS — I85.10 ESOPHAGEAL VARICES IN CIRRHOSIS (HCC): ICD-10-CM

## 2020-03-27 DIAGNOSIS — R57.8 HEMORRHAGIC SHOCK (HCC): Primary | ICD-10-CM

## 2020-03-27 DIAGNOSIS — K74.60 CIRRHOSIS OF LIVER WITH ASCITES, UNSPECIFIED HEPATIC CIRRHOSIS TYPE (HCC): ICD-10-CM

## 2020-03-27 LAB
ALBUMIN SERPL-MCNC: 2.5 G/DL (ref 3.5–5)
ALBUMIN/GLOB SERPL: 0.6 {RATIO} (ref 1.1–2.2)
ALP SERPL-CCNC: 91 U/L (ref 45–117)
ALT SERPL-CCNC: 27 U/L (ref 12–78)
ANION GAP SERPL CALC-SCNC: 5 MMOL/L (ref 5–15)
AST SERPL-CCNC: 40 U/L (ref 15–37)
BASOPHILS # BLD: 0.1 K/UL (ref 0–0.1)
BASOPHILS NFR BLD: 1 % (ref 0–1)
BILIRUB SERPL-MCNC: 0.6 MG/DL (ref 0.2–1)
BUN SERPL-MCNC: 14 MG/DL (ref 6–20)
BUN/CREAT SERPL: 18 (ref 12–20)
CALCIUM SERPL-MCNC: 8 MG/DL (ref 8.5–10.1)
CHLORIDE SERPL-SCNC: 106 MMOL/L (ref 97–108)
CO2 SERPL-SCNC: 26 MMOL/L (ref 21–32)
CREAT SERPL-MCNC: 0.8 MG/DL (ref 0.7–1.3)
DIFFERENTIAL METHOD BLD: ABNORMAL
EOSINOPHIL # BLD: 0.4 K/UL (ref 0–0.4)
EOSINOPHIL NFR BLD: 4 % (ref 0–7)
ERYTHROCYTE [DISTWIDTH] IN BLOOD BY AUTOMATED COUNT: 17.9 % (ref 11.5–14.5)
GLOBULIN SER CALC-MCNC: 4.1 G/DL (ref 2–4)
GLUCOSE SERPL-MCNC: 156 MG/DL (ref 65–100)
HCT VFR BLD AUTO: 26.3 % (ref 36.6–50.3)
HGB BLD-MCNC: 8.5 G/DL (ref 12.1–17)
IMM GRANULOCYTES # BLD AUTO: 0.1 K/UL (ref 0–0.04)
IMM GRANULOCYTES NFR BLD AUTO: 1 % (ref 0–0.5)
INR PPP: 1.2 (ref 0.9–1.1)
LIPASE SERPL-CCNC: 71 U/L (ref 73–393)
LYMPHOCYTES # BLD: 1.2 K/UL (ref 0.8–3.5)
LYMPHOCYTES NFR BLD: 12 % (ref 12–49)
MAGNESIUM SERPL-MCNC: 1.8 MG/DL (ref 1.6–2.4)
MCH RBC QN AUTO: 27 PG (ref 26–34)
MCHC RBC AUTO-ENTMCNC: 32.3 G/DL (ref 30–36.5)
MCV RBC AUTO: 83.5 FL (ref 80–99)
MONOCYTES # BLD: 0.7 K/UL (ref 0–1)
MONOCYTES NFR BLD: 7 % (ref 5–13)
NEUTS SEG # BLD: 8.1 K/UL (ref 1.8–8)
NEUTS SEG NFR BLD: 75 % (ref 32–75)
NRBC # BLD: 0 K/UL (ref 0–0.01)
NRBC BLD-RTO: 0 PER 100 WBC
PLATELET # BLD AUTO: 188 K/UL (ref 150–400)
PMV BLD AUTO: 10.8 FL (ref 8.9–12.9)
POTASSIUM SERPL-SCNC: 4 MMOL/L (ref 3.5–5.1)
PROT SERPL-MCNC: 6.6 G/DL (ref 6.4–8.2)
PROTHROMBIN TIME: 12 SEC (ref 9–11.1)
RBC # BLD AUTO: 3.15 M/UL (ref 4.1–5.7)
SODIUM SERPL-SCNC: 137 MMOL/L (ref 136–145)
WBC # BLD AUTO: 10.5 K/UL (ref 4.1–11.1)

## 2020-03-27 PROCEDURE — 99285 EMERGENCY DEPT VISIT HI MDM: CPT

## 2020-03-27 PROCEDURE — P9016 RBC LEUKOCYTES REDUCED: HCPCS

## 2020-03-27 PROCEDURE — 75810000455 HC PLCMT CENT VENOUS CATH LVL 2 5182

## 2020-03-27 PROCEDURE — 83735 ASSAY OF MAGNESIUM: CPT

## 2020-03-27 PROCEDURE — 80053 COMPREHEN METABOLIC PANEL: CPT

## 2020-03-27 PROCEDURE — C9113 INJ PANTOPRAZOLE SODIUM, VIA: HCPCS | Performed by: EMERGENCY MEDICINE

## 2020-03-27 PROCEDURE — 74011000258 HC RX REV CODE- 258: Performed by: EMERGENCY MEDICINE

## 2020-03-27 PROCEDURE — 85025 COMPLETE CBC W/AUTO DIFF WBC: CPT

## 2020-03-27 PROCEDURE — 86900 BLOOD TYPING SEROLOGIC ABO: CPT

## 2020-03-27 PROCEDURE — 96365 THER/PROPH/DIAG IV INF INIT: CPT

## 2020-03-27 PROCEDURE — 96366 THER/PROPH/DIAG IV INF ADDON: CPT

## 2020-03-27 PROCEDURE — 51702 INSERT TEMP BLADDER CATH: CPT

## 2020-03-27 PROCEDURE — 96376 TX/PRO/DX INJ SAME DRUG ADON: CPT

## 2020-03-27 PROCEDURE — 94762 N-INVAS EAR/PLS OXIMTRY CONT: CPT

## 2020-03-27 PROCEDURE — 96368 THER/DIAG CONCURRENT INF: CPT

## 2020-03-27 PROCEDURE — 83690 ASSAY OF LIPASE: CPT

## 2020-03-27 PROCEDURE — 30233P1 TRANSFUSION OF NONAUTOLOGOUS FROZEN RED CELLS INTO PERIPHERAL VEIN, PERCUTANEOUS APPROACH: ICD-10-PCS | Performed by: INTERNAL MEDICINE

## 2020-03-27 PROCEDURE — 36430 TRANSFUSION BLD/BLD COMPNT: CPT

## 2020-03-27 PROCEDURE — 36415 COLL VENOUS BLD VENIPUNCTURE: CPT

## 2020-03-27 PROCEDURE — 93005 ELECTROCARDIOGRAM TRACING: CPT

## 2020-03-27 PROCEDURE — 74011000250 HC RX REV CODE- 250: Performed by: EMERGENCY MEDICINE

## 2020-03-27 PROCEDURE — 74011250636 HC RX REV CODE- 250/636: Performed by: EMERGENCY MEDICINE

## 2020-03-27 PROCEDURE — 85610 PROTHROMBIN TIME: CPT

## 2020-03-27 PROCEDURE — 86923 COMPATIBILITY TEST ELECTRIC: CPT

## 2020-03-27 PROCEDURE — 96375 TX/PRO/DX INJ NEW DRUG ADDON: CPT

## 2020-03-27 RX ORDER — OCTREOTIDE ACETATE 50 UG/ML
50 INJECTION, SOLUTION INTRAVENOUS; SUBCUTANEOUS ONCE
Status: COMPLETED | OUTPATIENT
Start: 2020-03-27 | End: 2020-03-27

## 2020-03-27 RX ORDER — SODIUM CHLORIDE 9 MG/ML
250 INJECTION, SOLUTION INTRAVENOUS AS NEEDED
Status: DISCONTINUED | OUTPATIENT
Start: 2020-03-27 | End: 2020-04-01 | Stop reason: ALTCHOICE

## 2020-03-27 RX ORDER — MIDAZOLAM HYDROCHLORIDE 1 MG/ML
2 INJECTION, SOLUTION INTRAMUSCULAR; INTRAVENOUS
Status: DISCONTINUED | OUTPATIENT
Start: 2020-03-27 | End: 2020-03-30

## 2020-03-27 RX ORDER — METOCLOPRAMIDE HYDROCHLORIDE 5 MG/ML
10 INJECTION INTRAMUSCULAR; INTRAVENOUS
Status: COMPLETED | OUTPATIENT
Start: 2020-03-27 | End: 2020-03-27

## 2020-03-27 RX ORDER — ROCURONIUM BROMIDE 10 MG/ML
100 INJECTION, SOLUTION INTRAVENOUS
Status: COMPLETED | OUTPATIENT
Start: 2020-03-27 | End: 2020-03-27

## 2020-03-27 RX ORDER — MORPHINE SULFATE 2 MG/ML
4 INJECTION, SOLUTION INTRAMUSCULAR; INTRAVENOUS
Status: COMPLETED | OUTPATIENT
Start: 2020-03-27 | End: 2020-03-27

## 2020-03-27 RX ORDER — ONDANSETRON 2 MG/ML
4 INJECTION INTRAMUSCULAR; INTRAVENOUS
Status: COMPLETED | OUTPATIENT
Start: 2020-03-27 | End: 2020-03-27

## 2020-03-27 RX ORDER — ETOMIDATE 2 MG/ML
20 INJECTION INTRAVENOUS
Status: COMPLETED | OUTPATIENT
Start: 2020-03-27 | End: 2020-03-27

## 2020-03-27 RX ADMIN — CEFTRIAXONE SODIUM 1 G: 1 INJECTION, POWDER, FOR SOLUTION INTRAMUSCULAR; INTRAVENOUS at 22:38

## 2020-03-27 RX ADMIN — METOCLOPRAMIDE 10 MG: 5 INJECTION, SOLUTION INTRAMUSCULAR; INTRAVENOUS at 22:38

## 2020-03-27 RX ADMIN — OCTREOTIDE ACETATE 50 MCG/HR: 500 INJECTION, SOLUTION INTRAVENOUS; SUBCUTANEOUS at 22:45

## 2020-03-27 RX ADMIN — OCTREOTIDE ACETATE 50 MCG: 50 INJECTION, SOLUTION INTRAVENOUS; SUBCUTANEOUS at 22:40

## 2020-03-27 RX ADMIN — ONDANSETRON HYDROCHLORIDE 4 MG: 2 INJECTION, SOLUTION INTRAMUSCULAR; INTRAVENOUS at 22:38

## 2020-03-27 RX ADMIN — PHENYLEPHRINE HYDROCHLORIDE 0.1 MG: 10 INJECTION INTRAVENOUS at 11:47

## 2020-03-27 RX ADMIN — ETOMIDATE 20 MG: 2 INJECTION, SOLUTION INTRAVENOUS at 11:58

## 2020-03-27 RX ADMIN — ONDANSETRON HYDROCHLORIDE 4 MG: 2 INJECTION, SOLUTION INTRAMUSCULAR; INTRAVENOUS at 21:55

## 2020-03-27 RX ADMIN — ROCURONIUM BROMIDE 100 MG: 10 INJECTION INTRAVENOUS at 11:58

## 2020-03-27 RX ADMIN — SODIUM CHLORIDE 40 MG: 9 INJECTION, SOLUTION INTRAMUSCULAR; INTRAVENOUS; SUBCUTANEOUS at 22:38

## 2020-03-27 RX ADMIN — MORPHINE SULFATE 4 MG: 2 INJECTION, SOLUTION INTRAMUSCULAR; INTRAVENOUS at 23:12

## 2020-03-27 RX ADMIN — PHENYLEPHRINE HYDROCHLORIDE 0.1 MG: 10 INJECTION INTRAVENOUS at 11:54

## 2020-03-27 RX ADMIN — SODIUM CHLORIDE 500 ML: 900 INJECTION, SOLUTION INTRAVENOUS at 22:38

## 2020-03-27 RX ADMIN — SODIUM CHLORIDE 1000 ML: 900 INJECTION, SOLUTION INTRAVENOUS at 23:45

## 2020-03-27 NOTE — PROGRESS NOTES
HISTORY OF PRESENT ILLNESS Alize Johnson is a 68 y.o. male. HPI This is a patient of Dr. Leilani Arriaga with PMHx significant for hypertension, hyperlipidemia, diabetes here today for virtual visit due to coronavirus to discuss his advanced directive. He has an advanced directive, signed October 2, 2015, which is scanned into his medical record. In this advance medical directive, he outlines his daughter Zurdo Perea as his health care agent should he be unable to make decisions, and his son Batool Barron to be the secondary agent should she be unavailable. He does not want any life-prolonging measures in the event of a terminal condition that would serve to only artifiically prolong the dying process. He is wondering about how the coronavirus outbreak would affect that. He does not to be put on a ventilator should he be expected not to recover, but does not want to have the \"plug pulled prematurely. \" Advance Care Planning (ACP) Provider Note - Comprehensive Date of ACP Conversation: 03/27/20 Persons included in Conversation:  patient Length of ACP Conversation in minutes:  16 minutes Authorized Decision Maker (if patient is incapable of making informed decisions): This person is: 
Healthcare Agent/Medical Power of  under Advance Directive General ACP for ALL Patients with Decision Making Capacity: 
 Importance of advance care planning, including choosing a healthcare agent to communicate patient's healthcare decisions if patient lost the ability to make decisions, such as after a sudden illness or accident Understanding of the healthcare agent role was assessed and information provided Exploration of values, goals, and preferences if recovery is not expected, even with continued medical treatment in the event of: Imminent death Severe, permanent brain injury Review of Existing Advance Directive: 
What is your understanding of your agent's willingness to honor your wishes, even if he/she may not agree with them? He reports that his daughter understands his wishes and he will discuss with her again to ensure that she is in agreement. Does this advance directive still reflect your preferences? Yes (Provide new form/Refer for assistance in updating) For Serious or Chronic Illness: For Serious or Chronic Illness: 
Hint: Use Ask-Tell-Ask communication model for Conversation with Patient or Authorized Decision Maker (if patient is incapable of making informed decisions): The following items were discussed with the patient who verbalized understanding: 
 
Understanding of medical condition Interventions Provided: 
Reviewed existing Advance Directive Recommended communicating the plan and making copies for the healthcare agent, personal physician, and others as appropriate (e.g., health system) Recommended review of completed ACP document annually or upon change in health status Review of Systems Constitutional: Negative for chills and fever. Cardiovascular: Negative for chest pain and palpitations. Psychiatric/Behavioral: Negative for depression, substance abuse and suicidal ideas. Physical Exam 
Constitutional:   
   Appearance: Normal appearance. Pulmonary:  
   Effort: Pulmonary effort is normal. No respiratory distress. Neurological:  
   General: No focal deficit present. Mental Status: He is alert. Psychiatric:     
   Mood and Affect: Mood normal.     
   Behavior: Behavior normal.  
 
 
 
ASSESSMENT and PLAN 
  ICD-10-CM ICD-9-CM 1. Advance directive discussed with patient Z71.89 V65.49 Advance Directive discussed with patient. Current AD reviewed. Reassured patient that his AD outlines his wishes as he expressed them today, and encouraged him to communicate them with his family and other caregivers. Follow up with Dr. Fe Wu as scheduled for routine care. Pursuant to the emergency declaration under the [address] and [place] [address] waiver authority and the [employer] Act, this Virtual Visit was conducted, with patient's consent, to reduce the patient's risk of exposure to COVID-19 and provide continuity of care for an established patient. Services were provided through a video synchronous discussion virtually to substitute for in-person appointment.

## 2020-03-28 ENCOUNTER — APPOINTMENT (OUTPATIENT)
Dept: INTERVENTIONAL RADIOLOGY/VASCULAR | Age: 70
DRG: 405 | End: 2020-03-28
Attending: EMERGENCY MEDICINE
Payer: MEDICARE

## 2020-03-28 ENCOUNTER — ANESTHESIA EVENT (OUTPATIENT)
Dept: INTERVENTIONAL RADIOLOGY/VASCULAR | Age: 70
DRG: 405 | End: 2020-03-28
Payer: MEDICARE

## 2020-03-28 ENCOUNTER — APPOINTMENT (OUTPATIENT)
Dept: GENERAL RADIOLOGY | Age: 70
DRG: 405 | End: 2020-03-28
Attending: EMERGENCY MEDICINE
Payer: MEDICARE

## 2020-03-28 ENCOUNTER — ANESTHESIA (OUTPATIENT)
Dept: INTERVENTIONAL RADIOLOGY/VASCULAR | Age: 70
DRG: 405 | End: 2020-03-28
Payer: MEDICARE

## 2020-03-28 PROBLEM — K92.2 ACUTE UPPER GI BLEED: Status: ACTIVE | Noted: 2020-03-28

## 2020-03-28 LAB
BASOPHILS # BLD: 0.1 K/UL (ref 0–0.1)
BASOPHILS NFR BLD: 1 % (ref 0–1)
DIFFERENTIAL METHOD BLD: ABNORMAL
EOSINOPHIL # BLD: 0.9 K/UL (ref 0–0.4)
EOSINOPHIL NFR BLD: 6 % (ref 0–7)
ERYTHROCYTE [DISTWIDTH] IN BLOOD BY AUTOMATED COUNT: 16.8 % (ref 11.5–14.5)
ERYTHROCYTE [DISTWIDTH] IN BLOOD BY AUTOMATED COUNT: 17.1 % (ref 11.5–14.5)
GLUCOSE BLD STRIP.AUTO-MCNC: 253 MG/DL (ref 65–100)
GLUCOSE BLD STRIP.AUTO-MCNC: 273 MG/DL (ref 65–100)
HCT VFR BLD AUTO: 23.1 % (ref 36.6–50.3)
HCT VFR BLD AUTO: 23.2 % (ref 36.6–50.3)
HCT VFR BLD AUTO: 28.1 % (ref 36.6–50.3)
HGB BLD-MCNC: 7.5 G/DL (ref 12.1–17)
HGB BLD-MCNC: 7.7 G/DL (ref 12.1–17)
HGB BLD-MCNC: 9.1 G/DL (ref 12.1–17)
IMM GRANULOCYTES # BLD AUTO: 0.1 K/UL (ref 0–0.04)
IMM GRANULOCYTES NFR BLD AUTO: 1 % (ref 0–0.5)
LYMPHOCYTES # BLD: 1.7 K/UL (ref 0.8–3.5)
LYMPHOCYTES NFR BLD: 11 % (ref 12–49)
MCH RBC QN AUTO: 27.1 PG (ref 26–34)
MCH RBC QN AUTO: 27.4 PG (ref 26–34)
MCHC RBC AUTO-ENTMCNC: 32.3 G/DL (ref 30–36.5)
MCHC RBC AUTO-ENTMCNC: 32.4 G/DL (ref 30–36.5)
MCV RBC AUTO: 83.8 FL (ref 80–99)
MCV RBC AUTO: 84.6 FL (ref 80–99)
MONOCYTES # BLD: 1.5 K/UL (ref 0–1)
MONOCYTES NFR BLD: 10 % (ref 5–13)
NEUTS SEG # BLD: 10.8 K/UL (ref 1.8–8)
NEUTS SEG NFR BLD: 71 % (ref 32–75)
NRBC # BLD: 0 K/UL (ref 0–0.01)
NRBC # BLD: 0 K/UL (ref 0–0.01)
NRBC BLD-RTO: 0 PER 100 WBC
NRBC BLD-RTO: 0 PER 100 WBC
PLATELET # BLD AUTO: 190 K/UL (ref 150–400)
PLATELET # BLD AUTO: 262 K/UL (ref 150–400)
PMV BLD AUTO: 10.1 FL (ref 8.9–12.9)
PMV BLD AUTO: 10.5 FL (ref 8.9–12.9)
RBC # BLD AUTO: 2.77 M/UL (ref 4.1–5.7)
RBC # BLD AUTO: 3.32 M/UL (ref 4.1–5.7)
SERVICE CMNT-IMP: ABNORMAL
SERVICE CMNT-IMP: ABNORMAL
WBC # BLD AUTO: 15 K/UL (ref 4.1–11.1)
WBC # BLD AUTO: 25.5 K/UL (ref 4.1–11.1)

## 2020-03-28 PROCEDURE — 74011250636 HC RX REV CODE- 250/636: Performed by: NURSE ANESTHETIST, CERTIFIED REGISTERED

## 2020-03-28 PROCEDURE — 74011250636 HC RX REV CODE- 250/636: Performed by: INTERNAL MEDICINE

## 2020-03-28 PROCEDURE — 71045 X-RAY EXAM CHEST 1 VIEW: CPT

## 2020-03-28 PROCEDURE — 77030021533 HC CATH ANGI DX PRFMA MRTM -A

## 2020-03-28 PROCEDURE — 65610000006 HC RM INTENSIVE CARE

## 2020-03-28 PROCEDURE — 96365 THER/PROPH/DIAG IV INF INIT: CPT

## 2020-03-28 PROCEDURE — 77030013522 HC DEV INFL BLN MRTM -B

## 2020-03-28 PROCEDURE — 74011250636 HC RX REV CODE- 250/636: Performed by: EMERGENCY MEDICINE

## 2020-03-28 PROCEDURE — 0W3P8ZZ CONTROL BLEEDING IN GASTROINTESTINAL TRACT, VIA NATURAL OR ARTIFICIAL OPENING ENDOSCOPIC: ICD-10-PCS | Performed by: INTERNAL MEDICINE

## 2020-03-28 PROCEDURE — 74011636637 HC RX REV CODE- 636/637: Performed by: INTERNAL MEDICINE

## 2020-03-28 PROCEDURE — C1887 CATHETER, GUIDING: HCPCS

## 2020-03-28 PROCEDURE — 74011250636 HC RX REV CODE- 250/636: Performed by: ANESTHESIOLOGY

## 2020-03-28 PROCEDURE — 85027 COMPLETE CBC AUTOMATED: CPT

## 2020-03-28 PROCEDURE — 96366 THER/PROPH/DIAG IV INF ADDON: CPT

## 2020-03-28 PROCEDURE — 36430 TRANSFUSION BLD/BLD COMPNT: CPT

## 2020-03-28 PROCEDURE — 94002 VENT MGMT INPAT INIT DAY: CPT

## 2020-03-28 PROCEDURE — 74011000258 HC RX REV CODE- 258: Performed by: INTERNAL MEDICINE

## 2020-03-28 PROCEDURE — 74011250636 HC RX REV CODE- 250/636

## 2020-03-28 PROCEDURE — 5A1945Z RESPIRATORY VENTILATION, 24-96 CONSECUTIVE HOURS: ICD-10-PCS | Performed by: INTERNAL MEDICINE

## 2020-03-28 PROCEDURE — 31500 INSERT EMERGENCY AIRWAY: CPT

## 2020-03-28 PROCEDURE — 77030011229 HC DIL VESL COON COOK -A

## 2020-03-28 PROCEDURE — 82962 GLUCOSE BLOOD TEST: CPT

## 2020-03-28 PROCEDURE — 76040000007: Performed by: INTERNAL MEDICINE

## 2020-03-28 PROCEDURE — C1769 GUIDE WIRE: HCPCS

## 2020-03-28 PROCEDURE — 77030013242

## 2020-03-28 PROCEDURE — 85018 HEMOGLOBIN: CPT

## 2020-03-28 PROCEDURE — 77030004561 HC CATH ANGI DX COBRA ANGI -B

## 2020-03-28 PROCEDURE — C1892 INTRO/SHEATH,FIXED,PEEL-AWAY: HCPCS

## 2020-03-28 PROCEDURE — 74011000250 HC RX REV CODE- 250: Performed by: EMERGENCY MEDICINE

## 2020-03-28 PROCEDURE — 76060000032 HC ANESTHESIA 0.5 TO 1 HR: Performed by: INTERNAL MEDICINE

## 2020-03-28 PROCEDURE — P9016 RBC LEUKOCYTES REDUCED: HCPCS

## 2020-03-28 PROCEDURE — 85025 COMPLETE CBC W/AUTO DIFF WBC: CPT

## 2020-03-28 PROCEDURE — 77030014243 HC BND LIG VRCES BSC -D: Performed by: INTERNAL MEDICINE

## 2020-03-28 PROCEDURE — C1725 CATH, TRANSLUMIN NON-LASER: HCPCS

## 2020-03-28 PROCEDURE — 37182 INSERT HEPATIC SHUNT (TIPS): CPT

## 2020-03-28 PROCEDURE — 77030009378 HC DEV TORQ OLCT F/GWIRE MANIP COOK -A

## 2020-03-28 PROCEDURE — 06183J4 BYPASS PORTAL VEIN TO HEPATIC VEIN WITH SYNTHETIC SUBSTITUTE, PERCUTANEOUS APPROACH: ICD-10-PCS | Performed by: RADIOLOGY

## 2020-03-28 PROCEDURE — C9113 INJ PANTOPRAZOLE SODIUM, VIA: HCPCS | Performed by: INTERNAL MEDICINE

## 2020-03-28 PROCEDURE — 76060000037 HC ANESTHESIA 3 TO 3.5 HR

## 2020-03-28 PROCEDURE — 74018 RADEX ABDOMEN 1 VIEW: CPT

## 2020-03-28 PROCEDURE — 74011000250 HC RX REV CODE- 250: Performed by: NURSE ANESTHETIST, CERTIFIED REGISTERED

## 2020-03-28 PROCEDURE — 36415 COLL VENOUS BLD VENIPUNCTURE: CPT

## 2020-03-28 PROCEDURE — 77030014109 HC TRNSDUC SP PROC MRTM -B

## 2020-03-28 PROCEDURE — 0BH17EZ INSERTION OF ENDOTRACHEAL AIRWAY INTO TRACHEA, VIA NATURAL OR ARTIFICIAL OPENING: ICD-10-PCS | Performed by: EMERGENCY MEDICINE

## 2020-03-28 PROCEDURE — C1874 STENT, COATED/COV W/DEL SYS: HCPCS

## 2020-03-28 RX ORDER — SODIUM CHLORIDE 0.9 % (FLUSH) 0.9 %
5-40 SYRINGE (ML) INJECTION EVERY 8 HOURS
Status: DISCONTINUED | OUTPATIENT
Start: 2020-03-28 | End: 2020-03-29 | Stop reason: ALTCHOICE

## 2020-03-28 RX ORDER — SODIUM CHLORIDE 9 MG/ML
250 INJECTION, SOLUTION INTRAVENOUS AS NEEDED
Status: DISCONTINUED | OUTPATIENT
Start: 2020-03-28 | End: 2020-04-06 | Stop reason: HOSPADM

## 2020-03-28 RX ORDER — FENTANYL CITRATE 50 UG/ML
50 INJECTION, SOLUTION INTRAMUSCULAR; INTRAVENOUS
Status: COMPLETED | OUTPATIENT
Start: 2020-03-28 | End: 2020-04-01

## 2020-03-28 RX ORDER — SODIUM CHLORIDE 0.9 % (FLUSH) 0.9 %
5-40 SYRINGE (ML) INJECTION AS NEEDED
Status: CANCELLED | OUTPATIENT
Start: 2020-03-28

## 2020-03-28 RX ORDER — SODIUM CHLORIDE 0.9 % (FLUSH) 0.9 %
5-40 SYRINGE (ML) INJECTION EVERY 8 HOURS
Status: DISCONTINUED | OUTPATIENT
Start: 2020-03-28 | End: 2020-04-06 | Stop reason: HOSPADM

## 2020-03-28 RX ORDER — DIPHENHYDRAMINE HYDROCHLORIDE 50 MG/ML
12.5 INJECTION, SOLUTION INTRAMUSCULAR; INTRAVENOUS AS NEEDED
Status: CANCELLED | OUTPATIENT
Start: 2020-03-28 | End: 2020-03-28

## 2020-03-28 RX ORDER — ONDANSETRON 2 MG/ML
4 INJECTION INTRAMUSCULAR; INTRAVENOUS
Status: DISCONTINUED | OUTPATIENT
Start: 2020-03-28 | End: 2020-04-06 | Stop reason: HOSPADM

## 2020-03-28 RX ORDER — MIDAZOLAM HYDROCHLORIDE 1 MG/ML
INJECTION, SOLUTION INTRAMUSCULAR; INTRAVENOUS AS NEEDED
Status: DISCONTINUED | OUTPATIENT
Start: 2020-03-28 | End: 2020-03-28 | Stop reason: HOSPADM

## 2020-03-28 RX ORDER — ATROPINE SULFATE 0.1 MG/ML
0.5 INJECTION INTRAVENOUS
Status: ACTIVE | OUTPATIENT
Start: 2020-03-28 | End: 2020-03-29

## 2020-03-28 RX ORDER — MIDAZOLAM HYDROCHLORIDE 1 MG/ML
.25-5 INJECTION, SOLUTION INTRAMUSCULAR; INTRAVENOUS
Status: ACTIVE | OUTPATIENT
Start: 2020-03-28 | End: 2020-03-28

## 2020-03-28 RX ORDER — PROPOFOL 10 MG/ML
0-50 VIAL (ML) INTRAVENOUS
Status: DISCONTINUED | OUTPATIENT
Start: 2020-03-28 | End: 2020-03-29

## 2020-03-28 RX ORDER — SODIUM CHLORIDE 9 MG/ML
100 INJECTION, SOLUTION INTRAVENOUS CONTINUOUS
Status: DISPENSED | OUTPATIENT
Start: 2020-03-28 | End: 2020-03-28

## 2020-03-28 RX ORDER — SODIUM CHLORIDE 9 MG/ML
75 INJECTION, SOLUTION INTRAVENOUS CONTINUOUS
Status: DISPENSED | OUTPATIENT
Start: 2020-03-28 | End: 2020-03-28

## 2020-03-28 RX ORDER — LIDOCAINE HYDROCHLORIDE 20 MG/ML
10 INJECTION, SOLUTION INFILTRATION; PERINEURAL ONCE
Status: DISPENSED | OUTPATIENT
Start: 2020-03-28 | End: 2020-03-28

## 2020-03-28 RX ORDER — FLUMAZENIL 0.1 MG/ML
0.2 INJECTION INTRAVENOUS
Status: ACTIVE | OUTPATIENT
Start: 2020-03-28 | End: 2020-03-28

## 2020-03-28 RX ORDER — PHENYLEPHRINE HYDROCHLORIDE 10 MG/ML
100 INJECTION INTRAVENOUS ONCE
Status: COMPLETED | OUTPATIENT
Start: 2020-03-28 | End: 2020-03-27

## 2020-03-28 RX ORDER — KETAMINE HYDROCHLORIDE 50 MG/ML
100 INJECTION, SOLUTION INTRAMUSCULAR; INTRAVENOUS
Status: COMPLETED | OUTPATIENT
Start: 2020-03-28 | End: 2020-03-28

## 2020-03-28 RX ORDER — PHENYLEPHRINE HCL IN 0.9% NACL 0.4MG/10ML
SYRINGE (ML) INTRAVENOUS AS NEEDED
Status: DISCONTINUED | OUTPATIENT
Start: 2020-03-28 | End: 2020-03-28 | Stop reason: HOSPADM

## 2020-03-28 RX ORDER — SODIUM CHLORIDE 0.9 % (FLUSH) 0.9 %
5-40 SYRINGE (ML) INJECTION AS NEEDED
Status: DISCONTINUED | OUTPATIENT
Start: 2020-03-28 | End: 2020-04-06 | Stop reason: HOSPADM

## 2020-03-28 RX ORDER — NOREPINEPHRINE BITARTRATE/D5W 8 MG/250ML
2-100 PLASTIC BAG, INJECTION (ML) INTRAVENOUS
Status: DISCONTINUED | OUTPATIENT
Start: 2020-03-28 | End: 2020-03-30

## 2020-03-28 RX ORDER — INSULIN LISPRO 100 [IU]/ML
INJECTION, SOLUTION INTRAVENOUS; SUBCUTANEOUS
Status: DISCONTINUED | OUTPATIENT
Start: 2020-03-28 | End: 2020-03-28

## 2020-03-28 RX ORDER — LIDOCAINE HYDROCHLORIDE 10 MG/ML
0.1 INJECTION, SOLUTION EPIDURAL; INFILTRATION; INTRACAUDAL; PERINEURAL AS NEEDED
Status: CANCELLED | OUTPATIENT
Start: 2020-03-28

## 2020-03-28 RX ORDER — MAGNESIUM SULFATE 100 %
4 CRYSTALS MISCELLANEOUS AS NEEDED
Status: DISCONTINUED | OUTPATIENT
Start: 2020-03-28 | End: 2020-04-06 | Stop reason: HOSPADM

## 2020-03-28 RX ORDER — KETAMINE HYDROCHLORIDE 10 MG/ML
INJECTION, SOLUTION INTRAMUSCULAR; INTRAVENOUS AS NEEDED
Status: DISCONTINUED | OUTPATIENT
Start: 2020-03-28 | End: 2020-03-28 | Stop reason: HOSPADM

## 2020-03-28 RX ORDER — SODIUM CHLORIDE 0.9 % (FLUSH) 0.9 %
5-40 SYRINGE (ML) INJECTION EVERY 8 HOURS
Status: CANCELLED | OUTPATIENT
Start: 2020-03-28

## 2020-03-28 RX ORDER — MIDAZOLAM HYDROCHLORIDE 1 MG/ML
5 INJECTION, SOLUTION INTRAMUSCULAR; INTRAVENOUS ONCE
Status: COMPLETED | OUTPATIENT
Start: 2020-03-28 | End: 2020-03-28

## 2020-03-28 RX ORDER — EPINEPHRINE 0.1 MG/ML
1 INJECTION INTRACARDIAC; INTRAVENOUS
Status: ACTIVE | OUTPATIENT
Start: 2020-03-28 | End: 2020-03-29

## 2020-03-28 RX ORDER — SODIUM CHLORIDE, SODIUM LACTATE, POTASSIUM CHLORIDE, CALCIUM CHLORIDE 600; 310; 30; 20 MG/100ML; MG/100ML; MG/100ML; MG/100ML
INJECTION, SOLUTION INTRAVENOUS
Status: DISCONTINUED | OUTPATIENT
Start: 2020-03-28 | End: 2020-03-28 | Stop reason: HOSPADM

## 2020-03-28 RX ORDER — HEPARIN SODIUM 200 [USP'U]/100ML
600 INJECTION, SOLUTION INTRAVENOUS ONCE
Status: DISPENSED | OUTPATIENT
Start: 2020-03-28 | End: 2020-03-28

## 2020-03-28 RX ORDER — SODIUM CHLORIDE, SODIUM LACTATE, POTASSIUM CHLORIDE, CALCIUM CHLORIDE 600; 310; 30; 20 MG/100ML; MG/100ML; MG/100ML; MG/100ML
25 INJECTION, SOLUTION INTRAVENOUS CONTINUOUS
Status: CANCELLED | OUTPATIENT
Start: 2020-03-28 | End: 2020-03-29

## 2020-03-28 RX ORDER — PANTOPRAZOLE SODIUM 40 MG/10ML
40 INJECTION, POWDER, LYOPHILIZED, FOR SOLUTION INTRAVENOUS EVERY 12 HOURS
Status: DISCONTINUED | OUTPATIENT
Start: 2020-03-28 | End: 2020-04-06 | Stop reason: HOSPADM

## 2020-03-28 RX ORDER — FENTANYL CITRATE 50 UG/ML
INJECTION, SOLUTION INTRAMUSCULAR; INTRAVENOUS AS NEEDED
Status: DISCONTINUED | OUTPATIENT
Start: 2020-03-28 | End: 2020-03-28 | Stop reason: HOSPADM

## 2020-03-28 RX ORDER — NALOXONE HYDROCHLORIDE 0.4 MG/ML
0.4 INJECTION, SOLUTION INTRAMUSCULAR; INTRAVENOUS; SUBCUTANEOUS
Status: ACTIVE | OUTPATIENT
Start: 2020-03-28 | End: 2020-03-28

## 2020-03-28 RX ORDER — SODIUM CHLORIDE 0.9 % (FLUSH) 0.9 %
5-40 SYRINGE (ML) INJECTION AS NEEDED
Status: DISCONTINUED | OUTPATIENT
Start: 2020-03-28 | End: 2020-03-29 | Stop reason: ALTCHOICE

## 2020-03-28 RX ORDER — SODIUM CHLORIDE 9 MG/ML
75 INJECTION, SOLUTION INTRAVENOUS CONTINUOUS
Status: DISCONTINUED | OUTPATIENT
Start: 2020-03-28 | End: 2020-04-01

## 2020-03-28 RX ORDER — SODIUM CHLORIDE, SODIUM LACTATE, POTASSIUM CHLORIDE, CALCIUM CHLORIDE 600; 310; 30; 20 MG/100ML; MG/100ML; MG/100ML; MG/100ML
25 INJECTION, SOLUTION INTRAVENOUS CONTINUOUS
Status: CANCELLED | OUTPATIENT
Start: 2020-03-28

## 2020-03-28 RX ORDER — FENTANYL CITRATE 50 UG/ML
25 INJECTION, SOLUTION INTRAMUSCULAR; INTRAVENOUS
Status: CANCELLED | OUTPATIENT
Start: 2020-03-28

## 2020-03-28 RX ORDER — PHENYLEPHRINE HYDROCHLORIDE 10 MG/ML
40 INJECTION INTRAVENOUS ONCE
Status: COMPLETED | OUTPATIENT
Start: 2020-03-28 | End: 2020-03-27

## 2020-03-28 RX ORDER — PROPOFOL 10 MG/ML
INJECTION, EMULSION INTRAVENOUS
Status: DISCONTINUED | OUTPATIENT
Start: 2020-03-28 | End: 2020-03-28 | Stop reason: HOSPADM

## 2020-03-28 RX ORDER — DEXTROMETHORPHAN/PSEUDOEPHED 2.5-7.5/.8
1.2 DROPS ORAL
Status: DISCONTINUED | OUTPATIENT
Start: 2020-03-28 | End: 2020-03-29 | Stop reason: ALTCHOICE

## 2020-03-28 RX ORDER — HYDROMORPHONE HYDROCHLORIDE 1 MG/ML
0.2 INJECTION, SOLUTION INTRAMUSCULAR; INTRAVENOUS; SUBCUTANEOUS
Status: CANCELLED | OUTPATIENT
Start: 2020-03-28

## 2020-03-28 RX ORDER — PROPOFOL 10 MG/ML
INJECTION, EMULSION INTRAVENOUS AS NEEDED
Status: DISCONTINUED | OUTPATIENT
Start: 2020-03-28 | End: 2020-03-28 | Stop reason: HOSPADM

## 2020-03-28 RX ORDER — DEXTROSE 50 % IN WATER (D50W) INTRAVENOUS SYRINGE
12.5-25 AS NEEDED
Status: DISCONTINUED | OUTPATIENT
Start: 2020-03-28 | End: 2020-04-06 | Stop reason: HOSPADM

## 2020-03-28 RX ORDER — PROPOFOL 10 MG/ML
INJECTION, EMULSION INTRAVENOUS
Status: COMPLETED
Start: 2020-03-28 | End: 2020-03-28

## 2020-03-28 RX ORDER — INSULIN LISPRO 100 [IU]/ML
INJECTION, SOLUTION INTRAVENOUS; SUBCUTANEOUS EVERY 6 HOURS
Status: DISCONTINUED | OUTPATIENT
Start: 2020-03-28 | End: 2020-03-30

## 2020-03-28 RX ORDER — ONDANSETRON 2 MG/ML
INJECTION INTRAMUSCULAR; INTRAVENOUS AS NEEDED
Status: DISCONTINUED | OUTPATIENT
Start: 2020-03-28 | End: 2020-03-28 | Stop reason: HOSPADM

## 2020-03-28 RX ADMIN — SODIUM CHLORIDE, POTASSIUM CHLORIDE, SODIUM LACTATE AND CALCIUM CHLORIDE: 600; 310; 30; 20 INJECTION, SOLUTION INTRAVENOUS at 03:06

## 2020-03-28 RX ADMIN — KETAMINE HYDROCHLORIDE 20 MG: 10 INJECTION, SOLUTION INTRAMUSCULAR; INTRAVENOUS at 04:38

## 2020-03-28 RX ADMIN — Medication 80 MCG: at 05:22

## 2020-03-28 RX ADMIN — PROPOFOL 25 MCG/KG/MIN: 10 INJECTION, EMULSION INTRAVENOUS at 20:48

## 2020-03-28 RX ADMIN — ONDANSETRON HYDROCHLORIDE 4 MG: 2 INJECTION, SOLUTION INTRAMUSCULAR; INTRAVENOUS at 04:30

## 2020-03-28 RX ADMIN — SODIUM CHLORIDE 10 ML: 9 INJECTION, SOLUTION INTRAMUSCULAR; INTRAVENOUS; SUBCUTANEOUS at 14:22

## 2020-03-28 RX ADMIN — CEFTRIAXONE 1 G: 1 INJECTION, POWDER, FOR SOLUTION INTRAMUSCULAR; INTRAVENOUS at 23:44

## 2020-03-28 RX ADMIN — KETAMINE HYDROCHLORIDE 40 MG: 10 INJECTION, SOLUTION INTRAMUSCULAR; INTRAVENOUS at 04:43

## 2020-03-28 RX ADMIN — SODIUM CHLORIDE 10 ML: 9 INJECTION, SOLUTION INTRAMUSCULAR; INTRAVENOUS; SUBCUTANEOUS at 09:52

## 2020-03-28 RX ADMIN — PANTOPRAZOLE SODIUM 40 MG: 40 INJECTION, POWDER, FOR SOLUTION INTRAVENOUS at 09:52

## 2020-03-28 RX ADMIN — Medication 160 MCG: at 04:53

## 2020-03-28 RX ADMIN — KETAMINE HYDROCHLORIDE 10 MG: 10 INJECTION, SOLUTION INTRAMUSCULAR; INTRAVENOUS at 04:14

## 2020-03-28 RX ADMIN — FENTANYL CITRATE 25 MCG: 50 INJECTION, SOLUTION INTRAMUSCULAR; INTRAVENOUS at 04:26

## 2020-03-28 RX ADMIN — PROPOFOL 25 MCG/KG/MIN: 10 INJECTION, EMULSION INTRAVENOUS at 09:53

## 2020-03-28 RX ADMIN — INSULIN LISPRO 5 UNITS: 100 INJECTION, SOLUTION INTRAVENOUS; SUBCUTANEOUS at 18:33

## 2020-03-28 RX ADMIN — SODIUM CHLORIDE 10 ML: 9 INJECTION, SOLUTION INTRAMUSCULAR; INTRAVENOUS; SUBCUTANEOUS at 20:52

## 2020-03-28 RX ADMIN — KETAMINE HYDROCHLORIDE 20 MG: 10 INJECTION, SOLUTION INTRAMUSCULAR; INTRAVENOUS at 04:37

## 2020-03-28 RX ADMIN — MIDAZOLAM 5 MG: 1 INJECTION INTRAMUSCULAR; INTRAVENOUS at 03:27

## 2020-03-28 RX ADMIN — KETAMINE HYDROCHLORIDE 10 MG: 10 INJECTION, SOLUTION INTRAMUSCULAR; INTRAVENOUS at 04:26

## 2020-03-28 RX ADMIN — Medication 12 MCG/MIN: at 08:33

## 2020-03-28 RX ADMIN — Medication 120 MCG: at 05:09

## 2020-03-28 RX ADMIN — KETAMINE HYDROCHLORIDE 10 MG: 10 INJECTION, SOLUTION INTRAMUSCULAR; INTRAVENOUS at 04:08

## 2020-03-28 RX ADMIN — PROPOFOL 25 MCG/KG/MIN: 10 INJECTION, EMULSION INTRAVENOUS at 16:23

## 2020-03-28 RX ADMIN — OCTREOTIDE ACETATE 25 MCG/HR: 500 INJECTION, SOLUTION INTRAVENOUS; SUBCUTANEOUS at 09:53

## 2020-03-28 RX ADMIN — Medication 100 MCG/HR: at 18:20

## 2020-03-28 RX ADMIN — MIDAZOLAM 1 MG: 1 INJECTION INTRAMUSCULAR; INTRAVENOUS at 04:32

## 2020-03-28 RX ADMIN — FENTANYL CITRATE 25 MCG: 50 INJECTION, SOLUTION INTRAMUSCULAR; INTRAVENOUS at 04:08

## 2020-03-28 RX ADMIN — INSULIN LISPRO 5 UNITS: 100 INJECTION, SOLUTION INTRAVENOUS; SUBCUTANEOUS at 23:58

## 2020-03-28 RX ADMIN — Medication 10 MCG/MIN: at 02:23

## 2020-03-28 RX ADMIN — SODIUM CHLORIDE 75 ML/HR: 900 INJECTION, SOLUTION INTRAVENOUS at 18:34

## 2020-03-28 RX ADMIN — PROPOFOL 50 MG: 10 INJECTION, EMULSION INTRAVENOUS at 04:43

## 2020-03-28 RX ADMIN — SODIUM CHLORIDE 75 ML/HR: 900 INJECTION, SOLUTION INTRAVENOUS at 09:51

## 2020-03-28 RX ADMIN — KETAMINE HYDROCHLORIDE 20 MG: 10 INJECTION, SOLUTION INTRAMUSCULAR; INTRAVENOUS at 04:39

## 2020-03-28 RX ADMIN — MIDAZOLAM 2 MG: 1 INJECTION INTRAMUSCULAR; INTRAVENOUS at 02:37

## 2020-03-28 RX ADMIN — SODIUM CHLORIDE 1000 ML: 900 INJECTION, SOLUTION INTRAVENOUS at 02:09

## 2020-03-28 RX ADMIN — FENTANYL CITRATE 25 MCG: 50 INJECTION, SOLUTION INTRAMUSCULAR; INTRAVENOUS at 04:00

## 2020-03-28 RX ADMIN — KETAMINE HYDROCHLORIDE 30 MG: 10 INJECTION, SOLUTION INTRAMUSCULAR; INTRAVENOUS at 03:27

## 2020-03-28 RX ADMIN — Medication 10 MCG/MIN: at 14:19

## 2020-03-28 RX ADMIN — KETAMINE HYDROCHLORIDE 20 MG: 10 INJECTION, SOLUTION INTRAMUSCULAR; INTRAVENOUS at 04:32

## 2020-03-28 RX ADMIN — KETAMINE HYDROCHLORIDE 10 MG: 10 INJECTION, SOLUTION INTRAMUSCULAR; INTRAVENOUS at 03:53

## 2020-03-28 RX ADMIN — PROPOFOL 50 MCG/KG/MIN: 10 INJECTION, EMULSION INTRAVENOUS at 04:45

## 2020-03-28 RX ADMIN — Medication 10 ML: at 14:22

## 2020-03-28 RX ADMIN — MIDAZOLAM 2 MG: 1 INJECTION INTRAMUSCULAR; INTRAVENOUS at 03:25

## 2020-03-28 RX ADMIN — KETAMINE HYDROCHLORIDE 100 MG: 50 INJECTION, SOLUTION INTRAMUSCULAR; INTRAVENOUS at 02:14

## 2020-03-28 RX ADMIN — Medication 80 MCG: at 03:39

## 2020-03-28 RX ADMIN — Medication 10 ML: at 20:53

## 2020-03-28 RX ADMIN — MIDAZOLAM 2 MG: 1 INJECTION INTRAMUSCULAR; INTRAVENOUS at 01:44

## 2020-03-28 RX ADMIN — Medication 80 MCG: at 04:48

## 2020-03-28 RX ADMIN — KETAMINE HYDROCHLORIDE 10 MG: 10 INJECTION, SOLUTION INTRAMUSCULAR; INTRAVENOUS at 04:19

## 2020-03-28 RX ADMIN — SODIUM CHLORIDE 75 ML/HR: 900 INJECTION, SOLUTION INTRAVENOUS at 06:15

## 2020-03-28 RX ADMIN — FENTANYL CITRATE 25 MCG: 50 INJECTION, SOLUTION INTRAMUSCULAR; INTRAVENOUS at 04:14

## 2020-03-28 RX ADMIN — Medication 80 MCG: at 03:45

## 2020-03-28 RX ADMIN — PANTOPRAZOLE SODIUM 40 MG: 40 INJECTION, POWDER, FOR SOLUTION INTRAVENOUS at 20:49

## 2020-03-28 RX ADMIN — Medication 50 MCG/HR: at 00:22

## 2020-03-28 RX ADMIN — Medication 4 MCG/HR: at 08:30

## 2020-03-28 RX ADMIN — FENTANYL CITRATE 50 MCG: 50 INJECTION, SOLUTION INTRAMUSCULAR; INTRAVENOUS at 05:28

## 2020-03-28 NOTE — PROGRESS NOTES
0940: patient arrived from ED. Report received from [address] [place] [address], [address] [place] [address]. Patient intubated and sedated in bed. VSS at this time. CHG bath completed. Duel skin assessment completed with Jarad Mosley RN with no breakdown noted. 1000: Dr. Leticia Madrid at bedside. Update given and plan of care discussed. Will plan to check h/h at 1400.  
 
1005: XR at bedside. Confirmed with Dr. Leticia Madrid that ETT in sufficient position. 1040: Patient more awake attempting to pull ETT. Bilateral wrist restraints applied. 1200: Phone updated given to Dr. Giacomo Avalos.  
 
1400: H/H drawn and sent to lab

## 2020-03-28 NOTE — ANESTHESIA POSTPROCEDURE EVALUATION
* No procedures listed *. 
 
total IV anesthesia, general 
 
Anesthesia Post Evaluation Patient location during evaluation: PACU Note status: Adequate. Level of consciousness: responsive to verbal stimuli and sleepy but conscious Pain management: satisfactory to patient Airway patency: patent Anesthetic complications: no 
Cardiovascular status: acceptable Respiratory status: acceptable Hydration status: acceptable Comments: +Post-Anesthesia Evaluation and Assessment Patient: Nia Pope MRN: [id number]  SSN: [id number] YOB: 1950  Age: 68 y.o. Sex: male Cardiovascular Function/Vital Signs BP (!) 97/25   Pulse 65   Temp 36.8 °C (98.2 °F)   Resp 11   Ht 5' 10\" (1.778 m)   Wt 117.9 kg (260 lb)   SpO2 100%   BMI 37.31 kg/m² Patient is status post * No procedures listed *. Nausea/Vomiting: Controlled. Postoperative hydration reviewed and adequate. Pain: 
Pain Scale 1: Visual (03/28/20 0545) Pain Intensity 1: 0 (03/28/20 0545) Managed. Neurological Status: At baseline. Mental Status and Level of Consciousness: Arousable. Pulmonary Status:  
O2 Device: Ventilator (03/28/20 5912) Adequate oxygenation and airway patent. Complications related to anesthesia: None Post-anesthesia assessment completed. No concerns. Signed By: Yao Castillo MD  
 3/28/2020 Post anesthesia nausea and vomiting:  controlled Vitals Value Taken Time /30 3/28/2020  8:15 AM  
Temp Pulse 65 3/28/2020  8:18 AM  
Resp 12 3/28/2020  8:18 AM  
SpO2 100 % 3/28/2020  8:18 AM  
Vitals shown include unvalidated device data.

## 2020-03-28 NOTE — ED NOTES
Per Dr Maxine Garcia, the levophed should be titrated to maintain an sbp of . Levophed was reduced from 15 to 5 per md instruction. bp fell to 75/25. Levo was subsequently increased to 10. BP pau to 90/23.   Will inform MD

## 2020-03-28 NOTE — PROGRESS NOTES
6635: [facility] [address] from [address] [place] [address]: Patient placed back on 840 ventilator. Patient tolerated.

## 2020-03-28 NOTE — PROGRESS NOTES
Gastroenterology Progress Note 3/28/2020 Admit Date: 3/27/2020 Subjective: Follow up for: GI bleed 2/2 varices Intubated and sedated. Hgb is  Stable. WBC is up. Patient was seen in rounds by me today. Current Facility-Administered Medications Medication Dose Route Frequency  sodium chloride (NS) flush 5-40 mL  5-40 mL IntraVENous Q8H  
 sodium chloride (NS) flush 5-40 mL  5-40 mL IntraVENous PRN  
 simethicone (MYLICON) 62OZ/8.6DD oral drops 80 mg  1.2 mL Oral Multiple  atropine injection 0.5 mg  0.5 mg IntraVENous ONCE PRN  
 EPINEPHrine (ADRENALIN) 0.1 mg/mL syringe 1 mg  1 mg Endoscopically ONCE PRN  
 heparinized saline 2 units/mL infusion 1,200 Units  600 mL Irrigation ONCE  
 iopamidoL (ISOVUE-370) 76 % injection 400 mL  400 mL IntraVENous ONCE  
 lidocaine (XYLOCAINE) 20 mg/mL (2 %) injection 200 mg  10 mL SubCUTAneous ONCE  
 NOREPINephrine (LEVOPHED) 8 mg in 5% dextrose 250mL (32 mcg/mL) infusion  2-100 mcg/min IntraVENous TITRATE  
 0.9% sodium chloride infusion 250 mL  250 mL IntraVENous PRN  
 ondansetron (ZOFRAN) injection 4 mg  4 mg IntraVENous Q4H PRN  
 fentaNYL citrate (PF) injection 50 mcg  50 mcg IntraVENous Q4H PRN  propofol (DIPRIVAN) 10 mg/mL infusion  0-50 mcg/kg/min IntraVENous TITRATE  sodium chloride (NS) flush 5-40 mL  5-40 mL IntraVENous Q8H  
 sodium chloride (NS) flush 5-40 mL  5-40 mL IntraVENous PRN  
 cefTRIAXone (ROCEPHIN) 1 g in 0.9% sodium chloride (MBP/ADV) 50 mL  1 g IntraVENous Q24H  
 0.9% sodium chloride infusion  75 mL/hr IntraVENous CONTINUOUS  
 octreotide (SANDOSTATIN) 500 mcg in 0.9% sodium chloride 500 mL infusion  25 mcg/hr IntraVENous CONTINUOUS  
 glucose chewable tablet 16 g  4 Tab Oral PRN  
 dextrose (D50W) injection syrg 12.5-25 g  12.5-25 g IntraVENous PRN  
 glucagon (GLUCAGEN) injection 1 mg  1 mg IntraMUSCular PRN  pantoprazole (PROTONIX) injection 40 mg  40 mg IntraVENous Q12H  insulin lispro (HUMALOG) injection   SubCUTAneous Q6H  
 octreotide (SANDOSTATIN) 500 mcg in 0.9% sodium chloride 500 mL infusion  50 mcg/hr IntraVENous CONTINUOUS  
 0.9% sodium chloride infusion 250 mL  250 mL IntraVENous PRN  
 fentaNYL (PF) 1,500 mcg/30 mL (50 mcg/mL) infusion  0-200 mcg/hr IntraVENous TITRATE  midazolam (VERSED) injection 2 mg  2 mg IntraVENous Q1H PRN  
 0.9% sodium chloride infusion 250 mL  250 mL IntraVENous PRN Objective:  
 
Blood pressure (!) 103/33, pulse 60, temperature 98.2 °F (36.8 °C), resp. rate 15, height 5' 10\" (1.778 m), weight 117.9 kg (260 lb), SpO2 100 %. No intake/output data recorded. 03/26 1901 - 03/28 0700 In: 2500 [I.V.:2500] Out: 110 [Urine:110] Physical Examination:  
 
 
General:ETT and sedated HEENT:  Eyes closed GI: Not exaimned 2/2 COVID 19 CNS: NA 
 
Data Review Recent Results (from the past 24 hour(s)) CBC WITH AUTOMATED DIFF Collection Time: 03/27/20  9:53 PM  
Result Value Ref Range WBC 10.5 4.1 - 11.1 K/uL  
 RBC 3.15 (L) 4.10 - 5.70 M/uL HGB 8.5 (L) 12.1 - 17.0 g/dL HCT 26.3 (L) 36.6 - 50.3 % MCV 83.5 80.0 - 99.0 FL  
 MCH 27.0 26.0 - 34.0 PG  
 MCHC 32.3 30.0 - 36.5 g/dL  
 RDW 17.9 (H) 11.5 - 14.5 % PLATELET 428 816 - 171 K/uL MPV 10.8 8.9 - 12.9 FL  
 NRBC 0.0 0  WBC ABSOLUTE NRBC 0.00 0.00 - 0.01 K/uL NEUTROPHILS 75 32 - 75 % LYMPHOCYTES 12 12 - 49 % MONOCYTES 7 5 - 13 % EOSINOPHILS 4 0 - 7 % BASOPHILS 1 0 - 1 % IMMATURE GRANULOCYTES 1 (H) 0.0 - 0.5 % ABS. NEUTROPHILS 8.1 (H) 1.8 - 8.0 K/UL  
 ABS. LYMPHOCYTES 1.2 0.8 - 3.5 K/UL  
 ABS. MONOCYTES 0.7 0.0 - 1.0 K/UL  
 ABS. EOSINOPHILS 0.4 0.0 - 0.4 K/UL  
 ABS. BASOPHILS 0.1 0.0 - 0.1 K/UL  
 ABS. IMM. GRANS. 0.1 (H) 0.00 - 0.04 K/UL  
 DF AUTOMATED METABOLIC PANEL, COMPREHENSIVE Collection Time: 03/27/20  9:53 PM  
Result Value Ref Range Sodium 137 136 - 145 mmol/L  Potassium 4.0 3.5 - 5.1 mmol/L  
 Chloride 106 97 - 108 mmol/L  
 CO2 26 21 - 32 mmol/L Anion gap 5 5 - 15 mmol/L Glucose 156 (H) 65 - 100 mg/dL BUN 14 6 - 20 MG/DL Creatinine 0.80 0.70 - 1.30 MG/DL  
 BUN/Creatinine ratio 18 12 - 20 GFR est AA >60 >60 ml/min/1.73m2 GFR est non-AA >60 >60 ml/min/1.73m2 Calcium 8.0 (L) 8.5 - 10.1 MG/DL Bilirubin, total 0.6 0.2 - 1.0 MG/DL  
 ALT (SGPT) 27 12 - 78 U/L  
 AST (SGOT) 40 (H) 15 - 37 U/L Alk. phosphatase 91 45 - 117 U/L Protein, total 6.6 6.4 - 8.2 g/dL Albumin 2.5 (L) 3.5 - 5.0 g/dL Globulin 4.1 (H) 2.0 - 4.0 g/dL A-G Ratio 0.6 (L) 1.1 - 2.2 LIPASE Collection Time: 03/27/20  9:53 PM  
Result Value Ref Range Lipase 71 (L) 73 - 393 U/L MAGNESIUM Collection Time: 03/27/20  9:53 PM  
Result Value Ref Range Magnesium 1.8 1.6 - 2.4 mg/dL EKG, 12 LEAD, INITIAL Collection Time: 03/27/20 10:08 PM  
Result Value Ref Range Ventricular Rate 74 BPM  
 Atrial Rate 74 BPM  
 P-R Interval 166 ms  
 QRS Duration 90 ms Q-T Interval 428 ms QTC Calculation (Bezet) 475 ms Calculated P Axis 42 degrees Calculated R Axis -15 degrees Calculated T Axis 11 degrees Diagnosis Normal sinus rhythm Minimal voltage criteria for LVH, may be normal variant When compared with ECG of 07-APR-2018 06:54, No significant change was found PROTHROMBIN TIME + INR Collection Time: 03/27/20 10:20 PM  
Result Value Ref Range INR 1.2 (H) 0.9 - 1.1 Prothrombin time 12.0 (H) 9.0 - 11.1 sec TYPE + CROSSMATCH Collection Time: 03/27/20 10:45 PM  
Result Value Ref Range Crossmatch Expiration 03/30/2020 ABO/Rh(D) O POSITIVE Antibody screen NEG Unit number [id number] Blood component type  LR Unit division 00 Status of unit TRANSFUSED Crossmatch result Compatible Unit number [id number] Blood component type  LR Unit division 00 Status of unit ISSUED Crossmatch result Compatible CBC W/O DIFF Collection Time: 03/28/20  7:42 AM  
Result Value Ref Range WBC 25.5 (H) 4.1 - 11.1 K/uL  
 RBC 3.32 (L) 4.10 - 5.70 M/uL HGB 9.1 (L) 12.1 - 17.0 g/dL HCT 28.1 (L) 36.6 - 50.3 % MCV 84.6 80.0 - 99.0 FL  
 MCH 27.4 26.0 - 34.0 PG  
 MCHC 32.4 30.0 - 36.5 g/dL  
 RDW 16.8 (H) 11.5 - 14.5 % PLATELET 029 893 - 152 K/uL MPV 10.5 8.9 - 12.9 FL  
 NRBC 0.0 0  WBC ABSOLUTE NRBC 0.00 0.00 - 0.01 K/uL Recent Labs  
  03/28/20 
9249 03/27/20 
2153 WBC 25.5* 10.5 HGB 9.1* 8.5* HCT 28.1* 26.3*  
 188 Recent Labs  
  03/27/20 
2153   
K 4.0  
 CO2 26 BUN 14  
CREA 0.80 * CA 8.0*  
MG 1.8 Recent Labs  
  03/27/20 
2153 SGOT 40* AP 91  
TP 6.6 ALB 2.5*  
GLOB 4.1*  
LPSE 71* Recent Labs  
  03/27/20 
2220 INR 1.2* PTP 12.0* No results for input(s): FE, TIBC, PSAT, FERR in the last 72 hours. Lab Results Component Value Date/Time Folate 9.2 03/18/2010 11:26 AM  
  
No results for input(s): PH, PCO2, PO2 in the last 72 hours. No results for input(s): CPK, CKNDX, TROIQ in the last 72 hours. No lab exists for component: CPKMB Lab Results Component Value Date/Time Cholesterol, total 121 11/07/2019 10:23 AM  
 HDL Cholesterol 68 11/07/2019 10:23 AM  
 LDL, calculated 40 11/07/2019 10:23 AM  
 Triglyceride 66 11/07/2019 10:23 AM  
 CHOL/HDL Ratio 3.5 11/01/2010 07:07 AM  
 
No components found for: [place] Lab Results Component Value Date/Time  Color YELLOW/STRAW 04/07/2018 06:50 AM  
 Appearance CLEAR 04/07/2018 06:50 AM  
 Specific gravity 1.021 04/07/2018 06:50 AM  
 Specific gravity 1.020 02/28/2018 05:01 PM  
 pH (UA) 6.0 04/07/2018 06:50 AM  
 Protein NEGATIVE  04/07/2018 06:50 AM  
 Glucose >1,000 (A) 04/07/2018 06:50 AM  
 Ketone NEGATIVE  04/07/2018 06:50 AM  
 Bilirubin NEGATIVE  04/07/2018 06:50 AM  
 Urobilinogen 0.2 04/07/2018 06:50 AM  
 Nitrites NEGATIVE  04/07/2018 06:50 AM  
 Leukocyte Esterase NEGATIVE  04/07/2018 06:50 AM  
 Epithelial cells FEW 04/07/2018 06:50 AM  
 Bacteria NEGATIVE  04/07/2018 06:50 AM  
 WBC 0-4 04/07/2018 06:50 AM  
 RBC 0-5 04/07/2018 06:50 AM  
 
  
ROS: -CP, SOB, Dysuria, palpitations, cough. Assessment: 
 
Active Problems: 
  Acute upper GI bleed (3/28/2020) Variceal Bleed Cirrhosis Plan/Discussion:  
 
Hemodynamically done well after TIPS but still not out of the woods yet. Continue Sandostatin, IV abx and PPI. Follow hgb. We will follow up on him via computer rounds tomorrow. Please call or page us for any urgent issues that need immediate answers. Extubation as per CC medicine. Signed By: Kevan Franco.  Master Khan MD 
 
3/28/2020  1:33 PM

## 2020-03-28 NOTE — PROGRESS NOTES
Pharmacy Antimicrobial Automatic Dosing Protocol Ordered: CTX 2g IV q24h Indication: IAI Changing to 1g IV q24h per pharmacy protocol. Thanks, Rodo Angeles, Pharm. D

## 2020-03-28 NOTE — ED NOTES
IR at bedside to take patient. They do not want to wait for confirmation of central line by chest XR. They will get confirmation when pt is on table in OR.

## 2020-03-28 NOTE — ED NOTES
GI in ED. MD Michelle Schwarz at bedside to intubate patient. Size 8 tube and 24 at lip.   
 
20 etomidate given at 1158 pm 
100 rocuronium given at 1158 pm

## 2020-03-28 NOTE — ED NOTES
Assumed care of pt via EMS. Pt had bands placed for esophageal varices about 2-3 weeks ago at [facility]. Pt started having nausea and vomiting today and also some mid abdominal pain. Pt has a hernia in his RLQ which pt states could be contributing to pain. Pt is on daily 81 mg ASA. Pt had one episode of bright red vomit when EMS arrived to pt house. Pt lives at home with his son that takes care of him. Pt denies any SOB or CP. Pt resting comfortably on stretcher in position of comfort. Pt in no apparent distress at this time. Call bell within reach. Side rails x2. Connected to monitor x3. Pt a/o x4. Stretcher locked in lowest position. Pt aware of plan to await for MD/PA-C/NP assessment, and pt/family verbalizes understanding. Will continue to monitor pt condition.

## 2020-03-28 NOTE — ED NOTES
TRANSFER - OUT REPORT: 
 
Verbal report given to Shailesh Dave (name) on Marisa Warren  being transferred to CCU 2532(unit) for routine progression of care Report consisted of patients Situation, Background, Assessment and  
Recommendations(SBAR). Information from the following report(s) SBAR, ED Summary, [facility] and Recent Results was reviewed with the receiving nurse. Lines:  
Triple Lumen 03/28/20 Left Internal jugular (Active) Central Line Being Utilized No 3/28/2020  2:48 AM  
Site Assessment Clean, dry, & intact 3/28/2020  2:48 AM  
Dressing Status Clean, dry, & intact 3/28/2020  2:48 AM  
   
Peripheral IV 03/27/20 Right Wrist (Active) Site Assessment Clean, dry, & intact 3/27/2020  9:56 PM  
Phlebitis Assessment 0 3/27/2020  9:56 PM  
Infiltration Assessment 0 3/27/2020  9:56 PM  
Dressing Status Clean, dry, & intact 3/27/2020  9:56 PM  
Dressing Type Tape;Transparent 3/27/2020  9:56 PM  
Hub Color/Line Status Pink;Flushed;Patent 3/27/2020  9:56 PM  
   
Peripheral IV 03/27/20 Left Forearm (Active) Site Assessment Clean, dry, & intact 3/27/2020 10:51 PM  
Phlebitis Assessment 0 3/27/2020 10:51 PM  
Infiltration Assessment 0 3/27/2020 10:51 PM  
Dressing Status Clean, dry, & intact 3/27/2020 10:51 PM  
Dressing Type Tape;Transparent 3/27/2020 10:51 PM  
Hub Color/Line Status Green;Flushed;Patent 3/27/2020 10:51 PM  
  
 
Opportunity for questions and clarification was provided. Patient transported with: 
 Monitor O2 @ 10 liters Registered Nurse Respiratory Therapist

## 2020-03-28 NOTE — CONSULTS
Gastrointestinal Consult Subjective:  
 
Patient is a 68 y.o.  male who is being seen with hematemesis for the few hours. I have been asked to see the patient for the above mentioned issue by the ER physician. There are + presyncopal symptoms. Onset of symptoms was abrupt with gradually worsening course since that time. Patient c/o no abdominal pain. Symptoms improve with nothing specific. There is a history of eso varices recentlybanded. There is no history of alcohol use. Past history includes cirrhosis . Previous studies include upper endoscopy  recently showing The esophageal mucosa in the proximal  esophagus is normal.  
Starting in mid to distal esophagus 3 columns of grade III esophageal varices are noted. One varix had a non specific ulcer/erosion on it. The squamo-columnar junction is at 42 cm where the Z-line was noted. Using the [employer] 7 BS Banding shooter 6 bands were deployed with variceal decompression starting above the GE junction without complications 
  
Stomach: The gastric mucosa has mosaic patterned erythema in the body/fundus c/w portal hypertensive gastropathy. The fundus was found to be normal with no lesions noted on retroflexion. No gastric varices noted. The angularis is normal as well. 
  
Duodenum:  
The bulb and post bulbar mucosa is normal in appearance. The duodenal folds are normal. .  
 
Lab work up revealed a low hgb. oagulation profile shows an INR of 1.2. Patient Active Problem List  
 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  Obesity 03/04/2018  Insomnia 03/04/2018  Infected prosthesis of right hip ([address] 02/26/2018  PFO (patent foramen ovale) 07/26/2017  Systolic murmur 80/15/6992  Jessica-prosthetic fracture of femur following total hip arthroplasty 08/25/2016  Osteoarthritis of right hip 08/01/2016  Benign essential tremor  Diabetes mellitus type 2, uncontrolled ([address] 06/03/2013  Hypogonadism male 08/29/2012  Encounter for long-term (current) use of medications 07/29/2010  Osteoarthritis of hip  Depression  ED (erectile dysfunction) of organic origin  GERD (gastroesophageal reflux disease)  PUD (peptic ulcer disease)  Hinson's esophagus with esophagitis  HTN (hypertension) 03/17/2010  Hypercholesterolemia 03/17/2010 Past Medical History:  
Diagnosis Date  ADD (attention deficit disorder)  Anemia due to blood loss  Hinson's esophagus with esophagitis  Benign essential tremor  Cancer [facility]) 2012 [facility]  Cirrhosis ([address]  Depression  DJD (degenerative joint disease) of hip   
 bilat  DM (diabetes mellitus) ([address] 3/17/2010  ED (erectile dysfunction) of organic origin  Esophageal varices determined by endoscopy ([address]  Fall on or from sidewalk curb 9/24/2015  Femur fracture ([address]  Gastritis and duodenitis  GERD (gastroesophageal reflux disease)   
 resolved after discontinuing diclofenac  Hematuria 6/2016  High cholesterol 03/17/2010  
 pt denies 6/19  
 HTN (hypertension) 3/17/2010  Morbid obesity ([address]  Murmur, cardiac 2016  
 PFO (patent foramen ovale) 7/26/2017  PUD (peptic ulcer disease)  Shingles 6/2016 RESOLVING- NO RASH (HEAD)  Sleep apnea   
 doesnt use CPAP anymore Past Surgical History:  
Procedure Laterality Date  COLONOSCOPY N/A 6/18/2019 COLONOSCOPY AND EGD performed by Kenzie Baker MD at [facility] ENDOSCOPY  COLONOSCOPY,DIAGNOSTIC  6/18/2019  ENDOSCOPY, COLON, DIAGNOSTIC    
 HX CHOLECYSTECTOMY  1995  HX GI  1980  
 gastroplasty [address]  HX HIP REPLACEMENT Left  HX ORTHOPAEDIC Right 2011  
 rot cuff repair  HX ORTHOPAEDIC  2016  
 left broken femur [address]  HX VASCULAR ACCESS    
 picc line and removed after sepsis resolved [facility] ARTHROPLASTY  2010  
 right  TOTAL HIP ARTHROPLASTY  2016  
 left  UPPER GI ENDOSCOPY,BIOPSY  2019 Family History Problem Relation Age of Onset  Cancer Father   
     multiple myeloma  Stroke Father  Dementia Mother  No Known Problems Brother  COPD Brother  Cancer Maternal Grandmother COLON  Anesth Problems Neg Hx Social History Socioeconomic History  Marital status:  Spouse name: Not on file  Number of children: Not on file  Years of education: Not on file  Highest education level: Not on file Tobacco Use  Smoking status: Former Smoker Packs/day: 2.00 Years: 15.00 Pack years: 30.00 Last attempt to quit: 3/1/1979 Years since quittin.1  Smokeless tobacco: Never Used Substance and Sexual Activity  Alcohol use: No  
  Alcohol/week: 0.0 standard drinks  Drug use: No  
 Sexual activity: Never Current Facility-Administered Medications Medication Dose Route Frequency Provider Last Rate Last Dose  octreotide (SANDOSTATIN) 500 mcg in 0.9% sodium chloride 500 mL infusion  50 mcg/hr IntraVENous CONTINUOUS Omid Steele MD 50 mL/hr at 20 2245 50 mcg/hr at 20 2245  
 0.9% sodium chloride infusion 250 mL  250 mL IntraVENous PRN Livier Godinez MD      
 etomidate (AMIDATE) 2 mg/mL injection 20 mg  20 mg IntraVENous NOW Livier Godinez MD      
 rocuronium injection 100 mg  100 mg IntraVENous NOW Omid Steele MD      
 fentaNYL (PF) 1,500 mcg/30 mL (50 mcg/mL) infusion  0-200 mcg/hr IntraVENous TITRATE Livier Godinez MD      
 midazolam (VERSED) injection 2 mg  2 mg IntraVENous Q1H PRN Omid Steele MD      
 0.9% sodium chloride infusion 250 mL  250 mL IntraVENous PRN Omid Steele MD      
 sodium chloride 0.9 % bolus infusion 1,000 mL  1,000 mL IntraVENous ONCE Alex Dinh MD 1,000 mL/hr at 03/27/20 2345 1,000 mL at 03/27/20 2345 Current Outpatient Medications Medication Sig Dispense Refill  folic acid (FOLVITE) 1 mg tablet Take 1 Tab by mouth daily. 30 Tab 0  
 diclofenac (VOLTAREN) 1 % gel Apply 4 g to affected area four (4) times daily. 100 g 0  
 lisinopriL (PRINIVIL, ZESTRIL) 5 mg tablet TAKE 2 TABLETS BY MOUTH EVERY DAY 60 Tab 0  
 insulin glargine (Lantus Solostar U-100 Insulin) 100 unit/mL (3 mL) inpn 40 units twice daily 25 Adjustable Dose Pre-filled Pen Syringe 3  
 gabapentin (NEURONTIN) 300 mg capsule Take 2 Caps by mouth nightly. 180 Cap 1  
 butalbital-acetaminophen-caffeine (FIORICET, ESGIC) -40 mg per tablet Take 1 Tab by mouth every six (6) hours as needed for Headache. 30 Tab 1  cephALEXin (KEFLEX) 500 mg capsule Take 1 Cap by mouth two (2) times a day. 30 Cap 6  sertraline (ZOLOFT) 100 mg tablet TAKE 1.5 TABLETS DAILY 45 Tab 5  
 metFORMIN ER (GLUCOPHAGE XR) 500 mg tablet 2 tablets twice daily 360 Tab 3  
 spironolactone (ALDACTONE) 25 mg tablet Take 1 Tab by mouth daily. 30 Tab 1  
 ferrous sulfate 140 mg (45 mg iron) TbER ER tablet Take 1 Tab by mouth Daily (before breakfast). 90 Tab 1  
 furosemide (LASIX) 40 mg tablet Take 2 tabs by mouth daily x 1 week and then increase to 1 tab daily 45 Tab 0  
 ergocalciferol (VITAMIN D2) 50,000 unit capsule TAKE 1 CAPSULE EVERY 7 DAYS 12 Cap 2  
 atorvastatin (LIPITOR) 40 mg tablet Take 1 Tab by mouth daily. 90 Tab 3  
 glucose blood VI test strips (ASCENSIA AUTODISC VI, ONE TOUCH ULTRA TEST VI) strip Test blood sugar twice daily Diagnosis E 11.9. Can use [employer] Brand 200 Strip 4  
 glucose blood VI test strips (ACCU-CHEK SOWMYA PLUS TEST STRP) strip Test blood sugar twice daily Diagnosis E 11.9 200 Strip 5  primidone (MYSOLINE) 250 mg tablet TAKE 1 TABLET TWICE A  Tab 3  
 acetaminophen (TYLENOL) 500 mg tablet Take 2 Tabs by mouth every six (6) hours as needed for Pain. 30 Tab 0  
 omeprazole (PRILOSEC) 20 mg capsule TAKE 1 CAPSULE BY MOUTH EVERY DAY  12  Blood-Glucose Meter (ACCU-CHEK SOWMYA PLUS METER) misc Test blood sugar twice daily Diagnosis E 11.9 1 Each 1  Blood Glucose Control High&Low (ACCU-CHEK SOWMYA CONTROL SOLN) soln Test blood sugar twice daily Diagnosis E 11.9 1 Bottle 3  
 insulin regular (NOVOLIN R REGULAR U-100 INSULN) 100 unit/mL injection INJECT 7 UNITS UNDER THE SKIN DAILY (WITH DINNER). (Patient taking differently: INJECT 7 UNITS UNDER THE SKIN DAILY (WITH DINNER). only takes if BS is elevated.) 10 mL 0  
 Insulin Needles, Disposable, (BD ULTRA-FINE SHORT PEN NEEDLE) 31 gauge x 5/16\" ndle USE TO INJECT UNDER THE SKIN THREE TIMES A  Pen Needle 1  
 B.infantis-B.ani-B.long-B.bifi (PROBIOTIC 4X) 10-15 mg TbEC Take  by mouth.  magnesium 250 mg tab Take 500 mg by mouth daily.  melatonin tab tablet Take 10 mg by mouth nightly as needed.  potassium 99 mg tablet Take 99 mg by mouth two (2) times a day.  calcium citrate-vitamin D3 (CITRACAL + D) tablet Take 2 Tabs by mouth two (2) times a day.  aspirin 81 mg chewable tablet Take 1 Tab by mouth daily. (Patient taking differently: Take 81 mg by mouth every other day.) 30 Tab 11 Allergies Allergen Reactions  Nsaids (Non-Steroidal Anti-Inflammatory Drug) Other (comments) Hx of PUD and Hinson's Esophagus Review of Systems: A comprehensive review of systems was negative except for that written in the History of Present Illness. Objective:  
 
Patient Vitals for the past 8 hrs: 
 BP Temp Pulse Resp SpO2 Height Weight  
03/27/20 2245 146/53  69 12 95 %    
03/27/20 2139 130/48 99.1 °F (37.3 °C) 69 20 98 % 5' 10\" (1.778 m) 117.9 kg (260 lb) Physical Exam:  
Seen briefly 2/2 COVID 19 pandemic. Pale appearing. See ER MD exam note. Lab/Data Review: 
Recent Results (from the past 24 hour(s)) CBC WITH AUTOMATED DIFF  
 Collection Time: 03/27/20  9:53 PM  
Result Value Ref Range WBC 10.5 4.1 - 11.1 K/uL  
 RBC 3.15 (L) 4.10 - 5.70 M/uL HGB 8.5 (L) 12.1 - 17.0 g/dL HCT 26.3 (L) 36.6 - 50.3 % MCV 83.5 80.0 - 99.0 FL  
 MCH 27.0 26.0 - 34.0 PG  
 MCHC 32.3 30.0 - 36.5 g/dL  
 RDW 17.9 (H) 11.5 - 14.5 % PLATELET 428 102 - 537 K/uL MPV 10.8 8.9 - 12.9 FL  
 NRBC 0.0 0  WBC ABSOLUTE NRBC 0.00 0.00 - 0.01 K/uL NEUTROPHILS 75 32 - 75 % LYMPHOCYTES 12 12 - 49 % MONOCYTES 7 5 - 13 % EOSINOPHILS 4 0 - 7 % BASOPHILS 1 0 - 1 % IMMATURE GRANULOCYTES 1 (H) 0.0 - 0.5 % ABS. NEUTROPHILS 8.1 (H) 1.8 - 8.0 K/UL  
 ABS. LYMPHOCYTES 1.2 0.8 - 3.5 K/UL  
 ABS. MONOCYTES 0.7 0.0 - 1.0 K/UL  
 ABS. EOSINOPHILS 0.4 0.0 - 0.4 K/UL  
 ABS. BASOPHILS 0.1 0.0 - 0.1 K/UL  
 ABS. IMM. GRANS. 0.1 (H) 0.00 - 0.04 K/UL  
 DF AUTOMATED METABOLIC PANEL, COMPREHENSIVE Collection Time: 03/27/20  9:53 PM  
Result Value Ref Range Sodium 137 136 - 145 mmol/L Potassium 4.0 3.5 - 5.1 mmol/L Chloride 106 97 - 108 mmol/L  
 CO2 26 21 - 32 mmol/L Anion gap 5 5 - 15 mmol/L Glucose 156 (H) 65 - 100 mg/dL BUN 14 6 - 20 MG/DL Creatinine 0.80 0.70 - 1.30 MG/DL  
 BUN/Creatinine ratio 18 12 - 20 GFR est AA >60 >60 ml/min/1.73m2 GFR est non-AA >60 >60 ml/min/1.73m2 Calcium 8.0 (L) 8.5 - 10.1 MG/DL Bilirubin, total 0.6 0.2 - 1.0 MG/DL  
 ALT (SGPT) 27 12 - 78 U/L  
 AST (SGOT) 40 (H) 15 - 37 U/L Alk. phosphatase 91 45 - 117 U/L Protein, total 6.6 6.4 - 8.2 g/dL Albumin 2.5 (L) 3.5 - 5.0 g/dL Globulin 4.1 (H) 2.0 - 4.0 g/dL A-G Ratio 0.6 (L) 1.1 - 2.2 LIPASE Collection Time: 03/27/20  9:53 PM  
Result Value Ref Range Lipase 71 (L) 73 - 393 U/L MAGNESIUM Collection Time: 03/27/20  9:53 PM  
Result Value Ref Range Magnesium 1.8 1.6 - 2.4 mg/dL EKG, 12 LEAD, INITIAL Collection Time: 03/27/20 10:08 PM  
Result Value Ref Range  Ventricular Rate 74 BPM  
 Atrial Rate 74 BPM  
 P-R Interval 166 ms  
 QRS Duration 90 ms Q-T Interval 428 ms QTC Calculation (Bezet) 475 ms Calculated P Axis 42 degrees Calculated R Axis -15 degrees Calculated T Axis 11 degrees Diagnosis Normal sinus rhythm Minimal voltage criteria for LVH, may be normal variant When compared with ECG of 07-APR-2018 06:54, No significant change was found PROTHROMBIN TIME + INR Collection Time: 03/27/20 10:20 PM  
Result Value Ref Range INR 1.2 (H) 0.9 - 1.1 Prothrombin time 12.0 (H) 9.0 - 11.1 sec TYPE + CROSSMATCH Collection Time: 03/27/20 10:45 PM  
Result Value Ref Range Crossmatch Expiration 03/30/2020 ABO/Rh(D) O POSITIVE Antibody screen NEG Unit number [id number] Blood component type RC LR Unit division 00 Status of unit ISSUED Crossmatch result Compatible Assessment:  
GI bleeding w/ hematemesis Hx of esophageal varices Anemia Cirrhosis Plan:  
 
Transfuse with 1-2 units PRBCs. Upper endoscopy emergently. I discussed the risks, benefits and alternatives with the patient. We will keep the patient nil per os for the procedure. IV octreotide, IV Abx, pressors PRN and IV PPI. Case d/w ER MD and GI team. 
 
Unstable patient who needs an emergent intervention. Signed By: Sheeba Collado. Gatito Allen MD  
 March 27, 2020

## 2020-03-28 NOTE — PROGRESS NOTES
Emergent procedure in ED 14 with Dr Ashley Garcia. Consent signed by patients son. Patient bleeding and intubated. EGD performed and attempted banding of varies.  Patient refered to IR per Dr Patricia Gann

## 2020-03-28 NOTE — ED NOTES
Pt has vomited about 800 mL of blood since arrival to ED. Another IV to be started to get ready to hang blood. VSS.

## 2020-03-28 NOTE — PROCEDURES
[place] Office: [phone] Esophagogastroduodenoscopy Procedure Note Lyle Michael Ours 1950 
[id number] Indication:  Hematemesis : Sonia Ramsay MD 
 
Referring Provider:  Margarette Gordon MD 
 
Sedation:  General anesthesia Procedure Details: After detailed informed consent was obtained for the procedure, with all risks and benefits of procedure explained the patient was taken to the endoscopy suite and placed in the left lateral decubitus position. Following sequential administration of sedation as per above, the endoscope was inserted into the mouth and advanced under direct vision to stomach. A careful inspection was made as the gastroscope was withdrawn, including a retroflexed view of the proximal stomach; findings and interventions are described below. Findings: ETTnoted. Esophagus: The esophageal mucosa in the proximal esophagus is normal.  
The esophagus is full of fresh red blood with clots. Large distal possible varices are noted but the site of bleeding cannot be visualized. I used a flushing system with no success, with excessive washings. I then pulled the scope out and mounted a 7- shooter  on the scope. Multiple esophageal intubations were done as the scope kept getting clogged despite using the tubing with saline/water flushes. 4 bands deployed blindly to see if we could stop the bleeding but the amount of blood loss makes viewing next to impossible. We spend about 40-45  minutes without success. Stomach:  
Blood noted. Duodenum:  
NA Therapies:  Banding as above Specimen: none Complications:   None were encountered during the procedure. EBL:  Cannot specify Recommendations:  
 
-Stat IR consult for a TIPS procedure. I spoke with Dr Birgit Victoria. -PRBC transfusions. -Guarded prognosis. -continue IV octreotide Richard Khan MD 
3/28/2020  12:57 AM

## 2020-03-28 NOTE — ED PROVIDER NOTES
EMERGENCY DEPARTMENT HISTORY AND PHYSICAL EXAM 
 
 
Date: 3/27/2020 Patient Name: Moreno Beasley Patient Age and Sex: 68 y.o. male History of Presenting Illness Chief Complaint Patient presents with  Vomiting Pt had bands for esophageal varicies placed about 2- 3 weeks ago and today started having nausea and vomiting. Pt had one episode of bright red bloody vomit when EMS got to pt house.  Abdominal Pain  
  x today- pt has history of hernias History Provided By: Patient HPI: Moreno Beasley, is a 68 y.o. male whose medical history is noted below presents to the ED with one episode of hematemesis this evening, about an hour ago, currently feels lightheaded/presyncopal, no chest pain or shortness of breath. Patient is a history of non-alcoholic cirrhosis and has esophageal varices, 6 of which were banded approximately 3 weeks ago. He currently denies having any significant abdominal pain does report some mild epigastric discomfort after she had the vomiting episode. Last bowel movement was today and normal.  No fevers or chills. Pt denies any other alleviating or exacerbating factors. There are no other complaints, changes or physical findings at this time. Past Medical History:  
Diagnosis Date  ADD (attention deficit disorder)  Anemia due to blood loss  Hinson's esophagus with esophagitis  Benign essential tremor  Cancer [facility]) 2012 [facility]  Cirrhosis ([address]  Depression  DJD (degenerative joint disease) of hip   
 bilat  DM (diabetes mellitus) ([address] 3/17/2010  ED (erectile dysfunction) of organic origin  Esophageal varices determined by endoscopy ([address]  Fall on or from sidewalk curb 9/24/2015  Femur fracture ([address]  Gastritis and duodenitis  GERD (gastroesophageal reflux disease)   
 resolved after discontinuing diclofenac  Hematuria 6/2016  High cholesterol 03/17/2010  
 pt denies 6/19  HTN (hypertension) 3/17/2010  Morbid obesity ([address]  Murmur, cardiac 2016  
 PFO (patent foramen ovale) 7/26/2017  PUD (peptic ulcer disease)  Shingles 6/2016 RESOLVING- NO RASH (HEAD)  Sleep apnea   
 doesnt use CPAP anymore Past Surgical History:  
Procedure Laterality Date  COLONOSCOPY N/A 6/18/2019 COLONOSCOPY AND EGD performed by Avel Aguillon MD at \[facility]\"" ENDOSCOPY  COLONOSCOPY,DIAGNOSTIC  6/18/2019  ENDOSCOPY, COLON, DIAGNOSTIC    
 HX CHOLECYSTECTOMY  1995  HX GI  1980  
 gastroplasty [address]  HX HIP REPLACEMENT Left  HX ORTHOPAEDIC Right 2011  
 rot cuff repair  HX ORTHOPAEDIC  2016  
 left broken femur [address]  HX VASCULAR ACCESS    
 picc line and removed after sepsis resolved [address] [facility] ARTHROPLASTY  05/2010  
 right  TOTAL HIP ARTHROPLASTY  08/01/2016  
 left  UPPER GI ENDOSCOPY,BIOPSY  6/18/2019 PCP: Radha Graff MD 
 
Past History Past Medical History: 
Past Medical History:  
Diagnosis Date  ADD (attention deficit disorder)  Anemia due to blood loss  Hinson's esophagus with esophagitis  Benign essential tremor  Cancer [facility]) 2012 [address]  Cirrhosis ([address]  Depression  DJD (degenerative joint disease) of hip   
 bilat  DM (diabetes mellitus) ([address] 3/17/2010  ED (erectile dysfunction) of organic origin  Esophageal varices determined by endoscopy ([address]  Fall on or from sidewalk curb 9/24/2015  Femur fracture ([address]  Gastritis and duodenitis  GERD (gastroesophageal reflux disease)   
 resolved after discontinuing diclofenac  Hematuria 6/2016  High cholesterol 03/17/2010  
 pt denies 6/19  
 HTN (hypertension) 3/17/2010  Morbid obesity ([address]  Murmur, cardiac 2016  
 PFO (patent foramen ovale) 7/26/2017  PUD (peptic ulcer disease)  Shingles 6/2016 RESOLVING- NO RASH (HEAD)  Sleep apnea doesnt use CPAP anymore Past Surgical History: 
Past Surgical History:  
Procedure Laterality Date  COLONOSCOPY N/A 2019 COLONOSCOPY AND EGD performed by Beau Ledezma MD at hospitals ENDOSCOPY  COLONOSCOPY,DIAGNOSTIC  2019  ENDOSCOPY, COLON, DIAGNOSTIC    
 HX CHOLECYSTECTOMY    HX GI  1980  
 gastroplasty [address]  HX HIP REPLACEMENT Left  HX ORTHOPAEDIC Right   
 rot cuff repair  HX ORTHOPAEDIC  2016  
 left broken femur [address]  HX VASCULAR ACCESS    
 picc line and removed after sepsis resolved [address] [facility] ARTHROPLASTY  2010  
 right  TOTAL HIP ARTHROPLASTY  2016  
 left  UPPER GI ENDOSCOPY,BIOPSY  2019 Family History: 
Family History Problem Relation Age of Onset  Cancer Father   
     multiple myeloma  Stroke Father  Dementia Mother  No Known Problems Brother  COPD Brother  Cancer Maternal Grandmother COLON  Anesth Problems Neg Hx Social History: 
Social History Tobacco Use  Smoking status: Former Smoker Packs/day: 2.00 Years: 15.00 Pack years: 30.00 Last attempt to quit: 3/1/1979 Years since quittin.1  Smokeless tobacco: Never Used Substance Use Topics  Alcohol use: No  
  Alcohol/week: 0.0 standard drinks  Drug use: No  
 
 
Allergies: Allergies Allergen Reactions  Nsaids (Non-Steroidal Anti-Inflammatory Drug) Other (comments) Hx of PUD and Hinson's Esophagus Current Medications: 
Current Facility-Administered Medications on File Prior to Encounter Medication Dose Route Frequency Provider Last Rate Last Dose  [] 0.9% sodium chloride infusion  25 mL/hr IntraVENous CONTINUOUS [place], [place] Current Outpatient Medications on File Prior to Encounter Medication Sig Dispense Refill  folic acid (FOLVITE) 1 mg tablet Take 1 Tab by mouth daily.  30 Tab 0  
  diclofenac (VOLTAREN) 1 % gel Apply 4 g to affected area four (4) times daily. 100 g 0  
 lisinopriL (PRINIVIL, ZESTRIL) 5 mg tablet TAKE 2 TABLETS BY MOUTH EVERY DAY 60 Tab 0  
 insulin glargine (Lantus Solostar U-100 Insulin) 100 unit/mL (3 mL) inpn 40 units twice daily 25 Adjustable Dose Pre-filled Pen Syringe 3  
 gabapentin (NEURONTIN) 300 mg capsule Take 2 Caps by mouth nightly. 180 Cap 1  
 butalbital-acetaminophen-caffeine (FIORICET, ESGIC) -40 mg per tablet Take 1 Tab by mouth every six (6) hours as needed for Headache. 30 Tab 1  cephALEXin (KEFLEX) 500 mg capsule Take 1 Cap by mouth two (2) times a day. 30 Cap 6  sertraline (ZOLOFT) 100 mg tablet TAKE 1.5 TABLETS DAILY 45 Tab 5  
 metFORMIN ER (GLUCOPHAGE XR) 500 mg tablet 2 tablets twice daily 360 Tab 3  
 spironolactone (ALDACTONE) 25 mg tablet Take 1 Tab by mouth daily. 30 Tab 1  
 ferrous sulfate 140 mg (45 mg iron) TbER ER tablet Take 1 Tab by mouth Daily (before breakfast). 90 Tab 1  
 furosemide (LASIX) 40 mg tablet Take 2 tabs by mouth daily x 1 week and then increase to 1 tab daily 45 Tab 0  
 ergocalciferol (VITAMIN D2) 50,000 unit capsule TAKE 1 CAPSULE EVERY 7 DAYS 12 Cap 2  
 atorvastatin (LIPITOR) 40 mg tablet Take 1 Tab by mouth daily. 90 Tab 3  
 glucose blood VI test strips (ASCENSIA AUTODISC VI, ONE TOUCH ULTRA TEST VI) strip Test blood sugar twice daily Diagnosis E 11.9. Can use [employer] Brand 200 Strip 4  
 glucose blood VI test strips (ACCU-CHEK SOWMYA PLUS TEST STRP) strip Test blood sugar twice daily Diagnosis E 11.9 200 Strip 5  primidone (MYSOLINE) 250 mg tablet TAKE 1 TABLET TWICE A  Tab 3  
 acetaminophen (TYLENOL) 500 mg tablet Take 2 Tabs by mouth every six (6) hours as needed for Pain. 30 Tab 0  
 omeprazole (PRILOSEC) 20 mg capsule TAKE 1 CAPSULE BY MOUTH EVERY DAY  12  Blood-Glucose Meter (ACCU-CHEK SOWMYA PLUS METER) misc Test blood sugar twice daily Diagnosis E 11.9 1 Each 1  
  Blood Glucose Control High&Low (ACCU-CHEK SOWMYA CONTROL SOLN) soln Test blood sugar twice daily Diagnosis E 11.9 1 Bottle 3  
 insulin regular (NOVOLIN R REGULAR U-100 INSULN) 100 unit/mL injection INJECT 7 UNITS UNDER THE SKIN DAILY (WITH DINNER). (Patient taking differently: INJECT 7 UNITS UNDER THE SKIN DAILY (WITH DINNER). only takes if BS is elevated.) 10 mL 0  
 Insulin Needles, Disposable, (BD ULTRA-FINE SHORT PEN NEEDLE) 31 gauge x 5/16\" ndle USE TO INJECT UNDER THE SKIN THREE TIMES A  Pen Needle 1  
 B.infantis-B.ani-B.long-B.bifi (PROBIOTIC 4X) 10-15 mg TbEC Take  by mouth.  magnesium 250 mg tab Take 500 mg by mouth daily.  melatonin tab tablet Take 10 mg by mouth nightly as needed.  potassium 99 mg tablet Take 99 mg by mouth two (2) times a day.  calcium citrate-vitamin D3 (CITRACAL + D) tablet Take 2 Tabs by mouth two (2) times a day.  aspirin 81 mg chewable tablet Take 1 Tab by mouth daily. (Patient taking differently: Take 81 mg by mouth every other day.) 30 Tab 11 Review of Systems Review of Systems Constitutional: Negative for appetite change, chills and fever. Respiratory: Negative for cough, chest tightness and shortness of breath. Cardiovascular: Negative for chest pain, palpitations and leg swelling. Gastrointestinal: Positive for nausea and vomiting. Negative for abdominal distention, abdominal pain, blood in stool, constipation and diarrhea. Genitourinary: Negative for decreased urine volume, difficulty urinating, dysuria, flank pain, frequency and hematuria. Musculoskeletal: Negative for arthralgias, joint swelling, myalgias, neck pain and neck stiffness. Neurological: Positive for light-headedness. Negative for dizziness, weakness, numbness and headaches. Hematological: Negative for adenopathy. All other systems reviewed and are negative. Physical Exam  
 
Physical Exam 
Vitals signs and nursing note reviewed. Constitutional:   
   Appearance: He is obese. He is ill-appearing. He is not diaphoretic. HENT:  
   Mouth/Throat:  
   Mouth: Mucous membranes are moist.  
Eyes:  
   General: No scleral icterus. Extraocular Movements: Extraocular movements intact. Conjunctiva/sclera: Conjunctivae normal.  
   Pupils: Pupils are equal, round, and reactive to light. Neck: Musculoskeletal: Normal range of motion and neck supple. Vascular: No JVD. Cardiovascular:  
   Rate and Rhythm: Normal rate and regular rhythm. Pulses: Normal pulses. Heart sounds: Normal heart sounds. Pulmonary:  
   Effort: Pulmonary effort is normal.  
   Breath sounds: Normal breath sounds. Chest:  
   Chest wall: No tenderness. Abdominal:  
   General: Bowel sounds are normal. There is distension. Palpations: Abdomen is soft. Tenderness: There is abdominal tenderness in the epigastric area and periumbilical area. Hernia: A hernia (large hernia in left lower abdomen, chronic) is present. Musculoskeletal: Normal range of motion. General: No swelling or tenderness. Right lower leg: No edema. Left lower leg: No edema. Skin: 
   General: Skin is warm and dry. Capillary Refill: Capillary refill takes less than 2 seconds. Findings: No erythema or rash. Neurological:  
   General: No focal deficit present. Mental Status: He is alert. Mental status is at baseline. Cranial Nerves: Cranial nerves are intact. Motor: Motor function is intact. Psychiatric:     
   Mood and Affect: Mood normal.     
   Behavior: Behavior normal.  
 
 
 
Diagnostic Study Results Labs - I have personally reviewed and interpreted all laboratory results. Amira Levy MD, MSc Recent Results (from the past 24 hour(s)) CBC WITH AUTOMATED DIFF Collection Time: 03/27/20  9:53 PM  
Result Value Ref Range WBC 10.5 4.1 - 11.1 K/uL  
 RBC 3.15 (L) 4.10 - 5.70 M/uL HGB 8.5 (L) 12.1 - 17.0 g/dL HCT 26.3 (L) 36.6 - 50.3 % MCV 83.5 80.0 - 99.0 FL  
 MCH 27.0 26.0 - 34.0 PG  
 MCHC 32.3 30.0 - 36.5 g/dL  
 RDW 17.9 (H) 11.5 - 14.5 % PLATELET 957 487 - 849 K/uL MPV 10.8 8.9 - 12.9 FL  
 NRBC 0.0 0  WBC ABSOLUTE NRBC 0.00 0.00 - 0.01 K/uL NEUTROPHILS 75 32 - 75 % LYMPHOCYTES 12 12 - 49 % MONOCYTES 7 5 - 13 % EOSINOPHILS 4 0 - 7 % BASOPHILS 1 0 - 1 % IMMATURE GRANULOCYTES 1 (H) 0.0 - 0.5 % ABS. NEUTROPHILS 8.1 (H) 1.8 - 8.0 K/UL  
 ABS. LYMPHOCYTES 1.2 0.8 - 3.5 K/UL  
 ABS. MONOCYTES 0.7 0.0 - 1.0 K/UL  
 ABS. EOSINOPHILS 0.4 0.0 - 0.4 K/UL  
 ABS. BASOPHILS 0.1 0.0 - 0.1 K/UL  
 ABS. IMM. GRANS. 0.1 (H) 0.00 - 0.04 K/UL  
 DF AUTOMATED METABOLIC PANEL, COMPREHENSIVE Collection Time: 03/27/20  9:53 PM  
Result Value Ref Range Sodium 137 136 - 145 mmol/L Potassium 4.0 3.5 - 5.1 mmol/L Chloride 106 97 - 108 mmol/L  
 CO2 26 21 - 32 mmol/L Anion gap 5 5 - 15 mmol/L Glucose 156 (H) 65 - 100 mg/dL BUN 14 6 - 20 MG/DL Creatinine 0.80 0.70 - 1.30 MG/DL  
 BUN/Creatinine ratio 18 12 - 20 GFR est AA >60 >60 ml/min/1.73m2 GFR est non-AA >60 >60 ml/min/1.73m2 Calcium 8.0 (L) 8.5 - 10.1 MG/DL Bilirubin, total 0.6 0.2 - 1.0 MG/DL  
 ALT (SGPT) 27 12 - 78 U/L  
 AST (SGOT) 40 (H) 15 - 37 U/L Alk. phosphatase 91 45 - 117 U/L Protein, total 6.6 6.4 - 8.2 g/dL Albumin 2.5 (L) 3.5 - 5.0 g/dL Globulin 4.1 (H) 2.0 - 4.0 g/dL A-G Ratio 0.6 (L) 1.1 - 2.2 LIPASE Collection Time: 03/27/20  9:53 PM  
Result Value Ref Range Lipase 71 (L) 73 - 393 U/L MAGNESIUM Collection Time: 03/27/20  9:53 PM  
Result Value Ref Range Magnesium 1.8 1.6 - 2.4 mg/dL EKG, 12 LEAD, INITIAL Collection Time: 03/27/20 10:08 PM  
Result Value Ref Range Ventricular Rate 74 BPM  
 Atrial Rate 74 BPM  
 P-R Interval 166 ms  
 QRS Duration 90 ms Q-T Interval 428 ms QTC Calculation (Bezet) 475 ms Calculated P Axis 42 degrees Calculated R Axis -15 degrees Calculated T Axis 11 degrees Diagnosis Normal sinus rhythm Minimal voltage criteria for LVH, may be normal variant When compared with ECG of 07-APR-2018 06:54, No significant change was found PROTHROMBIN TIME + INR Collection Time: 03/27/20 10:20 PM  
Result Value Ref Range INR 1.2 (H) 0.9 - 1.1 Prothrombin time 12.0 (H) 9.0 - 11.1 sec TYPE + CROSSMATCH Collection Time: 03/27/20 10:45 PM  
Result Value Ref Range Crossmatch Expiration 03/30/2020 ABO/Rh(D) O POSITIVE Antibody screen NEG Unit number [id number] Blood component type RC LR Unit division 00 Status of unit ISSUED Crossmatch result Compatible Unit number [id number] Blood component type RC LR Unit division 00 Status of unit ISSUED Crossmatch result Compatible Radiologic Studies - I have personally reviewed and interpreted all imaging studies and agree with radiology interpretation and report. - Fatimah Lopez MD, MSc 
XR CHEST PORT Final Result IR INSERT TIPS HEPATIC SHUNT    (Results Pending) Medical Decision Making I am the first provider for this patient. Records Reviewed: I reviewed our electronic medical record system for any past medical records that were available that may contribute to the patient's current condition, including their PMH, surgical history, social and family history. Reviewed the nursing notes and vital signs from today's visit. Nursing notes will be reviewed as they become available in realtime while the pt has been in the ED. Fatimah Lopez MD Msc 
 
Vital Signs-Reviewed the patient's vital signs. Patient Vitals for the past 24 hrs: 
 Temp Pulse Resp BP SpO2  
03/28/20 0613 98.2 °F (36.8 °C) 82 24 (!) 144/34 100 % 03/28/20 0610  79 15 191/56 100 % 03/28/20 0607  73 10 (!) 64/28 100 % 03/28/20 0600  86 18 (!) 144/34 100 % 03/28/20 0558    (!) 202/55   
03/28/20 0545  79 16 138/40 100 % 03/28/20 0245  76 17 133/42 100 % 03/28/20 0242  80 15 112/41 100 % 03/28/20 0230  77 10 135/54 100 % 03/28/20 0222  73 10 (!) 107/34 100 % 03/28/20 0215  75 17 (!) 89/40 100 % 03/28/20 0213  73 18 (!) 99/39 99 % 03/28/20 0208  76 19 (!) 95/39 99 % 03/28/20 0200  73 17 (!) 75/39 98 % 03/28/20 0152  75 9 (!) 89/36 97 % 03/28/20 0151  76 10 (!) 82/32 98 % 03/28/20 0147 98.3 °F (36.8 °C) 82 20 118/47 98 % 03/28/20 0145  82 20 138/57 98 % 03/28/20 0130  82 11 150/59 100 % 03/28/20 0121 97.7 °F (36.5 °C) 83 19 145/56 99 % 03/28/20 0115  83  149/56 99 % 03/28/20 0100  83 22 145/56 99 % 03/28/20 0055 97.8 °F (36.6 °C) 84 23 158/60 99 % 03/28/20 0054  85 23 158/60 100 % 03/28/20 0045  85 23 166/70 100 % 03/28/20 0040 97.6 °F (36.4 °C) 86 22 169/71 100 % 03/28/20 0038  79 19  100 % 03/28/20 0030  80 21 181/83 100 % 03/28/20 0025 97.4 °F (36.3 °C) 77 22 181/63 100 % 03/28/20 0024  80 20 153/63 100 % 03/28/20 0009 97.5 °F (36.4 °C) 82 12 175/65 100 % 03/28/20 0001    99/42   
03/27/20 2355 97.9 °F (36.6 °C) 70 15 (!) 98/31 100 % 03/27/20 2353  70 18 (!) 98/31 99 % 03/27/20 2345  68 21 (!) 90/30 93 % 03/27/20 2340  67 19 (!) 94/30 94 % 03/27/20 2330  65 18 (!) 82/33 94 % 03/27/20 2315  62 18 (!) 88/37 95 % 03/27/20 2245  69 12 146/53 95 % 03/27/20 2139 99.1 °F (37.3 °C) 69 20 130/48 98 % ECG interpretation. This ECG has been viewed and interpreted by me personally. Aftab Hagen MD, Msc 
 
Provider Notes (Medical Decision Making):  
Current issues/events in the ED. More detailed note to follow: 
 
Hematemesis from hemodynamic instability due to esophageal varices Immediate intervention in the ED: Transfuse 2 units PRBCs. Upper endoscopy emergently - paged GI 
IV octreotide, IV ceftriaxone, IV protonix 
 
-- Intubated without complication GI arrived- endoscopy showed no source They recommend TIPS 
IR paged Patient needed central line prior to going to IR due to persistent hypotension Total resuscitation: 
2LNS plus 2U blood. Need to avoid over-resuscitation due to varices. Will need gentle pressure support. Norepi started. Will ensure BP does not go over 120 Went to IR and they completed procedure successfully Will be admitted to the hospital 
 
Intubation was for airway security only. Can be extubated as soon as safe. ED Course:  
Initial assessment performed. The patients presenting problems have been discussed, and they are in agreement with the care plan formulated and outlined with them. I have encouraged them to ask questions as they arise throughout their visit. Flaca Jimenes MD, am the attending of record for this patient encounter. ED Orders Placed/Medications Administered During ED Course:  
Orders Placed This Encounter  XR CHEST PORT  
 IR INSERT TIPS HEPATIC SHUNT  CBC WITH AUTOMATED DIFF  
 COMPREHENSIVE METABOLIC PANEL  
 LIPASE  MAGNESIUM  
 PROTHROMBIN TIME + INR [address]  TRANSFUSE PACKED RBC'S  VERIFY CONSENT HAS BEEN OBTAINED EGD ONE TIME STAT  TRANSFUSE PACKED RBC'S  
 VITAL SIGNS [address]  VITAL SIGNS  
 NURSING ASSESSMENT:  SPECIFY Assess for bleeding and hematoma formation with vital signs. CONTINUOUS STAT  
 NURSING ASSESSMENT:  SPECIFY Assess nausea and vomiting. Assess nausea and vomiting. CONTINUOUS STAT  
 NEUROLOGIC STATUS ASSESSMENT  PAIN ASSESSMENT ONE TIME STAT  
 NURSING ASSESSMENT:  SPECIFY Assess sedation. CONTINUOUS STAT  
 NURSING-MISCELLANEOUS: If bleeding or hematoma, apply direct pressure at puncture site and notify IR clinical staff. CONTINUOUS  
 IP CONSULT TO GASTROENTEROLOGY  IP CONSULT TO INTERVENTIONAL RADIOLOGY  
 OXYGEN CANNULA Liters per minute: 2; Indications for O2 therapy: HYPOXIA CONTINUOUS Routine  RT--OXIMETRY, CONTINUOUS  
 RT--MECHANICAL VENTILATOR  EKG, 12 LEAD, INITIAL  
 BLOOD BANK REQUEST FOR RED BLOOD CELLS  
 SALINE LOCK IV ONE TIME STAT  
 INSERT PERIPHERAL IV ONE TIME Routine  ondansetron (ZOFRAN) injection 4 mg  pantoprazole (PROTONIX) 40 mg in 0.9% sodium chloride 10 mL injection  octreotide (SANDOSTATIN) injection 50 mcg  octreotide (SANDOSTATIN) 500 mcg in 0.9% sodium chloride 500 mL infusion  cefTRIAXone (ROCEPHIN) 1 g in 0.9% sodium chloride (MBP/ADV) 50 mL  ondansetron (ZOFRAN) injection 4 mg  metoclopramide HCl (REGLAN) injection 10 mg  
 morphine injection 4 mg  sodium chloride 0.9 % bolus infusion 500 mL  0.9% sodium chloride infusion 250 mL  etomidate (AMIDATE) 2 mg/mL injection 20 mg  
 rocuronium injection 100 mg  
 fentaNYL (PF) 1,500 mcg/30 mL (50 mcg/mL) infusion  midazolam (VERSED) injection 2 mg  0.9% sodium chloride infusion 250 mL  sodium chloride 0.9 % bolus infusion 1,000 mL  0.9% sodium chloride infusion  sodium chloride (NS) flush 5-40 mL  sodium chloride (NS) flush 5-40 mL  midazolam (VERSED) injection 0.25-5 mg  
 naloxone (NARCAN) injection 0.4 mg  
 flumazeniL (ROMAZICON) 0.1 mg/mL injection 0.2 mg  
 simethicone (MYLICON) 53GW/8.3NP oral drops 80 mg  
 atropine injection 0.5 mg  
 EPINEPHrine (ADRENALIN) 0.1 mg/mL syringe 1 mg  PHENYLephrine (LIGIA-SYNEPHRINE) injection 0.1 mg  
 PHENYLephrine (LIGIA-SYNEPHRINE) injection 0.1 mg  
 heparinized saline 2 units/mL infusion 1,200 Units  iopamidoL (ISOVUE-370) 76 % injection 400 mL  lidocaine (XYLOCAINE) 20 mg/mL (2 %) injection 200 mg  
 DISCONTD: sodium chloride 0.9 % bolus infusion 1,000 mL  sodium chloride 0.9 % bolus infusion 1,000 mL  NOREPINephrine (LEVOPHED) 8 mg in 5% dextrose 250mL (32 mcg/mL) infusion  ketamine (KETALAR) 50 mg/mL injection 100 mg  
 0.9% sodium chloride infusion 250 mL  propofoL (DIPRIVAN) 10 mg/mL injection  midazolam (VERSED) injection 5 mg Medications  
octreotide (SANDOSTATIN) 500 mcg in 0.9% sodium chloride 500 mL infusion (50 mcg/hr IntraVENous Rate Change 3/28/20 0304) 0.9% sodium chloride infusion 250 mL (has no administration in time range)  
fentaNYL (PF) 1,500 mcg/30 mL (50 mcg/mL) infusion (200 mcg/hr IntraVENous Rate Change 3/28/20 0304) midazolam (VERSED) injection 2 mg (2 mg IntraVENous Given 3/28/20 0237)  
0.9% sodium chloride infusion 250 mL (has no administration in time range) 0.9% sodium chloride infusion (75 mL/hr IntraVENous New Bag 3/28/20 0615)  
sodium chloride (NS) flush 5-40 mL (has no administration in time range)  
sodium chloride (NS) flush 5-40 mL (has no administration in time range)  
midazolam (VERSED) injection 0.25-5 mg (has no administration in time range)  
naloxone [facility]) injection 0.4 mg (has no administration in time range)  
flumazeniL (ROMAZICON) 0.1 mg/mL injection 0.2 mg (has no administration in time range)  
simethicone (MYLICON) 98PC/5.6RL oral drops 80 mg (has no administration in time range)  
atropine injection 0.5 mg (has no administration in time range) EPINEPHrine (ADRENALIN) 0.1 mg/mL syringe 1 mg (has no administration in time range)  
heparinized saline 2 units/mL infusion 1,200 Units (has no administration in time range) iopamidoL (ISOVUE-370) 76 % injection 400 mL (has no administration in time range) lidocaine (XYLOCAINE) 20 mg/mL (2 %) injection 200 mg (has no administration in time range) NOREPINephrine (LEVOPHED) 8 mg in 5% dextrose 250mL (32 mcg/mL) infusion (10 mcg/min IntraVENous Rate Change 3/28/20 0608)  
0.9% sodium chloride infusion 250 mL (has no administration in time range)  
midazolam (VERSED) injection 5 mg (has no administration in time range)  
ondansetron (ZOFRAN) injection 4 mg (4 mg IntraVENous Given 3/27/20 2155) pantoprazole (PROTONIX) 40 mg in 0.9% sodium chloride 10 mL injection (40 mg IntraVENous Given 3/27/20 2238)  
octreotide (SANDOSTATIN) injection 50 mcg (50 mcg IntraVENous Given 3/27/20 2240) cefTRIAXone (ROCEPHIN) 1 g in 0.9% sodium chloride (MBP/ADV) 50 mL (0 g IntraVENous IV Completed 3/27/20 2352) ondansetron [facility]) injection 4 mg (4 mg IntraVENous Given 3/27/20 2238) metoclopramide HCl (REGLAN) injection 10 mg (10 mg IntraVENous Given 3/27/20 2238) morphine injection 4 mg (4 mg IntraVENous Given 3/27/20 2312)  
sodium chloride 0.9 % bolus infusion 500 mL (0 mL IntraVENous IV Completed 3/27/20 2307)  
etomidate (AMIDATE) 2 mg/mL injection 20 mg (20 mg IntraVENous Given 3/27/20 1158) rocuronium injection 100 mg (100 mg IntraVENous Given 3/27/20 1158)  
sodium chloride 0.9 % bolus infusion 1,000 mL (0 mL IntraVENous IV Completed 3/28/20 0106) PHENYLephrine (LIGIA-SYNEPHRINE) injection 0.1 mg (0.1 mg IntraVENous Given 3/27/20 1154) PHENYLephrine (LIGIA-SYNEPHRINE) injection 0.1 mg (0.1 mg IntraVENous Given 3/27/20 1147)  
sodium chloride 0.9 % bolus infusion 1,000 mL (0 mL IntraVENous IV Completed 3/28/20 0614)  
ketamine (KETALAR) 50 mg/mL injection 100 mg (100 mg IntraVENous Given 3/28/20 0214) propofoL (DIPRIVAN) 10 mg/mL injection (  Override pull for Anesthesia 3/28/20 0443) Consult Note: 
Amira Levy MD spoke with  Dr. Master Khan (gi), interventional radiology, anesthesia, hospitalist.,  
Discussed pt's hx, physical exam and available diagnostic and imaging results. Reviewed care plans. Agree with management and plan thus far. DISPOSITION: ADMIT Patient is being admitted to the hospital.  Their test results and reasons for admission have been discussed. The patient and/or available family express agreement with and understanding of the need to be admitted and their admission diagnosis. Thank you for resuming the care of this patient. Please don't hesitate to contact me in the emergency department if you  have any additional questions. Orestes Doe MD, MSc 
 
 
 
Diagnosis Clinical Impression: 1. Hemorrhagic shock ([facility] [address] 2. Acute upper GI bleed 3. Bleeding esophageal varices, unspecified esophageal varices type ([address] CRITICAL CARE NOTE : 
 
IMPENDING DETERIORATION -Airway and Cardiovascular ASSOCIATED RISK FACTORS - Hypotension, Shock and Bleeding MANAGEMENT- Bedside Assessment and Supervision of Care INTERPRETATION -  Xrays, ECG, Blood Pressure and Cardiac Output Measures INTERVENTIONS - hemodynamic mngmt, vent mngmt and vascular control CASE REVIEW - Hospitalist, Medical Sub-Specialist, Nursing and Family TREATMENT RESPONSE -Stable PERFORMED BY - Self NOTES   : 
 
I have spent 87 minutes of critical care time involved in lab review, consultations with specialist, family decision- making, bedside attention and documentation. During this entire length of time I was immediately available to the patient . Critical Care: The reason for providing this level of medical care for this critically ill patient was due to a critical illness that impaired one or more vital organ systems, such that there was a high probability of imminent or life threatening deterioration in the patient's condition. This care involved high complexity decision making to assess, manipulate, and support vital system functions, to treat this degree of vital organ system failure, and to prevent further life threatening deterioration of the patients condition. Orestes Doe MD 
 
Procedure Note - Orotracheal Intubation:  
Start time 11:50 PM End time: 00:7am 
Performed by: aaron Indication for procedure: airway compromise RSI performed. The patient was sedated with 20mg etomidate and paralyzed with 100mg rocuronium (Zemuron) and orotracheally intubated with a 8.0 cuffed Slovenian endotracheal tube using a 4 blade with direct visualization. Tube was advanced to 24 cm at the lip.  ETT location confirmed by bilateral, symmetric breath sounds, good end-tidal CO2 detector color change , no breath sounds over stomach, bulb aspirator expands promptly and chest x-ray visualization. Number of attempts: 1 Complications: none RSI was used. The procedure took 16-30 minutes, and pt tolerated well. Procedure Note - Central Line Placement time start 2:35am. End time 2:52am 
Performed by: dr. Simin Kiser Immediately prior to the procedure, the patient was reevaluated and found suitable for the planned procedure and any planned medications. Immediately prior to the procedure a time out was called to verify the correct patient, procedure, equipment, staff, and marking as appropriate. Area was cleansed with Betadine and anesthetized with 3mLs of 1% lidocaine. Prepped and draped in sterile fashion. Landmarks identified. 7 gauge needle with triple lumen catheter was inserted into pt's Left, Internal Jugular Vein with ultrasound guidance. Line sutured in place; sterile dressing applied. Position: Trendelenburg Number of attempts: 1 Estimated blood loss: minimal 
The procedure took 16-30 minutes, and pt tolerated well. Attestation: 
I personally performed the services described in this documentation on this date 3/27/2020 for patient Brandie Santos. Natalia Mathis MD 
 
Please note that this dictation was completed with mGenerator, the [email or web address] voice recognition software. Quite often unanticipated grammatical, syntax, homophones, and other interpretive errors are inadvertently transcribed by the computer software. Please disregard these errors. Please excuse any errors that have escaped final proofreading.

## 2020-03-28 NOTE — ED NOTES
Bedside shift change report given to [address]  (oncoming nurse) by Iwona Snow RN (offgoing nurse). Report included the following information SBAR, ED Summary, Intake/Output and Recent Results.

## 2020-03-28 NOTE — PROGRESS NOTES
0303: Patient transported to IR from ER 14 on Ambu-bag 
0316: Paient placed back on 840 ventilator. Patient tolerated well.

## 2020-03-28 NOTE — PROGRESS NOTES
Name of procedure: TIPS Complications, if any, r/t procedure: none Sedation: Per CRNA 
 
VS : Stable Post Procedure Care Needed/order sets in connectcare: see orders Procedure completed. VSS. Dressing to site D&I. No bleeding or hematoma noted to site. Bedside report given to ER RN in angio. Pt taken back to room by ER RN, CRNA, ER tech, and RT. ER RN to assume care of pt. Daughter called and given updates.

## 2020-03-28 NOTE — H&P
Hospitalist Admission Note NAME: Brennan Felty :  1950 MRN:  [id number] Date/Time:  3/28/2020 8:48 AM 
 
Patient PCP: Di Pierre MD 
______________________________________________________________________ Given the patient's current clinical presentation, I have a high level of concern for decompensation if discharged from the emergency department. Complex decision making was performed, which includes reviewing the patient's available past medical records, laboratory results, and x-ray films. My assessment of this patient's clinical condition and my plan of care is as follows. Assessment / Plan: 
Admit to ICU Upper GI bleed sec varices Continue monitor hemoglobin/ hematocrit and further episodes of GI bleed. Continue with the octreotide Aidan Ortiz Also give high-dose Protonix 40 mg IV twice daily Follow GI and  interventional radiology recommendations DM TYPE 2 . Sliding scale and accuchecks . JACOB Code Status: DNR Surrogate Decision Maker: DVT Prophylaxis: SCD Subjective: CHIEF COMPLAINT:  Nausea /hematemesis prior coming to ER HISTORY OF PRESENT ILLNESS:    
Moira Fortune is a 68 y.o. } male who presents with with history of Darnelle Brennen presents with upper GI bleed. Patient underwent urgent EGD which was inconclusive. The patient underwent upper EGD  3 weeks back and had variceal banding. In ER patient's condition got worse and and patient underwent TIPS procedure. Currently patient is intubated on vasopressors and octreotide unable to give any history We were asked to admit for work up and evaluation of the above problems. Past Medical History:  
Diagnosis Date  ADD (attention deficit disorder)  Anemia due to blood loss  Hinson's esophagus with esophagitis  Benign essential tremor  Cancer [facility]  [facility]  Cirrhosis ([facility] [address]  Depression  DJD (degenerative joint disease) of hip   
 bilat  DM (diabetes mellitus) ([facility] [address] 3/17/2010  ED (erectile dysfunction) of organic origin  Esophageal varices determined by [facility]oscopy ([facility]  Fall on or from sidewalk curb 2015  Femur fracture ([facility] [address]  Gastritis and duodenitis  GERD (gastroesophageal reflux disease)   
 resolved after discontinuing diclofenac  Hematuria 2016  High cholesterol 2010  
 pt denies   
 HTN (hypertension) 3/17/2010  Morbid obesity ([facility] [address]  Murmur, cardiac   
 PFO (patent foramen ovale) 2017  PUD (peptic ulcer disease)  Shingles 2016 RESOLVING- NO RASH (HEAD)  Sleep apnea   
 doesnt use CPAP anymore Past Surgical History:  
Procedure Laterality Date  COLONOSCOPY N/A 2019 COLONOSCOPY AND EGD performed by Arlet Queen MD at [facility] ENDOSCOPY  COLONOSCOPY,DIAGNOSTIC  2019  ENDOSCOPY, COLON, DIAGNOSTIC    
 HX CHOLECYSTECTOMY    HX GI    
 gastroplasty [address]  HX HIP REPLACEMENT Left  HX ORTHOPAEDIC Right   
 rot cuff repair  HX ORTHOPAEDIC  2016  
 left broken femur [address]  HX VASCULAR ACCESS    
 picc line and removed after sepsis resolved [address] [facility] ARTHROPLASTY  2010  
 right  TOTAL HIP ARTHROPLASTY  2016  
 left  UPPER GI ENDOSCOPY,BIOPSY  2019 Social History Tobacco Use  Smoking status: Former Smoker Packs/day: 2.00 Years: 15.00 Pack years: 30.00 Last attempt to quit: 3/1/1979 Years since quittin.1  Smokeless tobacco: Never Used Substance Use Topics  Alcohol use: No  
  Alcohol/week: 0.0 standard drinks Family History Problem Relation Age of Onset  Cancer Father   
     multiple myeloma  Stroke Father  Dementia Mother  No Known Problems Brother  COPD Brother  Cancer Maternal Grandmother COLON  Anesth Problems Neg Hx Allergies Allergen Reactions  Nsaids (Non-Steroidal Anti-Inflammatory Drug) Other (comments) Hx of PUD and Hinson's Esophagus Prior to Admission medications Medication Sig Start Date End Date Taking? Authorizing Provider  
folic acid (FOLVITE) 1 mg tablet Take 1 Tab by mouth daily. 3/26/20   Edil Desai NP  
diclofenac (VOLTAREN) 1 % gel Apply 4 g to affected area four (4) times daily. 3/26/20   Edil Desai NP  
lisinopriL (PRINIVIL, ZESTRIL) 5 mg tablet TAKE 2 TABLETS BY MOUTH EVERY DAY 3/26/20   Edil Desai NP  
insulin glargine (Lantus Solostar U-100 Insulin) 100 unit/mL (3 mL) inpn 40 units twice daily 3/26/20   Sandip Lockett MD  
gabapentin (NEURONTIN) 300 mg capsule Take 2 Caps by mouth nightly. 3/16/20   Phoebe Limon MD  
butalbital-acetaminophen-caffeine (FIORICET, ESGIC) -40 mg per tablet Take 1 Tab by mouth every six (6) hours as needed for Headache. 3/6/20   Phoebe Limon MD  
cephALEXin (KEFLEX) 500 mg capsule Take 1 Cap by mouth two (2) times a day. 2/24/20   Phoebe Limon MD  
sertraline (ZOLOFT) 100 mg tablet TAKE 1.5 TABLETS DAILY 2/21/20   Phoebe Limon MD  
metFORMIN ER (GLUCOPHAGE XR) 500 mg tablet 2 tablets twice daily 2/13/20   Sandip Lockett MD  
spironolactone (ALDACTONE) 25 mg tablet Take 1 Tab by mouth daily. 2/4/20   hPoebe Limon MD  
ferrous sulfate 140 mg (45 mg iron) TbER ER tablet Take 1 Tab by mouth Daily (before breakfast). 2/4/20   Phoebe Limon MD  
furosemide (LASIX) 40 mg tablet Take 2 tabs by mouth daily x 1 week and then increase to 1 tab daily 1/27/20   Phoebe Limon MD  
ergocalciferol (VITAMIN D2) 50,000 unit capsule TAKE 1 CAPSULE EVERY 7 DAYS 1/6/20   Phoebe Limon MD  
atorvastatin (LIPITOR) 40 mg tablet Take 1 Tab by mouth daily. 1/4/20   Sandip Lockett MD  
glucose blood VI test strips (ASCENSIA AUTODISC VI, ONE TOUCH ULTRA TEST VI) strip Test blood sugar twice daily Diagnosis E 11.9.  Can use DeltabMenur Brand 12/13/19   Alejandro Coronel MD  
glucose blood VI test strips (ACCU-CHEK SOWMYA PLUS TEST STRP) strip Test blood sugar twice daily Diagnosis E 11.9 12/5/19   Alejandro Coronel MD  
primidone (MYSOLINE) 250 mg tablet TAKE 1 TABLET TWICE A DAY 11/7/19   Alejandro Coronel MD  
acetaminophen (TYLENOL) 500 mg tablet Take 2 Tabs by mouth every six (6) hours as needed for Pain. 10/7/19   Reyes Mast MD  
omeprazole (PRILOSEC) 20 mg capsule TAKE 1 CAPSULE BY MOUTH EVERY DAY 7/15/19   Provider, Historical  
Blood-Glucose Meter (ACCU-CHEK SOWMYA PLUS METER) misc Test blood sugar twice daily Diagnosis E 11.9 7/9/19   Alejandro Coronel MD  
Blood Glucose Control High&Low (ACCU-CHEK SOWMYA CONTROL SOLN) soln Test blood sugar twice daily Diagnosis E 11.9 7/9/19   Alejandro Coronel MD  
insulin regular (NOVOLIN R REGULAR U-100 INSULN) 100 unit/mL injection INJECT 7 UNITS UNDER THE SKIN DAILY (WITH DINNER). Patient taking differently: INJECT 7 UNITS UNDER THE SKIN DAILY (WITH DINNER). only takes if BS is elevated. 7/9/19   Alejandro Coronel MD  
Insulin Needles, Disposable, (BD ULTRA-FINE SHORT PEN NEEDLE) 31 gauge x 5/16\" ndle USE TO INJECT UNDER THE SKIN THREE TIMES A DAY 7/9/19   Alejandro Coronel MD  
B.infantis-B.ani-B.long-B.bifi (PROBIOTIC 4X) 10-15 mg TbEC Take  by mouth. Provider, Historical  
magnesium 250 mg tab Take 500 mg by mouth daily. Provider, Historical  
melatonin tab tablet Take 10 mg by mouth nightly as needed. Provider, Historical  
potassium 99 mg tablet Take 99 mg by mouth two (2) times a day. Provider, Historical  
calcium citrate-vitamin D3 (CITRACAL + D) tablet Take 2 Tabs by mouth two (2) times a day. Provider, Historical  
aspirin 81 mg chewable tablet Take 1 Tab by mouth daily. Patient taking differently: Take 81 mg by mouth every other day. 2/2/17   Alejandro Coronel MD  
 
 
REVIEW OF SYSTEMS:    
I am not able to complete the review of systems because: The patient is intubated and sedated The patient has altered mental status due to his acute medical problems The patient has baseline aphasia from prior stroke(s) The patient has baseline dementia and is not reliable historian The patient is in acute medical distress and unable to provide information System could not be done because of intubation:   
    
 
Objective: VITALS:   
Visit Vitals BP (!) 121/34 Pulse 68 Temp 98.2 °F (36.8 °C) Resp 9 Ht 5' 10\" (1.778 m) Wt 117.9 kg (260 lb) SpO2 100% BMI 37.31 kg/m² PHYSICAL EXAM: 
 
General:    INTUBATED and sedated HEENT: icteric sclerae, pink conjunctivae Dried blood noted Neck:  Supple Lungs:   Clear to auscultation bilaterally. No Wheezing or Rhonchi. No rales. [place] Heart:   Regular  rhythm,  No  murmur   No edema Abdomen:    distended. Bowel sounds normal 
Extremities: No cyanosis. No clubbing,   
  Skin turgor normal, Capillary refill normal, Radial dial pulse 2+ Skin:      pale. Jaundiced  No rashes Psych:  sedated 
 
_______________________________________________________________________ Care Plan discussed with: 
  Comments Patient Family RN Care Manager Consultant:     
_______________________________________________________________________ Expected  Disposition:  
Home with Family HH/PT/OT/RN   
SNF/LTC   
SHAYNA   
________________________________________________________________________ TOTAL TIME:  45 Minutes Critical Care Provided     Minutes non procedure based Comments Reviewed previous records  
>50% of visit spent in counseling and coordination of care  Discussion with patient and/or family and questions answered 
  
 
________________________________________________________________________ Signed: Jaime Coats MD 
 
Procedures: see electronic medical records for all procedures/Xrays and details which were not copied into this note but were reviewed prior to creation of Plan. LAB DATA REVIEWED:   
Recent Results (from the past 24 hour(s)) CBC WITH AUTOMATED DIFF Collection Time: 03/27/20  9:53 PM  
Result Value Ref Range WBC 10.5 4.1 - 11.1 K/uL  
 RBC 3.15 (L) 4.10 - 5.70 M/uL HGB 8.5 (L) 12.1 - 17.0 g/dL HCT 26.3 (L) 36.6 - 50.3 % MCV 83.5 80.0 - 99.0 FL  
 MCH 27.0 26.0 - 34.0 PG  
 MCHC 32.3 30.0 - 36.5 g/dL  
 RDW 17.9 (H) 11.5 - 14.5 % PLATELET 704 506 - 674 K/uL MPV 10.8 8.9 - 12.9 FL  
 NRBC 0.0 0  WBC ABSOLUTE NRBC 0.00 0.00 - 0.01 K/uL NEUTROPHILS 75 32 - 75 % LYMPHOCYTES 12 12 - 49 % MONOCYTES 7 5 - 13 % EOSINOPHILS 4 0 - 7 % BASOPHILS 1 0 - 1 % IMMATURE GRANULOCYTES 1 (H) 0.0 - 0.5 % ABS. NEUTROPHILS 8.1 (H) 1.8 - 8.0 K/UL  
 ABS. LYMPHOCYTES 1.2 0.8 - 3.5 K/UL  
 ABS. MONOCYTES 0.7 0.0 - 1.0 K/UL  
 ABS. EOSINOPHILS 0.4 0.0 - 0.4 K/UL  
 ABS. BASOPHILS 0.1 0.0 - 0.1 K/UL  
 ABS. IMM. GRANS. 0.1 (H) 0.00 - 0.04 K/UL  
 DF AUTOMATED METABOLIC PANEL, COMPREHENSIVE Collection Time: 03/27/20  9:53 PM  
Result Value Ref Range Sodium 137 136 - 145 mmol/L Potassium 4.0 3.5 - 5.1 mmol/L Chloride 106 97 - 108 mmol/L  
 CO2 26 21 - 32 mmol/L Anion gap 5 5 - 15 mmol/L Glucose 156 (H) 65 - 100 mg/dL BUN 14 6 - 20 MG/DL Creatinine 0.80 0.70 - 1.30 MG/DL  
 BUN/Creatinine ratio 18 12 - 20 GFR est AA >60 >60 ml/min/1.73m2 GFR est non-AA >60 >60 ml/min/1.73m2 Calcium 8.0 (L) 8.5 - 10.1 MG/DL Bilirubin, total 0.6 0.2 - 1.0 MG/DL  
 ALT (SGPT) 27 12 - 78 U/L  
 AST (SGOT) 40 (H) 15 - 37 U/L Alk. phosphatase 91 45 - 117 U/L Protein, total 6.6 6.4 - 8.2 g/dL Albumin 2.5 (L) 3.5 - 5.0 g/dL Globulin 4.1 (H) 2.0 - 4.0 g/dL A-G Ratio 0.6 (L) 1.1 - 2.2 LIPASE Collection Time: 03/27/20  9:53 PM  
Result Value Ref Range Lipase 71 (L) 73 - 393 U/L MAGNESIUM  
 Collection Time: 03/27/20  9:53 PM  
Result Value Ref Range Magnesium 1.8 1.6 - 2.4 mg/dL EKG, 12 LEAD, INITIAL Collection Time: 03/27/20 10:08 PM  
Result Value Ref Range Ventricular Rate 74 BPM  
 Atrial Rate 74 BPM  
 P-R Interval 166 ms  
 QRS Duration 90 ms Q-T Interval 428 ms QTC Calculation (Bezet) 475 ms Calculated P Axis 42 degrees Calculated R Axis -15 degrees Calculated T Axis 11 degrees Diagnosis Normal sinus rhythm Minimal voltage criteria for LVH, may be normal variant When compared with ECG of 07-APR-2018 06:54, No significant change was found PROTHROMBIN TIME + INR Collection Time: 03/27/20 10:20 PM  
Result Value Ref Range INR 1.2 (H) 0.9 - 1.1 Prothrombin time 12.0 (H) 9.0 - 11.1 sec TYPE + CROSSMATCH Collection Time: 03/27/20 10:45 PM  
Result Value Ref Range Crossmatch Expiration 03/30/2020 ABO/Rh(D) O POSITIVE Antibody screen NEG Unit number [id number] Blood component type [facility] Unit division 00 Status of unit TRANSFUSED Crossmatch result Compatible Unit number [id number] Blood component type [facility] Unit division 00 Status of unit ISSUED Crossmatch result Compatible CBC W/O DIFF Collection Time: 03/28/20  7:42 AM  
Result Value Ref Range WBC 25.5 (H) 4.1 - 11.1 K/uL  
 RBC 3.32 (L) 4.10 - 5.70 M/uL HGB 9.1 (L) 12.1 - 17.0 g/dL HCT 28.1 (L) 36.6 - 50.3 % MCV 84.6 80.0 - 99.0 FL  
 MCH 27.4 26.0 - 34.0 PG  
 MCHC 32.4 30.0 - 36.5 g/dL  
 RDW 16.8 (H) 11.5 - 14.5 % PLATELET 189 937 - 528 K/uL MPV 10.5 8.9 - 12.9 FL  
 NRBC 0.0 0  WBC ABSOLUTE NRBC 0.00 0.00 - 0.01 K/uL

## 2020-03-28 NOTE — PROCEDURES
Interventional Radiology 3/28/2020 Informed consent obtained Diagnosis: variceal hemorrage Procedure(s): TIPS without diff Pressures Pre: atrium 9/5 mean 6 
pv 30 
 post  
pv 17 
ra 10 Specimens removed:  none Complications: None Primary Physician: Rio Aranda MD 
 
Recomendations: none Discharge Disposition: icu Full dictated report to follow Signed By: Rio Aranda MD

## 2020-03-28 NOTE — ED NOTES
Consent for central line was completed and signed by patient prior to intubation. MD did not place central line at that time d/t patient BP improving. Consent placed in pt chart in case future central line is needed.

## 2020-03-28 NOTE — ANESTHESIA PREPROCEDURE EVALUATION
Anesthetic History No history of anesthetic complications Review of Systems / Medical History Patient summary reviewed, nursing notes reviewed and pertinent labs reviewed Pulmonary Sleep apnea Neuro/Psych Psychiatric history Comments: Depression Benign essential tremor  Cardiovascular Hypertension Exercise tolerance: >4 METS 
  
GI/Hepatic/Renal 
  
GERD 
 
 
PUD and liver disease Comments: Hinson's esophagus with esophagitis  Endo/Other Diabetes Obesity, arthritis and anemia Other Findings Comments: Portal htn with bleeding Hgb 8.5 DJD Hx BCCA 
ADD (attention deficit disorder) Physical Exam 
 
Airway Mallampati: II 
TM Distance: > 6 cm Neck ROM: normal range of motion Mouth opening: Normal 
Intubated Cardiovascular Regular rate and rhythm,  S1 and S2 normal,  no murmur, click, rub, or gallop Dental 
No notable dental hx Pulmonary Breath sounds clear to auscultation Abdominal 
GI exam deferred Other Findings Anesthetic Plan ASA: 4, emergent Anesthesia type: total IV anesthesia and general 
 
Monitoring Plan: BIS Induction: Intravenous Anesthetic plan and risks discussed with: Patient

## 2020-03-29 ENCOUNTER — APPOINTMENT (OUTPATIENT)
Dept: GENERAL RADIOLOGY | Age: 70
DRG: 405 | End: 2020-03-29
Attending: INTERNAL MEDICINE
Payer: MEDICARE

## 2020-03-29 LAB
ALBUMIN SERPL-MCNC: 1.9 G/DL (ref 3.5–5)
ALBUMIN/GLOB SERPL: 0.7 {RATIO} (ref 1.1–2.2)
ALP SERPL-CCNC: 106 U/L (ref 45–117)
ALT SERPL-CCNC: 148 U/L (ref 12–78)
AMMONIA PLAS-SCNC: 200 UMOL/L
ANION GAP SERPL CALC-SCNC: 4 MMOL/L (ref 5–15)
ARTERIAL PATENCY WRIST A: YES
AST SERPL-CCNC: 321 U/L (ref 15–37)
BASE DEFICIT BLD-SCNC: 1 MMOL/L
BASOPHILS # BLD: 0.1 K/UL (ref 0–0.1)
BASOPHILS NFR BLD: 1 % (ref 0–1)
BDY SITE: ABNORMAL
BILIRUB SERPL-MCNC: 3.8 MG/DL (ref 0.2–1)
BUN SERPL-MCNC: 20 MG/DL (ref 6–20)
BUN/CREAT SERPL: 23 (ref 12–20)
CA-I BLD-SCNC: 1.16 MMOL/L (ref 1.12–1.32)
CALCIUM SERPL-MCNC: 7.1 MG/DL (ref 8.5–10.1)
CHLORIDE SERPL-SCNC: 111 MMOL/L (ref 97–108)
CO2 SERPL-SCNC: 25 MMOL/L (ref 21–32)
CREAT SERPL-MCNC: 0.88 MG/DL (ref 0.7–1.3)
DIFFERENTIAL METHOD BLD: ABNORMAL
EOSINOPHIL # BLD: 0.9 K/UL (ref 0–0.4)
EOSINOPHIL NFR BLD: 6 % (ref 0–7)
ERYTHROCYTE [DISTWIDTH] IN BLOOD BY AUTOMATED COUNT: 17.2 % (ref 11.5–14.5)
GAS FLOW.O2 O2 DELIVERY SYS: ABNORMAL L/MIN
GAS FLOW.O2 SETTING OXYMISER: 12 BPM
GLOBULIN SER CALC-MCNC: 2.9 G/DL (ref 2–4)
GLUCOSE BLD STRIP.AUTO-MCNC: 215 MG/DL (ref 65–100)
GLUCOSE BLD STRIP.AUTO-MCNC: 227 MG/DL (ref 65–100)
GLUCOSE BLD STRIP.AUTO-MCNC: 236 MG/DL (ref 65–100)
GLUCOSE BLD STRIP.AUTO-MCNC: 256 MG/DL (ref 65–100)
GLUCOSE SERPL-MCNC: 237 MG/DL (ref 65–100)
HCO3 BLD-SCNC: 24 MMOL/L (ref 22–26)
HCT VFR BLD AUTO: 20.5 % (ref 36.6–50.3)
HCT VFR BLD AUTO: 21.6 % (ref 36.6–50.3)
HCT VFR BLD AUTO: 22.8 % (ref 36.6–50.3)
HGB BLD-MCNC: 6.7 G/DL (ref 12.1–17)
HGB BLD-MCNC: 7 G/DL (ref 12.1–17)
HGB BLD-MCNC: 7.4 G/DL (ref 12.1–17)
IMM GRANULOCYTES # BLD AUTO: 0.1 K/UL (ref 0–0.04)
IMM GRANULOCYTES NFR BLD AUTO: 1 % (ref 0–0.5)
LYMPHOCYTES # BLD: 1.8 K/UL (ref 0.8–3.5)
LYMPHOCYTES NFR BLD: 12 % (ref 12–49)
MAGNESIUM SERPL-MCNC: 2 MG/DL (ref 1.6–2.4)
MCH RBC QN AUTO: 27 PG (ref 26–34)
MCH RBC QN AUTO: 27 PG (ref 26–34)
MCH RBC QN AUTO: 27.1 PG (ref 26–34)
MCHC RBC AUTO-ENTMCNC: 32.4 G/DL (ref 30–36.5)
MCHC RBC AUTO-ENTMCNC: 32.5 G/DL (ref 30–36.5)
MCHC RBC AUTO-ENTMCNC: 32.7 G/DL (ref 30–36.5)
MCV RBC AUTO: 83 FL (ref 80–99)
MCV RBC AUTO: 83.2 FL (ref 80–99)
MCV RBC AUTO: 83.4 FL (ref 80–99)
MONOCYTES # BLD: 1.6 K/UL (ref 0–1)
MONOCYTES NFR BLD: 10 % (ref 5–13)
NEUTS SEG # BLD: 11.2 K/UL (ref 1.8–8)
NEUTS SEG NFR BLD: 70 % (ref 32–75)
NRBC # BLD: 0 K/UL (ref 0–0.01)
NRBC BLD-RTO: 0 PER 100 WBC
O2/TOTAL GAS SETTING VFR VENT: 35 %
PCO2 BLD: 39.3 MMHG (ref 35–45)
PEEP RESPIRATORY: 6 CMH2O
PH BLD: 7.39 [PH] (ref 7.35–7.45)
PHOSPHATE SERPL-MCNC: 2.4 MG/DL (ref 2.6–4.7)
PLATELET # BLD AUTO: 107 K/UL (ref 150–400)
PLATELET # BLD AUTO: 144 K/UL (ref 150–400)
PLATELET # BLD AUTO: 192 K/UL (ref 150–400)
PMV BLD AUTO: 10.2 FL (ref 8.9–12.9)
PMV BLD AUTO: 10.3 FL (ref 8.9–12.9)
PMV BLD AUTO: 10.7 FL (ref 8.9–12.9)
PO2 BLD: 115 MMHG (ref 80–100)
POTASSIUM SERPL-SCNC: 4.4 MMOL/L (ref 3.5–5.1)
PROT SERPL-MCNC: 4.8 G/DL (ref 6.4–8.2)
RBC # BLD AUTO: 2.47 M/UL (ref 4.1–5.7)
RBC # BLD AUTO: 2.59 M/UL (ref 4.1–5.7)
RBC # BLD AUTO: 2.74 M/UL (ref 4.1–5.7)
SAO2 % BLD: 98 % (ref 92–97)
SERVICE CMNT-IMP: ABNORMAL
SODIUM SERPL-SCNC: 140 MMOL/L (ref 136–145)
SPECIMEN TYPE: ABNORMAL
TOTAL RESP. RATE, ITRR: 22
VENTILATION MODE VENT: ABNORMAL
VT SETTING VENT: 450 ML
WBC # BLD AUTO: 10.9 K/UL (ref 4.1–11.1)
WBC # BLD AUTO: 15.8 K/UL (ref 4.1–11.1)
WBC # BLD AUTO: 6.8 K/UL (ref 4.1–11.1)

## 2020-03-29 PROCEDURE — 71045 X-RAY EXAM CHEST 1 VIEW: CPT

## 2020-03-29 PROCEDURE — 74011250637 HC RX REV CODE- 250/637: Performed by: HOSPITALIST

## 2020-03-29 PROCEDURE — 36600 WITHDRAWAL OF ARTERIAL BLOOD: CPT

## 2020-03-29 PROCEDURE — C9113 INJ PANTOPRAZOLE SODIUM, VIA: HCPCS | Performed by: INTERNAL MEDICINE

## 2020-03-29 PROCEDURE — 85027 COMPLETE CBC AUTOMATED: CPT

## 2020-03-29 PROCEDURE — 74011000250 HC RX REV CODE- 250: Performed by: EMERGENCY MEDICINE

## 2020-03-29 PROCEDURE — 85025 COMPLETE CBC W/AUTO DIFF WBC: CPT

## 2020-03-29 PROCEDURE — 74011250636 HC RX REV CODE- 250/636: Performed by: INTERNAL MEDICINE

## 2020-03-29 PROCEDURE — 80053 COMPREHEN METABOLIC PANEL: CPT

## 2020-03-29 PROCEDURE — 94003 VENT MGMT INPAT SUBQ DAY: CPT

## 2020-03-29 PROCEDURE — 65610000006 HC RM INTENSIVE CARE

## 2020-03-29 PROCEDURE — 74011636637 HC RX REV CODE- 636/637: Performed by: INTERNAL MEDICINE

## 2020-03-29 PROCEDURE — 82803 BLOOD GASES ANY COMBINATION: CPT

## 2020-03-29 PROCEDURE — 36415 COLL VENOUS BLD VENIPUNCTURE: CPT

## 2020-03-29 PROCEDURE — 74011000258 HC RX REV CODE- 258: Performed by: INTERNAL MEDICINE

## 2020-03-29 PROCEDURE — 77010033678 HC OXYGEN DAILY

## 2020-03-29 PROCEDURE — 83735 ASSAY OF MAGNESIUM: CPT

## 2020-03-29 PROCEDURE — 74011250636 HC RX REV CODE- 250/636: Performed by: EMERGENCY MEDICINE

## 2020-03-29 PROCEDURE — P9016 RBC LEUKOCYTES REDUCED: HCPCS

## 2020-03-29 PROCEDURE — 82140 ASSAY OF AMMONIA: CPT

## 2020-03-29 PROCEDURE — 82962 GLUCOSE BLOOD TEST: CPT

## 2020-03-29 PROCEDURE — 36430 TRANSFUSION BLD/BLD COMPNT: CPT

## 2020-03-29 PROCEDURE — 84100 ASSAY OF PHOSPHORUS: CPT

## 2020-03-29 RX ORDER — SUCRALFATE 1 G/1
1 TABLET ORAL
Status: DISCONTINUED | OUTPATIENT
Start: 2020-03-29 | End: 2020-04-06 | Stop reason: HOSPADM

## 2020-03-29 RX ORDER — SODIUM CHLORIDE 9 MG/ML
250 INJECTION, SOLUTION INTRAVENOUS AS NEEDED
Status: DISCONTINUED | OUTPATIENT
Start: 2020-03-29 | End: 2020-04-01 | Stop reason: ALTCHOICE

## 2020-03-29 RX ADMIN — Medication 10 ML: at 15:00

## 2020-03-29 RX ADMIN — INSULIN LISPRO 5 UNITS: 100 INJECTION, SOLUTION INTRAVENOUS; SUBCUTANEOUS at 05:51

## 2020-03-29 RX ADMIN — SODIUM CHLORIDE 10 ML: 9 INJECTION, SOLUTION INTRAMUSCULAR; INTRAVENOUS; SUBCUTANEOUS at 21:04

## 2020-03-29 RX ADMIN — Medication 1 AMPULE: at 09:39

## 2020-03-29 RX ADMIN — Medication 10 ML: at 05:38

## 2020-03-29 RX ADMIN — PANTOPRAZOLE SODIUM 40 MG: 40 INJECTION, POWDER, FOR SOLUTION INTRAVENOUS at 21:04

## 2020-03-29 RX ADMIN — INSULIN LISPRO 3 UNITS: 100 INJECTION, SOLUTION INTRAVENOUS; SUBCUTANEOUS at 23:17

## 2020-03-29 RX ADMIN — SODIUM CHLORIDE 10 ML: 9 INJECTION, SOLUTION INTRAMUSCULAR; INTRAVENOUS; SUBCUTANEOUS at 15:00

## 2020-03-29 RX ADMIN — INSULIN LISPRO 3 UNITS: 100 INJECTION, SOLUTION INTRAVENOUS; SUBCUTANEOUS at 12:06

## 2020-03-29 RX ADMIN — PROPOFOL 40 MCG/KG/MIN: 10 INJECTION, EMULSION INTRAVENOUS at 00:56

## 2020-03-29 RX ADMIN — PANTOPRAZOLE SODIUM 40 MG: 40 INJECTION, POWDER, FOR SOLUTION INTRAVENOUS at 09:39

## 2020-03-29 RX ADMIN — PROPOFOL 40 MCG/KG/MIN: 10 INJECTION, EMULSION INTRAVENOUS at 04:43

## 2020-03-29 RX ADMIN — SODIUM CHLORIDE 75 ML/HR: 900 INJECTION, SOLUTION INTRAVENOUS at 09:34

## 2020-03-29 RX ADMIN — SODIUM CHLORIDE 75 ML/HR: 900 INJECTION, SOLUTION INTRAVENOUS at 21:04

## 2020-03-29 RX ADMIN — CEFTRIAXONE 1 G: 1 INJECTION, POWDER, FOR SOLUTION INTRAMUSCULAR; INTRAVENOUS at 23:10

## 2020-03-29 RX ADMIN — SODIUM CHLORIDE 10 ML: 9 INJECTION, SOLUTION INTRAMUSCULAR; INTRAVENOUS; SUBCUTANEOUS at 05:38

## 2020-03-29 RX ADMIN — Medication 15 MCG/MIN: at 01:13

## 2020-03-29 RX ADMIN — Medication 1 AMPULE: at 21:04

## 2020-03-29 RX ADMIN — INSULIN LISPRO 3 UNITS: 100 INJECTION, SOLUTION INTRAVENOUS; SUBCUTANEOUS at 19:29

## 2020-03-29 RX ADMIN — OCTREOTIDE ACETATE 25 MCG/HR: 500 INJECTION, SOLUTION INTRAVENOUS; SUBCUTANEOUS at 04:45

## 2020-03-29 NOTE — CONSULTS
[facility] [place] Name: Ernestine Michael MRN: [id number] : 1950 Hospital: [facility] Date: 3/29/2020 Critical Care Progress Note IMPRESSION:  
· Respiratory failure · Variceal bleed · S/P unsuccessful banding due to volume of blood and emergent TIPS · Shock · Anemia · BREWER/Cirrhosis · JACOB on CPAP  
  
RECOMMENDATIONS:  
· SAT/SBT >> Extubate · GI following, no intervention planned · Pressors as needed · IV Protonix, Octreotide · S/P transfusion · Follow Hgb · GI and IR following Subjective/History:  
 
3/29 Seen and examined On SBT, alert and interactive No GI intervention planned today. Will see if we can extubate today. This patient has been seen and evaluated at the request of Dr. Adam Gambino for respiratory failure. Patient is a 68 y.o. male with H/O BREWER/cirrhosis and varices, admitted with variceal bleed. S/P unsuccessful banding and emergent TIPS. Intubated and on vent support, pressors. S/P 3 U PRBCs today, awaiting repeat HgB later this afternoon. Critically ill and high risk for deterioration. The patient is critically ill and can not provide additional history due to intubated Past Medical History:  
Diagnosis Date  ADD (attention deficit disorder)  Anemia due to blood loss  Hinson's esophagus with esophagitis  Benign essential tremor  Cancer [facility]  [facility]  Cirrhosis ([address]  Depression  DJD (degenerative joint disease) of hip   
 bilat  DM (diabetes mellitus) ([address] 3/17/2010  ED (erectile dysfunction) of organic origin  Esophageal varices determined by endoscopy ([address]  Fall on or from sidewalk curb 2015  Femur fracture ([address]  Gastritis and duodenitis  GERD (gastroesophageal reflux disease)   
 resolved after discontinuing diclofenac  Hematuria 2016  High cholesterol 2010 pt denies 6/19  
 HTN (hypertension) 3/17/2010  Morbid obesity ([address]  Murmur, cardiac 2016  
 PFO (patent foramen ovale) 7/26/2017  PUD (peptic ulcer disease)  Shingles 6/2016 RESOLVING- NO RASH (HEAD)  Sleep apnea   
 doesnt use CPAP anymore Past Surgical History:  
Procedure Laterality Date  COLONOSCOPY N/A 6/18/2019 COLONOSCOPY AND EGD performed by Jose Brian MD at [facility] ENDOSCOPY  COLONOSCOPY,DIAGNOSTIC  6/18/2019  ENDOSCOPY, COLON, DIAGNOSTIC    
 HX CHOLECYSTECTOMY  1995  HX GI  1980  
 gastroplasty [address]  HX HIP REPLACEMENT Left  HX ORTHOPAEDIC Right 2011  
 rot cuff repair  HX ORTHOPAEDIC  2016  
 left broken femur [address]  HX VASCULAR ACCESS    
 picc line and removed after sepsis resolved [facility] ARTHROPLASTY  05/2010  
 right  TOTAL HIP ARTHROPLASTY  08/01/2016  
 left  UPPER GI ENDOSCOPY,BIOPSY  6/18/2019 Prior to Admission medications Medication Sig Start Date End Date Taking? Authorizing Provider  
folic acid (FOLVITE) 1 mg tablet Take 1 Tab by mouth daily. 3/26/20   Robert Engle, AURELIANO  
diclofenac (VOLTAREN) 1 % gel Apply 4 g to affected area four (4) times daily. 3/26/20   Robert Engle NP  
lisinopriL (PRINIVIL, ZESTRIL) 5 mg tablet TAKE 2 TABLETS BY MOUTH EVERY DAY 3/26/20   Robert Engle NP  
insulin glargine (Lantus Solostar U-100 Insulin) 100 unit/mL (3 mL) inpn 40 units twice daily 3/26/20   Yasmin Luong MD  
gabapentin (NEURONTIN) 300 mg capsule Take 2 Caps by mouth nightly. 3/16/20   Brenton Evans MD  
butalbital-acetaminophen-caffeine (FIORICET, ESGIC) -40 mg per tablet Take 1 Tab by mouth every six (6) hours as needed for Headache. 3/6/20   Brenton Evans MD  
cephALEXin (KEFLEX) 500 mg capsule Take 1 Cap by mouth two (2) times a day.  2/24/20   Brenton Evans MD  
sertraline (ZOLOFT) 100 mg tablet TAKE 1.5 TABLETS DAILY 2/21/20   Ronny Jerrilyn Klinefelter, MD  
metFORMIN ER (GLUCOPHAGE XR) 500 mg tablet 2 tablets twice daily 2/13/20   Margarette Pacheco MD  
spironolactone (ALDACTONE) 25 mg tablet Take 1 Tab by mouth daily. 2/4/20   Jeannette Moscoso MD  
ferrous sulfate 140 mg (45 mg iron) TbER ER tablet Take 1 Tab by mouth Daily (before breakfast). 2/4/20   Jeannette Moscoso MD  
furosemide (LASIX) 40 mg tablet Take 2 tabs by mouth daily x 1 week and then increase to 1 tab daily 1/27/20   Jeannette Moscoso MD  
ergocalciferol (VITAMIN D2) 50,000 unit capsule TAKE 1 CAPSULE EVERY 7 DAYS 1/6/20   Jeannette Moscoso MD  
atorvastatin (LIPITOR) 40 mg tablet Take 1 Tab by mouth daily. 1/4/20   Margarette Pacheco MD  
glucose blood VI test strips (ASCENSIA AUTODISC VI, ONE TOUCH ULTRA TEST VI) strip Test blood sugar twice daily Diagnosis E 11.9. Can use [employer] 12/13/19   Jeannette Moscoso MD  
glucose blood VI test strips (ACCU-CHEK SOWMYA PLUS TEST STRP) strip Test blood sugar twice daily Diagnosis E 11.9 12/5/19   Jeannette Moscoso MD  
primidone (MYSOLINE) 250 mg tablet TAKE 1 TABLET TWICE A DAY 11/7/19   Jeannette Moscoso MD  
acetaminophen (TYLENOL) 500 mg tablet Take 2 Tabs by mouth every six (6) hours as needed for Pain. 10/7/19   Mart Melgoza MD  
omeprazole (PRILOSEC) 20 mg capsule TAKE 1 CAPSULE BY MOUTH EVERY DAY 7/15/19   Provider, Historical  
Blood-Glucose Meter (ACCU-CHEK SOWMYA PLUS METER) misc Test blood sugar twice daily Diagnosis E 11.9 7/9/19   Jeannette Moscoso MD  
Blood Glucose Control High&Low (ACCU-CHEK SOWMYA CONTROL SOLN) soln Test blood sugar twice daily Diagnosis E 11.9 7/9/19   Jeannette Moscoso MD  
insulin regular (NOVOLIN R REGULAR U-100 INSULN) 100 unit/mL injection INJECT 7 UNITS UNDER THE SKIN DAILY (WITH DINNER). Patient taking differently: INJECT 7 UNITS UNDER THE SKIN DAILY (WITH DINNER). only takes if BS is elevated.  7/9/19   Jeannette Moscoso MD  
 Insulin Needles, Disposable, (BD ULTRA-FINE SHORT PEN NEEDLE) 31 gauge x 5/16\" ndle USE TO INJECT UNDER THE SKIN THREE TIMES A DAY 7/9/19   Jeannette Moscoso MD  
B.infantis-B.ani-B.long-B.bifi (PROBIOTIC 4X) 10-15 mg TbEC Take  by mouth. Provider, Historical  
magnesium 250 mg tab Take 500 mg by mouth daily. Provider, Historical  
melatonin tab tablet Take 10 mg by mouth nightly as needed. Provider, Historical  
potassium 99 mg tablet Take 99 mg by mouth two (2) times a day. Provider, Historical  
calcium citrate-vitamin D3 (CITRACAL + D) tablet Take 2 Tabs by mouth two (2) times a day. Provider, Historical  
aspirin 81 mg chewable tablet Take 1 Tab by mouth daily. Patient taking differently: Take 81 mg by mouth every other day. 2/2/17   Jeannette Moscoso MD  
 
Current Facility-Administered Medications Medication Dose Route Frequency  alcohol 62% (NOZIN) nasal  1 Ampule  1 Ampule Topical Q12H  
 sodium chloride (NS) flush 5-40 mL  5-40 mL IntraVENous Q8H  
 NOREPINephrine (LEVOPHED) 8 mg in 5% dextrose 250mL (32 mcg/mL) infusion  2-100 mcg/min IntraVENous TITRATE  propofol (DIPRIVAN) 10 mg/mL infusion  0-50 mcg/kg/min IntraVENous TITRATE  sodium chloride (NS) flush 5-40 mL  5-40 mL IntraVENous Q8H  
 cefTRIAXone (ROCEPHIN) 1 g in 0.9% sodium chloride (MBP/ADV) 50 mL  1 g IntraVENous Q24H  
 0.9% sodium chloride infusion  75 mL/hr IntraVENous CONTINUOUS  
 octreotide (SANDOSTATIN) 500 mcg in 0.9% sodium chloride 500 mL infusion  25 mcg/hr IntraVENous CONTINUOUS  
 pantoprazole (PROTONIX) injection 40 mg  40 mg IntraVENous Q12H  
 insulin lispro (HUMALOG) injection   SubCUTAneous Q6H  
 octreotide (SANDOSTATIN) 500 mcg in 0.9% sodium chloride 500 mL infusion  50 mcg/hr IntraVENous CONTINUOUS  
 fentaNYL (PF) 1,500 mcg/30 mL (50 mcg/mL) infusion  0-200 mcg/hr IntraVENous TITRATE Allergies Allergen Reactions  Nsaids (Non-Steroidal Anti-Inflammatory Drug) Other (comments) Hx of PUD and Hinson's Esophagus Social History Tobacco Use  Smoking status: Former Smoker Packs/day: 2.00 Years: 15.00 Pack years: 30.00 Last attempt to quit: 3/1/1979 Years since quittin.1  Smokeless tobacco: Never Used Substance Use Topics  Alcohol use: No  
  Alcohol/week: 0.0 standard drinks Family History Problem Relation Age of Onset  Cancer Father   
     multiple myeloma  Stroke Father  Dementia Mother  No Known Problems Brother  COPD Brother  Cancer Maternal Grandmother COLON  Anesth Problems Neg Hx Review of Systems: 
Unobtainable intubated Objective:  
Vital Signs:   
Visit Vitals /44 Pulse 84 Temp 99.4 °F (37.4 °C) Resp 15 Ht 5' 10\" (1.778 m) Wt 117.9 kg (260 lb) SpO2 98% BMI 37.31 kg/m² O2 Device: Ventilator Temp (24hrs), Av.3 °F (37.4 °C), Min:98.5 °F (36.9 °C), Max:99.9 °F (37.7 °C) Intake/Output:  
Last shift:       0701 -  1900 In: -  
Out: 150 Last 3 shifts:  190 -  0700 In: 4203.3 [I.V.:4073.3] Out: 2210 [[id number]] Intake/Output Summary (Last 24 hours) at 3/29/2020 1022 Last data filed at 3/29/2020 3846 Gross per 24 hour Intake 925.18 ml Output 2100 ml Net -1174.82 ml Hemodynamics:  
PAP:   CO:    
Wedge:   CI:    
CVP:    SVR:    
  PVR:    
 
Ventilator Settings: 
Mode Rate Tidal Volume Pressure FiO2 PEEP  
CPAP   450 ml  5 cm H2O 35 % 6 cm H20 Peak airway pressure: 12 cm H2O Minute ventilation: 11.4 l/min Physical Exam: 
 
General:  Intubated and sedated Head:  Normocephalic, without obvious abnormality, atraumatic. Eyes:  Conjunctivae/corneas clear. PERRL, EOMs intact. Nose: Nares normal. Septum midline. Mucosa normal. No drainage or sinus tenderness.   
Throat: Intubated; NG tube with BRB  
 Neck: Supple, symmetrical, trachea midline, no adenopathy, thyroid: no enlargment/tenderness/nodules, no carotid bruit and no JVD. Back:   Symmetric, no curvature. ROM normal.  
Lungs:   Clear to auscultation bilaterally. Chest wall:  No tenderness or deformity. Heart:  Regular rate and rhythm, S1, S2 normal, no murmur, click, rub or gallop. Abdomen:   Soft, non-tender. Bowel sounds normal. No masses,  No organomegaly. Extremities: Extremities normal, atraumatic, no cyanosis or edema. Pulses: 2+ and symmetric all extremities. Skin: Skin color, texture, turgor normal. No rashes or lesions Lymph nodes: Cervical, supraclavicular, and axillary nodes normal.  
Neurologic: Sedated and intubated Data:  
 
Recent Results (from the past 24 hour(s)) HGB & HCT Collection Time: 03/28/20  2:18 PM  
Result Value Ref Range HGB 7.7 (L) 12.1 - 17.0 g/dL HCT 23.1 (L) 36.6 - 50.3 % GLUCOSE, POC Collection Time: 03/28/20  6:20 PM  
Result Value Ref Range Glucose (POC) 273 (H) 65 - 100 mg/dL Performed by Elicia Lo CBC WITH AUTOMATED DIFF Collection Time: 03/28/20  8:54 PM  
Result Value Ref Range WBC 15.0 (H) 4.1 - 11.1 K/uL  
 RBC 2.77 (L) 4.10 - 5.70 M/uL HGB 7.5 (L) 12.1 - 17.0 g/dL HCT 23.2 (L) 36.6 - 50.3 % MCV 83.8 80.0 - 99.0 FL  
 MCH 27.1 26.0 - 34.0 PG  
 MCHC 32.3 30.0 - 36.5 g/dL  
 RDW 17.1 (H) 11.5 - 14.5 % PLATELET 504 349 - 310 K/uL MPV 10.1 8.9 - 12.9 FL  
 NRBC 0.0 0  WBC ABSOLUTE NRBC 0.00 0.00 - 0.01 K/uL NEUTROPHILS 71 32 - 75 % LYMPHOCYTES 11 (L) 12 - 49 % MONOCYTES 10 5 - 13 % EOSINOPHILS 6 0 - 7 % BASOPHILS 1 0 - 1 % IMMATURE GRANULOCYTES 1 (H) 0.0 - 0.5 % ABS. NEUTROPHILS 10.8 (H) 1.8 - 8.0 K/UL  
 ABS. LYMPHOCYTES 1.7 0.8 - 3.5 K/UL  
 ABS. MONOCYTES 1.5 (H) 0.0 - 1.0 K/UL  
 ABS. EOSINOPHILS 0.9 (H) 0.0 - 0.4 K/UL  
 ABS. BASOPHILS 0.1 0.0 - 0.1 K/UL  
 ABS. IMM. GRANS. 0.1 (H) 0.00 - 0.04 K/UL DF AUTOMATED    
GLUCOSE, POC Collection Time: 03/28/20 11:38 PM  
Result Value Ref Range Glucose (POC) 253 (H) 65 - 100 mg/dL Performed by Fabrice Reno METABOLIC PANEL, COMPREHENSIVE Collection Time: 03/29/20  3:09 AM  
Result Value Ref Range Sodium 140 136 - 145 mmol/L Potassium 4.4 3.5 - 5.1 mmol/L Chloride 111 (H) 97 - 108 mmol/L  
 CO2 25 21 - 32 mmol/L Anion gap 4 (L) 5 - 15 mmol/L Glucose 237 (H) 65 - 100 mg/dL BUN 20 6 - 20 MG/DL Creatinine 0.88 0.70 - 1.30 MG/DL  
 BUN/Creatinine ratio 23 (H) 12 - 20 GFR est AA >60 >60 ml/min/1.73m2 GFR est non-AA >60 >60 ml/min/1.73m2 Calcium 7.1 (L) 8.5 - 10.1 MG/DL Bilirubin, total 3.8 (H) 0.2 - 1.0 MG/DL  
 ALT (SGPT) 148 (H) 12 - 78 U/L  
 AST (SGOT) 321 (H) 15 - 37 U/L Alk. phosphatase 106 45 - 117 U/L Protein, total 4.8 (L) 6.4 - 8.2 g/dL Albumin 1.9 (L) 3.5 - 5.0 g/dL Globulin 2.9 2.0 - 4.0 g/dL A-G Ratio 0.7 (L) 1.1 - 2.2    
CBC WITH AUTOMATED DIFF Collection Time: 03/29/20  3:09 AM  
Result Value Ref Range WBC 15.8 (H) 4.1 - 11.1 K/uL  
 RBC 2.74 (L) 4.10 - 5.70 M/uL HGB 7.4 (L) 12.1 - 17.0 g/dL HCT 22.8 (L) 36.6 - 50.3 % MCV 83.2 80.0 - 99.0 FL  
 MCH 27.0 26.0 - 34.0 PG  
 MCHC 32.5 30.0 - 36.5 g/dL  
 RDW 17.2 (H) 11.5 - 14.5 % PLATELET 116 433 - 746 K/uL MPV 10.3 8.9 - 12.9 FL  
 NRBC 0.0 0  WBC ABSOLUTE NRBC 0.00 0.00 - 0.01 K/uL NEUTROPHILS 70 32 - 75 % LYMPHOCYTES 12 12 - 49 % MONOCYTES 10 5 - 13 % EOSINOPHILS 6 0 - 7 % BASOPHILS 1 0 - 1 % IMMATURE GRANULOCYTES 1 (H) 0.0 - 0.5 % ABS. NEUTROPHILS 11.2 (H) 1.8 - 8.0 K/UL  
 ABS. LYMPHOCYTES 1.8 0.8 - 3.5 K/UL  
 ABS. MONOCYTES 1.6 (H) 0.0 - 1.0 K/UL  
 ABS. EOSINOPHILS 0.9 (H) 0.0 - 0.4 K/UL  
 ABS. BASOPHILS 0.1 0.0 - 0.1 K/UL  
 ABS. IMM. GRANS. 0.1 (H) 0.00 - 0.04 K/UL  
 DF AUTOMATED MAGNESIUM Collection Time: 03/29/20  3:09 AM  
Result Value Ref Range Magnesium 2.0 1.6 - 2.4 mg/dL PHOSPHORUS Collection Time: 03/29/20  3:09 AM  
Result Value Ref Range Phosphorus 2.4 (L) 2.6 - 4.7 MG/DL  
POC EG7 Collection Time: 03/29/20  4:16 AM  
Result Value Ref Range Calcium, ionized (POC) 1.16 1.12 - 1.32 mmol/L  
 FIO2 (POC) 35 % pH (POC) 7.394 7.35 - 7.45    
 pCO2 (POC) 39.3 35.0 - 45.0 MMHG  
 pO2 (POC) 115 (H) 80 - 100 MMHG  
 HCO3 (POC) 24.0 22 - 26 MMOL/L Base deficit (POC) 1 mmol/L  
 sO2 (POC) 98 (H) 92 - 97 % Site RIGHT RADIAL Device: VENT Mode ASSIST CONTROL Tidal volume 450 ml Set Rate 12 bpm  
 PEEP/CPAP (POC) 6 cmH2O Allens test (POC) YES Specimen type (POC) ARTERIAL Total resp. rate 22 GLUCOSE, POC Collection Time: 03/29/20  5:42 AM  
Result Value Ref Range Glucose (POC) 256 (H) 65 - 100 mg/dL Performed by [facility]: 
I have personally reviewed the patients radiographs and have reviewed the reports: 
ET in place D/W Nursing Total critical care time exclusive of procedures: 35 minutes Tracey Lai MD

## 2020-03-29 NOTE — PROGRESS NOTES
1932 Bedside shift change report given to Rosmery Dominguez RN (oncoming nurse) by Joshua Parada RN (offgoing nurse). Report included the following information Kardex, ED Summary, Intake/Output, MAR, Recent Results and Cardiac Rhythm SR/SB. 0715 Bedside shift change report given to Joshua Parada RN (oncoming nurse) by Rosmery Dominguez RN (offgoing nurse).  Report included the following information Kardex, Intake/Output, MAR, Recent Results and Cardiac Rhythm SR.

## 2020-03-29 NOTE — PROGRESS NOTES
Hospitalist Progress Note NAME: Asif Zhu :  1950 MRN:  [id number] Assessment / Plan: 
 
Acute upper GI bleed, presented on admission Acute on chronic normocytic anemia Secondary to variceal bleed Liver cirrhosis status post TIPS procedure Hemorrhagic/hypovolemic shock Acute respiratory failure due to above  Admitted with hemoglobin 9.1 yesterday and at 7 today Intubated, plans for possible extubation today per intensivist 
Venkata following, no plan for intervention at this time  Continue Protonix 40 mg IV twice daily Continue octreotide drip Continue IV ceftriaxone for SBP  Status post unsuccessful banding due to volume of blood and emergent TIPS 
 Underlying nonalcoholic liver cirrhosis  Continue IV pressors to maintain mean arterial pressure more than 65 mmHg  IR was consulted as well Continue following H&H 
 Transfuse blood if hemoglobin less than 7 
 
 
30.0 - 39.9 Obese / Body mass index is 37.31 kg/m². Code status: DNR Prophylaxis: SCD's Recommended Disposition: Home w/Family Subjective: Chief Complaint / Reason for Physician Visit Acute upper GI bleed/acute anemia/acute respiratory failure/hemorrhagic shock Discussed with RN events overnight. Patient is intubated Review of Systems: 
Symptom Y/N Comments  Symptom Y/N Comments Fever/Chills    Chest Pain Poor Appetite    Edema Cough    Abdominal Pain Sputum    Joint Pain SOB/WANG    Pruritis/Rash Nausea/vomit    Tolerating PT/OT Diarrhea    Tolerating Diet Constipation    Other Could NOT obtain due to:  Debated Objective: VITALS:  
Last 24hrs VS reviewed since prior progress note. Most recent are: 
Patient Vitals for the past 24 hrs: 
 Temp Pulse Resp BP SpO2  
20 1043     99 % 20 1030  80 13 (!) 108/39 98 % 20 1015  81 18 113/45 98 % 20 1000  84 15 116/44 98 % 20 0945  84 14 (!) 112/39 98 % 03/29/20 0930  85 13 127/43 98 % 03/29/20 0915  87 9 132/47 98 % 03/29/20 0900  86 22 130/47 98 % 03/29/20 0845  87 15 134/47 99 % 03/29/20 0830  85 11 127/45 98 % 03/29/20 0818  83 14  99 % 03/29/20 0815  83 26 126/48 98 % 03/29/20 0800 99.4 °F (37.4 °C) 81 18 125/48 99 % 03/29/20 0745  80 15 124/46 99 % 03/29/20 0730  81 25 122/47 99 % 03/29/20 0715  77 14 116/44 99 % 03/29/20 0700  79 15 118/44 99 % 03/29/20 0552 99.9 °F (37.7 °C)      
03/29/20 0400  74 10 113/43 99 % 03/29/20 0318  71 16  98 % 03/29/20 0315  73 12 (!) 75/32 98 % 03/29/20 0300  71 10 118/41 98 % 03/29/20 0200  73 11 129/42 99 % 03/29/20 0100  72 15 125/40 99 % 03/29/20 0000 98.5 °F (36.9 °C) 82 18 135/40 100 % 03/28/20 2317  77 21  100 % 03/28/20 2300  76 14 117/41 100 % 03/28/20 2200  79 11 129/44 100 % 03/28/20 2104  70 11 102/40 100 % 03/28/20 2100  71 12  100 % 03/28/20 2036  75 21  100 % 03/28/20 2030     100 % 03/28/20 2000  71 11 (!) 112/39 100 % 03/28/20 1900  65 11 (!) 107/37 100 % 03/28/20 1830  64 11 (!) 105/37 100 % 03/28/20 1815  64 9 (!) 107/36 100 % 03/28/20 1800  64 10 (!) 107/37 99 % 03/28/20 1745  63 9 (!) 109/36 100 % 03/28/20 1730  63 9 (!) 106/38 100 % 03/28/20 1715  63 (!) 6 (!) 106/38 100 % 03/28/20 1700  63 9 (!) 110/38 100 % 03/28/20 1645  62 8 103/40 100 % 03/28/20 1630  63 9 108/40 100 % 03/28/20 1625  68 23  100 % 03/28/20 1615  61 12 (!) 105/33 100 % 03/28/20 1600  60 12 (!) 101/33 100 % 03/28/20 1545  61 11 (!) 102/34 100 % 03/28/20 1530  60 12 (!) 105/33 100 % 03/28/20 1515  60 11 (!) 104/33 100 % 03/28/20 1500  60 14 (!) 105/34 100 % 03/28/20 1445  (!) 59 12 (!) 103/34 100 % 03/28/20 1430  60 12 (!) 108/37 100 % 03/28/20 1415  (!) 58 11 (!) 91/36 100 % 03/28/20 1400  60 12 (!) 99/36 100 % 03/28/20 1345  (!) 59 11 (!) 99/34 100 % 03/28/20 1330  (!) 58 10 (!) 95/33 100 % 03/28/20 1315  (!) 59 11 (!) 94/31 100 % 03/28/20 1300  (!) 59 10 (!) 93/32 100 % 03/28/20 1245  60 11 (!) 103/34 100 % Intake/Output Summary (Last 24 hours) at 3/29/2020 1233 Last data filed at 3/29/2020 2572 Gross per 24 hour Intake 725.18 ml Output 1750 ml Net -1024.82 ml PHYSICAL EXAM: 
General: Intubated no acute distress   
EENT:  EOMI. Anicteric sclerae. MMM Resp:  CTA bilaterally, no wheezing or rales. No accessory muscle use CV:  Regular  rhythm,  No edema GI:  Soft, Non distended, Non tender.  +Bowel sounds Neurologic:  Intubated Psych:   or agitated Skin:  No rashes. No jaundice Reviewed most current lab test results and cultures  YES Reviewed most current radiology test results   YES Review and summation of old records today    NO Reviewed patient's current orders and MAR    YES 
PMH/SH reviewed - no change compared to H&P 
________________________________________________________________________ Care Plan discussed with: 
  Comments Patient Family RN x Care Manager Consultant Multidiciplinary team rounds were held today with , nursing, pharmacist and clinical coordinator. Patient's plan of care was discussed; medications were reviewed and discharge planning was addressed. ________________________________________________________________________ Total NON critical care TIME:  35   Minutes Total CRITICAL CARE TIME Spent:   Minutes non procedure based Comments >50% of visit spent in counseling and coordination of care    
________________________________________________________________________ Marilu Ge MD  
 
Procedures: see electronic medical records for all procedures/Xrays and details which were not copied into this note but were reviewed prior to creation of Plan. LABS: 
I reviewed today's most current labs and imaging studies. Pertinent labs include: 
Recent Labs  
  03/29/20 
1053 03/29/20 
0309 03/28/20 
2054 WBC 10.9 15.8* 15.0* HGB 7.0* 7.4* 7.5* HCT 21.6* 22.8* 23.2*  
* 192 190 Recent Labs  
  03/29/20 
0309 03/27/20 
2220 03/27/20 
2153   --  137  
K 4.4  --  4.0  
*  --  106 CO2 25  --  26  
*  --  156* BUN 20  --  14  
CREA 0.88  --  0.80 CA 7.1*  --  8.0*  
MG 2.0  --  1.8 PHOS 2.4*  --   --   
ALB 1.9*  --  2.5* TBILI 3.8*  --  0.6 SGOT 321*  --  40* *  --  27 INR  --  1.2*  --   
 
 
Signed: Nirmala Ortega MD

## 2020-03-29 NOTE — PROGRESS NOTES
Gastroenterology Progress Note 3/29/2020 Admit Date: 3/27/2020 Subjective: Follow up for: GI bleed 2/2 eso varices, s/p TIPS He is awake. Feels weak. Pleasant as always. He is now extubated Hgb is a little lower but has had no more hematemesis. Current Facility-Administered Medications Medication Dose Route Frequency  alcohol 62% (NOZIN) nasal  1 Ampule  1 Ampule Topical Q12H  
 sodium chloride (NS) flush 5-40 mL  5-40 mL IntraVENous Q8H  
 sodium chloride (NS) flush 5-40 mL  5-40 mL IntraVENous PRN  
 simethicone (MYLICON) 17HL/0.2SP oral drops 80 mg  1.2 mL Oral Multiple  NOREPINephrine (LEVOPHED) 8 mg in 5% dextrose 250mL (32 mcg/mL) infusion  2-100 mcg/min IntraVENous TITRATE  
 0.9% sodium chloride infusion 250 mL  250 mL IntraVENous PRN  
 ondansetron (ZOFRAN) injection 4 mg  4 mg IntraVENous Q4H PRN  
 fentaNYL citrate (PF) injection 50 mcg  50 mcg IntraVENous Q4H PRN  
 sodium chloride (NS) flush 5-40 mL  5-40 mL IntraVENous Q8H  
 sodium chloride (NS) flush 5-40 mL  5-40 mL IntraVENous PRN  
 cefTRIAXone (ROCEPHIN) 1 g in 0.9% sodium chloride (MBP/ADV) 50 mL  1 g IntraVENous Q24H  
 0.9% sodium chloride infusion  75 mL/hr IntraVENous CONTINUOUS  
 octreotide (SANDOSTATIN) 500 mcg in 0.9% sodium chloride 500 mL infusion  25 mcg/hr IntraVENous CONTINUOUS  
 glucose chewable tablet 16 g  4 Tab Oral PRN  
 dextrose (D50W) injection syrg 12.5-25 g  12.5-25 g IntraVENous PRN  
 glucagon (GLUCAGEN) injection 1 mg  1 mg IntraMUSCular PRN  pantoprazole (PROTONIX) injection 40 mg  40 mg IntraVENous Q12H  
 insulin lispro (HUMALOG) injection   SubCUTAneous Q6H  
 octreotide (SANDOSTATIN) 500 mcg in 0.9% sodium chloride 500 mL infusion  50 mcg/hr IntraVENous CONTINUOUS  
 0.9% sodium chloride infusion 250 mL  250 mL IntraVENous PRN  
 fentaNYL (PF) 1,500 mcg/30 mL (50 mcg/mL) infusion  0-200 mcg/hr IntraVENous TITRATE  midazolam (VERSED) injection 2 mg  2 mg IntraVENous Q1H PRN  
 0.9% sodium chloride infusion 250 mL  250 mL IntraVENous PRN Objective:  
 
Blood pressure (!) 108/39, pulse 80, temperature 99.4 °F (37.4 °C), resp. rate 13, height 5' 10\" (1.778 m), weight 117.9 kg (260 lb), SpO2 99 %. 
 
03/29 0701 - 03/29 1900 In: -  
Out: 150  
 
03/27 1901 - 03/29 0700 In: 4203.3 [I.V.:4073.3] Out: 2210 [EKWAC:9065] Physical Examination:  
 
 
Jimena Courts. Weak HEENT:  Icteric. EOMI 
GI:  Mild distension but no tenderness CNS: As above Data Review Recent Results (from the past 24 hour(s)) HGB & HCT Collection Time: 03/28/20  2:18 PM  
Result Value Ref Range HGB 7.7 (L) 12.1 - 17.0 g/dL HCT 23.1 (L) 36.6 - 50.3 % GLUCOSE, POC Collection Time: 03/28/20  6:20 PM  
Result Value Ref Range Glucose (POC) 273 (H) 65 - 100 mg/dL Performed by Dedrick Miller CBC WITH AUTOMATED DIFF Collection Time: 03/28/20  8:54 PM  
Result Value Ref Range WBC 15.0 (H) 4.1 - 11.1 K/uL  
 RBC 2.77 (L) 4.10 - 5.70 M/uL HGB 7.5 (L) 12.1 - 17.0 g/dL HCT 23.2 (L) 36.6 - 50.3 % MCV 83.8 80.0 - 99.0 FL  
 MCH 27.1 26.0 - 34.0 PG  
 MCHC 32.3 30.0 - 36.5 g/dL  
 RDW 17.1 (H) 11.5 - 14.5 % PLATELET 516 108 - 777 K/uL MPV 10.1 8.9 - 12.9 FL  
 NRBC 0.0 0  WBC ABSOLUTE NRBC 0.00 0.00 - 0.01 K/uL NEUTROPHILS 71 32 - 75 % LYMPHOCYTES 11 (L) 12 - 49 % MONOCYTES 10 5 - 13 % EOSINOPHILS 6 0 - 7 % BASOPHILS 1 0 - 1 % IMMATURE GRANULOCYTES 1 (H) 0.0 - 0.5 % ABS. NEUTROPHILS 10.8 (H) 1.8 - 8.0 K/UL  
 ABS. LYMPHOCYTES 1.7 0.8 - 3.5 K/UL  
 ABS. MONOCYTES 1.5 (H) 0.0 - 1.0 K/UL  
 ABS. EOSINOPHILS 0.9 (H) 0.0 - 0.4 K/UL  
 ABS. BASOPHILS 0.1 0.0 - 0.1 K/UL  
 ABS. IMM. GRANS. 0.1 (H) 0.00 - 0.04 K/UL  
 DF AUTOMATED    
GLUCOSE, POC Collection Time: 03/28/20 11:38 PM  
Result Value Ref Range Glucose (POC) 253 (H) 65 - 100 mg/dL Performed by Moni Pickard METABOLIC PANEL, COMPREHENSIVE Collection Time: 03/29/20  3:09 AM  
Result Value Ref Range Sodium 140 136 - 145 mmol/L Potassium 4.4 3.5 - 5.1 mmol/L Chloride 111 (H) 97 - 108 mmol/L  
 CO2 25 21 - 32 mmol/L Anion gap 4 (L) 5 - 15 mmol/L Glucose 237 (H) 65 - 100 mg/dL BUN 20 6 - 20 MG/DL Creatinine 0.88 0.70 - 1.30 MG/DL  
 BUN/Creatinine ratio 23 (H) 12 - 20 GFR est AA >60 >60 ml/min/1.73m2 GFR est non-AA >60 >60 ml/min/1.73m2 Calcium 7.1 (L) 8.5 - 10.1 MG/DL Bilirubin, total 3.8 (H) 0.2 - 1.0 MG/DL  
 ALT (SGPT) 148 (H) 12 - 78 U/L  
 AST (SGOT) 321 (H) 15 - 37 U/L Alk. phosphatase 106 45 - 117 U/L Protein, total 4.8 (L) 6.4 - 8.2 g/dL Albumin 1.9 (L) 3.5 - 5.0 g/dL Globulin 2.9 2.0 - 4.0 g/dL A-G Ratio 0.7 (L) 1.1 - 2.2    
CBC WITH AUTOMATED DIFF Collection Time: 03/29/20  3:09 AM  
Result Value Ref Range WBC 15.8 (H) 4.1 - 11.1 K/uL  
 RBC 2.74 (L) 4.10 - 5.70 M/uL HGB 7.4 (L) 12.1 - 17.0 g/dL HCT 22.8 (L) 36.6 - 50.3 % MCV 83.2 80.0 - 99.0 FL  
 MCH 27.0 26.0 - 34.0 PG  
 MCHC 32.5 30.0 - 36.5 g/dL  
 RDW 17.2 (H) 11.5 - 14.5 % PLATELET 836 299 - 102 K/uL MPV 10.3 8.9 - 12.9 FL  
 NRBC 0.0 0  WBC ABSOLUTE NRBC 0.00 0.00 - 0.01 K/uL NEUTROPHILS 70 32 - 75 % LYMPHOCYTES 12 12 - 49 % MONOCYTES 10 5 - 13 % EOSINOPHILS 6 0 - 7 % BASOPHILS 1 0 - 1 % IMMATURE GRANULOCYTES 1 (H) 0.0 - 0.5 % ABS. NEUTROPHILS 11.2 (H) 1.8 - 8.0 K/UL  
 ABS. LYMPHOCYTES 1.8 0.8 - 3.5 K/UL  
 ABS. MONOCYTES 1.6 (H) 0.0 - 1.0 K/UL  
 ABS. EOSINOPHILS 0.9 (H) 0.0 - 0.4 K/UL  
 ABS. BASOPHILS 0.1 0.0 - 0.1 K/UL  
 ABS. IMM. GRANS. 0.1 (H) 0.00 - 0.04 K/UL  
 DF AUTOMATED MAGNESIUM Collection Time: 03/29/20  3:09 AM  
Result Value Ref Range Magnesium 2.0 1.6 - 2.4 mg/dL PHOSPHORUS Collection Time: 03/29/20  3:09 AM  
Result Value Ref Range  Phosphorus 2.4 (L) 2.6 - 4.7 MG/DL  
POC EG7  
 Collection Time: 03/29/20  4:16 AM  
Result Value Ref Range Calcium, ionized (POC) 1.16 1.12 - 1.32 mmol/L  
 FIO2 (POC) 35 % pH (POC) 7.394 7.35 - 7.45    
 pCO2 (POC) 39.3 35.0 - 45.0 MMHG  
 pO2 (POC) 115 (H) 80 - 100 MMHG  
 HCO3 (POC) 24.0 22 - 26 MMOL/L Base deficit (POC) 1 mmol/L  
 sO2 (POC) 98 (H) 92 - 97 % Site RIGHT RADIAL Device: VENT Mode ASSIST CONTROL Tidal volume 450 ml Set Rate 12 bpm  
 PEEP/CPAP (POC) 6 cmH2O Allens test (POC) YES Specimen type (POC) ARTERIAL Total resp. rate 22 GLUCOSE, POC Collection Time: 03/29/20  5:42 AM  
Result Value Ref Range Glucose (POC) 256 (H) 65 - 100 mg/dL Performed by [facility] CBC W/O DIFF Collection Time: 03/29/20 10:53 AM  
Result Value Ref Range WBC 10.9 4.1 - 11.1 K/uL  
 RBC 2.59 (L) 4.10 - 5.70 M/uL HGB 7.0 (L) 12.1 - 17.0 g/dL HCT 21.6 (L) 36.6 - 50.3 % MCV 83.4 80.0 - 99.0 FL  
 MCH 27.0 26.0 - 34.0 PG  
 MCHC 32.4 30.0 - 36.5 g/dL  
 RDW 17.2 (H) 11.5 - 14.5 % PLATELET 876 (L) 002 - 400 K/uL MPV 10.2 8.9 - 12.9 FL  
 NRBC 0.0 0  WBC ABSOLUTE NRBC 0.00 0.00 - 0.01 K/uL GLUCOSE, POC Collection Time: 03/29/20 11:53 AM  
Result Value Ref Range Glucose (POC) 236 (H) 65 - 100 mg/dL Performed by Aristides Starr Recent Labs  
  03/29/20 
1053 03/29/20 
0309 WBC 10.9 15.8* HGB 7.0* 7.4* HCT 21.6* 22.8*  
* 192 Recent Labs  
  03/29/20 
0309 03/27/20 
2153  137  
K 4.4 4.0  
* 106 CO2 25 26 BUN 20 14 CREA 0.88 0.80 * 156* CA 7.1* 8.0*  
MG 2.0 1.8 PHOS 2.4*  --   
 
Recent Labs  
  03/29/20 
0309 03/27/20 
2153 SGOT 321* 40*  91  
TP 4.8* 6.6 ALB 1.9* 2.5*  
GLOB 2.9 4.1*  
LPSE  --  71* Recent Labs  
  03/27/20 
2220 INR 1.2* PTP 12.0* No results for input(s): FE, TIBC, PSAT, FERR in the last 72 hours. Lab Results Component Value Date/Time  Folate 9.2 03/18/2010 11:26 AM  
  
 No results for input(s): PH, PCO2, PO2 in the last 72 hours. No results for input(s): CPK, CKNDX, TROIQ in the last 72 hours. No lab exists for component: CPKMB Lab Results Component Value Date/Time Cholesterol, total 121 11/07/2019 10:23 AM  
 HDL Cholesterol 68 11/07/2019 10:23 AM  
 LDL, calculated 40 11/07/2019 10:23 AM  
 Triglyceride 66 11/07/2019 10:23 AM  
 CHOL/HDL Ratio 3.5 11/01/2010 07:07 AM  
 
No components found for: [place] Lab Results Component Value Date/Time Color YELLOW/STRAW 04/07/2018 06:50 AM  
 Appearance CLEAR 04/07/2018 06:50 AM  
 Specific gravity 1.021 04/07/2018 06:50 AM  
 Specific gravity 1.020 02/28/2018 05:01 PM  
 pH (UA) 6.0 04/07/2018 06:50 AM  
 Protein NEGATIVE  04/07/2018 06:50 AM  
 Glucose >1,000 (A) 04/07/2018 06:50 AM  
 Ketone NEGATIVE  04/07/2018 06:50 AM  
 Bilirubin NEGATIVE  04/07/2018 06:50 AM  
 Urobilinogen 0.2 04/07/2018 06:50 AM  
 Nitrites NEGATIVE  04/07/2018 06:50 AM  
 Leukocyte Esterase NEGATIVE  04/07/2018 06:50 AM  
 Epithelial cells FEW 04/07/2018 06:50 AM  
 Bacteria NEGATIVE  04/07/2018 06:50 AM  
 WBC 0-4 04/07/2018 06:50 AM  
 RBC 0-5 04/07/2018 06:50 AM  
 
  
ROS: -CP, SOB, Dysuria, palpitations, cough. Assessment: 
 
Active Problems: 
  Acute upper GI bleed (3/28/2020) Variceal Bleed Cirrhosis Plan/Discussion:  
 
· Hemodynamically he is doing well after TIPS. Watch for worsening mental status 2/2 possibly worsening  HSE. Add Lactulose if NH3 is up. · Hgb is a little lower. Will not over transfuse: Goal hgb of 8. · Continue IV Sandostatin, IV abx and PPI for now. · Follow hgb. · Start a CLD diet. · I spoke with covering RN. Signed By: Kevan Franco.  Master Khan MD 
 
3/29/2020  151 PM

## 2020-03-29 NOTE — PROGRESS NOTES
03/29/20 0545 ABCDEF Bundle SBT Safety Screen Passed Yes SBT Trial Passed Yes Weaning Parameters Spontaneous Breathing Trial Complete Yes Resp Rate Observed 16 Ve 9.4  RSBI 24

## 2020-03-29 NOTE — PROGRESS NOTES
0700: Report received from Laila, [facility]. Patient resting intubated, sedated and restrained in bed. Currently passing SBT. 0900: Dr. Portia Matos paged to update on patient condition. Will plan to contact GI to confirm if there are any planned procedures for today prior to extubation. Will also plan to ask if they would like to transfuse PRBC for hgb 7.4.  
 
0915: Dr. Onur Crowder paged via perfect serve. 1000: GI answering service called since there has been no returned page - page now sent to Dr. Argentina Gray. 1025: Dr. Richi Remy returned paged. Update given and plan of care discussed. No plans for interventions at this time. Will continue to trend h/h and transfuse for hgb less than 7.  
 
1030: Confirmed with Dr. Portia Matos. Will extubate patient. 1035: RT notified and will be at bedside shortly. 1040: Patient extubated to 4L NC with no complications. Strong cough present. sats 99%. Restraints discontinues at this time. 1200: Reassessment completed with no changes 1600: Reassessment completed with no changes 1900: CBC drawn and sent to lab. Report given to MARK Milner.  
 
1955: hgb 6.7. 1u PRBC ordered per Dr. Richi Remy.

## 2020-03-30 LAB
ABO + RH BLD: NORMAL
ALBUMIN SERPL-MCNC: 1.8 G/DL (ref 3.5–5)
ALBUMIN/GLOB SERPL: 0.6 {RATIO} (ref 1.1–2.2)
ALP SERPL-CCNC: 130 U/L (ref 45–117)
ALT SERPL-CCNC: 178 U/L (ref 12–78)
ANION GAP SERPL CALC-SCNC: 4 MMOL/L (ref 5–15)
AST SERPL-CCNC: 304 U/L (ref 15–37)
BASOPHILS # BLD: 0 K/UL (ref 0–0.1)
BASOPHILS NFR BLD: 1 % (ref 0–1)
BILIRUB DIRECT SERPL-MCNC: 1.7 MG/DL (ref 0–0.2)
BILIRUB SERPL-MCNC: 2.2 MG/DL (ref 0.2–1)
BLD PROD TYP BPU: NORMAL
BLOOD GROUP ANTIBODIES SERPL: NORMAL
BPU ID: NORMAL
BUN SERPL-MCNC: 23 MG/DL (ref 6–20)
BUN/CREAT SERPL: 28 (ref 12–20)
CALCIUM SERPL-MCNC: 7.1 MG/DL (ref 8.5–10.1)
CHLORIDE SERPL-SCNC: 114 MMOL/L (ref 97–108)
CO2 SERPL-SCNC: 25 MMOL/L (ref 21–32)
CREAT SERPL-MCNC: 0.82 MG/DL (ref 0.7–1.3)
CROSSMATCH RESULT,%XM: NORMAL
DIFFERENTIAL METHOD BLD: ABNORMAL
EOSINOPHIL # BLD: 0.2 K/UL (ref 0–0.4)
EOSINOPHIL NFR BLD: 3 % (ref 0–7)
ERYTHROCYTE [DISTWIDTH] IN BLOOD BY AUTOMATED COUNT: 17 % (ref 11.5–14.5)
GLOBULIN SER CALC-MCNC: 3.1 G/DL (ref 2–4)
GLUCOSE BLD STRIP.AUTO-MCNC: 227 MG/DL (ref 65–100)
GLUCOSE BLD STRIP.AUTO-MCNC: 236 MG/DL (ref 65–100)
GLUCOSE BLD STRIP.AUTO-MCNC: 253 MG/DL (ref 65–100)
GLUCOSE SERPL-MCNC: 202 MG/DL (ref 65–100)
HCT VFR BLD AUTO: 22.5 % (ref 36.6–50.3)
HCT VFR BLD AUTO: 25 % (ref 36.6–50.3)
HGB BLD-MCNC: 7.3 G/DL (ref 12.1–17)
HGB BLD-MCNC: 8.1 G/DL (ref 12.1–17)
IMM GRANULOCYTES # BLD AUTO: 0.1 K/UL (ref 0–0.04)
IMM GRANULOCYTES NFR BLD AUTO: 1 % (ref 0–0.5)
LYMPHOCYTES # BLD: 0.7 K/UL (ref 0.8–3.5)
LYMPHOCYTES NFR BLD: 12 % (ref 12–49)
MAGNESIUM SERPL-MCNC: 1.9 MG/DL (ref 1.6–2.4)
MCH RBC QN AUTO: 26.8 PG (ref 26–34)
MCHC RBC AUTO-ENTMCNC: 32.4 G/DL (ref 30–36.5)
MCV RBC AUTO: 82.7 FL (ref 80–99)
MONOCYTES # BLD: 0.5 K/UL (ref 0–1)
MONOCYTES NFR BLD: 8 % (ref 5–13)
NEUTS SEG # BLD: 4.8 K/UL (ref 1.8–8)
NEUTS SEG NFR BLD: 76 % (ref 32–75)
NRBC # BLD: 0 K/UL (ref 0–0.01)
NRBC BLD-RTO: 0 PER 100 WBC
PHOSPHATE SERPL-MCNC: 1.4 MG/DL (ref 2.6–4.7)
PLATELET # BLD AUTO: 108 K/UL (ref 150–400)
PMV BLD AUTO: 10.6 FL (ref 8.9–12.9)
POTASSIUM SERPL-SCNC: 4.3 MMOL/L (ref 3.5–5.1)
PROT SERPL-MCNC: 4.9 G/DL (ref 6.4–8.2)
RBC # BLD AUTO: 2.72 M/UL (ref 4.1–5.7)
SERVICE CMNT-IMP: ABNORMAL
SODIUM SERPL-SCNC: 143 MMOL/L (ref 136–145)
SPECIMEN EXP DATE BLD: NORMAL
STATUS OF UNIT,%ST: NORMAL
UNIT DIVISION, %UDIV: 0
WBC # BLD AUTO: 6.3 K/UL (ref 4.1–11.1)

## 2020-03-30 PROCEDURE — 36415 COLL VENOUS BLD VENIPUNCTURE: CPT

## 2020-03-30 PROCEDURE — 74011250637 HC RX REV CODE- 250/637: Performed by: HOSPITALIST

## 2020-03-30 PROCEDURE — 84100 ASSAY OF PHOSPHORUS: CPT

## 2020-03-30 PROCEDURE — 80076 HEPATIC FUNCTION PANEL: CPT

## 2020-03-30 PROCEDURE — 82962 GLUCOSE BLOOD TEST: CPT

## 2020-03-30 PROCEDURE — 74011636637 HC RX REV CODE- 636/637: Performed by: INTERNAL MEDICINE

## 2020-03-30 PROCEDURE — 85025 COMPLETE CBC W/AUTO DIFF WBC: CPT

## 2020-03-30 PROCEDURE — 80048 BASIC METABOLIC PNL TOTAL CA: CPT

## 2020-03-30 PROCEDURE — 74011250637 HC RX REV CODE- 250/637: Performed by: INTERNAL MEDICINE

## 2020-03-30 PROCEDURE — 74011000250 HC RX REV CODE- 250: Performed by: HOSPITALIST

## 2020-03-30 PROCEDURE — 74011250636 HC RX REV CODE- 250/636: Performed by: HOSPITALIST

## 2020-03-30 PROCEDURE — 77010033678 HC OXYGEN DAILY

## 2020-03-30 PROCEDURE — C9113 INJ PANTOPRAZOLE SODIUM, VIA: HCPCS | Performed by: INTERNAL MEDICINE

## 2020-03-30 PROCEDURE — 83735 ASSAY OF MAGNESIUM: CPT

## 2020-03-30 PROCEDURE — 74011250636 HC RX REV CODE- 250/636: Performed by: INTERNAL MEDICINE

## 2020-03-30 PROCEDURE — 65610000006 HC RM INTENSIVE CARE

## 2020-03-30 PROCEDURE — 85018 HEMOGLOBIN: CPT

## 2020-03-30 RX ORDER — INSULIN LISPRO 100 [IU]/ML
INJECTION, SOLUTION INTRAVENOUS; SUBCUTANEOUS EVERY 6 HOURS
Status: DISCONTINUED | OUTPATIENT
Start: 2020-03-30 | End: 2020-04-03

## 2020-03-30 RX ORDER — LACTULOSE 10 G/15ML
200 SOLUTION ORAL; RECTAL 3 TIMES DAILY
Status: DISCONTINUED | OUTPATIENT
Start: 2020-03-30 | End: 2020-04-02

## 2020-03-30 RX ORDER — INSULIN GLARGINE 100 [IU]/ML
14 INJECTION, SOLUTION SUBCUTANEOUS DAILY
Status: DISCONTINUED | OUTPATIENT
Start: 2020-03-30 | End: 2020-03-31

## 2020-03-30 RX ORDER — LACTULOSE 10 G/15ML
200 SOLUTION ORAL; RECTAL ONCE
Status: COMPLETED | OUTPATIENT
Start: 2020-03-30 | End: 2020-03-30

## 2020-03-30 RX ADMIN — SODIUM CHLORIDE 10 ML: 9 INJECTION, SOLUTION INTRAMUSCULAR; INTRAVENOUS; SUBCUTANEOUS at 13:15

## 2020-03-30 RX ADMIN — INSULIN LISPRO 5 UNITS: 100 INJECTION, SOLUTION INTRAVENOUS; SUBCUTANEOUS at 05:28

## 2020-03-30 RX ADMIN — LACTULOSE 300 ML: 10 SOLUTION ORAL at 16:05

## 2020-03-30 RX ADMIN — LACTULOSE 300 ML: 10 SOLUTION ORAL at 08:14

## 2020-03-30 RX ADMIN — PANTOPRAZOLE SODIUM 40 MG: 40 INJECTION, POWDER, FOR SOLUTION INTRAVENOUS at 21:34

## 2020-03-30 RX ADMIN — OCTREOTIDE ACETATE 25 MCG/HR: 500 INJECTION, SOLUTION INTRAVENOUS; SUBCUTANEOUS at 17:32

## 2020-03-30 RX ADMIN — SODIUM CHLORIDE 10 ML: 9 INJECTION, SOLUTION INTRAMUSCULAR; INTRAVENOUS; SUBCUTANEOUS at 21:35

## 2020-03-30 RX ADMIN — SODIUM CHLORIDE 75 ML/HR: 900 INJECTION, SOLUTION INTRAVENOUS at 11:07

## 2020-03-30 RX ADMIN — POTASSIUM PHOSPHATE, MONOBASIC AND POTASSIUM PHOSPHATE, DIBASIC: 224; 236 INJECTION, SOLUTION, CONCENTRATE INTRAVENOUS at 06:24

## 2020-03-30 RX ADMIN — Medication 1 AMPULE: at 21:34

## 2020-03-30 RX ADMIN — INSULIN LISPRO 3 UNITS: 100 INJECTION, SOLUTION INTRAVENOUS; SUBCUTANEOUS at 12:16

## 2020-03-30 RX ADMIN — SODIUM CHLORIDE 10 ML: 9 INJECTION, SOLUTION INTRAMUSCULAR; INTRAVENOUS; SUBCUTANEOUS at 05:29

## 2020-03-30 RX ADMIN — LACTULOSE 300 ML: 10 SOLUTION ORAL at 22:55

## 2020-03-30 RX ADMIN — Medication 1 AMPULE: at 08:36

## 2020-03-30 RX ADMIN — INSULIN GLARGINE 14 UNITS: 100 INJECTION, SOLUTION SUBCUTANEOUS at 12:43

## 2020-03-30 RX ADMIN — INSULIN LISPRO 4 UNITS: 100 INJECTION, SOLUTION INTRAVENOUS; SUBCUTANEOUS at 17:39

## 2020-03-30 RX ADMIN — PANTOPRAZOLE SODIUM 40 MG: 40 INJECTION, POWDER, FOR SOLUTION INTRAVENOUS at 08:16

## 2020-03-30 NOTE — PROGRESS NOTES
Gastroenterology Progress Note AMAN Sewell 
 for Dr. 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 Getting lactulose enema on arrival. 
He is now extubated Hgb improved to 7.3 after being transfused one unit on 3/29 Ammonia >200 today Current Facility-Administered Medications Medication Dose Route Frequency  potassium phosphate 15 mmol in 0.9% sodium chloride 250 mL infusion   IntraVENous ONCE  
 alcohol 62% (NOZIN) nasal  1 Ampule  1 Ampule Topical Q12H  
 sucralfate (CARAFATE) tablet 1 g  1 g Oral ACB&D  
 0.9% sodium chloride infusion 250 mL  250 mL IntraVENous PRN  
 NOREPINephrine (LEVOPHED) 8 mg in 5% dextrose 250mL (32 mcg/mL) infusion  2-100 mcg/min IntraVENous TITRATE  
 0.9% sodium chloride infusion 250 mL  250 mL IntraVENous PRN  
 ondansetron (ZOFRAN) injection 4 mg  4 mg IntraVENous Q4H PRN  
 fentaNYL citrate (PF) injection 50 mcg  50 mcg IntraVENous Q4H PRN  
 sodium chloride (NS) flush 5-40 mL  5-40 mL IntraVENous Q8H  
 sodium chloride (NS) flush 5-40 mL  5-40 mL IntraVENous PRN  
 cefTRIAXone (ROCEPHIN) 1 g in 0.9% sodium chloride (MBP/ADV) 50 mL  1 g IntraVENous Q24H  
 0.9% sodium chloride infusion  75 mL/hr IntraVENous CONTINUOUS  
 octreotide (SANDOSTATIN) 500 mcg in 0.9% sodium chloride 500 mL infusion  25 mcg/hr IntraVENous CONTINUOUS  
 glucose chewable tablet 16 g  4 Tab Oral PRN  
 dextrose (D50W) injection syrg 12.5-25 g  12.5-25 g IntraVENous PRN  
 glucagon (GLUCAGEN) injection 1 mg  1 mg IntraMUSCular PRN  pantoprazole (PROTONIX) injection 40 mg  40 mg IntraVENous Q12H  
 insulin lispro (HUMALOG) injection   SubCUTAneous Q6H  
 0.9% sodium chloride infusion 250 mL  250 mL IntraVENous PRN  
 fentaNYL (PF) 1,500 mcg/30 mL (50 mcg/mL) infusion  0-200 mcg/hr IntraVENous TITRATE  midazolam (VERSED) injection 2 mg  2 mg IntraVENous Q1H PRN  
 0.9% sodium chloride infusion 250 mL  250 mL IntraVENous PRN Objective:  
 
Blood pressure 176/85, pulse 84, temperature 98 °F (36.7 °C), resp. rate 17, height 5' 10\" (1.778 m), weight 117.9 kg (260 lb), SpO2 99 %. No intake/output data recorded. 03/28 1901 - 03/30 0700 In: 3954.5 [I.V.:3672.5] Out: 2151 [Urine:2625] Physical Examination:  
 
 
General: non responsive WM, HEENT:  +Icteric scleara GI:  Mild distension but no tenderness, soft, normal BS 
CNS: Does not open eyes to voice, non responsive to pain Data Review Recent Results (from the past 24 hour(s)) CBC W/O DIFF Collection Time: 03/29/20 10:53 AM  
Result Value Ref Range WBC 10.9 4.1 - 11.1 K/uL  
 RBC 2.59 (L) 4.10 - 5.70 M/uL HGB 7.0 (L) 12.1 - 17.0 g/dL HCT 21.6 (L) 36.6 - 50.3 % MCV 83.4 80.0 - 99.0 FL  
 MCH 27.0 26.0 - 34.0 PG  
 MCHC 32.4 30.0 - 36.5 g/dL  
 RDW 17.2 (H) 11.5 - 14.5 % PLATELET 049 (L) 623 - 400 K/uL MPV 10.2 8.9 - 12.9 FL  
 NRBC 0.0 0  WBC ABSOLUTE NRBC 0.00 0.00 - 0.01 K/uL GLUCOSE, POC Collection Time: 03/29/20 11:53 AM  
Result Value Ref Range Glucose (POC) 236 (H) 65 - 100 mg/dL Performed by ernce Block GLUCOSE, POC Collection Time: 03/29/20  7:18 PM  
Result Value Ref Range Glucose (POC) 227 (H) 65 - 100 mg/dL Performed by ernce Block CBC W/O DIFF Collection Time: 03/29/20  7:21 PM  
Result Value Ref Range WBC 6.8 4.1 - 11.1 K/uL  
 RBC 2.47 (L) 4.10 - 5.70 M/uL HGB 6.7 (L) 12.1 - 17.0 g/dL HCT 20.5 (L) 36.6 - 50.3 % MCV 83.0 80.0 - 99.0 FL  
 MCH 27.1 26.0 - 34.0 PG  
 MCHC 32.7 30.0 - 36.5 g/dL  
 RDW 17.2 (H) 11.5 - 14.5 % PLATELET 667 (L) 737 - 400 K/uL MPV 10.7 8.9 - 12.9 FL  
 NRBC 0.0 0  WBC ABSOLUTE NRBC 0.00 0.00 - 0.01 K/uL AMMONIA Collection Time: 03/29/20  7:21 PM  
Result Value Ref Range Ammonia 200 (H) <32 UMOL/L  
GLUCOSE, POC Collection Time: 03/29/20 11:14 PM  
Result Value Ref Range Glucose (POC) 215 (H) 65 - 100 mg/dL Performed by [employer] CBC WITH AUTOMATED DIFF Collection Time: 03/30/20  1:38 AM  
Result Value Ref Range WBC 6.3 4.1 - 11.1 K/uL  
 RBC 2.72 (L) 4.10 - 5.70 M/uL HGB 7.3 (L) 12.1 - 17.0 g/dL HCT 22.5 (L) 36.6 - 50.3 % MCV 82.7 80.0 - 99.0 FL  
 MCH 26.8 26.0 - 34.0 PG  
 MCHC 32.4 30.0 - 36.5 g/dL  
 RDW 17.0 (H) 11.5 - 14.5 % PLATELET 616 (L) 206 - 400 K/uL MPV 10.6 8.9 - 12.9 FL  
 NRBC 0.0 0  WBC ABSOLUTE NRBC 0.00 0.00 - 0.01 K/uL NEUTROPHILS 76 (H) 32 - 75 % LYMPHOCYTES 12 12 - 49 % MONOCYTES 8 5 - 13 % EOSINOPHILS 3 0 - 7 % BASOPHILS 1 0 - 1 % IMMATURE GRANULOCYTES 1 (H) 0.0 - 0.5 % ABS. NEUTROPHILS 4.8 1.8 - 8.0 K/UL  
 ABS. LYMPHOCYTES 0.7 (L) 0.8 - 3.5 K/UL  
 ABS. MONOCYTES 0.5 0.0 - 1.0 K/UL  
 ABS. EOSINOPHILS 0.2 0.0 - 0.4 K/UL  
 ABS. BASOPHILS 0.0 0.0 - 0.1 K/UL  
 ABS. IMM. GRANS. 0.1 (H) 0.00 - 0.04 K/UL  
 DF AUTOMATED METABOLIC PANEL, BASIC Collection Time: 03/30/20  1:38 AM  
Result Value Ref Range Sodium 143 136 - 145 mmol/L Potassium 4.3 3.5 - 5.1 mmol/L Chloride 114 (H) 97 - 108 mmol/L  
 CO2 25 21 - 32 mmol/L Anion gap 4 (L) 5 - 15 mmol/L Glucose 202 (H) 65 - 100 mg/dL BUN 23 (H) 6 - 20 MG/DL Creatinine 0.82 0.70 - 1.30 MG/DL  
 BUN/Creatinine ratio 28 (H) 12 - 20 GFR est AA >60 >60 ml/min/1.73m2 GFR est non-AA >60 >60 ml/min/1.73m2 Calcium 7.1 (L) 8.5 - 10.1 MG/DL MAGNESIUM Collection Time: 03/30/20  1:38 AM  
Result Value Ref Range Magnesium 1.9 1.6 - 2.4 mg/dL PHOSPHORUS Collection Time: 03/30/20  1:38 AM  
Result Value Ref Range Phosphorus 1.4 (L) 2.6 - 4.7 MG/DL  
HEPATIC FUNCTION PANEL Collection Time: 03/30/20  1:38 AM  
Result Value Ref Range Protein, total 4.9 (L) 6.4 - 8.2 g/dL Albumin 1.8 (L) 3.5 - 5.0 g/dL Globulin 3.1 2.0 - 4.0 g/dL A-G Ratio 0.6 (L) 1.1 - 2.2 Bilirubin, total 2.2 (H) 0.2 - 1.0 MG/DL Bilirubin, direct 1.7 (H) 0.0 - 0.2 MG/DL Alk. phosphatase 130 (H) 45 - 117 U/L  
 AST (SGOT) 304 (H) 15 - 37 U/L  
 ALT (SGPT) 178 (H) 12 - 78 U/L  
GLUCOSE, POC Collection Time: 03/30/20  5:25 AM  
Result Value Ref Range Glucose (POC) 253 (H) 65 - 100 mg/dL Performed by [employer] Recent Labs  
  03/30/20 0138 03/29/20 
1921 WBC 6.3 6.8 HGB 7.3* 6.7* HCT 22.5* 20.5* * 107* Recent Labs  
  03/30/20 0138 03/29/20 
0309 03/27/20 
2153  140 137  
K 4.3 4.4 4.0  
* 111* 106 CO2 25 25 26 BUN 23* 20 14 CREA 0.82 0.88 0.80 * 237* 156* CA 7.1* 7.1* 8.0*  
MG 1.9 2.0 1.8 PHOS 1.4* 2.4*  --   
 
Recent Labs  
  03/30/20 0138 03/29/20 
0309 03/27/20 
2153 SGOT 304* 321* 40* * 106 91  
TP 4.9* 4.8* 6.6 ALB 1.8* 1.9* 2.5*  
GLOB 3.1 2.9 4.1*  
LPSE  --   --  71* Recent Labs  
  03/27/20 
2220 INR 1.2* PTP 12.0* No results for input(s): FE, TIBC, PSAT, FERR in the last 72 hours. Lab Results Component Value Date/Time Folate 9.2 03/18/2010 11:26 AM  
  
No results for input(s): PH, PCO2, PO2 in the last 72 hours. No results for input(s): CPK, CKNDX, TROIQ in the last 72 hours. No lab exists for component: CPKMB Lab Results Component Value Date/Time Cholesterol, total 121 11/07/2019 10:23 AM  
 HDL Cholesterol 68 11/07/2019 10:23 AM  
 LDL, calculated 40 11/07/2019 10:23 AM  
 Triglyceride 66 11/07/2019 10:23 AM  
 CHOL/HDL Ratio 3.5 11/01/2010 07:07 AM  
 
No components found for: [place] Lab Results Component Value Date/Time  Color YELLOW/STRAW 04/07/2018 06:50 AM  
 Appearance CLEAR 04/07/2018 06:50 AM  
 Specific gravity 1.021 04/07/2018 06:50 AM  
 Specific gravity 1.020 02/28/2018 05:01 PM  
 pH (UA) 6.0 04/07/2018 06:50 AM  
 Protein NEGATIVE  04/07/2018 06:50 AM  
 Glucose >1,000 (A) 04/07/2018 06:50 AM  
 Ketone NEGATIVE  04/07/2018 06:50 AM  
 Bilirubin NEGATIVE  04/07/2018 06:50 AM  
 Urobilinogen 0.2 04/07/2018 06:50 AM  
 Nitrites NEGATIVE  04/07/2018 06:50 AM  
 Leukocyte Esterase NEGATIVE  04/07/2018 06:50 AM  
 Epithelial cells FEW 04/07/2018 06:50 AM  
 Bacteria NEGATIVE  04/07/2018 06:50 AM  
 WBC 0-4 04/07/2018 06:50 AM  
 RBC 0-5 04/07/2018 06:50 AM  
 
  
ROS: -CP, SOB, Dysuria, palpitations, cough. Assessment: 
 
Active Problems: 
  Acute upper GI bleed (3/28/2020) Variceal Bleed Cirrhosis Hepatic encephalopathy Plan/Discussion:  
 
· Hemodynamically he is doing well after TIPS. · Started in lactulose enema for HE, continue to follow mental status and ammonia. · Continue IV Sandostatin, IV abx and PPI for now. · Follow hgb. · Hold on diet advancement until improvement in mental status. · I spoke with covering RN. AMAN Huerta I have examined the patient. I have reviewed the chart and agree with the documentation recorded by the NP, including the assessment, treatment plan, and disposition. ASSESSMENT AND PLAN: 
Cirrhosis with variceal bleed S/P TIPS. His condition remains critical. 
IV Octreotide IV antibiotics IV PPI Monitor Hb and transfuse as needed [address] partners with re-evaluate in AM 
 
Gabe Quispe MD 
 
 
3/30/2020   
12:13 PM

## 2020-03-30 NOTE — PROGRESS NOTES
Bedside shift change report given to Annia RN (oncoming nurse) by Jono Smiley RN (offgoing nurse). Report included the following information SBAR, Kardex, MAR, Recent Results, Med Rec Status and Cardiac Rhythm NSR. Pt is stable vitals WDLs, pt responds to voice, slight nodding appropriately w/ localizing.  
 
0350- on-call MD made aware of pt no longer nodding appropriately, and w/drawing the painful stimuli, no following commands. Vitals are remaining w/in defined limits, no indications of pain, SOB or distress noted. Phos 1.4 MD made aware Report given to [place]

## 2020-03-30 NOTE — PROGRESS NOTES
[facility] Critical Care, [facility] Sleep Medicine Name: Kenney Cardenas MRN: [id number] : 1950 Hospital: [facility] Date: 3/30/2020 Critical Care IMPRESSION:  
· Respiratory failure, resolved · Variceal bleed · S/P unsuccessful banding due to volume of blood and emergent TIPS · Severe hyperammonemia · Anemia · BREWER/Cirrhosis · JACOB on CPAP  
  
RECOMMENDATIONS:  
· O2 PRN 
· GI following · Lactulose enema until more alert and can take PO 
· Pressors as needed · IV Protonix, Octreotide · S/P transfusion · Follow Hgb Subjective/History:  
 
3-30-20: no events. Sleepy/lethargic. 3/29 Seen and examined On SBT, alert and interactive No GI intervention planned today. Will see if we can extubate today. This patient has been seen and evaluated at the request of Dr. Shantanu Linton for respiratory failure. Patient is a 68 y.o. male with H/O BREWER/cirrhosis and varices, admitted with variceal bleed. S/P unsuccessful banding and emergent TIPS. Intubated and on vent support, pressors. S/P 3 U PRBCs today, awaiting repeat HgB later this afternoon. Critically ill and high risk for deterioration. The patient is critically ill and can not provide additional history due to intubated Past Medical History:  
Diagnosis Date  ADD (attention deficit disorder)  Anemia due to blood loss  Hinson's esophagus with esophagitis  Benign essential tremor  Cancer [facility])  [address]  Cirrhosis ([address]  Depression  DJD (degenerative joint disease) of hip   
 bilat  DM (diabetes mellitus) ([address] 3/17/2010  ED (erectile dysfunction) of organic origin  Esophageal varices determined by endoscopy ([address]  Fall on or from sidewalk curb 2015  Femur fracture ([address]  Gastritis and duodenitis  GERD (gastroesophageal reflux disease)   
 resolved after discontinuing diclofenac  Hematuria 2016  High cholesterol 03/17/2010  
 pt denies 6/19  
 HTN (hypertension) 3/17/2010  Morbid obesity ([address]  Murmur, cardiac 2016  
 PFO (patent foramen ovale) 7/26/2017  PUD (peptic ulcer disease)  Shingles 6/2016 RESOLVING- NO RASH (HEAD)  Sleep apnea   
 doesnt use CPAP anymore Past Surgical History:  
Procedure Laterality Date  COLONOSCOPY N/A 6/18/2019 COLONOSCOPY AND EGD performed by Chica Tomas MD at [facility] ENDOSCOPY  COLONOSCOPY,DIAGNOSTIC  6/18/2019  ENDOSCOPY, COLON, DIAGNOSTIC    
 HX CHOLECYSTECTOMY  1995  HX GI  1980  
 gastroplasty [address] [employer]  HX HIP REPLACEMENT Left  HX ORTHOPAEDIC Right 2011  
 rot cuff repair  HX ORTHOPAEDIC  2016  
 left broken femur [address]  HX VASCULAR ACCESS    
 picc line and removed after sepsis resolved [address] [facility] ARTHROPLASTY  05/2010  
 right  TOTAL HIP ARTHROPLASTY  08/01/2016  
 left  UPPER GI ENDOSCOPY,BIOPSY  6/18/2019 Prior to Admission medications Medication Sig Start Date End Date Taking? Authorizing Provider  
folic acid (FOLVITE) 1 mg tablet Take 1 Tab by mouth daily. 3/26/20   Fransico Jha NP  
diclofenac (VOLTAREN) 1 % gel Apply 4 g to affected area four (4) times daily. 3/26/20   Fransico Jha NP  
lisinopriL (PRINIVIL, ZESTRIL) 5 mg tablet TAKE 2 TABLETS BY MOUTH EVERY DAY 3/26/20   Fransico Jha NP  
insulin glargine (Lantus Solostar U-100 Insulin) 100 unit/mL (3 mL) inpn 40 units twice daily 3/26/20   Lisseth Hutton MD  
gabapentin (NEURONTIN) 300 mg capsule Take 2 Caps by mouth nightly. 3/16/20   Elaine Lan MD  
butalbital-acetaminophen-caffeine (FIORICET, ESGIC) -40 mg per tablet Take 1 Tab by mouth every six (6) hours as needed for Headache. 3/6/20   Elaine Lan MD  
cephALEXin (KEFLEX) 500 mg capsule Take 1 Cap by mouth two (2) times a day.  2/24/20   Elaine Lan MD  
 sertraline (ZOLOFT) 100 mg tablet TAKE 1.5 TABLETS DAILY 2/21/20   Reginald Zimmerman MD  
metFORMIN ER (GLUCOPHAGE XR) 500 mg tablet 2 tablets twice daily 2/13/20   Natan Hamilton MD  
spironolactone (ALDACTONE) 25 mg tablet Take 1 Tab by mouth daily. 2/4/20   Reginald Zimmerman MD  
ferrous sulfate 140 mg (45 mg iron) TbER ER tablet Take 1 Tab by mouth Daily (before breakfast). 2/4/20   Reginald Zimmerman MD  
furosemide (LASIX) 40 mg tablet Take 2 tabs by mouth daily x 1 week and then increase to 1 tab daily 1/27/20   Reginald Zimmerman MD  
ergocalciferol (VITAMIN D2) 50,000 unit capsule TAKE 1 CAPSULE EVERY 7 DAYS 1/6/20   Reginald Zimmerman MD  
atorvastatin (LIPITOR) 40 mg tablet Take 1 Tab by mouth daily. 1/4/20   Natan Hamilton MD  
glucose blood VI test strips (ASCENSIA AUTODISC VI, ONE TOUCH ULTRA TEST VI) strip Test blood sugar twice daily Diagnosis E 11.9. Can use [employer] 12/13/19   Reginald Zimmerman MD  
glucose blood VI test strips (ACCU-CHEK SOWMYA PLUS TEST STRP) strip Test blood sugar twice daily Diagnosis E 11.9 12/5/19   Reginald Zimmerman MD  
primidone (MYSOLINE) 250 mg tablet TAKE 1 TABLET TWICE A DAY 11/7/19   Reginald Zimmerman MD  
acetaminophen (TYLENOL) 500 mg tablet Take 2 Tabs by mouth every six (6) hours as needed for Pain. 10/7/19   Abhishek Esquivel MD  
omeprazole (PRILOSEC) 20 mg capsule TAKE 1 CAPSULE BY MOUTH EVERY DAY 7/15/19   Provider, Historical  
Blood-Glucose Meter (ACCU-CHEK SOWMYA PLUS METER) misc Test blood sugar twice daily Diagnosis E 11.9 7/9/19   Reginald Zimmerman MD  
Blood Glucose Control High&Low (ACCU-CHEK SOWMYA CONTROL SOLN) soln Test blood sugar twice daily Diagnosis E 11.9 7/9/19   Reginald Zimmerman MD  
insulin regular (NOVOLIN R REGULAR U-100 INSULN) 100 unit/mL injection INJECT 7 UNITS UNDER THE SKIN DAILY (WITH DINNER).  
Patient taking differently: INJECT 7 UNITS UNDER THE SKIN DAILY (WITH DINNER). only takes if BS is elevated. 7/9/19   Medhat Sears MD  
Insulin Needles, Disposable, (BD ULTRA-FINE SHORT PEN NEEDLE) 31 gauge x 5/16\" ndle USE TO INJECT UNDER THE SKIN THREE TIMES A DAY 7/9/19   Medhat Sears MD  
B.infantis-B.ani-B.long-B.bifi (PROBIOTIC 4X) 10-15 mg TbEC Take  by mouth. Provider, Historical  
magnesium 250 mg tab Take 500 mg by mouth daily. Provider, Historical  
melatonin tab tablet Take 10 mg by mouth nightly as needed. Provider, Historical  
potassium 99 mg tablet Take 99 mg by mouth two (2) times a day. Provider, Historical  
calcium citrate-vitamin D3 (CITRACAL + D) tablet Take 2 Tabs by mouth two (2) times a day. Provider, Historical  
aspirin 81 mg chewable tablet Take 1 Tab by mouth daily. Patient taking differently: Take 81 mg by mouth every other day. 2/2/17   Medhat Sears MD  
 
Current Facility-Administered Medications Medication Dose Route Frequency  potassium phosphate 15 mmol in 0.9% sodium chloride 250 mL infusion   IntraVENous ONCE  
 alcohol 62% (NOZIN) nasal  1 Ampule  1 Ampule Topical Q12H  
 sucralfate (CARAFATE) tablet 1 g  1 g Oral ACB&D  NOREPINephrine (LEVOPHED) 8 mg in 5% dextrose 250mL (32 mcg/mL) infusion  2-100 mcg/min IntraVENous TITRATE  sodium chloride (NS) flush 5-40 mL  5-40 mL IntraVENous Q8H  
 cefTRIAXone (ROCEPHIN) 1 g in 0.9% sodium chloride (MBP/ADV) 50 mL  1 g IntraVENous Q24H  
 0.9% sodium chloride infusion  75 mL/hr IntraVENous CONTINUOUS  
 octreotide (SANDOSTATIN) 500 mcg in 0.9% sodium chloride 500 mL infusion  25 mcg/hr IntraVENous CONTINUOUS  
 pantoprazole (PROTONIX) injection 40 mg  40 mg IntraVENous Q12H  
 insulin lispro (HUMALOG) injection   SubCUTAneous Q6H  
 fentaNYL (PF) 1,500 mcg/30 mL (50 mcg/mL) infusion  0-200 mcg/hr IntraVENous TITRATE Allergies Allergen Reactions  Nsaids (Non-Steroidal Anti-Inflammatory Drug) Other (comments) Hx of PUD and Hinson's Esophagus Social History Tobacco Use  Smoking status: Former Smoker Packs/day: 2.00 Years: 15.00 Pack years: 30.00 Last attempt to quit: 3/1/1979 Years since quittin.1  Smokeless tobacco: Never Used Substance Use Topics  Alcohol use: No  
  Alcohol/week: 0.0 standard drinks Family History Problem Relation Age of Onset  Cancer Father   
     multiple myeloma  Stroke Father  Dementia Mother  No Known Problems Brother  COPD Brother  Cancer Maternal Grandmother COLON  Anesth Problems Neg Hx Review of Systems: 
Unobtainable intubated Objective:  
Vital Signs:   
Visit Vitals /52 Pulse 76 Temp 98 °F (36.7 °C) Resp 18 Ht 5' 10\" (1.778 m) Wt 117.9 kg (260 lb) SpO2 97% BMI 37.31 kg/m² O2 Device: Nasal cannula O2 Flow Rate (L/min): 3 l/min Temp (24hrs), Av.8 °F (36.6 °C), Min:97.4 °F (36.3 °C), Max:98.4 °F (36.9 °C) Intake/Output:  
Last shift:      No intake/output data recorded. Last 3 shifts:  1901 -  0700 In: 5054.5 [I.V.:4772.5] Out: 3704 [Urine:2625] Intake/Output Summary (Last 24 hours) at 3/30/2020 1035 Last data filed at 3/30/2020 0700 Gross per 24 hour Intake 2674.38 ml Output 1525 ml Net 1149.38 ml Physical Exam: 
 
General:  Sleepy, no acute distress Head:  Normocephalic, without obvious abnormality, atraumatic. Eyes:  Conjunctivae/corneas clear. Nose: Nares normal. Septum midline. Mucosa normal.   
Throat: MMM Neck: Supple, symmetrical, trachea midline Lungs:   Clear to auscultation bilaterally. Chest wall:  No tenderness or deformity. Heart:  Regular rate and rhythm Abdomen:   Soft, non-tender. Bowel sounds normal.   
Extremities: Extremities normal, atraumatic, no cyanosis or clubbing Skin: Skin color, texture, turgor normal. No rashes or lesions Neurologic: Lethargic Data:  
Labs: Recent Labs  
  03/30/20 
0138 03/29/20 
1921 03/29/20 
1053 WBC 6.3 6.8 10.9 HGB 7.3* 6.7* 7.0*  
HCT 22.5* 20.5* 21.6*  
* 107* 144* Recent Labs  
  03/30/20 
0138 03/29/20 
0309 03/27/20 
2220 03/27/20 
2153  140  --  137  
K 4.3 4.4  --  4.0  
* 111*  --  106 CO2 25 25  --  26  
* 237*  --  156* BUN 23* 20  --  14  
CREA 0.82 0.88  --  0.80 CA 7.1* 7.1*  --  8.0*  
MG 1.9 2.0  --  1.8 PHOS 1.4* 2.4*  --   --   
ALB 1.8* 1.9*  --  2.5* TBILI 2.2* 3.8*  --  0.6 SGOT 304* 321*  --  40* * 148*  --  27 INR  --   --  1.2*  --   
 
Recent Labs  
  03/29/20 
0416 PHI 7.394 PCO2I 39.3 PO2I 115* HCO3I 24.0  
FIO2I 35 Imaging: 
I have personally reviewed the patients radiographs: 
None today Rakan Faye MD

## 2020-03-30 NOTE — PROGRESS NOTES
YARELY: likely home, pending progress and medical stability Reason for Admission: upper GI bleed RUR Score:  26% PCP: First and Last name: Dr. Qi Carroll [place] [facility] [place] a current patient: Yes/No: yes Approximate date of last visit: 03/27/2020 with upcoming appointment scheduled for 06/05/2020 Specialists: 
Hepatologist Fernanda Bustillos, last seen 03/02/2020 with upcoming appointment scheduled for 06/03/2020 Endocrinologist Dr. Rocio Villegas [facility], last seen 12/30/2019 with upcoming appointment scheduled for 04/27/2020 Do you (patient/family) have any concerns for transition/discharge? No concerns identified at this time Plan for utilizing home health: per recommendation, patient has used [facility] [address] in the past   
 
Current Advanced Directive/Advance Care Plan: On file and correct, daughter Wilfrid Art [phone] and son Domingo Bonilla [phone] Transition of Care Plan:       
 
Patient is a 69 y/o male who was admitted to CCU at [id number] [place] [address] on 3/28/2020 for a upper GI bleed complicated by acute on chronic normocytic anemia secondary to variceal bleed, liver cirrhosis, hemorrhagic/hypovolemic shock, and acute respiratory failure due to the above. CM contacted patient's daughter Wilfrid Art [phone] to complete assessment. Daughter confirmed demographics, insurance, and emergency contact. Patient uses [address] [place] [address] on [facility]. Patient and son reside in a two story home with 3 HUGO. Patient's bedroom is on the main level of the home. Patient has support system including his son that he lives with, daughter that lives 40 minutes away, and two brothers, one lives in [place] and the other in [place].  Patient has DME including cane, RW, shower chair, raised toilet seat, and glucometer. Patient uses cane for all ambulation and is independent with all ADLs, IADLs, and driving. [facility] in the past and has been to rehab at [facility]. Patient has also used [facility] in the past for home IV ABX. CM attempted to discuss d/c planning for a general idea of what patient may need at d/c. Per daughter she is unable to provide any information regarding d/c planning as she has not spoken to a doctor since patient has been in the emergency department. CM informed daughter that CM would reach out to attending and request a phone call to daughter. CM will continue to follow for d/c planning. Care Management Interventions PCP Verified by CM: Yes Last Visit to PCP: 03/27/20 Mode of Transport at Discharge: Other (see comment)(likely family to provide transport at d/c) Transition of Care Consult (CM Consult): Discharge Planning Discharge Durable Medical Equipment: No 
Physical Therapy Consult: Yes Occupational Therapy Consult: No 
Speech Therapy Consult: No 
Current Support Network: Relative's Home, Family Lives [place] Confirm Follow Up Transport: Family Discharge Location Discharge Placement: (TBD, likely home ) [place] Maciel, [address], [address]

## 2020-03-30 NOTE — DIABETES MGMT
[address] [facility] 
 
FOLLOW-UP NOTE Presentation This 67 yo WM was admitted from ER 3/27/20 with esophageal varices and active bleeding. TIPS procedure completed same day. On Rocephin and Octreotide. Required intubation but was successfully extubated 3/29/20. Diabetes: Patient has known Type 2 diabetes treated with basal insulin and metformin. A1c 8.2% (November 2019). Subjective NA Objective Physical exam 
General Sedated. Did not respond to verbal cue. Vital Signs Visit Vitals /52 Pulse 80 Temp 98 °F (36.7 °C) Resp 18 Ht 5' 10\" (1.778 m) Wt 117.9 kg (260 lb) SpO2 98% BMI 37.31 kg/m² Laboratory Lab Results Component Value Date/Time Hemoglobin A1c 8.2 (H) 11/07/2019 10:23 AM  
 Hemoglobin A1c (POC) 6.5 03/05/2020 02:30 PM  
 
Lab Results Component Value Date/Time LDL, calculated 40 11/07/2019 10:23 AM  
 
Lab Results Component Value Date/Time Creatinine 0.82 03/30/2020 01:38 AM  
 
Lab Results Component Value Date/Time Sodium 143 03/30/2020 01:38 AM  
 Potassium 4.3 03/30/2020 01:38 AM  
 Chloride 114 (H) 03/30/2020 01:38 AM  
 CO2 25 03/30/2020 01:38 AM  
 Anion gap 4 (L) 03/30/2020 01:38 AM  
 Glucose 202 (H) 03/30/2020 01:38 AM  
 BUN 23 (H) 03/30/2020 01:38 AM  
 Creatinine 0.82 03/30/2020 01:38 AM  
 BUN/Creatinine ratio 28 (H) 03/30/2020 01:38 AM  
 GFR est AA >60 03/30/2020 01:38 AM  
 GFR est non-AA >60 03/30/2020 01:38 AM  
 Calcium 7.1 (L) 03/30/2020 01:38 AM  
 Bilirubin, total 2.2 (H) 03/30/2020 01:38 AM  
 AST (SGOT) 304 (H) 03/30/2020 01:38 AM  
 Alk. phosphatase 130 (H) 03/30/2020 01:38 AM  
 Protein, total 4.9 (L) 03/30/2020 01:38 AM  
 Albumin 1.8 (L) 03/30/2020 01:38 AM  
 Globulin 3.1 03/30/2020 01:38 AM  
 A-G Ratio 0.6 (L) 03/30/2020 01:38 AM  
 ALT (SGPT) 178 (H) 03/30/2020 01:38 AM  
 
Lab Results Component Value Date/Time  ALT (SGPT) 178 (H) 03/30/2020 01:38 AM  
 Blood glucose pattern BGs consistently in the 200s with corrective insulin. Assessment and Plan Nursing Diagnosis Risk for unstable blood glucose pattern Nursing Intervention Domain 9430 Decision-making Support Nursing Interventions Examined current inpatient diabetes control Explored factors facilitating and impeding inpatient management Evaluation This gentleman, with Type 2 diabetes, hasn't achieved inpatient blood glucose target of 100-180mg/dl. Patient with known cirrhosis which will require insulin to address loss of gluconeogenesis. Has used 14 units of corrective insulin in past 24 hours with BGs in the 200s. Since patient uses insulin at home, should start a basal dose along with corrective insulin. Recommendations Recommend: 
 
Starting Basal insulin at 
[x] 0.2 units/kg/D = Lantus 24 units D Continuing Corrective insulin 
[x] Insulin-resistant sensitivity (BMI >27) Billing Code(s) [x] [id number] IP subsequent hospital care - 30 minutes YUMI Francis Access via R Johny Shruthi [phone]

## 2020-03-30 NOTE — ROUTINE PROCESS
Bedside and Verbal shift change report received from ELYSIA Scanlon RN(offgoing nurse). Report included the following information SBAR, Kardex, OR Summary, Procedure Summary, Intake/Output, MAR, Accordion, Recent Results, Med Rec Status, Cardiac Rhythm NSR, Alarm Parameters  and Quality Measures. He is obtunded, however, no respiratory distress. Lactulose Enema pending 0815:Lactulose enema started. 0900:Noted with a small amount of liquid dark maroon stool results from the enema. Updates given to his daughter, Luana Norton [phone]. 
1030:Repositioned to his side for comfort. 1200:  in, he noted the current bowel movement (large Maroon) 1400: The current H&H is 8.1 & 25.0. No further stool at this time. However, he remains obtunded. 1600:His second dose of lactulose enema is starting. 1715:Noted with excellent results from the Enema, bath completed and repositioned for comfort. 1845:Noted with a huge maroon liquid, and soft dark tarry stools. Skin care completed and repositioned for comfort. 1930:Bedside and Verbal shift change report given to SCOTT Rodriguez (oncoming nurse) by myself (offgoing nurse). Report included the following information SBAR, Kardex, ED Summary, Procedure Summary, Intake/Output, MAR, Accordion, Recent Results, Med Rec Status, Cardiac Rhythm NSR, Alarm Parameters  and Quality Measures. He remains obtunded, continue the current plan of care.

## 2020-03-30 NOTE — PROGRESS NOTES
Telemedicine cross cover: 
 
Pt altered on exam, 
 
  - check ammonia stat. - ABG from earlier in the day reviewed, PCO2 wnl 
  - try lactulose 20 ml tid

## 2020-03-30 NOTE — PROGRESS NOTES
Hospitalist Progress Note NAME: Mary Jo Templeton :  1950 MRN:  [id number] Assessment / Plan: 
 
Acute upper GI bleed, presented on admission Acute on chronic normocytic anemia Secondary to variceal bleed Liver cirrhosis status post TIPS procedure Hepatic encephalopathy Hemorrhagic/hypovolemic shock Acute respiratory failure due to above  Admitted with hemoglobin 9.1 yesterday and at 7 today Extubated 3/29 Still lethargic today GI following, no plan for intervention at this time, keep n.p.o.  Continue Protonix 40 mg IV twice daily Continue octreotide drip Started lactulose enema Ammonia level on admission more than 200 Continue IV ceftriaxone for SBP  Status post unsuccessful banding due to volume of blood and emergent TIPS 
 Underlying nonalcoholic liver cirrhosis  Continue IV pressors to maintain mean arterial pressure more than 65 mmHg  IR was consulted as well Continue following H&H 
 Transfuse blood if hemoglobin less than 7 
 
 
30.0 - 39.9 Obese / Body mass index is 37.31 kg/m². Code status: DNR Prophylaxis: SCD's Recommended Disposition: Home w/Family Subjective: Chief Complaint / Reason for Physician Visit Acute upper GI bleed/acute anemia/acute respiratory failure/hemorrhagic shock Discussed with RN events overnight. Patient is I talked with the daughter and explained the plan. All her questions were answered. Review of Systems: 
Symptom Y/N Comments  Symptom Y/N Comments Fever/Chills    Chest Pain Poor Appetite    Edema Cough    Abdominal Pain Sputum    Joint Pain SOB/WANG    Pruritis/Rash Nausea/vomit    Tolerating PT/OT Diarrhea    Tolerating Diet Constipation    Other Could NOT obtain due to:  Lethargic Objective: VITALS:  
Last 24hrs VS reviewed since prior progress note. Most recent are: 
Patient Vitals for the past 24 hrs: 
 Temp Pulse Resp BP SpO2 03/30/20 1300  86 22 156/64 97 % 03/30/20 1200 97.8 °F (36.6 °C) 77 19 138/58 98 % 03/30/20 1100  80 18 132/52 98 % 03/30/20 1000  76 18 134/52 97 % 03/30/20 0900  86 23 154/58 97 % 03/30/20 0800 98 °F (36.7 °C) 84 17 176/85 99 % 03/30/20 0744  76 17 143/44 98 % 03/30/20 0700  75 18 142/60 98 % 03/30/20 0400 97.4 °F (36.3 °C) 74 18 134/56 95 % 03/30/20 0300  74 16 137/53 100 % 03/30/20 0200  75 18 137/54 100 % 03/30/20 0100  73 16 116/43 100 % 03/30/20 0000 97.6 °F (36.4 °C) 76 17 124/46 100 % 03/29/20 2300  75 16 125/47 100 % 03/29/20 2230  77 18  100 % 03/29/20 2200  73 16 121/53 100 % 03/29/20 2145  74 16 115/43 100 % 03/29/20 2130  74 16 117/46 100 % 03/29/20 2117 97.6 °F (36.4 °C)      
03/29/20 2116  76 15 117/46 100 % 03/29/20 2115  76 16 117/46 100 % 03/29/20 2100  74 16 111/42 100 % 03/29/20 2000 97.6 °F (36.4 °C) 76 15 113/46 100 % 03/29/20 1930  78 14 112/40 100 % 03/29/20 1900  75 14 111/43 100 % 03/29/20 1830  76 13 (!) 108/39 100 % 03/29/20 1800  74 13 (!) 104/38 100 % 03/29/20 1730  75 13 (!) 104/38 100 % 03/29/20 1715  76 13 101/40 100 % 03/29/20 1700  75 13 (!) 92/32 100 % 03/29/20 1645  75 13 (!) 96/34 100 % 03/29/20 1630  77 13 107/43 100 % 03/29/20 1615  78 13 (!) 107/39 100 % 03/29/20 1600 98.4 °F (36.9 °C) 77 14 105/41 100 % 03/29/20 1545  74 12 (!) 84/33 100 % 03/29/20 1532  74 13 (!) 86/28 100 % 03/29/20 1530  74 13 (!) 85/32 100 % Intake/Output Summary (Last 24 hours) at 3/30/2020 1502 Last data filed at 3/30/2020 1100 Gross per 24 hour Intake 2618.58 ml Output 1275 ml Net 1343.58 ml PHYSICAL EXAM: 
General: Lying in bed, sleepy, not answering questions no acute distress   
EENT:  EOMI. Anicteric sclerae. MMM Resp:  CTA bilaterally, no wheezing or rales. No accessory muscle use CV:  Regular  rhythm,  No edema GI:  Soft, Non distended, Non tender.  +Bowel sounds Neurologic:  Lying in bed, sleeping, not answering question, following only simple commands unable to move 4 extremities Psych:   or agitated Skin:  No rashes. No jaundice Reviewed most current lab test results and cultures  YES Reviewed most current radiology test results   YES Review and summation of old records today    NO Reviewed patient's current orders and MAR    YES 
PMH/SH reviewed - no change compared to H&P 
________________________________________________________________________ Care Plan discussed with: 
  Comments Patient Family  x  patient's daughter RN x Care Manager x Consultant Multidiciplinary team rounds were held today with , nursing, pharmacist and clinical coordinator. Patient's plan of care was discussed; medications were reviewed and discharge planning was addressed. ________________________________________________________________________ Total NON critical care TIME:  35   Minutes Total CRITICAL CARE TIME Spent:   Minutes non procedure based Comments >50% of visit spent in counseling and coordination of care    
________________________________________________________________________ Shefali Sandhu MD  
 
Procedures: see electronic medical records for all procedures/Xrays and details which were not copied into this note but were reviewed prior to creation of Plan. LABS: 
I reviewed today's most current labs and imaging studies. Pertinent labs include: 
Recent Labs  
  03/30/20 
1308 03/30/20 
0138 03/29/20 
1921 03/29/20 
1053 WBC  --  6.3 6.8 10.9 HGB 8.1* 7.3* 6.7* 7.0*  
HCT 25.0* 22.5* 20.5* 21.6* PLT  --  108* 107* 144* Recent Labs  
  03/30/20 
0138 03/29/20 
0309 03/27/20 
2220 03/27/20 
2153  140  --  137  
K 4.3 4.4  --  4.0  
* 111*  --  106 CO2 25 25  --  26  
* 237*  --  156* BUN 23* 20  --  14  
CREA 0.82 0.88  --  0.80 CA 7.1* 7.1*  --  8.0*  
 MG 1.9 2.0  --  1.8 PHOS 1.4* 2.4*  --   --   
ALB 1.8* 1.9*  --  2.5* TBILI 2.2* 3.8*  --  0.6 SGOT 304* 321*  --  40* * 148*  --  27 INR  --   --  1.2*  --   
 
 
Signed: Neftali Toribio MD

## 2020-03-31 ENCOUNTER — APPOINTMENT (OUTPATIENT)
Dept: CT IMAGING | Age: 70
DRG: 405 | End: 2020-03-31
Attending: INTERNAL MEDICINE
Payer: MEDICARE

## 2020-03-31 ENCOUNTER — APPOINTMENT (OUTPATIENT)
Dept: ULTRASOUND IMAGING | Age: 70
DRG: 405 | End: 2020-03-31
Attending: PHYSICIAN ASSISTANT
Payer: MEDICARE

## 2020-03-31 LAB
ALBUMIN SERPL-MCNC: 2.1 G/DL (ref 3.5–5)
ALBUMIN/GLOB SERPL: 0.6 {RATIO} (ref 1.1–2.2)
ALP SERPL-CCNC: 153 U/L (ref 45–117)
ALT SERPL-CCNC: 158 U/L (ref 12–78)
AMMONIA PLAS-SCNC: 81 UMOL/L
ANION GAP SERPL CALC-SCNC: 4 MMOL/L (ref 5–15)
AST SERPL-CCNC: 171 U/L (ref 15–37)
ATRIAL RATE: 74 BPM
BILIRUB SERPL-MCNC: 1.4 MG/DL (ref 0.2–1)
BUN SERPL-MCNC: 25 MG/DL (ref 6–20)
BUN/CREAT SERPL: 31 (ref 12–20)
CALCIUM SERPL-MCNC: 7.8 MG/DL (ref 8.5–10.1)
CALCULATED P AXIS, ECG09: 42 DEGREES
CALCULATED R AXIS, ECG10: -15 DEGREES
CALCULATED T AXIS, ECG11: 11 DEGREES
CHLORIDE SERPL-SCNC: 118 MMOL/L (ref 97–108)
CO2 SERPL-SCNC: 23 MMOL/L (ref 21–32)
CREAT SERPL-MCNC: 0.8 MG/DL (ref 0.7–1.3)
DIAGNOSIS, 93000: NORMAL
ERYTHROCYTE [DISTWIDTH] IN BLOOD BY AUTOMATED COUNT: 17.2 % (ref 11.5–14.5)
GLOBULIN SER CALC-MCNC: 3.7 G/DL (ref 2–4)
GLUCOSE BLD STRIP.AUTO-MCNC: 172 MG/DL (ref 65–100)
GLUCOSE BLD STRIP.AUTO-MCNC: 189 MG/DL (ref 65–100)
GLUCOSE BLD STRIP.AUTO-MCNC: 191 MG/DL (ref 65–100)
GLUCOSE BLD STRIP.AUTO-MCNC: 226 MG/DL (ref 65–100)
GLUCOSE SERPL-MCNC: 216 MG/DL (ref 65–100)
HCT VFR BLD AUTO: 24.9 % (ref 36.6–50.3)
HCT VFR BLD AUTO: 25.2 % (ref 36.6–50.3)
HGB BLD-MCNC: 8.3 G/DL (ref 12.1–17)
HGB BLD-MCNC: 8.3 G/DL (ref 12.1–17)
MAGNESIUM SERPL-MCNC: 2 MG/DL (ref 1.6–2.4)
MCH RBC QN AUTO: 27.9 PG (ref 26–34)
MCHC RBC AUTO-ENTMCNC: 32.9 G/DL (ref 30–36.5)
MCV RBC AUTO: 84.6 FL (ref 80–99)
NRBC # BLD: 0 K/UL (ref 0–0.01)
NRBC BLD-RTO: 0 PER 100 WBC
P-R INTERVAL, ECG05: 166 MS
PHOSPHATE SERPL-MCNC: 2 MG/DL (ref 2.6–4.7)
PLATELET # BLD AUTO: 146 K/UL (ref 150–400)
PMV BLD AUTO: 10.6 FL (ref 8.9–12.9)
POTASSIUM SERPL-SCNC: 3.5 MMOL/L (ref 3.5–5.1)
PROT SERPL-MCNC: 5.8 G/DL (ref 6.4–8.2)
Q-T INTERVAL, ECG07: 428 MS
QRS DURATION, ECG06: 90 MS
QTC CALCULATION (BEZET), ECG08: 475 MS
RBC # BLD AUTO: 2.98 M/UL (ref 4.1–5.7)
SERVICE CMNT-IMP: ABNORMAL
SODIUM SERPL-SCNC: 145 MMOL/L (ref 136–145)
VENTRICULAR RATE, ECG03: 74 BPM
WBC # BLD AUTO: 8.5 K/UL (ref 4.1–11.1)

## 2020-03-31 PROCEDURE — 85018 HEMOGLOBIN: CPT

## 2020-03-31 PROCEDURE — 74011250636 HC RX REV CODE- 250/636: Performed by: INTERNAL MEDICINE

## 2020-03-31 PROCEDURE — 74011636637 HC RX REV CODE- 636/637: Performed by: INTERNAL MEDICINE

## 2020-03-31 PROCEDURE — 82962 GLUCOSE BLOOD TEST: CPT

## 2020-03-31 PROCEDURE — C9113 INJ PANTOPRAZOLE SODIUM, VIA: HCPCS | Performed by: INTERNAL MEDICINE

## 2020-03-31 PROCEDURE — 65610000006 HC RM INTENSIVE CARE

## 2020-03-31 PROCEDURE — 74011250637 HC RX REV CODE- 250/637: Performed by: HOSPITALIST

## 2020-03-31 PROCEDURE — 82140 ASSAY OF AMMONIA: CPT

## 2020-03-31 PROCEDURE — 77010033678 HC OXYGEN DAILY

## 2020-03-31 PROCEDURE — 74011000258 HC RX REV CODE- 258: Performed by: INTERNAL MEDICINE

## 2020-03-31 PROCEDURE — 74011250637 HC RX REV CODE- 250/637: Performed by: INTERNAL MEDICINE

## 2020-03-31 PROCEDURE — 84100 ASSAY OF PHOSPHORUS: CPT

## 2020-03-31 PROCEDURE — 36415 COLL VENOUS BLD VENIPUNCTURE: CPT

## 2020-03-31 PROCEDURE — 80053 COMPREHEN METABOLIC PANEL: CPT

## 2020-03-31 PROCEDURE — 76705 ECHO EXAM OF ABDOMEN: CPT

## 2020-03-31 PROCEDURE — 85027 COMPLETE CBC AUTOMATED: CPT

## 2020-03-31 PROCEDURE — 76700 US EXAM ABDOM COMPLETE: CPT

## 2020-03-31 PROCEDURE — 83735 ASSAY OF MAGNESIUM: CPT

## 2020-03-31 PROCEDURE — 70450 CT HEAD/BRAIN W/O DYE: CPT

## 2020-03-31 RX ORDER — INSULIN GLARGINE 100 [IU]/ML
20 INJECTION, SOLUTION SUBCUTANEOUS DAILY
Status: DISCONTINUED | OUTPATIENT
Start: 2020-03-31 | End: 2020-04-02

## 2020-03-31 RX ORDER — THIAMINE HYDROCHLORIDE 100 MG/ML
100 INJECTION, SOLUTION INTRAMUSCULAR; INTRAVENOUS DAILY
Status: DISCONTINUED | OUTPATIENT
Start: 2020-03-31 | End: 2020-03-31 | Stop reason: CLARIF

## 2020-03-31 RX ORDER — INSULIN GLARGINE 100 [IU]/ML
24 INJECTION, SOLUTION SUBCUTANEOUS DAILY
Status: DISCONTINUED | OUTPATIENT
Start: 2020-03-31 | End: 2020-03-31

## 2020-03-31 RX ADMIN — CEFTRIAXONE 1 G: 1 INJECTION, POWDER, FOR SOLUTION INTRAMUSCULAR; INTRAVENOUS at 00:26

## 2020-03-31 RX ADMIN — SODIUM CHLORIDE 75 ML/HR: 900 INJECTION, SOLUTION INTRAVENOUS at 16:01

## 2020-03-31 RX ADMIN — SODIUM CHLORIDE 10 ML: 9 INJECTION, SOLUTION INTRAMUSCULAR; INTRAVENOUS; SUBCUTANEOUS at 22:55

## 2020-03-31 RX ADMIN — LACTULOSE 300 ML: 10 SOLUTION ORAL at 08:24

## 2020-03-31 RX ADMIN — Medication 1 AMPULE: at 08:24

## 2020-03-31 RX ADMIN — CEFTRIAXONE 1 G: 1 INJECTION, POWDER, FOR SOLUTION INTRAMUSCULAR; INTRAVENOUS at 22:55

## 2020-03-31 RX ADMIN — SODIUM CHLORIDE 40 ML: 9 INJECTION, SOLUTION INTRAMUSCULAR; INTRAVENOUS; SUBCUTANEOUS at 13:14

## 2020-03-31 RX ADMIN — INSULIN LISPRO 4 UNITS: 100 INJECTION, SOLUTION INTRAVENOUS; SUBCUTANEOUS at 18:09

## 2020-03-31 RX ADMIN — Medication 1 AMPULE: at 20:30

## 2020-03-31 RX ADMIN — SODIUM CHLORIDE 100 MG: 9 INJECTION, SOLUTION INTRAVENOUS at 14:34

## 2020-03-31 RX ADMIN — PANTOPRAZOLE SODIUM 40 MG: 40 INJECTION, POWDER, FOR SOLUTION INTRAVENOUS at 20:30

## 2020-03-31 RX ADMIN — LACTULOSE 300 ML: 10 SOLUTION ORAL at 22:54

## 2020-03-31 RX ADMIN — INSULIN LISPRO 3 UNITS: 100 INJECTION, SOLUTION INTRAVENOUS; SUBCUTANEOUS at 02:55

## 2020-03-31 RX ADMIN — INSULIN LISPRO 3 UNITS: 100 INJECTION, SOLUTION INTRAVENOUS; SUBCUTANEOUS at 11:29

## 2020-03-31 RX ADMIN — LACTULOSE 300 ML: 10 SOLUTION ORAL at 16:10

## 2020-03-31 RX ADMIN — SODIUM CHLORIDE 10 ML: 9 INJECTION, SOLUTION INTRAMUSCULAR; INTRAVENOUS; SUBCUTANEOUS at 08:24

## 2020-03-31 RX ADMIN — INSULIN LISPRO 4 UNITS: 100 INJECTION, SOLUTION INTRAVENOUS; SUBCUTANEOUS at 06:34

## 2020-03-31 RX ADMIN — SODIUM CHLORIDE 10 ML: 9 INJECTION, SOLUTION INTRAMUSCULAR; INTRAVENOUS; SUBCUTANEOUS at 06:35

## 2020-03-31 RX ADMIN — OCTREOTIDE ACETATE 25 MCG/HR: 500 INJECTION, SOLUTION INTRAVENOUS; SUBCUTANEOUS at 16:01

## 2020-03-31 RX ADMIN — INSULIN GLARGINE 20 UNITS: 100 INJECTION, SOLUTION SUBCUTANEOUS at 11:29

## 2020-03-31 RX ADMIN — PANTOPRAZOLE SODIUM 40 MG: 40 INJECTION, POWDER, FOR SOLUTION INTRAVENOUS at 08:24

## 2020-03-31 NOTE — ROUTINE PROCESS
Order changed from Abdomen Comp ultrasound to Abdomen LTD due to just to eval for ascites according to PA's notes.

## 2020-03-31 NOTE — PROGRESS NOTES
0730 - Bedside verbal report received from off going shift. 0800 - Assessment complete, see summary for details. Eyes remains closed, withdraws to pain but remains obtunded, ridged extremities to stimulation. Pupils equal and re-active. Re-positioned for comfort. 3098 - Lactulose enema initiated. 0940 - Large loose/liquid maroon/melena stool passed. Bath and Linin change complete. 1100 - Transported to CT via bed on room air, Sa02 >96% 1120 - Return to CCU 2536, tolerated procedure well, re-positioned for comfort. No acute distress noted. 1330 -Dr. Vandana Victoria at bedside, updated on condition, orders noted. 1600 - Assessment complete, no changes noted from previous assessment. Daughter updated via telephone, questions answered and re-assurance given. Re-positioned for Lactulose enema initiation. Will continue to closely monitor. 1900 - Bedside verbal report given to oncoming shift.

## 2020-03-31 NOTE — PROGRESS NOTES
1900- report received from [employer]. Pt in bed resting quietly. 2235- pt daughter called. Updated on current condition. Referred to MD for test results. Daughter does state that she believes pt takes a high blood pressure medication but doesn't know which one.  
 
0000- pt reassessed. Pt now observed to open eyes and lift eyebrows at calling of his name. He moans and guards against turning. Pt moaning can be heard outside room. After pt hygiene completed, pt moaning very loud, unable to verbalize pain level, or if moaning caused by pain. bp is high after activity at 190. 50mcg of fentanyl given per order, after bath to see if will calm patient. 0130- pt still moaning but is much softer in intensity- barely audible. Pt sbp down to 167. 
 
0630- pt cleaned of bm. Medicated for pain.   
 
0730- report given to [facility]

## 2020-03-31 NOTE — PROGRESS NOTES
[facility] Pulmonary, Critical Care, and Sleep Medicine Name: Fior Paluino MRN: [id number] : 1950 Hospital: [place] Date: 3/31/2020 Critical Care IMPRESSION:  
· Respiratory failure, resolved · Variceal bleed · S/P unsuccessful banding due to volume of blood and emergent TIPS · Severe hyperammonemia · Anemia · BREWER/Cirrhosis · JACOB on CPAP  
  
RECOMMENDATIONS:  
· O2 PRN 
· GI following · Continue lactulose enema until more alert and can take PO 
· Check head CT; would expect more improvement with the degree of improvement we have seen in his ammonia · IV Protonix, Octreotide · S/P transfusion · Follow Hgb Subjective/History:  
 
3-31-20: remains poorly responsive despite improvements in ammonia. 3-30-20: no events. Sleepy/lethargic. 3/29 Seen and examined On SBT, alert and interactive No GI intervention planned today. Will see if we can extubate today. This patient has been seen and evaluated at the request of Dr. Sid Hair for respiratory failure. Patient is a 68 y.o. male with H/O BREWER/cirrhosis and varices, admitted with variceal bleed. S/P unsuccessful banding and emergent TIPS. Intubated and on vent support, pressors. S/P 3 U PRBCs today, awaiting repeat HgB later this afternoon. Critically ill and high risk for deterioration. The patient is critically ill and can not provide additional history due to intubated Past Medical History:  
Diagnosis Date  ADD (attention deficit disorder)  Anemia due to blood loss  Hinson's esophagus with esophagitis  Benign essential tremor  Cancer [facility])  [address]  Cirrhosis ([facility] [address]  Depression  DJD (degenerative joint disease) of hip   
 bilat  DM (diabetes mellitus) ([facility] [address] 3/17/2010  ED (erectile dysfunction) of organic origin  Esophageal varices determined by endoscopy ([facility] [address] [facility]  Fall on or from sidewalk curb 2015  Femur fracture ([facility] [address]  Gastritis and duodenitis  GERD (gastroesophageal reflux disease)   
 resolved after discontinuing diclofenac  Hematuria 6/2016  High cholesterol 03/17/2010  
 pt denies 6/19  
 HTN (hypertension) 3/17/2010  Morbid obesity ([facility] [address]  Murmur, cardiac 2016  
 PFO (patent foramen ovale) 7/26/2017  PUD (peptic ulcer disease)  Shingles 6/2016 RESOLVING- NO RASH (HEAD)  Sleep apnea   
 doesnt use CPAP anymore Past Surgical History:  
Procedure Laterality Date  COLONOSCOPY N/A 6/18/2019 COLONOSCOPY AND EGD performed by Adelaida Frederick MD at [facility] ENDOSCOPY  COLONOSCOPY,DIAGNOSTIC  6/18/2019  ENDOSCOPY, COLON, DIAGNOSTIC    
 HX CHOLECYSTECTOMY  1995  HX GI  1980  
 gastroplasty [address]  HX HIP REPLACEMENT Left  HX ORTHOPAEDIC Right 2011  
 rot cuff repair  HX ORTHOPAEDIC  2016  
 left broken femur [address]  HX VASCULAR ACCESS    
 picc line and removed after sepsis resolved  IR INSERT TIPS HEPATIC SHUNT  3/28/2020 [facility] ARTHROPLASTY  05/2010  
 right  TOTAL HIP ARTHROPLASTY  08/01/2016  
 left  UPPER GI ENDOSCOPY,BIOPSY  6/18/2019 Prior to Admission medications Medication Sig Start Date End Date Taking? Authorizing Provider  
folic acid (FOLVITE) 1 mg tablet Take 1 Tab by mouth daily. 3/26/20   Kenyatta Tuttle NP  
diclofenac (VOLTAREN) 1 % gel Apply 4 g to affected area four (4) times daily. 3/26/20   Kenyatta Tuttle NP  
lisinopriL (PRINIVIL, ZESTRIL) 5 mg tablet TAKE 2 TABLETS BY MOUTH EVERY DAY 3/26/20   Kenyatta Tuttle NP  
insulin glargine (Lantus Solostar U-100 Insulin) 100 unit/mL (3 mL) inpn 40 units twice daily 3/26/20   Adonay Jones MD  
gabapentin (NEURONTIN) 300 mg capsule Take 2 Caps by mouth nightly.  3/16/20   Heather Dumont MD  
butalbital-acetaminophen-caffeine (FIORICET, ESGIC) -40 mg per tablet Take 1 Tab by mouth every six (6) hours as needed for Headache. 3/6/20   Jeannette Moscoso MD  
cephALEXin (KEFLEX) 500 mg capsule Take 1 Cap by mouth two (2) times a day. 2/24/20   Jeannette Moscoso MD  
sertraline (ZOLOFT) 100 mg tablet TAKE 1.5 TABLETS DAILY 2/21/20   Jeannette Moscoso MD  
metFORMIN ER (GLUCOPHAGE XR) 500 mg tablet 2 tablets twice daily 2/13/20   Margarette Pacheco MD  
spironolactone (ALDACTONE) 25 mg tablet Take 1 Tab by mouth daily. 2/4/20   Jeannette Moscoso MD  
ferrous sulfate 140 mg (45 mg iron) TbER ER tablet Take 1 Tab by mouth Daily (before breakfast). 2/4/20   Jeannette Moscoso MD  
furosemide (LASIX) 40 mg tablet Take 2 tabs by mouth daily x 1 week and then increase to 1 tab daily 1/27/20   Jeannette Moscoso MD  
ergocalciferol (VITAMIN D2) 50,000 unit capsule TAKE 1 CAPSULE EVERY 7 DAYS 1/6/20   Jeannette Moscoso MD  
atorvastatin (LIPITOR) 40 mg tablet Take 1 Tab by mouth daily. 1/4/20   Margarette Pacheco MD  
glucose blood VI test strips (ASCENSIA AUTODISC VI, ONE TOUCH ULTRA TEST VI) strip Test blood sugar twice daily Diagnosis E 11.9. Can use [employer] 12/13/19   Jeannette Moscoso MD  
glucose blood VI test strips (ACCU-CHEK SOWMYA PLUS TEST STRP) strip Test blood sugar twice daily Diagnosis E 11.9 12/5/19   Jeannette Moscoso MD  
primidone (MYSOLINE) 250 mg tablet TAKE 1 TABLET TWICE A DAY 11/7/19   Jeannette Moscoso MD  
acetaminophen (TYLENOL) 500 mg tablet Take 2 Tabs by mouth every six (6) hours as needed for Pain.  10/7/19   Mart Melgoza MD  
omeprazole (PRILOSEC) 20 mg capsule TAKE 1 CAPSULE BY MOUTH EVERY DAY 7/15/19   Provider, Historical  
Blood-Glucose Meter (ACCU-CHEK SOWMYA PLUS METER) misc Test blood sugar twice daily Diagnosis E 11.9 7/9/19   Jeannette Moscoso MD  
Blood Glucose Control High&Low (ACCU-CHEK SOWMYA CONTROL SOLN) soln Test blood sugar twice daily Diagnosis E 11.9 7/9/19   Jeannette Moscoso MD  
 insulin regular (NOVOLIN R REGULAR U-100 INSULN) 100 unit/mL injection INJECT 7 UNITS UNDER THE SKIN DAILY (WITH DINNER). Patient taking differently: INJECT 7 UNITS UNDER THE SKIN DAILY (WITH DINNER). only takes if BS is elevated. 7/9/19   Corina Lieberman MD  
Insulin Needles, Disposable, (BD ULTRA-FINE SHORT PEN NEEDLE) 31 gauge x 5/16\" ndle USE TO INJECT UNDER THE SKIN THREE TIMES A DAY 7/9/19   Corina Lieberman MD  
B.infantis-B.ani-B.long-B.bifi (PROBIOTIC 4X) 10-15 mg TbEC Take  by mouth. Provider, Historical  
magnesium 250 mg tab Take 500 mg by mouth daily. Provider, Historical  
melatonin tab tablet Take 10 mg by mouth nightly as needed. Provider, Historical  
potassium 99 mg tablet Take 99 mg by mouth two (2) times a day. Provider, Historical  
calcium citrate-vitamin D3 (CITRACAL + D) tablet Take 2 Tabs by mouth two (2) times a day. Provider, Historical  
aspirin 81 mg chewable tablet Take 1 Tab by mouth daily. Patient taking differently: Take 81 mg by mouth every other day. 2/2/17   Corina Lieberman MD  
 
Current Facility-Administered Medications Medication Dose Route Frequency  lactulose (CHRONULAC) 10 gram/15 mL solution 300 mL  200 g Rectal TID  insulin glargine (LANTUS) injection 14 Units  14 Units SubCUTAneous DAILY  insulin lispro (HUMALOG) injection   SubCUTAneous Q6H  
 alcohol 62% (NOZIN) nasal  1 Ampule  1 Ampule Topical Q12H  
 sucralfate (CARAFATE) tablet 1 g  1 g Oral ACB&D  
 sodium chloride (NS) flush 5-40 mL  5-40 mL IntraVENous Q8H  
 cefTRIAXone (ROCEPHIN) 1 g in 0.9% sodium chloride (MBP/ADV) 50 mL  1 g IntraVENous Q24H  
 0.9% sodium chloride infusion  75 mL/hr IntraVENous CONTINUOUS  
 octreotide (SANDOSTATIN) 500 mcg in 0.9% sodium chloride 500 mL infusion  25 mcg/hr IntraVENous CONTINUOUS  
 pantoprazole (PROTONIX) injection 40 mg  40 mg IntraVENous Q12H Allergies Allergen Reactions  Nsaids (Non-Steroidal Anti-Inflammatory Drug) Other (comments) Hx of PUD and Hinson's Esophagus Social History Tobacco Use  Smoking status: Former Smoker Packs/day: 2.00 Years: 15.00 Pack years: 30.00 Last attempt to quit: 3/1/1979 Years since quittin.1  Smokeless tobacco: Never Used Substance Use Topics  Alcohol use: No  
  Alcohol/week: 0.0 standard drinks Family History Problem Relation Age of Onset  Cancer Father   
     multiple myeloma  Stroke Father  Dementia Mother  No Known Problems Brother  COPD Brother  Cancer Maternal Grandmother COLON  Anesth Problems Neg Hx Review of Systems: 
Unobtainable intubated Objective:  
Vital Signs:   
Visit Vitals /66 Pulse 80 Temp 98.2 °F (36.8 °C) Resp 19 Ht 5' 10\" (1.778 m) Wt 121.4 kg (267 lb 10.2 oz) SpO2 100% BMI 38.40 kg/m² O2 Device: Nasal cannula O2 Flow Rate (L/min): 3 l/min Temp (24hrs), Av °F (36.7 °C), Min:97.8 °F (36.6 °C), Max:98.3 °F (36.8 °C) Intake/Output:  
Last shift:      No intake/output data recorded. Last 3 shifts:  1901 -  0700 In: 2807 [I.V.:2525] Out: 2078 [Urine:] Intake/Output Summary (Last 24 hours) at 3/31/2020 5862 Last data filed at 3/31/2020 2536 Gross per 24 hour Intake 1200 ml Output 1378 ml Net -178 ml Physical Exam: 
 
General:  Sleepy, no acute distress Head:  Normocephalic, without obvious abnormality, atraumatic. Eyes:  Conjunctivae/corneas clear. Nose: Nares normal. Septum midline. Mucosa normal.   
Throat: MMM Neck: Supple, symmetrical, trachea midline Lungs:   Clear to auscultation bilaterally. Chest wall:  No tenderness or deformity. Heart:  Regular rate and rhythm Abdomen:   Soft, non-tender. Bowel sounds normal.   
Extremities: Extremities normal, atraumatic, no cyanosis or clubbing Skin: Skin color, texture, turgor normal. No rashes or lesions Neurologic: Lethargic Data:  
Labs: 
Recent Labs  
  03/31/20 
0157 03/30/20 
1308 03/30/20 
0138 03/29/20 
1921 WBC 8.5  --  6.3 6.8 HGB 8.3* 8.1* 7.3* 6.7* HCT 25.2* 25.0* 22.5* 20.5* *  --  108* 107* Recent Labs  
  03/31/20 
0157 03/30/20 
0138 03/29/20 
0309  143 140  
K 3.5 4.3 4.4  
* 114* 111* CO2 23 25 25 * 202* 237* BUN 25* 23* 20  
CREA 0.80 0.82 0.88 CA 7.8* 7.1* 7.1*  
MG 2.0 1.9 2.0 PHOS 2.0* 1.4* 2.4* ALB 2.1* 1.8* 1.9* TBILI 1.4* 2.2* 3.8* SGOT 171* 304* 321* * 178* 148* Recent Labs  
  03/29/20 
6668 PHI 7.394 PCO2I 39.3 PO2I 115* HCO3I 24.0  
FIO2I 35 Imaging: 
I have personally reviewed the patients radiographs: 
None today Idania Diaz MD

## 2020-03-31 NOTE — PROGRESS NOTES
Hospitalist Progress Note NAME: Ravi Lockett :  1950 MRN:  [id number] Assessment / Plan: 
Hepatic encephalopathy POA Minimally responsive NH4 200 now improving to 80 Lactulose enemas Head CT negative ? Slowly to resolve hepatic encephalopathy Acute upper GI bleed POA Acute on chronic normocytic anemia Acute esophageal variceal bleed POA Liver cirrhosis status post TIPS procedure Hemorrhagic/hypovolemic shock POA Acute respiratory failure due to above  Admitted with hemoglobin 9.1 dropped to 6.7, s/p 2 units pRBCs  GI following, no plan for intervention at this time, keep n.p.o. -- Protonix 40 mg IV twice daily and octreotide drip  IV ceftriaxone for SBP prophylaxis x 7 days  Status post unsuccessful banding due to volume of blood  
-- S/p emergent TIPS 3/28/2020  Underlying nonalcoholic liver cirrhosis  Continue following H&H 
 Transfuse blood if hemoglobin less than 7 
 
 
30.0 - 39.9 Obese / Body mass index is 38.4 kg/m². Code status: DNR Prophylaxis: SCD's Recommended Disposition: Home w/Family Subjective: Chief Complaint / Reason for Physician Visit f/u acute upper GI bleed, hepatic encephalopathy Discussed with RN events overnight. Off vent, remains unresponsive, not able to provide history No events Review of Systems: 
Symptom Y/N Comments  Symptom Y/N Comments Fever/Chills    Chest Pain Poor Appetite    Edema Cough    Abdominal Pain Sputum    Joint Pain SOB/WANG    Pruritis/Rash Nausea/vomit    Tolerating PT/OT Diarrhea    Tolerating Diet Constipation    Other Could NOT obtain due to:  Lethargic Objective: VITALS:  
Last 24hrs VS reviewed since prior progress note. Most recent are: 
Patient Vitals for the past 24 hrs: 
 Temp Pulse Resp BP SpO2  
20 1300  76 20 139/61 98 % 20 1200 98.4 °F (36.9 °C) 88 22 155/57 98 % 20 1120  90 19  98 % 20 1102  85 10  98 % 03/31/20 1100  87 9 155/59 98 % 03/31/20 1000  82 19 146/58 99 % 03/31/20 0900  86 21 166/71 98 % 03/31/20 0800 97.8 °F (36.6 °C) 86 22 179/73 100 % 03/31/20 0700  87 21 169/76 99 % 03/31/20 0600  80 19 149/66 100 % 03/31/20 0500  86 22 161/70 99 % 03/31/20 0400 98.2 °F (36.8 °C) 87 22 176/68 98 % 03/31/20 0300  85 (!) 37 170/65 98 % 03/31/20 0200  83 20 175/68 99 % 03/31/20 0100  88 21 183/73 99 % 03/31/20 0001 97.8 °F (36.6 °C)   171/67   
03/30/20 2300  86 21 174/72 100 % 03/30/20 2200  84 21 169/66 100 % 03/30/20 2100  84 19 170/64 100 % 03/30/20 2020 98.3 °F (36.8 °C)      
03/30/20 1900  84 19 147/56 100 % 03/30/20 1800  85 20 150/58 99 % 03/30/20 1700  88 20 156/66 99 % 03/30/20 1600 98 °F (36.7 °C) 86 18 156/68 99 % 03/30/20 1500  84 19 154/64 99 % 03/30/20 1400  87 20 150/66 98 % Intake/Output Summary (Last 24 hours) at 3/31/2020 1335 Last data filed at 3/31/2020 1300 Gross per 24 hour Intake 2500 ml Output 1452 ml Net 1048 ml PHYSICAL EXAM: 
General: Lying in bed, sleepy, not answering questions or responding to verbal stumil  no acute distress   
EENT:  Anicteric sclerae. MMM Resp:  CTA bilaterally, no wheezing or rales. No accessory muscle use CV:  Regular  rhythm,  No edema GI:  Soft, Non distended, Non tender.  +Bowel sounds Neurologic:  Lying in bed, lethargic, not following only simple commands or opening eyes Psych:   Not agitated Skin:  No rashes. No jaundice Reviewed most current lab test results and cultures  YES Reviewed most current radiology test results   YES Review and summation of old records today    NO Reviewed patient's current orders and MAR    YES 
PMH/ reviewed - no change compared to H&P 
________________________________________________________________________ Care Plan discussed with: 
  Comments Patient Family RN x Care Manager x Consultant Multidiciplinary team rounds were held today with , nursing, pharmacist and clinical coordinator. Patient's plan of care was discussed; medications were reviewed and discharge planning was addressed. ________________________________________________________________________ Comments >50% of visit spent in counseling and coordination of care    
________________________________________________________________________ Cas Malagon MD  
 
Procedures: see electronic medical records for all procedures/Xrays and details which were not copied into this note but were reviewed prior to creation of Plan. LABS: 
I reviewed today's most current labs and imaging studies. Pertinent labs include: 
Recent Labs  
  03/31/20 
0157 03/30/20 
1308 03/30/20 
0138 03/29/20 
1921 WBC 8.5  --  6.3 6.8 HGB 8.3* 8.1* 7.3* 6.7* HCT 25.2* 25.0* 22.5* 20.5* *  --  108* 107* Recent Labs  
  03/31/20 
0157 03/30/20 
0138 03/29/20 
0309  143 140  
K 3.5 4.3 4.4  
* 114* 111* CO2 23 25 25 * 202* 237* BUN 25* 23* 20  
CREA 0.80 0.82 0.88 CA 7.8* 7.1* 7.1*  
MG 2.0 1.9 2.0 PHOS 2.0* 1.4* 2.4* ALB 2.1* 1.8* 1.9* TBILI 1.4* 2.2* 3.8* SGOT 171* 304* 321* * 178* 148* Signed: Cas Malagon MD

## 2020-03-31 NOTE — PROGRESS NOTES
**Consult Information** 
Member Facility: [address] [facility] Facility MRN: [id number] Consult ID: [id number] Facility Time Zone: ET 
Date and Time of Request: 03/31/2020 01:18:21 AM 
Requesting Clinician: Nikhil Nathan Patient Name: Kaushal Hadley Date of Birth: [id number] Gender: Male **Clinical Note** Clinical Note: Notified about patient having SBP of 170s-180s. Patient is asymptomatic. Recommend to continue monitoring for now. D/w nursing. **Attestation** Interaction Mode: Phone Only Phone Duration (mins): 5 Time of Call : 03/31/2020 01:31 AM 
Interaction Attestation: Interprofessional internet consultation was delivered through telephonic and/or electronic communication upon the request of the patients treating provider, while the requesting and the rendering provider were not in the same physical location. A written summary report was provided to the requesting provider at the originating site. Evaluation Duration (mins): 5 **Physician Signature** This document was electronically signed by: Iron Love MD  03/31/2020 01:32 AM

## 2020-03-31 NOTE — PROGRESS NOTES
1900- report received from [facility]. Pt in bed, restless. VSS. 0000- pt reassessed. Still anxious and restless. Prn ativan given per order. 8860- pt is very restless, pulling at cords, tearful, mouths \"I need more medicine\". Precedex increased 
 
0700- pt with 3 large bms over the shift. She is able to ask for the bedpan.  Report given to [facility]

## 2020-03-31 NOTE — PROGRESS NOTES
1900- report received from [place]. Pt in bed resting. 0000- pt reassessed, no changes 0700- pt cleaned of incontinent episode.  Report given to [facility]

## 2020-03-31 NOTE — DIABETES MGMT
[address] [facility] 
 
FOLLOW-UP NOTE Presentation This 67 yo WM was admitted from ER 3/27/20 with esophageal varices and active bleeding. TIPS procedure completed same day. Required intubation but was successfully extubated 3/29/20. Continues on 02, Rocephin and Octreotide. Ammonia level improving. Scheduled for head CT today.   
  
Diabetes: Patient has known Type 2 diabetes treated with basal insulin and metformin. A1c 8.2% (November 2019). Subjective NA Objective Physical exam 
General Not responsive. Vital Signs Visit Vitals /58 Pulse 82 Temp 97.8 °F (36.6 °C) Resp 19 Ht 5' 10\" (1.778 m) Wt 121.4 kg (267 lb 10.2 oz) SpO2 99% BMI 38.40 kg/m² Laboratory Lab Results Component Value Date/Time Hemoglobin A1c 8.2 (H) 11/07/2019 10:23 AM  
 Hemoglobin A1c (POC) 6.5 03/05/2020 02:30 PM  
 
Lab Results Component Value Date/Time LDL, calculated 40 11/07/2019 10:23 AM  
 
Lab Results Component Value Date/Time Creatinine 0.80 03/31/2020 01:57 AM  
 
Lab Results Component Value Date/Time Sodium 145 03/31/2020 01:57 AM  
 Potassium 3.5 03/31/2020 01:57 AM  
 Chloride 118 (H) 03/31/2020 01:57 AM  
 CO2 23 03/31/2020 01:57 AM  
 Anion gap 4 (L) 03/31/2020 01:57 AM  
 Glucose 216 (H) 03/31/2020 01:57 AM  
 BUN 25 (H) 03/31/2020 01:57 AM  
 Creatinine 0.80 03/31/2020 01:57 AM  
 BUN/Creatinine ratio 31 (H) 03/31/2020 01:57 AM  
 GFR est AA >60 03/31/2020 01:57 AM  
 GFR est non-AA >60 03/31/2020 01:57 AM  
 Calcium 7.8 (L) 03/31/2020 01:57 AM  
 Bilirubin, total 1.4 (H) 03/31/2020 01:57 AM  
 AST (SGOT) 171 (H) 03/31/2020 01:57 AM  
 Alk. phosphatase 153 (H) 03/31/2020 01:57 AM  
 Protein, total 5.8 (L) 03/31/2020 01:57 AM  
 Albumin 2.1 (L) 03/31/2020 01:57 AM  
 Globulin 3.7 03/31/2020 01:57 AM  
 A-G Ratio 0.6 (L) 03/31/2020 01:57 AM  
 ALT (SGPT) 158 (H) 03/31/2020 01:57 AM  
 
Lab Results Component Value Date/Time ALT (SGPT) 158 (H) 03/31/2020 01:57 AM  
 
 
Blood glucose pattern Basal insulin initiated yesterday. Assessment and Plan Nursing Diagnosis Risk for unstable blood glucose pattern Nursing Intervention Domain 5253 Decision-making Support Nursing Interventions Examined current inpatient diabetes control Explored factors facilitating and impeding inpatient management Evaluation This gentleman, with Type 2 diabetes, has improving BGs as we seek inpatient blood glucose target of 100-180mg/dl. Patient with cirrhosis. Using 14 units of corrective insulin before initiation of Lantus insulin at 0.1 unit/kg/D. Twelve additional corrective units required in past 24 hours. Continue to advance dose to rely more on basal insulin than on a corrective strategy. Recommendations Recommend: 
 
Increasing Lantus insulin dose to 20 units D Billing Code(s) [x] 71902 IP subsequent hospital care - 15 minutes YUMI Awan Access via R Johnydestiny Hawkins [phone]

## 2020-03-31 NOTE — PROGRESS NOTES
Gastroenterology Progress Note AMAN Mcmahan  for Dr. Suri Posey 3/31/2020 Admit Date: 3/27/2020 Subjective: Follow up for: GI bleed 2/2 eso varices, s/p emergent TIPS, HE Patient remains unresponsive. Per nursing withdraws to pain but does not follow any commands. Getting lactulose enemas TID. Had 5 BMs yesterday. Ammonia improved from 200 to 81 but still unresponsive. Going for head CT today. Hgb remains stable at 8.3. Last transfusion was 6.7. Remains on octreotide, PPI and ceftriaxone. Current Facility-Administered Medications Medication Dose Route Frequency  lactulose (CHRONULAC) 10 gram/15 mL solution 300 mL  200 g Rectal TID  insulin glargine (LANTUS) injection 14 Units  14 Units SubCUTAneous DAILY  insulin lispro (HUMALOG) injection   SubCUTAneous Q6H  
 alcohol 62% (NOZIN) nasal  1 Ampule  1 Ampule Topical Q12H  
 sucralfate (CARAFATE) tablet 1 g  1 g Oral ACB&D  
 0.9% sodium chloride infusion 250 mL  250 mL IntraVENous PRN  
 0.9% sodium chloride infusion 250 mL  250 mL IntraVENous PRN  
 ondansetron (ZOFRAN) injection 4 mg  4 mg IntraVENous Q4H PRN  
 fentaNYL citrate (PF) injection 50 mcg  50 mcg IntraVENous Q4H PRN  
 sodium chloride (NS) flush 5-40 mL  5-40 mL IntraVENous Q8H  
 sodium chloride (NS) flush 5-40 mL  5-40 mL IntraVENous PRN  
 cefTRIAXone (ROCEPHIN) 1 g in 0.9% sodium chloride (MBP/ADV) 50 mL  1 g IntraVENous Q24H  
 0.9% sodium chloride infusion  75 mL/hr IntraVENous CONTINUOUS  
 octreotide (SANDOSTATIN) 500 mcg in 0.9% sodium chloride 500 mL infusion  25 mcg/hr IntraVENous CONTINUOUS  
 glucose chewable tablet 16 g  4 Tab Oral PRN  
 dextrose (D50W) injection syrg 12.5-25 g  12.5-25 g IntraVENous PRN  
 glucagon (GLUCAGEN) injection 1 mg  1 mg IntraMUSCular PRN  pantoprazole (PROTONIX) injection 40 mg  40 mg IntraVENous Q12H  
 0.9% sodium chloride infusion 250 mL  250 mL IntraVENous PRN  
  0.9% sodium chloride infusion 250 mL  250 mL IntraVENous PRN Objective:  
 
Blood pressure 166/71, pulse 86, temperature 97.8 °F (36.6 °C), resp. rate 21, height 5' 10\" (1.778 m), weight 121.4 kg (267 lb 10.2 oz), SpO2 98 %. 
 
03/31 0701 - 03/31 1900 In: 100 [I.V.:100] Out: 125 [Urine:125] 03/29 1901 - 03/31 0700 In: 9249 [I.V.:3725] Out: 2078 [Urine:2075] Physical Examination:  
 
 
General: non responsive WM, HEENT:  scleara anicteric GI:  Mild distension but no tenderness, soft, normal BS 
CNS: Does not open eyes to voice, non responsive to pain Data Review Recent Results (from the past 24 hour(s)) GLUCOSE, POC Collection Time: 03/30/20 12:07 PM  
Result Value Ref Range Glucose (POC) 236 (H) 65 - 100 mg/dL Performed by Levy Holliday HGB & HCT Collection Time: 03/30/20  1:08 PM  
Result Value Ref Range HGB 8.1 (L) 12.1 - 17.0 g/dL HCT 25.0 (L) 36.6 - 50.3 % GLUCOSE, POC Collection Time: 03/30/20  5:24 PM  
Result Value Ref Range Glucose (POC) 227 (H) 65 - 100 mg/dL Performed by Levy Holliday GLUCOSE, POC Collection Time: 03/31/20 12:22 AM  
Result Value Ref Range Glucose (POC) 172 (H) 65 - 100 mg/dL Performed by Chuy Moreland RN   
CBC W/O DIFF Collection Time: 03/31/20  1:57 AM  
Result Value Ref Range WBC 8.5 4.1 - 11.1 K/uL  
 RBC 2.98 (L) 4.10 - 5.70 M/uL HGB 8.3 (L) 12.1 - 17.0 g/dL HCT 25.2 (L) 36.6 - 50.3 % MCV 84.6 80.0 - 99.0 FL  
 MCH 27.9 26.0 - 34.0 PG  
 MCHC 32.9 30.0 - 36.5 g/dL  
 RDW 17.2 (H) 11.5 - 14.5 % PLATELET 843 (L) 957 - 400 K/uL MPV 10.6 8.9 - 12.9 FL  
 NRBC 0.0 0  WBC ABSOLUTE NRBC 0.00 0.00 - 0.01 K/uL MAGNESIUM Collection Time: 03/31/20  1:57 AM  
Result Value Ref Range Magnesium 2.0 1.6 - 2.4 mg/dL METABOLIC PANEL, COMPREHENSIVE Collection Time: 03/31/20  1:57 AM  
Result Value Ref Range Sodium 145 136 - 145 mmol/L  Potassium 3.5 3.5 - 5.1 mmol/L  
 Chloride 118 (H) 97 - 108 mmol/L  
 CO2 23 21 - 32 mmol/L Anion gap 4 (L) 5 - 15 mmol/L Glucose 216 (H) 65 - 100 mg/dL BUN 25 (H) 6 - 20 MG/DL Creatinine 0.80 0.70 - 1.30 MG/DL  
 BUN/Creatinine ratio 31 (H) 12 - 20 GFR est AA >60 >60 ml/min/1.73m2 GFR est non-AA >60 >60 ml/min/1.73m2 Calcium 7.8 (L) 8.5 - 10.1 MG/DL Bilirubin, total 1.4 (H) 0.2 - 1.0 MG/DL  
 ALT (SGPT) 158 (H) 12 - 78 U/L  
 AST (SGOT) 171 (H) 15 - 37 U/L Alk. phosphatase 153 (H) 45 - 117 U/L Protein, total 5.8 (L) 6.4 - 8.2 g/dL Albumin 2.1 (L) 3.5 - 5.0 g/dL Globulin 3.7 2.0 - 4.0 g/dL A-G Ratio 0.6 (L) 1.1 - 2.2 PHOSPHORUS Collection Time: 03/31/20  1:57 AM  
Result Value Ref Range Phosphorus 2.0 (L) 2.6 - 4.7 MG/DL  
AMMONIA Collection Time: 03/31/20  1:57 AM  
Result Value Ref Range Ammonia 81 (H) <32 UMOL/L  
GLUCOSE, POC Collection Time: 03/31/20  5:12 AM  
Result Value Ref Range Glucose (POC) 191 (H) 65 - 100 mg/dL Performed by Ghanshyam Ziegler RN Recent Labs  
  03/31/20 
0157 03/30/20 
1308 03/30/20 
0138 WBC 8.5  --  6.3 HGB 8.3* 8.1* 7.3* HCT 25.2* 25.0* 22.5*  
*  --  108* Recent Labs  
  03/31/20 
0157 03/30/20 
0138 03/29/20 
0309  143 140  
K 3.5 4.3 4.4  
* 114* 111* CO2 23 25 25 BUN 25* 23* 20  
CREA 0.80 0.82 0.88 * 202* 237* CA 7.8* 7.1* 7.1*  
MG 2.0 1.9 2.0 PHOS 2.0* 1.4* 2.4* Recent Labs  
  03/31/20 
0157 03/30/20 
0138 03/29/20 
0309 SGOT 171* 304* 321* * 130* 106  
TP 5.8* 4.9* 4.8* ALB 2.1* 1.8* 1.9*  
GLOB 3.7 3.1 2.9 No results for input(s): INR, PTP, APTT, INREXT, INREXT in the last 72 hours. No results for input(s): FE, TIBC, PSAT, FERR in the last 72 hours. Lab Results Component Value Date/Time Folate 9.2 03/18/2010 11:26 AM  
  
No results for input(s): PH, PCO2, PO2 in the last 72 hours. No results for input(s): CPK, CKNDX, TROIQ in the last 72 hours. No lab exists for component: CPKMB Lab Results Component Value Date/Time Cholesterol, total 121 11/07/2019 10:23 AM  
 HDL Cholesterol 68 11/07/2019 10:23 AM  
 LDL, calculated 40 11/07/2019 10:23 AM  
 Triglyceride 66 11/07/2019 10:23 AM  
 CHOL/HDL Ratio 3.5 11/01/2010 07:07 AM  
 
No components found for: [place] Lab Results Component Value Date/Time Color YELLOW/STRAW 04/07/2018 06:50 AM  
 Appearance CLEAR 04/07/2018 06:50 AM  
 Specific gravity 1.021 04/07/2018 06:50 AM  
 Specific gravity 1.020 02/28/2018 05:01 PM  
 pH (UA) 6.0 04/07/2018 06:50 AM  
 Protein NEGATIVE  04/07/2018 06:50 AM  
 Glucose >1,000 (A) 04/07/2018 06:50 AM  
 Ketone NEGATIVE  04/07/2018 06:50 AM  
 Bilirubin NEGATIVE  04/07/2018 06:50 AM  
 Urobilinogen 0.2 04/07/2018 06:50 AM  
 Nitrites NEGATIVE  04/07/2018 06:50 AM  
 Leukocyte Esterase NEGATIVE  04/07/2018 06:50 AM  
 Epithelial cells FEW 04/07/2018 06:50 AM  
 Bacteria NEGATIVE  04/07/2018 06:50 AM  
 WBC 0-4 04/07/2018 06:50 AM  
 RBC 0-5 04/07/2018 06:50 AM  
 
  
ROS: -CP, SOB, Dysuria, palpitations, cough. Assessment: 
 
Active Problems: 
  Acute upper GI bleed (3/28/2020) Variceal Bleed Cirrhosis Hepatic encephalopathy Plan/Discussion:  
 
· Hemodynamically he is doing well after TIPS. · Started on lactulose enema for HE after TIPs, improvement in ammonia but not HE, If head CT negative, consider adding Xifaxan - use caution in Child [place] Class C 
· Continue IV Sandostatin, IV abx and PPI for now. · Follow hgb. · Hold on diet advancement until improvement in mental status. · I spoke with covering MARK.   
  
 
AMAN Read 
3/31/2020   
10:03 AM

## 2020-04-01 LAB
ALBUMIN SERPL-MCNC: 2.1 G/DL (ref 3.5–5)
ALBUMIN/GLOB SERPL: 0.6 {RATIO} (ref 1.1–2.2)
ALP SERPL-CCNC: 140 U/L (ref 45–117)
ALT SERPL-CCNC: 112 U/L (ref 12–78)
AMMONIA PLAS-SCNC: 47 UMOL/L
ANION GAP SERPL CALC-SCNC: 8 MMOL/L (ref 5–15)
AST SERPL-CCNC: 70 U/L (ref 15–37)
BILIRUB SERPL-MCNC: 1.3 MG/DL (ref 0.2–1)
BUN SERPL-MCNC: 27 MG/DL (ref 6–20)
BUN/CREAT SERPL: 36 (ref 12–20)
CALCIUM SERPL-MCNC: 7.4 MG/DL (ref 8.5–10.1)
CHLORIDE SERPL-SCNC: 119 MMOL/L (ref 97–108)
CO2 SERPL-SCNC: 21 MMOL/L (ref 21–32)
CREAT SERPL-MCNC: 0.74 MG/DL (ref 0.7–1.3)
ERYTHROCYTE [DISTWIDTH] IN BLOOD BY AUTOMATED COUNT: 18.4 % (ref 11.5–14.5)
GLOBULIN SER CALC-MCNC: 3.5 G/DL (ref 2–4)
GLUCOSE BLD STRIP.AUTO-MCNC: 188 MG/DL (ref 65–100)
GLUCOSE BLD STRIP.AUTO-MCNC: 193 MG/DL (ref 65–100)
GLUCOSE BLD STRIP.AUTO-MCNC: 213 MG/DL (ref 65–100)
GLUCOSE BLD STRIP.AUTO-MCNC: 229 MG/DL (ref 65–100)
GLUCOSE SERPL-MCNC: 210 MG/DL (ref 65–100)
HCT VFR BLD AUTO: 27.7 % (ref 36.6–50.3)
HGB BLD-MCNC: 8.9 G/DL (ref 12.1–17)
MAGNESIUM SERPL-MCNC: 2 MG/DL (ref 1.6–2.4)
MCH RBC QN AUTO: 27.8 PG (ref 26–34)
MCHC RBC AUTO-ENTMCNC: 32.1 G/DL (ref 30–36.5)
MCV RBC AUTO: 86.6 FL (ref 80–99)
NRBC # BLD: 0.03 K/UL (ref 0–0.01)
NRBC BLD-RTO: 0.3 PER 100 WBC
PHOSPHATE SERPL-MCNC: 2.1 MG/DL (ref 2.6–4.7)
PLATELET # BLD AUTO: 187 K/UL (ref 150–400)
PMV BLD AUTO: 10.9 FL (ref 8.9–12.9)
POTASSIUM SERPL-SCNC: 3.1 MMOL/L (ref 3.5–5.1)
PROT SERPL-MCNC: 5.6 G/DL (ref 6.4–8.2)
RBC # BLD AUTO: 3.2 M/UL (ref 4.1–5.7)
SERVICE CMNT-IMP: ABNORMAL
SODIUM SERPL-SCNC: 148 MMOL/L (ref 136–145)
WBC # BLD AUTO: 8.8 K/UL (ref 4.1–11.1)

## 2020-04-01 PROCEDURE — 82140 ASSAY OF AMMONIA: CPT

## 2020-04-01 PROCEDURE — 74011250636 HC RX REV CODE- 250/636: Performed by: HOSPITALIST

## 2020-04-01 PROCEDURE — 74011000258 HC RX REV CODE- 258: Performed by: INTERNAL MEDICINE

## 2020-04-01 PROCEDURE — 80053 COMPREHEN METABOLIC PANEL: CPT

## 2020-04-01 PROCEDURE — 74011250637 HC RX REV CODE- 250/637: Performed by: INTERNAL MEDICINE

## 2020-04-01 PROCEDURE — 74011250636 HC RX REV CODE- 250/636: Performed by: EMERGENCY MEDICINE

## 2020-04-01 PROCEDURE — 74011250636 HC RX REV CODE- 250/636: Performed by: INTERNAL MEDICINE

## 2020-04-01 PROCEDURE — 82962 GLUCOSE BLOOD TEST: CPT

## 2020-04-01 PROCEDURE — 74011250637 HC RX REV CODE- 250/637: Performed by: HOSPITALIST

## 2020-04-01 PROCEDURE — 77010033678 HC OXYGEN DAILY

## 2020-04-01 PROCEDURE — 85027 COMPLETE CBC AUTOMATED: CPT

## 2020-04-01 PROCEDURE — C9113 INJ PANTOPRAZOLE SODIUM, VIA: HCPCS | Performed by: INTERNAL MEDICINE

## 2020-04-01 PROCEDURE — 83735 ASSAY OF MAGNESIUM: CPT

## 2020-04-01 PROCEDURE — 74011636637 HC RX REV CODE- 636/637: Performed by: INTERNAL MEDICINE

## 2020-04-01 PROCEDURE — 84100 ASSAY OF PHOSPHORUS: CPT

## 2020-04-01 PROCEDURE — 65660000000 HC RM CCU STEPDOWN

## 2020-04-01 PROCEDURE — 36415 COLL VENOUS BLD VENIPUNCTURE: CPT

## 2020-04-01 RX ORDER — HYDRALAZINE HYDROCHLORIDE 20 MG/ML
5 INJECTION INTRAMUSCULAR; INTRAVENOUS ONCE
Status: COMPLETED | OUTPATIENT
Start: 2020-04-01 | End: 2020-04-01

## 2020-04-01 RX ORDER — SODIUM CHLORIDE 450 MG/100ML
125 INJECTION, SOLUTION INTRAVENOUS CONTINUOUS
Status: DISCONTINUED | OUTPATIENT
Start: 2020-04-01 | End: 2020-04-01

## 2020-04-01 RX ORDER — LABETALOL HYDROCHLORIDE 5 MG/ML
10 INJECTION, SOLUTION INTRAVENOUS
Status: DISCONTINUED | OUTPATIENT
Start: 2020-04-01 | End: 2020-04-06 | Stop reason: HOSPADM

## 2020-04-01 RX ADMIN — SODIUM CHLORIDE 10 ML: 9 INJECTION, SOLUTION INTRAMUSCULAR; INTRAVENOUS; SUBCUTANEOUS at 06:08

## 2020-04-01 RX ADMIN — LACTULOSE 300 ML: 10 SOLUTION ORAL at 11:08

## 2020-04-01 RX ADMIN — PANTOPRAZOLE SODIUM 40 MG: 40 INJECTION, POWDER, FOR SOLUTION INTRAVENOUS at 08:53

## 2020-04-01 RX ADMIN — LACTULOSE 300 ML: 10 SOLUTION ORAL at 21:05

## 2020-04-01 RX ADMIN — SODIUM CHLORIDE 10 ML: 9 INJECTION, SOLUTION INTRAMUSCULAR; INTRAVENOUS; SUBCUTANEOUS at 21:02

## 2020-04-01 RX ADMIN — FENTANYL CITRATE 50 MCG: 50 INJECTION INTRAMUSCULAR; INTRAVENOUS at 00:46

## 2020-04-01 RX ADMIN — INSULIN LISPRO 4 UNITS: 100 INJECTION, SOLUTION INTRAVENOUS; SUBCUTANEOUS at 06:05

## 2020-04-01 RX ADMIN — SODIUM CHLORIDE 10 ML: 9 INJECTION, SOLUTION INTRAMUSCULAR; INTRAVENOUS; SUBCUTANEOUS at 12:17

## 2020-04-01 RX ADMIN — POTASSIUM CHLORIDE: 2 INJECTION, SOLUTION, CONCENTRATE INTRAVENOUS at 17:26

## 2020-04-01 RX ADMIN — INSULIN LISPRO 4 UNITS: 100 INJECTION, SOLUTION INTRAVENOUS; SUBCUTANEOUS at 12:16

## 2020-04-01 RX ADMIN — SODIUM CHLORIDE 100 MG: 9 INJECTION, SOLUTION INTRAVENOUS at 15:21

## 2020-04-01 RX ADMIN — HYDRALAZINE HYDROCHLORIDE 5 MG: 20 INJECTION INTRAMUSCULAR; INTRAVENOUS at 04:24

## 2020-04-01 RX ADMIN — Medication 1 AMPULE: at 10:24

## 2020-04-01 RX ADMIN — INSULIN GLARGINE 20 UNITS: 100 INJECTION, SOLUTION SUBCUTANEOUS at 12:16

## 2020-04-01 RX ADMIN — SODIUM CHLORIDE 125 ML/HR: 450 INJECTION, SOLUTION INTRAVENOUS at 10:31

## 2020-04-01 RX ADMIN — INSULIN LISPRO 3 UNITS: 100 INJECTION, SOLUTION INTRAVENOUS; SUBCUTANEOUS at 18:02

## 2020-04-01 RX ADMIN — FENTANYL CITRATE 50 MCG: 50 INJECTION INTRAMUSCULAR; INTRAVENOUS at 06:05

## 2020-04-01 RX ADMIN — OCTREOTIDE ACETATE 25 MCG/HR: 500 INJECTION, SOLUTION INTRAVENOUS; SUBCUTANEOUS at 13:30

## 2020-04-01 RX ADMIN — PANTOPRAZOLE SODIUM 40 MG: 40 INJECTION, POWDER, FOR SOLUTION INTRAVENOUS at 21:02

## 2020-04-01 RX ADMIN — INSULIN LISPRO 3 UNITS: 100 INJECTION, SOLUTION INTRAVENOUS; SUBCUTANEOUS at 00:46

## 2020-04-01 RX ADMIN — Medication 1 AMPULE: at 21:01

## 2020-04-01 NOTE — PROGRESS NOTES
Hospitalist Progress Note NAME: Marisa Warren :  1950 MRN:  [id number] Assessment / Plan: 
Hepatic encephalopathy POA Hypernatremia Na 148 Exacerbated by the TIPS Waking up a bit, opens eyes and moaning, not talking or following commands NH4 200 now improving to 47 Lactulose enemas Head CT negative ? Slow to resolve hepatic encephalopathy Improving, but not as much as I would like with the decreasing ammonia Na rising, change IVF from NS to 0.45% NS Serial labs NPO till more awake Acute upper GI bleed POA Acute on chronic normocytic anemia Acute esophageal variceal bleed POA Liver cirrhosis status post TIPS procedure Hemorrhagic/hypovolemic shock POA Acute respiratory failure due to above Admitted with hemoglobin 9.1 dropped to 6.7, s/p 3 units pRBCs HgB stable 8.3 to 8.9 past 24 hours GI following, no plan for intervention at this time, keep n.p.o. Protonix 40 mg IV twice daily and octreotide drip IV ceftriaxone for SBP prophylaxis x 7 days Status post unsuccessful banding due to volume of blood S/p emergent TIPS 3/28/2020 Underlying nonalcoholic liver cirrhosis Continue following H&H Transfuse blood if hemoglobin less than 7 
 
30.0 - 39.9 Obese / Body mass index is 38.4 kg/m². Code status: DNR Prophylaxis: SCD's Recommended Disposition: Home w/Family Subjective: Chief Complaint / Reason for Physician Visit f/u acute upper GI bleed, hepatic encephalopathy Discussed with RN events overnight. More alert than yesterday, opening eyes and moaning, still not following commands Not talking or able to provide history No reported bleeding Being transferred to stepdown per ICU team 
 
Review of Systems: 
Symptom Y/N Comments  Symptom Y/N Comments Fever/Chills    Chest Pain Poor Appetite    Edema Cough    Abdominal Pain Sputum    Joint Pain SOB/WANG    Pruritis/Rash Nausea/vomit    Tolerating PT/OT Diarrhea    Tolerating Diet Constipation    Other Could NOT obtain due to:  Lethargic, not verbal  
 
Objective: VITALS:  
Last 24hrs VS reviewed since prior progress note. Most recent are: 
Patient Vitals for the past 24 hrs: 
 Temp Pulse Resp BP SpO2  
04/01/20 0800 97.9 °F (36.6 °C) 82 22 161/69 98 % 04/01/20 0700  81 23 159/67 98 % 04/01/20 0600  87 18 179/79 98 % 04/01/20 0500  87 20 172/74 99 % 04/01/20 0424  85  175/77   
04/01/20 0400 98.4 °F (36.9 °C) 85 26 175/77 99 % 04/01/20 0300  87 22 186/81 99 % 04/01/20 0200  86 23 177/73 98 % 04/01/20 0100  83 20 167/66 99 % 04/01/20 0036 98 °F (36.7 °C) 90 27 174/74 99 % 04/01/20 0010  93 21 190/77 98 % 03/31/20 2300  88 24 179/75 98 % 03/31/20 2100  85 22 169/70 98 % 03/31/20 2000 98.3 °F (36.8 °C) 87 21 169/76 99 % 03/31/20 1941     98 % 03/31/20 1900  84 22 167/68 98 % 03/31/20 1800  81 22 146/58 97 % 03/31/20 1700  88 20 170/72 97 % 03/31/20 1600 98.4 °F (36.9 °C) 87 20 154/55 98 % 03/31/20 1500  79 20 130/55 98 % 03/31/20 1400  87 20 161/56 98 % 03/31/20 1300  76 20 139/61 98 % 03/31/20 1200 98.4 °F (36.9 °C) 88 22 155/57 98 % 03/31/20 1120  90 19  98 % 03/31/20 1102  85 10  98 % 03/31/20 1100  87 9 155/59 98 % 03/31/20 1000  82 19 146/58 99 % Intake/Output Summary (Last 24 hours) at 4/1/2020 0920 Last data filed at 4/1/2020 0666 Gross per 24 hour Intake 2271.67 ml Output 1170 ml Net 1101.67 ml PHYSICAL EXAM: 
General: Lying in bed,opening eyes, moaning but not talking, no acute distress   
EENT:  Anicteric sclerae. MMM Resp:  Rhonchi bilaterally, no wheezing or rales. No accessory muscle use CV:  Regular  rhythm,  No edema GI:  Soft, Non distended, Non tender.  +Bowel sounds Neurologic:  Lying in bed, opening eyes, moaning but not talking, not following commands Psych:   Not agitated Skin:  No rashes. No jaundice Reviewed most current lab test results and cultures  YES Reviewed most current radiology test results   YES Review and summation of old records today    NO Reviewed patient's current orders and MAR    YES 
PMH/SH reviewed - no change compared to H&P 
________________________________________________________________________ Care Plan discussed with: 
  Comments Patient Family RN x Care Manager x Consultant Multidiciplinary team rounds were held today with , nursing, pharmacist and clinical coordinator. Patient's plan of care was discussed; medications were reviewed and discharge planning was addressed. ________________________________________________________________________ Comments >50% of visit spent in counseling and coordination of care    
________________________________________________________________________ Leona Gibson MD  
 
Procedures: see electronic medical records for all procedures/Xrays and details which were not copied into this note but were reviewed prior to creation of Plan. LABS: 
I reviewed today's most current labs and imaging studies. Pertinent labs include: 
Recent Labs 04/01/20 0139 03/31/20 
1443 03/31/20 0157 03/30/20 
8879 WBC 8.8  --  8.5  --  6.3 HGB 8.9* 8.3* 8.3*   < > 7.3* HCT 27.7* 24.9* 25.2*   < > 22.5*  
  --  146*  --  108*  
 < > = values in this interval not displayed. Recent Labs 04/01/20 0139 03/31/20 0157 03/30/20 
8591 * 145 143  
K 3.1* 3.5 4.3 * 118* 114* CO2 21 23 25 * 216* 202* BUN 27* 25* 23* CREA 0.74 0.80 0.82 CA 7.4* 7.8* 7.1*  
MG 2.0 2.0 1.9 PHOS 2.1* 2.0* 1.4* ALB 2.1* 2.1* 1.8* TBILI 1.3* 1.4* 2.2*  
SGOT 70* 171* 304* * 158* 178* Signed: Leona Gibson MD

## 2020-04-01 NOTE — PROGRESS NOTES
4:15 PM Report received from Tonia Rodriguez [address] [place] [address].  
 
7:00 PM Report given to Antwan Singh [address] [place] [address].

## 2020-04-01 NOTE — PROGRESS NOTES
[facility] Pulmonary, Critical Care, and Sleep Medicine Name: Naveed Saravia MRN: [id number] : 1950 Hospital: [facility] Date: 2020 Critical Care IMPRESSION:  
· Respiratory failure, resolved · Variceal bleed · S/P unsuccessful banding due to volume of blood and emergent TIPS · Severe hyperammonemia · Anemia · BREWER/Cirrhosis · JACOB on CPAP  
  
RECOMMENDATIONS:  
· O2 PRN 
· GI following · Continue lactulose enema until more alert and can take PO 
· IV Protonix, Octreotide per GI 
· S/P transfusion · Follow Hgb · Stable for transfer to floor (hemodynamically stable on room air) Subjective/History:  
 
20: moaning, opens eyes to voice. He is unable to provide any history 3-31-20: remains poorly responsive despite improvements in ammonia. 3-30-20: no events. Sleepy/lethargic. 3/29 Seen and examined On SBT, alert and interactive No GI intervention planned today. Will see if we can extubate today. This patient has been seen and evaluated at the request of Dr. Lam Nunes for respiratory failure. Patient is a 68 y.o. male with H/O BREWER/cirrhosis and varices, admitted with variceal bleed. S/P unsuccessful banding and emergent TIPS. Intubated and on vent support, pressors. S/P 3 U PRBCs today, awaiting repeat HgB later this afternoon. Critically ill and high risk for deterioration. The patient is critically ill and can not provide additional history due to intubated Past Medical History:  
Diagnosis Date  ADD (attention deficit disorder)  Anemia due to blood loss  Hinson's esophagus with esophagitis  Benign essential tremor  Cancer [facility]  [facility]  Cirrhosis ([facility]  Depression  DJD (degenerative joint disease) of hip   
 bilat  DM (diabetes mellitus) ([address] 3/17/2010  ED (erectile dysfunction) of organic origin  Esophageal varices determined by endoscopy ([facility]  Fall on or from sidewalk curb 9/24/2015  Femur fracture ([facility]  Gastritis and duodenitis  GERD (gastroesophageal reflux disease)   
 resolved after discontinuing diclofenac  Hematuria 6/2016  High cholesterol 03/17/2010  
 pt denies 6/19  
 HTN (hypertension) 3/17/2010  Morbid obesity ([facility]  Murmur, cardiac 2016  
 PFO (patent foramen ovale) 7/26/2017  PUD (peptic ulcer disease)  Shingles 6/2016 RESOLVING- NO RASH (HEAD)  Sleep apnea   
 doesnt use CPAP anymore Past Surgical History:  
Procedure Laterality Date  COLONOSCOPY N/A 6/18/2019 COLONOSCOPY AND EGD performed by Adelaida Frederick MD at [facility] ENDOSCOPY  COLONOSCOPY,DIAGNOSTIC  6/18/2019  ENDOSCOPY, COLON, DIAGNOSTIC    
 HX CHOLECYSTECTOMY  1995  HX GI  1980  
 gastroplasty [address]  HX HIP REPLACEMENT Left  HX ORTHOPAEDIC Right 2011  
 rot cuff repair  HX ORTHOPAEDIC  2016  
 left broken femur [address]  HX VASCULAR ACCESS    
 picc line and removed after sepsis resolved  IR INSERT TIPS HEPATIC SHUNT  3/28/2020 [facility] ARTHROPLASTY  05/2010  
 right  TOTAL HIP ARTHROPLASTY  08/01/2016  
 left  UPPER GI ENDOSCOPY,BIOPSY  6/18/2019 Prior to Admission medications Medication Sig Start Date End Date Taking? Authorizing Provider  
folic acid (FOLVITE) 1 mg tablet Take 1 Tab by mouth daily. 3/26/20   Kenyatta Tuttle NP  
diclofenac (VOLTAREN) 1 % gel Apply 4 g to affected area four (4) times daily. 3/26/20   Kenyatta Tuttle NP  
lisinopriL (PRINIVIL, ZESTRIL) 5 mg tablet TAKE 2 TABLETS BY MOUTH EVERY DAY 3/26/20   Kenyatta Tuttle NP  
insulin glargine (Lantus Solostar U-100 Insulin) 100 unit/mL (3 mL) inpn 40 units twice daily 3/26/20   Adonay Jones MD  
gabapentin (NEURONTIN) 300 mg capsule Take 2 Caps by mouth nightly.  3/16/20   Heather Dumont MD  
butalbital-acetaminophen-caffeine (FIORICET, ESGIC) -40 mg per tablet Take 1 Tab by mouth every six (6) hours as needed for Headache. 3/6/20   Avelina Hadley MD  
cephALEXin (KEFLEX) 500 mg capsule Take 1 Cap by mouth two (2) times a day. 2/24/20   Avelina Hadley MD  
sertraline (ZOLOFT) 100 mg tablet TAKE 1.5 TABLETS DAILY 2/21/20   Avelina Hadley MD  
metFORMIN ER (GLUCOPHAGE XR) 500 mg tablet 2 tablets twice daily 2/13/20   Martha Hernandes MD  
spironolactone (ALDACTONE) 25 mg tablet Take 1 Tab by mouth daily. 2/4/20   Avelina Hadley MD  
ferrous sulfate 140 mg (45 mg iron) TbER ER tablet Take 1 Tab by mouth Daily (before breakfast). 2/4/20   Avelina Hadley MD  
furosemide (LASIX) 40 mg tablet Take 2 tabs by mouth daily x 1 week and then increase to 1 tab daily 1/27/20   Avelina Hadley MD  
ergocalciferol (VITAMIN D2) 50,000 unit capsule TAKE 1 CAPSULE EVERY 7 DAYS 1/6/20   Avelina Hadley MD  
atorvastatin (LIPITOR) 40 mg tablet Take 1 Tab by mouth daily. 1/4/20   Martha Hernandes MD  
glucose blood VI test strips (ASCENSIA AUTODISC VI, ONE TOUCH ULTRA TEST VI) strip Test blood sugar twice daily Diagnosis E 11.9. Can use [employer] 12/13/19   Avelina Hadley MD  
glucose blood VI test strips (ACCU-CHEK SOWMYA PLUS TEST STRP) strip Test blood sugar twice daily Diagnosis E 11.9 12/5/19   Avelina Hadley MD  
primidone (MYSOLINE) 250 mg tablet TAKE 1 TABLET TWICE A DAY 11/7/19   Avelina Hadley MD  
acetaminophen (TYLENOL) 500 mg tablet Take 2 Tabs by mouth every six (6) hours as needed for Pain.  10/7/19   Jack Mora MD  
omeprazole (PRILOSEC) 20 mg capsule TAKE 1 CAPSULE BY MOUTH EVERY DAY 7/15/19   Provider, Historical  
Blood-Glucose Meter (ACCU-CHEK SOWMYA PLUS METER) misc Test blood sugar twice daily Diagnosis E 11.9 7/9/19   Avelina Hadley MD  
Blood Glucose Control High&Low (ACCU-CHEK SOWMYA CONTROL SOLN) soln Test blood sugar twice daily Diagnosis E 11.9 7/9/19   Avelina Hadlye MD  
 insulin regular (NOVOLIN R REGULAR U-100 INSULN) 100 unit/mL injection INJECT 7 UNITS UNDER THE SKIN DAILY (WITH DINNER). Patient taking differently: INJECT 7 UNITS UNDER THE SKIN DAILY (WITH DINNER). only takes if BS is elevated. 7/9/19   Radha Leggett MD  
Insulin Needles, Disposable, (BD ULTRA-FINE SHORT PEN NEEDLE) 31 gauge x 5/16\" ndle USE TO INJECT UNDER THE SKIN THREE TIMES A DAY 7/9/19   Radha Leggett MD  
B.infantis-B.ani-B.long-B.bifi (PROBIOTIC 4X) 10-15 mg TbEC Take  by mouth. Provider, Historical  
magnesium 250 mg tab Take 500 mg by mouth daily. Provider, Historical  
melatonin tab tablet Take 10 mg by mouth nightly as needed. Provider, Historical  
potassium 99 mg tablet Take 99 mg by mouth two (2) times a day. Provider, Historical  
calcium citrate-vitamin D3 (CITRACAL + D) tablet Take 2 Tabs by mouth two (2) times a day. Provider, Historical  
aspirin 81 mg chewable tablet Take 1 Tab by mouth daily. Patient taking differently: Take 81 mg by mouth every other day. 2/2/17   Radha Leggett MD  
 
Current Facility-Administered Medications Medication Dose Route Frequency  insulin glargine (LANTUS) injection 20 Units  20 Units SubCUTAneous DAILY  thiamine (B-1) 100 mg in 0.9% sodium chloride 50 mL IVPB  100 mg IntraVENous Q24H  
 lactulose (CHRONULAC) 10 gram/15 mL solution 300 mL  200 g Rectal TID  insulin lispro (HUMALOG) injection   SubCUTAneous Q6H  
 alcohol 62% (NOZIN) nasal  1 Ampule  1 Ampule Topical Q12H  
 sucralfate (CARAFATE) tablet 1 g  1 g Oral ACB&D  
 sodium chloride (NS) flush 5-40 mL  5-40 mL IntraVENous Q8H  
 cefTRIAXone (ROCEPHIN) 1 g in 0.9% sodium chloride (MBP/ADV) 50 mL  1 g IntraVENous Q24H  
 0.9% sodium chloride infusion  75 mL/hr IntraVENous CONTINUOUS  
 octreotide (SANDOSTATIN) 500 mcg in 0.9% sodium chloride 500 mL infusion  25 mcg/hr IntraVENous CONTINUOUS  
  pantoprazole (PROTONIX) injection 40 mg  40 mg IntraVENous Q12H Allergies Allergen Reactions  Nsaids (Non-Steroidal Anti-Inflammatory Drug) Other (comments) Hx of PUD and Hinson's Esophagus Social History Tobacco Use  Smoking status: Former Smoker Packs/day: 2.00 Years: 15.00 Pack years: 30.00 Last attempt to quit: 3/1/1979 Years since quittin.1  Smokeless tobacco: Never Used Substance Use Topics  Alcohol use: No  
  Alcohol/week: 0.0 standard drinks Family History Problem Relation Age of Onset  Cancer Father   
     multiple myeloma  Stroke Father  Dementia Mother  No Known Problems Brother  COPD Brother  Cancer Maternal Grandmother COLON  Anesth Problems Neg Hx Review of Systems: 
Unobtainable intubated Objective:  
Vital Signs:   
Visit Vitals /77 Pulse 85 Temp 98 °F (36.7 °C) Resp 22 Ht 5' 10\" (1.778 m) Wt 121.4 kg (267 lb 10.2 oz) SpO2 99% BMI 38.40 kg/m² O2 Device: Room air O2 Flow Rate (L/min): 3 l/min Temp (24hrs), Av.2 °F (36.8 °C), Min:97.8 °F (36.6 °C), Max:98.4 °F (36.9 °C) Intake/Output:  
Last shift:      No intake/output data recorded. Last 3 shifts: 1901 -  0700 In: 3671.7 [I.V.:3671.7] Out: 1970 [IXSPR:2793] Intake/Output Summary (Last 24 hours) at 2020 5196 Last data filed at 2020 7168 Gross per 24 hour Intake 2471.67 ml Output 1395 ml Net 1076.67 ml Physical Exam: 
 
General:  Sleepy, no acute distress Head:  Normocephalic, without obvious abnormality, atraumatic. Eyes:  Conjunctivae/corneas clear. Nose: Nares normal. Septum midline. Mucosa normal.   
Throat: MMM Neck: Supple, symmetrical, trachea midline Lungs:   Clear to auscultation bilaterally. Chest wall:  No tenderness or deformity. Heart:  Regular rate and rhythm Abdomen:   Soft, non-tender.  Bowel sounds normal.   
 Extremities: Extremities normal, atraumatic, no cyanosis or clubbing Skin: Skin color, texture, turgor normal. No rashes or lesions Neurologic: Lethargic but moans with eyes open Data:  
Labs: 
Recent Labs 04/01/20 0139 03/31/20 
1443 03/31/20 0157 03/30/20 
2670 WBC 8.8  --  8.5  --  6.3 HGB 8.9* 8.3* 8.3*   < > 7.3* HCT 27.7* 24.9* 25.2*   < > 22.5*  
  --  146*  --  108*  
 < > = values in this interval not displayed. Recent Labs 04/01/20 0139 03/31/20 0157 03/30/20 
8539 * 145 143  
K 3.1* 3.5 4.3 * 118* 114* CO2 21 23 25 * 216* 202* BUN 27* 25* 23* CREA 0.74 0.80 0.82 CA 7.4* 7.8* 7.1*  
MG 2.0 2.0 1.9 PHOS 2.1* 2.0* 1.4* ALB 2.1* 2.1* 1.8* TBILI 1.3* 1.4* 2.2*  
SGOT 70* 171* 304* * 158* 178* No results for input(s): PHI, PCO2I, PO2I, HCO3I, FIO2I in the last 72 hours. Imaging: 
I have personally reviewed the patients radiographs: 
None today, head CT negative Aries Lund MD

## 2020-04-01 NOTE — PROGRESS NOTES
4384 : TRANSFER - IN REPORT: 
 
Verbal report received from [facility] (name) on Peder Hind  being received from CCU(unit) for routine progression of care Report consisted of patients Situation, Background, Assessment and  
Recommendations(SBAR). Information from the following report(s) SBAR, Kardex, Intake/Output, MAR and Recent Results was reveiwed with the receiving nurse. Opportunity for questions and clarification was provided. Assessment completed upon patients arrival to unit and care assumed. 1055 : Patient arrived to room 2265. 
 
1605 : Report given to Alvaro Saeed RN. SBAR, Kardex, MAR or Recent Results were discussed. Alvaro Saeed RN assumed care of the pt.  
 
Jerald Gavin RN

## 2020-04-01 NOTE — DIABETES MGMT
1545 [facility] 
 
FOLLOW-UP NOTE Presentation This 67 yo WM was admitted from ER 3/27/20 with esophageal varices and active bleeding. TIPS procedure completed same day. Required intubation but was successfully extubated 3/29/20. Off 02. Rocephin and Octreotide. Ammonia level down from 200 (3/29/20) to 47 today. Receiving thiamine & lactulose. Head CT - mild chronic microvascular ischemic change & mild/moderate degree of cerebral atrophy.   
  
Diabetes: Patient has known Type 2 diabetes treated with basal insulin and metformin. A1c 8.2% (November 2019). Subjective Groaning Objective Physical exam 
General Not alert or oriented. In no acute distress. Vital Signs Visit Vitals /70 (BP 1 Location: Left arm, BP Patient Position: At rest) Pulse 89 Temp 97.4 °F (36.3 °C) Resp 18 Ht 5' 10\" (1.778 m) Wt 121.4 kg (267 lb 10.2 oz) SpO2 98% BMI 38.40 kg/m² Laboratory Lab Results Component Value Date/Time Hemoglobin A1c 8.2 (H) 11/07/2019 10:23 AM  
 Hemoglobin A1c (POC) 6.5 03/05/2020 02:30 PM  
 
Lab Results Component Value Date/Time LDL, calculated 40 11/07/2019 10:23 AM  
 
Lab Results Component Value Date/Time Creatinine 0.74 04/01/2020 01:39 AM  
 
Lab Results Component Value Date/Time Sodium 148 (H) 04/01/2020 01:39 AM  
 Potassium 3.1 (L) 04/01/2020 01:39 AM  
 Chloride 119 (H) 04/01/2020 01:39 AM  
 CO2 21 04/01/2020 01:39 AM  
 Anion gap 8 04/01/2020 01:39 AM  
 Glucose 210 (H) 04/01/2020 01:39 AM  
 BUN 27 (H) 04/01/2020 01:39 AM  
 Creatinine 0.74 04/01/2020 01:39 AM  
 BUN/Creatinine ratio 36 (H) 04/01/2020 01:39 AM  
 GFR est AA >60 04/01/2020 01:39 AM  
 GFR est non-AA >60 04/01/2020 01:39 AM  
 Calcium 7.4 (L) 04/01/2020 01:39 AM  
 Bilirubin, total 1.3 (H) 04/01/2020 01:39 AM  
 AST (SGOT) 70 (H) 04/01/2020 01:39 AM  
 Alk.  phosphatase 140 (H) 04/01/2020 01:39 AM  
 Protein, total 5.6 (L) 04/01/2020 01:39 AM  
 Albumin 2.1 (L) 04/01/2020 01:39 AM  
 Globulin 3.5 04/01/2020 01:39 AM  
 A-G Ratio 0.6 (L) 04/01/2020 01:39 AM  
 ALT (SGPT) 112 (H) 04/01/2020 01:39 AM  
 
Lab Results Component Value Date/Time ALT (SGPT) 112 (H) 04/01/2020 01:39 AM  
 
 
Blood glucose pattern BGs still elevated into the 200s. Assessment and Plan Nursing Diagnosis Risk for unstable blood glucose pattern Nursing Intervention Domain 9754 Decision-making Support Nursing Interventions Examined current inpatient diabetes control Explored factors facilitating and impeding inpatient management Evaluation This gentleman, with Type 2 diabetes, had improving BGs, but BGs back into the 200s since last evening. Basal insulin dosing at nearly 0.2 units/kg/D. Patient with known cirrhosis has diminished liver uptake of glucose. Patient used 14 units of corrective insulin yesterday. Continue to advance dose to rely more on basal insulin than on a corrective strategy. Recommendations Recommend: 
 
Increasing Lantus insulin to 30 units D tomorrow Adding an extra 10 units of Lantus insulin NOW Billing Code(s) [x] 13463 IP subsequent hospital care - 15 minutes YUMI Carlisle Access via PORTIA Hawkins [phone]

## 2020-04-01 NOTE — PROGRESS NOTES
0730 Bedside report received from Nuria Izquierdo, [address] [place] [address] 
 
0830 Patient in bed, moaning out, not following commands, withdrawing from pain BLE, hypertensive, edematous, with zeng and central line in place. 1000  Report given to Leonid Tacos, [address] [place] [address] in PCU 
 
9581 TRANSFER - OUT REPORT: 
 
Verbal report given to Leonid Balderrama on Ruth Cardinal  being transferred to PCU for routine progression of care Report consisted of patients Situation, Background, Assessment and  
Recommendations(SBAR). Information from the following report(s) SBAR, Kardex, ED Summary and Cardiac Rhythm Normal Sinus was reviewed with the receiving nurse. Opportunity for questions and clarification was provided. Patient transported with: 
 Monitor Registered Nurse Tech

## 2020-04-01 NOTE — PROGRESS NOTES
F/U for hepatic encephalopathy Albert Khan  
 
S: Mr. Marisa Warren was seen by me today during rounds. At this time, he is resting + un comfortably. He is moaning in bed. I cannot get a clear hpi, pfsh, ros or cc from him. Nurse states he moans all day. O: Blood pressure 158/70, pulse 89, temperature 97.4 °F (36.3 °C), resp. rate 18, height 5' 10\" (1.778 m), weight 121.4 kg (267 lb 10.2 oz), SpO2 98 %. Gen: Patient is in mild  acute distress. Pale, moaingin in bed. He opens his eyes when I call his name, but does not interact with me. There is no jaundice. I cannot test asterixus yet Cross sectional imaging:  Us: Trace ascites Mykel Glow Lab Results Component Value Date/Time WBC 8.8 04/01/2020 01:39 AM  
 HGB 8.9 (L) 04/01/2020 01:39 AM  
 HCT 27.7 (L) 04/01/2020 01:39 AM  
 PLATELET 166 19/67/8379 01:39 AM  
 MCV 86.6 04/01/2020 01:39 AM  
 
Lab Results Component Value Date/Time Sodium 148 (H) 04/01/2020 01:39 AM  
 Potassium 3.1 (L) 04/01/2020 01:39 AM  
 Chloride 119 (H) 04/01/2020 01:39 AM  
 CO2 21 04/01/2020 01:39 AM  
 Anion gap 8 04/01/2020 01:39 AM  
 Glucose 210 (H) 04/01/2020 01:39 AM  
 BUN 27 (H) 04/01/2020 01:39 AM  
 Creatinine 0.74 04/01/2020 01:39 AM  
 BUN/Creatinine ratio 36 (H) 04/01/2020 01:39 AM  
 GFR est AA >60 04/01/2020 01:39 AM  
 GFR est non-AA >60 04/01/2020 01:39 AM  
 Calcium 7.4 (L) 04/01/2020 01:39 AM  
 Bilirubin, total 1.3 (H) 04/01/2020 01:39 AM  
 AST (SGOT) 70 (H) 04/01/2020 01:39 AM  
 Alk. phosphatase 140 (H) 04/01/2020 01:39 AM  
 Protein, total 5.6 (L) 04/01/2020 01:39 AM  
 Albumin 2.1 (L) 04/01/2020 01:39 AM  
 Globulin 3.5 04/01/2020 01:39 AM  
 A-G Ratio 0.6 (L) 04/01/2020 01:39 AM  
 ALT (SGPT) 112 (H) 04/01/2020 01:39 AM  
  
Last ammonia 47 A: Active Problems: 
  Acute upper GI bleed (3/28/2020) Comment:  Slowly improving P:  Ammonia improvement may precede clinical improvement Will follow

## 2020-04-02 ENCOUNTER — TELEPHONE (OUTPATIENT)
Dept: FAMILY MEDICINE CLINIC | Age: 70
End: 2020-04-02

## 2020-04-02 ENCOUNTER — APPOINTMENT (OUTPATIENT)
Dept: GENERAL RADIOLOGY | Age: 70
DRG: 405 | End: 2020-04-02
Attending: NURSE PRACTITIONER
Payer: MEDICARE

## 2020-04-02 LAB
ALBUMIN SERPL-MCNC: 2 G/DL (ref 3.5–5)
ALBUMIN/GLOB SERPL: 0.6 {RATIO} (ref 1.1–2.2)
ALP SERPL-CCNC: 115 U/L (ref 45–117)
ALT SERPL-CCNC: 73 U/L (ref 12–78)
AMMONIA PLAS-SCNC: 32 UMOL/L
ANION GAP SERPL CALC-SCNC: 6 MMOL/L (ref 5–15)
ANION GAP SERPL CALC-SCNC: 6 MMOL/L (ref 5–15)
AST SERPL-CCNC: 41 U/L (ref 15–37)
BASOPHILS # BLD: 0.1 K/UL (ref 0–0.1)
BASOPHILS NFR BLD: 1 % (ref 0–1)
BILIRUB SERPL-MCNC: 1 MG/DL (ref 0.2–1)
BUN SERPL-MCNC: 20 MG/DL (ref 6–20)
BUN SERPL-MCNC: 23 MG/DL (ref 6–20)
BUN/CREAT SERPL: 24 (ref 12–20)
BUN/CREAT SERPL: 35 (ref 12–20)
CALCIUM SERPL-MCNC: 7.1 MG/DL (ref 8.5–10.1)
CALCIUM SERPL-MCNC: 7.1 MG/DL (ref 8.5–10.1)
CHLORIDE SERPL-SCNC: 115 MMOL/L (ref 97–108)
CHLORIDE SERPL-SCNC: 119 MMOL/L (ref 97–108)
CO2 SERPL-SCNC: 21 MMOL/L (ref 21–32)
CO2 SERPL-SCNC: 21 MMOL/L (ref 21–32)
CREAT SERPL-MCNC: 0.66 MG/DL (ref 0.7–1.3)
CREAT SERPL-MCNC: 0.84 MG/DL (ref 0.7–1.3)
DIFFERENTIAL METHOD BLD: ABNORMAL
EOSINOPHIL # BLD: 0.4 K/UL (ref 0–0.4)
EOSINOPHIL NFR BLD: 5 % (ref 0–7)
ERYTHROCYTE [DISTWIDTH] IN BLOOD BY AUTOMATED COUNT: 18.7 % (ref 11.5–14.5)
GLOBULIN SER CALC-MCNC: 3.1 G/DL (ref 2–4)
GLUCOSE BLD STRIP.AUTO-MCNC: 140 MG/DL (ref 65–100)
GLUCOSE BLD STRIP.AUTO-MCNC: 174 MG/DL (ref 65–100)
GLUCOSE BLD STRIP.AUTO-MCNC: 230 MG/DL (ref 65–100)
GLUCOSE BLD STRIP.AUTO-MCNC: 235 MG/DL (ref 65–100)
GLUCOSE SERPL-MCNC: 142 MG/DL (ref 65–100)
GLUCOSE SERPL-MCNC: 244 MG/DL (ref 65–100)
HCT VFR BLD AUTO: 23.8 % (ref 36.6–50.3)
HCT VFR BLD AUTO: 24.1 % (ref 36.6–50.3)
HGB BLD-MCNC: 7.5 G/DL (ref 12.1–17)
HGB BLD-MCNC: 7.5 G/DL (ref 12.1–17)
IMM GRANULOCYTES # BLD AUTO: 0.2 K/UL (ref 0–0.04)
IMM GRANULOCYTES NFR BLD AUTO: 2 % (ref 0–0.5)
LYMPHOCYTES # BLD: 1.1 K/UL (ref 0.8–3.5)
LYMPHOCYTES NFR BLD: 14 % (ref 12–49)
MAGNESIUM SERPL-MCNC: 1.8 MG/DL (ref 1.6–2.4)
MAGNESIUM SERPL-MCNC: 1.9 MG/DL (ref 1.6–2.4)
MCH RBC QN AUTO: 27.9 PG (ref 26–34)
MCHC RBC AUTO-ENTMCNC: 31.5 G/DL (ref 30–36.5)
MCV RBC AUTO: 88.5 FL (ref 80–99)
MONOCYTES # BLD: 0.7 K/UL (ref 0–1)
MONOCYTES NFR BLD: 9 % (ref 5–13)
NEUTS SEG # BLD: 5.7 K/UL (ref 1.8–8)
NEUTS SEG NFR BLD: 69 % (ref 32–75)
NRBC # BLD: 0 K/UL (ref 0–0.01)
NRBC BLD-RTO: 0 PER 100 WBC
PHOSPHATE SERPL-MCNC: 1.5 MG/DL (ref 2.6–4.7)
PHOSPHATE SERPL-MCNC: 1.8 MG/DL (ref 2.6–4.7)
PLATELET # BLD AUTO: 140 K/UL (ref 150–400)
PMV BLD AUTO: 9.9 FL (ref 8.9–12.9)
POTASSIUM SERPL-SCNC: 2.7 MMOL/L (ref 3.5–5.1)
POTASSIUM SERPL-SCNC: 3.6 MMOL/L (ref 3.5–5.1)
PROT SERPL-MCNC: 5.1 G/DL (ref 6.4–8.2)
RBC # BLD AUTO: 2.69 M/UL (ref 4.1–5.7)
SERVICE CMNT-IMP: ABNORMAL
SODIUM SERPL-SCNC: 142 MMOL/L (ref 136–145)
SODIUM SERPL-SCNC: 146 MMOL/L (ref 136–145)
WBC # BLD AUTO: 8.1 K/UL (ref 4.1–11.1)

## 2020-04-02 PROCEDURE — 97535 SELF CARE MNGMENT TRAINING: CPT | Performed by: OCCUPATIONAL THERAPIST

## 2020-04-02 PROCEDURE — 85018 HEMOGLOBIN: CPT

## 2020-04-02 PROCEDURE — 84100 ASSAY OF PHOSPHORUS: CPT

## 2020-04-02 PROCEDURE — 74011000250 HC RX REV CODE- 250: Performed by: INTERNAL MEDICINE

## 2020-04-02 PROCEDURE — C9113 INJ PANTOPRAZOLE SODIUM, VIA: HCPCS | Performed by: INTERNAL MEDICINE

## 2020-04-02 PROCEDURE — 74011250637 HC RX REV CODE- 250/637: Performed by: SPECIALIST

## 2020-04-02 PROCEDURE — 74011000258 HC RX REV CODE- 258: Performed by: INTERNAL MEDICINE

## 2020-04-02 PROCEDURE — 82140 ASSAY OF AMMONIA: CPT

## 2020-04-02 PROCEDURE — 74011636637 HC RX REV CODE- 636/637: Performed by: NURSE PRACTITIONER

## 2020-04-02 PROCEDURE — 97530 THERAPEUTIC ACTIVITIES: CPT | Performed by: OCCUPATIONAL THERAPIST

## 2020-04-02 PROCEDURE — 74011250636 HC RX REV CODE- 250/636: Performed by: INTERNAL MEDICINE

## 2020-04-02 PROCEDURE — 74011000250 HC RX REV CODE- 250: Performed by: NURSE PRACTITIONER

## 2020-04-02 PROCEDURE — 74011250637 HC RX REV CODE- 250/637: Performed by: NURSE PRACTITIONER

## 2020-04-02 PROCEDURE — 97166 OT EVAL MOD COMPLEX 45 MIN: CPT | Performed by: OCCUPATIONAL THERAPIST

## 2020-04-02 PROCEDURE — 97530 THERAPEUTIC ACTIVITIES: CPT

## 2020-04-02 PROCEDURE — 83735 ASSAY OF MAGNESIUM: CPT

## 2020-04-02 PROCEDURE — 82962 GLUCOSE BLOOD TEST: CPT

## 2020-04-02 PROCEDURE — 74011636637 HC RX REV CODE- 636/637: Performed by: INTERNAL MEDICINE

## 2020-04-02 PROCEDURE — 36415 COLL VENOUS BLD VENIPUNCTURE: CPT

## 2020-04-02 PROCEDURE — 74011250637 HC RX REV CODE- 250/637: Performed by: INTERNAL MEDICINE

## 2020-04-02 PROCEDURE — 85025 COMPLETE CBC W/AUTO DIFF WBC: CPT

## 2020-04-02 PROCEDURE — 74011250636 HC RX REV CODE- 250/636: Performed by: NURSE PRACTITIONER

## 2020-04-02 PROCEDURE — 97161 PT EVAL LOW COMPLEX 20 MIN: CPT

## 2020-04-02 PROCEDURE — 74011250637 HC RX REV CODE- 250/637: Performed by: HOSPITALIST

## 2020-04-02 PROCEDURE — 71045 X-RAY EXAM CHEST 1 VIEW: CPT

## 2020-04-02 PROCEDURE — 97116 GAIT TRAINING THERAPY: CPT

## 2020-04-02 PROCEDURE — 80053 COMPREHEN METABOLIC PANEL: CPT

## 2020-04-02 PROCEDURE — 65660000000 HC RM CCU STEPDOWN

## 2020-04-02 RX ORDER — IPRATROPIUM BROMIDE AND ALBUTEROL SULFATE 2.5; .5 MG/3ML; MG/3ML
3 SOLUTION RESPIRATORY (INHALATION)
Status: DISCONTINUED | OUTPATIENT
Start: 2020-04-02 | End: 2020-04-06 | Stop reason: HOSPADM

## 2020-04-02 RX ORDER — INSULIN GLARGINE 100 [IU]/ML
20 INJECTION, SOLUTION SUBCUTANEOUS
Status: DISCONTINUED | OUTPATIENT
Start: 2020-04-02 | End: 2020-04-03

## 2020-04-02 RX ORDER — SERTRALINE HYDROCHLORIDE 50 MG/1
50 TABLET, FILM COATED ORAL DAILY
Status: DISCONTINUED | OUTPATIENT
Start: 2020-04-02 | End: 2020-04-06 | Stop reason: HOSPADM

## 2020-04-02 RX ORDER — SODIUM,POTASSIUM PHOSPHATES 280-250MG
1 POWDER IN PACKET (EA) ORAL 4 TIMES DAILY
Status: DISCONTINUED | OUTPATIENT
Start: 2020-04-02 | End: 2020-04-03

## 2020-04-02 RX ORDER — FUROSEMIDE 10 MG/ML
40 INJECTION INTRAMUSCULAR; INTRAVENOUS ONCE
Status: COMPLETED | OUTPATIENT
Start: 2020-04-02 | End: 2020-04-02

## 2020-04-02 RX ORDER — POTASSIUM CHLORIDE 20 MEQ/1
40 TABLET, EXTENDED RELEASE ORAL
Status: DISCONTINUED | OUTPATIENT
Start: 2020-04-02 | End: 2020-04-02

## 2020-04-02 RX ORDER — LISINOPRIL 5 MG/1
5 TABLET ORAL DAILY
Status: DISCONTINUED | OUTPATIENT
Start: 2020-04-02 | End: 2020-04-04

## 2020-04-02 RX ORDER — PRIMIDONE 50 MG/1
100 TABLET ORAL 3 TIMES DAILY
Status: DISCONTINUED | OUTPATIENT
Start: 2020-04-02 | End: 2020-04-06 | Stop reason: HOSPADM

## 2020-04-02 RX ADMIN — RIFAXIMIN 550 MG: 550 TABLET ORAL at 17:17

## 2020-04-02 RX ADMIN — POTASSIUM & SODIUM PHOSPHATES POWDER PACK 280-160-250 MG 1 PACKET: 280-160-250 PACK at 17:17

## 2020-04-02 RX ADMIN — CEFTRIAXONE 1 G: 1 INJECTION, POWDER, FOR SOLUTION INTRAMUSCULAR; INTRAVENOUS at 23:08

## 2020-04-02 RX ADMIN — PANTOPRAZOLE SODIUM 40 MG: 40 INJECTION, POWDER, FOR SOLUTION INTRAVENOUS at 22:58

## 2020-04-02 RX ADMIN — Medication 1 AMPULE: at 21:21

## 2020-04-02 RX ADMIN — Medication 1 AMPULE: at 08:52

## 2020-04-02 RX ADMIN — SODIUM CHLORIDE 40 ML: 9 INJECTION, SOLUTION INTRAMUSCULAR; INTRAVENOUS; SUBCUTANEOUS at 23:14

## 2020-04-02 RX ADMIN — PRIMIDONE 100 MG: 50 TABLET ORAL at 23:02

## 2020-04-02 RX ADMIN — SUCRALFATE 1 G: 1 TABLET ORAL at 08:54

## 2020-04-02 RX ADMIN — PANTOPRAZOLE SODIUM 40 MG: 40 INJECTION, POWDER, FOR SOLUTION INTRAVENOUS at 08:53

## 2020-04-02 RX ADMIN — INSULIN LISPRO 3 UNITS: 100 INJECTION, SOLUTION INTRAVENOUS; SUBCUTANEOUS at 06:20

## 2020-04-02 RX ADMIN — SODIUM CHLORIDE: 900 INJECTION, SOLUTION INTRAVENOUS at 21:15

## 2020-04-02 RX ADMIN — LACTULOSE 30 ML: 20 SOLUTION ORAL at 10:28

## 2020-04-02 RX ADMIN — LABETALOL HYDROCHLORIDE 10 MG: 5 INJECTION INTRAVENOUS at 10:47

## 2020-04-02 RX ADMIN — RIFAXIMIN 550 MG: 550 TABLET ORAL at 10:28

## 2020-04-02 RX ADMIN — INSULIN LISPRO 3 UNITS: 100 INJECTION, SOLUTION INTRAVENOUS; SUBCUTANEOUS at 14:08

## 2020-04-02 RX ADMIN — SODIUM CHLORIDE 100 MG: 9 INJECTION, SOLUTION INTRAVENOUS at 14:18

## 2020-04-02 RX ADMIN — CEFTRIAXONE 1 G: 1 INJECTION, POWDER, FOR SOLUTION INTRAMUSCULAR; INTRAVENOUS at 00:38

## 2020-04-02 RX ADMIN — POTASSIUM CHLORIDE: 2 INJECTION, SOLUTION, CONCENTRATE INTRAVENOUS at 01:00

## 2020-04-02 RX ADMIN — SODIUM CHLORIDE 40 ML: 9 INJECTION, SOLUTION INTRAMUSCULAR; INTRAVENOUS; SUBCUTANEOUS at 14:17

## 2020-04-02 RX ADMIN — POTASSIUM & SODIUM PHOSPHATES POWDER PACK 280-160-250 MG 1 PACKET: 280-160-250 PACK at 23:06

## 2020-04-02 RX ADMIN — POTASSIUM & SODIUM PHOSPHATES POWDER PACK 280-160-250 MG 1 PACKET: 280-160-250 PACK at 08:54

## 2020-04-02 RX ADMIN — LISINOPRIL 5 MG: 5 TABLET ORAL at 17:17

## 2020-04-02 RX ADMIN — PRIMIDONE 100 MG: 50 TABLET ORAL at 17:17

## 2020-04-02 RX ADMIN — FUROSEMIDE 40 MG: 10 INJECTION, SOLUTION INTRAMUSCULAR; INTRAVENOUS at 18:04

## 2020-04-02 RX ADMIN — POTASSIUM CHLORIDE: 2 INJECTION, SOLUTION, CONCENTRATE INTRAVENOUS at 08:52

## 2020-04-02 RX ADMIN — SUCRALFATE 1 G: 1 TABLET ORAL at 18:05

## 2020-04-02 RX ADMIN — LABETALOL HYDROCHLORIDE 10 MG: 5 INJECTION INTRAVENOUS at 04:52

## 2020-04-02 RX ADMIN — POTASSIUM BICARBONATE 40 MEQ: 782 TABLET, EFFERVESCENT ORAL at 06:12

## 2020-04-02 RX ADMIN — INSULIN GLARGINE 20 UNITS: 100 INJECTION, SOLUTION SUBCUTANEOUS at 23:51

## 2020-04-02 RX ADMIN — LACTULOSE 30 ML: 20 SOLUTION ORAL at 17:18

## 2020-04-02 RX ADMIN — SODIUM CHLORIDE 10 ML: 9 INJECTION, SOLUTION INTRAMUSCULAR; INTRAVENOUS; SUBCUTANEOUS at 08:54

## 2020-04-02 RX ADMIN — SERTRALINE HYDROCHLORIDE 50 MG: 50 TABLET ORAL at 17:17

## 2020-04-02 RX ADMIN — POTASSIUM & SODIUM PHOSPHATES POWDER PACK 280-160-250 MG 1 PACKET: 280-160-250 PACK at 14:11

## 2020-04-02 RX ADMIN — INSULIN GLARGINE 20 UNITS: 100 INJECTION, SOLUTION SUBCUTANEOUS at 10:28

## 2020-04-02 RX ADMIN — LABETALOL HYDROCHLORIDE 10 MG: 5 INJECTION INTRAVENOUS at 23:56

## 2020-04-02 RX ADMIN — INSULIN LISPRO 3 UNITS: 100 INJECTION, SOLUTION INTRAVENOUS; SUBCUTANEOUS at 00:47

## 2020-04-02 RX ADMIN — SODIUM CHLORIDE 40 ML: 9 INJECTION, SOLUTION INTRAMUSCULAR; INTRAVENOUS; SUBCUTANEOUS at 04:56

## 2020-04-02 RX ADMIN — INSULIN LISPRO 4 UNITS: 100 INJECTION, SOLUTION INTRAVENOUS; SUBCUTANEOUS at 18:04

## 2020-04-02 NOTE — PROGRESS NOTES
Hospitalist Progress Note NAME: Laila Caldwell :  1950 MRN:  [id number] I reviewed with Dr. Diego Wang about the medical history and the findings on the physical examination. I discussed with Dr. Diego Wang the patient's diagnosis and concur with the plan. Interim Hospital Summary: 68 y.o. male whom presented on 3/27/2020 with nausea & hematemesis Assessment / Plan: 
Hepatic encephalopathy POA; improving. Pt is alert and orient to self & place, not to date/time 
- ammonia 200 on admission, 32 today. Continue with lactulose Acute respiratory failure on admission which has been resolved Hemorrhagic/hypovolemic shock POA Acute UGIB secondary to esophageal variceal hemorrhage S/p TIPS for ongoing UGIB (3/28) Post TIPS hepatic encephalopathy BREWER cirrhosis 
- Head CT (3/31): No acute intracranial process. US ABD (3/31): There is a trace amount of ascites visualized within the ventral wall hernia 
  appreciate GI input; continue with Xifaxan, carafate, lactulose Lasix & aldactone are on hold for now; weight same as on 3/11, trace amount of ascites from ABD US on 3/31 Wt Readings from Last 3 Encounters:  
20 121.4 kg (267 lb 10.2 oz) 20 121.3 kg (267 lb 7 oz) 20 123.4 kg (272 lb)  
  hgb 7.5 from 8.9 on . Will check H & H. Transfuse if hgb < 7.0 
  octerotide infusion has been d/c'd on  Continue with PPI 
  IV ceftriaxone for SBP prophylaxis x 7 days Electrolyte imbalance (K, mg, phos) 
- repleted in am; will recheck BMP/Mg/Phos now. Continue with kphos daily Hypertension - Resume ACEI with parameter on , lasix on hold for now Will add BB if need Type II DM with hyperglycemia 
- A1C 6.5. Pt was receiving Lantus 40units BID per Dr. Pérez How at home along with oral hypoglycemic agents Lantus 20 units BID; will adjust as his PO intake increases Check qac/qhs blood glucose and follow SSI Nurse reported pt was chocking while he was eating lunch. Noticed wheezing. No wheezing in earlier exam. 
- CXR now, Duoneb now then q4 hour prn Benign essential tremors 
- continue with mysoline; reduced the dose to 100mg TID vs 250mg BID at home due to elevated AST Depression 
- continue with zoloft (dose reduced to 50mg vs 100mg at home due to elevated AST) 
  
30.0 - 39.9 Obese / Body mass index is 38.4 kg/m². 
  
Code status: DNR Prophylaxis: SCD's Recommended Disposition: Home w/Family Subjective: Chief Complaint / Reason for Physician Visit \"I feel pretty good\". Pt responded to 'no' to all the questions. Discussed with RN events overnight. Review of Systems: 
Symptom Y/N Comments  Symptom Y/N Comments Fever/Chills n   Chest Pain n   
Poor Appetite    Edema Cough    Abdominal Pain n   
Sputum    Joint Pain SOB/WANG n   Pruritis/Rash Nausea/vomit n   Tolerating PT/OT Diarrhea    Tolerating Diet Constipation    Other Could NOT obtain due to:   
 
Objective: VITALS:  
Last 24hrs VS reviewed since prior progress note. Most recent are: 
Patient Vitals for the past 24 hrs: 
 Temp Pulse Resp BP SpO2  
04/02/20 0735 98 °F (36.7 °C) 74 19 139/68 100 % 04/02/20 0628  74  153/71   
04/02/20 0452  87  166/84   
04/02/20 0414 98.1 °F (36.7 °C) 81 20 166/84 99 % 04/02/20 0037 98.1 °F (36.7 °C) 80 20 167/67 99 % 04/01/20 1939 98.3 °F (36.8 °C) 86 18 169/66 100 % 04/01/20 1623 98.1 °F (36.7 °C) 74 18 159/80 97 % 04/01/20 1054 97.4 °F (36.3 °C) 89 18 158/70 98 % 04/01/20 1000  84 18 153/61 99 % 04/01/20 0900  85 15 155/67 99 % Intake/Output Summary (Last 24 hours) at 4/2/2020 7032 Last data filed at 4/2/2020 9393 Gross per 24 hour Intake 3793.17 ml Output 1275 ml Net 2518.17 ml PHYSICAL EXAM: 
General: Pale, ill appearing. Alert, cooperative, no acute distress EENT:  EOMI. Anicteric sclerae. MMM Resp:  Clear in apex with decreased breath sounds at bases, no wheezing or rales. No accessory muscle use CV:  Regular  rhythm,  No edema GI:  Soft, Non distended, Non tender. +Bowel sounds Neurologic:  Alert and oriented X self and place, normal speech, Psych:   Improved insight compare to 3/31. Not anxious nor agitated Skin:  No rashes. No jaundice Reviewed most current lab test results and cultures  YES Reviewed most current radiology test results   YES Review and summation of old records today    NO Reviewed patient's current orders and MAR    YES 
PMH/SH reviewed - no change compared to H&P 
________________________________________________________________________ Care Plan discussed with: 
  Comments Patient y Family RN y   
Care Manager Consultant Multidiciplinary team rounds were held today with , nursing, pharmacist and clinical coordinator. Patient's plan of care was discussed; medications were reviewed and discharge planning was addressed. ________________________________________________________________________ Marline Gomez NP Procedures: see electronic medical records for all procedures/Xrays and details which were not copied into this note but were reviewed prior to creation of Plan. LABS: 
I reviewed today's most current labs and imaging studies. Pertinent labs include: 
Recent Labs 04/02/20 0325 04/01/20 0139 03/31/20 
1443 03/31/20 
0157 WBC 8.1 8.8  --  8.5 HGB 7.5* 8.9* 8.3* 8.3* HCT 23.8* 27.7* 24.9* 25.2*  
* 187  --  146* Recent Labs 04/02/20 0325 04/01/20 0139 03/31/20 
0157 * 148* 145  
K 2.7* 3.1* 3.5 * 119* 118* CO2 21 21 23 * 210* 216* BUN 23* 27* 25* CREA 0.66* 0.74 0.80 CA 7.1* 7.4* 7.8*  
MG 1.9 2.0 2.0 PHOS 1.5* 2.1* 2.0* ALB 2.0* 2.1* 2.1* TBILI 1.0 1.3* 1.4* SGOT 41* 70* 171* ALT 73 112* 158* Signed: )Lauren Lemus, NP

## 2020-04-02 NOTE — PROGRESS NOTES
Gastroenterology Daily Progress Note (Dr. Donny Harvey) [facility] Admit Date: 3/27/2020 Subjective:   
  
Patient no longer receiving enemas per pt. He is awake, asking when he can eat lunch. Tolerating clear liquid diet. Current Facility-Administered Medications Medication Dose Route Frequency  potassium, sodium phosphates (NEUTRA-PHOS) packet 1 Packet  1 Packet Oral QID  lactulose (CHRONULAC) 10 gram/15 mL solution 30 mL  20 g Oral TID  rifAXIMin (XIFAXAN) tablet 550 mg  550 mg Oral BID  labetaloL (NORMODYNE;TRANDATE) injection 10 mg  10 mg IntraVENous Q4H PRN  
 0.45% sodium chloride 1,000 mL with potassium chloride 40 mEq infusion   IntraVENous CONTINUOUS  
 insulin glargine (LANTUS) injection 20 Units  20 Units SubCUTAneous DAILY  thiamine (B-1) 100 mg in 0.9% sodium chloride 50 mL IVPB  100 mg IntraVENous Q24H  
 insulin lispro (HUMALOG) injection   SubCUTAneous Q6H  
 alcohol 62% (NOZIN) nasal  1 Ampule  1 Ampule Topical Q12H  
 sucralfate (CARAFATE) tablet 1 g  1 g Oral ACB&D  
 0.9% sodium chloride infusion 250 mL  250 mL IntraVENous PRN  
 ondansetron (ZOFRAN) injection 4 mg  4 mg IntraVENous Q4H PRN  
 sodium chloride (NS) flush 5-40 mL  5-40 mL IntraVENous Q8H  
 sodium chloride (NS) flush 5-40 mL  5-40 mL IntraVENous PRN  
 cefTRIAXone (ROCEPHIN) 1 g in 0.9% sodium chloride (MBP/ADV) 50 mL  1 g IntraVENous Q24H  
 glucose chewable tablet 16 g  4 Tab Oral PRN  
 dextrose (D50W) injection syrg 12.5-25 g  12.5-25 g IntraVENous PRN  
 glucagon (GLUCAGEN) injection 1 mg  1 mg IntraMUSCular PRN  pantoprazole (PROTONIX) injection 40 mg  40 mg IntraVENous Q12H Objective:  
 
Visit Vitals /68 Pulse 74 Temp 98 °F (36.7 °C) Resp 19 Ht 5' 10\" (1.778 m) Wt 121.4 kg (267 lb 10.2 oz) SpO2 100% BMI 38.40 kg/m² Blood pressure 139/68, pulse 74, temperature 98 °F (36.7 °C), resp.  rate 19, height 5' 10\" (1.778 m), weight 121.4 kg (267 lb 10.2 oz), SpO2 100 %. No intake/output data recorded. 03/31 1901 - 04/02 0700 In: 5014.8 [P.O.:480; I.V.:4534.8] Out: 1900 [[id number]:[id number]] Intake/Output Summary (Last 24 hours) at 4/2/2020 7091 Last data filed at 4/2/2020 5799 Gross per 24 hour Intake 3793.17 ml Output 1275 ml Net 2518.17 ml Physical Exam:  
 
General: awake WM, oriented to person, place and time, some slight delay in responses Chest:  CTA, No rhonchi, rales or rubs. Heart: S1, S2, RRR 
GI: Obese, soft, Nontender, + RLQ hernia Extremities: no edema CNS: no asterixis Labs:  
 
Recent Results (from the past 24 hour(s)) GLUCOSE, POC Collection Time: 04/01/20 12:09 PM  
Result Value Ref Range Glucose (POC) 229 (H) 65 - 100 mg/dL Performed by Kusum Mackey GLUCOSE, POC Collection Time: 04/01/20  5:52 PM  
Result Value Ref Range Glucose (POC) 188 (H) 65 - 100 mg/dL Performed by Betty Hudson GLUCOSE, POC Collection Time: 04/02/20 12:35 AM  
Result Value Ref Range Glucose (POC) 174 (H) 65 - 100 mg/dL Performed by Tammy Overton AMMONIA Collection Time: 04/02/20  3:25 AM  
Result Value Ref Range Ammonia 32 (H) <32 UMOL/L  
CBC WITH AUTOMATED DIFF Collection Time: 04/02/20  3:25 AM  
Result Value Ref Range WBC 8.1 4.1 - 11.1 K/uL  
 RBC 2.69 (L) 4.10 - 5.70 M/uL HGB 7.5 (L) 12.1 - 17.0 g/dL HCT 23.8 (L) 36.6 - 50.3 % MCV 88.5 80.0 - 99.0 FL  
 MCH 27.9 26.0 - 34.0 PG  
 MCHC 31.5 30.0 - 36.5 g/dL  
 RDW 18.7 (H) 11.5 - 14.5 % PLATELET 881 (L) 094 - 400 K/uL MPV 9.9 8.9 - 12.9 FL  
 NRBC 0.0 0  WBC ABSOLUTE NRBC 0.00 0.00 - 0.01 K/uL NEUTROPHILS 69 32 - 75 % LYMPHOCYTES 14 12 - 49 % MONOCYTES 9 5 - 13 % EOSINOPHILS 5 0 - 7 % BASOPHILS 1 0 - 1 % IMMATURE GRANULOCYTES 2 (H) 0.0 - 0.5 % ABS. NEUTROPHILS 5.7 1.8 - 8.0 K/UL  
 ABS. LYMPHOCYTES 1.1 0.8 - 3.5 K/UL ABS. MONOCYTES 0.7 0.0 - 1.0 K/UL  
 ABS. EOSINOPHILS 0.4 0.0 - 0.4 K/UL  
 ABS. BASOPHILS 0.1 0.0 - 0.1 K/UL  
 ABS. IMM. GRANS. 0.2 (H) 0.00 - 0.04 K/UL  
 DF AUTOMATED METABOLIC PANEL, COMPREHENSIVE Collection Time: 04/02/20  3:25 AM  
Result Value Ref Range Sodium 146 (H) 136 - 145 mmol/L Potassium 2.7 (LL) 3.5 - 5.1 mmol/L Chloride 119 (H) 97 - 108 mmol/L  
 CO2 21 21 - 32 mmol/L Anion gap 6 5 - 15 mmol/L Glucose 142 (H) 65 - 100 mg/dL BUN 23 (H) 6 - 20 MG/DL Creatinine 0.66 (L) 0.70 - 1.30 MG/DL  
 BUN/Creatinine ratio 35 (H) 12 - 20 GFR est AA >60 >60 ml/min/1.73m2 GFR est non-AA >60 >60 ml/min/1.73m2 Calcium 7.1 (L) 8.5 - 10.1 MG/DL Bilirubin, total 1.0 0.2 - 1.0 MG/DL  
 ALT (SGPT) 73 12 - 78 U/L  
 AST (SGOT) 41 (H) 15 - 37 U/L Alk. phosphatase 115 45 - 117 U/L Protein, total 5.1 (L) 6.4 - 8.2 g/dL Albumin 2.0 (L) 3.5 - 5.0 g/dL Globulin 3.1 2.0 - 4.0 g/dL A-G Ratio 0.6 (L) 1.1 - 2.2 MAGNESIUM Collection Time: 04/02/20  3:25 AM  
Result Value Ref Range Magnesium 1.9 1.6 - 2.4 mg/dL PHOSPHORUS Collection Time: 04/02/20  3:25 AM  
Result Value Ref Range Phosphorus 1.5 (L) 2.6 - 4.7 MG/DL  
GLUCOSE, POC Collection Time: 04/02/20  6:11 AM  
Result Value Ref Range Glucose (POC) 140 (H) 65 - 100 mg/dL Performed by Annika Suazo Recent Labs 04/02/20 
0325 04/01/20 
0139 03/31/20 
0157 * 148* 145  
K 2.7* 3.1* 3.5 * 119* 118* CO2 21 21 23 BUN 23* 27* 25* CREA 0.66* 0.74 0.80 * 210* 216* CA 7.1* 7.4* 7.8*  
MG 1.9 2.0 2.0 PHOS 1.5* 2.1* 2.0* Recent Labs 04/02/20 
0325 04/01/20 
0139 03/31/20 
0157 SGOT 41* 70* 171*  140* 153* TP 5.1* 5.6* 5.8* ALB 2.0* 2.1* 2.1*  
GLOB 3.1 3.5 3.7 Impression: 
Acute UGIB secondary to esophageal variceal hemorrhage S/P TIPS for ongoing UGIB (placed 3/28/20) Post TIPS hepatic encephalopathy BREWER cirrhosis Sleep apnea Hypokalemia Plan: His mentation has improved along with a markedly improved serum ammonia level. Can therefore stop Lactulose enemas and switch to PO. 
-advance diet as tolerated to low sodium, diabetic diet 
-switch to PO lactulose BID 
-add Xifaxan 550 mg BID 
-needs correction of K; defer to hospitalists 
-stop Octreotide infusion (over 72 hours of infusion not needed) 
-will see again as needed, call with questions Geraldo Perez MD 
 
4/2/2020 [address] [place] [address], [address] [id number] Loc: [phone]

## 2020-04-02 NOTE — PROGRESS NOTES
Problem: Mobility Impaired (Adult and Pediatric) Goal: *Acute Goals and Plan of Care (Insert Text) Description: FUNCTIONAL STATUS PRIOR TO ADMISSION: Patient was independent and active without use of DME. 
 
HOME SUPPORT PRIOR TO ADMISSION: The patient lived with his son but did not require assist. 
 
Physical Therapy Goals Initiated 4/2/2020 1. Patient will move from supine to sit and sit to supine  in bed with moderate assistance  within 7 day(s). 2.  Patient will transfer from bed to chair and chair to bed with moderate assistance  using the least restrictive device within 7 day(s). 3.  Patient will perform sit to stand with moderate assistance  within 7 day(s). 4.  Patient will ambulate with moderate assistance  for 50 feet with the least restrictive device within 7 day(s). 5.  Patient will ascend/descend 4 stairs with 1 handrail(s) with moderate assistance  within 7 day(s). 6.  Patient will sit edge of bed x10 minutes for ther ex with supervision within 7 days. Outcome: Progressing Towards Goal 
PHYSICAL THERAPY EVALUATION Patient: Keila De Santiago (68 y.o. male) Date: 4/2/2020 Primary Diagnosis: Acute upper GI bleed [K92.2] Procedure(s) (LRB): ESOPHAGOGASTRODUODENOSCOPY (EGD) (N/A) ENDOSCOPIC BANDING OR LIGATION (N/A) 5 Days Post-Op Precautions:   Fall, DNR 
 
 
ASSESSMENT Based on the objective data described below, the patient presents with confusion, significant weakness, impaired balance, and poor endurance following a complex medical course and prolonged immobility d/t an upper GI bleed. He required additional time for command following and responded best to multimodal cues. He required maximum assist x2 and extensive cues for body mechanics to complete all bed mobility and transfers.  He presented with tendency towards forward flexion and demonstrated increased difficulty maintaining erect posture as he fatigued with prolonged sitting. He required multiple attempts to complete sit to stand and demonstrated a left anterior LOB that he was unable to correct despite extensive cues. Returned to bed after side stepping to [facility] using a RW and requiring maximum assist x2 d/t balance and difficulty advancing LEs. This writer deferred tranfers to a bedside chair d/t fatiguing quickly and concern for an inability to safely return to bed. Recommend OOB mobility to be completed with a david lift until he is able to safely stand and transfer with therapy. This patient was independent and active prior to his recent illness and presents with gross deviation from his independent baseline. Recommend discharge to a rehab setting. Current Level of Function Impacting Discharge (mobility/balance): maximum assist x2 for all mobiltiy Patient will benefit from skilled therapy intervention to address the above noted impairments. PLAN : 
Recommendations and Planned Interventions: bed mobility training, transfer training, gait training, therapeutic exercises, and neuromuscular re-education Frequency/Duration: Patient will be followed by physical therapy:  5 times a week to address goals. Recommendation for discharge: (in order for the patient to meet his/her long term goals) Therapy up to 5 days/week in SNF setting This discharge recommendation: 
Has not yet been discussed the attending provider and/or case management IF patient discharges home will need the following DME: to be determined (TBD) SUBJECTIVE:  
Patient stated It is 2/20.  OBJECTIVE DATA SUMMARY:  
HISTORY:   
Past Medical History:  
Diagnosis Date  
 ADD (attention deficit disorder) Anemia due to blood loss Hinson's esophagus with esophagitis Benign essential tremor Cancer [facility]) 2012 [facility] Cirrhosis ([facility] [address] Depression DJD (degenerative joint disease) of hip   
 bilat DM (diabetes mellitus) ([facility] [address] 3/17/2010 ED (erectile dysfunction) of organic origin Esophageal varices determined by endoscopy ([facility] [address] Fall on or from sidewalk curb 9/24/2015 Femur fracture ([facility] [address] Gastritis and duodenitis GERD (gastroesophageal reflux disease)   
 resolved after discontinuing diclofenac Hematuria 6/2016 High cholesterol 03/17/2010  
 pt denies 6/19 HTN (hypertension) 3/17/2010 Morbid obesity ([facility] [address] Murmur, cardiac 2016 PFO (patent foramen ovale) 7/26/2017 PUD (peptic ulcer disease) Shingles 6/2016 RESOLVING- NO RASH (HEAD) Sleep apnea   
 doesnt use CPAP anymore Past Surgical History:  
Procedure Laterality Date COLONOSCOPY N/A 6/18/2019 COLONOSCOPY AND EGD performed by Arlet Queen MD at [facility]  6/18/2019 ENDOSCOPY, COLON, DIAGNOSTIC    
 HX CHOLECYSTECTOMY  1995 HX GI  1980  
 gastroplasty [address] HX HIP REPLACEMENT Left HX ORTHOPAEDIC Right 2011  
 rot cuff repair HX ORTHOPAEDIC  2016  
 left broken femur [facility] HX VASCULAR ACCESS    
 picc line and removed after sepsis resolved IR INSERT TIPS HEPATIC SHUNT  3/28/2020 TOTAL HIP ARTHROPLASTY  05/2010  
 right TOTAL HIP ARTHROPLASTY  08/01/2016  
 left UPPER GI ENDOSCOPY,BIOPSY  6/18/2019 Personal factors and/or comorbidities impacting plan of care:  
 
Home Situation Home Environment: Private residence # Steps to Enter: 2 Rails to Enter: Yes Hand Rails : Bilateral 
One/Two Story Residence: Two story, live on 1st floor(son lives on second floor) Living Alone: No 
Support Systems: (Son) Patient Expects to be Discharged to[de-identified] Patient room(ER) Current DME Used/Available at Home: Cane, straight, Shower chair(hip kit) Tub or Shower Type: Tub/Shower combination EXAMINATION/PRESENTATION/DECISION MAKING:  
Critical Behavior: 
Neurologic State: Alert Orientation Level: Oriented to person, Oriented to place(not date nor what hosptial, he knew he was in the hospital) Cognition: Impulsive, Decreased attention/concentration, Decreased command following Safety/Judgement: Fall prevention, Decreased awareness of need for safety Hearing: Auditory Auditory Impairment: None Skin:   
Edema:  
Range Of Motion: 
AROM: Grossly decreased, non-functional 
  
  
  
PROM: Generally decreased, functional 
  
  
  
Strength:   
Strength: Generally decreased, functional(but grossly decreased bilateral hands ) Tone & Sensation:  
Tone: Normal 
  
  
  
  
Sensation: Intact Coordination: 
Coordination: Grossly decreased, non-functional 
Vision:  
Acuity: (grossly intact) Functional Mobility: 
Bed Mobility: 
Rolling: Maximum assistance; Additional time;Assist x2 Supine to Sit: Maximum assistance; Additional time;Assist x2 Sit to Supine: Maximum assistance; Additional time;Assist x2 Scooting: Maximum assistance; Additional time;Assist x2 Transfers: 
Sit to Stand: Maximum assistance; Moderate assistance; Additional time;Assist x2 Stand to Sit: Maximum assistance; Additional time;Assist x2 Bed to Chair: (unsafe to attempt, only able to side step 2 steps max A x2) Balance:  
Sitting: Impaired Sitting - Static: Poor (constant support) Sitting - Dynamic: Poor (constant support) Standing: Impaired Standing - Static: Constant support;Poor Standing - Dynamic : Poor;Constant support Ambulation/Gait Training: 
Distance (ft): 3 Feet (ft)(side step to [facility]) Assistive Device: Gait belt;Walker, rolling Ambulation - Level of Assistance: Maximum assistance;Assist x2 Gait Description (WDL): Exceptions to [facility] Gait Abnormalities: Shuffling gait; Decreased step clearance Base of Support: Widened Speed/Clarice: Shuffled Physical Therapy Evaluation Charge Determination History Examination Presentation Decision-Making MEDIUM  Complexity : 1-2 comorbidities / personal factors will impact the outcome/ POC  MEDIUM Complexity : 3 Standardized tests and measures addressing body structure, function, activity limitation and / or participation in recreation  LOW Complexity : Stable, uncomplicated  LOW Complexity : FOTO score of  Based on the above components, the patient evaluation is determined to be of the following complexity level: LOW Pain Rating: 
 
 
Activity Tolerance:  
Fair Please refer to the flowsheet for vital signs taken during this treatment. After treatment patient left in no apparent distress: sitting in chair position in bed, Call bell within reach COMMUNICATION/EDUCATION:  
The patients plan of care was discussed with: Registered nurse. Fall prevention education was provided and the patient/caregiver indicated understanding., Patient/family have participated as able in goal setting and plan of care. , and Patient/family agree to work toward stated goals and plan of care. Thank you for this referral. 
King Karen, PT, DPT Time Calculation: 26 mins

## 2020-04-02 NOTE — PROGRESS NOTES
Spiritual Care Assessment/Progress Note [address] 
 
 
NAME: Shannon Warren      MRN: [id number] AGE: 68 y.o. SEX: male Mosque Affiliation: [facility]  
Language: [place] 4/2/2020     Total Time (in minutes): 11 Spiritual Assessment begun in [facility] 2 PROGRESSIVE CARE through conversation with: 
  
    [x]Patient        [] Family    [] Friend(s) Reason for Consult: Initial/Spiritual assessment, patient floor Spiritual beliefs: (Please include comment if needed) [x] Identifies with a wil tradition:  Anabaptist    
   [] Supported by a wil community:        
   [] Claims no spiritual orientation:       
   [] Seeking spiritual identity:            
   [] Adheres to an individual form of spirituality:       
   [] Not able to assess:                   
 
    
Identified resources for coping:  
   [x] Prayer                           
   [] Music                  [] Guided Imagery [x] Family/friends                 [] Pet visits [] Devotional reading                         [] Unknown 
   [] Other:                                          
 
 
Interventions offered during this visit: (See comments for more details) Patient Interventions: Affirmation of emotions/emotional suffering, Affirmation of wil, Catharsis/review of pertinent events in supportive environment, Coping skills reviewed/reinforced, Iconic (affirming the presence of God/Higher Power), Prayer (actual) Plan of Care: 
 
 [] Support spiritual and/or cultural needs  
 [] Support AMD and/or advance care planning process    
 [] Support grieving process 
 [] Coordinate Rites and/or Rituals  
 [] Coordination with community clergy [] No spiritual needs identified at this time 
 [] Detailed Plan of Care below (See Comments)  [] Make referral to Music Therapy 
[] Make referral to Pet Therapy    
[] Make referral to Addiction services 
[] Make referral to [facility] [] Make referral to Spiritual Care Partner 
[] No future visits requested       
[x] Follow up visits as needed Comments:  visit for initial spiritual assessment. Patient reclining in bed resting and watching television. Good eye contact, friendly. Says he is feeling pretty good today. Provided spiritual presence and listening as he spoke briefly of his present thoughts, feelings, and concerns. Spoke of his health and the events leading to this hospitalization. Says he hopes to be discharged soon, possibly tomorrow, but says he is not certain. Lives with his son and described good support from his children. Also described an active wil and trust in God. Requested prayer and a prayer was offered. He appeared comforted as a result of this prayer and visit and expressed gratitude for this prayer and visit. Visited by Rev. Yonis Mcintosh MDiv, [facility], [facility]  paging service: [phone])

## 2020-04-02 NOTE — PROGRESS NOTES
Problem: Self Care Deficits Care Plan (Adult) Goal: *Acute Goals and Plan of Care (Insert Text) Description: FUNCTIONAL STATUS PRIOR TO ADMISSION: ambulated with SPC at times, performed ADLS on his own and light IADLS, was driving, has a shower chair but was not using it, uses hip kit for LB ADLS due to old hip replacement, history of hip replacement dislocation HOME SUPPORT PRIOR TO ADMISSION: The patient lived with son whom provided assist with IADLS and transportation as needed. Occupational Therapy Goals: 
Initiated 4/2/2020 1. Patient will perform self-feeding with supervision/set-up within 7 days. 2. Patient will perform grooming with supervision/set-up within 7 days. 3. Patient will perform upper body dressing with minimal assistance within 7 days. 4. Patient will perform toileting with max assist within 7 days. 5. Patient will improve dynamic sitting balance to supervision within 7 days. 6. Patient will transfer from bedside commode with moderate assistance  using the least restrictive device and appropriate durable medical equipment within 7 days. Outcome: Progressing Towards Goal 
 OCCUPATIONAL THERAPY EVALUATION Patient: Shannon Warren (73 y.o. male) Date: 4/2/2020 Primary Diagnosis: Acute upper GI bleed [K92.2] Procedure(s) (LRB): ESOPHAGOGASTRODUODENOSCOPY (EGD) (N/A) ENDOSCOPIC BANDING OR LIGATION (N/A) 5 Days Post-Op Precautions:   Fall, DNR 
 
ASSESSMENT Based on the objective data described below, the patient presents with significant general weakness with inability to effectively use BUE. He also had confusion and decreased attention/problem solving. During attempt at self feeding ice chips pt had weak grasp and limited motor control due to weakess. Hand over hand assist needed to self feed and pt was also confused between white handle of spoon and the white straw.   He was attempting to suck liquid out of the end of the spoon and scoop with the straw.  With prompts and hand over hand assist pt was able to problem solve through this but could not do this on his own. He needs half filled cups with tops and handles at this time and assist with feeding. Poor seated balance EOB with anterior loss of balance and in standing pt had heavy anterior weight shift and was only able to take two steps sideways with the walker. Unable to safely get pt to the chair today due to weakness and balance deficits. Recommend david at this time. Prior to illness pt was independent and pt has been bedbound for 5 days and was intubated this admit. Current Level of Function Impacting Discharge (ADLs/self-care): Transfers: 
Sit to Stand: Maximum assistance; Moderate assistance; Additional time;Assist x2 Stand to Sit: Maximum assistance; Additional time;Assist x2 Bed to Chair: (unsafe to attempt, only able to side step 2 steps max A x2) Bathroom Mobility: (unable) Toilet Transfer : (unable) ADL Assessment: 
Feeding: Maximum assistance Oral Facial Hygiene/Grooming: Maximum assistance Bathing: Total assistance;Maximum assistance Upper Body Dressing: Total assistance Lower Body Dressing: Total assistance Toileting: Total assistance Functional Outcome Measure: The patient scored 5/100 on the barthel outcome measure which is indicative of . Other factors to consider for discharge: pt was independent prior to admit for ADLS and was driving Patient will benefit from skilled therapy intervention to address the above noted impairments. PLAN : 
Recommendations and Planned Interventions: self care training, functional mobility training, therapeutic exercise, balance training, therapeutic activities, cognitive retraining, endurance activities, neuromuscular re-education, patient education, home safety training and family training/education Frequency/Duration: Patient will be followed by occupational therapy 5 times a week to address goals. Recommendation for discharge: (in order for the patient to meet his/her long term goals) Therapy 3 hours per day 5-7 days per week This discharge recommendation: A follow-up discussion with the attending provider and/or case management is planned SUBJECTIVE:  
Patient stated I need my ice chips.  OBJECTIVE DATA SUMMARY:  
HISTORY:  
Past Medical History:  
Diagnosis Date  ADD (attention deficit disorder)  Anemia due to blood loss  Hinson's esophagus with esophagitis  Benign essential tremor  Cancer [facility]) 2012 [address]  Cirrhosis ([address]  Depression  DJD (degenerative joint disease) of hip   
 bilat  DM (diabetes mellitus) ([address] 3/17/2010  ED (erectile dysfunction) of organic origin  Esophageal varices determined by endoscopy ([facility] [address]  Fall on or from sidewalk curb 9/24/2015  Femur fracture ([address]  Gastritis and duodenitis  GERD (gastroesophageal reflux disease)   
 resolved after discontinuing diclofenac  Hematuria 6/2016  High cholesterol 03/17/2010  
 pt denies 6/19  
 HTN (hypertension) 3/17/2010  Morbid obesity ([address]  Murmur, cardiac 2016  
 PFO (patent foramen ovale) 7/26/2017  PUD (peptic ulcer disease)  Shingles 6/2016 RESOLVING- NO RASH (HEAD)  Sleep apnea   
 doesnt use CPAP anymore Past Surgical History:  
Procedure Laterality Date  COLONOSCOPY N/A 6/18/2019 COLONOSCOPY AND EGD performed by Luis Fernando Hurt MD at \[facility]\"" ENDOSCOPY  COLONOSCOPY,DIAGNOSTIC  6/18/2019  ENDOSCOPY, COLON, DIAGNOSTIC    
 HX CHOLECYSTECTOMY  1995  HX GI  1980  
 gastroplasty [address]  HX HIP REPLACEMENT Left  HX ORTHOPAEDIC Right 2011  
 rot cuff repair  HX ORTHOPAEDIC  2016  
 left broken femur [address]  HX VASCULAR ACCESS    
 picc line and removed after sepsis resolved  IR INSERT TIPS HEPATIC SHUNT  3/28/2020 [address] ARTHROPLASTY  05/2010  
 right  TOTAL HIP ARTHROPLASTY  08/01/2016  
 left  UPPER GI ENDOSCOPY,BIOPSY  6/18/2019 Expanded or extensive additional review of patient history:  
 
Home Situation Home Environment: Private residence # Steps to Enter: 2 Rails to Enter: Yes Hand Rails : Bilateral 
One/Two Story Residence: Two story, live on 1st floor(son lives on second floor) Living Alone: No 
Support Systems: (Son) Patient Expects to be Discharged to[de-identified] Patient room(ER) Current DME Used/Available at Home: Cane, straight, Shower chair(hip kit) Tub or Shower Type: Tub/Shower combination Hand dominance: Right EXAMINATION OF PERFORMANCE DEFICITS: 
Cognitive/Behavioral Status: 
Neurologic State: Alert Orientation Level: Oriented to person;Oriented to place(not date nor what hosptial, he knew he was in the hospital) Cognition: Impulsive;Decreased attention/concentration;Decreased command following Perception: Appears intact Perseveration: No perseveration noted Safety/Judgement: Fall prevention;Decreased awareness of need for safety Hearing: Auditory Auditory Impairment: None Vision/Perceptual:   
    
    
    
  
    
Acuity: (grossly intact) Range of Motion: 
 
AROM: Grossly decreased, non-functional 
PROM: Generally decreased, functional 
  
  
  
  
  
  
 
Strength: 
 
Strength: Generally decreased, functional(but grossly decreased bilateral hands ) Coordination: 
Coordination: Grossly decreased, non-functional 
Fine Motor Skills-Upper: Left Impaired;Right Impaired Gross Motor Skills-Upper: Left Impaired;Right Impaired Tone & Sensation: 
 
Tone: Normal 
Sensation: Intact Balance: 
Sitting: Impaired Sitting - Static: Poor (constant support) Sitting - Dynamic: Poor (constant support) Standing: Impaired Standing - Static: Constant support;Poor Standing - Dynamic : Poor;Constant support Functional Mobility and Transfers for ADLs: 
Bed Mobility: Rolling: Maximum assistance; Additional time;Assist x2 Supine to Sit: Maximum assistance; Additional time;Assist x2 Sit to Supine: Maximum assistance; Additional time;Assist x2 Scooting: Maximum assistance; Additional time;Assist x2 Transfers: 
Sit to Stand: Maximum assistance; Moderate assistance; Additional time;Assist x2 Stand to Sit: Maximum assistance; Additional time;Assist x2 Bed to Chair: (unsafe to attempt, only able to side step 2 steps max A x2) Bathroom Mobility: (unable) Toilet Transfer : (unable) ADL Assessment: 
Feeding: Maximum assistance Oral Facial Hygiene/Grooming: Maximum assistance Bathing: Total assistance;Maximum assistance Upper Body Dressing: Total assistance Lower Body Dressing: Total assistance Toileting: Total assistance ADL Intervention and task modifications: 
  
See assessment Cognitive Retraining Safety/Judgement: Fall prevention;Decreased awareness of need for safety Functional Measure: 
Barthel Index: 
 
Bathin Bladder: 5 Bowels: 0 Groomin Dressin Feedin Mobility: 0 Stairs: 0 Toilet Use: 0 Transfer (Bed to Chair and Back): 0 Total: 5/100 The Barthel ADL Index: Guidelines 1. The index should be used as a record of what a patient does, not as a record of what a patient could do. 2. The main aim is to establish degree of independence from any help, physical or verbal, however minor and for whatever reason. 3. The need for supervision renders the patient not independent. 4. A patient's performance should be established using the best available evidence. Asking the patient, friends/relatives and nurses are the usual sources, but direct observation and common sense are also important. However direct testing is not needed. 5. Usually the patient's performance over the preceding 24-48 hours is important, but occasionally longer periods will be relevant. 6. Middle categories imply that the patient supplies over 50 per cent of the effort. 7. Use of aids to be independent is allowed. Saroj Letha., Barthel, D.W. ([id number]). Functional evaluation: the Barthel Index. [address] [facility] (14)2. [facility] MAX Powers Romualdo Holster.[email or web address].Chuy, [address] (). Measuring the change indisability after inpatient rehabilitation; comparison of the responsiveness of the Barthel Index and Functional Ross Measure. Journal of Neurology, Neurosurgery, and Psychiatry, 66(4), 994-787. Sukhi Arauz, NMarkJ.A, SHILPI Payan, & Mackenzie Ludwig M.A. (2004.) Assessment of post-stroke quality of life in cost-effectiveness studies: The usefulness of the Barthel Index and the EuroQoL-5D. [facility], 13, 130-53 Occupational Therapy Evaluation Charge Determination History Examination Decision-Making MEDIUM Complexity : Expanded review of history including physical, cognitive and psychosocial  history  MEDIUM Complexity : 3-5 performance deficits relating to physical, cognitive , or psychosocial skils that result in activity limitations and / or participation restrictions MEDIUM Complexity : Patient may present with comorbidities that affect occupational performnce. Miniml to moderate modification of tasks or assistance (eg, physical or verbal ) with assesment(s) is necessary to enable patient to complete evaluation Based on the above components, the patient evaluation is determined to be of the following complexity level: MEDIUM Pain Ratin/10 Activity Tolerance:  
Fair and requires rest breaks Please refer to the flowsheet for vital signs taken during this treatment. After treatment patient left in no apparent distress:   
Supine in bed, Bed / chair alarm activated and in chair position COMMUNICATION/EDUCATION:  
The patients plan of care was discussed with: Physical therapist, Registered nurse and patient. Patient/family agree to work toward stated goals and plan of care. This patients plan of care is appropriate for delegation to FLORIAN. Thank you for this referral. 
Rinku Rodriguez OTR/L Time Calculation: 30 mins

## 2020-04-02 NOTE — DIABETES MGMT
15[address] [facility] 
 
FOLLOW-UP NOTE Presentation This 69 yo WM was admitted from ER 3/27/20 with esophageal varices and active bleeding. TIPS procedure completed same day. Required intubation but was successfully extubated 3/29/20. Off 02. Rocephin and Octreotide (will be weaned over 72 hours). Head CT - mild chronic microvascular ischemic change & mild/moderate degree of cerebral atrophy. Ammonia level down from 200 (3/29/20) to 32 today. Receiving thiamine & lactulose. Had fluids for breakfast and ate carb consistent meal at lunch. No nausea or vomiting. Also defecating multiple times due to Lactulose. Afebrile. WBC 8.1. PT/OT on board. Diabetes: Patient has known Type 2 diabetes treated with basal insulin and metformin. A1c 8.2% (November 2019).   
 
Subjective I'm eating.  Objective Physical exam 
General Alert, oriented and in no acute distress. Conversant and cooperative. Sitting up in bed. Vital Signs Visit Vitals /74 (BP 1 Location: Left arm) Pulse 74 Temp 97.4 °F (36.3 °C) Resp 20 Ht 5' 10\" (1.778 m) Wt 121.4 kg (267 lb 10.2 oz) SpO2 98% BMI 38.40 kg/m² Laboratory Lab Results Component Value Date/Time Hemoglobin A1c 8.2 (H) 11/07/2019 10:23 AM  
 Hemoglobin A1c (POC) 6.5 03/05/2020 02:30 PM  
 
Lab Results Component Value Date/Time LDL, calculated 40 11/07/2019 10:23 AM  
 
Lab Results Component Value Date/Time Creatinine 0.66 (L) 04/02/2020 03:25 AM  
 
Lab Results Component Value Date/Time  Sodium 146 (H) 04/02/2020 03:25 AM  
 Potassium 2.7 (LL) 04/02/2020 03:25 AM  
 Chloride 119 (H) 04/02/2020 03:25 AM  
 CO2 21 04/02/2020 03:25 AM  
 Anion gap 6 04/02/2020 03:25 AM  
 Glucose 142 (H) 04/02/2020 03:25 AM  
 BUN 23 (H) 04/02/2020 03:25 AM  
 Creatinine 0.66 (L) 04/02/2020 03:25 AM  
 BUN/Creatinine ratio 35 (H) 04/02/2020 03:25 AM  
 GFR est AA >60 04/02/2020 03:25 AM  
 GFR est non-AA >60 04/02/2020 03:25 AM  
 Calcium 7.1 (L) 04/02/2020 03:25 AM  
 Bilirubin, total 1.0 04/02/2020 03:25 AM  
 AST (SGOT) 41 (H) 04/02/2020 03:25 AM  
 Alk. phosphatase 115 04/02/2020 03:25 AM  
 Protein, total 5.1 (L) 04/02/2020 03:25 AM  
 Albumin 2.0 (L) 04/02/2020 03:25 AM  
 Globulin 3.1 04/02/2020 03:25 AM  
 A-G Ratio 0.6 (L) 04/02/2020 03:25 AM  
 ALT (SGPT) 73 04/02/2020 03:25 AM  
 
Lab Results Component Value Date/Time ALT (SGPT) 73 04/02/2020 03:25 AM  
 
 
Blood glucose pattern Assessment and Plan Nursing Diagnosis Risk for unstable blood glucose pattern Nursing Intervention Domain 7657 Decision-making Support Nursing Interventions Examined current inpatient diabetes control Explored factors facilitating and impeding inpatient management Evaluation This gentleman, with Type 2 diabetes, had improving BGs, but requires corrective insulin dosing to achieve acceptable control. Pt with known cirrhosis. Since patient used significantly more insulin PTA, would continue to advance basal insulin dose to rely more on basal insulin than on a corrective strategy. Recommendations Recommend: 
 
Increasing Lantus insulin to 25 units D (0.2 units/kg/D dosing) Billing Code(s) [x] 05201 IP subsequent hospital care - 15 minutes Vishal June, CNS Access via R Johny Hawkins [phone]

## 2020-04-02 NOTE — PROGRESS NOTES
Pt has had 4 large, and 1 small loose bowel movement since lactulose enema that was given this morning. Pt is becoming more restless constantly yelling out for m\" more water, even if he was just given a sip of water. Pt also asking to get out of the bed. Reminded pt that he is too weak to get out of bed on his own and needs to work with PT/OT before getting out of bed. PT asking to be turned/ repositioned in bed, helps minimally. Pt swung legs on outside of bed earlier, but then was too weak to pull legs back into bed. Still needs 2 person assist with repositioning in bed. Zaina Shields RN 
 
 
04:33 Following message sent to hospitalist butch via Telemed Request: 
 
Pt admitted with:  Acute upper GI bleed POA Acute on chronic normocytic anemia Acute esophageal variceal bleed POA Liver cirrhosis status post TIPS procedure Hemorrhagic/hypovolemic shock POA Acute respiratory failure, Hepatic encephalopathy POA,Hypernatremia  Pt is on continuous Sandostatin infusion and NS with 40 KCL at 125/hr. Potassium this morning is 2.7 (was 3.1 yesterday). 07:45  Bedside shift change report given to Gino Ibarra (oncoming nurse) by Zaina Shields RN (offgoing nurse). Report included the following information SBAR, Kardex, MAR, Recent Results and Cardiac Rhythm NSR.

## 2020-04-02 NOTE — PROGRESS NOTES
RAPID RESPONSE TEAM- Follow Up Rounded on patient due to follow up due to transfer from CCU. Patient Vitals for the past 12 hrs: 
 Temp Pulse Resp BP SpO2  
04/01/20 1939 98.3 °F (36.8 °C) 86 18 169/66 100 % 04/01/20 1623 98.1 °F (36.7 °C) 74 18 159/80 97 % Spoke with Molly Santos RN. No concerns regarding patient at this time. Patient is in bed; NAD. No RRT interventions indicated at this time. Please call back if needed. Meredith Olmos, MARK Armijo

## 2020-04-02 NOTE — PROGRESS NOTES
RAPID RESPONSE TEAM- Follow Up Rounded on patient due to transfer from ICU. Patient is in bed, alert, NAD and eating lunch. Spoke with Anitha Aldrich RN whose only concern is coughing while eating. No RRT interventions indicated at this time. Please call if needed. Shreya Longo Vitals w/ MEWS Score (last day) Date/Time MEWS Score Pulse Resp Temp BP Level of Consciousness SpO2  
 04/02/20 1144          161/74      
 04/02/20 1130    74          98 % 04/02/20 1127    82      162/70    98 % 04/02/20 1042  1  81  20  97.4 °F (36.3 °C)  161/76  Alert  98 % 04/02/20 0735  1  74  19  98 °F (36.7 °C)  139/68  Alert  100 % 04/02/20 0628    74      153/71      
 04/02/20 0452    87      166/84      
 04/02/20 0414  3  81  20  98.1 °F (36.7 °C)  166/84  (!) Confused or Agitated  99 % 04/02/20 0037  3  80  20  98.1 °F (36.7 °C)  167/67  (!) Confused or Agitated  99 % 04/01/20 1939  3  86  18  98.3 °F (36.8 °C)  169/66  (!) Confused or Agitated  100 % 04/01/20 1623  1  74  18  98.1 °F (36.7 °C)  159/80  Alert  97 % 04/01/20 1054  1  89  18  97.4 °F (36.3 °C)  158/70  Alert  98 % 04/01/20 1000    84  18    153/61    99 % 04/01/20 0900    85  15    155/67    99 % 04/01/20 0800  2  82  22  97.9 °F (36.6 °C)  161/69  Alert  98 % 04/01/20 0700    81  23    159/67    98 % 04/01/20 0605            Alert    
 04/01/20 0600    87  18    179/79    98 % 04/01/20 0500    87  20    172/74    99 % 04/01/20 0424    85      175/77      
 04/01/20 0400  2  85  26  98.4 °F (36.9 °C)  175/77  Alert  99 % 04/01/20 0300    87  22    186/81    99 % 04/01/20 0200    86  23    177/73    98 % 04/01/20 0100    83  20    167/66    99 % 04/01/20 0046            Responds to Voice    
 04/01/20 0036  3  90  27  98 °F (36.7 °C)  174/74  Responds to Voice  99 % 04/01/20 0010    93  21    190/77    98 % Results for Jose Deleon (MRN [id number]) as of 4/2/2020 14:00 Ref. Range 3/30/2020 13:08 3/31/2020 01:57 3/31/2020 14:43 4/1/2020 01:39 4/2/2020 03:25 HGB Latest Ref Range: 12.1 - 17.0 g/dL 8.1 (L) 8.3 (L) 8.3 (L) 8.9 (L) 7.5 (L) Results for Jose Deleon (MRN [id number]) as of 4/2/2020 14:00 Ref. Range 3/30/2020 13:08 3/31/2020 01:57 3/31/2020 14:43 4/1/2020 01:39 4/2/2020 03:25 HCT Latest Ref Range: 36.6 - 50.3 % 25.0 (L) 25.2 (L) 24.9 (L) 27.7 (L) 23.8 (L)

## 2020-04-02 NOTE — TELEPHONE ENCOUNTER
----- Message from Dagmar Newsome sent at 4/2/2020 10:28 AM EDT -----  Regarding: Dr Marie Castillo first and last name:Jordyn Kelby/daughter      Reason for call:Daughter states father has been in the [facility] since Friday. Needs to get clarification about medications and other questions.       Callback required yes/no and why:yes      Best contact number(s):[phone]      Details to clarify the request:      Dagmar Newsome

## 2020-04-03 LAB
ALBUMIN SERPL-MCNC: 2.3 G/DL (ref 3.5–5)
ALBUMIN/GLOB SERPL: 0.7 {RATIO} (ref 1.1–2.2)
ALP SERPL-CCNC: 131 U/L (ref 45–117)
ALT SERPL-CCNC: 70 U/L (ref 12–78)
AMMONIA PLAS-SCNC: 31 UMOL/L
ANION GAP SERPL CALC-SCNC: 4 MMOL/L (ref 5–15)
AST SERPL-CCNC: 45 U/L (ref 15–37)
BASOPHILS # BLD: 0.1 K/UL (ref 0–0.1)
BASOPHILS NFR BLD: 1 % (ref 0–1)
BILIRUB SERPL-MCNC: 1.1 MG/DL (ref 0.2–1)
BUN SERPL-MCNC: 19 MG/DL (ref 6–20)
BUN/CREAT SERPL: 24 (ref 12–20)
CALCIUM SERPL-MCNC: 7.4 MG/DL (ref 8.5–10.1)
CHLORIDE SERPL-SCNC: 114 MMOL/L (ref 97–108)
CO2 SERPL-SCNC: 23 MMOL/L (ref 21–32)
CREAT SERPL-MCNC: 0.8 MG/DL (ref 0.7–1.3)
DIFFERENTIAL METHOD BLD: ABNORMAL
EOSINOPHIL # BLD: 0.5 K/UL (ref 0–0.4)
EOSINOPHIL NFR BLD: 6 % (ref 0–7)
ERYTHROCYTE [DISTWIDTH] IN BLOOD BY AUTOMATED COUNT: 19 % (ref 11.5–14.5)
GLOBULIN SER CALC-MCNC: 3.5 G/DL (ref 2–4)
GLUCOSE BLD STRIP.AUTO-MCNC: 144 MG/DL (ref 65–100)
GLUCOSE BLD STRIP.AUTO-MCNC: 187 MG/DL (ref 65–100)
GLUCOSE BLD STRIP.AUTO-MCNC: 230 MG/DL (ref 65–100)
GLUCOSE BLD STRIP.AUTO-MCNC: 259 MG/DL (ref 65–100)
GLUCOSE SERPL-MCNC: 159 MG/DL (ref 65–100)
HCT VFR BLD AUTO: 24.3 % (ref 36.6–50.3)
HGB BLD-MCNC: 7.6 G/DL (ref 12.1–17)
IMM GRANULOCYTES # BLD AUTO: 0.3 K/UL (ref 0–0.04)
IMM GRANULOCYTES NFR BLD AUTO: 3 % (ref 0–0.5)
LYMPHOCYTES # BLD: 1.4 K/UL (ref 0.8–3.5)
LYMPHOCYTES NFR BLD: 16 % (ref 12–49)
MAGNESIUM SERPL-MCNC: 1.9 MG/DL (ref 1.6–2.4)
MCH RBC QN AUTO: 27.8 PG (ref 26–34)
MCHC RBC AUTO-ENTMCNC: 31.3 G/DL (ref 30–36.5)
MCV RBC AUTO: 89 FL (ref 80–99)
MONOCYTES # BLD: 0.9 K/UL (ref 0–1)
MONOCYTES NFR BLD: 10 % (ref 5–13)
NEUTS SEG # BLD: 5.7 K/UL (ref 1.8–8)
NEUTS SEG NFR BLD: 64 % (ref 32–75)
NRBC # BLD: 0.03 K/UL (ref 0–0.01)
NRBC BLD-RTO: 0.3 PER 100 WBC
PHOSPHATE SERPL-MCNC: 2.9 MG/DL (ref 2.6–4.7)
PLATELET # BLD AUTO: 159 K/UL (ref 150–400)
PMV BLD AUTO: 10.7 FL (ref 8.9–12.9)
POTASSIUM SERPL-SCNC: 3.4 MMOL/L (ref 3.5–5.1)
PROT SERPL-MCNC: 5.8 G/DL (ref 6.4–8.2)
RBC # BLD AUTO: 2.73 M/UL (ref 4.1–5.7)
RBC MORPH BLD: ABNORMAL
SERVICE CMNT-IMP: ABNORMAL
SODIUM SERPL-SCNC: 141 MMOL/L (ref 136–145)
WBC # BLD AUTO: 8.9 K/UL (ref 4.1–11.1)

## 2020-04-03 PROCEDURE — 84100 ASSAY OF PHOSPHORUS: CPT

## 2020-04-03 PROCEDURE — 97530 THERAPEUTIC ACTIVITIES: CPT | Performed by: OCCUPATIONAL THERAPIST

## 2020-04-03 PROCEDURE — 74011250637 HC RX REV CODE- 250/637: Performed by: SPECIALIST

## 2020-04-03 PROCEDURE — 74011000258 HC RX REV CODE- 258: Performed by: INTERNAL MEDICINE

## 2020-04-03 PROCEDURE — 74011250637 HC RX REV CODE- 250/637: Performed by: FAMILY MEDICINE

## 2020-04-03 PROCEDURE — 65660000000 HC RM CCU STEPDOWN

## 2020-04-03 PROCEDURE — C9113 INJ PANTOPRAZOLE SODIUM, VIA: HCPCS | Performed by: INTERNAL MEDICINE

## 2020-04-03 PROCEDURE — 36415 COLL VENOUS BLD VENIPUNCTURE: CPT

## 2020-04-03 PROCEDURE — 85025 COMPLETE CBC W/AUTO DIFF WBC: CPT

## 2020-04-03 PROCEDURE — 74011636637 HC RX REV CODE- 636/637: Performed by: NURSE PRACTITIONER

## 2020-04-03 PROCEDURE — 74011250636 HC RX REV CODE- 250/636: Performed by: INTERNAL MEDICINE

## 2020-04-03 PROCEDURE — 82140 ASSAY OF AMMONIA: CPT

## 2020-04-03 PROCEDURE — 74011250637 HC RX REV CODE- 250/637: Performed by: INTERNAL MEDICINE

## 2020-04-03 PROCEDURE — 74011636637 HC RX REV CODE- 636/637: Performed by: INTERNAL MEDICINE

## 2020-04-03 PROCEDURE — 74011250637 HC RX REV CODE- 250/637: Performed by: HOSPITALIST

## 2020-04-03 PROCEDURE — 82962 GLUCOSE BLOOD TEST: CPT

## 2020-04-03 PROCEDURE — 97530 THERAPEUTIC ACTIVITIES: CPT

## 2020-04-03 PROCEDURE — 80053 COMPREHEN METABOLIC PANEL: CPT

## 2020-04-03 PROCEDURE — 83735 ASSAY OF MAGNESIUM: CPT

## 2020-04-03 PROCEDURE — 74011250637 HC RX REV CODE- 250/637: Performed by: NURSE PRACTITIONER

## 2020-04-03 RX ORDER — LANOLIN ALCOHOL/MO/W.PET/CERES
3 CREAM (GRAM) TOPICAL
Status: DISCONTINUED | OUTPATIENT
Start: 2020-04-03 | End: 2020-04-06 | Stop reason: HOSPADM

## 2020-04-03 RX ORDER — INSULIN GLARGINE 100 [IU]/ML
25 INJECTION, SOLUTION SUBCUTANEOUS
Status: DISCONTINUED | OUTPATIENT
Start: 2020-04-03 | End: 2020-04-04

## 2020-04-03 RX ORDER — LANOLIN ALCOHOL/MO/W.PET/CERES
325 CREAM (GRAM) TOPICAL
Status: DISCONTINUED | OUTPATIENT
Start: 2020-04-04 | End: 2020-04-06 | Stop reason: HOSPADM

## 2020-04-03 RX ORDER — FUROSEMIDE 20 MG/1
20 TABLET ORAL DAILY
Status: DISCONTINUED | OUTPATIENT
Start: 2020-04-03 | End: 2020-04-06 | Stop reason: HOSPADM

## 2020-04-03 RX ORDER — SPIRONOLACTONE 25 MG/1
25 TABLET ORAL DAILY
Status: DISCONTINUED | OUTPATIENT
Start: 2020-04-04 | End: 2020-04-06 | Stop reason: HOSPADM

## 2020-04-03 RX ORDER — INSULIN LISPRO 100 [IU]/ML
INJECTION, SOLUTION INTRAVENOUS; SUBCUTANEOUS
Status: DISCONTINUED | OUTPATIENT
Start: 2020-04-03 | End: 2020-04-06 | Stop reason: HOSPADM

## 2020-04-03 RX ADMIN — PANTOPRAZOLE SODIUM 40 MG: 40 INJECTION, POWDER, FOR SOLUTION INTRAVENOUS at 20:53

## 2020-04-03 RX ADMIN — SERTRALINE HYDROCHLORIDE 50 MG: 50 TABLET ORAL at 09:01

## 2020-04-03 RX ADMIN — INSULIN LISPRO 3 UNITS: 100 INJECTION, SOLUTION INTRAVENOUS; SUBCUTANEOUS at 11:30

## 2020-04-03 RX ADMIN — INSULIN LISPRO 4 UNITS: 100 INJECTION, SOLUTION INTRAVENOUS; SUBCUTANEOUS at 00:00

## 2020-04-03 RX ADMIN — PRIMIDONE 100 MG: 50 TABLET ORAL at 21:36

## 2020-04-03 RX ADMIN — PRIMIDONE 100 MG: 50 TABLET ORAL at 09:02

## 2020-04-03 RX ADMIN — SODIUM CHLORIDE 100 MG: 9 INJECTION, SOLUTION INTRAVENOUS at 15:45

## 2020-04-03 RX ADMIN — RIFAXIMIN 550 MG: 550 TABLET ORAL at 17:37

## 2020-04-03 RX ADMIN — Medication 1 AMPULE: at 20:53

## 2020-04-03 RX ADMIN — LISINOPRIL 5 MG: 5 TABLET ORAL at 09:00

## 2020-04-03 RX ADMIN — SODIUM CHLORIDE 10 ML: 9 INJECTION, SOLUTION INTRAMUSCULAR; INTRAVENOUS; SUBCUTANEOUS at 14:00

## 2020-04-03 RX ADMIN — SUCRALFATE 1 G: 1 TABLET ORAL at 09:00

## 2020-04-03 RX ADMIN — INSULIN LISPRO 2 UNITS: 100 INJECTION, SOLUTION INTRAVENOUS; SUBCUTANEOUS at 22:00

## 2020-04-03 RX ADMIN — LABETALOL HYDROCHLORIDE 10 MG: 5 INJECTION INTRAVENOUS at 19:46

## 2020-04-03 RX ADMIN — SODIUM CHLORIDE 10 ML: 9 INJECTION, SOLUTION INTRAMUSCULAR; INTRAVENOUS; SUBCUTANEOUS at 21:37

## 2020-04-03 RX ADMIN — SUCRALFATE 1 G: 1 TABLET ORAL at 17:37

## 2020-04-03 RX ADMIN — POTASSIUM & SODIUM PHOSPHATES POWDER PACK 280-160-250 MG 1 PACKET: 280-160-250 PACK at 09:00

## 2020-04-03 RX ADMIN — INSULIN GLARGINE 25 UNITS: 100 INJECTION, SOLUTION SUBCUTANEOUS at 21:38

## 2020-04-03 RX ADMIN — POTASSIUM & SODIUM PHOSPHATES POWDER PACK 280-160-250 MG 1 PACKET: 280-160-250 PACK at 12:15

## 2020-04-03 RX ADMIN — INSULIN GLARGINE 20 UNITS: 100 INJECTION, SOLUTION SUBCUTANEOUS at 09:01

## 2020-04-03 RX ADMIN — FUROSEMIDE 20 MG: 40 TABLET ORAL at 17:35

## 2020-04-03 RX ADMIN — Medication 1 AMPULE: at 09:02

## 2020-04-03 RX ADMIN — MELATONIN 3 MG: at 22:00

## 2020-04-03 RX ADMIN — INSULIN LISPRO 2 UNITS: 100 INJECTION, SOLUTION INTRAVENOUS; SUBCUTANEOUS at 17:35

## 2020-04-03 RX ADMIN — INSULIN LISPRO 3 UNITS: 100 INJECTION, SOLUTION INTRAVENOUS; SUBCUTANEOUS at 07:30

## 2020-04-03 RX ADMIN — PANTOPRAZOLE SODIUM 40 MG: 40 INJECTION, POWDER, FOR SOLUTION INTRAVENOUS at 09:03

## 2020-04-03 RX ADMIN — PRIMIDONE 100 MG: 50 TABLET ORAL at 17:36

## 2020-04-03 RX ADMIN — RIFAXIMIN 550 MG: 550 TABLET ORAL at 09:00

## 2020-04-03 NOTE — PROGRESS NOTES
Bedside shift change report given to [address] (oncoming nurse) by Jacqueline Patterson (offgoing nurse). Report included the following information SBAR, Kardex, MAR, Recent Results and Cardiac Rhythm NSR.

## 2020-04-03 NOTE — PROGRESS NOTES
Gastroenterology Daily Progress Note (Dr. Spike Leong) [facility] Admit Date: 3/27/2020 Subjective:   
  
Patient tolerating PO diet. Claims he missed his dinner. Many BMs (over 8-9) last night per nursing. He is more awake today. Tried to work with PT but had difficulty standing. Current Facility-Administered Medications Medication Dose Route Frequency  insulin lispro (HUMALOG) injection   SubCUTAneous AC&HS  
 lactulose (CHRONULAC) 10 gram/15 mL solution 30 mL  20 g Oral DAILY  potassium, sodium phosphates (NEUTRA-PHOS) packet 1 Packet  1 Packet Oral QID  rifAXIMin (XIFAXAN) tablet 550 mg  550 mg Oral BID  albuterol-ipratropium (DUO-NEB) 2.5 MG-0.5 MG/3 ML  3 mL Nebulization Q4H PRN  
 lisinopriL (PRINIVIL, ZESTRIL) tablet 5 mg  5 mg Oral DAILY  sertraline (ZOLOFT) tablet 50 mg  50 mg Oral DAILY  primidone (MYSOLINE) tablet 100 mg  100 mg Oral TID  insulin glargine (LANTUS) injection 20 Units  20 Units SubCUTAneous ACB/HS  labetaloL (NORMODYNE;TRANDATE) injection 10 mg  10 mg IntraVENous Q4H PRN  thiamine (B-1) 100 mg in 0.9% sodium chloride 50 mL IVPB  100 mg IntraVENous Q24H  
 alcohol 62% (NOZIN) nasal  1 Ampule  1 Ampule Topical Q12H  
 sucralfate (CARAFATE) tablet 1 g  1 g Oral ACB&D  
 0.9% sodium chloride infusion 250 mL  250 mL IntraVENous PRN  
 ondansetron (ZOFRAN) injection 4 mg  4 mg IntraVENous Q4H PRN  
 sodium chloride (NS) flush 5-40 mL  5-40 mL IntraVENous Q8H  
 sodium chloride (NS) flush 5-40 mL  5-40 mL IntraVENous PRN  
 cefTRIAXone (ROCEPHIN) 1 g in 0.9% sodium chloride (MBP/ADV) 50 mL  1 g IntraVENous Q24H  
 glucose chewable tablet 16 g  4 Tab Oral PRN  
 dextrose (D50W) injection syrg 12.5-25 g  12.5-25 g IntraVENous PRN  
 glucagon (GLUCAGEN) injection 1 mg  1 mg IntraMUSCular PRN  pantoprazole (PROTONIX) injection 40 mg  40 mg IntraVENous Q12H Objective:  
 
Visit Vitals BP (!) 158/91 Pulse 78 Temp 98.4 °F (36.9 °C) Resp 19 Ht 5' 10\" (1.778 m) Wt 127.9 kg (281 lb 15.5 oz) SpO2 100% BMI 40.46 kg/m² Blood pressure (!) 158/91, pulse 78, temperature 98.4 °F (36.9 °C), resp. rate 19, height 5' 10\" (1.778 m), weight 127.9 kg (281 lb 15.5 oz), SpO2 100 %. No intake/output data recorded. 04/01 1901 - 04/03 0700 In: 4058.8 [P.O.:2180; I.V.:1878.8] Out: 7650 [BLZSE:9821] Intake/Output Summary (Last 24 hours) at 4/3/2020 4639 Last data filed at 4/3/2020 9839 Gross per 24 hour Intake 1750 ml Output 1150 ml Net 600 ml Physical Exam:  
 
General: awake WM, oriented to person, place and time, more awake than yesterday Chest:  CTA, No rhonchi, rales or rubs. Heart: S1, S2, RRR 
GI: Obese, soft, Nontender, + RLQ hernia Extremities: no edema CNS: no asterixis Labs:  
 
Recent Results (from the past 24 hour(s)) MAGNESIUM Collection Time: 04/02/20  2:32 PM  
Result Value Ref Range Magnesium 1.8 1.6 - 2.4 mg/dL PHOSPHORUS Collection Time: 04/02/20  2:32 PM  
Result Value Ref Range Phosphorus 1.8 (L) 2.6 - 4.7 MG/DL  
HGB & HCT Collection Time: 04/02/20  2:32 PM  
Result Value Ref Range HGB 7.5 (L) 12.1 - 17.0 g/dL HCT 24.1 (L) 36.6 - 50.3 % METABOLIC PANEL, BASIC Collection Time: 04/02/20  2:32 PM  
Result Value Ref Range Sodium 142 136 - 145 mmol/L Potassium 3.6 3.5 - 5.1 mmol/L Chloride 115 (H) 97 - 108 mmol/L  
 CO2 21 21 - 32 mmol/L Anion gap 6 5 - 15 mmol/L Glucose 244 (H) 65 - 100 mg/dL BUN 20 6 - 20 MG/DL Creatinine 0.84 0.70 - 1.30 MG/DL  
 BUN/Creatinine ratio 24 (H) 12 - 20 GFR est AA >60 >60 ml/min/1.73m2 GFR est non-AA >60 >60 ml/min/1.73m2 Calcium 7.1 (L) 8.5 - 10.1 MG/DL  
GLUCOSE, POC Collection Time: 04/02/20  4:11 PM  
Result Value Ref Range Glucose (POC) 235 (H) 65 - 100 mg/dL Performed by Kami Blakely (PCT) GLUCOSE, POC  Collection Time: 04/02/20 11:40 PM  
 Result Value Ref Range Glucose (POC) 230 (H) 65 - 100 mg/dL Performed by [facility] CATARINO(CON) AMMONIA Collection Time: 04/03/20  2:30 AM  
Result Value Ref Range Ammonia 31 <32 UMOL/L  
CBC WITH AUTOMATED DIFF Collection Time: 04/03/20  2:30 AM  
Result Value Ref Range WBC 8.9 4.1 - 11.1 K/uL  
 RBC 2.73 (L) 4.10 - 5.70 M/uL HGB 7.6 (L) 12.1 - 17.0 g/dL HCT 24.3 (L) 36.6 - 50.3 % MCV 89.0 80.0 - 99.0 FL  
 MCH 27.8 26.0 - 34.0 PG  
 MCHC 31.3 30.0 - 36.5 g/dL  
 RDW 19.0 (H) 11.5 - 14.5 % PLATELET 634 524 - 791 K/uL MPV 10.7 8.9 - 12.9 FL  
 NRBC 0.3 (H) 0  WBC ABSOLUTE NRBC 0.03 (H) 0.00 - 0.01 K/uL NEUTROPHILS 64 32 - 75 % LYMPHOCYTES 16 12 - 49 % MONOCYTES 10 5 - 13 % EOSINOPHILS 6 0 - 7 % BASOPHILS 1 0 - 1 % IMMATURE GRANULOCYTES 3 (H) 0.0 - 0.5 % ABS. NEUTROPHILS 5.7 1.8 - 8.0 K/UL  
 ABS. LYMPHOCYTES 1.4 0.8 - 3.5 K/UL  
 ABS. MONOCYTES 0.9 0.0 - 1.0 K/UL  
 ABS. EOSINOPHILS 0.5 (H) 0.0 - 0.4 K/UL  
 ABS. BASOPHILS 0.1 0.0 - 0.1 K/UL  
 ABS. IMM. GRANS. 0.3 (H) 0.00 - 0.04 K/UL  
 DF SMEAR SCANNED    
 RBC COMMENTS ANISOCYTOSIS 1+ 
    
 RBC COMMENTS MACROCYTOSIS 1+ 
    
 RBC COMMENTS MICROCYTOSIS 1+ 
    
 RBC COMMENTS OVALOCYTES 1+ RBC COMMENTS POLYCHROMASIA 1+ METABOLIC PANEL, COMPREHENSIVE Collection Time: 04/03/20  2:30 AM  
Result Value Ref Range Sodium 141 136 - 145 mmol/L Potassium 3.4 (L) 3.5 - 5.1 mmol/L Chloride 114 (H) 97 - 108 mmol/L  
 CO2 23 21 - 32 mmol/L Anion gap 4 (L) 5 - 15 mmol/L Glucose 159 (H) 65 - 100 mg/dL BUN 19 6 - 20 MG/DL Creatinine 0.80 0.70 - 1.30 MG/DL  
 BUN/Creatinine ratio 24 (H) 12 - 20 GFR est AA >60 >60 ml/min/1.73m2 GFR est non-AA >60 >60 ml/min/1.73m2 Calcium 7.4 (L) 8.5 - 10.1 MG/DL Bilirubin, total 1.1 (H) 0.2 - 1.0 MG/DL  
 ALT (SGPT) 70 12 - 78 U/L  
 AST (SGOT) 45 (H) 15 - 37 U/L Alk.  phosphatase 131 (H) 45 - 117 U/L  
 Protein, total 5.8 (L) 6.4 - 8.2 g/dL Albumin 2.3 (L) 3.5 - 5.0 g/dL Globulin 3.5 2.0 - 4.0 g/dL A-G Ratio 0.7 (L) 1.1 - 2.2 MAGNESIUM Collection Time: 04/03/20  2:30 AM  
Result Value Ref Range Magnesium 1.9 1.6 - 2.4 mg/dL PHOSPHORUS Collection Time: 04/03/20  2:30 AM  
Result Value Ref Range Phosphorus 2.9 2.6 - 4.7 MG/DL  
GLUCOSE, POC Collection Time: 04/03/20  8:12 AM  
Result Value Ref Range Glucose (POC) 144 (H) 65 - 100 mg/dL Performed by Michael Nugent Recent Labs 04/03/20 
0230 04/02/20 
1432 04/02/20 
0325  142 146*  
K 3.4* 3.6 2.7*  
* 115* 119* CO2 23 21 21 BUN 19 20 23* CREA 0.80 0.84 0.66* * 244* 142* CA 7.4* 7.1* 7.1*  
MG 1.9 1.8 1.9 PHOS 2.9 1.8* 1.5* Recent Labs 04/03/20 
0230 04/02/20 
0325 04/01/20 
0139 SGOT 45* 41* 70* * 115 140* TP 5.8* 5.1* 5.6* ALB 2.3* 2.0* 2.1*  
GLOB 3.5 3.1 3.5 Impression: 
Acute UGIB secondary to esophageal variceal hemorrhage S/P TIPS for ongoing UGIB (placed 3/28/20) Post TIPS hepatic encephalopathy BREWER cirrhosis Sleep apnea Hypokalemia Plan: His mentation has improved along with a markedly improved serum ammonia level.   
-wean down lactulose with goal of 3 BMs daily 
-continuelow sodium, diabetic diet 
-continue Xifaxan 550 mg BID at discharge 
-signing off, should have a follow up with Dr. Manny Horne after discharge Geraldo Perez MD 
 
4/3/2020 [address] [facility], [address] [address] Loc: [phone]

## 2020-04-03 NOTE — PROGRESS NOTES
Hospitalist Progress Note NAME: Fior Paulino :  1950 MRN:  [id number] I reviewed with Dr. Dk Dhaliwal about the medical history and the findings on the physical examination. I discussed with Dr. Dk Dhaliwal the patient's diagnosis and concur with the plan. Interim Hospital Summary: 68 y.o. male whom presented on 3/27/2020 with nausea & hematemesis Assessment / Plan: 
Hepatic encephalopathy POA; improving. Pt is alert and orient to self & place, not to date/time 
- ammonia 200 on admission, 31 today. 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 
- Head CT (3/31): No acute intracranial process. US ABD (3/31): There is a trace amount of ascites visualized within the ventral wall hernia 
  appreciate GI input; continue with Xifaxan, carafate, lactulose Resume Lasix & aldactone; check daily weight 281lbs () Trace amount of ascites from ABD US on 3/31 CXR: Prominent vascular markings. Cannot exclude superimposed infiltrate 
right lung base. 
 
  hgb 7. 6. Transfuse if hgb < 7.0 
  octerotide infusion has been d/c'd on  Continue with PPI 
  IV ceftriaxone for SBP prophylaxis x 7 days Electrolyte imbalance (K, mg, phos) 
- repleted; will continue to monitor Hypertension 
-  Continue with ACEI with parameter on  Resume Lasix and aldactone Type II DM with hyperglycemia 
- A1C 6.5. Pt was receiving Lantus 40units BID per Dr. Gricelda Franklin at home along with oral hypoglycemic agents Lantus 25 units BID (takes 40 units BID at home) Check qac/qhs blood glucose and follow SSI 
 
- CXR now, Duoneb now then q4 hour prn Benign essential tremors - continue with mysoline; reduced the dose to 100mg TID vs 250mg BID at home due to elevated AST Depression 
- continue with zoloft (dose reduced to 50mg vs 100mg at home due to elevated AST) 
  
30.0 - 39.9 Obese / Body mass index is 38.4 kg/m². 
  
Code status: DNR Prophylaxis: SCD's Recommended Disposition: Home w/Family Subjective: Chief Complaint / Reason for Physician Visit \"I feel pretty good\". Pt responded to 'no' to all the questions. Discussed with RN events overnight. Review of Systems: 
Symptom Y/N Comments  Symptom Y/N Comments Fever/Chills n   Chest Pain n   
Poor Appetite    Edema Cough    Abdominal Pain n   
Sputum    Joint Pain SOB/WANG n   Pruritis/Rash Nausea/vomit n   Tolerating PT/OT Diarrhea    Tolerating Diet Constipation    Other Could NOT obtain due to:   
 
Objective: VITALS:  
Last 24hrs VS reviewed since prior progress note. Most recent are: 
Patient Vitals for the past 24 hrs: 
 Temp Pulse Resp BP SpO2  
04/03/20 1058 97.7 °F (36.5 °C) 72 20 167/61 98 % 04/03/20 0724 98.4 °F (36.9 °C) 78 19 (!) 158/91 100 % 04/03/20 0336 98.2 °F (36.8 °C) 77 16 137/61 98 % 04/03/20 0024    118/54   
04/02/20 2356  80  174/80   
04/02/20 2259 98.1 °F (36.7 °C) 77 20 161/64 97 % 04/02/20 1935 98.3 °F (36.8 °C) 82 20 151/66 100 % 04/02/20 1707 98 °F (36.7 °C) 80 20 177/70 97 % 04/02/20 1703  80 20 (!) 142/120 97 % Intake/Output Summary (Last 24 hours) at 4/3/2020 1623 Last data filed at 4/3/2020 1603 Gross per 24 hour Intake 1340 ml Output 1250 ml Net 90 ml PHYSICAL EXAM: 
General: Pale, ill appearing. Alert, cooperative, no acute distress EENT:  EOMI. Anicteric sclerae. MMM Resp:  Clear in apex with decreased breath sounds at bases, no wheezing or rales. No accessory muscle use CV:  Regular  rhythm,  No edema GI:  Soft, Non distended, Non tender. +Bowel sounds Neurologic:  Alert and oriented X self and place, normal speech, Psych:   Improved insight compare to 3/31. Not anxious nor agitated Skin:  No rashes. No jaundice Reviewed most current lab test results and cultures  YES Reviewed most current radiology test results   YES Review and summation of old records today    NO Reviewed patient's current orders and MAR    YES 
PMH/SH reviewed - no change compared to H&P 
________________________________________________________________________ Care Plan discussed with: 
  Comments Patient y Family RN y   
Care Manager Consultant Multidiciplinary team rounds were held today with , nursing, pharmacist and clinical coordinator. Patient's plan of care was discussed; medications were reviewed and discharge planning was addressed. ________________________________________________________________________ Hyunsun Aditya Ordoñez NP Procedures: see electronic medical records for all procedures/Xrays and details which were not copied into this note but were reviewed prior to creation of Plan. LABS: 
I reviewed today's most current labs and imaging studies. Pertinent labs include: 
Recent Labs 04/03/20 0230 04/02/20 1432 04/02/20 0325 04/01/20 0139 WBC 8.9  --  8.1 8.8 HGB 7.6* 7.5* 7.5* 8.9* HCT 24.3* 24.1* 23.8* 27.7*  
  --  140* 187 Recent Labs 04/03/20 0230 04/02/20 1432 04/02/20 0325 04/01/20 0139  142 146* 148* K 3.4* 3.6 2.7* 3.1*  
* 115* 119* 119* CO2 23 21 21 21 * 244* 142* 210* BUN 19 20 23* 27* CREA 0.80 0.84 0.66* 0.74 CA 7.4* 7.1* 7.1* 7.4* MG 1.9 1.8 1.9 2.0 PHOS 2.9 1.8* 1.5* 2.1* ALB 2.3*  --  2.0* 2.1* TBILI 1.1*  --  1.0 1.3*  
SGOT 45*  --  41* 70* ALT 70  --  73 112* Signed: )Jeanine Oseguera, NP

## 2020-04-03 NOTE — DIABETES MGMT
[address] [facility] 
 
FOLLOW-UP NOTE Presentation This 67 yo WM was admitted from ER 3/27/20 with esophageal varices and active bleeding. TIPS procedure completed same day. Required intubation but was successfully extubated 3/29/20. Head CT - mild chronic microvascular ischemic change & mild/moderate degree of cerebral atrophy. Now off 02, ABx and Octreotide. Ammonia level down from 200 (3/29/20) to 31 today. Lactulose held today. On thiamine. Eating carb consistent mechanical soft diet. Afebrile. WBC 8.9. GFR >60. Creatinine 0.8. PT on board.  
  
Diabetes: Patient has known Type 2 diabetes treated with basal insulin and metformin. A1c 8.2% (November 2019).   
 
Subjective I haven't been to the bathroom.  Objective Physical exam 
General Alert, oriented and in no acute distress. Conversant and cooperative. Sitting up in chair Vital Signs Visit Vitals /61 Pulse 72 Temp 97.7 °F (36.5 °C) Resp 20 Ht 5' 10\" (1.778 m) Wt 127.9 kg (281 lb 15.5 oz) SpO2 98% BMI 40.46 kg/m² Laboratory Lab Results Component Value Date/Time Hemoglobin A1c 8.2 (H) 11/07/2019 10:23 AM  
 Hemoglobin A1c (POC) 6.5 03/05/2020 02:30 PM  
 
Lab Results Component Value Date/Time LDL, calculated 40 11/07/2019 10:23 AM  
 
Lab Results Component Value Date/Time Creatinine 0.80 04/03/2020 02:30 AM  
 
Lab Results Component Value Date/Time  Sodium 141 04/03/2020 02:30 AM  
 Potassium 3.4 (L) 04/03/2020 02:30 AM  
 Chloride 114 (H) 04/03/2020 02:30 AM  
 CO2 23 04/03/2020 02:30 AM  
 Anion gap 4 (L) 04/03/2020 02:30 AM  
 Glucose 159 (H) 04/03/2020 02:30 AM  
 BUN 19 04/03/2020 02:30 AM  
 Creatinine 0.80 04/03/2020 02:30 AM  
 BUN/Creatinine ratio 24 (H) 04/03/2020 02:30 AM  
 GFR est AA >60 04/03/2020 02:30 AM  
 GFR est non-AA >60 04/03/2020 02:30 AM  
 Calcium 7.4 (L) 04/03/2020 02:30 AM  
 Bilirubin, total 1.1 (H) 04/03/2020 02:30 AM  
 AST (SGOT) 45 (H) 04/03/2020 02:30 AM  
 Alk. phosphatase 131 (H) 04/03/2020 02:30 AM  
 Protein, total 5.8 (L) 04/03/2020 02:30 AM  
 Albumin 2.3 (L) 04/03/2020 02:30 AM  
 Globulin 3.5 04/03/2020 02:30 AM  
 A-G Ratio 0.7 (L) 04/03/2020 02:30 AM  
 ALT (SGPT) 70 04/03/2020 02:30 AM  
 
Lab Results Component Value Date/Time ALT (SGPT) 70 04/03/2020 02:30 AM  
 
 
Blood glucose pattern BGs in the 160-190 range today Assessment and Plan Nursing Diagnosis Risk for unstable blood glucose pattern Nursing Intervention Domain 5303 Decision-making Support Nursing Interventions Examined current inpatient diabetes control Explored factors facilitating and impeding inpatient management Evaluation This gentleman, with Type 2 diabetes, has BGs in desired range. Pt with known cirrhosis. On basal insulin dose dosed at 0.2 units/kg/D. Recommendations Recommend: 
 
Continuing current strategy Billing Code(s) [x] 88316 IP subsequent hospital care - 15 minutes YUMI Maher Access via R Johny Hawkins [phone]

## 2020-04-03 NOTE — PROGRESS NOTES
Problem: Mobility Impaired (Adult and Pediatric) Goal: *Acute Goals and Plan of Care (Insert Text) Description: FUNCTIONAL STATUS PRIOR TO ADMISSION: Patient was independent and active without use of DME. 
 
HOME SUPPORT PRIOR TO ADMISSION: The patient lived with his son but did not require assist. 
 
Physical Therapy Goals Initiated 4/2/2020 1. Patient will move from supine to sit and sit to supine  in bed with moderate assistance  within 7 day(s). 2.  Patient will transfer from bed to chair and chair to bed with moderate assistance  using the least restrictive device within 7 day(s). 3.  Patient will perform sit to stand with moderate assistance  within 7 day(s). 4.  Patient will ambulate with moderate assistance  for 50 feet with the least restrictive device within 7 day(s). 5.  Patient will ascend/descend 4 stairs with 1 handrail(s) with moderate assistance  within 7 day(s). 6.  Patient will sit edge of bed x10 minutes for ther ex with supervision within 7 days. Outcome: Progressing Towards Goal 
 PHYSICAL THERAPY TREATMENT Patient: Rodney Marroquin (25 y.o. male) Date: 4/3/2020 Diagnosis: Acute upper GI bleed [K92.2] <principal problem not specified> Procedure(s) (LRB): ESOPHAGOGASTRODUODENOSCOPY (EGD) (N/A) ENDOSCOPIC BANDING OR LIGATION (N/A) 6 Days Post-Op Precautions: Fall, DNR Chart, physical therapy assessment, plan of care and goals were reviewed. ASSESSMENT Patient continues with skilled PT services and is progressing towards goals. Patient was able to stand from chair with mod a x 2 for 60 and 120 seconds each time with assistance and cues to improve posture and motor planning. Worked on sitting balance with reaching to improve trunk control as well as sitting posture with increased trunk activation. O2 sats remained in the high 90s throughout the session. He did need frequent rests complete session.  Reinforced fall precautions and to call Nurse with call bell for all needs - he verbalized understanding and demonstrated correct use of call bell. Current Level of Function Impacting Discharge (mobility/balance): mod a x 2 Other factors to consider for discharge: independent and active PLOF; good home support. PLAN : 
Patient continues to benefit from skilled intervention to address the above impairments. Continue treatment per established plan of care. to address goals. Recommendation for discharge: (in order for the patient to meet his/her long term goals) Therapy 3 hours per day 5-7 days per week This discharge recommendation: 
Has been made in collaboration with the attending provider and/or case management IF patient discharges home will need the following DME: bedside commode, mechanical lift, and wheelchair SUBJECTIVE:  
Patient stated I feel a little stronger, but arms and legs feel heavy.  OBJECTIVE DATA SUMMARY:  
Critical Behavior: 
Neurologic State: Alert Orientation Level: Oriented to person, Oriented to place, Disoriented to situation Cognition: Decreased attention/concentration, Follows commands Safety/Judgement: Fall prevention, Decreased awareness of need for safety, Decreased awareness of need for assistance Functional Mobility Training: 
Bed Mobility: 
Rolling: (patient up in recliner via Idalia Long when PT arrived) Transfers: 
Sit to Stand: Moderate assistance;Assist x2 Stand to Sit: Moderate assistance;Assist x2 Balance: 
Sitting: Impaired Sitting - Static: Fair (occasional) Sitting - Dynamic: Poor (constant support) Standing: Impaired Standing - Static: Constant support;Poor Standing - Dynamic : Constant support;Poor Ambulation/Gait Training: 
  
  
  
  
  
  
Right Side Weight Bearing: Full Left Side Weight Bearing: Full Pain Rating: No complaints Activity Tolerance:  
Fair, SpO2 stable on RA, and requires rest breaks Please refer to the flowsheet for vital signs taken during this treatment. After treatment patient left in no apparent distress:  
Sitting in chair and Call bell within reach COMMUNICATION/COLLABORATION:  
The patients plan of care was discussed with: Occupational therapist and Registered nurse. Loni Cash, PT Time Calculation: 25 mins

## 2020-04-03 NOTE — PROGRESS NOTES
Bedside shift change report given to Venda Gowers, RN (oncoming nurse) by Sunny Ferrer RN 
 (offgoing nurse). Report included the following information SBAR, Kardex, ED Summary, [facility] and Recent Results.

## 2020-04-03 NOTE — PROGRESS NOTES
Problem: Self Care Deficits Care Plan (Adult) Goal: *Acute Goals and Plan of Care (Insert Text) Description: FUNCTIONAL STATUS PRIOR TO ADMISSION: ambulated with SPC at times, performed ADLS on his own and light IADLS, was driving, has a shower chair but was not using it, uses hip kit for LB ADLS due to old hip replacement, history of hip replacement dislocation HOME SUPPORT PRIOR TO ADMISSION: The patient lived with son whom provided assist with IADLS and transportation as needed. Occupational Therapy Goals: 
Initiated 4/2/2020 1. Patient will perform self-feeding with supervision/set-up within 7 days. 2. Patient will perform grooming with supervision/set-up within 7 days. 3. Patient will perform upper body dressing with minimal assistance within 7 days. 4. Patient will perform toileting with max assist within 7 days. 5. Patient will improve dynamic sitting balance to supervision within 7 days. 6. Patient will transfer from bedside commode with moderate assistance  using the least restrictive device and appropriate durable medical equipment within 7 days. Outcome: Progressing Towards Goal 
 OCCUPATIONAL THERAPY TREATMENT Patient: Srinivasan Martell (25 y.o. male) Date: 4/3/2020 Diagnosis: Acute upper GI bleed [K92.2] <principal problem not specified> Procedure(s) (LRB): ESOPHAGOGASTRODUODENOSCOPY (EGD) (N/A) ENDOSCOPIC BANDING OR LIGATION (N/A) 6 Days Post-Op Precautions: Fall, DNR Chart, occupational therapy assessment, plan of care, and goals were reviewed. ASSESSMENT Patient continues with skilled OT services and is progressing towards goals. Pt remains slightly confused and initially was stating that it was ok to attempt to get up on his own. After education by therapist pt was able to voice why it is not safe to attempt to get up on his own. Upon arrival to room lift team was outside the room to david pt to the chair. Assisted with BM clean up supine and david to chair. Max assist to roll left and right in bed and total assist for BM clean up. Improved independence with sit to stand today and standing tolerance. RUE remains weaker than the left but pt thinks his RUE is stronger than his left. Focused on standing tolerance, seated balance at chair without back support and drinking from cup with straw. Pt was able to reach for cup with left hand and bring cup with straw to mouth using BUE but needed his straw taped in placed to cup for increased ease. Pt had no spillage. Prior to onset of illness pt was independent prior to admit. Current Level of Function Impacting Discharge (ADLs): total assist BM clean up supine, SBA drinking from cup with straw taped in place, moderate assist x2 sit to stands Other factors to consider for discharge: pt requires two assist to achieve standing and has limited standing tolerance PLAN : 
Patient continues to benefit from skilled intervention to address the above impairments. Continue treatment per established plan of care. to address goals. Recommend with staff: david to recliner chair and bed Recommend next OT session: UE strengthening, balance, sit to stand and ADLS Recommendation for discharge: (in order for the patient to meet his/her long term goals) Therapy 3 hours per day 5-7 days per week This discharge recommendation: 
Has been made in collaboration with the attending provider and/or case management SUBJECTIVE:  
Patient stated I am up in the chair!  OBJECTIVE DATA SUMMARY:  
Cognitive/Behavioral Status: 
Neurologic State: Alert Orientation Level: Oriented to person;Oriented to place; Disoriented to situation Cognition: Decreased attention/concentration; Follows commands Perception: Appears intact Perseveration: No perseveration noted Safety/Judgement: Fall prevention;Decreased awareness of need for safety;Decreased awareness of need for assistance Functional Mobility and Transfers for ADLs: 
Bed Mobility: 
Rolling: (patient up in recliner via Sheryle Helm when PT arrived) Transfers: 
Sit to Stand: Moderate assistance;Assist x2 Balance: 
Sitting: Impaired Sitting - Static: Fair (occasional) Sitting - Dynamic: Poor (constant support) Standing: Impaired Standing - Static: Constant support;Poor Standing - Dynamic : Constant support;Poor ADL Intervention: 
  
Max assist x2 to roll left and right in bed for BM clean up and to place david pad under pt. Toileting Bowel Hygiene: Total assistance (dependent) Clothing Management: Total assistance (dependent) Cognitive Retraining Safety/Judgement: Fall prevention;Decreased awareness of need for safety;Decreased awareness of need for assistance Therapeutic Exercises:  
Seated at bedside chair pulling up on arm rests with BUE and sitting without back support. Progressed to lifting one hand off chair arm 5 reps each and bilateral at one time (min assist needed for balance and limited ability lift UE against gravity) AAROM bilateral shoulders to 180 degrees seated 5 reps ( able to lift against gravity ~40 degrees shoulder flexion RUE and 70 degrees LUE Sit to stands x2 with standing for 1 minute with min assist and RW for support hand over hand assist needed on first trial to transitions hands from arm rests of chair to walker second standing trial pt was able to stand 2.5 minutes with min assist and was able to transfer hands from walker arm rest to walker without assist.   
 
Pain: 
Discomfort in left neck at IJ site Activity Tolerance:  
Fair and requires rest breaks Please refer to the flowsheet for vital signs taken during this treatment. After treatment patient left in no apparent distress:  
Sitting in chair, Call bell within reach, and david pad under pt COMMUNICATION/COLLABORATION:  
 The patients plan of care was discussed with: patient, nurse, PT and the patient. Nohelia De Luna OTR/L Time Calculation: 45 mins

## 2020-04-04 LAB
ALBUMIN SERPL-MCNC: 2.1 G/DL (ref 3.5–5)
ALBUMIN/GLOB SERPL: 0.6 {RATIO} (ref 1.1–2.2)
ALP SERPL-CCNC: 115 U/L (ref 45–117)
ALT SERPL-CCNC: 52 U/L (ref 12–78)
AMMONIA PLAS-SCNC: 31 UMOL/L
ANION GAP SERPL CALC-SCNC: 5 MMOL/L (ref 5–15)
AST SERPL-CCNC: 34 U/L (ref 15–37)
BASOPHILS # BLD: 0.1 K/UL (ref 0–0.1)
BASOPHILS NFR BLD: 1 % (ref 0–1)
BILIRUB SERPL-MCNC: 1.1 MG/DL (ref 0.2–1)
BUN SERPL-MCNC: 13 MG/DL (ref 6–20)
BUN/CREAT SERPL: 20 (ref 12–20)
CALCIUM SERPL-MCNC: 7.3 MG/DL (ref 8.5–10.1)
CHLORIDE SERPL-SCNC: 109 MMOL/L (ref 97–108)
CO2 SERPL-SCNC: 25 MMOL/L (ref 21–32)
CREAT SERPL-MCNC: 0.65 MG/DL (ref 0.7–1.3)
DIFFERENTIAL METHOD BLD: ABNORMAL
EOSINOPHIL # BLD: 0.5 K/UL (ref 0–0.4)
EOSINOPHIL NFR BLD: 6 % (ref 0–7)
ERYTHROCYTE [DISTWIDTH] IN BLOOD BY AUTOMATED COUNT: 18.8 % (ref 11.5–14.5)
GLOBULIN SER CALC-MCNC: 3.3 G/DL (ref 2–4)
GLUCOSE BLD STRIP.AUTO-MCNC: 103 MG/DL (ref 65–100)
GLUCOSE BLD STRIP.AUTO-MCNC: 173 MG/DL (ref 65–100)
GLUCOSE BLD STRIP.AUTO-MCNC: 191 MG/DL (ref 65–100)
GLUCOSE BLD STRIP.AUTO-MCNC: 220 MG/DL (ref 65–100)
GLUCOSE BLD STRIP.AUTO-MCNC: 75 MG/DL (ref 65–100)
GLUCOSE SERPL-MCNC: 81 MG/DL (ref 65–100)
HCT VFR BLD AUTO: 23.5 % (ref 36.6–50.3)
HGB BLD-MCNC: 7.6 G/DL (ref 12.1–17)
IMM GRANULOCYTES # BLD AUTO: 0.3 K/UL (ref 0–0.04)
IMM GRANULOCYTES NFR BLD AUTO: 3 % (ref 0–0.5)
LYMPHOCYTES # BLD: 1.4 K/UL (ref 0.8–3.5)
LYMPHOCYTES NFR BLD: 16 % (ref 12–49)
MAGNESIUM SERPL-MCNC: 1.8 MG/DL (ref 1.6–2.4)
MCH RBC QN AUTO: 27.5 PG (ref 26–34)
MCHC RBC AUTO-ENTMCNC: 32.3 G/DL (ref 30–36.5)
MCV RBC AUTO: 85.1 FL (ref 80–99)
MONOCYTES # BLD: 0.8 K/UL (ref 0–1)
MONOCYTES NFR BLD: 9 % (ref 5–13)
NEUTS SEG # BLD: 5.4 K/UL (ref 1.8–8)
NEUTS SEG NFR BLD: 65 % (ref 32–75)
NRBC # BLD: 0 K/UL (ref 0–0.01)
NRBC BLD-RTO: 0 PER 100 WBC
PLATELET # BLD AUTO: 143 K/UL (ref 150–400)
PMV BLD AUTO: 10.4 FL (ref 8.9–12.9)
POTASSIUM SERPL-SCNC: 3.1 MMOL/L (ref 3.5–5.1)
PROT SERPL-MCNC: 5.4 G/DL (ref 6.4–8.2)
RBC # BLD AUTO: 2.76 M/UL (ref 4.1–5.7)
RBC MORPH BLD: ABNORMAL
SERVICE CMNT-IMP: ABNORMAL
SERVICE CMNT-IMP: NORMAL
SODIUM SERPL-SCNC: 139 MMOL/L (ref 136–145)
WBC # BLD AUTO: 8.5 K/UL (ref 4.1–11.1)

## 2020-04-04 PROCEDURE — 74011636637 HC RX REV CODE- 636/637: Performed by: INTERNAL MEDICINE

## 2020-04-04 PROCEDURE — 85025 COMPLETE CBC W/AUTO DIFF WBC: CPT

## 2020-04-04 PROCEDURE — 83735 ASSAY OF MAGNESIUM: CPT

## 2020-04-04 PROCEDURE — 74011000250 HC RX REV CODE- 250: Performed by: INTERNAL MEDICINE

## 2020-04-04 PROCEDURE — 82962 GLUCOSE BLOOD TEST: CPT

## 2020-04-04 PROCEDURE — 74011250637 HC RX REV CODE- 250/637: Performed by: SPECIALIST

## 2020-04-04 PROCEDURE — 80053 COMPREHEN METABOLIC PANEL: CPT

## 2020-04-04 PROCEDURE — 74011250636 HC RX REV CODE- 250/636: Performed by: INTERNAL MEDICINE

## 2020-04-04 PROCEDURE — 74011250637 HC RX REV CODE- 250/637: Performed by: INTERNAL MEDICINE

## 2020-04-04 PROCEDURE — 74011636637 HC RX REV CODE- 636/637: Performed by: NURSE PRACTITIONER

## 2020-04-04 PROCEDURE — 74011250637 HC RX REV CODE- 250/637: Performed by: NURSE PRACTITIONER

## 2020-04-04 PROCEDURE — 74011000258 HC RX REV CODE- 258: Performed by: INTERNAL MEDICINE

## 2020-04-04 PROCEDURE — 74011250637 HC RX REV CODE- 250/637: Performed by: HOSPITALIST

## 2020-04-04 PROCEDURE — C9113 INJ PANTOPRAZOLE SODIUM, VIA: HCPCS | Performed by: INTERNAL MEDICINE

## 2020-04-04 PROCEDURE — 36415 COLL VENOUS BLD VENIPUNCTURE: CPT

## 2020-04-04 PROCEDURE — 74011250637 HC RX REV CODE- 250/637: Performed by: FAMILY MEDICINE

## 2020-04-04 PROCEDURE — 65660000000 HC RM CCU STEPDOWN

## 2020-04-04 PROCEDURE — 82140 ASSAY OF AMMONIA: CPT

## 2020-04-04 RX ORDER — INSULIN GLARGINE 100 [IU]/ML
20 INJECTION, SOLUTION SUBCUTANEOUS
Status: DISCONTINUED | OUTPATIENT
Start: 2020-04-04 | End: 2020-04-04

## 2020-04-04 RX ORDER — POTASSIUM CHLORIDE 750 MG/1
40 TABLET, FILM COATED, EXTENDED RELEASE ORAL
Status: COMPLETED | OUTPATIENT
Start: 2020-04-04 | End: 2020-04-04

## 2020-04-04 RX ORDER — LISINOPRIL 5 MG/1
5 TABLET ORAL ONCE
Status: COMPLETED | OUTPATIENT
Start: 2020-04-04 | End: 2020-04-04

## 2020-04-04 RX ORDER — GUAIFENESIN 600 MG/1
600 TABLET, EXTENDED RELEASE ORAL 2 TIMES DAILY
Status: DISCONTINUED | OUTPATIENT
Start: 2020-04-04 | End: 2020-04-05

## 2020-04-04 RX ORDER — LISINOPRIL 10 MG/1
10 TABLET ORAL DAILY
Status: DISCONTINUED | OUTPATIENT
Start: 2020-04-05 | End: 2020-04-06 | Stop reason: HOSPADM

## 2020-04-04 RX ORDER — INSULIN GLARGINE 100 [IU]/ML
20 INJECTION, SOLUTION SUBCUTANEOUS
Status: DISCONTINUED | OUTPATIENT
Start: 2020-04-04 | End: 2020-04-06 | Stop reason: HOSPADM

## 2020-04-04 RX ORDER — CEPHALEXIN 250 MG/1
500 CAPSULE ORAL 2 TIMES DAILY
Status: DISCONTINUED | OUTPATIENT
Start: 2020-04-04 | End: 2020-04-06 | Stop reason: HOSPADM

## 2020-04-04 RX ADMIN — SODIUM CHLORIDE 100 MG: 9 INJECTION, SOLUTION INTRAVENOUS at 14:08

## 2020-04-04 RX ADMIN — PANTOPRAZOLE SODIUM 40 MG: 40 INJECTION, POWDER, FOR SOLUTION INTRAVENOUS at 22:43

## 2020-04-04 RX ADMIN — LISINOPRIL 5 MG: 5 TABLET ORAL at 14:08

## 2020-04-04 RX ADMIN — CEFTRIAXONE 1 G: 1 INJECTION, POWDER, FOR SOLUTION INTRAMUSCULAR; INTRAVENOUS at 00:04

## 2020-04-04 RX ADMIN — LACTULOSE 30 ML: 20 SOLUTION ORAL at 08:37

## 2020-04-04 RX ADMIN — LISINOPRIL 5 MG: 5 TABLET ORAL at 08:37

## 2020-04-04 RX ADMIN — RIFAXIMIN 550 MG: 550 TABLET ORAL at 08:37

## 2020-04-04 RX ADMIN — POTASSIUM CHLORIDE 40 MEQ: 750 TABLET, FILM COATED, EXTENDED RELEASE ORAL at 08:36

## 2020-04-04 RX ADMIN — MELATONIN 3 MG: at 22:43

## 2020-04-04 RX ADMIN — SODIUM CHLORIDE 10 ML: 9 INJECTION, SOLUTION INTRAMUSCULAR; INTRAVENOUS; SUBCUTANEOUS at 14:09

## 2020-04-04 RX ADMIN — PRIMIDONE 100 MG: 50 TABLET ORAL at 19:31

## 2020-04-04 RX ADMIN — SPIRONOLACTONE 25 MG: 25 TABLET ORAL at 08:37

## 2020-04-04 RX ADMIN — SODIUM CHLORIDE 10 ML: 9 INJECTION, SOLUTION INTRAMUSCULAR; INTRAVENOUS; SUBCUTANEOUS at 08:37

## 2020-04-04 RX ADMIN — SUCRALFATE 1 G: 1 TABLET ORAL at 17:28

## 2020-04-04 RX ADMIN — INSULIN GLARGINE 20 UNITS: 100 INJECTION, SOLUTION SUBCUTANEOUS at 12:01

## 2020-04-04 RX ADMIN — CEPHALEXIN 500 MG: 250 CAPSULE ORAL at 17:28

## 2020-04-04 RX ADMIN — FERROUS SULFATE TAB 325 MG (65 MG ELEMENTAL FE) 325 MG: 325 (65 FE) TAB at 08:38

## 2020-04-04 RX ADMIN — INSULIN GLARGINE 20 UNITS: 100 INJECTION, SOLUTION SUBCUTANEOUS at 22:42

## 2020-04-04 RX ADMIN — FUROSEMIDE 20 MG: 40 TABLET ORAL at 08:38

## 2020-04-04 RX ADMIN — PRIMIDONE 100 MG: 50 TABLET ORAL at 08:37

## 2020-04-04 RX ADMIN — PANTOPRAZOLE SODIUM 40 MG: 40 INJECTION, POWDER, FOR SOLUTION INTRAVENOUS at 08:37

## 2020-04-04 RX ADMIN — SUCRALFATE 1 G: 1 TABLET ORAL at 08:38

## 2020-04-04 RX ADMIN — RIFAXIMIN 550 MG: 550 TABLET ORAL at 19:31

## 2020-04-04 RX ADMIN — INSULIN LISPRO 3 UNITS: 100 INJECTION, SOLUTION INTRAVENOUS; SUBCUTANEOUS at 17:27

## 2020-04-04 RX ADMIN — INSULIN LISPRO 4 UNITS: 100 INJECTION, SOLUTION INTRAVENOUS; SUBCUTANEOUS at 12:00

## 2020-04-04 RX ADMIN — SERTRALINE HYDROCHLORIDE 50 MG: 50 TABLET ORAL at 08:37

## 2020-04-04 RX ADMIN — GUAIFENESIN 600 MG: 600 TABLET, EXTENDED RELEASE ORAL at 17:28

## 2020-04-04 RX ADMIN — Medication 1 AMPULE: at 08:40

## 2020-04-04 NOTE — PROGRESS NOTES
Appointment Information The following appointments have been successfully scheduled: 
 
Date/time Wednesday, April 15, 2020 03:00 PM 
Patient Magdaleno Sanz [id number] ([id number]  3050 Department [facility] OFFICE [address] Appointment type Transitional Care Provider Ashok Crowder

## 2020-04-04 NOTE — PROGRESS NOTES
Initial Nutrition Assessment: 
 
INTERVENTIONS/RECOMMENDATIONS:  
· Meals/Snacks: General/healthful diet:  Continue CCD for BG management. ASSESSMENT:  
4/4:  Chart reviewed; med noted for acute GI bleed. Hx of HTN, GERD, DM. LOS screen. Pt has good appetite, consuming % of meals. Diet Order: Consistent carb 
% Eaten:   
Patient Vitals for the past 72 hrs: 
 % Diet Eaten 04/03/20 0724 100 % 04/02/20 1804 100 % 04/02/20 1413 80 % 04/02/20 1329 75 % 04/02/20 0855 50 %  
  
%Supplement Intake:   
Pertinent Medications: [x]Reviewed []Other Pertinent Labs: [x]Reviewed []Other Food Allergies: [x]None []Other Last BM:    [x]Active     []Hyperactive  []Hypoactive       [] Absent BS Skin:    [x] Intact   [] Incision  [] Breakdown  [] Other: Anthropometrics:  
Height: 5' 10\" (177.8 cm) Weight: 126.6 kg (279 lb 1.6 oz) IBW (%IBW):   ( ) UBW (%UBW):   (  %) Last Weight Metrics: 
Weight Loss Metrics 4/3/2020 3/27/2020 3/11/2020 3/5/2020 3/2/2020 2/4/2020 1/30/2020 Today's Wt 279 lb 1.6 oz - 267 lb 7 oz 272 lb 266 lb 279 lb 9.6 oz 276 lb 6.4 oz BMI - 40.05 kg/m2 38.37 kg/m2 39.03 kg/m2 38.17 kg/m2 40.12 kg/m2 39.66 kg/m2 BMI: Body mass index is 40.05 kg/m². This BMI is indicative of: 
 []Underweight    []Normal    []Overweight    [] Obesity   [x] Extreme Obesity (BMI>40) Estimated Nutrition Needs (Based on):  
2636 Kcals/day(BMR (2028) x 1. 3AF) , 127 g(1.0 g/kg bw) Protein Carbohydrate: At Least 130 g/day  Fluids: 2600 mL/day (1ml/kcal) NUTRITION DIAGNOSES:  
Problem:  No nutritional diagnosis at this time Etiology: related to Signs/Symptoms: as evidenced by NUTRITION INTERVENTIONS: 
Meals/Snacks: General/healthful diet GOAL:  
PO intake >50% of meals next 5-7 days LEARNING NEEDS (Diet, Food/Nutrient-Drug Interaction):  
 [x] None Identified 
 [] Identified and Education Provided/Documented [] Identified and Pt declined/was not appropriate Cultureal, Lutheran, OR Ethnic Dietary Needs:  
 [x] None Identified 
 [] Identified and Addressed 
 
 [x] Interdisciplinary Care Plan Reviewed/Documented  
 [x] Discharge Planning:   Continue CCD for BG management MONITORING /EVALUATION:  
Food/Nutrient Intake Outcomes: Total energy intake Physical Signs/Symptoms Outcomes: Weight/weight change, Glucose profile NUTRITION RISK:  
 [x] Patient At Nutritional Risk        [] Patient Not At Nutritional Risk PT SEEN FOR:  
 []  MD Consult: []Calorie Count []Diabetic Diet Education []Diet Education []Electrolyte Management []General Nutrition Management and Supplements []Management of Tube Feeding []TPN Recommendations []  RN Referral:  []MST score >=2 
   []Enteral/Parenteral Nutrition PTA []Pregnant: Gestational DM or Multigestation 
   []Pressure Ulcer/Wound Care needs 
     
[]  Low BMI [x]  ALLA Thompson RD Pager [phone] Weekend Pager [phone]

## 2020-04-04 NOTE — PROGRESS NOTES
Hospitalist Progress Note NAME: Ernestine Michael :  1950 MRN:  [id number] I reviewed with Dr. Kaelyn Jaimes about the medical history and the findings on the physical examination. I discussed with Dr. Kaelyn Jaimes the patient's diagnosis and concur with the plan. Interim Hospital Summary: 68 y.o. male whom presented on 3/27/2020 with nausea & hematemesis Assessment / Plan: 
Hepatic encephalopathy POA; improving. Pt is alert and orient to self & place, not to date/time 
- ammonia 200 on admission, 31 today. 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 
- Head CT (3/31): No acute intracranial process. US ABD (3/31): There is a trace amount of ascites visualized within the ventral wall hernia 
  appreciate GI input; continue with Xifaxan, carafate, lactulose Continue with Lasix & aldactone; check daily weight 281lbs ()->279lbs (4/3) Trace amount of ascites from ABD US on 3/31 CXR: Prominent vascular markings. Cannot exclude superimposed infiltrate 
right lung base. 
 
  hgb 7. 6. Transfuse if hgb < 7.0 
  octerotide infusion has been d/c'd on  Continue with PPI Completed IV ceftriaxone for SBP prophylaxis x 7 days as of 4/3. Resume prophylactic ABX, keflex BID per his pcp - pt's daughter called twice to remind me to make sure he is on ABX Electrolyte imbalance (K, mg, phos) 
- repleted; will continue to monitor Hypertension 
-  Continue with ACEI with parameter Continue with Lasix and aldactone Type II DM with hyperglycemia 
- A1C 6.5. Pt was receiving Lantus 40units BID per Dr. Denise Hutchison at home along with oral hypoglycemic agents Lantus 25 units BID (takes 40 units BID at home) Check qac/qhs blood glucose and follow SSI Benign essential tremors 
- continue with mysoline; reduced the dose to 100mg TID vs 250mg BID at home due to elevated AST Depression 
- continue with zoloft (dose reduced to 50mg vs 100mg at home due to elevated AST) 4/2: CXR was done due to cough/whezing. CXR revealed Prominent vascular markings. Cannot exclude superimposed infiltrate. Cough resolved. RA O2 sat 100%. duoneb PRN. Currently not concerned with any potential ASDZ.  
 
right lung base. 30.0 - 39.9 Obese / Body mass index is 38.4 kg/m². 
  
Code status: DNR Prophylaxis: SCD's Recommended Disposition: PT/OT recommends SNF vs inpatient rehab Subjective: Chief Complaint / Reason for Physician Visit \"I feel pretty good\". Pt responded to 'no' to all the questions. Discussed with RN events overnight. Review of Systems: 
Symptom Y/N Comments  Symptom Y/N Comments Fever/Chills n   Chest Pain n   
Poor Appetite    Edema Cough    Abdominal Pain n   
Sputum    Joint Pain SOB/WANG n   Pruritis/Rash Nausea/vomit n   Tolerating PT/OT Diarrhea    Tolerating Diet Constipation    Other Could NOT obtain due to:   
 
Objective: VITALS:  
Last 24hrs VS reviewed since prior progress note. Most recent are: 
Patient Vitals for the past 24 hrs: 
 Temp Pulse Resp BP SpO2  
04/04/20 0420 98.2 °F (36.8 °C) 68 19 140/60 99 % 04/04/20 0003 98.4 °F (36.9 °C) 75 28 155/64 99 % 04/03/20 2028  81 22 170/76 97 % 04/03/20 1941 98 °F (36.7 °C) 84 20 170/81 98 % 04/03/20 1735  82  177/70   
04/03/20 1549 98.3 °F (36.8 °C) 81 18 (!) 177/91   
04/03/20 1058 97.7 °F (36.5 °C) 72 20 167/61 98 % 04/03/20 0724 98.4 °F (36.9 °C) 78 19 (!) 158/91 100 % Intake/Output Summary (Last 24 hours) at 4/4/2020 9088 Last data filed at 4/4/2020 0050 Gross per 24 hour Intake 800 ml Output 800 ml Net 0 ml PHYSICAL EXAM: 
 General: Pale, ill appearing. Alert, cooperative, no acute distress EENT:  EOMI. Anicteric sclerae. MMM Resp:  Clear in apex with decreased breath sounds at bases, no wheezing or rales. No accessory muscle use CV:  Regular  rhythm,  No edema GI:  Soft, Non distended, Non tender. +Bowel sounds Neurologic:  Alert and oriented X self and place, normal speech, Psych:   Improved insight compare to 3/31. Not anxious nor agitated Skin:  No rashes. No jaundice Reviewed most current lab test results and cultures  YES Reviewed most current radiology test results   YES Review and summation of old records today    NO Reviewed patient's current orders and MAR    YES 
PMH/SH reviewed - no change compared to H&P 
________________________________________________________________________ Care Plan discussed with: 
  Comments Patient y Family  y Updated the daughter with d/c plan. Daughter agrees with SNF. CM will follow RN y   
Care Manager Consultant Multidiciplinary team rounds were held today with , nursing, pharmacist and clinical coordinator. Patient's plan of care was discussed; medications were reviewed and discharge planning was addressed. ________________________________________________________________________ Marline Singh NP Procedures: see electronic medical records for all procedures/Xrays and details which were not copied into this note but were reviewed prior to creation of Plan. LABS: 
I reviewed today's most current labs and imaging studies. Pertinent labs include: 
Recent Labs 04/04/20 
0421 04/03/20 
0230 04/02/20 
1432 04/02/20 
0325 WBC 8.5 8.9  --  8.1 HGB 7.6* 7.6* 7.5* 7.5* HCT 23.5* 24.3* 24.1* 23.8*  
* 159  --  140* Recent Labs 04/04/20 
0421 04/03/20 
0230 04/02/20 
1432 04/02/20 
0325  141 142 146*  
K 3.1* 3.4* 3.6 2.7*  
* 114* 115* 119* CO2 25 23 21 21 GLU 81 159* 244* 142* BUN 13 19 20 23* CREA 0.65* 0.80 0.84 0.66* CA 7.3* 7.4* 7.1* 7.1*  
MG  --  1.9 1.8 1.9 PHOS  --  2.9 1.8* 1.5* ALB 2.1* 2.3*  --  2.0*  
TBILI 1.1* 1.1*  --  1.0 SGOT 34 45*  --  41* ALT 52 70  --  73 Signed: )Carlo Duff, NP

## 2020-04-04 NOTE — PROGRESS NOTES
Bedside and Verbal shift change report given to Everardo Metzger RN (oncoming nurse) by Shahram Golden RN (offgoing nurse). Report included the following information SBAR, Kardex, Intake/Output, MAR, Recent Results and Cardiac Rhythm NSR. TRANSFER - OUT REPORT: 
 
Verbal report given to Garo Ramirez RN(name) on Bob Azul  being transferred to surgical telemetry(unit) for change in patient condition(remote tele) Report consisted of patients Situation, Background, Assessment and  
Recommendations(SBAR). Information from the following report(s) NSR was reviewed with the receiving nurse. Lines:  
Triple Lumen 03/28/20 Left Internal jugular (Active) Central Line Being Utilized Yes 4/4/2020  8:35 AM  
Criteria for Appropriate Use Limited/no vessel suitable for conventional peripheral access 4/4/2020  8:35 AM  
Site Assessment Clean, dry, & intact 4/4/2020  8:35 AM  
Infiltration Assessment 0 4/4/2020  8:35 AM  
Affected Extremity/Extremities Color distal to insertion site pink (or appropriate for race) 4/4/2020 12:03 AM  
Date of Last Dressing Change 04/03/20 4/4/2020 12:03 AM  
Dressing Status Clean, dry, & intact 4/4/2020  8:35 AM  
Dressing Type Disk with Chlorhexadine gluconate (CHG) 4/4/2020  8:35 AM  
Action Taken Open ports on tubing capped 4/4/2020 12:03 AM  
Proximal Hub Color/Line Status Flushed 4/4/2020  8:35 AM  
Positive Blood Return (Medial Site) No 4/4/2020 12:03 AM  
Medial Hub Color/Line Status Flushed 4/4/2020  8:35 AM  
Positive Blood Return (Lateral Site) Yes 4/4/2020 12:03 AM  
Distal Hub Color/Line Status Flushed 4/4/2020  8:35 AM  
Positive Blood Return (Site #3) Yes 4/4/2020 12:03 AM  
External Catheter Length (cm) 0 centimeters 4/2/2020  5:07 PM  
Alcohol Cap Used Yes 4/4/2020 12:03 AM  
   
Peripheral IV 03/27/20 Right Wrist (Active) Site Assessment Clean, dry, & intact 4/4/2020  8:35 AM  
Phlebitis Assessment 0 4/4/2020  8:35 AM  
Infiltration Assessment 0 4/4/2020  8:35 AM  
 Dressing Status Clean, dry, & intact 4/4/2020  8:35 AM  
Dressing Type Transparent;Tape 4/4/2020  8:35 AM  
Hub Color/Line Status Pink;Capped 4/4/2020 12:03 AM  
Action Taken Open ports on tubing capped 4/1/2020 12:46 AM  
Alcohol Cap Used Yes 4/3/2020  7:41 PM  
   
Peripheral IV 03/27/20 Left Forearm (Active) Site Assessment Clean, dry, & intact 4/4/2020  8:35 AM  
Phlebitis Assessment 0 4/4/2020  8:35 AM  
Infiltration Assessment 0 4/4/2020  8:35 AM  
Dressing Status Clean, dry, & intact 4/4/2020 12:03 AM  
Dressing Type Transparent;Tape 4/4/2020  8:35 AM  
Hub Color/Line Status Green;Flushed 4/4/2020 12:03 AM  
Action Taken Open ports on tubing capped 4/4/2020 12:03 AM  
Alcohol Cap Used Yes 4/4/2020 12:03 AM  
  
 
Opportunity for questions and clarification was provided. Patient transported with: 
 [employer]

## 2020-04-04 NOTE — PROGRESS NOTES
Bedside and Verbal shift change report given to MARK Oneill (oncoming nurse) by Slade Frazier RN (offgoing nurse).  Report included the following information SBAR, Kardex, Intake/Output, MAR and Recent Results.

## 2020-04-04 NOTE — PROGRESS NOTES
2300 bedside shift report given by Cherry Hodgkins RN to me with Lab results , meds reviewed Pt resting in bed . 0015 incontinent care given . Pt instructed on use of female urinal . No complaints made assessment done . [id number] Bedside shift change report given to 4007 [facility]  (oncoming nurse) by me  (offgoing nurse). Report given with SBAR, MAR and Recent Results.

## 2020-04-04 NOTE — PROGRESS NOTES
Primary Nurse Jodie Martinez, RN and Gertrude RN , RN performed a dual skin assessment on this patient No impairment noted Geoff score is 15

## 2020-04-04 NOTE — PROGRESS NOTES
Bedside and Verbal shift change report given to Jaye Patel RN (oncoming nurse) by REDDY Vera RN (offgoing nurse). Report given with SBAR, Kardex, Intake/Output, MAR and Recent Results.

## 2020-04-05 LAB
AMMONIA PLAS-SCNC: 44 UMOL/L
ANION GAP SERPL CALC-SCNC: 6 MMOL/L (ref 5–15)
BUN SERPL-MCNC: 10 MG/DL (ref 6–20)
BUN/CREAT SERPL: 14 (ref 12–20)
CALCIUM SERPL-MCNC: 7.2 MG/DL (ref 8.5–10.1)
CHLORIDE SERPL-SCNC: 107 MMOL/L (ref 97–108)
CO2 SERPL-SCNC: 25 MMOL/L (ref 21–32)
CREAT SERPL-MCNC: 0.73 MG/DL (ref 0.7–1.3)
GLUCOSE BLD STRIP.AUTO-MCNC: 151 MG/DL (ref 65–100)
GLUCOSE BLD STRIP.AUTO-MCNC: 154 MG/DL (ref 65–100)
GLUCOSE BLD STRIP.AUTO-MCNC: 169 MG/DL (ref 65–100)
GLUCOSE BLD STRIP.AUTO-MCNC: 172 MG/DL (ref 65–100)
GLUCOSE SERPL-MCNC: 155 MG/DL (ref 65–100)
MAGNESIUM SERPL-MCNC: 1.8 MG/DL (ref 1.6–2.4)
PHOSPHATE SERPL-MCNC: 2.5 MG/DL (ref 2.6–4.7)
POTASSIUM SERPL-SCNC: 3.7 MMOL/L (ref 3.5–5.1)
SERVICE CMNT-IMP: ABNORMAL
SODIUM SERPL-SCNC: 138 MMOL/L (ref 136–145)

## 2020-04-05 PROCEDURE — 82962 GLUCOSE BLOOD TEST: CPT

## 2020-04-05 PROCEDURE — 74011250636 HC RX REV CODE- 250/636: Performed by: INTERNAL MEDICINE

## 2020-04-05 PROCEDURE — 74011636637 HC RX REV CODE- 636/637: Performed by: NURSE PRACTITIONER

## 2020-04-05 PROCEDURE — 80048 BASIC METABOLIC PNL TOTAL CA: CPT

## 2020-04-05 PROCEDURE — 74011250637 HC RX REV CODE- 250/637: Performed by: NURSE PRACTITIONER

## 2020-04-05 PROCEDURE — 74011636637 HC RX REV CODE- 636/637: Performed by: INTERNAL MEDICINE

## 2020-04-05 PROCEDURE — 94640 AIRWAY INHALATION TREATMENT: CPT

## 2020-04-05 PROCEDURE — 94760 N-INVAS EAR/PLS OXIMETRY 1: CPT

## 2020-04-05 PROCEDURE — 83735 ASSAY OF MAGNESIUM: CPT

## 2020-04-05 PROCEDURE — 84100 ASSAY OF PHOSPHORUS: CPT

## 2020-04-05 PROCEDURE — 65660000000 HC RM CCU STEPDOWN

## 2020-04-05 PROCEDURE — 74011000250 HC RX REV CODE- 250: Performed by: NURSE PRACTITIONER

## 2020-04-05 PROCEDURE — 74011250637 HC RX REV CODE- 250/637: Performed by: INTERNAL MEDICINE

## 2020-04-05 PROCEDURE — 77010033678 HC OXYGEN DAILY

## 2020-04-05 PROCEDURE — 82140 ASSAY OF AMMONIA: CPT

## 2020-04-05 PROCEDURE — 74011250637 HC RX REV CODE- 250/637: Performed by: SPECIALIST

## 2020-04-05 PROCEDURE — 74011250637 HC RX REV CODE- 250/637: Performed by: HOSPITALIST

## 2020-04-05 PROCEDURE — 36415 COLL VENOUS BLD VENIPUNCTURE: CPT

## 2020-04-05 PROCEDURE — C9113 INJ PANTOPRAZOLE SODIUM, VIA: HCPCS | Performed by: INTERNAL MEDICINE

## 2020-04-05 PROCEDURE — 74011000258 HC RX REV CODE- 258: Performed by: INTERNAL MEDICINE

## 2020-04-05 RX ORDER — GUAIFENESIN/DEXTROMETHORPHAN 100-10MG/5
5 SYRUP ORAL
Status: DISCONTINUED | OUTPATIENT
Start: 2020-04-05 | End: 2020-04-06 | Stop reason: HOSPADM

## 2020-04-05 RX ADMIN — INSULIN LISPRO 3 UNITS: 100 INJECTION, SOLUTION INTRAVENOUS; SUBCUTANEOUS at 17:08

## 2020-04-05 RX ADMIN — GUAIFENESIN AND DEXTROMETHORPHAN 5 ML: 100; 10 SYRUP ORAL at 10:00

## 2020-04-05 RX ADMIN — INSULIN GLARGINE 20 UNITS: 100 INJECTION, SOLUTION SUBCUTANEOUS at 21:53

## 2020-04-05 RX ADMIN — PANTOPRAZOLE SODIUM 40 MG: 40 INJECTION, POWDER, FOR SOLUTION INTRAVENOUS at 09:14

## 2020-04-05 RX ADMIN — Medication 1 AMPULE: at 09:13

## 2020-04-05 RX ADMIN — SODIUM CHLORIDE 10 ML: 9 INJECTION, SOLUTION INTRAMUSCULAR; INTRAVENOUS; SUBCUTANEOUS at 21:57

## 2020-04-05 RX ADMIN — CEPHALEXIN 500 MG: 250 CAPSULE ORAL at 09:14

## 2020-04-05 RX ADMIN — PRIMIDONE 100 MG: 50 TABLET ORAL at 15:19

## 2020-04-05 RX ADMIN — INSULIN LISPRO 3 UNITS: 100 INJECTION, SOLUTION INTRAVENOUS; SUBCUTANEOUS at 09:48

## 2020-04-05 RX ADMIN — FUROSEMIDE 20 MG: 40 TABLET ORAL at 09:15

## 2020-04-05 RX ADMIN — PRIMIDONE 100 MG: 50 TABLET ORAL at 22:29

## 2020-04-05 RX ADMIN — RIFAXIMIN 550 MG: 550 TABLET ORAL at 10:00

## 2020-04-05 RX ADMIN — INSULIN LISPRO 3 UNITS: 100 INJECTION, SOLUTION INTRAVENOUS; SUBCUTANEOUS at 05:46

## 2020-04-05 RX ADMIN — SUCRALFATE 1 G: 1 TABLET ORAL at 09:15

## 2020-04-05 RX ADMIN — CEPHALEXIN 500 MG: 250 CAPSULE ORAL at 17:08

## 2020-04-05 RX ADMIN — SODIUM CHLORIDE 10 ML: 9 INJECTION, SOLUTION INTRAMUSCULAR; INTRAVENOUS; SUBCUTANEOUS at 13:32

## 2020-04-05 RX ADMIN — DIBASIC SODIUM PHOSPHATE, MONOBASIC POTASSIUM PHOSPHATE AND MONOBASIC SODIUM PHOSPHATE 1 TABLET: 852; 155; 130 TABLET ORAL at 09:14

## 2020-04-05 RX ADMIN — SODIUM CHLORIDE 100 MG: 9 INJECTION, SOLUTION INTRAVENOUS at 16:26

## 2020-04-05 RX ADMIN — Medication 1 AMPULE: at 22:28

## 2020-04-05 RX ADMIN — SPIRONOLACTONE 25 MG: 25 TABLET ORAL at 09:15

## 2020-04-05 RX ADMIN — IPRATROPIUM BROMIDE AND ALBUTEROL SULFATE 3 ML: .5; 3 SOLUTION RESPIRATORY (INHALATION) at 22:06

## 2020-04-05 RX ADMIN — INSULIN GLARGINE 20 UNITS: 100 INJECTION, SOLUTION SUBCUTANEOUS at 09:47

## 2020-04-05 RX ADMIN — LACTULOSE 30 ML: 20 SOLUTION ORAL at 17:08

## 2020-04-05 RX ADMIN — LISINOPRIL 10 MG: 10 TABLET ORAL at 09:14

## 2020-04-05 RX ADMIN — SODIUM CHLORIDE 10 ML: 9 INJECTION, SOLUTION INTRAMUSCULAR; INTRAVENOUS; SUBCUTANEOUS at 05:47

## 2020-04-05 RX ADMIN — PRIMIDONE 100 MG: 50 TABLET ORAL at 09:14

## 2020-04-05 RX ADMIN — RIFAXIMIN 550 MG: 550 TABLET ORAL at 17:15

## 2020-04-05 RX ADMIN — IPRATROPIUM BROMIDE AND ALBUTEROL SULFATE 3 ML: .5; 3 SOLUTION RESPIRATORY (INHALATION) at 01:43

## 2020-04-05 RX ADMIN — SERTRALINE HYDROCHLORIDE 50 MG: 50 TABLET ORAL at 09:15

## 2020-04-05 RX ADMIN — SUCRALFATE 1 G: 1 TABLET ORAL at 17:08

## 2020-04-05 RX ADMIN — LACTULOSE 30 ML: 20 SOLUTION ORAL at 09:15

## 2020-04-05 RX ADMIN — FERROUS SULFATE TAB 325 MG (65 MG ELEMENTAL FE) 325 MG: 325 (65 FE) TAB at 09:15

## 2020-04-05 RX ADMIN — INSULIN LISPRO 3 UNITS: 100 INJECTION, SOLUTION INTRAVENOUS; SUBCUTANEOUS at 13:32

## 2020-04-05 RX ADMIN — PANTOPRAZOLE SODIUM 40 MG: 40 INJECTION, POWDER, FOR SOLUTION INTRAVENOUS at 21:58

## 2020-04-05 NOTE — PROGRESS NOTES
10a  I spoke with milton Barros who states she wants [facility] at d/c; if he is not accepted, then [facility] [facility] SN[facility](not [facility]). She states pt lives with son Miguel Miller who works fulltime during the day. We talked about that they all need to come up with a plan \"after\" SAH or SNF if pt still needs assistance. They would want [facility] and one of them might need to take family leave. She asks to be kept in touch regarding SAH assessment. YARELY: 
-chart sent to [facility] per therapy recommendation for acute care 
-will call milton MARROQUIN, this am to discuss d/c plan 
-bhupinder't needs to be made with Dr Richi Remy post d/c -DDNR form to be placed on clipboard to be completed prior to d/c 
-[address] [facility], [address], last seen 03/02/2020 with upcoming appointment scheduled for 06/03/2020 
 -Endocrinologist Dr. [facility], last seen 12/30/2019 with upcoming appointment scheduled for 04/27/2020 
 -patient has used [facility] in the past   
 -daughter Jane Guerra [phone] and son Ismael Barragan [phone] Patient is a 69 y/o male who was admitted to CCU at [address] [facility] on 3/28/2020 for a upper GI bleed complicated by acute on chronic normocytic anemia secondary to variceal bleed, liver cirrhosis, hemorrhagic/hypovolemic shock, and acute respiratory failure due to the above. Patient and son reside in a two story home with [facility]. Patient's bedroom is on the main level of the home. Patient has support system including his son that he lives with, daughter that lives 40 minutes away, and two brothers, one lives in [place] and the other in [place]. Patient has DME including cane, RW, shower chair, raised toilet seat, and glucometer. Patient uses cane for all ambulation and is was independent with all ADLs, IADLs, and driving PTA. Patient has used [place] through [facility] [address] in the past and has been to rehab at [facility].  Patient has also used [facility] in the past for home IV ABX.

## 2020-04-05 NOTE — PROGRESS NOTES
2740: Introduced self to patient as secondary oncoming nurse, Patient verbalized acceptance. 0830: Rounded on patient and and performed morning vital signs. 0913: went back in to administer morning medications. 1000: Went back in due to forgetting to administer 2 medications that were located in patient med bin in [facility]. During this time n the room patient had a large BM and urine voidance. 1200: Patient sitting upright in bed. 
 
1300: Patient had anoter bowel movement. Now cleaned and resting. 1515: Rounded on patient and administered ,edicaiton. Still waiting for B1 IVPB infusion to come from pharmacy 1545: Patient called out to use urinal. Patient C/O wheezing 1708: Rounded on patient and administered evening meds. Patient tolerated medications all day. 1755: Patient needed to void. 1830: Patient had a large, watery BM. Uneventful shift. Patient had a total of 4 BM's this shift. Multiple urine voidances. Nothing major to report on. Patient was cooperative.

## 2020-04-05 NOTE — PROGRESS NOTES
Problem: Diabetes Self-Management Goal: *Disease process and treatment process Description: Define diabetes and identify own type of diabetes; list 3 options for treating diabetes. Outcome: Progressing Towards Goal 
Goal: *Incorporating nutritional management into lifestyle Description: Describe effect of type, amount and timing of food on blood glucose; list 3 methods for planning meals. Outcome: Progressing Towards Goal 
Goal: *Incorporating physical activity into lifestyle Description: State effect of exercise on blood glucose levels. Outcome: Progressing Towards Goal 
Goal: *Developing strategies to promote health/change behavior Description: Define the ABC's of diabetes; identify appropriate screenings, schedule and personal plan for screenings. Outcome: Progressing Towards Goal 
Goal: *Using medications safely Description: State effect of diabetes medications on diabetes; name diabetes medication taking, action and side effects. Outcome: Progressing Towards Goal 
Goal: *Monitoring blood glucose, interpreting and using results Description: Identify recommended blood glucose targets  and personal targets. Outcome: Progressing Towards Goal 
Goal: *Prevention, detection, treatment of acute complications Description: List symptoms of hyper- and hypoglycemia; describe how to treat low blood sugar and actions for lowering  high blood glucose level. Outcome: Progressing Towards Goal 
Goal: *Prevention, detection and treatment of chronic complications Description: Define the natural course of diabetes and describe the relationship of blood glucose levels to long term complications of diabetes. Outcome: Progressing Towards Goal 
Goal: *Developing strategies to address psychosocial issues Description: Describe feelings about living with diabetes; identify support needed and support network Outcome: Progressing Towards Goal 
Goal: *Insulin pump training Outcome: Progressing Towards Goal 
 Goal: *Sick day guidelines Outcome: Progressing Towards Goal 
Goal: *Patient Specific Goal (EDIT GOAL, INSERT TEXT) Outcome: Progressing Towards Goal 
  
Problem: Patient Education: Go to Patient Education Activity Goal: Patient/Family Education Outcome: Progressing Towards Goal 
  
Problem: Ventilator Management Goal: *Adequate oxygenation and ventilation Outcome: Progressing Towards Goal 
Goal: *Patient maintains clear airway/free of aspiration Outcome: Progressing Towards Goal 
Goal: *Absence of infection signs and symptoms Outcome: Progressing Towards Goal 
Goal: *Normal spontaneous ventilation Outcome: Progressing Towards Goal 
  
Problem: Non-Violent Restraints Goal: *Removal from restraints as soon as assessed to be safe Outcome: Progressing Towards Goal 
Goal: *No harm/injury to patient while restraints in use Outcome: Progressing Towards Goal 
Goal: *Patient's dignity will be maintained Outcome: Progressing Towards Goal 
Goal: *Patient Specific Goal (EDIT GOAL, INSERT TEXT) Outcome: Progressing Towards Goal 
Goal: Non-violent Restaints:Standard Interventions Outcome: Progressing Towards Goal 
Goal: Non-violent Restraints:Patient Interventions Outcome: Progressing Towards Goal 
Goal: Patient/Family Education Outcome: Progressing Towards Goal 
  
Problem: Falls - Risk of 
Goal: *Absence of Falls Description: Document Thora Bare Fall Risk and appropriate interventions in the flowsheet. Outcome: Progressing Towards Goal 
Note: Fall Risk Interventions: 
  
 
Mentation Interventions: Adequate sleep, hydration, pain control Medication Interventions: Evaluate medications/consider consulting pharmacy Elimination Interventions: Call light in reach Problem: Patient Education: Go to Patient Education Activity Goal: Patient/Family Education Outcome: Progressing Towards Goal 
  
Problem: Pressure Injury - Risk of 
Goal: *Prevention of pressure injury Description: Document Geoff Scale and appropriate interventions in the flowsheet. Outcome: Progressing Towards Goal 
Note: Pressure Injury Interventions: 
Sensory Interventions: Assess changes in LOC Moisture Interventions: Absorbent underpads Activity Interventions: Increase time out of bed Mobility Interventions: HOB 30 degrees or less Nutrition Interventions: Document food/fluid/supplement intake Friction and Shear Interventions: HOB 30 degrees or less Problem: Patient Education: Go to Patient Education Activity Goal: Patient/Family Education Outcome: Progressing Towards Goal 
  
Problem: Patient Education: Go to Patient Education Activity Goal: Patient/Family Education Outcome: Progressing Towards Goal 
  
Problem: Patient Education: Go to Patient Education Activity Goal: Patient/Family Education Outcome: Progressing Towards Goal

## 2020-04-05 NOTE — ROUTINE PROCESS
Late entry Bedside and Verbal shift change report given to Emperatriz Roy RN (oncoming nurse) by Vitaliy Kenny RN (offgoing nurse). Report included the following information SBAR, Kardex, MAR and Recent Results.

## 2020-04-05 NOTE — PROGRESS NOTES
Hospitalist Progress Note NAME: Billy Jimenez :  1950 MRN:  [id number] I reviewed with Dr. Brandi Alan about the medical history and the findings on the physical examination. I discussed with Dr. Brandi Alan the patient's diagnosis and concur with the plan. Interim Hospital Summary: 68 y.o. male whom presented on 3/27/2020 with nausea & hematemesis Assessment / Plan: 
Hepatic encephalopathy POA; improving. Pt is alert and orient to self & place, not to date/time 
- ammonia 200 on admission, ammonia level 31 on ;  Lactulose was decreased.  Ammonia level 44 with one large soft BM. Lactulose increased back to BID dosing. 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 
- Head CT (3/31): No acute intracranial process. US ABD (3/31): There is a trace amount of ascites visualized within the ventral wall hernia 
  appreciate GI input; continue with Xifaxan, carafate, lactulose Continue with Lasix & aldactone; check daily weight 281lbs ()->279lbs (4/3) 
  -> daily weight request re-entered. Trace amount of ascites from ABD US on 3/31 CXR: Prominent vascular markings. Cannot exclude superimposed infiltrate 
right lung base. 
 
  hgb 7. 6. Transfuse if hgb < 7.0 
  octerotide infusion has been d/c'd on  Continue with PPI Completed IV ceftriaxone for SBP prophylaxis x 7 days as of 4/3. Resume prophylactic ABX, keflex BID per his pcp - pt's daughter called twice to remind me to make sure he is on ABX Electrolyte imbalance (K, mg, phos) 
- resolved; will continue to monitor Hypertension 
-  Continue with ACEI with parameter Continue with Lasix and aldactone Type II DM with hyperglycemia - A1C 6.5. Pt was receiving Lantus 40units BID per Dr. Denise Hutchison at home along with oral hypoglycemic agents Lantus 25 units BID (takes 40 units BID at home) Check qac/qhs blood glucose and follow SSI Benign essential tremors 
- continue with mysoline; reduced the dose to 100mg TID vs 250mg BID at home due to elevated AST Depression 
- continue with zoloft (dose reduced to 50mg vs 100mg at home due to elevated AST) 4/2: CXR was done due to cough/whezing. CXR revealed Prominent vascular markings. Cannot exclude superimposed infiltrate. Cough resolved. RA O2 sat 100%. duoneb PRN. Currently not concerned with any potential ASDZ.  
 
right lung base. 30.0 - 39.9 Obese / Body mass index is 38.4 kg/m². 
  
Code status: DNR Prophylaxis: SCD's Recommended Disposition: PT/OT recommends SNF vs inpatient rehab Subjective: Chief Complaint / Reason for Physician Visit \"I feel pretty good\". Pt responded to 'no' to all the questions. Discussed with RN events overnight. Review of Systems: 
Symptom Y/N Comments  Symptom Y/N Comments Fever/Chills n   Chest Pain n   
Poor Appetite    Edema Cough    Abdominal Pain n   
Sputum    Joint Pain SOB/WANG n   Pruritis/Rash Nausea/vomit n   Tolerating PT/OT Diarrhea    Tolerating Diet Constipation    Other Could NOT obtain due to:   
 
Objective: VITALS:  
Last 24hrs VS reviewed since prior progress note. Most recent are: 
Patient Vitals for the past 24 hrs: 
 Temp Pulse Resp BP SpO2  
04/05/20 0337 98.1 °F (36.7 °C) 76 17 130/54 96 % 04/05/20 0133     97 % 04/04/20 2258 98.2 °F (36.8 °C) 82 17 130/66 97 % 04/04/20 2015 98.6 °F (37 °C) 74 17 116/57 95 % 04/04/20 1629 97.9 °F (36.6 °C) 80 18 149/61 100 % 04/04/20 1409  73  133/51   
04/04/20 1102 98.1 °F (36.7 °C) 76 18 151/77 100 % 04/04/20 0835 98.1 °F (36.7 °C) 83 20 160/60 100 % Intake/Output Summary (Last 24 hours) at 4/5/2020 0196 Last data filed at 4/5/2020 4788 Gross per 24 hour Intake 640 ml Output 1350 ml Net -710 ml PHYSICAL EXAM: 
General: Pale, ill appearing. Alert, cooperative, no acute distress EENT:  EOMI. Anicteric sclerae. MMM Resp:  Clear in apex with decreased breath sounds at bases, no wheezing or rales. No accessory muscle use CV:  Regular  rhythm,  No edema GI:  Soft, Non distended, Non tender. +Bowel sounds Neurologic:  Alert and oriented X self and place, normal speech, Psych:   Improved insight compare to 3/31. Not anxious nor agitated Skin:  No rashes. No jaundice Reviewed most current lab test results and cultures  YES Reviewed most current radiology test results   YES Review and summation of old records today    NO Reviewed patient's current orders and MAR    YES 
PMH/SH reviewed - no change compared to H&P 
________________________________________________________________________ Care Plan discussed with: 
  Comments Patient y Family RN y   
Care Manager Consultant Multidiciplinary team rounds were held today with , nursing, pharmacist and clinical coordinator. Patient's plan of care was discussed; medications were reviewed and discharge planning was addressed. ________________________________________________________________________ Marline Flores NP Procedures: see electronic medical records for all procedures/Xrays and details which were not copied into this note but were reviewed prior to creation of Plan. LABS: 
I reviewed today's most current labs and imaging studies. Pertinent labs include: 
Recent Labs 04/04/20 
0421 04/03/20 
0230 04/02/20 
1432 WBC 8.5 8.9  --   
HGB 7.6* 7.6* 7.5* HCT 23.5* 24.3* 24.1*  
* 159  --   
 
Recent Labs 04/05/20 
9563 04/04/20 
0421 04/03/20 
0230 04/02/20 
1432  139 141 142  
K 3.7 3.1* 3.4* 3.6  109* 114* 115* CO2 25 25 23 21 * 81 159* 244* BUN 10 13 19 20 CREA 0.73 0.65* 0.80 0.84 CA 7.2* 7.3* 7.4* 7.1*  
MG 1.8 1.8 1.9 1.8 PHOS 2.5*  --  2.9 1.8* ALB  --  2.1* 2.3*  --   
TBILI  --  1.1* 1.1*  --   
SGOT  --  34 45*  --   
ALT  --  52 70  --   
 
 
Signed: )Marline Perez, NP

## 2020-04-05 NOTE — PROGRESS NOTES
1900--bedside report received from grant cantu pt resting in bed watching tv, denies pain, nausea at this time call bell in reach assessment as noted 2200--pt remains oriented x 4, assisted with urinal 
 
0130--INC care provided for med, soft brown BM, repositioned in bed, pt wheezing o2 stats 97% RA, breathing treatment administered by RT, call bell in reach 
 
0220--pt sitting on side of bed, had dry coughing episode, 2L for comfort, hospitalist consulted for cough syrup, in no distress 
 
0700--bedside report given to grant barth who is assuming care of pt

## 2020-04-06 ENCOUNTER — HOSPITAL ENCOUNTER (OUTPATIENT)
Dept: REHABILITATION | Age: 70
End: 2020-04-18
Attending: INTERNAL MEDICINE | Admitting: PHYSICAL MEDICINE & REHABILITATION

## 2020-04-06 VITALS
WEIGHT: 262.79 LBS | RESPIRATION RATE: 14 BRPM | SYSTOLIC BLOOD PRESSURE: 167 MMHG | DIASTOLIC BLOOD PRESSURE: 75 MMHG | HEART RATE: 59 BPM | TEMPERATURE: 97.4 F | HEIGHT: 70 IN | BODY MASS INDEX: 37.62 KG/M2 | OXYGEN SATURATION: 95 %

## 2020-04-06 DIAGNOSIS — R53.1 WEAKNESS: ICD-10-CM

## 2020-04-06 PROBLEM — K76.82 HEPATIC ENCEPHALOPATHY: Status: ACTIVE | Noted: 2020-04-06

## 2020-04-06 PROBLEM — K75.81 NASH (NONALCOHOLIC STEATOHEPATITIS): Status: ACTIVE | Noted: 2020-04-06

## 2020-04-06 LAB
AMMONIA PLAS-SCNC: 31 UMOL/L
ANION GAP SERPL CALC-SCNC: 5 MMOL/L (ref 5–15)
BASOPHILS # BLD: 0 K/UL (ref 0–0.1)
BASOPHILS NFR BLD: 1 % (ref 0–1)
BUN SERPL-MCNC: 9 MG/DL (ref 6–20)
BUN/CREAT SERPL: 13 (ref 12–20)
CALCIUM SERPL-MCNC: 7.4 MG/DL (ref 8.5–10.1)
CHLORIDE SERPL-SCNC: 106 MMOL/L (ref 97–108)
CO2 SERPL-SCNC: 26 MMOL/L (ref 21–32)
CREAT SERPL-MCNC: 0.69 MG/DL (ref 0.7–1.3)
DIFFERENTIAL METHOD BLD: ABNORMAL
EOSINOPHIL # BLD: 0.4 K/UL (ref 0–0.4)
EOSINOPHIL NFR BLD: 7 % (ref 0–7)
ERYTHROCYTE [DISTWIDTH] IN BLOOD BY AUTOMATED COUNT: 18.8 % (ref 11.5–14.5)
GLUCOSE BLD STRIP.AUTO-MCNC: 114 MG/DL (ref 65–100)
GLUCOSE BLD STRIP.AUTO-MCNC: 245 MG/DL (ref 65–100)
GLUCOSE SERPL-MCNC: 136 MG/DL (ref 65–100)
HCT VFR BLD AUTO: 22.4 % (ref 36.6–50.3)
HCT VFR BLD AUTO: 24.2 % (ref 36.6–50.3)
HGB BLD-MCNC: 7.1 G/DL (ref 12.1–17)
HGB BLD-MCNC: 7.6 G/DL (ref 12.1–17)
IMM GRANULOCYTES # BLD AUTO: 0.1 K/UL (ref 0–0.04)
IMM GRANULOCYTES NFR BLD AUTO: 1 % (ref 0–0.5)
LYMPHOCYTES # BLD: 1.1 K/UL (ref 0.8–3.5)
LYMPHOCYTES NFR BLD: 19 % (ref 12–49)
MCH RBC QN AUTO: 28 PG (ref 26–34)
MCHC RBC AUTO-ENTMCNC: 31.7 G/DL (ref 30–36.5)
MCV RBC AUTO: 88.2 FL (ref 80–99)
MONOCYTES # BLD: 0.7 K/UL (ref 0–1)
MONOCYTES NFR BLD: 12 % (ref 5–13)
NEUTS SEG # BLD: 3.6 K/UL (ref 1.8–8)
NEUTS SEG NFR BLD: 60 % (ref 32–75)
NRBC # BLD: 0.02 K/UL (ref 0–0.01)
NRBC BLD-RTO: 0.3 PER 100 WBC
PLATELET # BLD AUTO: 127 K/UL (ref 150–400)
PMV BLD AUTO: 10.7 FL (ref 8.9–12.9)
POTASSIUM SERPL-SCNC: 3.6 MMOL/L (ref 3.5–5.1)
RBC # BLD AUTO: 2.54 M/UL (ref 4.1–5.7)
SERVICE CMNT-IMP: ABNORMAL
SERVICE CMNT-IMP: ABNORMAL
SODIUM SERPL-SCNC: 137 MMOL/L (ref 136–145)
WBC # BLD AUTO: 5.9 K/UL (ref 4.1–11.1)

## 2020-04-06 PROCEDURE — 74011636637 HC RX REV CODE- 636/637: Performed by: INTERNAL MEDICINE

## 2020-04-06 PROCEDURE — 77030008027

## 2020-04-06 PROCEDURE — 74011250637 HC RX REV CODE- 250/637: Performed by: NURSE PRACTITIONER

## 2020-04-06 PROCEDURE — 36415 COLL VENOUS BLD VENIPUNCTURE: CPT

## 2020-04-06 PROCEDURE — 77010033678 HC OXYGEN DAILY

## 2020-04-06 PROCEDURE — 74011000258 HC RX REV CODE- 258: Performed by: INTERNAL MEDICINE

## 2020-04-06 PROCEDURE — C9113 INJ PANTOPRAZOLE SODIUM, VIA: HCPCS | Performed by: INTERNAL MEDICINE

## 2020-04-06 PROCEDURE — 74011250636 HC RX REV CODE- 250/636: Performed by: INTERNAL MEDICINE

## 2020-04-06 PROCEDURE — 82962 GLUCOSE BLOOD TEST: CPT

## 2020-04-06 PROCEDURE — 97535 SELF CARE MNGMENT TRAINING: CPT

## 2020-04-06 PROCEDURE — 74011636637 HC RX REV CODE- 636/637: Performed by: NURSE PRACTITIONER

## 2020-04-06 PROCEDURE — 74011250637 HC RX REV CODE- 250/637: Performed by: HOSPITALIST

## 2020-04-06 PROCEDURE — 85025 COMPLETE CBC W/AUTO DIFF WBC: CPT

## 2020-04-06 PROCEDURE — 74011250637 HC RX REV CODE- 250/637: Performed by: SPECIALIST

## 2020-04-06 PROCEDURE — 85018 HEMOGLOBIN: CPT

## 2020-04-06 PROCEDURE — 74011250637 HC RX REV CODE- 250/637: Performed by: INTERNAL MEDICINE

## 2020-04-06 PROCEDURE — 94762 N-INVAS EAR/PLS OXIMTRY CONT: CPT

## 2020-04-06 PROCEDURE — 82140 ASSAY OF AMMONIA: CPT

## 2020-04-06 PROCEDURE — 80048 BASIC METABOLIC PNL TOTAL CA: CPT

## 2020-04-06 PROCEDURE — 97116 GAIT TRAINING THERAPY: CPT

## 2020-04-06 RX ORDER — FUROSEMIDE 20 MG/1
TABLET ORAL
Qty: 30 TAB | Refills: 0 | Status: SHIPPED | OUTPATIENT
Start: 2020-04-06 | End: 2020-07-15

## 2020-04-06 RX ORDER — INSULIN GLARGINE 100 [IU]/ML
20 INJECTION, SOLUTION SUBCUTANEOUS
Qty: 1 VIAL | Refills: 0 | Status: SHIPPED | OUTPATIENT
Start: 2020-04-06 | End: 2020-05-12 | Stop reason: SDUPTHER

## 2020-04-06 RX ORDER — THIAMINE HCL 500 MG
500 TABLET ORAL DAILY
Qty: 30 TAB | Refills: 0 | Status: ON HOLD
Start: 2020-04-06 | End: 2021-01-01

## 2020-04-06 RX ORDER — IPRATROPIUM BROMIDE AND ALBUTEROL SULFATE 2.5; .5 MG/3ML; MG/3ML
3 SOLUTION RESPIRATORY (INHALATION)
Qty: 30 NEBULE | Refills: 0 | Status: SHIPPED
Start: 2020-04-06 | End: 2020-08-10 | Stop reason: ALTCHOICE

## 2020-04-06 RX ORDER — GUAIFENESIN/DEXTROMETHORPHAN 100-10MG/5
5 SYRUP ORAL
Qty: 50 ML | Refills: 0 | Status: SHIPPED
Start: 2020-04-06 | End: 2020-04-16

## 2020-04-06 RX ORDER — SUCRALFATE 1 G/1
1 TABLET ORAL
Qty: 16 TAB | Refills: 0 | Status: SHIPPED | OUTPATIENT
Start: 2020-04-06 | End: 2020-04-14

## 2020-04-06 RX ADMIN — RIFAXIMIN 550 MG: 550 TABLET ORAL at 09:49

## 2020-04-06 RX ADMIN — SODIUM CHLORIDE 40 ML: 9 INJECTION, SOLUTION INTRAMUSCULAR; INTRAVENOUS; SUBCUTANEOUS at 04:09

## 2020-04-06 RX ADMIN — SPIRONOLACTONE 25 MG: 25 TABLET ORAL at 09:34

## 2020-04-06 RX ADMIN — PRIMIDONE 100 MG: 50 TABLET ORAL at 09:32

## 2020-04-06 RX ADMIN — Medication 1 AMPULE: at 09:38

## 2020-04-06 RX ADMIN — DIBASIC SODIUM PHOSPHATE, MONOBASIC POTASSIUM PHOSPHATE AND MONOBASIC SODIUM PHOSPHATE 1 TABLET: 852; 155; 130 TABLET ORAL at 09:32

## 2020-04-06 RX ADMIN — SUCRALFATE 1 G: 1 TABLET ORAL at 09:34

## 2020-04-06 RX ADMIN — INSULIN LISPRO 4 UNITS: 100 INJECTION, SOLUTION INTRAVENOUS; SUBCUTANEOUS at 12:09

## 2020-04-06 RX ADMIN — INSULIN GLARGINE 20 UNITS: 100 INJECTION, SOLUTION SUBCUTANEOUS at 09:32

## 2020-04-06 RX ADMIN — SODIUM CHLORIDE 40 ML: 9 INJECTION, SOLUTION INTRAMUSCULAR; INTRAVENOUS; SUBCUTANEOUS at 06:00

## 2020-04-06 RX ADMIN — FERROUS SULFATE TAB 325 MG (65 MG ELEMENTAL FE) 325 MG: 325 (65 FE) TAB at 07:30

## 2020-04-06 RX ADMIN — PANTOPRAZOLE SODIUM 40 MG: 40 INJECTION, POWDER, FOR SOLUTION INTRAVENOUS at 09:33

## 2020-04-06 RX ADMIN — SODIUM CHLORIDE 10 ML: 9 INJECTION, SOLUTION INTRAMUSCULAR; INTRAVENOUS; SUBCUTANEOUS at 14:00

## 2020-04-06 RX ADMIN — LACTULOSE 30 ML: 20 SOLUTION ORAL at 09:33

## 2020-04-06 RX ADMIN — SODIUM CHLORIDE 100 MG: 9 INJECTION, SOLUTION INTRAVENOUS at 14:40

## 2020-04-06 RX ADMIN — FUROSEMIDE 20 MG: 40 TABLET ORAL at 09:33

## 2020-04-06 RX ADMIN — LISINOPRIL 10 MG: 10 TABLET ORAL at 09:00

## 2020-04-06 RX ADMIN — SERTRALINE HYDROCHLORIDE 50 MG: 50 TABLET ORAL at 09:34

## 2020-04-06 RX ADMIN — CEPHALEXIN 500 MG: 250 CAPSULE ORAL at 09:33

## 2020-04-06 NOTE — PROGRESS NOTES
Bedside shift change report given to Whitney Ocampo (oncoming nurse) by Alise Grover RN (offgoing nurse). Report included the following information SBAR, Kardex, ED Summary, OR Summary, Intake/Output, MAR, Recent Results and Cardiac Rhythm NSR.

## 2020-04-06 NOTE — PROGRESS NOTES
Problem: Self Care Deficits Care Plan (Adult) Goal: *Acute Goals and Plan of Care (Insert Text) Description: FUNCTIONAL STATUS PRIOR TO ADMISSION: ambulated with SPC at times, performed ADLS on his own and light IADLS, was driving, has a shower chair but was not using it, uses hip kit for LB ADLS due to old hip replacement, history of hip replacement dislocation HOME SUPPORT PRIOR TO ADMISSION: The patient lived with son whom provided assist with IADLS and transportation as needed. Occupational Therapy Goals: 
Initiated 4/2/2020 1. Patient will perform self-feeding with supervision/set-up within 7 days. 2. Patient will perform grooming with supervision/set-up within 7 days. 3. Patient will perform upper body dressing with minimal assistance within 7 days. 4. Patient will perform toileting with max assist within 7 days. 5. Patient will improve dynamic sitting balance to supervision within 7 days. 6. Patient will transfer from bedside commode with moderate assistance  using the least restrictive device and appropriate durable medical equipment within 7 days. Outcome: Progressing Towards Goal 
 OCCUPATIONAL THERAPY TREATMENT Patient: Bob Azul (69 y.o. male) Date: 4/6/2020 Diagnosis: Acute upper GI bleed [K92.2] BREWER (nonalcoholic steatohepatitis) Procedure(s) (LRB): ESOPHAGOGASTRODUODENOSCOPY (EGD) (N/A) ENDOSCOPIC BANDING OR LIGATION (N/A) 9 Days Post-Op Precautions: Fall, DNR Chart, occupational therapy assessment, plan of care, and goals were reviewed. ASSESSMENT Patient continues with skilled OT services and is progressing towards goals. Pt was mod assist of 1 for bed mobility and was able to scoot to EOb with no assist.  He was able to stand with min of 2 and stood and used the urinal with min assist.  He was able to walk with RW to bathroom to wash his hands.   Pt had left knee buckle while he was standing at the sink,  and he was able to regain his balance with min of 2. Pt walked to recliner with RW and min of 2. He was left sitting up in recliner and legs elevated due to edema . He was able to setup his meal with no assist and was left in recliner eating lunch. Current Level of Function Impacting Discharge (ADLs): decreased endurance, strength, functional mobility, ADLs PLAN : 
Patient continues to benefit from skilled intervention to address the above impairments. Continue treatment per established plan of care. to address goals. Recommend with staff: OOB for meals Recommend next OT session: work on standing at the sink Recommendation for discharge: (in order for the patient to meet his/her long term goals) In pt rehab at discharge This discharge recommendation: 
Has been made in collaboration with the attending provider and/or case management IF patient discharges home will need the following DME: tbd SUBJECTIVE:  
Patient stated I feel pretty good today and I got up over the weekend.  OBJECTIVE DATA SUMMARY:  
Cognitive/Behavioral Status: 
  
  
 intact Functional Mobility and Transfers for ADLs: 
Bed Mobility: 
Supine to Sit: Minimum assistance Transfers: 
Sit to Stand: Minimum assistance Balance: 
Sitting: Intact Standing: Impaired; With support Standing - Static: Good Standing - Dynamic : Fair(1 episode buckling) ADL Intervention: 
 Pt is set up for grooming sitting and UB bathing set up sitting. He is max to mod for LB ADLs standing. Pt was not able to stand and void and hold the urinal and had to have OT hold urinal while he held onto RW for balance. Activity Tolerance:  
Fair Please refer to the flowsheet for vital signs taken during this treatment. After treatment patient left in no apparent distress:  
Sitting in chair and Call bell within reach COMMUNICATION/COLLABORATION:  
 The patients plan of care was discussed with: Physical therapist and Registered nurse. BRIAN Stephen Time Calculation: 15 mins

## 2020-04-06 NOTE — PROGRESS NOTES
Hospitalist Progress Note NAME: Asif Zhu :  1950 MRN:  [id number] I reviewed with Dr. Donaldo Torres about the medical history and the findings on the physical examination. I discussed with Dr. Donaldo Torres the patient's diagnosis and concur with the plan. Interim Hospital Summary: 68 y.o. male whom presented on 3/27/2020 with nausea & hematemesis Assessment / Plan: pt is alert and orient x 3. Ammonia level within normal range. Pt is medically stable to transition to inpatient rehab. Hepatic encephalopathy POA; improving. Pt is alert and orient to self & place, not to date/time 
- ammonia 200 on admission, ammonia level 31 on ;   
Ammonia level 44->31; titrate lactulose down if pt has more than 3 loose stools/day Acute respiratory failure on admission which has been resolved Hemorrhagic/hypovolemic shock POA Acute UGIB secondary to esophageal variceal hemorrhage S/p TIPS for ongoing UGIB (3/28) Post TIPS hepatic encephalopathy BREWER cirrhosis 
- Head CT (3/31): No acute intracranial process. US ABD (3/31): There is a trace amount of ascites visualized within the ventral wall hernia 
  appreciate GI input; continue with Xifaxan, carafate, lactulose Continue with Lasix & aldactone; check daily weight 281lbs ()->279lbs (4/3) 
  -> daily weight request re-entered. Trace amount of ascites from ABD US on 3/31 CXR: Prominent vascular markings. Cannot exclude superimposed infiltrate 
right lung base. 
 
  hgb 7.1. ordered H & H to be recheked. Transfuse if hgb < 7.0 
  octerotide infusion has been d/c'd on  Continue with PPI Completed IV ceftriaxone for SBP prophylaxis x 7 days as of 4/3. Resume prophylactic ABX, keflex BID per his pcp - pt's daughter called twice to remind me to make sure he is on ABX Electrolyte imbalance (K, mg, phos) 
- resolved; will continue to monitor Hypertension 
-  Continue with ACEI with parameter Continue with Lasix and aldactone Type II DM with hyperglycemia 
- A1C 6.5. Pt was receiving Lantus 40units BID per Dr. Jessica Maciel at home along with oral hypoglycemic agents Lantus 25 units BID (takes 40 units BID at home) Check qac/qhs blood glucose and follow SSI Benign essential tremors 
- continue with mysoline; reduced the dose to 100mg TID vs 250mg BID at home due to elevated AST Depression 
- continue with zoloft (dose reduced to 50mg vs 100mg at home due to elevated AST) 4/2: CXR was done due to cough/whezing. CXR revealed Prominent vascular markings. Cannot exclude superimposed infiltrate. Cough resolved. RA O2 sat 100%. duoneb PRN. Currently not concerned with any potential ASDZ.  
 
right lung base. 30.0 - 39.9 Obese / Body mass index is 38.4 kg/m². 
  
Code status: DNR Prophylaxis: SCD's Recommended Disposition: PT/OT recommends SNF vs inpatient rehab Subjective: Chief Complaint / Reason for Physician Visit \"I feel pretty good\". Pt responded to 'no' to all the questions. Discussed with RN events overnight. Review of Systems: 
Symptom Y/N Comments  Symptom Y/N Comments Fever/Chills n   Chest Pain n   
Poor Appetite    Edema Cough    Abdominal Pain n   
Sputum    Joint Pain SOB/WANG n   Pruritis/Rash Nausea/vomit n   Tolerating PT/OT Diarrhea    Tolerating Diet Constipation    Other Could NOT obtain due to:   
 
Objective: VITALS:  
Last 24hrs VS reviewed since prior progress note. Most recent are: 
Patient Vitals for the past 24 hrs: 
 Temp Pulse Resp BP SpO2  
04/06/20 1128 98.5 °F (36.9 °C) 72 15 119/51 96 % 04/06/20 0723 98.5 °F (36.9 °C) 83 18 145/67 99 % 04/06/20 0406 98.5 °F (36.9 °C) 82 18 131/64 97 % 04/06/20 0226  80   99 % 04/06/20 0021     96 % 04/05/20 2303 98.5 °F (36.9 °C) 86 18 136/55 95 % 04/05/20 1945 98.2 °F (36.8 °C) 78 18 120/69 96 % 04/05/20 1556 98.6 °F (37 °C) 80 18 113/49 96 % Intake/Output Summary (Last 24 hours) at 4/6/2020 1143 Last data filed at 4/6/2020 1130 Gross per 24 hour Intake  Output 1355 ml Net -1355 ml PHYSICAL EXAM: 
General: Pale, ill appearing. Alert, cooperative, no acute distress EENT:  EOMI. Anicteric sclerae. MMM Resp:  Clear in apex with decreased breath sounds at bases, no wheezing or rales. No accessory muscle use CV:  Regular  rhythm,  No edema GI:  Soft, Non distended, Non tender. +Bowel sounds Neurologic:  Alert and oriented X self and place, normal speech, Psych:   Improved insight compare to 3/31. Not anxious nor agitated Skin:  No rashes. No jaundice Reviewed most current lab test results and cultures  YES Reviewed most current radiology test results   YES Review and summation of old records today    NO Reviewed patient's current orders and MAR    YES 
PMH/SH reviewed - no change compared to H&P 
________________________________________________________________________ Care Plan discussed with: 
  Comments Patient y Family RN y   
Care Manager y Consultant Multidiciplinary team rounds were held today with , nursing, pharmacist and clinical coordinator. Patient's plan of care was discussed; medications were reviewed and discharge planning was addressed. ________________________________________________________________________ Marline Ordoñez NP Procedures: see electronic medical records for all procedures/Xrays and details which were not copied into this note but were reviewed prior to creation of Plan. LABS: 
I reviewed today's most current labs and imaging studies. Pertinent labs include: 
Recent Labs 04/06/20 
8767 04/04/20 
0421 WBC 5.9 8.5 HGB 7.1* 7.6* HCT 22.4* 23.5*  
* 143* Recent Labs 04/06/20 
7075 04/05/20 
6740 04/04/20 
0421  138 139  
K 3.6 3.7 3.1*  
 107 109* CO2 26 25 25 * 155* 81  
BUN 9 10 13 CREA 0.69* 0.73 0.65* CA 7.4* 7.2* 7.3*  
MG  --  1.8 1.8 PHOS  --  2.5*  --   
ALB  --   --  2.1* TBILI  --   --  1.1*  
SGOT  --   --  34 ALT  --   --  52 Signed: )Vinny Crane, NP

## 2020-04-06 NOTE — PROGRESS NOTES
Problem: Mobility Impaired (Adult and Pediatric) Goal: *Acute Goals and Plan of Care (Insert Text) Description: FUNCTIONAL STATUS PRIOR TO ADMISSION: Patient was independent and active without use of DME. 
 
HOME SUPPORT PRIOR TO ADMISSION: The patient lived with his son but did not require assist. 
 
Physical Therapy Goals Initiated 4/2/2020 1. Patient will move from supine to sit and sit to supine  in bed with moderate assistance  within 7 day(s). 2.  Patient will transfer from bed to chair and chair to bed with moderate assistance  using the least restrictive device within 7 day(s). 3.  Patient will perform sit to stand with moderate assistance  within 7 day(s). 4.  Patient will ambulate with moderate assistance  for 50 feet with the least restrictive device within 7 day(s). 5.  Patient will ascend/descend 4 stairs with 1 handrail(s) with moderate assistance  within 7 day(s). 6.  Patient will sit edge of bed x10 minutes for ther ex with supervision within 7 days. Outcome: Progressing Towards Goal 
 PHYSICAL THERAPY TREATMENT Patient: Roxi Narayanan (56 y.o. male) Date: 4/6/2020 Diagnosis: Acute upper GI bleed [K92.2] BREWER (nonalcoholic steatohepatitis) Procedure(s) (LRB): ESOPHAGOGASTRODUODENOSCOPY (EGD) (N/A) ENDOSCOPIC BANDING OR LIGATION (N/A) 9 Days Post-Op Precautions: Fall, DNR Chart, physical therapy assessment, plan of care and goals were reviewed. ASSESSMENT Patient continues with skilled PT services and is progressing towards goals. Pt received supine in bed and agreeable to therapy. Pt continues to be limited by generalized weakness, decreased functional mobility, impaired balance and gait and decreased tolerance to activity. Pt demonstrated improved progress towards goals this date. Pt able to progress mobility and gait distance with RW and min A. Pt with 1 episode of bilateral knee buckling (L>R) while standing at the sink.  Pt remained seated in the chair with all need met and RN in the room. Pt will continue to benefit from PT. Recommend inpatient rehab upon discharge to continue therapy efforts. Tona Vidales Current Level of Function Impacting Discharge (mobility/balance): min A bed mobility, transfers and RW and gait training x 12 ft with RW  
 
    
 
PLAN : 
Patient continues to benefit from skilled intervention to address the above impairments. Continue treatment per established plan of care. to address goals. Recommendation for discharge: (in order for the patient to meet his/her long term goals) Therapy 3 hours per day 5-7 days per week SUBJECTIVE:  
Patient stated I am tired.  OBJECTIVE DATA SUMMARY:  
Critical Behavior: 
Neurologic State: Alert, Appropriate for age Orientation Level: Oriented X4 Cognition: Appropriate for age attention/concentration, Follows commands Safety/Judgement: Fall prevention, Decreased awareness of need for safety, Decreased awareness of need for assistance Functional Mobility Training: 
Bed Mobility: 
  
Supine to Sit: Minimum assistance Transfers: 
Sit to Stand: Minimum assistance Stand to Sit: Minimum assistance Balance: 
Sitting: Intact Standing: Impaired; With support Standing - Static: Good Standing - Dynamic : Fair(1 episode buckling) Ambulation/Gait Training: 
Distance (ft): 12 Feet (ft) Assistive Device: Gait belt;Walker, rolling Ambulation - Level of Assistance: Minimal assistance Gait Abnormalities: Decreased step clearance; Path deviations;Trunk sway increased Base of Support: Widened Speed/Clarice: Pace decreased (<100 feet/min) Step Length: Right shortened;Left shortened Activity Tolerance:  
Good Please refer to the flowsheet for vital signs taken during this treatment. After treatment patient left in no apparent distress:  
Sitting in chair and Call bell within reach COMMUNICATION/COLLABORATION:  
 The patients plan of care was discussed with: Occupational therapist and Registered nurse. Shabbir Wright, PT, DPT Time Calculation: 15 mins

## 2020-04-06 NOTE — DISCHARGE INSTRUCTIONS
Patient Discharge Instructions     Pt Name  Naveed Saravia   Date of Birth 1950   Age  68 y.o. Medical Record Number  [id number]   PCP Red An MD    Admit date:  3/27/2020 @    [place] [address]    Room Number  2135/01   Date of Discharge 4/6/2020     Admission Diagnoses:     BREWER (nonalcoholic steatohepatitis)          Allergies   Allergen Reactions    Nsaids (Non-Steroidal Anti-Inflammatory Drug) Other (comments)     Hx of PUD and Hinson's Esophagus        You were admitted to [address] for  BREWER (nonalcoholic steatohepatitis)    YOUR OTHER MEDICAL DIAGNOSES INCLUDE (BUT NOT LIMITED TO ):  Present on Admission:   Severe obesity ([address]   PUD (peptic ulcer disease)   Jessica-prosthetic fracture of femur following total hip arthroplasty   Osteoarthritis of right hip   HTN (hypertension)   Depression   GERD (gastroesophageal reflux disease)   BREWER (nonalcoholic steatohepatitis)   Hepatic encephalopathy (HCC)      DIET:  Diabetic Diet       Recommended activity: Activity as tolerated  Follow up : Follow-up Information     Follow up With Specialties Details Why Contact Info    Dr Gatito Allen    Pt needs to call and set up appointment as hospital follow up next week. Red An MD Family Practice Go on 4/15/2020 Hospital follow-up scheduled at 3:00pm ( If you have questions or need to reschedule please call [phone] Abhijeet WAN [facility] MD [facility]   [place] [address] [place] [phone]            [facility] MD responsible for above upon discharge. · It is important that you take the medication exactly as they are prescribed.    · Keep your medication in the bottles provided by the pharmacist and keep a list of the medication names, dosages, and times to be taken in your wallet. · Do not take other medications without consulting your doctor. ADDITIONAL INFORMATION: If you experience any of the following symptoms or have any health problem not listed below, then please call your primary care physician or return to the emergency room if you cannot get hold of your doctor: Fever, chills, nausea, vomiting, diarrhea, change in mentation, falling, bleeding, shortness of breath. I understand that if any problems occur once I am discharged, I am supposed to call my Primary care physician for further care or seek help in the Emergency Department at the nearest Healthcare facility. I have had an opportunity to discuss my clinical issues with my doctor and nursing staff. I understand and acknowledge receipt of the above instructions.                                                                                                                                            Physician's or R.N.'s Signature                                                            Date/Time                                                                                                                                              Patient or Representative Signature                                                 Date/Time

## 2020-04-06 NOTE — DISCHARGE SUMMARY
Hospitalist Discharge Summary Patient ID: 
Umberto Tanner [id number] 
19 y.o. 
1950 PCP on record: Samara Hu MD 
 
Admit date: 3/27/2020 Discharge date and time: 4/6/2020 DISCHARGE DIAGNOSIS: 
Hepatic encephalopathy POA Acute respiratory failure on admission which has been resolved Hemorrhagic/hypovolemic shock POA Acute UGIB secondary to esophageal variceal hemorrhage S/p TIPS for ongoing UGIB (3/28) Post TIPS hepatic encephalopathy BREWER cirrhosis Electrolyte imbalance (K, mg, phos) Hypertension Type II DM with hyperglycemia Benign essential tremors Depression 30.0 - 39.9 Obese / Body mass index is 38.4 kg/m². 
  
 
 
CONSULTATIONS: 
IP CONSULT TO GASTROENTEROLOGY 
IP CONSULT TO INTERVENTIONAL RADIOLOGY 
IP CONSULT TO HOSPITALIST Excerpted HPI from H&P of Moon Conrad MD: 
Tony Leary is a 68 y.o. } male who presents with with history of Monda Mass presents with upper GI bleed. Patient underwent urgent EGD which was inconclusive. The patient underwent upper EGD  3 weeks back and had variceal banding. In ER patient's condition got worse and and patient underwent TIPS procedure. Currently patient is intubated on vasopressors and octreotide unable to give any history 
  
 
______________________________________________________________________ DISCHARGE SUMMARY/HOSPITAL COURSE:  for full details see H&P, daily progress notes, labs, consult notes. 68year old male admitted with hematemesis. Pt went under EGD which revealed full of fresh red blood with clots in the esophagus. Pt was immediately seen by he IR, had TIPS procedure. Pt received total of 3 units of PRBC during this admission. Hgb 7.6 upon discharge. Pt suffered from hepatic encephalopathy which has resolved. His ammonia level was 200 on admission, 31 upon discharge. Pt is medically stable but has been deconditioned significantly.  Pt will be discharged to [facility] for further rehab prior to returning home. Below are the detail medical illness that pt was treated it during this admission; 
 
Hepatic encephalopathy POA; resolved. Please keep ammonia within normal range. Pt is alert and orient to self & place, not to date/time 
- ammonia 200 on admission, ammonia level 31 upon discharge. Continue with lactulose; titrate down if pt has more than 3 loose stools/day 
  
Acute respiratory failure on admission which has been resolved Hemorrhagic/hypovolemic shock POA Acute UGIB secondary to esophageal variceal hemorrhage S/p TIPS for ongoing UGIB (3/28) Post TIPS hepatic encephalopathy BREWER cirrhosis 
- Head CT (3/31): No acute intracranial process. US ABD (3/31): There is a trace amount of ascites visualized within the ventral wall hernia 
  appreciate GI input; continue with Xifaxan, carafate, lactulose Continue with Lasix & aldactone; check daily weight 281lbs (4/2)->262lbs (4/3) 
  -> check daily labs Trace amount of ascites from ABD US on 3/31 CXR: Prominent vascular markings. Cannot exclude superimposed infiltrate 
right lung base. 
  
  hgb 7.6. Was on octerotide infusion which has been d/c'd on 4/2 Continue with PPI Completed IV ceftriaxone for SBP prophylaxis x 7 days as of 4/3. Resume prophylactic ABX, keflex BID per his pcp - pt's daughter called twice to remind me to make sure he is on ABX 
  
Electrolyte imbalance (K, mg, phos) 
- resolved  
  
Hypertension 
-  Continue with ACEI with parameter Continue with Lasix and aldactone 
  
Type II DM with hyperglycemia 
- A1C 6.5. Pt was receiving Lantus 40units BID per Dr. Levy Fernandez at home along with oral hypoglycemic agents Lantus 20 units BID (takes 40 units BID at home) Check qac/qhs blood glucose and follow SSI 
  
Benign essential tremors 
- continue with mysoline 
  
Depression 
- continue with zoloft  
  
4/2: CXR was done due to cough/whezing.  CXR revealed Prominent vascular markings. Cannot exclude superimposed infiltrate. Cough resolved. RA O2 sat 100%. duoneb PRN. Currently not concerned with any potential ASDZ.  
  
right lung base. 30.0 - 39.9 Obese / Body mass index is 38.4 kg/m². 
  
 
 
 
 
_______________________________________________________________________ Patient seen and examined by me on discharge day. Pertinent Findings: 
Gen:    Not in distress Chest: Clear in apex with decreased breath sounds at bases CVS:   Regular rhythm. No edema Abd:  Soft, distended, not tender Neuro:  Alert, orient to self and place 
_______________________________________________________________________ DISCHARGE MEDICATIONS:  
Current Discharge Medication List  
  
START taking these medications Details  
sucralfate (CARAFATE) 1 gram tablet Take 1 Tab by mouth Before breakfast and dinner for 8 days. Qty: 16 Tab, Refills: 0  
  
rifAXIMin (XIFAXAN) 550 mg tablet Take 1 Tab by mouth two (2) times a day for 30 days. Qty: 60 Tab, Refills: 1  
  
albuterol-ipratropium (DUO-NEB) 2.5 mg-0.5 mg/3 ml nebu 3 mL by Nebulization route every four (4) hours as needed for Wheezing. Qty: 30 Nebule, Refills: 0  
  
guaiFENesin-dextromethorphan (ROBITUSSIN DM) 100-10 mg/5 mL syrup Take 5 mL by mouth every six (6) hours as needed for Cough or Congestion for up to 10 days. Qty: 50 mL, Refills: 0  
  
lactulose (CHRONULAC) 10 gram/15 mL solution Take 30 mL by mouth two (2) times a day for 30 days. May decrease the dose if pt has more than 3 stools/day Qty: 1800 mL, Refills: 0  
  
insulin glargine (LANTUS) 100 unit/mL injection 20 Units by SubCUTAneous route ACB/HS. Caution: Long Acting Insulin. DO NOT HOLD without physician order. DO NOT MIX or dilute with any other insulin. Qty: 1 Vial, Refills: 0  
  
thiamine hcl 500 mg tablet Take 500 mg by mouth daily. Qty: 30 Tab, Refills: 0 CONTINUE these medications which have CHANGED Details furosemide (LASIX) 20 mg tablet Take 2 tabs by mouth daily x 1 week and then increase to 1 tab daily 
Qty: 30 Tab, Refills: 0 Associated Diagnoses: Cirrhosis of liver with ascites, unspecified hepatic cirrhosis type ([facility] Esophageal varices in cirrhosis ([facility] Essential hypertension CONTINUE these medications which have NOT CHANGED Details  
folic acid (FOLVITE) 1 mg tablet Take 1 Tab by mouth daily. Qty: 30 Tab, Refills: 0  
  
lisinopriL (PRINIVIL, ZESTRIL) 5 mg tablet TAKE 2 TABLETS BY MOUTH EVERY DAY Qty: 60 Tab, Refills: 0 Associated Diagnoses: Essential hypertension  
  
gabapentin (NEURONTIN) 300 mg capsule Take 2 Caps by mouth nightly. Qty: 180 Cap, Refills: 1 Associated Diagnoses: RLS (restless legs syndrome) butalbital-acetaminophen-caffeine (FIORICET, ESGIC) -40 mg per tablet Take 1 Tab by mouth every six (6) hours as needed for Headache. Qty: 30 Tab, Refills: 1  
  
cephALEXin (KEFLEX) 500 mg capsule Take 1 Cap by mouth two (2) times a day. Qty: 30 Cap, Refills: 6  
  
sertraline (ZOLOFT) 100 mg tablet TAKE 1.5 TABLETS DAILY Qty: 45 Tab, Refills: 5 Comments: Pt requested 30 day supply due to cost. thx 
Associated Diagnoses: Grief; Recurrent depression (HCC)  
  
spironolactone (ALDACTONE) 25 mg tablet Take 1 Tab by mouth daily. Qty: 30 Tab, Refills: 1 Associated Diagnoses: Cirrhosis of liver with ascites, unspecified hepatic cirrhosis type ([facility] ferrous sulfate 140 mg (45 mg iron) TbER ER tablet Take 1 Tab by mouth Daily (before breakfast). Qty: 90 Tab, Refills: 1 Associated Diagnoses: Iron deficiency anemia, unspecified iron deficiency anemia type  
  
ergocalciferol (VITAMIN D2) 50,000 unit capsule TAKE 1 CAPSULE EVERY 7 DAYS Qty: 12 Cap, Refills: 2  
  
atorvastatin (LIPITOR) 40 mg tablet Take 1 Tab by mouth daily. Qty: 90 Tab, Refills: 3 Associated Diagnoses: Uncontrolled type 2 diabetes mellitus with hyperglycemia ([facility] !! glucose blood VI test strips (ASCENSIA AUTODISC VI, ONE TOUCH ULTRA TEST VI) strip Test blood sugar twice daily Diagnosis E 11.9. Can use [employer] 
Qty: 200 Strip, Refills: 4  
  
!! glucose blood VI test strips (ACCU-CHEK SOWMYA PLUS TEST STRP) strip Test blood sugar twice daily Diagnosis E 11.9 Qty: 200 Strip, Refills: 5 Associated Diagnoses: Uncontrolled type 2 diabetes mellitus with hyperglycemia (HCC)  
  
primidone (MYSOLINE) 250 mg tablet TAKE 1 TABLET TWICE A DAY Qty: 180 Tab, Refills: 3 Associated Diagnoses: Benign essential tremor  
  
acetaminophen (TYLENOL) 500 mg tablet Take 2 Tabs by mouth every six (6) hours as needed for Pain. Qty: 30 Tab, Refills: 0 Blood-Glucose Meter (ACCU-CHEK SOWMYA PLUS METER) misc Test blood sugar twice daily Diagnosis E 11.9 Qty: 1 Each, Refills: 1 Associated Diagnoses: Uncontrolled type 2 diabetes mellitus with hyperglycemia ([address] Blood Glucose Control High&Low (ACCU-CHEK SOWMYA CONTROL SOLN) soln Test blood sugar twice daily Diagnosis E 11.9 Qty: 1 Bottle, Refills: 3 Associated Diagnoses: Uncontrolled type 2 diabetes mellitus with hyperglycemia ([address] B.infantis-B.ani-B.long-B.bifi (PROBIOTIC 4X) 10-15 mg TbEC Take  by mouth.  
  
magnesium 250 mg tab Take 500 mg by mouth daily. melatonin tab tablet Take 10 mg by mouth nightly as needed. potassium 99 mg tablet Take 99 mg by mouth two (2) times a day. calcium citrate-vitamin D3 (CITRACAL + D) tablet Take 2 Tabs by mouth two (2) times a day. !! - Potential duplicate medications found. Please discuss with provider. STOP taking these medications  
  
 diclofenac (VOLTAREN) 1 % gel Comments:  
Reason for Stopping:   
   
 insulin glargine (Lantus Solostar U-100 Insulin) 100 unit/mL (3 mL) inpn Comments:  
Reason for Stopping:   
   
 metFORMIN ER (GLUCOPHAGE XR) 500 mg tablet Comments:  
Reason for Stopping:   
   
 omeprazole (PRILOSEC) 20 mg capsule Comments: Reason for Stopping:   
   
 insulin regular (NOVOLIN R REGULAR U-100 INSULN) 100 unit/mL injection Comments:  
Reason for Stopping:   
   
 Insulin Needles, Disposable, (BD ULTRA-FINE SHORT PEN NEEDLE) 31 gauge x 5/16\" ndle Comments:  
Reason for Stopping:   
   
 aspirin 81 mg chewable tablet Comments:  
Reason for Stopping: My Recommended Diet, Activity, Wound Care, and follow-up labs are listed in the patient's Discharge Insturctions which I have personally completed and reviewed. _______________________________________________________________________ DISPOSITION:   
Home with Family:   
Home with HH/PT/OT/RN:   
SNF/LTC:   
SHAYNA: y  
OTHER:   
 
 
Condition at Discharge:  Stable 
_______________________________________________________________________ Follow up with: PCP : Addis Negron MD 
Follow-up Information Follow up With Specialties Details Why Contact Info Dr Laura Tillman    Pt needs to call and set up appointment as hospital follow up next week. Addis Negron MD Family Practice Go on 4/15/2020 Hospital follow-up scheduled at 3:00pm ( If you have questions or need to reschedule please call [facility] [phone] [address] [place] [address] [address] [facility]    [place] [id number] Christiano Funez MD Gastroenterology   [address] [place] [address] 
[phone] Total time in minutes spent coordinating this discharge (includes going over instructions, follow-up, prescriptions, and preparing report for sign off to her PCP) :  45 minutes Signed: 
Melissa Beasley NP

## 2020-04-06 NOTE — ROUTINE PROCESS
Pt discharged to [facility]. Report given to Buster Abreu. Opportunity for questions and answers provided. Pt left via wheelchair with discharge papers and personal belongings.

## 2020-04-06 NOTE — PROGRESS NOTES
YARELY Plan; [facility] 
 *IM letter explained to pt & placed on chart Per NP, pt is ready for d/c. [facility] stated they can accept pt today after 2pm.  CM provided RN with number to call report [phone]. CM explained IM letter to pt over phone. Patient in agreement to d/c to [facility] today. Patient does not have any needs or concerns regarding d/c.  CM attempted to schedule a GI f/u and office stated that they will contact pt/family to schedule a f/u appointment. Care Management Interventions PCP Verified by CM: Yes Last Visit to PCP: 03/27/20 Mode of Transport at Discharge: Other (see comment)(likely family to provide transport at d/c) Transition of Care Consult (CM Consult): Discharge Planning Discharge Durable Medical Equipment: No 
Physical Therapy Consult: Yes Occupational Therapy Consult: No 
Speech Therapy Consult: No 
Current Support Network: Relative's Home, Family Lives [place] Confirm Follow Up Transport: Family The Patient and/or Patient Representative was Provided with a Choice of Provider and Agrees with the Discharge Plan?: Yes Name of the Patient Representative Who was Provided with a Choice of Provider and Agrees with the Discharge Plan: Due to COVID-19 Patient provided verbal consent Freedom of Choice List was Provided with Basic Dialogue that Supports the Patient's Individualized Plan of Care/Goals, Treatment Preferences and Shares the Quality Data Associated with the Providers?: Yes Discharge Location Discharge Placement: Rehab hospital/unit acute([facility]) Haritha Kumar Ext [id number]

## 2020-04-07 LAB
25(OH)D3 SERPL-MCNC: 55.8 NG/ML (ref 30–100)
ANION GAP SERPL CALC-SCNC: 4 MMOL/L (ref 5–15)
BUN SERPL-MCNC: 8 MG/DL (ref 6–20)
BUN/CREAT SERPL: 10 (ref 12–20)
CALCIUM SERPL-MCNC: 7.5 MG/DL (ref 8.5–10.1)
CHLORIDE SERPL-SCNC: 108 MMOL/L (ref 97–108)
CO2 SERPL-SCNC: 28 MMOL/L (ref 21–32)
CREAT SERPL-MCNC: 0.83 MG/DL (ref 0.7–1.3)
ERYTHROCYTE [DISTWIDTH] IN BLOOD BY AUTOMATED COUNT: 19.3 % (ref 11.5–14.5)
GLUCOSE SERPL-MCNC: 131 MG/DL (ref 65–100)
HCT VFR BLD AUTO: 26 % (ref 36.6–50.3)
HGB BLD-MCNC: 8.1 G/DL (ref 12.1–17)
MAGNESIUM SERPL-MCNC: 2 MG/DL (ref 1.6–2.4)
MCH RBC QN AUTO: 27.5 PG (ref 26–34)
MCHC RBC AUTO-ENTMCNC: 31.2 G/DL (ref 30–36.5)
MCV RBC AUTO: 88.1 FL (ref 80–99)
NRBC # BLD: 0 K/UL (ref 0–0.01)
NRBC BLD-RTO: 0 PER 100 WBC
PLATELET # BLD AUTO: 168 K/UL (ref 150–400)
PMV BLD AUTO: 10.7 FL (ref 8.9–12.9)
POTASSIUM SERPL-SCNC: 4 MMOL/L (ref 3.5–5.1)
RBC # BLD AUTO: 2.95 M/UL (ref 4.1–5.7)
SODIUM SERPL-SCNC: 140 MMOL/L (ref 136–145)
WBC # BLD AUTO: 7.4 K/UL (ref 4.1–11.1)

## 2020-04-07 PROCEDURE — 82306 VITAMIN D 25 HYDROXY: CPT

## 2020-04-07 PROCEDURE — 85027 COMPLETE CBC AUTOMATED: CPT

## 2020-04-07 PROCEDURE — 83735 ASSAY OF MAGNESIUM: CPT

## 2020-04-07 PROCEDURE — 36415 COLL VENOUS BLD VENIPUNCTURE: CPT

## 2020-04-07 PROCEDURE — 80048 BASIC METABOLIC PNL TOTAL CA: CPT

## 2020-04-08 ENCOUNTER — TELEPHONE (OUTPATIENT)
Dept: FAMILY MEDICINE CLINIC | Age: 70
End: 2020-04-08

## 2020-04-08 NOTE — TELEPHONE ENCOUNTER
----- Message from Quique Martinez sent at 2020 12:26 PM EDT -----  Regarding: Dr. Rakel Littlejohn Message/Vendor Calls  To: [facility]  Subject: Dr. Juarez Oliver  Patient's first and last name: Susie Fang  : 1950  ID numbers:#[id number]    Caller's first and last name: n/a  Reason for call: Patient has requested to speak with a nurse in regards to his \"Jardiance\" medication.   Callback required yes/no and why: yes  Best contact number(s):[phone]  Details to clarify the request: n/a    Catina Toro

## 2020-04-12 LAB
APPEARANCE UR: CLEAR
BACTERIA URNS QL MICRO: ABNORMAL /HPF
BILIRUB UR QL: NEGATIVE
COLOR UR: ABNORMAL
EPITH CASTS URNS QL MICRO: ABNORMAL /LPF
GLUCOSE UR STRIP.AUTO-MCNC: NEGATIVE MG/DL
HGB UR QL STRIP: NEGATIVE
HYALINE CASTS URNS QL MICRO: ABNORMAL /LPF (ref 0–5)
KETONES UR QL STRIP.AUTO: NEGATIVE MG/DL
LEUKOCYTE ESTERASE UR QL STRIP.AUTO: NEGATIVE
NITRITE UR QL STRIP.AUTO: NEGATIVE
PH UR STRIP: 6.5 [PH] (ref 5–8)
PROT UR STRIP-MCNC: NEGATIVE MG/DL
RBC #/AREA URNS HPF: ABNORMAL /HPF (ref 0–5)
SP GR UR REFRACTOMETRY: 1.01 (ref 1–1.03)
UROBILINOGEN UR QL STRIP.AUTO: 0.2 EU/DL (ref 0.2–1)
WBC URNS QL MICRO: ABNORMAL /HPF (ref 0–4)

## 2020-04-12 PROCEDURE — 81001 URINALYSIS AUTO W/SCOPE: CPT

## 2020-04-12 PROCEDURE — 87086 URINE CULTURE/COLONY COUNT: CPT

## 2020-04-13 LAB
ANION GAP SERPL CALC-SCNC: 3 MMOL/L (ref 5–15)
BACTERIA SPEC CULT: NORMAL
BUN SERPL-MCNC: 10 MG/DL (ref 6–20)
BUN/CREAT SERPL: 13 (ref 12–20)
CALCIUM SERPL-MCNC: 8 MG/DL (ref 8.5–10.1)
CHLORIDE SERPL-SCNC: 110 MMOL/L (ref 97–108)
CO2 SERPL-SCNC: 25 MMOL/L (ref 21–32)
CREAT SERPL-MCNC: 0.77 MG/DL (ref 0.7–1.3)
ERYTHROCYTE [DISTWIDTH] IN BLOOD BY AUTOMATED COUNT: 18.6 % (ref 11.5–14.5)
GLUCOSE SERPL-MCNC: 100 MG/DL (ref 65–100)
HCT VFR BLD AUTO: 23.4 % (ref 36.6–50.3)
HGB BLD-MCNC: 7.3 G/DL (ref 12.1–17)
MCH RBC QN AUTO: 27.2 PG (ref 26–34)
MCHC RBC AUTO-ENTMCNC: 31.2 G/DL (ref 30–36.5)
MCV RBC AUTO: 87.3 FL (ref 80–99)
NRBC # BLD: 0 K/UL (ref 0–0.01)
NRBC BLD-RTO: 0 PER 100 WBC
PLATELET # BLD AUTO: 155 K/UL (ref 150–400)
PMV BLD AUTO: 10.5 FL (ref 8.9–12.9)
POTASSIUM SERPL-SCNC: 4.1 MMOL/L (ref 3.5–5.1)
RBC # BLD AUTO: 2.68 M/UL (ref 4.1–5.7)
SERVICE CMNT-IMP: NORMAL
SODIUM SERPL-SCNC: 138 MMOL/L (ref 136–145)
WBC # BLD AUTO: 4.7 K/UL (ref 4.1–11.1)

## 2020-04-13 PROCEDURE — 36415 COLL VENOUS BLD VENIPUNCTURE: CPT

## 2020-04-13 PROCEDURE — 80048 BASIC METABOLIC PNL TOTAL CA: CPT

## 2020-04-13 PROCEDURE — 85027 COMPLETE CBC AUTOMATED: CPT

## 2020-04-15 LAB
ERYTHROCYTE [DISTWIDTH] IN BLOOD BY AUTOMATED COUNT: 17.7 % (ref 11.5–14.5)
HCT VFR BLD AUTO: 22.5 % (ref 36.6–50.3)
HGB BLD-MCNC: 7.2 G/DL (ref 12.1–17)
MCH RBC QN AUTO: 28 PG (ref 26–34)
MCHC RBC AUTO-ENTMCNC: 32 G/DL (ref 30–36.5)
MCV RBC AUTO: 87.5 FL (ref 80–99)
NRBC # BLD: 0 K/UL (ref 0–0.01)
NRBC BLD-RTO: 0 PER 100 WBC
PLATELET # BLD AUTO: 132 K/UL (ref 150–400)
PMV BLD AUTO: 10.4 FL (ref 8.9–12.9)
RBC # BLD AUTO: 2.57 M/UL (ref 4.1–5.7)
WBC # BLD AUTO: 3.5 K/UL (ref 4.1–11.1)

## 2020-04-15 PROCEDURE — 36415 COLL VENOUS BLD VENIPUNCTURE: CPT

## 2020-04-15 PROCEDURE — 85027 COMPLETE CBC AUTOMATED: CPT

## 2020-04-16 ENCOUNTER — VIRTUAL VISIT (OUTPATIENT)
Dept: FAMILY MEDICINE CLINIC | Age: 70
End: 2020-04-16

## 2020-04-21 ENCOUNTER — VIRTUAL VISIT (OUTPATIENT)
Dept: FAMILY MEDICINE CLINIC | Age: 70
End: 2020-04-21

## 2020-04-21 VITALS — HEIGHT: 70 IN | WEIGHT: 260 LBS | BODY MASS INDEX: 37.22 KG/M2

## 2020-04-21 DIAGNOSIS — K92.2 ACUTE UPPER GI BLEED: ICD-10-CM

## 2020-04-21 DIAGNOSIS — K75.81 NASH (NONALCOHOLIC STEATOHEPATITIS): ICD-10-CM

## 2020-04-21 DIAGNOSIS — K74.60 ESOPHAGEAL VARICES IN CIRRHOSIS (HCC): ICD-10-CM

## 2020-04-21 DIAGNOSIS — I85.10 ESOPHAGEAL VARICES IN CIRRHOSIS (HCC): ICD-10-CM

## 2020-04-21 DIAGNOSIS — K74.60 CIRRHOSIS OF LIVER WITH ASCITES, UNSPECIFIED HEPATIC CIRRHOSIS TYPE (HCC): ICD-10-CM

## 2020-04-21 DIAGNOSIS — Z09 HOSPITAL DISCHARGE FOLLOW-UP: Primary | ICD-10-CM

## 2020-04-21 DIAGNOSIS — R18.8 CIRRHOSIS OF LIVER WITH ASCITES, UNSPECIFIED HEPATIC CIRRHOSIS TYPE (HCC): ICD-10-CM

## 2020-04-21 DIAGNOSIS — D50.9 IRON DEFICIENCY ANEMIA, UNSPECIFIED IRON DEFICIENCY ANEMIA TYPE: ICD-10-CM

## 2020-04-21 RX ORDER — POTASSIUM CHLORIDE 750 MG/1
10 TABLET, FILM COATED, EXTENDED RELEASE ORAL 2 TIMES DAILY
COMMUNITY
Start: 2020-04-18 | End: 2021-01-01

## 2020-04-21 RX ORDER — TAMSULOSIN HYDROCHLORIDE 0.4 MG/1
0.4 CAPSULE ORAL DAILY
COMMUNITY
Start: 2020-04-17 | End: 2020-07-15

## 2020-04-21 RX ORDER — INSULIN ASPART 100 [IU]/ML
7 INJECTION, SOLUTION INTRAVENOUS; SUBCUTANEOUS
COMMUNITY
Start: 2020-04-17 | End: 2020-05-12 | Stop reason: SDUPTHER

## 2020-04-21 RX ORDER — ONDANSETRON 4 MG/1
4 TABLET, ORALLY DISINTEGRATING ORAL
COMMUNITY
Start: 2020-04-17 | End: 2020-06-30

## 2020-04-21 NOTE — PROGRESS NOTES
Consent: Jorge Luis Seals, who was seen by synchronous (real-time) audio-video technology, and/or his healthcare decision maker, is aware that this patient-initiated, Telehealth encounter on 4/21/2020 is a billable service, with coverage as determined by his insurance carrier. He is aware that he may receive a bill and has provided verbal consent to proceed: Yes. Assessment & Plan:  
Diagnoses and all orders for this visit: 1. Hospital discharge follow-up 
-     CBC W/O DIFF; Future -     METABOLIC PANEL, COMPREHENSIVE; Future 2. Cirrhosis of liver with ascites, unspecified hepatic cirrhosis type ([facility] [address] -     METABOLIC PANEL, COMPREHENSIVE; Future 3. Acute upper GI bleed 
-     CBC W/O DIFF; Future 4. BREWER (nonalcoholic steatohepatitis) -     METABOLIC PANEL, COMPREHENSIVE; Future 5. Iron deficiency anemia, unspecified iron deficiency anemia type 
-     CBC W/O DIFF; Future 6. Esophageal varices in cirrhosis (HCC) -     METABOLIC PANEL, COMPREHENSIVE; Future Overall, patient doing well with recovered from extensive hospitalization with deconditioning. He is slowly recovering and has follow-up with specialists soon. Ordering labs to further evaluate improvement in blood counts and risk liver labs. He had numerous questions in regards to his prognosis after TIPS which were answered but also recommend he get further clarification from his GI and hepatology specialists. Continue current meds with no changes and have encouraged patient to check his blood pressure. Follow-up and Dispositions · Return in about 4 weeks (around 5/19/2020). [address] Subjective:  
Jorge Luis Seals is a 68 y.o. male who was seen for Hospital Follow Up (Discharged from Rehab on 4/18/20) [address] discharge f/u: 
Admit date: 3/27/2020 Discharge date and time: 4/6/2020 Went to [facility] for rehab until 4/18/2020.  
Patient initially had EGD with variceal banding and then a few weeks later developed severe UGI bleed with hematemesis. He was urgently seen by IR and had and transfused. Had a complicated hospital stay with hepatic encephalopathy with ammonia of 200 which improved at discharge. Denies any further bleeding. Not checking blood pressures regularly since coming home. Will see Dr. Percy Barger for virtual visit later this week. Following with Dr. Radha Bridges and has appointment in 2 days To see Dr. Marian Fry for DM follow-up soon as well. Using walker around house and slowly getting strength. Did well with [facility] but is not doing any home health physical therapy largely because patient is concerned about his 3 dogs in his home Sugars doing well on current regimen. Hospitalist discharge summary reviewed: \"Hepatic encephalopathy POA; resolved. Please keep ammonia within normal range. Pt is alert and orient to self & place, not to date/time 
- ammonia 200 on admission, ammonia level 31 upon discharge. Continue with lactulose; titrate down if pt has more than 3 loose stools/day 
  
Acute respiratory failure on admission which has been resolved Hemorrhagic/hypovolemic shock POA Acute UGIB secondary to esophageal variceal hemorrhage S/p TIPS for ongoing UGIB (3/28) Post TIPS hepatic encephalopathy BREWER cirrhosis 
- Head CT (3/31): No acute intracranial process. 
  US ABD (3/31): There is a trace amount of ascites visualized within the ventral wall hernia 
  appreciate GI input; continue with Xifaxan, carafate, lactulose 
  Continue with Lasix & aldactone; check daily weight 281lbs (4/2)->262lbs (4/3) 
  -> check daily labs 
  Trace amount of ascites from ABD US on 3/31 
  CXR: Prominent vascular markings. Cannot exclude superimposed infiltrate 
right lung base. 
  
  hgb 7. 6.  Was on octerotide infusion which has been d/c'd on 4/2 
  Continue with PPI 
  Completed IV ceftriaxone for SBP prophylaxis x 7 days as of 4/3.   Resume prophylactic ABX, keflex BID per his pcp - pt's daughter called twice to remind me to make sure he is on ABX 
  
Electrolyte imbalance (K, mg, phos) 
- resolved  
  
Hypertension -  Continue with ACEI with parameter 
   Continue with Lasix and aldactone 
  
Type II DM with hyperglycemia 
- A1C 6.5. Pt was receiving Lantus 40units BID per Dr. Eder Cabrera at home along with oral hypoglycemic agents 
  Lantus 20 units BID (takes 40 units BID at home) 
[facility] blood glucose and follow SSI 
  
Benign essential tremors 
- continue with mysoline 
  
Depression 
- continue with zoloft  
  
4/2: CXR was done due to cough/whezing. CXR revealed Prominent vascular markings. Cannot exclude superimposed infiltrate. Cough resolved. RA O2 sat 100%. duoneb PRN. Currently not concerned with any potential ASDZ. right lung base.  
30.0 - 39.9 Obese / Body mass index is 38.4 kg/m². \" Prior to Admission medications Medication Sig Start Date End Date Taking? Authorizing Provider  
tamsulosin [facility]) 0.4 mg capsule  4/17/20  Yes Provider, Historical  
NovoLOG Flexpen U-100 Insulin 100 unit/mL (3 mL) inpn 7 Units Before breakfast, lunch, and dinner. 4/17/20  Yes Provider, Historical  
potassium chloride SR (KLOR-CON 10) 10 mEq tablet  4/18/20  Yes Provider, Historical  
furosemide (LASIX) 20 mg tablet Take 2 tabs by mouth daily x 1 week and then increase to 1 tab daily Patient taking differently: Take  1 tab daily 4/6/20  Yes Marline Perez, NP  
albuterol-ipratropium (DUO-NEB) 2.5 mg-0.5 mg/3 ml nebu 3 mL by Nebulization route every four (4) hours as needed for Wheezing. 4/6/20  Yes Marline Perez, AURELIANO  
lactulose (CHRONULAC) 10 gram/15 mL solution Take 30 mL by mouth two (2) times a day for 30 days.  May decrease the dose if pt has more than 3 stools/day 4/6/20 5/6/20 Yes Marline Perez, AURELIANO  
insulin glargine (LANTUS) 100 unit/mL injection 20 Units by SubCUTAneous route ACB/HS. Caution: Long Acting Insulin. DO NOT HOLD without physician order. DO NOT MIX or dilute with any other insulin. Patient taking differently: 18 Units by SubCUTAneous route ACB/HS. Caution: Long Acting Insulin. DO NOT HOLD without physician order. DO NOT MIX or dilute with any other insulin. 4/6/20  Yes Marline Perez NP  
thiamine hcl 500 mg tablet Take 500 mg by mouth daily. 4/6/20  Yes Marline Perez NP  
folic acid (FOLVITE) 1 mg tablet Take 1 Tab by mouth daily. 3/26/20  Yes Brandi Reese NP  
lisinopriL (PRINIVIL, ZESTRIL) 5 mg tablet TAKE 2 TABLETS BY MOUTH EVERY DAY Patient taking differently: TAKE 1 TABLETS BY MOUTH EVERY DAY 3/26/20  Yes Brandi Reese NP  
gabapentin (NEURONTIN) 300 mg capsule Take 2 Caps by mouth nightly. 3/16/20  Yes Sumanth Quispe MD  
butalbital-acetaminophen-caffeine (FIORICET, ESGIC) -40 mg per tablet Take 1 Tab by mouth every six (6) hours as needed for Headache. 3/6/20  Yes Sumanth Quispe MD  
cephALEXin (KEFLEX) 500 mg capsule Take 1 Cap by mouth two (2) times a day. 2/24/20  Yes Sumanth Quispe MD  
sertraline (ZOLOFT) 100 mg tablet TAKE 1.5 TABLETS DAILY 2/21/20  Yes Sumanth Quispe MD  
spironolactone (ALDACTONE) 25 mg tablet Take 1 Tab by mouth daily. 2/4/20  Yes Sumanth Quispe MD  
ergocalciferol (VITAMIN D2) 50,000 unit capsule TAKE 1 CAPSULE EVERY 7 DAYS 1/6/20  Yes Sumanth Quispe MD  
atorvastatin (LIPITOR) 40 mg tablet Take 1 Tab by mouth daily. 1/4/20  Yes Rohini Scott MD  
glucose blood VI test strips (ASCENSIA AUTODISC VI, ONE TOUCH ULTRA TEST VI) strip Test blood sugar twice daily Diagnosis E 11.9. Can use [employer] 12/13/19  Yes Sumanth Quispe MD  
primidone (MYSOLINE) 250 mg tablet TAKE 1 TABLET TWICE A DAY 11/7/19  Yes Sumanth Quispe MD  
acetaminophen (TYLENOL) 500 mg tablet Take 2 Tabs by mouth every six (6) hours as needed for Pain.  10/7/19  Yes Vinny Bragg MD  
 Blood-Glucose Meter (ACCU-CHEK SOWMYA PLUS METER) misc Test blood sugar twice daily Diagnosis E 11.9 7/9/19  Yes Sumanth Quispe MD  
Blood Glucose Control High&Low (ACCU-CHEK SOWMYA CONTROL SOLN) soln Test blood sugar twice daily Diagnosis E 11.9 7/9/19  Yes Sumanth Quispe MD  
B.infantis-B.ani-B.long-B.bifi (PROBIOTIC 4X) 10-15 mg TbEC Take  by mouth. Yes Provider, Historical  
magnesium 250 mg tab Take 500 mg by mouth daily. Yes Provider, Historical  
calcium citrate-vitamin D3 (CITRACAL + D) tablet Take 2 Tabs by mouth two (2) times a day. Yes Provider, Historical  
ondansetron (ZOFRAN ODT) 4 mg disintegrating tablet  4/17/20   Provider, Historical  
rifAXIMin (XIFAXAN) 550 mg tablet Take 1 Tab by mouth two (2) times a day for 30 days. 4/6/20 5/6/20  Marline Perez, AURELIANO  
ferrous sulfate 140 mg (45 mg iron) TbER ER tablet Take 1 Tab by mouth Daily (before breakfast). 2/4/20   Sumanth Quispe MD  
glucose blood VI test strips (ACCU-CHEK SOWMYA PLUS TEST STRP) strip Test blood sugar twice daily Diagnosis E 11.9 12/5/19 4/21/20  Sumanth Quispe MD  
melatonin tab tablet Take 10 mg by mouth nightly as needed. Provider, Historical  
potassium 99 mg tablet Take 99 mg by mouth two (2) times a day. 4/21/20  Provider, Historical  
 
Allergies Allergen Reactions  Nsaids (Non-Steroidal Anti-Inflammatory Drug) Other (comments) Hx of PUD and Hinson's Esophagus Patient Active Problem List  
 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  Obesity 03/04/2018  Insomnia 03/04/2018  Infected prosthesis of right hip ([address] 02/26/2018  PFO (patent foramen ovale) 07/26/2017  Systolic murmur 69/38/5758  Jessica-prosthetic fracture of femur following total hip arthroplasty 08/25/2016  Osteoarthritis of right hip 08/01/2016  Benign essential tremor  Diabetes mellitus type 2, uncontrolled ([address] 06/03/2013  Hypogonadism male 08/29/2012  Encounter for long-term (current) use of medications 07/29/2010  Osteoarthritis of hip  Depression  ED (erectile dysfunction) of organic origin  GERD (gastroesophageal reflux disease)  PUD (peptic ulcer disease)  Hinson's esophagus with esophagitis  HTN (hypertension) 03/17/2010  Hypercholesterolemia 03/17/2010 Past Medical History:  
Diagnosis Date  ADD (attention deficit disorder)  Anemia due to blood loss  Hinson's esophagus with esophagitis  Benign essential tremor  Cancer [facility]) 2012 [facility]  Cirrhosis ([address]  Depression  DJD (degenerative joint disease) of hip   
 bilat  DM (diabetes mellitus) ([address] 3/17/2010  ED (erectile dysfunction) of organic origin  Esophageal varices determined by endoscopy ([address]  Fall on or from sidewalk curb 9/24/2015  Femur fracture ([address]  Gastritis and duodenitis  GERD (gastroesophageal reflux disease)   
 resolved after discontinuing diclofenac  Hematuria 6/2016  High cholesterol 03/17/2010  
 pt denies 6/19  
 HTN (hypertension) 3/17/2010  Morbid obesity ([address]  Murmur, cardiac 2016  
 PFO (patent foramen ovale) 7/26/2017  PUD (peptic ulcer disease)  Shingles 6/2016 RESOLVING- NO RASH (HEAD)  Sleep apnea   
 doesnt use CPAP anymore ROS Gen - no fever/chills Resp - no dyspnea or cough CV - no chest pain or WANG Rest per HPI Objective:  
Vital Signs: (As obtained by patient/caregiver at home) Visit Vitals Ht 5' 10\" (1.778 m) Wt 260 lb (117.9 kg) BMI 37.31 kg/m² Physical exam: 
General appearance - alert, well appearing, and in no distress Eyes -sclera anicteric, no discharge HEENT normocephalic, atraumatic, moist mucous membranes, no visualized neck mass Chest -normal respiratory effort, no visualized signs of respiratory distress Neurological - alert, awake, normal speech, no focal findings or movement disorder noted Psych - normal mood and affect Skin no apparent lesions We discussed the expected course, resolution and complications of the diagnosis(es) in detail. Medication risks, benefits, costs, interactions, and alternatives were discussed as indicated. I advised him to contact the office if his condition worsens, changes or fails to improve as anticipated. He expressed understanding with the diagnosis(es) and plan. Moreno Beasley is a 68 y.o. male being evaluated by a video visit encounter for concerns as above. A caregiver was present when appropriate. Due to this being a TeleHealth encounter (During [facility] public health emergency), evaluation of the following organ systems was limited: Vitals/Constitutional/EENT/Resp/CV/GI//MS/Neuro/Skin/Heme-Lymph-Imm. Pursuant to the emergency declaration under the [facility], [facility] waiver authority and the [employer] and [employer] Act, this Virtual  Visit was conducted, with patient's (and/or legal guardian's) consent, to reduce the patient's risk of exposure to COVID-19 and provide necessary medical care. Services were provided through a video synchronous discussion virtually to substitute for in-person clinic visit. Patient and provider were located at their individual homes.  
 
 
 
Jluis Denny MD

## 2020-04-21 NOTE — PROGRESS NOTES
Appointment scheduled for 4 week follow-up with Dr Raquel Sepulveda and lab slip mailed to home address.

## 2020-04-23 ENCOUNTER — VIRTUAL VISIT (OUTPATIENT)
Dept: HEMATOLOGY | Age: 70
End: 2020-04-23

## 2020-04-23 DIAGNOSIS — K74.60 CIRRHOSIS OF LIVER WITHOUT ASCITES, UNSPECIFIED HEPATIC CIRRHOSIS TYPE (HCC): Primary | ICD-10-CM

## 2020-04-23 PROBLEM — Z95.828 S/P TIPS (TRANSJUGULAR INTRAHEPATIC PORTOSYSTEMIC SHUNT): Status: ACTIVE | Noted: 2020-04-23

## 2020-04-23 PROBLEM — E66.01 SEVERE OBESITY (HCC): Status: RESOLVED | Noted: 2020-03-05 | Resolved: 2020-04-23

## 2020-04-23 PROBLEM — I85.01 ESOPHAGEAL VARICES WITH BLEEDING (HCC): Status: ACTIVE | Noted: 2020-03-28

## 2020-04-23 NOTE — PROGRESS NOTES
[address] [facility] 137 [facility] Kole Duff MD, [facility] MD Mandy Khalil PA-ANTHONY Vanegas, ACNP-BC April S Christa, [facility]   Mirela Verdechester, FNP-C Adelaide May, [facility] Center[facility] Dagmar Deputado Alan De [facility] 136 
  at [facility] 
  [address] [facility] Maria Lu[place], [address] An[address]ard  22. 
  [phone] FAX: [address] [place] [address] 
  [address], [address] [facility], [address] 
  [phone] FAX: [phone] Patient Care Team: 
Phoebe Limon MD as PCP - [facility]) Phoebe Limon MD as PCP - [facility] Mati Herrmann MD as Physician (Sleep Medicine) Billy Chambers MD (Orthopedic Surgery) Debi Bojorquez MD (Ophthalmology) Pricila Pinon NP (Nurse Practitioner) Thais Hutchison MD (Neurology) Sandip Lockett MD (Endocrinology) Jonnie Dumont MD as Physician (Cardiology) Luis Rosen MD (Gastroenterology) Problem List  Date Reviewed: 4/6/2020 Codes Class Noted BREWER (nonalcoholic steatohepatitis) ICD-10-CM: J84.22 ICD-9-CM: 571.8  4/6/2020 Hepatic encephalopathy ([facility] ICD-10-CM: K72.90 ICD-9-CM: 572.2  4/6/2020 Acute upper GI bleed ICD-10-CM: K92.2 ICD-9-CM: 578.9  3/28/2020 Severe obesity ([facility] ICD-10-CM: E66.01 
ICD-9-CM: 278.01  3/5/2020 Dislocation of prosthetic joint [facility]) ICD-10-CM: Z75.758H 
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 Brenda Arvizu ICD-9-CM: 996.44, V43.64  8/25/2016 Osteoarthritis of right hip ICD-10-CM: M16.11 
ICD-9-CM: 715.95  8/1/2016 Benign essential tremor ICD-10-CM: G25.0 ICD-9-CM: 333.1  Unknown Diabetes mellitus type 2, uncontrolled ([facility] ICD-10-CM: E11.65 ICD-9-CM: 250.02  6/3/2013 Hypogonadism male ICD-10-CM: E29.1 ICD-9-CM: 257.2  8/29/2012 Encounter for long-term (current) use of medications ICD-10-CM: Z79.899 ICD-9-CM: V58.69  7/29/2010 Osteoarthritis of hip ICD-10-CM: M16.9 ICD-9-CM: 715.95  Unknown Overview Signed 4/29/2010  6:55 AM by Zak Carrillo Depression ICD-10-CM: F32.9 ICD-9-CM: 557  Unknown  
   
 ED (erectile dysfunction) of organic origin ICD-10-CM: N52.9 ICD-9-CM: 607.84  Unknown GERD (gastroesophageal reflux disease) ICD-10-CM: K21.9 ICD-9-CM: 530.81  Unknown  
   
 PUD (peptic ulcer disease) ICD-10-CM: K27.9 ICD-9-CM: 533.90  Unknown Hinson's esophagus with esophagitis ICD-10-CM: K22.70, K20.9 ICD-9-CM: 530.85, 530.10  Unknown HTN (hypertension) ICD-10-CM: I10 
ICD-9-CM: 401.9  3/17/2010 Hypercholesterolemia ICD-10-CM: E78.00 ICD-9-CM: 272.0  3/17/2010 VIRTUAL TELEHEALTH VISIT PERFORMED DUE TO COVID-19 EPIDEMIC 
 
CONSENT: 
Fior Paulino, who was seen by synchronous, real-time, audio-video technology, and/or his healthcare decision maker, is aware that this patient-initiated, Telehealth encounter on 4/23/2020 is a billable service, with coverage as determined by his insurance carrier.  He is aware that he may receive a bill and has provided verbal consent to proceed. This patient was evaluated during a Virtual Telehealth visit. A caregiver was present if appropriate. Due to this being a TeleHealth encounter performed during the ULIPZ-10 public health emergency, the physical examination was limited to that listed [facility] the [address] [facility] 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 The active problem list, all pertinent past medical history, medications, radiologic findings and laboratory findings related to the liver disorder were reviewed with the patient. The patient is a 68 y.o.  male who was found to have chronic liver disease and cirrhosis in 7/2019 when esophageal varices were found on EGD. Since the last office appointment the patient: 
Had a variceal bleed which could not be controlled with EGD and banding. He underwent emergent TIPS placement. He developed HE and ascites during the hospitalization both of which are now improved. He spend several weeks in rehab after the hospitalization. The patient has developed the following complications of cirrhosis: esophageal varices, variceal bleeding, ascites,  
edema, hepatic encephalopathy, The patient has the following symptoms which are thought to be due to the liver disease:  fatigue, he is using a walker to ambulate but is getting stronger. The patient is not currently experiencing the following symptoms of liver disease:  pain in the right side over the liver, swelling of abdomen, hematemesis, hematochezia. The patient has moderate limitations in daily activities due to the liver disease ASSESSMENT AND PLAN: 
Cirrhosis Cirrhosis is presumed secondary to BREWER. The diagnosis of cirrhosis is based upon Fiboscan, imaging, laboratory studies, complications of cirrhosis. The CTP is 9. Child class B. The MELD score is 9. BREWER 
Suspect the patient has fatty liver based upon imaging, Fiboscan CAP score, features of metabolic syndrome, serologic studies that are negative for other causes of chronic liver disease, The histologic severity has not been defined. Elastography performed in 3/2020 suggests steatosis with Cirrhosis. Liver transaminases are normal.  ALP is normal.  Liver function is normal.  Serum albumin is depressed. The platelet count is depressed. Will perform laboratory testing to monitor liver function and degree of liver injury. This included BMP, hepatic panel, CBC with platelet count, INR. If the patient looses 20% of current body weight, which is 54 pounds, down to a weight of of 210 pounds, all steatosis will have resolved. Once all steatosis has resolved all inflammation will resolve. Then all fibrosis will gradually resolve and the liver could eventually be normal. 
 
Counseling for diet and weight loss in patients with confirmed or suspected NAFLD The patient was counseled regarding diet and exercise to achieve weight loss. The best diet for patients with fatty liver is one very low in carbohydrates and enriched with protein such as an Kailyn's program.   
 
The patient was told not to consume any food products and drinks containing fructose as this enhances hepatic fat synthesis. There is no medication or vitamin supplements that we advocate for BREWER. Using glitazones in patients without diabetes mellitus has been shown to reduce fat content in the liver but has no effect on fibrosis and is associated with weight gain. Vitamin E has also been used but the data is not very good and most experts no longer advocate this. Ascites Ascites developed following variceal hemorrhage and rescusitation. This has resolved with current dose of diuretics. Will continue the current dose of diuretics at step 20/25.  
The patient was counseled regarding the need to maintain sodium restriction and the types of foods containing high amounts of sodium to be avoided. Lower extremity edema Edema has resolved with current dose of diuretics. Will continue the current dose of diuretics at lasix 40 mg, aldactone 25 mg every day. Screening for Esophageal varices The patient has esophageal varices with prior bleeding. Varices were treated with TIPS. Repeat EGD to assess for varices is no longer needed with TIPS. Hepatic encephalopathy Overt HE developed during the recent hospitalization. This was treated with lactulose and Xifaxan during the hospitalization. After the hospitalization he could not afford the co-pay for xifaxan and he has stay on lactulose alone. Will sent Rx for Xifaxan 550 mg BID to [facility]. There is no need to restrict dietary protein at this time. Thrombocytopenia This is secondary to cirrhosis. There is no evidence of overt bleeding. No treatment is required. The platelet count is adequate for the patient to undergo procedures without the need for platelet transfusion or platelet growth factors. Anemia This is due to recent GI bleeding and acute blood loss Will obtain FE panel to assess for iron stores. Will replace FE stores if low with IV FE. Screening for Hepatocellular Carcinoma [address] screening has recently been performed and does not suggest [address] The next liver imaging study will be performed in 9/2020. Treatment of other medical problems in patients with chronic liver disease There are no contraindications for the patient to take most medications that are necessary for treatment of other medical issues. The patient has cirrhrosis and should avoid taking NSAIDs which are associated with a higher rate of developing ROXANE. The patient can take Any medications utilized for treatment of DM, Statins to treat hypercholesterolemia The patient does not comsume alcohol on a daily basis. Normal doses of acetaminophen, as recommended on the label of the bottle, are not hepatotoxic except in the setting of daily alcohol use, even in patients with cirrhosis and can be utilized for pain. Counseling for alcohol in patients with chronic liver disease The patient was counseled regarding alcohol consumption and the effect of alcohol on chronic liver disease. The patient has not consumed alcohol since 1979. Osteoporosis The risk of osteoporosis is increased in patients with cirrhosis. DEXA bone density to assess for osteoporosis has not been performed. This should be ordered by the patients primary care physician. Vaccinations Vaccination for viral hepatitis A and B is recommended since the patient has no serologic evidence of previous exposure or vaccination with immunity. Routine vaccinations against other bacterial and viral agents can be performed as indicated. Annual flu vaccination should be administered if indicated. ALLERGIES Allergies Allergen Reactions  Nsaids (Non-Steroidal Anti-Inflammatory Drug) Other (comments) Hx of PUD and Hinson's Esophagus MEDICATIONS Current Outpatient Medications Medication Sig  tamsulosin (FLOMAX) 0.4 mg capsule  ondansetron (ZOFRAN ODT) 4 mg disintegrating tablet  NovoLOG Flexpen U-100 Insulin 100 unit/mL (3 mL) inpn 7 Units Before breakfast, lunch, and dinner.  potassium chloride SR (KLOR-CON 10) 10 mEq tablet  furosemide (LASIX) 20 mg tablet Take 2 tabs by mouth daily x 1 week and then increase to 1 tab daily (Patient taking differently: Take  1 tab daily)  rifAXIMin (XIFAXAN) 550 mg tablet Take 1 Tab by mouth two (2) times a day for 30 days.  albuterol-ipratropium (DUO-NEB) 2.5 mg-0.5 mg/3 ml nebu 3 mL by Nebulization route every four (4) hours as needed for Wheezing.   
 lactulose (CHRONULAC) 10 gram/15 mL solution Take 30 mL by mouth two (2) times a day for 30 days. May decrease the dose if pt has more than 3 stools/day  insulin glargine (LANTUS) 100 unit/mL injection 20 Units by SubCUTAneous route ACB/HS. Caution: Long Acting Insulin. DO NOT HOLD without physician order. DO NOT MIX or dilute with any other insulin. (Patient taking differently: 18 Units by SubCUTAneous route ACB/HS. Caution: Long Acting Insulin. DO NOT HOLD without physician order. DO NOT MIX or dilute with any other insulin.)  thiamine hcl 500 mg tablet Take 500 mg by mouth daily.  folic acid (FOLVITE) 1 mg tablet Take 1 Tab by mouth daily.  lisinopriL (PRINIVIL, ZESTRIL) 5 mg tablet TAKE 2 TABLETS BY MOUTH EVERY DAY (Patient taking differently: TAKE 1 TABLETS BY MOUTH EVERY DAY)  gabapentin (NEURONTIN) 300 mg capsule Take 2 Caps by mouth nightly.  butalbital-acetaminophen-caffeine (FIORICET, ESGIC) -40 mg per tablet Take 1 Tab by mouth every six (6) hours as needed for Headache.  cephALEXin (KEFLEX) 500 mg capsule Take 1 Cap by mouth two (2) times a day.  sertraline (ZOLOFT) 100 mg tablet TAKE 1.5 TABLETS DAILY  spironolactone (ALDACTONE) 25 mg tablet Take 1 Tab by mouth daily.  ferrous sulfate 140 mg (45 mg iron) TbER ER tablet Take 1 Tab by mouth Daily (before breakfast).  ergocalciferol (VITAMIN D2) 50,000 unit capsule TAKE 1 CAPSULE EVERY 7 DAYS  atorvastatin (LIPITOR) 40 mg tablet Take 1 Tab by mouth daily.  glucose blood VI test strips (ASCENSIA AUTODISC VI, ONE TOUCH ULTRA TEST VI) strip Test blood sugar twice daily Diagnosis E 11.9. Can use [employer]  primidone (MYSOLINE) 250 mg tablet TAKE 1 TABLET TWICE A DAY  acetaminophen (TYLENOL) 500 mg tablet Take 2 Tabs by mouth every six (6) hours as needed for Pain.  Blood-Glucose Meter (ACCU-CHEK SOWMYA PLUS METER) misc Test blood sugar twice daily Diagnosis E 11.9  Blood Glucose Control High&Low (ACCU-CHEK SOWMYA CONTROL SOLN) soln Test blood sugar twice daily Diagnosis E 11.9  
 B.infantis-B.ani-B.long-B.bifi (PROBIOTIC 4X) 10-15 mg TbEC Take  by mouth.  magnesium 250 mg tab Take 500 mg by mouth daily.  melatonin tab tablet Take 10 mg by mouth nightly as needed.  calcium citrate-vitamin D3 (CITRACAL + D) tablet Take 2 Tabs by mouth two (2) times a day. No current facility-administered medications for this visit. SYSTEM REVIEW NOT RELATED TO LIVER DISEASE OR REVIEWED ABOVE: 
Constitution systems: Negative for fever, chills, weight gain, weight loss. Eyes: Negative for visual changes. ENT: Negative for sore throat, painful swallowing. Respiratory: Negative for cough, hemoptysis, SOB. Cardiology: Negative for chest pain, palpitations. GI:  Negative for constipation or diarrhea. : Negative for urinary frequency, dysuria, hematuria, nocturia. Skin: Negative for rash. Hematology: Negative for easy bruising, blood clots. Musculo-skeletal: Negative for back pain, muscle pain, weakness. Neurologic: Negative for headaches, dizziness, vertigo, memory problems not related to HE. Psychology: Negative for anxiety, depression. FAMILY HISTORY: 
The father  of Multiple myeloma. The mother  of dementia. There is no family history of liver disease. SOCIAL HISTORY: 
The patient is . The patient has 3 children, and 6 grandchildren. The patient stopped using tobacco products in . The patient has been abstinent from alcohol since . The patient used to work for the [employer]. PHYSICAL EXAMINATION PERFORMED BY VIRTUAL TELEHEALTH: 
VS: Not performed General: No acute distress. Eyes: Sclera anicteric. ENT: No oral lesions. Skin: No rashes. spider angiomata. No jaundice. Abdomen: No obvious distention suggesting ascites. Extremities: No edema. No muscle wasting. Neurologic: Alert and oriented. Cranial nerves grossly intact. LABORATORY STUDIES: 
Liver [place] [address] & Units 4/4/2020 WBC 4.1 - 11.1 K/uL 8.5 ANC 1.8 - 8.0 K/UL 5.4 HGB 12.1 - 17.0 g/dL 7.6 (L)  - 400 K/uL 143 (L) INR 0.9 - 1.1 AST 15 - 37 U/L 34 ALT 12 - 78 U/L 52 Alk Phos 45 - 117 U/L 115 Bili, Total 0.2 - 1.0 MG/DL 1.1 (H) Bili, Direct 0.0 - 0.2 MG/DL Albumin 3.5 - 5.0 g/dL 2.1 (L) BUN 6 - 20 MG/DL 13 Creat 0.70 - 1.30 MG/DL 0.65 (L) Na 136 - 145 mmol/L 139  
K 3.5 - 5.1 mmol/L 3.1 (L) Cl 97 - 108 mmol/L 109 (H)  
CO2 21 - 32 mmol/L 25 Glucose 65 - 100 mg/dL 81  
Magnesium 1.6 - 2.4 mg/dL 1.8 Ammonia <32 UMOL/L 31 Cancer Screening Latest Ref Rng & Units 1/30/2020 7/24/2019 AFP Tumor Marker 0.0 - 8.3 ng/mL  1.9  
AFP, Serum 0.0 - 8.0 ng/mL 2.1 AFP-L3% 0.0 - 9.9 % Comment SEROLOGIES: 
Serologies Latest Ref Rng & Units 1/30/2020 12/12/2018 Hep A Ab, Total Negative Negative Hep B Surface Ag Negative Negative Hep B Core Ab, Total Negative Negative Hep B Surface AB QL  Non Reactive Hep C Ab 0.0 - 0.9 s/co ratio <0.1 Ferritin 30 - 400 ng/mL 35 80 Iron % Saturation 15 - 55 % 10 (L) 12 (L) Alpha-1 antitrypsin level 101 - 187 mg/dL 183 LIVER HISTOLOGY: 
3/2020. FibroScan performed at [address] EkPa was 19. Suggested fibrosis level is F4. CAP score was 350, this is consistent with steatosis. ENDOSCOPIC PROCEDURES: 
6/2019. EGD by [employer]. Large esophageal varices. No banding performed. 2019. Colonoscopy by [facility] Betancourt. Normal 
 
RADIOLOGY: 
7/2016. Triple phase CT scan. Changes consistent with cirrhosis. No liver mass lesions. No dilated bile ducts. Splenomegaly. No ascites. 7/2019. Ultrasound of liver. Echogenic consistent with cirrhosis. No liver mass lesions. No dilated bile ducts. No ascites. 3/2020. Ultrasound of liver. Echogenic consistent with cirrhosis. No liver mass lesions. No dilated bile ducts. No ascites. OTHER TESTING: 
Not available or performed FOLLOW-UP: 
Pursuant to the emergency declaration under the [address] [facility], [address] [place] [address] authority and the [employer] Act, this Virtual  Visit was conducted, with the patient's (and/or their legal guardian's) consent, to reduce the patient's risk of exposure to COVID-19 and provide necessary medical care. Services were provided through a video synchronous discussion virtually to substitute for an in-person clinic visit. The patient was located in their home. The provider was located in the [place] [address] office. Because of the COVID-19 epidemic a follow-up appointment will be performed via TeleHealth in 4 weeks for routine monitoring. Orders to obtain laboratory testing will be mailed to the patient and obtained 1 week prior to the next TeleHealth encounter. MD Dagmar Cortez [facility] [place] [address], [address] [facility], [facility] 22. 
[phone] [address] [place] [address]

## 2020-05-05 DIAGNOSIS — K74.60 ESOPHAGEAL VARICES IN CIRRHOSIS (HCC): ICD-10-CM

## 2020-05-05 DIAGNOSIS — K75.81 NASH (NONALCOHOLIC STEATOHEPATITIS): ICD-10-CM

## 2020-05-05 DIAGNOSIS — R18.8 CIRRHOSIS OF LIVER WITH ASCITES, UNSPECIFIED HEPATIC CIRRHOSIS TYPE (HCC): ICD-10-CM

## 2020-05-05 DIAGNOSIS — I85.10 ESOPHAGEAL VARICES IN CIRRHOSIS (HCC): ICD-10-CM

## 2020-05-05 DIAGNOSIS — Z09 HOSPITAL DISCHARGE FOLLOW-UP: ICD-10-CM

## 2020-05-05 DIAGNOSIS — K74.60 CIRRHOSIS OF LIVER WITH ASCITES, UNSPECIFIED HEPATIC CIRRHOSIS TYPE (HCC): ICD-10-CM

## 2020-05-05 DIAGNOSIS — D50.9 IRON DEFICIENCY ANEMIA, UNSPECIFIED IRON DEFICIENCY ANEMIA TYPE: ICD-10-CM

## 2020-05-05 DIAGNOSIS — K92.2 ACUTE UPPER GI BLEED: ICD-10-CM

## 2020-05-12 ENCOUNTER — PATIENT MESSAGE (OUTPATIENT)
Dept: FAMILY MEDICINE CLINIC | Age: 70
End: 2020-05-12

## 2020-05-15 RX ORDER — INSULIN GLARGINE 100 [IU]/ML
20 INJECTION, SOLUTION SUBCUTANEOUS
Qty: 36 ML | Refills: 0 | Status: SHIPPED | OUTPATIENT
Start: 2020-05-15 | End: 2020-05-27 | Stop reason: SDUPTHER

## 2020-05-15 RX ORDER — INSULIN ASPART 100 [IU]/ML
7 INJECTION, SOLUTION INTRAVENOUS; SUBCUTANEOUS
Qty: 18.9 ML | Refills: 0 | Status: SHIPPED | OUTPATIENT
Start: 2020-05-15 | End: 2020-05-27 | Stop reason: SDUPTHER

## 2020-05-18 ENCOUNTER — APPOINTMENT (OUTPATIENT)
Dept: GENERAL RADIOLOGY | Age: 70
End: 2020-05-18
Attending: PHYSICIAN ASSISTANT
Payer: MEDICARE

## 2020-05-18 ENCOUNTER — ANESTHESIA (OUTPATIENT)
Dept: SURGERY | Age: 70
End: 2020-05-18
Payer: MEDICARE

## 2020-05-18 ENCOUNTER — APPOINTMENT (OUTPATIENT)
Dept: GENERAL RADIOLOGY | Age: 70
End: 2020-05-18
Attending: ORTHOPAEDIC SURGERY
Payer: MEDICARE

## 2020-05-18 ENCOUNTER — HOSPITAL ENCOUNTER (EMERGENCY)
Age: 70
Discharge: HOME | End: 2020-05-18
Attending: EMERGENCY MEDICINE
Payer: MEDICARE

## 2020-05-18 ENCOUNTER — ANESTHESIA EVENT (OUTPATIENT)
Dept: SURGERY | Age: 70
End: 2020-05-18
Payer: MEDICARE

## 2020-05-18 VITALS
WEIGHT: 260 LBS | HEART RATE: 81 BPM | HEIGHT: 70 IN | OXYGEN SATURATION: 98 % | SYSTOLIC BLOOD PRESSURE: 135 MMHG | TEMPERATURE: 98 F | BODY MASS INDEX: 37.22 KG/M2 | DIASTOLIC BLOOD PRESSURE: 60 MMHG | RESPIRATION RATE: 16 BRPM

## 2020-05-18 DIAGNOSIS — R53.1 WEAKNESS: ICD-10-CM

## 2020-05-18 DIAGNOSIS — S73.004A HIP DISLOCATION, RIGHT, INITIAL ENCOUNTER (HCC): Primary | ICD-10-CM

## 2020-05-18 LAB
ALBUMIN SERPL-MCNC: 2.9 G/DL (ref 3.5–5)
ALBUMIN/GLOB SERPL: 0.7 {RATIO} (ref 1.1–2.2)
ALP SERPL-CCNC: 141 U/L (ref 45–117)
ALT SERPL-CCNC: 24 U/L (ref 12–78)
ANION GAP SERPL CALC-SCNC: 9 MMOL/L (ref 5–15)
APTT PPP: 32.4 SEC (ref 22.1–32)
AST SERPL-CCNC: 27 U/L (ref 15–37)
BASOPHILS # BLD: 0.1 K/UL (ref 0–0.1)
BASOPHILS NFR BLD: 1 % (ref 0–1)
BILIRUB SERPL-MCNC: 1 MG/DL (ref 0.2–1)
BUN SERPL-MCNC: 13 MG/DL (ref 6–20)
BUN/CREAT SERPL: 12 (ref 12–20)
CALCIUM SERPL-MCNC: 8.7 MG/DL (ref 8.5–10.1)
CHLORIDE SERPL-SCNC: 111 MMOL/L (ref 97–108)
CO2 SERPL-SCNC: 20 MMOL/L (ref 21–32)
CREAT SERPL-MCNC: 1.09 MG/DL (ref 0.7–1.3)
DIFFERENTIAL METHOD BLD: ABNORMAL
EOSINOPHIL # BLD: 0.2 K/UL (ref 0–0.4)
EOSINOPHIL NFR BLD: 3 % (ref 0–7)
ERYTHROCYTE [DISTWIDTH] IN BLOOD BY AUTOMATED COUNT: 15.6 % (ref 11.5–14.5)
GLOBULIN SER CALC-MCNC: 4.1 G/DL (ref 2–4)
GLUCOSE SERPL-MCNC: 224 MG/DL (ref 65–100)
HCT VFR BLD AUTO: 28.6 % (ref 36.6–50.3)
HGB BLD-MCNC: 8.9 G/DL (ref 12.1–17)
IMM GRANULOCYTES # BLD AUTO: 0 K/UL (ref 0–0.04)
IMM GRANULOCYTES NFR BLD AUTO: 0 % (ref 0–0.5)
INR PPP: 1.2 (ref 0.9–1.1)
LYMPHOCYTES # BLD: 0.5 K/UL (ref 0.8–3.5)
LYMPHOCYTES NFR BLD: 9 % (ref 12–49)
MCH RBC QN AUTO: 26.3 PG (ref 26–34)
MCHC RBC AUTO-ENTMCNC: 31.1 G/DL (ref 30–36.5)
MCV RBC AUTO: 84.4 FL (ref 80–99)
MONOCYTES # BLD: 0.4 K/UL (ref 0–1)
MONOCYTES NFR BLD: 7 % (ref 5–13)
NEUTS SEG # BLD: 4 K/UL (ref 1.8–8)
NEUTS SEG NFR BLD: 80 % (ref 32–75)
NRBC # BLD: 0 K/UL (ref 0–0.01)
NRBC BLD-RTO: 0 PER 100 WBC
PLATELET # BLD AUTO: 142 K/UL (ref 150–400)
PMV BLD AUTO: 10.7 FL (ref 8.9–12.9)
POTASSIUM SERPL-SCNC: 4.1 MMOL/L (ref 3.5–5.1)
PROT SERPL-MCNC: 7 G/DL (ref 6.4–8.2)
PROTHROMBIN TIME: 11.9 SEC (ref 9–11.1)
RBC # BLD AUTO: 3.39 M/UL (ref 4.1–5.7)
RBC MORPH BLD: ABNORMAL
SODIUM SERPL-SCNC: 140 MMOL/L (ref 136–145)
THERAPEUTIC RANGE,PTTT: ABNORMAL SECS (ref 58–77)
WBC # BLD AUTO: 5.2 K/UL (ref 4.1–11.1)

## 2020-05-18 PROCEDURE — 99285 EMERGENCY DEPT VISIT HI MDM: CPT

## 2020-05-18 PROCEDURE — 85730 THROMBOPLASTIN TIME PARTIAL: CPT

## 2020-05-18 PROCEDURE — 73501 X-RAY EXAM HIP UNI 1 VIEW: CPT

## 2020-05-18 PROCEDURE — 76060000031 HC ANESTHESIA FIRST 0.5 HR: Performed by: ORTHOPAEDIC SURGERY

## 2020-05-18 PROCEDURE — 73502 X-RAY EXAM HIP UNI 2-3 VIEWS: CPT

## 2020-05-18 PROCEDURE — 96376 TX/PRO/DX INJ SAME DRUG ADON: CPT

## 2020-05-18 PROCEDURE — 74011250636 HC RX REV CODE- 250/636: Performed by: NURSE ANESTHETIST, CERTIFIED REGISTERED

## 2020-05-18 PROCEDURE — 74011000250 HC RX REV CODE- 250: Performed by: NURSE ANESTHETIST, CERTIFIED REGISTERED

## 2020-05-18 PROCEDURE — 76010000154 HC OR TIME FIRST 0.5 HR: Performed by: ORTHOPAEDIC SURGERY

## 2020-05-18 PROCEDURE — 74011250636 HC RX REV CODE- 250/636: Performed by: PHYSICIAN ASSISTANT

## 2020-05-18 PROCEDURE — 75810000301 HC ER LEVEL 1 CLOSED TREATMNT FRACTURE/DISLOCATION

## 2020-05-18 PROCEDURE — 85025 COMPLETE CBC W/AUTO DIFF WBC: CPT

## 2020-05-18 PROCEDURE — 77030008684 HC TU ET CUF COVD -B: Performed by: NURSE ANESTHETIST, CERTIFIED REGISTERED

## 2020-05-18 PROCEDURE — 76210000020 HC REC RM PH II FIRST 0.5 HR: Performed by: ORTHOPAEDIC SURGERY

## 2020-05-18 PROCEDURE — 96374 THER/PROPH/DIAG INJ IV PUSH: CPT

## 2020-05-18 PROCEDURE — 99152 MOD SED SAME PHYS/QHP 5/>YRS: CPT

## 2020-05-18 PROCEDURE — 76000 FLUOROSCOPY <1 HR PHYS/QHP: CPT

## 2020-05-18 PROCEDURE — 93005 ELECTROCARDIOGRAM TRACING: CPT

## 2020-05-18 PROCEDURE — 80053 COMPREHEN METABOLIC PANEL: CPT

## 2020-05-18 PROCEDURE — 74011000250 HC RX REV CODE- 250: Performed by: EMERGENCY MEDICINE

## 2020-05-18 PROCEDURE — 71045 X-RAY EXAM CHEST 1 VIEW: CPT

## 2020-05-18 PROCEDURE — 96375 TX/PRO/DX INJ NEW DRUG ADDON: CPT

## 2020-05-18 PROCEDURE — 74011250636 HC RX REV CODE- 250/636

## 2020-05-18 PROCEDURE — 77030026438 HC STYL ET INTUB CARD -A: Performed by: NURSE ANESTHETIST, CERTIFIED REGISTERED

## 2020-05-18 PROCEDURE — 85610 PROTHROMBIN TIME: CPT

## 2020-05-18 PROCEDURE — 74011250636 HC RX REV CODE- 250/636: Performed by: EMERGENCY MEDICINE

## 2020-05-18 PROCEDURE — 36415 COLL VENOUS BLD VENIPUNCTURE: CPT

## 2020-05-18 PROCEDURE — 76210000016 HC OR PH I REC 1 TO 1.5 HR: Performed by: ORTHOPAEDIC SURGERY

## 2020-05-18 RX ORDER — PROPOFOL 10 MG/ML
0.5 INJECTION, EMULSION INTRAVENOUS
Status: COMPLETED | OUTPATIENT
Start: 2020-05-18 | End: 2020-05-18

## 2020-05-18 RX ORDER — HYDROCODONE BITARTRATE AND ACETAMINOPHEN 5; 325 MG/1; MG/1
1 TABLET ORAL
Qty: 20 TAB | Refills: 0 | OUTPATIENT
Start: 2020-05-18 | End: 2020-05-21

## 2020-05-18 RX ORDER — KETAMINE HYDROCHLORIDE 50 MG/ML
0.5 INJECTION, SOLUTION INTRAMUSCULAR; INTRAVENOUS
Status: COMPLETED | OUTPATIENT
Start: 2020-05-18 | End: 2020-05-18

## 2020-05-18 RX ORDER — DIPHENHYDRAMINE HYDROCHLORIDE 50 MG/ML
12.5 INJECTION, SOLUTION INTRAMUSCULAR; INTRAVENOUS AS NEEDED
Status: DISCONTINUED | OUTPATIENT
Start: 2020-05-18 | End: 2020-05-18 | Stop reason: HOSPADM

## 2020-05-18 RX ORDER — ONDANSETRON 2 MG/ML
4 INJECTION INTRAMUSCULAR; INTRAVENOUS
Status: COMPLETED | OUTPATIENT
Start: 2020-05-18 | End: 2020-05-18

## 2020-05-18 RX ORDER — MORPHINE SULFATE 4 MG/ML
INJECTION INTRAVENOUS
Status: COMPLETED
Start: 2020-05-18 | End: 2020-05-18

## 2020-05-18 RX ORDER — FENTANYL CITRATE 50 UG/ML
25 INJECTION, SOLUTION INTRAMUSCULAR; INTRAVENOUS
Status: DISCONTINUED | OUTPATIENT
Start: 2020-05-18 | End: 2020-05-18 | Stop reason: HOSPADM

## 2020-05-18 RX ORDER — HYDROCODONE BITARTRATE AND ACETAMINOPHEN 5; 325 MG/1; MG/1
1 TABLET ORAL AS NEEDED
Status: DISCONTINUED | OUTPATIENT
Start: 2020-05-18 | End: 2020-05-18 | Stop reason: HOSPADM

## 2020-05-18 RX ORDER — MORPHINE SULFATE 4 MG/ML
4 INJECTION INTRAVENOUS
Status: COMPLETED | OUTPATIENT
Start: 2020-05-18 | End: 2020-05-18

## 2020-05-18 RX ORDER — LIDOCAINE HYDROCHLORIDE 20 MG/ML
INJECTION, SOLUTION EPIDURAL; INFILTRATION; INTRACAUDAL; PERINEURAL AS NEEDED
Status: DISCONTINUED | OUTPATIENT
Start: 2020-05-18 | End: 2020-05-18 | Stop reason: HOSPADM

## 2020-05-18 RX ORDER — MORPHINE SULFATE 10 MG/ML
4 INJECTION, SOLUTION INTRAMUSCULAR; INTRAVENOUS
Status: COMPLETED | OUTPATIENT
Start: 2020-05-18 | End: 2020-05-18

## 2020-05-18 RX ORDER — FENTANYL CITRATE 50 UG/ML
50 INJECTION, SOLUTION INTRAMUSCULAR; INTRAVENOUS AS NEEDED
Status: CANCELLED | OUTPATIENT
Start: 2020-05-18

## 2020-05-18 RX ORDER — SODIUM CHLORIDE 9 MG/ML
125 INJECTION, SOLUTION INTRAVENOUS CONTINUOUS
Status: DISCONTINUED | OUTPATIENT
Start: 2020-05-18 | End: 2020-05-19 | Stop reason: HOSPADM

## 2020-05-18 RX ORDER — LIDOCAINE HYDROCHLORIDE 10 MG/ML
0.1 INJECTION, SOLUTION EPIDURAL; INFILTRATION; INTRACAUDAL; PERINEURAL AS NEEDED
Status: CANCELLED | OUTPATIENT
Start: 2020-05-18

## 2020-05-18 RX ORDER — SUCCINYLCHOLINE CHLORIDE 20 MG/ML
INJECTION INTRAMUSCULAR; INTRAVENOUS AS NEEDED
Status: DISCONTINUED | OUTPATIENT
Start: 2020-05-18 | End: 2020-05-18 | Stop reason: HOSPADM

## 2020-05-18 RX ORDER — HYDROMORPHONE HYDROCHLORIDE 1 MG/ML
0.5 INJECTION, SOLUTION INTRAMUSCULAR; INTRAVENOUS; SUBCUTANEOUS
Status: DISCONTINUED | OUTPATIENT
Start: 2020-05-18 | End: 2020-05-18 | Stop reason: HOSPADM

## 2020-05-18 RX ORDER — MIDAZOLAM HYDROCHLORIDE 1 MG/ML
1 INJECTION, SOLUTION INTRAMUSCULAR; INTRAVENOUS AS NEEDED
Status: CANCELLED | OUTPATIENT
Start: 2020-05-18

## 2020-05-18 RX ORDER — MIDAZOLAM HYDROCHLORIDE 1 MG/ML
0.5 INJECTION, SOLUTION INTRAMUSCULAR; INTRAVENOUS
Status: DISCONTINUED | OUTPATIENT
Start: 2020-05-18 | End: 2020-05-18 | Stop reason: HOSPADM

## 2020-05-18 RX ORDER — SODIUM CHLORIDE, SODIUM LACTATE, POTASSIUM CHLORIDE, CALCIUM CHLORIDE 600; 310; 30; 20 MG/100ML; MG/100ML; MG/100ML; MG/100ML
INJECTION, SOLUTION INTRAVENOUS
Status: DISCONTINUED | OUTPATIENT
Start: 2020-05-18 | End: 2020-05-18 | Stop reason: HOSPADM

## 2020-05-18 RX ORDER — ONDANSETRON 2 MG/ML
4 INJECTION INTRAMUSCULAR; INTRAVENOUS AS NEEDED
Status: DISCONTINUED | OUTPATIENT
Start: 2020-05-18 | End: 2020-05-18 | Stop reason: HOSPADM

## 2020-05-18 RX ORDER — PROPOFOL 10 MG/ML
INJECTION, EMULSION INTRAVENOUS AS NEEDED
Status: DISCONTINUED | OUTPATIENT
Start: 2020-05-18 | End: 2020-05-18 | Stop reason: HOSPADM

## 2020-05-18 RX ADMIN — PROPOFOL 200 MG: 10 INJECTION, EMULSION INTRAVENOUS at 18:32

## 2020-05-18 RX ADMIN — MORPHINE SULFATE 4 MG: 10 INJECTION INTRAVENOUS at 13:17

## 2020-05-18 RX ADMIN — MORPHINE SULFATE 4 MG: 4 INJECTION INTRAVENOUS at 15:23

## 2020-05-18 RX ADMIN — SODIUM CHLORIDE 125 ML/HR: 900 INJECTION, SOLUTION INTRAVENOUS at 13:17

## 2020-05-18 RX ADMIN — SODIUM CHLORIDE, POTASSIUM CHLORIDE, SODIUM LACTATE AND CALCIUM CHLORIDE: 600; 310; 30; 20 INJECTION, SOLUTION INTRAVENOUS at 18:25

## 2020-05-18 RX ADMIN — KETAMINE HYDROCHLORIDE 59 MG: 50 INJECTION, SOLUTION INTRAMUSCULAR; INTRAVENOUS at 15:34

## 2020-05-18 RX ADMIN — ONDANSETRON 4 MG: 2 INJECTION INTRAMUSCULAR; INTRAVENOUS at 13:17

## 2020-05-18 RX ADMIN — SUCCINYLCHOLINE CHLORIDE 160 MG: 20 INJECTION, SOLUTION INTRAMUSCULAR; INTRAVENOUS at 18:32

## 2020-05-18 RX ADMIN — MORPHINE SULFATE 4 MG: 4 INJECTION, SOLUTION INTRAMUSCULAR; INTRAVENOUS at 15:23

## 2020-05-18 RX ADMIN — LIDOCAINE HYDROCHLORIDE 100 MG: 20 INJECTION, SOLUTION INTRAVENOUS at 18:31

## 2020-05-18 RX ADMIN — PROPOFOL 59 MG: 10 INJECTION, EMULSION INTRAVENOUS at 15:33

## 2020-05-18 NOTE — ANESTHESIA PREPROCEDURE EVALUATION
Anesthetic History No history of anesthetic complications Review of Systems / Medical History Patient summary reviewed, nursing notes reviewed and pertinent labs reviewed Pulmonary Sleep apnea: No treatment Neuro/Psych Psychiatric history Comments: Depression Benign essential tremor  Cardiovascular Hypertension Hyperlipidemia Exercise tolerance: >4 METS 
  
GI/Hepatic/Renal 
  
GERD: well controlled PUD and liver disease Comments: Hinson's esophagus with esophagitis  Endo/Other Diabetes Morbid obesity and arthritis Other Findings Comments: DJD Hx BCCA 
ADD (attention deficit disorder) Hx Cirrhosis Physical Exam 
 
Airway Mallampati: II 
TM Distance: > 6 cm Neck ROM: normal range of motion Mouth opening: Normal 
 
 Cardiovascular Regular rate and rhythm,  S1 and S2 normal,  no murmur, click, rub, or gallop Dental 
No notable dental hx Pulmonary Breath sounds clear to auscultation Abdominal 
GI exam deferred Other Findings Anesthetic Plan ASA: 3 Anesthesia type: total IV anesthesia and general 
 
Monitoring Plan: BIS Induction: Intravenous Anesthetic plan and risks discussed with: Patient

## 2020-05-18 NOTE — CONSULTS
ORTHOPAEDIC CONSULT NOTE Subjective:  
 
Date of Consultation:  May 18, 2020 Billy Jimenez is a 68 y.o. male who is being seen for right hip dilocation. Pt reports GLF this morning after becoming dizzy. Pt states he had his hip replaced years ago by Dr Trice Ramirez at [facility]  and has history of prior dislocation x five(5) -- last dislocation over a year ago. Ambulates at baseline unassisted. Patient Active Problem List  
 Diagnosis Date Noted  Closed dislocation of right hip ([address] 05/18/2020  S/P TIPS (transjugular intrahepatic portosystemic shunt) 04/23/2020  
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  Obesity 03/04/2018  Insomnia 03/04/2018  Infected prosthesis of right hip ([address] 02/26/2018  PFO (patent foramen ovale) 07/26/2017  Systolic murmur 45/08/8030  Jessica-prosthetic fracture of femur following total hip arthroplasty 08/25/2016  Osteoarthritis of right hip 08/01/2016  Benign essential tremor  Diabetes mellitus type 2, uncontrolled ([address] 06/03/2013  Hypogonadism male 08/29/2012  Osteoarthritis of hip  Depression  ED (erectile dysfunction) of organic origin  GERD (gastroesophageal reflux disease)  PUD (peptic ulcer disease)  Hinson's esophagus with esophagitis  HTN (hypertension) 03/17/2010  Hypercholesterolemia 03/17/2010 Family History Problem Relation Age of Onset  Cancer Father   
     multiple myeloma  Stroke Father  Dementia Mother  No Known Problems Brother  COPD Brother  Cancer Maternal Grandmother COLON  Anesth Problems Neg Hx Social History Tobacco Use  Smoking status: Former Smoker Packs/day: 2.00 Years: 15.00 Pack years: 30.00 Last attempt to quit: 3/1/1979 Years since quittin.2  Smokeless tobacco: Never Used Substance Use Topics  Alcohol use: No  
  Alcohol/week: 0.0 standard drinks Past Medical History:  
Diagnosis Date  ADD (attention deficit disorder)  Anemia due to blood loss  Hinson's esophagus with esophagitis  Benign essential tremor  Cancer [facility]  [facility]  Cirrhosis [facility]  Depression  DJD (degenerative joint disease) of hip   
 bilat  DM (diabetes mellitus) ([facility] 3/17/2010  ED (erectile dysfunction) of organic origin  Esophageal varices determined by endoscopy ([facility]  Fall on or from sidewalk curb 2015  Femur fracture ([facility]  Gastritis and duodenitis  GERD (gastroesophageal reflux disease)   
 resolved after discontinuing diclofenac  Hematuria 2016  High cholesterol 2010  
 pt denies   
 HTN (hypertension) 3/17/2010  Morbid obesity ([facility]  Murmur, cardiac   
 PFO (patent foramen ovale) 2017  PUD (peptic ulcer disease)  Shingles 2016 RESOLVING- NO RASH (HEAD)  Sleep apnea   
 doesnt use CPAP anymore Past Surgical History:  
Procedure Laterality Date  COLONOSCOPY N/A 2019 COLONOSCOPY AND EGD performed by Chica Tomas MD at \[facility]\"" ENDOSCOPY  COLONOSCOPY,DIAGNOSTIC  2019  ENDOSCOPY, COLON, DIAGNOSTIC    
 HX CHOLECYSTECTOMY    HX GI  1980  
 gastroplasty [address]  HX HIP REPLACEMENT Left  HX ORTHOPAEDIC Right   
 rot cuff repair  HX ORTHOPAEDIC  2016  
 left broken femur [address] [facility] [address]  HX VASCULAR ACCESS    
 picc line and removed after sepsis resolved  IR INSERT TIPS HEPATIC SHUNT  3/28/2020 [facility] ARTHROPLASTY  2010  
 right  TOTAL HIP ARTHROPLASTY  2016  
 left  UPPER GI ENDOSCOPY,BIOPSY  2019 Prior to Admission medications Medication Sig Start Date End Date Taking? Authorizing Provider  
insulin glargine (LANTUS) 100 unit/mL injection 20 Units by SubCUTAneous route ACB/HS. 5/15/20   Bronwyn Kim MD  
insulin aspart U-100 (NovoLOG Flexpen U-100 Insulin) 100 unit/mL (3 mL) inpn 7 Units by SubCUTAneous route Before breakfast, lunch, and dinner for 90 days. 7 units 3 times daily 5/15/20 8/13/20  Bronwyn Kim MD  
rifAXIMin [place]ick) 550 mg tablet Take 1 Tab by mouth two (2) times a day. 4/23/20   China Pereira MD  
tamsulosin [facility]) 0.4 mg capsule  4/17/20   Provider, Historical  
ondansetron (ZOFRAN ODT) 4 mg disintegrating tablet  4/17/20   Provider, Historical  
potassium chloride SR (KLOR-CON 10) 10 mEq tablet  4/18/20   Provider, Historical  
furosemide (LASIX) 20 mg tablet Take 2 tabs by mouth daily x 1 week and then increase to 1 tab daily Patient taking differently: Take  1 tab daily 4/6/20   Marline Perez NP  
albuterol-ipratropium (DUO-NEB) 2.5 mg-0.5 mg/3 ml nebu 3 mL by Nebulization route every four (4) hours as needed for Wheezing. 4/6/20   Marline Perez NP  
thiamine hcl 500 mg tablet Take 500 mg by mouth daily. 4/6/20   Marline Perez NP  
folic acid (FOLVITE) 1 mg tablet Take 1 Tab by mouth daily. 3/26/20   Yadira Pineda NP  
lisinopriL (PRINIVIL, ZESTRIL) 5 mg tablet TAKE 2 TABLETS BY MOUTH EVERY DAY Patient taking differently: TAKE 1 TABLETS BY MOUTH EVERY DAY 3/26/20   Yadira Pineda NP  
gabapentin (NEURONTIN) 300 mg capsule Take 2 Caps by mouth nightly. 3/16/20   Bronwyn Kim MD  
butalbital-acetaminophen-caffeine (FIORICET, ESGIC) -40 mg per tablet Take 1 Tab by mouth every six (6) hours as needed for Headache. 3/6/20   Bronwyn Kim MD  
cephALEXin (KEFLEX) 500 mg capsule Take 1 Cap by mouth two (2) times a day.  2/24/20   Bronwyn Kim MD  
sertraline (ZOLOFT) 100 mg tablet TAKE 1.5 TABLETS DAILY 2/21/20   Bronwyn Kim MD  
 spironolactone (ALDACTONE) 25 mg tablet Take 1 Tab by mouth daily. 2/4/20   Samir Brown MD  
ferrous sulfate 140 mg (45 mg iron) TbER ER tablet Take 1 Tab by mouth Daily (before breakfast). 2/4/20   Samir Brown MD  
ergocalciferol (VITAMIN D2) 50,000 unit capsule TAKE 1 CAPSULE EVERY 7 DAYS 1/6/20   Samir Brown MD  
atorvastatin (LIPITOR) 40 mg tablet Take 1 Tab by mouth daily. 1/4/20   Prosper Jacobsen MD  
glucose blood VI test strips (ASCENSIA AUTODISC VI, ONE TOUCH ULTRA TEST VI) strip Test blood sugar twice daily Diagnosis E 11.9. Can use [employer] 12/13/19   Samir Brown MD  
primidone (MYSOLINE) 250 mg tablet TAKE 1 TABLET TWICE A DAY 11/7/19   Samir Brown MD  
acetaminophen (TYLENOL) 500 mg tablet Take 2 Tabs by mouth every six (6) hours as needed for Pain. 10/7/19   Cale Álvarez MD  
Blood-Glucose Meter (ACCU-CHEK SOWMYA PLUS METER) misc Test blood sugar twice daily Diagnosis E 11.9 7/9/19   Samir Brown MD  
Blood Glucose Control High&Low (ACCU-CHEK SOWMYA CONTROL SOLN) soln Test blood sugar twice daily Diagnosis E 11.9 7/9/19   Samir Brown MD  
B.infantis-B.ani-B.long-B.bifi (PROBIOTIC 4X) 10-15 mg TbEC Take  by mouth. Provider, Historical  
magnesium 250 mg tab Take 500 mg by mouth daily. Provider, Historical  
melatonin tab tablet Take 10 mg by mouth nightly as needed. Provider, Historical  
calcium citrate-vitamin D3 (CITRACAL + D) tablet Take 2 Tabs by mouth two (2) times a day. Provider, Historical  
 
Current Facility-Administered Medications Medication Dose Route Frequency  morphine 4 mg/mL injection  0.9% sodium chloride infusion  125 mL/hr IntraVENous CONTINUOUS  
 morphine injection 4 mg  4 mg IntraVENous NOW Current Outpatient Medications Medication Sig  
 insulin glargine (LANTUS) 100 unit/mL injection 20 Units by SubCUTAneous route ACB/HS.  insulin aspart U-100 (NovoLOG Flexpen U-100 Insulin) 100 unit/mL (3 mL) inpn 7 Units by SubCUTAneous route Before breakfast, lunch, and dinner for 90 days. 7 units 3 times daily  rifAXIMin (XIFAXAN) 550 mg tablet Take 1 Tab by mouth two (2) times a day.  tamsulosin (FLOMAX) 0.4 mg capsule  ondansetron (ZOFRAN ODT) 4 mg disintegrating tablet  potassium chloride SR (KLOR-CON 10) 10 mEq tablet  furosemide (LASIX) 20 mg tablet Take 2 tabs by mouth daily x 1 week and then increase to 1 tab daily (Patient taking differently: Take  1 tab daily)  albuterol-ipratropium (DUO-NEB) 2.5 mg-0.5 mg/3 ml nebu 3 mL by Nebulization route every four (4) hours as needed for Wheezing.  thiamine hcl 500 mg tablet Take 500 mg by mouth daily.  folic acid (FOLVITE) 1 mg tablet Take 1 Tab by mouth daily.  lisinopriL (PRINIVIL, ZESTRIL) 5 mg tablet TAKE 2 TABLETS BY MOUTH EVERY DAY (Patient taking differently: TAKE 1 TABLETS BY MOUTH EVERY DAY)  gabapentin (NEURONTIN) 300 mg capsule Take 2 Caps by mouth nightly.  butalbital-acetaminophen-caffeine (FIORICET, ESGIC) -40 mg per tablet Take 1 Tab by mouth every six (6) hours as needed for Headache.  cephALEXin (KEFLEX) 500 mg capsule Take 1 Cap by mouth two (2) times a day.  sertraline (ZOLOFT) 100 mg tablet TAKE 1.5 TABLETS DAILY  spironolactone (ALDACTONE) 25 mg tablet Take 1 Tab by mouth daily.  ferrous sulfate 140 mg (45 mg iron) TbER ER tablet Take 1 Tab by mouth Daily (before breakfast).  ergocalciferol (VITAMIN D2) 50,000 unit capsule TAKE 1 CAPSULE EVERY 7 DAYS  atorvastatin (LIPITOR) 40 mg tablet Take 1 Tab by mouth daily.  glucose blood VI test strips (ASCENSIA AUTODISC VI, ONE TOUCH ULTRA TEST VI) strip Test blood sugar twice daily Diagnosis E 11.9. Can use [employer]  primidone (MYSOLINE) 250 mg tablet TAKE 1 TABLET TWICE A DAY  acetaminophen (TYLENOL) 500 mg tablet Take 2 Tabs by mouth every six (6) hours as needed for Pain.  Blood-Glucose Meter (ACCU-CHEK SOWMYA PLUS METER) misc Test blood sugar twice daily Diagnosis E 11.9  Blood Glucose Control High&Low (ACCU-CHEK SOWMYA CONTROL SOLN) soln Test blood sugar twice daily Diagnosis E 11.9  
 B.infantis-B.ani-B.long-B.bifi (PROBIOTIC 4X) 10-15 mg TbEC Take  by mouth.  magnesium 250 mg tab Take 500 mg by mouth daily.  melatonin tab tablet Take 10 mg by mouth nightly as needed.  calcium citrate-vitamin D3 (CITRACAL + D) tablet Take 2 Tabs by mouth two (2) times a day. Allergies Allergen Reactions  Nsaids (Non-Steroidal Anti-Inflammatory Drug) Other (comments) Hx of PUD and Hinson's Esophagus Review of Systems:  A comprehensive review of systems was negative except for that written in the HPI. Mental Status: no dementia Objective:  
 
Patient Vitals for the past 8 hrs: 
 BP Temp Pulse Resp SpO2 Height Weight 20 1229 (!) 160/23 98.8 °F (37.1 °C) 99 18 100 % 5' 10\" (1.778 m) 117.9 kg (260 lb) Temp (24hrs), Av.8 °F (37.1 °C), Min:98.8 °F (37.1 °C), Max:98.8 °F (37.1 °C) Gen: Well-developed,  in no acute distress, obese HEENT: Pink conjunctivae, hearing intact to voice, moist mucous membranes Neck: Supple Resp: No respiratory distress Card: RRR, palpable distal pulse-equal bilaterally, birsk cap refill all distal digits Abd: Soft, non-distended Musc: RLE shortened, No sign of LE VTE. Intact plant/dorsiflexion. Skin: No skin breakdown noted. Skin warm, pink, dry Neuro: Cranial nerves are grossly intact, no focal motor weakness, follows commands appropriately Psych: Good insight, oriented to person, place and time, alert Imaging Review: EXAM: XR HIP RT W OR WO PELV 2-3 VWS 
 INDICATION: shortening. Fall this morning with right hip pain and shortness right leg 
 COMPARISON: 2018. 
 FINDINGS: AP view of the pelvis and a frogleg lateral view of the right hip demonstrate posterior superior dislocation of the right hip arthroplasty. An acute fracture is not identified. 
 The left hip is been replaced. Slight prominence of the bone prosthesis 
interface proximally in the femoral shaft with cerclage wires and chronic benign. IMPRESSION:  Dislocated right hip prosthesis. Labs:  
Recent Results (from the past 24 hour(s)) CBC WITH AUTOMATED DIFF Collection Time: 05/18/20  1:03 PM  
Result Value Ref Range WBC 5.2 4.1 - 11.1 K/uL  
 RBC 3.39 (L) 4.10 - 5.70 M/uL HGB 8.9 (L) 12.1 - 17.0 g/dL HCT 28.6 (L) 36.6 - 50.3 % MCV 84.4 80.0 - 99.0 FL  
 MCH 26.3 26.0 - 34.0 PG  
 MCHC 31.1 30.0 - 36.5 g/dL  
 RDW 15.6 (H) 11.5 - 14.5 % PLATELET 809 (L) 148 - 400 K/uL MPV 10.7 8.9 - 12.9 FL  
 NRBC 0.0 0  WBC ABSOLUTE NRBC 0.00 0.00 - 0.01 K/uL NEUTROPHILS 80 (H) 32 - 75 % LYMPHOCYTES 9 (L) 12 - 49 % MONOCYTES 7 5 - 13 % EOSINOPHILS 3 0 - 7 % BASOPHILS 1 0 - 1 % IMMATURE GRANULOCYTES 0 0.0 - 0.5 % ABS. NEUTROPHILS 4.0 1.8 - 8.0 K/UL  
 ABS. LYMPHOCYTES 0.5 (L) 0.8 - 3.5 K/UL  
 ABS. MONOCYTES 0.4 0.0 - 1.0 K/UL  
 ABS. EOSINOPHILS 0.2 0.0 - 0.4 K/UL  
 ABS. BASOPHILS 0.1 0.0 - 0.1 K/UL  
 ABS. IMM. GRANS. 0.0 0.00 - 0.04 K/UL  
 DF SMEAR SCANNED    
 RBC COMMENTS NORMOCYTIC, NORMOCHROMIC METABOLIC PANEL, COMPREHENSIVE Collection Time: 05/18/20  1:03 PM  
Result Value Ref Range Sodium 140 136 - 145 mmol/L Potassium 4.1 3.5 - 5.1 mmol/L Chloride 111 (H) 97 - 108 mmol/L  
 CO2 20 (L) 21 - 32 mmol/L Anion gap 9 5 - 15 mmol/L Glucose 224 (H) 65 - 100 mg/dL BUN 13 6 - 20 MG/DL Creatinine 1.09 0.70 - 1.30 MG/DL  
 BUN/Creatinine ratio 12 12 - 20 GFR est AA >60 >60 ml/min/1.73m2 GFR est non-AA >60 >60 ml/min/1.73m2 Calcium 8.7 8.5 - 10.1 MG/DL Bilirubin, total 1.0 0.2 - 1.0 MG/DL  
 ALT (SGPT) 24 12 - 78 U/L  
 AST (SGOT) 27 15 - 37 U/L Alk.  phosphatase 141 (H) 45 - 117 U/L  
 Protein, total 7.0 6.4 - 8.2 g/dL Albumin 2.9 (L) 3.5 - 5.0 g/dL Globulin 4.1 (H) 2.0 - 4.0 g/dL A-G Ratio 0.7 (L) 1.1 - 2.2 PROTHROMBIN TIME + INR Collection Time: 05/18/20  1:03 PM  
Result Value Ref Range INR 1.2 (H) 0.9 - 1.1 Prothrombin time 11.9 (H) 9.0 - 11.1 sec PTT Collection Time: 05/18/20  1:03 PM  
Result Value Ref Range aPTT 32.4 (H) 22.1 - 32.0 sec  
 aPTT, therapeutic range     58.0 - 77.0 SECS Impression:  
 
Patient Active Problem List  
 Diagnosis Date Noted  Closed dislocation of right hip ([address] 05/18/2020  S/P TIPS (transjugular intrahepatic portosystemic shunt) 04/23/2020  
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  Obesity 03/04/2018  Insomnia 03/04/2018  Infected prosthesis of right hip ([address] 02/26/2018  PFO (patent foramen ovale) 07/26/2017  Systolic murmur 32/95/5437  Jessica-prosthetic fracture of femur following total hip arthroplasty 08/25/2016  Osteoarthritis of right hip 08/01/2016  Benign essential tremor  Diabetes mellitus type 2, uncontrolled ([address] 06/03/2013  Hypogonadism male 08/29/2012  Osteoarthritis of hip  Depression  ED (erectile dysfunction) of organic origin  GERD (gastroesophageal reflux disease)  PUD (peptic ulcer disease)  Hinson's esophagus with esophagitis  HTN (hypertension) 03/17/2010  Hypercholesterolemia 03/17/2010 Principal Problem: 
  Closed dislocation of right hip ([address] (5/18/2020) Plan:  
 
-  Consent signed for closed reduction -  Procedural sedation by Dr Kang/Amee -  Right hip reduction via manual manipulation, unsuccessful. No complication -  Post reduction confirm successful procedure -  Post procedure films confirm persistent dislocation -  Plan for closed reduction by Dr New Banrett in the [place] with anesthesia -  Last oral intake 0900 ensure -  FU with Dr Perez Mis - call for appt Dr. New Barnett aware and agrees with plan as above. Madisyn Cramer PA-C [employer]

## 2020-05-18 NOTE — ANESTHESIA POSTPROCEDURE EVALUATION
Procedure(s): HIP DISARTICULATION. total IV anesthesia, general 
 
Anesthesia Post Evaluation Patient location during evaluation: PACU Note status: Adequate. Level of consciousness: responsive to verbal stimuli and sleepy but conscious Pain management: satisfactory to patient Airway patency: patent Anesthetic complications: no 
Cardiovascular status: acceptable Respiratory status: acceptable Hydration status: acceptable Comments: +Post-Anesthesia Evaluation and Assessment Patient: Chuck Hill MRN: [id number]  SSN: [id number] YOB: 1950  Age: 68 y.o. Sex: male Cardiovascular Function/Vital Signs /64   Pulse 83   Temp 36.3 °C (97.4 °F)   Resp 18   Ht 5' 10\" (1.778 m)   Wt 117.9 kg (260 lb)   SpO2 97%   BMI 37.31 kg/m² Patient is status post Procedure(s): HIP DISARTICULATION. Nausea/Vomiting: Controlled. Postoperative hydration reviewed and adequate. Pain: 
Pain Scale 1: Numeric (0 - 10) (05/18/20 1930) Pain Intensity 1: 0 (05/18/20 1930) Managed. Neurological Status:  
Neuro (WDL): Exceptions to WDL (05/18/20 1851) At baseline. Mental Status and Level of Consciousness: Arousable. Pulmonary Status:  
O2 Device: Room air (05/18/20 1930) Adequate oxygenation and airway patent. Complications related to anesthesia: None Post-anesthesia assessment completed. No concerns. Signed By: An Lewis MD  
 5/18/2020 Post anesthesia nausea and vomiting:  controlled Vitals Value Taken Time /62 5/18/2020  7:40 PM  
Temp 36.3 °C (97.4 °F) 5/18/2020  6:52 PM  
Pulse 82 5/18/2020  7:43 PM  
Resp 15 5/18/2020  7:43 PM  
SpO2 96 % 5/18/2020  7:43 PM  
Vitals shown include unvalidated device data.

## 2020-05-18 NOTE — ED PROVIDER NOTES
EMERGENCY DEPARTMENT HISTORY AND PHYSICAL EXAM 
 
 
Date: 5/18/2020 Patient Name: Naveed Saravia History of Presenting Illness Chief Complaint Patient presents with  
 Hip Pain  
  pt became dizzy this morning around 10am and fell backwards on to hios rear end. Pt c/o  right pain with + shortening History Provided By: Patient HPI: Naveed Saravia, 68 y.o. male with significant PMHx for HTN, DM, and cirrhosis, presents by EMS to the ED with cc of right hip pain status post GLF that occurred about 10 AM this morning. The patient reports that he started not feeling well yesterday with some dizziness. This morning he was standing in his kitchen trying to get some orange juice when he lost is balance and fell backwards against his stove. He had immediate onset of right hip pain from the fall that has been constant, non-radiating and worsens with attempted movement. He has been unable to ambulate since falling. There has been no treatment PTA. There are no other complaints, changes, or physical findings at this time. Social Hx: Tobacco (denies), EtOH (denies), Illicit drug use (denies) PCP: Red An MD 
 
No current facility-administered medications on file prior to encounter. Current Outpatient Medications on File Prior to Encounter Medication Sig Dispense Refill  insulin glargine (LANTUS) 100 unit/mL injection 20 Units by SubCUTAneous route ACB/HS. 36 mL 0  
 insulin aspart U-100 (NovoLOG Flexpen U-100 Insulin) 100 unit/mL (3 mL) inpn 7 Units by SubCUTAneous route Before breakfast, lunch, and dinner for 90 days. 7 units 3 times daily 18.9 mL 0  
 rifAXIMin (XIFAXAN) 550 mg tablet Take 1 Tab by mouth two (2) times a day. 180 Tab 3  
 tamsulosin (FLOMAX) 0.4 mg capsule  ondansetron (ZOFRAN ODT) 4 mg disintegrating tablet  potassium chloride SR (KLOR-CON 10) 10 mEq tablet  furosemide (LASIX) 20 mg tablet Take 2 tabs by mouth daily x 1 week and then increase to 1 tab daily (Patient taking differently: Take  1 tab daily) 30 Tab 0  
 albuterol-ipratropium (DUO-NEB) 2.5 mg-0.5 mg/3 ml nebu 3 mL by Nebulization route every four (4) hours as needed for Wheezing. 30 Nebule 0  
 thiamine hcl 500 mg tablet Take 500 mg by mouth daily. 30 Tab 0  
 folic acid (FOLVITE) 1 mg tablet Take 1 Tab by mouth daily. 30 Tab 0  
 lisinopriL (PRINIVIL, ZESTRIL) 5 mg tablet TAKE 2 TABLETS BY MOUTH EVERY DAY (Patient taking differently: TAKE 1 TABLETS BY MOUTH EVERY DAY) 60 Tab 0  
 gabapentin (NEURONTIN) 300 mg capsule Take 2 Caps by mouth nightly. 180 Cap 1  
 butalbital-acetaminophen-caffeine (FIORICET, ESGIC) -40 mg per tablet Take 1 Tab by mouth every six (6) hours as needed for Headache. 30 Tab 1  cephALEXin (KEFLEX) 500 mg capsule Take 1 Cap by mouth two (2) times a day. 30 Cap 6  sertraline (ZOLOFT) 100 mg tablet TAKE 1.5 TABLETS DAILY 45 Tab 5  
 spironolactone (ALDACTONE) 25 mg tablet Take 1 Tab by mouth daily. 30 Tab 1  
 ferrous sulfate 140 mg (45 mg iron) TbER ER tablet Take 1 Tab by mouth Daily (before breakfast). 90 Tab 1  
 ergocalciferol (VITAMIN D2) 50,000 unit capsule TAKE 1 CAPSULE EVERY 7 DAYS 12 Cap 2  
 atorvastatin (LIPITOR) 40 mg tablet Take 1 Tab by mouth daily. 90 Tab 3  
 glucose blood VI test strips (ASCENSIA AUTODISC VI, ONE TOUCH ULTRA TEST VI) strip Test blood sugar twice daily Diagnosis E 11.9. Can use [employer] 200 Strip 4  primidone (MYSOLINE) 250 mg tablet TAKE 1 TABLET TWICE A  Tab 3  
 acetaminophen (TYLENOL) 500 mg tablet Take 2 Tabs by mouth every six (6) hours as needed for Pain. 30 Tab 0  Blood-Glucose Meter (ACCU-CHEK SOWMYA PLUS METER) misc Test blood sugar twice daily Diagnosis E 11.9 1 Each 1  Blood Glucose Control High&Low (ACCU-CHEK SOWMYA CONTROL SOLN) soln Test blood sugar twice daily Diagnosis E 11.9 1 Bottle 3  
 B.infantis-B.ani-B.long-B.bifi (PROBIOTIC 4X) 10-15 mg TbEC Take  by mouth.    
 magnesium 250 mg tab Take 500 mg by mouth daily.  melatonin tab tablet Take 10 mg by mouth nightly as needed.  calcium citrate-vitamin D3 (CITRACAL + D) tablet Take 2 Tabs by mouth two (2) times a day. Past History Past Medical History: 
Past Medical History:  
Diagnosis Date  ADD (attention deficit disorder)  Anemia due to blood loss  Hinson's esophagus with esophagitis  Benign essential tremor  Cancer [facility]) 2012 [address]  Cir[facility] ([facility] [address]  Depression  DJD (degenerative joint disease) of hip   
 bilat  DM (diabetes mellitus) ([address] 3/17/2010  ED (erectile dysfunction) of organic origin  Esophageal varices determined by endoscopy ([facility] [address]  Fall on or from sidewalk curb 9/24/2015  Femur fracture ([address]  Gastritis and duodenitis  GERD (gastroesophageal reflux disease)   
 resolved after discontinuing diclofenac  Hematuria 6/2016  High cholesterol 03/17/2010  
 pt denies 6/19  
 HTN (hypertension) 3/17/2010  Morbid obesity ([address]  Murmur, cardiac 2016  
 PFO (patent foramen ovale) 7/26/2017  PUD (peptic ulcer disease)  Shingles 6/2016 RESOLVING- NO RASH (HEAD)  Sleep apnea   
 doesnt use CPAP anymore Past Surgical History: 
Past Surgical History:  
Procedure Laterality Date  COLONOSCOPY N/A 6/18/2019 COLONOSCOPY AND EGD performed by Tracey Roberson MD at [facility] ENDOSCOPY  COLONOSCOPY,DIAGNOSTIC  6/18/2019  ENDOSCOPY, COLON, DIAGNOSTIC    
 HX CHOLECYSTECTOMY  1995  HX GI  1980  
 gastroplasty [address]  HX HIP REPLACEMENT Left  HX ORTHOPAEDIC Right 2011  
 rot cuff repair  HX ORTHOPAEDIC  2016  
 left broken femur [address]  HX VASCULAR ACCESS    
 picc line and removed after sepsis resolved  IR INSERT TIPS HEPATIC SHUNT  3/28/2020 [facility] ARTHROPLASTY  05/2010  
 right  TOTAL HIP ARTHROPLASTY  08/01/2016  
 left  UPPER GI ENDOSCOPY,BIOPSY  2019 Family History: 
Family History Problem Relation Age of Onset  Cancer Father   
     multiple myeloma  Stroke Father  Dementia Mother  No Known Problems Brother  COPD Brother  Cancer Maternal Grandmother COLON  Anesth Problems Neg Hx Social History: 
Social History Tobacco Use  Smoking status: Former Smoker Packs/day: 2.00 Years: 15.00 Pack years: 30.00 Last attempt to quit: 3/1/1979 Years since quittin.2  Smokeless tobacco: Never Used Substance Use Topics  Alcohol use: No  
  Alcohol/week: 0.0 standard drinks  Drug use: No  
 
 
Allergies: Allergies Allergen Reactions  Nsaids (Non-Steroidal Anti-Inflammatory Drug) Other (comments) Hx of PUD and Hinson's Esophagus Review of Systems Review of Systems Constitutional: Negative for chills, diaphoresis and fever. HENT: Negative for congestion, ear pain, rhinorrhea and sore throat. Respiratory: Negative for cough and shortness of breath. Cardiovascular: Negative for chest pain. Gastrointestinal: Negative for abdominal pain, constipation, diarrhea, nausea and vomiting. Genitourinary: Negative for difficulty urinating, dysuria, frequency and hematuria. Musculoskeletal: Positive for arthralgias and gait problem. Negative for myalgias. Neurological: Positive for dizziness and light-headedness. Negative for syncope, numbness and headaches. All other systems reviewed and are negative. Physical Exam  
Physical Exam 
Vitals signs and nursing note reviewed. Constitutional:   
   General: He is not in acute distress. Appearance: He is well-developed. He is not diaphoretic. Comments: 68 y.o.  male HENT:  
   Head: Normocephalic and atraumatic. Eyes:  
   General:     
   Right eye: No discharge. Left eye: No discharge.   
   Conjunctiva/sclera: Conjunctivae normal.  
 Neck: Musculoskeletal: Normal range of motion and neck supple. Cardiovascular:  
   Rate and Rhythm: Normal rate and regular rhythm. Pulses: Normal pulses. Heart sounds: Murmur present. Pulmonary:  
   Effort: Pulmonary effort is normal. No respiratory distress. Breath sounds: Normal breath sounds. Musculoskeletal:  
   Comments: R HIP: TTP to the right groin. Unable to perform SLR on the right. SLR on the left elicits slight pain on the right. Distal NV intact. Cap refill < 2 sec. Unable to ambulate. Skin: 
   General: Skin is warm and dry. Neurological:  
   Mental Status: He is alert and oriented to person, place, and time. Psychiatric:     
   Behavior: Behavior normal.  
 
 
 
Diagnostic Study Results Labs - Recent Results (from the past 12 hour(s)) CBC WITH AUTOMATED DIFF Collection Time: 05/18/20  1:03 PM  
Result Value Ref Range WBC 5.2 4.1 - 11.1 K/uL  
 RBC 3.39 (L) 4.10 - 5.70 M/uL HGB 8.9 (L) 12.1 - 17.0 g/dL HCT 28.6 (L) 36.6 - 50.3 % MCV 84.4 80.0 - 99.0 FL  
 MCH 26.3 26.0 - 34.0 PG  
 MCHC 31.1 30.0 - 36.5 g/dL  
 RDW 15.6 (H) 11.5 - 14.5 % PLATELET 755 (L) 599 - 400 K/uL MPV 10.7 8.9 - 12.9 FL  
 NRBC 0.0 0  WBC ABSOLUTE NRBC 0.00 0.00 - 0.01 K/uL NEUTROPHILS 80 (H) 32 - 75 % LYMPHOCYTES 9 (L) 12 - 49 % MONOCYTES 7 5 - 13 % EOSINOPHILS 3 0 - 7 % BASOPHILS 1 0 - 1 % IMMATURE GRANULOCYTES 0 0.0 - 0.5 % ABS. NEUTROPHILS 4.0 1.8 - 8.0 K/UL  
 ABS. LYMPHOCYTES 0.5 (L) 0.8 - 3.5 K/UL  
 ABS. MONOCYTES 0.4 0.0 - 1.0 K/UL  
 ABS. EOSINOPHILS 0.2 0.0 - 0.4 K/UL  
 ABS. BASOPHILS 0.1 0.0 - 0.1 K/UL  
 ABS. IMM. GRANS. 0.0 0.00 - 0.04 K/UL  
 DF SMEAR SCANNED    
 RBC COMMENTS NORMOCYTIC, NORMOCHROMIC METABOLIC PANEL, COMPREHENSIVE Collection Time: 05/18/20  1:03 PM  
Result Value Ref Range Sodium 140 136 - 145 mmol/L Potassium 4.1 3.5 - 5.1 mmol/L  Chloride 111 (H) 97 - 108 mmol/L  
 CO2 20 (L) 21 - 32 mmol/L Anion gap 9 5 - 15 mmol/L Glucose 224 (H) 65 - 100 mg/dL BUN 13 6 - 20 MG/DL Creatinine 1.09 0.70 - 1.30 MG/DL  
 BUN/Creatinine ratio 12 12 - 20 GFR est AA >60 >60 ml/min/1.73m2 GFR est non-AA >60 >60 ml/min/1.73m2 Calcium 8.7 8.5 - 10.1 MG/DL Bilirubin, total 1.0 0.2 - 1.0 MG/DL  
 ALT (SGPT) 24 12 - 78 U/L  
 AST (SGOT) 27 15 - 37 U/L Alk. phosphatase 141 (H) 45 - 117 U/L Protein, total 7.0 6.4 - 8.2 g/dL Albumin 2.9 (L) 3.5 - 5.0 g/dL Globulin 4.1 (H) 2.0 - 4.0 g/dL A-G Ratio 0.7 (L) 1.1 - 2.2 PROTHROMBIN TIME + INR Collection Time: 05/18/20  1:03 PM  
Result Value Ref Range INR 1.2 (H) 0.9 - 1.1 Prothrombin time 11.9 (H) 9.0 - 11.1 sec PTT Collection Time: 05/18/20  1:03 PM  
Result Value Ref Range aPTT 32.4 (H) 22.1 - 32.0 sec  
 aPTT, therapeutic range     58.0 - 77.0 SECS Radiologic Studies -  
XR CHEST SNGL V Final Result IMPRESSION:  
1. No definite acute cardiopulmonary disease. XR HIP RT W OR WO PELV 2-3 VWS Final Result IMPRESSION:   
Dislocated right hip prosthesis. XR HIP RT W OR WO PELV 2-3 VWS    (Results Pending) CT Results  (Last 48 hours) None CXR Results  (Last 48 hours) 05/18/20 1426  XR CHEST SNGL V Final result Impression:  IMPRESSION:  
1. No definite acute cardiopulmonary disease. Narrative:  INDICATION: . pre op Additional history: COMPARISON: Previous chest xray, 4/2/2020. LIMITATIONS: Portable technique. Deena Vacherie FINDINGS: Single frontal view of the chest.   
.  
Lines/tubes/surgical: Cardiac monitor leads overly the patient. Heart/mediastinum: Unremarkable. Lungs/pleura: Chronic appearing lung changes with low lung volumes and no  
definite acute process. No visible pneumothorax. Additional Comments: Degenerative changes in the shoulders. .  
  
  
 
 
 
Medical Decision Making I am the first provider for this patient. I reviewed the vital signs, available nursing notes, past medical history, past surgical history, family history and social history. Vital Signs-Reviewed the patient's vital signs. Patient Vitals for the past 12 hrs: 
 Temp Pulse Resp BP SpO2  
05/18/20 1610  87 12 146/69 100 % 05/18/20 1555  89 20 144/70 98 % 05/18/20 1548  87 16 170/83 99 % 05/18/20 1537  94 20 171/84 98 % 05/18/20 1530 98.8 °F (37.1 °C) (!) 102 22 169/83 99 % 05/18/20 1530  96 20 169/83 99 % 05/18/20 1515  99 20 147/78 100 % 05/18/20 1500  (!) 102 21 164/77 98 % 05/18/20 1445  100 20 146/89 98 % 05/18/20 1430  99 21 123/81 99 % 05/18/20 1330  99 23 161/71 98 % 05/18/20 1315  98 24 166/69 99 % 05/18/20 1300  99 24 163/63 99 % 05/18/20 1245  (!) 101 21 160/69 99 % 05/18/20 1230  100 21 160/63 100 % 05/18/20 1229 98.8 °F (37.1 °C) 99 18 (!) 160/23 100 % Records Reviewed: Nursing Notes and Old Medical Records Provider Notes (Medical Decision Making): The patient presents to the ED with stable vital signs and complaints of R hip pain from a GLF that occurred earlier today. The patient has also been feeling ill and light headed for several days. DDx include fracture, sprain, strain, dislocation, anemia, dehydration, electrolyte abnl, arrhythmia. Labs show anemia, which is improving in comparison to recent labs and elevated BS. Hip xrays shows dislocation. ED Course:  
Initial assessment performed. The patients presenting problems have been discussed, and they are in agreement with the care plan formulated and outlined with them. I have encouraged them to ask questions as they arise throughout their visit. CONSULT NOTE: 
2:37 PM 
AMAN Zamora spoke with Farideh Gonzalez, [facility], Consult for Orthopedics. Discussed available diagnostic tests and clinical findings. He is in agreement with care plans as outlined.  He is in the ED and will evaluate the patient. The patient's replacement was done by nuha lee. He has discussed this with the on call surgeon. Procedure Note - Procedural Sedation:  
 
Indication:  Procedural sedation is required to perform the following procedure:  Right hip relocation Informed Consent: The reason for the procedure as well as the risks, benefits, and alternatives to the procedure have been explained to the patient and He consents to the procedure. The consent for sedation has been signed by the patient/representative, the medical provider and witnessed by the patient's nurse. Anesthesia Risks: The ASA score is ASA 3 - Severe systemic disease but compensated Mallampati Score Reference: The patient has a patent airway with a Mallampati Classification Score of II (soft palate, uvula, fauces visible). Pre-Procedure Sedation Assessment: 
Sedation Start Time: 15:33 Time Out was performed to confirm patient identity, laterality, indication, and contraindications, prior to procedural sedation. Post-Procedure Sedation Assessment:  
Sedation End Time: 15:54 The patient was sedated with a total of 100mg propofol/DIPRIVAN and ketamine/KETOLAR via small repeat IV bolus Reversal agents and resuscitation equipment were at the bedside. Reversal agents were not required. The indicated procedure was completed and the patient tolerated the sedation well with no complications. However, the orthopedist was not krys to reduce the hip and pt will require OR reduction Please refer to sedation flowsheet for nursing notes, vital signs, and specific timeline details. Lorenzo Carlson MD 
 
 
After a 40-minute attempt, the hip was not successfully relocated in the ED and will require sedation in the OR for relocation. Critical Care Time: None Disposition: 
Admit to surgery for hip relocation. PLAN: 
Admit to surgery for hip relocation. Diagnosis Clinical Impression: 1. Hip dislocation, right, initial encounter ([facility] [address] 2. Weakness Please note that this dictation was completed with [employer], the computer voice recognition software. Quite often unanticipated grammatical, syntax, homophones, and other interpretive errors are inadvertently transcribed by the computer software. Please disregards these errors. Please excuse any errors that have escaped final proofreading. This note will not be viewable in [address]

## 2020-05-18 NOTE — DISCHARGE INSTRUCTIONS
Patient Education        Dislocated Hip: Care Instructions  Your Care Instructions    Your hip is a large and fairly stable joint. Normally it takes a hard fall, a car accident, or something else of great force to make the thighbone slip out of its socket (dislocate). But if you have had hip replacement surgery, your hip can more easily slip out of position. This is more common during the first few months after the surgery. After your doctor puts your dislocated hip back into normal position, you will need to use a walking aid or hip brace for several weeks or months while the hip heals. You will need to follow special hip precautions to avoid dislocating your hip again. Your doctor may recommend exercises to strengthen the hip joint and your legs. Rest and home treatment can help you heal.  If your hip becomes dislocated again, contact your doctor. You will need to go to a hospital or clinic to have your hip put back in position. You may have had a sedative to help you relax. You may be unsteady after having sedation. It can take a few hours for the medicine's effects to wear off. Common side effects of sedation include nausea, vomiting, and feeling sleepy or tired. The doctor has checked you carefully, but problems can develop later. If you notice any problems or new symptoms, get medical treatment right away. Follow-up care is a key part of your treatment and safety. Be sure to make and go to all appointments, and call your doctor if you are having problems. It's also a good idea to know your test results and keep a list of the medicines you take. How can you care for yourself at home? · If the doctor gave you a sedative:  ? For 24 hours, don't do anything that requires attention to detail. This includes going to work, making important decisions, or signing any legal documents.  It takes time for the medicine's effects to completely wear off.  ? For your safety, do not drive or operate any machinery that could be dangerous. Wait until the medicine wears off and you can think clearly and react easily. · Your doctor will give you safety precautions to keep your hip centered in its socket during the healing period. Be sure to follow these precautions. ? Keep your knees and toes pointed forward when you sit in a chair, walk, or stand. ? Do not sit with your legs crossed. ? Do not bend at the waist more than 90º. Be careful when leaning or when moving in bed to keep your legs as straight ahead as possible. · If you have a hip brace, wear it as directed. Do not remove it unless your doctor says you can. If you remove the brace to shower, be extremely careful. Follow hip precautions to limit hip movement. · Rest your hip as much as you can. You will need to change your activities to avoid movements that irritate the hip. · If your hip is swollen, put ice or a cold pack on it for 10 to 20 minutes at a time. Try to do this every 1 to 2 hours for the next 3 days (when you are awake) or until the swelling goes down. Put a thin cloth between the ice and your skin. · Take your medicines exactly as prescribed. Call your doctor if you think you are having a problem with your medicine. · Ask your doctor if you can take an over-the-counter pain medicine, such as acetaminophen (Tylenol), ibuprofen (Advil, Motrin), or naproxen (Aleve). Be safe with medicines. Read and follow all instructions on the label. · If your doctor recommends exercises, do them as directed. When should you call for help? Call 911 anytime you think you may need emergency care.  For example, call if:    · You have chest pain, are short of breath, or cough up blood.     · You have trouble breathing.     · You passed out (lost consciousness).    Call your doctor now or seek immediate medical care if:    · You have new or worse nausea or vomiting.     · You have new or worse pain.     · Your foot is cool or pale or changes color.     · You have tingling, weakness, or numbness in your foot or toes.     · You have signs of a blood clot in your leg (called a deep vein thrombosis), such as:  ? Pain in your calf, back of the knee, thigh, or groin. ? Redness or swelling in your leg.    Watch closely for changes in your health, and be sure to contact your doctor if:    · You do not get better as expected. Where can you learn more? Go to http://russ-kiesha.info/  Enter [id number] in the search box to learn more about \"Dislocated Hip: Care Instructions. \"  Current as of: June 26, 2019Content Version: 12.4  © 6306-2747 [employer]. Care instructions adapted under license by [employer] (which disclaims liability or warranty for this information). If you have questions about a medical condition or this instruction, always ask your healthcare professional. [place] [address] any warranty or liability for your use of this information. Patient Education        Sedation for a Medical Procedure: Care Instructions  Your Care Instructions    For a minor procedure or surgery, you will get a sedative to help you relax. This drug will make you sleepy. It is usually given in a vein (by IV). It may be used with anesthesia. There are different types of anesthesia. You and your doctor or anesthesia specialist will work together to choose the best anesthesia for you. It is usually based on your health, the procedure, and your preference. · Local anesthesia is a shot given to numb a small part of the body. · Regional anesthesia is a shot that blocks pain to a larger area of the body. · General anesthesia affects the brain and the whole body. You get it through a small tube placed in a vein (IV). Or you may breathe it in. You are unconscious and will not feel pain. You may get monitored anesthesia care (MAC). This means that an anesthesia specialist will care for you during your surgery.  He or she will make sure that you get only the level of anesthesia care you need to prevent pain for your specific case. If you had anesthesia, you may feel some pain and discomfort as it wears off. If you have pain, don't be afraid to say so. Pain medicine works better if you take it before the pain gets bad. Common side effects from sedation include:  · Feeling sleepy. (Your doctors and nurses will make sure you are not too sleepy to go home.)  · Nausea and vomiting. This usually does not last long. · Feeling tired. Follow-up care is a key part of your treatment and safety. Be sure to make and go to all appointments, and call your doctor if you are having problems. It's also a good idea to know your test results and keep a list of the medicines you take. How can you care for yourself at home? Activity    · Don't do anything for 24 hours that requires attention to detail. This includes going to work, making important decisions, or signing any legal documents. It takes time for the medicine effects to completely wear off.     · For your safety, you should not drive or operate any machinery that could be dangerous until the medicine wears off and you can think clearly and react easily.     · When you get home, it is important to rest until the anesthesia has worn off. Some people will feel drowsy or dizzy for up to a few hours after leaving the hospital.     · Take your time and walk slowly. Sudden changes in position may also cause nausea.     · Rest when you feel tired. Getting enough sleep will help you recover. Diet    · You can eat your normal diet, unless your doctor gives you other instructions. If your stomach is upset, try clear liquids and bland, low-fat foods like plain toast or rice.     · Drink plenty of fluids (unless your doctor tells you not to).   · Don't drink alcohol for 24 hours. Medicines    · Be safe with medicines. Read and follow all instructions on the label.   ? If the doctor gave you a prescription medicine for pain, take it as prescribed. ? If you are taking opioids for pain, it is very important to take them as prescribed. Opioids can easily be misused. Misuse can lead to opioid use disorder and even death. Because of this, it is best to get off them as soon as possible. As soon as you don't need them, talk to your doctor about how to safely stop taking them. Also talk with your doctor about how to safely store and get rid of opioids. ? If you are not taking a prescription pain medicine, ask your doctor if you can take an over-the-counter medicine.     · If you think your pain medicine is making you sick to your stomach, you can try these things. ? Take your medicine after meals (unless your doctor has told you not to). ? Ask your doctor for a different pain medicine. When should you call for help? Call 911 anytime you think you may need emergency care. For example, call if:    · You have severe trouble breathing.     · You passed out (lost consciousness).    Call your doctor now or seek immediate medical care if:    · You have trouble breathing.     · You have ongoing or worsening nausea or vomiting.     · You have a fever.     · You have a new or worse headache.     · The medicine is not wearing off and you can't think clearly.    Watch closely for changes in your health, and be sure to contact your doctor if:    · You do not get better as expected. Where can you learn more? Go to http://russ-kiesha.info/  Enter G817 in the search box to learn more about \"Sedation for a Medical Procedure: Care Instructions. \"  Current as of: August 21, 2019Content Version: 12.4  © 8284-3857 [employer]. Care instructions adapted under license by [employer] (which disclaims liability or warranty for this information).  If you have questions about a medical condition or this instruction, always ask your healthcare professional. Eulal[employer] Juan disclaims any warranty or liability for your use of this information. Patient Education        Dizziness: Care Instructions  Your Care Instructions  Dizziness is the feeling of unsteadiness or fuzziness in your head. It is different than having vertigo, which is a feeling that the room is spinning or that you are moving or falling. It is also different from lightheadedness, which is the feeling that you are about to faint. It can be hard to know what causes dizziness. Some people feel dizzy when they have migraine headaches. Sometimes bouts of flu can make you feel dizzy. Some medical conditions, such as heart problems or high blood pressure, can make you feel dizzy. Many medicines can cause dizziness, including medicines for high blood pressure, pain, or anxiety. If a medicine causes your symptoms, your doctor may recommend that you stop or change the medicine. If it is a problem with your heart, you may need medicine to help your heart work better. If there is no clear reason for your symptoms, your doctor may suggest watching and waiting for a while to see if the dizziness goes away on its own. Follow-up care is a key part of your treatment and safety. Be sure to make and go to all appointments, and call your doctor if you are having problems. It's also a good idea to know your test results and keep a list of the medicines you take. How can you care for yourself at home? · If your doctor recommends or prescribes medicine, take it exactly as directed. Call your doctor if you think you are having a problem with your medicine. · Do not drive while you feel dizzy. · Try to prevent falls. Steps you can take include:  ? Using nonskid mats, adding grab bars near the tub, and using night-lights. ? Clearing your home so that walkways are free of anything you might trip on.  ? Letting family and friends know that you have been feeling dizzy. This will help them know how to help you. When should you call for help?   Call 911 anytime you think you may need emergency care. For example, call if:    · You passed out (lost consciousness).     · You have dizziness along with symptoms of a heart attack. These may include:  ? Chest pain or pressure, or a strange feeling in the chest.  ? Sweating. ? Shortness of breath. ? Nausea or vomiting. ? Pain, pressure, or a strange feeling in the back, neck, jaw, or upper belly or in one or both shoulders or arms. ? Lightheadedness or sudden weakness. ? A fast or irregular heartbeat.     · You have symptoms of a stroke. These may include:  ? Sudden numbness, tingling, weakness, or loss of movement in your face, arm, or leg, especially on only one side of your body. ? Sudden vision changes. ? Sudden trouble speaking. ? Sudden confusion or trouble understanding simple statements. ? Sudden problems with walking or balance. ? A sudden, severe headache that is different from past headaches.    Call your doctor now or seek immediate medical care if:    · You feel dizzy and have a fever, headache, or ringing in your ears.     · You have new or increased nausea and vomiting.     · Your dizziness does not go away or comes back.    Watch closely for changes in your health, and be sure to contact your doctor if:    · You do not get better as expected. Where can you learn more? Go to http://russ-kiesha.info/  Enter Q823 in the search box to learn more about \"Dizziness: Care Instructions. \"  Current as of: June 26, 2019Content Version: 12.4  © 9941-8167 [employer]. Care instructions adapted under license by [employer] (which disclaims liability or warranty for this information). If you have questions about a medical condition or this instruction, always ask your healthcare professional. [place] [address] any warranty or liability for your use of this information.       Wear the knee immobilizer!!!!             DISCHARGE SUMMARY from Nurse    PATIENT INSTRUCTIONS:    After general anesthesia or intravenous sedation, for 24 hours or while taking prescription Narcotics:  · Limit your activities  · Do not drive and operate hazardous machinery  · Do not make important personal or business decisions  · Do  not drink alcoholic beverages  · If you have not urinated within 8 hours after discharge, please contact your surgeon on call. Report the following to your surgeon:  · Excessive pain, swelling, redness or odor of or around the surgical area  · Temperature over 100.5  · Nausea and vomiting lasting longer than 4 hours or if unable to take medications  · Any signs of decreased circulation or nerve impairment to extremity: change in color, persistent  numbness, tingling, coldness or increase pain  · Any questions    *  Please give a list of your current medications to your Primary Care Provider. *  Please update this list whenever your medications are discontinued, doses are      changed, or new medications (including over-the-counter products) are added. *  Please carry medication information at all times in case of emergency situations. These are general instructions for a healthy lifestyle:    No smoking/ No tobacco products/ Avoid exposure to second hand smoke  Surgeon General's Warning:  Quitting smoking now greatly reduces serious risk to your health. Obesity, smoking, and sedentary lifestyle greatly increases your risk for illness    A healthy diet, regular physical exercise & weight monitoring are important for maintaining a healthy lifestyle    You may be retaining fluid if you have a history of heart failure or if you experience any of the following symptoms:  Weight gain of 3 pounds or more overnight or 5 pounds in a week, increased swelling in our hands or feet or shortness of breath while lying flat in bed.   Please call your doctor as soon as you notice any of these symptoms; do not wait until your next office visit.        __________________________________________________________________________________________________________________________________    Please take time to review all of your Home Care Instructions and Medication Information sheets provided in your discharge packet. If you have any questions, please contact your surgeons office. Thank you. A common side effect of anesthesia following surgery is nausea and/or vomiting. In order to decrease symptoms, it is wise to avoid foods that are high in fat, greasy foods, milk products, and spicy foods for the first 24 hours. Acceptable foods for the first 24 hours following surgery include but are not limited to:     soup   broth    toast    crackers    applesauce    bananas    mashed potatoes,   soft or scrambled eggs   oatmeal    jello    It is important to eat when taking your pain medication. This will help to prevent nausea. If possible, please try to time your meals with your medications. It is very important to stay hydrated following surgery. Sip fluids frequently while awake. Avoid acidic drinks such as citrus juices and soda for 24 hours. Carbonated beverages may cause bloating and gas. Acceptable fluids include:    - water (flavor packets may add variety)  - coffee or tea (in moderation)  - Gatorade  - Roman-aid  - apple juice  - cranberry juice    You are encouraged to cough and deep breathe every hour when awake. This will help to prevent respiratory complications following anesthesia. You may want to hug a pillow when coughing and sneezing to add additional support to the surgical area and to decrease discomfort if you had abdominal or chest surgery. If you are discharged home with support stockings, you may remove them after 24 hours. Support stockings are used to help prevent blood clots in the legs following surgery.     Please take time to review all of your Home Care Instructions and Medication Information sheets provided in your discharge packet. If you have any questions, please contact your surgeons office. Thank you. TO PREVENT AN INFECTION      1. [address] YOUR HANDS     To prevent infection, good handwashing is the most important thing you or your caregiver can do.  Wash your hands with soap and water or use the hand  we gave you before you touch any wounds. 2. SHOWER     Use the antibacterial soap we gave you when you take a shower.  Shower with this soap until your wounds are healed.  To reach all areas of your body, you may need someone to help you.  Dont forget to clean your belly button with every shower. 3.  USE CLEAN SHEETS     Use freshly cleaned sheets on your bed after surgery.  To keep the surgery site clean, do not allow pets to sleep with you while your wound is still healing. 4. STOP SMOKING     Stop smoking, or at least cut back on smoking     Smoking slows your healing. 5.  CONTROL YOUR BLOOD SUGAR     High blood sugars slow wound healing.  If you are diabetic, control your blood sugar levels before and after your surgery.

## 2020-05-18 NOTE — PERIOP NOTES
Vinh from Operating Room to PACU Report received from [address] [facility]th  and Elsa Emanuel CRNA regarding Ernestine Michael. Surgeon(s): 
Lizette Chauhan MD  And Procedure(s) (LRB): HIP DISARTICULATION (Right)  confirmed  
with allergies discussed. Anesthesia type, drugs, patient history, complications, estimated blood loss, vital signs, intake and output, and last pain medication, lines and temperature were reviewed. [address] [facility] for discharge orders for patient. 0 Spoke with patient's son Brooke Gibson. He will be taking patient home tonight and staying with patient. Will arrive in approximately 30 minutes. 1955 Assisted patient with getting dressed. Up with 2 people. Knee immobolizer remains in place as it was when receiving pateint. IV removed. Telemetry removed. 2015 No RX on printer or chart for pain med Bethel Paz as it says on discharge instructions. Patient not having any pain just sore. Patient says he will just take tylenol if he needs it and will call later if he needs something stronger. 2030 Reviewed all discharge instructions with patient's son. Instructed to call Dr Julius Shabazz for appointment as soon as possible for follow up. Instructed patient to wear immobilizer at all times except when bathing.

## 2020-05-19 ENCOUNTER — PATIENT OUTREACH (OUTPATIENT)
Dept: INTERNAL MEDICINE CLINIC | Age: 70
End: 2020-05-19

## 2020-05-19 LAB
ATRIAL RATE: 100 BPM
CALCULATED P AXIS, ECG09: 51 DEGREES
CALCULATED R AXIS, ECG10: -9 DEGREES
CALCULATED T AXIS, ECG11: 22 DEGREES
DIAGNOSIS, 93000: NORMAL
P-R INTERVAL, ECG05: 170 MS
Q-T INTERVAL, ECG07: 372 MS
QRS DURATION, ECG06: 88 MS
QTC CALCULATION (BEZET), ECG08: 479 MS
VENTRICULAR RATE, ECG03: 100 BPM

## 2020-05-20 NOTE — OP NOTES
Op Note Patient: Chuck Hill YOB: 1950 MRN: [id number] Date of Procedure: 5/18/2020 Pre-Op Diagnosis: Closed dislocation of right hip, initial encounter ([facility] [U29.445Z] Post-Op Diagnosis: Same as preoperative diagnosis. Procedure(s):Closed reduction right total hip replacement under anesthesia Surgeon(s): 
Racquel Bahena MD 
 
Surgical Assistant: None Anesthesia: General  
 
Estimated Blood Loss (mL): None Complications: None Specimens: None Implants: None Drains: None Findings:  Dislocated right tripolar hip construct 
 
 indications:  Right total hip dislocation with failed attempted reduction in the emergency department under sedation. Description of procedure:   After being identified in the preoperative holding area and having his operative site marked the patient was brought back to the operating room and placed supine on a standard OR table. General anesthesia was induced without difficulty. An appropriate time-out was performed. Under anesthetic paralysis, the right hip was easily reduced and found to be stable through a gentle range of motion. Fluoroscopy confirmed reduction. There is no apparent complication with any of the implant components. A new knee immobilizer was placed and he was taken to the recovery room in satisfactory condition. He was discharged home with a recommendation to consider conversion to a constrained prosthesis.  
 
Electronically Signed by Isidra Frost MD on 5/20/2020 at 9:54 AM

## 2020-05-25 ENCOUNTER — APPOINTMENT (OUTPATIENT)
Dept: GENERAL RADIOLOGY | Age: 70
End: 2020-05-25
Attending: NURSE PRACTITIONER
Payer: MEDICARE

## 2020-05-25 ENCOUNTER — HOSPITAL ENCOUNTER (EMERGENCY)
Age: 70
Discharge: HOME | End: 2020-05-25
Attending: EMERGENCY MEDICINE | Admitting: EMERGENCY MEDICINE
Payer: MEDICARE

## 2020-05-25 ENCOUNTER — APPOINTMENT (OUTPATIENT)
Dept: CT IMAGING | Age: 70
End: 2020-05-25
Attending: EMERGENCY MEDICINE
Payer: MEDICARE

## 2020-05-25 VITALS
HEART RATE: 84 BPM | RESPIRATION RATE: 16 BRPM | TEMPERATURE: 98.2 F | SYSTOLIC BLOOD PRESSURE: 138 MMHG | DIASTOLIC BLOOD PRESSURE: 64 MMHG | OXYGEN SATURATION: 100 %

## 2020-05-25 DIAGNOSIS — S00.83XA CONTUSION OF FACE, INITIAL ENCOUNTER: ICD-10-CM
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
## 2020-05-25 LAB
ALBUMIN SERPL-MCNC: 2.8 G/DL (ref 3.5–5)
ALBUMIN/GLOB SERPL: 0.7 {RATIO} (ref 1.1–2.2)
ALP SERPL-CCNC: 138 U/L (ref 45–117)
ALT SERPL-CCNC: 23 U/L (ref 12–78)
ANION GAP SERPL CALC-SCNC: 7 MMOL/L (ref 5–15)
APPEARANCE UR: CLEAR
AST SERPL-CCNC: 23 U/L (ref 15–37)
BASOPHILS # BLD: 0 K/UL (ref 0–0.1)
BASOPHILS NFR BLD: 1 % (ref 0–1)
BILIRUB SERPL-MCNC: 1.2 MG/DL (ref 0.2–1)
BILIRUB UR QL: NEGATIVE
BUN SERPL-MCNC: 14 MG/DL (ref 6–20)
BUN/CREAT SERPL: 15 (ref 12–20)
CALCIUM SERPL-MCNC: 8.4 MG/DL (ref 8.5–10.1)
CHLORIDE SERPL-SCNC: 109 MMOL/L (ref 97–108)
CO2 SERPL-SCNC: 23 MMOL/L (ref 21–32)
COLOR UR: ABNORMAL
COMMENT, HOLDF: NORMAL
CREAT SERPL-MCNC: 0.95 MG/DL (ref 0.7–1.3)
DIFFERENTIAL METHOD BLD: ABNORMAL
EOSINOPHIL # BLD: 0.2 K/UL (ref 0–0.4)
EOSINOPHIL NFR BLD: 5 % (ref 0–7)
ERYTHROCYTE [DISTWIDTH] IN BLOOD BY AUTOMATED COUNT: 16.3 % (ref 11.5–14.5)
GLOBULIN SER CALC-MCNC: 4.2 G/DL (ref 2–4)
GLUCOSE SERPL-MCNC: 318 MG/DL (ref 65–100)
GLUCOSE UR STRIP.AUTO-MCNC: >1000 MG/DL
HCT VFR BLD AUTO: 25.1 % (ref 36.6–50.3)
HGB BLD-MCNC: 7.8 G/DL (ref 12.1–17)
HGB UR QL STRIP: NEGATIVE
IMM GRANULOCYTES # BLD AUTO: 0 K/UL (ref 0–0.04)
IMM GRANULOCYTES NFR BLD AUTO: 1 % (ref 0–0.5)
KETONES UR QL STRIP.AUTO: NEGATIVE MG/DL
LEUKOCYTE ESTERASE UR QL STRIP.AUTO: NEGATIVE
LYMPHOCYTES # BLD: 0.6 K/UL (ref 0.8–3.5)
LYMPHOCYTES NFR BLD: 15 % (ref 12–49)
MCH RBC QN AUTO: 26.5 PG (ref 26–34)
MCHC RBC AUTO-ENTMCNC: 31.1 G/DL (ref 30–36.5)
MCV RBC AUTO: 85.4 FL (ref 80–99)
MONOCYTES # BLD: 0.3 K/UL (ref 0–1)
MONOCYTES NFR BLD: 8 % (ref 5–13)
NEUTS SEG # BLD: 3.2 K/UL (ref 1.8–8)
NEUTS SEG NFR BLD: 70 % (ref 32–75)
NITRITE UR QL STRIP.AUTO: NEGATIVE
NRBC # BLD: 0 K/UL (ref 0–0.01)
NRBC BLD-RTO: 0 PER 100 WBC
PH UR STRIP: 7 [PH] (ref 5–8)
PLATELET # BLD AUTO: 174 K/UL (ref 150–400)
PMV BLD AUTO: 11.1 FL (ref 8.9–12.9)
POTASSIUM SERPL-SCNC: 4.1 MMOL/L (ref 3.5–5.1)
PROT SERPL-MCNC: 7 G/DL (ref 6.4–8.2)
PROT UR STRIP-MCNC: NEGATIVE MG/DL
RBC # BLD AUTO: 2.94 M/UL (ref 4.1–5.7)
RBC MORPH BLD: ABNORMAL
RBC MORPH BLD: ABNORMAL
SAMPLES BEING HELD,HOLD: NORMAL
SODIUM SERPL-SCNC: 139 MMOL/L (ref 136–145)
SP GR UR REFRACTOMETRY: 1.03 (ref 1–1.03)
UR CULT HOLD, URHOLD: NORMAL
UROBILINOGEN UR QL STRIP.AUTO: 1 EU/DL (ref 0.2–1)
WBC # BLD AUTO: 4.3 K/UL (ref 4.1–11.1)

## 2020-05-25 PROCEDURE — 81003 URINALYSIS AUTO W/O SCOPE: CPT

## 2020-05-25 PROCEDURE — 80053 COMPREHEN METABOLIC PANEL: CPT

## 2020-05-25 PROCEDURE — 99284 EMERGENCY DEPT VISIT MOD MDM: CPT

## 2020-05-25 PROCEDURE — 70450 CT HEAD/BRAIN W/O DYE: CPT

## 2020-05-25 PROCEDURE — 73562 X-RAY EXAM OF KNEE 3: CPT

## 2020-05-25 PROCEDURE — 85025 COMPLETE CBC W/AUTO DIFF WBC: CPT

## 2020-05-25 PROCEDURE — 36415 COLL VENOUS BLD VENIPUNCTURE: CPT

## 2020-05-25 NOTE — ED PROVIDER NOTES
Patient is a 20-year-old male with a history of anemia, cirrhosis, diabetes, GERD, who presents today after a ground-level fall around a week ago. He states that a few weeks ago he had a fall where he dislocated his hip. This was repaired at [facility].  He was doing okay until a few days later which is about a week ago, when he lost his balance and fell backwards. He denies any prodromal symptoms. Currently denies any headaches, dizziness, vision problems, denies any neck pain or back pain. Has some soreness in his hip but no new pain since the fall. He is not any anticoagulants. Patient son concerned given ecchymosis and brought him to the emergency room. No other acute complaints. Past Medical History:  
Diagnosis Date  ADD (attention deficit disorder)  Anemia due to blood loss  Hinson's esophagus with esophagitis  Benign essential tremor  Cancer [facility]) 2012 [facility]  Cirrhosis ([address]  Depression  Dislocated hip ([address]  DJD (degenerative joint disease) of hip   
 bilat  DM (diabetes mellitus) ([address] 3/17/2010  ED (erectile dysfunction) of organic origin  Esophageal varices determined by endoscopy ([address]  Fall on or from sidewalk curb 9/24/2015  Femur fracture ([address]  Gastritis and duodenitis  GERD (gastroesophageal reflux disease)   
 resolved after discontinuing diclofenac  Hematuria 6/2016  High cholesterol 03/17/2010  
 pt denies 6/19  
 HTN (hypertension) 3/17/2010  Morbid obesity ([address]  Murmur, cardiac 2016  
 PFO (patent foramen ovale) 7/26/2017  PUD (peptic ulcer disease)  Shingles 6/2016 RESOLVING- NO RASH (HEAD)  Sleep apnea   
 doesnt use CPAP anymore Past Surgical History:  
Procedure Laterality Date  COLONOSCOPY N/A 6/18/2019 COLONOSCOPY AND EGD performed by Allison Huynh MD at [facility] ENDOSCOPY  COLONOSCOPY,DIAGNOSTIC  6/18/2019  ENDOSCOPY, COLON, DIAGNOSTIC    
 [address]  HX GI  1980  
 gastroplasty [address]  HX HIP REPLACEMENT Left  HX ORTHOPAEDIC Right 2011  
 rot cuff repair  HX ORTHOPAEDIC  2016  
 left broken femur [address]  HX VASCULAR ACCESS    
 picc line and removed after sepsis resolved  IR INSERT TIPS HEPATIC SHUNT  3/28/2020 [address] [facility] ARTHROPLASTY  2010  
 right  TOTAL HIP ARTHROPLASTY  2016  
 left  UPPER GI ENDOSCOPY,BIOPSY  2019 Family History:  
Problem Relation Age of Onset  Cancer Father   
     multiple myeloma  Stroke Father  Dementia Mother  No Known Problems Brother  COPD Brother  Cancer Maternal Grandmother COLON  Anesth Problems Neg Hx Social History Socioeconomic History  Marital status:  Spouse name: Not on file  Number of children: Not on file  Years of education: Not on file  Highest education level: Not on file Occupational History  Not on file Social Needs  Financial resource strain: Not on file  Food insecurity Worry: Not on file Inability: Not on file  Transportation needs Medical: Not on file Non-medical: Not on file Tobacco Use  Smoking status: Former Smoker Packs/day: 2.00 Years: 15.00 Pack years: 30.00 Last attempt to quit: 3/1/1979 Years since quittin.2  Smokeless tobacco: Never Used Substance and Sexual Activity  Alcohol use: No  
  Alcohol/week: 0.0 standard drinks  Drug use: No  
 Sexual activity: Never Lifestyle  Physical activity Days per week: Not on file Minutes per session: Not on file  Stress: Not on file Relationships  Social connections Talks on phone: Not on file Gets together: Not on file Attends [employer] service: Not on file Active member of club or organization: Not on file Attends meetings of clubs or organizations: Not on file Relationship status: Not on file  Intimate partner violence Fear of current or ex partner: Not on file Emotionally abused: Not on file Physically abused: Not on file Forced sexual activity: Not on file Other Topics Concern  Not on file Social History Narrative  Not on file ALLERGIES: Nsaids (non-steroidal anti-inflammatory drug) Review of Systems Constitutional: Negative for chills and fever. HENT: Negative. Respiratory: Negative. Gastrointestinal: Negative. Musculoskeletal: Positive for arthralgias. Skin: Positive for color change. Neurological: Negative for dizziness and headaches. All other systems reviewed and are negative. Vitals:  
 05/25/20 1339 05/25/20 1515 BP: 145/67 Pulse:  87 Resp: 16 Temp:  98.2 °F (36.8 °C) SpO2: 100% Physical Exam 
Vitals signs and nursing note reviewed. Constitutional:   
   Appearance: Normal appearance. HENT:  
   Right Ear: There is impacted cerumen. Left Ear: There is impacted cerumen. Nose: Nose normal.  
   Mouth/Throat:  
   Mouth: Mucous membranes are moist.  
Eyes:  
   Extraocular Movements: Extraocular movements intact. Conjunctiva/sclera: Conjunctivae normal.  
   Pupils: Pupils are equal, round, and reactive to light. Neck: Musculoskeletal: Normal range of motion. Cardiovascular:  
   Rate and Rhythm: Normal rate and regular rhythm. Heart sounds: Murmur present. Pulmonary:  
   Effort: Pulmonary effort is normal.  
   Breath sounds: Normal breath sounds. Abdominal:  
   Palpations: Abdomen is soft. Tenderness: There is no abdominal tenderness. Musculoskeletal:     
   General: Tenderness (Right knee superior aspect) present. Skin: 
   General: Skin is warm and dry. Findings: Bruising (I lateral periorbital and bridge of nose.) present. Neurological:  
   Mental Status: He is alert and oriented to person, place, and time. Psychiatric:     
   Mood and Affect: Mood normal.     
   Behavior: Behavior normal.  
 
  
 
MDM Number of Diagnoses or Management Options Contusion of face, initial encounter:  
Fall, initial encounter:  
 
  
 
Procedures 3:51 PM 
Patient is a 17-year-old male who presents today after a ground-level fall. Patient states he lost his balance. Incident occurred about 1 week ago. CT the head negative for acute intracranial pathology. 4:10 PM 
Negative for infection. Patient has hemoglobin 7.8. Review of his records show that this is not far off his baseline. Glucose 300s. We will plan to discharge home follow back up with PCP. After discussion with the patient's son. Patient had mentioned that he was having some pain in his right knee that is been going on for several days. Unsure if it is related to his recent relocation of his right hip. X-rays performed that were negative for acute bony injury. There is very mild swelling. Mostly in the superior bursa. Will refer back to his orthopedist should his symptoms not improve.

## 2020-05-25 NOTE — ED NOTES
Patient given discharge paperwork and instructions by RN and provider. Patient verbalized understanding. No signs of distress at time of discharge. Patient wheeled to waiting room to wait for ride.

## 2020-05-25 NOTE — DISCHARGE INSTRUCTIONS
Patient Education        Preventing Falls: Care Instructions  Your Care Instructions    Getting around your home safely can be a challenge if you have injuries or health problems that make it easy for you to fall. Loose rugs and furniture in walkways are among the dangers for many older people who have problems walking or who have poor eyesight. People who have conditions such as arthritis, osteoporosis, or dementia also have to be careful not to fall. You can make your home safer with a few simple measures. Follow-up care is a key part of your treatment and safety. Be sure to make and go to all appointments, and call your doctor if you are having problems. It's also a good idea to know your test results and keep a list of the medicines you take. How can you care for yourself at home? Taking care of yourself  · You may get dizzy if you do not drink enough water. To prevent dehydration, drink plenty of fluids, enough so that your urine is light yellow or clear like water. Choose water and other caffeine-free clear liquids. If you have kidney, heart, or liver disease and have to limit fluids, talk with your doctor before you increase the amount of fluids you drink. · Exercise regularly to improve your strength, muscle tone, and balance. Walk if you can. Swimming may be a good choice if you cannot walk easily. · Have your vision and hearing checked each year or any time you notice a change. If you have trouble seeing and hearing, you might not be able to avoid objects and could lose your balance. · Know the side effects of the medicines you take. Ask your doctor or pharmacist whether the medicines you take can affect your balance. Sleeping pills or sedatives can affect your balance. · Limit the amount of alcohol you drink. Alcohol can impair your balance and other senses. · Ask your doctor whether calluses or corns on your feet need to be removed.  If you wear loose-fitting shoes because of calluses or corns, you can lose your balance and fall. · Talk to your doctor if you have numbness in your feet. Preventing falls at home  · Remove raised doorway thresholds, throw rugs, and clutter. Repair loose carpet or raised areas in the floor. · Move furniture and electrical cords to keep them out of walking paths. · Use nonskid floor wax, and wipe up spills right away, especially on ceramic tile floors. · If you use a walker or cane, put rubber tips on it. If you use crutches, clean the bottoms of them regularly with an abrasive pad, such as steel wool. · Keep your house well lit, especially [facility], and outside walkways. Use night-lights in areas such as hallways and bathrooms. Add extra light switches or use remote switches (such as switches that go on or off when you clap your hands) to make it easier to turn lights on if you have to get up during the night. · Install sturdy handrails on stairways. · Move items in your cabinets so that the things you use a lot are on the lower shelves (about waist level). · Keep a cordless phone and a flashlight with new batteries by your bed. If possible, put a phone in each of the main rooms of your house, or carry a cell phone in case you fall and cannot reach a phone. Or, you can wear a device around your neck or wrist. You push a button that sends a signal for help. · Wear low-heeled shoes that fit well and give your feet good support. Use footwear with nonskid soles. Check the heels and soles of your shoes for wear. Repair or replace worn heels or soles. · Do not wear socks without shoes on wood floors. · Walk on the grass when the sidewalks are slippery. If you live in an area that gets snow and ice in the winter, sprinkle salt on slippery steps and sidewalks. Preventing falls in the bath  · Install grab bars and nonskid mats inside and outside your shower or tub and near the toilet and sinks. · Use shower chairs and bath benches.   · Use a hand-held shower head that will allow you to sit while showering. · Get into a tub or shower by putting the weaker leg in first. Get out of a tub or shower with your strong side first.  · Repair loose toilet seats and consider installing a raised toilet seat to make getting on and off the toilet easier. · Keep your bathroom door unlocked while you are in the shower. Where can you learn more? Go to http://russ-kiesha.info/. Enter [id number] [id number] in the search box to learn more about \"Preventing Falls: Care Instructions. \"  Current as of: March 16, 2018  Content Version: 11.8  © 0586-8516 [employer], [employer]. Care instructions adapted under license by [employer] (which disclaims liability or warranty for this information). If you have questions about a medical condition or this instruction, always ask your healthcare professional. [address] any warranty or liability for your use of this information.

## 2020-05-25 NOTE — ED TRIAGE NOTES
Pt stated he fell last week and dislocated his hip, today pts son noticed he had \"racoon eyes\", denies headache, does remember hitting head, denies neck pain

## 2020-05-26 ENCOUNTER — PATIENT OUTREACH (OUTPATIENT)
Dept: CASE MANAGEMENT | Age: 70
End: 2020-05-26

## 2020-05-26 NOTE — PROGRESS NOTES
ACM unable to reach patient for ER follow up and left VM for return call. ACM left message with patients son for him to return the call with contact information provided 11;45 am - ACM received VM from patient returning the call, states on VM he can hardly walk today. ACM attempted to reach patient back at cell and home numbers provided and was unable to reach him. Left VM for return call

## 2020-05-26 NOTE — PROGRESS NOTES
3:40 Pm - ACM had not received return call from the patient. Contacted his son Kori Thomas and noted concern that on his last VM to me he mentioned he was barely able to walk today and this ACM has been unable to reach him since. Patients son notes he will follow up with him and either himself or the patient will follow up with ACM.

## 2020-05-27 ENCOUNTER — PATIENT OUTREACH (OUTPATIENT)
Dept: CASE MANAGEMENT | Age: 70
End: 2020-05-27

## 2020-05-27 RX ORDER — INSULIN GLARGINE 100 [IU]/ML
20 INJECTION, SOLUTION SUBCUTANEOUS
Qty: 36 ML | Refills: 0 | Status: ON HOLD | OUTPATIENT
Start: 2020-05-27 | End: 2020-07-23 | Stop reason: SDUPTHER

## 2020-05-27 RX ORDER — INSULIN ASPART 100 [IU]/ML
7 INJECTION, SOLUTION INTRAVENOUS; SUBCUTANEOUS
Qty: 18.9 ML | Refills: 0 | Status: SHIPPED | OUTPATIENT
Start: 2020-05-27 | End: 2020-08-25

## 2020-05-27 NOTE — TELEPHONE ENCOUNTER
----- Message from Estefanía Percy sent at 5/27/2020 12:39 PM EDT -----  Regarding: Dr. Johnson Paulino (if not patient): N/A  Relationship of caller (if not patient): N/A  Best contact number(s): [phone]  Name of medication and dosage if known: Lantus 100 unit/mL, Novolin - dosage unknown  Is patient out of this medication (yes/no): Yes - Lantus, No - Novolin  Ph[facility] name: [facility] listed in chart? (yes/no): Yes  Pharmacy phone number: N/A  Date of last visit: April 21, 2020  Details to clarify the request: N/A

## 2020-05-27 NOTE — PROGRESS NOTES
ACM received messages from patient yesterday evening and returned call. Per VM patients phone number in the computer system was updated as it was incorrect . ACM again attempted to reach patient and was unable to reach him. Left vm's on both lines and contact info provided for return call 11;45 AM- no return call from patient . Unable to reach patient after several attempts. Left messages this morning with no return call. ACM will now close  This episode at this time 12:45 pm- 
 
Patient contacted regarding recent discharge and COVID-19 risk. Discussed COVID-19 related testing which was not done at this time. Test results were not done. Patient informed of results, if available? no   
Care Transition Nurse/ Ambulatory Care Manager contacted the patient by telephone to perform post discharge assessment. Verified name and  with patient as identifiers. Patient has following risk factors of: diabetes. CTN/ACM reviewed discharge instructions, medical action plan and red flags related to discharge diagnosis. Reviewed and educated them on any new and changed medications related to discharge diagnosis. Advised obtaining a 90-day supply of all daily and as-needed medications. Education provided regarding infection prevention, and signs and symptoms of COVID-19 and when to seek medical attention with patient who verbalized understanding. Discussed exposure protocols and quarantine from [address] you at higher risk for severe illness  and given an opportunity for questions and concerns. The patient agrees to contact the COVID-19 hotline [phone] or PCP office for questions related to their healthcare. CTN/ACM provided contact information for future reference. From CDC: Are you at higher risk for severe illness?  Wash your hands often.  Avoid close contact (6 feet, which is about two arm lengths) with people who are sick.  Put distance between yourself and other people if COVID-19 is spreading in your community.  Clean and disinfect frequently touched surfaces.  Avoid all cruise travel and non-essential air travel.  Call your healthcare professional if you have concerns about COVID-19 and your underlying condition or if you are sick. For more information on steps you can take to protect yourself, see CDC's How to Protect Yourself Patient/family/caregiver given information for [employer] and agrees to enroll yes Patient's preferred e-mail:  [email or web address] Patient's preferred phone number: [phone] Based on Loop alert triggers, patient will be contacted by nurse care manager for worsening symptoms. Follow up by get well Loop team based on severity of symptoms and risk factors. Patient reports he is doing ok. Notes he had a difficult few days after having hip dislocation with reduction he had pain. He notes he lives with his son and has emergency alert watch in case of falling again. Is walking with a walker. Has grabber to pick things up to avoid bending/ turning at the hip a lot. Verified his appt with him for Dr Raquel Sepulveda on 6/5 and [facility]. Patient reports he does have edema in his legs and is taking his fluid pill. COVID teaching provided. Patient has no further questions/ concerns . Will contact [facility] with any further questions.

## 2020-05-29 ENCOUNTER — TELEPHONE (OUTPATIENT)
Dept: FAMILY MEDICINE CLINIC | Age: 70
End: 2020-05-29

## 2020-05-29 NOTE — TELEPHONE ENCOUNTER
----- Message from Karina Horne Page sent at 5/29/2020  7:57 AM EDT -----  Regarding: Dr. Christelle Morgan  Patient return call    Caller's first and last name and relationship (if not the patient):      Best contact number(s):  [phone]    Whose call is being returned:  Joseph Broderick    Details to clarify the request:      Karina Horne Page

## 2020-06-02 ENCOUNTER — VIRTUAL VISIT (OUTPATIENT)
Dept: FAMILY MEDICINE CLINIC | Age: 70
End: 2020-06-02

## 2020-06-02 VITALS — TEMPERATURE: 98.2 F

## 2020-06-02 DIAGNOSIS — R29.6 RECURRENT FALLS: ICD-10-CM

## 2020-06-02 DIAGNOSIS — M16.12 PRIMARY OSTEOARTHRITIS OF LEFT HIP: Primary | ICD-10-CM

## 2020-06-02 DIAGNOSIS — D50.9 IRON DEFICIENCY ANEMIA, UNSPECIFIED IRON DEFICIENCY ANEMIA TYPE: ICD-10-CM

## 2020-06-02 DIAGNOSIS — R18.8 CIRRHOSIS OF LIVER WITH ASCITES, UNSPECIFIED HEPATIC CIRRHOSIS TYPE (HCC): ICD-10-CM

## 2020-06-02 DIAGNOSIS — S72.92XS CLOSED FRACTURE OF LEFT FEMUR, UNSPECIFIED FRACTURE MORPHOLOGY, UNSPECIFIED PORTION OF FEMUR, SEQUELA: ICD-10-CM

## 2020-06-02 DIAGNOSIS — K75.81 NASH (NONALCOHOLIC STEATOHEPATITIS): ICD-10-CM

## 2020-06-02 DIAGNOSIS — K74.60 CIRRHOSIS OF LIVER WITH ASCITES, UNSPECIFIED HEPATIC CIRRHOSIS TYPE (HCC): ICD-10-CM

## 2020-06-02 DIAGNOSIS — R29.898 WEAKNESS OF BOTH LEGS: ICD-10-CM

## 2020-06-02 RX ORDER — OXYCODONE HYDROCHLORIDE 5 MG/1
5 TABLET ORAL
COMMUNITY
Start: 2020-05-20 | End: 2020-06-30 | Stop reason: ALTCHOICE

## 2020-06-02 NOTE — PROGRESS NOTES
Consent: Kenney Cardenas, who was seen by synchronous (real-time) audio-video technology, and/or his healthcare decision maker, is aware that this patient-initiated, Telehealth encounter on 6/2/2020 is a billable service, with coverage as determined by his insurance carrier. He is aware that he may receive a bill and has provided verbal consent to proceed: Yes. Assessment & Plan:  
Diagnoses and all orders for this visit: 1. Primary osteoarthritis of left hip 2. Closed fracture of left femur, unspecified fracture morphology, unspecified portion of femur, sequela 3. Recurrent falls 4. Weakness of both legs Continues to have pain and weakness worsened by recent hospitalization and deconditioning. Declines having home health and has too much trouble being able to get a ride to outpatient physical therapy. We will continue to work with patient on finding options to help regain strength and for now, patient opts to work on home exercises 5. Cirrhosis of liver with ascites, unspecified hepatic cirrhosis type ([facility] [address] 6. BREWER (nonalcoholic steatohepatitis) Encouraged patient to follow-up with liver [place] 7. Iron deficiency anemia, unspecified iron deficiency anemia typeworsened by recent GI bleed. Encouraged restarting iron Follow-up and Dispositions · Return in about 6 weeks (around 7/14/2020), or if symptoms worsen or fail to improve. [address] Subjective:  
Kenney Cardenas is a 68 y.o. male who was seen for Follow-up (ER Visit/Fall and Follow-up) Since last appt, had ER visit for a fall - had another fall 1 week before that. Actually had hip pop out of place when. Had a face contusion and this is resolved. Has not seen Ortho yet for follow-up on this. Using walker but having falls when using walker. He did not want physical therapy in his home partly related to his dogs but also previous concerns with his depression/grief after his wife passed away and how he keeps his home. Sugars doing well on current regimen. Continues following with Dr. Addis Tony Chronic anemia -worsened by GI bleed. Planned to get octreotide infusions for this but ended up having trouble with getting rides. [facility] to see if this is still needed. Prior to Admission medications Medication Sig Start Date End Date Taking? Authorizing Provider  
oxyCODONE IR (ROXICODONE) 5 mg immediate release tablet Take 5 mg by mouth every four (4) hours as needed. 5/20/20  Yes Provider, Historical  
insulin glargine (LANTUS) 100 unit/mL injection 20 Units by SubCUTAneous route ACB/HS. Patient taking differently: 18 Units by SubCUTAneous route ACB/HS. 5/27/20  Yes Margarette Gordon MD  
insulin aspart U-100 (NovoLOG Flexpen U-100 Insulin) 100 unit/mL (3 mL) inpn 7 Units by SubCUTAneous route Before breakfast, lunch, and dinner for 90 days. 7 units 3 times daily 5/27/20 8/25/20 Yes Margarette Gordon MD  
rifAXIMin Milta Pace) 550 mg tablet Take 1 Tab by mouth two (2) times a day. 4/23/20  Yes David Vanegas MD  
[facility]) 0.4 mg capsule  4/17/20  Yes Provider, Historical  
potassium chloride SR (KLOR-CON 10) 10 mEq tablet  4/18/20  Yes Provider, Historical  
furosemide (LASIX) 20 mg tablet Take 2 tabs by mouth daily x 1 week and then increase to 1 tab daily Patient taking differently: Take  1 tab daily 4/6/20  Yes Marline Perez NP  
thiamine hcl 500 mg tablet Take 500 mg by mouth daily. 4/6/20  Yes Marline Perez NP  
folic acid (FOLVITE) 1 mg tablet Take 1 Tab by mouth daily. 3/26/20  Yes Brandi Reese NP  
lisinopriL (PRINIVIL, ZESTRIL) 5 mg tablet TAKE 2 TABLETS BY MOUTH EVERY DAY Patient taking differently: TAKE 1 TABLETS BY MOUTH EVERY DAY 3/26/20  Yes Valentina Reese NP  
butalbital-acetaminophen-caffeine (FIORICET, ESGIC) -40 mg per tablet Take 1 Tab by mouth every six (6) hours as needed for Headache.  3/6/20  Yes Margarette Gordon MD  
 cephALEXin (KEFLEX) 500 mg capsule Take 1 Cap by mouth two (2) times a day. 2/24/20  Yes Reginald Zimmerman MD  
sertraline (ZOLOFT) 100 mg tablet TAKE 1.5 TABLETS DAILY 2/21/20  Yes Reginald Zimmerman MD  
spironolactone (ALDACTONE) 25 mg tablet Take 1 Tab by mouth daily. 2/4/20  Yes Reginald Zimmerman MD  
ferrous sulfate 140 mg (45 mg iron) TbER ER tablet Take 1 Tab by mouth Daily (before breakfast). 2/4/20  Yes Reginald Zimmerman MD  
ergocalciferol (VITAMIN D2) 50,000 unit capsule TAKE 1 CAPSULE EVERY 7 DAYS 1/6/20  Yes Reginald Zimmerman MD  
atorvastatin (LIPITOR) 40 mg tablet Take 1 Tab by mouth daily. 1/4/20  Yes Natan Hamilton MD  
glucose blood VI test strips (ASCENSIA AUTODISC VI, ONE TOUCH ULTRA TEST VI) strip Test blood sugar twice daily Diagnosis E 11.9. Can use [employer] 12/13/19  Yes Reginald Zimmerman MD  
primidone (MYSOLINE) 250 mg tablet TAKE 1 TABLET TWICE A DAY 11/7/19  Yes Reginald Zimmerman MD  
acetaminophen (TYLENOL) 500 mg tablet Take 2 Tabs by mouth every six (6) hours as needed for Pain. 10/7/19  Yes Abhishek Esquivel MD  
Blood-Glucose Meter (ACCU-CHEK SOWMYA PLUS METER) misc Test blood sugar twice daily Diagnosis E 11.9 7/9/19  Yes Reginald Zimmerman MD  
Blood Glucose Control High&Low (ACCU-CHEK SOWMYA CONTROL SOLN) soln Test blood sugar twice daily Diagnosis E 11.9 7/9/19  Yes Reginald Zimmerman MD  
B.infantis-B.ani-B.long-B.bifi (PROBIOTIC 4X) 10-15 mg TbEC Take  by mouth. Yes Provider, Historical  
magnesium 250 mg tab Take 500 mg by mouth daily. Yes Provider, Historical  
calcium citrate-vitamin D3 (CITRACAL + D) tablet Take 2 Tabs by mouth two (2) times a day. Yes Provider, Historical  
ondansetron (ZOFRAN ODT) 4 mg disintegrating tablet  4/17/20   Provider, Historical  
albuterol-ipratropium (DUO-NEB) 2.5 mg-0.5 mg/3 ml nebu 3 mL by Nebulization route every four (4) hours as needed for Wheezing.  4/6/20   Vivi CHAUDHRY, NP  
 gabapentin (NEURONTIN) 300 mg capsule Take 2 Caps by mouth nightly. 3/16/20   Windy Lazo MD  
melatonin tab tablet Take 10 mg by mouth nightly as needed. Provider, Historical  
 
Allergies Allergen Reactions  Nsaids (Non-Steroidal Anti-Inflammatory Drug) Other (comments) Hx of PUD and Hinson's Esophagus Patient Active Problem List  
 Diagnosis Date Noted  Closed dislocation of right hip ([address] 05/18/2020  S/P TIPS (transjugular intrahepatic portosystemic shunt) 04/23/2020  
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  Obesity 03/04/2018  Insomnia 03/04/2018  Infected prosthesis of right hip ([address] 02/26/2018  PFO (patent foramen ovale) 07/26/2017  Systolic murmur 21/13/6572  Jessica-prosthetic fracture of femur following total hip arthroplasty 08/25/2016  Osteoarthritis of right hip 08/01/2016  Benign essential tremor  Diabetes mellitus type 2, uncontrolled ([address] 06/03/2013  Hypogonadism male 08/29/2012  Osteoarthritis of hip  Depression  ED (erectile dysfunction) of organic origin  GERD (gastroesophageal reflux disease)  PUD (peptic ulcer disease)  Hinson's esophagus with esophagitis  HTN (hypertension) 03/17/2010  Hypercholesterolemia 03/17/2010 Past Medical History:  
Diagnosis Date  ADD (attention deficit disorder)  Anemia due to blood loss  Hinson's esophagus with esophagitis  Benign essential tremor  Cancer [facility]) 2012 [facility]  Cirrhosis ([address]  Depression  Dislocated hip ([address]  DJD (degenerative joint disease) of hip   
 bilat  DM (diabetes mellitus) ([address] 3/17/2010  ED (erectile dysfunction) of organic origin  Esophageal varices determined by endoscopy ([address]  Fall on or from sidewalk curb 9/24/2015  Femur fracture ([facility] [address]  Gastritis and duodenitis  GERD (gastroesophageal reflux disease)   
 resolved after discontinuing diclofenac  Hematuria 6/2016  High cholesterol 03/17/2010  
 pt denies 6/19  
 HTN (hypertension) 3/17/2010  Morbid obesity ([facility] [address]  Murmur, cardiac 2016  
 PFO (patent foramen ovale) 7/26/2017  PUD (peptic ulcer disease)  Shingles 6/2016 RESOLVING- NO RASH (HEAD)  Sleep apnea   
 doesnt use CPAP anymore ROS Gen - no fever/chills Resp - no dyspnea or cough CV - no chest pain or WANG Rest per HPI Objective:  
Vital Signs: (As obtained by patient/caregiver at home) Visit Vitals Temp 98.2 °F (36.8 °C) (Oral) Physical exam: 
General appearance - alert, well appearing, and in no distress Eyes -sclera anicteric, no discharge HEENT normocephalic, atraumatic, moist mucous membranes, no visualized neck mass Chest -normal respiratory effort, no visualized signs of respiratory distress Neurological - alert, awake, normal speech, no focal findings or movement disorder noted Psych - normal mood and affect Skin no apparent lesions We discussed the expected course, resolution and complications of the diagnosis(es) in detail. Medication risks, benefits, costs, interactions, and alternatives were discussed as indicated. I advised him to contact the office if his condition worsens, changes or fails to improve as anticipated. He expressed understanding with the diagnosis(es) and plan. Stephanie Orta is a 68 y.o. male being evaluated by a video visit encounter for concerns as above. A caregiver was present when appropriate. Due to this being a TeleHealth encounter (During RDW-82 public health emergency), evaluation of the following organ systems was limited: Vitals/Constitutional/EENT/Resp/CV/GI//MS/Neuro/Skin/Heme-Lymph-Imm. Pursuant to the emergency declaration under the [facility], [facility] waiver authority and the [employer] and [employer] Act, this Virtual  Visit was conducted, with patient's (and/or legal guardian's) consent, to reduce the patient's risk of exposure to COVID-19 and provide necessary medical care. Services were provided through a video synchronous discussion virtually to substitute for in-person clinic visit. Patient and provider were located at their individual homes.  
 
 
 
Orlando Brown MD

## 2020-06-09 ENCOUNTER — TELEPHONE (OUTPATIENT)
Dept: FAMILY MEDICINE CLINIC | Age: 70
End: 2020-06-09

## 2020-06-09 NOTE — TELEPHONE ENCOUNTER
----- Message from [address] [place] [address] sent at 6/9/2020  2:36 PM EDT -----  Regarding: Dr. Evelin Daniel telephone  Caller's first and last name: n/a  Reason for call:forms faxed  Callback required yes/no and why: yes  Best contact number(s): [phone]  Details to clarify the request:               Stated that he had faxed forms over and needed to know the status as soon as possible. He would like the signed forms sent to him via mail to the address on file. He would like to know if he would be able  to send a check in to the office for any charges there may be for filling out the forms.

## 2020-06-11 ENCOUNTER — VIRTUAL VISIT (OUTPATIENT)
Dept: HEMATOLOGY | Age: 70
End: 2020-06-11

## 2020-06-11 DIAGNOSIS — K74.60 CIRRHOSIS OF LIVER WITHOUT ASCITES, UNSPECIFIED HEPATIC CIRRHOSIS TYPE (HCC): Primary | ICD-10-CM

## 2020-06-11 NOTE — PROGRESS NOTES
[address] 137 [facility] Concepcion Samuel Meza MD, BRENDAN HaysSMD Jerilyn Anthony, PA-ANTHONY Florez, [facility] April S Christa, [facility]   Jaime Medina, P-C Yecenia Correia, [facility] Dagmar Deputado Alan De T[address] 136 
  at [facility] 
  [address], [address] [place] [address]. 
  [phone] FAX: [address] 
  [facility] 
  [address], [address] 98 [facility], [address] 
  [phone] FAX: [phone] Patient Care Team: 
Addis Negron MD as PCP - [facility]) Addis Negron MD as PCP - REHABILITATION [facility] Giancarlo Barnes MD as Physician (Sleep Medicine) Nadeen Vieira MD (Orthopedic Surgery) Karishma Sharp MD (Ophthalmology) Tevin Schroeder NP (Nurse Practitioner) Kallie Stover MD (Neurology) Luis Tellez MD (Endocrinology) Kait Noonan MD as Physician (Cardiology) Rowena Guerin MD (Gastroenterology) Irina Martin, MARK as Ambulatory Care Manager Problem List  Date Reviewed: 4/23/2020 Codes Class Noted Closed dislocation of right hip ([facility].) ICD-10-CM: K56.930Z ICD-9-CM: 835.00  5/18/2020 S/P TIPS (transjugular intrahepatic portosystemic shunt) ICD-10-CM: H32.169 ICD-9-CM: V45.89  4/23/2020 BREWER (nonalcoholic steatohepatitis) ICD-10-CM: N72.98 ICD-9-CM: 571.8  4/6/2020 Hepatic encephalopathy ([facility] ICD-10-CM: K72.90 ICD-9-CM: 572.2  4/6/2020 Esophageal varices with bleeding ([facility] 75.) ICD-10-CM: I85.01 
ICD-9-CM: 456.0  3/28/2020 Dislocation of prosthetic joint [facility]) ICD-10-CM: W99.631Z 
ICD-9-CM: 996.42  3/20/2018 Overview Signed 3/20/2018 11:37 PM by AMAN Mar Right CIERRA Endocarditis of mitral valve ICD-10-CM: I05.8 ICD-9-CM: 394.9  3/4/2018 Obesity ICD-10-CM: E66.9 ICD-9-CM: 278.00  3/4/2018 Insomnia ICD-10-CM: G47.00 ICD-9-CM: 780.52  3/4/2018 Infected prosthesis of right hip ([facility] 75.) ICD-10-CM: M44.44MZ ICD-9-CM: 996.66, V43.64  2/26/2018 PFO (patent foramen ovale) ICD-10-CM: Q21.1 ICD-9-CM: 745.5  7/26/2017 Systolic murmur QCS-20-WL: R01.1 ICD-9-CM: 785.2  3/9/2017 Jessica-prosthetic fracture of femur following total hip arthroplasty ICD-10-CM: M97. Veronica Servant ICD-9-CM: 996.44, V43.64  8/25/2016 Osteoarthritis of right hip ICD-10-CM: M16.11 
ICD-9-CM: 715.95  8/1/2016 Benign essential tremor ICD-10-CM: G25.0 ICD-9-CM: 333.1  Unknown Diabetes mellitus type 2, uncontrolled ([facility].) ICD-10-CM: E11.65 ICD-9-CM: 250.02  6/3/2013 Hypogonadism male ICD-10-CM: E29.1 ICD-9-CM: 257.2  8/29/2012 Osteoarthritis of hip ICD-10-CM: M16.9 ICD-9-CM: 715.95  Unknown Overview Signed 4/29/2010  6:55 AM by Nohelia Hdez Depression ICD-10-CM: F32.9 ICD-9-CM: 034  Unknown  
   
 ED (erectile dysfunction) of organic origin ICD-10-CM: N52.9 ICD-9-CM: 607.84  Unknown GERD (gastroesophageal reflux disease) ICD-10-CM: K21.9 ICD-9-CM: 530.81  Unknown  
   
 PUD (peptic ulcer disease) ICD-10-CM: K27.9 ICD-9-CM: 533.90  Unknown Hinson's esophagus with esophagitis ICD-10-CM: K22.70, K20.9 ICD-9-CM: 530.85, 530.10  Unknown HTN (hypertension) ICD-10-CM: I10 
ICD-9-CM: 401.9  3/17/2010 Hypercholesterolemia ICD-10-CM: E78.00 ICD-9-CM: 272.0  3/17/2010 VIRTUAL TELEHEALTH VISIT PERFORMED DUE TO COVID-19 EPIDEMIC 
 
CONSENT: 
Roxi Narayanan, who was seen by synchronous, real-time, audio-video technology, and/or his healthcare decision maker, is aware that this patient-initiated, Telehealth encounter on 6/11/2020 is a billable service, with coverage as determined by his insurance carrier.  He is aware that he may receive a bill and has provided verbal consent to proceed. This patient was evaluated during a Virtual Telehealth visit. A caregiver was present if appropriate. Due to this being a TeleHealth encounter performed during the OSJII-67 public health emergency, the physical examination was limited to that listed in the [facility]. Sammie Mcdowell returns to the [place] [address] for management of cirrhosis secondary to non-alcoholic steatohepatitis (BREWER). The active problem list, all pertinent past medical history, medications, radiologic findings and laboratory findings related to the liver disorder were reviewed with the patient. The patient is a 68 y.o.  male who was found to have chronic liver disease and cirrhosis in 7/2019 when esophageal varices were found on EGD. Since the last office appointment the patient: 
 
Has been seen in the EMD for 2 occasions of ground level falls in 5/2020. The first he had to have his hip prosthesis reduced. He reports that the nature of these falls was imbalance and a tripping episode, he did not have syncope or loss of consciousness. He is very deconditioned in general and has had increased weakness. Had a variceal bleed in 3/2020 which could not be controlled with EGD and banding. He underwent emergent TIPS placement. He developed HE and ascites during the hospitalization both of which are now improved. He spend several weeks in rehab after the hospitalization. He has remained on lactulose and xifaxan for management of HE and has been reasonably stable in thinking. The patient has developed the following complications of cirrhosis: esophageal varices, variceal bleeding, ascites,  
edema, hepatic encephalopathy, The patient has the following symptoms which are thought to be due to the liver disease:  fatigue, he is using a walker to ambulate but is getting stronger. The patient is not currently experiencing the following symptoms of liver disease:  pain in the right side over the liver, swelling of abdomen, hematemesis, hematochezia. The patient has severe limitations in daily activities due to the liver disease. ASSESSMENT AND PLAN: 
Cirrhosis Cirrhosis is presumed secondary to BREWER. The diagnosis of cirrhosis is based upon Fiboscan, imaging, laboratory studies, complications of cirrhosis. The CTP is 9. Child class B. The MELD score is 9. BREWER Suspect the patient has fatty liver based upon imaging, Fiboscan CAP score, features of metabolic syndrome, serologic studies that are negative for other causes of chronic liver disease, The histologic severity has not been defined. Elastography performed in 3/2020 suggests steatosis with Cirrhosis. Liver transaminases are normal.  ALP is normal.  Liver function is normal.  Serum albumin is depressed. The platelet count is depressed. Will perform laboratory testing to monitor liver function and degree of liver injury. This included BMP, hepatic panel, CBC with platelet count, INR. I will send a lab order to the patient to have these done in 4 weeks, prior to the next office visit. If the patient looses 20% of current body weight, which is 54 pounds, down to a weight of of 210 pounds, all steatosis will have resolved. Once all steatosis has resolved all inflammation will resolve. Then all fibrosis will gradually resolve and the liver could eventually be normal.  Clearly, he has limitations in this goal for the time being due to his medical issues. Counseling for diet and weight loss in patients with confirmed or suspected NAFLD The patient was counseled regarding diet and exercise to achieve weight loss.   The best diet for patients with fatty liver is one very low in carbohydrates and enriched with protein such as an Kailyn's program.   
 
 The patient was told not to consume any food products and drinks containing fructose as this enhances hepatic fat synthesis. There is no medication or vitamin supplements that we advocate for BREWER. Using glitazones in patients without diabetes mellitus has been shown to reduce fat content in the liver but has no effect on fibrosis and is associated with weight gain. Vitamin E has also been used but the data is not very good and most experts no longer advocate this. Ascites Ascites developed following variceal hemorrhage and rescusitation. This has resolved with current dose of diuretics. Will continue the current dose of diuretics at step 20/25. The patient was counseled regarding the need to maintain sodium restriction and the types of foods containing high amounts of sodium to be avoided. Lower extremity edema Edema has resolved with current dose of diuretics. Will continue the current dose of diuretics at lasix 40 mg, aldactone 25 mg every day. I have given him guidance on increasing dose to step 1 if needed for brief periods of time. Screening for Esophageal varices The patient has esophageal varices with prior bleeding. Varices were treated with TIPS. Repeat EGD to assess for varices is no longer needed with TIPS. Hepatic encephalopathy Overt HE developed during the recent hospitalization. This was treated with lactulose and Xifaxan during the hospitalization and we were able to secure these medications for long-term use. He is taking regularly. I think that his slow mentation may be more due to anemia at this point than anything else. Thrombocytopenia This is secondary to cirrhosis. There is no evidence of overt bleeding. No treatment is required. The platelet count is adequate for the patient to undergo procedures without the need for platelet transfusion or platelet growth factors. Anemia This is due to recent GI bleeding and acute blood loss. He was unable to come in the past for iron infusion due to transportation issues. I have determined he could come on a Saturday at [facility] and will renew this order. Will obtain FE panel to assess for iron stores on next labs. I have stressed the importance of getting this done as I think this has much to do with his poor performance status and is likely contributing to his falls. Screening for Hepatocellular Carcinoma [facility] [address] screening has recently been performed and does not suggest [address] The next liver imaging study will be performed in 9/2020. Treatment of other medical problems in patients with chronic liver disease There are no contraindications for the patient to take most medications that are necessary for treatment of other medical issues. The patient has cirrhrosis and should avoid taking NSAIDs which are associated with a higher rate of developing ROXANE. The patient can take Any medications utilized for treatment of DM, Statins to treat hypercholesterolemia The patient does not comsume alcohol on a daily basis. Normal doses of acetaminophen, as recommended on the label of the bottle, are not hepatotoxic except in the setting of daily alcohol use, even in patients with cirrhosis and can be utilized for pain. Counseling for alcohol in patients with chronic liver disease The patient was counseled regarding alcohol consumption and the effect of alcohol on chronic liver disease. The patient has not consumed alcohol since 1979. Osteoporosis The risk of osteoporosis is increased in patients with cirrhosis. DEXA bone density to assess for osteoporosis has not been performed. This should be ordered by the patients primary care physician. Vaccinations Vaccination for viral hepatitis A and B is recommended since the patient has no serologic evidence of previous exposure or vaccination with immunity.  
Routine vaccinations against other bacterial and viral agents can be performed as indicated. Annual flu vaccination should be administered if indicated. ALLERGIES Allergies Allergen Reactions  Nsaids (Non-Steroidal Anti-Inflammatory Drug) Other (comments) Hx of PUD and Hinson's Esophagus MEDICATIONS Current Outpatient Medications Medication Sig  
 oxyCODONE IR (ROXICODONE) 5 mg immediate release tablet Take 5 mg by mouth every four (4) hours as needed.  insulin glargine (LANTUS) 100 unit/mL injection 20 Units by SubCUTAneous route ACB/HS. (Patient taking differently: 18 Units by SubCUTAneous route ACB/HS. )  insulin aspart U-100 (NovoLOG Flexpen U-100 Insulin) 100 unit/mL (3 mL) inpn 7 Units by SubCUTAneous route Before breakfast, lunch, and dinner for 90 days. 7 units 3 times daily  rifAXIMin (XIFAXAN) 550 mg tablet Take 1 Tab by mouth two (2) times a day.  tamsulosin (FLOMAX) 0.4 mg capsule  ondansetron (ZOFRAN ODT) 4 mg disintegrating tablet  potassium chloride SR (KLOR-CON 10) 10 mEq tablet  furosemide (LASIX) 20 mg tablet Take 2 tabs by mouth daily x 1 week and then increase to 1 tab daily (Patient taking differently: Take  1 tab daily)  albuterol-ipratropium (DUO-NEB) 2.5 mg-0.5 mg/3 ml nebu 3 mL by Nebulization route every four (4) hours as needed for Wheezing.  thiamine hcl 500 mg tablet Take 500 mg by mouth daily.  folic acid (FOLVITE) 1 mg tablet Take 1 Tab by mouth daily.  lisinopriL (PRINIVIL, ZESTRIL) 5 mg tablet TAKE 2 TABLETS BY MOUTH EVERY DAY (Patient taking differently: TAKE 1 TABLETS BY MOUTH EVERY DAY)  gabapentin (NEURONTIN) 300 mg capsule Take 2 Caps by mouth nightly.  butalbital-acetaminophen-caffeine (FIORICET, ESGIC) -40 mg per tablet Take 1 Tab by mouth every six (6) hours as needed for Headache.  cephALEXin (KEFLEX) 500 mg capsule Take 1 Cap by mouth two (2) times a day.  sertraline (ZOLOFT) 100 mg tablet TAKE 1.5 TABLETS DAILY  spironolactone (ALDACTONE) 25 mg tablet Take 1 Tab by mouth daily.  ferrous sulfate 140 mg (45 mg iron) TbER ER tablet Take 1 Tab by mouth Daily (before breakfast).  ergocalciferol (VITAMIN D2) 50,000 unit capsule TAKE 1 CAPSULE EVERY 7 DAYS  atorvastatin (LIPITOR) 40 mg tablet Take 1 Tab by mouth daily.  glucose blood VI test strips (ASCENSIA AUTODISC VI, ONE TOUCH ULTRA TEST VI) strip Test blood sugar twice daily Diagnosis E 11.9. Can use [employer]  primidone (MYSOLINE) 250 mg tablet TAKE 1 TABLET TWICE A DAY  acetaminophen (TYLENOL) 500 mg tablet Take 2 Tabs by mouth every six (6) hours as needed for Pain.  Blood-Glucose Meter (ACCU-CHEK SOWMYA PLUS METER) misc Test blood sugar twice daily Diagnosis E 11.9  Blood Glucose Control High&Low (ACCU-CHEK SOWMYA CONTROL SOLN) soln Test blood sugar twice daily Diagnosis E 11.9  
 B.infantis-B.ani-B.long-B.bifi (PROBIOTIC 4X) 10-15 mg TbEC Take  by mouth.  magnesium 250 mg tab Take 500 mg by mouth daily.  melatonin tab tablet Take 10 mg by mouth nightly as needed.  calcium citrate-vitamin D3 (CITRACAL + D) tablet Take 2 Tabs by mouth two (2) times a day. No current facility-administered medications for this visit. SYSTEM REVIEW NOT RELATED TO LIVER DISEASE OR REVIEWED ABOVE: 
Constitution systems: Negative for fever, chills, weight gain, weight loss. Eyes: Negative for visual changes. ENT: Negative for sore throat, painful swallowing. Respiratory: Negative for cough, hemoptysis, SOB. Cardiology: Negative for chest pain, palpitations. GI:  Negative for constipation or diarrhea. : Negative for urinary frequency, dysuria, hematuria, nocturia. Skin: Negative for rash. Hematology: Negative for easy bruising, blood clots. Musculo-skeletal: Negative for back pain, muscle pain, weakness. Neurologic: Negative for headaches, dizziness, vertigo, memory problems not related to HE. Psychology: Negative for anxiety, depression. FAMILY HISTORY: 
The father  of Multiple myeloma. The mother  of dementia. There is no family history of liver disease. SOCIAL HISTORY: 
The patient is . The patient has 3 children, and 6 grandchildren. The patient stopped using tobacco products in . The patient has been abstinent from alcohol since . The patient used to work for the [employer]. PHYSICAL EXAMINATION PERFORMED BY VIRTUAL TELEHEALTH: 
VS: Not performed General: No acute distress. Eyes: Sclera anicteric. ENT: No oral lesions. Skin: No rashes. spider angiomata. No jaundice. Abdomen: No obvious distention suggesting ascites. Extremities: No edema. No muscle wasting. Neurologic: Alert and oriented. Cranial nerves grossly intact. LABORATORY STUDIES: 
[address] & Units 2020 WBC 4.1 - 11.1 K/uL 4.3 5.2 ANC 1.8 - 8.0 K/UL 3.2 4.0 HGB 12.1 - 17.0 g/dL 7.8 (L) 8.9 (L)  - 400 K/uL 174 142 (L) INR 0.9 - 1.1    1.2 (H) AST 15 - 37 U/L 23 27 ALT 12 - 78 U/L 23 24 Alk Phos 45 - 117 U/L 138 (H) 141 (H) Bili, Total 0.2 - 1.0 MG/DL 1.2 (H) 1.0 Bili, Direct 0.0 - 0.2 MG/DL Albumin 3.5 - 5.0 g/dL 2.8 (L) 2.9 (L) BUN 6 - 20 MG/DL 14 13 Creat 0.70 - 1.30 MG/DL 0.95 1.09 Na 136 - 145 mmol/L 139 140  
K 3.5 - 5.1 mmol/L 4.1 4.1 Cl 97 - 108 mmol/L 109 (H) 111 (H)  
CO2 21 - 32 mmol/L 23 20 (L) Glucose 65 - 100 mg/dL 318 (H) 224 (H) Magnesium 1.6 - 2.4 mg/dL Ammonia <32 UMOL/L Liver [address] Units 2020 WBC 4.1 - 11.1 K/uL 8.5 ANC 1.8 - 8.0 K/UL 5.4 HGB 12.1 - 17.0 g/dL 7.6 (L)  - 400 K/uL 143 (L) INR 0.9 - 1.1 AST 15 - 37 U/L 34 ALT 12 - 78 U/L 52 Alk Phos 45 - 117 U/L 115 Bili, Total 0.2 - 1.0 MG/DL 1.1 (H) Bili, Direct 0.0 - 0.2 MG/DL Albumin 3.5 - 5.0 g/dL 2.1 (L) BUN 6 - 20 MG/DL 13 Creat 0.70 - 1.30 MG/DL 0.65 (L) Na 136 - 145 mmol/L 139  
K 3.5 - 5.1 mmol/L 3.1 (L) Cl 97 - 108 mmol/L 109 (H)  
CO2 21 - 32 mmol/L 25 Glucose 65 - 100 mg/dL 81  
Magnesium 1.6 - 2.4 mg/dL 1.8 Ammonia <32 UMOL/L 31 Cancer Screening Latest Ref Rng & Units 1/30/2020 7/24/2019 AFP Tumor Marker 0.0 - 8.3 ng/mL  1.9  
AFP, Serum 0.0 - 8.0 ng/mL 2.1 AFP-L3% 0.0 - 9.9 % Comment SEROLOGIES: 
Serologies Latest Ref Rng & Units 1/30/2020 12/12/2018 Hep A Ab, Total Negative Negative Hep B Surface Ag Negative Negative Hep B Core Ab, Total Negative Negative Hep B Surface AB QL  Non Reactive Hep C Ab 0.0 - 0.9 s/co ratio <0.1 Ferritin 30 - 400 ng/mL 35 80 Iron % Saturation 15 - 55 % 10 (L) 12 (L) Alpha-1 antitrypsin level 101 - 187 mg/dL 183 LIVER HISTOLOGY: 
3/2020. FibroScan performed at [address] EkPa was 19. Suggested fibrosis level is F4. CAP score was 350, this is consistent with steatosis. ENDOSCOPIC PROCEDURES: 
6/2019. EGD by Erasmo Bryan. Large esophageal varices. No banding performed. 2019. Colonoscopy by Erasmo Bryan. Normal 
 
RADIOLOGY: 
7/2016. Triple phase CT scan. Changes consistent with cirrhosis. No liver mass lesions. No dilated bile ducts. Splenomegaly. No ascites. 7/2019. Ultrasound of liver. Echogenic consistent with cirrhosis. No liver mass lesions. No dilated bile ducts. No ascites. 3/2020. Ultrasound of liver. Echogenic consistent with cirrhosis. No liver mass lesions. No dilated bile ducts. No ascites. OTHER TESTING: 
Not available or performed FOLLOW-UP: 
Pursuant to the emergency declaration under the [address] [facility], [address] [place] [address] authority and the [employer] Act, this Virtual  Visit was conducted, with the patient's (and/or their legal guardian's) consent, to reduce the patient's risk of exposure to COVID-19 and provide necessary medical care. Services were provided through a video synchronous discussion virtually to substitute for an in-person clinic visit. The patient was located in their home. The provider [facility]. Because of the COVID-19 epidemic a follow-up appointment will be performed via TeleHealth in 4-5 weeks for routine monitoring. Orders to obtain laboratory testing will be mailed to the patient and obtained 1 week prior to the next TeleHealth encounter. Christelle Joe PA-C Liver [place] [address], [address] [place]  22. 
[phone] [address] [facility]

## 2020-06-12 ENCOUNTER — TELEPHONE (OUTPATIENT)
Dept: FAMILY MEDICINE CLINIC | Age: 70
End: 2020-06-12

## 2020-06-12 ENCOUNTER — APPOINTMENT (OUTPATIENT)
Dept: GENERAL RADIOLOGY | Age: 70
End: 2020-06-12
Attending: PHYSICIAN ASSISTANT
Payer: MEDICARE

## 2020-06-12 ENCOUNTER — HOSPITAL ENCOUNTER (EMERGENCY)
Age: 70
Discharge: HOME | End: 2020-06-12
Attending: EMERGENCY MEDICINE | Admitting: ORTHOPAEDIC SURGERY
Payer: MEDICARE

## 2020-06-12 ENCOUNTER — APPOINTMENT (OUTPATIENT)
Dept: GENERAL RADIOLOGY | Age: 70
End: 2020-06-12
Attending: EMERGENCY MEDICINE
Payer: MEDICARE

## 2020-06-12 ENCOUNTER — TELEPHONE (OUTPATIENT)
Dept: CASE MANAGEMENT | Age: 70
End: 2020-06-12

## 2020-06-12 ENCOUNTER — DOCUMENTATION ONLY (OUTPATIENT)
Dept: CASE MANAGEMENT | Age: 70
End: 2020-06-12

## 2020-06-12 VITALS
DIASTOLIC BLOOD PRESSURE: 42 MMHG | HEART RATE: 82 BPM | RESPIRATION RATE: 21 BRPM | TEMPERATURE: 98.6 F | SYSTOLIC BLOOD PRESSURE: 131 MMHG | OXYGEN SATURATION: 98 %

## 2020-06-12 DIAGNOSIS — S73.004A HIP DISLOCATION, RIGHT, INITIAL ENCOUNTER (HCC): Primary | ICD-10-CM

## 2020-06-12 LAB
ALBUMIN SERPL-MCNC: 2.6 G/DL (ref 3.5–5)
ALBUMIN/GLOB SERPL: 0.6 {RATIO} (ref 1.1–2.2)
ALP SERPL-CCNC: 173 U/L (ref 45–117)
ALT SERPL-CCNC: 22 U/L (ref 12–78)
ANION GAP SERPL CALC-SCNC: 8 MMOL/L (ref 5–15)
AST SERPL-CCNC: 28 U/L (ref 15–37)
BASOPHILS # BLD: 0.1 K/UL (ref 0–0.1)
BASOPHILS NFR BLD: 1 % (ref 0–1)
BILIRUB SERPL-MCNC: 0.5 MG/DL (ref 0.2–1)
BUN SERPL-MCNC: 11 MG/DL (ref 6–20)
BUN/CREAT SERPL: 12 (ref 12–20)
CALCIUM SERPL-MCNC: 8.2 MG/DL (ref 8.5–10.1)
CHLORIDE SERPL-SCNC: 103 MMOL/L (ref 97–108)
CO2 SERPL-SCNC: 23 MMOL/L (ref 21–32)
COMMENT, HOLDF: NORMAL
CREAT SERPL-MCNC: 0.93 MG/DL (ref 0.7–1.3)
DIFFERENTIAL METHOD BLD: ABNORMAL
EOSINOPHIL # BLD: 0.3 K/UL (ref 0–0.4)
EOSINOPHIL NFR BLD: 6 % (ref 0–7)
ERYTHROCYTE [DISTWIDTH] IN BLOOD BY AUTOMATED COUNT: 16.1 % (ref 11.5–14.5)
GLOBULIN SER CALC-MCNC: 4.5 G/DL (ref 2–4)
GLUCOSE SERPL-MCNC: 357 MG/DL (ref 65–100)
HCT VFR BLD AUTO: 26.3 % (ref 36.6–50.3)
HGB BLD-MCNC: 8 G/DL (ref 12.1–17)
IMM GRANULOCYTES # BLD AUTO: 0 K/UL (ref 0–0.04)
IMM GRANULOCYTES NFR BLD AUTO: 0 % (ref 0–0.5)
LYMPHOCYTES # BLD: 0.7 K/UL (ref 0.8–3.5)
LYMPHOCYTES NFR BLD: 15 % (ref 12–49)
MCH RBC QN AUTO: 25.6 PG (ref 26–34)
MCHC RBC AUTO-ENTMCNC: 30.4 G/DL (ref 30–36.5)
MCV RBC AUTO: 84.3 FL (ref 80–99)
MONOCYTES # BLD: 0.5 K/UL (ref 0–1)
MONOCYTES NFR BLD: 10 % (ref 5–13)
NEUTS SEG # BLD: 3.3 K/UL (ref 1.8–8)
NEUTS SEG NFR BLD: 68 % (ref 32–75)
NRBC # BLD: 0 K/UL (ref 0–0.01)
NRBC BLD-RTO: 0 PER 100 WBC
PLATELET # BLD AUTO: 194 K/UL (ref 150–400)
PLATELET COMMENTS,PCOM: ABNORMAL
PMV BLD AUTO: 10.5 FL (ref 8.9–12.9)
POTASSIUM SERPL-SCNC: 3.9 MMOL/L (ref 3.5–5.1)
PROT SERPL-MCNC: 7.1 G/DL (ref 6.4–8.2)
RBC # BLD AUTO: 3.12 M/UL (ref 4.1–5.7)
RBC MORPH BLD: ABNORMAL
SAMPLES BEING HELD,HOLD: NORMAL
SODIUM SERPL-SCNC: 134 MMOL/L (ref 136–145)
WBC # BLD AUTO: 4.9 K/UL (ref 4.1–11.1)

## 2020-06-12 PROCEDURE — 99152 MOD SED SAME PHYS/QHP 5/>YRS: CPT

## 2020-06-12 PROCEDURE — 73501 X-RAY EXAM HIP UNI 1 VIEW: CPT

## 2020-06-12 PROCEDURE — 80053 COMPREHEN METABOLIC PANEL: CPT

## 2020-06-12 PROCEDURE — 96374 THER/PROPH/DIAG INJ IV PUSH: CPT

## 2020-06-12 PROCEDURE — 36415 COLL VENOUS BLD VENIPUNCTURE: CPT

## 2020-06-12 PROCEDURE — 85025 COMPLETE CBC W/AUTO DIFF WBC: CPT

## 2020-06-12 PROCEDURE — 74011250636 HC RX REV CODE- 250/636: Performed by: EMERGENCY MEDICINE

## 2020-06-12 PROCEDURE — 99153 MOD SED SAME PHYS/QHP EA: CPT

## 2020-06-12 PROCEDURE — 96375 TX/PRO/DX INJ NEW DRUG ADDON: CPT

## 2020-06-12 PROCEDURE — 75810000301 HC ER LEVEL 1 CLOSED TREATMNT FRACTURE/DISLOCATION

## 2020-06-12 PROCEDURE — 99285 EMERGENCY DEPT VISIT HI MDM: CPT

## 2020-06-12 RX ORDER — FENTANYL CITRATE 50 UG/ML
50 INJECTION, SOLUTION INTRAMUSCULAR; INTRAVENOUS
Status: COMPLETED | OUTPATIENT
Start: 2020-06-12 | End: 2020-06-12

## 2020-06-12 RX ORDER — ONDANSETRON 2 MG/ML
8 INJECTION INTRAMUSCULAR; INTRAVENOUS
Status: COMPLETED | OUTPATIENT
Start: 2020-06-12 | End: 2020-06-12

## 2020-06-12 RX ORDER — PROPOFOL 10 MG/ML
40 INJECTION, EMULSION INTRAVENOUS
Status: COMPLETED | OUTPATIENT
Start: 2020-06-12 | End: 2020-06-12

## 2020-06-12 RX ORDER — MIDAZOLAM HYDROCHLORIDE 1 MG/ML
3 INJECTION, SOLUTION INTRAMUSCULAR; INTRAVENOUS
Status: COMPLETED | OUTPATIENT
Start: 2020-06-12 | End: 2020-06-12

## 2020-06-12 RX ORDER — SODIUM CHLORIDE 9 MG/ML
150 INJECTION, SOLUTION INTRAVENOUS CONTINUOUS
Status: DISCONTINUED | OUTPATIENT
Start: 2020-06-12 | End: 2020-06-12 | Stop reason: HOSPADM

## 2020-06-12 RX ADMIN — FENTANYL CITRATE 50 MCG: 50 INJECTION INTRAMUSCULAR; INTRAVENOUS at 01:50

## 2020-06-12 RX ADMIN — MIDAZOLAM HYDROCHLORIDE 3 MG: 2 INJECTION, SOLUTION INTRAMUSCULAR; INTRAVENOUS at 03:48

## 2020-06-12 RX ADMIN — PROPOFOL 40 MG: 10 INJECTION, EMULSION INTRAVENOUS at 04:00

## 2020-06-12 RX ADMIN — ONDANSETRON 8 MG: 2 INJECTION INTRAMUSCULAR; INTRAVENOUS at 01:49

## 2020-06-12 RX ADMIN — PROPOFOL 60 MG: 10 INJECTION, EMULSION INTRAVENOUS at 03:32

## 2020-06-12 NOTE — TELEPHONE ENCOUNTER
Referral received and appreciated for possible home assessment needs. Contact made w/ son/ CARA Lozoya [phone] introduced to role of CM. HIPAA Identifier verified. History obtained patient and son live together in 2 story home Karely Mayfield works 8-10 hour shifts. Patient has had multiple falls and noted hip relocated today in ED. Blair Cooks has small dogs son describes at not aggressive however could secure in basement if visitor planned. Son states pending completed paperwork from PCP for [employer] Benefits. CM asked if was for Aide and Attendance Program - states yes and would like to get approved for help in the home while at work. Patient has a daughter ( assists w/ MD appointments ) and another son (not physically available to family at this time). Karely Mayfield states he does not make decisions for his father and would like for him to finish sleeping and will talk w/ him about HH/PT visit - CM informed of limit service and would not provide hour+ service requesting while at work. CM offered to e-mail list of private duty agencies. Jayson asked if could be mailed to home. Address verified. This writer obtained [facility] /PT order call placed to [address] [place] [address] office spoke w/ Palma Bernal and provided updates for Nurse. Patient will need new HH order from PCP if this writer unsuccessful w/ sending referral. CM will attempt to reach patient by phone and offer choice of providers (outcome pending).

## 2020-06-12 NOTE — CONSULTS
ORTHOPAEDIC CONSULT NOTE Subjective:  
 
Date of Consultation:  June 12, 2020 Referring Physician:  Nahid Ramsey Sammie Mcdowell is a 68 y.o. male with PMH significant for BREWER, liver cirrhosis, DM and now recurrent right hip dislocations is seen for right hip pain after feeling a \"pop\" when trying to roll over in bed. Had similar event about 3 weeks ago and was seen at [id number] [facility] for closed reduction. States he has pain in his right hip in the groin and laterally without radiation past the knee. He denies tingling or numbness. He was brought to the ED and found to have a right hip dislocation and we have been asked to see the patient regarding the same. Patient Active Problem List  
 Diagnosis Date Noted  Closed dislocation of right hip ([address] 05/18/2020  S/P TIPS (transjugular intrahepatic portosystemic shunt) 04/23/2020  
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  Obesity 03/04/2018  Insomnia 03/04/2018  Infected prosthesis of right hip ([address] 02/26/2018  PFO (patent foramen ovale) 07/26/2017  Systolic murmur 80/40/3586  Jessica-prosthetic fracture of femur following total hip arthroplasty 08/25/2016  Osteoarthritis of right hip 08/01/2016  Benign essential tremor  Diabetes mellitus type 2, uncontrolled ([address] 06/03/2013  Hypogonadism male 08/29/2012  Osteoarthritis of hip  Depression  ED (erectile dysfunction) of organic origin  GERD (gastroesophageal reflux disease)  PUD (peptic ulcer disease)  Hinson's esophagus with esophagitis  HTN (hypertension) 03/17/2010  Hypercholesterolemia 03/17/2010 Family History Problem Relation Age of Onset  Cancer Father   
     multiple myeloma  Stroke Father  Dementia Mother  No Known Problems Brother  COPD Brother  Cancer Maternal Grandmother COLON  Anesth Problems Neg Hx Social History Tobacco Use  Smoking status: Former Smoker Packs/day: 2.00 Years: 15.00 Pack years: 30.00 Last attempt to quit: 3/1/1979 Years since quittin.3  Smokeless tobacco: Never Used Substance Use Topics  Alcohol use: No  
  Alcohol/week: 0.0 standard drinks Past Medical History:  
Diagnosis Date  ADD (attention deficit disorder)  Anemia due to blood loss  Hinson's esophagus with esophagitis  Benign essential tremor  Cancer [facility]  [facility]  Cir[facility]osis [facility]  Depression  Dislocated hip ([facility]  DJD (degenerative joint disease) of hip   
 bilat  DM (diabetes mellitus) ([facility] 3/17/2010  ED (erectile dysfunction) of organic origin  Esophageal varices determined by endoscopy ([facility]  Fall on or from sidewalk curb 2015  Femur fracture ([facility]  Gastritis and duodenitis  GERD (gastroesophageal reflux disease)   
 resolved after discontinuing diclofenac  Hematuria 2016  High cholesterol 2010  
 pt denies   
 HTN (hypertension) 3/17/2010  Morbid obesity ([address]  Murmur, cardiac   
 PFO (patent foramen ovale) 2017  PUD (peptic ulcer disease)  Shingles 2016 RESOLVING- NO RASH (HEAD)  Sleep apnea   
 doesnt use CPAP anymore Past Surgical History:  
Procedure Laterality Date  COLONOSCOPY N/A 2019 COLONOSCOPY AND EGD performed by Chica Tomas MD at [facility] ENDOSCOPY  COLONOSCOPY,DIAGNOSTIC  2019  ENDOSCOPY, COLON, DIAGNOSTIC    
 HX CHOLECYSTECTOMY    HX GI  1980  
 gastroplasty [address]  HX HIP REPLACEMENT Left  HX ORTHOPAEDIC Right   
 rot cuff repair  HX ORTHOPAEDIC  2016  
 left broken femur [address]  HX VASCULAR ACCESS    
 picc line and removed after sepsis resolved  IR INSERT TIPS HEPATIC SHUNT  3/28/2020 [facility] ARTHROPLASTY  05/2010  
 right  TOTAL HIP ARTHROPLASTY  08/01/2016  
 left  UPPER GI ENDOSCOPY,BIOPSY  6/18/2019 Prior to Admission medications Medication Sig Start Date End Date Taking? Authorizing Provider  
oxyCODONE IR (ROXICODONE) 5 mg immediate release tablet Take 5 mg by mouth every four (4) hours as needed. 5/20/20   Provider, Historical  
insulin glargine (LANTUS) 100 unit/mL injection 20 Units by SubCUTAneous route ACB/HS. Patient taking differently: 18 Units by SubCUTAneous route ACB/HS. 5/27/20   Livan Villegas MD  
insulin aspart U-100 (NovoLOG Flexpen U-100 Insulin) 100 unit/mL (3 mL) inpn 7 Units by SubCUTAneous route Before breakfast, lunch, and dinner for 90 days. 7 units 3 times daily 5/27/20 8/25/20  Livan Villegas MD  
rifAXIMin [facility]) 550 mg tablet Take 1 Tab by mouth two (2) times a day. 4/23/20   Tamar Webster MD  
tamsulosin [facility]) 0.4 mg capsule  4/17/20   Provider, Historical  
ondansetron (ZOFRAN ODT) 4 mg disintegrating tablet  4/17/20   Provider, Historical  
potassium chloride SR (KLOR-CON 10) 10 mEq tablet  4/18/20   Provider, Historical  
furosemide (LASIX) 20 mg tablet Take 2 tabs by mouth daily x 1 week and then increase to 1 tab daily Patient taking differently: Take  1 tab daily 4/6/20   Marline Perez NP  
albuterol-ipratropium (DUO-NEB) 2.5 mg-0.5 mg/3 ml nebu 3 mL by Nebulization route every four (4) hours as needed for Wheezing. 4/6/20   Marline Perez NP  
thiamine hcl 500 mg tablet Take 500 mg by mouth daily. 4/6/20   Marline Perez NP  
folic acid (FOLVITE) 1 mg tablet Take 1 Tab by mouth daily. 3/26/20   Carlee Lyn NP  
lisinopriL (PRINIVIL, ZESTRIL) 5 mg tablet TAKE 2 TABLETS BY MOUTH EVERY DAY Patient taking differently: TAKE 1 TABLETS BY MOUTH EVERY DAY 3/26/20   Carlee Lyn NP  
gabapentin (NEURONTIN) 300 mg capsule Take 2 Caps by mouth nightly. 3/16/20   Farzana Salinas MD  
butalbital-acetaminophen-caffeine (FIORICET, ESGIC) -40 mg per tablet Take 1 Tab by mouth every six (6) hours as needed for Headache. 3/6/20   Farzana Salinas MD  
cephALEXin (KEFLEX) 500 mg capsule Take 1 Cap by mouth two (2) times a day. 2/24/20   Farzana Salinas MD  
sertraline (ZOLOFT) 100 mg tablet TAKE 1.5 TABLETS DAILY 2/21/20   Farzana Salinas MD  
spironolactone (ALDACTONE) 25 mg tablet Take 1 Tab by mouth daily. 2/4/20   Farzana Salinas MD  
ferrous sulfate 140 mg (45 mg iron) TbER ER tablet Take 1 Tab by mouth Daily (before breakfast). 2/4/20   Farzana Salinas MD  
ergocalciferol (VITAMIN D2) 50,000 unit capsule TAKE 1 CAPSULE EVERY 7 DAYS 1/6/20   Farzana Salinas MD  
atorvastatin (LIPITOR) 40 mg tablet Take 1 Tab by mouth daily. 1/4/20   Radha Alonzo MD  
glucose blood VI test strips (ASCENSIA AUTODISC VI, ONE TOUCH ULTRA TEST VI) strip Test blood sugar twice daily Diagnosis E 11.9. Can use [employer] 12/13/19   Farzana Salinas MD  
primidone (MYSOLINE) 250 mg tablet TAKE 1 TABLET TWICE A DAY 11/7/19   Farzana Salinas MD  
acetaminophen (TYLENOL) 500 mg tablet Take 2 Tabs by mouth every six (6) hours as needed for Pain. 10/7/19   Angelika Bonilla MD  
Blood-Glucose Meter (ACCU-CHEK SOWMYA PLUS METER) misc Test blood sugar twice daily Diagnosis E 11.9 7/9/19   Farzana Salinas MD  
Blood Glucose Control High&Low (ACCU-CHEK SOWMYA CONTROL SOLN) soln Test blood sugar twice daily Diagnosis E 11.9 7/9/19   Farzana Salinas MD  
B.infantis-B.ani-B.long-B.bifi (PROBIOTIC 4X) 10-15 mg TbEC Take  by mouth. Provider, Historical  
magnesium 250 mg tab Take 500 mg by mouth daily. Provider, Historical  
melatonin tab tablet Take 10 mg by mouth nightly as needed. Provider, Historical  
calcium citrate-vitamin D3 (CITRACAL + D) tablet Take 2 Tabs by mouth two (2) times a day.     Provider, Historical  
 
 Current Facility-Administered Medications Medication Dose Route Frequency  0.9% sodium chloride infusion  150 mL/hr IntraVENous CONTINUOUS  propofoL (DIPRIVAN) 10 mg/mL injection 40 mg  40 mg IntraVENous NOW Current Outpatient Medications Medication Sig  
 oxyCODONE IR (ROXICODONE) 5 mg immediate release tablet Take 5 mg by mouth every four (4) hours as needed.  insulin glargine (LANTUS) 100 unit/mL injection 20 Units by SubCUTAneous route ACB/HS. (Patient taking differently: 18 Units by SubCUTAneous route ACB/HS. )  insulin aspart U-100 (NovoLOG Flexpen U-100 Insulin) 100 unit/mL (3 mL) inpn 7 Units by SubCUTAneous route Before breakfast, lunch, and dinner for 90 days. 7 units 3 times daily  rifAXIMin (XIFAXAN) 550 mg tablet Take 1 Tab by mouth two (2) times a day.  tamsulosin (FLOMAX) 0.4 mg capsule  ondansetron (ZOFRAN ODT) 4 mg disintegrating tablet  potassium chloride SR (KLOR-CON 10) 10 mEq tablet  furosemide (LASIX) 20 mg tablet Take 2 tabs by mouth daily x 1 week and then increase to 1 tab daily (Patient taking differently: Take  1 tab daily)  albuterol-ipratropium (DUO-NEB) 2.5 mg-0.5 mg/3 ml nebu 3 mL by Nebulization route every four (4) hours as needed for Wheezing.  thiamine hcl 500 mg tablet Take 500 mg by mouth daily.  folic acid (FOLVITE) 1 mg tablet Take 1 Tab by mouth daily.  lisinopriL (PRINIVIL, ZESTRIL) 5 mg tablet TAKE 2 TABLETS BY MOUTH EVERY DAY (Patient taking differently: TAKE 1 TABLETS BY MOUTH EVERY DAY)  gabapentin (NEURONTIN) 300 mg capsule Take 2 Caps by mouth nightly.  butalbital-acetaminophen-caffeine (FIORICET, ESGIC) -40 mg per tablet Take 1 Tab by mouth every six (6) hours as needed for Headache.  cephALEXin (KEFLEX) 500 mg capsule Take 1 Cap by mouth two (2) times a day.  sertraline (ZOLOFT) 100 mg tablet TAKE 1.5 TABLETS DAILY  spironolactone (ALDACTONE) 25 mg tablet Take 1 Tab by mouth daily.  ferrous sulfate 140 mg (45 mg iron) TbER ER tablet Take 1 Tab by mouth Daily (before breakfast).  ergocalciferol (VITAMIN D2) 50,000 unit capsule TAKE 1 CAPSULE EVERY 7 DAYS  atorvastatin (LIPITOR) 40 mg tablet Take 1 Tab by mouth daily.  glucose blood VI test strips (ASCENSIA AUTODISC VI, ONE TOUCH ULTRA TEST VI) strip Test blood sugar twice daily Diagnosis E 11.9. Can use [employer]  primidone (MYSOLINE) 250 mg tablet TAKE 1 TABLET TWICE A DAY  acetaminophen (TYLENOL) 500 mg tablet Take 2 Tabs by mouth every six (6) hours as needed for Pain.  Blood-Glucose Meter (ACCU-CHEK SOWMYA PLUS METER) misc Test blood sugar twice daily Diagnosis E 11.9  Blood Glucose Control High&Low (ACCU-CHEK SOWMYA CONTROL SOLN) soln Test blood sugar twice daily Diagnosis E 11.9  
 B.infantis-B.ani-B.long-B.bifi (PROBIOTIC 4X) 10-15 mg TbEC Take  by mouth.  magnesium 250 mg tab Take 500 mg by mouth daily.  melatonin tab tablet Take 10 mg by mouth nightly as needed.  calcium citrate-vitamin D3 (CITRACAL + D) tablet Take 2 Tabs by mouth two (2) times a day. Allergies Allergen Reactions  Nsaids (Non-Steroidal Anti-Inflammatory Drug) Other (comments) Hx of PUD and Hinson's Esophagus Review of Systems:  Pertinent items are noted in HPI. Mental Status: no dementia Objective:  
 
Patient Vitals for the past 8 hrs: 
 BP Temp Pulse SpO2  
20 0115 164/61 98.6 °F (37 °C) 91 98 % 20 0105 190/80 99 °F (37.2 °C) 95 98 % Temp (24hrs), Av.8 °F (37.1 °C), Min:98.6 °F (37 °C), Max:99 °F (37.2 °C) EXAM: Awake and alert lying on stretcher; NAD; agreeable to exam 
Right hip with well healed surgical incision Right leg shortened but not significantly rotated compared to the left leg Log roll of right leg increases pain and has a firm endpoint with external rotation Moves ankles and toes to command Distal pulses palpable Imaging Review: EXAM: XR HIP RT W OR WO PELV  1 VW 
  
INDICATION: pain, thinks he dislocated it. 
  
COMPARISON: None. 
  
FINDINGS: AP single view of the right hip demonstrates superior dislocation of 
the right hip prosthesis. 
  
IMPRESSION IMPRESSION:  
Hip prosthesis dislocation. Labs:  
Recent Results (from the past 24 hour(s)) SAMPLES BEING HELD Collection Time: 06/12/20  1:25 AM  
Result Value Ref Range SAMPLES BEING HELD RED,LAV,BLUE,PST COMMENT Add-on orders for these samples will be processed based on acceptable specimen integrity and analyte stability, which may vary by analyte. CBC WITH AUTOMATED DIFF Collection Time: 06/12/20  1:25 AM  
Result Value Ref Range WBC 4.9 4.1 - 11.1 K/uL  
 RBC 3.12 (L) 4.10 - 5.70 M/uL HGB 8.0 (L) 12.1 - 17.0 g/dL HCT 26.3 (L) 36.6 - 50.3 % MCV 84.3 80.0 - 99.0 FL  
 MCH 25.6 (L) 26.0 - 34.0 PG  
 MCHC 30.4 30.0 - 36.5 g/dL  
 RDW 16.1 (H) 11.5 - 14.5 % PLATELET 991 400 - 789 K/uL MPV 10.5 8.9 - 12.9 FL  
 NRBC 0.0 0  WBC ABSOLUTE NRBC 0.00 0.00 - 0.01 K/uL NEUTROPHILS 68 32 - 75 % LYMPHOCYTES 15 12 - 49 % MONOCYTES 10 5 - 13 % EOSINOPHILS 6 0 - 7 % BASOPHILS 1 0 - 1 % IMMATURE GRANULOCYTES 0 0.0 - 0.5 % ABS. NEUTROPHILS 3.3 1.8 - 8.0 K/UL  
 ABS. LYMPHOCYTES 0.7 (L) 0.8 - 3.5 K/UL  
 ABS. MONOCYTES 0.5 0.0 - 1.0 K/UL  
 ABS. EOSINOPHILS 0.3 0.0 - 0.4 K/UL  
 ABS. BASOPHILS 0.1 0.0 - 0.1 K/UL  
 ABS. IMM. GRANS. 0.0 0.00 - 0.04 K/UL  
 DF SMEAR SCANNED    
 PLATELET COMMENTS Large Platelets RBC COMMENTS ANISOCYTOSIS 1+ 
    
 RBC COMMENTS OVALOCYTES 1+ RBC COMMENTS MICROCYTOSIS 1+ 
    
 RBC COMMENTS SCHISTOCYTES 1+ RBC COMMENTS HYPOCHROMIA 1+ METABOLIC PANEL, COMPREHENSIVE Collection Time: 06/12/20  1:25 AM  
Result Value Ref Range Sodium 134 (L) 136 - 145 mmol/L Potassium 3.9 3.5 - 5.1 mmol/L  Chloride 103 97 - 108 mmol/L  
 CO2 23 21 - 32 mmol/L  
 Anion gap 8 5 - 15 mmol/L Glucose 357 (H) 65 - 100 mg/dL BUN 11 6 - 20 MG/DL Creatinine 0.93 0.70 - 1.30 MG/DL  
 BUN/Creatinine ratio 12 12 - 20 GFR est AA >60 >60 ml/min/1.73m2 GFR est non-AA >60 >60 ml/min/1.73m2 Calcium 8.2 (L) 8.5 - 10.1 MG/DL Bilirubin, total 0.5 0.2 - 1.0 MG/DL  
 ALT (SGPT) 22 12 - 78 U/L  
 AST (SGOT) 28 15 - 37 U/L Alk. phosphatase 173 (H) 45 - 117 U/L Protein, total 7.1 6.4 - 8.2 g/dL Albumin 2.6 (L) 3.5 - 5.0 g/dL Globulin 4.5 (H) 2.0 - 4.0 g/dL A-G Ratio 0.6 (L) 1.1 - 2.2 Impression: Active Problems: * No active hospital problems. * 
 
 
Plan:  
 
Acute but recurrent right prosthetic hip dislocation - will attempt closed reduction under sedation in ED despite need to go to OR for reduction at last ED visit Sedation to be provided by ED attending Knee immobilizer for stability WBAT through right leg following reduction Will need close follow-up with Dr Catie Jack - surgeon of record - for further treatment options Spoke with son Sri Cassette [phone]) regarding reduction and need for knee immobilizer until seen back in office. Should be seen by early next week if possible. Dr Mary Mckeon will be informed of patient presentation and disposition Julianna Donahue PA-C [facility] [address] [place] [address]

## 2020-06-12 NOTE — PROCEDURES
PROCEDURE NOTE - FRACTURE REDUCTION: The patient was found to have a right hip prosthesis dislocation requiring reduction under procedural sedation. Risks and benefits of closed reduction under sedation discussed with patient who agreed to proceed. Consents signed and on chart. Patient was induced with propofol and versed for procedrual sedation via the emergency department MD. After it was confirmed that appropriate sedation had been reached, right leg was drawn up into 90-90 position and longitudinal traction was applied along the femur using some adduction manipulation and hip joint was felt to reduce and leg lengths were shown to be equal. Patient placed in knee immobilizer and aroused from sedation. Patient tolerated procedure well. Post-procedure imaging showed adequate joint reduction. Nigel Haley PA-C Orthopedic [facility]uma [facility]

## 2020-06-12 NOTE — PROGRESS NOTES
Call received from [facility] informed if patient declines HH/PT will need to set up appointment w/ PCP office to have paperwork completed. Contact made back w/ Alexia dean has spoken w/ his father this afternoon to contact PCP office and ask what he needs to do regarding paperwork. No additional transitional care needs verbalized at this time.

## 2020-06-12 NOTE — DISCHARGE INSTRUCTIONS
Patient Education     Your hip was put back in place by the Orthopedic team.  We have placed a knee immobilizer which you must keep on to keep your hip in place. Please call Dr. Rashad Meza office this morning to arrange close follow up. You can can weight bear as tolerated. Dislocated Hip: Care Instructions  Your Care Instructions     Your hip is a large and fairly stable joint. Normally it takes a hard fall, a car accident, or something else of great force to make the thighbone slip out of its socket (dislocate). But if you have had hip replacement surgery, your hip can more easily slip out of position. This is more common during the first few months after the surgery. After your doctor puts your dislocated hip back into normal position, you will need to use a walking aid or hip brace for several weeks or months while the hip heals. You will need to follow special hip precautions to avoid dislocating your hip again. Your doctor may recommend exercises to strengthen the hip joint and your legs. Rest and home treatment can help you heal.  If your hip becomes dislocated again, contact your doctor. You will need to go to a hospital or clinic to have your hip put back in position. You may have had a sedative to help you relax. You may be unsteady after having sedation. It can take a few hours for the medicine's effects to wear off. Common side effects of sedation include nausea, vomiting, and feeling sleepy or tired. The doctor has checked you carefully, but problems can develop later. If you notice any problems or new symptoms, get medical treatment right away. Follow-up care is a key part of your treatment and safety. Be sure to make and go to all appointments, and call your doctor if you are having problems. It's also a good idea to know your test results and keep a list of the medicines you take. How can you care for yourself at home? · If the doctor gave you a sedative:  ?  For 24 hours, don't do anything that requires attention to detail. This includes going to work, making important decisions, or signing any legal documents. It takes time for the medicine's effects to completely wear off.  ? For your safety, do not drive or operate any machinery that could be dangerous. Wait until the medicine wears off and you can think clearly and react easily. · Your doctor will give you safety precautions to keep your hip centered in its socket during the healing period. Be sure to follow these precautions. ? Keep your knees and toes pointed forward when you sit in a chair, walk, or stand. ? Do not sit with your legs crossed. ? Do not bend at the waist more than 90º. Be careful when leaning or when moving in bed to keep your legs as straight ahead as possible. · If you have a hip brace, wear it as directed. Do not remove it unless your doctor says you can. If you remove the brace to shower, be extremely careful. Follow hip precautions to limit hip movement. · Rest your hip as much as you can. You will need to change your activities to avoid movements that irritate the hip. · If your hip is swollen, put ice or a cold pack on it for 10 to 20 minutes at a time. Try to do this every 1 to 2 hours for the next 3 days (when you are awake) or until the swelling goes down. Put a thin cloth between the ice and your skin. · Take your medicines exactly as prescribed. Call your doctor if you think you are having a problem with your medicine. · Ask your doctor if you can take an over-the-counter pain medicine, such as acetaminophen (Tylenol), ibuprofen (Advil, Motrin), or naproxen (Aleve). Be safe with medicines. Read and follow all instructions on the label. · If your doctor recommends exercises, do them as directed. When should you call for help? [id number] anytime you think you may need emergency care. For example, call if:  · You have chest pain, are short of breath, or cough up blood. · You have trouble breathing.   · You passed out (lost consciousness). Call your doctor now or seek immediate medical care if:  · You have new or worse nausea or vomiting. · You have new or worse pain. · Your foot is cool or pale or changes color. · You have tingling, weakness, or numbness in your foot or toes. · You have signs of a blood clot in your leg (called a deep vein thrombosis), such as:  ? Pain in your calf, back of the knee, thigh, or groin. ? Redness or swelling in your leg. Watch closely for changes in your health, and be sure to contact your doctor if:  · You do not get better as expected. Where can you learn more? Go to http://russ-kiesha.info/  Enter [id number] in the search box to learn more about \"Dislocated Hip: Care Instructions. \"  Current as of: March 2, 2020               Content Version: 12.5  © 5831-1830 [employer]. Care instructions adapted under license by [employer] (which disclaims liability or warranty for this information). If you have questions about a medical condition or this instruction, always ask your healthcare professional. [place] [address] any warranty or liability for your use of this information.

## 2020-06-12 NOTE — ED PROVIDER NOTES
HPI  
 
75-year-old male with a history of benign essential tremor, Hinson's esophagus, cirrhosis, depression, prior to hip dislocation, diabetes, esophageal varices, presents emergency department with right hip pain. Patient states he moved funny in the bed and felt his hip pop. He has had it dislocated in the past.  He denies any recent fever chest pain shortness of breath or cough. He denies any known COVID-19 exposure. He states he did fall 2 days ago taking his dogs out at night landing on his right arm causing a significant bruise. He denies a headache or loss of consciousness. He denies any abdominal pain nausea vomiting diarrhea or difficulty with urination. Past Medical History:  
Diagnosis Date  ADD (attention deficit disorder)  Anemia due to blood loss  Hinson's esophagus with esophagitis  Benign essential tremor  Cancer [facility] 2012 [facility]  Cirrhosis ([address]  Depression  Dislocated hip ([address]  DJD (degenerative joint disease) of hip   
 bilat  DM (diabetes mellitus) ([address] 3/17/2010  ED (erectile dysfunction) of organic origin  Esophageal varices determined by endoscopy ([address]  Fall on or from sidewalk curb 9/24/2015  Femur fracture ([address]  Gastritis and duodenitis  GERD (gastroesophageal reflux disease)   
 resolved after discontinuing diclofenac  Hematuria 6/2016  High cholesterol 03/17/2010  
 pt denies 6/19  
 HTN (hypertension) 3/17/2010  Morbid obesity ([address]  Murmur, cardiac 2016  
 PFO (patent foramen ovale) 7/26/2017  PUD (peptic ulcer disease)  Shingles 6/2016 RESOLVING- NO RASH (HEAD)  Sleep apnea   
 doesnt use CPAP anymore Past Surgical History:  
Procedure Laterality Date  COLONOSCOPY N/A 6/18/2019 COLONOSCOPY AND EGD performed by Nikhil Park MD at [facility] ENDOSCOPY  COLONOSCOPY,DIAGNOSTIC  6/18/2019  ENDOSCOPY, COLON, DIAGNOSTIC    
 HX CHOLECYSTECTOMY  1995 [address] [place] [address] gastroplasty [address]  HX HIP REPLACEMENT Left  HX ORTHOPAEDIC Right   
 rot cuff repair  HX ORTHOPAEDIC  2016  
 left broken femur [address]  HX VASCULAR ACCESS    
 picc line and removed after sepsis resolved  IR INSERT TIPS HEPATIC SHUNT  3/28/2020 [facility] ARTHROPLASTY  2010  
 right  TOTAL HIP ARTHROPLASTY  2016  
 left  UPPER GI ENDOSCOPY,BIOPSY  2019 Family History:  
Problem Relation Age of Onset  Cancer Father   
     multiple myeloma  Stroke Father  Dementia Mother  No Known Problems Brother  COPD Brother  Cancer Maternal Grandmother COLON  Anesth Problems Neg Hx Social History Socioeconomic History  Marital status:  Spouse name: Not on file  Number of children: Not on file  Years of education: Not on file  Highest education level: Not on file Occupational History  Not on file Social Needs  Financial resource strain: Not on file  Food insecurity Worry: Not on file Inability: Not on file  Transportation needs Medical: Not on file Non-medical: Not on file Tobacco Use  Smoking status: Former Smoker Packs/day: 2.00 Years: 15.00 Pack years: 30.00 Last attempt to quit: 3/1/1979 Years since quittin.3  Smokeless tobacco: Never Used Substance and Sexual Activity  Alcohol use: No  
  Alcohol/week: 0.0 standard drinks  Drug use: No  
 Sexual activity: Never Lifestyle  Physical activity Days per week: Not on file Minutes per session: Not on file  Stress: Not on file Relationships  Social connections Talks on phone: Not on file Gets together: Not on file Attends Hinduism service: Not on file Active member of club or organization: Not on file Attends meetings of clubs or organizations: Not on file Relationship status: Not on file  Intimate partner violence Fear of current or ex partner: Not on file Emotionally abused: Not on file Physically abused: Not on file Forced sexual activity: Not on file Other Topics Concern  Not on file Social History Narrative  Not on file ALLERGIES: Nsaids (non-steroidal anti-inflammatory drug) Review of Systems Constitutional: Negative for fever. HENT: Negative for congestion. Eyes: Negative for visual disturbance. Respiratory: Negative for cough and shortness of breath. Cardiovascular: Negative for chest pain. Gastrointestinal: Negative for abdominal pain, nausea and vomiting. Genitourinary: Negative for dysuria. Musculoskeletal: Positive for gait problem. Skin: Negative for rash. Neurological: Negative for headaches. Psychiatric/Behavioral: Negative for dysphoric mood. Vitals:  
 06/12/20 0105 06/12/20 0115 BP: 190/80 164/61 Pulse: 95 91 Temp: 99 °F (37.2 °C) 98.6 °F (37 °C) SpO2: 98% 98% Physical Exam 
Constitutional:   
   General: He is not in acute distress. Appearance: He is well-developed. HENT:  
   Head: Normocephalic and atraumatic. Mouth/Throat:  
   Pharynx: No oropharyngeal exudate. Eyes:  
   General: No scleral icterus. Right eye: No discharge. Left eye: No discharge. Pupils: Pupils are equal, round, and reactive to light. Neck: Musculoskeletal: Normal range of motion and neck supple. Vascular: No JVD. Cardiovascular:  
   Rate and Rhythm: Normal rate. Heart sounds: No murmur. Pulmonary:  
   Effort: Pulmonary effort is normal. No respiratory distress. Breath sounds: No stridor. No wheezing or rales. Chest:  
   Chest wall: No tenderness. Abdominal:  
   General: Bowel sounds are normal. There is no distension. Palpations: Abdomen is soft. There is no mass. Tenderness: There is no abdominal tenderness. There is no guarding or rebound. Musculoskeletal: Normal range of motion. General: Tenderness (right hip) present. Skin: 
   General: Skin is warm and dry. Capillary Refill: Capillary refill takes less than 2 seconds. Findings: No rash. Neurological:  
   Mental Status: He is oriented to person, place, and time. Psychiatric:     
   Behavior: Behavior normal.     
   Thought Content: Thought content normal.     
   Judgment: Judgment normal.  
 
  
 
MDM Procedural Sedation Date/Time: 6/12/2020 3:58 AM 
Performed by: Janel Sanz MD 
Authorized by: Janel Sanz MD  
 
Consent:  
  Consent obtained:  Written Consent given by:  Patient Risks discussed:  Prolonged hypoxia resulting in organ damage, prolonged sedation necessitating reversal and respiratory compromise necessitating ventilatory assistance and intubation Alternatives discussed:  Analgesia without sedation Indications:  
  Procedure performed:  Dislocation reduction Procedure necessitating sedation performed by:  Different physician Intended level of sedation:  Moderate (conscious sedation) Pre-sedation assessment:  
  Time since last food or drink:  8 
  ASA classification: class 3 - patient with severe systemic disease Neck mobility: normal   
  Mouth opening:  3 or more finger widths Mallampati score:  II - soft palate, uvula, fauces visible Pre-sedation assessments completed and reviewed: airway patency, cardiovascular function, hydration status, mental status, nausea/vomiting, pain level and respiratory function History of difficult intubation: no   
  Pre-sedation assessment completed:  6/12/2020 3:59 AM 
Immediate pre-procedure details:  
  Reviewed: vital signs, relevant labs/tests and NPO status Verified: bag valve mask available, emergency equipment available, intubation equipment available and oxygen available Procedure details (see MAR for exact dosages):  
  Sedation start time:  6/12/2020 3:30 AM 
 Preoxygenation:  Nasal cannula Sedation:  Propofol (propofol and versed) Analgesia:  Fentanyl Intra-procedure events comment:  Stiffing with just propofol Sedation end time:  6/12/2020 4:00 AM 
  Total sedation time (minutes):  30 Right hip again dislocated. IN May, required OR to reduce. Perfect serve texted with - wants to try in ED again. Will sedate once they get here. On initial sedation with just propofol, 60mg,  patient stiffened in all 4 extremities. Never lost respiratory drive or airway. Quickly resolved after a few minutes and patient returned to baseline. I spoke with anesthesia- they recommended a combination of propofol and versed. Patient was given 3mg of versed and 60mg proprofol and patient tolerated very well with successful reduction of hip.  
 
 
 hip relocated by Ortho. Knee immobilizer placed. Patient falls frequently. He lives with his son but son is at work during day. He has refused in home help b/c of his dogs. Will have senior services call today to see what help they be able to provide. Patient states his daughter is trying to assist him with [facility] options.

## 2020-06-12 NOTE — ED TRIAGE NOTES
Pt arrives from home via EMS 
 
CC: hip pain Pt was sitting in his chair, leaned to reach urinal and heard right hip pop. Pt presents with right leg shorter than left. No external rotation. Pt able to move toes. Pulses bounding. EMS administered 100mcg fentanyl.

## 2020-06-12 NOTE — ED NOTES
Pt discharged. Spoke with pt and pt's son in regards to senior services. advsd them that having a dog in the house is not an acceptable reason to not get the help that he needs. Pt is having multiple falls with injuries. Both parties are amenable

## 2020-06-15 ENCOUNTER — DOCUMENTATION ONLY (OUTPATIENT)
Dept: CASE MANAGEMENT | Age: 70
End: 2020-06-15

## 2020-06-15 ENCOUNTER — TELEPHONE (OUTPATIENT)
Dept: FAMILY MEDICINE CLINIC | Age: 70
End: 2020-06-15

## 2020-06-15 NOTE — PROGRESS NOTES
Referral placed to [facility]. QUINTON spoke w/ Sinan Nicolas Liaison informed authorization may take until 6/16/20 this writer requested VM be left for QUINTON KHANH [phone].

## 2020-06-15 NOTE — TELEPHONE ENCOUNTER
----- Message from Vonda De Luna sent at 6/15/2020  4:04 PM EDT -----  Regarding: Dr. Gonzalo Rogers first and last name: N/A  Reason for call: Pt would like to set up an appt for an evaluation for home health care. Pt stated he believes that he needs to come into the office for this appt.    Callback required yes/no and why: Yes  Best contact number(s): [phone]  Details to clarify the request: N/A

## 2020-06-16 ENCOUNTER — HOME HEALTH ADMISSION (OUTPATIENT)
Dept: HOME HEALTH SERVICES | Facility: HOME HEALTH | Age: 70
End: 2020-06-16
Payer: MEDICARE

## 2020-06-17 ENCOUNTER — HOME CARE VISIT (OUTPATIENT)
Dept: SCHEDULING | Facility: HOME HEALTH | Age: 70
End: 2020-06-17
Payer: MEDICARE

## 2020-06-17 PROCEDURE — G0151 HHCP-SERV OF PT,EA 15 MIN: HCPCS

## 2020-06-17 PROCEDURE — 3331090001 HH PPS REVENUE CREDIT

## 2020-06-17 PROCEDURE — 3331090002 HH PPS REVENUE DEBIT

## 2020-06-17 PROCEDURE — 400013 HH SOC

## 2020-06-17 RX ORDER — SODIUM CHLORIDE 9 MG/ML
25 INJECTION, SOLUTION INTRAVENOUS CONTINUOUS
Status: DISCONTINUED | OUTPATIENT
Start: 2020-06-22 | End: 2020-06-26 | Stop reason: HOSPADM

## 2020-06-18 VITALS
WEIGHT: 257 LBS | HEIGHT: 70 IN | OXYGEN SATURATION: 99 % | HEART RATE: 81 BPM | BODY MASS INDEX: 36.79 KG/M2 | TEMPERATURE: 98.1 F

## 2020-06-18 PROCEDURE — 3331090002 HH PPS REVENUE DEBIT

## 2020-06-18 PROCEDURE — 3331090001 HH PPS REVENUE CREDIT

## 2020-06-19 ENCOUNTER — TELEPHONE (OUTPATIENT)
Dept: CASE MANAGEMENT | Age: 70
End: 2020-06-19

## 2020-06-19 PROCEDURE — 3331090001 HH PPS REVENUE CREDIT

## 2020-06-19 PROCEDURE — 3331090002 HH PPS REVENUE DEBIT

## 2020-06-19 NOTE — TELEPHONE ENCOUNTER
VM left for patient to follow up regarding transitional care planning [facility] and [facility] Paperwork). Noted in system [place] is following and PCP appointment scheduled for 6/24/20.

## 2020-06-20 PROCEDURE — 3331090002 HH PPS REVENUE DEBIT

## 2020-06-20 PROCEDURE — 3331090001 HH PPS REVENUE CREDIT

## 2020-06-21 PROCEDURE — 3331090002 HH PPS REVENUE DEBIT

## 2020-06-21 PROCEDURE — 3331090001 HH PPS REVENUE CREDIT

## 2020-06-22 ENCOUNTER — HOSPITAL ENCOUNTER (OUTPATIENT)
Dept: INFUSION THERAPY | Age: 70
Discharge: HOME | End: 2020-06-22
Payer: MEDICARE

## 2020-06-22 ENCOUNTER — TELEPHONE (OUTPATIENT)
Dept: CASE MANAGEMENT | Age: 70
End: 2020-06-22

## 2020-06-22 VITALS
HEART RATE: 78 BPM | SYSTOLIC BLOOD PRESSURE: 129 MMHG | RESPIRATION RATE: 18 BRPM | DIASTOLIC BLOOD PRESSURE: 68 MMHG | TEMPERATURE: 97.5 F

## 2020-06-22 PROCEDURE — 3331090001 HH PPS REVENUE CREDIT

## 2020-06-22 PROCEDURE — 74011250636 HC RX REV CODE- 250/636: Performed by: PHYSICIAN ASSISTANT

## 2020-06-22 PROCEDURE — 96365 THER/PROPH/DIAG IV INF INIT: CPT

## 2020-06-22 PROCEDURE — 3331090002 HH PPS REVENUE DEBIT

## 2020-06-22 RX ORDER — SODIUM CHLORIDE 0.9 % (FLUSH) 0.9 %
5-10 SYRINGE (ML) INJECTION AS NEEDED
Status: DISCONTINUED | OUTPATIENT
Start: 2020-06-22 | End: 2020-06-23 | Stop reason: HOSPADM

## 2020-06-22 RX ADMIN — SODIUM CHLORIDE 25 ML/HR: 900 INJECTION, SOLUTION INTRAVENOUS at 08:11

## 2020-06-22 RX ADMIN — Medication 10 ML: at 08:09

## 2020-06-22 RX ADMIN — SODIUM CHLORIDE 750 MG: 900 INJECTION, SOLUTION INTRAVENOUS at 09:25

## 2020-06-22 NOTE — PROGRESS NOTES
OPIC Short Note Date: 2020 Name: Michael Multani MRN: [id number] : 1950 
 
 
0800 Pt admit to [facility] for Injectafer 1/2 ambulatory with walker and in stable condition. Assessment completed. No concerns voiced. Oriented patient to [facility] including medication side effects and wait times. Peripheral IV established with positive blood return. Line flushed and connected to [facility] at [facility]. Mr. Naomi Smyth vitals were reviewed prior to and after treatment. Patient Vitals for the past 12 hrs: 
 Temp Pulse Resp BP  
20 1015  78 18 129/68  
20 0803 97.5 °F (36.4 °C) 80 18 150/68 Medications given:  
Medications Administered 0.9% sodium chloride infusion Admin Date 
2020 Action New Bag Dose 25 mL/hr Rate 25 mL/hr Route IntraVENous Administered By 
Dae Session, RN  
  
  
 ferric carboxymaltose (INJECTAFER) 750 mg in 0.9% sodium chloride 250 mL, overfill volume 25 mL IVPB Admin Date 
2020 Action Given Dose 
750 mg Rate 
870 mL/hr Route IntraVENous Administered By 
[place] Session, RN  
  
  
 sodium chloride (NS) flush 5-10 mL Admin Date 
2020 Action Given Dose 
10 mL Route IntraVENous Administered By 
Dae Session, RN  
  
  
  
 
 
PIV maintained positive blood return. Line flushed and removed per protocol. Mr. Mariano Labs tolerated the infusion, had no complaints, and was discharged from [place] [id number] in stable condition at 1015. Future Appointments Date Time Provider Nabor Valdez 2020  1:00 PM Colleen Coil, PT [facility]  
2020  3:30 PM Ophelia Crocker MD [address]  
2020 12:00 PM Colleen Coil, PT [facility]  
2020  5:00 PM  [facility]   
2020 To Be Determined Lien Coy[facility] [address]  
2020 To Be Determined Colleen Coil, PT [facility]  
 7/7/2020 To Be Determined SKYLAR Liriano  
7/9/2020 To Be Determined Alfreida Minor [facility]  
7/14/2020 To Be Determined Alfreida Minor [facility]  
7/16/2020 To Be Determined Alfreida Minor [facility]  
7/21/2020 To Be Determined Alfreida Minor [facility]  
7/23/2020 To Be Determined Alfreida Minor [facility]  
7/28/2020 To Be Determined Alfreida Minor [facility]  
7/30/2020 To Be Determined Alfreida Minor [address] [facility]  
8/4/2020 To Be Determined Alfreida Minor [address] [facility]  
8/6/2020 To Be Determined Jp Burrows PT [facility] Ani Rivers RN 
June 22, 2020 
2:43 PM

## 2020-06-22 NOTE — TELEPHONE ENCOUNTER
Call received from patient - endorses doing better, [facility] scheduled for 6/23 and PCP appointment to complete [facility] Paperwork on 6/24. Guerrero Jack will have his brother transport to appointment. No additional transitional care needs verbalized at this time.

## 2020-06-23 ENCOUNTER — HOME CARE VISIT (OUTPATIENT)
Dept: SCHEDULING | Facility: HOME HEALTH | Age: 70
End: 2020-06-23
Payer: MEDICARE

## 2020-06-23 PROCEDURE — 3331090001 HH PPS REVENUE CREDIT

## 2020-06-23 PROCEDURE — 3331090002 HH PPS REVENUE DEBIT

## 2020-06-24 PROCEDURE — 3331090002 HH PPS REVENUE DEBIT

## 2020-06-24 PROCEDURE — 3331090001 HH PPS REVENUE CREDIT

## 2020-06-25 ENCOUNTER — HOME CARE VISIT (OUTPATIENT)
Dept: HOME HEALTH SERVICES | Facility: HOME HEALTH | Age: 70
End: 2020-06-25
Payer: MEDICARE

## 2020-06-25 ENCOUNTER — HOME CARE VISIT (OUTPATIENT)
Dept: SCHEDULING | Facility: HOME HEALTH | Age: 70
End: 2020-06-25
Payer: MEDICARE

## 2020-06-25 ENCOUNTER — HOSPITAL ENCOUNTER (OUTPATIENT)
Dept: INFUSION THERAPY | Age: 70
End: 2020-06-25
Payer: MEDICARE

## 2020-06-25 PROCEDURE — G0151 HHCP-SERV OF PT,EA 15 MIN: HCPCS

## 2020-06-25 PROCEDURE — G0152 HHCP-SERV OF OT,EA 15 MIN: HCPCS

## 2020-06-25 PROCEDURE — 3331090001 HH PPS REVENUE CREDIT

## 2020-06-25 PROCEDURE — 3331090002 HH PPS REVENUE DEBIT

## 2020-06-26 VITALS
TEMPERATURE: 97.6 F | OXYGEN SATURATION: 97 % | HEART RATE: 74 BPM | DIASTOLIC BLOOD PRESSURE: 85 MMHG | RESPIRATION RATE: 17 BRPM | SYSTOLIC BLOOD PRESSURE: 162 MMHG

## 2020-06-26 VITALS
HEART RATE: 74 BPM | TEMPERATURE: 97.6 F | SYSTOLIC BLOOD PRESSURE: 162 MMHG | OXYGEN SATURATION: 97 % | RESPIRATION RATE: 16 BRPM | DIASTOLIC BLOOD PRESSURE: 85 MMHG

## 2020-06-26 PROCEDURE — 3331090002 HH PPS REVENUE DEBIT

## 2020-06-26 PROCEDURE — 3331090001 HH PPS REVENUE CREDIT

## 2020-06-27 PROCEDURE — 3331090001 HH PPS REVENUE CREDIT

## 2020-06-27 PROCEDURE — 3331090002 HH PPS REVENUE DEBIT

## 2020-06-28 PROCEDURE — 3331090002 HH PPS REVENUE DEBIT

## 2020-06-28 PROCEDURE — 3331090001 HH PPS REVENUE CREDIT

## 2020-06-29 ENCOUNTER — APPOINTMENT (OUTPATIENT)
Dept: INFUSION THERAPY | Age: 70
End: 2020-06-29
Payer: MEDICARE

## 2020-06-29 PROCEDURE — 3331090002 HH PPS REVENUE DEBIT

## 2020-06-29 PROCEDURE — 3331090001 HH PPS REVENUE CREDIT

## 2020-06-29 RX ORDER — SODIUM CHLORIDE 9 MG/ML
25 INJECTION, SOLUTION INTRAVENOUS CONTINUOUS
Status: DISCONTINUED | OUTPATIENT
Start: 2020-07-02 | End: 2020-07-03 | Stop reason: HOSPADM

## 2020-06-30 ENCOUNTER — HOME CARE VISIT (OUTPATIENT)
Dept: HOME HEALTH SERVICES | Facility: HOME HEALTH | Age: 70
End: 2020-06-30
Payer: MEDICARE

## 2020-06-30 ENCOUNTER — HOME CARE VISIT (OUTPATIENT)
Dept: SCHEDULING | Facility: HOME HEALTH | Age: 70
End: 2020-06-30
Payer: MEDICARE

## 2020-06-30 ENCOUNTER — VIRTUAL VISIT (OUTPATIENT)
Dept: FAMILY MEDICINE CLINIC | Age: 70
End: 2020-06-30

## 2020-06-30 VITALS
RESPIRATION RATE: 16 BRPM | DIASTOLIC BLOOD PRESSURE: 70 MMHG | SYSTOLIC BLOOD PRESSURE: 150 MMHG | OXYGEN SATURATION: 99 % | HEART RATE: 77 BPM

## 2020-06-30 DIAGNOSIS — R29.898 WEAKNESS OF BOTH LEGS: ICD-10-CM

## 2020-06-30 DIAGNOSIS — I10 ESSENTIAL HYPERTENSION: ICD-10-CM

## 2020-06-30 DIAGNOSIS — M16.12 PRIMARY OSTEOARTHRITIS OF LEFT HIP: Primary | ICD-10-CM

## 2020-06-30 DIAGNOSIS — D50.9 IRON DEFICIENCY ANEMIA, UNSPECIFIED IRON DEFICIENCY ANEMIA TYPE: ICD-10-CM

## 2020-06-30 DIAGNOSIS — E11.65 UNCONTROLLED TYPE 2 DIABETES MELLITUS WITH HYPERGLYCEMIA (HCC): ICD-10-CM

## 2020-06-30 DIAGNOSIS — R29.6 RECURRENT FALLS: ICD-10-CM

## 2020-06-30 DIAGNOSIS — K74.60 CIRRHOSIS OF LIVER WITH ASCITES, UNSPECIFIED HEPATIC CIRRHOSIS TYPE (HCC): ICD-10-CM

## 2020-06-30 DIAGNOSIS — K75.81 NASH (NONALCOHOLIC STEATOHEPATITIS): ICD-10-CM

## 2020-06-30 DIAGNOSIS — F33.9 RECURRENT DEPRESSION (HCC): ICD-10-CM

## 2020-06-30 DIAGNOSIS — R18.8 CIRRHOSIS OF LIVER WITH ASCITES, UNSPECIFIED HEPATIC CIRRHOSIS TYPE (HCC): ICD-10-CM

## 2020-06-30 DIAGNOSIS — S72.92XS CLOSED FRACTURE OF LEFT FEMUR, UNSPECIFIED FRACTURE MORPHOLOGY, UNSPECIFIED PORTION OF FEMUR, SEQUELA: ICD-10-CM

## 2020-06-30 PROCEDURE — 3331090001 HH PPS REVENUE CREDIT

## 2020-06-30 PROCEDURE — G0152 HHCP-SERV OF OT,EA 15 MIN: HCPCS

## 2020-06-30 PROCEDURE — G0151 HHCP-SERV OF PT,EA 15 MIN: HCPCS

## 2020-06-30 PROCEDURE — 3331090002 HH PPS REVENUE DEBIT

## 2020-06-30 NOTE — PROGRESS NOTES
Consent: Kenney Cardenas, who was seen by synchronous (real-time) audio-video technology, and/or his healthcare decision maker, is aware that this patient-initiated, Telehealth encounter on 6/30/2020 is a billable service, with coverage as determined by his insurance carrier. He is aware that he may receive a bill and has provided verbal consent to proceed: Yes. Assessment & Plan:  
Diagnoses and all orders for this visit: 1. Primary osteoarthritis of left hip 2. Closed fracture of left femur, unspecified fracture morphology, unspecified portion of femur, sequela 3. Recurrent falls 4. Weakness of both legs  Ct to have pain and weakness with numerous GLFs. ER visit for dislocated hip since last appt. Now working with home health PT. 
 
5. Cirrhosis of liver with ascites, unspecified hepatic cirrhosis type ([facility] 6. BREWER (nonalcoholic steatohepatitis) 7. Fe def anemia  Cirrhosis presumed d/t BREWER. Following with [facility]. Set up for Fe infusion for anemia - last Hgb 8.0. By most recent notes: \"The CTP is 9. Child class B. The MELD score is 9.\" 
 
8. Essential hypertension - prev controled 9. Uncontrolled type 2 diabetes mellitus with hyperglycemia ([address] - working with Endo, thinks sugars are improving 10. Recurrent depression ([facility] [address] - ct to play a role in deconditioning, ct meds and to consider therapy at next appt Planned for DEXA but unable to get prev d/t hospital admission and COVID pandemic. Will discuss with pt during next appt. Due for Hep A & B vaccination. Plan at next in office visit. Follow-up and Dispositions · Return in about 6 weeks (around 8/11/2020). [address] Subjective:  
Kenney Cardenas is a 68 y.o. male who was seen for Form Completion ([facility]) Has forms to be completed today for [place]. Since last appt, had another fall with dislocation of R hip.   Also tells me he had a fall today and has some bruising of right arm but no severe pain and did not go to ER for this. Using walker but having falls when using walker. He did not want physical therapy in his home partly related to his dogs but also previous concerns with his depression/grief after his wife passed away and how he keeps his home. Had another hip dislocation after this and decided to pursue [facility]. He is sig deconditioned and working with both PT and OT now. Reviewed notes from [address] at [address] [facility] [address] for Cirrhosis from [facility] aggressively working on weight loss with a goal of 20% weight loss. Planning for infusion for anemia. Diabetes Mellitus: Sugars doing well on current regimen. Continues following with Dr. Philippe Gonzales. Lab Results Component Value Date/Time Hemoglobin A1c (POC) 6.5 03/05/2020 02:30 PM  
 Hemoglobin A1c (POC) 8.6 07/26/2017 12:24 PM  
 Hemoglobin A1c 8.2 (H) 11/07/2019 10:23 AM  
 Microalbumin/Creat ratio (mg/g creat) 13 11/01/2010 07:07 AM  
 Microalb/Creat ratio (ug/mg creat.) <4.8 11/07/2019 10:23 AM  
 Microalbumin,urine random 0.81 11/01/2010 07:07 AM  
 LDL, calculated 40 11/07/2019 10:23 AM  
 Creatinine 0.93 06/12/2020 01:25 AM  
  
Lab Results Component Value Date/Time GFR est AA >60 06/12/2020 01:25 AM  
 GFR est non-AA >60 06/12/2020 01:25 AM  
  
Lab Results Component Value Date/Time TSH 2.930 01/22/2019 09:17 AM  
   
 
 
Prior to Admission medications Medication Sig Start Date End Date Taking? Authorizing Provider  
insulin glargine (LANTUS) 100 unit/mL injection 20 Units by SubCUTAneous route ACB/HS. Patient taking differently: 18 Units by SubCUTAneous route ACB/HS. 5/27/20  Yes Livan Villegas MD  
insulin aspart U-100 (NovoLOG Flexpen U-100 Insulin) 100 unit/mL (3 mL) inpn 7 Units by SubCUTAneous route Before breakfast, lunch, and dinner for 90 days. 7 units 3 times daily 5/27/20 8/25/20 Yes Livan Villegas MD  
rifAXIMin [place] [employer]) 550 mg tablet Take 1 Tab by mouth two (2) times a day. 4/23/20  Yes Radha Staley MD  
potassium chloride SR (KLOR-CON 10) 10 mEq tablet Take 10 mEq by mouth two (2) times a day. 4/18/20  Yes Trae Lorenzo  
furosemide (LASIX) 20 mg tablet Take 2 tabs by mouth daily x 1 week and then increase to 1 tab daily Patient taking differently: Take  1 tab daily 4/6/20  Yes Marline Perez NP  
albuterol-ipratropium (DUO-NEB) 2.5 mg-0.5 mg/3 ml nebu 3 mL by Nebulization route every four (4) hours as needed for Wheezing. 4/6/20  Yes Marline Perez NP  
thiamine hcl 500 mg tablet Take 500 mg by mouth daily. 4/6/20  Yes Marline Perez NP  
folic acid (FOLVITE) 1 mg tablet Take 1 Tab by mouth daily. 3/26/20  Yes Brandi Reese NP  
lisinopriL (PRINIVIL, ZESTRIL) 5 mg tablet TAKE 2 TABLETS BY MOUTH EVERY DAY Patient taking differently: TAKE 1 TABLETS BY MOUTH EVERY DAY 3/26/20  Yes Brandi Reese NP  
gabapentin (NEURONTIN) 300 mg capsule Take 2 Caps by mouth nightly. 3/16/20  Yes Mansi Downs MD  
butalbital-acetaminophen-caffeine (FIORICET, ESGIC) -40 mg per tablet Take 1 Tab by mouth every six (6) hours as needed for Headache. 3/6/20  Yes Mansi Downs MD  
cephALEXin (KEFLEX) 500 mg capsule Take 1 Cap by mouth two (2) times a day. 2/24/20  Yes Mansi Downs MD  
sertraline (ZOLOFT) 100 mg tablet TAKE 1.5 TABLETS DAILY Patient taking differently: Take 100 mg by mouth daily. TAKE 1.5 TABLETS DAILY 2/21/20  Yes Mansi Downs MD  
spironolactone (ALDACTONE) 25 mg tablet Take 1 Tab by mouth daily. 2/4/20  Yes Mansi Downs MD  
ergocalciferol (VITAMIN D2) 50,000 unit capsule TAKE 1 CAPSULE EVERY 7 DAYS 1/6/20  Yes Mansi Downs MD  
atorvastatin (LIPITOR) 40 mg tablet Take 1 Tab by mouth daily. 1/4/20  Yes Fab Rayo MD  
glucose blood VI test strips (ASCENSIA AUTODISC VI, ONE TOUCH ULTRA TEST VI) strip Test blood sugar twice daily Diagnosis E 11.9.  Can use [employer] 12/13/19  Yes Mansi Downs MD  
 primidone (MYSOLINE) 250 mg tablet TAKE 1 TABLET TWICE A DAY 11/7/19  Yes Delano Ferrara MD  
acetaminophen (TYLENOL) 500 mg tablet Take 2 Tabs by mouth every six (6) hours as needed for Pain. 10/7/19  Yes Alex Eric MD  
Blood-Glucose Meter (ACCU-CHEK SOWMYA PLUS METER) misc Test blood sugar twice daily Diagnosis E 11.9 7/9/19  Yes Delano Ferrara MD  
Blood Glucose Control High&Low (ACCU-CHEK SOWMYA CONTROL SOLN) soln Test blood sugar twice daily Diagnosis E 11.9 7/9/19  Yes Delano Ferrara MD  
B.infantis-B.ani-B.long-B.bifi (PROBIOTIC 4X) 10-15 mg TbEC Take 1 Tab by mouth daily. Yes Provider, Historical  
magnesium 250 mg tab Take 500 mg by mouth daily. Yes Provider, Historical  
calcium citrate-vitamin D3 (CITRACAL + D) tablet Take 2 Tabs by mouth two (2) times a day. Yes Provider, Historical  
melatonin 10 mg tab Take 10 mg by mouth nightly as needed for Other (sleep). 6/30/20  Provider, Historical  
furosemide (LASIX) 20 mg tablet Take 20 mg by mouth daily. 6/30/20  Provider, Historical  
sertraline (ZOLOFT) 100 mg tablet Take 100 mg by mouth daily. Take 1.5 tablets daily  6/30/20  Provider, Historical  
oxyCODONE IR (ROXICODONE) 5 mg immediate release tablet Take 5 mg by mouth every four (4) hours as needed for Pain. 5/20/20 6/30/20  Provider, Historical  
tamsulosin (FLOMAX) 0.4 mg capsule Take 0.4 mg by mouth daily. 4/17/20   Provider, Historical  
ondansetron (ZOFRAN ODT) 4 mg disintegrating tablet Take 4 mg by mouth every eight (8) hours as needed for Nausea. 4/17/20   Provider, Historical  
ferrous sulfate 140 mg (45 mg iron) TbER ER tablet Take 1 Tab by mouth Daily (before breakfast). 2/4/20   Delano Ferrara MD  
melatonin tab tablet Take 10 mg by mouth nightly as needed for Other (sleep). Provider, Historical  
 
Allergies Allergen Reactions  Nsaids (Non-Steroidal Anti-Inflammatory Drug) Other (comments) Hx of PUD and Hinson's Esophagus Patient Active Problem List  
 Diagnosis Date Noted  Closed dislocation of right hip ([address] 05/18/2020  S/P TIPS (transjugular intrahepatic portosystemic shunt) 04/23/2020  
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  Obesity 03/04/2018  Insomnia 03/04/2018  Infected prosthesis of right hip ([address] 02/26/2018  PFO (patent foramen ovale) 07/26/2017  Systolic murmur 91/66/8725  Jessica-prosthetic fracture of femur following total hip arthroplasty 08/25/2016  Osteoarthritis of right hip 08/01/2016  Benign essential tremor  Diabetes mellitus type 2, uncontrolled ([address] 06/03/2013  Hypogonadism male 08/29/2012  Osteoarthritis of hip  Depression  ED (erectile dysfunction) of organic origin  GERD (gastroesophageal reflux disease)  PUD (peptic ulcer disease)  Hinson's esophagus with esophagitis  HTN (hypertension) 03/17/2010  Hypercholesterolemia 03/17/2010 Past Medical History:  
Diagnosis Date  ADD (attention deficit disorder)  Anemia due to blood loss  Hinson's esophagus with esophagitis  Benign essential tremor  Cancer [facility]) 2012 [address]  Cirrhosis ([address]  Depression  Dislocated hip ([address]  DJD (degenerative joint disease) of hip   
 bilat  DM (diabetes mellitus) ([address] 3/17/2010  ED (erectile dysfunction) of organic origin  Esophageal varices determined by endoscopy ([address]  Fall on or from sidewalk curb 9/24/2015  Femur fracture ([address]  Gastritis and duodenitis  GERD (gastroesophageal reflux disease)   
 resolved after discontinuing diclofenac  Hematuria 6/2016  High cholesterol 03/17/2010  
 pt denies 6/19  
 HTN (hypertension) 3/17/2010  Morbid obesity ([address]  Murmur, cardiac 2016  PFO (patent foramen ovale) 7/26/2017  PUD (peptic ulcer disease)  Shingles 6/2016 RESOLVING- NO RASH (HEAD)  Sleep apnea   
 doesnt use CPAP anymore ROS Gen - no fever/chills Resp - no dyspnea or cough CV - no chest pain or WANG Rest per HPI Objective:  
Vital Signs: (As obtained by patient/caregiver at home) There were no vitals taken for this visit. Physical exam: 
General appearance - alert, well appearing, and in no distress Eyes -sclera anicteric, no discharge HEENT normocephalic, atraumatic, moist mucous membranes, no visualized neck mass Chest -normal respiratory effort, no visualized signs of respiratory distress Neurological - alert, awake, normal speech, no focal findings or movement disorder noted Psych - normal mood and affect Skin no apparent lesions We discussed the expected course, resolution and complications of the diagnosis(es) in detail. Medication risks, benefits, costs, interactions, and alternatives were discussed as indicated. I advised him to contact the office if his condition worsens, changes or fails to improve as anticipated. He expressed understanding with the diagnosis(es) and plan. Da Yarbrough is a 68 y.o. male being evaluated by a video visit encounter for concerns as above. A caregiver was present when appropriate. Due to this being a TeleHealth encounter (During hospitals-83 public health emergency), evaluation of the following organ systems was limited: Vitals/Constitutional/EENT/Resp/CV/GI//MS/Neuro/Skin/Heme-Lymph-Imm. Pursuant to the emergency declaration under the [facility] waiver authority and the [employer] Act, this Virtual  Visit was conducted, with patient's (and/or legal guardian's) consent, to reduce the patient's risk of exposure to COVID-19 and provide necessary medical care. Services were provided through a video synchronous discussion virtually to substitute for in-person clinic visit. Patient and provider were located at their individual homes.  
 
 
 
Nilam Bowden MD

## 2020-07-01 VITALS
OXYGEN SATURATION: 99 % | SYSTOLIC BLOOD PRESSURE: 168 MMHG | RESPIRATION RATE: 16 BRPM | TEMPERATURE: 97.1 F | HEART RATE: 77 BPM | DIASTOLIC BLOOD PRESSURE: 89 MMHG

## 2020-07-01 PROCEDURE — 3331090002 HH PPS REVENUE DEBIT

## 2020-07-01 PROCEDURE — 3331090001 HH PPS REVENUE CREDIT

## 2020-07-02 ENCOUNTER — HOME CARE VISIT (OUTPATIENT)
Dept: HOME HEALTH SERVICES | Facility: HOME HEALTH | Age: 70
End: 2020-07-02
Payer: MEDICARE

## 2020-07-02 ENCOUNTER — HOME CARE VISIT (OUTPATIENT)
Dept: SCHEDULING | Facility: HOME HEALTH | Age: 70
End: 2020-07-02
Payer: MEDICARE

## 2020-07-02 ENCOUNTER — HOSPITAL ENCOUNTER (OUTPATIENT)
Dept: INFUSION THERAPY | Age: 70
Discharge: HOME | End: 2020-07-02
Payer: MEDICARE

## 2020-07-02 VITALS
RESPIRATION RATE: 18 BRPM | DIASTOLIC BLOOD PRESSURE: 68 MMHG | OXYGEN SATURATION: 97 % | SYSTOLIC BLOOD PRESSURE: 145 MMHG | HEART RATE: 78 BPM | TEMPERATURE: 98.6 F

## 2020-07-02 VITALS
SYSTOLIC BLOOD PRESSURE: 155 MMHG | RESPIRATION RATE: 17 BRPM | DIASTOLIC BLOOD PRESSURE: 60 MMHG | TEMPERATURE: 98.7 F | HEART RATE: 74 BPM | OXYGEN SATURATION: 97 %

## 2020-07-02 PROCEDURE — 3331090002 HH PPS REVENUE DEBIT

## 2020-07-02 PROCEDURE — 96365 THER/PROPH/DIAG IV INF INIT: CPT

## 2020-07-02 PROCEDURE — 74011250636 HC RX REV CODE- 250/636: Performed by: PHYSICIAN ASSISTANT

## 2020-07-02 PROCEDURE — G0152 HHCP-SERV OF OT,EA 15 MIN: HCPCS

## 2020-07-02 PROCEDURE — G0151 HHCP-SERV OF PT,EA 15 MIN: HCPCS

## 2020-07-02 PROCEDURE — 3331090001 HH PPS REVENUE CREDIT

## 2020-07-02 PROCEDURE — 74011000258 HC RX REV CODE- 258: Performed by: PHYSICIAN ASSISTANT

## 2020-07-02 RX ADMIN — SODIUM CHLORIDE 25 ML/HR: 900 INJECTION, SOLUTION INTRAVENOUS at 17:25

## 2020-07-02 RX ADMIN — SODIUM CHLORIDE 750 MG: 900 INJECTION, SOLUTION INTRAVENOUS at 17:28

## 2020-07-02 NOTE — PROGRESS NOTES
OPIC Short Note Date: 2020 Name: Dia Martínez MRN: [id number] : 1950 
 
 
1700 Pt admit to [facility] for Injectafer 2/2 ambulatory with walker and in stable condition. Assessment completed. No concerns voiced. Peripheral IV established with positive blood return. Line flushed and connected to NS at [facility]. Mr. Paloma Cunha vitals were reviewed prior to and after treatment. Patient Vitals for the past 12 hrs: 
 Temp Pulse Resp BP SpO2  
20 1806  78  145/68   
20 1704 98.6 °F (37 °C) 83 18 142/74 97 % Medications given:  
Medications Administered 0.9% sodium chloride infusion Admin Date 
2020 Action New Bag Dose 25 mL/hr Rate 25 mL/hr Route IntraVENous Administered By Katt Adams RN  
  
  
 ferric carboxymaltose Earma Mink) 750 mg in 0.9% sodium chloride 250 mL, overfill volume 25 mL IVPB Admin Date 
2020 Action Given Dose 
750 mg Rate 
870 mL/hr Route IntraVENous Administered By Katt Adams RN  
  
  
  
 
  
 
 
PIV maintained positive blood return. Line flushed and removed per protocol. Mr. Esther Phoenix tolerated the infusion, had no complaints, and was discharged from [place] [address] in stable condition at 1815.   
 
 
 
Forest Marley RN 
2020 
2:43 PM

## 2020-07-03 ENCOUNTER — APPOINTMENT (OUTPATIENT)
Dept: GENERAL RADIOLOGY | Age: 70
End: 2020-07-03
Attending: PHYSICIAN ASSISTANT
Payer: MEDICARE

## 2020-07-03 ENCOUNTER — APPOINTMENT (OUTPATIENT)
Dept: GENERAL RADIOLOGY | Age: 70
End: 2020-07-03
Attending: STUDENT IN AN ORGANIZED HEALTH CARE EDUCATION/TRAINING PROGRAM
Payer: MEDICARE

## 2020-07-03 ENCOUNTER — HOSPITAL ENCOUNTER (EMERGENCY)
Age: 70
Discharge: HOME | End: 2020-07-04
Attending: STUDENT IN AN ORGANIZED HEALTH CARE EDUCATION/TRAINING PROGRAM | Admitting: STUDENT IN AN ORGANIZED HEALTH CARE EDUCATION/TRAINING PROGRAM
Payer: MEDICARE

## 2020-07-03 VITALS
OXYGEN SATURATION: 98 % | BODY MASS INDEX: 37.96 KG/M2 | SYSTOLIC BLOOD PRESSURE: 139 MMHG | DIASTOLIC BLOOD PRESSURE: 55 MMHG | HEART RATE: 83 BPM | WEIGHT: 264.55 LBS | TEMPERATURE: 98.4 F | RESPIRATION RATE: 17 BRPM

## 2020-07-03 DIAGNOSIS — S73.004S CLOSED DISLOCATION OF RIGHT HIP, SEQUELA: Primary | ICD-10-CM

## 2020-07-03 PROCEDURE — 99285 EMERGENCY DEPT VISIT HI MDM: CPT

## 2020-07-03 PROCEDURE — 72170 X-RAY EXAM OF PELVIS: CPT

## 2020-07-03 PROCEDURE — 3331090001 HH PPS REVENUE CREDIT

## 2020-07-03 PROCEDURE — 73501 X-RAY EXAM HIP UNI 1 VIEW: CPT

## 2020-07-03 PROCEDURE — 74011250636 HC RX REV CODE- 250/636: Performed by: STUDENT IN AN ORGANIZED HEALTH CARE EDUCATION/TRAINING PROGRAM

## 2020-07-03 PROCEDURE — 75810000301 HC ER LEVEL 1 CLOSED TREATMNT FRACTURE/DISLOCATION

## 2020-07-03 PROCEDURE — 96374 THER/PROPH/DIAG INJ IV PUSH: CPT

## 2020-07-03 PROCEDURE — 3331090002 HH PPS REVENUE DEBIT

## 2020-07-03 RX ORDER — HYDROCODONE BITARTRATE AND ACETAMINOPHEN 5; 325 MG/1; MG/1
1 TABLET ORAL
Qty: 18 TAB | Refills: 0 | Status: SHIPPED | OUTPATIENT
Start: 2020-07-03 | End: 2020-07-06

## 2020-07-03 RX ORDER — HYDROMORPHONE HYDROCHLORIDE 2 MG/ML
1 INJECTION, SOLUTION INTRAMUSCULAR; INTRAVENOUS; SUBCUTANEOUS ONCE
Status: COMPLETED | OUTPATIENT
Start: 2020-07-03 | End: 2020-07-03

## 2020-07-03 RX ORDER — PROPOFOL 10 MG/ML
200 INJECTION, EMULSION INTRAVENOUS
Status: COMPLETED | OUTPATIENT
Start: 2020-07-03 | End: 2020-07-03

## 2020-07-03 RX ORDER — MIDAZOLAM HYDROCHLORIDE 1 MG/ML
5 INJECTION, SOLUTION INTRAMUSCULAR; INTRAVENOUS
Status: COMPLETED | OUTPATIENT
Start: 2020-07-03 | End: 2020-07-03

## 2020-07-03 RX ADMIN — PROPOFOL 60 MG: 10 INJECTION, EMULSION INTRAVENOUS at 22:49

## 2020-07-03 RX ADMIN — HYDROMORPHONE HYDROCHLORIDE 1 MG: 2 INJECTION INTRAMUSCULAR; INTRAVENOUS; SUBCUTANEOUS at 20:36

## 2020-07-03 RX ADMIN — MIDAZOLAM 3 MG: 1 INJECTION INTRAMUSCULAR; INTRAVENOUS at 22:48

## 2020-07-03 NOTE — ED TRIAGE NOTES
Triage note: Pt arrives via EMS from home cc right hip dislocation. Pt stated her was sitting on the edge of the bed when it popped out. This is the 3rd time in 3 weeks. Right hip is titanium.

## 2020-07-04 PROCEDURE — 3331090001 HH PPS REVENUE CREDIT

## 2020-07-04 PROCEDURE — 3331090002 HH PPS REVENUE DEBIT

## 2020-07-04 NOTE — ED NOTES
Post procedure note Pt underwent rip hip reduction. Start time 2035 
3mg versed and 60mg Prop administered by Dr. Cleatrice Devoid. Lynnell Nyhan Procedure performed by AMAN Zhou Procedure end time 2039 Knee immobilizer applied

## 2020-07-04 NOTE — PROCEDURES
Joint Reduction Procedural Note Procedure: Right hip closed reduction Preprocedural Diagnosis: Closed dislocation of right hip ([facility] [address] Postprocedural Diagnosis: Closed dislocation of right hip ([facility] [address] Provider: AMAN Ngo Anesthesia: Conscious sedation provided by ER attending Elijah Milan) Allergy: Allergies Allergen Reactions  Nsaids (Non-Steroidal Anti-Inflammatory Drug) Other (comments) Hx of PUD and Hinson's Esophagus Procedure Details: The risks,benefits and alternatives of hip reduction were explained and consent was obtained for the procedure. Closed reduction of right hip using standard posterior hip dislocation reduction technique. Tolerated procedure well w/o complications. Knee immobilizer placed. Patient educated on neurovascular compromise and compartment syndrome. Pt. Neurovascularly intact with sensation intact and capillary refill <2secs p procedure in right foot. Pt. Awake and alert. Complications:  None; patient tolerated the procedure well. Signed By: AMAN Ngo

## 2020-07-04 NOTE — DISCHARGE INSTRUCTIONS
Patient Education        Dislocated Hip: Care Instructions  Your Care Instructions     Your hip is a large and fairly stable joint. Normally it takes a hard fall, a car accident, or something else of great force to make the thighbone slip out of its socket (dislocate). But if you have had hip replacement surgery, your hip can more easily slip out of position. This is more common during the first few months after the surgery. After your doctor puts your dislocated hip back into normal position, you will need to use a walking aid or hip brace for several weeks or months while the hip heals. You will need to follow special hip precautions to avoid dislocating your hip again. Your doctor may recommend exercises to strengthen the hip joint and your legs. Rest and home treatment can help you heal.  If your hip becomes dislocated again, contact your doctor. You will need to go to a hospital or clinic to have your hip put back in position. You may have had a sedative to help you relax. You may be unsteady after having sedation. It can take a few hours for the medicine's effects to wear off. Common side effects of sedation include nausea, vomiting, and feeling sleepy or tired. The doctor has checked you carefully, but problems can develop later. If you notice any problems or new symptoms, get medical treatment right away. Follow-up care is a key part of your treatment and safety. Be sure to make and go to all appointments, and call your doctor if you are having problems. It's also a good idea to know your test results and keep a list of the medicines you take. How can you care for yourself at home? · If the doctor gave you a sedative:  ? For 24 hours, don't do anything that requires attention to detail. This includes going to work, making important decisions, or signing any legal documents.  It takes time for the medicine's effects to completely wear off.  ? For your safety, do not drive or operate any machinery that could be dangerous. Wait until the medicine wears off and you can think clearly and react easily. · Your doctor will give you safety precautions to keep your hip centered in its socket during the healing period. Be sure to follow these precautions. ? Keep your knees and toes pointed forward when you sit in a chair, walk, or stand. ? Do not sit with your legs crossed. ? Do not bend at the waist more than 90º. Be careful when leaning or when moving in bed to keep your legs as straight ahead as possible. · If you have a hip brace, wear it as directed. Do not remove it unless your doctor says you can. If you remove the brace to shower, be extremely careful. Follow hip precautions to limit hip movement. · Rest your hip as much as you can. You will need to change your activities to avoid movements that irritate the hip. · If your hip is swollen, put ice or a cold pack on it for 10 to 20 minutes at a time. Try to do this every 1 to 2 hours for the next 3 days (when you are awake) or until the swelling goes down. Put a thin cloth between the ice and your skin. · Take your medicines exactly as prescribed. Call your doctor if you think you are having a problem with your medicine. · Ask your doctor if you can take an over-the-counter pain medicine, such as acetaminophen (Tylenol), ibuprofen (Advil, Motrin), or naproxen (Aleve). Be safe with medicines. Read and follow all instructions on the label. · If your doctor recommends exercises, do them as directed. When should you call for help? [id number] anytime you think you may need emergency care. For example, call if:  · You have chest pain, are short of breath, or cough up blood. · You have trouble breathing. · You passed out (lost consciousness). Call your doctor now or seek immediate medical care if:  · You have new or worse nausea or vomiting. · You have new or worse pain. · Your foot is cool or pale or changes color.   · You have tingling, weakness, or numbness in your foot or toes. · You have signs of a blood clot in your leg (called a deep vein thrombosis), such as:  ? Pain in your calf, back of the knee, thigh, or groin. ? Redness or swelling in your leg. Watch closely for changes in your health, and be sure to contact your doctor if:  · You do not get better as expected. Where can you learn more? Go to http://russ-kiesha.info/  Enter [id number] in the search box to learn more about \"Dislocated Hip: Care Instructions. \"  Current as of: March 2, 2020               Content Version: 12.5  © 4018-9490 [employer], [employer]. Care instructions adapted under license by [employer] (which disclaims liability or warranty for this information). If you have questions about a medical condition or this instruction, always ask your healthcare professional. [address] any warranty or liability for your use of this information.

## 2020-07-04 NOTE — CONSULTS
Ortho Joint Consult Patient: Gillermina Councilman  YOB: 1950   MRN: [id number] Consult Date: 7/3/2020 Chief Complaint: Right hip pain ED Presentation Time: < 8 hours Mechanism of Injury: Sitting on the edge of the bed and reached over to grab his phone . Felt \"pop\" in right hip. Same sensation as 6/12/20 when he last dislocated Ambulatory Status: Adal Ramirez Past Medical History:  
Past Medical History:  
Diagnosis Date  ADD (attention deficit disorder)  Anemia due to blood loss  Hinson's esophagus with esophagitis  Benign essential tremor  Cancer [facility]) 2012 [address]  Cirrhosis ([address]  Depression  Dislocated hip ([address]  DJD (degenerative joint disease) of hip   
 bilat  DM (diabetes mellitus) ([address] 3/17/2010  ED (erectile dysfunction) of organic origin  Esophageal varices determined by endoscopy ([address]  Fall on or from sidewalk curb 9/24/2015  Femur fracture ([address]  Gastritis and duodenitis  GERD (gastroesophageal reflux disease)   
 resolved after discontinuing diclofenac  Hematuria 6/2016  High cholesterol 03/17/2010  
 pt denies 6/19  
 HTN (hypertension) 3/17/2010  Morbid obesity ([address]  Murmur, cardiac 2016  
 PFO (patent foramen ovale) 7/26/2017  PUD (peptic ulcer disease)  Shingles 6/2016 RESOLVING- NO RASH (HEAD)  Sleep apnea   
 doesnt use CPAP anymore Allergies: Allergies Allergen Reactions  Nsaids (Non-Steroidal Anti-Inflammatory Drug) Other (comments) Hx of PUD and Hinson's Esophagus Past Surgical History:  
Past Surgical History:  
Procedure Laterality Date  COLONOSCOPY N/A 6/18/2019 COLONOSCOPY AND EGD performed by Ria Ruiz MD at [facility] ENDOSCOPY  COLONOSCOPY,DIAGNOSTIC  6/18/2019  ENDOSCOPY, COLON, DIAGNOSTIC    
 HX CHOLECYSTECTOMY  1995  HX GI  1980  
 gastroplasty [address]  HX HIP REPLACEMENT Left  HX ORTHOPAEDIC Right 2011  
 rot cuff repair  HX ORTHOPAEDIC  2016  
 left broken femur [address]  HX VASCULAR ACCESS    
 picc line and removed after sepsis resolved  IR INSERT TIPS HEPATIC SHUNT  3/28/2020 [address] [facility] ARTHROPLASTY  2010  
 right  TOTAL HIP ARTHROPLASTY  2016  
 left  UPPER GI ENDOSCOPY,BIOPSY  2019 Social History:  
Social History Socioeconomic History  Marital status:  Spouse name: Not on file  Number of children: Not on file  Years of education: Not on file  Highest education level: Not on file Occupational History  Not on file Social Needs  Financial resource strain: Not on file  Food insecurity Worry: Not on file Inability: Not on file  Transportation needs Medical: Not on file Non-medical: Not on file Tobacco Use  Smoking status: Former Smoker Packs/day: 2.00 Years: 15.00 Pack years: 30.00 Last attempt to quit: 3/1/1979 Years since quittin.3  Smokeless tobacco: Never Used Substance and Sexual Activity  Alcohol use: No  
  Alcohol/week: 0.0 standard drinks  Drug use: No  
 Sexual activity: Never Lifestyle  Physical activity Days per week: Not on file Minutes per session: Not on file  Stress: Not on file Relationships  Social connections Talks on phone: Not on file Gets together: Not on file Attends Orthodoxy service: Not on file Active member of club or organization: Not on file Attends meetings of clubs or organizations: Not on file Relationship status: Not on file  Intimate partner violence Fear of current or ex partner: Not on file Emotionally abused: Not on file Physically abused: Not on file Forced sexual activity: Not on file Other Topics Concern  Not on file Social History Narrative  Not on file Dwelling Status: Alone Current Anticoagulant Medications: none History of falls: YES Prior Fractures: Hip/Pelvis Labs:   
Lab Results Component Value Date/Time HGB 8.0 (L) 06/12/2020 01:25 AM  
 WBC 4.9 06/12/2020 01:25 AM  
 INR 1.2 (H) 05/18/2020 01:03 PM  
 Albumin 2.6 (L) 06/12/2020 01:25 AM  
 
X-RAYS:  
XR HIP RT W OR WO PELV  1 VW 7/3/2020 20:47 INDICATION:   dislocation COMPARISON: June 12, 2020 FINDINGS: 
AP view of the right hip demonstrates acute dislocation of right hip 
arthroplasty with fever superior to the acetabulum. No definite fracture. IMPRESSION: 
Acute dislocated right hip arthroplasty Physical Exam:  
General: 65yo male, pleasant/cooperative, no distress CNS: alert/oriented, normal mood Vascular: pedal pulses normal and bilateral LE edema Skin: well-healed surgical scars right hip anterior and lateral 
Extremities: right leg shortened, internal rotation deformity, pain with right hip ROM Neurologic: no acute motor/sensory deficits Assessment: Acute dislocated right hip arthroplasty Plan: Discussed diagnosis and plan of care with patient. Recommend closed reduction tonight and close follow-up with orthopedic surgery for further recommendations. He is in agreement and consent obtained for procedure. Reduction performed without complication (see procedure note). Post-procedure xrays confirm reduction. Follow-up next week. Signed by: AMAN Hidalgo Today's Date: July 3, 2020

## 2020-07-05 PROCEDURE — 3331090002 HH PPS REVENUE DEBIT

## 2020-07-05 PROCEDURE — 3331090001 HH PPS REVENUE CREDIT

## 2020-07-05 RX ORDER — HYDROCODONE BITARTRATE AND ACETAMINOPHEN 5; 325 MG/1; MG/1
1 TABLET ORAL
Qty: 6 TAB | Refills: 0 | Status: SHIPPED | OUTPATIENT
Start: 2020-07-05 | End: 2020-07-08

## 2020-07-05 NOTE — ED NOTES
Pt called today and stated he was given a paper prescription for pain medicine at his visit on 7/3/2020. He tried to fill the prescription today and pharmacy would not accept the printed prescription as they now require narcotics to be e-scribed. Dr. Boogie Cantrell attempted to e-scribe to his pharmacy earlier. Pharmacy confirms this was not received. I e-scribed the prescription to his pharmacy now. Pt informed that it should be available this evening. Zohaib Wylie PA-C 
7/5/2020

## 2020-07-06 ENCOUNTER — HOME CARE VISIT (OUTPATIENT)
Dept: HOME HEALTH SERVICES | Facility: HOME HEALTH | Age: 70
End: 2020-07-06
Payer: MEDICARE

## 2020-07-06 VITALS
TEMPERATURE: 97.7 F | SYSTOLIC BLOOD PRESSURE: 178 MMHG | OXYGEN SATURATION: 99 % | DIASTOLIC BLOOD PRESSURE: 89 MMHG | RESPIRATION RATE: 16 BRPM | HEART RATE: 82 BPM

## 2020-07-06 PROCEDURE — 3331090002 HH PPS REVENUE DEBIT

## 2020-07-06 PROCEDURE — 3331090001 HH PPS REVENUE CREDIT

## 2020-07-07 ENCOUNTER — HOME CARE VISIT (OUTPATIENT)
Dept: HOME HEALTH SERVICES | Facility: HOME HEALTH | Age: 70
End: 2020-07-07
Payer: MEDICARE

## 2020-07-07 ENCOUNTER — HOME CARE VISIT (OUTPATIENT)
Dept: SCHEDULING | Facility: HOME HEALTH | Age: 70
End: 2020-07-07
Payer: MEDICARE

## 2020-07-07 ENCOUNTER — TELEPHONE (OUTPATIENT)
Dept: FAMILY MEDICINE CLINIC | Age: 70
End: 2020-07-07

## 2020-07-07 PROCEDURE — 3331090001 HH PPS REVENUE CREDIT

## 2020-07-07 PROCEDURE — 3331090002 HH PPS REVENUE DEBIT

## 2020-07-07 NOTE — TELEPHONE ENCOUNTER
Spoke with patient and he states that when he went to pharmacy for pain medication they only gave him 6 tabs and Er suggested he call Dr Moose Miles to get a prescription for additional 12 pills. Patient was advised per Dr Moose Miles would need to schedule virtual appointment. He has appointment schedule with Ortho tomorrow and will discuss with him.

## 2020-07-08 PROCEDURE — 3331090001 HH PPS REVENUE CREDIT

## 2020-07-08 PROCEDURE — 3331090002 HH PPS REVENUE DEBIT

## 2020-07-09 ENCOUNTER — HOME CARE VISIT (OUTPATIENT)
Dept: SCHEDULING | Facility: HOME HEALTH | Age: 70
End: 2020-07-09
Payer: MEDICARE

## 2020-07-09 ENCOUNTER — HOME CARE VISIT (OUTPATIENT)
Dept: HOME HEALTH SERVICES | Facility: HOME HEALTH | Age: 70
End: 2020-07-09
Payer: MEDICARE

## 2020-07-09 VITALS
SYSTOLIC BLOOD PRESSURE: 130 MMHG | OXYGEN SATURATION: 97 % | DIASTOLIC BLOOD PRESSURE: 60 MMHG | TEMPERATURE: 98.4 F | RESPIRATION RATE: 16 BRPM | HEART RATE: 71 BPM

## 2020-07-09 PROCEDURE — G0152 HHCP-SERV OF OT,EA 15 MIN: HCPCS

## 2020-07-09 PROCEDURE — 3331090002 HH PPS REVENUE DEBIT

## 2020-07-09 PROCEDURE — 3331090001 HH PPS REVENUE CREDIT

## 2020-07-10 PROCEDURE — 3331090002 HH PPS REVENUE DEBIT

## 2020-07-10 PROCEDURE — 3331090001 HH PPS REVENUE CREDIT

## 2020-07-10 NOTE — ED PROVIDER NOTES
Patient is a 22-year-old male presents emergency department with right hip pain. Patient was sitting at home on the edge of the bed when he bent over felt a pop in his right hip. Patient states that this is consistent with the last time he had a hip dislocation. Per the patient this will be his third hip dislocation within the last several weeks. Patient denies any trauma denies any sensory changes to the lower extremity. Past Medical History:  
Diagnosis Date  ADD (attention deficit disorder)  Anemia due to blood loss  Hinson's esophagus with esophagitis  Benign essential tremor  Cancer [facility]) 2012 [facility]  Cirrhosis ([address]  Depression  Dislocated hip ([address]  DJD (degenerative joint disease) of hip   
 bilat  DM (diabetes mellitus) ([address] 3/17/2010  ED (erectile dysfunction) of organic origin  Esophageal varices determined by endoscopy ([address]  Fall on or from sidewalk curb 9/24/2015  Femur fracture ([address]  Gastritis and duodenitis  GERD (gastroesophageal reflux disease)   
 resolved after discontinuing diclofenac  Hematuria 6/2016  High cholesterol 03/17/2010  
 pt denies 6/19  
 HTN (hypertension) 3/17/2010  Morbid obesity ([address]  Murmur, cardiac 2016  
 PFO (patent foramen ovale) 7/26/2017  PUD (peptic ulcer disease)  Shingles 6/2016 RESOLVING- NO RASH (HEAD)  Sleep apnea   
 doesnt use CPAP anymore Past Surgical History:  
Procedure Laterality Date  COLONOSCOPY N/A 6/18/2019 COLONOSCOPY AND EGD performed by Ross Rodriguez MD at [facility] ENDOSCOPY  COLONOSCOPY,DIAGNOSTIC  6/18/2019  ENDOSCOPY, COLON, DIAGNOSTIC    
 HX CHOLECYSTECTOMY  1995  HX GI  1980  
 gastroplasty [address]  HX HIP REPLACEMENT Left  HX ORTHOPAEDIC Right 2011  
 rot cuff repair  HX ORTHOPAEDIC  2016  
 left broken femur [address]  HX VASCULAR ACCESS    
 picc line and removed after sepsis resolved  IR INSERT TIPS HEPATIC SHUNT  3/28/2020 [address] [facility] [address] ARTHROPLASTY  2010  
 right  TOTAL HIP ARTHROPLASTY  2016  
 left  UPPER GI ENDOSCOPY,BIOPSY  2019 Family History:  
Problem Relation Age of Onset  Cancer Father   
     multiple myeloma  Stroke Father  Dementia Mother  No Known Problems Brother  COPD Brother  Cancer Maternal Grandmother COLON  Anesth Problems Neg Hx Social History Socioeconomic History  Marital status:  Spouse name: Not on file  Number of children: Not on file  Years of education: Not on file  Highest education level: Not on file Occupational History  Not on file Social Needs  Financial resource strain: Not on file  Food insecurity Worry: Not on file Inability: Not on file  Transportation needs Medical: Not on file Non-medical: Not on file Tobacco Use  Smoking status: Former Smoker Packs/day: 2.00 Years: 15.00 Pack years: 30.00 Last attempt to quit: 3/1/1979 Years since quittin.3  Smokeless tobacco: Never Used Substance and Sexual Activity  Alcohol use: No  
  Alcohol/week: 0.0 standard drinks  Drug use: No  
 Sexual activity: Never Lifestyle  Physical activity Days per week: Not on file Minutes per session: Not on file  Stress: Not on file Relationships  Social connections Talks on phone: Not on file Gets together: Not on file Attends Alevism service: Not on file Active member of club or organization: Not on file Attends meetings of clubs or organizations: Not on file Relationship status: Not on file  Intimate partner violence Fear of current or ex partner: Not on file Emotionally abused: Not on file Physically abused: Not on file Forced sexual activity: Not on file Other Topics Concern  Not on file Social History Narrative  Not on file ALLERGIES: Nsaids (non-steroidal anti-inflammatory drug) Review of Systems Musculoskeletal: Positive for arthralgias and gait problem. All other systems reviewed and are negative. Vitals:  
 07/03/20 2302 07/03/20 2304 07/03/20 2306 07/03/20 2308 BP: 158/56 131/60 155/53 139/55 Pulse: 83 82 85 83 Resp: 18 15 22 17 Temp:      
SpO2: 99% 99% 99% 98% Weight:      
      
 
Physical Exam 
Vitals signs and nursing note reviewed. Constitutional:   
   Appearance: Normal appearance. Eyes:  
   Extraocular Movements: Extraocular movements intact. Pupils: Pupils are equal, round, and reactive to light. Neck: Musculoskeletal: Normal range of motion and neck supple. Cardiovascular:  
   Rate and Rhythm: Normal rate and regular rhythm. Pulmonary:  
   Effort: Pulmonary effort is normal.  
   Breath sounds: Normal breath sounds. Musculoskeletal:  
   Right hip: He exhibits decreased range of motion, tenderness, bony tenderness and deformity. He exhibits no laceration. Comments: DP and PT pulses present and equal bilateral sensation intact Neurological:  
   General: No focal deficit present. Mental Status: He is alert and oriented to person, place, and time. Psychiatric:     
   Mood and Affect: Mood normal.     
   Behavior: Behavior normal.  
 
  
 
MDM Number of Diagnoses or Management Options Closed dislocation of right hip, sequela:  
 
  
 
Procedural Sedation Date/Time: 7/10/2020 7:23 AM 
Performed by: Samson Alvarado MD 
Authorized by: Samson Alvarado MD  
 
Consent:  
  Consent obtained:  Written Consent given by:  Patient Risks discussed: Allergic reaction, prolonged hypoxia resulting in organ damage, dysrhythmia and inadequate sedation Alternatives discussed:  Analgesia without sedation Indications:  
  Procedure performed:  Dislocation reduction Procedure necessitating sedation performed by:  Physician performing sedation Intended level of sedation:  Moderate (conscious sedation) Pre-sedation assessment:  
  Time since last food or drink:  1600 hrs ASA classification: class 3 - patient with severe systemic disease Neck mobility: normal   
  Mouth openin finger widths Mallampati score:  II - soft palate, uvula, fauces visible Pre-sedation assessments completed and reviewed: airway patency, cardiovascular function, hydration status, mental status, nausea/vomiting, pain level, respiratory function and temperature History of difficult intubation: no   
  Pre-sedation assessment completed:  7/3/2020 8:00 PM 
Immediate pre-procedure details:  
  Reassessment: Patient reassessed immediately prior to procedure Reviewed: vital signs, relevant labs/tests and NPO status Verified: bag valve mask available, emergency equipment available, intubation equipment available, IV patency confirmed, oxygen available, reversal medications available and suction available Procedure details (see MAR for exact dosages):  
  Sedation start time:  7/3/2020 8:35 PM 
  Preoxygenation:  Nasal cannula Sedation:  Midazolam (3 mg versed, 60 mg propofol) Intra-procedure monitoring:  Blood pressure monitoring, cardiac monitor, continuous capnometry, continuous pulse oximetry, frequent LOC assessments and frequent vital sign checks Intra-procedure events: none Sedation end time:  7/3/2020 8:39 PM 
  Total sedation time (minutes):  4 Post-procedure details: Post-sedation assessment completed:  7/3/2020 9:15 AM 
  Attendance: Constant attendance by certified staff until patient recovered Recovery: Patient returned to pre-procedure baseline Complications:  None   Post-sedation assessments completed and reviewed: airway patency, cardiovascular function, hydration status, mental status, nausea/vomiting, pain level, respiratory function and temperature Patient is stable for discharge or admission: yes Patient tolerance: Tolerated well, no immediate complications

## 2020-07-11 DIAGNOSIS — K74.60 CIRRHOSIS OF LIVER WITHOUT ASCITES, UNSPECIFIED HEPATIC CIRRHOSIS TYPE (HCC): ICD-10-CM

## 2020-07-11 PROCEDURE — 3331090001 HH PPS REVENUE CREDIT

## 2020-07-11 PROCEDURE — 3331090002 HH PPS REVENUE DEBIT

## 2020-07-12 PROCEDURE — 3331090002 HH PPS REVENUE DEBIT

## 2020-07-12 PROCEDURE — 3331090001 HH PPS REVENUE CREDIT

## 2020-07-13 ENCOUNTER — PATIENT MESSAGE (OUTPATIENT)
Dept: FAMILY MEDICINE CLINIC | Age: 70
End: 2020-07-13

## 2020-07-13 DIAGNOSIS — E11.65 UNCONTROLLED TYPE 2 DIABETES MELLITUS WITH HYPERGLYCEMIA (HCC): Primary | ICD-10-CM

## 2020-07-13 PROCEDURE — 3331090001 HH PPS REVENUE CREDIT

## 2020-07-13 PROCEDURE — 3331090002 HH PPS REVENUE DEBIT

## 2020-07-14 ENCOUNTER — HOME CARE VISIT (OUTPATIENT)
Dept: SCHEDULING | Facility: HOME HEALTH | Age: 70
End: 2020-07-14
Payer: MEDICARE

## 2020-07-14 PROCEDURE — 3331090001 HH PPS REVENUE CREDIT

## 2020-07-14 PROCEDURE — 3331090002 HH PPS REVENUE DEBIT

## 2020-07-14 RX ORDER — INSULIN GLARGINE 100 [IU]/ML
INJECTION, SOLUTION SUBCUTANEOUS
Refills: 0 | Status: CANCELLED | OUTPATIENT
Start: 2020-07-14

## 2020-07-15 ENCOUNTER — HOME CARE VISIT (OUTPATIENT)
Dept: HOME HEALTH SERVICES | Facility: HOME HEALTH | Age: 70
End: 2020-07-15
Payer: MEDICARE

## 2020-07-15 ENCOUNTER — HOSPITAL ENCOUNTER (OUTPATIENT)
Dept: PREADMISSION TESTING | Age: 70
Discharge: HOME | End: 2020-07-15
Payer: MEDICARE

## 2020-07-15 VITALS
SYSTOLIC BLOOD PRESSURE: 162 MMHG | HEART RATE: 75 BPM | BODY MASS INDEX: 34.17 KG/M2 | WEIGHT: 244.05 LBS | RESPIRATION RATE: 18 BRPM | HEIGHT: 71 IN | TEMPERATURE: 98.2 F | DIASTOLIC BLOOD PRESSURE: 77 MMHG

## 2020-07-15 LAB
ABO + RH BLD: NORMAL
ALBUMIN SERPL-MCNC: 2.7 G/DL (ref 3.5–5)
ALBUMIN/GLOB SERPL: 0.8 {RATIO} (ref 1.1–2.2)
ALP SERPL-CCNC: 131 U/L (ref 45–117)
ALT SERPL-CCNC: 28 U/L (ref 12–78)
ANION GAP SERPL CALC-SCNC: 5 MMOL/L (ref 5–15)
APPEARANCE UR: CLEAR
AST SERPL-CCNC: 32 U/L (ref 15–37)
BACTERIA URNS QL MICRO: NEGATIVE /HPF
BILIRUB SERPL-MCNC: 0.8 MG/DL (ref 0.2–1)
BILIRUB UR QL: NEGATIVE
BLOOD GROUP ANTIBODIES SERPL: NORMAL
BUN SERPL-MCNC: 10 MG/DL (ref 6–20)
BUN/CREAT SERPL: 15 (ref 12–20)
CALCIUM SERPL-MCNC: 8.1 MG/DL (ref 8.5–10.1)
CHLORIDE SERPL-SCNC: 108 MMOL/L (ref 97–108)
CO2 SERPL-SCNC: 26 MMOL/L (ref 21–32)
COLOR UR: ABNORMAL
CREAT SERPL-MCNC: 0.66 MG/DL (ref 0.7–1.3)
EPITH CASTS URNS QL MICRO: ABNORMAL /LPF
ERYTHROCYTE [DISTWIDTH] IN BLOOD BY AUTOMATED COUNT: 22.4 % (ref 11.5–14.5)
EST. AVERAGE GLUCOSE BLD GHB EST-MCNC: 143 MG/DL
GLOBULIN SER CALC-MCNC: 3.6 G/DL (ref 2–4)
GLUCOSE SERPL-MCNC: 113 MG/DL (ref 65–100)
GLUCOSE UR STRIP.AUTO-MCNC: NEGATIVE MG/DL
HBA1C MFR BLD: 6.6 % (ref 4–5.6)
HCT VFR BLD AUTO: 33.3 % (ref 36.6–50.3)
HGB BLD-MCNC: 10.4 G/DL (ref 12.1–17)
HGB UR QL STRIP: NEGATIVE
INR PPP: 1.2 (ref 0.9–1.1)
KETONES UR QL STRIP.AUTO: NEGATIVE MG/DL
LEUKOCYTE ESTERASE UR QL STRIP.AUTO: NEGATIVE
MCH RBC QN AUTO: 28.3 PG (ref 26–34)
MCHC RBC AUTO-ENTMCNC: 31.2 G/DL (ref 30–36.5)
MCV RBC AUTO: 90.7 FL (ref 80–99)
NITRITE UR QL STRIP.AUTO: NEGATIVE
NRBC # BLD: 0 K/UL (ref 0–0.01)
NRBC BLD-RTO: 0 PER 100 WBC
PH UR STRIP: 6.5 [PH] (ref 5–8)
PLATELET # BLD AUTO: 147 K/UL (ref 150–400)
PMV BLD AUTO: 10.1 FL (ref 8.9–12.9)
POTASSIUM SERPL-SCNC: 3.6 MMOL/L (ref 3.5–5.1)
PROT SERPL-MCNC: 6.3 G/DL (ref 6.4–8.2)
PROT UR STRIP-MCNC: ABNORMAL MG/DL
PROTHROMBIN TIME: 12.3 SEC (ref 9–11.1)
RBC # BLD AUTO: 3.67 M/UL (ref 4.1–5.7)
RBC #/AREA URNS HPF: ABNORMAL /HPF (ref 0–5)
SODIUM SERPL-SCNC: 139 MMOL/L (ref 136–145)
SP GR UR REFRACTOMETRY: 1.02 (ref 1–1.03)
SPECIMEN EXP DATE BLD: NORMAL
UA: UC IF INDICATED,UAUC: ABNORMAL
UROBILINOGEN UR QL STRIP.AUTO: 1 EU/DL (ref 0.2–1)
WBC # BLD AUTO: 4.2 K/UL (ref 4.1–11.1)
WBC URNS QL MICRO: ABNORMAL /HPF (ref 0–4)

## 2020-07-15 PROCEDURE — 81001 URINALYSIS AUTO W/SCOPE: CPT

## 2020-07-15 PROCEDURE — 3331090002 HH PPS REVENUE DEBIT

## 2020-07-15 PROCEDURE — 85610 PROTHROMBIN TIME: CPT

## 2020-07-15 PROCEDURE — 3331090001 HH PPS REVENUE CREDIT

## 2020-07-15 PROCEDURE — 80053 COMPREHEN METABOLIC PANEL: CPT

## 2020-07-15 PROCEDURE — 86900 BLOOD TYPING SEROLOGIC ABO: CPT

## 2020-07-15 PROCEDURE — 85027 COMPLETE CBC AUTOMATED: CPT

## 2020-07-15 PROCEDURE — 83036 HEMOGLOBIN GLYCOSYLATED A1C: CPT

## 2020-07-15 PROCEDURE — 36415 COLL VENOUS BLD VENIPUNCTURE: CPT

## 2020-07-15 RX ORDER — LISINOPRIL 5 MG/1
10 TABLET ORAL
Status: ON HOLD | COMMUNITY
End: 2021-01-01

## 2020-07-15 RX ORDER — SERTRALINE HYDROCHLORIDE 100 MG/1
150 TABLET, FILM COATED ORAL DAILY
Status: ON HOLD | COMMUNITY
End: 2021-01-01

## 2020-07-15 NOTE — PERIOP NOTES
Preoperative and medication instructions reviewed with patient. Patient given  2 bottles of CHG soap. Instructions reviewed on use of CHG soap. Patient given SSI infection FAQS sheet, as well as a  MRSA/MSSA treatment instruction sheet  With an explanation to patient that they will be notified if treatment instructions need to be initiated. Patient was given the opportunity to ask questions on the information provided. Information given regarding covid testing and arrival to hospital on day of surgery.

## 2020-07-15 NOTE — H&P
Date of Surgery Update:  Alfonzo Seals was seen and examined. Past Medical History:   Diagnosis Date    ADD (attention deficit disorder)     Adverse effect of anesthesia     motion sickness resulting in loss of balance    Anemia due to blood loss     Hinson's esophagus with esophagitis     Benign essential tremor     [facility]    [facility]    Chronic pain     [facility]osis [facility]     Depression     Dislocated hip (HCC)     DJD (degenerative joint disease) of hip     bilat    DM (diabetes mellitus) ([facility] 3/17/2010    ED (erectile dysfunction) of organic origin     Esophageal varices determined by endoscopy ([facility]     Fall on or from sidewalk curb 9/24/2015    Femur fracture ([facility]     Gastritis and duodenitis     GERD (gastroesophageal reflux disease)     resolved after discontinuing diclofenac    Hematuria 6/2016    High cholesterol 03/17/2010    pt denies 6/19    HTN (hypertension) 3/17/2010    Morbid obesity ([facility]     Murmur, cardiac 2016    PFO (patent foramen ovale) 7/26/2017    PUD (peptic ulcer disease)     Sepsis ([facility]     Shingles 6/2016    RESOLVING- NO RASH (HEAD)    Sleep apnea     doesnt use CPAP anymore     Prior to Admission Medications   Prescriptions Last Dose Informant Patient Reported? Taking? B.infantis-B.ani-B.long-B.bifi (PROBIOTIC 4X) 10-15 mg TbEC   Yes No   Sig: Take 1 Tab by mouth daily. Blood Glucose Control High&Low (ACCU-CHEK SOWMYA CONTROL SOLN) soln   No No   Sig: Test blood sugar twice daily Diagnosis E 11.9   Blood-Glucose Meter (ACCU-CHEK SOWMYA PLUS METER) misc   No No   Sig: Test blood sugar twice daily Diagnosis E 11.9   acetaminophen (TYLENOL) 500 mg tablet   No No   Sig: Take 2 Tabs by mouth every six (6) hours as needed for Pain. albuterol-ipratropium (DUO-NEB) 2.5 mg-0.5 mg/3 ml nebu   No No   Sig: 3 mL by Nebulization route every four (4) hours as needed for Wheezing. atorvastatin (LIPITOR) 40 mg tablet   No No   Sig: Take 1 Tab by mouth daily. butalbital-acetaminophen-caffeine (FIORICET, ESGIC) -40 mg per tablet   No No   Sig: Take 1 Tab by mouth every six (6) hours as needed for Headache.   calcium citrate-vitamin D3 (CITRACAL + D) tablet   Yes No   Sig: Take 2 Tabs by mouth two (2) times a day. cephALEXin (KEFLEX) 500 mg capsule   No No   Sig: Take 1 Cap by mouth two (2) times a day. ergocalciferol (VITAMIN D2) 50,000 unit capsule   No No   Sig: TAKE 1 CAPSULE EVERY 7 DAYS   folic acid (FOLVITE) 1 mg tablet   No No   Sig: Take 1 Tab by mouth daily. gabapentin (NEURONTIN) 300 mg capsule   No No   Sig: Take 2 Caps by mouth nightly. glucose blood VI test strips (ASCENSIA AUTODISC VI, ONE TOUCH ULTRA TEST VI) strip   No No   Sig: Test blood sugar twice daily Diagnosis E 11.9. Can use [employer]   insulin aspart U-100 (NovoLOG Flexpen U-100 Insulin) 100 unit/mL (3 mL) inpn   No No   Si Units by SubCUTAneous route Before breakfast, lunch, and dinner for 90 days. 7 units 3 times daily   insulin glargine (LANTUS) 100 unit/mL injection   No No   Si Units by SubCUTAneous route ACB/HS. Patient taking differently: 18 Units by SubCUTAneous route ACB/HS. lisinopriL (PRINIVIL, ZESTRIL) 5 mg tablet  Self Yes No   Sig: Take 5 mg by mouth every morning.   magnesium 250 mg tab   Yes No   Sig: Take 500 mg by mouth daily. potassium chloride SR (KLOR-CON 10) 10 mEq tablet   Yes No   Sig: Take 10 mEq by mouth two (2) times a day. primidone (MYSOLINE) 250 mg tablet   No No   Sig: TAKE 1 TABLET TWICE A DAY   rifAXIMin (XIFAXAN) 550 mg tablet   No No   Sig: Take 1 Tab by mouth two (2) times a day. sertraline (Zoloft) 100 mg tablet  Self Yes No   Sig: Take 100 mg by mouth every morning. thiamine hcl 500 mg tablet   No No   Sig: Take 500 mg by mouth daily.       Facility-Administered Medications: None      Allergy to:Nsaids (non-steroidal anti-inflammatory drug)  Physical Examination: General appearance - alert, well appearing, and in no distress  Chest - clear to auscultation, no wheezes, rales or rhonchi, symmetric air entry  Heart - normal rate and regular rhythm  Abdomen - soft, nontender, nondistended, no masses or organomegaly  History and physical has been reviewed. The patient has been examined. There have been no significant clinical changes since the completion of the originally dated History and Physical.    Signed By: Sacha Preston PA-C     July 20, 2020 11:02 AM             PCP: Suleiman Hugo MD      Problem List      None        Subjective: There is no problem list on file for this patient. Chief Complaint: Follow-up of the Right Hip    HPI: Gillermina Councilman is a 65 y.o. male who returns for follow-up of revision of acetabular lining and femoral head to a tripolar head of his right total hip replacement performed 4/2018. He is currently over 2 years post-op. He is also s/p a left total hip replacement performed by me in 2016. During his last visit with our office on 5/20/20, he reported that he fell on his back while in the kitchen after losing his balance. He also reported that his right hip dislocated at the time and was reduced under anesthesia at the hospital. He was prescribed oxycodone at his last visit. He reports 3 right hip dislocation events with the most recent event on 7/3/20. He notes that 2/3 dislocations occurred while he was sitting on the edge of his bed reaching for his medication. He returns today for reassessment. He is in a wheelchair today.     Objective:      There were no vitals filed for this visit. Wt Readings from Last 3 Encounters:   05/20/20 252 lb   04/25/18 252 lb   03/21/18 264 lb     There is no height or weight on file to calculate BMI.     Musculoskeletal : Bilateral Hips and Knees:  He has normal range of motion of the right hip with no significant pain on motion. He has normal range of motion of the left hip with no significant pain on motion. Ligaments intact.  The right leg appears shorter than the left leg. Skin healthy and intact. Neurovascularly intact. No apparent atrophy. Strong pedal pulses.     Radiographs:             No imaging obtained    Assessment:      1. Recurrent dislocation of hip joint prosthesis, subsequent encounter      Plan:      I reviewed the x-rays ordered 7/3/20 on the [address] of the pelvis. He has well-aligned bilateral total hip replacements. The patient is s/p a right total hip replacement approximately 10 years ago. He developed sepsis and in 2/2018 he underwent irrigation and debridement of his right hip with removal of metal liner and placement of polyethylene liner and replacement of femoral head, placement of vancomycin and tobramycin impregnated absorbable beads with placement of hemovac drain. He also developed recurrent dislocation and underwent I & D revision of the femoral head and polyethylene liner of the right hip in 4/2018, which had done well up until recently. I explained that given his history of revision of his right total hip replacement, he has a higher risk for dislocation. I discussed the diagnosis of recurrent right hip dislocation as well treatment options with the patient. I discussed the option of revision of the polyethylene liner with a longer femoral head and continued use of the tri-polar ball vs placement of a constrain liner vs revision of the acetabular component as well as the pros and cons of each. We also discussed surgery at length including expected outcomes and post-op recovery as well as risks and complications, specifically DVT, PE, infection, dislocation, leg length discrepancy, and nerve injury. After a lengthy discussion, the patient desires to proceed with surgery. We will schedule surgery at the patient's convenience.   Follow-up for scheduled surgery.         Orders Placed This Encounter    BMI >=25 PATIENT INSTRUCTIONS & EDUCATION      Return for scheduled surgery.      Medical documentation was entered by me, Lynn Moses, Medical Scribe for Dr. Apoorva Bauer. 7/8/2020     I, Elizabeth Osorio MD, personally, performed the services described in this documentation, as scribed in my presence, and it is both accurate and complete.

## 2020-07-16 ENCOUNTER — HOSPITAL ENCOUNTER (OUTPATIENT)
Dept: PREADMISSION TESTING | Age: 70
Discharge: HOME | End: 2020-07-16
Payer: MEDICARE

## 2020-07-16 ENCOUNTER — HOME CARE VISIT (OUTPATIENT)
Dept: SCHEDULING | Facility: HOME HEALTH | Age: 70
End: 2020-07-16
Payer: MEDICARE

## 2020-07-16 DIAGNOSIS — U07.1 COVID-19: ICD-10-CM

## 2020-07-16 LAB
BACTERIA SPEC CULT: NORMAL
BACTERIA SPEC CULT: NORMAL
SERVICE CMNT-IMP: NORMAL

## 2020-07-16 PROCEDURE — 87635 SARS-COV-2 COVID-19 AMP PRB: CPT

## 2020-07-16 PROCEDURE — 3331090002 HH PPS REVENUE DEBIT

## 2020-07-16 PROCEDURE — 3331090001 HH PPS REVENUE CREDIT

## 2020-07-17 PROCEDURE — 3331090002 HH PPS REVENUE DEBIT

## 2020-07-17 PROCEDURE — 3331090001 HH PPS REVENUE CREDIT

## 2020-07-18 LAB — SARS-COV-2, COV2NT: NOT DETECTED

## 2020-07-18 PROCEDURE — 3331090002 HH PPS REVENUE DEBIT

## 2020-07-18 PROCEDURE — 3331090001 HH PPS REVENUE CREDIT

## 2020-07-19 PROCEDURE — 3331090001 HH PPS REVENUE CREDIT

## 2020-07-19 PROCEDURE — 3331090002 HH PPS REVENUE DEBIT

## 2020-07-20 ENCOUNTER — HOSPITAL ENCOUNTER (INPATIENT)
Age: 70
LOS: 4 days | Discharge: NURSING, REHAB OR LONG-TERM CARE | DRG: 468 | End: 2020-07-24
Attending: ORTHOPAEDIC SURGERY | Admitting: ORTHOPAEDIC SURGERY
Payer: MEDICARE

## 2020-07-20 ENCOUNTER — ANESTHESIA EVENT (OUTPATIENT)
Dept: SURGERY | Age: 70
DRG: 468 | End: 2020-07-20
Payer: MEDICARE

## 2020-07-20 ENCOUNTER — ANESTHESIA (OUTPATIENT)
Dept: SURGERY | Age: 70
DRG: 468 | End: 2020-07-20
Payer: MEDICARE

## 2020-07-20 DIAGNOSIS — Z96.649 S/P REVISION OF TOTAL HIP: ICD-10-CM

## 2020-07-20 DIAGNOSIS — Z96.649 RECURRENT DISLOCATION OF HIP JOINT PROSTHESIS, SEQUELA: Primary | ICD-10-CM

## 2020-07-20 DIAGNOSIS — T84.029S RECURRENT DISLOCATION OF HIP JOINT PROSTHESIS, SEQUELA: Primary | ICD-10-CM

## 2020-07-20 PROBLEM — T84.029A RECURRENT DISLOCATION OF HIP JOINT PROSTHESIS (HCC): Status: ACTIVE | Noted: 2020-07-20

## 2020-07-20 LAB
GLUCOSE BLD STRIP.AUTO-MCNC: 133 MG/DL (ref 65–100)
GLUCOSE BLD STRIP.AUTO-MCNC: 186 MG/DL (ref 65–100)
GLUCOSE BLD STRIP.AUTO-MCNC: 197 MG/DL (ref 65–100)
GLUCOSE BLD STRIP.AUTO-MCNC: 255 MG/DL (ref 65–100)
INR PPP: 1.2 (ref 0.9–1.1)
PROTHROMBIN TIME: 12.4 SEC (ref 9–11.1)
SERVICE CMNT-IMP: ABNORMAL

## 2020-07-20 PROCEDURE — 74011000250 HC RX REV CODE- 250: Performed by: PHYSICIAN ASSISTANT

## 2020-07-20 PROCEDURE — 3331090002 HH PPS REVENUE DEBIT

## 2020-07-20 PROCEDURE — 76210000006 HC OR PH I REC 0.5 TO 1 HR: Performed by: ORTHOPAEDIC SURGERY

## 2020-07-20 PROCEDURE — 77030020788: Performed by: ORTHOPAEDIC SURGERY

## 2020-07-20 PROCEDURE — 77030031139 HC SUT VCRL2 J&J -A: Performed by: ORTHOPAEDIC SURGERY

## 2020-07-20 PROCEDURE — 74011250636 HC RX REV CODE- 250/636: Performed by: NURSE ANESTHETIST, CERTIFIED REGISTERED

## 2020-07-20 PROCEDURE — 82962 GLUCOSE BLOOD TEST: CPT

## 2020-07-20 PROCEDURE — 74011000250 HC RX REV CODE- 250: Performed by: NURSE ANESTHETIST, CERTIFIED REGISTERED

## 2020-07-20 PROCEDURE — 76060000036 HC ANESTHESIA 2.5 TO 3 HR: Performed by: ORTHOPAEDIC SURGERY

## 2020-07-20 PROCEDURE — 74011250637 HC RX REV CODE- 250/637: Performed by: ANESTHESIOLOGY

## 2020-07-20 PROCEDURE — 77010033678 HC OXYGEN DAILY

## 2020-07-20 PROCEDURE — 65270000029 HC RM PRIVATE

## 2020-07-20 PROCEDURE — 77030036660

## 2020-07-20 PROCEDURE — 3331090001 HH PPS REVENUE CREDIT

## 2020-07-20 PROCEDURE — 0SUR09Z SUPPLEMENT RIGHT HIP JOINT, FEMORAL SURFACE WITH LINER, OPEN APPROACH: ICD-10-PCS | Performed by: ORTHOPAEDIC SURGERY

## 2020-07-20 PROCEDURE — 77030026438 HC STYL ET INTUB CARD -A: Performed by: ANESTHESIOLOGY

## 2020-07-20 PROCEDURE — 77030011640 HC PAD GRND REM COVD -A: Performed by: ORTHOPAEDIC SURGERY

## 2020-07-20 PROCEDURE — 77030040361 HC SLV COMPR DVT MDII -B

## 2020-07-20 PROCEDURE — 0SP909Z REMOVAL OF LINER FROM RIGHT HIP JOINT, OPEN APPROACH: ICD-10-PCS | Performed by: ORTHOPAEDIC SURGERY

## 2020-07-20 PROCEDURE — 77030018846 HC SOL IRR STRL H20 ICUM -A: Performed by: ORTHOPAEDIC SURGERY

## 2020-07-20 PROCEDURE — 74011250636 HC RX REV CODE- 250/636: Performed by: ANESTHESIOLOGY

## 2020-07-20 PROCEDURE — 74011250636 HC RX REV CODE- 250/636: Performed by: PHYSICIAN ASSISTANT

## 2020-07-20 PROCEDURE — 77030008684 HC TU ET CUF COVD -B: Performed by: ANESTHESIOLOGY

## 2020-07-20 PROCEDURE — 77030018836 HC SOL IRR NACL ICUM -A: Performed by: ORTHOPAEDIC SURGERY

## 2020-07-20 PROCEDURE — 94762 N-INVAS EAR/PLS OXIMTRY CONT: CPT

## 2020-07-20 PROCEDURE — C1776 JOINT DEVICE (IMPLANTABLE): HCPCS | Performed by: ORTHOPAEDIC SURGERY

## 2020-07-20 PROCEDURE — 74011636637 HC RX REV CODE- 636/637: Performed by: PHYSICIAN ASSISTANT

## 2020-07-20 PROCEDURE — 76010000172 HC OR TIME 2.5 TO 3 HR INTENSV-TIER 1: Performed by: ORTHOPAEDIC SURGERY

## 2020-07-20 PROCEDURE — 74011250637 HC RX REV CODE- 250/637: Performed by: PHYSICIAN ASSISTANT

## 2020-07-20 PROCEDURE — 36415 COLL VENOUS BLD VENIPUNCTURE: CPT

## 2020-07-20 PROCEDURE — 77030011264 HC ELECTRD BLD EXT COVD -A: Performed by: ORTHOPAEDIC SURGERY

## 2020-07-20 PROCEDURE — 85610 PROTHROMBIN TIME: CPT

## 2020-07-20 DEVICE — ARTICUL/EZE FEMORAL HEAD DIAMETER 28MM +15.5 12/14 TAPER
Type: IMPLANTABLE DEVICE | Site: HIP | Status: NON-FUNCTIONAL
Brand: ARTICUL/EZE
Removed: 2020-08-14

## 2020-07-20 RX ORDER — SODIUM CHLORIDE, SODIUM LACTATE, POTASSIUM CHLORIDE, CALCIUM CHLORIDE 600; 310; 30; 20 MG/100ML; MG/100ML; MG/100ML; MG/100ML
1000 INJECTION, SOLUTION INTRAVENOUS CONTINUOUS
Status: DISCONTINUED | OUTPATIENT
Start: 2020-07-20 | End: 2020-07-20 | Stop reason: HOSPADM

## 2020-07-20 RX ORDER — ROPIVACAINE HYDROCHLORIDE 5 MG/ML
150 INJECTION, SOLUTION EPIDURAL; INFILTRATION; PERINEURAL AS NEEDED
Status: DISCONTINUED | OUTPATIENT
Start: 2020-07-20 | End: 2020-07-20 | Stop reason: HOSPADM

## 2020-07-20 RX ORDER — ACETAMINOPHEN 500 MG
1000 TABLET ORAL ONCE
Status: DISCONTINUED | OUTPATIENT
Start: 2020-07-20 | End: 2020-07-20 | Stop reason: HOSPADM

## 2020-07-20 RX ORDER — LANOLIN ALCOHOL/MO/W.PET/CERES
400 CREAM (GRAM) TOPICAL DAILY
Status: DISCONTINUED | OUTPATIENT
Start: 2020-07-21 | End: 2020-07-24 | Stop reason: HOSPADM

## 2020-07-20 RX ORDER — ACETAMINOPHEN 500 MG
1000 TABLET ORAL EVERY 6 HOURS
Status: DISCONTINUED | OUTPATIENT
Start: 2020-07-21 | End: 2020-07-24 | Stop reason: HOSPADM

## 2020-07-20 RX ORDER — FENTANYL CITRATE 50 UG/ML
50 INJECTION, SOLUTION INTRAMUSCULAR; INTRAVENOUS AS NEEDED
Status: DISCONTINUED | OUTPATIENT
Start: 2020-07-20 | End: 2020-07-20 | Stop reason: HOSPADM

## 2020-07-20 RX ORDER — SODIUM CHLORIDE 9 MG/ML
25 INJECTION, SOLUTION INTRAVENOUS CONTINUOUS
Status: DISCONTINUED | OUTPATIENT
Start: 2020-07-20 | End: 2020-07-20 | Stop reason: HOSPADM

## 2020-07-20 RX ORDER — MIDAZOLAM HYDROCHLORIDE 1 MG/ML
1 INJECTION, SOLUTION INTRAMUSCULAR; INTRAVENOUS AS NEEDED
Status: DISCONTINUED | OUTPATIENT
Start: 2020-07-20 | End: 2020-07-20 | Stop reason: HOSPADM

## 2020-07-20 RX ORDER — PHENYLEPHRINE HCL IN 0.9% NACL 0.4MG/10ML
SYRINGE (ML) INTRAVENOUS AS NEEDED
Status: DISCONTINUED | OUTPATIENT
Start: 2020-07-20 | End: 2020-07-20 | Stop reason: HOSPADM

## 2020-07-20 RX ORDER — NALOXONE HYDROCHLORIDE 0.4 MG/ML
0.4 INJECTION, SOLUTION INTRAMUSCULAR; INTRAVENOUS; SUBCUTANEOUS AS NEEDED
Status: DISCONTINUED | OUTPATIENT
Start: 2020-07-20 | End: 2020-07-24 | Stop reason: HOSPADM

## 2020-07-20 RX ORDER — LIDOCAINE HYDROCHLORIDE 20 MG/ML
INJECTION, SOLUTION EPIDURAL; INFILTRATION; INTRACAUDAL; PERINEURAL AS NEEDED
Status: DISCONTINUED | OUTPATIENT
Start: 2020-07-20 | End: 2020-07-20 | Stop reason: HOSPADM

## 2020-07-20 RX ORDER — SODIUM CHLORIDE, SODIUM LACTATE, POTASSIUM CHLORIDE, CALCIUM CHLORIDE 600; 310; 30; 20 MG/100ML; MG/100ML; MG/100ML; MG/100ML
INJECTION, SOLUTION INTRAVENOUS
Status: DISCONTINUED | OUTPATIENT
Start: 2020-07-20 | End: 2020-07-20 | Stop reason: HOSPADM

## 2020-07-20 RX ORDER — ACETAMINOPHEN 325 MG/1
650 TABLET ORAL ONCE
Status: DISCONTINUED | OUTPATIENT
Start: 2020-07-20 | End: 2020-07-20 | Stop reason: SDUPTHER

## 2020-07-20 RX ORDER — LANOLIN ALCOHOL/MO/W.PET/CERES
500 CREAM (GRAM) TOPICAL DAILY
Status: DISCONTINUED | OUTPATIENT
Start: 2020-07-21 | End: 2020-07-24 | Stop reason: HOSPADM

## 2020-07-20 RX ORDER — MORPHINE SULFATE 2 MG/ML
2 INJECTION, SOLUTION INTRAMUSCULAR; INTRAVENOUS
Status: ACTIVE | OUTPATIENT
Start: 2020-07-20 | End: 2020-07-21

## 2020-07-20 RX ORDER — FENTANYL CITRATE 50 UG/ML
INJECTION, SOLUTION INTRAMUSCULAR; INTRAVENOUS AS NEEDED
Status: DISCONTINUED | OUTPATIENT
Start: 2020-07-20 | End: 2020-07-20 | Stop reason: HOSPADM

## 2020-07-20 RX ORDER — MORPHINE SULFATE 2 MG/ML
INJECTION, SOLUTION INTRAMUSCULAR; INTRAVENOUS AS NEEDED
Status: DISCONTINUED | OUTPATIENT
Start: 2020-07-20 | End: 2020-07-20 | Stop reason: HOSPADM

## 2020-07-20 RX ORDER — ONDANSETRON 2 MG/ML
4 INJECTION INTRAMUSCULAR; INTRAVENOUS
Status: ACTIVE | OUTPATIENT
Start: 2020-07-20 | End: 2020-07-21

## 2020-07-20 RX ORDER — LIDOCAINE HYDROCHLORIDE 10 MG/ML
0.1 INJECTION, SOLUTION EPIDURAL; INFILTRATION; INTRACAUDAL; PERINEURAL AS NEEDED
Status: DISCONTINUED | OUTPATIENT
Start: 2020-07-20 | End: 2020-07-20 | Stop reason: HOSPADM

## 2020-07-20 RX ORDER — PREGABALIN 75 MG/1
75 CAPSULE ORAL ONCE
Status: COMPLETED | OUTPATIENT
Start: 2020-07-20 | End: 2020-07-20

## 2020-07-20 RX ORDER — INSULIN LISPRO 100 [IU]/ML
INJECTION, SOLUTION INTRAVENOUS; SUBCUTANEOUS
Status: DISCONTINUED | OUTPATIENT
Start: 2020-07-20 | End: 2020-07-24 | Stop reason: HOSPADM

## 2020-07-20 RX ORDER — DIPHENHYDRAMINE HYDROCHLORIDE 50 MG/ML
12.5 INJECTION, SOLUTION INTRAMUSCULAR; INTRAVENOUS AS NEEDED
Status: DISCONTINUED | OUTPATIENT
Start: 2020-07-20 | End: 2020-07-20 | Stop reason: HOSPADM

## 2020-07-20 RX ORDER — HYDROMORPHONE HYDROCHLORIDE 1 MG/ML
0.2 INJECTION, SOLUTION INTRAMUSCULAR; INTRAVENOUS; SUBCUTANEOUS
Status: COMPLETED | OUTPATIENT
Start: 2020-07-20 | End: 2020-07-20

## 2020-07-20 RX ORDER — SODIUM CHLORIDE 0.9 % (FLUSH) 0.9 %
5-40 SYRINGE (ML) INJECTION EVERY 8 HOURS
Status: DISCONTINUED | OUTPATIENT
Start: 2020-07-20 | End: 2020-07-24 | Stop reason: HOSPADM

## 2020-07-20 RX ORDER — GABAPENTIN 300 MG/1
600 CAPSULE ORAL
Status: DISCONTINUED | OUTPATIENT
Start: 2020-07-20 | End: 2020-07-24 | Stop reason: HOSPADM

## 2020-07-20 RX ORDER — DEXAMETHASONE SODIUM PHOSPHATE 4 MG/ML
INJECTION, SOLUTION INTRA-ARTICULAR; INTRALESIONAL; INTRAMUSCULAR; INTRAVENOUS; SOFT TISSUE AS NEEDED
Status: DISCONTINUED | OUTPATIENT
Start: 2020-07-20 | End: 2020-07-20 | Stop reason: HOSPADM

## 2020-07-20 RX ORDER — LISINOPRIL 5 MG/1
5 TABLET ORAL
Status: DISCONTINUED | OUTPATIENT
Start: 2020-07-21 | End: 2020-07-24 | Stop reason: HOSPADM

## 2020-07-20 RX ORDER — ONDANSETRON 2 MG/ML
4 INJECTION INTRAMUSCULAR; INTRAVENOUS AS NEEDED
Status: DISCONTINUED | OUTPATIENT
Start: 2020-07-20 | End: 2020-07-20 | Stop reason: HOSPADM

## 2020-07-20 RX ORDER — AMOXICILLIN 250 MG
1 CAPSULE ORAL 2 TIMES DAILY
Status: DISCONTINUED | OUTPATIENT
Start: 2020-07-20 | End: 2020-07-24 | Stop reason: HOSPADM

## 2020-07-20 RX ORDER — HYDROXYZINE HYDROCHLORIDE 10 MG/1
10 TABLET, FILM COATED ORAL
Status: DISCONTINUED | OUTPATIENT
Start: 2020-07-20 | End: 2020-07-24 | Stop reason: HOSPADM

## 2020-07-20 RX ORDER — ONDANSETRON 2 MG/ML
INJECTION INTRAMUSCULAR; INTRAVENOUS AS NEEDED
Status: DISCONTINUED | OUTPATIENT
Start: 2020-07-20 | End: 2020-07-20 | Stop reason: HOSPADM

## 2020-07-20 RX ORDER — IPRATROPIUM BROMIDE AND ALBUTEROL SULFATE 2.5; .5 MG/3ML; MG/3ML
3 SOLUTION RESPIRATORY (INHALATION)
Status: DISCONTINUED | OUTPATIENT
Start: 2020-07-20 | End: 2020-07-24 | Stop reason: HOSPADM

## 2020-07-20 RX ORDER — OXYCODONE HYDROCHLORIDE 5 MG/1
5 TABLET ORAL AS NEEDED
Status: DISCONTINUED | OUTPATIENT
Start: 2020-07-20 | End: 2020-07-20 | Stop reason: HOSPADM

## 2020-07-20 RX ORDER — MIDAZOLAM HYDROCHLORIDE 1 MG/ML
0.5 INJECTION, SOLUTION INTRAMUSCULAR; INTRAVENOUS
Status: DISCONTINUED | OUTPATIENT
Start: 2020-07-20 | End: 2020-07-20 | Stop reason: HOSPADM

## 2020-07-20 RX ORDER — POTASSIUM CHLORIDE 750 MG/1
10 TABLET, FILM COATED, EXTENDED RELEASE ORAL 2 TIMES DAILY
Status: DISCONTINUED | OUTPATIENT
Start: 2020-07-20 | End: 2020-07-24 | Stop reason: HOSPADM

## 2020-07-20 RX ORDER — EPHEDRINE SULFATE/0.9% NACL/PF 50 MG/5 ML
5 SYRINGE (ML) INTRAVENOUS AS NEEDED
Status: DISCONTINUED | OUTPATIENT
Start: 2020-07-20 | End: 2020-07-20 | Stop reason: HOSPADM

## 2020-07-20 RX ORDER — MIDAZOLAM HYDROCHLORIDE 1 MG/ML
INJECTION, SOLUTION INTRAMUSCULAR; INTRAVENOUS AS NEEDED
Status: DISCONTINUED | OUTPATIENT
Start: 2020-07-20 | End: 2020-07-20 | Stop reason: HOSPADM

## 2020-07-20 RX ORDER — INSULIN GLARGINE 100 [IU]/ML
0.2 INJECTION, SOLUTION SUBCUTANEOUS
Status: DISCONTINUED | OUTPATIENT
Start: 2020-07-20 | End: 2020-07-24 | Stop reason: HOSPADM

## 2020-07-20 RX ORDER — SODIUM CHLORIDE 9 MG/ML
125 INJECTION, SOLUTION INTRAVENOUS CONTINUOUS
Status: DISPENSED | OUTPATIENT
Start: 2020-07-20 | End: 2020-07-21

## 2020-07-20 RX ORDER — EPHEDRINE SULFATE/0.9% NACL/PF 50 MG/5 ML
SYRINGE (ML) INTRAVENOUS AS NEEDED
Status: DISCONTINUED | OUTPATIENT
Start: 2020-07-20 | End: 2020-07-20 | Stop reason: HOSPADM

## 2020-07-20 RX ORDER — CEFAZOLIN SODIUM/WATER 2 G/20 ML
2 SYRINGE (ML) INTRAVENOUS ONCE
Status: COMPLETED | OUTPATIENT
Start: 2020-07-20 | End: 2020-07-20

## 2020-07-20 RX ORDER — FACIAL-BODY WIPES
10 EACH TOPICAL DAILY PRN
Status: DISCONTINUED | OUTPATIENT
Start: 2020-07-22 | End: 2020-07-24 | Stop reason: HOSPADM

## 2020-07-20 RX ORDER — SUCCINYLCHOLINE CHLORIDE 20 MG/ML
INJECTION INTRAMUSCULAR; INTRAVENOUS AS NEEDED
Status: DISCONTINUED | OUTPATIENT
Start: 2020-07-20 | End: 2020-07-20 | Stop reason: HOSPADM

## 2020-07-20 RX ORDER — ROCURONIUM BROMIDE 10 MG/ML
INJECTION, SOLUTION INTRAVENOUS AS NEEDED
Status: DISCONTINUED | OUTPATIENT
Start: 2020-07-20 | End: 2020-07-20 | Stop reason: HOSPADM

## 2020-07-20 RX ORDER — CEFAZOLIN SODIUM/WATER 2 G/20 ML
2 SYRINGE (ML) INTRAVENOUS EVERY 8 HOURS
Status: COMPLETED | OUTPATIENT
Start: 2020-07-20 | End: 2020-07-21

## 2020-07-20 RX ORDER — DEXTROSE MONOHYDRATE 100 MG/ML
0-250 INJECTION, SOLUTION INTRAVENOUS AS NEEDED
Status: DISCONTINUED | OUTPATIENT
Start: 2020-07-20 | End: 2020-07-24 | Stop reason: HOSPADM

## 2020-07-20 RX ORDER — SERTRALINE HYDROCHLORIDE 50 MG/1
100 TABLET, FILM COATED ORAL
Status: DISCONTINUED | OUTPATIENT
Start: 2020-07-21 | End: 2020-07-24 | Stop reason: HOSPADM

## 2020-07-20 RX ORDER — MORPHINE SULFATE 10 MG/ML
2 INJECTION, SOLUTION INTRAMUSCULAR; INTRAVENOUS
Status: DISCONTINUED | OUTPATIENT
Start: 2020-07-20 | End: 2020-07-20 | Stop reason: HOSPADM

## 2020-07-20 RX ORDER — SODIUM CHLORIDE 0.9 % (FLUSH) 0.9 %
5-40 SYRINGE (ML) INJECTION AS NEEDED
Status: DISCONTINUED | OUTPATIENT
Start: 2020-07-20 | End: 2020-07-24 | Stop reason: HOSPADM

## 2020-07-20 RX ORDER — FENTANYL CITRATE 50 UG/ML
25 INJECTION, SOLUTION INTRAMUSCULAR; INTRAVENOUS
Status: COMPLETED | OUTPATIENT
Start: 2020-07-20 | End: 2020-07-20

## 2020-07-20 RX ORDER — MAGNESIUM SULFATE 100 %
4 CRYSTALS MISCELLANEOUS AS NEEDED
Status: DISCONTINUED | OUTPATIENT
Start: 2020-07-20 | End: 2020-07-24 | Stop reason: HOSPADM

## 2020-07-20 RX ORDER — FAMOTIDINE 20 MG/1
20 TABLET, FILM COATED ORAL 2 TIMES DAILY
Status: DISCONTINUED | OUTPATIENT
Start: 2020-07-20 | End: 2020-07-24 | Stop reason: HOSPADM

## 2020-07-20 RX ORDER — PRIMIDONE 250 MG/1
250 TABLET ORAL 2 TIMES DAILY
Status: DISCONTINUED | OUTPATIENT
Start: 2020-07-20 | End: 2020-07-24 | Stop reason: HOSPADM

## 2020-07-20 RX ORDER — ONDANSETRON 4 MG/1
4 TABLET, ORALLY DISINTEGRATING ORAL
Status: DISCONTINUED | OUTPATIENT
Start: 2020-07-20 | End: 2020-07-24 | Stop reason: HOSPADM

## 2020-07-20 RX ORDER — OXYCODONE HYDROCHLORIDE 5 MG/1
5 TABLET ORAL
Status: DISCONTINUED | OUTPATIENT
Start: 2020-07-20 | End: 2020-07-24 | Stop reason: HOSPADM

## 2020-07-20 RX ORDER — ATORVASTATIN CALCIUM 40 MG/1
40 TABLET, FILM COATED ORAL DAILY
Status: DISCONTINUED | OUTPATIENT
Start: 2020-07-21 | End: 2020-07-24 | Stop reason: HOSPADM

## 2020-07-20 RX ORDER — PROPOFOL 10 MG/ML
INJECTION, EMULSION INTRAVENOUS AS NEEDED
Status: DISCONTINUED | OUTPATIENT
Start: 2020-07-20 | End: 2020-07-20 | Stop reason: HOSPADM

## 2020-07-20 RX ORDER — POLYETHYLENE GLYCOL 3350 17 G/17G
17 POWDER, FOR SOLUTION ORAL DAILY
Status: DISCONTINUED | OUTPATIENT
Start: 2020-07-21 | End: 2020-07-24 | Stop reason: HOSPADM

## 2020-07-20 RX ORDER — OXYCODONE HYDROCHLORIDE 5 MG/1
10 TABLET ORAL
Status: DISCONTINUED | OUTPATIENT
Start: 2020-07-20 | End: 2020-07-24 | Stop reason: HOSPADM

## 2020-07-20 RX ORDER — SODIUM CHLORIDE, SODIUM LACTATE, POTASSIUM CHLORIDE, CALCIUM CHLORIDE 600; 310; 30; 20 MG/100ML; MG/100ML; MG/100ML; MG/100ML
100 INJECTION, SOLUTION INTRAVENOUS CONTINUOUS
Status: DISCONTINUED | OUTPATIENT
Start: 2020-07-20 | End: 2020-07-20 | Stop reason: HOSPADM

## 2020-07-20 RX ADMIN — PROPOFOL 100 MG: 10 INJECTION, EMULSION INTRAVENOUS at 11:56

## 2020-07-20 RX ADMIN — FENTANYL CITRATE 50 MCG: 50 INJECTION, SOLUTION INTRAMUSCULAR; INTRAVENOUS at 11:45

## 2020-07-20 RX ADMIN — SODIUM CHLORIDE, POTASSIUM CHLORIDE, SODIUM LACTATE AND CALCIUM CHLORIDE: 600; 310; 30; 20 INJECTION, SOLUTION INTRAVENOUS at 13:39

## 2020-07-20 RX ADMIN — FAMOTIDINE 20 MG: 20 TABLET ORAL at 22:04

## 2020-07-20 RX ADMIN — GABAPENTIN 600 MG: 300 CAPSULE ORAL at 22:04

## 2020-07-20 RX ADMIN — FENTANYL CITRATE 25 MCG: 50 INJECTION, SOLUTION INTRAMUSCULAR; INTRAVENOUS at 16:20

## 2020-07-20 RX ADMIN — Medication 80 MCG: at 12:08

## 2020-07-20 RX ADMIN — SODIUM CHLORIDE, POTASSIUM CHLORIDE, SODIUM LACTATE AND CALCIUM CHLORIDE: 600; 310; 30; 20 INJECTION, SOLUTION INTRAVENOUS at 11:45

## 2020-07-20 RX ADMIN — INSULIN LISPRO 2 UNITS: 100 INJECTION, SOLUTION INTRAVENOUS; SUBCUTANEOUS at 16:42

## 2020-07-20 RX ADMIN — Medication 2 G: at 12:06

## 2020-07-20 RX ADMIN — MORPHINE SULFATE 2 MG: 2 INJECTION, SOLUTION INTRAMUSCULAR; INTRAVENOUS at 12:35

## 2020-07-20 RX ADMIN — Medication 80 MCG: at 12:57

## 2020-07-20 RX ADMIN — PRIMIDONE 250 MG: 250 TABLET ORAL at 22:04

## 2020-07-20 RX ADMIN — Medication 20 MG: at 12:18

## 2020-07-20 RX ADMIN — RIFAXIMIN 550 MG: 550 TABLET ORAL at 22:05

## 2020-07-20 RX ADMIN — Medication 10 ML: at 22:04

## 2020-07-20 RX ADMIN — FENTANYL CITRATE 25 MCG: 50 INJECTION, SOLUTION INTRAMUSCULAR; INTRAVENOUS at 16:10

## 2020-07-20 RX ADMIN — SUGAMMADEX 200 MG: 100 INJECTION, SOLUTION INTRAVENOUS at 13:56

## 2020-07-20 RX ADMIN — SODIUM CHLORIDE 125 ML/HR: 900 INJECTION, SOLUTION INTRAVENOUS at 15:30

## 2020-07-20 RX ADMIN — Medication 40 MCG: at 12:12

## 2020-07-20 RX ADMIN — ONDANSETRON HYDROCHLORIDE 4 MG: 2 INJECTION, SOLUTION INTRAMUSCULAR; INTRAVENOUS at 13:30

## 2020-07-20 RX ADMIN — FENTANYL CITRATE 50 MCG: 50 INJECTION, SOLUTION INTRAMUSCULAR; INTRAVENOUS at 11:50

## 2020-07-20 RX ADMIN — LIDOCAINE HYDROCHLORIDE 100 MG: 20 INJECTION, SOLUTION EPIDURAL; INFILTRATION; INTRACAUDAL; PERINEURAL at 11:55

## 2020-07-20 RX ADMIN — ROCURONIUM BROMIDE 10 MG: 10 SOLUTION INTRAVENOUS at 12:38

## 2020-07-20 RX ADMIN — POTASSIUM CHLORIDE 10 MEQ: 750 TABLET, FILM COATED, EXTENDED RELEASE ORAL at 22:04

## 2020-07-20 RX ADMIN — ROCURONIUM BROMIDE 30 MG: 10 SOLUTION INTRAVENOUS at 12:16

## 2020-07-20 RX ADMIN — ONDANSETRON HYDROCHLORIDE 4 MG: 2 INJECTION, SOLUTION INTRAMUSCULAR; INTRAVENOUS at 12:12

## 2020-07-20 RX ADMIN — MORPHINE SULFATE 2 MG: 2 INJECTION, SOLUTION INTRAMUSCULAR; INTRAVENOUS at 12:32

## 2020-07-20 RX ADMIN — DEXAMETHASONE SODIUM PHOSPHATE 4 MG: 4 INJECTION, SOLUTION INTRAMUSCULAR; INTRAVENOUS at 12:11

## 2020-07-20 RX ADMIN — HYDROMORPHONE HYDROCHLORIDE 0.2 MG: 1 INJECTION, SOLUTION INTRAMUSCULAR; INTRAVENOUS; SUBCUTANEOUS at 16:40

## 2020-07-20 RX ADMIN — DOCUSATE SODIUM 50MG AND SENNOSIDES 8.6MG 1 TABLET: 8.6; 5 TABLET, FILM COATED ORAL at 22:04

## 2020-07-20 RX ADMIN — OXYCODONE 5 MG: 5 TABLET ORAL at 19:49

## 2020-07-20 RX ADMIN — SUCCINYLCHOLINE CHLORIDE 160 MG: 20 INJECTION, SOLUTION INTRAMUSCULAR; INTRAVENOUS at 11:57

## 2020-07-20 RX ADMIN — INSULIN GLARGINE 22 UNITS: 100 INJECTION, SOLUTION SUBCUTANEOUS at 22:03

## 2020-07-20 RX ADMIN — HYDROMORPHONE HYDROCHLORIDE 0.2 MG: 1 INJECTION, SOLUTION INTRAMUSCULAR; INTRAVENOUS; SUBCUTANEOUS at 16:55

## 2020-07-20 RX ADMIN — FENTANYL CITRATE 25 MCG: 50 INJECTION, SOLUTION INTRAMUSCULAR; INTRAVENOUS at 16:05

## 2020-07-20 RX ADMIN — PREGABALIN 75 MG: 75 CAPSULE ORAL at 11:45

## 2020-07-20 RX ADMIN — HYDROMORPHONE HYDROCHLORIDE 0.2 MG: 1 INJECTION, SOLUTION INTRAMUSCULAR; INTRAVENOUS; SUBCUTANEOUS at 17:25

## 2020-07-20 RX ADMIN — FENTANYL CITRATE 25 MCG: 50 INJECTION, SOLUTION INTRAMUSCULAR; INTRAVENOUS at 16:25

## 2020-07-20 RX ADMIN — MORPHINE SULFATE 2 MG: 2 INJECTION, SOLUTION INTRAMUSCULAR; INTRAVENOUS at 14:24

## 2020-07-20 RX ADMIN — MIDAZOLAM 2 MG: 1 INJECTION INTRAMUSCULAR; INTRAVENOUS at 11:45

## 2020-07-20 RX ADMIN — ROCURONIUM BROMIDE 10 MG: 10 SOLUTION INTRAVENOUS at 11:56

## 2020-07-20 RX ADMIN — CEFAZOLIN SODIUM 2 G: 300 INJECTION, POWDER, LYOPHILIZED, FOR SOLUTION INTRAVENOUS at 22:04

## 2020-07-20 RX ADMIN — Medication 40 MCG: at 13:06

## 2020-07-20 RX ADMIN — HYDROMORPHONE HYDROCHLORIDE 0.2 MG: 1 INJECTION, SOLUTION INTRAMUSCULAR; INTRAVENOUS; SUBCUTANEOUS at 17:10

## 2020-07-20 RX ADMIN — MORPHINE SULFATE 2 MG: 2 INJECTION, SOLUTION INTRAMUSCULAR; INTRAVENOUS at 14:32

## 2020-07-20 RX ADMIN — INSULIN LISPRO 3 UNITS: 100 INJECTION, SOLUTION INTRAVENOUS; SUBCUTANEOUS at 22:04

## 2020-07-20 RX ADMIN — HYDROMORPHONE HYDROCHLORIDE 0.2 MG: 1 INJECTION, SOLUTION INTRAMUSCULAR; INTRAVENOUS; SUBCUTANEOUS at 15:40

## 2020-07-20 NOTE — ANESTHESIA POSTPROCEDURE EVALUATION
Post-Anesthesia Evaluation and Assessment    Patient: Michael Multani MRN: [id number]  SSN: [id number]    YOB: 1950  Age: 65 y.o. Sex: male      I have evaluated the patient and they are stable and ready for discharge from the PACU. Cardiovascular Function/Vital Signs  Visit Vitals  /58   Pulse 85   Temp 36.4 °C (97.5 °F)   Resp 18   Ht 5' 10.5\" (1.791 m)   Wt 110.7 kg (244 lb 0.8 oz)   SpO2 93%   BMI 34.52 kg/m²       Patient is status post General anesthesia for Procedure(s):  REVISION POLYLINER RIGHT TOTAL HIP REPLACEMENT. Nausea/Vomiting: None    Postoperative hydration reviewed and adequate. Pain:  Pain Scale 1: Numeric (0 - 10) (07/20/20 1520)  Pain Intensity 1: 0 (07/20/20 1520)   Managed    Neurological Status:   Neuro (WDL): Within Defined Limits (07/20/20 1520)  Neuro  Neurologic State: Appropriate for age;Eyes open spontaneously; Eyes open to voice (07/20/20 1520)  LUE Motor Response: Purposeful (07/20/20 1520)  LLE Motor Response: Purposeful (07/20/20 1520)  RUE Motor Response: Purposeful (07/20/20 1520)  RLE Motor Response: Purposeful;Weak(s/p rt hip sx) (07/20/20 1520)   At baseline    Mental Status, Level of Consciousness: Alert and  oriented to person, place, and time    Pulmonary Status:   O2 Device: Nasal cannula (07/20/20 1520)   Adequate oxygenation and airway patent    Complications related to anesthesia: None    Post-anesthesia assessment completed. No concerns    Signed By: Quinn Stark MD     July 20, 2020              Procedure(s):  REVISION POLYLINER RIGHT TOTAL HIP REPLACEMENT. general    <BSHSIANPOST>    INITIAL Post-op Vital signs:   Vitals Value Taken Time   /65 7/20/2020  3:15 PM   Temp 36.4 °C (97.5 °F) 7/20/2020  3:20 PM   Pulse 85 7/20/2020  3:25 PM   Resp 16 7/20/2020  3:25 PM   SpO2 98 % 7/20/2020  3:25 PM   Vitals shown include unvalidated device data.

## 2020-07-20 NOTE — PERIOP NOTES
Family called and informed of patient's progress.     1300: Explanted 3 components a bipolar (metal with a liner and a metal head Depuy)

## 2020-07-20 NOTE — BRIEF OP NOTE
Brief Postoperative Note    Patient: Holli Burns  YOB: 1950  MRN: [id number]    Date of Procedure: 7/20/2020     Pre-Op Diagnosis: RECURRENT DISLOCATION RIGHT TOTAL HIP    Post-Op Diagnosis: Same as preoperative diagnosis. Procedure(s):  REVISION POLYLINER RIGHT TOTAL HIP REPLACEMENT    Surgeon(s):  Molly Long MD    Surgical Assistant: Physician Assistant: Mirela Dey PA-C    Anesthesia: General     Estimated Blood Loss (mL): less than 155     Complications: None    Specimens: * No specimens in log *     Implants:   Implant Name Type Inv. Item Serial No.  Lot No. LRB No. Used Action   HEAD FEM 12/14 TPR 28MM+15.5MM --  - SNA  HEAD FEM 12/14 TPR 28MM+15.5MM --  [id number] Right 1 Implanted   HEAD FEM [facility] [id number] RUST --  - SNA  HEAD FEM [facility] 40X28 RUST --  [facility] [id number] Right 1 Implanted       Drains:   [REMOVED] Orogastric Tube 03/28/20 (Removed)       [REMOVED] Drain 02/26/18 Right Hip (Removed)       Findings: unable to dislocate hip during exam under anesthesia. After exposure and posterior capsulectomy, hip was stable at 90 deg flexion and 70 deg internal rotation. No impingement noted.     Electronically Signed by Lenny Kasper MD on 7/20/2020 at 2:02 PM

## 2020-07-20 NOTE — ANESTHESIA PREPROCEDURE EVALUATION
Relevant Problems   No relevant active problems       Anesthetic History   No history of anesthetic complications            Review of Systems / Medical History  Patient summary reviewed, nursing notes reviewed and pertinent labs reviewed    Pulmonary        Sleep apnea: No treatment           Neuro/Psych   Within defined limits           Cardiovascular    Hypertension                   GI/Hepatic/Renal     GERD      PUD and liver disease     Endo/Other    Diabetes    Obesity and arthritis     Other Findings              Physical Exam    Airway  Mallampati: II  TM Distance: > 6 cm  Neck ROM: normal range of motion   Mouth opening: Normal     Cardiovascular  Regular rate and rhythm,  S1 and S2 normal,  no murmur, click, rub, or gallop             Dental  No notable dental hx       Pulmonary  Breath sounds clear to auscultation               Abdominal  GI exam deferred       Other Findings            Anesthetic Plan    ASA: 3  Anesthesia type: general          Induction: Intravenous  Anesthetic plan and risks discussed with: Patient

## 2020-07-21 ENCOUNTER — HOME CARE VISIT (OUTPATIENT)
Dept: SCHEDULING | Facility: HOME HEALTH | Age: 70
End: 2020-07-21
Payer: MEDICARE

## 2020-07-21 LAB
ANION GAP SERPL CALC-SCNC: 5 MMOL/L (ref 5–15)
BUN SERPL-MCNC: 12 MG/DL (ref 6–20)
BUN/CREAT SERPL: 17 (ref 12–20)
CALCIUM SERPL-MCNC: 7.4 MG/DL (ref 8.5–10.1)
CHLORIDE SERPL-SCNC: 107 MMOL/L (ref 97–108)
CO2 SERPL-SCNC: 25 MMOL/L (ref 21–32)
CREAT SERPL-MCNC: 0.72 MG/DL (ref 0.7–1.3)
GLUCOSE BLD STRIP.AUTO-MCNC: 159 MG/DL (ref 65–100)
GLUCOSE BLD STRIP.AUTO-MCNC: 241 MG/DL (ref 65–100)
GLUCOSE BLD STRIP.AUTO-MCNC: 294 MG/DL (ref 65–100)
GLUCOSE BLD STRIP.AUTO-MCNC: 397 MG/DL (ref 65–100)
GLUCOSE SERPL-MCNC: 209 MG/DL (ref 65–100)
HGB BLD-MCNC: 8.9 G/DL (ref 12.1–17)
POTASSIUM SERPL-SCNC: 3.9 MMOL/L (ref 3.5–5.1)
SERVICE CMNT-IMP: ABNORMAL
SODIUM SERPL-SCNC: 137 MMOL/L (ref 136–145)

## 2020-07-21 PROCEDURE — 97530 THERAPEUTIC ACTIVITIES: CPT

## 2020-07-21 PROCEDURE — 82962 GLUCOSE BLOOD TEST: CPT

## 2020-07-21 PROCEDURE — 85018 HEMOGLOBIN: CPT

## 2020-07-21 PROCEDURE — 36415 COLL VENOUS BLD VENIPUNCTURE: CPT

## 2020-07-21 PROCEDURE — 3331090002 HH PPS REVENUE DEBIT

## 2020-07-21 PROCEDURE — 65270000029 HC RM PRIVATE

## 2020-07-21 PROCEDURE — 97161 PT EVAL LOW COMPLEX 20 MIN: CPT

## 2020-07-21 PROCEDURE — 3331090001 HH PPS REVENUE CREDIT

## 2020-07-21 PROCEDURE — 74011636637 HC RX REV CODE- 636/637: Performed by: PHYSICIAN ASSISTANT

## 2020-07-21 PROCEDURE — 97116 GAIT TRAINING THERAPY: CPT

## 2020-07-21 PROCEDURE — 74011250637 HC RX REV CODE- 250/637: Performed by: PHYSICIAN ASSISTANT

## 2020-07-21 PROCEDURE — 74011250636 HC RX REV CODE- 250/636: Performed by: PHYSICIAN ASSISTANT

## 2020-07-21 PROCEDURE — 74011000250 HC RX REV CODE- 250: Performed by: PHYSICIAN ASSISTANT

## 2020-07-21 PROCEDURE — 77010033678 HC OXYGEN DAILY

## 2020-07-21 PROCEDURE — 97165 OT EVAL LOW COMPLEX 30 MIN: CPT

## 2020-07-21 PROCEDURE — 74011636637 HC RX REV CODE- 636/637: Performed by: ORTHOPAEDIC SURGERY

## 2020-07-21 PROCEDURE — 80048 BASIC METABOLIC PNL TOTAL CA: CPT

## 2020-07-21 PROCEDURE — 97535 SELF CARE MNGMENT TRAINING: CPT

## 2020-07-21 RX ORDER — INSULIN LISPRO 100 [IU]/ML
9 INJECTION, SOLUTION INTRAVENOUS; SUBCUTANEOUS ONCE
Status: COMPLETED | OUTPATIENT
Start: 2020-07-21 | End: 2020-07-21

## 2020-07-21 RX ADMIN — INSULIN LISPRO 9 UNITS: 100 INJECTION, SOLUTION INTRAVENOUS; SUBCUTANEOUS at 12:00

## 2020-07-21 RX ADMIN — ACETAMINOPHEN 1000 MG: 500 TABLET ORAL at 06:42

## 2020-07-21 RX ADMIN — PRIMIDONE 250 MG: 250 TABLET ORAL at 09:25

## 2020-07-21 RX ADMIN — RIFAXIMIN 550 MG: 550 TABLET ORAL at 19:50

## 2020-07-21 RX ADMIN — FAMOTIDINE 20 MG: 20 TABLET ORAL at 08:54

## 2020-07-21 RX ADMIN — CEFAZOLIN SODIUM 2 G: 300 INJECTION, POWDER, LYOPHILIZED, FOR SOLUTION INTRAVENOUS at 06:43

## 2020-07-21 RX ADMIN — INSULIN LISPRO 2 UNITS: 100 INJECTION, SOLUTION INTRAVENOUS; SUBCUTANEOUS at 06:45

## 2020-07-21 RX ADMIN — Medication 10 ML: at 06:45

## 2020-07-21 RX ADMIN — ATORVASTATIN CALCIUM 40 MG: 40 TABLET, FILM COATED ORAL at 08:54

## 2020-07-21 RX ADMIN — APIXABAN 2.5 MG: 2.5 TABLET, FILM COATED ORAL at 06:42

## 2020-07-21 RX ADMIN — SODIUM CHLORIDE 125 ML/HR: 900 INJECTION, SOLUTION INTRAVENOUS at 00:01

## 2020-07-21 RX ADMIN — APIXABAN 2.5 MG: 2.5 TABLET, FILM COATED ORAL at 17:38

## 2020-07-21 RX ADMIN — INSULIN GLARGINE 22 UNITS: 100 INJECTION, SOLUTION SUBCUTANEOUS at 21:34

## 2020-07-21 RX ADMIN — Medication 500 MG: at 08:54

## 2020-07-21 RX ADMIN — OXYCODONE 10 MG: 5 TABLET ORAL at 19:50

## 2020-07-21 RX ADMIN — MAGNESIUM OXIDE TAB 400 MG (241.3 MG ELEMENTAL MG) 400 MG: 400 (241.3 MG) TAB at 08:54

## 2020-07-21 RX ADMIN — FAMOTIDINE 20 MG: 20 TABLET ORAL at 17:38

## 2020-07-21 RX ADMIN — SERTRALINE HYDROCHLORIDE 100 MG: 50 TABLET ORAL at 06:42

## 2020-07-21 RX ADMIN — PRIMIDONE 250 MG: 250 TABLET ORAL at 19:50

## 2020-07-21 RX ADMIN — ACETAMINOPHEN 1000 MG: 500 TABLET ORAL at 00:01

## 2020-07-21 RX ADMIN — OXYCODONE 10 MG: 5 TABLET ORAL at 10:31

## 2020-07-21 RX ADMIN — POTASSIUM CHLORIDE 10 MEQ: 750 TABLET, FILM COATED, EXTENDED RELEASE ORAL at 08:54

## 2020-07-21 RX ADMIN — POTASSIUM CHLORIDE 10 MEQ: 750 TABLET, FILM COATED, EXTENDED RELEASE ORAL at 17:38

## 2020-07-21 RX ADMIN — OXYCODONE 5 MG: 5 TABLET ORAL at 16:15

## 2020-07-21 RX ADMIN — ACETAMINOPHEN 1000 MG: 500 TABLET ORAL at 17:38

## 2020-07-21 RX ADMIN — INSULIN LISPRO 3 UNITS: 100 INJECTION, SOLUTION INTRAVENOUS; SUBCUTANEOUS at 21:34

## 2020-07-21 RX ADMIN — GABAPENTIN 600 MG: 300 CAPSULE ORAL at 21:33

## 2020-07-21 RX ADMIN — ACETAMINOPHEN 1000 MG: 500 TABLET ORAL at 23:01

## 2020-07-21 RX ADMIN — DOCUSATE SODIUM 50MG AND SENNOSIDES 8.6MG 1 TABLET: 8.6; 5 TABLET, FILM COATED ORAL at 08:54

## 2020-07-21 RX ADMIN — INSULIN LISPRO 3 UNITS: 100 INJECTION, SOLUTION INTRAVENOUS; SUBCUTANEOUS at 17:38

## 2020-07-21 RX ADMIN — LISINOPRIL 5 MG: 5 TABLET ORAL at 06:42

## 2020-07-21 RX ADMIN — Medication 10 ML: at 21:34

## 2020-07-21 RX ADMIN — POLYETHYLENE GLYCOL 3350 17 G: 17 POWDER, FOR SOLUTION ORAL at 08:54

## 2020-07-21 RX ADMIN — DOCUSATE SODIUM 50MG AND SENNOSIDES 8.6MG 1 TABLET: 8.6; 5 TABLET, FILM COATED ORAL at 17:37

## 2020-07-21 RX ADMIN — RIFAXIMIN 550 MG: 550 TABLET ORAL at 09:25

## 2020-07-21 NOTE — PROGRESS NOTES
Transition of Care Plan  RUR 28%    Disposition  Home with family assistance  Transportation   Son Barbara Mayorga [phone]  Home Health    [facility]  PCP follow up   Dr Susana Ojeda     Reason for Admission:                      RUR Score:       28%              Plan for utilizing home health:      Yes     PCP: First and Last name:    Quentin Anglin   Name of Practice:    Are you a current patient: Yes/No: yes   Approximate date of last visit: a month ago   Can you participate in a virtual visit with your PCP: yes                    Current Advanced Directive/Advance Care Plan: yes   Sulma Poag daughter and son Barbara Mayorga second                      Transition of Care Plan:     Home with son and home health    CM met with patient in his room to introduce self and explain role. Patient was alert and oriented and confirmed demographics, PCP and insurance. Patient lives in his home with his son Jaime Long  [phone].

## 2020-07-21 NOTE — PROGRESS NOTES
Problem: Mobility Impaired (Adult and Pediatric)  Goal: *Acute Goals and Plan of Care (Insert Text)  Description: FUNCTIONAL STATUS PRIOR TO ADMISSION: Patient was modified independent using a rolling walker for functional mobility. Pt reports 12 falls in 2020. HOME SUPPORT PRIOR TO ADMISSION: The patient lived with son who works night shift - son assisted with meals/shopping. Physical Therapy Goals  Initiated 7/21/2020    1. Patient will move from supine to sit and sit to supine , scoot up and down, and roll side to side in bed with modified independence within 4 days. 2. Patient will perform sit to stand with modified independence within 4 days. 3. Patient will ambulate with modified independence for > 100 feet with the least restrictive device within 4 days. 4. Patient will ascend/descend 4 stairs with one handrail(s) with minimal assistance/contact guard assist within 4 days. 5. Patient will perform [facility] home exercise program per protocol with supervision/set-up within 4 days. 7/21/2020 1420 by Annette Walter, PT  Outcome: Progressing Towards Goal  PHYSICAL THERAPY TREATMENT  Patient: Yamileth Lim (36 y.o. male)  Date: 7/21/2020  Diagnosis: Recurrent dislocation of hip joint prosthesis, sequela [T84.029S, Z96.649]   Recurrent dislocation of hip joint prosthesis (HCC)  Procedure(s) (LRB):  REVISION POLYLINER RIGHT TOTAL HIP REPLACEMENT (Right) 1 Day Post-Op  Precautions: Fall, WBAT  Chart, physical therapy assessment, plan of care and goals were reviewed. ASSESSMENT  Patient continues with skilled PT services and is progressing towards goals. Overall improving but continues to have difficulty coming to sitting edge of bed and maintaining sitting edge of bed secondary to leaning backwards. Gait quality improved - no knee buckling noted and increased distance to 50 feet. Current Level of Function Impacting Discharge (mobility/balance):  Mod assist x 1 for bed mobility, mod assist x 1 for sit to stand; CGA for amb with RW    Other factors to consider for discharge: home alone while son at work; hx of falls         PLAN :  Patient continues to benefit from skilled intervention to address the above impairments. Continue treatment per established plan of care. to address goals. Recommendation for discharge: (in order for the patient to meet his/her long term goals)  Therapy up to 5 days/week in SNF setting vs. Home health    This discharge recommendation:  Has not yet been discussed the attending provider and/or case management    IF patient discharges home will need the following DME: patient owns DME required for discharge       SUBJECTIVE:   Patient stated I don't think I can go home like this either.     OBJECTIVE DATA SUMMARY:   Critical Behavior:  Neurologic State: Alert  Orientation Level: Oriented X4  Cognition: Appropriate decision making, Appropriate for age attention/concentration, Appropriate safety awareness  Safety/Judgement: Awareness of environment, Lack of insight into deficits  Functional Mobility Training:  Bed Mobility:     Supine to Sit: Moderate assistance;Assist x1     Scooting: Minimum assistance;Assist x1        Transfers:  Sit to Stand: Moderate assistance;Assist x1  Stand to Sit: Minimum assistance;Assist x1                             Balance:  Sitting: Impaired  Sitting - Static: Poor (constant support)  Sitting - Dynamic: Poor (constant support)  Standing: Impaired; With support  Standing - Static: Good;Constant support  Standing - Dynamic : Fair;Constant support(no knee buckling)  Ambulation/Gait Training:  Distance (ft): 50 Feet (ft)  Assistive Device: Walker, rolling;Gait belt  Ambulation - Level of Assistance: Contact guard assistance(min assist x 1 for loss of balance)        Gait Abnormalities: Antalgic;Decreased step clearance; Step to gait(right foot drop)  Right Side Weight Bearing: As tolerated  Left Side Weight Bearing: Full  Base of Support: Center of gravity altered  Stance: Right decreased  Speed/Clarice: Pace decreased (<100 feet/min)  Step Length: Right shortened;Left shortened  Swing Pattern: Right asymmetrical           Therapeutic Exercises:   SUPINE  EXERCISES   Sets   Reps   Active Active Assist   Passive Self ROM   Comments   Ankle Pumps  5 [x]                                        []                                        []                                        []                                           Quad Sets  5 [x]                                        []                                        []                                        []                                           Heel Slides  3 [x]                                        []                                        []                                        []                                           Hip Abduction   []                                        []                                        []                                        []                                           Glut Sets   []                                        []                                        []                                        []                                              []                                        []                                        []                                        []                                              []                                        []                                        []                                        []                                               Pain Rating:  Right hip 4-5/10    Activity Tolerance:   Good - POD #1 - has been OOB x 2 and amb x 2   Please refer to the flowsheet for vital signs taken during this treatment.     After treatment patient left in no apparent distress:   Sitting in chair and Call bell within reach    COMMUNICATION/COLLABORATION:   The patients plan of care was discussed with: Registered nurse.     Reji Marquez, PT   Time Calculation: 25 mins

## 2020-07-21 NOTE — PROGRESS NOTES
Problem: Mobility Impaired (Adult and Pediatric)  Goal: *Acute Goals and Plan of Care (Insert Text)  Description: FUNCTIONAL STATUS PRIOR TO ADMISSION: Patient was modified independent using a rolling walker for functional mobility. Pt reports 12 falls in 2020. HOME SUPPORT PRIOR TO ADMISSION: The patient lived with son who works night shift - son assisted with meals/shopping. Physical Therapy Goals  Initiated 7/21/2020    1. Patient will move from supine to sit and sit to supine , scoot up and down, and roll side to side in bed with modified independence within 4 days. 2. Patient will perform sit to stand with modified independence within 4 days. 3. Patient will ambulate with modified independence for > 100 feet with the least restrictive device within 4 days. 4. Patient will ascend/descend 4 stairs with one handrail(s) with minimal assistance/contact guard assist within 4 days. 5. Patient will perform [facility] home exercise program per protocol with supervision/set-up within 4 days. PHYSICAL THERAPY EVALUATION  Patient: Colten Brian (75 y.o. male)  Date: 7/21/2020  Primary Diagnosis: Recurrent dislocation of hip joint prosthesis, sequela [T84.029S, Z96.649]  Procedure(s) (LRB):  REVISION POLYLINER RIGHT TOTAL HIP REPLACEMENT (Right) 1 Day Post-Op   Precautions:   (P) Fall, WBAT    ASSESSMENT  Based on the objective data described below, the patient presents POD # 1 revision polyliner right THR with decreased ROM/strength and function right leg, post op right foot drop, pain right hip, decline in functional mobility, and impaired gait/balance -per pt frequent falls - 12 in past year. Patient initially required mod assist x 1 in sitting secondary to leaning backwards and was unable to self-correct with verbal cues. Pt requiring assist x 2 for amb secondary to leaning backwards and right leg instability - knee buckling. Current Level of Function Impacting Discharge (mobility/balance):  Mod assist x 1 for bed mobility, sitting edge of bed; mod assist x 2 for sit to stand transfers, min to mod assist x 1 for amb with RW (standby guard of 2nd person for safety)    Functional Outcome Measure: The patient scored 45/100 on the Barthel Index outcome measure which is indicative of 55 % disability for ADL's. Other factors to consider for discharge: hx of falls, right foot drop - 0/5 anterior tibialis      Patient will benefit from skilled therapy intervention to address the above noted impairments. PLAN :  Recommendations and Planned Interventions: bed mobility training, transfer training, gait training, therapeutic exercises, patient and family training/education, and therapeutic activities      Frequency/Duration: Patient will be followed by physical therapy:  twice daily to address goals. Recommendation for discharge: (in order for the patient to meet his/her long term goals)  Therapy up to 5 days/week in SNF setting vs. Home health     This discharge recommendation:  Has not yet been discussed the attending provider and/or case management    IF patient discharges home will need the following DME: patient owns DME required for discharge         SUBJECTIVE:   Patient stated I fall a lot at home, not sure why? Swathi Conklin    OBJECTIVE DATA SUMMARY:   HISTORY:    Past Medical History:   Diagnosis Date    ADD (attention deficit disorder)     Adverse effect of anesthesia     motion sickness resulting in loss of balance    Anemia due to blood loss     Hinson's esophagus with esophagitis     Benign essential tremor     Cancer ([address] 2012    BCC    Chronic pain     Cirrhosis ([address]     Depression     Dislocated hip (HCC)     DJD (degenerative joint disease) of hip     bilat    DM (diabetes mellitus) ([address] 3/17/2010    ED (erectile dysfunction) of organic origin     Esophageal varices determined by endoscopy ([address]     Fall on or from sidewalk curb 9/24/2015    Femur fracture (HCC)     Gastritis and duodenitis GERD (gastroesophageal reflux disease)     resolved after discontinuing diclofenac    Hematuria 6/2016    High cholesterol 03/17/2010    pt denies 6/19    HTN (hypertension) 3/17/2010    Morbid obesity ([address]     Murmur, cardiac 2016    PFO (patent foramen ovale) 7/26/2017    PUD (peptic ulcer disease)     Seps[facility]     Shingles 6/2016    RESOLVING- NO RASH (HEAD)    Sleep apnea     doesnt use CPAP anymore     Past Surgical History:   Procedure Laterality Date    COLONOSCOPY N/A 6/18/2019    COLONOSCOPY AND EGD performed by Bola Wei MD at [address]  6/18/2019         ENDOSCOPY, COLON, DIAGNOSTIC      HX CATARACT REMOVAL Bilateral     HX CHOLECYSTECTOMY  1995    HX GI  1980    gastroplasty    [address]    HX HIP REPLACEMENT      Left    HX ORTHOPAEDIC Right 2011    rot cuff repair    HX ORTHOPAEDIC  2016    left broken femur    HX TONSILLECTOMY  1950    HX VASCULAR ACCESS      picc line and removed after sepsis resolved    IR INSERT TIPS HEPATIC SHUNT  3/28/2020    TOTAL HIP ARTHROPLASTY  05/2010    right    TOTAL HIP ARTHROPLASTY  08/01/2016    left    UPPER GI ENDOSCOPY,BIOPSY  6/18/2019            Personal factors and/or comorbidities impacting plan of care: essential tremor, hx of falls    Home Situation  Home Environment: (P) Private residence  # Steps to Enter: (P) 2  Rails to Enter: (P) Yes  Hand Rails : (P) Right  One/Two Story Residence: (P) Two story, live on 1st floor  Living Alone: (P) No  Support Systems: (P) Child(dariusz)  Patient Expects to be Discharged to[de-identified] (P) Private residence  Current DME Used/Available at Home: (P) Catha Fritter, straight, Walker, rolling, Shower chair  Tub or Shower Type: (P) Shower    EXAMINATION/PRESENTATION/DECISION MAKING:   Critical Behavior:  Neurologic State: Alert           Hearing:  WFL's   Range Of Motion:  AROM: Generally decreased, functional   Right plantar flex 2/5; dorsiflex 0/5                    Strength:    Strength: Generally decreased, functional                    Tone & Sensation:   Tone: Normal              Sensation: Intact               Coordination:  Coordination: Generally decreased, functional  Functional Mobility:  Bed Mobility:     Supine to Sit: Moderate assistance;Assist x1     Scooting: Minimum assistance;Assist x1  Transfers:  Sit to Stand: Moderate assistance;Assist x2  Stand to Sit: Minimum assistance;Assist x1                       Balance:   Sitting: Impaired(leaning posteriorly - required mod assist x 1 )  Sitting - Static: Poor (constant support)  Sitting - Dynamic: Poor (constant support)  Standing: Impaired; With support  Standing - Static: Fair;Constant support(tends to lean backwards)  Standing - Dynamic : Fair;Constant support(right knee byuckling x 2)  Ambulation/Gait Training:  Distance (ft): 15 Feet (ft)  Assistive Device: Walker, rolling;Gait belt  Ambulation - Level of Assistance: Minimal assistance(to mod assist x 1, + knee buckling)        Gait Abnormalities: Antalgic;Decreased step clearance; Step to gait(right foot drop)  Right Side Weight Bearing: As tolerated  Left Side Weight Bearing: Full  Base of Support: Center of gravity altered;Shift to left  Stance: Right decreased  Speed/Clarice: Pace decreased (<100 feet/min)  Step Length: Right shortened;Left shortened  Swing Pattern: Right asymmetrical     Therapeutic Exercises:   Pt instructed to perform left ankle pump x 10 once hour when awake. Functional Measure:  Barthel Index:    Bathin  Bladder: 10  Bowels: 10  Groomin  Dressin  Feeding: 10  Mobility: 0  Stairs: 0  Toilet Use: 0  Transfer (Bed to Chair and Back): 5  Total: 45/100       The Barthel ADL Index: Guidelines  1. The index should be used as a record of what a patient does, not as a record of what a patient could do. 2. The main aim is to establish degree of independence from any help, physical or verbal, however minor and for whatever reason.   3. The need for supervision renders the patient not independent. 4. A patient's performance should be established using the best available evidence. Asking the patient, friends/relatives and nurses are the usual sources, but direct observation and common sense are also important. However direct testing is not needed. 5. Usually the patient's performance over the preceding 24-48 hours is important, but occasionally longer periods will be relevant. 6. Middle categories imply that the patient supplies over 50 per cent of the effort. 7. Use of aids to be independent is allowed. Faiza Mcrae, Barthel, DMarkWMark (1535). Functional evaluation: the Barthel Index. [address] [facility] (14)2. [place] Luong gray DAISY PowersF, Yash Sevilla., Darlene Randolph., [place], [address] [place] [address] Ave (1999). Measuring the change indisability after inpatient rehabilitation; comparison of the responsiveness of the Barthel Index and Functional [place] Measure. Journal of Neurology, Neurosurgery, and Psychiatry, 66(4), 251-646. Mariana Stark, [place].KHARI, SHILPI Payan, & Imelda Puentes MBRANDON. (2004.) Assessment of post-stroke quality of life in cost-effectiveness studies: The usefulness of the Barthel Index and the EuroQoL-5D.  Quality of Life Research, 15, 716-79           Physical Therapy Evaluation Charge Determination   History Examination Presentation Decision-Making   LOW Complexity : Zero comorbidities / personal factors that will impact the outcome / POC LOW Complexity : 1-2 Standardized tests and measures addressing body structure, function, activity limitation and / or participation in recreation  LOW Complexity : Stable, uncomplicated  LOW Complexity : FOTO score of       Based on the above components, the patient evaluation is determined to be of the following complexity level: LOW     Pain Rating:  Right hip 5/10    Activity Tolerance:   Fair and requires rest breaks  Vitals:    07/21/20 0853 07/21/20 0956 07/21/20 1002 07/21/20 1012   BP: 122/54 102/49 141/68 126/71   BP 1 Location: Right arm Right arm Right arm Right arm   BP Patient Position: At rest At rest;Pre-activity;Supine Sitting Standing   Pulse: 75 73 92 79   Resp: 16      Temp: 98.3 °F (36.8 °C)      SpO2: 100%      Weight:       Height:          After treatment patient left in no apparent distress:   Sitting in chair and Call bell within reach    COMMUNICATION/EDUCATION:   The patients plan of care was discussed with: Registered nurse. Fall prevention education was provided and the patient/caregiver indicated understanding., Patient/family have participated as able in goal setting and plan of care. , and Patient/family agree to work toward stated goals and plan of care.     Thank you for this referral.  Fritz Parra, PT   Time Calculation: 35 mins

## 2020-07-21 NOTE — OP NOTES
[address]   OPERATIVE REPORT    Name:  Karen Chand  MR#:  [id number]  :  1950  ACCOUNT #:  [de-identified]  DATE OF SERVICE:  2020      PREOPERATIVE DIAGNOSIS:  Recurrent dislocation, right hip. POSTOPERATIVE DIAGNOSIS:  Recurrent dislocation, right hip. PROCEDURE PERFORMED:  Revision of femoral head and tripolar component, right hip. SURGEON:  Mao Alvarez MD    ASSISTANT:  Sasha Magallanes PA-C    ANESTHESIA:  General.    COMPLICATIONS:  None. SPECIMENS REMOVED:  Old tripolar component. IMPLANTS:  New tripolar component. ESTIMATED BLOOD LOSS:  100 mL. INDICATIONS:  A 58-year-old gentleman who underwent conversion to a right tripolar component quite some time ago who initially has done quite well. Just in the past month or so, he has dislocated on 3 occasions with, according to him and family, required minimal provocation. He is now taken to the operating room for revision and consideration of either constrained liner or longer head and possibly even revision of the acetabular component. OPERATION:  The patient was taken to the operating room and underwent general inhalational anesthesia. He was placed in lateral position with the right hip up on the hip positioner. At this point in time, I examined his hip quite thoroughly. There was no anterior instability. I could flex his hip to 90 degrees and internally rotate to about 70 or 80 degrees without any instability. This included adducting the hip. I was unable to actually dislocate the hip under anesthesia. At this point in time, I attempted to call the family to discuss options, however, only got their voicemail. At this point, I decided that with multiple recent dislocations, I felt that I needed to explore the hip and do something to prevent recurrence of its dislocation. The right hip and leg were then prepped and draped in the usual fashion.   The original incision was utilized to take down through the skin and subcutaneous tissue. The posterior hip capsule was dissected and excised. After I had good exposure of the acetabulum and femoral head, I again examined the hip and the hip was stable at 90 degrees of flexion and 70 degrees of internal rotation, at which point in time, I started to lever out posteriorly. There was no anterior instability. I could not detect any evidence for impingement. I therefore, with the aid of a bone hook and excising all of the posterior capsule, was able to dislocate the tripolar head. I removed this with a bone tamp. Again considered the option of constrained liner, however, was fearful that given his multiple recent falls and Parkinson's disease that he may indeed be able to dislocate out of the constrained liner which would require another open procedure. I therefore decided I would simply lengthen him to try and improve tension. With the trial reduction, I noted excellent stability and range of motion. I therefore impacted a new tripolar head which was 40 mm in diameter with a 28-mm head that was 15.5 length. This was the largest length I had available. It was quite difficult to get the hip reduced, however, once we did, the hip was stable at 90 degrees of flexion and 70 degrees of internal rotation as well as adduction. The joint was extensively irrigated with the pulsatile lavage system. The fascia was repaired using #1 Vicryl interrupted figure-of-eight fashion. Subcutaneous tissue was approximated using 2-0 Vicryl in interrupted fashion. Skin edges were approximated using stapling apparatus. Bulky sterile dressing was applied and the patient was awakened, extubated, and transferred to the recovery room in stable condition. There were no complications. The physician assistant was critical throughout the case in assisting with manipulation of the leg and reducing the hip as well as closure.         CORINNE RAMIREZ, MD [id number]  D:  07/20/2020 14:14  T:  07/20/2020 23:22  JOB #:  [id number]

## 2020-07-21 NOTE — PROGRESS NOTES
Bedside and Verbal shift change report given to Nery Kingston RN (oncoming nurse) by Lorrie Benjamin RN (offgoing nurse). Report included the following information SBAR, Kardex, Intake/Output, MAR and Recent Results.

## 2020-07-21 NOTE — PERIOP NOTES
TRANSFER - OUT REPORT:    Verbal report given to   [place] Keenan  (name) on [place]  being transferred to [address] [place] [address](unit) for routine post - op       Report consisted of patients Situation, Background, Assessment and   Recommendations(SBAR). Time Pre op antibiotic given:1206  Anesthesia Stop time: 4785  Hoffman Present on Transfer to [address] for [place] on Chart:NO  Discharge Prescriptions with Chart:NO    Information from the following report(s) SBAR, Kardex, OR Summary, Intake/Output, MAR, Med Rec Status and Cardiac Rhythm SR was reviewed with the receiving nurse. Opportunity for questions and clarification was provided. Is the patient on 02? YES       L/Min 2L    Surgical Waiting Area notified of patient's transfer from PACU? YES      The following personal items collected during your admission accompanied patient upon transfer:   Dental Appliance:    Vision: Visual Aid: Glasses  Hearing Aid:    Jewelry: Jewelry: None  Clothing: Clothing: (bag of clothes returned to patient)  Other Valuables:  Other Valuables: Eyeglasses(returned to pt)  Valuables sent to safe: Personal Items Sent to Safe: wallet and cell phone

## 2020-07-21 NOTE — PROGRESS NOTES
Ortho Progress Note  1 Day Post-Op  Procedure(s):  REVISION POLYLINER RIGHT TOTAL HIP REPLACEMENT    Subjective: Pt doing well today. Minimal pain. States he has some \"burning\" in the right heel and it feels like the leg is \"somewhat numb. \" However, he does have full sensation. He has yet to work with PT. Denies N/V, lightheadedness, dizziness, SOB, CP, palpations, or abdominal pain. Physical Exam:   Visit Vitals  /70   Pulse 74   Temp 98.2 °F (36.8 °C)   Resp 16   Ht 5' 10.5\" (1.791 m)   Wt 110.7 kg (244 lb 0.8 oz)   SpO2 100%   BMI 34.52 kg/m²     General appearance: alert, cooperative, no distress, appears stated age  Abdomen: soft, non-tender.  Bowel sounds normal. No masses,  no organomegaly  Extremities: Homans sign is negative, no sign of DVT, good AROM/PROM RLE, minimal pain to palpation R thigh, full sensation of RLE, calf soft NTTP, good quad sets  Pulses: 2+ and symmetric  Wound: dressing c/d/i, minimal sanguinous drainage at inferior aspect of incision  Neurologic: Grossly normal, ankle plantar flexion intact, faint ankle dorsi flexion-mostly just toe involvement, heel raise limited     Recent Results (from the past 24 hour(s))   GLUCOSE, POC    Collection Time: 07/20/20 11:37 AM   Result Value Ref Range    Glucose (POC) 133 (H) 65 - 100 mg/dL    Performed by [id number] [facility] [address] + INR    Collection Time: 07/20/20 11:41 AM   Result Value Ref Range    INR 1.2 (H) 0.9 - 1.1      Prothrombin time 12.4 (H) 9.0 - 11.1 sec   GLUCOSE, POC    Collection Time: 07/20/20  3:18 PM   Result Value Ref Range    Glucose (POC) 186 (H) 65 - 100 mg/dL    [facility]    Collection Time: 07/20/20  4:36 PM   Result Value Ref Range    Glucose (POC) 197 (H) 65 - 100 mg/dL    Performed by [facility], POC    Collection Time: 07/20/20  9:32 PM   Result Value Ref Range    Glucose (POC) 255 (H) 65 - 100 mg/dL    Performed by [facility] Collection Time: 07/21/20 12:07 AM   Result Value Ref Range    Sodium 137 136 - 145 mmol/L    Potassium 3.9 3.5 - 5.1 mmol/L    Chloride 107 97 - 108 mmol/L    CO2 25 21 - 32 mmol/L    Anion gap 5 5 - 15 mmol/L    Glucose 209 (H) 65 - 100 mg/dL    BUN 12 6 - 20 MG/DL    Creatinine 0.72 0.70 - 1.30 MG/DL    BUN/Creatinine ratio 17 12 - 20      GFR est AA >60 >60 ml/min/1.73m2    GFR est non-AA >60 >60 ml/min/1.73m2    Calcium 7.4 (L) 8.5 - 10.1 MG/DL   HEMOGLOBIN    Collection Time: 07/21/20 12:07 AM   Result Value Ref Range    HGB 8.9 (L) 12.1 - 17.0 g/dL   GLUCOSE, POC    Collection Time: 07/21/20  6:30 AM   Result Value Ref Range    Glucose (POC) 159 (H) 65 - 100 mg/dL    Performed by Sulema Lucas        Assessment:  Stable Post Op    Plan:  DVT Prophylaxis:Eliquis 2.5mg BID x 1 mo  Physical Therapy: WBAT   Discharge Planning: home with home health when cleared by PT                                    F/u in the office in 2 weeks  Pain control: tylenol,   Acute blood loss anemia: hgb 8.9, normal post op finding, pt asymtpomatic, will continue to monitor  Obesity: BMI 34.5, chronic, encourage OOB for all meals, use of IS, good pulmonary toilet, may limit pt's ability to progress with PT/OT  Foot drop: pt with very limited ankle dorsiflexion, likely stretch injury during surgery                    pt with good plantar flexion and active SLR                   may require AFO in the short term depending on PT                    will continue to monitor

## 2020-07-21 NOTE — DIABETES MGMT
GERALD[facility]  CLINICAL NURSE SPECIALIST CONSULT  [facility]    INITIAL NOTE    Presentation   Monie Chandler is a 65 y.o. male admitted with  Right  THR. - revision, POD1 . Current clinical course has been complicated by hyperglycemia. PMH: extensive-see H&P- DM2/ cirrhosis/ GERD/ HTN/ esophageal varicies   Findings to date include: CBC and BMP unremarkable. Diabetes: Patient has known Type 2 diabetes,A1C 6.6% treated with Lantus and Novolog PTA. Family history unknown for diabetes. Consulted by Provider for advanced diabetes nursing assessment and care, specifically related to   [] Transitioning off Ruben Martinsirisjeanne   [x] Inpatient management strategy  [] Home management assessment  [] Survival skill education    Diabetes-related medical history  Acute complications  hyperglycemia  Neurological complications  Peripheral neuropathy  Microvascular disease  denies  Macrovascular disease  denies  Other associated conditions     GERD/HTN/cirrhosis    Diabetes medication history  Drug class Currently in use Discontinued Never used   Biguanide Metformin     DDP-4 inhibitor       Sulfonylurea      Thiazolidinedione      GLP-1 RA      SGLT-2 inhibitors      Basal insulin Lantus 18units BID     Fixed Dose  Combinations      Bolus insulin Novolog 7units TID       Subjective   Mr. Emma Webb is a pleasant man who is currently eating his lunch. He was very surprised his A1C was as good as it was. PCP follows his for diabetes management.     Patient reports the following home diabetes self-care practices:  Eating pattern- mostly follows his diet; sneaks sweets \"every now and then\"   Drinks green tea, water, coffee  Physical activity pattern-limited    Monitoring pattern-daily    Taking medications pattern  [x] Consistent administration  [x] Affordable    Social determinants of health impacting diabetes self-management practices   None voiced    Objective   Physical exam  General Alert, oriented and in no acute distress. Conversant and cooperative. Vital Signs   Visit Vitals  /71 (BP 1 Location: Right arm, BP Patient Position: Standing)   Pulse 79   Temp 98.3 °F (36.8 °C)   Resp 16   Ht 5' 10.5\" (1.791 m)   Wt 110.7 kg (244 lb 0.8 oz)   SpO2 100%   BMI 34.52 kg/m²     Skin  Warm and dry. Heart   Regular rate and rhythm. No murmurs, rubs or gallops  Lungs  Clear to auscultation without rales or rhonchi  Extremities No foot wounds    Diabetic foot exam: deferred as he was eating lunch, but denies peripheral neuropathy        Laboratory  Lab Results   Component Value Date/Time    Hemoglobin A1c 6.6 (H) 07/15/2020 10:35 AM    Hemoglobin A1c (POC) 6.5 03/05/2020 02:30 PM     Lab Results   Component Value Date/Time    LDL, calculated 40 11/07/2019 10:23 AM     Lab Results   Component Value Date/Time    Creatinine 0.72 07/21/2020 12:07 AM     Lab Results   Component Value Date/Time    Sodium 137 07/21/2020 12:07 AM    Potassium 3.9 07/21/2020 12:07 AM    Chloride 107 07/21/2020 12:07 AM    CO2 25 07/21/2020 12:07 AM    Anion gap 5 07/21/2020 12:07 AM    Glucose 209 (H) 07/21/2020 12:07 AM    BUN 12 07/21/2020 12:07 AM    Creatinine 0.72 07/21/2020 12:07 AM    BUN/Creatinine ratio 17 07/21/2020 12:07 AM    GFR est AA >60 07/21/2020 12:07 AM    GFR est non-AA >60 07/21/2020 12:07 AM    Calcium 7.4 (L) 07/21/2020 12:07 AM    Bilirubin, total 0.8 07/15/2020 10:35 AM    Alk.  phosphatase 131 (H) 07/15/2020 10:35 AM    Protein, total 6.3 (L) 07/15/2020 10:35 AM    Albumin 2.7 (L) 07/15/2020 10:35 AM    Globulin 3.6 07/15/2020 10:35 AM    A-G Ratio 0.8 (L) 07/15/2020 10:35 AM    ALT (SGPT) 28 07/15/2020 10:35 AM     Lab Results   Component Value Date/Time    ALT (SGPT) 28 07/15/2020 10:35 AM       Factors affecting BG pattern  Factor Dose Comments   Nutrition:  Carb-controlled meals     60 grams/meal      Pain Right hip post op    Infection n/a    Other:        Blood glucose pattern        Assessment and Plan   Nursing Diagnosis Risk for unstable blood glucose pattern   Nursing Intervention Domain 0197 Decision-making Support   Nursing Interventions Examined current inpatient diabetes control   Explored factors facilitating and impeding inpatient management  Identified self-management practices impeding diabetes control  Explored corrective strategies with patient and responsible inpatient provider   Informed patient of rational for insulin strategy while hospitalized       Evaluation   Mr. Das with well controlled Type2 diabetes, hasn't achieved inpatient blood glucose target of 100-180mg/dl. BG trends have been >350mg/dl today post Breakfast.  BG trends yesterday were 133-250s. BAsal and mealtime insulin initiated last night which will assist with bringing BG level down. Inpatient blood glucose management has been impacted by  [] Kidney dysfunction  [] Erratic meal consumption  [] Glucocorticoid use  []      Recommendations   1. CONTINUE Basal insulin -22 units daily- IF AM BG >200mg/dl tomorrow; consider increasing basal to 24units daily       2. CONTINUE Bolus insulin  [x] Normal sensitivity-9 units TID    3.   CONTINUE Corrective insulin  [x] Normal sensitivity      Billing Code(s)   I personally reviewed chart, notes, data and current medications in the medical record, and examined the patient at bedside before making care recommendations. Thank you for including us in their care. I spent 30 minutes in direct patient care today for this patient.   Time includes chart review, face to face with patient and collaboration with interdisciplinary care team.      Linn Sweet, CNS  [facility]  Access via [address] [place] [address]  [phone]

## 2020-07-21 NOTE — PROGRESS NOTES
Problem: Self Care Deficits Care Plan (Adult)  Goal: *Acute Goals and Plan of Care (Insert Text)  Description: FUNCTIONAL STATUS PRIOR TO ADMISSION: Pt reports living with son in 2 story home, residing on first floor, with walk-in shower and seated surface. Pt states he spends majority of time resting in uplift recliner, intermittent Min A for distal LE ADLs, using RW for mobility/balance. All DME/AE needs fulfilled. HOME SUPPORT: The patient lived with son who is able to provide PRN ADL assist.    Occupational Therapy Goals  Initiated 7/21/2020  1. Patient will perform lower body ADLs with AE supervision/set-up within 4 day(s). 2.  Patient will perform upper body ADLs standing 5 mins without fatigue or LOB with supervision/set-up within 4 day(s). 3.  Patient will perform toilet transfer with supervision/set-up within 4 day(s). 4.  Patient will perform all aspects of toileting with supervision/set-up within 4 day(s). 5.  Patient will participate in upper extremity therapeutic exercise/activities with supervision/set-up for 10 minutes within 4 day(s). 6.  Patient will utilize energy conservation techniques during functional activities without cues within 4 day(s). Outcome: Progressing Towards Goal     OCCUPATIONAL THERAPY EVALUATION  Patient: Jeffrey Kerns (71 y.o. male)  Date: 7/21/2020  Primary Diagnosis: Recurrent dislocation of hip joint prosthesis, sequela [T84.029S, Z96.649]  Procedure(s) (LRB):  REVISION POLYLINER RIGHT TOTAL HIP REPLACEMENT (Right) 1 Day Post-Op   Precautions:   Fall, WBAT    ASSESSMENT  Based on the objective data described below, the patient presents with decreased functional mobility/balance, decreased strength/endurance, decreased full body reaching and decreased activity tolerance, all of which limit pt's ability to complete self-care routine at level congruent with PLOF.   Currently, pt benefits from [place] for self-feeding, S/U for seated grooming and UB dressing, Moderate A for bathing and LE dressing and Min A for toileting. Per talks with Physical Therapist, pt noted with increased core strength, EOB balance, increased mentation, as well as, increased sit to stand transfers during OT evaluation verses PT evaluation. Pt reports he has all DME/AE needs fulfilled within home environment, with son able to provide PRN assist.   Pt [facility] the past and would like to return home with therapy services. Pt benefits from skilled OT to address functional deficits in an overall effort at maximizing pt's highest level of safe functional independence prior to discharge. Current Level of Function Impacting Discharge (ADLs/self-care): [place] for self-feeding, S/U for seated grooming and UB dressing, Moderate A for bathing and LE dressing and Min A for toileting. Functional Outcome Measure: The patient scored 45/100 on the Barthel Index outcome measure. Other factors to consider for discharge: none     Patient will benefit from skilled therapy intervention to address the above noted impairments. PLAN :  Recommendations and Planned Interventions: self care training, functional mobility training, therapeutic exercise, balance training, therapeutic activities, endurance activities, and patient education    Frequency/Duration: Patient will be followed by occupational therapy 5 times a week to address goals. Recommendation for discharge: (in order for the patient to meet his/her long term goals)  Occupational therapy at least 2 days/week in the home AND ensure assist and/or supervision for safety with ADL/IADL routine completion    This discharge recommendation:  Has not yet been discussed the attending provider and/or case management    IF patient discharges home will need the following DME: patient owns DME required for discharge       SUBJECTIVE:   Patient stated I feel pretty good now.     OBJECTIVE DATA SUMMARY: HISTORY:   Past Medical History:   Diagnosis Date    ADD (attention deficit disorder)     Adverse effect of anesthesia     motion sickness resulting in loss of balance    Anemia due to blood loss     Hinson's esophagus with esophagitis     Benign essential tremor     Cancer ([address] 2012    BCC    Chronic pain     Cirrhosis ([address]     Depression     Dislocated hip (HCC)     DJD (degenerative joint disease) of hip     bilat    DM (diabetes mellitus) ([address] 3/17/2010    ED (erectile dysfunction) of organic origin     Esophageal varices determined by endoscopy ([address]     Fall on or from sidewalk curb 9/24/2015    Femur fracture ([address]     Gastritis and duodenitis     GERD (gastroesophageal reflux disease)     resolved after discontinuing diclofenac    Hematuria 6/2016    High cholesterol 03/17/2010    pt denies 6/19    HTN (hypertension) 3/17/2010    Morbid obesity ([address]     Murmur, cardiac 2016    PFO (patent foramen ovale) 7/26/2017    PUD (peptic ulcer disease)     Sepsis ([address]     Shingles 6/2016    RESOLVING- NO RASH (HEAD)    Sleep apnea     doesnt use CPAP anymore     Past Surgical History:   Procedure Laterality Date    COLONOSCOPY N/A 6/18/2019    COLONOSCOPY AND EGD performed by Marla Washington MD at [facility] [place]  6/18/2019         ENDOSCOPY, COLON, DIAGNOSTIC      HX CATARACT REMOVAL Bilateral     HX CHOLECYSTECTOMY  1995    HX GI  1980    gastroplasty    [address] 25    HX HIP REPLACEMENT      Left    HX ORTHOPAEDIC Right 2011    rot cuff repair    HX ORTHOPAEDIC  2016    left broken femur    HX TONSILLECTOMY  1950    HX VASCULAR ACCESS      picc line and removed after sepsis resolved    IR INSERT TIPS HEPATIC SHUNT  3/28/2020    TOTAL HIP ARTHROPLASTY  05/2010    right    TOTAL HIP ARTHROPLASTY  08/01/2016    left    UPPER GI ENDOSCOPY,BIOPSY  6/18/2019            Expanded or extensive additional review of patient history:     Home Situation  Home Environment: Private residence  # Steps to Enter: 2  Rails to Enter: Yes  Hand Rails : Right  One/Two Story Residence: Two story, live on 1st floor  Living Alone: No  Support Systems: Child(dariusz)  Patient Expects to be Discharged to[de-identified] Private residence  Current DME Used/Available at Home: Alba Lisseth, straight, Walker, rolling, Shower chair, Commode, bedside, Grab bars(uplift recliner)  Tub or Shower Type: Shower    Hand dominance: Right    EXAMINATION OF PERFORMANCE DEFICITS:  Cognitive/Behavioral Status:  Neurologic State: Alert  Orientation Level: Oriented X4  Cognition: Appropriate decision making; Appropriate for age attention/concentration; Appropriate safety awareness  Perception: Appears intact  Perseveration: No perseveration noted  Safety/Judgement: Awareness of environment;Lack of insight into deficits    Range of Motion:  AROM: Generally decreased, functional    Strength:  Strength: Generally decreased, functional    Coordination:  Coordination: Generally decreased, functional    Tone & Sensation:  Tone: Normal  Sensation: Normal    Balance:  Sitting Balance: Intact with UE support  Standing: Impaired with support:  Standing: Static:  Fair  Standing: Dynamic: Fair    Functional Mobility and Transfers for ADLs:  Bed Mobility:  Supine to Sit: Moderate assistance;Assist x1  Scooting: Minimum assistance;Assist x1    Transfers:  Sit to stand: Min A from EOB to RW  Stand to Sit: CGA    ADL Assessment:  Patient recalled and demonstrated avoiding extreme planes of movement with Right LE during ADLs and functional mobility with Min verbal cues. Feeding: Independent    Oral Facial Hygiene/Grooming: Setup    Bathing: Moderate assistance    Upper Body Dressing: Setup    Lower Body Dressing:  Moderate assistance    Toileting: Minimum assistance    ADL Intervention and task modifications:  Patient instructed and indicated understanding the benefits of maintaining activity tolerance, functional mobility, and independence with self care tasks during acute stay  to ensure safe return home and to baseline. Encouraged patient to increase frequency and duration OOB, be out of bed for all meals, perform daily ADLs (as approved by RN/MD regarding bathing etc), and performing functional mobility to/from bathroom. Pt educated on safe transfer techniques, with specific emphasis on proper hand placement to push up from seated surface rather than attempt to pull self up, fully positioning self in-front of desired seated location, feeling chair on back of legs and reaching back with 1-2 UE to slowly lower self to seated position. Cognitive Retraining  Safety/Judgement: Awareness of environment;Lack of insight into deficits    Bathing: Patient instructed when bathing to not submerge wound in water, stand to shower or sponge bathe, cover wound with plastic and tape to ensure no water reaches bandage/wound without cues. Patient indicated understanding. Dressing joint: Patient instructed and demonstrated to don/doff Right LE first/last Min verbal cues. Patient instructed and demonstrated to don all clothing while sitting prior to standing, doff all clothing to knees while standing, then sit to doff clothing off from knees to feet in order to facilitate fall prevention, pain management, and energy conservation with Moderate Assistance. Home safety: Patient instructed on home modifications and safety (raise height of ADL objects, appropriate height of chair surfaces, recliner safety, change of floor surfaces, clear pathways) to increase independence and fall prevention. Patient indicated understanding. Standing: Patient instructed and demonstrated to walk up to sink/counter top/surfaces, step into walker to increase safety of joint and fall prevention with Contact guard assistance. Instructed to apply concept of hip contraindications to ADLs within the home (no extreme reaching movement, square off while using objects, slide objects along surfaces).   Patient instructed to increase amount of time standing, observe standing position during ADLs in order to increase even weight bearing through bilateral LEs in order to increase independence with ADLs. Goal to be reached 30 days post - op, per orthopedic surgeon or per PT. Patient indicated understanding. Functional Measure:  Barthel Index:    Bathin  Bladder: 10  Bowels: 10  Groomin  Dressin  Feeding: 10  Mobility: 0  Stairs: 0  Toilet Use: 0  Transfer (Bed to Chair and Back): 5  Total: 45/100        The Barthel ADL Index: Guidelines  1. The index should be used as a record of what a patient does, not as a record of what a patient could do. 2. The main aim is to establish degree of independence from any help, physical or verbal, however minor and for whatever reason. 3. The need for supervision renders the patient not independent. 4. A patient's performance should be established using the best available evidence. Asking the patient, friends/relatives and nurses are the usual sources, but direct observation and common sense are also important. However direct testing is not needed. 5. Usually the patient's performance over the preceding 24-48 hours is important, but occasionally longer periods will be relevant. 6. Middle categories imply that the patient supplies over 50 per cent of the effort. 7. Use of aids to be independent is allowed. Danita Martinez., Barthel, D.W. ([id number]). Functional evaluation: the Barthel Index. [facility] (14)2. Frankey Bohr der [place], J.J.M.F, Kathi Bonilla.Margareth.[facility], [address] [place] [address] (). Measuring the change indisability after inpatient rehabilitation; comparison of the responsiveness of the Barthel Index and Functional [place] Measure. Journal of Neurology, Neurosurgery, and Psychiatry, 66(4), 757-721. [place], N.[place].A, JILLIAN Payan.MIRI, & Gayle Gonzalez, M.A. (2004.) Assessment of post-stroke quality of life in cost-effectiveness studies:  The usefulness of the Barthel Index and the EuroQoL-5D. Quality of Life Research, 15, 213-18     Occupational Therapy Evaluation Charge Determination   History Examination Decision-Making   LOW Complexity : Brief history review  MEDIUM Complexity : 3-5 performance deficits relating to physical, cognitive , or psychosocial skils that result in activity limitations and / or participation restrictions LOW Complexity : No comorbidities that affect functional and no verbal or physical assistance needed to complete eval tasks       Based on the above components, the patient evaluation is determined to be of the following complexity level: LOW   Pain Ratin/10; nursing aware and following    Activity Tolerance:   Fair and requires rest breaks  Please refer to the flowsheet for vital signs taken during this treatment. After treatment patient left in no apparent distress:    Supine in bed, Call bell within reach, and Side rails x 3    COMMUNICATION/EDUCATION:   The patients plan of care was discussed with: Physical therapist and Registered nurse. Home safety education was provided and the patient/caregiver indicated understanding., Patient/family have participated as able in goal setting and plan of care. , and Patient/family agree to work toward stated goals and plan of care. This patients plan of care is appropriate for delegation to [facility].     Thank you for this referral.  Edilson Ye, OT  Time Calculation: 37 mins

## 2020-07-21 NOTE — PROGRESS NOTES
Problem: Diabetes Self-Management  Goal: *Monitoring blood glucose, interpreting and using results  Description: Identify recommended blood glucose targets  and personal targets. Outcome: Progressing Towards Goal     Problem: Falls - Risk of  Goal: *Absence of Falls  Description: Document Melisa Escobar Fall Risk and appropriate interventions in the flowsheet.   Outcome: Progressing Towards Goal  Note: Fall Risk Interventions:  Mobility Interventions: Communicate number of staff needed for ambulation/transfer, OT consult for ADLs, Patient to call before getting OOB, PT Consult for mobility concerns, PT Consult for assist device competence, Utilize walker, cane, or other assistive device         Medication Interventions: Evaluate medications/consider consulting pharmacy, Patient to call before getting OOB, Teach patient to arise slowly    Elimination Interventions: Call light in reach, Patient to call for help with toileting needs, Urinal in reach

## 2020-07-21 NOTE — PROGRESS NOTES
Bedside and Verbal shift change report given to MARK Milner (oncoming nurse) by Linda Webster RN (offgoing nurse).  Report included the following information SBAR, Kardex, Intake/Output, MAR and Recent Results.

## 2020-07-21 NOTE — PROGRESS NOTES
TRANSFER - IN REPORT:    Verbal report received from [place], RN(name) on Jyl Shell  being received from PACU(unit) for routine post - op      Report consisted of patients Situation, Background, Assessment and   Recommendations(SBAR). Information from the following report(s) SBAR, Kardex, Intake/Output, MAR and Recent Results was reviewed with the receiving nurse. Opportunity for questions and clarification was provided. Assessment completed upon patients arrival to unit and care assumed.

## 2020-07-22 ENCOUNTER — HOME CARE VISIT (OUTPATIENT)
Dept: HOME HEALTH SERVICES | Facility: HOME HEALTH | Age: 70
End: 2020-07-22
Payer: MEDICARE

## 2020-07-22 LAB
GLUCOSE BLD STRIP.AUTO-MCNC: 174 MG/DL (ref 65–100)
GLUCOSE BLD STRIP.AUTO-MCNC: 212 MG/DL (ref 65–100)
GLUCOSE BLD STRIP.AUTO-MCNC: 217 MG/DL (ref 65–100)
GLUCOSE BLD STRIP.AUTO-MCNC: 277 MG/DL (ref 65–100)
SERVICE CMNT-IMP: ABNORMAL

## 2020-07-22 PROCEDURE — 87635 SARS-COV-2 COVID-19 AMP PRB: CPT

## 2020-07-22 PROCEDURE — 97116 GAIT TRAINING THERAPY: CPT

## 2020-07-22 PROCEDURE — 82962 GLUCOSE BLOOD TEST: CPT

## 2020-07-22 PROCEDURE — 3331090001 HH PPS REVENUE CREDIT

## 2020-07-22 PROCEDURE — 3331090002 HH PPS REVENUE DEBIT

## 2020-07-22 PROCEDURE — 74011636637 HC RX REV CODE- 636/637: Performed by: PHYSICIAN ASSISTANT

## 2020-07-22 PROCEDURE — 74011250637 HC RX REV CODE- 250/637: Performed by: PHYSICIAN ASSISTANT

## 2020-07-22 PROCEDURE — 97535 SELF CARE MNGMENT TRAINING: CPT

## 2020-07-22 PROCEDURE — 97530 THERAPEUTIC ACTIVITIES: CPT

## 2020-07-22 PROCEDURE — 77030027138 HC INCENT SPIROMETER -A

## 2020-07-22 PROCEDURE — 65270000029 HC RM PRIVATE

## 2020-07-22 RX ORDER — OXYCODONE HYDROCHLORIDE 5 MG/1
5 TABLET ORAL
Qty: 60 TAB | Refills: 0 | Status: SHIPPED | OUTPATIENT
Start: 2020-07-22 | End: 2020-08-01

## 2020-07-22 RX ORDER — AMOXICILLIN 250 MG
1 CAPSULE ORAL 2 TIMES DAILY
Qty: 30 TAB | Refills: 0 | Status: SHIPPED | OUTPATIENT
Start: 2020-07-22 | End: 2020-08-24

## 2020-07-22 RX ADMIN — RIFAXIMIN 550 MG: 550 TABLET ORAL at 18:28

## 2020-07-22 RX ADMIN — MAGNESIUM OXIDE TAB 400 MG (241.3 MG ELEMENTAL MG) 400 MG: 400 (241.3 MG) TAB at 09:11

## 2020-07-22 RX ADMIN — FAMOTIDINE 20 MG: 20 TABLET ORAL at 17:57

## 2020-07-22 RX ADMIN — DOCUSATE SODIUM 50MG AND SENNOSIDES 8.6MG 1 TABLET: 8.6; 5 TABLET, FILM COATED ORAL at 08:56

## 2020-07-22 RX ADMIN — FAMOTIDINE 20 MG: 20 TABLET ORAL at 08:57

## 2020-07-22 RX ADMIN — INSULIN GLARGINE 22 UNITS: 100 INJECTION, SOLUTION SUBCUTANEOUS at 22:36

## 2020-07-22 RX ADMIN — PRIMIDONE 250 MG: 250 TABLET ORAL at 18:28

## 2020-07-22 RX ADMIN — ATORVASTATIN CALCIUM 40 MG: 40 TABLET, FILM COATED ORAL at 08:57

## 2020-07-22 RX ADMIN — DOCUSATE SODIUM 50MG AND SENNOSIDES 8.6MG 1 TABLET: 8.6; 5 TABLET, FILM COATED ORAL at 17:58

## 2020-07-22 RX ADMIN — ACETAMINOPHEN 1000 MG: 500 TABLET ORAL at 17:58

## 2020-07-22 RX ADMIN — OXYCODONE 10 MG: 5 TABLET ORAL at 18:31

## 2020-07-22 RX ADMIN — POLYETHYLENE GLYCOL 3350 17 G: 17 POWDER, FOR SOLUTION ORAL at 08:56

## 2020-07-22 RX ADMIN — POTASSIUM CHLORIDE 10 MEQ: 750 TABLET, FILM COATED, EXTENDED RELEASE ORAL at 17:58

## 2020-07-22 RX ADMIN — ACETAMINOPHEN 1000 MG: 500 TABLET ORAL at 06:06

## 2020-07-22 RX ADMIN — GABAPENTIN 600 MG: 300 CAPSULE ORAL at 22:36

## 2020-07-22 RX ADMIN — SERTRALINE HYDROCHLORIDE 100 MG: 50 TABLET ORAL at 06:05

## 2020-07-22 RX ADMIN — INSULIN LISPRO 3 UNITS: 100 INJECTION, SOLUTION INTRAVENOUS; SUBCUTANEOUS at 17:57

## 2020-07-22 RX ADMIN — INSULIN LISPRO 5 UNITS: 100 INJECTION, SOLUTION INTRAVENOUS; SUBCUTANEOUS at 11:58

## 2020-07-22 RX ADMIN — RIFAXIMIN 550 MG: 550 TABLET ORAL at 09:11

## 2020-07-22 RX ADMIN — PRIMIDONE 250 MG: 250 TABLET ORAL at 09:11

## 2020-07-22 RX ADMIN — Medication 500 MG: at 09:11

## 2020-07-22 RX ADMIN — APIXABAN 2.5 MG: 2.5 TABLET, FILM COATED ORAL at 17:58

## 2020-07-22 RX ADMIN — OXYCODONE 5 MG: 5 TABLET ORAL at 22:35

## 2020-07-22 RX ADMIN — ACETAMINOPHEN 1000 MG: 500 TABLET ORAL at 11:58

## 2020-07-22 RX ADMIN — POTASSIUM CHLORIDE 10 MEQ: 750 TABLET, FILM COATED, EXTENDED RELEASE ORAL at 08:57

## 2020-07-22 RX ADMIN — OXYCODONE 10 MG: 5 TABLET ORAL at 14:02

## 2020-07-22 RX ADMIN — APIXABAN 2.5 MG: 2.5 TABLET, FILM COATED ORAL at 06:06

## 2020-07-22 RX ADMIN — INSULIN LISPRO 2 UNITS: 100 INJECTION, SOLUTION INTRAVENOUS; SUBCUTANEOUS at 06:40

## 2020-07-22 RX ADMIN — Medication 10 ML: at 14:00

## 2020-07-22 RX ADMIN — INSULIN LISPRO 2 UNITS: 100 INJECTION, SOLUTION INTRAVENOUS; SUBCUTANEOUS at 22:36

## 2020-07-22 NOTE — PROGRESS NOTES
Bedside and Verbal shift change report given to Mary (oncoming nurse) by Umesh Frank (offgoing nurse). Report included the following information SBAR.

## 2020-07-22 NOTE — PROGRESS NOTES
Problem: Mobility Impaired (Adult and Pediatric)  Goal: *Acute Goals and Plan of Care (Insert Text)  Description: FUNCTIONAL STATUS PRIOR TO ADMISSION: Patient was modified independent using a rolling walker for functional mobility. Pt reports 12 falls in 2020. HOME SUPPORT PRIOR TO ADMISSION: The patient lived with son who works night shift - son assisted with meals/shopping. Physical Therapy Goals  Initiated 7/21/2020    1. Patient will move from supine to sit and sit to supine , scoot up and down, and roll side to side in bed with modified independence within 4 days. 2. Patient will perform sit to stand with modified independence within 4 days. 3. Patient will ambulate with modified independence for > 100 feet with the least restrictive device within 4 days. 4. Patient will ascend/descend 4 stairs with one handrail(s) with minimal assistance/contact guard assist within 4 days. 5. Patient will perform [facility] home exercise program per protocol with supervision/set-up within 4 days. Outcome: Progressing Towards Goal  PHYSICAL THERAPY TREATMENT  Patient: Dia Martínez (82 y.o. 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 ASSESSMENT  Patient continues with skilled PT services and is slowly progressing towards goals. Pt continues to require mod to max assist x 1  - coming to sitting and sit to stand - secondary to posterior lean/loss of balance backwards. After standing in place with mod assist and verbal and tactile cues to lean forward able to amb 20 feet with RW with contact guard - however loss of balance x 2 posteriorly requiring mod assist for safety.   Primary concern is pt's ability to be home alone when son working at night and during day when son sleeping - recommend inpatient rehab. Current Level of Function Impacting Discharge (mobility/balance): Mod to max assist x 1 for bed mobility - supine to sit, sit to stand; CGA to mod assist x 1 for loss of balance during amb with RW - short distances 20 feet; right foot drop post-op  - awaiting AFO    Other factors to consider for discharge: Previously indep with mobility/amb - son assisted with meals, transportation and care of 3 dogs; son /officer works night shift - sleeps during the day per patient         PLAN :  Patient continues to benefit from skilled intervention to address the above impairments. Continue treatment per established plan of care. to address goals. Recommendation for discharge: (in order for the patient to meet his/her long term goals)  Therapy 3 hours per day 5-7 days per week    This discharge recommendation:  Has been made in collaboration with the attending provider     IF patient discharges home will need the following DME: patient owns DME required for discharge       SUBJECTIVE:   Patient stated my son will be upset - he took off to take me home today.     OBJECTIVE DATA SUMMARY:   Critical Behavior:  Neurologic State: Alert, Appropriate for age(confusion over night per report)  Orientation Level: Oriented X4  Cognition: Appropriate decision making, Appropriate for age attention/concentration, Appropriate safety awareness, Follows commands  Safety/Judgement: Awareness of environment, Lack of insight into deficits  Functional Mobility Training:  Bed Mobility:     Supine to Sit: Moderate assistance;Assist x1     Scooting: Moderate assistance;Assist x1    Bed linens noted to be soiled with urine      Transfers:  Sit to Stand: Maximum assistance;Assist x1  Stand to Sit: Minimum assistance(to mod assist x 1 - leans posteriorly moving chair)                             Balance:  Sitting: Impaired; With support  Sitting - Static: Poor (constant support)  Sitting - Dynamic: Poor (constant support)  Standing: Impaired; With support  Standing - Static: Poor;Constant support(initially unable to correct balance - leans against bed )  Standing - Dynamic : Fair;Constant support(loss of balance x 2 - posteriorly - requiring mod assist x 1)  Ambulation/Gait Training:  Distance (ft): 25 Feet (ft)  Assistive Device: Walker, rolling;Gait belt  Ambulation - Level of Assistance: Minimal assistance(to mod assist x 1)        Gait Abnormalities: Antalgic;Decreased step clearance; Step to gait(right foot drop)  Right Side Weight Bearing: As tolerated  Left Side Weight Bearing: Full  Base of Support: Center of gravity altered;Shift to left  Stance: Right decreased  Speed/Clarice: Pace decreased (<100 feet/min)  Step Length: Right shortened;Left shortened  Swing Pattern: Right asymmetrical    Pain Rating:  Right hip pain 5/10    Activity Tolerance:   Poor, requires frequent rest breaks, and observed SOB with activity  Please refer to the flowsheet for vital signs taken during this treatment. After treatment patient left in no apparent distress:   Sitting in chair and Call bell within reach    COMMUNICATION/COLLABORATION:   The patients plan of care was discussed with: Registered nurse, AMAN Norwood.      Ayaan Childs, PT   Time Calculation: 35 mins

## 2020-07-22 NOTE — PROGRESS NOTES
Problem: Mobility Impaired (Adult and Pediatric)  Goal: *Acute Goals and Plan of Care (Insert Text)  Description: FUNCTIONAL STATUS PRIOR TO ADMISSION: Patient was modified independent using a rolling walker for functional mobility. Pt reports 12 falls in 2020. HOME SUPPORT PRIOR TO ADMISSION: The patient lived with son who works night shift - son assisted with meals/shopping. Physical Therapy Goals  Initiated 7/21/2020    1. Patient will move from supine to sit and sit to supine , scoot up and down, and roll side to side in bed with modified independence within 4 days. 2. Patient will perform sit to stand with modified independence within 4 days. 3. Patient will ambulate with modified independence for > 100 feet with the least restrictive device within 4 days. 4. Patient will ascend/descend 4 stairs with one handrail(s) with minimal assistance/contact guard assist within 4 days. 5. Patient will perform CIERRA home exercise program per protocol with supervision/set-up within 4 days. 7/22/2020 1053 by Lisandra Pineda, PT  Outcome: Progressing Towards Goal  PHYSICAL THERAPY TREATMENT  Patient: Marlana Nissen (15 y.o. 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 ASSESSMENT  Patient continues with skilled PT services and is progressing towards goals. Pt continues to require moderate assist when coming from supine to sit edge of bed and upon initial sitting - leaning backwards. Amb improved this pm with use of right AFO. Current Level of Function Impacting Discharge (mobility/balance):  Mod assist x 1 for bed mobility and sitting edge of bed; Min assist x 1 for sit to/from stand, and CGA amb with RW    Other factors to consider for discharge: Son works night shift and sleeps during day, hx of falls         PLAN :  Patient continues to benefit from skilled intervention to address the above impairments. Continue treatment per established plan of care. to address goals. Recommendation for discharge: (in order for the patient to meet his/her long term goals)  Therapy 3 hours per day 5-7 days per week    This discharge recommendation:  Has been made in collaboration with the attending provider and/or case management    IF patient discharges home will need the following DME: patient owns DME required for discharge       SUBJECTIVE:   Patient stated it's easier to pick my feet up.     OBJECTIVE DATA SUMMARY:   Critical Behavior:  Neurologic State: Alert  Orientation Level: Oriented X4  Cognition: Appropriate decision making, Appropriate for age attention/concentration, Appropriate safety awareness  Safety/Judgement: Awareness of environment, Insight into deficits  Functional Mobility Training:  Bed Mobility:     Supine to Sit: Moderate assistance;Assist x1     Scooting: Moderate assistance;Assist x1        Transfers:  Sit to Stand: Minimum assistance;Assist x1  Stand to Sit: Minimum assistance;Assist x1                             Balance:  Sitting: Impaired; With support(has difficulty leaning forward)  Sitting - Static: Fair (occasional)(initially max use of arms to prevent falling backwards )  Sitting - Dynamic: Fair (occasional)  Standing: Impaired; With support  Standing - Static: Fair;Constant support  Standing - Dynamic : Fair;Constant support  Ambulation/Gait Training:  Distance (ft): 100 Feet (ft)  Assistive Device: Walker, rolling;Gait belt(Orthotic - AFO)  Ambulation - Level of Assistance: Contact guard assistance        Gait Abnormalities: Antalgic;Decreased step clearance  Right Side Weight Bearing: As tolerated  Left Side Weight Bearing: Full  Base of Support: Center of gravity altered;Shift to left  Stance: Right decreased  Speed/Clarice: Pace decreased (<100 feet/min)  Step Length: Right shortened;Left shortened  Swing Pattern: Right asymmetrical    Pain Rating:  Right hip 4/10    Activity Tolerance:   Fair and requires rest breaks  Please refer to the flowsheet for vital signs taken during this treatment. After treatment patient left in no apparent distress:   Supine in bed and Call bell within reach    COMMUNICATION/COLLABORATION:   The patients plan of care was discussed with: Registered nurse.      Sherin Phillips, PT   Time Calculation: 30 mins

## 2020-07-22 NOTE — PROGRESS NOTES
Problem: Self Care Deficits Care Plan (Adult)  Goal: *Acute Goals and Plan of Care (Insert Text)  Description: FUNCTIONAL STATUS PRIOR TO ADMISSION: Pt reports living with son in 2 story home, residing on first floor, with walk-in shower and seated surface. Pt states he spends majority of time resting in uplift recliner, intermittent Min A for distal LE ADLs, using RW for mobility/balance. All DME/AE needs fulfilled. HOME SUPPORT: The patient lived with son who is able to provide PRN ADL assist.    Occupational Therapy Goals  Initiated 7/21/2020  1. Patient will perform lower body ADLs with AE supervision/set-up within 4 day(s). 2.  Patient will perform upper body ADLs standing 5 mins without fatigue or LOB with supervision/set-up within 4 day(s). 3.  Patient will perform toilet transfer with supervision/set-up within 4 day(s). 4.  Patient will perform all aspects of toileting with supervision/set-up within 4 day(s). 5.  Patient will participate in upper extremity therapeutic exercise/activities with supervision/set-up for 10 minutes within 4 day(s). 6.  Patient will utilize energy conservation techniques during functional activities without cues within 4 day(s). Outcome: Progressing Towards Goal     OCCUPATIONAL THERAPY TREATMENT  Patient: Michelle Kim (15 y.o. 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 ASSESSMENT  Patient continues with skilled OT services and is progressing towards goals. Pt noted with increased EOB sitting balance and toileting this date, tolerating 5 minutes at EOB while reaching outside base of support, with no LOB and intermittent 0-2 UE supports.   Pt progressing with toileting, completing tasks at elevated commode with RW and CGA. Pt does benefit from cues to fully position self in-front of desired seated location prior to reaching back to sit. During treatment pt expressed concerns regarding mentation, stating since March he has begun to notice increased periods of confusion and decreased STM; nursing notified. Pt verbalizes desires to transition to inpatient rehab prior to return to home environment, with reporting therapist believing pt would benefit from inpatient rehab stay s/p discharge from acute hospitalization. Current Level of Function Impacting Discharge (ADLs): Mod A for LE ADLs, CGA RW toileting    Other factors to consider for discharge: none         PLAN :  Patient continues to benefit from skilled intervention to address the above impairments. Continue treatment per established plan of care. to address goals. Recommend with staff: OOB meals, Active ADL engagement    Recommend next OT session: POC progression    Recommendation for discharge: (in order for the patient to meet his/her long term goals)  Therapy 3 hours per day 5-7 days per week    This discharge recommendation:  Has been made in collaboration with the attending provider and/or case management    IF patient discharges home will need the following DME: patient owns DME required for discharge       SUBJECTIVE:   Patient stated I think rehab would be a good idea, my leg is moving better working with yall so if I go there, I can get even better.     OBJECTIVE DATA SUMMARY:   Cognitive/Behavioral Status:  Neurologic State: Alert  Orientation Level: Oriented X4  Cognition: Appropriate decision making; Appropriate for age attention/concentration; Appropriate safety awareness  Perception: Appears intact  Perseveration: No perseveration noted  Safety/Judgement: Awareness of environment; Insight into deficits    Functional Mobility and Transfers for ADLs:    Transfers:  Sit to Stand: Contact guard assistance  Functional Transfers  Bathroom Mobility: Contact guard assistance  Toilet Transfer : Contact guard assistance     Balance:  Sitting: Intact; With support(1-2 UE supports)  Sitting - Static: Good (unsupported)  Sitting - Dynamic: Good (unsupported); Fair (occasional)  Standing: Impaired; With support  Standing - Static: Poor;Constant support(initially unable to correct balance - leans against bed )  Standing - Dynamic : Fair;Constant support(loss of balance x 2 - posteriorly - requiring mod assist x 1)    ADL Intervention:  Grooming  Grooming Assistance: Contact guard assistance  Position Performed: Standing  Washing Hands: Contact guard assistance  Cues: Verbal cues provided(Min cues for RW positioning)    Toileting  Toileting Assistance: Contact guard assistance  Bladder Hygiene: Modified independent  Clothing Management: Contact guard assistance  Cues: Verbal cues provided(Min cue for transfer technique)  Adaptive Equipment: Walker    Cognitive Retraining  Safety/Judgement: Awareness of environment; Insight into deficits    Patient instructed and indicated understanding the benefits of maintaining activity tolerance, functional mobility, and independence with self care tasks during acute stay  to ensure safe return home and to baseline. Encouraged patient to increase frequency and duration OOB, be out of bed for all meals, perform daily ADLs (as approved by RN/MD regarding bathing etc), and performing functional mobility to/from bathroom. Pt educated on safe transfer techniques, with specific emphasis on proper hand placement to push up from seated surface rather than attempt to pull self up, fully positioning self in-front of desired seated location, feeling chair on back of legs and reaching back with 1-2 UE to slowly lower self to seated position.     Pain:  No c/o pain    Activity Tolerance:   Good, Fair, and requires rest breaks  Please refer to the flowsheet for vital signs taken during this treatment. After treatment patient left in no apparent distress:   Sitting in chair and Call bell within reach    COMMUNICATION/COLLABORATION:   The patients plan of care was discussed with: Physical therapist and Registered nurse.      Atul Montalvo OT  Time Calculation: 32 mins

## 2020-07-22 NOTE — PROGRESS NOTES
Ortho POD#2  No complaint  afeb  Right foot drop persists, sensation intact, trace dorsiflexion of toes  Hip Ok  HGB 8.9  Imp post op foot drop         Blood loss anemia  Plan: will try to get AFO            Hope to D/C home later today

## 2020-07-22 NOTE — DISCHARGE INSTRUCTIONS
Discharge Instructions Hip Replacement  Dr. Darlin Cabot    Patient Name  Vicki Ramsay  Date of procedure  7/20/2020    Procedure  Procedure(s):  [address]  Surgeon  Surgeon(s) and Role:     Rakan Person MD - Primary  Date of discharge: 7/2 /20  PCP: Addis Shook MD    Follow up care   Follow up visit with Dr. Darlin Cabot in 2 weeks. Call [phone] to make an appointment    Activity at home   AVOID sudden and extreme movement of your hip (surgical leg)   Take a short walk every hour; except at night when sleeping   Do your Home Exercise Program 3 times every day    After exercising lie down and elevate your leg on pillows for 15-30 minutes to decrease swelling   Refer to your patient notebook for more information  Bathing and caring for your incision   You may take a shower with your Aquacel dressing in place.  The Aquacel (brown, waterproof) dressing is to stay on your hip for 7 days.  On the 7th day have someone gently peel the dressing off by lifting the edge and stretching it to break the seal.   You may then leave your incision open to air unless you see drainage from your hip. Preventing blood clots   Take Eliquis 2.5 mg twice a day as prescribed   Call Dr. Darlin Cabot if you have side effects of blood thinning medication: bleeding, bruising, upset stomach, or diarrhea.  Call Dr. Darlin Cabot for signs of a blood clot in your leg: calf pain, tenderness, redness, swelling of lower leg   Preventing lung congestion   Use your incentive spirometer 4 times a day; do 10 repetitions each time   Remember to keep the small blue ball between the two arrows when taking a slow, deep breath   Pain Management   Get up and walk a short distance to relieve pain and stiffness.  Place ice wrap on your hip except when you are walking.  The gel ice packs should be changed about every 4 hours   Elevate your leg on pillows for 15-30 minutes    Take Tylenol 1000mg (take two 500mg tablets) every 6 hours for the next 2 weeks   If needed, take a narcotic pain pill every 4-6 hours as prescribed. Take with a small amount of food.  As your pain decreases, take the narcotics less often or take ½ of a pill   Call Dr. Santana Record if you have side effects from your narcotic pain medication: itching, drowsiness, dizziness, upset stomach, dry mouth, constipation or if you medication is not relieving your pain. YOUR PRESCRIPTIONS HAVE BEEN ELECTRONICALLY SENT TO YOUR PHARMACY  Diet after surgery   You may resume your normal diet. Include vegetables, fruit, whole grains, lean meats, and low-fat dairy products. Eat food high in fiber    Drink plenty of fluids, including 8 cups of water daily   Take over-the-counter stool softeners and laxatives to prevent constipation    Avoid after surgery   Do not take any over-the-counter medication for pain except Tylenol     Do not take more than 4000 (4 Grams) of Tylenol in 24 hours     Do not drink alcoholic beverages   Do not smoke   Do not drive until seen for follow up appointment   Do not place frozen gel pack directly on your skin. It can cause frostbite.  Do not take a tub bath, swim or get in a hot tub for 6 weeks  Prevention of falls and safety at home   Set up an area where you can rest comfortably leaving space around furniture to allow you to walk with your walker   Keep stairs, hallways and bathrooms well lit; especially at night   Arrange for care for your pets   Keep your home free of clutter   Call Dr. Santana Record at [phone] for:   Pain that is not relieved by pain medication, ice, activity   Side effects of medications   Increased/spread of bruising   Warning signs of infection:  ? persistent fever greater than 100 degrees  ?  shaking or chills  ? increased redness, tenderness, swelling or drainage from incision  ? increased pain during activity or rest   Warning signs of a blood clot in your leg:  ? increased pain in your calf  ? tenderness or redness  ? increased swelling or knee, calf, ankle or foot    If you call after 5pm or on a weekend, the on call physician will return your phone call  Call your Primary Care Doctor for:    Concerns about your medical conditions such as diabetes, high blood pressure, asthma, congestive heart failure   Blood sugars greater than 180   Persistent headache or dizziness   Coughing or congestion   Constipation or diarrhea   Burning when you go to the bathroom   Abnormal heart rate (fast or slow)        Call 911 and go to the nearest hospital for:    Sudden increased shortness of breath   Sudden onset of chest pain   Difficulty breathing   Localized chest pain with coughing or taking a deep breath       Diabetes Management:  1. Take a blood glucose reading four times daily:  First thing in the morning before you eat or drink anything. Then before lunch, dinner and bedtime. Make a log and bring to your next doctor appointment. If your blood sugar is over 200 consistently OR   If your blood sugar is under 100 consistently: call your doctor for a medication adjustment   Goal blood sugar is: ________________    2. Take your insulin as prescribed -  Goal A1C is 7%. 3. Make a follow up appointment with your endocrinologist or primary care physician. Please see him within the next 2-4 weeks. 4. Make sure to get a DILATED eye exam every year. This checks for retinal blood vessel damage caused by long term high blood sugar. 5. Make sure to examine your feet every day looking for any wound or foot irritation as sensation can be impaired in your feet. Always wear socks and/or slippers even while at home. This will protect your feet from injury.

## 2020-07-22 NOTE — PROGRESS NOTES
[facility] The bed will be available on Friday. Another COVID test will be needed for rehab placement. Chart reviewed. CM met with patient at bedside. Patient stated he would like to go to [address] [place] [address] for rehab. CM sent referral via [employer]. FOC placed on hard chart. CM spoke with Maynor Gordon with [address] They can accept patient and bed will be available Friday. Another COVID test is needed.  CM notified ortho PA to order COVID test.    KADEN CarbajalW/CRM

## 2020-07-22 NOTE — DIABETES MGMT
[facility]  CLINICAL NURSE SPECIALIST CONSULT  PROGRAM FOR DIABETES HEALTH    INITIAL NOTE    Presentation   Vida Hargrove is a 65 y.o. male admitted with  Right  THR. - revision, POD1 . Current clinical course has been complicated by hyperglycemia. PMH: extensive-see H&P- DM2/ cirrhosis/ GERD/ HTN/ esophageal varicies   Findings to date include: CBC and BMP unremarkable. Diabetes: Patient has known Type 2 diabetes,A1C 6.6% treated with Lantus and Novolog PTA. Family history unknown for diabetes. Consulted by Provider for advanced diabetes nursing assessment and care, specifically related to   [] Transitioning off Desma Highman   [x] Inpatient management strategy  [] Home management assessment  [] Survival skill education    Diabetes-related medical history  Acute complications  hyperglycemia  Neurological complications  Peripheral neuropathy  Microvascular disease  denies  Macrovascular disease  denies  Other associated conditions     GERD/HTN/cirrhosis    Diabetes medication history  Drug class Currently in use Discontinued Never used   Biguanide Metformin     DDP-4 inhibitor       Sulfonylurea      Thiazolidinedione      GLP-1 RA      SGLT-2 inhibitors      Basal insulin Lantus 18units BID     Fixed Dose  Combinations      Bolus insulin Novolog 7units TID       Subjective   Mr. Tracy Pandya is a pleasant man who is currently eating his lunch. He was very surprised his A1C was as good as it was. PCP follows his for diabetes management.     Patient reports the following home diabetes self-care practices:  Eating pattern- mostly follows his diet; sneaks sweets \"every now and then\"   Drinks green tea, water, coffee  Physical activity pattern-limited    Monitoring pattern-daily    Taking medications pattern  [x] Consistent administration  [x] Affordable    Social determinants of health impacting diabetes self-management practices   None voiced    Objective   Physical exam  General Alert, oriented and in no acute distress. Conversant and cooperative. Vital Signs   Visit Vitals  /49 (BP 1 Location: Right arm, BP Patient Position: At rest)   Pulse 75   Temp 99 °F (37.2 °C)   Resp 16   Ht 5' 10.5\" (1.791 m)   Wt 110.7 kg (244 lb 0.8 oz)   SpO2 97%   BMI 34.52 kg/m²     Skin  Warm and dry. Heart   Regular rate and rhythm. No murmurs, rubs or gallops  Lungs  Clear to auscultation without rales or rhonchi  Extremities No foot wounds    Diabetic foot exam: deferred as he was eating lunch, but denies peripheral neuropathy        Laboratory  Lab Results   Component Value Date/Time    Hemoglobin A1c 6.6 (H) 07/15/2020 10:35 AM    Hemoglobin A1c (POC) 6.5 03/05/2020 02:30 PM     Lab Results   Component Value Date/Time    LDL, calculated 40 11/07/2019 10:23 AM     Lab Results   Component Value Date/Time    Creatinine 0.72 07/21/2020 12:07 AM     Lab Results   Component Value Date/Time    Sodium 137 07/21/2020 12:07 AM    Potassium 3.9 07/21/2020 12:07 AM    Chloride 107 07/21/2020 12:07 AM    CO2 25 07/21/2020 12:07 AM    Anion gap 5 07/21/2020 12:07 AM    Glucose 209 (H) 07/21/2020 12:07 AM    BUN 12 07/21/2020 12:07 AM    Creatinine 0.72 07/21/2020 12:07 AM    BUN/Creatinine ratio 17 07/21/2020 12:07 AM    GFR est AA >60 07/21/2020 12:07 AM    GFR est non-AA >60 07/21/2020 12:07 AM    Calcium 7.4 (L) 07/21/2020 12:07 AM    Bilirubin, total 0.8 07/15/2020 10:35 AM    Alk.  phosphatase 131 (H) 07/15/2020 10:35 AM    Protein, total 6.3 (L) 07/15/2020 10:35 AM    Albumin 2.7 (L) 07/15/2020 10:35 AM    Globulin 3.6 07/15/2020 10:35 AM    A-G Ratio 0.8 (L) 07/15/2020 10:35 AM    ALT (SGPT) 28 07/15/2020 10:35 AM     Lab Results   Component Value Date/Time    ALT (SGPT) 28 07/15/2020 10:35 AM       Factors affecting BG pattern  Factor Dose Comments   Nutrition:  Carb-controlled meals     60 grams/meal      Pain Right hip post op    Infection n/a    Other:        Blood glucose pattern        Assessment and Plan   Nursing Diagnosis Risk for unstable blood glucose pattern   Nursing Intervention Domain 0086 Decision-making Support   Nursing Interventions Examined current inpatient diabetes control   Explored factors facilitating and impeding inpatient management  Identified self-management practices impeding diabetes control  Explored corrective strategies with patient and responsible inpatient provider   Informed patient of rational for insulin strategy while hospitalized       Evaluation   Mr. Das with well controlled Type2 diabetes, hasn't achieved inpatient blood glucose target of 100-180mg/dl. BG trends have been >350mg/dl today post Breakfast.  BG trends yesterday were 133-250s. BAsal and mealtime insulin initiated last night which will assist with bringing BG level down. Inpatient blood glucose management has been impacted by  [] Kidney dysfunction  [] Erratic meal consumption  [] Glucocorticoid use  []      Recommendations/ Discharge Planning   1. CONTINUE Basal insulin -22 units daily- IF AM BG >200mg/dl tomorrow; consider increasing basal to 24units daily     2. CONTINUE Corrective insulin  [x] Normal sensitivity    Discharge Planning  -Restart PTA Metformin and PTA insulin  -Check BG 3 times daily  -Follow up with PCP in 2 weeks. Billing Code(s)   I personally reviewed chart, notes, data and current medications in the medical record, and examined the patient at bedside before making care recommendations. Thank you for including us in their care. I spent 15 minutes in direct patient care today for this patient.   Time includes chart review, face to face with patient and collaboration with interdisciplinary care team.      Danis Gayle, [facility]  Program for Diabetes Health  Access via [facility]  [phone]

## 2020-07-22 NOTE — PROGRESS NOTES
Problem: Diabetes Self-Management  Goal: *Disease process and treatment process  Description: Define diabetes and identify own type of diabetes; list 3 options for treating diabetes. Outcome: Progressing Towards Goal  Goal: *Monitoring blood glucose, interpreting and using results  Description: Identify recommended blood glucose targets  and personal targets. Outcome: Progressing Towards Goal     Problem: Falls - Risk of  Goal: *Absence of Falls  Description: Document Mark Persons Fall Risk and appropriate interventions in the flowsheet.   Outcome: Progressing Towards Goal  Note: Fall Risk Interventions:  Mobility Interventions: Communicate number of staff needed for ambulation/transfer, OT consult for ADLs, Patient to call before getting OOB, PT Consult for mobility concerns, PT Consult for assist device competence, Utilize walker, cane, or other assistive device    Mentation Interventions: Adequate sleep, hydration, pain control, Evaluate medications/consider consulting pharmacy, Door open when patient unattended, Increase mobility, More frequent rounding, Reorient patient, Toileting rounds, Update white board    Medication Interventions: Evaluate medications/consider consulting pharmacy, Patient to call before getting OOB, Teach patient to arise slowly    Elimination Interventions: Call light in reach, Patient to call for help with toileting needs, Urinal in reach

## 2020-07-22 NOTE — PROGRESS NOTES
Bedside and Verbal shift change report given to Stacey Celaya RN (oncoming nurse) by Yang Barreto RN (offgoing nurse). Report included the following information SBAR, Kardex, Intake/Output, MAR and Recent Results.

## 2020-07-23 LAB
AMMONIA PLAS-SCNC: 39 UMOL/L
GLUCOSE BLD STRIP.AUTO-MCNC: 174 MG/DL (ref 65–100)
GLUCOSE BLD STRIP.AUTO-MCNC: 199 MG/DL (ref 65–100)
GLUCOSE BLD STRIP.AUTO-MCNC: 223 MG/DL (ref 65–100)
GLUCOSE BLD STRIP.AUTO-MCNC: 340 MG/DL (ref 65–100)
SARS-COV-2, COV2: NOT DETECTED
SERVICE CMNT-IMP: ABNORMAL
SOURCE, COVRS: NORMAL
SPECIMEN SOURCE, FCOV2M: NORMAL

## 2020-07-23 PROCEDURE — 97116 GAIT TRAINING THERAPY: CPT

## 2020-07-23 PROCEDURE — 3331090001 HH PPS REVENUE CREDIT

## 2020-07-23 PROCEDURE — 97535 SELF CARE MNGMENT TRAINING: CPT

## 2020-07-23 PROCEDURE — 82962 GLUCOSE BLOOD TEST: CPT

## 2020-07-23 PROCEDURE — 74011636637 HC RX REV CODE- 636/637: Performed by: PHYSICIAN ASSISTANT

## 2020-07-23 PROCEDURE — 97530 THERAPEUTIC ACTIVITIES: CPT

## 2020-07-23 PROCEDURE — 74011250637 HC RX REV CODE- 250/637: Performed by: PHYSICIAN ASSISTANT

## 2020-07-23 PROCEDURE — 3331090002 HH PPS REVENUE DEBIT

## 2020-07-23 PROCEDURE — 65270000029 HC RM PRIVATE

## 2020-07-23 PROCEDURE — 82140 ASSAY OF AMMONIA: CPT

## 2020-07-23 PROCEDURE — 36415 COLL VENOUS BLD VENIPUNCTURE: CPT

## 2020-07-23 RX ORDER — INSULIN GLARGINE 100 [IU]/ML
20 INJECTION, SOLUTION SUBCUTANEOUS
Qty: 36 ML | Refills: 0 | Status: ON HOLD | OUTPATIENT
Start: 2020-07-23 | End: 2020-08-15

## 2020-07-23 RX ADMIN — ACETAMINOPHEN 1000 MG: 500 TABLET ORAL at 22:11

## 2020-07-23 RX ADMIN — SERTRALINE HYDROCHLORIDE 100 MG: 50 TABLET ORAL at 06:59

## 2020-07-23 RX ADMIN — GABAPENTIN 600 MG: 300 CAPSULE ORAL at 22:11

## 2020-07-23 RX ADMIN — Medication 5 ML: at 22:00

## 2020-07-23 RX ADMIN — ACETAMINOPHEN 1000 MG: 500 TABLET ORAL at 03:01

## 2020-07-23 RX ADMIN — FAMOTIDINE 20 MG: 20 TABLET ORAL at 17:49

## 2020-07-23 RX ADMIN — APIXABAN 2.5 MG: 2.5 TABLET, FILM COATED ORAL at 06:59

## 2020-07-23 RX ADMIN — POTASSIUM CHLORIDE 10 MEQ: 750 TABLET, FILM COATED, EXTENDED RELEASE ORAL at 17:49

## 2020-07-23 RX ADMIN — PRIMIDONE 250 MG: 250 TABLET ORAL at 09:28

## 2020-07-23 RX ADMIN — INSULIN LISPRO 3 UNITS: 100 INJECTION, SOLUTION INTRAVENOUS; SUBCUTANEOUS at 17:08

## 2020-07-23 RX ADMIN — INSULIN GLARGINE 22 UNITS: 100 INJECTION, SOLUTION SUBCUTANEOUS at 22:11

## 2020-07-23 RX ADMIN — PRIMIDONE 250 MG: 250 TABLET ORAL at 17:49

## 2020-07-23 RX ADMIN — MAGNESIUM OXIDE TAB 400 MG (241.3 MG ELEMENTAL MG) 400 MG: 400 (241.3 MG) TAB at 09:01

## 2020-07-23 RX ADMIN — DOCUSATE SODIUM 50MG AND SENNOSIDES 8.6MG 1 TABLET: 8.6; 5 TABLET, FILM COATED ORAL at 17:50

## 2020-07-23 RX ADMIN — Medication 10 ML: at 13:22

## 2020-07-23 RX ADMIN — ATORVASTATIN CALCIUM 40 MG: 40 TABLET, FILM COATED ORAL at 09:01

## 2020-07-23 RX ADMIN — Medication 10 ML: at 06:00

## 2020-07-23 RX ADMIN — FAMOTIDINE 20 MG: 20 TABLET ORAL at 09:01

## 2020-07-23 RX ADMIN — POLYETHYLENE GLYCOL 3350 17 G: 17 POWDER, FOR SOLUTION ORAL at 09:02

## 2020-07-23 RX ADMIN — RIFAXIMIN 550 MG: 550 TABLET ORAL at 17:50

## 2020-07-23 RX ADMIN — INSULIN LISPRO 2 UNITS: 100 INJECTION, SOLUTION INTRAVENOUS; SUBCUTANEOUS at 06:59

## 2020-07-23 RX ADMIN — Medication 500 MG: at 09:01

## 2020-07-23 RX ADMIN — ACETAMINOPHEN 1000 MG: 500 TABLET ORAL at 08:29

## 2020-07-23 RX ADMIN — APIXABAN 2.5 MG: 2.5 TABLET, FILM COATED ORAL at 17:56

## 2020-07-23 RX ADMIN — ACETAMINOPHEN 1000 MG: 500 TABLET ORAL at 15:41

## 2020-07-23 RX ADMIN — POTASSIUM CHLORIDE 10 MEQ: 750 TABLET, FILM COATED, EXTENDED RELEASE ORAL at 08:30

## 2020-07-23 RX ADMIN — DOCUSATE SODIUM 50MG AND SENNOSIDES 8.6MG 1 TABLET: 8.6; 5 TABLET, FILM COATED ORAL at 08:30

## 2020-07-23 RX ADMIN — INSULIN LISPRO 7 UNITS: 100 INJECTION, SOLUTION INTRAVENOUS; SUBCUTANEOUS at 13:20

## 2020-07-23 RX ADMIN — RIFAXIMIN 550 MG: 550 TABLET ORAL at 09:01

## 2020-07-23 RX ADMIN — OXYCODONE 5 MG: 5 TABLET ORAL at 17:56

## 2020-07-23 NOTE — PROGRESS NOTES
Problem: Self Care Deficits Care Plan (Adult)  Goal: *Acute Goals and Plan of Care (Insert Text)  Description: FUNCTIONAL STATUS PRIOR TO ADMISSION: Pt reports living with son in 2 story home, residing on first floor, with walk-in shower and seated surface. Pt states he spends majority of time resting in uplift recliner, intermittent Min A for distal LE ADLs, using RW for mobility/balance. All DME/AE needs fulfilled. HOME SUPPORT: The patient lived with son who is able to provide PRN ADL assist.    Occupational Therapy Goals  Initiated 7/21/2020  1. Patient will perform lower body ADLs with AE supervision/set-up within 4 day(s). 2.  Patient will perform upper body ADLs standing 5 mins without fatigue or LOB with supervision/set-up within 4 day(s). 3.  Patient will perform toilet transfer with supervision/set-up within 4 day(s). 4.  Patient will perform all aspects of toileting with supervision/set-up within 4 day(s). 5.  Patient will participate in upper extremity therapeutic exercise/activities with supervision/set-up for 10 minutes within 4 day(s). 6.  Patient will utilize energy conservation techniques during functional activities without cues within 4 day(s). Outcome: Progressing Towards Goal    OCCUPATIONAL THERAPY TREATMENT  Patient: Michael Multani (15 y.o. 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 ASSESSMENT  Patient continues with skilled OT services and is progressing towards goals. Pt progressing with activity tolerance as evidenced by engagement with full body bathing/dressing. Good distal AE technique noted evidenced by S/U for donning socks with use of sock-aid.   Pt with progressive reacher technique to distal LE. Continues to benefit from cues for safe transfer technique. Reporting therapist believes pt would benefit from inpatient rehab placement s/p discharge, with acute OT to continue to follow during hospitalization in an attempt to maximize highest level of safe functional independence. Current Level of Function Impacting Discharge (ADLs): Mod A    Other factors to consider for discharge: none         PLAN :  Patient continues to benefit from skilled intervention to address the above impairments. Continue treatment per established plan of care. to address goals. Recommend with staff: OOB meals, Active ADL engagement, Assist x1 to/from bathroom    Recommend next OT session: POC progression    Recommendation for discharge: (in order for the patient to meet his/her long term goals)  Therapy 3 hours per day 5-7 days per week    This discharge recommendation:  Has been made in collaboration with the attending provider and/or case management    IF patient discharges home will need the following DME: patient owns DME required for discharge       SUBJECTIVE:   Patient stated I know how to use a sock aid.     OBJECTIVE DATA SUMMARY:   Cognitive/Behavioral Status:  Neurologic State: Alert(intermittent confusion)  Orientation Level: Oriented X4  Cognition: Appropriate decision making  Perception: Appears intact  Perseveration: No perseveration noted  Safety/Judgement: Decreased awareness of environment; Insight into deficits    Functional Mobility and Transfers for ADLs:  Bed Mobility:  Supine to Sit: Minimum assistance;Bed Modified(HOB elevated)    Transfers:  Sit to Stand: Contact guard assistance;Minimum assistance; Adaptive equipment; Additional time(depending on the height of the surface)     Bed to Chair: Minimum assistance; Adaptive equipment; Additional time    Balance:  Sitting: Intact; Without support  Standing: Impaired; With support  Standing - Static: Fair;Occasional(poor protectiive responses)  Standing - Dynamic : Fair;Constant support(due to poor protective responses)    ADL Intervention:  Upper Body Bathing  Bathing Assistance: Minimum assistance(for posterior aspects)  Position Performed: Seated edge of bed  Cues: Verbal cues provided(to increase seated upright posture)    Lower Body Bathing  Lower Body : Moderate assistance(for management distal to B knees)  Position Performed: Seated edge of bed;Standing(at RW)    Upper Body Dressing Assistance  Pullover Shirt: Set-up    Lower Body Dressing Assistance  Dressing Assistance: Moderate assistance  Underpants: Moderate assistance  Pants With Elastic Waist: Moderate assistance  Socks: Set-up  Shoes with Elastic Laces: Moderate assistance(2/2 new AFO)  Position Performed: Seated edge of bed;Standing(at RW)  Cues: Verbal cues provided(for AE technique)  Adaptive Equipment Used: Reacher;Sock aid; Walker    Cognitive Retraining  Safety/Judgement: Decreased awareness of environment; Insight into deficits    Pain:  No c/o pain    Activity Tolerance:   Good, Fair, and requires rest breaks  Please refer to the flowsheet for vital signs taken during this treatment. After treatment patient left in no apparent distress:   Sitting EOB with physical therapist    COMMUNICATION/COLLABORATION:   The patients plan of care was discussed with: Physical therapist and Registered nurse.      Devyn Hsu OT  Time Calculation: 39 mins

## 2020-07-23 NOTE — PROGRESS NOTES
Problem: Mobility Impaired (Adult and Pediatric)  Goal: *Acute Goals and Plan of Care (Insert Text)  Description: FUNCTIONAL STATUS PRIOR TO ADMISSION: Patient was modified independent using a rolling walker for functional mobility. Pt reports 12 falls in 2020. HOME SUPPORT PRIOR TO ADMISSION: The patient lived with son who works night shift - son assisted with meals/shopping. Physical Therapy Goals  Initiated 7/21/2020    1. Patient will move from supine to sit and sit to supine , scoot up and down, and roll side to side in bed with modified independence within 4 days. 2. Patient will perform sit to stand with modified independence within 4 days. 3. Patient will ambulate with modified independence for > 100 feet with the least restrictive device within 4 days. 4. Patient will ascend/descend 4 stairs with one handrail(s) with minimal assistance/contact guard assist within 4 days. 5. Patient will perform [facility]RA home exercise program per protocol with supervision/set-up within 4 days. Outcome: Progressing Towards Goal   PHYSICAL THERAPY MORNING SESSION NOTE  Patient: Torri Mchugh (89 y.o. male)  Date: 7/23/2020  Primary Diagnosis: Recurrent dislocation of hip joint prosthesis, sequela [T84.029S, Z96.649]  Procedure(s) (LRB):  REVISION POLYLINER RIGHT TOTAL HIP REPLACEMENT (Right) 3 Days Post-Op   Precautions:   Fall, WBAT    Lyle Das was seen for morning PT session. He is walking about 50 feet with RW, AFO on the RLE and with min assist, largely for balance. The limiting factors are poor balance, difficulty with bed mobility and inconsistency with sit to stand. Currently, dagmar Mchugh will need rehab prior to discharging to prior place of residence. The full therapy note will follow after this afternoon's session. Morning session functional mobility:  Bed Mobility:              Transfers:  Sit to Stand: Contact guard assistance;Minimum assistance; Adaptive equipment; Additional time(depending on the height of the surface)  Stand to Sit: Contact guard assistance; Adaptive equipment; Additional time        Bed to Chair: Minimum assistance; Adaptive equipment; Additional time              Balance:   Sitting: Intact; Without support  Standing: Impaired; With support  Standing - Static: Fair;Occasional(poor protectiive responses)  Standing - Dynamic : Fair;Constant support(due to poor protective responses)  Ambulation/Gait Training:  Distance (ft): 50 Feet (ft)(plus 40)  Assistive Device: Gait belt;Walker, rolling;Brace/Splint(AFO R)  Ambulation - Level of Assistance: Minimal assistance; Adaptive equipment; Additional time        Gait Abnormalities: Antalgic;Decreased step clearance; Step to gait(AFO in place RLE)        Base of Support: Widened  Stance: Right decreased  Speed/Clarice: Pace decreased (<100 feet/min)  Step Length: Right shortened;Left shortened  Swing Pattern: Right asymmetrical              Stairs:              Thank you for this referral.  Shonda Ordaz, PT

## 2020-07-23 NOTE — PROGRESS NOTES
Bedside and Verbal shift change report given to [facility] RG BORJAS (oncoming nurse) by Ken Alanis (offgoing nurse). Report included the following information SBAR, Kardex and MAR.

## 2020-07-23 NOTE — PROGRESS NOTES
Ortho Daily Progress Note    7/23/2020  12:47 PM    POD:  3 Days Post-Op  S/P:  Procedure(s):  REVISION POLYLINER RIGHT TOTAL HIP REPLACEMENT    Afebrile/VSS, NAD, A&O x 3  Doing well without complaints of nausea  Pain well controlled  Calves soft/NTTP Bilaterally  Incision OK, no drainage or dehiscence. leg soft. Dressing clean and dry  Moving lower extremities well. Ambulating well with AFO on  Neurocirculatory exam intact and within normal range. Lab Results   Component Value Date/Time    HGB 8.9 (L) 07/21/2020 12:07 AM    INR 1.2 (H) 07/20/2020 11:41 AM     Recent Labs     07/21/20  0007 07/15/20  1035 06/12/20  0125 05/25/20  1518 05/18/20  1303 04/13/20  0515 04/07/20  0654 04/06/20  0419 04/05/20  0108 04/04/20  0421   CREA 0.72 0.66* 0.93 0.95 1.09 0.77 0.83 0.69* 0.73 0.65*   BUN 12 10 11 14 13 10 8 9 10 13     Estimated Creatinine Clearance: 116.5 mL/min (by C-G formula based on SCr of 0.72 mg/dL).     PLAN: Check on dorsiflexion tomorrow- transient foot drop  DVT prophylaxis: Eliquis 2.5 mg bid  WBAT with PT-mobilization  Pain Control: tylenol, oxycodone, mobic  Plan to D/C [facility] scheduled for tomorrow      AMAN Dawson

## 2020-07-23 NOTE — PROGRESS NOTES
Bedside and Verbal shift change report given to MARK Hernandez (oncoming nurse) by Garett Gambino RN (offgoing nurse). Report included the following information SBAR.

## 2020-07-23 NOTE — PROGRESS NOTES
Problem: Mobility Impaired (Adult and Pediatric)  Goal: *Acute Goals and Plan of Care (Insert Text)  Description: FUNCTIONAL STATUS PRIOR TO ADMISSION: Patient was modified independent using a rolling walker for functional mobility. Pt reports 12 falls in 2020. HOME SUPPORT PRIOR TO ADMISSION: The patient lived with son who works night shift - son assisted with meals/shopping. Physical Therapy Goals  Initiated 7/21/2020    1. Patient will move from supine to sit and sit to supine , scoot up and down, and roll side to side in bed with modified independence within 4 days. 2. Patient will perform sit to stand with modified independence within 4 days. 3. Patient will ambulate with modified independence for > 100 feet with the least restrictive device within 4 days. 4. Patient will ascend/descend 4 stairs with one handrail(s) with minimal assistance/contact guard assist within 4 days. 5. Patient will perform CIERRA home exercise program per protocol with supervision/set-up within 4 days. 7/23/2020 1053 by Jermaine Delatorre, PT  Outcome: Progressing Towards Goal   PHYSICAL THERAPY TREATMENT  Patient: Yamileth Lim (19 y.o. 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 ASSESSMENT  Patient continues with skilled PT services and is progressing towards goals. He was better able to manage his bed mobility this afternoon, needing only min assist with scooting to the EOB once sitting. His gait remains very slow and punctuated by pauses to regain his balance. He demonstrates poor protective responses with any balance challenges which makes him a very high fall risk.   He is improving steadily but continue to recommend rehab prior to return home, especially in light of his frequent falls at home prior to admission. Current Level of Function Impacting Discharge (mobility/balance): min assist for limited distance mobility with RW    Other factors to consider for discharge: history of falls and hip dislocation         PLAN :  Patient continues to benefit from skilled intervention to address the above impairments. Continue treatment per established plan of care. to address goals. Recommendation for discharge: (in order for the patient to meet his/her long term goals)  Therapy 3 hours per day 5-7 days per week    This discharge recommendation:  Has been made in collaboration with the attending provider and/or case management    IF patient discharges home will need the following DME: patient owns DME required for discharge       SUBJECTIVE:   Patient stated I think I am getting better. I have to focus so I don't fall.     OBJECTIVE DATA SUMMARY:   Critical Behavior:  Neurologic State: Alert(intermittent confusion)  Orientation Level: Oriented X4  Cognition: Appropriate decision making  Safety/Judgement: Decreased awareness of environment, Insight into deficits  Functional Mobility Training:  Bed Mobility:     Supine to Sit: Minimum assistance; Additional time;Bed Modified(HOB elevated, use of bedrail, assit to scoot)  Sit to Supine: Contact guard assistance; Additional time(with bed flat)           Transfers:  Sit to Stand: Contact guard assistance;Minimum assistance; Adaptive equipment; Additional time(depending on the height of the surface)  Stand to Sit: Contact guard assistance; Adaptive equipment; Additional time        Bed to Chair: Minimum assistance; Adaptive equipment; Additional time                    Balance:  Sitting: Intact; Without support  Standing: Impaired; With support  Standing - Static: Fair;Occasional(poor protectiive responses)  Standing - Dynamic : Fair;Constant support(due to poor protective responses)  Ambulation/Gait Training:  Distance (ft): 100 Feet (ft)  Assistive Device: Gait belt;Walker, rolling;Brace/Splint(AFO R)  Ambulation - Level of Assistance: Minimal assistance; Adaptive equipment; Additional time        Gait Abnormalities: Antalgic;Decreased step clearance; Step to gait(AFO in place RLE)        Base of Support: Widened  Stance: Right decreased  Speed/Clarice: Pace decreased (<100 feet/min)  Step Length: Right shortened;Left shortened  Swing Pattern: Right asymmetrical                 Stairs: Therapeutic Exercises:   Sit to stand practice from varying heights  Pain Rating:  No complaints of pain    Activity Tolerance:   Good and requires rest breaks  Please refer to the flowsheet for vital signs taken during this treatment. After treatment patient left in no apparent distress:   Supine in bed, Call bell within reach, and Side rails x 3    COMMUNICATION/COLLABORATION:   The patients plan of care was discussed with: Occupational therapist, Registered nurse, Case management, and PA .      Chintan Quintana, PT   Time Calculation: 26 mins

## 2020-07-24 VITALS
DIASTOLIC BLOOD PRESSURE: 72 MMHG | HEART RATE: 75 BPM | RESPIRATION RATE: 18 BRPM | OXYGEN SATURATION: 97 % | BODY MASS INDEX: 34.17 KG/M2 | HEIGHT: 71 IN | WEIGHT: 244.05 LBS | SYSTOLIC BLOOD PRESSURE: 145 MMHG | TEMPERATURE: 98.7 F

## 2020-07-24 LAB
GLUCOSE BLD STRIP.AUTO-MCNC: 238 MG/DL (ref 65–100)
GLUCOSE BLD STRIP.AUTO-MCNC: 273 MG/DL (ref 65–100)
GLUCOSE BLD STRIP.AUTO-MCNC: 281 MG/DL (ref 65–100)
SERVICE CMNT-IMP: ABNORMAL

## 2020-07-24 PROCEDURE — 97116 GAIT TRAINING THERAPY: CPT

## 2020-07-24 PROCEDURE — 82962 GLUCOSE BLOOD TEST: CPT

## 2020-07-24 PROCEDURE — 74011636637 HC RX REV CODE- 636/637: Performed by: PHYSICIAN ASSISTANT

## 2020-07-24 PROCEDURE — 3331090001 HH PPS REVENUE CREDIT

## 2020-07-24 PROCEDURE — 74011250637 HC RX REV CODE- 250/637: Performed by: PHYSICIAN ASSISTANT

## 2020-07-24 PROCEDURE — 3331090002 HH PPS REVENUE DEBIT

## 2020-07-24 RX ADMIN — OXYCODONE 5 MG: 5 TABLET ORAL at 12:07

## 2020-07-24 RX ADMIN — ACETAMINOPHEN 1000 MG: 500 TABLET ORAL at 15:18

## 2020-07-24 RX ADMIN — DOCUSATE SODIUM 50MG AND SENNOSIDES 8.6MG 1 TABLET: 8.6; 5 TABLET, FILM COATED ORAL at 08:34

## 2020-07-24 RX ADMIN — LISINOPRIL 5 MG: 5 TABLET ORAL at 06:42

## 2020-07-24 RX ADMIN — ACETAMINOPHEN 1000 MG: 500 TABLET ORAL at 03:20

## 2020-07-24 RX ADMIN — SERTRALINE HYDROCHLORIDE 100 MG: 50 TABLET ORAL at 06:42

## 2020-07-24 RX ADMIN — RIFAXIMIN 550 MG: 550 TABLET ORAL at 09:30

## 2020-07-24 RX ADMIN — INSULIN LISPRO 5 UNITS: 100 INJECTION, SOLUTION INTRAVENOUS; SUBCUTANEOUS at 11:31

## 2020-07-24 RX ADMIN — FAMOTIDINE 20 MG: 20 TABLET ORAL at 08:34

## 2020-07-24 RX ADMIN — POTASSIUM CHLORIDE 10 MEQ: 750 TABLET, FILM COATED, EXTENDED RELEASE ORAL at 08:34

## 2020-07-24 RX ADMIN — INSULIN LISPRO 3 UNITS: 100 INJECTION, SOLUTION INTRAVENOUS; SUBCUTANEOUS at 06:42

## 2020-07-24 RX ADMIN — PRIMIDONE 250 MG: 250 TABLET ORAL at 09:30

## 2020-07-24 RX ADMIN — Medication 10 ML: at 06:42

## 2020-07-24 RX ADMIN — ATORVASTATIN CALCIUM 40 MG: 40 TABLET, FILM COATED ORAL at 08:34

## 2020-07-24 RX ADMIN — APIXABAN 2.5 MG: 2.5 TABLET, FILM COATED ORAL at 06:42

## 2020-07-24 RX ADMIN — Medication 500 MG: at 08:34

## 2020-07-24 RX ADMIN — POLYETHYLENE GLYCOL 3350 17 G: 17 POWDER, FOR SOLUTION ORAL at 08:34

## 2020-07-24 RX ADMIN — MAGNESIUM OXIDE TAB 400 MG (241.3 MG ELEMENTAL MG) 400 MG: 400 (241.3 MG) TAB at 08:34

## 2020-07-24 NOTE — PROGRESS NOTES
Occupational Therapy    Patient chart reviewed. Attempted to see patient for treatment, but patient declining at this time. Received in bed, fully dressed, and reports need for minimal assistance with earlier lower body dressing as he does not have his long-handled AE available in the hospital. Patient with no questions and expressed feeling comfortable and confident going to rehab. Thank you.   Nelsy Almeida, OTR/DEVORAH

## 2020-07-24 NOTE — PROGRESS NOTES
Bedside and Verbal shift change report given to [facility] (oncoming nurse) by Kofi Juarez (offgoing nurse). Report included the following information SBAR, Kardex, Procedure Summary, MAR and Recent Results.

## 2020-07-24 NOTE — PROGRESS NOTES
Problem: Mobility Impaired (Adult and Pediatric)  Goal: *Acute Goals and Plan of Care (Insert Text)  Description: FUNCTIONAL STATUS PRIOR TO ADMISSION: Patient was modified independent using a rolling walker for functional mobility. Pt reports 12 falls in 2020. HOME SUPPORT PRIOR TO ADMISSION: The patient lived with son who works night shift - son assisted with meals/shopping. Physical Therapy Goals  Initiated 7/21/2020    1. Patient will move from supine to sit and sit to supine , scoot up and down, and roll side to side in bed with modified independence within 4 days. 2. Patient will perform sit to stand with modified independence within 4 days. 3. Patient will ambulate with modified independence for > 100 feet with the least restrictive device within 4 days. 4. Patient will ascend/descend 4 stairs with one handrail(s) with minimal assistance/contact guard assist within 4 days. 5. Patient will perform CI[facility]RA home exercise program per protocol with supervision/set-up within 4 days. Outcome: Progressing Towards Goal   PHYSICAL THERAPY TREATMENT  Patient: Yamileth Lim (89 y.o. 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 ASSESSMENT  Patient continues with skilled PT services and is progressing towards goals. He is still struggling with bed mobility at times, especially for the last 25% of the task due to poor trunk strength. He had difficulty with sit to stand and walking to the bedside commode, needing mod assist.  Note that he had gripper socks on but not his shoes or AFO.   Once his shoes and AFO were on, his gait was considerably better, though still slow and punctuated by frequent pauses. His balance overall remains impaired with poor protective responses with challenges. Continue to recommend rehab and plan is for transfer to inpatient rehab this afternoon. Current Level of Function Impacting Discharge (mobility/balance): min to mod assist with mobility and short distance ambulation    Other factors to consider for discharge: none         PLAN :  Patient continues to benefit from skilled intervention to address the above impairments. Continue treatment per established plan of care. to address goals. Recommendation for discharge: (in order for the patient to meet his/her long term goals)  Therapy 3 hours per day 5-7 days per week    This discharge recommendation:  Has been made in collaboration with the attending provider and/or case management    IF patient discharges home will need the following DME: to be determined (TBD)       SUBJECTIVE:   Patient stated The last time I fell, I broke my walker because I fell on it.     OBJECTIVE DATA SUMMARY:   Critical Behavior:  Neurologic State: Alert  Orientation Level: Oriented X4  Cognition: Appropriate decision making  Safety/Judgement: Decreased awareness of environment, Insight into deficits  Functional Mobility Training:  Bed Mobility:     Supine to Sit: Minimum assistance; Additional time  Sit to Supine: (up in chair after)           Transfers:  Sit to Stand: Minimum assistance; Adaptive equipment; Additional time; Moderate assistance(min assist from elevated surface)  Stand to Sit: Minimum assistance; Adaptive equipment; Additional time(for safety and to control descent)        Bed to Chair: Minimum assistance; Adaptive equipment; Additional time(but very slow and needs frequent verbal cues)                    Balance:  Sitting: Intact; Without support  Standing: Impaired; With support  Standing - Static: Constant support; Fair  Standing - Dynamic : Constant support;Fair(but poor protective responses)  Ambulation/Gait Training:  Distance (ft): 100 Feet (ft)(with frequent pauses and constant hands on assist)  Assistive Device: Brace/Splint;Gait belt;Walker, rolling  Ambulation - Level of Assistance: Minimal assistance; Adaptive equipment; Additional time; Moderate assistance(mod assist first few steps with walker, without shoes/AFO)        Gait Abnormalities: Decreased step clearance; Step to gait; Foot drop(but corrected by AFO RLE, decr coordination of LEs)        Base of Support: Widened;Shift to left  Stance: Right decreased  Speed/Clarice: Pace decreased (<100 feet/min)  Step Length: Right shortened;Left shortened  Swing Pattern: Right asymmetrical                 Stairs: Therapeutic Exercises:   Standing balance  Pain Rating:  Minimal pain reported with gait    Activity Tolerance:   Good and requires rest breaks  Please refer to the flowsheet for vital signs taken during this treatment. After treatment patient left in no apparent distress:   Sitting in chair and Call bell within reach    COMMUNICATION/COLLABORATION:   The patients plan of care was discussed with: Registered nurse and Case management.      Desiree Juan, PT   Time Calculation: 31 mins

## 2020-07-24 NOTE — PROGRESS NOTES
Bedside and Verbal shift change report given to Gwendolyn Okeefe RN (oncoming nurse) by Dalia Echols RN (offgoing nurse). Report included the following information SBAR, Kardex, Intake/Output, MAR and Recent Results.

## 2020-07-24 NOTE — ROUTINE PROCESS
Bedside shift change report given to MARK Milner (oncoming nurse) by Arline Flynn RN(offgoing nurse). Report given with SBAR.

## 2020-07-24 NOTE — PROGRESS NOTES
Ortho Daily Progress Note    7/24/2020  8:46 AM    POD:  4 Days Post-Op  S/P:  Procedure(s):  REVISION POLYLINER RIGHT TOTAL HIP REPLACEMENT    Afebrile/VSS, NAD, A&O x 3  Doing well without complaints of nausea  Pain well controlled  Calves soft/NTTP Bilaterally  Incision OK, no drainage or dehiscence. thigh soft. Dressing clean and dry  Moving lower extremities well. Slight dorsiflexion RLE, nothing against resistance  Neurocirculatory exam intact and within normal range otherwise. Lab Results   Component Value Date/Time    HGB 8.9 (L) 07/21/2020 12:07 AM    INR 1.2 (H) 07/20/2020 11:41 AM     Recent Labs     07/21/20  0007 07/15/20  1035 06/12/20  0125 05/25/20  1518 05/18/20  1303 04/13/20  0515 04/07/20  0654 04/06/20  0419 04/05/20  0108 04/04/20  0421   CREA 0.72 0.66* 0.93 0.95 1.09 0.77 0.83 0.69* 0.73 0.65*   BUN 12 10 11 14 13 10 8 9 10 13     Estimated Creatinine Clearance: 116.5 mL/min (by C-G formula based on SCr of 0.72 mg/dL). PLAN: Dischage medications electronically transferred to pharmacy of record have been cancelled, will be continued by [facility] upon admission.    DVT prophylaxis: Eliquis 2.5 mg bid  WBAT with PT-mobilization  Pain Control: tylenol, occasional oxycodone  Plan to D/C [facility] today pending bed availability      AMAN Sainz

## 2020-07-24 NOTE — PROGRESS NOTES
TRANSFER - OUT REPORT:    Verbal report given to Kelsey Avery(name) on Vida Hargrove  being transferred to [facility](unit) for routine progression of care       Report consisted of patients Situation, Background, Assessment and   Recommendations(SBAR). Information from the following report(s) SBAR, Kardex, Intake/Output, MAR and Recent Results was reviewed with the receiving nurse. Lines:   Peripheral IV 07/23/20 Left Antecubital (Active)   Site Assessment Clean, dry, & intact 07/24/20 0900   Phlebitis Assessment 0 07/24/20 0900   Infiltration Assessment 0 07/24/20 0900   Dressing Status Clean, dry, & intact 07/24/20 0900   Dressing Type Transparent 07/24/20 0900   Hub Color/Line Status Capped 07/24/20 0900        Opportunity for questions and clarification was provided.       Patient transported with:   Registered Nurse

## 2020-07-24 NOTE — PROGRESS NOTES
YARELY: Discharge to [facility] today. Bed is available after 2 PM today. Wheelchair van transport set for 4:45 PM with [employer]. CM spoke with Duong Glendale with [facility]. They have a meeting at 9:30 regarding bed availability and she will call this CM once confirmed. CM received call from [facility] with [facility]. The bed will be available after 2 PM today. CM met with patient, he prefers wheelchair van transport. CM delivered IM letter. CM requested wheelchair [facility]ia Gist [facility], they can do 4:45 PM.    The patient is aware of discharge and transport time. Patient plans to pay for the transport. IM letter has been delivered. CM completed CM portion of Emtala. Discharge folder located on hard chart. RN to follow with Kardex, MAR, Emtala, and call report to #[phone].      JORDAN Andrade/CRM

## 2020-07-24 NOTE — PROGRESS NOTES
Problem: Falls - Risk of  Goal: *Absence of Falls  Description: Document Freya Ross Fall Risk and appropriate interventions in the flowsheet. Note: Fall Risk Interventions:  Mobility Interventions: Communicate number of staff needed for ambulation/transfer, Patient to call before getting OOB, PT Consult for mobility concerns    Mentation Interventions: Adequate sleep, hydration, pain control    Medication Interventions: Evaluate medications/consider consulting pharmacy, Patient to call before getting OOB, Teach patient to arise slowly    Elimination Interventions: Call light in reach, Urinal in reach    History of Falls Interventions: Door open when patient unattended, Evaluate medications/consider consulting pharmacy         Problem: Diabetes Self-Management  Goal: *Monitoring blood glucose, interpreting and using results  Description: Identify recommended blood glucose targets  and personal targets.   Outcome: Progressing Towards Goal

## 2020-07-25 PROCEDURE — 3331090001 HH PPS REVENUE CREDIT

## 2020-07-25 PROCEDURE — 3331090002 HH PPS REVENUE DEBIT

## 2020-07-26 PROCEDURE — 3331090002 HH PPS REVENUE DEBIT

## 2020-07-26 PROCEDURE — 3331090001 HH PPS REVENUE CREDIT

## 2020-07-27 PROCEDURE — 3331090001 HH PPS REVENUE CREDIT

## 2020-07-27 PROCEDURE — 3331090002 HH PPS REVENUE DEBIT

## 2020-07-27 NOTE — DISCHARGE SUMMARY
Orthopedic Service Discharge Summary    Patient ID:  Cecy Cannon  [id number]  male  65 y.o.  1950    Admit date: 7/20/2020    Discharge date and time: 7/24/2020  5:00 PM     Admitting Physician: Karen Adams MD     Discharge Physician: Karen Adams MD    Consulting Physician(s): Treatment Team: Utilization Review: Jackie Callejas RN; Care Manager: Adry Padron    Date of Surgery: 7/20/2020     Preoperative Diagnosis:  DISLOCATED RIGHT TOTAL HIP    Postoperative Diagnosis: DISLOCATED RIGHT TOTAL HIP    Procedure(s): Procedure(s):  REVISION POLYLINER RIGHT TOTAL HIP REPLACEMENT    Surgeon: Jax Aguilera) and Role:     Davis Barros MD - Primary      Anesthesia:  General    Preoperative Medical Clearance:                           HPI:  Pt is a 65 y.o. male who has a history of Recurrent dislocation of hip joint prosthesis, sequela [T84.029S, Z96.649]  with pain and limitations of activities of daily living who presents at this time for a right hip revision of liner following the failure of conservative management.     PMH:   Past Medical History:   Diagnosis Date    ADD (attention deficit disorder)     Adverse effect of anesthesia     motion sickness resulting in loss of balance    Anemia due to blood loss     Hinson's esophagus with esophagitis     Benign essential tremor     Cancer ([address] 2012    BCC    Chronic pain     Cirrhosis (HCC)     Depression     Dislocated hip (HCC)     DJD (degenerative joint disease) of hip     bilat    DM (diabetes mellitus) ([address] 3/17/2010    ED (erectile dysfunction) of organic origin     Esophageal varices determined by endoscopy ([address]     Fall on or from sidewalk curb 9/24/2015    Femur fracture ([address]     Gastritis and duodenitis     GERD (gastroesophageal reflux disease)     resolved after discontinuing diclofenac    Hematuria 6/2016    High cholesterol 03/17/2010    pt denies 6/19    HTN (hypertension) 3/17/2010    Morbid obesity ([address]  Murmur, cardiac 2016    PFO (patent foramen ovale) 7/26/2017    PUD (peptic ulcer disease)     S[address] [address]     Shingles 6/2016    RESOLVING- NO RASH (HEAD)    Sleep apnea     doesnt use CPAP anymore       Medications upon admission :   Prior to Admission Medications   Prescriptions Last Dose Informant Patient Reported? Taking? B.infantis-B.ani-B.long-B.bifi (PROBIOTIC 4X) 10-15 mg TbEC 7/19/2020 at Unknown time  Yes Yes   Sig: Take 1 Tab by mouth daily. Blood Glucose Control High&Low (ACCU-CHEK SOWMYA CONTROL SOLN) soln   No No   Sig: Test blood sugar twice daily Diagnosis E 11.9   Blood-Glucose Meter (ACCU-CHEK SOWMYA PLUS METER) misc   No No   Sig: Test blood sugar twice daily Diagnosis E 11.9   acetaminophen (TYLENOL) 500 mg tablet 7/13/2020 at Unknown time  No Yes   Sig: Take 2 Tabs by mouth every six (6) hours as needed for Pain. albuterol-ipratropium (DUO-NEB) 2.5 mg-0.5 mg/3 ml nebu   No No   Sig: 3 mL by Nebulization route every four (4) hours as needed for Wheezing. atorvastatin (LIPITOR) 40 mg tablet 7/20/2020 at Unknown time  No Yes   Sig: Take 1 Tab by mouth daily. butalbital-acetaminophen-caffeine (FIORICET, ESGIC) -40 mg per tablet Unknown at Unknown time  No No   Sig: Take 1 Tab by mouth every six (6) hours as needed for Headache.   calcium citrate-vitamin D3 (CITRACAL + D) tablet 7/13/2020 at Unknown time  Yes Yes   Sig: Take 2 Tabs by mouth two (2) times a day. cephALEXin (KEFLEX) 500 mg capsule 7/20/2020 at Unknown time  No Yes   Sig: Take 1 Cap by mouth two (2) times a day. ergocalciferol (VITAMIN D2) 50,000 unit capsule 7/13/2020 at Unknown time  No Yes   Sig: TAKE 1 CAPSULE EVERY 7 DAYS   folic acid (FOLVITE) 1 mg tablet 7/13/2020 at Unknown time  No Yes   Sig: Take 1 Tab by mouth daily. gabapentin (NEURONTIN) 300 mg capsule   No No   Sig: Take 2 Caps by mouth nightly.    glucose blood VI test strips (ASCENSIA AUTODISC VI, ONE TOUCH ULTRA TEST VI) strip   No No Sig: Test blood sugar twice daily Diagnosis E 11.9. Can use [employer] Brand   insulin aspart U-100 (NovoLOG Flexpen U-100 Insulin) 100 unit/mL (3 mL) inpn   No No   Si Units by SubCUTAneous route Before breakfast, lunch, and dinner for 90 days. 7 units 3 times daily   insulin glargine (LANTUS) 100 unit/mL injection   No No   Si Units by SubCUTAneous route ACB/HS. lisinopriL (PRINIVIL, ZESTRIL) 5 mg tablet 2020 at Unknown time Self Yes Yes   Sig: Take 5 mg by mouth every morning.   magnesium 250 mg tab 2020 at Unknown time  Yes Yes   Sig: Take 500 mg by mouth daily. potassium chloride SR (KLOR-CON 10) 10 mEq tablet 2020 at Unknown time  Yes Yes   Sig: Take 10 mEq by mouth two (2) times a day. primidone (MYSOLINE) 250 mg tablet 2020  No No   Sig: TAKE 1 TABLET TWICE A DAY   rifAXIMin (XIFAXAN) 550 mg tablet 2020 at Unknown time  No Yes   Sig: Take 1 Tab by mouth two (2) times a day. sertraline (Zoloft) 100 mg tablet 2020 at Unknown time Self Yes Yes   Sig: Take 100 mg by mouth every morning. thiamine hcl 500 mg tablet 2020 at Unknown time  No Yes   Sig: Take 500 mg by mouth daily. Facility-Administered Medications: None        Allergies: Allergies   Allergen Reactions    Nsaids (Non-Steroidal Anti-Inflammatory Drug) Other (comments)     Hx of PUD and Hinson's Esophagus        Hospital Course: The patient underwent surgery. Complications:  None; patient tolerated the procedure well. Was taken to the PACU in stable condition and then transferred to the Orthopedics floor.       Condition on discharge: good    Perioperative Antibiotics:  Ancef     Postoperative Pain Management:  Acetaminophen and Oxycodone    DVT Prophylaxis: eliquis scds    Postoperative transfusions:     none Banked PRBCs     Post Op complications: none    Hemoglobin at discharge:    Lab Results   Component Value Date/Time    HGB 8.9 (L) 2020 12:07 AM    INR 1.2 (H) 2020 11:41 AM         Physical Therapy started on the day following surgery and progressed to ambulation with the aid of a rolling walker for distances of 25 feet with moderate assistance. Range of motion  limited by pain. At the time of discharge, able to go up and down stairs and had understanding of precautions needed following surgery. Discharged to: SNF ([facility]). Discharge instructions:  -See Full Summary of discharge instructions attached  -Anticoagulate with eliquis  -Resume pre hospital diet            -Resume home medications   Discharge Medication List as of 7/24/2020  3:15 PM      START taking these medications    Details   apixaban (ELIQUIS) 2.5 mg tablet Take 1 Tab by mouth every twelve (12) hours every twelve (12) hours. Indications: deep vein thrombosis prevention, Normal, Disp-60 Tab,R-0      senna-docusate (PERICOLACE) 8.6-50 mg per tablet Take 1 Tab by mouth two (2) times a day. Indications: constipation, Normal, Disp-30 Tab,R-0      oxyCODONE IR (ROXICODONE) 5 mg immediate release tablet Take 1 Tab by mouth every four (4) hours as needed for Pain for up to 10 days. Max Daily Amount: 30 mg. Indications: pain, Normal, Disp-60 Tab,R-0         CONTINUE these medications which have CHANGED    Details   insulin glargine (LANTUS) 100 unit/mL injection 20 Units by SubCUTAneous route ACB/HS., Normal, Disp-36 mL,R-0         CONTINUE these medications which have NOT CHANGED    Details   lisinopriL (PRINIVIL, ZESTRIL) 5 mg tablet Take 5 mg by mouth every morning., Historical Med      sertraline (Zoloft) 100 mg tablet Take 100 mg by mouth every morning., Historical Med      rifAXIMin (XIFAXAN) 550 mg tablet Take 1 Tab by mouth two (2) times a day., Normal, Disp-180 Tab, R-3      potassium chloride SR (KLOR-CON 10) 10 mEq tablet Take 10 mEq by mouth two (2) times a day., Historical Med      thiamine hcl 500 mg tablet Take 500 mg by mouth daily. , No Print, Disp-30 Tab, R-0      folic acid (FOLVITE) 1 mg tablet Take 1 Tab by mouth daily. , Normal, Disp-30 Tab, R-0      cephALEXin (KEFLEX) 500 mg capsule Take 1 Cap by mouth two (2) times a day., Normal, Disp-30 Cap, R-6      ergocalciferol (VITAMIN D2) 50,000 unit capsule TAKE 1 CAPSULE EVERY 7 DAYS, Normal, Disp-12 Cap, R-2      atorvastatin (LIPITOR) 40 mg tablet Take 1 Tab by mouth daily. , Normal, Disp-90 Tab, R-3      acetaminophen (TYLENOL) 500 mg tablet Take 2 Tabs by mouth every six (6) hours as needed for Pain., Print, Disp-30 Tab, R-0      B.infantis-B.ani-B.long-B.bifi (PROBIOTIC 4X) 10-15 mg TbEC Take 1 Tab by mouth daily. , Historical Med      magnesium 250 mg tab Take 500 mg by mouth daily. , Historical Med      calcium citrate-vitamin D3 (CITRACAL + D) tablet Take 2 Tabs by mouth two (2) times a day., Historical Med      insulin aspart U-100 (NovoLOG Flexpen U-100 Insulin) 100 unit/mL (3 mL) inpn 7 Units by SubCUTAneous route Before breakfast, lunch, and dinner for 90 days. 7 units 3 times daily, Normal, Disp-18.9 mL, R-0      albuterol-ipratropium (DUO-NEB) 2.5 mg-0.5 mg/3 ml nebu 3 mL by Nebulization route every four (4) hours as needed for Wheezing., No Print, Disp-30 Nebule, R-0      gabapentin (NEURONTIN) 300 mg capsule Take 2 Caps by mouth nightly., Normal, Disp-180 Cap, R-1      butalbital-acetaminophen-caffeine (FIORICET, ESGIC) -40 mg per tablet Take 1 Tab by mouth every six (6) hours as needed for Headache., Normal, Disp-30 Tab, R-1      glucose blood VI test strips (ASCENSIA AUTODISC VI, ONE TOUCH ULTRA TEST VI) strip Test blood sugar twice daily Diagnosis E 11.9.  Can use [employer] Brand, Normal, Disp-200 Strip, R-4      primidone (MYSOLINE) 250 mg tablet TAKE 1 TABLET TWICE A DAY, Normal, Disp-180 Tab, R-3      Blood-Glucose Meter (ACCU-CHEK SOWMYA PLUS METER) misc Test blood sugar twice daily Diagnosis E 11.9, Normal, Disp-1 Each, R-1      Blood Glucose Control High&Low (ACCU-CHEK SOWMYA CONTROL SOLN) soln Test blood sugar twice daily Diagnosis E 11.9, Normal, Disp-1 Bottle, R-3          per medical continuation form  -Discharge activity: Activity as tolerated  -Ambulate with Walkers, Type: Rolling Walker, appropriate total joint protocol  -Wound Care Keep wound clean and dry, Reinforce dressing PRN, Ice to area for comfort and As directed  -Follow up in office in 2 weeks      Signed:  Ambreen Parmar PA-C  7/27/2020  4:21 PM        No att. providers found

## 2020-07-28 PROCEDURE — 3331090001 HH PPS REVENUE CREDIT

## 2020-07-28 PROCEDURE — 3331090002 HH PPS REVENUE DEBIT

## 2020-07-29 PROCEDURE — 3331090002 HH PPS REVENUE DEBIT

## 2020-07-29 PROCEDURE — 3331090001 HH PPS REVENUE CREDIT

## 2020-07-30 PROCEDURE — 3331090001 HH PPS REVENUE CREDIT

## 2020-07-30 PROCEDURE — 3331090002 HH PPS REVENUE DEBIT

## 2020-07-31 ENCOUNTER — HOME CARE VISIT (OUTPATIENT)
Dept: SCHEDULING | Facility: HOME HEALTH | Age: 70
End: 2020-07-31
Payer: MEDICARE

## 2020-07-31 VITALS
SYSTOLIC BLOOD PRESSURE: 130 MMHG | RESPIRATION RATE: 18 BRPM | HEART RATE: 78 BPM | TEMPERATURE: 99.6 F | DIASTOLIC BLOOD PRESSURE: 85 MMHG | OXYGEN SATURATION: 99 %

## 2020-07-31 PROCEDURE — G0151 HHCP-SERV OF PT,EA 15 MIN: HCPCS

## 2020-07-31 PROCEDURE — G0493 RN CARE EA 15 MIN HH/HOSPICE: HCPCS

## 2020-07-31 PROCEDURE — 400013 HH SOC

## 2020-07-31 PROCEDURE — 3331090001 HH PPS REVENUE CREDIT

## 2020-07-31 PROCEDURE — 3331090002 HH PPS REVENUE DEBIT

## 2020-07-31 NOTE — PROGRESS NOTES
PT attempted to contact the MD office, but was unable to go past the mechine after holding for several minutes. PT wanted to communicate about the following  Patient is a hoarder and his house a complete mess including three uncontrolled dogs with feces/dirty urine pads  all over his bedroom/living room. PT like to obtain a social srevice order to help the patient if possible. PT also was unable to reconceliate the medications beasue of   they were not available/was unable to find. Per pateint his son is helping with his medications and will find ot later when son comes back from work.

## 2020-08-01 PROCEDURE — A6213 FOAM DRG >16<=48 SQ IN W/BDR: HCPCS

## 2020-08-01 PROCEDURE — 3331090001 HH PPS REVENUE CREDIT

## 2020-08-01 PROCEDURE — 3331090002 HH PPS REVENUE DEBIT

## 2020-08-02 VITALS
DIASTOLIC BLOOD PRESSURE: 88 MMHG | SYSTOLIC BLOOD PRESSURE: 152 MMHG | TEMPERATURE: 99 F | HEART RATE: 88 BPM | OXYGEN SATURATION: 96 % | RESPIRATION RATE: 18 BRPM

## 2020-08-02 PROCEDURE — 3331090001 HH PPS REVENUE CREDIT

## 2020-08-02 PROCEDURE — 3331090002 HH PPS REVENUE DEBIT

## 2020-08-03 ENCOUNTER — HOME CARE VISIT (OUTPATIENT)
Dept: HOME HEALTH SERVICES | Facility: HOME HEALTH | Age: 70
End: 2020-08-03
Payer: MEDICARE

## 2020-08-03 PROCEDURE — 3331090001 HH PPS REVENUE CREDIT

## 2020-08-03 PROCEDURE — 3331090002 HH PPS REVENUE DEBIT

## 2020-08-04 ENCOUNTER — TELEPHONE (OUTPATIENT)
Dept: FAMILY MEDICINE CLINIC | Age: 70
End: 2020-08-04

## 2020-08-04 ENCOUNTER — HOME CARE VISIT (OUTPATIENT)
Dept: HOME HEALTH SERVICES | Facility: HOME HEALTH | Age: 70
End: 2020-08-04
Payer: MEDICARE

## 2020-08-04 ENCOUNTER — HOME CARE VISIT (OUTPATIENT)
Dept: SCHEDULING | Facility: HOME HEALTH | Age: 70
End: 2020-08-04
Payer: MEDICARE

## 2020-08-04 VITALS
HEART RATE: 79 BPM | OXYGEN SATURATION: 98 % | SYSTOLIC BLOOD PRESSURE: 132 MMHG | TEMPERATURE: 98.9 F | DIASTOLIC BLOOD PRESSURE: 86 MMHG

## 2020-08-04 PROCEDURE — G0157 HHC PT ASSISTANT EA 15: HCPCS

## 2020-08-04 PROCEDURE — 3331090001 HH PPS REVENUE CREDIT

## 2020-08-04 PROCEDURE — 3331090002 HH PPS REVENUE DEBIT

## 2020-08-04 PROCEDURE — G0300 HHS/HOSPICE OF LPN EA 15 MIN: HCPCS

## 2020-08-04 NOTE — TELEPHONE ENCOUNTER
Pt reports he had hip surgery on 7/20/2020 and he needs a f/u and R foot is numb x 1 week ( he has appt tomorrow with surgeon)     He wants an appt and needs to know if VV or come in to the office     Best number to reach him is [phone]    Pls return call to patient

## 2020-08-05 ENCOUNTER — HOME CARE VISIT (OUTPATIENT)
Dept: SCHEDULING | Facility: HOME HEALTH | Age: 70
End: 2020-08-05
Payer: MEDICARE

## 2020-08-05 VITALS
OXYGEN SATURATION: 97 % | SYSTOLIC BLOOD PRESSURE: 142 MMHG | HEART RATE: 72 BPM | TEMPERATURE: 97.9 F | DIASTOLIC BLOOD PRESSURE: 70 MMHG

## 2020-08-05 VITALS — HEART RATE: 66 BPM | RESPIRATION RATE: 17 BRPM | OXYGEN SATURATION: 97 % | TEMPERATURE: 97.2 F

## 2020-08-05 PROCEDURE — G0152 HHCP-SERV OF OT,EA 15 MIN: HCPCS

## 2020-08-05 PROCEDURE — 3331090001 HH PPS REVENUE CREDIT

## 2020-08-05 PROCEDURE — 3331090002 HH PPS REVENUE DEBIT

## 2020-08-06 PROCEDURE — 3331090001 HH PPS REVENUE CREDIT

## 2020-08-06 PROCEDURE — 3331090002 HH PPS REVENUE DEBIT

## 2020-08-06 NOTE — TELEPHONE ENCOUNTER
Spoke with patient and scheduled for [facility] hospital follow-up appointment with Dr Shahriar Quinones.

## 2020-08-07 ENCOUNTER — HOME CARE VISIT (OUTPATIENT)
Dept: HOME HEALTH SERVICES | Facility: HOME HEALTH | Age: 70
End: 2020-08-07
Payer: MEDICARE

## 2020-08-07 PROCEDURE — 3331090002 HH PPS REVENUE DEBIT

## 2020-08-07 PROCEDURE — 3331090001 HH PPS REVENUE CREDIT

## 2020-08-08 PROCEDURE — 3331090002 HH PPS REVENUE DEBIT

## 2020-08-08 PROCEDURE — 3331090001 HH PPS REVENUE CREDIT

## 2020-08-09 ENCOUNTER — HOME CARE VISIT (OUTPATIENT)
Dept: HOME HEALTH SERVICES | Facility: HOME HEALTH | Age: 70
End: 2020-08-09
Payer: MEDICARE

## 2020-08-09 PROCEDURE — 3331090002 HH PPS REVENUE DEBIT

## 2020-08-09 PROCEDURE — 3331090001 HH PPS REVENUE CREDIT

## 2020-08-10 ENCOUNTER — VIRTUAL VISIT (OUTPATIENT)
Dept: FAMILY MEDICINE CLINIC | Age: 70
End: 2020-08-10
Payer: MEDICARE

## 2020-08-10 ENCOUNTER — HOME CARE VISIT (OUTPATIENT)
Dept: SCHEDULING | Facility: HOME HEALTH | Age: 70
End: 2020-08-10
Payer: MEDICARE

## 2020-08-10 VITALS
RESPIRATION RATE: 18 BRPM | HEART RATE: 71 BPM | DIASTOLIC BLOOD PRESSURE: 80 MMHG | OXYGEN SATURATION: 97 % | TEMPERATURE: 98.4 F | SYSTOLIC BLOOD PRESSURE: 160 MMHG

## 2020-08-10 DIAGNOSIS — D50.9 IRON DEFICIENCY ANEMIA, UNSPECIFIED IRON DEFICIENCY ANEMIA TYPE: ICD-10-CM

## 2020-08-10 DIAGNOSIS — Z96.649 S/P REVISION OF TOTAL HIP: ICD-10-CM

## 2020-08-10 DIAGNOSIS — K74.60 CIRRHOSIS OF LIVER WITH ASCITES, UNSPECIFIED HEPATIC CIRRHOSIS TYPE (HCC): ICD-10-CM

## 2020-08-10 DIAGNOSIS — E11.65 UNCONTROLLED TYPE 2 DIABETES MELLITUS WITH HYPERGLYCEMIA (HCC): ICD-10-CM

## 2020-08-10 DIAGNOSIS — R18.8 CIRRHOSIS OF LIVER WITH ASCITES, UNSPECIFIED HEPATIC CIRRHOSIS TYPE (HCC): ICD-10-CM

## 2020-08-10 DIAGNOSIS — Z09 HOSPITAL DISCHARGE FOLLOW-UP: ICD-10-CM

## 2020-08-10 DIAGNOSIS — Z00.00 MEDICARE ANNUAL WELLNESS VISIT, SUBSEQUENT: Primary | ICD-10-CM

## 2020-08-10 DIAGNOSIS — K74.60 ESOPHAGEAL VARICES IN CIRRHOSIS (HCC): ICD-10-CM

## 2020-08-10 DIAGNOSIS — I10 ESSENTIAL HYPERTENSION: ICD-10-CM

## 2020-08-10 DIAGNOSIS — I85.10 ESOPHAGEAL VARICES IN CIRRHOSIS (HCC): ICD-10-CM

## 2020-08-10 PROCEDURE — 2022F DILAT RTA XM EVC RTNOPTHY: CPT | Performed by: FAMILY MEDICINE

## 2020-08-10 PROCEDURE — G9717 DOC PT DX DEP/BP F/U NT REQ: HCPCS | Performed by: FAMILY MEDICINE

## 2020-08-10 PROCEDURE — 3017F COLORECTAL CA SCREEN DOC REV: CPT | Performed by: FAMILY MEDICINE

## 2020-08-10 PROCEDURE — 1100F PTFALLS ASSESS-DOCD GE2>/YR: CPT | Performed by: FAMILY MEDICINE

## 2020-08-10 PROCEDURE — G0155 HHCP-SVS OF CSW,EA 15 MIN: HCPCS

## 2020-08-10 PROCEDURE — G8417 CALC BMI ABV UP PARAM F/U: HCPCS | Performed by: FAMILY MEDICINE

## 2020-08-10 PROCEDURE — 3331090002 HH PPS REVENUE DEBIT

## 2020-08-10 PROCEDURE — G8753 SYS BP > OR = 140: HCPCS | Performed by: FAMILY MEDICINE

## 2020-08-10 PROCEDURE — 1111F DSCHRG MED/CURRENT MED MERGE: CPT | Performed by: FAMILY MEDICINE

## 2020-08-10 PROCEDURE — G8536 NO DOC ELDER MAL SCRN: HCPCS | Performed by: FAMILY MEDICINE

## 2020-08-10 PROCEDURE — G8427 DOCREV CUR MEDS BY ELIG CLIN: HCPCS | Performed by: FAMILY MEDICINE

## 2020-08-10 PROCEDURE — G0151 HHCP-SERV OF PT,EA 15 MIN: HCPCS

## 2020-08-10 PROCEDURE — G8754 DIAS BP LESS 90: HCPCS | Performed by: FAMILY MEDICINE

## 2020-08-10 PROCEDURE — 3044F HG A1C LEVEL LT 7.0%: CPT | Performed by: FAMILY MEDICINE

## 2020-08-10 PROCEDURE — 3288F FALL RISK ASSESSMENT DOCD: CPT | Performed by: FAMILY MEDICINE

## 2020-08-10 PROCEDURE — 99442 PR PHYS/QHP TELEPHONE EVALUATION 11-20 MIN: CPT | Performed by: FAMILY MEDICINE

## 2020-08-10 PROCEDURE — 3331090001 HH PPS REVENUE CREDIT

## 2020-08-10 RX ORDER — FERROUS SULFATE 324(65)MG
TABLET, DELAYED RELEASE (ENTERIC COATED) ORAL
Status: ON HOLD | COMMUNITY
End: 2020-08-15

## 2020-08-10 RX ORDER — DICLOFENAC SODIUM 10 MG/G
2 GEL TOPICAL 4 TIMES DAILY
Status: ON HOLD | COMMUNITY
End: 2021-01-01

## 2020-08-10 NOTE — PROGRESS NOTES
Gregorio Hilliard is a 65 y.o. male, evaluated via audio-only technology on 8/10/2020 for Hospital Follow Up (Discharged from Rehab 7/30/20) Marzena Bagley Assessment & Plan:  
Diagnoses and all orders for this visit: 
 
1. Medicare annual wellness visit, subsequent 2. Hospital discharge follow-up - recovering ok, answered numerous questions regarding recovery expectations and overall medical issues. Discussed pt's concerns with drop foot and episodic voice loss. Ct to work with Dr. Chris Ortega. Discussed checking in with ENT if voice changes persist to eval vocal cords. -     CBC W/O DIFF 
-     METABOLIC PANEL, COMPREHENSIVE 3. S/P revision of total hip - no longer dealing with dislocations and seems to be recovering. Working with home health PT and OT 
 
4. Uncontrolled type 2 diabetes mellitus with hyperglycemia (HCC) - chronic hyperglycemia though A1C well controlled d/t sig anemia with UGI bleed from varices back in 3/2020. Working with Dr. Mandy Plummer on this anastasia in setting of cirrhosis. -     METABOLIC PANEL, COMPREHENSIVE 5. Iron deficiency anemia, unspecified iron deficiency anemia type - mild postop anemia in setting of prev anemia. To recheck labs and would consider another Fe infusion anastasia given use of chronic PPI. For now will ct PO Fe 
-     CBC W/O DIFF 6. Cirrhosis of liver with ascites, unspecified hepatic cirrhosis type ([address] - ongoing issue. Encouraged major changes to diet and focus on sig weight loss given underlying cause of NAFLD. Ct working with [employer]. -     METABOLIC PANEL, COMPREHENSIVE 7. Esophageal varices in cirrhosis (HCC) - s/p TIPS, encouraged f/u with GI since required sig banding back in 3/2020 
-     CBC W/O DIFF 
-     METABOLIC PANEL, COMPREHENSIVE 8. Essential hypertension - has been controlled -     METABOLIC PANEL, COMPREHENSIVE Follow-up and Dispositions · Return in about 6 weeks (around 9/21/2020). 12 
Subjective: Hospital follow up d/c - admitted from 7/20/2020-7/24/2020 for dislocated right total hip and had surgery with revision of R THR with Dr. Adelaida Mcarthur. Discharged to [address] [place] [address] for very short rehab stay. Pt has ct with home health for PT, OT, and nursing. Feels like he is doing well. Has numerous questions about foot drop, anemia, and medications that we reviewed today. He reports improvement in mobility and no issues with recurrent dislocation. Has already checked in with Dr. Adelaida Mcarthur post op. Diabetes with hyperglycemia \"especially when I'm bad\" per pt. Ct working with Dr. Charles Dai. A1C excellent but in setting of acute UGI bleed from eso varices in 3/2020. Prior to Admission medications Medication Sig Start Date End Date Taking? Authorizing Provider  
lactulose (CHRONULAC) 10 gram/15 mL solution Take 30 mL by mouth two (2) times a day. Yes Provider, Historical  
diclofenac (VOLTAREN) 1 % gel Apply 2 g to affected area four (4) times daily as needed for Pain. Yes Provider, Historical  
oxyCODONE IR (ROXICODONE) 5 mg immediate release tablet Take 5 mg by mouth every four (4) hours as needed for Pain. Yes Provider, Historical  
furosemide (LASIX) 20 mg tablet Take 20 mg by mouth daily. Yes Provider, Historical  
Omeprazole delayed release (PRILOSEC D/R) 20 mg tablet Take 20 mg by mouth daily. Yes Provider, Historical  
insulin glargine (LANTUS) 100 unit/mL injection 20 Units by SubCUTAneous route ACB/HS. Patient taking differently: 36 Units by SubCUTAneous route nightly. 7/23/20  Yes Abelardo Livingston MD  
apixaban (ELIQUIS) 2.5 mg tablet Take 1 Tab by mouth every twelve (12) hours every twelve (12) hours. Indications: deep vein thrombosis prevention 7/22/20  Yes AMAN Machado  
lisinopriL (PRINIVIL, ZESTRIL) 5 mg tablet Take 10 mg by mouth every morning. Yes Provider, Historical  
sertraline (Zoloft) 100 mg tablet Take 150 mg by mouth daily.    Yes Provider, Historical  
 insulin aspart U-100 (NovoLOG Flexpen U-100 Insulin) 100 unit/mL (3 mL) inpn 7 Units by SubCUTAneous route Before breakfast, lunch, and dinner for 90 days. 7 units 3 times daily 5/27/20 8/25/20 Yes Uma Escoto MD  
rifAXIMin [place] Friend) 550 mg tablet Take 1 Tab by mouth two (2) times a day. 4/23/20  Yes Jake Navarro MD  
potassium chloride SR (KLOR-CON 10) 10 mEq tablet Take 10 mEq by mouth two (2) times a day. 4/18/20  Yes Provider, Trae  
folic acid (FOLVITE) 1 mg tablet Take 1 Tab by mouth daily. 3/26/20  Yes Jt Reese NP  
gabapentin (NEURONTIN) 300 mg capsule Take 2 Caps by mouth nightly. 3/16/20  Yes Uma Escoto MD  
butalbital-acetaminophen-caffeine (FIORICET, ESGIC) -40 mg per tablet Take 1 Tab by mouth every six (6) hours as needed for Headache. 3/6/20  Yes Uma Escoto MD  
cephALEXin (KEFLEX) 500 mg capsule Take 1 Cap by mouth two (2) times a day. 2/24/20  Yes Uma Escoto MD  
ergocalciferol (VITAMIN D2) 50,000 unit capsule TAKE 1 CAPSULE EVERY 7 DAYS Patient taking differently: Take 50,000 Units by mouth every seven (7) days. TAKE 1 CAPSULE EVERY 7 DAYS 1/6/20  Yes Uma Escoto MD  
atorvastatin (LIPITOR) 40 mg tablet Take 1 Tab by mouth daily. 1/4/20  Yes Carla Madden MD  
glucose blood VI test strips (ASCENSIA AUTODISC VI, ONE TOUCH ULTRA TEST VI) strip Test blood sugar twice daily Diagnosis E 11.9. Can use [employer] 12/13/19  Yes Uma Escoto MD  
primidone (MYSOLINE) 250 mg tablet TAKE 1 TABLET TWICE A DAY Patient taking differently: Take 250 mg by mouth two (2) times a day. TAKE 1 TABLET TWICE A DAY 11/7/19  Yes Uma Escoto MD  
acetaminophen (TYLENOL) 500 mg tablet Take 2 Tabs by mouth every six (6) hours as needed for Pain.  10/7/19  Yes Britton Hashimoto, MD  
Blood-Glucose Meter (ACCU-CHEK SOWMYA PLUS METER) misc Test blood sugar twice daily Diagnosis E 11.9 7/9/19  Yes Uma Escoto MD  
 Blood Glucose Control High&Low (ACCU-CHEK SOWMYA CONTROL SOLN) soln Test blood sugar twice daily Diagnosis E 11.9 7/9/19  Yes MD Carley Campos-B.long-Yashira (PROBIOTIC 4X) 10-15 mg TbEC Take 1 Tab by mouth daily. Yes Provider, Historical  
calcium citrate-vitamin D3 (CITRACAL + D) tablet Take 2 Tabs by mouth two (2) times a day. Yes Provider, Historical  
ferrous sulfate 324 mg (65 mg iron) tablet Take  by mouth Daily (before breakfast). Provider, Historical  
senna-docusate (PERICOLACE) 8.6-50 mg per tablet Take 1 Tab by mouth two (2) times a day. Indications: constipation 7/22/20   Brooksie Hodgkin, PA  
thiamine hcl 500 mg tablet Take 500 mg by mouth daily. 4/6/20   Marline Perez, NP  
albuterol-ipratropium (DUO-NEB) 2.5 mg-0.5 mg/3 ml nebu 3 mL by Nebulization route every four (4) hours as needed for Wheezing. 4/6/20 8/10/20  Marline Perez, NP  
magnesium 250 mg tab Take 500 mg by mouth daily. 8/10/20  Provider, Historical  
 
Patient Active Problem List  
Diagnosis Code  
 HTN (hypertension) I10  
 Hypercholesterolemia E78.00  
 Osteoarthritis of hip M16.9  Depression F32.9  ED (erectile dysfunction) of organic origin N52.9  GERD (gastroesophageal reflux disease) K21.9  PUD (peptic ulcer disease) K27.9  Hinson's esophagus with esophagitis K22.70, K20.9  Hypogonadism male E29.1  Diabetes mellitus type 2, uncontrolled ([address] E11.65  Benign essential tremor G25.0  Osteoarthritis of right hip M16.11  
 Jessica-prosthetic fracture of femur following total hip arthroplasty M97. 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  
 Insomnia G47.00  Dislocation of prosthetic joint ([address] T84.029A  Esophageal varices with bleeding (HCC) I85.01  
 BREWER (nonalcoholic steatohepatitis) K75.81  
  Hepatic encephalopathy (HCC) K72.90  
 S/P TIPS (transjugular intrahepatic portosystemic shunt) O52.030  Closed dislocation of right hip ([address] S73.004A  Recurrent dislocation of hip joint prosthesis ([address] T84.029A, F35.268 Past Medical History:  
Diagnosis Date  ADD (attention deficit disorder)  Adverse effect of anesthesia   
 motion sickness resulting in loss of balance  Anemia due to blood loss  Hinson's esophagus with esophagitis  Benign essential tremor  Cancer [facility] 2012 [facility]  Chronic pain  Cirrhosis ([address]  Depression  Dislocated hip ([address]  DJD (degenerative joint disease) of hip   
 bilat  DM (diabetes mellitus) ([address] 3/17/2010  ED (erectile dysfunction) of organic origin  Esophageal varices determined by endoscopy ([address]  Fall on or from sidewalk curb 9/24/2015  Femur fracture ([address]  Gastritis and duodenitis  GERD (gastroesophageal reflux disease)   
 resolved after discontinuing diclofenac  Hematuria 6/2016  High cholesterol 03/17/2010  
 pt denies 6/19  
 HTN (hypertension) 3/17/2010  Morbid obesity ([address]  Murmur, cardiac 2016  
 PFO (patent foramen ovale) 7/26/2017  PUD (peptic ulcer disease)  Sepsis ([address]  Shingles 6/2016 RESOLVING- NO RASH (HEAD)  Sleep apnea   
 doesnt use CPAP anymore ROS Gen - no fever/chills Resp - no dyspnea or cough CV - no chest pain or WANG Rest per HPI Patient-Reported Vitals 8/10/2020 Patient-Reported Pulse 71 Patient-Reported Temperature 98.4 Patient-Reported SpO2 97 Patient-Reported Systolic  575 Patient-Reported Diastolic 70 Jameson Ramos, who was evaluated through a patient-initiated, synchronous (real-time) audio only encounter, and/or her healthcare decision maker, is aware that it is a billable service, with coverage as determined by his insurance carrier. He provided verbal consent to proceed: Yes.  He has not had a related appointment within my department in the past 7 days or scheduled within the next 24 hours. Total Time: minutes: 11-20 minutes Veronica Ordonez MD  
 
 
 
This is the Subsequent Medicare Annual Wellness Exam, performed 12 months or more after the Initial AWV or the last Subsequent AWV I have reviewed the patient's medical history in detail and updated the computerized patient record. History Patient Active Problem List  
Diagnosis Code  
 HTN (hypertension) I10  
 Hypercholesterolemia E78.00  
 Osteoarthritis of hip M16.9  Depression F32.9  ED (erectile dysfunction) of organic origin N52.9  GERD (gastroesophageal reflux disease) K21.9  PUD (peptic ulcer disease) K27.9  Hinson's esophagus with esophagitis K22.70, K20.9  Hypogonadism male E29.1  Diabetes mellitus type 2, uncontrolled ([address] E11.65  Benign essential tremor G25.0  Osteoarthritis of right hip M16.11  
 Jessica-prosthetic fracture of femur following total hip arthroplasty M97. 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  
 Insomnia G47.00  Dislocation of prosthetic joint ([address] T84.029A  Esophageal varices with bleeding (HCC) I85.01  
 BREWER (nonalcoholic steatohepatitis) K75.81  
 Hepatic encephalopathy (HCC) K72.90  
 S/P TIPS (transjugular intrahepatic portosystemic shunt) Y98.110  Closed dislocation of right hip ([address] S73.004A  Recurrent dislocation of hip joint prosthesis ([address] T84.029A, T39.386 Past Medical History:  
Diagnosis Date  ADD (attention deficit disorder)  Adverse effect of anesthesia   
 motion sickness resulting in loss of balance  Anemia due to blood loss  Hinson's esophagus with esophagitis  Benign essential tremor  Cancer [facility]) 2012 [address]  Chronic pain  Cirrhosis ([address]  Depression  [facility]  DJD (degenerative joint disease) of hip   
 bilat  DM (diabetes mellitus) ([facility] 3/17/2010  ED (erectile dysfunction) of organic origin  Esophageal varices determined by endoscopy ([facility]  Fall on or from sidewalk curb 9/24/2015  Femur fracture ([facility]  Gastritis and duodenitis  GERD (gastroesophageal reflux disease)   
 resolved after discontinuing diclofenac  Hematuria 6/2016  High cholesterol 03/17/2010  
 pt denies 6/19  
 HTN (hypertension) 3/17/2010  Morbid obesity ([facility]  Murmur, cardiac 2016  
 PFO (patent foramen ovale) 7/26/2017  PUD (peptic ulcer disease)  Sepsis ([facility]  Shingles 6/2016 RESOLVING- NO RASH (HEAD)  Sleep apnea   
 doesnt use CPAP anymore Past Surgical History:  
Procedure Laterality Date  COLONOSCOPY N/A 6/18/2019 COLONOSCOPY AND EGD performed by Kary Kim MD at [facility] ENDOSCOPY  COLONOSCOPY,DIAGNOSTIC  6/18/2019  ENDOSCOPY, COLON, DIAGNOSTIC    
 HX CATARACT REMOVAL Bilateral   
 HX CHOLECYSTECTOMY  1995  HX GI  1980  
 gastroplasty [address]  HX HIP REPLACEMENT Left  HX ORTHOPAEDIC Right 2011  
 rot cuff repair  HX ORTHOPAEDIC  2016  
 left broken femur [address]  HX VASCULAR ACCESS    
 picc line and removed after sepsis resolved  IR INSERT TIPS HEPATIC SHUNT  3/28/2020 [facility] ARTHROPLASTY  05/2010  
 right  TOTAL HIP ARTHROPLASTY  08/01/2016  
 left  UPPER GI ENDOSCOPY,BIOPSY  6/18/2019 Current Outpatient Medications Medication Sig Dispense Refill  lactulose (CHRONULAC) 10 gram/15 mL solution Take 30 mL by mouth two (2) times a day.  diclofenac (VOLTAREN) 1 % gel Apply 2 g to affected area four (4) times daily as needed for Pain.  oxyCODONE IR (ROXICODONE) 5 mg immediate release tablet Take 5 mg by mouth every four (4) hours as needed for Pain.  furosemide (LASIX) 20 mg tablet Take 20 mg by mouth daily.  Omeprazole delayed release (PRILOSEC D/R) 20 mg tablet Take 20 mg by mouth daily.  insulin glargine (LANTUS) 100 unit/mL injection 20 Units by SubCUTAneous route ACB/HS. (Patient taking differently: 36 Units by SubCUTAneous route nightly.) 36 mL 0  
 apixaban (ELIQUIS) 2.5 mg tablet Take 1 Tab by mouth every twelve (12) hours every twelve (12) hours. Indications: deep vein thrombosis prevention 60 Tab 0  
 lisinopriL (PRINIVIL, ZESTRIL) 5 mg tablet Take 10 mg by mouth every morning.  sertraline (Zoloft) 100 mg tablet Take 150 mg by mouth daily.  insulin aspart U-100 (NovoLOG Flexpen U-100 Insulin) 100 unit/mL (3 mL) inpn 7 Units by SubCUTAneous route Before breakfast, lunch, and dinner for 90 days. 7 units 3 times daily 18.9 mL 0  
 rifAXIMin (XIFAXAN) 550 mg tablet Take 1 Tab by mouth two (2) times a day. 180 Tab 3  potassium chloride SR (KLOR-CON 10) 10 mEq tablet Take 10 mEq by mouth two (2) times a day.  folic acid (FOLVITE) 1 mg tablet Take 1 Tab by mouth daily. 30 Tab 0  
 gabapentin (NEURONTIN) 300 mg capsule Take 2 Caps by mouth nightly. 180 Cap 1  
 butalbital-acetaminophen-caffeine (FIORICET, ESGIC) -40 mg per tablet Take 1 Tab by mouth every six (6) hours as needed for Headache. 30 Tab 1  cephALEXin (KEFLEX) 500 mg capsule Take 1 Cap by mouth two (2) times a day. 30 Cap 6  ergocalciferol (VITAMIN D2) 50,000 unit capsule TAKE 1 CAPSULE EVERY 7 DAYS (Patient taking differently: Take 50,000 Units by mouth every seven (7) days. TAKE 1 CAPSULE EVERY 7 DAYS) 12 Cap 2  
 atorvastatin (LIPITOR) 40 mg tablet Take 1 Tab by mouth daily. 90 Tab 3  
 glucose blood VI test strips (ASCENSIA AUTODISC VI, ONE TOUCH ULTRA TEST VI) strip Test blood sugar twice daily Diagnosis E 11.9. Can use [employer] 200 Strip 4  primidone (MYSOLINE) 250 mg tablet TAKE 1 TABLET TWICE A DAY (Patient taking differently: Take 250 mg by mouth two (2) times a day. TAKE 1 TABLET TWICE A DAY) 180 Tab 3  
 acetaminophen (TYLENOL) 500 mg tablet Take 2 Tabs by mouth every six (6) hours as needed for Pain. 30 Tab 0  Blood-Glucose Meter (ACCU-CHEK SOWMYA PLUS METER) misc Test blood sugar twice daily Diagnosis E 11.9 1 Each 1  Blood Glucose Control High&Low (ACCU-CHEK SOWMYA CONTROL SOLN) soln Test blood sugar twice daily Diagnosis E 11.9 1 Bottle 3  
 B.infantis-B.ani-B.long-B.bifi (PROBIOTIC 4X) 10-15 mg TbEC Take 1 Tab by mouth daily.  calcium citrate-vitamin D3 (CITRACAL + D) tablet Take 2 Tabs by mouth two (2) times a day.  ferrous sulfate 324 mg (65 mg iron) tablet Take  by mouth Daily (before breakfast).  senna-docusate (PERICOLACE) 8.6-50 mg per tablet Take 1 Tab by mouth two (2) times a day. Indications: constipation 30 Tab 0  
 thiamine hcl 500 mg tablet Take 500 mg by mouth daily. 30 Tab 0 Allergies Allergen Reactions  Nsaids (Non-Steroidal Anti-Inflammatory Drug) Other (comments) Hx of PUD and Hinson's Esophagus Family History Problem Relation Age of Onset  Cancer Father   
     multiple myeloma  Stroke Father  Dementia Mother  No Known Problems Brother  COPD Brother  No Known Problems Daughter  Cancer Maternal Grandmother COLON  
 No Known Problems Son  No Known Problems Son  Anesth Problems Neg Hx Social History Tobacco Use  Smoking status: Former Smoker Packs/day: 2.00 Years: 15.00 Pack years: 30.00 Last attempt to quit: 3/1/1979 Years since quittin.4  Smokeless tobacco: Never Used Substance Use Topics  Alcohol use: No  
  Alcohol/week: 0.0 standard drinks Depression Risk Factor Screening:  
 
3 most recent PHQ Screens 2019 PHQ Not Done - Little interest or pleasure in doing things Not at all Feeling down, depressed, irritable, or hopeless Not at all Total Score PHQ 2 0 Alcohol Risk Factor Screening (MALE > 65): Do you average more 1 drink per night or more than 7 drinks a week: No 
 
In the past three months have you have had more than 4 drinks containing alcohol on one occasion: No 
 
 
Functional Ability and Level of Safety:  
Hearing: Hearing is good. Activities of Daily Living: The home contains: no safety equipment. Patient does total self care Ambulation: with difficulty, uses a walker Son available during showers d/t fall risk. Fall Risk: 
Fall Risk Assessment, last 12 mths 10/10/2019 Able to walk? Yes Fall in past 12 months? Yes Fall with injury? Yes  
Number of falls in past 12 months 8 or more Fall Risk Score 9 Abuse Screen: 
Patient is not abused Cognitive Screening Has your family/caregiver stated any concerns about your memory: no 
 
Cognitive Screening: Normal - Verbal Fluency Test 
 
Patient Care Team  
Patient Care Team: 
Alisia Sim MD as PCP - General (Family Medicine) Alisia Sim [facility] Provider Eboni Easley MD as Physician (Sleep Medicine) Logan Hatfield MD (Orthopedic Surgery) Jaci Villela MD (Ophthalmology) Lluvia Hawley NP (Nurse Practitioner) Daisy Coto MD (Neurology) Luke Morgan MD (Endocrinology) Jhon Jj MD as Physician (Cardiology) Demetrice Srivastava MD (Gastroenterology) Assessment/Plan Education and counseling provided: 
Are appropriate based on today's review and evaluation Diagnoses and all orders for this visit: 
 
1. Medicare annual wellness visit, subsequent 2. Hospital discharge follow-up 
-     CBC W/O DIFF 
-     METABOLIC PANEL, COMPREHENSIVE 3. S/P revision of total hip 4. Uncontrolled type 2 diabetes mellitus with hyperglycemia ([facility] -     METABOLIC PANEL, COMPREHENSIVE 5.  Iron deficiency anemia, unspecified iron deficiency anemia type 
-     CBC W/O DIFF 
 
 6. Cirrhosis of liver with ascites, unspecified hepatic cirrhosis type ([facility] -     METABOLIC PANEL, COMPREHENSIVE 7. Esophageal varices in cirrhosis (HCC) 
-     CBC W/O DIFF 
-     METABOLIC PANEL, COMPREHENSIVE 8. Essential hypertension -     METABOLIC PANEL, COMPREHENSIVE Health Maintenance Due Topic Date Due  Bone Densitometry  07/03/1966  AAA Screening 73-69 YO Male Smoking Patients  07/03/2013  Pneumococcal 65+ years (1 of 1 - PPSV23) 07/03/2013  Medicare Yearly Exam  07/09/2020  Influenza Age 5 to Adult  08/01/2020 Alex Westfall, who was evaluated through a synchronous (real-time) audio only encounter, and/or his healthcare decision maker, is aware that it is a billable service, with coverage as determined by his insurance carrier. He provided verbal consent to proceed: Yes, and patient identification was verified. It was conducted pursuant to the emergency declaration under the [facility], [address] [place] [address] authority and the [employer] Act. A caregiver was present when appropriate. Ability to conduct physical exam was limited. I was in the office. The patient was at home.  
 
Yasemin Gutierrez MD

## 2020-08-10 NOTE — PROGRESS NOTES
Foot drop Right Foot since surgery was discovered in Rehab. Currently Physical therapy and OT. Scheduled to follow-up with Ortho 9/2/20

## 2020-08-10 NOTE — PATIENT INSTRUCTIONS
Medicare Wellness Visit, Male The best way to live healthy is to have a lifestyle where you eat a well-balanced diet, exercise regularly, limit alcohol use, and quit all forms of tobacco/nicotine, if applicable. Regular preventive services are another way to keep healthy. Preventive services (vaccines, screening tests, monitoring & exams) can help personalize your care plan, which helps you manage your own care. Screening tests can find health problems at the earliest stages, when they are easiest to treat. [facility] follows the current, evidence-based guidelines published by the [facility] ([facility]) when recommending preventive services for our patients. Because we follow these guidelines, sometimes recommendations change over time as research supports it. (For example, a prostate screening blood test is no longer routinely recommended for men with no symptoms). Of course, you and your doctor may decide to screen more often for some diseases, based on your risk and co-morbidities (chronic disease you are already diagnosed with). Preventive services for you include: - Medicare offers their members a free annual wellness visit, which is time for you and your primary care provider to discuss and plan for your preventive service needs. Take advantage of this benefit every year! 
-All adults over age 72 should receive the recommended pneumonia vaccines. Current USPSTF guidelines recommend a series of two vaccines for the best pneumonia protection.  
-All adults should have a flu vaccine yearly and tetanus vaccine every 10 years. 
-All adults age 48 and older should receive the shingles vaccines (series of two vaccines).       
-All adults age 38-68 who are overweight should have a diabetes screening test once every three years.  
-Other screening tests & preventive services for persons with diabetes include: an eye exam to screen for diabetic retinopathy, a kidney function test, a foot exam, and stricter control over your cholesterol.  
-Cardiovascular screening for adults with routine risk involves an electrocardiogram (ECG) at intervals determined by the provider.  
-Colorectal cancer screening should be done for adults age 54-65 with no increased risk factors for colorectal cancer. There are a number of acceptable methods of screening for this type of cancer. Each test has its own benefits and drawbacks. Discuss with your provider what is most appropriate for you during your annual wellness visit. The different tests include: colonoscopy (considered the best screening method), a fecal occult blood test, a fecal DNA test, and sigmoidoscopy. 
-All adults born between [facility] should be screened once for Hepatitis C. 
-An Abdominal Aortic Aneurysm (AAA) Screening is recommended for men age 73-68 who has ever smoked in their lifetime. Here is a list of your current Health Maintenance items (your personalized list of preventive services) with a due date: 
Health Maintenance Due Topic Date Due  Bone Densitometry  07/03/1966  AAA Screening  07/03/2013  Pneumococcal Vaccine (1 of 1 - PPSV23) 07/03/2013 [facility] Annual Well Visit  07/09/2020  Flu Vaccine  08/01/2020

## 2020-08-11 ENCOUNTER — HOME CARE VISIT (OUTPATIENT)
Dept: HOME HEALTH SERVICES | Facility: HOME HEALTH | Age: 70
End: 2020-08-11
Payer: MEDICARE

## 2020-08-11 ENCOUNTER — HOME CARE VISIT (OUTPATIENT)
Dept: SCHEDULING | Facility: HOME HEALTH | Age: 70
End: 2020-08-11
Payer: MEDICARE

## 2020-08-11 VITALS
SYSTOLIC BLOOD PRESSURE: 130 MMHG | DIASTOLIC BLOOD PRESSURE: 68 MMHG | OXYGEN SATURATION: 96 % | HEART RATE: 72 BPM | RESPIRATION RATE: 18 BRPM | TEMPERATURE: 99 F

## 2020-08-11 PROCEDURE — 3331090002 HH PPS REVENUE DEBIT

## 2020-08-11 PROCEDURE — 3331090001 HH PPS REVENUE CREDIT

## 2020-08-11 PROCEDURE — G0299 HHS/HOSPICE OF RN EA 15 MIN: HCPCS

## 2020-08-12 ENCOUNTER — HOME CARE VISIT (OUTPATIENT)
Dept: HOME HEALTH SERVICES | Facility: HOME HEALTH | Age: 70
End: 2020-08-12
Payer: MEDICARE

## 2020-08-12 ENCOUNTER — HOME CARE VISIT (OUTPATIENT)
Dept: SCHEDULING | Facility: HOME HEALTH | Age: 70
End: 2020-08-12
Payer: MEDICARE

## 2020-08-12 PROCEDURE — G0152 HHCP-SERV OF OT,EA 15 MIN: HCPCS

## 2020-08-12 PROCEDURE — 3331090001 HH PPS REVENUE CREDIT

## 2020-08-12 PROCEDURE — 3331090002 HH PPS REVENUE DEBIT

## 2020-08-13 ENCOUNTER — APPOINTMENT (OUTPATIENT)
Dept: GENERAL RADIOLOGY | Age: 70
DRG: 466 | End: 2020-08-13
Attending: EMERGENCY MEDICINE
Payer: MEDICARE

## 2020-08-13 ENCOUNTER — HOME CARE VISIT (OUTPATIENT)
Dept: HOME HEALTH SERVICES | Facility: HOME HEALTH | Age: 70
End: 2020-08-13
Payer: MEDICARE

## 2020-08-13 ENCOUNTER — ANESTHESIA (OUTPATIENT)
Dept: SURGERY | Age: 70
DRG: 466 | End: 2020-08-13
Payer: MEDICARE

## 2020-08-13 ENCOUNTER — ANESTHESIA EVENT (OUTPATIENT)
Dept: SURGERY | Age: 70
DRG: 466 | End: 2020-08-13
Payer: MEDICARE

## 2020-08-13 ENCOUNTER — HOSPITAL ENCOUNTER (INPATIENT)
Age: 70
LOS: 11 days | Discharge: NURSING, REHAB OR LONG-TERM CARE | DRG: 466 | End: 2020-08-24
Attending: EMERGENCY MEDICINE | Admitting: ORTHOPAEDIC SURGERY
Payer: MEDICARE

## 2020-08-13 ENCOUNTER — APPOINTMENT (OUTPATIENT)
Dept: GENERAL RADIOLOGY | Age: 70
DRG: 466 | End: 2020-08-13
Attending: ORTHOPAEDIC SURGERY
Payer: MEDICARE

## 2020-08-13 VITALS
SYSTOLIC BLOOD PRESSURE: 150 MMHG | OXYGEN SATURATION: 100 % | DIASTOLIC BLOOD PRESSURE: 70 MMHG | TEMPERATURE: 97.2 F | HEART RATE: 72 BPM

## 2020-08-13 DIAGNOSIS — S73.014A CLOSED POSTERIOR DISLOCATION OF RIGHT HIP, INITIAL ENCOUNTER (HCC): Primary | ICD-10-CM

## 2020-08-13 DIAGNOSIS — K22.70 BARRETT'S ESOPHAGUS WITH ESOPHAGITIS: ICD-10-CM

## 2020-08-13 DIAGNOSIS — T84.029D DISLOCATION OF PROSTHETIC JOINT, SUBSEQUENT ENCOUNTER: ICD-10-CM
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
## 2020-08-13 DIAGNOSIS — A49.8 SERRATIA INFECTION: ICD-10-CM

## 2020-08-13 DIAGNOSIS — I05.9 ENDOCARDITIS OF MITRAL VALVE: ICD-10-CM

## 2020-08-13 DIAGNOSIS — K20.90 BARRETT'S ESOPHAGUS WITH ESOPHAGITIS: ICD-10-CM

## 2020-08-13 DIAGNOSIS — M25.551 ACUTE RIGHT HIP PAIN: ICD-10-CM

## 2020-08-13 DIAGNOSIS — Z86.19 H/O STAPHYLOCOCCAL INFECTION: ICD-10-CM

## 2020-08-13 DIAGNOSIS — S73.004S CLOSED DISLOCATION OF RIGHT HIP, SEQUELA: ICD-10-CM

## 2020-08-13 DIAGNOSIS — F33.1 MODERATE EPISODE OF RECURRENT MAJOR DEPRESSIVE DISORDER (HCC): ICD-10-CM

## 2020-08-13 DIAGNOSIS — E11.65 UNCONTROLLED TYPE 2 DIABETES MELLITUS WITH HYPERGLYCEMIA (HCC): ICD-10-CM

## 2020-08-13 DIAGNOSIS — A41.53: ICD-10-CM

## 2020-08-13 DIAGNOSIS — G25.0 BENIGN ESSENTIAL TREMOR: ICD-10-CM

## 2020-08-13 DIAGNOSIS — D63.8 ANEMIA OF CHRONIC DISEASE: ICD-10-CM

## 2020-08-13 DIAGNOSIS — R78.81 BACTEREMIA: ICD-10-CM

## 2020-08-13 DIAGNOSIS — M16.52 POST-TRAUMATIC OSTEOARTHRITIS OF LEFT HIP: ICD-10-CM

## 2020-08-13 PROBLEM — T84.020A DISLOCATION OF INTERNAL RIGHT HIP PROSTHESIS (HCC): Status: ACTIVE | Noted: 2020-08-13

## 2020-08-13 LAB
ALBUMIN SERPL-MCNC: 2.4 G/DL (ref 3.5–5)
ALBUMIN/GLOB SERPL: 0.6 {RATIO} (ref 1.1–2.2)
ALP SERPL-CCNC: 134 U/L (ref 45–117)
ALT SERPL-CCNC: 21 U/L (ref 12–78)
ANION GAP SERPL CALC-SCNC: 6 MMOL/L (ref 5–15)
AST SERPL-CCNC: 36 U/L (ref 15–37)
BASOPHILS # BLD: 0.1 K/UL (ref 0–0.1)
BASOPHILS NFR BLD: 1 % (ref 0–1)
BILIRUB SERPL-MCNC: 1.3 MG/DL (ref 0.2–1)
BUN SERPL-MCNC: 13 MG/DL (ref 6–20)
BUN/CREAT SERPL: 13 (ref 12–20)
CALCIUM SERPL-MCNC: 7.4 MG/DL (ref 8.5–10.1)
CHLORIDE SERPL-SCNC: 108 MMOL/L (ref 97–108)
CO2 SERPL-SCNC: 24 MMOL/L (ref 21–32)
COMMENT, HOLDF: NORMAL
CREAT SERPL-MCNC: 0.98 MG/DL (ref 0.7–1.3)
DIFFERENTIAL METHOD BLD: ABNORMAL
EOSINOPHIL # BLD: 0.1 K/UL (ref 0–0.4)
EOSINOPHIL NFR BLD: 1 % (ref 0–7)
ERYTHROCYTE [DISTWIDTH] IN BLOOD BY AUTOMATED COUNT: 18.6 % (ref 11.5–14.5)
GLOBULIN SER CALC-MCNC: 3.9 G/DL (ref 2–4)
GLUCOSE BLD STRIP.AUTO-MCNC: 196 MG/DL (ref 65–100)
GLUCOSE BLD STRIP.AUTO-MCNC: 221 MG/DL (ref 65–100)
GLUCOSE BLD STRIP.AUTO-MCNC: 287 MG/DL (ref 65–100)
GLUCOSE SERPL-MCNC: 196 MG/DL (ref 65–100)
HCT VFR BLD AUTO: 33.2 % (ref 36.6–50.3)
HGB BLD-MCNC: 10.7 G/DL (ref 12.1–17)
IMM GRANULOCYTES # BLD AUTO: 0.1 K/UL (ref 0–0.04)
IMM GRANULOCYTES NFR BLD AUTO: 1 % (ref 0–0.5)
LYMPHOCYTES # BLD: 0.3 K/UL (ref 0.8–3.5)
LYMPHOCYTES NFR BLD: 4 % (ref 12–49)
MCH RBC QN AUTO: 29.9 PG (ref 26–34)
MCHC RBC AUTO-ENTMCNC: 32.2 G/DL (ref 30–36.5)
MCV RBC AUTO: 92.7 FL (ref 80–99)
MONOCYTES # BLD: 0.3 K/UL (ref 0–1)
MONOCYTES NFR BLD: 4 % (ref 5–13)
NEUTS SEG # BLD: 7.4 K/UL (ref 1.8–8)
NEUTS SEG NFR BLD: 89 % (ref 32–75)
NRBC # BLD: 0 K/UL (ref 0–0.01)
NRBC BLD-RTO: 0 PER 100 WBC
PLATELET # BLD AUTO: 151 K/UL (ref 150–400)
PMV BLD AUTO: 10.3 FL (ref 8.9–12.9)
POTASSIUM SERPL-SCNC: 4.1 MMOL/L (ref 3.5–5.1)
PROT SERPL-MCNC: 6.3 G/DL (ref 6.4–8.2)
RBC # BLD AUTO: 3.58 M/UL (ref 4.1–5.7)
RBC MORPH BLD: ABNORMAL
SAMPLES BEING HELD,HOLD: NORMAL
SERVICE CMNT-IMP: ABNORMAL
SODIUM SERPL-SCNC: 138 MMOL/L (ref 136–145)
WBC # BLD AUTO: 8.3 K/UL (ref 4.1–11.1)

## 2020-08-13 PROCEDURE — 75810000301 HC ER LEVEL 1 CLOSED TREATMNT FRACTURE/DISLOCATION

## 2020-08-13 PROCEDURE — 76060000033 HC ANESTHESIA 1 TO 1.5 HR: Performed by: ORTHOPAEDIC SURGERY

## 2020-08-13 PROCEDURE — 96374 THER/PROPH/DIAG INJ IV PUSH: CPT

## 2020-08-13 PROCEDURE — 77030008684 HC TU ET CUF COVD -B: Performed by: NURSE ANESTHETIST, CERTIFIED REGISTERED

## 2020-08-13 PROCEDURE — 99285 EMERGENCY DEPT VISIT HI MDM: CPT

## 2020-08-13 PROCEDURE — 74011250636 HC RX REV CODE- 250/636: Performed by: PHYSICIAN ASSISTANT

## 2020-08-13 PROCEDURE — 36415 COLL VENOUS BLD VENIPUNCTURE: CPT

## 2020-08-13 PROCEDURE — 0YJ7XZZ INSPECTION OF RIGHT FEMORAL REGION, EXTERNAL APPROACH: ICD-10-PCS | Performed by: ORTHOPAEDIC SURGERY

## 2020-08-13 PROCEDURE — 73501 X-RAY EXAM HIP UNI 1 VIEW: CPT

## 2020-08-13 PROCEDURE — 77030026438 HC STYL ET INTUB CARD -A: Performed by: NURSE ANESTHETIST, CERTIFIED REGISTERED

## 2020-08-13 PROCEDURE — 73502 X-RAY EXAM HIP UNI 2-3 VIEWS: CPT

## 2020-08-13 PROCEDURE — 93005 ELECTROCARDIOGRAM TRACING: CPT

## 2020-08-13 PROCEDURE — 74011636637 HC RX REV CODE- 636/637: Performed by: PHYSICIAN ASSISTANT

## 2020-08-13 PROCEDURE — 77030013079 HC BLNKT BAIR HGGR 3M -A: Performed by: NURSE ANESTHETIST, CERTIFIED REGISTERED

## 2020-08-13 PROCEDURE — 80053 COMPREHEN METABOLIC PANEL: CPT

## 2020-08-13 PROCEDURE — 74011250636 HC RX REV CODE- 250/636: Performed by: ANESTHESIOLOGY

## 2020-08-13 PROCEDURE — 74011636637 HC RX REV CODE- 636/637: Performed by: HOSPITALIST

## 2020-08-13 PROCEDURE — 86923 COMPATIBILITY TEST ELECTRIC: CPT

## 2020-08-13 PROCEDURE — 76210000000 HC OR PH I REC 2 TO 2.5 HR: Performed by: ORTHOPAEDIC SURGERY

## 2020-08-13 PROCEDURE — 77030019908 HC STETH ESOPH SIMS -A: Performed by: NURSE ANESTHETIST, CERTIFIED REGISTERED

## 2020-08-13 PROCEDURE — 76000 FLUOROSCOPY <1 HR PHYS/QHP: CPT

## 2020-08-13 PROCEDURE — 74011250637 HC RX REV CODE- 250/637: Performed by: PHYSICIAN ASSISTANT

## 2020-08-13 PROCEDURE — 86900 BLOOD TYPING SEROLOGIC ABO: CPT

## 2020-08-13 PROCEDURE — 65270000029 HC RM PRIVATE

## 2020-08-13 PROCEDURE — 3331090002 HH PPS REVENUE DEBIT

## 2020-08-13 PROCEDURE — 74011250636 HC RX REV CODE- 250/636: Performed by: EMERGENCY MEDICINE

## 2020-08-13 PROCEDURE — 3331090001 HH PPS REVENUE CREDIT

## 2020-08-13 PROCEDURE — 74011250636 HC RX REV CODE- 250/636: Performed by: NURSE ANESTHETIST, CERTIFIED REGISTERED

## 2020-08-13 PROCEDURE — 74011636637 HC RX REV CODE- 636/637: Performed by: ANESTHESIOLOGY

## 2020-08-13 PROCEDURE — 76010000149 HC OR TIME 1 TO 1.5 HR: Performed by: ORTHOPAEDIC SURGERY

## 2020-08-13 PROCEDURE — 82962 GLUCOSE BLOOD TEST: CPT

## 2020-08-13 PROCEDURE — 85025 COMPLETE CBC W/AUTO DIFF WBC: CPT

## 2020-08-13 PROCEDURE — 71045 X-RAY EXAM CHEST 1 VIEW: CPT

## 2020-08-13 PROCEDURE — 74011000250 HC RX REV CODE- 250: Performed by: NURSE ANESTHETIST, CERTIFIED REGISTERED

## 2020-08-13 RX ORDER — INSULIN GLARGINE 100 [IU]/ML
10 INJECTION, SOLUTION SUBCUTANEOUS
Status: DISCONTINUED | OUTPATIENT
Start: 2020-08-13 | End: 2020-08-15

## 2020-08-13 RX ORDER — MAGNESIUM SULFATE 100 %
4 CRYSTALS MISCELLANEOUS AS NEEDED
Status: DISCONTINUED | OUTPATIENT
Start: 2020-08-13 | End: 2020-08-24 | Stop reason: HOSPADM

## 2020-08-13 RX ORDER — LISINOPRIL 5 MG/1
10 TABLET ORAL
Status: DISCONTINUED | OUTPATIENT
Start: 2020-08-14 | End: 2020-08-24 | Stop reason: HOSPADM

## 2020-08-13 RX ORDER — SUCCINYLCHOLINE CHLORIDE 20 MG/ML
INJECTION INTRAMUSCULAR; INTRAVENOUS AS NEEDED
Status: DISCONTINUED | OUTPATIENT
Start: 2020-08-13 | End: 2020-08-13 | Stop reason: HOSPADM

## 2020-08-13 RX ORDER — SODIUM CHLORIDE 0.9 % (FLUSH) 0.9 %
5-40 SYRINGE (ML) INJECTION AS NEEDED
Status: DISCONTINUED | OUTPATIENT
Start: 2020-08-13 | End: 2020-08-13 | Stop reason: HOSPADM

## 2020-08-13 RX ORDER — SPIRONOLACTONE 25 MG/1
25 TABLET ORAL DAILY
Status: DISCONTINUED | OUTPATIENT
Start: 2020-08-14 | End: 2020-08-24 | Stop reason: HOSPADM

## 2020-08-13 RX ORDER — SODIUM CHLORIDE 0.9 % (FLUSH) 0.9 %
5-40 SYRINGE (ML) INJECTION EVERY 8 HOURS
Status: DISCONTINUED | OUTPATIENT
Start: 2020-08-13 | End: 2020-08-13 | Stop reason: HOSPADM

## 2020-08-13 RX ORDER — AMOXICILLIN 250 MG
2 CAPSULE ORAL 2 TIMES DAILY
Status: DISCONTINUED | OUTPATIENT
Start: 2020-08-13 | End: 2020-08-15 | Stop reason: SDUPTHER

## 2020-08-13 RX ORDER — PROPOFOL 10 MG/ML
40 INJECTION, EMULSION INTRAVENOUS
Status: COMPLETED | OUTPATIENT
Start: 2020-08-13 | End: 2020-08-13

## 2020-08-13 RX ORDER — OXYCODONE HYDROCHLORIDE 5 MG/1
5 TABLET ORAL
Status: DISCONTINUED | OUTPATIENT
Start: 2020-08-13 | End: 2020-08-14

## 2020-08-13 RX ORDER — FENTANYL CITRATE 50 UG/ML
25 INJECTION, SOLUTION INTRAMUSCULAR; INTRAVENOUS
Status: DISCONTINUED | OUTPATIENT
Start: 2020-08-13 | End: 2020-08-13 | Stop reason: HOSPADM

## 2020-08-13 RX ORDER — OXYCODONE HYDROCHLORIDE 5 MG/1
2.5 TABLET ORAL
Status: DISCONTINUED | OUTPATIENT
Start: 2020-08-13 | End: 2020-08-14

## 2020-08-13 RX ORDER — ATORVASTATIN CALCIUM 40 MG/1
40 TABLET, FILM COATED ORAL DAILY
Status: DISCONTINUED | OUTPATIENT
Start: 2020-08-14 | End: 2020-08-24 | Stop reason: HOSPADM

## 2020-08-13 RX ORDER — DIPHENHYDRAMINE HYDROCHLORIDE 50 MG/ML
12.5 INJECTION, SOLUTION INTRAMUSCULAR; INTRAVENOUS AS NEEDED
Status: DISCONTINUED | OUTPATIENT
Start: 2020-08-13 | End: 2020-08-13 | Stop reason: HOSPADM

## 2020-08-13 RX ORDER — MORPHINE SULFATE 2 MG/ML
4 INJECTION, SOLUTION INTRAMUSCULAR; INTRAVENOUS ONCE
Status: COMPLETED | OUTPATIENT
Start: 2020-08-13 | End: 2020-08-13

## 2020-08-13 RX ORDER — SODIUM CHLORIDE, SODIUM LACTATE, POTASSIUM CHLORIDE, CALCIUM CHLORIDE 600; 310; 30; 20 MG/100ML; MG/100ML; MG/100ML; MG/100ML
25 INJECTION, SOLUTION INTRAVENOUS CONTINUOUS
Status: DISCONTINUED | OUTPATIENT
Start: 2020-08-13 | End: 2020-08-13 | Stop reason: HOSPADM

## 2020-08-13 RX ORDER — FUROSEMIDE 20 MG/1
20 TABLET ORAL DAILY
Status: DISCONTINUED | OUTPATIENT
Start: 2020-08-14 | End: 2020-08-14

## 2020-08-13 RX ORDER — MORPHINE SULFATE 10 MG/ML
2 INJECTION, SOLUTION INTRAMUSCULAR; INTRAVENOUS
Status: DISCONTINUED | OUTPATIENT
Start: 2020-08-13 | End: 2020-08-13 | Stop reason: HOSPADM

## 2020-08-13 RX ORDER — DEXTROSE 50 % IN WATER (D50W) INTRAVENOUS SYRINGE
12.5-25 AS NEEDED
Status: DISCONTINUED | OUTPATIENT
Start: 2020-08-13 | End: 2020-08-24 | Stop reason: HOSPADM

## 2020-08-13 RX ORDER — ONDANSETRON 2 MG/ML
4 INJECTION INTRAMUSCULAR; INTRAVENOUS AS NEEDED
Status: DISCONTINUED | OUTPATIENT
Start: 2020-08-13 | End: 2020-08-13 | Stop reason: HOSPADM

## 2020-08-13 RX ORDER — SERTRALINE HYDROCHLORIDE 50 MG/1
150 TABLET, FILM COATED ORAL DAILY
Status: DISCONTINUED | OUTPATIENT
Start: 2020-08-14 | End: 2020-08-24 | Stop reason: HOSPADM

## 2020-08-13 RX ORDER — INSULIN LISPRO 100 [IU]/ML
INJECTION, SOLUTION INTRAVENOUS; SUBCUTANEOUS
Status: DISCONTINUED | OUTPATIENT
Start: 2020-08-13 | End: 2020-08-24 | Stop reason: HOSPADM

## 2020-08-13 RX ORDER — FENTANYL CITRATE 50 UG/ML
INJECTION, SOLUTION INTRAMUSCULAR; INTRAVENOUS AS NEEDED
Status: DISCONTINUED | OUTPATIENT
Start: 2020-08-13 | End: 2020-08-13 | Stop reason: HOSPADM

## 2020-08-13 RX ORDER — SODIUM CHLORIDE 9 MG/ML
75 INJECTION, SOLUTION INTRAVENOUS CONTINUOUS
Status: DISCONTINUED | OUTPATIENT
Start: 2020-08-13 | End: 2020-08-14

## 2020-08-13 RX ORDER — FAMOTIDINE 20 MG/1
20 TABLET, FILM COATED ORAL 2 TIMES DAILY
Status: DISCONTINUED | OUTPATIENT
Start: 2020-08-13 | End: 2020-08-24 | Stop reason: HOSPADM

## 2020-08-13 RX ORDER — SODIUM CHLORIDE, SODIUM LACTATE, POTASSIUM CHLORIDE, CALCIUM CHLORIDE 600; 310; 30; 20 MG/100ML; MG/100ML; MG/100ML; MG/100ML
INJECTION, SOLUTION INTRAVENOUS
Status: DISCONTINUED | OUTPATIENT
Start: 2020-08-13 | End: 2020-08-13 | Stop reason: HOSPADM

## 2020-08-13 RX ORDER — SODIUM CHLORIDE 0.9 % (FLUSH) 0.9 %
5-40 SYRINGE (ML) INJECTION EVERY 8 HOURS
Status: DISCONTINUED | OUTPATIENT
Start: 2020-08-13 | End: 2020-08-14

## 2020-08-13 RX ORDER — MIDAZOLAM HYDROCHLORIDE 1 MG/ML
5 INJECTION INTRAMUSCULAR; INTRAVENOUS
Status: COMPLETED | OUTPATIENT
Start: 2020-08-13 | End: 2020-08-13

## 2020-08-13 RX ORDER — HYDROMORPHONE HYDROCHLORIDE 1 MG/ML
.2-.5 INJECTION, SOLUTION INTRAMUSCULAR; INTRAVENOUS; SUBCUTANEOUS
Status: DISCONTINUED | OUTPATIENT
Start: 2020-08-13 | End: 2020-08-13 | Stop reason: HOSPADM

## 2020-08-13 RX ORDER — ROCURONIUM BROMIDE 10 MG/ML
INJECTION, SOLUTION INTRAVENOUS AS NEEDED
Status: DISCONTINUED | OUTPATIENT
Start: 2020-08-13 | End: 2020-08-13

## 2020-08-13 RX ORDER — PROPOFOL 10 MG/ML
60 INJECTION, EMULSION INTRAVENOUS
Status: COMPLETED | OUTPATIENT
Start: 2020-08-13 | End: 2020-08-13

## 2020-08-13 RX ORDER — LIDOCAINE HYDROCHLORIDE 20 MG/ML
INJECTION, SOLUTION EPIDURAL; INFILTRATION; INTRACAUDAL; PERINEURAL AS NEEDED
Status: DISCONTINUED | OUTPATIENT
Start: 2020-08-13 | End: 2020-08-13 | Stop reason: HOSPADM

## 2020-08-13 RX ORDER — ACETAMINOPHEN 325 MG/1
650 TABLET ORAL EVERY 6 HOURS
Status: DISCONTINUED | OUTPATIENT
Start: 2020-08-13 | End: 2020-08-14

## 2020-08-13 RX ORDER — PROPOFOL 10 MG/ML
200 INJECTION, EMULSION INTRAVENOUS
Status: COMPLETED | OUTPATIENT
Start: 2020-08-13 | End: 2020-08-13

## 2020-08-13 RX ORDER — FENTANYL CITRATE 50 UG/ML
50 INJECTION, SOLUTION INTRAMUSCULAR; INTRAVENOUS
Status: COMPLETED | OUTPATIENT
Start: 2020-08-13 | End: 2020-08-13

## 2020-08-13 RX ORDER — HYDROMORPHONE HYDROCHLORIDE 1 MG/ML
.5-1 INJECTION, SOLUTION INTRAMUSCULAR; INTRAVENOUS; SUBCUTANEOUS
Status: DISCONTINUED | OUTPATIENT
Start: 2020-08-13 | End: 2020-08-24 | Stop reason: HOSPADM

## 2020-08-13 RX ORDER — ONDANSETRON 2 MG/ML
INJECTION INTRAMUSCULAR; INTRAVENOUS AS NEEDED
Status: DISCONTINUED | OUTPATIENT
Start: 2020-08-13 | End: 2020-08-13 | Stop reason: HOSPADM

## 2020-08-13 RX ORDER — PROPOFOL 10 MG/ML
INJECTION, EMULSION INTRAVENOUS AS NEEDED
Status: DISCONTINUED | OUTPATIENT
Start: 2020-08-13 | End: 2020-08-13 | Stop reason: HOSPADM

## 2020-08-13 RX ORDER — GABAPENTIN 300 MG/1
600 CAPSULE ORAL
Status: DISCONTINUED | OUTPATIENT
Start: 2020-08-13 | End: 2020-08-24 | Stop reason: HOSPADM

## 2020-08-13 RX ORDER — ACETAMINOPHEN 325 MG/1
650 TABLET ORAL
Status: DISCONTINUED | OUTPATIENT
Start: 2020-08-13 | End: 2020-08-14

## 2020-08-13 RX ORDER — SODIUM CHLORIDE 0.9 % (FLUSH) 0.9 %
5-40 SYRINGE (ML) INJECTION AS NEEDED
Status: DISCONTINUED | OUTPATIENT
Start: 2020-08-13 | End: 2020-08-14

## 2020-08-13 RX ORDER — NALOXONE HYDROCHLORIDE 0.4 MG/ML
0.4 INJECTION, SOLUTION INTRAMUSCULAR; INTRAVENOUS; SUBCUTANEOUS AS NEEDED
Status: DISCONTINUED | OUTPATIENT
Start: 2020-08-13 | End: 2020-08-14

## 2020-08-13 RX ADMIN — PROPOFOL 40 MG: 10 INJECTION, EMULSION INTRAVENOUS at 12:09

## 2020-08-13 RX ADMIN — SUCCINYLCHOLINE CHLORIDE 40 MG: 20 INJECTION, SOLUTION INTRAMUSCULAR; INTRAVENOUS at 13:46

## 2020-08-13 RX ADMIN — HYDROMORPHONE HYDROCHLORIDE 0.5 MG: 1 INJECTION, SOLUTION INTRAMUSCULAR; INTRAVENOUS; SUBCUTANEOUS at 16:15

## 2020-08-13 RX ADMIN — PROPOFOL 60 MG: 10 INJECTION, EMULSION INTRAVENOUS at 12:03

## 2020-08-13 RX ADMIN — INSULIN GLARGINE 10 UNITS: 100 INJECTION, SOLUTION SUBCUTANEOUS at 23:00

## 2020-08-13 RX ADMIN — FENTANYL CITRATE 25 MCG: 50 INJECTION, SOLUTION INTRAMUSCULAR; INTRAVENOUS at 14:45

## 2020-08-13 RX ADMIN — SODIUM CHLORIDE 80 MCG: 900 INJECTION, SOLUTION INTRAVENOUS at 13:47

## 2020-08-13 RX ADMIN — ONDANSETRON HYDROCHLORIDE 4 MG: 2 INJECTION, SOLUTION INTRAMUSCULAR; INTRAVENOUS at 14:01

## 2020-08-13 RX ADMIN — FENTANYL CITRATE 50 MCG: 50 INJECTION, SOLUTION INTRAMUSCULAR; INTRAVENOUS at 13:22

## 2020-08-13 RX ADMIN — SUCCINYLCHOLINE CHLORIDE 40 MG: 20 INJECTION, SOLUTION INTRAMUSCULAR; INTRAVENOUS at 13:53

## 2020-08-13 RX ADMIN — MIDAZOLAM HYDROCHLORIDE 5 MG: 1 INJECTION, SOLUTION INTRAMUSCULAR; INTRAVENOUS at 11:47

## 2020-08-13 RX ADMIN — SUCCINYLCHOLINE CHLORIDE 40 MG: 20 INJECTION, SOLUTION INTRAMUSCULAR; INTRAVENOUS at 13:31

## 2020-08-13 RX ADMIN — SUCCINYLCHOLINE CHLORIDE 160 MG: 20 INJECTION, SOLUTION INTRAMUSCULAR; INTRAVENOUS at 13:22

## 2020-08-13 RX ADMIN — LIDOCAINE HYDROCHLORIDE 60 MG: 20 INJECTION, SOLUTION EPIDURAL; INFILTRATION; INTRACAUDAL; PERINEURAL at 13:22

## 2020-08-13 RX ADMIN — FENTANYL CITRATE 50 MCG: 50 INJECTION, SOLUTION INTRAMUSCULAR; INTRAVENOUS at 11:29

## 2020-08-13 RX ADMIN — PROPOFOL 100 MG: 10 INJECTION, EMULSION INTRAVENOUS at 11:51

## 2020-08-13 RX ADMIN — PROPOFOL 160 MG: 10 INJECTION, EMULSION INTRAVENOUS at 13:22

## 2020-08-13 RX ADMIN — ACETAMINOPHEN 650 MG: 325 TABLET ORAL at 22:07

## 2020-08-13 RX ADMIN — MORPHINE SULFATE 4 MG: 2 INJECTION, SOLUTION INTRAMUSCULAR; INTRAVENOUS at 10:32

## 2020-08-13 RX ADMIN — HUMAN INSULIN 6 UNITS: 100 INJECTION, SOLUTION SUBCUTANEOUS at 15:22

## 2020-08-13 RX ADMIN — INSULIN LISPRO 3 UNITS: 100 INJECTION, SOLUTION INTRAVENOUS; SUBCUTANEOUS at 21:37

## 2020-08-13 RX ADMIN — OXYCODONE 5 MG: 5 TABLET ORAL at 20:11

## 2020-08-13 RX ADMIN — FENTANYL CITRATE 25 MCG: 50 INJECTION, SOLUTION INTRAMUSCULAR; INTRAVENOUS at 16:05

## 2020-08-13 RX ADMIN — FENTANYL CITRATE 25 MCG: 50 INJECTION, SOLUTION INTRAMUSCULAR; INTRAVENOUS at 14:55

## 2020-08-13 RX ADMIN — SODIUM CHLORIDE 75 ML/HR: 900 INJECTION, SOLUTION INTRAVENOUS at 20:13

## 2020-08-13 RX ADMIN — GABAPENTIN 600 MG: 300 CAPSULE ORAL at 21:37

## 2020-08-13 RX ADMIN — Medication 10 ML: at 21:40

## 2020-08-13 RX ADMIN — SODIUM CHLORIDE, POTASSIUM CHLORIDE, SODIUM LACTATE AND CALCIUM CHLORIDE: 600; 310; 30; 20 INJECTION, SOLUTION INTRAVENOUS at 13:18

## 2020-08-13 RX ADMIN — SODIUM CHLORIDE 120 MCG: 900 INJECTION, SOLUTION INTRAVENOUS at 13:50

## 2020-08-13 RX ADMIN — FENTANYL CITRATE 50 MCG: 50 INJECTION, SOLUTION INTRAMUSCULAR; INTRAVENOUS at 14:17

## 2020-08-13 RX ADMIN — FENTANYL CITRATE 25 MCG: 50 INJECTION, SOLUTION INTRAMUSCULAR; INTRAVENOUS at 15:49

## 2020-08-13 RX ADMIN — SUCCINYLCHOLINE CHLORIDE 40 MG: 20 INJECTION, SOLUTION INTRAMUSCULAR; INTRAVENOUS at 13:40

## 2020-08-13 NOTE — PERIOP NOTES
TRANSFER - IN REPORT: 
 
Verbal report received from Mable Jacobs RN on Derek Veliz  being received from ED 37 for ordered procedure Report consisted of patients Situation, Background, Assessment and  
Recommendations(SBAR). Information from the following report(s) SBAR, Kardex, Intake/Output, MAR, Recent Results and Cardiac Rhythm NSR-ST was reviewed with the receiving nurse. Opportunity for questions and clarification was provided. Assessment completed upon patients arrival to unit and care assumed.

## 2020-08-13 NOTE — ANESTHESIA PREPROCEDURE EVALUATION
Anesthetic History No history of anesthetic complications Review of Systems / Medical History Patient summary reviewed, nursing notes reviewed and pertinent labs reviewed Pulmonary Sleep apnea: No treatment Neuro/Psych Psychiatric history Comments: Depression Benign essential tremor  Cardiovascular Hypertension Exercise tolerance: >4 METS Comments: There was a possible tiny patent foramen ovale GI/Hepatic/Renal 
  
GERD 
 
 
PUD and liver disease Comments: Hinson's esophagus with esophagitis  Endo/Other Diabetes Obesity and arthritis Other Findings Comments: DJD Hx BCCA 
ADD (attention deficit disorder) Hx Cirrhosis Physical Exam 
 
Airway Mallampati: II 
TM Distance: > 6 cm Neck ROM: normal range of motion Mouth opening: Normal 
 
 Cardiovascular Regular rate and rhythm,  S1 and S2 normal,  no murmur, click, rub, or gallop Dental 
No notable dental hx Pulmonary Breath sounds clear to auscultation Abdominal 
GI exam deferred Other Findings Anesthetic Plan ASA: 3, emergent Anesthesia type: general 
 
Monitoring Plan: BIS Induction: Intravenous Anesthetic plan and risks discussed with: Patient

## 2020-08-13 NOTE — ROUTINE PROCESS
Bedside and Verbal shift change report given to [address] (oncoming nurse) by Isabell Alcantara RN (offgoing nurse). Report included the following information SBAR, Kardex, Intake/Output, MAR and Recent Results.

## 2020-08-13 NOTE — ED NOTES
PA Andres at bedside to perform procedure. Pt. Joint appears to be back in place. MD Carmen Kent remains at bedside.

## 2020-08-13 NOTE — ED NOTES
X-ray at bedside. Unsuccessful reduction. Verbal orders from Dr. Jose Brizuela 60mg propofol at this time. Administered by MD. AMAN Campos attempting to reduce joint.

## 2020-08-13 NOTE — ED PROVIDER NOTES
EMERGENCY DEPARTMENT HISTORY AND PHYSICAL EXAM 
 
 
Please note that this dictation was completed with [employer], the computer voice recognition software. Quite often unanticipated grammatical, syntax, homophones, and other interpretive errors are inadvertently transcribed by the computer software. Please disregard these errors and any errors that have escaped final proofreading. Thank you. Date: 8/13/2020 Patient Name: Anibal Clayton Patient Age and Sex: 65 y.o. male History of Presenting Illness Chief Complaint Patient presents with  
 Hip Pain History Provided By: Patient and EMS 
 
HPI: Anibal Clayton, 65 y.o. male with past medical history as documented below presents to the ED with c/o of ground-level fall and moderate to severe right hip pain approximately 1 hour prior to arrival.  Patient states that he was trying to get out of bed when he slipped out of bed and landed on his right hip. He states that he was unable to get up since the fall. Patient denies any loss of consciousness or head injury. EMS was called by a family member. Of note, patient has had a history of recurrent dislocation of the right hip, last underwent surgery by Dr. Hernandez Pratt on July 20. Pt denies any other alleviating or exacerbating factors. Additionally, pt specifically denies any recent fever, chills, headache, nausea, vomiting, abdominal pain, CP, SOB, lightheadedness, dizziness, numbness, weakness, lower extremity swelling, heart palpitations, urinary sxs, diarrhea, constipation, melena, hematochezia, cough, or congestion. There are no other complaints, changes or physical findings at this time. PCP: Ursula Chavez MD 
 
Past History Past Medical History: 
Past Medical History:  
Diagnosis Date  ADD (attention deficit disorder)  Adverse effect of anesthesia   
 motion sickness resulting in loss of balance  Anemia due to blood loss  Hinson's esophagus with esophagitis  Benign essential tremor  Cancer [facility] 2012 [facility]  Chronic pain  Cirrhosis ([address]  Depression  Dislocated hip ([address]  DJD (degenerative joint disease) of hip   
 bilat  DM (diabetes mellitus) ([address] 3/17/2010  ED (erectile dysfunction) of organic origin  Esophageal varices determined by endoscopy ([address]  Fall on or from sidewalk curb 9/24/2015  Femur fracture ([address]  Gastritis and duodenitis  GERD (gastroesophageal reflux disease)   
 resolved after discontinuing diclofenac  Hematuria 6/2016  High cholesterol 03/17/2010  
 pt denies 6/19  
 HTN (hypertension) 3/17/2010  Morbid obesity ([address]  Murmur, cardiac 2016  
 PFO (patent foramen ovale) 7/26/2017  PUD (peptic ulcer disease)  Sepsis ([address]  Shingles 6/2016 RESOLVING- NO RASH (HEAD)  Sleep apnea   
 doesnt use CPAP anymore Past Surgical History: 
Past Surgical History:  
Procedure Laterality Date  COLONOSCOPY N/A 6/18/2019 COLONOSCOPY AND EGD performed by Marquis Peters MD at [facility] ENDOSCOPY  COLONOSCOPY,DIAGNOSTIC  6/18/2019  ENDOSCOPY, COLON, DIAGNOSTIC    
 HX CATARACT REMOVAL Bilateral   
 HX CHOLECYSTECTOMY  1995  HX GI  1980  
 gastroplasty [address]  HX HIP REPLACEMENT Left  HX ORTHOPAEDIC Right 2011  
 rot cuff repair  HX ORTHOPAEDIC  2016  
 left broken femur [address]  HX VASCULAR ACCESS    
 picc line and removed after sepsis resolved  IR INSERT TIPS HEPATIC SHUNT  3/28/2020 [facility] ARTHROPLASTY  05/2010  
 right  TOTAL HIP ARTHROPLASTY  08/01/2016  
 left  UPPER GI ENDOSCOPY,BIOPSY  6/18/2019 Family History: 
Family History Problem Relation Age of Onset  Cancer Father   
     multiple myeloma  Stroke Father  Dementia Mother  No Known Problems Brother  COPD Brother  No Known Problems Daughter  Cancer Maternal Grandmother      COLON  
  No Known Problems Son  No Known Problems Son  Anesth Problems Neg Hx Social History: 
Social History Tobacco Use  Smoking status: Former Smoker Packs/day: 2.00 Years: 15.00 Pack years: 30.00 Last attempt to quit: 3/1/1979 Years since quittin.4  Smokeless tobacco: Never Used Substance Use Topics  Alcohol use: No  
  Alcohol/week: 0.0 standard drinks  Drug use: No  
 
 
Allergies: Allergies Allergen Reactions  Nsaids (Non-Steroidal Anti-Inflammatory Drug) Other (comments) Hx of PUD and Hinson's Esophagus Current Medications: No current facility-administered medications on file prior to encounter. Current Outpatient Medications on File Prior to Encounter Medication Sig Dispense Refill  melatonin 3 mg tablet Take 3 mg by mouth nightly as needed for Sleep.  spironolactone (Aldactone) 25 mg tablet Take 25 mg by mouth daily.  famotidine (PEPCID) 20 mg tablet Take 20 mg by mouth two (2) times a day. Before meals  magnesium oxide 400 mg magnesium tab Take 1 Tab by mouth daily.  lactulose (CHRONULAC) 10 gram/15 mL solution Take 30 mL by mouth two (2) times a day.  diclofenac (VOLTAREN) 1 % gel Apply 2 g to affected area four (4) times daily as needed for Pain.  ferrous sulfate 324 mg (65 mg iron) tablet Take  by mouth Daily (before breakfast).  oxyCODONE IR (ROXICODONE) 5 mg immediate release tablet Take 5 mg by mouth every four (4) hours as needed for Pain.  furosemide (LASIX) 20 mg tablet Take 20 mg by mouth daily.  Omeprazole delayed release (PRILOSEC D/R) 20 mg tablet Take 20 mg by mouth daily.  insulin glargine (LANTUS) 100 unit/mL injection 20 Units by SubCUTAneous route ACB/HS.  (Patient taking differently: 36 Units by SubCUTAneous route nightly.) 36 mL 0  
 apixaban (ELIQUIS) 2.5 mg tablet Take 1 Tab by mouth every twelve (12) hours every twelve (12) hours. Indications: deep vein thrombosis prevention 60 Tab 0  
 senna-docusate (PERICOLACE) 8.6-50 mg per tablet Take 1 Tab by mouth two (2) times a day. Indications: constipation 30 Tab 0  
 lisinopriL (PRINIVIL, ZESTRIL) 5 mg tablet Take 10 mg by mouth every morning.  sertraline (Zoloft) 100 mg tablet Take 150 mg by mouth daily.  insulin aspart U-100 (NovoLOG Flexpen U-100 Insulin) 100 unit/mL (3 mL) inpn 7 Units by SubCUTAneous route Before breakfast, lunch, and dinner for 90 days. 7 units 3 times daily 18.9 mL 0  
 rifAXIMin (XIFAXAN) 550 mg tablet Take 1 Tab by mouth two (2) times a day. 180 Tab 3  potassium chloride SR (KLOR-CON 10) 10 mEq tablet Take 10 mEq by mouth two (2) times a day.  thiamine hcl 500 mg tablet Take 500 mg by mouth daily. 30 Tab 0  
 folic acid (FOLVITE) 1 mg tablet Take 1 Tab by mouth daily. 30 Tab 0  
 gabapentin (NEURONTIN) 300 mg capsule Take 2 Caps by mouth nightly. 180 Cap 1  
 butalbital-acetaminophen-caffeine (FIORICET, ESGIC) -40 mg per tablet Take 1 Tab by mouth every six (6) hours as needed for Headache. 30 Tab 1  cephALEXin (KEFLEX) 500 mg capsule Take 1 Cap by mouth two (2) times a day. 30 Cap 6  ergocalciferol (VITAMIN D2) 50,000 unit capsule TAKE 1 CAPSULE EVERY 7 DAYS (Patient taking differently: Take 50,000 Units by mouth every seven (7) days. TAKE 1 CAPSULE EVERY 7 DAYS) 12 Cap 2  
 atorvastatin (LIPITOR) 40 mg tablet Take 1 Tab by mouth daily. 90 Tab 3  
 glucose blood VI test strips (ASCENSIA AUTODISC VI, ONE TOUCH ULTRA TEST VI) strip Test blood sugar twice daily Diagnosis E 11.9. Can use [employer] 200 Strip 4  primidone (MYSOLINE) 250 mg tablet TAKE 1 TABLET TWICE A DAY (Patient taking differently: Take 250 mg by mouth two (2) times a day. TAKE 1 TABLET TWICE A DAY) 180 Tab 3  
 acetaminophen (TYLENOL) 500 mg tablet Take 2 Tabs by mouth every six (6) hours as needed for Pain.  30 Tab 0  
  Blood-Glucose Meter (ACCU-CHEK SOWMYA PLUS METER) misc Test blood sugar twice daily Diagnosis E 11.9 1 Each 1  Blood Glucose Control High&Low (ACCU-CHEK SOWMYA CONTROL SOLN) soln Test blood sugar twice daily Diagnosis E 11.9 1 Bottle 3  
 B.infantis-B.ani-B.long-B.bifi (PROBIOTIC 4X) 10-15 mg TbEC Take 1 Tab by mouth daily.  calcium citrate-vitamin D3 (CITRACAL + D) tablet Take 2 Tabs by mouth two (2) times a day. Review of Systems Review of Systems Constitutional: Negative. Negative for chills and fever. HENT: Negative. Negative for congestion, facial swelling, rhinorrhea, sore throat, trouble swallowing and voice change. Eyes: Negative. Respiratory: Negative. Negative for apnea, cough, chest tightness, shortness of breath and wheezing. Cardiovascular: Negative. Negative for chest pain, palpitations and leg swelling. Gastrointestinal: Negative. Negative for abdominal distention, abdominal pain, blood in stool, constipation, diarrhea, nausea and vomiting. Endocrine: Negative. Negative for cold intolerance, heat intolerance and polyuria. Genitourinary: Negative. Negative for difficulty urinating, dysuria, flank pain, frequency, hematuria and urgency. Musculoskeletal: Positive for arthralgias and joint swelling. Negative for back pain, myalgias, neck pain and neck stiffness. Skin: Negative. Negative for color change and rash. Neurological: Negative. Negative for dizziness, syncope, facial asymmetry, speech difficulty, weakness, light-headedness, numbness and headaches. Hematological: Negative. Does not bruise/bleed easily. Psychiatric/Behavioral: Negative. Negative for confusion and self-injury. The patient is not nervous/anxious. Physical Exam  
Physical Exam 
Vitals signs and nursing note reviewed. Constitutional:   
   Appearance: He is well-developed. He is not toxic-appearing. HENT:  
   Head: Normocephalic and atraumatic. Mouth/Throat: Pharynx: No posterior oropharyngeal erythema. Eyes:  
   Conjunctiva/sclera: Conjunctivae normal.  
   Pupils: Pupils are equal, round, and reactive to light. Neck: Musculoskeletal: Normal range of motion. Cardiovascular:  
   Rate and Rhythm: Normal rate and regular rhythm. Heart sounds: Normal heart sounds. No murmur. No friction rub. No gallop. Pulmonary:  
   Effort: Pulmonary effort is normal. No respiratory distress. Breath sounds: Normal breath sounds. No wheezing or rales. Chest:  
   Chest wall: No tenderness. Abdominal:  
   General: Bowel sounds are normal. There is no distension. Palpations: Abdomen is soft. There is no mass. Tenderness: There is no abdominal tenderness. There is no guarding or rebound. Musculoskeletal: Normal range of motion. General: Tenderness (Tenderness to palpation over the right hip, leg is shortened, neurovascular intact distally. Exam concerning for dislocation) present. No deformity. Skin: 
   General: Skin is warm. Findings: No rash. Neurological:  
   Mental Status: He is alert and oriented to person, place, and time. Cranial Nerves: No cranial nerve deficit. Motor: No abnormal muscle tone. Coordination: Coordination normal.  
   Deep Tendon Reflexes: Reflexes normal.  
Psychiatric:     
   Behavior: Behavior is cooperative. Diagnostic Study Results Labs - Recent Results (from the past 24 hour(s)) EKG, 12 LEAD, INITIAL Collection Time: 08/13/20 10:26 AM  
Result Value Ref Range Ventricular Rate 99 BPM  
 Atrial Rate 99 BPM  
 P-R Interval 150 ms QRS Duration 90 ms Q-T Interval 342 ms QTC Calculation (Bezet) 438 ms Calculated P Axis 17 degrees Calculated R Axis 75 degrees Calculated T Axis 37 degrees Diagnosis Normal sinus rhythm with sinus arrhythmia Normal ECG When compared with ECG of 18-MAY-2020 14:29, 
Questionable change in QRS axis CBC WITH AUTOMATED DIFF Collection Time: 08/13/20 10:30 AM  
Result Value Ref Range WBC 8.3 4.1 - 11.1 K/uL  
 RBC 3.58 (L) 4.10 - 5.70 M/uL  
 HGB 10.7 (L) 12.1 - 17.0 g/dL HCT 33.2 (L) 36.6 - 50.3 % MCV 92.7 80.0 - 99.0 FL  
 MCH 29.9 26.0 - 34.0 PG  
 MCHC 32.2 30.0 - 36.5 g/dL  
 RDW 18.6 (H) 11.5 - 14.5 % PLATELET 575 499 - 733 K/uL MPV 10.3 8.9 - 12.9 FL  
 NRBC 0.0 0  WBC ABSOLUTE NRBC 0.00 0.00 - 0.01 K/uL NEUTROPHILS 89 (H) 32 - 75 % LYMPHOCYTES 4 (L) 12 - 49 % MONOCYTES 4 (L) 5 - 13 % EOSINOPHILS 1 0 - 7 % BASOPHILS 1 0 - 1 % IMMATURE GRANULOCYTES 1 (H) 0.0 - 0.5 % ABS. NEUTROPHILS 7.4 1.8 - 8.0 K/UL  
 ABS. LYMPHOCYTES 0.3 (L) 0.8 - 3.5 K/UL  
 ABS. MONOCYTES 0.3 0.0 - 1.0 K/UL  
 ABS. EOSINOPHILS 0.1 0.0 - 0.4 K/UL  
 ABS. BASOPHILS 0.1 0.0 - 0.1 K/UL  
 ABS. IMM. GRANS. 0.1 (H) 0.00 - 0.04 K/UL  
 DF SMEAR SCANNED    
 RBC COMMENTS ANISOCYTOSIS 
1+ METABOLIC PANEL, COMPREHENSIVE Collection Time: 08/13/20 10:30 AM  
Result Value Ref Range Sodium 138 136 - 145 mmol/L Potassium 4.1 3.5 - 5.1 mmol/L Chloride 108 97 - 108 mmol/L  
 CO2 24 21 - 32 mmol/L Anion gap 6 5 - 15 mmol/L Glucose 196 (H) 65 - 100 mg/dL BUN 13 6 - 20 MG/DL Creatinine 0.98 0.70 - 1.30 MG/DL  
 BUN/Creatinine ratio 13 12 - 20 GFR est AA >60 >60 ml/min/1.73m2 GFR est non-AA >60 >60 ml/min/1.73m2 Calcium 7.4 (L) 8.5 - 10.1 MG/DL Bilirubin, total 1.3 (H) 0.2 - 1.0 MG/DL  
 ALT (SGPT) 21 12 - 78 U/L  
 AST (SGOT) 36 15 - 37 U/L Alk. phosphatase 134 (H) 45 - 117 U/L Protein, total 6.3 (L) 6.4 - 8.2 g/dL Albumin 2.4 (L) 3.5 - 5.0 g/dL Globulin 3.9 2.0 - 4.0 g/dL A-G Ratio 0.6 (L) 1.1 - 2.2 TYPE & SCREEN Collection Time: 08/13/20 10:30 AM  
Result Value Ref Range Crossmatch Expiration 08/16/2020 ABO/Rh(D) O POSITIVE Antibody screen NEG   
SAMPLES BEING HELD Collection Time: 08/13/20 10:30 AM  
Result Value Ref Range SAMPLES BEING HELD JOAN   
 COMMENT Add-on orders for these samples will be processed based on acceptable specimen integrity and analyte stability, which may vary by analyte. GLUCOSE, POC Collection Time: 08/13/20  1:02 PM  
Result Value Ref Range Glucose (POC) 196 (H) 65 - 100 mg/dL Performed by Luis Watson Radiologic Studies -  
XR CHEST PORT Final Result  
 impression: Mild vascular prominence. XR HIP RT W OR WO PELV 2-3 VWS Final Result IMPRESSION:   
Stable right posterior hip dislocation. Billing note: The Facility order (procedure) was incorrect at the time of  
interpretation and signature of this exam.? This discrepancy may have been  
corrected after final signature. XR HIP RT W OR WO PELV 2-3 VWS Final Result IMPRESSION:   
Bilateral total hip replacement with posterior dislocation of the right hip. XR FLUOROSCOPY UNDER 60 MINUTES    (Results Pending) XR HIP RT DURING OR PROC    (Results Pending) CT Results  (Last 48 hours) None CXR Results  (Last 48 hours) 08/13/20 1229  XR CHEST PORT Final result Impression:   impression: Mild vascular prominence. Narrative:  Clinical indication: Preop. Portable AP upright view of the chest is obtained, comparison May 18, 2020. The  
heart is enlarged and there is a mild vascular pattern without focal infiltrate. Medical Decision Making I am the first provider for this patient. I reviewed the vital signs, available nursing notes, past medical history, past surgical history, family history and social history. Vital Signs-Reviewed the patient's vital signs. Patient Vitals for the past 24 hrs: 
 Temp Pulse Resp BP SpO2  
08/13/20 1255 98.8 °F (37.1 °C) 92 22 135/55 97 % 08/13/20 1230  87 28 129/48   
08/13/20 1209  95 (!) 31 148/63 94 % 08/13/20 1200  95 26 (!) 124/39 93 % 08/13/20 1153  98 29 147/48 93 % 08/13/20 1030    150/50 95 % 08/13/20 1010 98.3 °F (36.8 °C) 76 16 139/52 96 % Pulse Oximetry Analysis - 96% on RA Cardiac Monitor:  
Rate: 76 bpm 
Rhythm: Normal Sinus Rhythm ED EKG interpretation: 
Rhythm: normal sinus rhythm; and regular . Rate (approx.): 99; Axis: normal; P wave: normal; QRS interval: normal ; ST/T wave: normal; Other findings: normal. This EKG was interpreted by Brenda Macedo M.D. Records Reviewed: Nursing Notes, Old Medical Records, Previous electrocardiograms, Previous Radiology Studies and Previous Laboratory Studies Provider Notes (Medical Decision Making):  
Patient presents after fall with acute right hip pain. Will get imaging to further evaluate for fracture vs. Dislocation vs. Contusion. Will treat with analgesics at this time and continue to monitor for changes in mentation. I have discussed with the patient how to reduce likelihood of falling at home by removing throw rugs, loose wires or other objects from floor, installing hand-rails in bathrooms, tubs and halls, and leaving some lights on at night. ED Course:  
Initial assessment performed. The patients presenting problems have been discussed, and they are in agreement with the care plan formulated and outlined with them. I have encouraged them to ask questions as they arise throughout their visit. HYPERTENSION COUNSELING For 10 minutes, education was provided to the patient today regarding their hypertension. Patient is made aware of their elevated blood pressure and is instructed to follow up this week with their Primary Care for a recheck. Patient is counseled regarding consequences of chronic, uncontrolled hypertension including kidney disease, heart disease, stroke or even death. Patient states their understanding and agrees to follow up this week.  Additionally, during their visit, I discussed sodium restriction, maintaining ideal body weight and regular exercise program as physiologic means to achieve blood pressure control. The patient will strive towards this. I reviewed our electronic medical record system for any past medical records that were available that may contribute to the patient's current condition, the nursing notes and vital signs from today's visit. Daria Nails MD 
 
ED Orders Placed : 
Orders Placed This Encounter  XR HIP RT W OR WO PELV 2-3 VWS  XR HIP RT W OR WO PELV 2-3 VWS  XR CHEST PORT  
 FLUORO UNDER 60 MINS  XR HIP RT DURING OR PROC  CBC WITH AUTOMATED DIFF  
 METABOLIC PANEL, COMPREHENSIVE  
 Hold Sample  METABOLIC PANEL, BASIC  
 HEMOGLOBIN  
 CBC W/O DIFF  
 METABOLIC PANEL, BASIC  
 HGB & HCT  MAGNESIUM  
 DIET DIABETIC WITH OPTIONS Consistent Carb 2200kcal; Regular  CARDIAC/RESPIRATORY MONITORING  
 NURSING-MISCELLANEOUS: Hold ACE, ARB, Diuretics if systolic BP is LESS THAN 014 or orthostatic hypotension CONTINUOUS  
 OUT OF BED IN CHAIR  
 OUT OF BED WITH ASSISTANCE  
 RAISED TOILET SEAT  
 ELEVATE HEAD OF BED  NURSING-MISCELLANEOUS: Please provide heel relief. Bilaterally  CONTINUOUS  
 NURSING-MISCELLANEOUS: Cold therapy to operative hip Cold therapy to operative hip - needs to be refreshed every 3-4 hours. Educate patient and family about cold therapy  CONTINUOUS  
 NURSING-MISCELLANEOUS: Use pressure reduction mattress, turn and reposition patient every 2 hours CONTINUOUS  
 TURN & POSITION  
 DUDLEY CATHETER, CARE  
 DUDLEY CATH, DISCONTINUE ON POD #1  
 INTAKE AND OUTPUT  
 DRESSING, CHANGE SPECIFY Remove bulky dressing is present  and apply opsite visible over wound. If occlusive dressing is already in place, change only if saturated or loses integrity OR if on for 7 days If Prineo/Dermabond in place - leave open t. ..  DRESSING, REINFORCE PRN  
 INCENTIVE SPIROMETRY  MAINTAIN SEQUENTIAL COMPRESSION DEVICE  
 POC GLUCOSE  TRANSFUSE PACKED RBC'S  TRANSFUSE PACKED RBC'S  
  DIET ONE TIME MESSAGE  
 OT EVAL AND TREAT  WEIGHT BEARING Level of Weight Bearing: As Tolerated PRN Routine  PT EVAL AND TREAT  
 OXYGEN CANNULA Liters per minute: 2; Indications for O2 therapy: HYPOXIA PRN Routine  RT--OXIMETRY, CONTINUOUS  
 GLUCOSE, POC  GLUCOSE, POC  GLUCOSE, POC  GLUCOSE, POC  GLUCOSE, POC  GLUCOSE, POC  GLUCOSE, POC  GLUCOSE, POC  GLUCOSE, POC  GLUCOSE, POC  GLUCOSE, POC  GLUCOSE, POC  GLUCOSE, POC  GLUCOSE, POC  GLUCOSE, POC  EKG 12 LEAD INITIAL  
 EKG, 12 LEAD, INITIAL  
 TYPE & SCREEN  
 HIP PRECAUTIONS  morphine injection 4 mg  propofoL (DIPRIVAN) 10 mg/mL injection 200 mg  
 midazolam (PF) (VERSED) injection 5 mg  
 fentaNYL citrate (PF) injection 50 mcg  propofoL (DIPRIVAN) 10 mg/mL injection 60 mg  
 propofoL (DIPRIVAN) 10 mg/mL injection 40 mg  
 DISCONTD: ceFAZolin (ANCEF) 3 g in 0.9%  ml IVPB  DISCONTD: sodium chloride (NS) flush 5-40 mL  DISCONTD: sodium chloride (NS) flush 5-40 mL  DISCONTD: lactated Ringers infusion  DISCONTD: sodium chloride (NS) flush 5-40 mL  DISCONTD: sodium chloride (NS) flush 5-40 mL  DISCONTD: fentaNYL citrate (PF) injection 25 mcg  DISCONTD: morphine 10 mg/ml injection 2 mg  DISCONTD: HYDROmorphone (PF) (DILAUDID) injection 0.2-0.5 mg  
 DISCONTD: ondansetron (ZOFRAN) injection 4 mg  DISCONTD: diphenhydrAMINE (BENADRYL) injection 12.5 mg  
 DISCONTD: meperidine (DEMEROL) injection 12.5 mg  
 insulin regular (NOVOLIN R, HUMULIN R) injection 6 Units  DISCONTD: insulin regular (NOVOLIN R, HUMULIN R) 100 unit/mL injection  DISCONTD: acetaminophen (TYLENOL) tablet 650 mg  
 atorvastatin (LIPITOR) tablet 40 mg  
 famotidine (PEPCID) tablet 20 mg  
 gabapentin (NEURONTIN) capsule 600 mg  
 lactulose (CHRONULAC) 10 gram/15 mL solution 30 mL  lisinopriL (PRINIVIL, ZESTRIL) tablet 10 mg  
 sertraline (ZOLOFT) tablet 150 mg  
  spironolactone (ALDACTONE) tablet 25 mg  
 DISCONTD: 0.9% sodium chloride infusion  DISCONTD: sodium chloride (NS) flush 5-40 mL  DISCONTD: sodium chloride (NS) flush 5-40 mL  DISCONTD: acetaminophen (TYLENOL) tablet 650 mg  
 DISCONTD: oxyCODONE IR (ROXICODONE) tablet 2.5 mg  
 DISCONTD: oxyCODONE IR (ROXICODONE) tablet 5 mg  DISCONTD: naloxone (NARCAN) injection 0.4 mg  
 DISCONTD: senna-docusate (PERICOLACE) 8.6-50 mg per tablet 2 Tab  DISCONTD: ceFAZolin (ANCEF) 3 g in 0.9%  ml IVPB  insulin lispro (HUMALOG) injection  glucose chewable tablet 16 g  
 dextrose (D50W) injection syrg 12.5-25 g  
 glucagon (GLUCAGEN) injection 1 mg  
 HYDROmorphone (PF) (DILAUDID) injection 0.5-1 mg  
 DISCONTD: insulin glargine (LANTUS) injection 10 Units  DISCONTD: furosemide (LASIX) tablet 20 mg  
 rifAXIMin (XIFAXAN) tablet 550 mg  
 alcohol 62% (NOZIN) nasal  3 Ampule  DISCONTD: vancomycin (VANCOCIN) injection  0.9% sodium chloride infusion  sodium chloride 0.9 % bolus infusion 500 mL  sodium chloride (NS) flush 5-40 mL  sodium chloride (NS) flush 5-40 mL  acetaminophen (TYLENOL) tablet 650 mg  
 oxyCODONE IR (ROXICODONE) tablet 5 mg  DISCONTD: oxyCODONE IR (ROXICODONE) tablet 10 mg  
 naloxone (NARCAN) injection 0.4 mg  
 ondansetron (ZOFRAN) injection 4 mg  dexamethasone (DECADRON) 4 mg/mL injection 10 mg  
 ondansetron (ZOFRAN ODT) tablet 4 mg  diphenhydrAMINE (BENADRYL) 12.5 mg/5 mL oral liquid 12.5 mg  
 DISCONTD: famotidine (PEPCID) tablet 20 mg  
 senna-docusate (PERICOLACE) 8.6-50 mg per tablet 1 Tab  polyethylene glycol (MIRALAX) packet 17 g  
 bisacodyL (DULCOLAX) suppository 10 mg  
 ceFAZolin (ANCEF) 2 g in sterile water (preservative free) 20 mL IV syringe  tranexamic acid (CYKLOKAPRON) 1,000 mg in 0.9% sodium chloride 100 mL IVPB  apixaban (ELIQUIS) tablet 2.5 mg  
 oxyCODONE IR (ROXICODONE) 5 mg tablet  tranexamic acid (CYKLOKAPRON) 1,000 mg/10 mL (100 mg/mL) injection  0.9% sodium chloride infusion  oxyCODONE IR (ROXICODONE) tablet 5-10 mg  
 0.9% sodium chloride infusion 250 mL  DISCONTD: insulin glargine (LANTUS) injection 25 Units  DISCONTD: insulin lispro (HUMALOG) injection 3 Units  insulin glargine (LANTUS) injection 30 Units  insulin lispro (HUMALOG) injection 5 Units  insulin glargine (Lantus Solostar U-100 Insulin) 100 unit/mL (3 mL) inpn  cholecalciferol (Vitamin D3) 25 mcg (1,000 unit) cap  
 HYDROcodone-acetaminophen (Norco) 5-325 mg per tablet  tamsulosin (FLOMAX) 0.4 mg capsule  haloperidol lactate (HALDOL) injection 5 mg  
 0.9% sodium chloride infusion 250 mL  IP CONSULT TO HOSPITALIST  IP CONSULT TO ORTHOPEDIC SURGERY  
 INITIAL PHYSICIAN ORDER: INPATIENT Orthopedics; 1. Patient Failed outpatient treatment (further clarification in H&P documentation)  IP CONSULT TO CASE MANAGEMENT [address] to complete admission med history  IP CONSULT TO CASE MANAGEMENT [address] [facility]  
 
ED Medications Administered: 
Medications  
atorvastatin (LIPITOR) tablet 40 mg (40 mg Oral Given 8/16/20 0834)  
famotidine (PEPCID) tablet 20 mg (20 mg Oral Given 8/16/20 0834) gabapentin (NEURONTIN) capsule 600 mg (600 mg Oral Given 8/15/20 2227)  
lactulose (CHRONULAC) 10 gram/15 mL solution 30 mL (30 mL Oral Given 8/16/20 0833) lisinopriL (PRINIVIL, ZESTRIL) tablet 10 mg ( Oral Automatically Held 8/28/20 0700)  
sertraline (ZOLOFT) tablet 150 mg (150 mg Oral Given 8/16/20 0833)  
spironolactone (ALDACTONE) tablet 25 mg (25 mg Oral Given 8/16/20 0834) insulin lispro (HUMALOG) injection (7 Units SubCUTAneous Given 8/16/20 1153) glucose chewable tablet 16 g (has no administration in time range) dextrose (D50W) injection syrg 12.5-25 g (has no administration in time range) glucagon (GLUCAGEN) injection 1 mg (has no administration in time range) HYDROmorphone (PF) (DILAUDID) injection 0.5-1 mg (0.5 mg IntraVENous Given 8/14/20 1640) rifAXIMin (XIFAXAN) tablet 550 mg (550 mg Oral Given 8/16/20 0833)  
0.9% sodium chloride infusion (125 mL/hr IntraVENous New Bag 8/14/20 1500)  
sodium chloride 0.9 % bolus infusion 500 mL (has no administration in time range)  
sodium chloride (NS) flush 5-40 mL ( IntraVENous Canceled Entry 8/16/20 1400)  
sodium chloride (NS) flush 5-40 mL (has no administration in time range)  
acetaminophen (TYLENOL) tablet 650 mg (650 mg Oral Given 8/16/20 1153) oxyCODONE IR (ROXICODONE) tablet 5 mg (5 mg Oral Given 8/16/20 0945)  
naloxone [facility]) injection 0.4 mg (has no administration in time range)  
ondansetron (ZOFRAN) injection 4 mg (has no administration in time range)  
ondansetron (ZOFRAN ODT) tablet 4 mg (has no administration in time range) diphenhydrAMINE (BENADRYL) 12.5 mg/5 mL oral liquid 12.5 mg (has no administration in time range)  
senna-docusate (PERICOLACE) 8.6-50 mg per tablet 1 Tab (1 Tab Oral Given 8/16/20 9215) polyethylene glycol (MIRALAX) packet 17 g (17 g Oral Given 8/16/20 0833)  
bisacodyL (DULCOLAX) suppository 10 mg (has no administration in time range)  
apixaban (ELIQUIS) tablet 2.5 mg (0 mg Oral Held 8/16/20 0800)  
tranexamic acid (CYKLOKAPRON) 1,000 mg/10 mL (100 mg/mL) injection (  Canceled Entry 8/14/20 1516) oxyCODONE IR (ROXICODONE) tablet 5-10 mg (10 mg Oral Given 8/15/20 1710)  
0.9% sodium chloride infusion 250 mL (has no administration in time range)  
insulin glargine (LANTUS) injection 30 Units (30 Units SubCUTAneous Given 8/15/20 2225) insulin lispro (HUMALOG) injection 5 Units (5 Units SubCUTAneous Given 8/16/20 1153)  
0.9% sodium chloride infusion 250 mL (has no administration in time range) morphine injection 4 mg (4 mg IntraVENous Given 8/13/20 1032) propofoL (DIPRIVAN) 10 mg/mL injection 200 mg (100 mg IntraVENous Given by Provider 8/13/20 1151) midazolam (PF) (VERSED) injection 5 mg (5 mg IntraVENous Given by Provider 8/13/20 1147) fentaNYL citrate (PF) injection 50 mcg (50 mcg IntraVENous Given 8/13/20 1129)  
propofoL (DIPRIVAN) 10 mg/mL injection 60 mg (60 mg IntraVENous Given by Provider 8/13/20 1203) propofoL (DIPRIVAN) 10 mg/mL injection 40 mg (40 mg IntraVENous Given by Provider 8/13/20 1209) insulin regular (NOVOLIN R, HUMULIN R) injection 6 Units (6 Units SubCUTAneous Given 8/13/20 1522) alcohol 62% (NOZIN) nasal  3 Ampule (3 Ampules Topical Given 8/14/20 1112) dexamethasone (DECADRON) 4 mg/mL injection 10 mg (10 mg IntraVENous Given 8/15/20 1236) ceFAZolin (ANCEF) 2 g in sterile water (preservative free) 20 mL IV syringe (2 g IntraVENous Given 8/15/20 0351)  
tranexamic acid (CYKLOKAPRON) 1,000 mg in 0.9% sodium chloride 100 mL IVPB (1,000 mg IntraVENous Given 8/14/20 1515)  
0.9% sodium chloride infusion (  New Bag 8/14/20 1515)  
haloperidol lactate (HALDOL) injection 5 mg (5 mg IntraMUSCular Given 8/16/20 0403) Consult Note: 
Juany Sarmiento MD spoke with AMAN Sharp, Specialty: Orthopedic Discussed pt's hx, disposition, and available diagnostic and imaging results. Reviewed care plans. Agree with management and plan thus far. Consultant will evaluate pt for admission. Procedure Note - Procedural Sedation:  
 
Indication:  Procedural sedation is required to perform the following procedure:  Closed reduction of right hip dislocation Informed Consent: The reason for the procedure as well as the risks, benefits, and alternatives to the procedure have been explained to the patient and He consents to the procedure. The consent for sedation has been signed by the patient/representative, the medical provider and witnessed by the patient's nurse. Anesthesia Risks: The ASA score is ASA 3 - Severe systemic disease but compensated Mallampati Score Reference: The patient has a patent airway with a Mallampati Classification Score of II (soft palate, uvula, fauces visible). Pre-Procedure Sedation Assessment: 
Sedation Start Time: 11:47 AM 
Time Out was performed to confirm patient identity, laterality, indication, and contraindications, prior to procedural sedation. Post-Procedure Sedation Assessment:  
Sedation End Time: 12:25 PM 
The patient was sedated with a total of 5 mg IV Versed and 200 mg propofol/DIPRIVAN via IV. Reversal agents and resuscitation equipment were at the bedside. Reversal agents were not required. The indicated procedure was completed and the patient tolerated the sedation well with no complications. Please refer to sedation flowsheet for nursing notes, vital signs, and specific timeline details. Dallin Stephenson MD 
1:17 PM 
 
 
Consult Note: 
Dallin Stephenson MD spoke with AMAN Lucero, Specialty: Orthopedics Discussed pt's hx, disposition, and available diagnostic and imaging results. Reviewed care plans. Agree with management and plan thus far. Consultant will evaluate pt for admission. Disposition: Admit Patient is being admitted to the hospital. The results of their tests and reasons for their admission have been discussed with them and/or available family. I have discussed the case with the admitting specialist and expressed my high concern for decompensation if patient is discharged from the ED. The patient and/or available family convey agreement and understanding for the need to be admitted and for their admission diagnosis. Consultation has been made with the inpatient physician specialist for hospitalization. Diagnosis Clinical Impression: 1. Closed posterior dislocation of right hip, initial encounter ([facility] [address] 2. Fall from ground level 3. Acute right hip pain 4. Uncontrolled type 2 diabetes mellitus with hyperglycemia ([address] 5. Anemia of chronic disease Attestation: Brock Sandoval MD, am the attending of record for this patient. I personally performed the services described in this documentation on this date, 8/13/2020 for patient, Siva Sheppard. I have reviewed the chart and verified that the record is accurate and complete. This note will not be viewable in [address]

## 2020-08-13 NOTE — ANESTHESIA POSTPROCEDURE EVALUATION
Procedure(s): CLOSED REDUCTION RIGHT HIP. 
 
general 
 
Anesthesia Post Evaluation Patient location during evaluation: PACU Note status: Adequate. Level of consciousness: responsive to verbal stimuli and sleepy but conscious Pain management: satisfactory to patient Airway patency: patent Anesthetic complications: no 
Cardiovascular status: acceptable Respiratory status: acceptable Hydration status: acceptable Comments: +Post-Anesthesia Evaluation and Assessment Patient: Alex Westfall MRN: [id number]  SSN: [id number] YOB: 1950  Age: 65 y.o. Sex: male Cardiovascular Function/Vital Signs /49   Pulse 82   Temp 37 °C (98.6 °F)   Resp 19   Wt 114.8 kg (253 lb)   SpO2 94%   BMI 35.79 kg/m² Patient is status post Procedure(s): CLOSED REDUCTION RIGHT HIP. Nausea/Vomiting: Controlled. Postoperative hydration reviewed and adequate. Pain: 
Pain Scale 1: Numeric (0 - 10) (08/13/20 1549) Pain Intensity 1: 6 (08/13/20 1549) Managed. Neurological Status:  
Neuro (WDL): Exceptions to WDL (08/13/20 1426) At baseline. Mental Status and Level of Consciousness: Arousable. Pulmonary Status:  
O2 Device: Nasal cannula (08/13/20 1530) Adequate oxygenation and airway patent. Complications related to anesthesia: None Post-anesthesia assessment completed. No concerns. Signed By: Mlaka Fletcher MD  
 8/13/2020 Post anesthesia nausea and vomiting:  controlled INITIAL Post-op Vital signs:  
Vitals Value Taken Time /49 8/13/2020  3:45 PM  
Temp 37 °C (98.6 °F) 8/13/2020  2:27 PM  
Pulse 83 8/13/2020  4:01 PM  
Resp 26 8/13/2020  4:01 PM  
SpO2 95 % 8/13/2020  4:01 PM  
Vitals shown include unvalidated device data.

## 2020-08-13 NOTE — ED NOTES
Pt. Presents to ED today for complaints of R hip pain. Patient states that he \"slipped out of bed today onto the floor on his right side. \"  Pt. Reports hip surgery on 7/20 at [facility]. Pt. Placed in position of comfort with call bell in reach.

## 2020-08-13 NOTE — CONSULTS
Orthopedic CONSULT NOTE Subjective:  
 
Date of Consultation:  August 13, 2020 Glendy Valdes is a 65 y.o. male who is being seen for right hip pain and short leg. Injury occurred  This morning  while Pt. was at home slid out of bed onto his right side . Workup has revealed right hip dislocation of revised tripolar prosthesis . Patient's ambulatory status includes None and their living environment is home. Known to DR Elin Velazquez has had previous revisions for instability last one on the right in July Patient Active Problem List  
 Diagnosis Date Noted  Dislocation of internal right hip prosthesis ([address] 08/13/2020  Recurrent dislocation of hip joint prosthesis ([address] 07/20/2020  Closed dislocation of right hip ([address] 05/18/2020  S/P TIPS (transjugular intrahepatic portosystemic shunt) 04/23/2020  
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  Obesity 03/04/2018  Insomnia 03/04/2018  Infected prosthesis of right hip ([address] 02/26/2018  PFO (patent foramen ovale) 07/26/2017  Systolic murmur 91/48/3349  Jessica-prosthetic fracture of femur following total hip arthroplasty 08/25/2016  Osteoarthritis of right hip 08/01/2016  Benign essential tremor  Diabetes mellitus type 2, uncontrolled ([address] 06/03/2013  Hypogonadism male 08/29/2012  Osteoarthritis of hip  Depression  ED (erectile dysfunction) of organic origin  GERD (gastroesophageal reflux disease)  PUD (peptic ulcer disease)  Hinson's esophagus with esophagitis  HTN (hypertension) 03/17/2010  Hypercholesterolemia 03/17/2010 Family History Problem Relation Age of Onset  Cancer Father   
     multiple myeloma  Stroke Father  Dementia Mother  No Known Problems Brother  COPD Brother  No Known Problems Daughter  Cancer Maternal Grandmother COLON  
 No Known Problems Son  No Known Problems Son  Anesth Problems Neg Hx Social History Tobacco Use  Smoking status: Former Smoker Packs/day: 2.00 Years: 15.00 Pack years: 30.00 Last attempt to quit: 3/1/1979 Years since quittin.4  Smokeless tobacco: Never Used Substance Use Topics  Alcohol use: No  
  Alcohol/week: 0.0 standard drinks Past Medical History:  
Diagnosis Date  ADD (attention deficit disorder)  Adverse effect of anesthesia   
 motion sickness resulting in loss of balance  Anemia due to blood loss  Hinson's esophagus with esophagitis  Benign essential tremor  Cancer [facility]  [facility]  Chronic pain  Cirrhosis ([address]  Depression  Dislocated hip ([address]  DJD (degenerative joint disease) of hip   
 bilat  DM (diabetes mellitus) ([address] 3/17/2010  ED (erectile dysfunction) of organic origin  Esophageal varices determined by endoscopy ([address]  Fall on or from sidewalk curb 2015  Femur fracture ([address]  Gastritis and duodenitis  GERD (gastroesophageal reflux disease)   
 resolved after discontinuing diclofenac  Hematuria 2016  High cholesterol 2010  
 pt denies   
 HTN (hypertension) 3/17/2010  Morbid obesity ([address]  Murmur, cardiac   
 PFO (patent foramen ovale) 2017  PUD (peptic ulcer disease)  Sepsis ([address]  Shingles 2016 RESOLVING- NO RASH (HEAD)  Sleep apnea   
 doesnt use CPAP anymore Past Surgical History:  
Procedure Laterality Date  COLONOSCOPY N/A 2019 COLONOSCOPY AND EGD performed by Juliana Nelson MD at [facility] ENDOSCOPY  COLONOSCOPY,DIAGNOSTIC  2019  ENDOSCOPY, COLON, DIAGNOSTIC    
 HX CATARACT REMOVAL Bilateral   
 HX CHOLECYSTECTOMY    HX GI    
 gastroplasty [address]  HX HIP REPLACEMENT Left  HX ORTHOPAEDIC Right 2011  
 rot cuff repair  HX ORTHOPAEDIC  2016  
 left broken femur [address]  HX VASCULAR ACCESS    
 picc line and removed after sepsis resolved  IR INSERT TIPS HEPATIC SHUNT  3/28/2020 [facility] ARTHROPLASTY  05/2010  
 right  TOTAL HIP ARTHROPLASTY  08/01/2016  
 left  UPPER GI ENDOSCOPY,BIOPSY  6/18/2019 Prior to Admission medications Medication Sig Start Date End Date Taking? Authorizing Provider  
melatonin 3 mg tablet Take 3 mg by mouth nightly as needed for Sleep. 8/12/20   Provider, Historical  
spironolactone (Aldactone) 25 mg tablet Take 25 mg by mouth daily. 8/12/20   Provider, Historical  
famotidine (PEPCID) 20 mg tablet Take 20 mg by mouth two (2) times a day. Before meals 8/12/20   Provider, Historical  
magnesium oxide 400 mg magnesium tab Take 1 Tab by mouth daily. 8/12/20   Provider, Historical  
lactulose (CHRONULAC) 10 gram/15 mL solution Take 30 mL by mouth two (2) times a day. Provider, Historical  
diclofenac (VOLTAREN) 1 % gel Apply 2 g to affected area four (4) times daily as needed for Pain. Provider, Historical  
ferrous sulfate 324 mg (65 mg iron) tablet Take  by mouth Daily (before breakfast). Provider, Historical  
oxyCODONE IR (ROXICODONE) 5 mg immediate release tablet Take 5 mg by mouth every four (4) hours as needed for Pain. Provider, Historical  
furosemide (LASIX) 20 mg tablet Take 20 mg by mouth daily. Provider, Historical  
Omeprazole delayed release (PRILOSEC D/R) 20 mg tablet Take 20 mg by mouth daily. Provider, Historical  
insulin glargine (LANTUS) 100 unit/mL injection 20 Units by SubCUTAneous route ACB/HS. Patient taking differently: 36 Units by SubCUTAneous route nightly. 7/23/20   Abelardo Livingston MD  
apixaban (ELIQUIS) 2.5 mg tablet Take 1 Tab by mouth every twelve (12) hours every twelve (12) hours.  Indications: deep vein thrombosis prevention 7/22/20   AMAN De La Cruz  
senna-docusate (PERICOLACE) 8.6-50 mg per tablet Take 1 Tab by mouth two (2) times a day. Indications: constipation 7/22/20   AMAN De La Cruz  
lisinopriL (PRINIVIL, ZESTRIL) 5 mg tablet Take 10 mg by mouth every morning. Provider, Historical  
sertraline (Zoloft) 100 mg tablet Take 150 mg by mouth daily. Provider, Historical  
insulin aspart U-100 (NovoLOG Flexpen U-100 Insulin) 100 unit/mL (3 mL) inpn 7 Units by SubCUTAneous route Before breakfast, lunch, and dinner for 90 days. 7 units 3 times daily 5/27/20 8/25/20  Mino Mullen MD  
rifAXIMin Monia Meiers) 550 mg tablet Take 1 Tab by mouth two (2) times a day. 4/23/20   Rk Walton MD  
potassium chloride SR (KLOR-CON 10) 10 mEq tablet Take 10 mEq by mouth two (2) times a day. 4/18/20   Provider, Historical  
thiamine hcl 500 mg tablet Take 500 mg by mouth daily. 4/6/20   Marline Perez NP  
folic acid (FOLVITE) 1 mg tablet Take 1 Tab by mouth daily. 3/26/20   Clifford Tovar NP  
gabapentin (NEURONTIN) 300 mg capsule Take 2 Caps by mouth nightly. 3/16/20   Mino Mullen MD  
butalbital-acetaminophen-caffeine (FIORICET, ESGIC) -40 mg per tablet Take 1 Tab by mouth every six (6) hours as needed for Headache. 3/6/20   Mino Mullen MD  
cephALEXin (KEFLEX) 500 mg capsule Take 1 Cap by mouth two (2) times a day. 2/24/20   Mino Mullen MD  
ergocalciferol (VITAMIN D2) 50,000 unit capsule TAKE 1 CAPSULE EVERY 7 DAYS Patient taking differently: Take 50,000 Units by mouth every seven (7) days. TAKE 1 CAPSULE EVERY 7 DAYS 1/6/20   Mino Mullen MD  
atorvastatin (LIPITOR) 40 mg tablet Take 1 Tab by mouth daily. 1/4/20   Lucian Treadwell MD  
glucose blood VI test strips (ASCENSIA AUTODISC VI, ONE TOUCH ULTRA TEST VI) strip Test blood sugar twice daily Diagnosis E 11.9.  Can use [employer] 12/13/19   Mino Mullen MD  
 primidone (MYSOLINE) 250 mg tablet TAKE 1 TABLET TWICE A DAY Patient taking differently: Take 250 mg by mouth two (2) times a day. TAKE 1 TABLET TWICE A DAY 11/7/19   Abelardo Livingston MD  
acetaminophen (TYLENOL) 500 mg tablet Take 2 Tabs by mouth every six (6) hours as needed for Pain. 10/7/19   Mckinley Linton MD  
Blood-Glucose Meter (ACCU-CHEK SOWMYA PLUS METER) misc Test blood sugar twice daily Diagnosis E 11.9 7/9/19   Abelardo Livingston MD  
Blood Glucose Control High&Low (ACCU-CHEK SOWMYA CONTROL SOLN) soln Test blood sugar twice daily Diagnosis E 11.9 7/9/19   Abelardo Livingston MD  
B.infantis-B.ani-B.long-B.bifi (PROBIOTIC 4X) 10-15 mg TbEC Take 1 Tab by mouth daily. Provider, Historical  
calcium citrate-vitamin D3 (CITRACAL + D) tablet Take 2 Tabs by mouth two (2) times a day. Provider, Historical  
 
Current Facility-Administered Medications Medication Dose Route Frequency  ceFAZolin (ANCEF) 3 g in 0.9%  ml IVPB  3 g IntraVENous ON CALL TO OR  
 
Current Outpatient Medications Medication Sig  
 melatonin 3 mg tablet Take 3 mg by mouth nightly as needed for Sleep.  spironolactone (Aldactone) 25 mg tablet Take 25 mg by mouth daily.  famotidine (PEPCID) 20 mg tablet Take 20 mg by mouth two (2) times a day. Before meals  magnesium oxide 400 mg magnesium tab Take 1 Tab by mouth daily.  lactulose (CHRONULAC) 10 gram/15 mL solution Take 30 mL by mouth two (2) times a day.  diclofenac (VOLTAREN) 1 % gel Apply 2 g to affected area four (4) times daily as needed for Pain.  ferrous sulfate 324 mg (65 mg iron) tablet Take  by mouth Daily (before breakfast).  oxyCODONE IR (ROXICODONE) 5 mg immediate release tablet Take 5 mg by mouth every four (4) hours as needed for Pain.  furosemide (LASIX) 20 mg tablet Take 20 mg by mouth daily.  Omeprazole delayed release (PRILOSEC D/R) 20 mg tablet Take 20 mg by mouth daily.  insulin glargine (LANTUS) 100 unit/mL injection 20 Units by SubCUTAneous route ACB/HS. (Patient taking differently: 36 Units by SubCUTAneous route nightly.)  apixaban (ELIQUIS) 2.5 mg tablet Take 1 Tab by mouth every twelve (12) hours every twelve (12) hours. Indications: deep vein thrombosis prevention  senna-docusate (PERICOLACE) 8.6-50 mg per tablet Take 1 Tab by mouth two (2) times a day. Indications: constipation  lisinopriL (PRINIVIL, ZESTRIL) 5 mg tablet Take 10 mg by mouth every morning.  sertraline (Zoloft) 100 mg tablet Take 150 mg by mouth daily.  insulin aspart U-100 (NovoLOG Flexpen U-100 Insulin) 100 unit/mL (3 mL) inpn 7 Units by SubCUTAneous route Before breakfast, lunch, and dinner for 90 days. 7 units 3 times daily  rifAXIMin (XIFAXAN) 550 mg tablet Take 1 Tab by mouth two (2) times a day.  potassium chloride SR (KLOR-CON 10) 10 mEq tablet Take 10 mEq by mouth two (2) times a day.  thiamine hcl 500 mg tablet Take 500 mg by mouth daily.  folic acid (FOLVITE) 1 mg tablet Take 1 Tab by mouth daily.  gabapentin (NEURONTIN) 300 mg capsule Take 2 Caps by mouth nightly.  butalbital-acetaminophen-caffeine (FIORICET, ESGIC) -40 mg per tablet Take 1 Tab by mouth every six (6) hours as needed for Headache.  cephALEXin (KEFLEX) 500 mg capsule Take 1 Cap by mouth two (2) times a day.  ergocalciferol (VITAMIN D2) 50,000 unit capsule TAKE 1 CAPSULE EVERY 7 DAYS (Patient taking differently: Take 50,000 Units by mouth every seven (7) days. TAKE 1 CAPSULE EVERY 7 DAYS)  atorvastatin (LIPITOR) 40 mg tablet Take 1 Tab by mouth daily.  glucose blood VI test strips (ASCENSIA AUTODISC VI, ONE TOUCH ULTRA TEST VI) strip Test blood sugar twice daily Diagnosis E 11.9. Can use [employer]  primidone (MYSOLINE) 250 mg tablet TAKE 1 TABLET TWICE A DAY (Patient taking differently: Take 250 mg by mouth two (2) times a day. TAKE 1 TABLET TWICE A DAY)  acetaminophen (TYLENOL) 500 mg tablet Take 2 Tabs by mouth every six (6) hours as needed for Pain.  Blood-Glucose Meter (ACCU-CHEK SOWMYA PLUS METER) misc Test blood sugar twice daily Diagnosis E 11.9  Blood Glucose Control High&Low (ACCU-CHEK SOWMYA CONTROL SOLN) soln Test blood sugar twice daily Diagnosis E 11.9  
 B.infantis-B.ani-B.long-B.bifi (PROBIOTIC 4X) 10-15 mg TbEC Take 1 Tab by mouth daily.  calcium citrate-vitamin D3 (CITRACAL + D) tablet Take 2 Tabs by mouth two (2) times a day. Allergies Allergen Reactions  Nsaids (Non-Steroidal Anti-Inflammatory Drug) Other (comments) Hx of PUD and Hinson's Esophagus Review of Systems:  A comprehensive review of systems was negative except for that written in the HPI. Mental Status: no dementia Objective:  
 
Patient Vitals for the past 8 hrs: 
 BP Temp Pulse Resp SpO2 Weight 20 1230 129/48  87 28    
20 1209 148/63  95 (!) 31 94 %   
20 1200 (!) 124/39  95 26 93 %   
20 1153 147/48  98 29 93 %   
20 1115      115.2 kg (253 lb 15.5 oz) 20 1030 150/50    95 %   
20 1010 139/52 98.3 °F (36.8 °C) 76 16 96 %  Temp (24hrs), Av.3 °F (36.8 °C), Min:98.3 °F (36.8 °C), Max:98.3 °F (36.8 °C) EXAM: alert, cooperative, no distress, oriented, normal, normal mood, clear to auscultation bilaterally, regular rate and rhythm, soft, non-tender. Bowel sounds normal. No masses,  no organomegaly, purpura / ecchymosis noted on face, trunk and extremities, Pt. Is TTP over roght hip with short rotated leg. Spine and Scalp w/o step off TTP or deformity. Capillary refill <2 secs in bilateral  Fingers and toes bilaterally, Sensation intact in bilateralFingers and toes bilaterally. Pulses 2+ in upper/lower extremities.  
 
Imaging Review: XRStudy Result  
 
  
INDICATION: Hip dislocation reduction. 
  
COMPARISON: Earlier the same day. 
  
 FINDINGS: Single frontal view of the pelvis demonstrates no change in the 
posterior right hip dislocation. 
  
IMPRESSION IMPRESSION:  
Stable right posterior hip dislocation. 
  
Billing note: The Facility order (procedure) was incorrect at the time of 
interpretation and signature of this exam.? This discrepancy may have been 
corrected after final signature. Labs:  
Recent Results (from the past 24 hour(s)) EKG, 12 LEAD, INITIAL Collection Time: 08/13/20 10:26 AM  
Result Value Ref Range Ventricular Rate 99 BPM  
 Atrial Rate 99 BPM  
 P-R Interval 150 ms QRS Duration 90 ms Q-T Interval 342 ms QTC Calculation (Bezet) 438 ms Calculated P Axis 17 degrees Calculated R Axis 75 degrees Calculated T Axis 37 degrees Diagnosis Normal sinus rhythm with sinus arrhythmia Normal ECG When compared with ECG of 18-MAY-2020 14:29, 
Questionable change in QRS axis CBC WITH AUTOMATED DIFF Collection Time: 08/13/20 10:30 AM  
Result Value Ref Range WBC 8.3 4.1 - 11.1 K/uL  
 RBC 3.58 (L) 4.10 - 5.70 M/uL  
 HGB 10.7 (L) 12.1 - 17.0 g/dL HCT 33.2 (L) 36.6 - 50.3 % MCV 92.7 80.0 - 99.0 FL  
 MCH 29.9 26.0 - 34.0 PG  
 MCHC 32.2 30.0 - 36.5 g/dL  
 RDW 18.6 (H) 11.5 - 14.5 % PLATELET 407 492 - 219 K/uL MPV 10.3 8.9 - 12.9 FL  
 NRBC 0.0 0  WBC ABSOLUTE NRBC 0.00 0.00 - 0.01 K/uL NEUTROPHILS 89 (H) 32 - 75 % LYMPHOCYTES 4 (L) 12 - 49 % MONOCYTES 4 (L) 5 - 13 % EOSINOPHILS 1 0 - 7 % BASOPHILS 1 0 - 1 % IMMATURE GRANULOCYTES 1 (H) 0.0 - 0.5 % ABS. NEUTROPHILS 7.4 1.8 - 8.0 K/UL  
 ABS. LYMPHOCYTES 0.3 (L) 0.8 - 3.5 K/UL  
 ABS. MONOCYTES 0.3 0.0 - 1.0 K/UL  
 ABS. EOSINOPHILS 0.1 0.0 - 0.4 K/UL  
 ABS. BASOPHILS 0.1 0.0 - 0.1 K/UL  
 ABS. IMM. GRANS. 0.1 (H) 0.00 - 0.04 K/UL  
 DF SMEAR SCANNED    
 RBC COMMENTS ANISOCYTOSIS 
1+ METABOLIC PANEL, COMPREHENSIVE Collection Time: 08/13/20 10:30 AM  
Result Value Ref Range Sodium 138 136 - 145 mmol/L Potassium 4.1 3.5 - 5.1 mmol/L Chloride 108 97 - 108 mmol/L  
 CO2 24 21 - 32 mmol/L Anion gap 6 5 - 15 mmol/L Glucose 196 (H) 65 - 100 mg/dL BUN 13 6 - 20 MG/DL Creatinine 0.98 0.70 - 1.30 MG/DL  
 BUN/Creatinine ratio 13 12 - 20 GFR est AA >60 >60 ml/min/1.73m2 GFR est non-AA >60 >60 ml/min/1.73m2 Calcium 7.4 (L) 8.5 - 10.1 MG/DL Bilirubin, total 1.3 (H) 0.2 - 1.0 MG/DL  
 ALT (SGPT) 21 12 - 78 U/L  
 AST (SGOT) 36 15 - 37 U/L Alk. phosphatase 134 (H) 45 - 117 U/L Protein, total 6.3 (L) 6.4 - 8.2 g/dL Albumin 2.4 (L) 3.5 - 5.0 g/dL Globulin 3.9 2.0 - 4.0 g/dL A-G Ratio 0.6 (L) 1.1 - 2.2 TYPE & SCREEN Collection Time: 08/13/20 10:30 AM  
Result Value Ref Range Crossmatch Expiration 08/16/2020 ABO/Rh(D) O POSITIVE Antibody screen NEG   
SAMPLES BEING HELD Collection Time: 08/13/20 10:30 AM  
Result Value Ref Range SAMPLES BEING HELD JOAN   
 COMMENT Add-on orders for these samples will be processed based on acceptable specimen integrity and analyte stability, which may vary by analyte. Impression:  
 
Patient Active Problem List  
 Diagnosis Date Noted  Dislocation of internal right hip prosthesis ([address] 08/13/2020  Recurrent dislocation of hip joint prosthesis ([address] 07/20/2020  Closed dislocation of right hip ([address] 05/18/2020  S/P TIPS (transjugular intrahepatic portosystemic shunt) 04/23/2020  
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  Obesity 03/04/2018  Insomnia 03/04/2018  Infected prosthesis of right hip [facility] 02/26/2018  PFO (patent foramen ovale) 07/26/2017  Systolic murmur 20/06/1197  Jessica-prosthetic fracture of femur following total hip arthroplasty 08/25/2016  Osteoarthritis of right hip 08/01/2016  Benign essential tremor  Diabetes mellitus type 2, uncontrolled ([facility] 06/03/2013  Hypogonadism male 08/29/2012  Osteoarthritis of hip  Depression  ED (erectile dysfunction) of organic origin  GERD (gastroesophageal reflux disease)  PUD (peptic ulcer disease)  Hinson's esophagus with esophagitis  HTN (hypertension) 03/17/2010  Hypercholesterolemia 03/17/2010 Principal Problem: 
  Dislocation of internal right hip prosthesis ([facility] (8/13/2020) Plan:  
Admit to ortho Npo Consent for closed right hi[p reduction possible open with constrained liner Dr. Key Maciel aware and agree with above plan.  
 
 
Ady Freire PA-C

## 2020-08-13 NOTE — PERIOP NOTES
1426-Handoff Report from Operating Room to PACU Report received from [address] and Delmy Mejia CRNA regarding Michelle Kim. Surgeon(s): 
Kushal Santo MD  And Procedure(s) (LRB): 
CLOSED REDUCTION RIGHT HIP (Right)  confirmed  
with allergies discussed. Anesthesia type, drugs, patient history, complications, estimated blood loss, vital signs, intake and output, and last pain medication, lines and temperature were reviewed. [phone]- Daughter updated via telephone. [facility] [phone] 1705- TRANSFER - OUT REPORT: 
 
Verbal report given to Nikky FLORES(name) on Lyle Das  being transferred to ortho(unit) for routine post - op Report consisted of patients Situation, Background, Assessment and  
Recommendations(SBAR). Information from the following report(s) SBAR, Kardex, OR Summary, Procedure Summary, Intake/Output, MAR and Recent Results was reviewed with the receiving nurse. Opportunity for questions and clarification was provided. Patient transported with: 
 O2 @ 2 liters Tech

## 2020-08-14 ENCOUNTER — ANESTHESIA (OUTPATIENT)
Dept: SURGERY | Age: 70
DRG: 466 | End: 2020-08-14
Payer: MEDICARE

## 2020-08-14 LAB
ATRIAL RATE: 70 BPM
ATRIAL RATE: 99 BPM
CALCULATED P AXIS, ECG09: 17 DEGREES
CALCULATED P AXIS, ECG09: 44 DEGREES
CALCULATED R AXIS, ECG10: -10 DEGREES
CALCULATED R AXIS, ECG10: 75 DEGREES
CALCULATED T AXIS, ECG11: 16 DEGREES
CALCULATED T AXIS, ECG11: 37 DEGREES
DIAGNOSIS, 93000: NORMAL
DIAGNOSIS, 93000: NORMAL
GLUCOSE BLD STRIP.AUTO-MCNC: 130 MG/DL (ref 65–100)
GLUCOSE BLD STRIP.AUTO-MCNC: 147 MG/DL (ref 65–100)
GLUCOSE BLD STRIP.AUTO-MCNC: 167 MG/DL (ref 65–100)
GLUCOSE BLD STRIP.AUTO-MCNC: 170 MG/DL (ref 65–100)
GLUCOSE BLD STRIP.AUTO-MCNC: 309 MG/DL (ref 65–100)
GLUCOSE BLD STRIP.AUTO-MCNC: 435 MG/DL (ref 65–100)
P-R INTERVAL, ECG05: 150 MS
P-R INTERVAL, ECG05: 154 MS
Q-T INTERVAL, ECG07: 342 MS
Q-T INTERVAL, ECG07: 438 MS
QRS DURATION, ECG06: 90 MS
QRS DURATION, ECG06: 94 MS
QTC CALCULATION (BEZET), ECG08: 438 MS
QTC CALCULATION (BEZET), ECG08: 473 MS
SERVICE CMNT-IMP: ABNORMAL
VENTRICULAR RATE, ECG03: 70 BPM
VENTRICULAR RATE, ECG03: 99 BPM

## 2020-08-14 PROCEDURE — 74011000250 HC RX REV CODE- 250: Performed by: NURSE ANESTHETIST, CERTIFIED REGISTERED

## 2020-08-14 PROCEDURE — 74011250636 HC RX REV CODE- 250/636: Performed by: PHYSICIAN ASSISTANT

## 2020-08-14 PROCEDURE — 77030019908 HC STETH ESOPH SIMS -A: Performed by: NURSE ANESTHETIST, CERTIFIED REGISTERED

## 2020-08-14 PROCEDURE — 74011250637 HC RX REV CODE- 250/637: Performed by: PHYSICIAN ASSISTANT

## 2020-08-14 PROCEDURE — C1776 JOINT DEVICE (IMPLANTABLE): HCPCS | Performed by: ORTHOPAEDIC SURGERY

## 2020-08-14 PROCEDURE — 74011250637 HC RX REV CODE- 250/637

## 2020-08-14 PROCEDURE — 0LNL3ZZ RELEASE RIGHT UPPER LEG TENDON, PERCUTANEOUS APPROACH: ICD-10-PCS | Performed by: ORTHOPAEDIC SURGERY

## 2020-08-14 PROCEDURE — 77030027744 HC PWDR HEMSTAT ARISTA ABSRB 5GM BARD -D: Performed by: ORTHOPAEDIC SURGERY

## 2020-08-14 PROCEDURE — 77030014647 HC SEAL FBRN TISSL BAXT -D: Performed by: ORTHOPAEDIC SURGERY

## 2020-08-14 PROCEDURE — 74011000250 HC RX REV CODE- 250: Performed by: PHYSICIAN ASSISTANT

## 2020-08-14 PROCEDURE — 77030026438 HC STYL ET INTUB CARD -A: Performed by: NURSE ANESTHETIST, CERTIFIED REGISTERED

## 2020-08-14 PROCEDURE — 77030008684 HC TU ET CUF COVD -B: Performed by: NURSE ANESTHETIST, CERTIFIED REGISTERED

## 2020-08-14 PROCEDURE — 76010000171 HC OR TIME 2 TO 2.5 HR INTENSV-TIER 1: Performed by: ORTHOPAEDIC SURGERY

## 2020-08-14 PROCEDURE — 94760 N-INVAS EAR/PLS OXIMETRY 1: CPT

## 2020-08-14 PROCEDURE — 0SP90JZ REMOVAL OF SYNTHETIC SUBSTITUTE FROM RIGHT HIP JOINT, OPEN APPROACH: ICD-10-PCS | Performed by: ORTHOPAEDIC SURGERY

## 2020-08-14 PROCEDURE — 77030008462 HC STPLR SKN PROX J&J -A: Performed by: ORTHOPAEDIC SURGERY

## 2020-08-14 PROCEDURE — 76210000016 HC OR PH I REC 1 TO 1.5 HR: Performed by: ORTHOPAEDIC SURGERY

## 2020-08-14 PROCEDURE — 65270000029 HC RM PRIVATE

## 2020-08-14 PROCEDURE — 97530 THERAPEUTIC ACTIVITIES: CPT

## 2020-08-14 PROCEDURE — 74011636637 HC RX REV CODE- 636/637: Performed by: PHYSICIAN ASSISTANT

## 2020-08-14 PROCEDURE — 77030018723 HC ELCTRD BLD COVD -A: Performed by: ORTHOPAEDIC SURGERY

## 2020-08-14 PROCEDURE — 77030011640 HC PAD GRND REM COVD -A: Performed by: ORTHOPAEDIC SURGERY

## 2020-08-14 PROCEDURE — 77030035236 HC SUT PDS STRATFX BARB J&J -B: Performed by: ORTHOPAEDIC SURGERY

## 2020-08-14 PROCEDURE — 77030002916 HC SUT ETHLN J&J -A: Performed by: ORTHOPAEDIC SURGERY

## 2020-08-14 PROCEDURE — 77030031139 HC SUT VCRL2 J&J -A: Performed by: ORTHOPAEDIC SURGERY

## 2020-08-14 PROCEDURE — 77030013079 HC BLNKT BAIR HGGR 3M -A: Performed by: NURSE ANESTHETIST, CERTIFIED REGISTERED

## 2020-08-14 PROCEDURE — 77030018836 HC SOL IRR NACL ICUM -A: Performed by: ORTHOPAEDIC SURGERY

## 2020-08-14 PROCEDURE — 3331090001 HH PPS REVENUE CREDIT

## 2020-08-14 PROCEDURE — 74011250636 HC RX REV CODE- 250/636: Performed by: NURSE ANESTHETIST, CERTIFIED REGISTERED

## 2020-08-14 PROCEDURE — 77030002966 HC SUT PDS J&J -A: Performed by: ORTHOPAEDIC SURGERY

## 2020-08-14 PROCEDURE — 74011000258 HC RX REV CODE- 258: Performed by: PHYSICIAN ASSISTANT

## 2020-08-14 PROCEDURE — 3331090002 HH PPS REVENUE DEBIT

## 2020-08-14 PROCEDURE — 77030008552 HC TBNG SMK EVAC BFLF -A: Performed by: ORTHOPAEDIC SURGERY

## 2020-08-14 PROCEDURE — 77030018673: Performed by: ORTHOPAEDIC SURGERY

## 2020-08-14 PROCEDURE — 77030018846 HC SOL IRR STRL H20 ICUM -A: Performed by: ORTHOPAEDIC SURGERY

## 2020-08-14 PROCEDURE — 77010033678 HC OXYGEN DAILY

## 2020-08-14 PROCEDURE — 77030041680 HC PNCL ELECSURG SMK EVAC CNMD -B: Performed by: ORTHOPAEDIC SURGERY

## 2020-08-14 PROCEDURE — 77030022704 HC SUT VLOC COVD -B: Performed by: ORTHOPAEDIC SURGERY

## 2020-08-14 PROCEDURE — 74011000258 HC RX REV CODE- 258

## 2020-08-14 PROCEDURE — 77030036660

## 2020-08-14 PROCEDURE — 77030003666 HC NDL SPINAL BD -A: Performed by: ORTHOPAEDIC SURGERY

## 2020-08-14 PROCEDURE — 76060000035 HC ANESTHESIA 2 TO 2.5 HR: Performed by: ORTHOPAEDIC SURGERY

## 2020-08-14 PROCEDURE — 0SR903Z REPLACEMENT OF RIGHT HIP JOINT WITH CERAMIC SYNTHETIC SUBSTITUTE, OPEN APPROACH: ICD-10-PCS | Performed by: ORTHOPAEDIC SURGERY

## 2020-08-14 PROCEDURE — 97162 PT EVAL MOD COMPLEX 30 MIN: CPT

## 2020-08-14 PROCEDURE — 74011250636 HC RX REV CODE- 250/636: Performed by: ORTHOPAEDIC SURGERY

## 2020-08-14 PROCEDURE — 77030040361 HC SLV COMPR DVT MDII -B: Performed by: ORTHOPAEDIC SURGERY

## 2020-08-14 PROCEDURE — 82962 GLUCOSE BLOOD TEST: CPT

## 2020-08-14 PROCEDURE — 74011250637 HC RX REV CODE- 250/637: Performed by: ORTHOPAEDIC SURGERY

## 2020-08-14 DEVICE — PINNACLE HIP SOLUTIONS GVF POLYETHYLENE CONSTRAINED ACETABULAR LINER +4 NEUTRAL 36MM ID 58MM OD
Type: IMPLANTABLE DEVICE | Site: HIP | Status: FUNCTIONAL
Brand: PINNACLE

## 2020-08-14 DEVICE — BIOLOX DELTA TS CERAMIC FEMORAL HEAD 12/14 TAPER REVISION DIAMETER 36MM +5
Type: IMPLANTABLE DEVICE | Site: HIP | Status: FUNCTIONAL
Brand: BIOLOX DELTA

## 2020-08-14 RX ORDER — EPHEDRINE SULFATE/0.9% NACL/PF 50 MG/5 ML
SYRINGE (ML) INTRAVENOUS AS NEEDED
Status: DISCONTINUED | OUTPATIENT
Start: 2020-08-14 | End: 2020-08-14 | Stop reason: HOSPADM

## 2020-08-14 RX ORDER — PROPOFOL 10 MG/ML
INJECTION, EMULSION INTRAVENOUS AS NEEDED
Status: DISCONTINUED | OUTPATIENT
Start: 2020-08-14 | End: 2020-08-14 | Stop reason: HOSPADM

## 2020-08-14 RX ORDER — PHENYLEPHRINE HCL IN 0.9% NACL 0.4MG/10ML
SYRINGE (ML) INTRAVENOUS AS NEEDED
Status: DISCONTINUED | OUTPATIENT
Start: 2020-08-14 | End: 2020-08-14 | Stop reason: HOSPADM

## 2020-08-14 RX ORDER — NEOSTIGMINE METHYLSULFATE 1 MG/ML
INJECTION, SOLUTION INTRAVENOUS AS NEEDED
Status: DISCONTINUED | OUTPATIENT
Start: 2020-08-14 | End: 2020-08-14 | Stop reason: HOSPADM

## 2020-08-14 RX ORDER — FACIAL-BODY WIPES
10 EACH TOPICAL DAILY PRN
Status: DISCONTINUED | OUTPATIENT
Start: 2020-08-16 | End: 2020-08-24 | Stop reason: HOSPADM

## 2020-08-14 RX ORDER — SODIUM CHLORIDE 0.9 % (FLUSH) 0.9 %
5-40 SYRINGE (ML) INJECTION AS NEEDED
Status: DISCONTINUED | OUTPATIENT
Start: 2020-08-14 | End: 2020-08-24 | Stop reason: HOSPADM

## 2020-08-14 RX ORDER — AMOXICILLIN 250 MG
1 CAPSULE ORAL 2 TIMES DAILY
Status: DISCONTINUED | OUTPATIENT
Start: 2020-08-14 | End: 2020-08-24 | Stop reason: HOSPADM

## 2020-08-14 RX ORDER — DEXAMETHASONE SODIUM PHOSPHATE 4 MG/ML
10 INJECTION, SOLUTION INTRA-ARTICULAR; INTRALESIONAL; INTRAMUSCULAR; INTRAVENOUS; SOFT TISSUE ONCE
Status: COMPLETED | OUTPATIENT
Start: 2020-08-15 | End: 2020-08-15

## 2020-08-14 RX ORDER — TRANEXAMIC ACID 100 MG/ML
INJECTION, SOLUTION INTRAVENOUS AS NEEDED
Status: DISCONTINUED | OUTPATIENT
Start: 2020-08-14 | End: 2020-08-14 | Stop reason: HOSPADM

## 2020-08-14 RX ORDER — POLYETHYLENE GLYCOL 3350 17 G/17G
17 POWDER, FOR SOLUTION ORAL DAILY
Status: DISCONTINUED | OUTPATIENT
Start: 2020-08-15 | End: 2020-08-24 | Stop reason: HOSPADM

## 2020-08-14 RX ORDER — DIPHENHYDRAMINE HCL 12.5MG/5ML
12.5 LIQUID (ML) ORAL
Status: DISCONTINUED | OUTPATIENT
Start: 2020-08-14 | End: 2020-08-24 | Stop reason: HOSPADM

## 2020-08-14 RX ORDER — SODIUM CHLORIDE, SODIUM LACTATE, POTASSIUM CHLORIDE, CALCIUM CHLORIDE 600; 310; 30; 20 MG/100ML; MG/100ML; MG/100ML; MG/100ML
INJECTION, SOLUTION INTRAVENOUS
Status: DISCONTINUED | OUTPATIENT
Start: 2020-08-14 | End: 2020-08-14 | Stop reason: HOSPADM

## 2020-08-14 RX ORDER — FAMOTIDINE 20 MG/1
20 TABLET, FILM COATED ORAL 2 TIMES DAILY
Status: DISCONTINUED | OUTPATIENT
Start: 2020-08-14 | End: 2020-08-15 | Stop reason: SDUPTHER

## 2020-08-14 RX ORDER — SODIUM CHLORIDE 9 MG/ML
INJECTION, SOLUTION INTRAVENOUS
Status: COMPLETED
Start: 2020-08-14 | End: 2020-08-14

## 2020-08-14 RX ORDER — ROCURONIUM BROMIDE 10 MG/ML
INJECTION, SOLUTION INTRAVENOUS AS NEEDED
Status: DISCONTINUED | OUTPATIENT
Start: 2020-08-14 | End: 2020-08-14 | Stop reason: HOSPADM

## 2020-08-14 RX ORDER — OXYCODONE HYDROCHLORIDE 5 MG/1
TABLET ORAL
Status: COMPLETED
Start: 2020-08-14 | End: 2020-08-14

## 2020-08-14 RX ORDER — ONDANSETRON 4 MG/1
4 TABLET, ORALLY DISINTEGRATING ORAL
Status: DISCONTINUED | OUTPATIENT
Start: 2020-08-16 | End: 2020-08-24 | Stop reason: HOSPADM

## 2020-08-14 RX ORDER — CEFAZOLIN SODIUM 1 G/3ML
INJECTION, POWDER, FOR SOLUTION INTRAMUSCULAR; INTRAVENOUS AS NEEDED
Status: DISCONTINUED | OUTPATIENT
Start: 2020-08-14 | End: 2020-08-14 | Stop reason: HOSPADM

## 2020-08-14 RX ORDER — ACETAMINOPHEN 325 MG/1
650 TABLET ORAL EVERY 6 HOURS
Status: DISCONTINUED | OUTPATIENT
Start: 2020-08-14 | End: 2020-08-17

## 2020-08-14 RX ORDER — TRANEXAMIC ACID 100 MG/ML
INJECTION, SOLUTION INTRAVENOUS
Status: DISPENSED
Start: 2020-08-14 | End: 2020-08-15

## 2020-08-14 RX ORDER — OXYCODONE HYDROCHLORIDE 5 MG/1
5 TABLET ORAL
Status: DISCONTINUED | OUTPATIENT
Start: 2020-08-14 | End: 2020-08-24 | Stop reason: HOSPADM

## 2020-08-14 RX ORDER — LIDOCAINE HYDROCHLORIDE 20 MG/ML
INJECTION, SOLUTION EPIDURAL; INFILTRATION; INTRACAUDAL; PERINEURAL AS NEEDED
Status: DISCONTINUED | OUTPATIENT
Start: 2020-08-14 | End: 2020-08-14 | Stop reason: HOSPADM

## 2020-08-14 RX ORDER — SODIUM CHLORIDE 9 MG/ML
125 INJECTION, SOLUTION INTRAVENOUS CONTINUOUS
Status: DISPENSED | OUTPATIENT
Start: 2020-08-14 | End: 2020-08-15

## 2020-08-14 RX ORDER — NALOXONE HYDROCHLORIDE 0.4 MG/ML
0.4 INJECTION, SOLUTION INTRAMUSCULAR; INTRAVENOUS; SUBCUTANEOUS AS NEEDED
Status: DISCONTINUED | OUTPATIENT
Start: 2020-08-14 | End: 2020-08-24 | Stop reason: HOSPADM

## 2020-08-14 RX ORDER — ONDANSETRON 2 MG/ML
INJECTION INTRAMUSCULAR; INTRAVENOUS AS NEEDED
Status: DISCONTINUED | OUTPATIENT
Start: 2020-08-14 | End: 2020-08-14 | Stop reason: HOSPADM

## 2020-08-14 RX ORDER — SUCCINYLCHOLINE CHLORIDE 20 MG/ML
INJECTION INTRAMUSCULAR; INTRAVENOUS AS NEEDED
Status: DISCONTINUED | OUTPATIENT
Start: 2020-08-14 | End: 2020-08-14 | Stop reason: HOSPADM

## 2020-08-14 RX ORDER — OXYCODONE HYDROCHLORIDE 5 MG/1
5-10 TABLET ORAL
Status: DISCONTINUED | OUTPATIENT
Start: 2020-08-14 | End: 2020-08-24 | Stop reason: HOSPADM

## 2020-08-14 RX ORDER — VANCOMYCIN HYDROCHLORIDE 1 G/20ML
INJECTION, POWDER, LYOPHILIZED, FOR SOLUTION INTRAVENOUS AS NEEDED
Status: DISCONTINUED | OUTPATIENT
Start: 2020-08-14 | End: 2020-08-14 | Stop reason: HOSPADM

## 2020-08-14 RX ORDER — DEXAMETHASONE SODIUM PHOSPHATE 4 MG/ML
INJECTION, SOLUTION INTRA-ARTICULAR; INTRALESIONAL; INTRAMUSCULAR; INTRAVENOUS; SOFT TISSUE AS NEEDED
Status: DISCONTINUED | OUTPATIENT
Start: 2020-08-14 | End: 2020-08-14 | Stop reason: HOSPADM

## 2020-08-14 RX ORDER — OXYCODONE HYDROCHLORIDE 5 MG/1
10 TABLET ORAL
Status: DISCONTINUED | OUTPATIENT
Start: 2020-08-14 | End: 2020-08-14

## 2020-08-14 RX ORDER — FENTANYL CITRATE 50 UG/ML
INJECTION, SOLUTION INTRAMUSCULAR; INTRAVENOUS AS NEEDED
Status: DISCONTINUED | OUTPATIENT
Start: 2020-08-14 | End: 2020-08-14 | Stop reason: HOSPADM

## 2020-08-14 RX ORDER — ONDANSETRON 2 MG/ML
4 INJECTION INTRAMUSCULAR; INTRAVENOUS
Status: ACTIVE | OUTPATIENT
Start: 2020-08-14 | End: 2020-08-15

## 2020-08-14 RX ORDER — GLYCOPYRROLATE 0.2 MG/ML
INJECTION INTRAMUSCULAR; INTRAVENOUS AS NEEDED
Status: DISCONTINUED | OUTPATIENT
Start: 2020-08-14 | End: 2020-08-14 | Stop reason: HOSPADM

## 2020-08-14 RX ORDER — SODIUM CHLORIDE 0.9 % (FLUSH) 0.9 %
5-40 SYRINGE (ML) INJECTION EVERY 8 HOURS
Status: DISCONTINUED | OUTPATIENT
Start: 2020-08-14 | End: 2020-08-24 | Stop reason: HOSPADM

## 2020-08-14 RX ADMIN — SODIUM CHLORIDE, POTASSIUM CHLORIDE, SODIUM LACTATE AND CALCIUM CHLORIDE: 600; 310; 30; 20 INJECTION, SOLUTION INTRAVENOUS at 13:30

## 2020-08-14 RX ADMIN — PROPOFOL 150 MG: 10 INJECTION, EMULSION INTRAVENOUS at 12:12

## 2020-08-14 RX ADMIN — Medication 10 MG: at 12:31

## 2020-08-14 RX ADMIN — INSULIN LISPRO 2 UNITS: 100 INJECTION, SOLUTION INTRAVENOUS; SUBCUTANEOUS at 16:40

## 2020-08-14 RX ADMIN — WATER 2 G: 1 INJECTION INTRAMUSCULAR; INTRAVENOUS; SUBCUTANEOUS at 20:01

## 2020-08-14 RX ADMIN — ROCURONIUM BROMIDE 10 MG: 10 INJECTION INTRAVENOUS at 12:12

## 2020-08-14 RX ADMIN — CEFAZOLIN 2 G: 1 INJECTION, POWDER, FOR SOLUTION INTRAMUSCULAR; INTRAVENOUS; PARENTERAL at 12:15

## 2020-08-14 RX ADMIN — ONDANSETRON HYDROCHLORIDE 4 MG: 2 INJECTION, SOLUTION INTRAMUSCULAR; INTRAVENOUS at 13:27

## 2020-08-14 RX ADMIN — HYDROMORPHONE HYDROCHLORIDE 0.5 MG: 1 INJECTION, SOLUTION INTRAMUSCULAR; INTRAVENOUS; SUBCUTANEOUS at 13:03

## 2020-08-14 RX ADMIN — FAMOTIDINE 20 MG: 20 TABLET, FILM COATED ORAL at 17:29

## 2020-08-14 RX ADMIN — Medication 10 ML: at 16:40

## 2020-08-14 RX ADMIN — Medication 4 MG: at 13:27

## 2020-08-14 RX ADMIN — Medication 10 ML: at 21:33

## 2020-08-14 RX ADMIN — Medication 80 MCG: at 12:57

## 2020-08-14 RX ADMIN — Medication 120 MCG: at 13:40

## 2020-08-14 RX ADMIN — SODIUM CHLORIDE: 900 INJECTION, SOLUTION INTRAVENOUS at 15:15

## 2020-08-14 RX ADMIN — LACTULOSE 30 ML: 20 SOLUTION ORAL at 17:28

## 2020-08-14 RX ADMIN — Medication 80 MCG: at 13:31

## 2020-08-14 RX ADMIN — Medication 120 MCG: at 13:19

## 2020-08-14 RX ADMIN — SUCCINYLCHOLINE CHLORIDE 140 MG: 20 INJECTION, SOLUTION INTRAMUSCULAR; INTRAVENOUS at 12:12

## 2020-08-14 RX ADMIN — INSULIN GLARGINE 10 UNITS: 100 INJECTION, SOLUTION SUBCUTANEOUS at 21:32

## 2020-08-14 RX ADMIN — FENTANYL CITRATE 50 MCG: 50 INJECTION, SOLUTION INTRAMUSCULAR; INTRAVENOUS at 12:31

## 2020-08-14 RX ADMIN — DEXAMETHASONE SODIUM PHOSPHATE 8 MG: 4 INJECTION, SOLUTION INTRAMUSCULAR; INTRAVENOUS at 12:30

## 2020-08-14 RX ADMIN — PROPOFOL 50 MG: 10 INJECTION, EMULSION INTRAVENOUS at 13:35

## 2020-08-14 RX ADMIN — ROCURONIUM BROMIDE 10 MG: 10 INJECTION INTRAVENOUS at 13:14

## 2020-08-14 RX ADMIN — OXYCODONE HYDROCHLORIDE 10 MG: 5 TABLET ORAL at 15:00

## 2020-08-14 RX ADMIN — FENTANYL CITRATE 50 MCG: 50 INJECTION, SOLUTION INTRAMUSCULAR; INTRAVENOUS at 12:09

## 2020-08-14 RX ADMIN — SODIUM CHLORIDE 125 ML/HR: 900 INJECTION, SOLUTION INTRAVENOUS at 15:00

## 2020-08-14 RX ADMIN — ACETAMINOPHEN 650 MG: 325 TABLET ORAL at 17:28

## 2020-08-14 RX ADMIN — INSULIN LISPRO 2 UNITS: 100 INJECTION, SOLUTION INTRAVENOUS; SUBCUTANEOUS at 08:10

## 2020-08-14 RX ADMIN — Medication 120 MCG: at 13:21

## 2020-08-14 RX ADMIN — HYDROMORPHONE HYDROCHLORIDE 1 MG: 1 INJECTION, SOLUTION INTRAMUSCULAR; INTRAVENOUS; SUBCUTANEOUS at 00:00

## 2020-08-14 RX ADMIN — TRANEXAMIC ACID 1 G: 100 INJECTION, SOLUTION INTRAVENOUS at 12:46

## 2020-08-14 RX ADMIN — GLYCOPYRROLATE 0.4 MG: 0.2 INJECTION, SOLUTION INTRAMUSCULAR; INTRAVENOUS at 13:27

## 2020-08-14 RX ADMIN — TRANEXAMIC ACID 1000 MG: 100 INJECTION, SOLUTION INTRAVENOUS at 15:15

## 2020-08-14 RX ADMIN — Medication 3 AMPULE: at 11:12

## 2020-08-14 RX ADMIN — Medication 120 MCG: at 13:44

## 2020-08-14 RX ADMIN — LIDOCAINE HYDROCHLORIDE 100 MG: 20 INJECTION, SOLUTION INTRAVENOUS at 12:09

## 2020-08-14 RX ADMIN — INSULIN LISPRO 4 UNITS: 100 INJECTION, SOLUTION INTRAVENOUS; SUBCUTANEOUS at 21:32

## 2020-08-14 RX ADMIN — PROPOFOL 50 MG: 10 INJECTION, EMULSION INTRAVENOUS at 13:58

## 2020-08-14 RX ADMIN — OXYCODONE 10 MG: 5 TABLET ORAL at 17:29

## 2020-08-14 RX ADMIN — GABAPENTIN 600 MG: 300 CAPSULE ORAL at 21:32

## 2020-08-14 RX ADMIN — OXYCODONE 10 MG: 5 TABLET ORAL at 20:40

## 2020-08-14 RX ADMIN — RIFAXIMIN 550 MG: 550 TABLET ORAL at 17:33

## 2020-08-14 RX ADMIN — SODIUM CHLORIDE, POTASSIUM CHLORIDE, SODIUM LACTATE AND CALCIUM CHLORIDE: 600; 310; 30; 20 INJECTION, SOLUTION INTRAVENOUS at 12:01

## 2020-08-14 RX ADMIN — ACETAMINOPHEN 650 MG: 325 TABLET ORAL at 05:00

## 2020-08-14 RX ADMIN — HYDROMORPHONE HYDROCHLORIDE 0.5 MG: 1 INJECTION, SOLUTION INTRAMUSCULAR; INTRAVENOUS; SUBCUTANEOUS at 16:40

## 2020-08-14 RX ADMIN — DOCUSATE SODIUM - SENNOSIDES 1 TABLET: 50; 8.6 TABLET, FILM COATED ORAL at 17:29

## 2020-08-14 RX ADMIN — ROCURONIUM BROMIDE 20 MG: 10 INJECTION INTRAVENOUS at 12:15

## 2020-08-14 RX ADMIN — Medication 10 ML: at 06:21

## 2020-08-14 RX ADMIN — Medication 20 MG: at 12:20

## 2020-08-14 RX ADMIN — SODIUM CHLORIDE 40 MCG/MIN: 900 INJECTION, SOLUTION INTRAVENOUS at 12:33

## 2020-08-14 RX ADMIN — Medication 10 MG: at 12:17

## 2020-08-14 RX ADMIN — Medication 120 MCG: at 12:16

## 2020-08-14 RX ADMIN — Medication 120 MCG: at 12:33

## 2020-08-14 NOTE — PROGRESS NOTES
Occupational Therapy Orders received and medical record reviewed. Pt is POD 0 of R Hip Arthroplasty Total Revision Posterior Approach. Will follow up tomorrow on POD 1. Thank you.

## 2020-08-14 NOTE — PROGRESS NOTES
Bedside and Verbal shift change report given to [address] [place] [address] (oncoming nurse) by Rufina David RN (offgoing nurse). Report included the following information SBAR, Kardex, Procedure Summary, Intake/Output, MAR and Recent Results.

## 2020-08-14 NOTE — PROGRESS NOTES
Telemedicine cross cover: 
Pt is a 65 yr old male with multiple medical problems including endocarditis, BREWER/liver cirrhosis, esophageal varices, s/p TIPS, uncontrolled DM II who has been hospitalized for dislocation of R hip prosthesis. - BS elevated. add lantus 10 units nightly 
- hgb A1C ~ 6.2 
- continue aldactone, resume lasix 
- hold IV fluids 
- continue lactulose and resume rifaximin as well  
- monitor volume status, renal function closely - check ECG

## 2020-08-14 NOTE — ROUTINE PROCESS
TRANSFER - IN REPORT: 
 
Verbal report received from April RN(name) on Miguel Hinson  being received from OR(unit) for routine progression of care Report consisted of patients Situation, Background, Assessment and  
Recommendations(SBAR). Information from the following report(s) OR Summary was reviewed with the receiving nurse. Opportunity for questions and clarification was provided. Assessment completed upon patients arrival to unit and care assumed.

## 2020-08-14 NOTE — PERIOP NOTES
TRANSFER - IN REPORT: 
 
Verbal report received from Nikky RN(name) on Siva Sheppard  being received from Orthopedics room 3214(unit) for ordered procedure Report consisted of patients Situation, Background, Assessment and  
Recommendations(SBAR). Information from the following report(s) SBAR, OR Summary, Intake/Output, MAR and Recent Results was reviewed with the receiving nurse. Opportunity for questions and clarification was provided. Assessment completed upon patients arrival to unit and care assumed.

## 2020-08-14 NOTE — BRIEF OP NOTE
Brief Postoperative Note Patient: Lm Pathak YOB: 1950 MRN: [id number] Date of Procedure: 8/14/2020 Pre-Op Diagnosis: RIGHT HIP DISLOCATION Post-Op Diagnosis: Same as preoperative diagnosis. Procedure(s): RIGHT HIP ARTHROPLASTY TOTAL REVISION POSTERIOR APPROACH AND ADDUCTOR RELEASE, SCAR REVISION Surgeon(s): 
Myles Mccarthy MD 
 
Surgical Assistant: Physician Assistant: AMAN Howard Anesthesia: General  
 
Estimated Blood Loss (mL): 400 Complications: None Specimens: * No specimens in log * Implants:  
Implant Name Type Inv. Item Serial No.  Lot No. LRB No. Used Action INSERT ACET PINN NEUT +4 [id number] -- CONSTRAINED - SNA   INSERT ACET PINN NEUT +4 [id number] -- CONSTRAINED NA  [facility] [employer] [id number] Right 1 Implanted HEAD FEM CER ARTC MARTHA 36 +5 -- BIOLOX DELTA - SNA  HEAD FEM CER ARTC MARTHA 36 +5 -- BIOLOX DELTA NA [facility] [employer] [id number] Right 1 Implanted HEAD FEM 12/14 TPR 28MM+15.5MM --  - SNA  HEAD FEM 12/14 TPR 28MM+15.5MM --  NA [facility] [employer] [id number] Right 1 Explanted HEAD FEM [facility] 40X28 RUST --  - SNA  HEAD FEM [facility] 40X28 RUST --  NA [facility] [employer] [id number] Right 1 Explanted HEAD FEM [facility] 40X28 RUST --  - SNA  HEAD FEM [facility] 40X28 RUST --  NA [facility] [employer] [id number] Right 1 Explanted Drains:  
[REMOVED] Orogastric Tube 03/28/20 (Removed) [REMOVED] Drain 02/26/18 Right Hip (Removed) Findings: hip buttonholed through gluteus placido Electronically Signed by El Greenwood MD on 8/14/2020 at 2:34 PM

## 2020-08-14 NOTE — PROGRESS NOTES
Problem: Mobility Impaired (Adult and Pediatric) Goal: *Acute Goals and Plan of Care (Insert Text) Description: FUNCTIONAL STATUS PRIOR TO ADMISSION: Patient was modified independent using a walker for functional mobility. HOME SUPPORT PRIOR TO ADMISSION: The patient lived with son. Physical Therapy Goals Initiated 8/14/2020 1. Patient will move from supine to sit and sit to supine  in bed with minimal assistance/contact guard assist within 7 days. 2. Patient will perform sit to stand with minimal assistance/contact guard assist within 7 days. 3. Patient will ambulate with minimal assistance/contact guard assist for 150 feet with the least restrictive device within 7 days. 4. Patient will ascend/descend 2 stairs with 1 handrail(s) with minimal assistance/contact guard assist within 7 days. 5. Patient will verbalize and demonstrate understanding of posterior precautions per protocol within 7 days. 6. Patient will perform all home exercise program per protocol with independence within 7 days. Outcome: Not Met PHYSICAL THERAPY EVALUATION Patient: Vida Hargrove (32 y.o. male) Date: 8/14/2020 Primary Diagnosis: Dislocation of internal right hip prosthesis ([address] Isabella Bustos Procedure(s) (LRB): 
RIGHT HIP ARTHROPLASTY TOTAL REVISION POSTERIOR APPROACH AND ABDUCTOR RELEASE (Right) Day of Surgery Precautions: posterior hip  WBAT, Fall ASSESSMENT Based on the objective data described below, the patient presents with generalized weakness, decreased activity tolerance, and overall decreased functional mobility. Pt was was limited due to not being able to mobilize RLE. He was able to tolerate coming to the EOB with 2 person A. Noted increased posterior lean in sitting. He was returned to supine at the end of the session and dressing saturated, nursing made aware. Compression and ice applied.  
 
Current Level of Function Impacting Discharge (mobility/balance): mod A x 2 
 
 Functional Outcome Measure: The patient scored 20/100 on the barthel outcome measure which is indicative of 80% impaired. Other factors to consider for discharge: hoarder, poor living conditions Patient will benefit from skilled therapy intervention to address the above noted impairments. PLAN : 
Recommendations and Planned Interventions: bed mobility training, transfer training, gait training, therapeutic exercises, patient and family training/education, and therapeutic activities Frequency/Duration: Patient will be followed by physical therapy:  twice daily to address goals. Recommendation for discharge: (in order for the patient to meet his/her long term goals) Therapy up to 5 days/week in SNF setting ( due to poor home environment reviewed in chart) This discharge recommendation: 
Has not yet been discussed the attending provider and/or case management IF patient discharges home will need the following DME: to be determined (TBD) SUBJECTIVE:  
Patient stated Abundio Erickson slid off my bed and dislocated .  OBJECTIVE DATA SUMMARY:  
HISTORY:   
Past Medical History:  
Diagnosis Date  
 ADD (attention deficit disorder) Adverse effect of anesthesia   
 motion sickness resulting in loss of balance Anemia due to blood loss Hinson's esophagus with esophagitis Benign essential tremor [facility]) 2012 [facility] Chronic pain Cirrhosis ([facility] Depression Dislocated hip (HCC)   
 DJD (degenerative joint disease) of hip   
 bilat DM (diabetes mellitus) ([facility] [address] 3/17/2010 ED (erectile dysfunction) of organic origin Esophageal varices determined by endoscopy ([facility] Fall on or from sidewalk curb 9/24/2015 Femur fracture ([facility] [facility] Gastritis and duodenitis GERD (gastroesophageal reflux disease)   
 resolved after discontinuing diclofenac Hematuria 6/2016 High cholesterol 03/17/2010  
 pt denies 6/19 HTN (hypertension) 3/17/2010 Morbid obesity ([address] Murmur, cardiac 2016 PFO (patent foramen ovale) 7/26/2017 PUD (peptic ulcer disease) Sepsis ([facility] [address] Shingles 6/2016 RESOLVING- NO RASH (HEAD) Sleep apnea   
 doesnt use CPAP anymore Past Surgical History:  
Procedure Laterality Date COLONOSCOPY N/A 6/18/2019 COLONOSCOPY AND EGD performed by Marla Washington MD at [address]  6/18/2019 ENDOSCOPY, COLON, DIAGNOSTIC    
 HX CATARACT REMOVAL Bilateral   
 HX CHOLECYSTECTOMY  1995 HX GI  1980  
 gastroplasty [address] 27 HX HIP REPLACEMENT Left HX ORTHOPAEDIC Right 2011  
 rot cuff repair HX ORTHOPAEDIC  2016  
 left broken femur HX ORTHOPAEDIC Right 08/13/2020 Attempt closed reduction of hip dislocation [address] HX VASCULAR ACCESS    
 picc line and removed after sepsis resolved IR INSERT TIPS HEPATIC SHUNT  3/28/2020 TOTAL HIP ARTHROPLASTY  05/2010  
 right TOTAL HIP ARTHROPLASTY  08/01/2016  
 left UPPER GI ENDOSCOPY,BIOPSY  6/18/2019 Personal factors and/or comorbidities impacting plan of care: twyla, poor living conditions per previous PT note. Home Situation Home Environment: Private residence # Steps to Enter: 2 Rails to Enter: Yes Hand Rails : Right One/Two Story Residence: Two story, live on 1st floor Support Systems: Child(dariusz) EXAMINATION/PRESENTATION/DECISION MAKING:  
Critical Behavior: 
Neurologic State: Alert, Drowsy, Eyes open spontaneously Orientation Level: Oriented to person, Oriented to place, Oriented to situation Cognition: Follows commands Hearing: Auditory Auditory Impairment: None Hearing Aids/Status: Does not own Skin:  dressing intact Edema: WDL Range Of Motion: 
AROM: Generally decreased, functional 
  
  
  
PROM: Generally decreased, functional 
  
  
  
Strength:   
Strength: Generally decreased, functional 
  
  
  
  
  
  
Tone & Sensation: Tone: Normal 
  
  
  
  
Sensation: Intact Coordination: 
Coordination: Within functional limits Vision:  
  
Functional Mobility: 
Bed Mobility: 
Rolling: Moderate assistance Supine to Sit: Moderate assistance Sit to Supine: Moderate assistance Scooting: Minimum assistance Transfers: 
  
 Deferred due to immobility of RLE Balance:  
Sitting: Impaired Sitting - Static: Fair (occasional) Sitting - Dynamic: Fair (occasional) Functional Measure: 
Barthel Index: 
 
Bathin Bladder: 0 Bowels: 5 Groomin Dressin Feeding: 10 Mobility: 0 Stairs: 0 Toilet Use: 0 Transfer (Bed to Chair and Back): 0 Total: 20/100 The Barthel ADL Index: Guidelines 1. The index should be used as a record of what a patient does, not as a record of what a patient could do. 2. The main aim is to establish degree of independence from any help, physical or verbal, however minor and for whatever reason. 3. The need for supervision renders the patient not independent. 4. A patient's performance should be established using the best available evidence. Asking the patient, friends/relatives and nurses are the usual sources, but direct observation and common sense are also important. However direct testing is not needed. 5. Usually the patient's performance over the preceding 24-48 hours is important, but occasionally longer periods will be relevant. 6. Middle categories imply that the patient supplies over 50 per cent of the effort. 7. Use of aids to be independent is allowed. Hanh Ku., Barthel, D.W. (6471). Functional evaluation: the Barthel Index. [facility] (14)2. MAX Davidson, Sage Davies., Jose Meza., Chuy, [address] [place] [address] Ave (). Measuring the change indisability after inpatient rehabilitation; comparison of the responsiveness of the Barthel Index and Functional [place] Measure. Journal of Neurology, Neurosurgery, and Psychiatry, 66(4), 091-374. TAJ Lafleur, SHILPI Payan, & Christiano Barone M.A. (2004.) Assessment of post-stroke quality of life in cost-effectiveness studies: The usefulness of the Barthel Index and the EuroQoL-5D. [facility], 13, 117-74 Physical Therapy Evaluation Charge Determination History Examination Presentation Decision-Making HIGH Complexity :3+ comorbidities / personal factors will impact the outcome/ POC  MEDIUM Complexity : 3 Standardized tests and measures addressing body structure, function, activity limitation and / or participation in recreation  MEDIUM Complexity : Evolving with changing characteristics  Other outcome measures barthel  HIGH Based on the above components, the patient evaluation is determined to be of the following complexity level: MEDIUM Activity Tolerance:  
Fair Please refer to the flowsheet for vital signs taken during this treatment. After treatment patient left in no apparent distress:  
Supine in bed and Call bell within reach COMMUNICATION/EDUCATION:  
The patients plan of care was discussed with: Registered nurse. Fall prevention education was provided and the patient/caregiver indicated understanding. and Patient/family agree to work toward stated goals and plan of care. Thank you for this referral. 
Baron Toussaint, PT, DPT Time Calculation: 22 mins

## 2020-08-14 NOTE — PERIOP NOTES
1110:  Dr. Drew Lawrence in to see the patient & discuss anesthesia plan of care 1125:  Phoned the patient's daughter, Angie Sim [phone] (at his request) & updated her that he would be going to the OR today & the procedure. She verified that her father had called her earlier & given updates. Procedure was reviewed & she agrees to plan of care as well.

## 2020-08-14 NOTE — ROUTINE PROCESS
TRANSFER - OUT REPORT: 
 
Verbal report given to Nikky RN(name) on Lyle Das  being transferred to [facility](unit) for routine progression of care Report consisted of patients Situation, Background, Assessment and  
Recommendations(SBAR). Information from the following report(s) OR Summary, Intake/Output and MAR was reviewed with the receiving nurse. Opportunity for questions and clarification was provided. Patient transported with: 
 Monitor O2 @ 2 liters Registered Nurse Tech

## 2020-08-14 NOTE — ANESTHESIA PREPROCEDURE EVALUATION
Anesthetic History No history of anesthetic complications Review of Systems / Medical History Patient summary reviewed, nursing notes reviewed and pertinent labs reviewed Pulmonary Sleep apnea: No treatment Neuro/Psych Psychiatric history Comments: Depression Benign essential tremor  Cardiovascular Hypertension Exercise tolerance: >4 METS Comments: There was a possible tiny patent foramen ovale GI/Hepatic/Renal 
  
GERD 
 
 
PUD and liver disease Comments: Hinson's esophagus with esophagitis  Endo/Other Diabetes Obesity and arthritis Other Findings Comments: DJD Hx BCCA 
ADD (attention deficit disorder) Hx Cirrhosis Physical Exam 
 
Airway Mallampati: II 
TM Distance: > 6 cm Neck ROM: normal range of motion Mouth opening: Normal 
 
 Cardiovascular Regular rate and rhythm,  S1 and S2 normal,  no murmur, click, rub, or gallop Dental 
No notable dental hx Pulmonary Breath sounds clear to auscultation Abdominal 
GI exam deferred Other Findings Anesthetic Plan ASA: 3 Anesthesia type: general 
 
Monitoring Plan: BIS Induction: Intravenous Anesthetic plan and risks discussed with: Patient

## 2020-08-14 NOTE — ANESTHESIA POSTPROCEDURE EVALUATION
Procedure(s): RIGHT HIP ARTHROPLASTY TOTAL REVISION POSTERIOR APPROACH AND ABDUCTOR RELEASE. 
 
general 
 
Anesthesia Post Evaluation Patient location during evaluation: PACU Note status: Adequate. Level of consciousness: responsive to verbal stimuli and sleepy but conscious Pain management: satisfactory to patient Airway patency: patent Anesthetic complications: no 
Cardiovascular status: acceptable Respiratory status: acceptable Hydration status: acceptable Comments: +Post-Anesthesia Evaluation and Assessment Patient: Alfonzo Seals MRN: [id number]  SSN: [id number] YOB: 1950  Age: 65 y.o. Sex: male Cardiovascular Function/Vital Signs /46 (BP 1 Location: Right arm, BP Patient Position: At rest)   Pulse 80   Temp 36.9 °C (98.4 °F)   Resp 16   Ht 5' 10.5\" (1.791 m)   Wt 114.8 kg (253 lb)   SpO2 97%   BMI 35.79 kg/m² Patient is status post Procedure(s): RIGHT HIP ARTHROPLASTY TOTAL REVISION POSTERIOR APPROACH AND ABDUCTOR RELEASE. Nausea/Vomiting: Controlled. Postoperative hydration reviewed and adequate. Pain: 
Pain Scale 1: Numeric (0 - 10) (08/14/20 1515) Pain Intensity 1: 4 (08/14/20 1515) Managed. Neurological Status:  
Neuro (WDL): Exceptions to WDL (08/14/20 1515) At baseline. Mental Status and Level of Consciousness: Arousable. Pulmonary Status:  
O2 Device: Nasal cannula (08/14/20 1515) Adequate oxygenation and airway patent. Complications related to anesthesia: None Post-anesthesia assessment completed. No concerns. Signed By: Cony Coto MD  
 8/14/2020 Post anesthesia nausea and vomiting:  controlled INITIAL Post-op Vital signs:  
Vitals Value Taken Time /46 8/14/2020  3:15 PM  
Temp 36.9 °C (98.4 °F) 8/14/2020  3:15 PM  
Pulse 81 8/14/2020  3:25 PM  
Resp 12 8/14/2020  3:25 PM  
SpO2 97 % 8/14/2020  3:25 PM  
Vitals shown include unvalidated device data.

## 2020-08-14 NOTE — PROGRESS NOTES
Pharmacy Automatic Direct Oral Anticoagulant Renal Dosing Protocol Patient currently receiving Apixaban 2.5 mg bid with an indication of dvt ppx. Start date: pta Major interacting medications:  
 
Platelet inhibitors (dose/frequency):  
 
Labs: 
Recent Labs 08/13/20 
1030 CREA 0.98  
HGB 10.7*  Wt Readings from Last 1 Encounters:  
08/14/20 114.8 kg (253 lb) Creatinine Clearance: 110.6 (Actual Body Weight) Impression/Plan: eliquis 2.5 mg twice daily appropirate Cbc every other day and bmp ordered Pharmacy will follow daily and adjust as appropriate. Thank you, Kiko Oneil, PHARMD  
 
 Child Crain Score calculator 
[employer]. Jimmie Dobbs Edoxaban Rivaroxaban 
 
[email or web address]/[place]/[facility]/Pharmacy/Clinical%20Companion/DOAC%20Dosing_Additional%20Guidance. pdf

## 2020-08-14 NOTE — CONSULTS
Hospitalist Consultation Note NAME:  Liat Park :   1950 MRN:   [id number] ATTENDING: Judith Vincent MD 
PCP:  Bibiana Carter MD 
 
Date/Time:  2020 3:05 AM 
 
 
Recommendations/Plan:  
 
 
Closed posterior dislocation of right hip-Failed multiple attempts Of closed  Reduction yesterday by ortho and is  scheuled to OR today  EKG  NSR normal sinus rhythm; and regular . Rate 99; Axis: normal; P wave: normal; QRS interval: normal ; ST/T wave: normal; CXR-Mild PVC  So Holding IVF and cont diuretics ECHO  Left ventricle: Systolic function was normal. Ejection fraction was 
estimated in the range of 55 % to 60 %. There were no regional wall motion 
abnormalities. Aortic valve: Leaflets exhibited sclerosis without stenosis. Tricuspid valve: There was mild regurgitation. --Pre-op cardiac risk assessment:  Pt evaluated using revised cardiac risk index and is felt to be low cardiovascular risk for intermediaterisk surgery with a   risk  0.9 % 
 %  for major complications based on these criteria. This risk has been discussed with the patient  and pt wishes to proceed. Plan for surgery without further cardiac testing if this risk is acceptable per surgery and anesthesia. Further risk reduction will involve medical management of other comorbid conditions in the perioperative period. Uncontrolled type 2 diabetes mellitus with hyperglycemia  
- cont. ssi  While Npo for possible procedure 
-Cont lantus 10 units nightly  
- monitor volume status, renal function closely H/O COLLIN/liver cirrhosis/ascites, esophageal varices, s/p TIPS, 
 continue aldactone, lasix 
- continue lactulose and  rifaximin Iron deficiency anemia Stable monitor Per pt. He is on eliquis bid , the last time time he took was two days ago. He does not know why he is on eliquis. Currently will Hold for surgry Sleep apnea-Not using cpap at home HTN/HLD-cont home meds Pt. Does not remember  His medication accuratly. Pharmacy consulted for med rec. Subjective:  
REQUESTING PHYSICIAN:Jackie Vaca MD 
REASON FOR CONSULT:    Medical Managment Pt is a 65 yr old male with multiple medical problems including endocarditis, BREWER/liver cirrhosis, esophageal varices, s/p TIPS, uncontrolled DM II who has been hospitalized for dislocation of R hip prosthesis. Patient states that he was trying to get out of bed when he slipped out of bed and laid on his right hip. He states that he was unable to get up since the fall. Patient denies any loss of consciousness or head injury. EMS was called by a family member. Of note, patient has had a history of recurrent dislocation of the right hip, last underwent surgery by Dr. Gisselle Peterson on July 20. Pt denies any other alleviating or exacerbating factors. Additionally, pt specifically denies any recent fever, chills, headache, nausea, vomiting, abdominal pain, CP, SOB, lightheadedness, dizziness, numbness, weakness, lower extremity swelling, heart palpitations, urinary sxs, diarrhea, constipation, melena, hematochezia, cough, or congestion.  
  
There are no other complaints, changes or physical findings at this time.  
  
PCP: Fredy Rivas MD 
 
  
Pt. currenty denies cp sob cough fever chills rigors  Flu like symptoms. Past Medical History:  
Diagnosis Date  ADD (attention deficit disorder)  Adverse effect of anesthesia   
 motion sickness resulting in loss of balance  Anemia due to blood loss  Hinson's esophagus with esophagitis  Benign essential tremor  Cancer [facility]) 2012 [facility]  Chronic pain  Cirrhosis ([address]  Depression  Dislocated hip ([address]  DJD (degenerative joint disease) of hip   
 bilat  DM (diabetes mellitus) ([address] 3/17/2010  ED (erectile dysfunction) of organic origin  Esophageal varices determined by endoscopy ([address]  Fall on or from sidewalk curb 2015  Femur fracture [facility]  Gastritis and duodenitis  GERD (gastroesophageal reflux disease)   
 resolved after discontinuing diclofenac  Hematuria 2016  High cholesterol 2010  
 pt denies   
 HTN (hypertension) 3/17/2010  Morbid obesity ([facility]  Murmur, cardiac   
 PFO (patent foramen ovale) 2017  PUD (peptic ulcer disease)  Sepsis ([facility]  Shingles 2016 RESOLVING- NO RASH (HEAD)  Sleep apnea   
 doesnt use CPAP anymore Past Surgical History:  
Procedure Laterality Date  COLONOSCOPY N/A 2019 COLONOSCOPY AND EGD performed by Diya Stephen MD at [facility] ENDOSCOPY  COLONOSCOPY,DIAGNOSTIC  2019  ENDOSCOPY, COLON, DIAGNOSTIC    
 HX CATARACT REMOVAL Bilateral   
 HX CHOLECYSTECTOMY    HX GI  1980  
 gastroplasty [address]  HX HIP REPLACEMENT Left  HX ORTHOPAEDIC Right 2011  
 rot cuff repair  HX ORTHOPAEDIC  2016  
 left broken femur [facility]  HX VASCULAR ACCESS    
 picc line and removed after sepsis resolved  IR INSERT TIPS HEPATIC SHUNT  3/28/2020 [address] [facility] ARTHROPLASTY  2010  
 right  TOTAL HIP ARTHROPLASTY  2016  
 left  UPPER GI ENDOSCOPY,BIOPSY  2019 Social History Tobacco Use  Smoking status: Former Smoker Packs/day: 2.00 Years: 15.00 Pack years: 30.00 Last attempt to quit: 3/1/1979 Years since quittin.4  Smokeless tobacco: Never Used Substance Use Topics  Alcohol use: No  
  Alcohol/week: 0.0 standard drinks Family History Problem Relation Age of Onset  Cancer Father   
     multiple myeloma  Stroke Father  Dementia Mother  No Known Problems Brother  COPD Brother  No Known Problems Daughter  Cancer Maternal Grandmother COLON  
 No Known Problems Son  No Known Problems Son  Anesth Problems Neg Hx Allergies Allergen Reactions  Nsaids (Non-Steroidal Anti-Inflammatory Drug) Other (comments) Hx of PUD and Hinson's Esophagus Prior to Admission medications Medication Sig Start Date End Date Taking? Authorizing Provider  
melatonin 3 mg tablet Take 3 mg by mouth nightly as needed for Sleep. 8/12/20  Yes Provider, Historical  
spironolactone (Aldactone) 25 mg tablet Take 25 mg by mouth daily. 8/12/20  Yes Provider, Historical  
famotidine (PEPCID) 20 mg tablet Take 20 mg by mouth two (2) times a day. Before meals 8/12/20  Yes Provider, Historical  
magnesium oxide 400 mg magnesium tab Take 1 Tab by mouth daily. 8/12/20  Yes Provider, Historical  
lactulose (CHRONULAC) 10 gram/15 mL solution Take 30 mL by mouth two (2) times a day. Yes Provider, Historical  
diclofenac (VOLTAREN) 1 % gel Apply 2 g to affected area four (4) times daily as needed for Pain. Yes Provider, Historical  
ferrous sulfate 324 mg (65 mg iron) tablet Take  by mouth Daily (before breakfast). Yes Provider, Historical  
oxyCODONE IR (ROXICODONE) 5 mg immediate release tablet Take 5 mg by mouth every four (4) hours as needed for Pain. Yes Provider, Historical  
furosemide (LASIX) 20 mg tablet Take 20 mg by mouth daily. Yes Provider, Historical  
Omeprazole delayed release (PRILOSEC D/R) 20 mg tablet Take 20 mg by mouth daily. Yes Provider, Historical  
insulin glargine (LANTUS) 100 unit/mL injection 20 Units by SubCUTAneous route ACB/HS. Patient taking differently: 36 Units by SubCUTAneous route nightly. 7/23/20  Yes Rico Oseguera MD  
apixaban (ELIQUIS) 2.5 mg tablet Take 1 Tab by mouth every twelve (12) hours every twelve (12) hours. Indications: deep vein thrombosis prevention 7/22/20  Yes AMAN Clemente  
senna-docusate (PERICOLACE) 8.6-50 mg per tablet Take 1 Tab by mouth two (2) times a day.  Indications: constipation 7/22/20  Yes Pierce Clemente  
 lisinopriL (PRINIVIL, ZESTRIL) 5 mg tablet Take 10 mg by mouth every morning. Yes Provider, Historical  
sertraline (Zoloft) 100 mg tablet Take 150 mg by mouth daily. Yes Provider, Historical  
insulin aspart U-100 (NovoLOG Flexpen U-100 Insulin) 100 unit/mL (3 mL) inpn 7 Units by SubCUTAneous route Before breakfast, lunch, and dinner for 90 days. 7 units 3 times daily 5/27/20 8/25/20 Yes Adam Hoyos MD  
rifAXIMin Bina Benitez) 550 mg tablet Take 1 Tab by mouth two (2) times a day. 4/23/20  Yes Denver Bue, MD  
potassium chloride SR (KLOR-CON 10) 10 mEq tablet Take 10 mEq by mouth two (2) times a day. 4/18/20  Yes Provider, Historical  
thiamine hcl 500 mg tablet Take 500 mg by mouth daily. 4/6/20  Yes Marline Perez NP  
folic acid (FOLVITE) 1 mg tablet Take 1 Tab by mouth daily. 3/26/20  Yes Ashley Reese NP  
gabapentin (NEURONTIN) 300 mg capsule Take 2 Caps by mouth nightly. 3/16/20  Yes Adam Hoyos MD  
butalbital-acetaminophen-caffeine (FIORICET, ESGIC) -40 mg per tablet Take 1 Tab by mouth every six (6) hours as needed for Headache. 3/6/20  Yes Adam Hoyos MD  
cephALEXin (KEFLEX) 500 mg capsule Take 1 Cap by mouth two (2) times a day. 2/24/20  Yes Adam Hoyos MD  
ergocalciferol (VITAMIN D2) 50,000 unit capsule TAKE 1 CAPSULE EVERY 7 DAYS Patient taking differently: Take 50,000 Units by mouth every seven (7) days. TAKE 1 CAPSULE EVERY 7 DAYS 1/6/20  Yes Adam Hoyos MD  
atorvastatin (LIPITOR) 40 mg tablet Take 1 Tab by mouth daily. 1/4/20  Yes Dae Luna MD  
glucose blood VI test strips (ASCENSIA AUTODISC VI, ONE TOUCH ULTRA TEST VI) strip Test blood sugar twice daily Diagnosis E 11.9. Can use [employer] 12/13/19  Yes Adam Hoyos MD  
primidone (MYSOLINE) 250 mg tablet TAKE 1 TABLET TWICE A DAY Patient taking differently: Take 250 mg by mouth two (2) times a day.  TAKE 1 TABLET TWICE A DAY 11/7/19  Yes Adam Hoyso MD  
 acetaminophen (TYLENOL) 500 mg tablet Take 2 Tabs by mouth every six (6) hours as needed for Pain. 10/7/19  Yes Bisi Davis MD  
Blood-Glucose Meter (ACCU-CHEK SOWMYA PLUS METER) misc Test blood sugar twice daily Diagnosis E 11.9 7/9/19  Yes Addis Shook MD  
Blood Glucose Control High&Low (ACCU-CHEK SOWMYA CONTROL SOLN) soln Test blood sugar twice daily Diagnosis E 11.9 7/9/19  Yes Addis Shook MD  
B.infantis-B.ani-B.long-B.bifi (PROBIOTIC 4X) 10-15 mg TbEC Take 1 Tab by mouth daily. Yes Provider, Historical  
calcium citrate-vitamin D3 (CITRACAL + D) tablet Take 2 Tabs by mouth two (2) times a day. Yes Provider, Historical  
 
 
REVIEW OF SYSTEMS:    
Total of 12 systems reviewed as follows:  
I am not able to complete the review of systems because: The patient is intubated and sedated The patient has altered mental status due to his acute medical problems The patient has baseline aphasia from prior stroke(s) The patient has baseline dementia and is not reliable historian Constitutional: Negative. Negative for chills and fever. HENT: Negative. Negative for congestion, facial swelling, rhinorrhea, sore throat, trouble swallowing and voice change. Eyes: Negative. Respiratory: Negative. Negative for apnea, cough, chest tightness, shortness of breath and wheezing. Cardiovascular: Negative. Negative for chest pain, palpitations and leg swelling. Gastrointestinal: Negative. Negative for abdominal distention, abdominal pain, blood in stool, constipation, diarrhea, nausea and vomiting. Endocrine: Negative. Negative for cold intolerance, heat intolerance and polyuria. Genitourinary: Negative. Negative for difficulty urinating, dysuria, flank pain, frequency, hematuria and urgency. Musculoskeletal: Positive for arthralgias. Negative for back pain, myalgias, neck pain and neck stiffness. Skin: Negative. Negative for color change and rash. Neurological: Negative. Negative for dizziness, syncope, facial asymmetry, speech difficulty, weakness, light-headedness, numbness and headaches. Hematological: Negative. Does not bruise/bleed easily. Psychiatric/Behavioral: Negative. Negative for confusion and self-injury. The patient is not Objective: VITALS:   
Visit Vitals /51 Pulse 69 Temp 98.2 °F (36.8 °C) Resp 18 Wt 114.8 kg (253 lb) SpO2 100% BMI 35.79 kg/m² Temp (24hrs), Av.4 °F (36.9 °C), Min:97.5 °F (36.4 °C), Max:99 °F (37.2 °C) PHYSICAL EXAM:  
 
Constitutional:   
   Appearance: He is well-developed. He is not toxic-appearing. HENT:  
   Head: Normocephalic and atraumatic. Mouth/Throat:  
   Pharynx: No posterior oropharyngeal erythema. Eyes:  
   Conjunctiva/sclera: Conjunctivae normal.  
   Pupils: Pupils are equal, round, and reactive to light. Neck: Musculoskeletal: Normal range of motion. Cardiovascular:  
   Rate and Rhythm: Normal rate and regular rhythm. Heart sounds: Normal heart sounds. No murmur. No friction rub. No gallop. Pulmonary:  
   Effort: Pulmonary effort is normal. No respiratory distress. Breath sounds: Normal breath sounds. No wheezing or rales. Chest:  
   Chest wall: No tenderness. Abdominal:  
   General: Bowel sounds are normal. There is no distension. Palpations: Abdomen is soft. There is no mass. Tenderness: There is no abdominal tenderness. There is no guarding or rebound. Musculoskeletal: Normal range of motion. General: Tenderness (Tenderness to palpation over the right hip, leg is shortened, neurovascular intact distally. Exam concerning for dislocation) present. No deformity. Skin:  
   General: Skin is warm. Findings: No rash. Neurological:  
   Mental Status: He is alert and oriented to person, place, and time. Cranial Nerves: No cranial nerve deficit. Motor: No abnormal muscle tone. Coordination: Coordination normal.  
   Deep Tendon Reflexes: Reflexes normal.  
Psychiatric:     
   Behavior: Behavior is cooperative.  
 
_______________________________________________________________________ Care Plan discussed with: 
  Comments Patient y Family RN y   
Care Manager Consultant:     
____________________________________________________________________ TOTAL TIME:     60 mins Comments  
 y Reviewed previous records  
>50% of visit spent in counseling and coordination of care y Discussion with patient and/or family and questions answered Critical Care Provided     Minutes non procedure based 
________________________________________________________________________ Signed: Moe Martinez MD 
 
 
Procedures: see electronic medical records for all procedures/Xrays and details which were not copied into this note but were reviewed prior to creation of Plan. LAB DATA REVIEWED:   
Recent Results (from the past 24 hour(s)) EKG, 12 LEAD, INITIAL Collection Time: 08/13/20 10:26 AM  
Result Value Ref Range Ventricular Rate 99 BPM  
 Atrial Rate 99 BPM  
 P-R Interval 150 ms QRS Duration 90 ms Q-T Interval 342 ms QTC Calculation (Bezet) 438 ms Calculated P Axis 17 degrees Calculated R Axis 75 degrees Calculated T Axis 37 degrees Diagnosis Normal sinus rhythm with sinus arrhythmia Normal ECG When compared with ECG of 18-MAY-2020 14:29, 
Questionable change in QRS axis CBC WITH AUTOMATED DIFF Collection Time: 08/13/20 10:30 AM  
Result Value Ref Range WBC 8.3 4.1 - 11.1 K/uL  
 RBC 3.58 (L) 4.10 - 5.70 M/uL  
 HGB 10.7 (L) 12.1 - 17.0 g/dL HCT 33.2 (L) 36.6 - 50.3 % MCV 92.7 80.0 - 99.0 FL  
 MCH 29.9 26.0 - 34.0 PG  
 MCHC 32.2 30.0 - 36.5 g/dL  
 RDW 18.6 (H) 11.5 - 14.5 % PLATELET 529 929 - 433 K/uL MPV 10.3 8.9 - 12.9 FL  
 NRBC 0.0 0  WBC ABSOLUTE NRBC 0.00 0.00 - 0.01 K/uL NEUTROPHILS 89 (H) 32 - 75 % LYMPHOCYTES 4 (L) 12 - 49 % MONOCYTES 4 (L) 5 - 13 % EOSINOPHILS 1 0 - 7 % BASOPHILS 1 0 - 1 % IMMATURE GRANULOCYTES 1 (H) 0.0 - 0.5 % ABS. NEUTROPHILS 7.4 1.8 - 8.0 K/UL  
 ABS. LYMPHOCYTES 0.3 (L) 0.8 - 3.5 K/UL  
 ABS. MONOCYTES 0.3 0.0 - 1.0 K/UL  
 ABS. EOSINOPHILS 0.1 0.0 - 0.4 K/UL  
 ABS. BASOPHILS 0.1 0.0 - 0.1 K/UL  
 ABS. IMM. GRANS. 0.1 (H) 0.00 - 0.04 K/UL  
 DF SMEAR SCANNED    
 RBC COMMENTS ANISOCYTOSIS 
1+ METABOLIC PANEL, COMPREHENSIVE Collection Time: 08/13/20 10:30 AM  
Result Value Ref Range Sodium 138 136 - 145 mmol/L Potassium 4.1 3.5 - 5.1 mmol/L Chloride 108 97 - 108 mmol/L  
 CO2 24 21 - 32 mmol/L Anion gap 6 5 - 15 mmol/L Glucose 196 (H) 65 - 100 mg/dL BUN 13 6 - 20 MG/DL Creatinine 0.98 0.70 - 1.30 MG/DL  
 BUN/Creatinine ratio 13 12 - 20 GFR est AA >60 >60 ml/min/1.73m2 GFR est non-AA >60 >60 ml/min/1.73m2 Calcium 7.4 (L) 8.5 - 10.1 MG/DL Bilirubin, total 1.3 (H) 0.2 - 1.0 MG/DL  
 ALT (SGPT) 21 12 - 78 U/L  
 AST (SGOT) 36 15 - 37 U/L Alk. phosphatase 134 (H) 45 - 117 U/L Protein, total 6.3 (L) 6.4 - 8.2 g/dL Albumin 2.4 (L) 3.5 - 5.0 g/dL Globulin 3.9 2.0 - 4.0 g/dL A-G Ratio 0.6 (L) 1.1 - 2.2 TYPE & SCREEN Collection Time: 08/13/20 10:30 AM  
Result Value Ref Range Crossmatch Expiration 08/16/2020 ABO/Rh(D) O POSITIVE Antibody screen NEG   
SAMPLES BEING HELD Collection Time: 08/13/20 10:30 AM  
Result Value Ref Range SAMPLES BEING HELD JOAN   
 COMMENT Add-on orders for these samples will be processed based on acceptable specimen integrity and analyte stability, which may vary by analyte. GLUCOSE, POC Collection Time: 08/13/20  1:02 PM  
Result Value Ref Range Glucose (POC) 196 (H) 65 - 100 mg/dL Performed by Simona Cuevas GLUCOSE, POC Collection Time: 08/13/20  3:14 PM  
Result Value Ref Range Glucose (POC) 221 (H) 65 - 100 mg/dL Performed by Antonio Gutiérrez GLUCOSE, POC Collection Time: 08/13/20  9:11 PM  
Result Value Ref Range Glucose (POC) 287 (H) 65 - 100 mg/dL Performed by Silvia Matthew EKG, 12 LEAD, INITIAL Collection Time: 08/13/20 11:45 PM  
Result Value Ref Range Ventricular Rate 70 BPM  
 Atrial Rate 70 BPM  
 P-R Interval 154 ms QRS Duration 94 ms Q-T Interval 438 ms QTC Calculation (Bezet) 473 ms Calculated P Axis 44 degrees Calculated R Axis -10 degrees Calculated T Axis 16 degrees Diagnosis Normal sinus rhythm Incomplete right bundle branch block Minimal voltage criteria for LVH, may be normal variant When compared with ECG of 13-AUG-2020 10:26, 
MANUAL COMPARISON REQUIRED, DATA IS UNCONFIRMED 
  
 
 
_____________________________ Hospitalist: Tonya Cooley MD

## 2020-08-15 LAB
ANION GAP SERPL CALC-SCNC: 6 MMOL/L (ref 5–15)
BUN SERPL-MCNC: 33 MG/DL (ref 6–20)
BUN/CREAT SERPL: 25 (ref 12–20)
CALCIUM SERPL-MCNC: 7.1 MG/DL (ref 8.5–10.1)
CHLORIDE SERPL-SCNC: 103 MMOL/L (ref 97–108)
CO2 SERPL-SCNC: 24 MMOL/L (ref 21–32)
CREAT SERPL-MCNC: 1.34 MG/DL (ref 0.7–1.3)
ERYTHROCYTE [DISTWIDTH] IN BLOOD BY AUTOMATED COUNT: 18.4 % (ref 11.5–14.5)
GLUCOSE BLD STRIP.AUTO-MCNC: 208 MG/DL (ref 65–100)
GLUCOSE BLD STRIP.AUTO-MCNC: 253 MG/DL (ref 65–100)
GLUCOSE BLD STRIP.AUTO-MCNC: 254 MG/DL (ref 65–100)
GLUCOSE BLD STRIP.AUTO-MCNC: 272 MG/DL (ref 65–100)
GLUCOSE SERPL-MCNC: 216 MG/DL (ref 65–100)
HCT VFR BLD AUTO: 19.9 % (ref 36.6–50.3)
HCT VFR BLD AUTO: 22.2 % (ref 36.6–50.3)
HGB BLD-MCNC: 6.3 G/DL (ref 12.1–17)
HGB BLD-MCNC: 7.3 G/DL (ref 12.1–17)
MCH RBC QN AUTO: 30.3 PG (ref 26–34)
MCHC RBC AUTO-ENTMCNC: 31.7 G/DL (ref 30–36.5)
MCV RBC AUTO: 95.7 FL (ref 80–99)
NRBC # BLD: 0 K/UL (ref 0–0.01)
NRBC BLD-RTO: 0 PER 100 WBC
PLATELET # BLD AUTO: 138 K/UL (ref 150–400)
PMV BLD AUTO: 10.4 FL (ref 8.9–12.9)
POTASSIUM SERPL-SCNC: 4.5 MMOL/L (ref 3.5–5.1)
RBC # BLD AUTO: 2.08 M/UL (ref 4.1–5.7)
SERVICE CMNT-IMP: ABNORMAL
SODIUM SERPL-SCNC: 133 MMOL/L (ref 136–145)
WBC # BLD AUTO: 7.2 K/UL (ref 4.1–11.1)

## 2020-08-15 PROCEDURE — 82962 GLUCOSE BLOOD TEST: CPT

## 2020-08-15 PROCEDURE — 74011250637 HC RX REV CODE- 250/637: Performed by: PHYSICIAN ASSISTANT

## 2020-08-15 PROCEDURE — 36430 TRANSFUSION BLD/BLD COMPNT: CPT

## 2020-08-15 PROCEDURE — 30233N1 TRANSFUSION OF NONAUTOLOGOUS RED BLOOD CELLS INTO PERIPHERAL VEIN, PERCUTANEOUS APPROACH: ICD-10-PCS | Performed by: INTERNAL MEDICINE

## 2020-08-15 PROCEDURE — 94760 N-INVAS EAR/PLS OXIMETRY 1: CPT

## 2020-08-15 PROCEDURE — 80048 BASIC METABOLIC PNL TOTAL CA: CPT

## 2020-08-15 PROCEDURE — 74011250637 HC RX REV CODE- 250/637: Performed by: ORTHOPAEDIC SURGERY

## 2020-08-15 PROCEDURE — 3331090002 HH PPS REVENUE DEBIT

## 2020-08-15 PROCEDURE — 74011000250 HC RX REV CODE- 250: Performed by: PHYSICIAN ASSISTANT

## 2020-08-15 PROCEDURE — 85027 COMPLETE CBC AUTOMATED: CPT

## 2020-08-15 PROCEDURE — 36415 COLL VENOUS BLD VENIPUNCTURE: CPT

## 2020-08-15 PROCEDURE — 77010033678 HC OXYGEN DAILY

## 2020-08-15 PROCEDURE — 65660000000 HC RM CCU STEPDOWN

## 2020-08-15 PROCEDURE — 74011636637 HC RX REV CODE- 636/637: Performed by: INTERNAL MEDICINE

## 2020-08-15 PROCEDURE — P9016 RBC LEUKOCYTES REDUCED: HCPCS

## 2020-08-15 PROCEDURE — 85018 HEMOGLOBIN: CPT

## 2020-08-15 PROCEDURE — 74011250636 HC RX REV CODE- 250/636: Performed by: PHYSICIAN ASSISTANT

## 2020-08-15 PROCEDURE — 3331090001 HH PPS REVENUE CREDIT

## 2020-08-15 PROCEDURE — 74011636637 HC RX REV CODE- 636/637: Performed by: PHYSICIAN ASSISTANT

## 2020-08-15 PROCEDURE — 97110 THERAPEUTIC EXERCISES: CPT | Performed by: PHYSICAL THERAPIST

## 2020-08-15 RX ORDER — SODIUM CHLORIDE 9 MG/ML
250 INJECTION, SOLUTION INTRAVENOUS AS NEEDED
Status: DISCONTINUED | OUTPATIENT
Start: 2020-08-15 | End: 2020-08-24 | Stop reason: HOSPADM

## 2020-08-15 RX ORDER — GLUCOSAMINE SULFATE 1500 MG
1000 POWDER IN PACKET (EA) ORAL DAILY
Status: ON HOLD | COMMUNITY
End: 2021-01-01

## 2020-08-15 RX ORDER — INSULIN LISPRO 100 [IU]/ML
5 INJECTION, SOLUTION INTRAVENOUS; SUBCUTANEOUS
Status: DISCONTINUED | OUTPATIENT
Start: 2020-08-15 | End: 2020-08-16

## 2020-08-15 RX ORDER — INSULIN LISPRO 100 [IU]/ML
3 INJECTION, SOLUTION INTRAVENOUS; SUBCUTANEOUS
Status: DISCONTINUED | OUTPATIENT
Start: 2020-08-15 | End: 2020-08-15

## 2020-08-15 RX ORDER — HYDROCODONE BITARTRATE AND ACETAMINOPHEN 5; 325 MG/1; MG/1
1 TABLET ORAL
Status: ON HOLD | COMMUNITY
End: 2021-01-01

## 2020-08-15 RX ORDER — TAMSULOSIN HYDROCHLORIDE 0.4 MG/1
0.4 CAPSULE ORAL DAILY
COMMUNITY
End: 2021-01-01

## 2020-08-15 RX ORDER — INSULIN GLARGINE 100 [IU]/ML
25 INJECTION, SOLUTION SUBCUTANEOUS
Status: DISCONTINUED | OUTPATIENT
Start: 2020-08-15 | End: 2020-08-15

## 2020-08-15 RX ORDER — INSULIN GLARGINE 100 [IU]/ML
30 INJECTION, SOLUTION SUBCUTANEOUS
Status: DISCONTINUED | OUTPATIENT
Start: 2020-08-15 | End: 2020-08-16

## 2020-08-15 RX ORDER — INSULIN GLARGINE 100 [IU]/ML
30 INJECTION, SOLUTION SUBCUTANEOUS 2 TIMES DAILY
COMMUNITY

## 2020-08-15 RX ADMIN — ATORVASTATIN CALCIUM 40 MG: 40 TABLET, FILM COATED ORAL at 08:22

## 2020-08-15 RX ADMIN — RIFAXIMIN 550 MG: 550 TABLET ORAL at 17:11

## 2020-08-15 RX ADMIN — OXYCODONE 5 MG: 5 TABLET ORAL at 12:35

## 2020-08-15 RX ADMIN — SERTRALINE HYDROCHLORIDE 150 MG: 50 TABLET ORAL at 08:21

## 2020-08-15 RX ADMIN — WATER 2 G: 1 INJECTION INTRAMUSCULAR; INTRAVENOUS; SUBCUTANEOUS at 03:51

## 2020-08-15 RX ADMIN — DOCUSATE SODIUM - SENNOSIDES 1 TABLET: 50; 8.6 TABLET, FILM COATED ORAL at 08:23

## 2020-08-15 RX ADMIN — INSULIN GLARGINE 30 UNITS: 100 INJECTION, SOLUTION SUBCUTANEOUS at 22:25

## 2020-08-15 RX ADMIN — INSULIN LISPRO 5 UNITS: 100 INJECTION, SOLUTION INTRAVENOUS; SUBCUTANEOUS at 14:00

## 2020-08-15 RX ADMIN — DEXAMETHASONE SODIUM PHOSPHATE 10 MG: 4 INJECTION, SOLUTION INTRAMUSCULAR; INTRAVENOUS at 12:36

## 2020-08-15 RX ADMIN — INSULIN LISPRO 5 UNITS: 100 INJECTION, SOLUTION INTRAVENOUS; SUBCUTANEOUS at 17:12

## 2020-08-15 RX ADMIN — DOCUSATE SODIUM - SENNOSIDES 1 TABLET: 50; 8.6 TABLET, FILM COATED ORAL at 17:10

## 2020-08-15 RX ADMIN — POLYETHYLENE GLYCOL 3350 17 G: 17 POWDER, FOR SOLUTION ORAL at 08:24

## 2020-08-15 RX ADMIN — ACETAMINOPHEN 650 MG: 325 TABLET ORAL at 12:35

## 2020-08-15 RX ADMIN — LACTULOSE 30 ML: 20 SOLUTION ORAL at 17:10

## 2020-08-15 RX ADMIN — INSULIN LISPRO 3 UNITS: 100 INJECTION, SOLUTION INTRAVENOUS; SUBCUTANEOUS at 22:26

## 2020-08-15 RX ADMIN — INSULIN LISPRO 3 UNITS: 100 INJECTION, SOLUTION INTRAVENOUS; SUBCUTANEOUS at 08:20

## 2020-08-15 RX ADMIN — Medication 10 ML: at 22:27

## 2020-08-15 RX ADMIN — RIFAXIMIN 550 MG: 550 TABLET ORAL at 08:22

## 2020-08-15 RX ADMIN — LACTULOSE 30 ML: 20 SOLUTION ORAL at 08:19

## 2020-08-15 RX ADMIN — INSULIN LISPRO 5 UNITS: 100 INJECTION, SOLUTION INTRAVENOUS; SUBCUTANEOUS at 17:22

## 2020-08-15 RX ADMIN — Medication 10 ML: at 14:04

## 2020-08-15 RX ADMIN — OXYCODONE 10 MG: 5 TABLET ORAL at 08:24

## 2020-08-15 RX ADMIN — ACETAMINOPHEN 650 MG: 325 TABLET ORAL at 06:29

## 2020-08-15 RX ADMIN — OXYCODONE 10 MG: 5 TABLET ORAL at 17:10

## 2020-08-15 RX ADMIN — GABAPENTIN 600 MG: 300 CAPSULE ORAL at 22:27

## 2020-08-15 RX ADMIN — FAMOTIDINE 20 MG: 20 TABLET, FILM COATED ORAL at 08:21

## 2020-08-15 RX ADMIN — ACETAMINOPHEN 650 MG: 325 TABLET ORAL at 17:10

## 2020-08-15 RX ADMIN — INSULIN LISPRO 5 UNITS: 100 INJECTION, SOLUTION INTRAVENOUS; SUBCUTANEOUS at 12:34

## 2020-08-15 RX ADMIN — Medication 10 ML: at 06:30

## 2020-08-15 RX ADMIN — FAMOTIDINE 20 MG: 20 TABLET, FILM COATED ORAL at 17:10

## 2020-08-15 NOTE — PROGRESS NOTES
Problem: Falls - Risk of 
Goal: *Absence of Falls Description: Document Zaria Hogue Fall Risk and appropriate interventions in the flowsheet. Outcome: Progressing Towards Goal 
Note: Fall Risk Interventions: 
Mobility Interventions: Assess mobility with egress test, OT consult for ADLs, Patient to call before getting OOB, PT Consult for assist device competence, PT Consult for mobility concerns Mentation Interventions: Bed/chair exit alarm, Adequate sleep, hydration, pain control, Door open when patient unattended Medication Interventions: Assess postural VS orthostatic hypotension, Bed/chair exit alarm, Evaluate medications/consider consulting pharmacy Elimination Interventions: Call light in reach, Patient to call for help with toileting needs History of Falls Interventions: Consult care management for discharge planning, Bed/chair exit alarm Problem: Patient Education: Go to Patient Education Activity Goal: Patient/Family Education Outcome: Progressing Towards Goal 
  
Problem: Pressure Injury - Risk of 
Goal: *Prevention of pressure injury Description: Document Geoff Scale and appropriate interventions in the flowsheet. Outcome: Progressing Towards Goal 
Note: Pressure Injury Interventions: 
Sensory Interventions: Assess changes in LOC, Float heels, Keep linens dry and wrinkle-free, Maintain/enhance activity level Moisture Interventions: Absorbent underpads, Internal/External urinary devices Activity Interventions: Chair cushion, Pressure redistribution bed/mattress(bed type), Increase time out of bed, PT/OT evaluation Mobility Interventions: Assess need for specialty bed, HOB 30 degrees or less, Pressure redistribution bed/mattress (bed type), PT/OT evaluation Nutrition Interventions: Document food/fluid/supplement intake Friction and Shear Interventions: Apply protective barrier, creams and emollients, Minimize layers Problem: Patient Education: Go to Patient Education Activity Goal: Patient/Family Education Outcome: Progressing Towards Goal

## 2020-08-15 NOTE — PROGRESS NOTES
AMAN العراقي notified off patient's copious drainage saturating dressing at 0645 today as well as low HGB 6.3. Orders given to hold am dose of Eliquis.

## 2020-08-15 NOTE — PROGRESS NOTES
Hospitalist Progress Note NAME: Prabhu Witt :  1950 MRN:  [id number] Interim Hospital Summary: 65 y.o. male whom presented on 2020 with Assessment / Plan: 
 
DM2, uncont with hyperglycemia 
-increase lantus 30 units QHS   (PTA 36) 
-add premeal humalog 5  (PTA 8units) BREWER cirrhosis Hx Esophageal varices, s/p TIPS 
-plan on restarting lasix and aldactone tomorrow if Cr and BP okay 
-continue PTA lactulose and xifaxam 
 
HTN 
-hold lisinopril with bump in Cr.  BP okay JACOB 
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 
-Eliquis for DVT prophylaxis 
-activity per Ortho Code status: Full Prophylaxis: continue eliquis Subjective: Chief Complaint / Reason for Physician Visit Follow up of DM, cirrhosis, HTN,  
Chart reviewed in detail. Discussed with RN events overnight. Review of Systems: 
Symptom Y/N Comments  Symptom Y/N Comments Fever/Chills    Chest Pain Poor Appetite    Edema Cough    Abdominal Pain Sputum    Joint Pain SOB/WANG    Pruritis/Rash Nausea/vomit    Tolerating PT/OT Diarrhea    Tolerating Diet Constipation    Other Could NOT obtain due to:   
 
PO intake: No data found. Objective: VITALS:  
Last 24hrs VS reviewed since prior progress note. Most recent are: 
Patient Vitals for the past 24 hrs: 
 Temp Pulse Resp BP SpO2  
08/15/20 0735 97.5 °F (36.4 °C) 75 18 108/49 99 % 08/15/20 0314 97.9 °F (36.6 °C) 81 18 119/82 91 % 20 2300 97.9 °F (36.6 °C) 80 18 123/43 99 % 20 98.7 °F (37.1 °C) 79 18 130/62 98 % 20 1840 98.8 °F (37.1 °C) 87 18 (!) 119/101 98 % 20 1755  80  146/50 100 % 20 1735 97.4 °F (36.3 °C) 83 18 159/45 98 % 20 1650 98.2 °F (36.8 °C) 84 18 112/62 99 % 08/14/20 1550 98.1 °F (36.7 °C) 85 18 113/53 96 % 08/14/20 1530  83 19 121/41 96 % 08/14/20 1515 98.4 °F (36.9 °C) 80 16 131/46 97 % 08/14/20 1445  84 21 115/48 97 % 08/14/20 1430  82 12 (!) 120/37 97 % 08/14/20 1425  83 12 113/41 97 % 08/14/20 1420  83 12 (!) 109/36 98 % 08/14/20 1415  83 13 (!) 104/37 98 % 08/14/20 1413 98.8 °F (37.1 °C) 84 13 (!) 104/38 100 % Intake/Output Summary (Last 24 hours) at 8/15/2020 1233 Last data filed at 8/15/2020 9762 Gross per 24 hour Intake 3570.42 ml Output 1025 ml Net 2545.42 ml PHYSICAL EXAM: 
General: WD, WN. Alert, cooperative, no acute distress   
EENT:  EOMI. Anicteric sclerae. MMM Resp:  CTA bilaterally, no wheezing or rales. No accessory muscle use CV:  Regular  rhythm,  No edema GI:  Soft, Non distended, Non tender.  +Bowel sounds Neurologic:  Alert and oriented X 3, normal speech, Psych:   Good insight. Not anxious nor agitated Skin:  No rashes. No jaundice Reviewed most current lab test results and cultures  YES Reviewed most current radiology test results   YES Review and summation of old records today    NO Reviewed patient's current orders and MAR    YES 
PMH/SH reviewed - no change compared to H&P 
________________________________________________________________________ Care Plan discussed with: 
  Comments Patient Family RN Care Manager Consultant Multidiciplinary team rounds were held today with , nursing, pharmacist and clinical coordinator. Patient's plan of care was discussed; medications were reviewed and discharge planning was addressed. ________________________________________________________________________ Total NON critical care TIME:     Minutes Total CRITICAL CARE TIME Spent:   Minutes non procedure based Comments >50% of visit spent in counseling and coordination of care x This includes time during multidisciplinary rounds if indicated above  
________________________________________________________________________ Luke Escalona MD  
 
Procedures: see electronic medical records for all procedures/Xrays and details which were not copied into this note but were reviewed prior to creation of Plan. LABS: 
I reviewed today's most current labs and imaging studies. Pertinent labs include: 
Recent Labs 08/15/20 
0317 08/13/20 
1030 WBC 7.2 8.3 HGB 6.3* 10.7* HCT 19.9* 33.2*  
* 151 Recent Labs 08/15/20 
0317 08/13/20 
1030 * 138  
K 4.5 4.1  108 CO2 24 24 * 196* BUN 33* 13  
CREA 1.34* 0.98  
CA 7.1* 7.4* ALB  --  2.4* TBILI  --  1.3* ALT  --  21

## 2020-08-15 NOTE — PROGRESS NOTES
Problem: Diabetes Self-Management Goal: *Disease process and treatment process Description: Define diabetes and identify own type of diabetes; list 3 options for treating diabetes. Outcome: Progressing Towards Goal 
Goal: *Incorporating nutritional management into lifestyle Description: Describe effect of type, amount and timing of food on blood glucose; list 3 methods for planning meals. Outcome: Progressing Towards Goal 
Goal: *Incorporating physical activity into lifestyle Description: State effect of exercise on blood glucose levels. Outcome: Progressing Towards Goal 
Goal: *Developing strategies to promote health/change behavior Description: Define the ABC's of diabetes; identify appropriate screenings, schedule and personal plan for screenings. Outcome: Progressing Towards Goal 
Goal: *Using medications safely Description: State effect of diabetes medications on diabetes; name diabetes medication taking, action and side effects. Outcome: Progressing Towards Goal 
Goal: *Monitoring blood glucose, interpreting and using results Description: Identify recommended blood glucose targets  and personal targets. Outcome: Progressing Towards Goal 
Goal: *Prevention, detection, treatment of acute complications Description: List symptoms of hyper- and hypoglycemia; describe how to treat low blood sugar and actions for lowering  high blood glucose level. Outcome: Progressing Towards Goal 
Goal: *Prevention, detection and treatment of chronic complications Description: Define the natural course of diabetes and describe the relationship of blood glucose levels to long term complications of diabetes. Outcome: Progressing Towards Goal 
Goal: *Developing strategies to address psychosocial issues Description: Describe feelings about living with diabetes; identify support needed and support network Outcome: Progressing Towards Goal 
Goal: *Insulin pump training Outcome: Progressing Towards Goal 
 Goal: *Sick day guidelines Outcome: Progressing Towards Goal 
Goal: *Patient Specific Goal (EDIT GOAL, INSERT TEXT) Outcome: Progressing Towards Goal 
  
Problem: Patient Education: Go to Patient Education Activity Goal: Patient/Family Education Outcome: Progressing Towards Goal 
  
Problem: Falls - Risk of 
Goal: *Absence of Falls Description: Document Donata Carrel Fall Risk and appropriate interventions in the flowsheet. Outcome: Progressing Towards Goal 
Note: Fall Risk Interventions: 
Mobility Interventions: Assess mobility with egress test, Communicate number of staff needed for ambulation/transfer, OT consult for ADLs, Patient to call before getting OOB, PT Consult for mobility concerns, Utilize walker, cane, or other assistive device, Utilize gait belt for transfers/ambulation Mentation Interventions: Bed/chair exit alarm, Adequate sleep, hydration, pain control Medication Interventions: Assess postural VS orthostatic hypotension, Bed/chair exit alarm, Evaluate medications/consider consulting pharmacy, Patient to call before getting OOB, Teach patient to arise slowly, Utilize gait belt for transfers/ambulation Elimination Interventions: Bed/chair exit alarm, Call light in reach, Patient to call for help with toileting needs History of Falls Interventions: Bed/chair exit alarm, Door open when patient unattended, Utilize gait belt for transfer/ambulation Problem: Patient Education: Go to Patient Education Activity Goal: Patient/Family Education Outcome: Progressing Towards Goal 
  
Problem: Pressure Injury - Risk of 
Goal: *Prevention of pressure injury Description: Document Geoff Scale and appropriate interventions in the flowsheet. Outcome: Progressing Towards Goal 
Note: Pressure Injury Interventions: 
Sensory Interventions: Assess changes in LOC, Float heels, Keep linens dry and wrinkle-free, Minimize linen layers, Monitor skin under medical devices, Check visual cues for pain Moisture Interventions: Absorbent underpads, Internal/External urinary devices, Check for incontinence Q2 hours and as needed, Minimize layers, Limit adult briefs Activity Interventions: PT/OT evaluation, Pressure redistribution bed/mattress(bed type), Assess need for specialty bed Mobility Interventions: Assess need for specialty bed, Float heels, HOB 30 degrees or less, PT/OT evaluation Nutrition Interventions: Offer support with meals,snacks and hydration, Document food/fluid/supplement intake Friction and Shear Interventions: Feet elevated on foot rest, HOB 30 degrees or less, Lift sheet, Minimize layers, Lift team/patient mobility team 
 
  
 
 
 
  
Problem: Patient Education: Go to Patient Education Activity Goal: Patient/Family Education Outcome: Progressing Towards Goal 
  
Problem: Patient Education: Go to Patient Education Activity Goal: Patient/Family Education Outcome: Progressing Towards Goal 
  
Problem: Falls - Risk of 
Goal: *Absence of Falls Description: [facility] and appropriate interventions in the flowsheet. Outcome: Progressing Towards Goal 
Note: Fall Risk Interventions: 
Mobility Interventions: Assess mobility with egress test, Communicate number of staff needed for ambulation/transfer, OT consult for ADLs, Patient to call before getting OOB, PT Consult for mobility concerns, Utilize walker, cane, or other assistive device, Utilize gait belt for transfers/ambulation Mentation Interventions: Bed/chair exit alarm, Adequate sleep, hydration, pain control Medication Interventions: Assess postural VS orthostatic hypotension, Bed/chair exit alarm, Evaluate medications/consider consulting pharmacy, Patient to call before getting OOB, Teach patient to arise slowly, Utilize gait belt for transfers/ambulation Elimination Interventions: Bed/chair exit alarm, Call light in reach, Patient to call for help with toileting needs History of Falls Interventions: Bed/chair exit alarm, Door open when patient unattended, Utilize gait belt for transfer/ambulation Problem: Patient Education: Go to Patient Education Activity Goal: Patient/Family Education Outcome: Progressing Towards Goal 
  
Problem: Pressure Injury - Risk of 
Goal: *Prevention of pressure injury Description: Document Geoff Scale and appropriate interventions in the flowsheet. Outcome: Progressing Towards Goal 
Note: Pressure Injury Interventions: 
Sensory Interventions: Assess changes in LOC, Float heels, Keep linens dry and wrinkle-free, Minimize linen layers, Monitor skin under medical devices, Check visual cues for pain Moisture Interventions: Absorbent underpads, Internal/External urinary devices, Check for incontinence Q2 hours and as needed, Minimize layers, Limit adult briefs Activity Interventions: PT/OT evaluation, Pressure redistribution bed/mattress(bed type), Assess need for specialty bed Mobility Interventions: Assess need for specialty bed, Float heels, HOB 30 degrees or less, PT/OT evaluation Nutrition Interventions: Offer support with meals,snacks and hydration, Document food/fluid/supplement intake Friction and Shear Interventions: Feet elevated on foot rest, HOB 30 degrees or less, Lift sheet, Minimize layers, Lift team/patient mobility team 
 
  
 
 
 
  
Problem: Patient Education: Go to Patient Education Activity Goal: Patient/Family Education Outcome: Progressing Towards Goal 
  
Problem: Patient Education: Go to Patient Education Activity Goal: Patient/Family Education Outcome: Progressing Towards Goal

## 2020-08-15 NOTE — OP NOTES
[address] 
OPERATIVE REPORT Name:  Lelo Fernandez 
MR#:  [id number] :  1950 ACCOUNT #:  [de-identified] DATE OF SERVICE:  2020 PREOPERATIVE DIAGNOSIS:  Right hip dislocation following total hip arthroplasty. POSTOPERATIVE DIAGNOSIS:  Right hip dislocation following total hip arthroplasty. PROCEDURE PERFORMED:  Attempted closed reduction under general anesthesia. SURGEON:  Nito Mchugh MD 
 
ASSISTANT:  none ANESTHESIA:  General. 
 
COMPLICATIONS:  Failed closed reduction. SPECIMENS REMOVED:  None. IMPLANTS:  n/a. ESTIMATED BLOOD LOSS:  None. DRAINS:  None. CONDITION:  Stable to PACU. 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 He had recently been revised with significant offset and a lateralized and lipped liner. He somehow managed to dislocate this and after failed attempted closed reduction in the emergency department, we recommended attempted closed reduction in the operating room under general anesthesia. He understood no guarantees could be given about the outcome and wished to proceed. Of note, the patient had a preexisting sciatic nerve palsy. DESCRIPTION OF PROCEDURE:  After being identified in the preoperative holding area and having his operative site marked, the patient was brought back to the operating room, placed supine on standard OR table. General anesthesia was induced without difficulty. We then attempted multiple reduction maneuvers under fluoroscopic guidance and we were unable to reduce the hip despite having it appropriately distracted. We then moved the patient to a fracture table and attempted to use traction boot to help us further.   This too was unsuccessful after multiple attempts even when the hip appeared to be significantly distracted and appropriately aligned with the acetabular component. We felt there might be some soft tissue blocking reduction. We therefore aborted and awakened the patient. He was taken to the Recovery room in satisfactory condition. We discussed with the patient and the family that this would require open reduction and likely revision. MD MESSI [id number] 
D:  08/14/2020 14:47 T:  08/14/2020 21:58 JOB #:  [id number]

## 2020-08-15 NOTE — PROGRESS NOTES
Patient's chart reviewed for OT eval, note Hemoglobin is low at 6.3. Spoke with nursing, who recommends holding morning PT eval due to low hemoglobin/low resting blood pressure.  
 
Miguel Quintana MS, OTR/L, CSRS

## 2020-08-15 NOTE — PROGRESS NOTES
ORTHO - Progress Note Post Op day: 1 Day Post-Op Vida Hargrove     [id number]  male    65 y.o.    1950 Admit date:2020 Date of Surgery:2020 Procedures:Procedure(s):RIGHT HIP ARTHROPLASTY TOTAL REVISION POSTERIOR APPROACH AND ABDUCTOR RELEASE Surgeon:Surgeon(s) and Role: Darshan Boone MD - Primary SUBJECTIVE: 
  
Vida Hargrove is a 65 y.o. male resting in the bed/chair. Patient has complaints of appropriate post-op pain. Denies F/C, nausea, vomiting, dizziness, lightheadedness, chest pain, or shortness of breath. OBJECTIVE: 
 
  
Physical Exam: 
General: alert, cooperative, no distress. Gastrointestinal:  non-distended . Cardiovascular: equal pulses in the lower extremities,  Brisk cap refill in all distal extremities Genitourinary: Zeng Respiratory: No respiratory distress Neurological:Neurovascular exam within normal limits. Senstion intact: LE bilat. Motor: + DF/PF/EHL. Musculoskeletal: Allison's sign negative in bilateral lower extremities. Calves soft, supple, non-tender upon palpation or with passive stretch. Dressing/Wound:  Bulky dressing-  Clean, dry and intact. No significant erythema or swelling. Vital Signs:  
  
  
Patient Vitals for the past 8 hrs: 
 BP Temp Pulse Resp SpO2  
08/15/20 0735 108/49 97.5 °F (36.4 °C) 75 18 99 % 08/15/20 0314 119/82 97.9 °F (36.6 °C) 81 18 91 % Temp (24hrs), Av.3 °F (36.8 °C), Min:97.4 °F (36.3 °C), Max:99.1 °F (37.3 °C) Labs:  
  
 
Recent Labs 08/15/20 
3690 HCT 19.9* HGB 6.3*  
 
PT/OT:  
 
  
  
  
ASSESSMENT / PLAN:  
Principal Problem: 
  Dislocation of internal right hip prosthesis ([address] (2020) -  DC zeng -  Nursing staff noted sig drainage post op -  Expected post op blood loss anemia- transfuse one unit PRBC 
-  Continue PT/OT WBAT 
-  DVT prophylaxis- SCD w/ Eliquis---- Hold today -  DC planning - TBD Signed By: Antoinette Villalpando PA-C

## 2020-08-15 NOTE — OP NOTES
[address] 
OPERATIVE REPORT Name:  Elina Urias 
MR#:  [id number] :  1950 ACCOUNT #:  [de-identified] DATE OF SERVICE:  2020 PREOPERATIVE DIAGNOSIS:  Recurrent right total hip dislocation, irreducible. POSTOPERATIVE DIAGNOSIS:  Recurrent right total hip dislocation, irreducible. PROCEDURE PERFORMED: 1. Revision of right total hip arthroplasty. 2.  Revision of incisional scar including skin and all layers to fascia. 3.  Percutaneous adductor tendon release. SURGEON:  Jose Hollins MD 
 
ASSISTANT:  AMAN Gaytan 
 
ANESTHESIA:  General. 
 
COMPLICATIONS:  None. SPECIMENS REMOVED:  None. IMPLANTS:  DePuy [place] constrained liner of 58 mm x 36 plus 4 mm and DePuy ceramic head with titanium sleeve size 36 mm/+5 mm. ESTIMATED BLOOD LOSS:  400. DRAINS:  None. CONDITION:  Stable to PACU. INDICATION:  This is a 55-year-old gentleman with an unfortunate history of multiple revisions of a right total hip replacement. He had suffered multiple dislocations and recently had a revision of his hip to significant offset with a lateralized and lipped liner. Unfortunately, he managed to dislocate this as well despite being incredibly stable intraoperatively at his last procedure. Attempted closed reduction was performed the day prior to this procedure without success. A fluoroscopy was utilized and we could see that the head was appropriately distracted on the fracture table, but could not reduce it and we believed may have had some soft tissue interposition. We therefore discussed with the patient opening this to relocate the hip, but at the same time, recommended converting to a constrained liner in hopes that it might minimize his risk of recurrence. He understood that there is still a possibility he could dislocate and that a dislocation will require open reduction.   He also understood that each procedure was fraught with increased risk of complication including infection. The patient already had a sciatic nerve palsy from a previous procedure and/or dislocation and he understood that the risk of increased injury to the nerve was significant. DESCRIPTION OF PROCEDURE:  After being identified in the preoperative holding area and having his operative site marked, the patient was brought back to operating room, placed supine on the standard OR table. General anesthesia was induced without difficulty. Preoperative antibiotics were administered. He was moved to decubitus position with right side up being sure to pad all bony prominences. The right lower extremity was prepped with both Duraprep and chlorhexidine. We then draped in usual sterile fashion. Appropriate time-out was performed. We began by excising his previous incision as it had been used many times and appeared to have some difficulty healing from his recent operation. It was quite angry with significant erythema although no signs of infection. We ellipsed his entire incision measuring approximately 20-25cm in length from one end to the other including the skin, subcutaneous fat, and fascia down to the IT band. The IT band was divided from his previous incision and the sutures were removed. The hip was noted to be buttonholed through the gluteus placido. We used a tamp to remove the bipolar head from the stem before being able to reduce the stem back through the gluteus placido. This was then held with retractors anterior to the acetabulum. The extraction tool was used to remove the lateralized and lipped liner. We then thoroughly cleaned the acetabulum and removed any soft tissue, which might interfere with the locking mechanism. A constrained liner was then impacted into the locking mechanism of the acetabulum and checked for stability.   We then utilized trials and selected a 36/+5 mm ceramic head with a titanium sleeve. The locking ring was placed over the stem and the final head was impacted on the trunnion. The hip was then reduced and the locking ring placed around the tabs of the constrained liner. The hip was taken through range of motion and found to be stable. We then thoroughly lavaged with Betadine containing irrigation. Vancomycin powder was placed deep in the wound. We then closed with PDS monofilament suture in layers irrigating each layer as we went. Final skin closure was performed with interrupted nylon sutures. Bacitracin ointment and sterile compressive dressings were applied. We then moved the patient back to supine position. He was noted to have some significant contracture of his adductor tendon. We therefore elected to proceed with percutaneous adductor tendon release. The groin was prepped with a chlorhexidine prep stick and a 11-blade was used to release the abductors with palpable release of the soft tissues and considerable improvement in his abduction. We then placed a bandage on this. The patient was moved to the stretcher and taken to the recovery room in satisfactory condition. At the conclusion of the procedure, all counts were correct. There were no immediate complications. MD MESSI [email or web address] 
D:  08/14/2020 14:44 T:  08/14/2020 21:44 
JOB #:  [id number]

## 2020-08-15 NOTE — PROGRESS NOTES
Patient's chart reviewed for PT treatment, note Hemoglobin is low at 6.3. Spoke with nursing, who recommends holding morning PT treatment due to low hemoglobin/low resting blood pressure. Will try again this afternoon, if appropriate.

## 2020-08-15 NOTE — PROGRESS NOTES
Pharmacy Medication Reconciliation The patient was interviewed regarding current PTA medication list, use and drug allergies. The patient was questioned regarding use of any other inhalers, topical products, over the counter medications, herbal medications, vitamin products or ophthalmic/nasal/otic medication use. Allergy Update: Nsaids (non-steroidal anti-inflammatory drug) Recommendations/Findings: The following amendments were made to the patient's active medication list on file at [address]:  
1) Additions:  
     - Norco 
     - Vitamin D3 
     - Tamsulosin 2) Deletions: - Famotidine (his dog's medication) - Iron 3) Changes: - Voltaren: Takes scheduled - Lantus: 30 units twice daily Pertinent Findings:  
    - Concerned about compliance given that many of his maintenance medications were filled back in April - Patient did seem a little confused or maybe hard of hearing (difficult to determine) at times stating originally for his Lantus that he \"takes 200 tablets\" and then later that he \"does not use 96 units of anything\" when that was not mentioned. - Of note, he reports that he had sepsis 3 years ago and since the doctor did not remove everything from his hip he is to continue to be on cephalexin twice daily.   
   - Of note, we do not have a fill history for 50,000 unit Vit D, gabapentin, rifaximin, lactulose, sertaline, or spironolactone but he reports that he is taking them.  
 
-Clarified PTA med list with Rx Query and the patient. PTA medication list was corrected to the following:  
 
Prior to Admission Medications Prescriptions Last Dose Informant Taking? B.infantis-B.ani-B.long-B.bifi (PROBIOTIC 4X) 10-15 mg TbEC 8/13/2020  Yes Sig: Take 1 Tab by mouth daily. HYDROcodone-acetaminophen (Norco) 5-325 mg per tablet   Yes Sig: Take 1 Tab by mouth every six (6) hours as needed for Pain. Omeprazole delayed release (PRILOSEC D/R) 20 mg tablet 2020  Yes Sig: Take 20 mg by mouth daily. acetaminophen (TYLENOL) 500 mg tablet 2020  Yes Sig: Take 2 Tabs by mouth every six (6) hours as needed for Pain. apixaban (ELIQUIS) 2.5 mg tablet 2020  Yes Sig: Take 1 Tab by mouth every twelve (12) hours every twelve (12) hours. Indications: deep vein thrombosis prevention  
atorvastatin (LIPITOR) 40 mg tablet 2020  Yes Sig: Take 1 Tab by mouth daily. butalbital-acetaminophen-caffeine (FIORICET, ESGIC) -40 mg per tablet 2020  Yes Sig: Take 1 Tab by mouth every six (6) hours as needed for Headache.  
calcium citrate-vitamin D3 (CITRACAL + D) tablet 2020  Yes Sig: Take 2 Tabs by mouth two (2) times a day. cephALEXin (KEFLEX) 500 mg capsule 2020  Yes Sig: Take 1 Cap by mouth two (2) times a day. cholecalciferol (Vitamin D3) 25 mcg (1,000 unit) cap   Yes Sig: Take 1,000 Units by mouth daily. diclofenac (VOLTAREN) 1 % gel 2020  Yes Sig: Apply 2 g to affected area four (4) times daily. ergocalciferol (VITAMIN D2) 50,000 unit capsule 2020  Yes Sig: TAKE 1 CAPSULE EVERY 7 DAYS Patient taking differently: Take 50,000 Units by mouth every seven (7) days. Takes on   
folic acid (FOLVITE) 1 mg tablet 2020  Yes Sig: Take 1 Tab by mouth daily. furosemide (LASIX) 20 mg tablet 2020  Yes Sig: Take 20 mg by mouth daily. gabapentin (NEURONTIN) 300 mg capsule 2020  Yes Sig: Take 2 Caps by mouth nightly. insulin aspart U-100 (NovoLOG Flexpen U-100 Insulin) 100 unit/mL (3 mL) inpn 2020  Yes Si Units by SubCUTAneous route Before breakfast, lunch, and dinner for 90 days. 7 units 3 times daily  
insulin glargine (Lantus Solostar U-100 Insulin) 100 unit/mL (3 mL) inpn   Yes Si Units by SubCUTAneous route two (2) times a day. lactulose (CHRONULAC) 10 gram/15 mL solution 2020  Yes Sig: Take 30 mL by mouth two (2) times a day. lisinopriL (PRINIVIL, ZESTRIL) 5 mg tablet 8/13/2020 Self Yes Sig: Take 10 mg by mouth every morning.  
magnesium oxide 400 mg magnesium tab 8/13/2020  Yes Sig: Take 1 Tab by mouth daily. melatonin 3 mg tablet 8/13/2020  Yes Sig: Take 3 mg by mouth nightly as needed for Sleep. oxyCODONE IR (ROXICODONE) 5 mg immediate release tablet 8/13/2020  Yes Sig: Take 5 mg by mouth every four (4) hours as needed for Pain.  
potassium chloride SR (KLOR-CON 10) 10 mEq tablet 8/13/2020  Yes Sig: Take 10 mEq by mouth two (2) times a day. primidone (MYSOLINE) 250 mg tablet 8/13/2020  Yes Sig: TAKE 1 TABLET TWICE A DAY rifAXIMin (XIFAXAN) 550 mg tablet 8/13/2020  Yes Sig: Take 1 Tab by mouth two (2) times a day. senna-docusate (PERICOLACE) 8.6-50 mg per tablet 8/13/2020  Yes Sig: Take 1 Tab by mouth two (2) times a day. Indications: constipation  
sertraline (Zoloft) 100 mg tablet 8/13/2020 Self Yes Sig: Take 150 mg by mouth daily. spironolactone (Aldactone) 25 mg tablet 8/13/2020  Yes Sig: Take 25 mg by mouth daily. tamsulosin (FLOMAX) 0.4 mg capsule   Yes Sig: Take 0.4 mg by mouth daily. thiamine hcl 500 mg tablet 8/13/2020  Yes Sig: Take 500 mg by mouth daily. Facility-Administered Medications: None Thank you, Esmer Ramirez, PHARMD

## 2020-08-15 NOTE — PROGRESS NOTES
Problem: Mobility Impaired (Adult and Pediatric) Goal: *Acute Goals and Plan of Care (Insert Text) Description: FUNCTIONAL STATUS PRIOR TO ADMISSION: Patient was modified independent using a walker for functional mobility. HOME SUPPORT PRIOR TO ADMISSION: The patient lived with son. Physical Therapy Goals Initiated 8/14/2020 1. Patient will move from supine to sit and sit to supine  in bed with minimal assistance/contact guard assist within 7 days. 2. Patient will perform sit to stand with minimal assistance/contact guard assist within 7 days. 3. Patient will ambulate with minimal assistance/contact guard assist for 150 feet with the least restrictive device within 7 days. 4. Patient will ascend/descend 2 stairs with 1 handrail(s) with minimal assistance/contact guard assist within 7 days. 5. Patient will verbalize and demonstrate understanding of posterior precautions per protocol within 7 days. 6. Patient will perform all home exercise program per protocol with independence within 7 days. Outcome: Not Progressing Towards Goal 
 PHYSICAL THERAPY TREATMENT Patient: Tino Monterroso (32 y.o. male) Date: 8/15/2020 Diagnosis: Dislocation of internal right hip prosthesis ([address] Dellis Chalet Dislocation of internal right hip prosthesis ([address] Procedure(s) (LRB): 
RIGHT HIP ARTHROPLASTY TOTAL REVISION POSTERIOR APPROACH AND ABDUCTOR RELEASE (Right) 1 Day Post-Op Precautions: WBAT, Fall Chart, physical therapy assessment, plan of care and goals were reviewed. ASSESSMENT Patient continues with skilled PT services and is not progressing towards goals, due to mobility training needing to be held today (nursing hold in the morning due to medical status, and bed exercise only in the afternoon due to patient receiving hemoglobin).  Patient's mental status is variable during this treatment (he thinks he is in his guest bedroom, for example) and he needs constant verbal cueing to keep him to task with bilateral LE exercise, AAROM/PROM of right LE and A/AAROM of left LE. He seems to doze at times but insists he is awake. Patient is unable to recite any of his posterior hip precautions, and after being educated with 3/3, he is unable to recall them. Patient will hopefully be able to participate in mobility training tomorrow, will continue to follow. Current Level of Function Impacting Discharge (mobility/balance): moderate assist x 2 to come to sit yesterday Other factors to consider for discharge: home situation PLAN : 
Patient continues to benefit from skilled intervention to address the above impairments. Continue treatment per established plan of care. to address goals. Recommendation for discharge: (in order for the patient to meet his/her long term goals) Therapy up to 5 days/week in SNF setting This discharge recommendation: 
Has been made in collaboration with the attending provider and/or case management IF patient discharges home will need the following DME: to be determined (TBD) SUBJECTIVE:  
Patient stated Well, I'm not supposed to walk up on the roof. , when asked to recite posterior hip precautions. OBJECTIVE DATA SUMMARY:  
Critical Behavior: 
Neurologic State: Alert Orientation Level: Oriented to person, Disoriented to place, Other (Comment)(orientation changes throughout session, he is sometimes sharp, sometimes confused) Cognition: Decreased attention/concentration, Decreased command following Safety/Judgement: Decreased insight into deficits Therapeutic Exercises:  
Patient performs 10 reps for the following: Ankle pumps, AROM left, PROM right Heel slide, A/AAROM left, AA>PROM right 
Hip abduction, A/AAROM left, AAROM right Quad sets (poor quality despite constant verbal cues and encouragement) Glut Sets, 2 attempts only, also poor quality and patient begins to doze Pain Rating: Patient does not give numerical level of pain during this session, but PROM of right ankle is painful Activity Tolerance:  
Poor Please refer to the flowsheet for vital signs taken during this treatment. After treatment patient left in no apparent distress:  
Supine in bed, Call bell within reach, and Side rails x 3 
 
COMMUNICATION/COLLABORATION:  
The patients plan of care was discussed with: Physical therapist and Registered nurse. Marianna Celeste, PT Time Calculation: 15 mins

## 2020-08-16 LAB
ANION GAP SERPL CALC-SCNC: 5 MMOL/L (ref 5–15)
BUN SERPL-MCNC: 27 MG/DL (ref 6–20)
BUN/CREAT SERPL: 29 (ref 12–20)
CALCIUM SERPL-MCNC: 7.5 MG/DL (ref 8.5–10.1)
CHLORIDE SERPL-SCNC: 104 MMOL/L (ref 97–108)
CO2 SERPL-SCNC: 23 MMOL/L (ref 21–32)
CREAT SERPL-MCNC: 0.94 MG/DL (ref 0.7–1.3)
GLUCOSE BLD STRIP.AUTO-MCNC: 208 MG/DL (ref 65–100)
GLUCOSE BLD STRIP.AUTO-MCNC: 217 MG/DL (ref 65–100)
GLUCOSE BLD STRIP.AUTO-MCNC: 273 MG/DL (ref 65–100)
GLUCOSE BLD STRIP.AUTO-MCNC: 307 MG/DL (ref 65–100)
GLUCOSE SERPL-MCNC: 248 MG/DL (ref 65–100)
HGB BLD-MCNC: 6.3 G/DL (ref 12.1–17)
MAGNESIUM SERPL-MCNC: 2.2 MG/DL (ref 1.6–2.4)
POTASSIUM SERPL-SCNC: 4.2 MMOL/L (ref 3.5–5.1)
SERVICE CMNT-IMP: ABNORMAL
SODIUM SERPL-SCNC: 132 MMOL/L (ref 136–145)

## 2020-08-16 PROCEDURE — 83735 ASSAY OF MAGNESIUM: CPT

## 2020-08-16 PROCEDURE — 74011636637 HC RX REV CODE- 636/637: Performed by: PHYSICIAN ASSISTANT

## 2020-08-16 PROCEDURE — 80048 BASIC METABOLIC PNL TOTAL CA: CPT

## 2020-08-16 PROCEDURE — 74011250637 HC RX REV CODE- 250/637: Performed by: ORTHOPAEDIC SURGERY

## 2020-08-16 PROCEDURE — 36430 TRANSFUSION BLD/BLD COMPNT: CPT

## 2020-08-16 PROCEDURE — 36415 COLL VENOUS BLD VENIPUNCTURE: CPT

## 2020-08-16 PROCEDURE — 74011250637 HC RX REV CODE- 250/637: Performed by: PHYSICIAN ASSISTANT

## 2020-08-16 PROCEDURE — 82962 GLUCOSE BLOOD TEST: CPT

## 2020-08-16 PROCEDURE — 94760 N-INVAS EAR/PLS OXIMETRY 1: CPT

## 2020-08-16 PROCEDURE — P9016 RBC LEUKOCYTES REDUCED: HCPCS

## 2020-08-16 PROCEDURE — 74011636637 HC RX REV CODE- 636/637: Performed by: INTERNAL MEDICINE

## 2020-08-16 PROCEDURE — 85018 HEMOGLOBIN: CPT

## 2020-08-16 PROCEDURE — 74011250636 HC RX REV CODE- 250/636: Performed by: INTERNAL MEDICINE

## 2020-08-16 PROCEDURE — 65660000000 HC RM CCU STEPDOWN

## 2020-08-16 RX ORDER — INSULIN GLARGINE 100 [IU]/ML
40 INJECTION, SOLUTION SUBCUTANEOUS
Status: DISCONTINUED | OUTPATIENT
Start: 2020-08-16 | End: 2020-08-24 | Stop reason: HOSPADM

## 2020-08-16 RX ORDER — INSULIN LISPRO 100 [IU]/ML
7 INJECTION, SOLUTION INTRAVENOUS; SUBCUTANEOUS
Status: DISCONTINUED | OUTPATIENT
Start: 2020-08-16 | End: 2020-08-24 | Stop reason: HOSPADM

## 2020-08-16 RX ORDER — SODIUM CHLORIDE 9 MG/ML
250 INJECTION, SOLUTION INTRAVENOUS AS NEEDED
Status: DISCONTINUED | OUTPATIENT
Start: 2020-08-16 | End: 2020-08-24 | Stop reason: HOSPADM

## 2020-08-16 RX ORDER — HALOPERIDOL 5 MG/ML
5 INJECTION INTRAMUSCULAR ONCE
Status: COMPLETED | OUTPATIENT
Start: 2020-08-16 | End: 2020-08-16

## 2020-08-16 RX ADMIN — INSULIN LISPRO 2 UNITS: 100 INJECTION, SOLUTION INTRAVENOUS; SUBCUTANEOUS at 21:57

## 2020-08-16 RX ADMIN — Medication 10 ML: at 11:53

## 2020-08-16 RX ADMIN — INSULIN GLARGINE 40 UNITS: 100 INJECTION, SOLUTION SUBCUTANEOUS at 21:58

## 2020-08-16 RX ADMIN — INSULIN LISPRO 5 UNITS: 100 INJECTION, SOLUTION INTRAVENOUS; SUBCUTANEOUS at 08:34

## 2020-08-16 RX ADMIN — SPIRONOLACTONE 25 MG: 25 TABLET ORAL at 08:34

## 2020-08-16 RX ADMIN — INSULIN LISPRO 3 UNITS: 100 INJECTION, SOLUTION INTRAVENOUS; SUBCUTANEOUS at 17:45

## 2020-08-16 RX ADMIN — OXYCODONE 5 MG: 5 TABLET ORAL at 09:45

## 2020-08-16 RX ADMIN — FAMOTIDINE 20 MG: 20 TABLET, FILM COATED ORAL at 17:46

## 2020-08-16 RX ADMIN — LACTULOSE 30 ML: 20 SOLUTION ORAL at 17:44

## 2020-08-16 RX ADMIN — ACETAMINOPHEN 650 MG: 325 TABLET ORAL at 17:45

## 2020-08-16 RX ADMIN — SERTRALINE HYDROCHLORIDE 150 MG: 50 TABLET ORAL at 08:33

## 2020-08-16 RX ADMIN — RIFAXIMIN 550 MG: 550 TABLET ORAL at 08:33

## 2020-08-16 RX ADMIN — GABAPENTIN 600 MG: 300 CAPSULE ORAL at 21:58

## 2020-08-16 RX ADMIN — INSULIN LISPRO 7 UNITS: 100 INJECTION, SOLUTION INTRAVENOUS; SUBCUTANEOUS at 11:53

## 2020-08-16 RX ADMIN — INSULIN LISPRO 5 UNITS: 100 INJECTION, SOLUTION INTRAVENOUS; SUBCUTANEOUS at 08:35

## 2020-08-16 RX ADMIN — ACETAMINOPHEN 650 MG: 325 TABLET ORAL at 06:06

## 2020-08-16 RX ADMIN — FAMOTIDINE 20 MG: 20 TABLET, FILM COATED ORAL at 08:34

## 2020-08-16 RX ADMIN — LACTULOSE 30 ML: 20 SOLUTION ORAL at 08:33

## 2020-08-16 RX ADMIN — HALOPERIDOL LACTATE 5 MG: 5 INJECTION, SOLUTION INTRAMUSCULAR at 04:03

## 2020-08-16 RX ADMIN — OXYCODONE 5 MG: 5 TABLET ORAL at 01:05

## 2020-08-16 RX ADMIN — OXYCODONE 5 MG: 5 TABLET ORAL at 17:45

## 2020-08-16 RX ADMIN — ACETAMINOPHEN 650 MG: 325 TABLET ORAL at 11:53

## 2020-08-16 RX ADMIN — Medication 10 ML: at 22:03

## 2020-08-16 RX ADMIN — INSULIN LISPRO 7 UNITS: 100 INJECTION, SOLUTION INTRAVENOUS; SUBCUTANEOUS at 17:45

## 2020-08-16 RX ADMIN — RIFAXIMIN 550 MG: 550 TABLET ORAL at 18:36

## 2020-08-16 RX ADMIN — Medication 10 ML: at 08:37

## 2020-08-16 RX ADMIN — DOCUSATE SODIUM - SENNOSIDES 1 TABLET: 50; 8.6 TABLET, FILM COATED ORAL at 17:44

## 2020-08-16 RX ADMIN — OXYCODONE 10 MG: 5 TABLET ORAL at 23:58

## 2020-08-16 RX ADMIN — ATORVASTATIN CALCIUM 40 MG: 40 TABLET, FILM COATED ORAL at 08:34

## 2020-08-16 RX ADMIN — POLYETHYLENE GLYCOL 3350 17 G: 17 POWDER, FOR SOLUTION ORAL at 08:33

## 2020-08-16 RX ADMIN — INSULIN LISPRO 5 UNITS: 100 INJECTION, SOLUTION INTRAVENOUS; SUBCUTANEOUS at 11:53

## 2020-08-16 RX ADMIN — DOCUSATE SODIUM - SENNOSIDES 1 TABLET: 50; 8.6 TABLET, FILM COATED ORAL at 08:33

## 2020-08-16 NOTE — PROGRESS NOTES
Hospitalist Progress Note NAME: Prabhu Witt :  1950 MRN:  [id number] Assessment / Plan: 
 
Hepatic encephalopathy Hospital delirium Confused, oriented x 2, positive asterixis Last ammonia only minimal elevated Continue lactulose and rifaximin, overnight had 6 BMs Recheck ammonia in AM 
 
DM type 2 POA uncont with hyperglycemia - Lantus 30 units QHS increase to 40 units - Premeal humalog 5  (PTA 8units), increase to 7 units - , 253, 272, 273, 307 
- will adjust as needed BREWER cirrhosis Hx Esophageal varices, s/p TIPS 
-resume lasix and spironolactone in AM if creat stable 
-continue PTA lactulose and xifaxam 
 
Anemia S/p 2 units pRBCs No reported gi bleeding Serial Hgbs Essential HTN 
-hold lisinopril with bump in Cr, creatinine improved 
-BP okay JACOB 
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 
-Eliquis for DVT prophylaxis 
-activity per Ortho Code status: Full Prophylaxis: continue eliquis Subjective: Chief Complaint / Reason for Physician Visit Follow up of DM, cirrhosis, HTN,  
Chart reviewed in detail. Discussed with RN events overnight. \"call me before you come tomorrow, I will look up the dogs\" Alert, mildly confused, having visual hallucinations Denies pain Review of Systems: 
Symptom Y/N Comments  Symptom Y/N Comments Fever/Chills n   Chest Pain n   
Poor Appetite    Edema Cough n   Abdominal Pain n   
Sputum    Joint Pain SOB/WANG n   Pruritis/Rash Nausea/vomit n   Tolerating PT/OT Diarrhea n   Tolerating Diet y Constipation    Other Could NOT obtain due to:   
 
PO intake:  
Patient Vitals for the past 72 hrs: 
 % Diet Eaten 20 1205 75 % 20 0833 75 % Objective: VITALS:  
 Last 24hrs VS reviewed since prior progress note. Most recent are: 
Patient Vitals for the past 24 hrs: 
 Temp Pulse Resp BP SpO2  
08/16/20 1309 98.3 °F (36.8 °C) 79 18 162/72 99 % 08/16/20 1227 98.2 °F (36.8 °C) 78 18 166/70 98 % 08/16/20 1127 98.3 °F (36.8 °C) 71 18 131/60 97 % 08/16/20 1027 98 °F (36.7 °C) 72 18 165/71 96 % 08/16/20 0957 98.2 °F (36.8 °C) 75 18 146/63 95 % 08/16/20 0942 98.1 °F (36.7 °C) 75 18 137/63 94 % 08/16/20 0927 98.4 °F (36.9 °C) 75 18 147/67 97 % 08/16/20 0920 98.4 °F (36.9 °C) 77 18 155/66 97 % 08/16/20 0732 98 °F (36.7 °C) 75 18 170/70 98 % 08/16/20 0256 98.3 °F (36.8 °C) 77 16 157/70 97 % 08/16/20 0059 97.9 °F (36.6 °C) 77 18 131/58 97 % 08/15/20 2000 98.3 °F (36.8 °C) 72 18 129/58 95 % 08/15/20 1629 98.4 °F (36.9 °C) 75 18 120/57 95 % 08/15/20 1616 97.5 °F (36.4 °C) 77 18 108/53 95 % 08/15/20 1516 98.1 °F (36.7 °C) 79 18 133/60 97 % 08/15/20 1429 98.2 °F (36.8 °C) 76 18 117/58 97 % Intake/Output Summary (Last 24 hours) at 8/16/2020 1416 Last data filed at 8/16/2020 1309 Gross per 24 hour Intake 685 ml Output 1650 ml Net -965 ml PHYSICAL EXAM: 
General: WD, WN. Alert, cooperative, no acute distress   
EENT:  Anicteric sclerae. MMM Resp:  CTA bilaterally, no wheezing or rales. No accessory muscle use CV:  Regular  rhythm,  No edema GI:  Soft, Non distended, Non tender.  +Bowel sounds Neurologic:  Alert and oriented to birthday, year and president, not place\" I am at home\", normal speech,  
  Normal motor exam, positive asterixis Psych:    Not anxious nor agitated Skin:  No rashes. No jaundice Reviewed most current lab test results and cultures  YES Reviewed most current radiology test results   YES Review and summation of old records today    NO Reviewed patient's current orders and MAR    YES 
PMH/SH reviewed - no change compared to H&P 
________________________________________________________________________ Care Plan discussed with: 
  Comments Patient x Family RN x Care Manager Consultant Multidiciplinary team rounds were held today with , nursing, pharmacist and clinical coordinator. Patient's plan of care was discussed; medications were reviewed and discharge planning was addressed. ________________________________________________________________________ Total NON critical care TIME:  35   Minutes Total CRITICAL CARE TIME Spent:   Minutes non procedure based Comments >50% of visit spent in counseling and coordination of care x This includes time during multidisciplinary rounds if indicated above  
________________________________________________________________________ Oliverio Multani MD  
 
Procedures: see electronic medical records for all procedures/Xrays and details which were not copied into this note but were reviewed prior to creation of Plan. LABS: 
I reviewed today's most current labs and imaging studies. Pertinent labs include: 
Recent Labs 08/16/20 
0430 08/15/20 
1738 08/15/20 
3111 WBC  --   --  7.2 HGB 6.3* 7.3* 6.3* HCT  --  22.2* 19.9* PLT  --   --  138* Recent Labs 08/16/20 
0430 08/15/20 
2482 * 133* K 4.2 4.5  
 103 CO2 23 24 * 216* BUN 27* 33* CREA 0.94 1.34* CA 7.5* 7.1*  
MG 2.2  --

## 2020-08-16 NOTE — PROGRESS NOTES
8:52 PM 
Attempted to page On call for Dr. Phoebe Alvarez paged regarding patient eliquis and patient saturated 2nd dressing today. Dressing change done at 7am and 7pm today with copius amounts of drainage. 9:13 PM 
Unable to reach answering service for Dr. Phoebe Alvarez, telehospitalist paged at this time. 9:22 PM 
Dr. Simone Neil returned page, hold PM dose of eliquis

## 2020-08-16 NOTE — PROGRESS NOTES
Notified Nelson Garcia NP of concerns due to copious drainage from incisional area as well as increased swelling and bruising to scrotum and R hip. No new orders given at this time. PRBCs infusing.

## 2020-08-16 NOTE — PROGRESS NOTES
Orthopedic NP Progress Note Post Op day: 2 Days Post-Op August 16, 2020 12:08 PM  
 
Liat Park Attending Physician: Treatment Team: Attending Provider: Sb Lazo MD; Consulting Provider: Harriet Gowers; Consulting Provider: Ev Cramer MD; Consulting Provider: Edmund Gonzales MD; Utilization Review: Kirk Aguillon RN Vital Signs:   
Patient Vitals for the past 8 hrs: 
 BP Temp Pulse Resp SpO2  
08/16/20 1027 165/71 98 °F (36.7 °C) 72 18 96 % 08/16/20 0957 146/63 98.2 °F (36.8 °C) 75 18 95 % 08/16/20 0942 137/63 98.1 °F (36.7 °C) 75 18 94 % 08/16/20 0927 147/67 98.4 °F (36.9 °C) 75 18 97 % 08/16/20 0920 155/66 98.4 °F (36.9 °C) 77 18 97 % 08/16/20 0732 170/70 98 °F (36.7 °C) 75 18 98 % BMI (calculated): 35.8 (08/14/20 1040) Intake/Output: 
08/16 0701 - 08/16 1900 In: -  
Out: 100 [Urine:100] 08/14 1901 - 08/16 0700 In: 315 Out: 1950 [[id number]] Pain Control:  
Pain Assessment Pain Scale 1: Numeric (0 - 10) Pain Intensity 1: 6 Pain Onset 1: postop Pain Location 1: Hip Pain Orientation 1: Right Pain Description 1: Aching Pain Intervention(s) 1: Medication (see MAR) LAB:   
Recent Labs 08/16/20 
0430 08/15/20 
1738 HCT  --  22.2* HGB 6.3* 7.3* Lab Results Component Value Date/Time Sodium 132 (L) 08/16/2020 04:30 AM  
 Potassium 4.2 08/16/2020 04:30 AM  
 Chloride 104 08/16/2020 04:30 AM  
 CO2 23 08/16/2020 04:30 AM  
 Glucose 248 (H) 08/16/2020 04:30 AM  
 BUN 27 (H) 08/16/2020 04:30 AM  
 Creatinine 0.94 08/16/2020 04:30 AM  
 Calcium 7.5 (L) 08/16/2020 04:30 AM  
 
 
Subjective:  Liat Park is a 65 y.o. male s/p a  Procedure(s): RIGHT HIP ARTHROPLASTY TOTAL REVISION POSTERIOR APPROACH AND ABDUCTOR RELEASE Procedure(s): RIGHT HIP ARTHROPLASTY TOTAL REVISION POSTERIOR APPROACH AND ABDUCTOR RELEASE. Tolerating diet. Resting in bed with family - mild confusion today Nurse concerned about drainage Objective: General: alert, cooperative, no distress, pale . Neuro/Vascular: CNS Intact. Sensation stable. Brisk cap refill, + pulses UE/LE Musculoskeletal:  + ROM UE/LE with limited R DF/PF due to old injury  . Skin: Incision - clean, dry and intact. No significant erythema or swelling. Dressing: clean, dry, and intact - changed at 0630 Hoffman - n Drain - n 
  
 
PT/OT:  
Gait:    
            
 
 
Assessment:  
 s/p Procedure(s): RIGHT HIP ARTHROPLASTY TOTAL REVISION POSTERIOR APPROACH AND ABDUCTOR RELEASE Principal Problem: 
  Dislocation of internal right hip prosthesis ([facility] [address] (8/13/2020) Plan:  
-  Continue PT/OT - WBAT  
-  Continue established methods of pain control 
-  VTE Prophylaxes - TEDS &/or SCDs - hold eliquis due to drainage -  Medicine to managed elevated ammonia with hx of cirrhosis -  Acute blood loss anemia - Hgb 6.3 this AM, was 7.3 after unit yesterday. Monitor HGB. Transfuse 1 unit PRBCs today Discharge To:  DEON Mcrae     aware and agree with plan. Signed By: Blu Galeano NP Orthopedic Nurse Practitioner

## 2020-08-16 NOTE — PROGRESS NOTES
Problem: Diabetes Self-Management Goal: *Disease process and treatment process Description: Define diabetes and identify own type of diabetes; list 3 options for treating diabetes. Outcome: Progressing Towards Goal 
Goal: *Incorporating nutritional management into lifestyle Description: Describe effect of type, amount and timing of food on blood glucose; list 3 methods for planning meals. Outcome: Progressing Towards Goal 
Goal: *Incorporating physical activity into lifestyle Description: State effect of exercise on blood glucose levels. Outcome: Progressing Towards Goal 
Goal: *Developing strategies to promote health/change behavior Description: Define the ABC's of diabetes; identify appropriate screenings, schedule and personal plan for screenings. Outcome: Progressing Towards Goal 
Goal: *Using medications safely Description: State effect of diabetes medications on diabetes; name diabetes medication taking, action and side effects. Outcome: Progressing Towards Goal 
Goal: *Monitoring blood glucose, interpreting and using results Description: Identify recommended blood glucose targets  and personal targets. Outcome: Progressing Towards Goal 
Goal: *Prevention, detection, treatment of acute complications Description: List symptoms of hyper- and hypoglycemia; describe how to treat low blood sugar and actions for lowering  high blood glucose level. Outcome: Progressing Towards Goal 
Goal: *Prevention, detection and treatment of chronic complications Description: Define the natural course of diabetes and describe the relationship of blood glucose levels to long term complications of diabetes. Outcome: Progressing Towards Goal 
Goal: *Developing strategies to address psychosocial issues Description: Describe feelings about living with diabetes; identify support needed and support network Outcome: Progressing Towards Goal 
Goal: *Insulin pump training Outcome: Progressing Towards Goal 
 Goal: *Sick day guidelines Outcome: Progressing Towards Goal 
Goal: *Patient Specific Goal (EDIT GOAL, INSERT TEXT) Outcome: Progressing Towards Goal 
  
Problem: Patient Education: Go to Patient Education Activity Goal: Patient/Family Education Outcome: Progressing Towards Goal 
  
Problem: Falls - Risk of 
Goal: *Absence of Falls Description: Document Alvino Cantu Fall Risk and appropriate interventions in the flowsheet. Outcome: Progressing Towards Goal 
Note: Fall Risk Interventions: 
Mobility Interventions: Assess mobility with egress test, Bed/chair exit alarm, Communicate number of staff needed for ambulation/transfer, Mechanical lift, Patient to call before getting OOB, PT Consult for mobility concerns, PT Consult for assist device competence, OT consult for ADLs, Strengthening exercises (ROM-active/passive), Utilize walker, cane, or other assistive device, Utilize gait belt for transfers/ambulation Mentation Interventions: Adequate sleep, hydration, pain control, Bed/chair exit alarm, Door open when patient unattended, Evaluate medications/consider consulting pharmacy, Eyeglasses and hearing aids, Familiar objects from home, Update white board, Toileting rounds, Room close to nurse's station, Reorient patient, More frequent rounding, Increase mobility, Gait belt with transfers/ambulation Medication Interventions: Assess postural VS orthostatic hypotension, Bed/chair exit alarm, Evaluate medications/consider consulting pharmacy, Patient to call before getting OOB, Utilize gait belt for transfers/ambulation, Teach patient to arise slowly Elimination Interventions: Bed/chair exit alarm, Call light in reach, Elevated toilet seat, Patient to call for help with toileting needs, Stay With Me (per policy), Toilet paper/wipes in reach, Toileting schedule/hourly rounds, Urinal in reach History of Falls Interventions: Bed/chair exit alarm, Consult care management for discharge planning, Door open when patient unattended, Evaluate medications/consider consulting pharmacy, Assess for delayed presentation/identification of injury for 48 hrs (comment for end date), Vital signs minimum Q4HRs X 24 hrs (comment for end date), Utilize gait belt for transfer/ambulation, Room close to nurse's station Problem: Patient Education: Go to Patient Education Activity Goal: Patient/Family Education Outcome: Progressing Towards Goal 
  
Problem: Pressure Injury - Risk of 
Goal: *Prevention of pressure injury Description: Document Geoff Scale and appropriate interventions in the flowsheet. Outcome: Progressing Towards Goal 
Note: Pressure Injury Interventions: 
Sensory Interventions: Assess changes in LOC, Assess need for specialty bed, Avoid rigorous massage over bony prominences, Chair cushion, Check visual cues for pain, Discuss PT/OT consult with provider, Float heels, Keep linens dry and wrinkle-free, Maintain/enhance activity level, Minimize linen layers, Use 30-degree side-lying position, Turn and reposition approx. every two hours (pillows and wedges if needed), Sit a 90-degree angle/use footstool if needed, Pressure redistribution bed/mattress (bed type), Pad between skin to skin, Monitor skin under medical devices Moisture Interventions: Absorbent underpads, Apply protective barrier, creams and emollients, Assess need for specialty bed, Check for incontinence Q2 hours and as needed, Contain wound drainage, Limit adult briefs, Maintain skin hydration (lotion/cream), Minimize layers, Moisture barrier, Offer toileting Q_hr Activity Interventions: Assess need for specialty bed, Chair cushion, Increase time out of bed, Pressure redistribution bed/mattress(bed type), PT/OT evaluation Mobility Interventions: Assess need for specialty bed, Chair cushion, Float heels, HOB 30 degrees or less, Pressure redistribution bed/mattress (bed type), PT/OT evaluation, Turn and reposition approx. every two hours(pillow and wedges) Nutrition Interventions: Document food/fluid/supplement intake, Discuss nutritional consult with provider, Offer support with meals,snacks and hydration Friction and Shear Interventions: Apply protective barrier, creams and emollients, Feet elevated on foot rest, Foam dressings/transparent film/skin sealants, Lift sheet, Lift team/patient mobility team, HOB 30 degrees or less, Sit at 90-degree angle, Transfer aides:transfer board/Kirsten lift/ceiling lift, Transferring/repositioning devices, Minimize layers Problem: Patient Education: Go to Patient Education Activity Goal: Patient/Family Education Outcome: Progressing Towards Goal 
  
Problem: Patient Education: Go to Patient Education Activity Goal: Patient/Family Education Outcome: Progressing Towards Goal

## 2020-08-16 NOTE — PROGRESS NOTES
Patient will be deferred this am for OT. Agitated overnight and has not yet gotten new hgb result.  
 
Helene Kelly MS, OTR/L, CSRS

## 2020-08-16 NOTE — PROGRESS NOTES
12:06 AM 
Patient having multiple BMs. Patient confused, does not understand that he is in the hospital, attempted to kick RN when assisting patient to bed pan. Patient threw urinal on the ground. EVS called to mop floor. 2:17 AM 
Patient verbally aggressive with this RN, unable to reorient patient, patient refusing to allow RN to remove bedpan after sitting on bed pan for more than 10 mins. Patient repeatedly trying to get up and twisting in the bed, pulling at IV and attempting to remove surgical dressing. 
2:21 AM 
On call for dr. Lucinda Shankar paged regarding patient safety, PCT and Hector Hagan RN removed patient from bedpan, attempting to reorient patient, patient still attempting to get out of bed 
2:54 AM 
No call back received, telehospitalist paged r/t agitation 3:10 AM 
 paged oncall for dr Lucinda Shankar again, Dr. Shahzad Razo returned page, recommended telehospitalist be contacted for any PRN for agitation. Dr Shahzad Razo made aqware of dressing change and ecchymosis to patient's right inner thigh. 3:38 AM 
No response form telehospitalist, patient is resting in bed, this RN has had to remind patient to keep legs on the bed. Will page telehospitalist a second time 4:00 AM 
Haldol given 5:32 AM 
Patient in bed, still attempting to get out of bed. Needs constant redirecting

## 2020-08-16 NOTE — PROGRESS NOTES
Bedside and Verbal shift change report given to Sandy (oncoming nurse) by Carin Sullivan (offgoing nurse). Report included the following information SBAR, Kardex, Intake/Output, MAR, Recent Results, Med Rec Status and Cardiac Rhythm NSR.

## 2020-08-16 NOTE — PROGRESS NOTES
Physical Therapy Attempted to see patient for PT treatment but patient receiving blood and RN requesting to return later. Will check on patient this afternoon.

## 2020-08-17 ENCOUNTER — APPOINTMENT (OUTPATIENT)
Dept: GENERAL RADIOLOGY | Age: 70
DRG: 466 | End: 2020-08-17
Attending: INTERNAL MEDICINE
Payer: MEDICARE

## 2020-08-17 ENCOUNTER — APPOINTMENT (OUTPATIENT)
Dept: CT IMAGING | Age: 70
DRG: 466 | End: 2020-08-17
Attending: INTERNAL MEDICINE
Payer: MEDICARE

## 2020-08-17 LAB
ABO + RH BLD: NORMAL
ALBUMIN SERPL-MCNC: 1.7 G/DL (ref 3.5–5)
ALBUMIN/GLOB SERPL: 0.5 {RATIO} (ref 1.1–2.2)
ALP SERPL-CCNC: 83 U/L (ref 45–117)
ALT SERPL-CCNC: 33 U/L (ref 12–78)
AMMONIA PLAS-SCNC: <10 UMOL/L
ANION GAP SERPL CALC-SCNC: 3 MMOL/L (ref 5–15)
APPEARANCE UR: CLEAR
ARTERIAL PATENCY WRIST A: ABNORMAL
AST SERPL-CCNC: 86 U/L (ref 15–37)
BACTERIA URNS QL MICRO: NEGATIVE /HPF
BASE EXCESS BLD CALC-SCNC: 2 MMOL/L
BASOPHILS # BLD: 0 K/UL (ref 0–0.1)
BASOPHILS # BLD: 0 K/UL (ref 0–0.1)
BASOPHILS NFR BLD: 0 % (ref 0–1)
BASOPHILS NFR BLD: 0 % (ref 0–1)
BDY SITE: ABNORMAL
BILIRUB DIRECT SERPL-MCNC: 0.7 MG/DL (ref 0–0.2)
BILIRUB SERPL-MCNC: 2 MG/DL (ref 0.2–1)
BILIRUB UR QL: NEGATIVE
BLD PROD TYP BPU: NORMAL
BLD PROD TYP BPU: NORMAL
BLOOD GROUP ANTIBODIES SERPL: NORMAL
BNP SERPL-MCNC: 566 PG/ML
BPU ID: NORMAL
BPU ID: NORMAL
BUN SERPL-MCNC: 23 MG/DL (ref 6–20)
BUN/CREAT SERPL: 24 (ref 12–20)
CA-I BLD-SCNC: 1.2 MMOL/L (ref 1.12–1.32)
CALCIUM SERPL-MCNC: 7.4 MG/DL (ref 8.5–10.1)
CHLORIDE SERPL-SCNC: 105 MMOL/L (ref 97–108)
CO2 SERPL-SCNC: 27 MMOL/L (ref 21–32)
COLOR UR: NORMAL
CREAT SERPL-MCNC: 0.94 MG/DL (ref 0.7–1.3)
CROSSMATCH RESULT,%XM: NORMAL
CROSSMATCH RESULT,%XM: NORMAL
DIFFERENTIAL METHOD BLD: ABNORMAL
DIFFERENTIAL METHOD BLD: ABNORMAL
EOSINOPHIL # BLD: 0.1 K/UL (ref 0–0.4)
EOSINOPHIL # BLD: 0.2 K/UL (ref 0–0.4)
EOSINOPHIL NFR BLD: 1 % (ref 0–7)
EOSINOPHIL NFR BLD: 2 % (ref 0–7)
EPITH CASTS URNS QL MICRO: NORMAL /LPF
ERYTHROCYTE [DISTWIDTH] IN BLOOD BY AUTOMATED COUNT: 17.1 % (ref 11.5–14.5)
ERYTHROCYTE [DISTWIDTH] IN BLOOD BY AUTOMATED COUNT: 17.2 % (ref 11.5–14.5)
GAS FLOW.O2 O2 DELIVERY SYS: ABNORMAL L/MIN
GAS FLOW.O2 SETTING OXYMISER: 5 L/M
GLOBULIN SER CALC-MCNC: 3.1 G/DL (ref 2–4)
GLUCOSE BLD STRIP.AUTO-MCNC: 126 MG/DL (ref 65–100)
GLUCOSE BLD STRIP.AUTO-MCNC: 148 MG/DL (ref 65–100)
GLUCOSE BLD STRIP.AUTO-MCNC: 155 MG/DL (ref 65–100)
GLUCOSE BLD STRIP.AUTO-MCNC: 170 MG/DL (ref 65–100)
GLUCOSE SERPL-MCNC: 169 MG/DL (ref 65–100)
GLUCOSE UR STRIP.AUTO-MCNC: NEGATIVE MG/DL
HAPTOGLOB SERPL-MCNC: 125 MG/DL (ref 30–200)
HCO3 BLD-SCNC: 25.4 MMOL/L (ref 22–26)
HCT VFR BLD AUTO: 18.1 % (ref 36.6–50.3)
HCT VFR BLD AUTO: 18.4 % (ref 36.6–50.3)
HGB BLD-MCNC: 5.8 G/DL (ref 12.1–17)
HGB BLD-MCNC: 6 G/DL (ref 12.1–17)
HGB UR QL STRIP: NEGATIVE
HYALINE CASTS URNS QL MICRO: NORMAL /LPF (ref 0–5)
IMM GRANULOCYTES # BLD AUTO: 0 K/UL (ref 0–0.04)
IMM GRANULOCYTES # BLD AUTO: 0 K/UL (ref 0–0.04)
IMM GRANULOCYTES NFR BLD AUTO: 0 % (ref 0–0.5)
IMM GRANULOCYTES NFR BLD AUTO: 0 % (ref 0–0.5)
KETONES UR QL STRIP.AUTO: NEGATIVE MG/DL
LACTATE SERPL-SCNC: 1.8 MMOL/L (ref 0.4–2)
LACTATE SERPL-SCNC: 4.2 MMOL/L (ref 0.4–2)
LDH SERPL L TO P-CCNC: 350 U/L (ref 85–241)
LEUKOCYTE ESTERASE UR QL STRIP.AUTO: NEGATIVE
LYMPHOCYTES # BLD: 0.2 K/UL (ref 0.8–3.5)
LYMPHOCYTES # BLD: 1.2 K/UL (ref 0.8–3.5)
LYMPHOCYTES NFR BLD: 13 % (ref 12–49)
LYMPHOCYTES NFR BLD: 2 % (ref 12–49)
MCH RBC QN AUTO: 30.6 PG (ref 26–34)
MCH RBC QN AUTO: 31.2 PG (ref 26–34)
MCHC RBC AUTO-ENTMCNC: 31.5 G/DL (ref 30–36.5)
MCHC RBC AUTO-ENTMCNC: 33.1 G/DL (ref 30–36.5)
MCV RBC AUTO: 92.3 FL (ref 80–99)
MCV RBC AUTO: 98.9 FL (ref 80–99)
METAMYELOCYTES NFR BLD MANUAL: 1 %
METAMYELOCYTES NFR BLD MANUAL: 1 %
MONOCYTES # BLD: 0.3 K/UL (ref 0–1)
MONOCYTES # BLD: 0.4 K/UL (ref 0–1)
MONOCYTES NFR BLD: 3 % (ref 5–13)
MONOCYTES NFR BLD: 4 % (ref 5–13)
NEUTS BAND NFR BLD MANUAL: 6 %
NEUTS SEG # BLD: 10.8 K/UL (ref 1.8–8)
NEUTS SEG # BLD: 7.3 K/UL (ref 1.8–8)
NEUTS SEG NFR BLD: 80 % (ref 32–75)
NEUTS SEG NFR BLD: 87 % (ref 32–75)
NITRITE UR QL STRIP.AUTO: NEGATIVE
NRBC # BLD: 0.02 K/UL (ref 0–0.01)
NRBC # BLD: 0.13 K/UL (ref 0–0.01)
NRBC BLD-RTO: 0.2 PER 100 WBC
NRBC BLD-RTO: 1.1 PER 100 WBC
PCO2 BLD: 32.2 MMHG (ref 35–45)
PH BLD: 7.5 [PH] (ref 7.35–7.45)
PH UR STRIP: 5 [PH] (ref 5–8)
PLATELET # BLD AUTO: 176 K/UL (ref 150–400)
PLATELET # BLD AUTO: 211 K/UL (ref 150–400)
PMV BLD AUTO: 10.1 FL (ref 8.9–12.9)
PMV BLD AUTO: 10.4 FL (ref 8.9–12.9)
PO2 BLD: 136 MMHG (ref 80–100)
POTASSIUM SERPL-SCNC: 3.7 MMOL/L (ref 3.5–5.1)
PROT SERPL-MCNC: 4.8 G/DL (ref 6.4–8.2)
PROT UR STRIP-MCNC: NEGATIVE MG/DL
RBC # BLD AUTO: 1.86 M/UL (ref 4.1–5.7)
RBC # BLD AUTO: 1.96 M/UL (ref 4.1–5.7)
RBC #/AREA URNS HPF: NORMAL /HPF (ref 0–5)
RBC MORPH BLD: ABNORMAL
RBC MORPH BLD: ABNORMAL
RETICS # AUTO: 0.12 M/UL (ref 0.03–0.1)
RETICS/RBC NFR AUTO: 6.4 % (ref 0.7–2.1)
SAO2 % BLD: 99 % (ref 92–97)
SERVICE CMNT-IMP: ABNORMAL
SODIUM SERPL-SCNC: 135 MMOL/L (ref 136–145)
SP GR UR REFRACTOMETRY: 1.01 (ref 1–1.03)
SPECIMEN EXP DATE BLD: NORMAL
SPECIMEN TYPE: ABNORMAL
STATUS OF UNIT,%ST: NORMAL
STATUS OF UNIT,%ST: NORMAL
UNIT DIVISION, %UDIV: 0
UNIT DIVISION, %UDIV: 0
UR CULT HOLD, URHOLD: NORMAL
UROBILINOGEN UR QL STRIP.AUTO: 0.2 EU/DL (ref 0.2–1)
WBC # BLD AUTO: 11.6 K/UL (ref 4.1–11.1)
WBC # BLD AUTO: 9.1 K/UL (ref 4.1–11.1)
WBC URNS QL MICRO: NORMAL /HPF (ref 0–4)

## 2020-08-17 PROCEDURE — 93005 ELECTROCARDIOGRAM TRACING: CPT

## 2020-08-17 PROCEDURE — 77010033678 HC OXYGEN DAILY

## 2020-08-17 PROCEDURE — 74011000258 HC RX REV CODE- 258: Performed by: INTERNAL MEDICINE

## 2020-08-17 PROCEDURE — 36430 TRANSFUSION BLD/BLD COMPNT: CPT

## 2020-08-17 PROCEDURE — 83615 LACTATE (LD) (LDH) ENZYME: CPT

## 2020-08-17 PROCEDURE — 80076 HEPATIC FUNCTION PANEL: CPT

## 2020-08-17 PROCEDURE — 74011250636 HC RX REV CODE- 250/636: Performed by: INTERNAL MEDICINE

## 2020-08-17 PROCEDURE — 87186 SC STD MICRODIL/AGAR DIL: CPT

## 2020-08-17 PROCEDURE — 82140 ASSAY OF AMMONIA: CPT

## 2020-08-17 PROCEDURE — 74011250637 HC RX REV CODE- 250/637: Performed by: PHYSICIAN ASSISTANT

## 2020-08-17 PROCEDURE — 81001 URINALYSIS AUTO W/SCOPE: CPT

## 2020-08-17 PROCEDURE — 36415 COLL VENOUS BLD VENIPUNCTURE: CPT

## 2020-08-17 PROCEDURE — 97530 THERAPEUTIC ACTIVITIES: CPT

## 2020-08-17 PROCEDURE — 85025 COMPLETE CBC W/AUTO DIFF WBC: CPT

## 2020-08-17 PROCEDURE — 36600 WITHDRAWAL OF ARTERIAL BLOOD: CPT

## 2020-08-17 PROCEDURE — P9016 RBC LEUKOCYTES REDUCED: HCPCS

## 2020-08-17 PROCEDURE — 74011636637 HC RX REV CODE- 636/637: Performed by: PHYSICIAN ASSISTANT

## 2020-08-17 PROCEDURE — 87040 BLOOD CULTURE FOR BACTERIA: CPT

## 2020-08-17 PROCEDURE — 94760 N-INVAS EAR/PLS OXIMETRY 1: CPT

## 2020-08-17 PROCEDURE — 83605 ASSAY OF LACTIC ACID: CPT

## 2020-08-17 PROCEDURE — 86923 COMPATIBILITY TEST ELECTRIC: CPT

## 2020-08-17 PROCEDURE — 70450 CT HEAD/BRAIN W/O DYE: CPT

## 2020-08-17 PROCEDURE — 87077 CULTURE AEROBIC IDENTIFY: CPT

## 2020-08-17 PROCEDURE — 82962 GLUCOSE BLOOD TEST: CPT

## 2020-08-17 PROCEDURE — 82803 BLOOD GASES ANY COMBINATION: CPT

## 2020-08-17 PROCEDURE — 83010 ASSAY OF HAPTOGLOBIN QUANT: CPT

## 2020-08-17 PROCEDURE — 86900 BLOOD TYPING SEROLOGIC ABO: CPT

## 2020-08-17 PROCEDURE — 71045 X-RAY EXAM CHEST 1 VIEW: CPT

## 2020-08-17 PROCEDURE — 65620000000 HC RM CCU GENERAL

## 2020-08-17 PROCEDURE — 87086 URINE CULTURE/COLONY COUNT: CPT

## 2020-08-17 PROCEDURE — 74011250637 HC RX REV CODE- 250/637: Performed by: INTERNAL MEDICINE

## 2020-08-17 PROCEDURE — 80048 BASIC METABOLIC PNL TOTAL CA: CPT

## 2020-08-17 PROCEDURE — 74011636637 HC RX REV CODE- 636/637: Performed by: INTERNAL MEDICINE

## 2020-08-17 PROCEDURE — 85045 AUTOMATED RETICULOCYTE COUNT: CPT

## 2020-08-17 PROCEDURE — 83880 ASSAY OF NATRIURETIC PEPTIDE: CPT

## 2020-08-17 RX ORDER — VANCOMYCIN/0.9 % SOD CHLORIDE 1.5G/250ML
1500 PLASTIC BAG, INJECTION (ML) INTRAVENOUS EVERY 12 HOURS
Status: DISCONTINUED | OUTPATIENT
Start: 2020-08-18 | End: 2020-08-22

## 2020-08-17 RX ORDER — HYDRALAZINE HYDROCHLORIDE 20 MG/ML
10 INJECTION INTRAMUSCULAR; INTRAVENOUS
Status: DISCONTINUED | OUTPATIENT
Start: 2020-08-17 | End: 2020-08-24 | Stop reason: HOSPADM

## 2020-08-17 RX ORDER — SODIUM CHLORIDE 9 MG/ML
75 INJECTION, SOLUTION INTRAVENOUS CONTINUOUS
Status: DISCONTINUED | OUTPATIENT
Start: 2020-08-17 | End: 2020-08-24 | Stop reason: HOSPADM

## 2020-08-17 RX ORDER — VANCOMYCIN 2 GRAM/500 ML IN 0.9 % SODIUM CHLORIDE INTRAVENOUS
2000 ONCE
Status: COMPLETED | OUTPATIENT
Start: 2020-08-17 | End: 2020-08-17

## 2020-08-17 RX ORDER — FUROSEMIDE 10 MG/ML
40 INJECTION INTRAMUSCULAR; INTRAVENOUS ONCE
Status: ACTIVE | OUTPATIENT
Start: 2020-08-17 | End: 2020-08-17

## 2020-08-17 RX ORDER — ACETAMINOPHEN 650 MG/1
650 SUPPOSITORY RECTAL EVERY 6 HOURS
Status: DISCONTINUED | OUTPATIENT
Start: 2020-08-17 | End: 2020-08-22

## 2020-08-17 RX ADMIN — RIFAXIMIN 550 MG: 550 TABLET ORAL at 08:23

## 2020-08-17 RX ADMIN — VANCOMYCIN HYDROCHLORIDE 2000 MG: 10 INJECTION, POWDER, LYOPHILIZED, FOR SOLUTION INTRAVENOUS at 14:08

## 2020-08-17 RX ADMIN — INSULIN LISPRO 7 UNITS: 100 INJECTION, SOLUTION INTRAVENOUS; SUBCUTANEOUS at 08:21

## 2020-08-17 RX ADMIN — ACETAMINOPHEN 650 MG: 650 SUPPOSITORY RECTAL at 23:24

## 2020-08-17 RX ADMIN — Medication 10 ML: at 21:35

## 2020-08-17 RX ADMIN — ATORVASTATIN CALCIUM 40 MG: 40 TABLET, FILM COATED ORAL at 08:23

## 2020-08-17 RX ADMIN — CEFEPIME HYDROCHLORIDE 1 G: 1 INJECTION, POWDER, FOR SOLUTION INTRAMUSCULAR; INTRAVENOUS at 21:30

## 2020-08-17 RX ADMIN — POLYETHYLENE GLYCOL 3350 17 G: 17 POWDER, FOR SOLUTION ORAL at 08:22

## 2020-08-17 RX ADMIN — APIXABAN 2.5 MG: 2.5 TABLET, FILM COATED ORAL at 08:24

## 2020-08-17 RX ADMIN — SPIRONOLACTONE 25 MG: 25 TABLET ORAL at 08:24

## 2020-08-17 RX ADMIN — SODIUM CHLORIDE 150 ML/HR: 900 INJECTION, SOLUTION INTRAVENOUS at 12:12

## 2020-08-17 RX ADMIN — LACTULOSE 30 ML: 20 SOLUTION ORAL at 08:25

## 2020-08-17 RX ADMIN — CEFEPIME HYDROCHLORIDE 1 G: 1 INJECTION, POWDER, FOR SOLUTION INTRAMUSCULAR; INTRAVENOUS at 13:40

## 2020-08-17 RX ADMIN — DOCUSATE SODIUM - SENNOSIDES 1 TABLET: 50; 8.6 TABLET, FILM COATED ORAL at 08:23

## 2020-08-17 RX ADMIN — Medication 10 ML: at 06:33

## 2020-08-17 RX ADMIN — SERTRALINE HYDROCHLORIDE 150 MG: 50 TABLET ORAL at 08:23

## 2020-08-17 RX ADMIN — ACETAMINOPHEN 650 MG: 325 TABLET ORAL at 06:20

## 2020-08-17 RX ADMIN — INSULIN LISPRO 2 UNITS: 100 INJECTION, SOLUTION INTRAVENOUS; SUBCUTANEOUS at 08:21

## 2020-08-17 RX ADMIN — ACETAMINOPHEN 650 MG: 650 SUPPOSITORY RECTAL at 16:27

## 2020-08-17 RX ADMIN — FAMOTIDINE 20 MG: 20 TABLET, FILM COATED ORAL at 08:22

## 2020-08-17 NOTE — ROUTINE PROCESS
Report called to CCU nurse. Patient to be transported via bed with nurse assistance.  
 
Nancy Gaucher, RN

## 2020-08-17 NOTE — ROUTINE PROCESS
Critical hgb result 5.8, Lactic acid 4.2. Dr. Cam Manzanares has already ordered to transfuse 2 units PRBC's, IVF at 150cc/h and IV antibiotics.    
 
Richardson Whitman RN

## 2020-08-17 NOTE — PROGRESS NOTES
Pharmacy Automatic Renal Dosing Protocol - Antimicrobials Indication for Antimicrobials: sepsis of unknown etiology Current Regimen of Each Antimicrobial: 
Cefepime 1 gm IV q8h (Start Date ; Day 4) Vancomycin 2g reloaded, then 1.5g iv q12h (Start date ; day 1) Previous Antimicrobial Therapy: 
Vancomycin 2g x1 on  Goal Level: VANCOMYCIN TROUGH GOAL RANGE Vancomycin Trough: 15 - 20 mcg/mL  (AUC: 400 - 600 mg/hr/Liter/day) Date Dose & Interval Measured (mcg/mL) Extrapolated (mcg/mL) Date & time of next level: 130 Significant Cultures:  
 blood: pending Urine Cx on hold Radiology / Imaging results: (X-ray, CT scan or MRI): 
 
Labs: 
Recent Labs 20 
01320 
0430 08/15/20 
8990 CREA 0.94 0.94 1.34* BUN 23* 27* 33* WBC 9.1  --  7.2 Temp (24hrs), Av.4 °F (37.4 °C), Min:98 °F (36.7 °C), Max:101.9 °F (38.8 °C) Creatinine Clearance (mL/min):  
CrCl (Actual Body Weight): 115.3 CrCl (Adjusted Body Weight): 90.8 CrCl (Ideal Body Weight): 74.5 Impression/Plan:  
Febrile and more confused. Broadened ABX to include Vancomcyin in addition to current cefepime regimen Vancomycin reloaded, then 1.5g iv q12h for predicted trough ~15-16 mcg/ml. Continue current cefepime 1g iv q8h BMP daily and Vanc trough on  before 0200 dose Antimicrobial stop date pending Pharmacy will follow daily and adjust medications as appropriate for renal function and/or serum levels.  
 
Thank you, 
Fartun Nino, PHARMD

## 2020-08-17 NOTE — PROGRESS NOTES
Po open hip reduction and revision to constrained liner. Confused and has shaking chills this morning. Has fever and tachycardia. Wbc 9 Patient Vitals for the past 24 hrs: 
 Temp Pulse Resp BP SpO2  
08/17/20 1108 (!) 101.9 °F (38.8 °C) (!) 111 24 133/88 97 % 08/17/20 1059 (!) 101.9 °F (38.8 °C) (!) 110 (!) 36 133/88 96 % 08/17/20 1055 100 °F (37.8 °C)      
08/17/20 1051 (!) 101 °F (38.3 °C) (!) 138  126/58 (!) 78 % 08/17/20 0750 98.3 °F (36.8 °C) 90 16 127/62 97 % 08/17/20 0306 98.3 °F (36.8 °C) 89 18 172/74 100 % 08/16/20 2356 98 °F (36.7 °C) 87 18 152/67 97 % 08/16/20 2230  85     
08/16/20 1940 98.3 °F (36.8 °C) 75 18 134/62 90 % 08/16/20 1600 98 °F (36.7 °C) 76 18 160/71 98 % 08/16/20 1309 98.3 °F (36.8 °C) 79 18 162/72 99 % 08/16/20 1227 98.2 °F (36.8 °C) 78 18 166/70 98 % Dressing clean and dry 
reported bloody when changed earlier this morning Code rapid called Blood and urine cx ordered 
cxr ordered Medicine to evaluate.

## 2020-08-17 NOTE — PROGRESS NOTES
IRS 8/17 with fevers to 101.9, 110, 36 Metabolic encephalopathy likey fever, infection Hospital delirium Confused, more today, talking but not conversant Febrile and tachycardiac(new) Yesterday concern for hepatic encephalopathy with asterixis, NH4 improved Now febrile, more confused, concern for developing sepsis Stat paired BC and lactate pCXR and UA Check head CT 
IVF Empiric vanco and cefepime once cultures sent Right hip wound still draining, d/w orthopedics to check the wound DM type 2 POA uncont with hyperglycemia Lantus 30 units QHS increase to 40 units Premeal humalog 5  (PTA 8units), increase to 7 units , 217, 148, 155 BREWER cirrhosis Hx Esophageal varices, s/p TIPS 
-hold lasix and spironolactone till condition more stable 
-continue PTA lactulose and xifaxam 
 
Anemia acute blood loss S/p 2 units pRBCs over weekend, HgB still dropping Holding eliquis(received dose this AM, none over weekend) No reported gi bleeding per NS Transfuse Orthopedics to check the wound Essential HTN 
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 
- unable to reduce hip 
-S/P RIGHT HIP ARTHROPLASTY TOTAL REVISION POSTERIOR APPROACH AND ADDUCTOR RELEASE, SCAR REVISION 8/14/2020 
-Eliquis for DVT prophylaxis on hold 
-activity per Ortho Code status: Full Prophylaxis: continue eliquis

## 2020-08-17 NOTE — PROGRESS NOTES
RAPID RESPONSE TEAM 
 
 
Rapid response called to 3223 for AMS, tachycardia, and rigors. Discussed with primary RN, Nikky. Pt appears very pale, in rigors and disoriented. Pt only oriented to self. Pt warm to touch, rectal temp 101.9. Pt continually saturating abd pads s/p hip surgery at site. H/H 6.0/18. 1. Pt tachycardic to 110's, 4L NC 96%. Spoke with Dr Korina Ospina at bedside, sepsis protocol to be initiated. Blood work (lactic, BCx2, RC, LD, haptoglobin), Chest XR, head CT, and UA ordered. Discussed H/H, recommended transfusion until surgery can evaluate site. Awaiting orders. Coags? Recommended rectal tylenol and ice packs to primary RN Orders received from Andres NEWTON (Ortho) for 2U PRBC, CBC, lasix 40mg. UPDATE  
1630: Received call from CCL to reevaluate pt as appears to be worse than earlier and needs 2nd IV. Upon assessment, pt still in rigor and disoriented, though able to answer some questions appropriately. 2nd IV established, still awaiting PRBC and tylenol administration. Pt more febrile to 102+, other VSS. Pt packed in ice. Higher level of care not indicated at this time per Dr Korina Ospina. Will reevaluate after med administration. Patient Vitals for the past 8 hrs: 
 Temp Pulse Resp BP SpO2  
08/17/20 1108 (!) 101.9 °F (38.8 °C) (!) 111 24 133/88 97 % 08/17/20 1059 (!) 101.9 °F (38.8 °C) (!) 110 (!) 36 133/88 96 % 08/17/20 1055 100 °F (37.8 °C)      
08/17/20 1051 (!) 101 °F (38.3 °C) (!) 138  126/58 (!) 78 % 08/17/20 0750 98.3 °F (36.8 °C) 90 16 127/62 97 % Lab Results Component Value Date/Time WBC 9.1 08/17/2020 01:30 AM  
 HGB 6.0 (L) 08/17/2020 01:30 AM  
 HCT 18.1 (L) 08/17/2020 01:30 AM  
 PLATELET 320 56/07/6346 01:30 AM  
 MCV 92.3 08/17/2020 01:30 AM  
 
 
Patient Vitals for the past 8 hrs: 
 Temp Pulse Resp BP SpO2  
08/17/20 1755 100.4 °F (38 °C) 87 26 107/54 97 % 08/17/20 1640 (!) 101.9 °F (38.8 °C) 90 24 110/52 97 % 08/17/20 1538 (!) 101.9 °F (38.8 °C) 92 26 115/52 99 % 08/17/20 1515 (!) 102.1 °F (38.9 °C) 95 28 115/54 99 % 08/17/20 1208 99.2 °F (37.3 °C) (!) 102 24 123/53 98 % [address] now 3, rigor subsided, temp 100.4. Other VSS. Dr Allegra Gorman updated on pt, no higher level of care transfer indicated. Pt ABG O2 136, NC DC'd. Received call from Nursing Supervisor regarding order from Dr New Villegas for higher level of care transfer. Spoke with attending on call for Dr. New Villegas (ortho), who agreed higher level of care does not seem necessary and deferred to medical team.  
 
Lab Results Component Value Date/Time WBC 11.6 (H) 08/17/2020 11:33 AM  
 HGB 5.8 (LL) 08/17/2020 11:33 AM  
 HCT 18.4 (L) 08/17/2020 11:33 AM  
 PLATELET 947 35/97/5501 11:33 AM  
 MCV 98.9 08/17/2020 11:33 AM  
 
 
1900 Discussed with Dr New Villegas via phone, who was firm on HLOC transfer. Awaiting PRBC transfusion at this time. Please call with any questions or concerns. Rosi Page Rapid Response RN Bárbara Kee

## 2020-08-17 NOTE — PROGRESS NOTES
Bedside and Verbal shift change report given to Nikky (oncoming nurse) by Jonathan Jeffers (offgoing nurse). Report included the following information SBAR, Kardex, Intake/Output, MAR, Recent Results, Med Rec Status and Cardiac Rhythm NSR.

## 2020-08-17 NOTE — PROGRESS NOTES
Hospitalist 
 
 
 
 
 
 
 
 
 
IVF IV cefepime and vanco started Fever trending down, decreased HR Head CT no acute changes CXR with no focal ASD 
UA with no pyuria or bacturia BC are pending Wound photos as above Scrotum swollen, ecchymotic appearing along with groin and buttocks Not tender or indurated, no sub-q air I can feel Doubt Toyin's Spoke with urology, they will see in AM to check scrotum Hemolysis labs look negative, normal haptoglobin Mildly increased LDH and T bili are non specific Lactate normalized with IVF and antibiotics ABG reviewed, no hypercarbia Spoke with Son danny about events, fevers, treatment plan Vickie Davies MD

## 2020-08-17 NOTE — ROUTINE PROCESS
Patient less verbal, temp 102.1 rectal. Surgical right hip dressing changed, MD at bedside to assess incision. IV antibiotics infusing. Attempting to obtain second IV access to administer blood transfusion per order. Resp. At bedside obtaining ABG's.  
 
Santino Cole RN

## 2020-08-17 NOTE — PROGRESS NOTES
Occupational Therapy Noted rapid response called this morning. Patient is not appropriate for OT evaluation at this time. Will defer for now and follow up tomorrow.

## 2020-08-17 NOTE — PROGRESS NOTES
Transition of Care · SNF: Referral sent to [address] [place] [address] per family's request 
· BLS transport: [facility] · COVID test needed 24-48 hours prior to dc · 2nd IM needed prior to dc Reason for Readmission:   Dislocation of internal right hip prosthesis RUR Score/Risk Level:   42% PCP: First and Last name:  Jaun Arvizu Name of Practice:  
 Are you a current patient: Yes/No: Yes Approximate date of last visit: 8/10 virtual visit Can you participate in a virtual visit with your PCP: yes Is a Care Conference indicated:  
None at this time Did you attend your follow up appointment (s): If not, why not: 
Yes Resources/supports as identified by patient/family: Pt's son  Marilee Kingstonp: [phone] and daughter Ara Caraballo: [phone] Top Challenges facing patient (as identified by patient/family and CM): Finances/Medication cost?    No issues reported Transportation     No issues reported Support system or lack thereof? No issues reported Living arrangements? No issues reported Self-care/ADLs/Cognition? Current Advanced Directive/Advance Care Plan: On File Plan for utilizing home health:   Pt was receiving [facility] services through [facility]. Transition of Care Plan:    Based on readmission, the patient's previous Plan of Care 
 has been evaluated and/or modified. The current Transition of Care Plan is:    SNF has been recommended per Therapy. Pt is a 65year old male readmitted to [facility] on 8/13/2020 for dislocated internal right hip prosthesis. Pt was initially admitted to [facility] on 7/20/2020 for dislocated right total hip. During that admission Pt had revision surgery. Pt appeared confused during the initial assessment, CM contacted Pt's son Aime Hdez to provide information. Pt is reported to live  In a 2 story home with his son. Pt has access to a rolling walker, cane, shower chair, raised toilet seat in the home. Per Pt's son, Pt uses a rolling walker at baseline and has a back brace that he uses, but is unable to use the brace at this time because one of the pieces are missing. CM notified son of recommendation for SNF. Pt's son requested that CM send SNF listing to his email: [email or web address]. CM sent listing to Pt's son email. Pt's son stated that he would discuss SNF options with his sister and the patient. Care Management Interventions PCP Verified by CM: Yes Mode of Transport at Discharge: BLS Transition of Care Consult (CM Consult): SNF Confirm Follow Up Transport: Family Discharge Location Discharge Placement: Skilled nursing facility [address], BS, [facility]

## 2020-08-17 NOTE — PROGRESS NOTES
Problem: Mobility Impaired (Adult and Pediatric) Goal: *Acute Goals and Plan of Care (Insert Text) Description: FUNCTIONAL STATUS PRIOR TO ADMISSION: Patient was modified independent using a walker for functional mobility. HOME SUPPORT PRIOR TO ADMISSION: The patient lived with son. Physical Therapy Goals Initiated 8/14/2020 1. Patient will move from supine to sit and sit to supine  in bed with minimal assistance/contact guard assist within 7 days. 2. Patient will perform sit to stand with minimal assistance/contact guard assist within 7 days. 3. Patient will ambulate with minimal assistance/contact guard assist for 150 feet with the least restrictive device within 7 days. 4. Patient will ascend/descend 2 stairs with 1 handrail(s) with minimal assistance/contact guard assist within 7 days. 5. Patient will verbalize and demonstrate understanding of posterior precautions per protocol within 7 days. 6. Patient will perform all home exercise program per protocol with independence within 7 days. Outcome: Not Met PHYSICAL THERAPY TREATMENT Patient: Dia Martínez (54 y.o. male) Date: 8/17/2020 Diagnosis: Dislocation of internal right hip prosthesis ([address] Candace Reddy Procedure(s) (LRB): RIGHT HIP ARTHROPLASTY TOTAL REVISION POSTERIOR APPROACH AND ABDUCTOR RELEASE (Right) 3 Days Post-Op Precautions: WBAT, Fall, POSTERIOR hip. Chart, physical therapy assessment, plan of care and goals were reviewed. ASSESSMENT: Patient continues with skilled PT services and is progressing slowly towards goals, pt unable to sit up on his own, very confused, was able to half stand for about 30 sec, did fair with bed mob, unsafe to put in chair at this time, vc's for safety. Current Level of Function Impacting Discharge (mobility/balance): mod->max x2 Other factors to consider for discharge: confused PLAN : Patient continues to benefit from skilled intervention to address the above impairments. Continue treatment per established plan of care 
to address goals. Recommendation for discharge: (in order for the patient to meet his/her long term goals) Therapy up to 5 days/week in SNF setting This discharge recommendation: Has not yet been discussed the attending provider and/or case management IF patient discharges home will need the following DME: bedside commode, mechanical lift, rolling walker, and wheelchair OBJECTIVE DATA SUMMARY:  
 
Critical Behavior: 
Neurologic State: Alert, Eyes open spontaneously Orientation Level: Oriented to person, Oriented to time, Disoriented to situation, Disoriented to place Cognition: Decreased command following, Impulsive, Poor safety awareness, No command following, Decreased attention/concentration Safety/Judgement: Decreased insight into deficits Functional Mobility Training: 
Bed Mobility: 
Rolling: Moderate assistance;Assist x2; Additional time Supine to Sit: Moderate assistance;Assist x2; Additional time Sit to Supine: Maximum assistance;Assist x2 Scooting: Minimum assistance;Assist x2; Additional time Level of Assistance: Moderate assistance Interventions: Manual cues; Tactile cues; Verbal cues Transfers: unable/unsafe Balance: 
Sitting: Impaired; With support Sitting - Static: Poor (constant support) Sitting - Dynamic: Not tested Standing: Pull to stand; Impaired; With support Standing - Static: Constant support;Poor Standing - Dynamic : (unable) Ambulation/Gait Training: unable Pain Ratin Activity Tolerance: Poor After treatment patient left in no apparent distress: Supine in bed, Call bell within reach, and Bed / chair alarm activated COMMUNICATION/COLLABORATION:  
The patients plan of care was discussed with: Registered nurse. Rach Betancourt PTA Time Calculation: 30 mins

## 2020-08-17 NOTE — PROGRESS NOTES
Discussed patient's most recent temperature with Dr. Sonam Rangel (101.9 rectal). Tylenol rectal given. Packed with ice. Dr. Sonam Rangel wants transfusion to be held until patient's temperature is under 100.

## 2020-08-17 NOTE — PROGRESS NOTES
Code rapid called this am and pt being moved to higher level of care, will defer pm tx and follow up when pt is stable and when cleared by nursing.

## 2020-08-17 NOTE — ROUTINE PROCESS
Patient noted hallucinating, seeing things in air. Appears angry at times, unable to reorient patient. Bed alarm on and in working condition. Continue to monitor patient closely.   
 
Camron Ng RN

## 2020-08-17 NOTE — PROGRESS NOTES
Hospitalist Progress Note NAME: Shala Garcia :  1950 MRN:  [id number] Assessment / Plan: SIRS  with fevers to 101.9, 110, 36 Metabolic encephalopathy likey fever, infection Hospital delirium Confused, more today, talking but not conversant Febrile and tachycardiac(new) Yesterday concern for hepatic encephalopathy with asterixis, NH4 improved Now febrile, more confused, concern for developing sepsis Stat paired BC and lactate pCXR and UA Check head CT 
IVF Empiric vanco and cefepime once cultures sent Right hip wound still draining, d/w orthopedics to check the wound 
  
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 Treated with 6 weeks IV antibiotics Maintained on suppressive keflex since that time Daughter says concern was not all hardware was removed, chronic infection in hip Resume Po keflex at discharge Anemia acute blood loss S/p 2 units pRBCs over weekend, HgB still dropping Holding eliquis(received dose this AM, none over weekend) No reported gi bleeding per NS Transfuse Orthopedics to check the wound 
  
BREWER cirrhosis Hx Esophageal varices, s/p TIPS 
-hold lasix and spironolactone till condition more stable 
-continue PTA  lactulose and xifaxam 
  
Essential HTN 
hold lisinopril with bump in Cr, creatinine improved BP okay 
  
JACOB Does not use CPAP 
  
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 
- unable to reduce hip 
-S/P RIGHT HIP ARTHROPLASTY TOTAL REVISION POSTERIOR APPROACH AND ADDUCTOR RELEASE, SCAR REVISION 2020 
-Eliquis for DVT prophylaxis on hold 
-activity per Ortho 
  
Code status: Full Prophylaxis: continue eliquis Subjective: Chief Complaint / Reason for Physician Visit f/u sepsis Follow up of DM, cirrhosis, HTN,  
Chart reviewed in detail. Discussed with RN events overnight. More confused this Am, talking but not really conversant Febrile to 101.9, increased HR and respiratory  rate HgB dropped again despite 2 units pRBCs, still with bloody drainage from hip Review of Systems: 
Symptom Y/N Comments  Symptom Y/N Comments Fever/Chills    Chest Pain Poor Appetite    Edema Cough    Abdominal Pain Sputum    Joint Pain SOB/WANG    Pruritis/Rash Nausea/vomit    Tolerating PT/OT Diarrhea    Tolerating Diet Constipation    Other Could NOT obtain due to: AMS  
 
PO intake:  
Patient Vitals for the past 72 hrs: 
 % Diet Eaten 08/16/20 1205 75 % 08/16/20 0833 75 % Objective: VITALS:  
Last 24hrs VS reviewed since prior progress note. Most recent are: 
Patient Vitals for the past 24 hrs: 
 Temp Pulse Resp BP SpO2  
08/17/20 1108 (!) 101.9 °F (38.8 °C) (!) 111 24 133/88 97 % 08/17/20 1059 (!) 101.9 °F (38.8 °C) (!) 110 (!) 36 133/88 96 % 08/17/20 1055 100 °F (37.8 °C)      
08/17/20 1051 (!) 101 °F (38.3 °C) (!) 138  126/58 (!) 78 % 08/17/20 0750 98.3 °F (36.8 °C) 90 16 127/62 97 % 08/17/20 0306 98.3 °F (36.8 °C) 89 18 172/74 100 % 08/16/20 2356 98 °F (36.7 °C) 87 18 152/67 97 % 08/16/20 2230  85     
08/16/20 1940 98.3 °F (36.8 °C) 75 18 134/62 90 % 08/16/20 1600 98 °F (36.7 °C) 76 18 160/71 98 % 08/16/20 1309 98.3 °F (36.8 °C) 79 18 162/72 99 % 08/16/20 1227 98.2 °F (36.8 °C) 78 18 166/70 98 % Intake/Output Summary (Last 24 hours) at 8/17/2020 1141 Last data filed at 8/16/2020 2124 Gross per 24 hour Intake 870 ml Output  Net 870 ml PHYSICAL EXAM: 
General: Lethargic, talking when aroused, but confused and not conversant, no acute distress   
EENT:  Anicteric sclerae. MMM Resp:  CTA bilaterally, no wheezing or rales. No accessory muscle use CV:  Tachycardiac, regular  rhythm,  1+ edema GI:  Soft, Non distended, Non tender.  +Bowel sounds Neurologic:  Lethargic, arousable and talking a bit, confused Psych:    Not anxious nor agitated Skin:  No rashes. No jaundice Reviewed most current lab test results and cultures  YES Reviewed most current radiology test results   YES Review and summation of old records today    NO Reviewed patient's current orders and MAR    YES 
PMH/SH reviewed - no change compared to H&P 
________________________________________________________________________ Care Plan discussed with: 
  Comments Patient x Family RN x Care Manager Consultant  x Orthopedics Multidiciplinary team rounds were held today with , nursing, pharmacist and clinical coordinator. Patient's plan of care was discussed; medications were reviewed and discharge planning was addressed. ________________________________________________________________________ Comments >50% of visit spent in counseling and coordination of care x This includes time during multidisciplinary rounds if indicated above  
________________________________________________________________________ Rick Bernal MD  
 
Procedures: see electronic medical records for all procedures/Xrays and details which were not copied into this note but were reviewed prior to creation of Plan. LABS: 
I reviewed today's most current labs and imaging studies. Pertinent labs include: 
Recent Labs 08/17/20 
0130 08/16/20 
0430 08/15/20 
1738 08/15/20 
2068 WBC 9.1  --   --  7.2 HGB 6.0* 6.3* 7.3* 6.3* HCT 18.1*  --  22.2* 19.9*  
  --   --  138* Recent Labs 08/17/20 
0130 08/16/20 
0430 08/15/20 
4083 * 132* 133* K 3.7 4.2 4.5  
 104 103 CO2 27 23 24 * 248* 216* BUN 23* 27* 33* CREA 0.94 0.94 1.34* CA 7.4* 7.5* 7.1*  
MG  --  2.2  --

## 2020-08-18 ENCOUNTER — APPOINTMENT (OUTPATIENT)
Dept: ULTRASOUND IMAGING | Age: 70
DRG: 466 | End: 2020-08-18
Attending: NURSE PRACTITIONER
Payer: MEDICARE

## 2020-08-18 LAB
ALBUMIN SERPL-MCNC: 1.7 G/DL (ref 3.5–5)
ALBUMIN/GLOB SERPL: 0.5 {RATIO} (ref 1.1–2.2)
ALP SERPL-CCNC: 84 U/L (ref 45–117)
ALT SERPL-CCNC: 33 U/L (ref 12–78)
ANION GAP SERPL CALC-SCNC: 2 MMOL/L (ref 5–15)
AST SERPL-CCNC: 78 U/L (ref 15–37)
ATRIAL RATE: 114 BPM
BASOPHILS # BLD: 0.1 K/UL (ref 0–0.1)
BASOPHILS NFR BLD: 1 % (ref 0–1)
BILIRUB SERPL-MCNC: 3.2 MG/DL (ref 0.2–1)
BUN SERPL-MCNC: 22 MG/DL (ref 6–20)
BUN/CREAT SERPL: 25 (ref 12–20)
CALCIUM SERPL-MCNC: 7.2 MG/DL (ref 8.5–10.1)
CALCULATED P AXIS, ECG09: 51 DEGREES
CALCULATED R AXIS, ECG10: 11 DEGREES
CALCULATED T AXIS, ECG11: 51 DEGREES
CHLORIDE SERPL-SCNC: 109 MMOL/L (ref 97–108)
CO2 SERPL-SCNC: 26 MMOL/L (ref 21–32)
CREAT SERPL-MCNC: 0.88 MG/DL (ref 0.7–1.3)
DIAGNOSIS, 93000: NORMAL
DIFFERENTIAL METHOD BLD: ABNORMAL
EOSINOPHIL # BLD: 0 K/UL (ref 0–0.4)
EOSINOPHIL NFR BLD: 0 % (ref 0–7)
ERYTHROCYTE [DISTWIDTH] IN BLOOD BY AUTOMATED COUNT: 17.2 % (ref 11.5–14.5)
GLOBULIN SER CALC-MCNC: 3.2 G/DL (ref 2–4)
GLUCOSE BLD STRIP.AUTO-MCNC: 142 MG/DL (ref 65–100)
GLUCOSE BLD STRIP.AUTO-MCNC: 190 MG/DL (ref 65–100)
GLUCOSE BLD STRIP.AUTO-MCNC: 201 MG/DL (ref 65–100)
GLUCOSE BLD STRIP.AUTO-MCNC: 258 MG/DL (ref 65–100)
GLUCOSE SERPL-MCNC: 151 MG/DL (ref 65–100)
HCT VFR BLD AUTO: 20.1 % (ref 36.6–50.3)
HGB BLD-MCNC: 6.6 G/DL (ref 12.1–17)
IMM GRANULOCYTES # BLD AUTO: 0 K/UL (ref 0–0.04)
IMM GRANULOCYTES NFR BLD AUTO: 0 % (ref 0–0.5)
LYMPHOCYTES # BLD: 0.5 K/UL (ref 0.8–3.5)
LYMPHOCYTES NFR BLD: 5 % (ref 12–49)
MCH RBC QN AUTO: 30.1 PG (ref 26–34)
MCHC RBC AUTO-ENTMCNC: 32.8 G/DL (ref 30–36.5)
MCV RBC AUTO: 91.8 FL (ref 80–99)
METAMYELOCYTES NFR BLD MANUAL: 1 %
MONOCYTES # BLD: 0.9 K/UL (ref 0–1)
MONOCYTES NFR BLD: 10 % (ref 5–13)
NEUTS BAND NFR BLD MANUAL: 1 %
NEUTS SEG # BLD: 7.8 K/UL (ref 1.8–8)
NEUTS SEG NFR BLD: 82 % (ref 32–75)
NRBC # BLD: 0.1 K/UL (ref 0–0.01)
NRBC BLD-RTO: 1.1 PER 100 WBC
P-R INTERVAL, ECG05: 140 MS
PERIPHERAL SMEAR,PSM: NORMAL
PLATELET # BLD AUTO: 128 K/UL (ref 150–400)
PMV BLD AUTO: 10.4 FL (ref 8.9–12.9)
POTASSIUM SERPL-SCNC: 4.1 MMOL/L (ref 3.5–5.1)
PROT SERPL-MCNC: 4.9 G/DL (ref 6.4–8.2)
Q-T INTERVAL, ECG07: 318 MS
QRS DURATION, ECG06: 84 MS
QTC CALCULATION (BEZET), ECG08: 438 MS
RBC # BLD AUTO: 2.19 M/UL (ref 4.1–5.7)
RBC MORPH BLD: ABNORMAL
RBC MORPH BLD: ABNORMAL
SERVICE CMNT-IMP: ABNORMAL
SODIUM SERPL-SCNC: 137 MMOL/L (ref 136–145)
VENTRICULAR RATE, ECG03: 114 BPM
WBC # BLD AUTO: 9.4 K/UL (ref 4.1–11.1)

## 2020-08-18 PROCEDURE — 36415 COLL VENOUS BLD VENIPUNCTURE: CPT

## 2020-08-18 PROCEDURE — 74011250637 HC RX REV CODE- 250/637: Performed by: INTERNAL MEDICINE

## 2020-08-18 PROCEDURE — 74011250636 HC RX REV CODE- 250/636: Performed by: INTERNAL MEDICINE

## 2020-08-18 PROCEDURE — 82962 GLUCOSE BLOOD TEST: CPT

## 2020-08-18 PROCEDURE — 76870 US EXAM SCROTUM: CPT

## 2020-08-18 PROCEDURE — 65270000029 HC RM PRIVATE

## 2020-08-18 PROCEDURE — 94760 N-INVAS EAR/PLS OXIMETRY 1: CPT

## 2020-08-18 PROCEDURE — 77010033678 HC OXYGEN DAILY

## 2020-08-18 PROCEDURE — 80053 COMPREHEN METABOLIC PANEL: CPT

## 2020-08-18 PROCEDURE — 74011000258 HC RX REV CODE- 258: Performed by: INTERNAL MEDICINE

## 2020-08-18 PROCEDURE — 93005 ELECTROCARDIOGRAM TRACING: CPT

## 2020-08-18 PROCEDURE — 36430 TRANSFUSION BLD/BLD COMPNT: CPT

## 2020-08-18 PROCEDURE — 74011636637 HC RX REV CODE- 636/637: Performed by: INTERNAL MEDICINE

## 2020-08-18 PROCEDURE — 74011250637 HC RX REV CODE- 250/637: Performed by: PHYSICIAN ASSISTANT

## 2020-08-18 PROCEDURE — 85025 COMPLETE CBC W/AUTO DIFF WBC: CPT

## 2020-08-18 PROCEDURE — 74011636637 HC RX REV CODE- 636/637: Performed by: PHYSICIAN ASSISTANT

## 2020-08-18 PROCEDURE — P9016 RBC LEUKOCYTES REDUCED: HCPCS

## 2020-08-18 RX ORDER — SODIUM CHLORIDE 9 MG/ML
250 INJECTION, SOLUTION INTRAVENOUS AS NEEDED
Status: DISCONTINUED | OUTPATIENT
Start: 2020-08-18 | End: 2020-08-24 | Stop reason: HOSPADM

## 2020-08-18 RX ADMIN — RIFAXIMIN 550 MG: 550 TABLET ORAL at 17:10

## 2020-08-18 RX ADMIN — SODIUM CHLORIDE 150 ML/HR: 900 INJECTION, SOLUTION INTRAVENOUS at 05:30

## 2020-08-18 RX ADMIN — LACTULOSE 30 ML: 20 SOLUTION ORAL at 17:09

## 2020-08-18 RX ADMIN — OXYCODONE 5 MG: 5 TABLET ORAL at 08:35

## 2020-08-18 RX ADMIN — SPIRONOLACTONE 25 MG: 25 TABLET ORAL at 08:32

## 2020-08-18 RX ADMIN — SODIUM CHLORIDE 75 ML/HR: 900 INJECTION, SOLUTION INTRAVENOUS at 22:45

## 2020-08-18 RX ADMIN — INSULIN LISPRO 2 UNITS: 100 INJECTION, SOLUTION INTRAVENOUS; SUBCUTANEOUS at 08:30

## 2020-08-18 RX ADMIN — ACETAMINOPHEN 650 MG: 650 SUPPOSITORY RECTAL at 05:00

## 2020-08-18 RX ADMIN — Medication 10 ML: at 13:37

## 2020-08-18 RX ADMIN — FAMOTIDINE 20 MG: 20 TABLET, FILM COATED ORAL at 08:31

## 2020-08-18 RX ADMIN — RIFAXIMIN 550 MG: 550 TABLET ORAL at 08:32

## 2020-08-18 RX ADMIN — INSULIN LISPRO 7 UNITS: 100 INJECTION, SOLUTION INTRAVENOUS; SUBCUTANEOUS at 08:30

## 2020-08-18 RX ADMIN — INSULIN LISPRO 7 UNITS: 100 INJECTION, SOLUTION INTRAVENOUS; SUBCUTANEOUS at 12:42

## 2020-08-18 RX ADMIN — CEFEPIME HYDROCHLORIDE 1 G: 1 INJECTION, POWDER, FOR SOLUTION INTRAMUSCULAR; INTRAVENOUS at 05:00

## 2020-08-18 RX ADMIN — VANCOMYCIN HYDROCHLORIDE 1500 MG: 10 INJECTION, POWDER, LYOPHILIZED, FOR SOLUTION INTRAVENOUS at 02:48

## 2020-08-18 RX ADMIN — Medication 20 ML: at 05:00

## 2020-08-18 RX ADMIN — OXYCODONE 5 MG: 5 TABLET ORAL at 21:13

## 2020-08-18 RX ADMIN — CEFEPIME HYDROCHLORIDE 1 G: 1 INJECTION, POWDER, FOR SOLUTION INTRAMUSCULAR; INTRAVENOUS at 22:35

## 2020-08-18 RX ADMIN — DIPHENHYDRAMINE HYDROCHLORIDE 12.5 MG: 25 LIQUID ORAL at 10:31

## 2020-08-18 RX ADMIN — CEFEPIME HYDROCHLORIDE 1 G: 1 INJECTION, POWDER, FOR SOLUTION INTRAMUSCULAR; INTRAVENOUS at 13:37

## 2020-08-18 RX ADMIN — VANCOMYCIN HYDROCHLORIDE 1500 MG: 10 INJECTION, POWDER, LYOPHILIZED, FOR SOLUTION INTRAVENOUS at 15:25

## 2020-08-18 RX ADMIN — DOCUSATE SODIUM - SENNOSIDES 1 TABLET: 50; 8.6 TABLET, FILM COATED ORAL at 17:09

## 2020-08-18 RX ADMIN — INSULIN LISPRO 2 UNITS: 100 INJECTION, SOLUTION INTRAVENOUS; SUBCUTANEOUS at 21:20

## 2020-08-18 RX ADMIN — INSULIN LISPRO 5 UNITS: 100 INJECTION, SOLUTION INTRAVENOUS; SUBCUTANEOUS at 12:41

## 2020-08-18 RX ADMIN — INSULIN LISPRO 2 UNITS: 100 INJECTION, SOLUTION INTRAVENOUS; SUBCUTANEOUS at 17:08

## 2020-08-18 RX ADMIN — Medication 10 ML: at 21:13

## 2020-08-18 RX ADMIN — DOCUSATE SODIUM - SENNOSIDES 1 TABLET: 50; 8.6 TABLET, FILM COATED ORAL at 08:31

## 2020-08-18 RX ADMIN — POLYETHYLENE GLYCOL 3350 17 G: 17 POWDER, FOR SOLUTION ORAL at 08:32

## 2020-08-18 RX ADMIN — INSULIN LISPRO 7 UNITS: 100 INJECTION, SOLUTION INTRAVENOUS; SUBCUTANEOUS at 17:08

## 2020-08-18 RX ADMIN — FAMOTIDINE 20 MG: 20 TABLET, FILM COATED ORAL at 17:09

## 2020-08-18 RX ADMIN — GABAPENTIN 600 MG: 300 CAPSULE ORAL at 21:13

## 2020-08-18 RX ADMIN — ACETAMINOPHEN 650 MG: 650 SUPPOSITORY RECTAL at 12:42

## 2020-08-18 RX ADMIN — ATORVASTATIN CALCIUM 40 MG: 40 TABLET, FILM COATED ORAL at 08:33

## 2020-08-18 RX ADMIN — LACTULOSE 30 ML: 20 SOLUTION ORAL at 08:31

## 2020-08-18 RX ADMIN — ACETAMINOPHEN 650 MG: 650 SUPPOSITORY RECTAL at 17:07

## 2020-08-18 NOTE — PROGRESS NOTES
Attempted to see pt for OT. Currently being transferred out of CCU to ortho. Will continue to follow. 2nd attempt and pt was receiving blood at this time and asked to defer per nursing. Will continue to follow

## 2020-08-18 NOTE — PROGRESS NOTES
Attempted to see pt for PT. Currently being transferred out of CCU to ortho. Will continue to follow.

## 2020-08-18 NOTE — PROGRESS NOTES
8/18/2020 INTENSIVIST PROGRESS NOTE:  
 
Patient seen and evaluated, chart reviewed 64 yo male s/p right THR Transferred to ccu as a pcu overflow due to anemia No acute events overnight Now pt in CCU awake, alert, following commands ROS: no sob, no cp Visit Vitals /53 Pulse 85 Temp 98.1 °F (36.7 °C) Resp 24 Ht 5' 10.5\" (1.791 m) Wt 118.1 kg (260 lb 5.8 oz) SpO2 96% BMI 36.83 kg/m² General: no distress Eyes: anicteric HEENT: clear Neck: FROM 
CV: RRR Lungs: clear Abd: soft : massive scrotal hematoma Ext: no edema Skin: no rash Musculoskeletal: normal inspection Neuro: non focal 
 
CXR: none today Labs reviewed A/P: 
- blood loss anemia: transfuse today 
- right hip infection: on IV abx 
- no hypoxemia 
- lactic acid WNL - VSS stable 
- glycemic control 
- renal fx WNL - po intake as tolerated - Will assist on disposition planning, transfer pt back to nuha Adkins MD

## 2020-08-18 NOTE — PROGRESS NOTES
Attempted to see pt for 2nd time. Pt currently getting blood for HGB 6.6 and nursing request to hold. Will continue to follow.

## 2020-08-18 NOTE — PROGRESS NOTES
Hospitalist Progress Note NAME: Nilda Eagle :  1950 MRN:  [id number] Assessment / Plan: SIRS  with fevers to 101.9, 110, 36 Metabolic encephalopathy likey fever, infection Hospital delirium Confused, more today, talking but not conversant Febrile and tachycardiac(new) Yesterday concern for hepatic encephalopathy with asterixis, NH4 improved Now febrile, more confused, concern for developing sepsis Stat paired BC and lactate pCXR and UA Check head CT 
IVF Empiric vanco and cefepime once cultures sent Right hip wound still draining, d/w orthopedics to check the wound 
  
DM type 2 POA uncont with hyperglycemia Lantus 30 units QHS increase to 40 units Premeal humalog 5  (PTA 8units), increase to 7 units(hold if not eating) , 217, 148, 155 
  
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 
  
Anemia acute blood loss S/p 2 units pRBCs over weekend, HgB still dropping Holding eliquis(received dose this AM, none over weekend) No reported gi bleeding per NS Transfuse 1 unit today Orthopedics to check the wound F/u fecal occult blood stool  
  
BREWER cirrhosis Hx Esophageal varices, s/p TIPS 
-hold lasix and spironolactone till condition more stable 
-continue PTA  lactulose and xifaxam 
  
Essential HTN 
hold lisinopril with bump in Cr, creatinine improved BP okay 
  
JACOB Does not use CPAP 
  
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 
- unable to reduce hip 
-S/P RIGHT HIP ARTHROPLASTY TOTAL REVISION POSTERIOR APPROACH AND ADDUCTOR RELEASE, SCAR REVISION 2020 
-Eliquis for DVT prophylaxis on hold 
-activity per Ortho 30.0 - 39.9 Obese / Body mass index is 36.83 kg/m². Code status: Full Prophylaxis: Eliquis Recommended Disposition: SNF/LTC Subjective: Chief Complaint / Reason for Physician Visit 
\"\". Discussed with RN events overnight. Alert responsive to verbal stimuli Review of Systems: 
Symptom Y/N Comments  Symptom Y/N Comments Fever/Chills    Chest Pain Poor Appetite    Edema Cough    Abdominal Pain Sputum    Joint Pain SOB/WANG    Pruritis/Rash Nausea/vomit    Tolerating PT/OT Diarrhea    Tolerating Diet Constipation    Other Could NOT obtain due to: Change mental status Objective: VITALS:  
Last 24hrs VS reviewed since prior progress note. Most recent are: 
Patient Vitals for the past 24 hrs: 
 Temp Pulse Resp BP SpO2  
08/18/20 1210 98.2 °F (36.8 °C) 84 18 136/57 95 % 08/18/20 1111 98.4 °F (36.9 °C) 85 18 150/60 96 % 08/18/20 1046 98.8 °F (37.1 °C) 88 18 153/67 97 % 08/18/20 1033 99.8 °F (37.7 °C) 89 18 164/70 97 % 08/18/20 1025 99.5 °F (37.5 °C) 91 18 159/73 97 % 08/18/20 1013 99 °F (37.2 °C) 87 18 158/67 95 % 08/18/20 0944 98.5 °F (36.9 °C) 84 18 140/69 99 % 08/18/20 0900  83 22 147/53 99 % 08/18/20 0800 98.1 °F (36.7 °C) 85 21 (!) 136/23 97 % 08/18/20 0700  85 24 146/53 96 % 08/18/20 0600  89 24 159/55 95 % 08/18/20 0500  90 26 119/45 95 % 08/18/20 0400 98.1 °F (36.7 °C) 95 25 158/46 96 % 08/18/20 0300  94 24 142/57 98 % 08/18/20 0245 98.7 °F (37.1 °C) 95 28 158/49 97 % 08/18/20 0200 98.2 °F (36.8 °C) 95 27 140/59 97 % 08/18/20 0130 99.5 °F (37.5 °C) 97 25 152/52 97 % 08/18/20 0100 99.5 °F (37.5 °C) 99 29 127/47 100 % 08/18/20 0045 (!) 100.7 °F (38.2 °C) 98 28 164/57 99 % 08/18/20 0030  98 26 180/63 99 % 08/18/20 0015 98.9 °F (37.2 °C) 99 29 133/61 98 % 08/18/20 0000 98.9 °F (37.2 °C) (!) 101 19 (!) 162/35 100 % 08/17/20 2309 98.9 °F (37.2 °C) 94 28 161/47 100 % 08/17/20 2300  93 27 135/62 100 % 08/17/20 2230  88 20 (!) 130/99 100 % 08/17/20 2215  85 22 141/47 100 % 08/17/20 2200 98.9 °F (37.2 °C) 83 21 155/56 100 % 08/17/20 2145  83 21 152/61 100 % 08/17/20 2130  73 16 113/44 100 % 08/17/20 2115 98.9 °F (37.2 °C) 75 14 114/41 100 % 08/17/20 2100  77 17 120/48 100 % 08/17/20 2045 99.1 °F (37.3 °C) 81 19 125/52 100 % 08/17/20 2030 99.4 °F (37.4 °C) 83 20 169/54 100 % 08/17/20 2000 99.5 °F (37.5 °C) 87 18 160/58 99 % 08/17/20 1755 100.4 °F (38 °C) 87 26 107/54 97 % 08/17/20 1640 (!) 101.9 °F (38.8 °C) 90 24 110/52 97 % 08/17/20 1538 (!) 101.9 °F (38.8 °C) 92 26 115/52 99 % 08/17/20 1515 (!) 102.1 °F (38.9 °C) 95 28 115/54 99 % Intake/Output Summary (Last 24 hours) at 8/18/2020 1343 Last data filed at 8/18/2020 0900 Gross per 24 hour Intake 2388.75 ml Output 782 ml Net 1606.75 ml PHYSICAL EXAM: 
General: WD, WN. Alert, cooperative, no acute distress   
EENT:  EOMI. Anicteric sclerae. MMM Resp:  CTA bilaterally, no wheezing or rales. No accessory muscle use CV:  Regular  rhythm,  No edema GI:  Soft, Non distended, Non tender.  +Bowel sounds Neurologic:  Alert Psych:   Good insight. Not anxious nor agitated Skin:  No rashes. No jaundice Reviewed most current lab test results and cultures  YES Reviewed most current radiology test results   YES Review and summation of old records today    NO Reviewed patient's current orders and MAR    YES 
PMH/SH reviewed - no change compared to H&P 
________________________________________________________________________ Care Plan discussed with: 
  Comments Patient Family RN y   
Care Manager y Consultant  y Multidiciplinary team rounds were held today with , nursing, pharmacist and clinical coordinator. Patient's plan of care was discussed; medications were reviewed and discharge planning was addressed. ________________________________________________________________________ Total NON critical care TIME:  35    Minutes Total CRITICAL CARE TIME Spent:   Minutes non procedure based Comments >50% of visit spent in counseling and coordination of care y   
________________________________________________________________________ Medhat Gross MD  
 
Procedures: see electronic medical records for all procedures/Xrays and details which were not copied into this note but were reviewed prior to creation of Plan. LABS: 
I reviewed today's most current labs and imaging studies. Pertinent labs include: 
Recent Labs 08/18/20 
0433 08/17/20 
1133 08/17/20 
0130 WBC 9.4 11.6* 9.1 HGB 6.6* 5.8* 6.0*  
HCT 20.1* 18.4* 18.1*  
* 211 176 Recent Labs 08/18/20 
0433 08/17/20 
1524 08/17/20 
0130 08/16/20 
0430   --  135* 132* K 4.1  --  3.7 4.2 *  --  105 104 CO2 26  --  27 23 *  --  169* 248* BUN 22*  --  23* 27* CREA 0.88  --  0.94 0.94  
CA 7.2*  --  7.4* 7.5* MG  --   --   --  2.2 ALB 1.7* 1.7*  --   --   
TBILI 3.2* 2.0*  --   --   
ALT 33 33  --   --   
 
 
Signed:  Medhat Gross MD

## 2020-08-18 NOTE — PROGRESS NOTES
Bedside and Verbal shift change report given to Tequila (oncoming nurse) by Virgilio Eng (offgoing nurse). Report included the following information SBAR, Intake/Output, MAR and Recent Results.

## 2020-08-18 NOTE — CONSULTS
Requesting Provider: Dajuan Shanks MD - Reason for Consultation: \"scrotal edema, ecchymosis\" Pre-existing [facility] Patient:   No 
 
         
 
Patient: Liat Park MRN: [id number]  SSN: [id number] YOB: 1950  Age: 65 y.o. Sex: male Location: [address] [place] [address] Code Status: Prior PCP: Bibiana Carter MD  - [phone] Emergency Contact:  Primary Emergency Contact: JUSTOCASEY, Home Phone: [phone] Race/Congregation/Language: WHITE OR  / Lutheran / Speaks ENGLISH Payor: Payor: [employer] / Plan: [place] MEDICARE PART A & B / Product Type: Medicare /   
Prior Admission Data: 7/24/20 [facility] 5S ORTHO JOINT Vickie Poe Hospitalized:  Hospital Day: 6 - Admitted 8/13/2020 10:05 AM  
POD # 4 Days Post-Op Procedure(s): RIGHT HIP ARTHROPLASTY TOTAL REVISION POSTERIOR APPROACH AND ABDUCTOR RELEASE by Sb Lazo MD - Blood Loss: 400 ml 2 Hr 8 Min 0 Sec [facility] 
IP CONSULT TO ORTHOPEDIC SURGERY 
IP CONSULT TO HOSPITALIST 
IP CONSULT TO UROLOGY ADMISSION DIAGNOSES 
  ICD-10-CM ICD-9-CM 1. Closed posterior dislocation of right hip, [facility]  B00.042X 835.01  
2. Fall from ground level  W18.30XA C588.1 3. Acute right hip pain  M25.551 719.45  
4. Uncontrolled type 2 diabetes mellitus with hyperglycemia (HCC)  E11.65 250.02  
5. Anemia of chronic disease  D63.8 285.29 Assessment/Plan: · Scrotal edema · Scrotal ecchymosis 
 
-Pt underwent hip surgery 4 days ago and developed scrotal edema, ecchymosis. On Eliquis for DVT prophylaxis. 
-Ultrasound ordered. 
-Elevate w/ towel roll, okay to use ice pack in 15 min intervals 
-Hoffman to remain in place until ambulating, then void trial  
 
CC: Hip Pain HPI: He is a 65 y.o. male w/ PMHx of DMT2, HTN, BREWER cirrhosis, admitted after a ground level fall. Work up revealed right hip dislocation of revised tripolar prosthesis.  He is currently on Eliquis for DVT prophylaxis. He developed right hip infection and is now on IV abx, in the ICU. [address] growing GPR in 3/4 bottles. UA (-). Afebrile. WBC WNL  Cr WNL Urology consulted for cc of scrotal edema, ecchymosis. Exam revealed right hip ecchymosis along with buttocks, right groin, and scrotum. No crepitus or induration felt w/ scrotal exam. Mild tenderness. Hoffman in place draining clear, yellow urine. Problem: scrotal edema; Location: scrotum; Severity: mild-moderate; Timin days, Context: as above in HPI; Better/Worse: elevation, time for absorption, Associated s/s:edema, ecchymosis Temp (24hrs), Av.9 °F (37.7 °C), Min:98.1 °F (36.7 °C), Max:102.1 °F (38.9 °C) Urinary Status: Hoffman, Draining Creatinine Date/Time Value Ref Range Status 2020 04:33 AM 0.88 0.70 - 1.30 MG/DL Final  
2020 01:30 AM 0.94 0.70 - 1.30 MG/DL Final  
2020 04:30 AM 0.94 0.70 - 1.30 MG/DL Final  
08/15/2020 03:17 AM 1.34 (H) 0.70 - 1.30 MG/DL Final  
2020 10:30 AM 0.98 0.70 - 1.30 MG/DL Final  
 
Current Antimicrobial Therapy (168h ago, onward) Ordered     Start Stop  
 20 1242  Vancomycin trough  prior to the 0200 dose. thanks  Other,   ONCE    
 20 0100 20 1259  
 20 1242  vancomycin (VANCOCIN) 1500 mg in  ml infusion  1,500 mg,   IntraVENous,   EVERY 12 HOURS    
 20 0200 --  
 20 1207  cefepime (MAXIPIME) 1 g in 0.9% sodium chloride (MBP/ADV) 50 mL  1 g,   IntraVENous,   EVERY 8 HOURS    
 20 1300 --  
 20 2206  rifAXIMin (XIFAXAN) tablet 550 mg  550 mg,   Oral,   2 TIMES DAILY Note to Pharmacy:  OP SIG:Take 1 Tab by mouth two (2) times a day. 20 0900 --  
  
 
Key Anti-Platelet Anticoagulant Meds   
    
  
 apixaban (ELIQUIS) 2.5 mg tablet (Taking) Take 1 Tab by mouth every twelve (12) hours every twelve (12) hours. Indications: deep vein thrombosis prevention Diet: DIET ONE TIME MESSAGE 
 DIET DIABETIC WITH OPTIONS Consistent Carb 2200kcal; Regular - % Diet Eaten: 75 % Labs Lab Results Component Value Date/Time Lactic acid 1.8 08/17/2020 03:24 PM  
 Lactic acid 4.2 (HH) 08/17/2020 11:33 AM  
 Lactic acid 1.6 03/20/2018 10:55 PM  
 WBC 9.4 08/18/2020 04:33 AM  
 HCT 20.1 (L) 08/18/2020 04:33 AM  
 PLATELET 300 (L) 56/64/4409 04:33 AM  
 Sodium 137 08/18/2020 04:33 AM  
 Potassium 4.1 08/18/2020 04:33 AM  
 Chloride 109 (H) 08/18/2020 04:33 AM  
 CO2 26 08/18/2020 04:33 AM  
 BUN 22 (H) 08/18/2020 04:33 AM  
 Creatinine 0.88 08/18/2020 04:33 AM  
 Glucose 151 (H) 08/18/2020 04:33 AM  
 Calcium 7.2 (L) 08/18/2020 04:33 AM  
 Magnesium 2.2 08/16/2020 04:30 AM  
 INR 1.2 (H) 07/20/2020 11:41 AM  
 Prostate Specific Ag 0.3 07/22/2009 01:59 PM  
 
UA:  
Lab Results Component Value Date/Time Color YELLOW/STRAW 08/17/2020 11:33 AM  
 Appearance CLEAR 08/17/2020 11:33 AM  
 Specific gravity 1.012 08/17/2020 11:33 AM  
 Specific gravity 1.020 02/28/2018 05:01 PM  
 pH (UA) 5.0 08/17/2020 11:33 AM  
 Protein Negative 08/17/2020 11:33 AM  
 Glucose Negative 08/17/2020 11:33 AM  
 Ketone Negative 08/17/2020 11:33 AM  
 Bilirubin Negative 08/17/2020 11:33 AM  
 Urobilinogen 0.2 08/17/2020 11:33 AM  
 Nitrites Negative 08/17/2020 11:33 AM  
 Leukocyte Esterase Negative 08/17/2020 11:33 AM  
 Epithelial cells FEW 08/17/2020 11:33 AM  
 Bacteria Negative 08/17/2020 11:33 AM  
 WBC 0-4 08/17/2020 11:33 AM  
 RBC 0-5 08/17/2020 11:33 AM  
 
Imaging Results for orders placed during the hospital encounter of 08/13/20 CT HEAD WO CONT Narrative EXAM: Head CT without Contrast: 
 
TECHNIQUE: Unenhanced CT Head is performed. CT dose reduction was achieved 
through use of a standardized protocol tailored for this examination and 
automatic exposure control for dose modulation. INDICATION:  confusion FINDINGS: There is no mass, bleed, shift, hydrocephalus or extra-axial fluid collection. No acute infarct is apparent. Bone windows are unremarkable. No 
significant change since 5/25/2020. Impression IMPRESSION: No acute intracranial finding. US Results (most recent): 
Results from [place] encounter on 03/27/20 US ABD LTD Narrative EXAM:  US ABD LTD INDICATION: assess for ascites COMPARISON: None. TECHNIQUE:  
Real-time sonography of all 4 quadrants and midline. FINDINGS: 
Ultrasound was performed of all 4 quadrants and midline. There is a trace amount of ascites visualized within the ventral wall hernia. Otherwise no ascites is visualized. Impression  IMPRESSION: 
There is a trace amount of ascites visualized within the ventral wall hernia 
otherwise no ascites identified. Cultures All Micro Results Procedure Component Value Units Date/Time CULTURE, BLOOD, PAIRED [[id number]]  (Abnormal) Collected:  08/17/20 1133 Order Status:  Completed Specimen:  Blood Updated:  08/18/20 5408 Special Requests: NO SPECIAL REQUESTS Culture result:    
  GRAM NEGATIVE RODS GROWING IN 3 OF 4 BOTTLES DRAWN (SITE = L ARM AND R WRIST) REMAINING BOTTLE(S) HAS/HAVE NO GROWTH SO FAR  
     
 MICRO TRACKING [[id number]] Collected:  08/18/20 0400 Order Status:  Completed Updated:  08/18/20 3058 MICRO TRACKING [[id number]] Collected:  08/17/20 1133 Order Status:  Completed Updated:  08/18/20 0150 URINE CULTURE HOLD SAMPLE [[id number]] Collected:  08/17/20 1204 Order Status:  Completed Specimen:  Urine from Serum Updated:  08/17/20 1220 Urine culture hold Urine on hold in Microbiology dept for 2 days. If unpreserved urine is submitted, it cannot be used for addtional testing after 24 hours, recollection will be required. CULTURE, BLOOD [[id number]] Order Status:  Sent Specimen:  Blood CULTURE, URINE [[id number]] Order Status:  Sent Specimen:  Cath Urine CULTURE, BLOOD, PAIRED [[id number]] Order Status:  Canceled Specimen:  Blood Past History: (Complete 2+/3 areas) Allergies Allergen Reactions  Nsaids (Non-Steroidal Anti-Inflammatory Drug) Other (comments) Hx of PUD and Hinson's Esophagus Current Facility-Administered Medications Medication Dose Route Frequency  0.9% sodium chloride infusion 250 mL  250 mL IntraVENous PRN  
 0.9% sodium chloride infusion  75 mL/hr IntraVENous CONTINUOUS  
 cefepime (MAXIPIME) 1 g in 0.9% sodium chloride (MBP/ADV) 50 mL  1 g IntraVENous Q8H  
 vancomycin (VANCOCIN) 1500 mg in  ml infusion  1,500 mg IntraVENous Q12H  
 [START ON 8/19/2020] Vancomycin trough 8/19 prior to the 0200 dose. thanks   Other ONCE  
 acetaminophen (TYLENOL) suppository 650 mg  650 mg Rectal Q6H  
 hydrALAZINE (APRESOLINE) 20 mg/mL injection 10 mg  10 mg IntraVENous Q6H PRN  
 0.9% sodium chloride infusion 250 mL  250 mL IntraVENous PRN  
 [Held by provider] insulin glargine (LANTUS) injection 40 Units  40 Units SubCUTAneous QHS  insulin lispro (HUMALOG) injection 7 Units  7 Units SubCUTAneous TIDAC  
 0.9% sodium chloride infusion 250 mL  250 mL IntraVENous PRN  
 sodium chloride (NS) flush 5-40 mL  5-40 mL IntraVENous Q8H  
 sodium chloride (NS) flush 5-40 mL  5-40 mL IntraVENous PRN  
 oxyCODONE IR (ROXICODONE) tablet 5 mg  5 mg Oral Q3H PRN  
 naloxone (NARCAN) injection 0.4 mg  0.4 mg IntraVENous PRN  
 ondansetron (ZOFRAN ODT) tablet 4 mg  4 mg Oral Q6H PRN  
 diphenhydrAMINE (BENADRYL) 12.5 mg/5 mL oral liquid 12.5 mg  12.5 mg Oral Q6H PRN  
 senna-docusate (PERICOLACE) 8.6-50 mg per tablet 1 Tab  1 Tab Oral BID  polyethylene glycol (MIRALAX) packet 17 g  17 g Oral DAILY  bisacodyL (DULCOLAX) suppository 10 mg  10 mg Rectal DAILY PRN  
 [Held by provider] apixaban (ELIQUIS) tablet 2.5 mg  2.5 mg Oral Q12H  
 oxyCODONE IR (ROXICODONE) tablet 5-10 mg  5-10 mg Oral Q3H PRN  
  atorvastatin (LIPITOR) tablet 40 mg  40 mg Oral DAILY  famotidine (PEPCID) tablet 20 mg  20 mg Oral BID  
 gabapentin (NEURONTIN) capsule 600 mg  600 mg Oral QHS  lactulose (CHRONULAC) 10 gram/15 mL solution 30 mL  30 mL Oral BID  [Held by provider] lisinopriL (PRINIVIL, ZESTRIL) tablet 10 mg  10 mg Oral 7am  
 [Held by provider] sertraline (ZOLOFT) tablet 150 mg  150 mg Oral DAILY  spironolactone (ALDACTONE) tablet 25 mg  25 mg Oral DAILY  insulin lispro (HUMALOG) injection   SubCUTAneous AC&HS  
 glucose chewable tablet 16 g  4 Tab Oral PRN  
 dextrose (D50W) injection syrg 12.5-25 g  12.5-25 g IntraVENous PRN  
 glucagon (GLUCAGEN) injection 1 mg  1 mg IntraMUSCular PRN  
 HYDROmorphone (PF) (DILAUDID) injection 0.5-1 mg  0.5-1 mg IntraVENous Q2H PRN  
 rifAXIMin (XIFAXAN) tablet 550 mg  550 mg Oral BID Prior to Admission medications Medication Sig Start Date End Date Taking? Authorizing Provider  
insulin glargine (Lantus Solostar U-100 Insulin) 100 unit/mL (3 mL) inpn 30 Units by SubCUTAneous route two (2) times a day. Yes Provider, Historical  
cholecalciferol (Vitamin D3) 25 mcg (1,000 unit) cap Take 1,000 Units by mouth daily. Yes Provider, Historical  
HYDROcodone-acetaminophen (Norco) 5-325 mg per tablet Take 1 Tab by mouth every six (6) hours as needed for Pain. Yes Provider, Historical  
tamsulosin (FLOMAX) 0.4 mg capsule Take 0.4 mg by mouth daily. Yes Provider, Historical  
melatonin 3 mg tablet Take 3 mg by mouth nightly as needed for Sleep. 8/12/20  Yes Provider, Historical  
spironolactone (Aldactone) 25 mg tablet Take 25 mg by mouth daily. 8/12/20  Yes Provider, Historical  
magnesium oxide 400 mg magnesium tab Take 1 Tab by mouth daily. 8/12/20  Yes Provider, Historical  
lactulose (CHRONULAC) 10 gram/15 mL solution Take 30 mL by mouth two (2) times a day.    Yes Provider, Historical  
diclofenac (VOLTAREN) 1 % gel Apply 2 g to affected area four (4) times daily. Yes Provider, Historical  
oxyCODONE IR (ROXICODONE) 5 mg immediate release tablet Take 5 mg by mouth every four (4) hours as needed for Pain. Yes Provider, Historical  
furosemide (LASIX) 20 mg tablet Take 20 mg by mouth daily. Yes Provider, Historical  
Omeprazole delayed release (PRILOSEC D/R) 20 mg tablet Take 20 mg by mouth daily. Yes Provider, Historical  
apixaban (ELIQUIS) 2.5 mg tablet Take 1 Tab by mouth every twelve (12) hours every twelve (12) hours. Indications: deep vein thrombosis prevention 7/22/20  Yes AMAN Manning  
senna-docusate (PERICOLACE) 8.6-50 mg per tablet Take 1 Tab by mouth two (2) times a day. Indications: constipation 7/22/20  Yes AMAN Manning  
lisinopriL (PRINIVIL, ZESTRIL) 5 mg tablet Take 10 mg by mouth every morning. Yes Provider, Historical  
sertraline (Zoloft) 100 mg tablet Take 150 mg by mouth daily. Yes Provider, Historical  
insulin aspart U-100 (NovoLOG Flexpen U-100 Insulin) 100 unit/mL (3 mL) inpn 7 Units by SubCUTAneous route Before breakfast, lunch, and dinner for 90 days. 7 units 3 times daily 5/27/20 8/25/20 Yes Christina Kaufman MD  
rifAXIMin Alexa Gar) 550 mg tablet Take 1 Tab by mouth two (2) times a day. 4/23/20  Yes Esther Santoro MD  
potassium chloride SR (KLOR-CON 10) 10 mEq tablet Take 10 mEq by mouth two (2) times a day. 4/18/20  Yes Provider, Historical  
thiamine hcl 500 mg tablet Take 500 mg by mouth daily. 4/6/20  Yes Marline Perez NP  
folic acid (FOLVITE) 1 mg tablet Take 1 Tab by mouth daily. 3/26/20  Yes Roxi Reese NP  
gabapentin (NEURONTIN) 300 mg capsule Take 2 Caps by mouth nightly. 3/16/20  Yes Christina Kaufman MD  
butalbital-acetaminophen-caffeine (FIORICET, ESGIC) -40 mg per tablet Take 1 Tab by mouth every six (6) hours as needed for Headache.  3/6/20  Yes Christina Kaufman MD  
cephALEXin (KEFLEX) 500 mg capsule Take 1 Cap by mouth two (2) times a day. 2/24/20  Yes Debby Weiss MD  
ergocalciferol (VITAMIN D2) 50,000 unit capsule TAKE 1 CAPSULE EVERY 7 DAYS Patient taking differently: Take 50,000 Units by mouth every seven (7) days. Takes on Thursdays 1/6/20  Yes Debby Weiss MD  
atorvastatin (LIPITOR) 40 mg tablet Take 1 Tab by mouth daily. 1/4/20  Yes Jairo Valverde MD  
primidone (MYSOLINE) 250 mg tablet TAKE 1 TABLET TWICE A DAY 11/7/19  Yes Debby Weiss MD  
acetaminophen (TYLENOL) 500 mg tablet Take 2 Tabs by mouth every six (6) hours as needed for Pain. 10/7/19  Yes Haile Wagner MD  
B.infantis-B.ani-B.long-B.bifi (PROBIOTIC 4X) 10-15 mg TbEC Take 1 Tab by mouth daily. Yes Provider, Historical  
calcium citrate-vitamin D3 (CITRACAL + D) tablet Take 2 Tabs by mouth two (2) times a day. Yes Provider, Historical  
  
 
PMHx:  has a past medical history of ADD (attention deficit disorder), Adverse effect of anesthesia, Anemia due to blood loss, Hinson's esophagus with esophagitis, Benign essential tremor, Cancer ([address] (2012), Chronic pain, Cirrhosis ([address] Depression, Dislocated hip ([address] DJD (degenerative joint disease) of hip, DM (diabetes mellitus) ([address] (3/17/2010), ED (erectile dysfunction) of organic origin, Esophageal varices determined by endoscopy ([address] Fall on or from sidewalk curb (9/24/2015), Femur fracture ([address] Gastritis and duodenitis, GERD (gastroesophageal reflux disease), Hematuria (6/2016), High cholesterol (03/17/2010), HTN (hypertension) (3/17/2010), Morbid obesity ([address] Murmur, cardiac (2016), PFO (patent foramen ovale) (7/26/2017), PUD (peptic ulcer disease), Sepsis ([address] Shingles (6/2016), and Sleep apnea.   
PSurgHx:  has a past surgical history that includes endoscopy, colon, diagnostic; hx cholecystectomy (1995); hx hip replacement; hx vascular access; upper gi endoscopy,biopsy (6/18/2019); colonoscopy,diagnostic (6/18/2019); colonoscopy (N/A, 6/18/2019); ir insert tips hepatic shunt (3/28/2020); hx gi (1980); hx hernia repair (1995); hx tonsillectomy (1950); hx cataract removal (Bilateral); hx orthopaedic (Right, 2011); pr total hip arthroplasty (05/2010); pr total hip arthroplasty (08/01/2016); hx orthopaedic (2016); and hx orthopaedic (Right, 08/13/2020). PSocHx:  reports that he quit smoking about 41 years ago. He has a 30.00 pack-year smoking history. He has never used smokeless tobacco. He reports that he does not drink alcohol or use drugs. ROS:  (Complete - 10 systems) - DENIES: Weightloss (Constitutional), Dry mouth (ENMT), Chest pain (CV), SOB (Respiratory), Constipation (GI), Weakness (MS), Pallor (Skin), TIA Sx (Neuro), Confusion (Psych), Easy bruising (Heme) Physical Exam: (Comprehesive - 8+ 1995 Systems)  
 
(1) Constitutional:  FIO2:   on SpO2: O2 Sat (%): 96 % O2 Device: Room air O2 Flow Rate (L/min): 2 l/min Patient Vitals for the past 24 hrs: 
 BP Temp Pulse Resp SpO2 Weight 08/18/20 0700 146/53  85 24 96 %   
08/18/20 0600 159/55  89 24 95 %   
08/18/20 0500 119/45  90 26 95 % 118.1 kg (260 lb 5.8 oz) 08/18/20 0400 158/46 98.1 °F (36.7 °C) 95 25 96 %   
08/18/20 0300 142/57  94 24 98 %   
08/18/20 0245 158/49 98.7 °F (37.1 °C) 95 28 97 %   
08/18/20 0200 140/59 98.2 °F (36.8 °C) 95 27 97 %   
08/18/20 0130 152/52 99.5 °F (37.5 °C) 97 25 97 %   
08/18/20 0100 127/47 99.5 °F (37.5 °C) 99 29 100 %   
08/18/20 0045 164/57 (!) 100.7 °F (38.2 °C) 98 28 99 %   
08/18/20 0030 180/63  98 26 99 %   
08/18/20 0015 133/61 98.9 °F (37.2 °C) 99 29 98 %   
08/18/20 0000 (!) 162/35 98.9 °F (37.2 °C) (!) 101 19 100 %   
08/17/20 2309 161/47 98.9 °F (37.2 °C) 94 28 100 %   
08/17/20 2300 135/62  93 27 100 %   
08/17/20 2230 (!) 130/99  88 20 100 %   
08/17/20 2215 141/47  85 22 100 %   
08/17/20 2200 155/56 98.9 °F (37.2 °C) 83 21 100 %   
08/17/20 2145 152/61  83 21 100 %   
08/17/20 2130 113/44  73 16 100 %   
 08/17/20 2115 114/41 98.9 °F (37.2 °C) 75 14 100 %   
08/17/20 2100 120/48  77 17 100 %   
08/17/20 2045 125/52 99.1 °F (37.3 °C) 81 19 100 %   
08/17/20 2030 169/54 99.4 °F (37.4 °C) 83 20 100 %   
08/17/20 2000 160/58 99.5 °F (37.5 °C) 87 18 99 % 118.1 kg (260 lb 4.8 oz) 08/17/20 1755 107/54 100.4 °F (38 °C) 87 26 97 %   
08/17/20 1640 110/52 (!) 101.9 °F (38.8 °C) 90 24 97 %   
08/17/20 1538 115/52 (!) 101.9 °F (38.8 °C) 92 26 99 %   
08/17/20 1515 115/54 (!) 102.1 °F (38.9 °C) 95 28 99 %   
08/17/20 1208 123/53 99.2 °F (37.3 °C) (!) 102 24 98 %   
08/17/20 1108 133/88 (!) 101.9 °F (38.8 °C) (!) 111 24 97 %   
08/17/20 1059 133/88 (!) 101.9 °F (38.8 °C) (!) 110 (!) 36 96 %   
08/17/20 1055  100 °F (37.8 °C)      
08/17/20 1051 126/58 (!) 101 °F (38.3 °C) (!) 138  (!) 78 %  Date 08/17/20 0700 - 08/18/20 6028 08/18/20 0700 - 08/19/20 4080 Shift 2098-1480 8177-0787 24 Hour Total 7128-7710 4961-5831 24 Hour Total  
INTAKE  
I.V.(mL/kg/hr)  725(0.5) 725(0.3) 150  150 Volume (0.9% sodium chloride infusion)  75 75 150  150 Volume (cefepime (MAXIPIME) 1 g in 0.9% sodium chloride (MBP/ADV) 50 mL)  150 150 Volume (vancomycin (VANCOCIN) 1500 mg in  ml infusion)  500 500 Blood  620 620 Volume (TRANSFUSE PACKED RBC'S)  310 310 Volume (TRANSFUSE PACKED RBC'S)  310 310 Shift Total(mL/kg)  1345(11.4) 1345(11.4) 150(1.3)  150(1.3) OUTPUT Urine(mL/kg/hr)  707(0.5) 707(0.2) Urine Output (mL) (Urinary Catheter 08/17/20 Hoffman)  707 707 Shift Total(mL/kg)  707(6) 707(6) NET  638 638 150  150 Weight (kg) 114.8 118.1 118.1 118.1 118.1 118.1  
  
(2) ENMT:   moist mucous membranes, normal sinuses (3) Respiratory:  breathing easily, no distress (4) GI:  no abdominal masses, tenderness  
(5) :   Hoffman in place, urine clear/yellow, large amount of scrotal edema, ecchymosis (6) Lymphatic:  no adenopathy, neck supple (7) Muscloskeletal:  no gross deformity, normal ROM  
(8) Skin:  warm & dry, right hip ecchymosis  
(9) Neuro:  no focal deficits, normal speech Signed By: Meghna Thomas NP  - August 18, 2020 Pt seen and examined. Agree with above. Appears to be ecchymosis/hematoma Continue Ice and scrotal elevation

## 2020-08-18 NOTE — PROGRESS NOTES
0730 Reportreceived from [employer], [place]. Assumed care of patient. Patient is awake and alert, oriented x 3, unclear of day/night. R hip dressing intact, extensive bruising and swelling noted to right hip, lower abdomen, scrotum. 0800 Urology NP in to see patient. Assessed scrotum edema and bruising. Scrotal ultrasound ordered. 0590 Patient reported pain to right hip, rates 6/10. Medicated with Roxycodone 5 mg po.   
 
0850 TRANSFER - OUT REPORT: 
 
Verbal report given to [employer] Luis RN on Silvia Shepard  being transferred to [facility] for routine progression of care Report consisted of patients Situation, Background, Assessment and  
Recommendations(SBAR). Information from the following report(s) Kardex, OR Summary, Intake/Output, Recent Results and Cardiac Rhythm nsr was reviewed with the receiving nurse. Lines:  
Peripheral IV 08/17/20 Left Wrist (Active) Site Assessment Clean, dry, & intact 08/18/20 0400 Phlebitis Assessment 0 08/18/20 0400 Infiltration Assessment 0 08/18/20 0400 Dressing Status Clean, dry, & intact; Occlusive 08/18/20 0400 Dressing Type Transparent 08/18/20 0400 Hub Color/Line Status Pink; Infusing 08/18/20 0400 Peripheral IV 08/17/20 (Active) Site Assessment Clean, dry, & intact 08/18/20 0400 Phlebitis Assessment 0 08/18/20 0400 Infiltration Assessment 0 08/18/20 0400 Dressing Status Clean, dry, & intact 08/18/20 0400 Dressing Type Trach dressing 08/18/20 0400 Hub Color/Line Status Pink; Infusing 08/18/20 0400 Opportunity for questions and clarification was provided. Patient transported with: 
 Patient-specific medications from [facility]

## 2020-08-18 NOTE — PROGRESS NOTES
Hospitalist Progress Note NAME: Holli Burns :  1950 MRN:  [id number] Assessment / Plan: SIRS  with fevers to 101.9, 110, 36 Metabolic encephalopathy likey fever, infection Hospital delirium Confused, more today, talking but not conversant Febrile and tachycardiac(new) Yesterday concern for hepatic encephalopathy with asterixis, NH4 improved Now febrile, more confused, concern for developing sepsis Stat paired BC and lactate pCXR and UA Check head CT 
IVF Empiric vanco and cefepime once cultures sent Right hip wound still draining, d/w orthopedics to check the wound 
  
DM type 2 POA uncont with hyperglycemia Lantus 30 units QHS increase to 40 units Premeal humalog 5  (PTA 8units), increase to 7 units(hold if not eating) , 217, 148, 155 
  
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 
-hold lasix and spironolactone till condition more stable 
-continue PTA lactulose and xifaxam 
  
Essential HTN 
hold lisinopril with bump in Cr, creatinine improved BP okay 
  
JACOB Does not use CPAP 
  
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 
- unable to reduce hip 
-S/P RIGHT HIP ARTHROPLASTY TOTAL REVISION POSTERIOR APPROACH AND ADDUCTOR RELEASE, SCAR REVISION 2020 
-Eliquis for DVT prophylaxis on hold 
-activity per Ortho 
  
Code status: Full Prophylaxis: continue eliquis Subjective: Chief Complaint / Reason for Physician Visit f/u sepsis Follow up of DM, cirrhosis, HTN,  
Chart reviewed in detail. Discussed with RN events overnight. More confused this Am, talking but not really conversant Febrile to 101.9, increased HR and respiratory  rate HgB dropped again despite 2 units pRBCs, still with bloody drainage from hip Review of Systems: 
Symptom Y/N Comments  Symptom Y/N Comments Fever/Chills    Chest Pain Poor Appetite    Edema Cough    Abdominal Pain Sputum    Joint Pain SOB/WANG    Pruritis/Rash Nausea/vomit    Tolerating PT/OT Diarrhea    Tolerating Diet Constipation    Other Could NOT obtain due to: AMS  
 
PO intake:  
Patient Vitals for the past 72 hrs: 
 % Diet Eaten 08/16/20 1205 75 % 08/16/20 0833 75 % Objective: VITALS:  
Last 24hrs VS reviewed since prior progress note. Most recent are: 
Patient Vitals for the past 24 hrs: 
 Temp Pulse Resp BP SpO2  
08/17/20 2000 99.5 °F (37.5 °C) 87 18 160/58 99 % 08/17/20 1755 100.4 °F (38 °C) 87 26 107/54 97 % 08/17/20 1640 (!) 101.9 °F (38.8 °C) 90 24 110/52 97 % 08/17/20 1538 (!) 101.9 °F (38.8 °C) 92 26 115/52 99 % 08/17/20 1515 (!) 102.1 °F (38.9 °C) 95 28 115/54 99 % 08/17/20 1208 99.2 °F (37.3 °C) (!) 102 24 123/53 98 % 08/17/20 1108 (!) 101.9 °F (38.8 °C) (!) 111 24 133/88 97 % 08/17/20 1059 (!) 101.9 °F (38.8 °C) (!) 110 (!) 36 133/88 96 % 08/17/20 1055 100 °F (37.8 °C)      
08/17/20 1051 (!) 101 °F (38.3 °C) (!) 138  126/58 (!) 78 % 08/17/20 0750 98.3 °F (36.8 °C) 90 16 127/62 97 % 08/17/20 0306 98.3 °F (36.8 °C) 89 18 172/74 100 % 08/16/20 2356 98 °F (36.7 °C) 87 18 152/67 97 % 08/16/20 2230  216 [facility] Intake/Output Summary (Last 24 hours) at 8/17/2020 2027 Last data filed at 8/17/2020 2000 Gross per 24 hour Intake 500 ml Output 150 ml Net 350 ml PHYSICAL EXAM: 
 General: Lethargic, talking when aroused, but confused and not conversant, no acute distress   
EENT:  Anicteric sclerae. MMM Resp:  CTA bilaterally, no wheezing or rales. No accessory muscle use CV:  Tachycardiac, regular  rhythm,  1+ edema GI:  Soft, Non distended, Non tender.  +Bowel sounds Neurologic:  Lethargic, arousable and talking a bit, confused Psych:    Not anxious nor agitated Skin:  No rashes. No jaundice Reviewed most current lab test results and cultures  YES Reviewed most current radiology test results   YES Review and summation of old records today    NO Reviewed patient's current orders and MAR    YES 
PMH/ reviewed - no change compared to H&P 
________________________________________________________________________ Care Plan discussed with: 
  Comments Patient x Family RN x Care Manager Consultant  x Orthopedics Multidiciplinary team rounds were held today with , nursing, pharmacist and clinical coordinator. Patient's plan of care was discussed; medications were reviewed and discharge planning was addressed. ________________________________________________________________________ Comments >50% of visit spent in counseling and coordination of care x This includes time during multidisciplinary rounds if indicated above  
________________________________________________________________________ Kenisha Nye MD  
 
Procedures: see electronic medical records for all procedures/Xrays and details which were not copied into this note but were reviewed prior to creation of Plan. LABS: 
I reviewed today's most current labs and imaging studies. Pertinent labs include: 
Recent Labs 08/17/20 
1133 08/17/20 
0130 08/16/20 
0430 08/15/20 
1738 08/15/20 
6248 WBC 11.6* 9.1  --   --  7.2 HGB 5.8* 6.0* 6.3* 7.3* 6.3* HCT 18.4* 18.1*  --  22.2* 19.9*  
 176  --   --  138* Recent Labs 08/17/20 [phone] 08/17/20 
0130 08/16/20 
0430 08/15/20 
3136 NA  --  135* 132* 133* K  --  3.7 4.2 4.5  
CL  --  105 104 103 CO2  --  27 23 24 GLU  --  169* 248* 216* BUN  --  23* 27* 33* CREA  --  0.94 0.94 1.34* CA  --  7.4* 7.5* 7.1*  
MG  --   --  2.2  --   
ALB 1.7*  --   --   --   
TBILI 2.0*  --   --   --   
ALT 33  --   --   --

## 2020-08-18 NOTE — PROGRESS NOTES
Bedside and Verbal shift change report given to Jay Adhikari RN (oncoming nurse) by Nae Sheets (offgoing nurse). Report included the following information SBAR, Kardex, Intake/Output, MAR and Recent Results.

## 2020-08-18 NOTE — PROGRESS NOTES
Pt. Type and cross and transfused 1 Unit of RBC\"S, v/s stable, skin warm and dry no reaction to transfusion noted, offered no c/o pain.

## 2020-08-18 NOTE — PROGRESS NOTES
Orthopedic NP Progress Note Post Op day: 4 Days Post-Op August 18, 2020 11:57 AM  
 
Miguel Hinson Attending Physician: Treatment Team: Attending Provider: Rodney Copeland MD; Consulting Provider: Bhavik Ordonez; Consulting Provider: Emmett Guajardo MD; Consulting Provider: Davion Deleon MD; Utilization Review: Elisha Christianson RN; Care Manager: Moise Arreaga Consulting Provider: Bird Pena MD  
 
Vital Signs:   
Patient Vitals for the past 8 hrs: 
 BP Temp Pulse Resp SpO2 Weight 08/18/20 1111 (P) 150/60 (P) 98.4 °F (36.9 °C) (P) 85 (P) 18 (P) 96 %   
08/18/20 1046 153/67 98.8 °F (37.1 °C) 88 18 97 %   
08/18/20 1033 164/70 99.8 °F (37.7 °C) 89 18 97 %   
08/18/20 1025 159/73 99.5 °F (37.5 °C) 91 18 97 %   
08/18/20 1013 158/67 99 °F (37.2 °C) 87 18 95 %   
08/18/20 0944 140/69 98.5 °F (36.9 °C) 84 18 99 %   
08/18/20 0900 147/53  83 22 99 %   
08/18/20 0800 (!) 136/23 98.1 °F (36.7 °C) 85 21 97 %   
08/18/20 0700 146/53  85 24 96 %   
08/18/20 0600 159/55  89 24 95 %   
08/18/20 0500 119/45  90 26 95 % 118.1 kg (260 lb 5.8 oz) 08/18/20 0400 158/46 98.1 °F (36.7 °C) 95 25 96 %  BMI (calculated): 36.8 (08/18/20 0500) Intake/Output: 
08/18 0701 - 08/18 1900 In: 893.8 [P.O.:650; I.V.:243.8] Out: 75 [Urine:75] 
08/16 1901 - 08/18 0700 In: 1995 [P.O.:500; I.V.:875] Out: 707 [KHVVX:035] Pain Control:  
Pain Assessment Pain Scale 1: Numeric (0 - 10) Pain Intensity 1: 6 Pain Onset 1: surgical 
Pain Location 1: Hip Pain Orientation 1: Right, Lateral 
Pain Description 1: Sore, Throbbing Pain Intervention(s) 1: Medication (see MAR) LAB:   
Recent Labs 08/18/20 2099 HCT 20.1* HGB 6.6* Lab Results Component Value Date/Time  Sodium 137 08/18/2020 04:33 AM  
 Potassium 4.1 08/18/2020 04:33 AM  
 Chloride 109 (H) 08/18/2020 04:33 AM  
 CO2 26 08/18/2020 04:33 AM  
 Glucose 151 (H) 08/18/2020 04:33 AM  
 BUN 22 (H) 08/18/2020 04:33 AM  
 Creatinine 0.88 08/18/2020 04:33 AM  
 Calcium 7.2 (L) 08/18/2020 04:33 AM  
 
 
Subjective:  Colten Brian is a 65 y.o. male s/p a  Procedure(s): RIGHT HIP ARTHROPLASTY TOTAL REVISION POSTERIOR APPROACH AND ABDUCTOR RELEASE Procedure(s): RIGHT HIP ARTHROPLASTY TOTAL REVISION POSTERIOR APPROACH AND ABDUCTOR RELEASE. Tolerating diet. Pt seen by urology, transferred back to floor from CCU, VSS Objective: General: resting with eyes closed, cooperative, no distress. Neuro/Vascular: CNS Intact. Sensation stable. Brisk cap refill, 2+ pulses UE/LE Musculoskeletal:  FROM UE/LE, + ecchymosis to the buttocks and scrotum. Skin: Incision - clean, dry and intact. No significant erythema or swelling. Dressing: + serosanguinous drainage Hoffman - y Drain - n 
  
 
PT/OT:  
Gait:    
            
 
 
Assessment:  
 s/p Procedure(s): RIGHT HIP ARTHROPLASTY TOTAL REVISION POSTERIOR APPROACH AND ABDUCTOR RELEASE Principal Problem: 
  Dislocation of internal right hip prosthesis ([address] (8/13/2020) Plan:  
 
-  Continue PT/OT - WBAT  
-  Continue established methods of pain control 
-  VTE Prophylaxes - TEDS &/or SCDs - hold eliquis due to drainage, resume when cleared by medicine -  Medicine to managed elevated ammonia with hx of cirrhosis -  Acute blood loss anemia - Hgb 6.6 today, transfusion as per medicine -  Dressing changed today, will monitor drainage Dr. Kb Fischer aware and agree with plan. Signed By: Edyta Uribe NP Orthopedic Nurse Practitioner

## 2020-08-19 LAB
ABO + RH BLD: NORMAL
ANION GAP SERPL CALC-SCNC: 0 MMOL/L (ref 5–15)
BACTERIA SPEC CULT: NORMAL
BASOPHILS # BLD: 0 K/UL (ref 0–0.1)
BASOPHILS NFR BLD: 0 % (ref 0–1)
BLD PROD TYP BPU: NORMAL
BLOOD GROUP ANTIBODIES SERPL: NORMAL
BPU ID: NORMAL
BUN SERPL-MCNC: 23 MG/DL (ref 6–20)
BUN/CREAT SERPL: 26 (ref 12–20)
CALCIUM SERPL-MCNC: 6.7 MG/DL (ref 8.5–10.1)
CHLORIDE SERPL-SCNC: 108 MMOL/L (ref 97–108)
CO2 SERPL-SCNC: 27 MMOL/L (ref 21–32)
CREAT SERPL-MCNC: 0.89 MG/DL (ref 0.7–1.3)
CROSSMATCH RESULT,%XM: NORMAL
DATE LAST DOSE: ABNORMAL
DIFFERENTIAL METHOD BLD: ABNORMAL
EOSINOPHIL # BLD: 0.2 K/UL (ref 0–0.4)
EOSINOPHIL NFR BLD: 2 % (ref 0–7)
ERYTHROCYTE [DISTWIDTH] IN BLOOD BY AUTOMATED COUNT: 17.4 % (ref 11.5–14.5)
GLUCOSE BLD STRIP.AUTO-MCNC: 138 MG/DL (ref 65–100)
GLUCOSE BLD STRIP.AUTO-MCNC: 189 MG/DL (ref 65–100)
GLUCOSE BLD STRIP.AUTO-MCNC: 217 MG/DL (ref 65–100)
GLUCOSE BLD STRIP.AUTO-MCNC: 311 MG/DL (ref 65–100)
GLUCOSE SERPL-MCNC: 155 MG/DL (ref 65–100)
HCT VFR BLD AUTO: 21.7 % (ref 36.6–50.3)
HGB BLD-MCNC: 7.5 G/DL (ref 12.1–17)
IMM GRANULOCYTES # BLD AUTO: 0 K/UL (ref 0–0.04)
IMM GRANULOCYTES NFR BLD AUTO: 0 % (ref 0–0.5)
LYMPHOCYTES # BLD: 0.5 K/UL (ref 0.8–3.5)
LYMPHOCYTES NFR BLD: 6 % (ref 12–49)
MCH RBC QN AUTO: 31.1 PG (ref 26–34)
MCHC RBC AUTO-ENTMCNC: 34.6 G/DL (ref 30–36.5)
MCV RBC AUTO: 90 FL (ref 80–99)
METAMYELOCYTES NFR BLD MANUAL: 2 %
MONOCYTES # BLD: 0.4 K/UL (ref 0–1)
MONOCYTES NFR BLD: 5 % (ref 5–13)
NEUTS BAND NFR BLD MANUAL: 3 %
NEUTS SEG # BLD: 6.8 K/UL (ref 1.8–8)
NEUTS SEG NFR BLD: 82 % (ref 32–75)
NRBC # BLD: 0.07 K/UL (ref 0–0.01)
NRBC BLD-RTO: 0.9 PER 100 WBC
PLATELET # BLD AUTO: 138 K/UL (ref 150–400)
PMV BLD AUTO: 11.1 FL (ref 8.9–12.9)
POTASSIUM SERPL-SCNC: 3.7 MMOL/L (ref 3.5–5.1)
RBC # BLD AUTO: 2.41 M/UL (ref 4.1–5.7)
RBC MORPH BLD: ABNORMAL
REPORTED DOSE,DOSE: ABNORMAL UNITS
REPORTED DOSE/TIME,TMG: 1400
SERVICE CMNT-IMP: ABNORMAL
SERVICE CMNT-IMP: NORMAL
SODIUM SERPL-SCNC: 135 MMOL/L (ref 136–145)
SPECIMEN EXP DATE BLD: NORMAL
STATUS OF UNIT,%ST: NORMAL
UNIT DIVISION, %UDIV: 0
VANCOMYCIN TROUGH SERPL-MCNC: 18 UG/ML (ref 5–10)
WBC # BLD AUTO: 8 K/UL (ref 4.1–11.1)

## 2020-08-19 PROCEDURE — 74011250637 HC RX REV CODE- 250/637: Performed by: INTERNAL MEDICINE

## 2020-08-19 PROCEDURE — 74011250636 HC RX REV CODE- 250/636: Performed by: INTERNAL MEDICINE

## 2020-08-19 PROCEDURE — 85025 COMPLETE CBC W/AUTO DIFF WBC: CPT

## 2020-08-19 PROCEDURE — 77010033678 HC OXYGEN DAILY

## 2020-08-19 PROCEDURE — 97535 SELF CARE MNGMENT TRAINING: CPT

## 2020-08-19 PROCEDURE — 65270000029 HC RM PRIVATE

## 2020-08-19 PROCEDURE — 80048 BASIC METABOLIC PNL TOTAL CA: CPT

## 2020-08-19 PROCEDURE — 82962 GLUCOSE BLOOD TEST: CPT

## 2020-08-19 PROCEDURE — 97165 OT EVAL LOW COMPLEX 30 MIN: CPT

## 2020-08-19 PROCEDURE — 80202 ASSAY OF VANCOMYCIN: CPT

## 2020-08-19 PROCEDURE — 97530 THERAPEUTIC ACTIVITIES: CPT

## 2020-08-19 PROCEDURE — 94760 N-INVAS EAR/PLS OXIMETRY 1: CPT

## 2020-08-19 PROCEDURE — 74011000258 HC RX REV CODE- 258: Performed by: INTERNAL MEDICINE

## 2020-08-19 PROCEDURE — 36415 COLL VENOUS BLD VENIPUNCTURE: CPT

## 2020-08-19 PROCEDURE — 74011636637 HC RX REV CODE- 636/637: Performed by: INTERNAL MEDICINE

## 2020-08-19 RX ADMIN — CEFEPIME HYDROCHLORIDE 1 G: 1 INJECTION, POWDER, FOR SOLUTION INTRAMUSCULAR; INTRAVENOUS at 22:33

## 2020-08-19 RX ADMIN — INSULIN LISPRO 7 UNITS: 100 INJECTION, SOLUTION INTRAVENOUS; SUBCUTANEOUS at 18:57

## 2020-08-19 RX ADMIN — VANCOMYCIN HYDROCHLORIDE 1500 MG: 10 INJECTION, POWDER, LYOPHILIZED, FOR SOLUTION INTRAVENOUS at 04:21

## 2020-08-19 RX ADMIN — INSULIN LISPRO 2 UNITS: 100 INJECTION, SOLUTION INTRAVENOUS; SUBCUTANEOUS at 18:57

## 2020-08-19 RX ADMIN — FAMOTIDINE 20 MG: 20 TABLET, FILM COATED ORAL at 09:52

## 2020-08-19 RX ADMIN — SODIUM CHLORIDE 75 ML/HR: 900 INJECTION, SOLUTION INTRAVENOUS at 21:15

## 2020-08-19 RX ADMIN — INSULIN LISPRO 7 UNITS: 100 INJECTION, SOLUTION INTRAVENOUS; SUBCUTANEOUS at 12:22

## 2020-08-19 RX ADMIN — RIFAXIMIN 550 MG: 550 TABLET ORAL at 18:19

## 2020-08-19 RX ADMIN — DOCUSATE SODIUM - SENNOSIDES 1 TABLET: 50; 8.6 TABLET, FILM COATED ORAL at 18:19

## 2020-08-19 RX ADMIN — Medication 10 ML: at 22:29

## 2020-08-19 RX ADMIN — RIFAXIMIN 550 MG: 550 TABLET ORAL at 09:52

## 2020-08-19 RX ADMIN — SPIRONOLACTONE 25 MG: 25 TABLET ORAL at 09:52

## 2020-08-19 RX ADMIN — ATORVASTATIN CALCIUM 40 MG: 40 TABLET, FILM COATED ORAL at 09:52

## 2020-08-19 RX ADMIN — INSULIN LISPRO 2 UNITS: 100 INJECTION, SOLUTION INTRAVENOUS; SUBCUTANEOUS at 22:28

## 2020-08-19 RX ADMIN — Medication 10 ML: at 13:53

## 2020-08-19 RX ADMIN — Medication 10 ML: at 06:12

## 2020-08-19 RX ADMIN — OXYCODONE 10 MG: 5 TABLET ORAL at 20:50

## 2020-08-19 RX ADMIN — ACETAMINOPHEN 650 MG: 650 SUPPOSITORY RECTAL at 00:45

## 2020-08-19 RX ADMIN — DOCUSATE SODIUM - SENNOSIDES 1 TABLET: 50; 8.6 TABLET, FILM COATED ORAL at 09:53

## 2020-08-19 RX ADMIN — INSULIN LISPRO 7 UNITS: 100 INJECTION, SOLUTION INTRAVENOUS; SUBCUTANEOUS at 12:23

## 2020-08-19 RX ADMIN — GABAPENTIN 600 MG: 300 CAPSULE ORAL at 22:28

## 2020-08-19 RX ADMIN — CEFEPIME HYDROCHLORIDE 1 G: 1 INJECTION, POWDER, FOR SOLUTION INTRAMUSCULAR; INTRAVENOUS at 06:28

## 2020-08-19 RX ADMIN — INSULIN LISPRO 7 UNITS: 100 INJECTION, SOLUTION INTRAVENOUS; SUBCUTANEOUS at 09:53

## 2020-08-19 RX ADMIN — VANCOMYCIN HYDROCHLORIDE 1500 MG: 10 INJECTION, POWDER, LYOPHILIZED, FOR SOLUTION INTRAVENOUS at 15:19

## 2020-08-19 RX ADMIN — LACTULOSE 30 ML: 20 SOLUTION ORAL at 09:52

## 2020-08-19 RX ADMIN — OXYCODONE 10 MG: 5 TABLET ORAL at 16:08

## 2020-08-19 RX ADMIN — LACTULOSE 30 ML: 20 SOLUTION ORAL at 18:19

## 2020-08-19 RX ADMIN — OXYCODONE 5 MG: 5 TABLET ORAL at 12:24

## 2020-08-19 RX ADMIN — ACETAMINOPHEN 650 MG: 650 SUPPOSITORY RECTAL at 18:20

## 2020-08-19 RX ADMIN — ACETAMINOPHEN 650 MG: 650 SUPPOSITORY RECTAL at 12:23

## 2020-08-19 RX ADMIN — CEFEPIME HYDROCHLORIDE 1 G: 1 INJECTION, POWDER, FOR SOLUTION INTRAMUSCULAR; INTRAVENOUS at 13:52

## 2020-08-19 RX ADMIN — FAMOTIDINE 20 MG: 20 TABLET, FILM COATED ORAL at 18:19

## 2020-08-19 RX ADMIN — ACETAMINOPHEN 650 MG: 650 SUPPOSITORY RECTAL at 06:12

## 2020-08-19 RX ADMIN — POLYETHYLENE GLYCOL 3350 17 G: 17 POWDER, FOR SOLUTION ORAL at 09:52

## 2020-08-19 NOTE — PROGRESS NOTES
Spiritual Care Assessment/Progress Note [address] 
 
 
NAME: Shala Garcia      MRN: [id number] AGE: 65 y.o. SEX: male Adventism Affiliation: [facility]  
Language: [place] 8/19/2020     Total Time (in minutes): 25 Spiritual Assessment begun in [facility] ORTHOPEDICS through conversation with: 
  
    [x]Patient        [] Family    [] Friend(s) Reason for Consult: Initial/Spiritual assessment, patient floor Spiritual beliefs: (Please include comment if needed) [x] Identifies with a wil tradition:   Sabianism  
   [x] Supported by a wil community:        
   [] Claims no spiritual orientation:       
   [] Seeking spiritual identity:            
   [] Adheres to an individual form of spirituality:       
   [] Not able to assess:                   
 
    
Identified resources for coping:  
   [x] Prayer                           
   [] Music                  [] Guided Imagery [x] Family/friends                 [] Pet visits [x] Devotional reading                         [] Unknown 
   [] Other:                                       
 
 
Interventions offered during this visit: (See comments for more details) Patient Interventions: Affirmation of emotions/emotional suffering, Affirmation of wil, Catharsis/review of pertinent events in supportive environment, Iconic (affirming the presence of God/Higher Power), Initial/Spiritual assessment, patient floor, Normalization of emotional/spiritual concerns, Prayer (assurance of), Adventism beliefs/image of God discussed, Spiritual materials provided (comment)(Our Daily Bread given to patient) Plan of Care: 
 
 [x] Support spiritual and/or cultural needs  
 [] Support AMD and/or advance care planning process    
 [] Support grieving process 
 [] Coordinate Rites and/or Rituals  
 [] Coordination with community clergy [] No spiritual needs identified at this time [] Detailed Plan of Care below (See Comments)  [] Make referral to Music Therapy 
[] Make referral to Pet Therapy    
[] Make referral to Addiction services 
[] Make referral to [facility] 
[] Make referral to Spiritual Care Partner 
[] No future visits requested       
[x] Follow up visits as needed Comments:  Initial visit on Ortho unit for spiritual assessment. No family/friends present. Patient was propped up in bed eating a late lunch. No family/friends present. Provided bedside presence and reflective listening as patient shared about events leading to his hospitalization and what has transpired since that time. He appears to be in good spirits today. Mr. Aniyah Mejía identifies as Patti Barros and has attended the same [employer] for many years (he did not indicate the name of the [employer]). He inquired about non-Gnosticism Alevism services in the [address] [place] [address], but none are offered. Offered spiritual materials we are able to provide-bibles and daily devotionals. He chose to receive a copy of \"Our Daily Bread\" devotional.  Franchester Bence to pastoral care office and returned with devotional.  Patient shared his hope is to be able to go to his daughter's house for Thanksgiving. Assured him that would be my prayer for him. Advised patient of  availability. RASMES Simental, [facility], Staff  [facility]  Paging Service  [phone])

## 2020-08-19 NOTE — PROGRESS NOTES
ORTHO - Progress Note Post Op day: 5 Days Post-Op Michael Multani     [id number]  male    65 y.o.    1950 Admit date:2020 Date of Surgery:2020 Procedures:Procedure(s):RIGHT HIP ARTHROPLASTY TOTAL REVISION POSTERIOR APPROACH AND ABDUCTOR RELEASE Surgeon:Surgeon(s) and Role:   Nata Friend MD - Primary SUBJECTIVE: 
  
Michael Multani is a 65 y.o. male resting in the bed  Patient is sleepy but awakening and is smiling and appropriate. Denies F/C, nausea, vomiting, dizziness, lightheadedness, chest pain, or shortness of breath. OBJECTIVE: 
 
  
Physical Exam: 
General: alert, cooperative, no distress. Gastrointestinal:  non-distended . Cardiovascular: equal pulses in the lower extremities,  Brisk cap refill in all distal extremities Genitourinary: Hoffman Respiratory: No respiratory distress Neurological:Neurovascular exam within normal limits. Senstion intact: LE bilat. Motor: + DF/PF/EHL. Musculoskeletal: Allison's sign negative in bilateral lower extremities. Calves soft, supple, non-tender upon palpation or with passive stretch.  + Right hip ecchy Dressing/Wound:  Clean, dry and intact. No significant erythema or swelling. Vital Signs:  
  
  
Patient Vitals for the past 8 hrs: 
 BP Temp Pulse Resp SpO2  
20 1156 129/61 98.5 °F (36.9 °C) 67 18 98 % 20 0755 111/44 99.1 °F (37.3 °C) 74 18 99 % Temp (24hrs), Av °F (37.2 °C), Min:98.5 °F (36.9 °C), Max:99.2 °F (37.3 °C) Date 20 0700 - 20 0820 Shift [id number] 1825-5314 6225-9075 24 Hour Total  
INTAKE Shift Total(mL/kg) OUTPUT Urine(mL/kg/hr) 200   200 Shift Total(mL/kg) 200(1.7)   200(1.7) Weight (kg) 118.1 118.1 118.1 118.1 Labs:  
  
 
Recent Labs 20 
0129 HCT 21.7* HGB 7.5*  
 
PT/OT:  
 
  
  
  
ASSESSMENT / PLAN:  
Principal Problem: 
  Dislocation of internal right hip prosthesis ([facility] [address] (2020) -  Continue PT/OT - WBAT  
-  continue Ice 
-  Continue established methods of pain control -  VTE Prophylaxes - TEDS &/or SCDs - hold eliquis due to drainage, resume when cleared by medicine -  Medicine to managed elevated ammonia with hx of cirrhosis -  Urology managing scrotal swelling -  Acute blood loss anemia - Hgb 7.5  Today--- transfusion yesterday 
  
Dr. Candie Judd aware and agree with plan.   
 
Signed By: Joe Murphy PA-C

## 2020-08-19 NOTE — PROGRESS NOTES
Bedside and Verbal shift change report given to Megha Flores RN (oncoming nurse) by Paulla Romberg (offgoing nurse). Report included the following information SBAR, Kardex, Intake/Output, MAR and Recent Results.

## 2020-08-19 NOTE — PROGRESS NOTES
RAPID RESPONSE TEAM- Follow Up Rounded on patient due to recent rapid response. Discussed with primary RNTequila. No acute concerns, VSS, MEWS 1. Patient is resting at this time, NAD. Patient Vitals for the past 12 hrs: 
 Temp Pulse Resp BP SpO2  
08/18/20 2005 99.2 °F (37.3 °C) 79 18 147/67 98 % 08/18/20 1946 98.8 °F (37.1 °C) 78 18 157/67 100 % 08/18/20 1509 98.8 °F (37.1 °C) 78 16 139/59 95 % 08/18/20 1338 98.2 °F (36.8 °C) 83 18 144/63 96 % 08/18/20 1210 98.2 °F (36.8 °C) 84 18 136/57 95 % 08/18/20 1111 98.4 °F (36.9 °C) 85 18 150/60 96 % 08/18/20 1046 98.8 °F (37.1 °C) 88 18 153/67 97 % 08/18/20 1033 99.8 °F (37.7 °C) 89 18 164/70 97 % 08/18/20 1025 99.5 °F (37.5 °C) 91 18 159/73 97 % 08/18/20 1013 99 °F (37.2 °C) 87 18 158/67 95 % 08/18/20 0944 98.5 °F (36.9 °C) 84 18 140/69 99 % 08/18/20 0900  83 22 147/53 99 % No RRT interventions indicated at this time. Please call with any questions or concerns. Maxime Zhao Rapid Response MARK Paulino

## 2020-08-19 NOTE — PROGRESS NOTES
Bedside and Verbal shift change report given to MARK Zapata (oncoming nurse) by Leyla Brown (offgoing nurse). Report included the following information SBAR, Kardex, ED Summary, OR Summary, Procedure Summary, Intake/Output, MAR, Recent Results and Med Rec Status.

## 2020-08-19 NOTE — PROGRESS NOTES
Problem: Self Care Deficits Care Plan (Adult) Goal: *Acute Goals and Plan of Care (Insert Text) Description: FUNCTIONAL STATUS PRIOR TO ADMISSION: Pt reports residing with son in 2 story home; however, resides on first floor. Pt reports Mod [place] with self-care routine at RW. Has walk-in shower with seated surface. HOME SUPPORT: The patient lives with son, who is currently working and able to provide PRN ADL/IADL assistance. Occupational Therapy Goals Initiated 8/19/2020 1. Patient will perform seated upper body dressing with supervision/set-up within 7 day(s). 2.  Patient will perform bathing, EOB/RW, with moderate assistance  within 7 day(s). 3.  Patient will perform lower body dressing, EOB/RW, with moderate assistance  within 7 day(s). 4.  Patient will perform toilet transfers to [facility] with moderate assistance  within 7 day(s). 5.  Patient will perform all aspects of toileting with moderate assistance  within 7 day(s). 6.  Patient will utilize energy conservation techniques during functional activities with Min verbal cues within 7 day(s). Outcome: Progressing Towards Goal 
 
OCCUPATIONAL THERAPY EVALUATION Patient: Jameson Ramos (73 y.o. male) Date: 8/19/2020 Primary Diagnosis: Dislocation of internal right hip prosthesis ([facility] [place] [address] [place]na Hill Procedure(s) (LRB): 
RIGHT HIP ARTHROPLASTY TOTAL REVISION POSTERIOR APPROACH AND ABDUCTOR RELEASE (Right) 5 Days Post-Op Precautions:   WBAT, Fall(Posterior hip) ASSESSMENT Based on the objective data described below, the patient presents with decreased functional mobility/balance, decreased strength/endurance, decreased activity tolerance, RLE pain, scrotal edema, R foot drop, decreased full body reaching and fluctuating mentation, all of which limit pt's ability to complete self-care routine at level congruent with PLOF.   Currently, pt benefits from [place] with self-feeding, S/U for inclined grooming, Min A for seated UB dressing, Max A for bathing and total A for LE dressing and toileting. Pt reports living with son who is able to provide PRN assist; however, son works and pt spends time alone in home. Pt required Total Ax2 for all bed mobility; however, was able to stand from slightly elevated EOB with Min Ax2. Pt attempted L lateral stepping at RW; however, fatigued quickly and required total A to return to inclined in bed. Pt benefits from skilled OT to address functional deficits in an overall attempt at maximizing pt's highest level of safe functional independence, with reporting therapist believing pt would benefit from inpatient vs SNF rehab (dependent on progression with therapy) upon discharge from acute hospitalization. Current Level of Function Impacting Discharge (ADLs/self-care): [place] with self-feeding, S/U for inclined grooming, Min A for seated UB dressing, Max A for bathing and total A for LE dressing and toileting. Functional Outcome Measure: The patient scored 20/100 on the Barthel Index outcome measure. Other factors to consider for discharge: below PLOF Patient will benefit from skilled therapy intervention to address the above noted impairments. PLAN : 
Recommendations and Planned Interventions: self care training, functional mobility training, therapeutic exercise, balance training, therapeutic activities, endurance activities, and patient education Frequency/Duration: Patient will be followed by occupational therapy 5 times a week to address goals. Recommendation for discharge: (in order for the patient to meet his/her long term goals) To be determined: Inpatient verses SNF dependent on progress This discharge recommendation: 
Has not yet been discussed the attending provider and/or case management IF patient discharges home will need the following DME: TBD SUBJECTIVE:  
Patient stated I feel tired.  OBJECTIVE DATA SUMMARY:  
HISTORY:  
Past Medical History:  
Diagnosis Date  
 ADD (attention deficit disorder) Adverse effect of anesthesia   
 motion sickness resulting in loss of balance Anemia due to blood loss Hinson's esophagus with esophagitis Benign essential tremor Cancer [facility]) 2012 [facility] Chronic pain Cirrhosis ([address] Depression Dislocated hip (HCC)   
 DJD (degenerative joint disease) of hip   
 bilat DM (diabetes mellitus) ([address] 3/17/2010 ED (erectile dysfunction) of organic origin Esophageal varices determined by endoscopy ([address] Fall on or from sidewalk curb 9/24/2015 Femur fracture ([address] Gastritis and duodenitis GERD (gastroesophageal reflux disease)   
 resolved after discontinuing diclofenac Hematuria 6/2016 High cholesterol 03/17/2010  
 pt denies 6/19 HTN (hypertension) 3/17/2010 Morbid obesity ([address] Murmur, cardiac 2016 PFO (patent foramen ovale) 7/26/2017 PUD (peptic ulcer disease) Sepsis ([address] Shingles 6/2016 RESOLVING- NO RASH (HEAD) Sleep apnea   
 doesnt use CPAP anymore Past Surgical History:  
Procedure Laterality Date COLONOSCOPY N/A 6/18/2019 COLONOSCOPY AND EGD performed by Carina Neri MD at [facility]  6/18/2019 ENDOSCOPY, COLON, DIAGNOSTIC    
 HX CATARACT REMOVAL Bilateral   
 HX CHOLECYSTECTOMY  1995 HX GI  1980  
 gastroplasty [address] 25 HX HIP REPLACEMENT Left HX ORTHOPAEDIC Right 2011  
 rot cuff repair HX ORTHOPAEDIC  2016  
 left broken femur HX ORTHOPAEDIC Right 08/13/2020 Attempt closed reduction of hip dislocation [address] HX VASCULAR ACCESS    
 picc line and removed after sepsis resolved IR INSERT TIPS HEPATIC SHUNT  3/28/2020 TOTAL HIP ARTHROPLASTY  05/2010  
 right TOTAL HIP ARTHROPLASTY  08/01/2016  
 left UPPER GI ENDOSCOPY,BIOPSY  6/18/2019 Expanded or extensive additional review of patient history:  
 
Home Situation Home Environment: Private residence # Steps to Enter: 2 Rails to Enter: Yes Hand Rails : Right One/Two Story Residence: Two story, live on 1st floor Living Alone: No 
Support Systems: Child(dariusz) Patient Expects to be Discharged to[de-identified] (rehab) Current DME Used/Available at Home: Walker, rolling Tub or Shower Type: Shower(shower chair present) Hand dominance: Right EXAMINATION OF PERFORMANCE DEFICITS: 
Cognitive/Behavioral Status: 
Neurologic State: Alert;Drowsy Orientation Level: Oriented X4 Cognition: Follows commands Perception: Cues to maintain midline in sitting(mild L lateral lean noted at EOB) Perseveration: No perseveration noted Safety/Judgement: Decreased insight into deficits Hearing: Auditory Auditory Impairment: None Hearing Aids/Status: Does not own Vision/Perceptual:   
Corrective Lenses: Glasses Range of Motion: 
AROM: Generally decreased, functional 
PROM: Generally decreased, functional 
 
Strength: 
Strength: Generally decreased, functional 
 
Coordination: 
Coordination: Generally decreased, functional 
Fine Motor Skills-Upper: Left Intact; Right Intact Gross Motor Skills-Upper: Left Intact; Right Intact Balance: 
Sitting: Impaired; With support Sitting - Static: Fair (occasional) Sitting - Dynamic: Fair (occasional); Poor (constant support) Standing: Impaired; With support Standing - Static: Fair Standing - Dynamic : Fair;Poor Functional Mobility and Transfers for ADLs: 
Bed Mobility: 
Supine to Sit: Total assistance;Assist x2 Sit to Supine: Total assistance;Assist x2 Scooting: Total assistance;Assist x2 Transfers: 
Sit to Stand: Minimum assistance;Assist x2(head of bed slightly elevated) Stand to Sit: Minimum assistance ADL Assessment: 
Patient recalled and demonstrated avoiding extreme planes of movement with Right LE during ADLs and functional mobility, as well as, 3/3 posterior hip precautions with Moderate verbal cues and extended time. Feeding: Independent Oral Facial Hygiene/Grooming: Setup Bathing: Maximum assistance Upper Body Dressing: Minimum assistance Lower Body Dressing: Total assistance Toileting: Total assistance ADL Intervention and task modifications: 
Cognitive Retraining Safety/Judgement: Decreased insight into deficits Bathing: Patient instructed when bathing to not submerge wound in water, stand to shower or sponge bathe, cover wound with plastic and tape to ensure no water reaches bandage/wound without cues. Patient indicated understanding. Dressing joint: Patient instructed and demonstrated to don/doff Right LE first/last Min verbal cues. Patient instructed and demonstrated to don all clothing while sitting prior to standing, doff all clothing to knees while standing, then sit to doff clothing off from knees to feet in order to facilitate fall prevention, pain management, and energy conservation with Total assistance. Home safety: Patient instructed on home modifications and safety (raise height of ADL objects, appropriate height of chair surfaces, recliner safety, change of floor surfaces, clear pathways) to increase independence and fall prevention. Patient indicated understanding. Standing: Patient instructed and demonstrated to walk up to sink/counter top/surfaces, step into walker to increase safety of joint and fall prevention with Contact guard assistance and Assist x2. Instructed to apply concept of hip contraindications to ADLs within the home (no extreme movements, square off while using objects, slide objects along surfaces). Patient instructed to increase amount of time standing, observe standing position during ADLs in order to increase even weight bearing through bilateral LEs in order to increase independence with ADLs.   Goal to be reached 30 days post - op, per orthopedic surgeon or per PT. Patient indicated understanding. Functional Measure: 
Barthel Index: 
 
Bathin Bladder: 0 Bowels: 5 Groomin Dressin Feeding: 10 Mobility: 0 Stairs: 0 Toilet Use: 0 Transfer (Bed to Chair and Back): 0 Total: 20/100 The Barthel ADL Index: Guidelines 1. The index should be used as a record of what a patient does, not as a record of what a patient could do. 2. The main aim is to establish degree of independence from any help, physical or verbal, however minor and for whatever reason. 3. The need for supervision renders the patient not independent. 4. A patient's performance should be established using the best available evidence. Asking the patient, friends/relatives and nurses are the usual sources, but direct observation and common sense are also important. However direct testing is not needed. 5. Usually the patient's performance over the preceding 24-48 hours is important, but occasionally longer periods will be relevant. 6. Middle categories imply that the patient supplies over 50 per cent of the effort. 7. Use of aids to be independent is allowed. Viviane Williamson., Barthel, D.W. ([id number]). Functional evaluation: the Barthel Index. [address] [facility] (14)2. Vern Boone gray MAX Powers, Alvino Cardenas., Isabella Bales., [place], [address] [place] [address] (). Measuring the change indisability after inpatient rehabilitation; comparison of the responsiveness of the Barthel Index and Functional [place] Measure. Journal of Neurology, Neurosurgery, and Psychiatry, 66(4), 125-427. Greg Aguillon, N.J.A, SHILPI Payan, & Eduardo Peralta MMarkA. (2004.) Assessment of post-stroke quality of life in cost-effectiveness studies: The usefulness of the Barthel Index and the EuroQoL-5D. [facility], 13, 185-57 Occupational Therapy Evaluation Charge Determination History Examination Decision-Making LOW Complexity : Brief history review  HIGH Complexity : 5 or more performance deficits relating to physical, cognitive , or psychosocial skils that result in activity limitations and / or participation restrictions LOW Complexity : No comorbidities that affect functional and no verbal or physical assistance needed to complete eval tasks Based on the above components, the patient evaluation is determined to be of the following complexity level: LOW Pain Ratin/10; nursing aware and following Activity Tolerance:  
Fair and requires frequent rest breaks Please refer to the flowsheet for vital signs taken during this treatment. After treatment patient left in no apparent distress:   
Supine in bed, Call bell within reach, and Side rails x 3 
 
COMMUNICATION/EDUCATION:  
The patients plan of care was discussed with: Physical therapist and Registered nurse. Home safety education was provided and the patient/caregiver indicated understanding., Patient/family have participated as able in goal setting and plan of care. , and Patient/family agree to work toward stated goals and plan of care. This patients plan of care is appropriate for delegation to [facility]. Thank you for this referral. 
Saritha Nguyen OT Time Calculation: 32 mins

## 2020-08-19 NOTE — PROGRESS NOTES
Occupational Therapy Orders acknowledged, chart reviewed in prep for skilled OT evaluation; however, pt declined early morning participation 2/2 fatigue and requested for OT to return later in day. Will defer and follow up later as able and appropriate.  
 
Thank you, 
Audrey Crum, OT

## 2020-08-19 NOTE — PROGRESS NOTES
Problem: Mobility Impaired (Adult and Pediatric) Goal: *Acute Goals and Plan of Care (Insert Text) Description: FUNCTIONAL STATUS PRIOR TO ADMISSION: Patient was modified independent using a walker for functional mobility. HOME SUPPORT PRIOR TO ADMISSION: The patient lived with son. Physical Therapy Goals Goals reviewed and remain appropriate 8/19/2020 Initiated 8/14/2020 1. Patient will move from supine to sit and sit to supine  in bed with minimal assistance/contact guard assist within 7 days. 2. Patient will perform sit to stand with minimal assistance/contact guard assist within 7 days. 3. Patient will ambulate with minimal assistance/contact guard assist for 150 feet with the least restrictive device within 7 days. 4. Patient will ascend/descend 2 stairs with 1 handrail(s) with minimal assistance/contact guard assist within 7 days. 5. Patient will verbalize and demonstrate understanding of posterior precautions per protocol within 7 days. 6. Patient will perform all home exercise program per protocol with independence within 7 days. Outcome: Not Progressing Towards Goal 
 PHYSICAL THERAPY REEVALUATION Patient: Shala Garcia (77 y.o. male) Date: 8/19/2020 Primary Diagnosis: Dislocation of internal right hip prosthesis ([facility] [address] Laureen Grumbling Procedure(s) (LRB): 
RIGHT HIP ARTHROPLASTY TOTAL REVISION POSTERIOR APPROACH AND ABDUCTOR RELEASE (Right) 5 Days Post-Op Precautions:  WBAT, Fall(Posterior hip) ASSESSMENT Based on the objective data described below, the patient presents with impaired functional mobility and decreased independence secondary to impaired sitting and standing balance, generalized weakness, increased R hip pain and edema, scrotal edema, decreased R hip strength and ROM, posterior hip precautions, impaired gait mechanics, and decreased activity tolerance.  Patient with RRT on 8/17/2020 for AMS, tachycardia, and rigors and with transfer into CCU. Transferred back to ortho unit on 8/18. Received supine in bed and agreeable to PT intervention. Patient currently requires total A for bed mobility and min A x 2 for transfers with bed height elevated. Needed increased assistance and cueing for forward weight shifting in sitting due to posterior lean. Provided VCs for adherence to hip precautions and safe hand placement with use of RW during transfers. Pt unable to progress BLEs to attempt ambulation due to increased weakness and pain, therefore was returned supine in bed and left with all needs met and RN aware following therapy session. Continue to recommend patient discharge to SNF rehab to address functional impairments as noted above as patient with significant decline in functional mobility and increased fall risk. Current Level of Function Impacting Discharge (mobility/balance): total A for bed mobility; min A x 2 for sit<>stand transfers Functional Outcome Measure: The patient scored 20/100 on the Barthel Index outcome measure which is indicative of 80% impairment. Other factors to consider for discharge: increased risk for falls; decline from baseline mobility; needs physical assistance x 2 Patient will benefit from skilled therapy intervention to address the above noted impairments. PLAN : 
Recommendations and Planned Interventions: bed mobility training, transfer training, gait training, therapeutic exercises, patient and family training/education, and therapeutic activities Frequency/Duration: Patient will be followed by physical therapy:  twice daily to address goals. Recommendation for discharge: (in order for the patient to meet his/her long term goals) Therapy up to 5 days/week in SNF setting This discharge recommendation: 
Has been made in collaboration with the attending provider and/or case management Equipment recommendations for successful discharge (if) home: DEON  
 
 
 
 SUBJECTIVE:  
Patient stated It's just weak.  OBJECTIVE DATA SUMMARY:  
HISTORY:   
Past Medical History:  
Diagnosis Date  
 ADD (attention deficit disorder) Adverse effect of anesthesia   
 motion sickness resulting in loss of balance Anemia due to blood loss Hinson's esophagus with esophagitis Benign essential tremor Cancer [facility]) 2012 [address] Chronic pain Cirrhosis ([address] Depression Dislocated hip (HCC)   
 DJD (degenerative joint disease) of hip   
 bilat DM (diabetes mellitus) ([address] 3/17/2010 ED (erectile dysfunction) of organic origin Esophageal varices determined by endoscopy ([address] Fall on or from sidewalk curb 9/24/2015 Femur fracture ([address] Gastritis and duodenitis GERD (gastroesophageal reflux disease)   
 resolved after discontinuing diclofenac Hematuria 6/2016 High cholesterol 03/17/2010  
 pt denies 6/19 HTN (hypertension) 3/17/2010 Morbid obesity ([address] Murmur, cardiac 2016 PFO (patent foramen ovale) 7/26/2017 PUD (peptic ulcer disease) Sepsis ([address] Shingles 6/2016 RESOLVING- NO RASH (HEAD) Sleep apnea   
 doesnt use CPAP anymore Past Surgical History:  
Procedure Laterality Date COLONOSCOPY N/A 6/18/2019 COLONOSCOPY AND EGD performed by Azar Kruse MD at [facility]  6/18/2019 ENDOSCOPY, COLON, DIAGNOSTIC    
 HX CATARACT REMOVAL Bilateral   
 HX CHOLECYSTECTOMY  1995 HX GI  1980  
 gastroplasty [address] 25 HX HIP REPLACEMENT Left HX ORTHOPAEDIC Right 2011  
 rot cuff repair HX ORTHOPAEDIC  2016  
 left broken femur HX ORTHOPAEDIC Right 08/13/2020 Attempt closed reduction of hip dislocation [address] HX VASCULAR ACCESS    
 picc line and removed after sepsis resolved IR INSERT TIPS HEPATIC SHUNT  3/28/2020 TOTAL HIP ARTHROPLASTY  05/2010  
 right TOTAL HIP ARTHROPLASTY  08/01/2016  
 left UPPER GI ENDOSCOPY,BIOPSY  6/18/2019 Hospital course since last seen and reason for reevaluation: Patient with RRT on 8/17/2020 for AMS, tachycardia, and rigors and with transfer into CCU. Transferred back to ortho unit on 8/18. Personal factors and/or comorbidities impacting plan of care: chronic pain; DM; HTN Home Situation Home Environment: Private residence # Steps to Enter: 2 Rails to Enter: Yes Hand Rails : Right One/Two Story Residence: Two story, live on 1st floor Living Alone: No 
Support Systems: Child(dariusz) Patient Expects to be Discharged to[de-identified] (rehab) Current DME Used/Available at Home: Walker, rolling Tub or Shower Type: Shower(shower chair present) EXAMINATION/PRESENTATION/DECISION MAKING:  
Critical Behavior: 
Neurologic State: Alert, Drowsy Orientation Level: Oriented X4 Cognition: Follows commands Safety/Judgement: Decreased insight into deficits Hearing: Auditory Auditory Impairment: None Hearing Aids/Status: Does not own Skin:  R hip incision Edema: Moderate BLE edema Range Of Motion: 
AROM: Generally decreased, functional 
  
  
  
PROM: Generally decreased, functional 
  
  
  
Strength:   
Strength: Generally decreased, functional 
  
  
  
  
  
  
Tone & Sensation:  
Tone: Normal 
  
  
  
  
Sensation: Intact Coordination: 
Coordination: Generally decreased, functional 
Vision:  
Corrective Lenses: Glasses Functional Mobility: 
Bed Mobility: 
Rolling: Maximum assistance Supine to Sit: Total assistance;Assist x2 Sit to Supine: Total assistance;Assist x2 Scooting: Total assistance;Assist x2 Transfers: 
Sit to Stand: Minimum assistance;Assist x2(head of bed slightly elevated) Stand to Sit: Minimum assistance Balance:  
Sitting: Impaired; With support Sitting - Static: Fair (occasional) Sitting - Dynamic: Fair (occasional); Poor (constant support) Standing: Impaired; With support Standing - Static: Fair Standing - Dynamic : Fair;Poor Therapeutic Exercises:  
Reviewed ankle pumps and quad sets Functional Measure: 
Barthel Index: 
 
Bathin Bladder: 0 Bowels: 5 Groomin Dressin Feeding: 10 Mobility: 0 Stairs: 0 Toilet Use: 0 Transfer (Bed to Chair and Back): 0 Total: 20/100 The Barthel ADL Index: Guidelines 1. The index should be used as a record of what a patient does, not as a record of what a patient could do. 2. The main aim is to establish degree of independence from any help, physical or verbal, however minor and for whatever reason. 3. The need for supervision renders the patient not independent. 4. A patient's performance should be established using the best available evidence. Asking the patient, friends/relatives and nurses are the usual sources, but direct observation and common sense are also important. However direct testing is not needed. 5. Usually the patient's performance over the preceding 24-48 hours is important, but occasionally longer periods will be relevant. 6. Middle categories imply that the patient supplies over 50 per cent of the effort. 7. Use of aids to be independent is allowed. Robi Frank., Barthel, D.W. ([id number]). Functional evaluation: the Barthel Index. [address] [facility] (14)2. MAX Godfrey, Avelino Corbin, Elizabeth Felton., [place], [address] [place] [address] (). Measuring the change indisability after inpatient rehabilitation; comparison of the responsiveness of the Barthel Index and Functional [place] Measure. Journal of Neurology, Neurosurgery, and Psychiatry, 66(4), 635-906. Lendon Soulier, N.[email or web address].KHARI, SHILPI Payan, & Alonzo Gan M.A. (2004.) Assessment of post-stroke quality of life in cost-effectiveness studies: The usefulness of the Barthel Index and the EuroQoL-5D. [facility], 13, 982-95 Pain Ratin/10 R hip pain with activity Activity Tolerance: Fair, desaturates with exertion and requires oxygen, requires frequent rest breaks, and observed SOB with activity Please refer to the flowsheet for vital signs taken during this treatment. After treatment patient left in no apparent distress:  
Supine in bed, Heels elevated for pressure relief, Call bell within reach, and Side rails x 3 
 
COMMUNICATION/EDUCATION:  
The patients plan of care was discussed with: Physical therapist, Occupational therapist, and Registered nurse. Fall prevention education was provided and the patient/caregiver indicated understanding., Patient/family have participated as able in goal setting and plan of care. , and Patient/family agree to work toward stated goals and plan of care. Thank you for this referral. 
Naveed Head, PT, DPT Time Calculation: 31 mins

## 2020-08-19 NOTE — PROGRESS NOTES
Patient: Anibal Clayton MRN: [id number]  SSN: [id number] YOB: 1950  Age: 65 y.o. Sex: male ADMITTED: 2020 to Blayne Gamboa MD by Lori Loo MD for Dislocation of internal right hip prosthesis ([facility] [address] Diann Ramos POD# 5 Days Post-Op Procedure(s): RIGHT HIP ARTHROPLASTY TOTAL REVISION POSTERIOR APPROACH AND ABDUCTOR RELEASE · Scrotal edema · Scrotal ecchymosis 
  
-Pt underwent hip surgery 5 days ago and developed scrotal edema, ecchymosis. On Eliquis for DVT prophylaxis. 
-Ultrasound ordered. IMPRESSION: Scrotal wall edema with asymmetric extension into the spermatic cords. Normal testes. No abscess. cords. Normal testes. No abscess. Exam reveals right hip ecchymosis along with buttocks, right groin, and scrotum. No crepitus or induration felt w/ scrotal exam. Mild tenderness. Hoffman in place draining clear, yellow urine. Ice pack in place. Scrotal elevation w/ towel roll. Vitals: Temp (24hrs), Av.9 °F (37.2 °C), Min:98.2 °F (36.8 °C), Max:99.8 °F (37.7 °C) Blood pressure 111/44, pulse 74, temperature 99.1 °F (37.3 °C), resp. rate 18, height 5' 10.5\" (1.791 m), weight 118.1 kg (260 lb 5.8 oz), SpO2 99 %. Intake and Output: 
 1901 -  0700 In: 7661 [P.O.:1130; I.V.:3061.3] Out: 1482 [[id number]] No intake/output data recorded. KAVYA Output lats 24 hrs: No data found. KAVYA Output last 8 hrs: No data found. BM over last 24 hrs:  
Patient Vitals for the past 24 hrs: 
 Stool Occurrence(s)  
20 2120 1 Exam: 
Appearance: Well-developed no acute distress Conjunctiva: Conjunctiva and lids normal 
External ears/nose: Normal no lesions or deformities Respiratory effort: Breathing easily Abdomen/flank: Soft, nontender, without masses, no CVA tenderness : scrotal edema, non tender Digits/nails: No clubbing cyanosis or petechiae Skin inspection: Warm and dry Skin palpation: No subcutaneous nodularity Sensation: No sensory deficits Judgment/Insight: intact Mood/Affect: normal 
 
Labs: CBC:  
Lab Results Component Value Date/Time WBC 8.0 08/19/2020 01:29 AM  
 HCT 21.7 (L) 08/19/2020 01:29 AM  
 PLATELET 796 (L) 20/34/1358 01:29 AM  
 
BMP:  
Lab Results Component Value Date/Time Glucose 155 (H) 08/19/2020 01:29 AM  
 Sodium 135 (L) 08/19/2020 01:29 AM  
 Potassium 3.7 08/19/2020 01:29 AM  
 Chloride 108 08/19/2020 01:29 AM  
 CO2 27 08/19/2020 01:29 AM  
 BUN 23 (H) 08/19/2020 01:29 AM  
 Creatinine 0.89 08/19/2020 01:29 AM  
 Calcium 6.7 (L) 08/19/2020 01:29 AM  
 
 
 
Imaging: Scrotal US  8/18/2020 IMPRESSION: Scrotal wall edema with asymmetric extension into the spermatic 
cords. Normal testes. No abscess. Assessment/Plan:  
 
POD# 5 Days Post-Op Procedure(s): RIGHT HIP ARTHROPLASTY TOTAL REVISION POSTERIOR APPROACH AND ABDUCTOR RELEASE · Scrotal edema · Scrotal ecchymosis Scrotal US IMPRESSION: Scrotal wall edema with asymmetric extension into the spermatic cords. Normal testes. No abscess. 
  
-Elevate scrotum w/ towel roll, continue use of ice pack in 15 min intervals 
-Hoffman to remain in place until ambulating, then void trial 
 
Signed By: Tere Joseph NP - August 19, 2020

## 2020-08-19 NOTE — PROGRESS NOTES
Pharmacy Automatic Renal Dosing Protocol - Antimicrobials Indication for Antimicrobials: sepsis / right hip wound Current Regimen of Each Antimicrobial: 
Cefepime 1 gm IV q8h (Start Date ; Day 6) Vancomycin 2g reloaded, then 1.5g iv q12h (Start date ; day 3) Previous Antimicrobial Therapy: 
Vancomycin 2g x1 on  Goal Level: VANCOMYCIN TROUGH GOAL RANGE Vancomycin Trough: 15 - 20 mcg/mL  (AUC: 400 - 600 mg/hr/Liter/day) Date Dose & Interval Measured (mcg/mL) Extrapolated (mcg/mL) 129 1.5g iv q12h 18.0 15.1 Date & time of next level:  
 
Significant Cultures:  
 blood: pending Urine Cx on hold Radiology / Imaging results: (X-ray, CT scan or MRI): 
 
Labs: 
Recent Labs 20 
0129 20 
0433 20 
1133 20 
0130 CREA 0.89 0.88  --  0.94 BUN 23* 22*  --  23* WBC 8.0 9.4 11.6* 9.1 Temp (24hrs), Av.9 °F (37.2 °C), Min:98.2 °F (36.8 °C), Max:99.8 °F (37.7 °C) Creatinine Clearance (mL/min):  
CrCl (Actual Body Weight): 125.3 CrCl (Adjusted Body Weight): 97.3 CrCl (Ideal Body Weight): 78.7 Impression/Plan:  
History of right hip infection with Staph Lugdenesis, S lugdenesis IE  at [facility], ABX treated x6 weeks then maintained on prophylactic Cephalexin 500 mg BID PO. Ortho evaluated recurrent dislocation of internal right hip prosthesis. Broadened ABX to include Vancomcyin in addition to current cefepime regimen Vancomycin extrapolated trough ~15.1 within goal range. Continue same dose. Continue current cefepime 1g iv q8h Patient was in CCU briefly () as a overflow for transfusion due to anemia. Now back in ortho. Following up on fecal occult blood. BMP daily and recheck Vanc trough in 2-3 days. Antimicrobial stop date pending Pharmacy will follow daily and adjust medications as appropriate for renal function and/or serum levels.  
 
Thank you, 
Al Angeles, RACHELLED

## 2020-08-19 NOTE — PROGRESS NOTES
Problem: Falls - Risk of 
Goal: *Absence of Falls Description: Document Shahriar Rachelle Fall Risk and appropriate interventions in the flowsheet. Outcome: Progressing Towards Goal 
Note: Fall Risk Interventions: 
Mobility Interventions: Bed/chair exit alarm, Communicate number of staff needed for ambulation/transfer, OT consult for ADLs, Patient to call before getting OOB, PT Consult for mobility concerns, PT Consult for assist device competence Mentation Interventions: Bed/chair exit alarm, Door open when patient unattended, More frequent rounding Medication Interventions: Bed/chair exit alarm, Evaluate medications/consider consulting pharmacy, Patient to call before getting OOB Elimination Interventions: Bed/chair exit alarm, Call light in reach, Patient to call for help with toileting needs, Toileting schedule/hourly rounds History of Falls Interventions: Bed/chair exit alarm, Consult care management for discharge planning, Door open when patient unattended, Evaluate medications/consider consulting pharmacy, Investigate reason for fall Problem: Pressure Injury - Risk of 
Goal: *Prevention of pressure injury Description: Document Geoff Scale and appropriate interventions in the flowsheet. Outcome: Progressing Towards Goal 
Note: Pressure Injury Interventions: 
Sensory Interventions: Assess changes in LOC, Check visual cues for pain, Float heels, Keep linens dry and wrinkle-free, Minimize linen layers, Pressure redistribution bed/mattress (bed type), Turn and reposition approx. every two hours (pillows and wedges if needed) Moisture Interventions: Apply protective barrier, creams and emollients, Check for incontinence Q2 hours and as needed, Internal/External urinary devices Activity Interventions: Increase time out of bed, Pressure redistribution bed/mattress(bed type), PT/OT evaluation Mobility Interventions: Float heels, HOB 30 degrees or less, Pressure redistribution bed/mattress (bed type), PT/OT evaluation, Turn and reposition approx. every two hours(pillow and wedges) Nutrition Interventions: Document food/fluid/supplement intake, Discuss nutritional consult with provider, Offer support with meals,snacks and hydration Friction and Shear Interventions: Apply protective barrier, creams and emollients, Feet elevated on foot rest, HOB 30 degrees or less, Lift sheet, Lift team/patient mobility team

## 2020-08-19 NOTE — PROGRESS NOTES
Hospitalist Progress Note NAME: Miguel Hinson :  1950 MRN:  [id number] Assessment / Plan: SIRS  with fevers to 101.9, 110, 36 Metabolic encephalopathy likey fever, infection Hospital delirium Confused, more today, talking but not conversant Febrile and tachycardiac(new) Yesterday concern for hepatic encephalopathy with asterixis, NH4 improved Now febrile, more confused, concern for developing sepsis Stat paired BC and lactate pCXR and UA Check head CT 
IVF Empiric vanco and cefepime once cultures sent Right hip wound still draining, d/w orthopedics to check the wound 
  
DM type 2 POA uncont with hyperglycemia Lantus 30 units QHS increase to 40 units Premeal humalog 5  (PTA 8units), increase to 7 units(hold if not eating) , 217, 148, 155 
  
History of right hip infection with Staph Lugdenesis feb [id number] S lugdenesis IE 2018 at [facility] Treated with 6 weeks IV antibiotics Maintained on suppressive keflex since that time 
     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 
  
Anemia acute blood loss, HG 7.5 S/p 3 units pRBCs Holding eliquis(received dose this AM, none over weekend) No reported gi bleeding per NS Orthopedics to check the wound F/u fecal occult blood stool  
  
BREWER cirrhosis Hx Esophageal varices, s/p TIPS 
-hold lasix and spironolactone till condition more stable 
-continue PTA  lactulose and xifaxam 
  
Essential HTN 
hold lisinopril with bump in Cr, creatinine improved BP okay 
  
JACOB Does not use CPAP 
  
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 
- unable to reduce hip 
-S/P RIGHT HIP ARTHROPLASTY TOTAL REVISION POSTERIOR APPROACH AND ADDUCTOR RELEASE, SCAR REVISION 2020 
-Eliquis for DVT prophylaxis on hold 
-activity per Ortho 
  
  
30.0 - 39.9 Obese / Body mass index is 36.83 kg/m². 
  
Code status: Full Prophylaxis: Eliquis Recommended Disposition: SNF/LTC Subjective: Chief Complaint / Reason for Physician Visit Discussed with RN events overnight. More alert today , follow command Review of Systems: 
Symptom Y/N Comments  Symptom Y/N Comments Fever/Chills n   Chest Pain n   
Poor Appetite n   Edema n   
Cough n   Abdominal Pain n   
Sputum n   Joint Pain SOB/WANG n   Pruritis/Rash Nausea/vomit n   Tolerating PT/OT Diarrhea n   Tolerating Diet y Constipation n   Other Could NOT obtain due to:   
 
Objective: VITALS:  
Last 24hrs VS reviewed since prior progress note. Most recent are: 
Patient Vitals for the past 24 hrs: 
 Temp Pulse Resp BP SpO2  
08/19/20 1156 98.5 °F (36.9 °C) 67 18 129/61 98 % 08/19/20 0755 99.1 °F (37.3 °C) 74 18 111/44 99 % 08/19/20 0320 99.2 °F (37.3 °C) 80 18 122/61 97 % 08/18/20 2350 98.9 °F (37.2 °C) 88 18 126/57 98 % 08/18/20 2250 98.8 °F (37.1 °C) 74 18 130/66 97 % 08/18/20 2150 99 °F (37.2 °C) 75 18 150/65 97 % 08/18/20 2050 99.2 °F (37.3 °C) 71 18 133/60 97 % 08/18/20 2020  76 18 133/59 99 % 08/18/20 2005 99.2 °F (37.3 °C) 79 18 147/67 98 % 08/18/20 1946 98.8 °F (37.1 °C) 78 18 157/67 100 % 08/18/20 1509 98.8 °F (37.1 °C) 78 16 139/59 95 % Intake/Output Summary (Last 24 hours) at 8/19/2020 1508 Last data filed at 8/19/2020 2135 Gross per 24 hour Intake 2732.5 ml Output 900 ml Net 1832.5 ml PHYSICAL EXAM: 
General: WD, WN. Alert, cooperative, no acute distress   
EENT:  EOMI. Anicteric sclerae. MMM Resp:  CTA bilaterally, no wheezing or rales. No accessory muscle use CV:  Regular  rhythm,  No edema GI:  Soft, Non distended, Non tender.  +Bowel sounds Neurologic:  Alert and oriented X 2, normal speech, Psych:   Good insight. Not anxious nor agitated Skin:  No rashes. No jaundice Reviewed most current lab test results and cultures  YES Reviewed most current radiology test results   YES 
 Review and summation of old records today    NO Reviewed patient's current orders and MAR    YES 
PMH/SH reviewed - no change compared to H&P 
________________________________________________________________________ Care Plan discussed with: 
  Comments Patient y Family RN y   
Care Manager Consultant  y Multidiciplinary team rounds were held today with , nursing, pharmacist and clinical coordinator. Patient's plan of care was discussed; medications were reviewed and discharge planning was addressed. ________________________________________________________________________ Total NON critical care TIME:  35    Minutes Total CRITICAL CARE TIME Spent:   Minutes non procedure based Comments >50% of visit spent in counseling and coordination of care y   
________________________________________________________________________ Ashley Gonzalez MD  
 
Procedures: see electronic medical records for all procedures/Xrays and details which were not copied into this note but were reviewed prior to creation of Plan. LABS: 
I reviewed today's most current labs and imaging studies. Pertinent labs include: 
Recent Labs 08/19/20 
0129 08/18/20 
0433 08/17/20 
1133 WBC 8.0 9.4 11.6* HGB 7.5* 6.6* 5.8* HCT 21.7* 20.1* 18.4* * 128* 211 Recent Labs 08/19/20 
0129 08/18/20 
0433 08/17/20 
1524 08/17/20 
0130 * 137  --  135* K 3.7 4.1  --  3.7  109*  --  105 CO2 27 26  --  27 * 151*  --  169* BUN 23* 22*  --  23* CREA 0.89 0.88  --  0.94 CA 6.7* 7.2*  --  7.4* ALB  --  1.7* 1.7*  --   
TBILI  --  3.2* 2.0*  --   
ALT  --  33 33  --   
 
 
Signed:  Ashley Gonzalez MD

## 2020-08-20 LAB
AMMONIA PLAS-SCNC: 15 UMOL/L
ANION GAP SERPL CALC-SCNC: 3 MMOL/L (ref 5–15)
ATRIAL RATE: 92 BPM
BACTERIA SPEC CULT: ABNORMAL
BASOPHILS # BLD: 0 K/UL (ref 0–0.1)
BASOPHILS NFR BLD: 0 % (ref 0–1)
BUN SERPL-MCNC: 20 MG/DL (ref 6–20)
BUN/CREAT SERPL: 24 (ref 12–20)
CALCIUM SERPL-MCNC: 6.8 MG/DL (ref 8.5–10.1)
CALCULATED P AXIS, ECG09: 47 DEGREES
CALCULATED R AXIS, ECG10: -4 DEGREES
CALCULATED T AXIS, ECG11: 24 DEGREES
CHLORIDE SERPL-SCNC: 106 MMOL/L (ref 97–108)
CO2 SERPL-SCNC: 25 MMOL/L (ref 21–32)
COMMENT, HOLDF: NORMAL
CREAT SERPL-MCNC: 0.83 MG/DL (ref 0.7–1.3)
DIAGNOSIS, 93000: NORMAL
DIFFERENTIAL METHOD BLD: ABNORMAL
EOSINOPHIL # BLD: 0.3 K/UL (ref 0–0.4)
EOSINOPHIL NFR BLD: 3 % (ref 0–7)
ERYTHROCYTE [DISTWIDTH] IN BLOOD BY AUTOMATED COUNT: 18.6 % (ref 11.5–14.5)
GLUCOSE BLD STRIP.AUTO-MCNC: 158 MG/DL (ref 65–100)
GLUCOSE BLD STRIP.AUTO-MCNC: 179 MG/DL (ref 65–100)
GLUCOSE BLD STRIP.AUTO-MCNC: 194 MG/DL (ref 65–100)
GLUCOSE BLD STRIP.AUTO-MCNC: 252 MG/DL (ref 65–100)
GLUCOSE SERPL-MCNC: 145 MG/DL (ref 65–100)
HCT VFR BLD AUTO: 22.9 % (ref 36.6–50.3)
HGB BLD-MCNC: 7.4 G/DL (ref 12.1–17)
IMM GRANULOCYTES # BLD AUTO: 0 K/UL (ref 0–0.04)
IMM GRANULOCYTES NFR BLD AUTO: 0 % (ref 0–0.5)
LYMPHOCYTES # BLD: 0.2 K/UL (ref 0.8–3.5)
LYMPHOCYTES NFR BLD: 2 % (ref 12–49)
MCH RBC QN AUTO: 30.5 PG (ref 26–34)
MCHC RBC AUTO-ENTMCNC: 32.3 G/DL (ref 30–36.5)
MCV RBC AUTO: 94.2 FL (ref 80–99)
MONOCYTES # BLD: 0.2 K/UL (ref 0–1)
MONOCYTES NFR BLD: 2 % (ref 5–13)
NEUTS BAND NFR BLD MANUAL: 5 %
NEUTS SEG # BLD: 7.8 K/UL (ref 1.8–8)
NEUTS SEG NFR BLD: 88 % (ref 32–75)
NRBC # BLD: 0.05 K/UL (ref 0–0.01)
NRBC BLD-RTO: 0.6 PER 100 WBC
P-R INTERVAL, ECG05: 160 MS
PLATELET # BLD AUTO: 97 K/UL (ref 150–400)
PMV BLD AUTO: 10.5 FL (ref 8.9–12.9)
POTASSIUM SERPL-SCNC: 3.8 MMOL/L (ref 3.5–5.1)
Q-T INTERVAL, ECG07: 378 MS
QRS DURATION, ECG06: 102 MS
QTC CALCULATION (BEZET), ECG08: 467 MS
RBC # BLD AUTO: 2.43 M/UL (ref 4.1–5.7)
RBC MORPH BLD: ABNORMAL
SAMPLES BEING HELD,HOLD: NORMAL
SERVICE CMNT-IMP: ABNORMAL
SODIUM SERPL-SCNC: 134 MMOL/L (ref 136–145)
VENTRICULAR RATE, ECG03: 92 BPM
WBC # BLD AUTO: 8.5 K/UL (ref 4.1–11.1)

## 2020-08-20 PROCEDURE — 97530 THERAPEUTIC ACTIVITIES: CPT

## 2020-08-20 PROCEDURE — 87040 BLOOD CULTURE FOR BACTERIA: CPT

## 2020-08-20 PROCEDURE — 74011250637 HC RX REV CODE- 250/637: Performed by: INTERNAL MEDICINE

## 2020-08-20 PROCEDURE — 74011250637 HC RX REV CODE- 250/637: Performed by: PHYSICIAN ASSISTANT

## 2020-08-20 PROCEDURE — 74011250636 HC RX REV CODE- 250/636: Performed by: INTERNAL MEDICINE

## 2020-08-20 PROCEDURE — 36415 COLL VENOUS BLD VENIPUNCTURE: CPT

## 2020-08-20 PROCEDURE — 94760 N-INVAS EAR/PLS OXIMETRY 1: CPT

## 2020-08-20 PROCEDURE — 80048 BASIC METABOLIC PNL TOTAL CA: CPT

## 2020-08-20 PROCEDURE — 99223 1ST HOSP IP/OBS HIGH 75: CPT | Performed by: INTERNAL MEDICINE

## 2020-08-20 PROCEDURE — 74011636637 HC RX REV CODE- 636/637: Performed by: INTERNAL MEDICINE

## 2020-08-20 PROCEDURE — 82962 GLUCOSE BLOOD TEST: CPT

## 2020-08-20 PROCEDURE — 74011000258 HC RX REV CODE- 258: Performed by: INTERNAL MEDICINE

## 2020-08-20 PROCEDURE — 85025 COMPLETE CBC W/AUTO DIFF WBC: CPT

## 2020-08-20 PROCEDURE — 97110 THERAPEUTIC EXERCISES: CPT

## 2020-08-20 PROCEDURE — 65270000029 HC RM PRIVATE

## 2020-08-20 PROCEDURE — 82140 ASSAY OF AMMONIA: CPT

## 2020-08-20 RX ORDER — FUROSEMIDE 20 MG/1
20 TABLET ORAL DAILY
Status: DISCONTINUED | OUTPATIENT
Start: 2020-08-21 | End: 2020-08-24 | Stop reason: HOSPADM

## 2020-08-20 RX ADMIN — RIFAXIMIN 550 MG: 550 TABLET ORAL at 17:07

## 2020-08-20 RX ADMIN — ATORVASTATIN CALCIUM 40 MG: 40 TABLET, FILM COATED ORAL at 10:04

## 2020-08-20 RX ADMIN — INSULIN LISPRO 7 UNITS: 100 INJECTION, SOLUTION INTRAVENOUS; SUBCUTANEOUS at 17:00

## 2020-08-20 RX ADMIN — RIFAXIMIN 550 MG: 550 TABLET ORAL at 10:03

## 2020-08-20 RX ADMIN — CEFEPIME HYDROCHLORIDE 1 G: 1 INJECTION, POWDER, FOR SOLUTION INTRAMUSCULAR; INTRAVENOUS at 13:43

## 2020-08-20 RX ADMIN — CEFEPIME HYDROCHLORIDE 1 G: 1 INJECTION, POWDER, FOR SOLUTION INTRAMUSCULAR; INTRAVENOUS at 07:01

## 2020-08-20 RX ADMIN — VANCOMYCIN HYDROCHLORIDE 1500 MG: 10 INJECTION, POWDER, LYOPHILIZED, FOR SOLUTION INTRAVENOUS at 15:11

## 2020-08-20 RX ADMIN — INSULIN LISPRO 7 UNITS: 100 INJECTION, SOLUTION INTRAVENOUS; SUBCUTANEOUS at 13:39

## 2020-08-20 RX ADMIN — INSULIN LISPRO 5 UNITS: 100 INJECTION, SOLUTION INTRAVENOUS; SUBCUTANEOUS at 13:39

## 2020-08-20 RX ADMIN — APIXABAN 2.5 MG: 2.5 TABLET, FILM COATED ORAL at 20:42

## 2020-08-20 RX ADMIN — OXYCODONE 10 MG: 5 TABLET ORAL at 20:42

## 2020-08-20 RX ADMIN — DIPHENHYDRAMINE HYDROCHLORIDE 12.5 MG: 25 LIQUID ORAL at 13:43

## 2020-08-20 RX ADMIN — ACETAMINOPHEN 650 MG: 650 SUPPOSITORY RECTAL at 06:53

## 2020-08-20 RX ADMIN — SPIRONOLACTONE 25 MG: 25 TABLET ORAL at 10:04

## 2020-08-20 RX ADMIN — OXYCODONE 10 MG: 5 TABLET ORAL at 17:06

## 2020-08-20 RX ADMIN — FAMOTIDINE 20 MG: 20 TABLET, FILM COATED ORAL at 10:03

## 2020-08-20 RX ADMIN — ACETAMINOPHEN 650 MG: 650 SUPPOSITORY RECTAL at 18:00

## 2020-08-20 RX ADMIN — Medication 10 ML: at 13:44

## 2020-08-20 RX ADMIN — VANCOMYCIN HYDROCHLORIDE 1500 MG: 10 INJECTION, POWDER, LYOPHILIZED, FOR SOLUTION INTRAVENOUS at 02:24

## 2020-08-20 RX ADMIN — INSULIN LISPRO 7 UNITS: 100 INJECTION, SOLUTION INTRAVENOUS; SUBCUTANEOUS at 09:21

## 2020-08-20 RX ADMIN — POLYETHYLENE GLYCOL 3350 17 G: 17 POWDER, FOR SOLUTION ORAL at 10:03

## 2020-08-20 RX ADMIN — Medication 10 ML: at 21:22

## 2020-08-20 RX ADMIN — ACETAMINOPHEN 650 MG: 650 SUPPOSITORY RECTAL at 00:21

## 2020-08-20 RX ADMIN — DOCUSATE SODIUM - SENNOSIDES 1 TABLET: 50; 8.6 TABLET, FILM COATED ORAL at 10:03

## 2020-08-20 RX ADMIN — LACTULOSE 30 ML: 20 SOLUTION ORAL at 10:03

## 2020-08-20 RX ADMIN — GABAPENTIN 600 MG: 300 CAPSULE ORAL at 21:29

## 2020-08-20 RX ADMIN — CEFEPIME HYDROCHLORIDE 1 G: 1 INJECTION, POWDER, FOR SOLUTION INTRAMUSCULAR; INTRAVENOUS at 21:19

## 2020-08-20 RX ADMIN — FAMOTIDINE 20 MG: 20 TABLET, FILM COATED ORAL at 17:07

## 2020-08-20 RX ADMIN — OXYCODONE 10 MG: 5 TABLET ORAL at 00:43

## 2020-08-20 RX ADMIN — INSULIN LISPRO 2 UNITS: 100 INJECTION, SOLUTION INTRAVENOUS; SUBCUTANEOUS at 17:00

## 2020-08-20 RX ADMIN — ACETAMINOPHEN 650 MG: 650 SUPPOSITORY RECTAL at 13:40

## 2020-08-20 RX ADMIN — INSULIN LISPRO 2 UNITS: 100 INJECTION, SOLUTION INTRAVENOUS; SUBCUTANEOUS at 09:21

## 2020-08-20 RX ADMIN — OXYCODONE 5 MG: 5 TABLET ORAL at 10:04

## 2020-08-20 NOTE — PROGRESS NOTES
Bedside and Verbal shift change report given to Tequila (oncoming nurse) by Oscar Hinson (offgoing nurse). Report included the following information SBAR, Kardex, Intake/Output, MAR and Recent Results.

## 2020-08-20 NOTE — PROGRESS NOTES
Physician Progress Note PATIENTBohaydee Cleary 
CSN #:                  [id number] :                       1950 ADMIT DATE:       2020 10:05 AM 
DISCH DATE: 
RESPONDING 
PROVIDER #:        Brie Khan MD 
 
 
 
 
QUERY TEXT: 
 
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 Pt noted to have SIRS ? with fevers to 217.1, 282, 36,Metabolic encephalopathy likey fever, infection. If possible, please document in the progress notes and discharge summary if you are evaluating and /or treating any of the following: The medical record reflects the following: 
Risk Factors: DM, History of right hip infection with Staph Lugdenesis feb [id number] S lugdenesis IE 2018 at [facility] Clinical Indicators:  pn: SIRS ? with fevers to 101.9, 110, 36 Metabolic encephalopathy likey fever, infection, lac 4.2, \" concern for developing sepsis\" Treatment: paired bc, cxr, vanco and cefepime Dewey Chand Rn/CDI  Options provided: 
-- *** (organism, if known) Sepsis, present on admission 
-- *** (organism if known) Sepsis, now resolved, *** 
-- *** (organism if known) Sepsis, not present on admission, 
-- No Sepsis, localized infection only *** (if known) -- Other - I will add my own diagnosis -- Disagree - Not applicable / Not valid -- Disagree - Clinically unable to determine / Unknown 
-- Refer to Clinical Documentation Reviewer PROVIDER RESPONSE TEXT: 
 
This patient has *** (organism if known) sepsis that was not present on admission.  
 
Query created by: Unknown Sero on 2020 1:33 PM 
 
 
Electronically signed by:  Eugene Bean MD 2020 2:34 PM

## 2020-08-20 NOTE — PROGRESS NOTES
YARELY:  Discharge to [address] [place] [address] and Rehab for SNF level care Will need [facility] transport 2nd IM Medicare letter needed at discharge COVID test needed 24-48 prior to discharge (last test completed 7/22/20) Pt will need new covid test to be considered for discharge to [address] [place] [address]. CM to speak with attending about ordering if patient will be ready for discharge in the next 48 hours. Azul King Prudent Care Manager - ED [facility] ACP Facilitator Zone Phone: [phone] Mobile: [phone]

## 2020-08-20 NOTE — PROGRESS NOTES
Hospitalist Progress Note NAME: Cecy Cannon :  1950 MRN:  [id number] Assessment / Plan: 
Sepsis   with fevers to 101.9, 110, 36 Metabolic encephalopathy likey fever, infection Continue Empiric vanco and cefepime Blood cx growing serratia ID consult Echo  
Repeat blood cx  
  
DM type 2 POA uncont with hyperglycemia Lantus 30 units QHS increase to 40 units Premeal humalog 5  (PTA 8units), increase to 7 units(hold if not eating) , 217, 148, 155 
  
History of right hip infection with Staph Lugdenesis [id number] S lugdenesis IE 2018 at [facility] Treated with 6 weeks IV antibiotics Maintained on suppressive keflex since that time 
     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 
  
Anemia acute blood loss, HG 7.5 S/p 3 units pRBCs Resume  eliquis case was discussed with ortho and they are clear about resume eliquis No reported gi bleeding per NS Orthopedics to check the wound F/u fecal occult blood stool  
  
BREWER cirrhosis Hx Esophageal varices, s/p TIPS 
-continue PTA  lactulose and xifaxam 
  
Essential HTN 
hold lisinopril with bump in Cr, creatinine improved BP okay 
  
JACOB Does not use CPAP 
  
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 
- unable to reduce hip 
-S/P RIGHT HIP ARTHROPLASTY TOTAL REVISION POSTERIOR APPROACH AND ADDUCTOR RELEASE, SCAR REVISION 2020 
-Eliquis for DVT prophylaxis  
-activity per Ortho 
  
  
30.0 - 39.9 Obese / Body mass index is 36.83 kg/m². 
  
Code status: Full Prophylaxis: Eliquis  
Recommended Disposition: SNF/LTC 
  
 
  
 
Subjective: Chief Complaint / Reason for Physician Visit Discussed with RN events overnight. More alert , Review of Systems: 
Symptom Y/N Comments  Symptom Y/N Comments Fever/Chills n   Chest Pain n   
Poor Appetite n   Edema Cough n   Abdominal Pain Sputum n   Joint Pain SOB/WANG n   Pruritis/Rash Nausea/vomit n   Tolerating PT/OT Diarrhea n   Tolerating Diet Constipation n   Other Could NOT obtain due to:   
 
Objective: VITALS:  
Last 24hrs VS reviewed since prior progress note. Most recent are: 
Patient Vitals for the past 24 hrs: 
 Temp Pulse Resp BP SpO2  
08/20/20 1246 98.5 °F (36.9 °C) 83 20 140/88 96 % 08/20/20 0800 98.5 °F (36.9 °C) 67 18 107/51 95 % 08/20/20 0333 98.9 °F (37.2 °C) 77 18 132/77 97 % 08/19/20 2044 99 °F (37.2 °C) 77 18 113/41 96 % 08/19/20 1600 98.6 °F (37 °C) 89 16 159/80 97 % Intake/Output Summary (Last 24 hours) at 8/20/2020 1434 Last data filed at 8/19/2020 1946 Gross per 24 hour Intake 5125 ml Output 550 ml Net 4575 ml PHYSICAL EXAM: 
General: WD, WN. Alert, cooperative, no acute distress   
EENT:  EOMI. Anicteric sclerae. MMM Resp:  CTA bilaterally, no wheezing or rales. No accessory muscle use CV:  Regular  rhythm,  No edema GI:  Soft, Non distended, Non tender.  +Bowel sounds Neurologic:  Alert and oriented X 3, normal speech, Psych:   Good insight. Not anxious nor agitated Skin:  No rashes. No jaundice Reviewed most current lab test results and cultures  YES Reviewed most current radiology test results   YES Review and summation of old records today    NO Reviewed patient's current orders and MAR    YES 
PMH/SH reviewed - no change compared to H&P 
________________________________________________________________________ Care Plan discussed with: 
  Comments Patient y Family RN y   
Care Manager Consultant  y   
                 y Multidiciplinary team rounds were held today with , nursing, pharmacist and clinical coordinator. Patient's plan of care was discussed; medications were reviewed and discharge planning was addressed. ________________________________________________________________________ Total NON critical care TIME:  35   Minutes Total CRITICAL CARE TIME Spent:   Minutes non procedure based Comments >50% of visit spent in counseling and coordination of care y   
________________________________________________________________________ Christina Estrada MD  
 
Procedures: see electronic medical records for all procedures/Xrays and details which were not copied into this note but were reviewed prior to creation of Plan. LABS: 
I reviewed today's most current labs and imaging studies. Pertinent labs include: 
Recent Labs  
  08/20/20 
0431 08/19/20 0129 08/18/20 
0694 WBC 8.5 8.0 9.4 HGB 7.4* 7.5* 6.6* HCT 22.9* 21.7* 20.1* PLT 97* 138* 128* Recent Labs  
  08/20/20 
0431 08/19/20 0129 08/18/20 
0433 08/17/20 
1524 * 135* 137  --   
K 3.8 3.7 4.1  --   
 108 109*  --   
CO2 25 27 26  --   
* 155* 151*  --   
BUN 20 23* 22*  --   
CREA 0.83 0.89 0.88  --   
CA 6.8* 6.7* 7.2*  --   
ALB  --   --  1.7* 1.7* TBILI  --   --  3.2* 2.0* ALT  --   --  33 33 Signed:  Christina Estrada MD

## 2020-08-20 NOTE — PROGRESS NOTES
Bedside and Verbal shift change report given to MARK Zapata (oncoming nurse) by Neena Magana (offgoing nurse). Report included the following information SBAR, Kardex, OR Summary, Procedure Summary, Intake/Output, MAR, Recent Results and Med Rec Status.

## 2020-08-20 NOTE — CONSULTS
ID  
 Pt seen & Examined Old records & Muicro/ labs reviewed Orders placed Consult to follow. Infectious Disease Consult Betsy Galvez MD FACP Date of Consultation: Aug 20,2020 Referring Physician:  Dr India Lim Subjective:  
 
Patient is a 65 y.o. male who is being seen for - Positive Blood culture IMPRESSION:  
  
- Sepsis,  
-Serratia bacteremia 
  gBC - 8/17+ for Serratia marcescens 4/4 Repeat BC- 8/20- pending TTE - pending UA - unremarkable, UC- NG 
- S/p R/hip arthroplasty Total Revision posterior approach 8/14 S/p attempted closed reduction under anesthesia on 8/13 
- H/o recurrent dislocations of R/hip arthroplasty - H/o  septic srthritis R/hip with Staph lugdudensis  2/18 
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 
- Coag negative Staph bacteremia 2/24/18 
- H/o MRSA infection - ( WC + Thio broth only 8/30/16( Kiko Billings) - DM Type 2 A1c 6.6 
- Encephalopathy 
- BREWER cirrhosis - JACOB on CPAP  
  
  
PLAN:  
  
  
- Continue Cefepime IV  
- Await repeat BC, if negative & TTE negative , Cefepime IV x 2 weeks - Resume po Keflex thereafter - Monitor surgical site, change dressings as needed - Blood sugar control - Encourage yogurt, probiotic - Plan of care D/w  Dr Catrachito Epps is seen at request of Dr India Lim for encephalopathy . His BC are + for Serratia marcescens Narinder Tello, 65 y.o. male with past medical historysignificant for R/hip dislocation who presented to ED on 8/13 with c/o of ground-level fall and moderate to severe right hip pain approximately 1 hour prior to arrival.  Patient had  stated that he was trying to get out of bed when he slipped out of bed and landed on his right hip. He  was unable to get up since the fall. Patient denies any loss of consciousness or head injury. EMS was called by a family member. Of note, patient has had a history of recurrent dislocation of the right hip, last underwent surgery by Dr. Hernandez Pratt on July 20. Pt  denied any recent fever, chills, headache, nausea, vomiting, abdominal pain, CP, SOB, lightheadedness, dizziness, numbness, weakness, lower extremity swelling, heart palpitations, urinary sxs, diarrhea, constipation, melena, hematochezia, cough, or congestion. He is S/p R/hip arthroplasty Total Revision posterior approach 8/14. S/p attempted closed reduction under anesthesia on 8/13 BC + for Serratia marcescens 4/4 on 8/17 , repeat pending Pt has h/o recurrent  R/hip dislocations , also R/hip septic arthritis & IE secondary to Staph lugdudensis ( [place] S ) . Pt was treated at [facility] ( 2/24- 3/4/18 Pt has been on po Keflex as chronic suppressive oral therapy . Pt seen today . Appears confused. D/w RN events of day . Pt has confusion that waxes & wanes Patient Active Problem List  
Diagnosis Code  
 HTN (hypertension) I10  
 Hypercholesterolemia E78.00  
 Osteoarthritis of hip M16.9  Depression F32.9  ED (erectile dysfunction) of organic origin N52.9  GERD (gastroesophageal reflux disease) K21.9  PUD (peptic ulcer disease) K27.9  Hinson's esophagus with esophagitis K22.70, K20.9  Hypogonadism male E29.1  Diabetes mellitus type 2, uncontrolled ([address] E11.65  Benign essential tremor G25.0  Osteoarthritis of right hip M16.11  
 Jessica-prosthetic fracture of femur following total hip arthroplasty M97. Gerlene Perone  Systolic murmur U08.5  PFO (patent foramen ovale) Q21.1  Infected prosthesis of right hip ([address] T84.51XA  Endocarditis of mitral valve I05.8  Obesity E66.9  
 Insomnia G47.00  Dislocation of prosthetic joint ([address] T84.029A  Esophageal varices with bleeding (HCC) I85.01  
 BREWER (nonalcoholic steatohepatitis) K75.81  
 Hepatic encephalopathy (HCC) K72.90  S/P TIPS (transjugular intrahepatic portosystemic shunt) S02.165  Closed dislocation of right hip ([address] S73.004A  Recurrent dislocation of hip joint prosthesis ([address] T84.029A, H91.075  Dislocation of internal right hip prosthesis (HCC) T84.020A Past Medical History:  
Diagnosis Date  ADD (attention deficit disorder)  Adverse effect of anesthesia   
 motion sickness resulting in loss of balance  Anemia due to blood loss  Hinson's esophagus with esophagitis  Benign essential tremor  Cancer [facility]  [facility]  Chronic pain  Cirrhosis ([address]  Depression  Dislocated hip ([address]  DJD (degenerative joint disease) of hip   
 bilat  DM (diabetes mellitus) ([address] 3/17/2010  ED (erectile dysfunction) of organic origin  Esophageal varices determined by endoscopy ([address]  Fall on or from sidewalk curb 2015  Femur fracture ([address]  Gastritis and duodenitis  GERD (gastroesophageal reflux disease)   
 resolved after discontinuing diclofenac  Hematuria 2016  High cholesterol 2010  
 pt denies   
 HTN (hypertension) 3/17/2010  Morbid obesity ([address]  Murmur, cardiac   
 PFO (patent foramen ovale) 2017  PUD (peptic ulcer disease)  Sepsis ([address]  Shingles 2016 RESOLVING- NO RASH (HEAD)  Sleep apnea   
 doesnt use CPAP anymore Family History Problem Relation Age of Onset  Cancer Father   
     multiple myeloma  Stroke Father  Dementia Mother  No Known Problems Brother  COPD Brother  No Known Problems Daughter  Cancer Maternal Grandmother COLON  
 No Known Problems Son  No Known Problems Son  Anesth Problems Neg Hx Social History Tobacco Use  Smoking status: Former Smoker Packs/day: 2.00 Years: 15.00 Pack years: 30.00 Last attempt to quit: 3/1/1979 Years since quittin.5  Smokeless tobacco: Never Used Substance Use Topics  Alcohol use: No  
  Alcohol/week: 0.0 standard drinks Past Surgical History:  
Procedure Laterality Date  COLONOSCOPY N/A 6/18/2019 COLONOSCOPY AND EGD performed by Shanique Morales MD at \[facility]\"" ENDOSCOPY  COLONOSCOPY,DIAGNOSTIC  6/18/2019  ENDOSCOPY, COLON, DIAGNOSTIC    
 HX CATARACT REMOVAL Bilateral   
 HX CHOLECYSTECTOMY  1995  HX GI  1980  
 gastroplasty [address]  HX HIP REPLACEMENT Left  HX ORTHOPAEDIC Right 2011  
 rot cuff repair  HX ORTHOPAEDIC  2016  
 left broken femur  HX ORTHOPAEDIC Right 08/13/2020 Attempt closed reduction of hip dislocation [address]  HX VASCULAR ACCESS    
 picc line and removed after sepsis resolved  IR INSERT TIPS HEPATIC SHUNT  3/28/2020 [facility] ARTHROPLASTY  05/2010  
 right  TOTAL HIP ARTHROPLASTY  08/01/2016  
 left  UPPER GI ENDOSCOPY,BIOPSY  6/18/2019 Prior to Admission medications Medication Sig Start Date End Date Taking? Authorizing Provider  
insulin glargine (Lantus Solostar U-100 Insulin) 100 unit/mL (3 mL) inpn 30 Units by SubCUTAneous route two (2) times a day. Yes Provider, Historical  
cholecalciferol (Vitamin D3) 25 mcg (1,000 unit) cap Take 1,000 Units by mouth daily. Yes Provider, Historical  
HYDROcodone-acetaminophen (Norco) 5-325 mg per tablet Take 1 Tab by mouth every six (6) hours as needed for Pain. Yes Provider, Historical  
tamsulosin (FLOMAX) 0.4 mg capsule Take 0.4 mg by mouth daily. Yes Provider, Historical  
melatonin 3 mg tablet Take 3 mg by mouth nightly as needed for Sleep. 8/12/20  Yes Provider, Historical  
spironolactone (Aldactone) 25 mg tablet Take 25 mg by mouth daily. 8/12/20  Yes Provider, Historical  
magnesium oxide 400 mg magnesium tab Take 1 Tab by mouth daily.  8/12/20  Yes Provider, Historical  
lactulose (CHRONULAC) 10 gram/15 mL solution Take 30 mL by mouth two (2) times a day. Yes Provider, Historical  
diclofenac (VOLTAREN) 1 % gel Apply 2 g to affected area four (4) times daily. Yes Provider, Historical  
oxyCODONE IR (ROXICODONE) 5 mg immediate release tablet Take 5 mg by mouth every four (4) hours as needed for Pain. Yes Provider, Historical  
furosemide (LASIX) 20 mg tablet Take 20 mg by mouth daily. Yes Provider, Historical  
Omeprazole delayed release (PRILOSEC D/R) 20 mg tablet Take 20 mg by mouth daily. Yes Provider, Historical  
apixaban (ELIQUIS) 2.5 mg tablet Take 1 Tab by mouth every twelve (12) hours every twelve (12) hours. Indications: deep vein thrombosis prevention 7/22/20  Yes Lamonte Bernheim, PA  
senna-docusate (PERICOLACE) 8.6-50 mg per tablet Take 1 Tab by mouth two (2) times a day. Indications: constipation 7/22/20  Yes Lamonte Bernheim, PA  
lisinopriL (PRINIVIL, ZESTRIL) 5 mg tablet Take 10 mg by mouth every morning. Yes Provider, Historical  
sertraline (Zoloft) 100 mg tablet Take 150 mg by mouth daily. Yes Provider, Historical  
insulin aspart U-100 (NovoLOG Flexpen U-100 Insulin) 100 unit/mL (3 mL) inpn 7 Units by SubCUTAneous route Before breakfast, lunch, and dinner for 90 days. 7 units 3 times daily 5/27/20 8/25/20 Yes Rock Bo MD  
rifAXIMin Margert Brenda) 550 mg tablet Take 1 Tab by mouth two (2) times a day. 4/23/20  Yes Carlitos Sena MD  
potassium chloride SR (KLOR-CON 10) 10 mEq tablet Take 10 mEq by mouth two (2) times a day. 4/18/20  Yes Provider, Historical  
thiamine hcl 500 mg tablet Take 500 mg by mouth daily. 4/6/20  Yes Marline Perez NP  
folic acid (FOLVITE) 1 mg tablet Take 1 Tab by mouth daily. 3/26/20  Yes Júnior Reese NP  
gabapentin (NEURONTIN) 300 mg capsule Take 2 Caps by mouth nightly. 3/16/20  Yes Rock Bo MD  
butalbital-acetaminophen-caffeine (FIORICET, ESGIC) -40 mg per tablet Take 1 Tab by mouth every six (6) hours as needed for Headache. 3/6/20  Yes Isabella Downs MD  
cephALEXin (KEFLEX) 500 mg capsule Take 1 Cap by mouth two (2) times a day. 20  Yes Isabella Downs MD  
ergocalciferol (VITAMIN D2) 50,000 unit capsule TAKE 1 CAPSULE EVERY 7 DAYS Patient taking differently: Take 50,000 Units by mouth every seven (7) days. Takes on 20  Yes Isabella Downs MD  
atorvastatin (LIPITOR) 40 mg tablet Take 1 Tab by mouth daily. 20  Yes Landy Sim MD  
primidone (MYSOLINE) 250 mg tablet TAKE 1 TABLET TWICE A DAY 19  Yes Isabella Downs MD  
acetaminophen (TYLENOL) 500 mg tablet Take 2 Tabs by mouth every six (6) hours as needed for Pain. 10/7/19  Yes Gracy Art MD  
B.infantis-B.ani-B.long-B.bifi (PROBIOTIC 4X) 10-15 mg TbEC Take 1 Tab by mouth daily. Yes Provider, Historical  
calcium citrate-vitamin D3 (CITRACAL + D) tablet Take 2 Tabs by mouth two (2) times a day. Yes Provider, Historical  
 
Allergies Allergen Reactions  Nsaids (Non-Steroidal Anti-Inflammatory Drug) Other (comments) Hx of PUD and Hinson's Esophagus Review of Systems:  A comprehensive review of systems was negative except for that written in the History of Present Illness. 10 point review of systems obtained . All other systems negative Objective:  
Blood pressure 109/54, pulse 73, temperature 99.9 °F (37.7 °C), resp. rate 18, height 5' 10.5\" (1.791 m), weight 260 lb 5.8 oz (118.1 kg), SpO2 97 %. Temp (24hrs), Av.8 °F (37.1 °C), Min:98.2 °F (36.8 °C), Max:99.9 °F (37.7 °C) Current Facility-Administered Medications Medication Dose Route Frequency  acetaminophen (TYLENOL) tablet 650 mg  650 mg Oral Q4H PRN  
 vancomycin (VANCOCIN) 1500 mg in  ml infusion  1,500 mg IntraVENous Q18H  
 furosemide (LASIX) tablet 20 mg  20 mg Oral DAILY  0.9% sodium chloride infusion 250 mL  250 mL IntraVENous PRN  
 0.9% sodium chloride infusion  75 mL/hr IntraVENous CONTINUOUS  
  cefepime (MAXIPIME) 1 g in 0.9% sodium chloride (MBP/ADV) 50 mL  1 g IntraVENous Q8H  
 hydrALAZINE (APRESOLINE) 20 mg/mL injection 10 mg  10 mg IntraVENous Q6H PRN  
 0.9% sodium chloride infusion 250 mL  250 mL IntraVENous PRN  
 [Held by provider] insulin glargine (LANTUS) injection 40 Units  40 Units SubCUTAneous QHS  insulin lispro (HUMALOG) injection 7 Units  7 Units SubCUTAneous TIDAC  
 0.9% sodium chloride infusion 250 mL  250 mL IntraVENous PRN  
 sodium chloride (NS) flush 5-40 mL  5-40 mL IntraVENous Q8H  
 sodium chloride (NS) flush 5-40 mL  5-40 mL IntraVENous PRN  
 oxyCODONE IR (ROXICODONE) tablet 5 mg  5 mg Oral Q3H PRN  
 naloxone (NARCAN) injection 0.4 mg  0.4 mg IntraVENous PRN  
 ondansetron (ZOFRAN ODT) tablet 4 mg  4 mg Oral Q6H PRN  
 diphenhydrAMINE (BENADRYL) 12.5 mg/5 mL oral liquid 12.5 mg  12.5 mg Oral Q6H PRN  
 senna-docusate (PERICOLACE) 8.6-50 mg per tablet 1 Tab  1 Tab Oral BID  polyethylene glycol (MIRALAX) packet 17 g  17 g Oral DAILY  bisacodyL (DULCOLAX) suppository 10 mg  10 mg Rectal DAILY PRN  
 apixaban (ELIQUIS) tablet 2.5 mg  2.5 mg Oral Q12H  
 oxyCODONE IR (ROXICODONE) tablet 5-10 mg  5-10 mg Oral Q3H PRN  
 atorvastatin (LIPITOR) tablet 40 mg  40 mg Oral DAILY  famotidine (PEPCID) tablet 20 mg  20 mg Oral BID  
 gabapentin (NEURONTIN) capsule 600 mg  600 mg Oral QHS  lactulose (CHRONULAC) 10 gram/15 mL solution 30 mL  30 mL Oral BID  [Held by provider] lisinopriL (PRINIVIL, ZESTRIL) tablet 10 mg  10 mg Oral 7am  
 [Held by provider] sertraline (ZOLOFT) tablet 150 mg  150 mg Oral DAILY  spironolactone (ALDACTONE) tablet 25 mg  25 mg Oral DAILY  insulin lispro (HUMALOG) injection   SubCUTAneous AC&HS  
 glucose chewable tablet 16 g  4 Tab Oral PRN  
 dextrose (D50W) injection syrg 12.5-25 g  12.5-25 g IntraVENous PRN  
 glucagon (GLUCAGEN) injection 1 mg  1 mg IntraMUSCular PRN  
  HYDROmorphone (PF) (DILAUDID) injection 0.5-1 mg  0.5-1 mg IntraVENous Q2H PRN  
 rifAXIMin (XIFAXAN) tablet 550 mg  550 mg Oral BID Exam:   
General:  Awake , appears confused Eyes:  Sclera anicteric. Pupils equally round and reactive to light. Mouth/Throat: Mucous membranes dry, oral pharynx clear Neck: Supple Lungs:   Reduced auscultation bilaterally, good effort CV:  Regular rate and rhythm,no murmur, click, rub or gallop Abdomen:   Soft, non-tender. bowel sounds normal. non-distended Extremities: No  edema Skin: Skin color, texture, turgor normal. no acute rash or lesions Lymph nodes: Cervical and supraclavicular normal  
Musculoskeletal: No swelling , R/hip dressing +, no erythema , no warmth to touch Lines/Devices:  Intact, no erythema, drainage or tenderness Psych: Awake  and orientedx 1, normal mood Data Review: CBC:  
Recent Labs  
  08/23/20 
0444 08/22/20 
0112 08/21/20 
0439 WBC 10.7 10.4 11.5*  
RBC 2.64* 2.55* 2.51* HGB 8.1* 7.7* 7.6* HCT 25.3* 24.2* 23.5*  
* 115* 105* GRANS 85* 69 71 LYMPH 7* 10* 9* EOS 3 6 5 CMP:  
Recent Labs  
  08/23/20 
0444 08/22/20 
0112 08/21/20 
9602 * 166* 143*  139 135* K 3.2* 3.5 3.6  109* 107 CO2 24 26 25 BUN 10 14 18 CREA 0.66* 0.71 0.83 CA 7.1* 7.0* 6.9* AGAP 6 4* 3*  
BUCR 15 20 22* Lab Results Component Value Date/Time Culture result: NO GROWTH 3 DAYS 08/20/2020 07:00 PM  
 Culture result: No growth (<1,000 CFU/ML) 08/17/2020 12:04 PM  
 Culture result: (A) 08/17/2020 11:33 AM  
  SERRATIA MARCESCENS GROWING IN ALL 4 BOTTLES DRAWN (SITES = L ARM AND R WRIST)  Culture result: MRSA NOT PRESENT 07/15/2020 06:50 AM  
 Culture result:  07/15/2020 06:50 AM  
      Screening of patient nares for MRSA is for surveillance purposes and, if positive, to facilitate isolation considerations in high risk settings. It is not intended for automatic decolonization interventions per se as regimens are not sufficiently effective to warrant routine use. XR Results (most recent): 
Results from [facility]  [facility] Encounter encounter on 08/13/20 XR CHEST PORT Narrative INDICATION: Fever. Portable AP semiupright view of the chest. 
 
Direct comparison made to prior chest x-ray dated August 13, 2020. Cardiomediastinal silhouette is stable. There is persistent mild pulmonary 
vascular prominence. No interstitial edema is visualized. There is no focal 
airspace consolidation. No pleural fluid is visualized. There is no 
pneumothorax. Impression IMPRESSION: Persistent, mild pulmonary vascular prominence. ICD-10-CM ICD-9-CM 1. Closed posterior dislocation of right hip, initial encounter ([facility]  D62.495U 835.01   
2. Fall from ground level  W18.30XA K254.3 3. Acute right hip pain  M25.551 719.45   
4. Uncontrolled type 2 diabetes mellitus with hyperglycemia (HCC)  E11.65 250.02   
5. Anemia of chronic disease  D63.8 285.29 Antibiotic History Cefepime IV  
 S/p Keflex po I have discussed the diagnosis with the patient and the intended plan as seen in the above orders. I have discussed medication side effects and warnings with the patient as well. Reviewed test results at length with patient Signed By: Jose Pan MD FACP

## 2020-08-20 NOTE — PROGRESS NOTES
Bedside and Verbal shift change report given to [address] [facility] Deepti (oncoming nurse) by Keshia Porras (offgoing nurse). Report included the following information SBAR, Kardex, Intake/Output, MAR and Recent Results.

## 2020-08-20 NOTE — PROGRESS NOTES
Problem: Self Care Deficits Care Plan (Adult) Goal: *Acute Goals and Plan of Care (Insert Text) Description: FUNCTIONAL STATUS PRIOR TO ADMISSION: Pt reports residing with son in 2 story home; however, resides on first floor. Pt reports Mod [place] with self-care routine at RW. Has walk-in shower with seated surface. HOME SUPPORT: The patient lives with son, who is currently working and able to provide PRN ADL/IADL assistance. Occupational Therapy Goals Initiated 8/19/2020 1. Patient will perform seated upper body dressing with supervision/set-up within 7 day(s). 2.  Patient will perform bathing, EOB/RW, with moderate assistance  within 7 day(s). 3.  Patient will perform lower body dressing, EOB/RW, with moderate assistance  within 7 day(s). 4.  Patient will perform toilet transfers to [facility] with moderate assistance  within 7 day(s). 5.  Patient will perform all aspects of toileting with moderate assistance  within 7 day(s). 6.  Patient will utilize energy conservation techniques during functional activities with Min verbal cues within 7 day(s). Outcome: Progressing Towards Goal 
 
OCCUPATIONAL THERAPY TREATMENT Patient: Miguel Hinson (62 y.o. male) Date: 8/20/2020 Diagnosis: Dislocation of internal right hip prosthesis ([facility] Phu Pilling Dislocation of internal right hip prosthesis ([facility] Procedure(s) (LRB): 
RIGHT HIP ARTHROPLASTY TOTAL REVISION POSTERIOR APPROACH AND ABDUCTOR RELEASE (Right) 6 Days Post-Op Precautions: WBAT, Fall(Posterior hip) Chart, occupational therapy assessment, plan of care, and goals were reviewed. ASSESSMENT Patient continues with skilled OT services and is progressing towards goals.   Pt noted with increased mentation this date, as well as, increased functional mobility/balance engagement, evidenced by ability to complete EOB to armchair with Moderate Ax2 and use of orbi turn for pivoting aspects. Pt continues to benefit from skilled OT to address functional deficits in an overall effort at maximizing pt's highest level of safe functional independence prior to discharge. Current Level of Function Impacting Discharge (ADLs): Max A Other factors to consider for discharge: Max A, no dynamic standing balance PLAN : 
Patient continues to benefit from skilled intervention to address the above impairments. Continue treatment per established plan of care. to address goals. Recommend with staff: Active engagement with bed level ADLs Recommend next OT session: POC progression Recommendation for discharge: (in order for the patient to meet his/her long term goals) Therapy up to 5 days/week in SNF setting This discharge recommendation: 
Has been made in collaboration with the attending provider and/or case management IF patient discharges home will need the following DME: TBD SUBJECTIVE:  
Patient stated I feel better today.  OBJECTIVE DATA SUMMARY:  
Cognitive/Behavioral Status: 
Neurologic State: Alert; Appropriate for age Orientation Level: Appropriate for age;Oriented X4 Cognition: Appropriate decision making; Follows commands Perception: Appears intact Perseveration: No perseveration noted Safety/Judgement: Insight into deficits; Awareness of environment Functional Mobility and Transfers for ADLs: 
Bed Mobility: 
Rolling: Maximum assistance;Assist x2; Additional time Supine to Sit: Maximum assistance;Assist x2; Additional time Scooting: Moderate assistance;Assist x2; Additional time Transfers: 
Sit to Stand: Moderate assistance;Assist x2; Additional time(with orbi turn) Bed to Chair: Moderate assistance;Assist x2; Additional time(with orbi turn) Balance: 
Sitting: Impaired; With support Sitting - Static: Fair (occasional) Sitting - Dynamic: Not tested Standing: Impaired; With support Standing - Static: Poor;Constant support Standing - Dynamic : (unable) ADL Intervention: 
  
Cognitive Retraining Safety/Judgement: Insight into deficits; Awareness of environment Pain: C/o R calf pain, did not quantify; nursing aware and following Activity Tolerance:  
Fair and requires rest breaks Please refer to the flowsheet for vital signs taken during this treatment. After treatment patient left in no apparent distress:  
Sitting in chair and Call bell within reach COMMUNICATION/COLLABORATION:  
The patients plan of care was discussed with: Physical therapist, Registered nurse, and Case management. Maki Bauer OT Time Calculation: 25 mins

## 2020-08-20 NOTE — PROGRESS NOTES
Attempted to see pt this pm and pt declined 2/2 to being tired and wanting to take a nap. Will continue to follow.

## 2020-08-20 NOTE — PROGRESS NOTES
Patient: Marlana Nissen MRN: [id number]  SSN: [id number] YOB: 1950  Age: 65 y.o. Sex: male ADMITTED: 2020 to Tara Montez MD by Crystal Lima MD for Dislocation of internal right hip prosthesis ([facility] [address] [facility] POD# 6 Days Post-Op Procedure(s): RIGHT HIP ARTHROPLASTY TOTAL REVISION POSTERIOR APPROACH AND ABDUCTOR RELEASE · Scrotal edema · Scrotal ecchymosis 
  
-Pt underwent hip surgery 6 days ago and developed scrotal edema, ecchymosis. On Eliquis for DVT prophylaxis. 
-Ultrasound ordered 2020. IMPRESSION: Scrotal wall edema with asymmetric extension into the spermatic cords. Normal testes. No abscess. cords. Normal testes. No abscess. Exam reveals right hip ecchymosis along with buttocks, right groin, and scrotum. No crepitus or induration felt w/ scrotal exam. Mild tenderness. Hoffman in place draining clear, yellow urine. Vitals: Temp (24hrs), Av.8 °F (37.1 °C), Min:98.5 °F (36.9 °C), Max:99 °F (37.2 °C) Blood pressure 107/51, pulse 67, temperature 98.5 °F (36.9 °C), resp. rate 18, height 5' 10.5\" (1.791 m), weight 118.1 kg (260 lb 5.8 oz), SpO2 95 %. Intake and Output: 
 1901 -  0700 In: 6467.5 [P.O.:960; I.V.:5507.5] Out: 1000 [Urine:1000] No intake/output data recorded. KAVYA Output lats 24 hrs: No data found. KAVYA Output last 8 hrs: No data found. BM over last 24 hrs:  
No data found. Exam: 
Appearance: Well-developed no acute distress Conjunctiva: Conjunctiva and lids normal 
External ears/nose: Normal no lesions or deformities Respiratory effort: Breathing easily Abdomen/flank: Soft, nontender, without masses, no CVA tenderness : scrotal edema, non tender Digits/nails: No clubbing cyanosis or petechiae Skin inspection: Warm and dry Skin palpation: No subcutaneous nodularity Sensation: No sensory deficits Judgment/Insight: intact Mood/Affect: normal 
 
Labs: CBC:  
Lab Results Component Value Date/Time WBC 8.5 08/20/2020 04:31 AM  
 HCT 22.9 (L) 08/20/2020 04:31 AM  
 PLATELET 97 (L) 03/06/2533 04:31 AM  
 
BMP:  
Lab Results Component Value Date/Time Glucose 145 (H) 08/20/2020 04:31 AM  
 Sodium 134 (L) 08/20/2020 04:31 AM  
 Potassium 3.8 08/20/2020 04:31 AM  
 Chloride 106 08/20/2020 04:31 AM  
 CO2 25 08/20/2020 04:31 AM  
 BUN 20 08/20/2020 04:31 AM  
 Creatinine 0.83 08/20/2020 04:31 AM  
 Calcium 6.8 (L) 08/20/2020 04:31 AM  
 
 
 
Imaging: Scrotal US  8/18/2020 IMPRESSION: Scrotal wall edema with asymmetric extension into the spermatic 
cords. Normal testes. No abscess. Assessment/Plan:  
 
POD# 6 Days Post-Op Procedure(s): RIGHT HIP ARTHROPLASTY TOTAL REVISION POSTERIOR APPROACH AND ABDUCTOR RELEASE · Scrotal edema · Scrotal ecchymosis Scrotal US IMPRESSION: Scrotal wall edema with asymmetric extension into the spermatic cords. Normal testes. No abscess. 
  
-Elevate scrotum w/ towel roll, continue use of ice pack in 15 min intervals 
-Hoffman to remain in place until ambulating, then void trial 
 
Signed By: Elise Peralta NP - August 20, 2020

## 2020-08-20 NOTE — PROGRESS NOTES
Comprehensive Nutrition Assessment Type and Reason for Visit: Initial, RD nutrition re-screen/LOS Nutrition Recommendations/Plan:  
· Continue CCD for BG management. Nutrition Assessment:    
8/20:  Chart reviewed; med noted for dislocation of internal right prosthesis. Hx of HTN, elevated chol, depression, DM, PUD, and cirrhosis with esophageal varices. PO intake adequate. LOS screen. No acute nutrition dx at this time. Patient Vitals for the past 72 hrs: 
 % Diet Eaten 08/19/20 1500 80 % 08/19/20 0946 50 % 08/18/20 1509 20 % 08/18/20 0900 100 % Last Weight Metric Weight Loss Metrics 8/18/2020 8/13/2020 7/20/2020 7/15/2020 7/3/2020 6/17/2020 5/18/2020 Today's Wt 260 lb 5.8 oz - 244 lb 0.8 oz 244 lb 0.8 oz 264 lb 8.8 oz 257 lb 260 lb BMI - 36.83 kg/m2 34.52 kg/m2 34.52 kg/m2 37.96 kg/m2 36.88 kg/m2 37.31 kg/m2 Estimated Daily Nutrient Needs: 
Energy (kcal):  2028 (BMR 1945 x 1. 3AF - 500 kcals) Protein (g):  1180 (1.0 g/kg bw) Fluid (ml/day):  2000 ml/day Wounds:   
Surgical wound Current Nutrition Therapies: DIET ONE TIME MESSAGE 
DIET DIABETIC WITH OPTIONS Consistent Carb 2200kcal; Regular Anthropometric Measures: 
· Height:  5' 10.5\" (179.1 cm) · Current Body Wt:  118.1 kg (260 lb 5.8 oz) · Ideal Body Wt:  169 lbs:  154.1 % · BMI Category:  Obese class 2 (BMI 35.0-39. 9) Nutrition Diagnosis: · No nutrition diagnosis at this time related to   as evidenced by   
 
Nutrition Interventions:  
Food and/or Nutrient Delivery: Continue current diet Nutrition Education and Counseling: No recommendations at this time Coordination of Nutrition Care: No recommendation at this time Goals: 
Maintain PO intake >50% of meals next 5-7 days Nutrition Monitoring and Evaluation:  
Food/Nutrient Intake Outcomes: Food and nutrient intake Physical Signs/Symptoms Outcomes: Biochemical data, Skin, Weight Discharge Planning:   
Continue current diet Electronically signed by Charleen Hanna RD on 8/20/2020 at 10:44 AM

## 2020-08-20 NOTE — PROGRESS NOTES
Problem: Mobility Impaired (Adult and Pediatric) Goal: *Acute Goals and Plan of Care (Insert Text) Description: FUNCTIONAL STATUS PRIOR TO ADMISSION: Patient was modified independent using a walker for functional mobility. HOME SUPPORT PRIOR TO ADMISSION: The patient lived with son. Physical Therapy Goals Goals reviewed and remain appropriate 8/19/2020 Initiated 8/14/2020 1. Patient will move from supine to sit and sit to supine  in bed with minimal assistance/contact guard assist within 7 days. 2. Patient will perform sit to stand with minimal assistance/contact guard assist within 7 days. 3. Patient will ambulate with minimal assistance/contact guard assist for 150 feet with the least restrictive device within 7 days. 4. Patient will ascend/descend 2 stairs with 1 handrail(s) with minimal assistance/contact guard assist within 7 days. 5. Patient will verbalize and demonstrate understanding of posterior precautions per protocol within 7 days. 6. Patient will perform all home exercise program per protocol with independence within 7 days. Outcome: Not Met PHYSICAL THERAPY TREATMENT Patient: Michael Multani (36 y.o. male) Date: 8/20/2020 Diagnosis: Dislocation of internal right hip prosthesis ([address] Selena Leaver Procedure(s) (LRB): RIGHT HIP ARTHROPLASTY TOTAL REVISION POSTERIOR APPROACH AND ABDUCTOR RELEASE (Right) 6 Days Post-Op Precautions: WBAT, Fall(Posterior hip) Chart, physical therapy assessment, plan of care and goals were reviewed. ASSESSMENT: Patient continues with skilled PT services and is progressing very slowly towards goals, was able to get pt to chair with orbi turn, pt was able to stand for a little over 5 min using orbi turn for support, ther-ex was limited to LLE, c/o pain in Rt calf, max vc's for safety.  
 
Current Level of Function Impacting Discharge (mobility/balance): mod->max assist x2 
    
 
 PLAN : Patient continues to benefit from skilled intervention to address the above impairments. Continue treatment per established plan of care 
to address goals. Recommendation for discharge: (in order for the patient to meet his/her long term goals) Therapy up to 5 days/week in SNF setting This discharge recommendation: Has been made in collaboration with the attending provider and/or case management IF patient discharges home will need the following DME: bedside commode and rolling walker OBJECTIVE DATA SUMMARY:  
 
Critical Behavior: 
Neurologic State: Alert, Appropriate for age Orientation Level: Appropriate for age, Oriented X4 Cognition: Appropriate decision making, Follows commands Safety/Judgement: Insight into deficits, Awareness of environment Functional Mobility Training: 
Bed Mobility: 
Rolling: Maximum assistance;Assist x2; Additional time Supine to Sit: Maximum assistance;Assist x2; Additional time Scooting: Moderate assistance;Assist x2; Additional time Level of Assistance: Maximum assistance Interventions: Manual cues; Tactile cues; Verbal cues Transfers: 
Sit to Stand: Moderate assistance;Assist x2; Additional time(with orbi turn) Stand to Sit: Minimum assistance;Assist x2; Additional time Bed to Chair: Moderate assistance;Assist x2; Additional time(with orbi turn) Interventions: Tactile cues; Verbal cues Level of Assistance: Moderate assistance;Assist x2; Additional time Balance: 
Sitting: Impaired; With support Sitting - Static: Fair (occasional) Sitting - Dynamic: Not tested Standing: Impaired; With support Standing - Static: Poor;Constant support Standing - Dynamic : (unable) Ambulation/Gait Training: unable Therapeutic Exercises:  
sitting EXERCISE Sets Reps Active Active Assist  
Passive Comments Ankle pumps 1 10 [x] [] [] LLE Heel raises 1 10 [x] [] [] \" Toe tap 1 10 [x] [] [] \" Knee ext 1 10 [x] [] [] \" Hip flex 1 10 [x] [] [] \" Activity Tolerance: Poor After treatment patient left in no apparent distress: Sitting in chair and Call bell within reach COMMUNICATION/COLLABORATION:  
The patients plan of care was discussed with: Registered nurse. Butch Gale PTA Time Calculation: 25 mins

## 2020-08-20 NOTE — PROGRESS NOTES
Orthopedic NP Progress Note Post Op day: 6 Days Post-Op August 20, 2020 10:48 AM  
 
Alex Westfall Attending Physician: Treatment Team: Attending Provider: Catalina Nugent MD; Consulting Provider: Laurinda Pallas; Consulting Provider: Saran Plunkett MD; Utilization Review: Loyd Faulkner RN; Care Manager: Joe Dodson Consulting Provider: Pj Gonzalez MD; Consulting Provider: Axel Ramos MD; Consulting Provider: Shankar Ho MD  
 
Vital Signs:   
Patient Vitals for the past 8 hrs: 
 BP Temp Pulse Resp SpO2 Height 08/20/20 1041      5' 10.5\" (1.791 m) 08/20/20 0800 107/51 98.5 °F (36.9 °C) 67 18 95 %   
08/20/20 0333 132/77 98.9 °F (37.2 °C) 77 18 97 %  BMI (calculated): 36.8 (08/18/20 0500) Intake/Output: 
No intake/output data recorded. 08/18 1901 - 08/20 0700 In: 6467.5 [P.O.:960; I.V.:5507.5] Out: 1000 [Urine:1000] Pain Control:  
Pain Assessment Pain Scale 1: Numeric (0 - 10) Pain Intensity 1: 7 Pain Onset 1: post op pain Pain Location 1: Hip Pain Orientation 1: Right Pain Description 1: Aching Pain Intervention(s) 1: Medication (see MAR) LAB:   
Recent Labs  
  08/20/20 
0431 HCT 22.9* HGB 7.4* Lab Results Component Value Date/Time Sodium 134 (L) 08/20/2020 04:31 AM  
 Potassium 3.8 08/20/2020 04:31 AM  
 Chloride 106 08/20/2020 04:31 AM  
 CO2 25 08/20/2020 04:31 AM  
 Glucose 145 (H) 08/20/2020 04:31 AM  
 BUN 20 08/20/2020 04:31 AM  
 Creatinine 0.83 08/20/2020 04:31 AM  
 Calcium 6.8 (L) 08/20/2020 04:31 AM  
 
 
Subjective:  Alex Westfall is a 65 y.o. male s/p a  Procedure(s): RIGHT HIP ARTHROPLASTY TOTAL REVISION POSTERIOR APPROACH AND ABDUCTOR RELEASE Procedure(s): RIGHT HIP ARTHROPLASTY TOTAL REVISION POSTERIOR APPROACH AND ABDUCTOR RELEASE. Tolerating diet. Pt awake and alert today - reading the paper. Pain is well managed Temp 99 yesterday, a febrile today. Objective: General: alert, cooperative, no distress. Neuro/Vascular: CNS Intact. Sensation stable. Brisk cap refill, + pulses UE/LE Musculoskeletal:  +ROM UE/LE. Skin: Incision - clean, dry and intact. No significant erythema or swelling. Dressing: clean, dry, and intact Zeng - y - per urology due scrotal edema Drain - n 
  
 
PT/OT:  
Gait:    
            
 
 
Assessment:  
 s/p Procedure(s): RIGHT HIP ARTHROPLASTY TOTAL REVISION POSTERIOR APPROACH AND ABDUCTOR RELEASE Principal Problem: 
  Dislocation of internal right hip prosthesis ([address] (8/13/2020) Plan:  
 
-  Continue PT/OT - WBAT  
-  Continue established methods of pain control -  VTE Prophylaxes - TEDS &/or SCDs - hold eliquis due to drainage, resume when cleared by medicine -  Medicine to managed elevated ammonia resolved, hx of cirrhosis -  Acute blood loss anemia - Hgb 7.4 today -  Dressing clean and dry at this time, change as needed, will monitor drainage serosanguinous per nursing last changed over night -  Scrotal edema - as per urology, Elevate scrotum w/ towel roll, continue use of ice pack in 15 min intervals and zeng to remain in place until ambulating, then void trial 
-  Holding on Covid test until medical stable for DC (test needs to be done 48 hours prior to DC to rehab), discussed with medicine - Dr. Aries Coats to discuss transfer to medicine service with Dr. Svetlana Varela. Discharge To:  Cleared for DC per ortho, pt will need medical clearance for DC 
 
Dr. Aries Coats aware and agree with plan. Signed By: Jessica Bonilla NP Orthopedic Nurse Practitioner

## 2020-08-21 ENCOUNTER — APPOINTMENT (OUTPATIENT)
Dept: NON INVASIVE DIAGNOSTICS | Age: 70
DRG: 466 | End: 2020-08-21
Attending: INTERNAL MEDICINE
Payer: MEDICARE

## 2020-08-21 LAB
ANION GAP SERPL CALC-SCNC: 3 MMOL/L (ref 5–15)
BASOPHILS # BLD: 0 K/UL (ref 0–0.1)
BASOPHILS NFR BLD: 0 % (ref 0–1)
BUN SERPL-MCNC: 18 MG/DL (ref 6–20)
BUN/CREAT SERPL: 22 (ref 12–20)
CALCIUM SERPL-MCNC: 6.9 MG/DL (ref 8.5–10.1)
CHLORIDE SERPL-SCNC: 107 MMOL/L (ref 97–108)
CO2 SERPL-SCNC: 25 MMOL/L (ref 21–32)
CREAT SERPL-MCNC: 0.83 MG/DL (ref 0.7–1.3)
DIFFERENTIAL METHOD BLD: ABNORMAL
ECHO AO ROOT DIAM: 2.91 CM
ECHO AV AREA PEAK VELOCITY: 0.84 CM2
ECHO AV AREA PEAK VELOCITY: 0.85 CM2
ECHO AV AREA PEAK VELOCITY: 1.03 CM2
ECHO AV AREA PEAK VELOCITY: 1.04 CM2
ECHO AV AREA VTI: 0.79 CM2
ECHO AV AREA/BSA VTI: 0.3 CM2/M2
ECHO AV MEAN GRADIENT: 19.18 MMHG
ECHO AV PEAK GRADIENT: 26.96 MMHG
ECHO AV PEAK GRADIENT: 36.32 MMHG
ECHO AV PEAK VELOCITY: 301.26 CM/S
ECHO AV VTI: 68.07 CM
ECHO EST RA PRESSURE: 10 MMHG
ECHO LA AREA 4C: 23.09 CM2
ECHO LA MAJOR AXIS: 3.89 CM
ECHO LA MINOR AXIS: 1.66 CM
ECHO LA TO AORTIC ROOT RATIO: 2.05
ECHO LA VOL 4C: 67.41 ML (ref 18–58)
ECHO LA VOLUME INDEX A4C: 28.72 ML/M2 (ref 16–28)
ECHO LV E' LATERAL VELOCITY: 12.59 CM/S
ECHO LV E' SEPTAL VELOCITY: 10.53 CM/S
ECHO LV INTERNAL DIMENSION DIASTOLIC: 4.95 CM (ref 4.2–5.9)
ECHO LV INTERNAL DIMENSION SYSTOLIC: 4.13 CM
ECHO LV IVSD: 1.55 CM (ref 0.6–1)
ECHO LV MASS 2D: 202.5 G (ref 88–224)
ECHO LV MASS INDEX 2D: 86.3 G/M2 (ref 49–115)
ECHO LV POSTERIOR WALL DIASTOLIC: 0.64 CM (ref 0.6–1)
ECHO LVOT CARDIAC OUTPUT: 3.55 LITER/MINUTE
ECHO LVOT DIAM: 1.61 CM
ECHO LVOT PEAK GRADIENT: 6.2 MMHG
ECHO LVOT PEAK GRADIENT: 6.39 MMHG
ECHO LVOT PEAK VELOCITY: 124.54 CM/S
ECHO LVOT PEAK VELOCITY: 126.43 CM/S
ECHO LVOT SV: 54.1 ML
ECHO LVOT VTI: 26.72 CM
ECHO MV A VELOCITY: 73.92 CM/S
ECHO MV AREA VTI: 1.14 CM2
ECHO MV E DECELERATION TIME (DT): 0.23 S
ECHO MV E VELOCITY: 130.52 CM/S
ECHO MV E/A RATIO: 1.77
ECHO MV E/E' LATERAL: 10.37
ECHO MV E/E' RATIO (AVERAGED): 11.38
ECHO MV E/E' SEPTAL: 12.4
ECHO MV MAX VELOCITY: 118.48 CM/S
ECHO MV MEAN GRADIENT: 2.27 MMHG
ECHO MV PEAK GRADIENT: 5.61 MMHG
ECHO MV REGURGITANT VTIA: 127.24 CM
ECHO MV VTI: 47.26 CM
ECHO PV REGURGITANT MAX VELOCITY: 245.7 CM/S
ECHO PV REGURGITANT MAX VELOCITY: 390 CM/S
ECHO RA AREA 4C: 14.9 CM2
ECHO RIGHT VENTRICULAR SYSTOLIC PRESSURE (RVSP): 51.45 MMHG
ECHO TV REGURGITANT MAX VELOCITY: 321.29 CM/S
ECHO TV REGURGITANT PEAK GRADIENT: 41.45 MMHG
EOSINOPHIL # BLD: 0.6 K/UL (ref 0–0.4)
EOSINOPHIL NFR BLD: 5 % (ref 0–7)
ERYTHROCYTE [DISTWIDTH] IN BLOOD BY AUTOMATED COUNT: 18.9 % (ref 11.5–14.5)
GLUCOSE BLD STRIP.AUTO-MCNC: 102 MG/DL (ref 65–100)
GLUCOSE BLD STRIP.AUTO-MCNC: 163 MG/DL (ref 65–100)
GLUCOSE BLD STRIP.AUTO-MCNC: 173 MG/DL (ref 65–100)
GLUCOSE BLD STRIP.AUTO-MCNC: 216 MG/DL (ref 65–100)
GLUCOSE SERPL-MCNC: 143 MG/DL (ref 65–100)
HCT VFR BLD AUTO: 23.5 % (ref 36.6–50.3)
HGB BLD-MCNC: 7.6 G/DL (ref 12.1–17)
IMM GRANULOCYTES # BLD AUTO: 0.7 K/UL (ref 0–0.04)
IMM GRANULOCYTES NFR BLD AUTO: 6 % (ref 0–0.5)
LVOT MG: 3.15 MMHG
LYMPHOCYTES # BLD: 1 K/UL (ref 0.8–3.5)
LYMPHOCYTES NFR BLD: 9 % (ref 12–49)
MCH RBC QN AUTO: 30.3 PG (ref 26–34)
MCHC RBC AUTO-ENTMCNC: 32.3 G/DL (ref 30–36.5)
MCV RBC AUTO: 93.6 FL (ref 80–99)
MONOCYTES # BLD: 1 K/UL (ref 0–1)
MONOCYTES NFR BLD: 9 % (ref 5–13)
NEUTS SEG # BLD: 8.2 K/UL (ref 1.8–8)
NEUTS SEG NFR BLD: 71 % (ref 32–75)
NRBC # BLD: 0 K/UL (ref 0–0.01)
NRBC BLD-RTO: 0 PER 100 WBC
PLATELET # BLD AUTO: 105 K/UL (ref 150–400)
PMV BLD AUTO: 10.6 FL (ref 8.9–12.9)
POTASSIUM SERPL-SCNC: 3.6 MMOL/L (ref 3.5–5.1)
RBC # BLD AUTO: 2.51 M/UL (ref 4.1–5.7)
RBC MORPH BLD: ABNORMAL
RBC MORPH BLD: ABNORMAL
SERVICE CMNT-IMP: ABNORMAL
SODIUM SERPL-SCNC: 135 MMOL/L (ref 136–145)
WBC # BLD AUTO: 11.5 K/UL (ref 4.1–11.1)
WBC MORPH BLD: ABNORMAL

## 2020-08-21 PROCEDURE — 85025 COMPLETE CBC W/AUTO DIFF WBC: CPT

## 2020-08-21 PROCEDURE — 74011250636 HC RX REV CODE- 250/636: Performed by: INTERNAL MEDICINE

## 2020-08-21 PROCEDURE — 74011250637 HC RX REV CODE- 250/637: Performed by: INTERNAL MEDICINE

## 2020-08-21 PROCEDURE — 97110 THERAPEUTIC EXERCISES: CPT

## 2020-08-21 PROCEDURE — 74011000258 HC RX REV CODE- 258: Performed by: INTERNAL MEDICINE

## 2020-08-21 PROCEDURE — 80048 BASIC METABOLIC PNL TOTAL CA: CPT

## 2020-08-21 PROCEDURE — 74011250637 HC RX REV CODE- 250/637: Performed by: PHYSICIAN ASSISTANT

## 2020-08-21 PROCEDURE — 93306 TTE W/DOPPLER COMPLETE: CPT

## 2020-08-21 PROCEDURE — 87635 SARS-COV-2 COVID-19 AMP PRB: CPT

## 2020-08-21 PROCEDURE — 99232 SBSQ HOSP IP/OBS MODERATE 35: CPT | Performed by: INTERNAL MEDICINE

## 2020-08-21 PROCEDURE — 97530 THERAPEUTIC ACTIVITIES: CPT

## 2020-08-21 PROCEDURE — 74011636637 HC RX REV CODE- 636/637: Performed by: INTERNAL MEDICINE

## 2020-08-21 PROCEDURE — 65270000029 HC RM PRIVATE

## 2020-08-21 PROCEDURE — 36415 COLL VENOUS BLD VENIPUNCTURE: CPT

## 2020-08-21 PROCEDURE — 82962 GLUCOSE BLOOD TEST: CPT

## 2020-08-21 RX ADMIN — INSULIN LISPRO 3 UNITS: 100 INJECTION, SOLUTION INTRAVENOUS; SUBCUTANEOUS at 22:33

## 2020-08-21 RX ADMIN — INSULIN LISPRO 7 UNITS: 100 INJECTION, SOLUTION INTRAVENOUS; SUBCUTANEOUS at 10:01

## 2020-08-21 RX ADMIN — VANCOMYCIN HYDROCHLORIDE 1500 MG: 10 INJECTION, POWDER, LYOPHILIZED, FOR SOLUTION INTRAVENOUS at 13:43

## 2020-08-21 RX ADMIN — INSULIN LISPRO 2 UNITS: 100 INJECTION, SOLUTION INTRAVENOUS; SUBCUTANEOUS at 13:45

## 2020-08-21 RX ADMIN — ACETAMINOPHEN 650 MG: 650 SUPPOSITORY RECTAL at 18:09

## 2020-08-21 RX ADMIN — RIFAXIMIN 550 MG: 550 TABLET ORAL at 18:09

## 2020-08-21 RX ADMIN — SODIUM CHLORIDE 75 ML/HR: 900 INJECTION, SOLUTION INTRAVENOUS at 20:28

## 2020-08-21 RX ADMIN — INSULIN LISPRO 7 UNITS: 100 INJECTION, SOLUTION INTRAVENOUS; SUBCUTANEOUS at 13:46

## 2020-08-21 RX ADMIN — CEFEPIME HYDROCHLORIDE 1 G: 1 INJECTION, POWDER, FOR SOLUTION INTRAMUSCULAR; INTRAVENOUS at 04:50

## 2020-08-21 RX ADMIN — INSULIN LISPRO 7 UNITS: 100 INJECTION, SOLUTION INTRAVENOUS; SUBCUTANEOUS at 18:09

## 2020-08-21 RX ADMIN — LACTULOSE 30 ML: 20 SOLUTION ORAL at 10:00

## 2020-08-21 RX ADMIN — Medication 20 ML: at 20:38

## 2020-08-21 RX ADMIN — FUROSEMIDE 20 MG: 20 TABLET ORAL at 10:00

## 2020-08-21 RX ADMIN — SPIRONOLACTONE 25 MG: 25 TABLET ORAL at 09:59

## 2020-08-21 RX ADMIN — ACETAMINOPHEN 650 MG: 650 SUPPOSITORY RECTAL at 05:58

## 2020-08-21 RX ADMIN — POLYETHYLENE GLYCOL 3350 17 G: 17 POWDER, FOR SOLUTION ORAL at 10:00

## 2020-08-21 RX ADMIN — DOCUSATE SODIUM - SENNOSIDES 1 TABLET: 50; 8.6 TABLET, FILM COATED ORAL at 09:59

## 2020-08-21 RX ADMIN — FAMOTIDINE 20 MG: 20 TABLET, FILM COATED ORAL at 09:59

## 2020-08-21 RX ADMIN — LACTULOSE 30 ML: 20 SOLUTION ORAL at 18:09

## 2020-08-21 RX ADMIN — FAMOTIDINE 20 MG: 20 TABLET, FILM COATED ORAL at 18:09

## 2020-08-21 RX ADMIN — VANCOMYCIN HYDROCHLORIDE 1500 MG: 10 INJECTION, POWDER, LYOPHILIZED, FOR SOLUTION INTRAVENOUS at 02:12

## 2020-08-21 RX ADMIN — SODIUM CHLORIDE 75 ML/HR: 900 INJECTION, SOLUTION INTRAVENOUS at 13:50

## 2020-08-21 RX ADMIN — OXYCODONE 10 MG: 5 TABLET ORAL at 13:47

## 2020-08-21 RX ADMIN — RIFAXIMIN 550 MG: 550 TABLET ORAL at 09:59

## 2020-08-21 RX ADMIN — ATORVASTATIN CALCIUM 40 MG: 40 TABLET, FILM COATED ORAL at 10:00

## 2020-08-21 RX ADMIN — OXYCODONE 10 MG: 5 TABLET ORAL at 20:33

## 2020-08-21 RX ADMIN — CEFEPIME HYDROCHLORIDE 1 G: 1 INJECTION, POWDER, FOR SOLUTION INTRAMUSCULAR; INTRAVENOUS at 20:34

## 2020-08-21 RX ADMIN — CEFEPIME HYDROCHLORIDE 1 G: 1 INJECTION, POWDER, FOR SOLUTION INTRAMUSCULAR; INTRAVENOUS at 13:51

## 2020-08-21 RX ADMIN — OXYCODONE 10 MG: 5 TABLET ORAL at 05:57

## 2020-08-21 RX ADMIN — GABAPENTIN 600 MG: 300 CAPSULE ORAL at 22:32

## 2020-08-21 RX ADMIN — Medication 10 ML: at 13:51

## 2020-08-21 RX ADMIN — Medication 20 ML: at 22:32

## 2020-08-21 RX ADMIN — OXYCODONE 10 MG: 5 TABLET ORAL at 09:59

## 2020-08-21 RX ADMIN — APIXABAN 2.5 MG: 2.5 TABLET, FILM COATED ORAL at 09:59

## 2020-08-21 RX ADMIN — ACETAMINOPHEN 650 MG: 650 SUPPOSITORY RECTAL at 13:46

## 2020-08-21 RX ADMIN — DOCUSATE SODIUM - SENNOSIDES 1 TABLET: 50; 8.6 TABLET, FILM COATED ORAL at 18:09

## 2020-08-21 RX ADMIN — APIXABAN 2.5 MG: 2.5 TABLET, FILM COATED ORAL at 20:29

## 2020-08-21 RX ADMIN — INSULIN LISPRO 2 UNITS: 100 INJECTION, SOLUTION INTRAVENOUS; SUBCUTANEOUS at 10:00

## 2020-08-21 RX ADMIN — Medication 10 ML: at 05:58

## 2020-08-21 NOTE — PROGRESS NOTES
Problem: Self Care Deficits Care Plan (Adult) Goal: *Acute Goals and Plan of Care (Insert Text) Description: FUNCTIONAL STATUS PRIOR TO ADMISSION: Pt reports residing with son in 2 story home; however, resides on first floor. Pt reports Mod [place] with self-care routine at RW. Has walk-in shower with seated surface. HOME SUPPORT: The patient lives with son, who is currently working and able to provide PRN ADL/IADL assistance. Occupational Therapy Goals Initiated 8/19/2020 1. Patient will perform seated upper body dressing with supervision/set-up within 7 day(s). 2.  Patient will perform bathing, EOB/RW, with moderate assistance  within 7 day(s). 3.  Patient will perform lower body dressing, EOB/RW, with moderate assistance  within 7 day(s). 4.  Patient will perform toilet transfers to [facility] with moderate assistance  within 7 day(s). 5.  Patient will perform all aspects of toileting with moderate assistance  within 7 day(s). 6.  Patient will utilize energy conservation techniques during functional activities with Min verbal cues within 7 day(s). 
 
8/21/2020 1541 by ANGIE Mccord/L Outcome: Progressing Towards Goal 
OCCUPATIONAL THERAPY TREATMENT Patient: Yamileth Minor (4 y.o. male) Date: 8/21/2020 Diagnosis: Dislocation of internal right hip prosthesis ([facility] [address] Eugenias Neeru Dislocation of internal right hip prosthesis ([facility] [address] Procedure(s) (LRB): 
RIGHT HIP ARTHROPLASTY TOTAL REVISION POSTERIOR APPROACH AND ABDUCTOR RELEASE (Right) 7 Days Post-Op Precautions: WBAT, Fall(Posterior hip) Chart, occupational therapy assessment, plan of care, and goals were reviewed. ASSESSMENT Patient continues with skilled OT services and is slowly progressing towards goals. Pt declined functional transfer trial this date as he just completed afternoon PT session and reported feeling exhausted.  Pt agreeable to complete seated UB strengthening seated in chair to maximize independence with functional transfers. Issued HEP with Red Theraband. Pt requires multimodal cues throughout for technique and isolating proper muscle groups. Would benefit from continued practice focusing on tricep strengthening. Continue to recommend SNF rehab given activity tolerance and x2 assist for mobility. Current Level of Function Impacting Discharge (ADLs): Indep to Total A with ADLs; Mod Assist x2 using orbi-turn Other factors to consider for discharge: Mod I using RW at baseline; Fall Risk PLAN : 
Patient continues to benefit from skilled intervention to address the above impairments. Continue treatment per established plan of care. to address goals. Recommend next OT session: Continue HEP, add focus on tricep strengthening; LB ADLs using AE Recommendation for discharge: (in order for the patient to meet his/her long term goals) Therapy up to 5 days/week in SNF setting This discharge recommendation: 
Has not yet been discussed the attending provider and/or case management IF patient discharges home will need the following DME: AE: long handled bathing and AE: long handled dressing SUBJECTIVE:  
Patient stated It depends on what it is. . I'm exhausted\" OBJECTIVE DATA SUMMARY:  
 
ADL Intervention: Pt provided excellent return demo of UB dressing simulation donning items overhead despite decreased shoulder AROM. Upper Body Dressing Assistance Shirt simulation with hospital gown: Minimum  assistance(d/t decreased b/l shoulder AROM) Therapeutic Exercises:  
Patient instructed and demonstrated therapeutic exercise shoulder all planes 2 reps 10 sets (Red Theraband) to increase functional transfers and independence with ADLs with Mod A for technique and isolating proper muscle groups. Patient provided with written and picture handout of exercise program.    
 
Pain: 
None reported Activity Tolerance:  
Fair Please refer to the flowsheet for vital signs taken during this treatment. After treatment patient left in no apparent distress:  
Sitting in chair and Call bell within reach COMMUNICATION/COLLABORATION:  
The patients plan of care was discussed with: Registered nurse and patient . Tera Torres OTR/L Time Calculation: 27 mins

## 2020-08-21 NOTE — PROGRESS NOTES
Problem: Mobility Impaired (Adult and Pediatric) Goal: *Acute Goals and Plan of Care (Insert Text) Description: FUNCTIONAL STATUS PRIOR TO ADMISSION: Patient was modified independent using a walker for functional mobility. HOME SUPPORT PRIOR TO ADMISSION: The patient lived with son. Physical Therapy Goals Goals reviewed and remain appropriate 8/19/2020 Initiated 8/14/2020 1. Patient will move from supine to sit and sit to supine  in bed with minimal assistance/contact guard assist within 7 days. 2. Patient will perform sit to stand with minimal assistance/contact guard assist within 7 days. 3. Patient will ambulate with minimal assistance/contact guard assist for 150 feet with the least restrictive device within 7 days. 4. Patient will ascend/descend 2 stairs with 1 handrail(s) with minimal assistance/contact guard assist within 7 days. 5. Patient will verbalize and demonstrate understanding of posterior precautions per protocol within 7 days. 6. Patient will perform all home exercise program per protocol with independence within 7 days. Outcome: Not Met PHYSICAL THERAPY TREATMENT Patient: Jameson Ramos (12 y.o. male) Date: 8/21/2020 Diagnosis: Dislocation of internal right hip prosthesis ([address] Marianna Hill Procedure(s) (LRB): RIGHT HIP ARTHROPLASTY TOTAL REVISION POSTERIOR APPROACH AND ABDUCTOR RELEASE (Right) 7 Days Post-Op Precautions: WBAT, Fall(Posterior hip) Chart, physical therapy assessment, plan of care and goals were reviewed. ASSESSMENT: Patient continues with skilled PT services and is progressing towards goals, pt continues to do well with orbi-turn transfers, is able to stand for 5 min before having to sit, RLE is improving but still very limited, max vc's for safety. Current Level of Function Impacting Discharge (mobility/balance): max assist x2 PLAN : Patient continues to benefit from skilled intervention to address the above impairments. Continue treatment per established plan of care 
to address goals. Recommendation for discharge: (in order for the patient to meet his/her long term goals) Therapy up to 5 days/week in SNF setting This discharge recommendation: Has been made in collaboration with the attending provider and/or case management IF patient discharges home will need the following DME: bedside commode, mechanical lift, rolling walker, and wheelchair OBJECTIVE DATA SUMMARY:  
 
Critical Behavior: 
Neurologic State: Alert Orientation Level: Oriented X4 Cognition: Follows commands Safety/Judgement: Insight into deficits, Awareness of environment Functional Mobility Training: 
Bed Mobility: 
Rolling: Maximum assistance; Additional time;Assist x2 Supine to Sit: Maximum assistance;Assist x2; Additional time Scooting: Maximum assistance;Assist x1;Additional time Level of Assistance: Maximum assistance Interventions: Manual cues; Tactile cues; Verbal cues Transfers: 
Sit to Stand: Moderate assistance;Assist x2; Additional time(with orbi-yurn) Stand to Sit: Minimum assistance;Assist x2 Bed to Chair: Moderate assistance;Assist x2; Additional time(with orbi-turn) Interventions: Manual cues; Tactile cues; Verbal cues Level of Assistance: Moderate assistance;Assist x2; Additional time Balance: 
Sitting: Impaired; With support Sitting - Static: Fair (occasional); Prop sitting Sitting - Dynamic: Not tested Standing: Impaired; With support Standing - Static: Poor;Constant support Standing - Dynamic : (unable) Ambulation/Gait Training: unable Therapeutic Exercises:  
sitting EXERCISE Sets Reps Active Active Assist  
Passive Comments Ankle pumps 1 10 [x] [] [] LLE Heel raises 1 10 [x] [] [] \" Toe tap 1 10 [x] [] [] \" Knee ext 1 10 [x] [] [] bilat Hip flex 1 10 [x] [] [] \"  
 
Pain Ratin Activity Tolerance: Poor After treatment patient left in no apparent distress: Sitting in chair and Call bell within reach COMMUNICATION/COLLABORATION:  
The patients plan of care was discussed with: Registered nurse. Bandar Gordon PTA Time Calculation: 30 mins

## 2020-08-21 NOTE — PROGRESS NOTES
Bedside and Verbal shift change report given to MARK Zapata (oncoming nurse) by Marty Platt (offgoing nurse). Report included the following information SBAR, Kardex, OR Summary, Procedure Summary, Intake/Output, MAR, Recent Results and Med Rec Status.

## 2020-08-21 NOTE — PROGRESS NOTES
Patient: Vida Hargrove MRN: [id number]  SSN: [id number] YOB: 1950  Age: 65 y.o. Sex: male ADMITTED: 2020 to Steven Lewis MD by Lyssa Gomez MD for Dislocation of internal right hip prosthesis [facility]) [facility] POD# 7 Days Post-Op Procedure(s): RIGHT HIP ARTHROPLASTY TOTAL REVISION POSTERIOR APPROACH AND ABDUCTOR RELEASE 
-[address]: Growing serratia in the blood. On Vanc/Maxipime · Scrotal edema · Scrotal ecchymosis 
  
-Pt underwent hip surgery 7 days ago and developed scrotal edema, ecchymosis. On Eliquis for DVT prophylaxis. 
-Ultrasound ordered 2020. IMPRESSION: Scrotal wall edema with asymmetric extension into the spermatic cords. Normal testes. No abscess. cords. Normal testes. No abscess. Exam reveals slight improvement in right hip ecchymosis along with buttocks, right groin, and scrotum. No crepitus or induration felt w/ scrotal exam. Mild tenderness. Hoffman in place draining clear, yellow urine. Vitals: Temp (24hrs), Av.6 °F (37 °C), Min:98.2 °F (36.8 °C), Max:99.6 °F (37.6 °C) Blood pressure 119/55, pulse 66, temperature 98.2 °F (36.8 °C), resp. rate 18, height 5' 10.5\" (1.791 m), weight 118.1 kg (260 lb 5.8 oz), SpO2 97 %. Intake and Output: 
 1901 -  0700 In: 4404 [P.O.:720; I.V.:4265] Out: 750 [Urine:750] No intake/output data recorded. KAVYA Output lats 24 hrs: No data found. KAVYA Output last 8 hrs: No data found. BM over last 24 hrs:  
No data found. Exam: 
Appearance: Well-developed no acute distress Conjunctiva: Conjunctiva and lids normal 
External ears/nose: Normal no lesions or deformities Respiratory effort: Breathing easily Abdomen/flank: Soft, nontender, without masses, no CVA tenderness : scrotal edema, non tender Digits/nails: No clubbing cyanosis or petechiae Skin inspection: Warm and dry Skin palpation: No subcutaneous nodularity Sensation: No sensory deficits Judgment/Insight: intact Mood/Affect: normal 
 
Labs: CBC:  
Lab Results Component Value Date/Time WBC 11.5 (H) 08/21/2020 04:39 AM  
 HCT 23.5 (L) 08/21/2020 04:39 AM  
 PLATELET 152 (L) 62/16/5247 04:39 AM  
 
BMP:  
Lab Results Component Value Date/Time Glucose 143 (H) 08/21/2020 04:39 AM  
 Sodium 135 (L) 08/21/2020 04:39 AM  
 Potassium 3.6 08/21/2020 04:39 AM  
 Chloride 107 08/21/2020 04:39 AM  
 CO2 25 08/21/2020 04:39 AM  
 BUN 18 08/21/2020 04:39 AM  
 Creatinine 0.83 08/21/2020 04:39 AM  
 Calcium 6.9 (L) 08/21/2020 04:39 AM  
 
 
 
Imaging: Scrotal US  8/18/2020 IMPRESSION: Scrotal wall edema with asymmetric extension into the spermatic 
cords. Normal testes. No abscess. Assessment/Plan:  
 
POD# 7 Days Post-Op Procedure(s): RIGHT HIP ARTHROPLASTY TOTAL REVISION POSTERIOR APPROACH AND ABDUCTOR RELEASE · Scrotal edema · Scrotal ecchymosis Scrotal US IMPRESSION: Scrotal wall edema with asymmetric extension into the spermatic cords. Normal testes. No abscess. 
  
-Elevate scrotum w/ towel roll, continue use of ice pack in 15 min intervals 
-Hoffman to remain in place until ambulating, or at least able to sit on side of bed w/ urinal. Penile edema may cause an issue if replacement of catheter is necessary. Okay to perform VT @ rehab facility. 
-Thank you for allowing us to participate in Mr. Das's care. Please consult Urology if further services are needed. Signed By: Alee Davis NP - August 21, 2020

## 2020-08-21 NOTE — PROGRESS NOTES
Bedside and Verbal shift change report given to [address] (oncoming nurse) by Margrett Sicard RN (offgoing nurse). Report included the following information SBAR, Kardex, OR Summary, Procedure Summary, Intake/Output, MAR and Recent Results.

## 2020-08-21 NOTE — PROGRESS NOTES
Hospitalist Progress Note NAME: Colten Brian :  1950 MRN:  [id number] Assessment / Plan: 
Sepsis   with fevers to 101.9, 110, 36 Metabolic encephalopathy likey fever, infection 
  
Continue Empiric vanco and cefepime Blood cx growing serratia ID consult Echo show normal EF no vegetation Repeat blood cx  
  
DM type 2 POA uncont with hyperglycemia Lantus 30 units QHS increase to 40 units Premeal humalog 5  (PTA 8units), increase to 7 units(hold if not eating) , 217, 148, 155 
  
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 
  
Anemia acute blood loss, HG 7.5 S/p 3 units pRBCs Resume  eliquis case was discussed with ortho and they are clear about resume eliquis No reported gi bleeding per NS Orthopedics to check the wound F/u fecal occult blood stool  
  
BREWER cirrhosis Hx Esophageal varices, s/p TIPS 
-continue PTA  lactulose and xifaxam 
  
Essential HTN 
hold lisinopril with bump in Cr, creatinine improved BP okay 
  
JACOB Does not use CPAP 
  
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 
- unable to reduce hip 
-S/P RIGHT HIP ARTHROPLASTY TOTAL REVISION POSTERIOR APPROACH AND ADDUCTOR RELEASE, SCAR REVISION 2020 
-Eliquis for DVT prophylaxis  
-activity per Ortho 
  
  
30.0 - 39.9 Obese / Body mass index is 36.83 kg/m². 
  
Code status: Full Prophylaxis: Eliquis  
Recommended Disposition: SNF/LTC 
  
 
  
 
Subjective: Chief Complaint / Reason for Physician Visit Discussed with RN events overnight. More alert , HG stable , blood culture repeated Review of Systems: 
Symptom Y/N Comments  Symptom Y/N Comments Fever/Chills n   Chest Pain n   
Poor Appetite n   Edema n Cough n   Abdominal Pain n   
Sputum n   Joint Pain y   
SOB/WANG n   Pruritis/Rash Nausea/vomit n   Tolerating PT/OT Diarrhea n   Tolerating Diet Constipation n   Other Could NOT obtain due to:   
 
Objective: VITALS:  
Last 24hrs VS reviewed since prior progress note. Most recent are: 
Patient Vitals for the past 24 hrs: 
 Temp Pulse Resp BP SpO2  
08/21/20 1139 97.4 °F (36.3 °C) 70 18 117/67 98 % 08/21/20 0811 98.2 °F (36.8 °C) 66 18 119/55 97 % 08/21/20 0428 98.4 °F (36.9 °C) 69 18 123/63 97 % 08/20/20 2153 98.2 °F (36.8 °C) 66 18 111/46 95 % 08/20/20 1600 99.6 °F (37.6 °C) 86 18 142/46 95 % Intake/Output Summary (Last 24 hours) at 8/21/2020 1417 Last data filed at 8/20/2020 1611 Gross per 24 hour Intake 2020 ml Output 750 ml Net 1270 ml PHYSICAL EXAM: 
General: WD, WN. Alert, cooperative, no acute distress   
EENT:  EOMI. Anicteric sclerae. MMM Resp:  CTA bilaterally, no wheezing or rales. No accessory muscle use CV:  Regular  rhythm,  No edema GI:  Soft, Non distended, Non tender.  +Bowel sounds Neurologic:  Alert and oriented X 3, normal speech, Psych:   Good insight. Not anxious nor agitated Skin:  No rashes. No jaundice Reviewed most current lab test results and cultures  YES Reviewed most current radiology test results   YES Review and summation of old records today    NO Reviewed patient's current orders and MAR    YES 
PMH/SH reviewed - no change compared to H&P 
________________________________________________________________________ Care Plan discussed with: 
  Comments Patient y Family RN y   
Care Manager y Consultant     
                 y Multidiciplinary team rounds were held today with , nursing, pharmacist and clinical coordinator. Patient's plan of care was discussed; medications were reviewed and discharge planning was addressed. ________________________________________________________________________ Total NON critical care TIME:  35   Minutes Total CRITICAL CARE TIME Spent:   Minutes non procedure based Comments >50% of visit spent in counseling and coordination of care y   
________________________________________________________________________ Magnus Knowles MD  
 
Procedures: see electronic medical records for all procedures/Xrays and details which were not copied into this note but were reviewed prior to creation of Plan. LABS: 
I reviewed today's most current labs and imaging studies. Pertinent labs include: 
Recent Labs  
  08/21/20 
0439 08/20/20 
0431 08/19/20 
0129 WBC 11.5* 8.5 8.0 HGB 7.6* 7.4* 7.5* HCT 23.5* 22.9* 21.7*  
* 97* 138* Recent Labs  
  08/21/20 
0439 08/20/20 
0431 08/19/20 
0129 * 134* 135* K 3.6 3.8 3.7  106 108 CO2 25 25 27 * 145* 155* BUN 18 20 23* CREA 0.83 0.83 0.89 CA 6.9* 6.8* 6.7* Signed:  Magnus Knowles MD

## 2020-08-21 NOTE — PROGRESS NOTES
Pain managed and mental stats improved. growing serratia in the blood. ID consulted. Still lots of fluid on board HGB 7.6 this am 
 
Patient Vitals for the past 24 hrs: 
 Temp Pulse Resp BP SpO2  
08/21/20 0811 98.2 °F (36.8 °C) 66 18 119/55 97 % 08/21/20 0428 98.4 °F (36.9 °C) 69 18 123/63 97 % 08/20/20 2153 98.2 °F (36.8 °C) 66 18 111/46 95 % 08/20/20 1600 99.6 °F (37.6 °C) 86 18 142/46 95 % 08/20/20 1246 98.5 °F (36.9 °C) 83 20 140/88 96 % Dressing clean and dry this morning Right foot moving a little more. (Chronic footdrop from previous hip revision) Musculoskeletal: Allison's sign negative in bilateral lower extremities. Calves soft, supple, non-tender upon palpation or with passive stretch. [facility] Ok to [employer] from ortho standpoint

## 2020-08-21 NOTE — PROGRESS NOTES
Bedside and Verbal shift change report given to Anayeli Haynes (oncoming nurse) by Mae Villar (offgoing nurse). Report included the following information SBAR, Kardex, Intake/Output, MAR and Recent Results.

## 2020-08-21 NOTE — PROGRESS NOTES
Bedside and Verbal shift change report given to Ricardo Crowley RN (oncoming nurse) by Gracie Ocasio (offgoing nurse). Report included the following information SBAR, Kardex, Procedure Summary, Intake/Output, MAR, Accordion and Recent Results.

## 2020-08-22 LAB
ANION GAP SERPL CALC-SCNC: 4 MMOL/L (ref 5–15)
BASOPHILS # BLD: 0.1 K/UL (ref 0–0.1)
BASOPHILS NFR BLD: 1 % (ref 0–1)
BUN SERPL-MCNC: 14 MG/DL (ref 6–20)
BUN/CREAT SERPL: 20 (ref 12–20)
CALCIUM SERPL-MCNC: 7 MG/DL (ref 8.5–10.1)
CHLORIDE SERPL-SCNC: 109 MMOL/L (ref 97–108)
CO2 SERPL-SCNC: 26 MMOL/L (ref 21–32)
COVID-19, XGCOVT: NOT DETECTED
CREAT SERPL-MCNC: 0.71 MG/DL (ref 0.7–1.3)
DATE LAST DOSE: ABNORMAL
DIFFERENTIAL METHOD BLD: ABNORMAL
EOSINOPHIL # BLD: 0.6 K/UL (ref 0–0.4)
EOSINOPHIL NFR BLD: 6 % (ref 0–7)
ERYTHROCYTE [DISTWIDTH] IN BLOOD BY AUTOMATED COUNT: 19.3 % (ref 11.5–14.5)
GLUCOSE BLD STRIP.AUTO-MCNC: 146 MG/DL (ref 65–100)
GLUCOSE BLD STRIP.AUTO-MCNC: 187 MG/DL (ref 65–100)
GLUCOSE BLD STRIP.AUTO-MCNC: 213 MG/DL (ref 65–100)
GLUCOSE BLD STRIP.AUTO-MCNC: 288 MG/DL (ref 65–100)
GLUCOSE SERPL-MCNC: 166 MG/DL (ref 65–100)
HCT VFR BLD AUTO: 24.2 % (ref 36.6–50.3)
HEALTH STATUS, XMCV2T: NORMAL
HGB BLD-MCNC: 7.7 G/DL (ref 12.1–17)
IMM GRANULOCYTES # BLD AUTO: 0.5 K/UL (ref 0–0.04)
IMM GRANULOCYTES NFR BLD AUTO: 5 % (ref 0–0.5)
LYMPHOCYTES # BLD: 1 K/UL (ref 0.8–3.5)
LYMPHOCYTES NFR BLD: 10 % (ref 12–49)
MCH RBC QN AUTO: 30.2 PG (ref 26–34)
MCHC RBC AUTO-ENTMCNC: 31.8 G/DL (ref 30–36.5)
MCV RBC AUTO: 94.9 FL (ref 80–99)
MONOCYTES # BLD: 0.9 K/UL (ref 0–1)
MONOCYTES NFR BLD: 9 % (ref 5–13)
NEUTS SEG # BLD: 7.3 K/UL (ref 1.8–8)
NEUTS SEG NFR BLD: 69 % (ref 32–75)
NRBC # BLD: 0 K/UL (ref 0–0.01)
NRBC BLD-RTO: 0 PER 100 WBC
PLATELET # BLD AUTO: 115 K/UL (ref 150–400)
PMV BLD AUTO: 10.9 FL (ref 8.9–12.9)
POTASSIUM SERPL-SCNC: 3.5 MMOL/L (ref 3.5–5.1)
RBC # BLD AUTO: 2.55 M/UL (ref 4.1–5.7)
RBC MORPH BLD: ABNORMAL
REPORTED DOSE,DOSE: ABNORMAL UNITS
REPORTED DOSE/TIME,TMG: 1400
SERVICE CMNT-IMP: ABNORMAL
SODIUM SERPL-SCNC: 139 MMOL/L (ref 136–145)
SOURCE, COVRS: NORMAL
SPECIMEN SOURCE, FCOV2M: NORMAL
SPECIMEN TYPE, XMCV1T: NORMAL
VANCOMYCIN TROUGH SERPL-MCNC: 26.5 UG/ML (ref 5–10)
WBC # BLD AUTO: 10.4 K/UL (ref 4.1–11.1)

## 2020-08-22 PROCEDURE — 74011250636 HC RX REV CODE- 250/636: Performed by: INTERNAL MEDICINE

## 2020-08-22 PROCEDURE — 74011250637 HC RX REV CODE- 250/637: Performed by: INTERNAL MEDICINE

## 2020-08-22 PROCEDURE — 80202 ASSAY OF VANCOMYCIN: CPT

## 2020-08-22 PROCEDURE — 74011636637 HC RX REV CODE- 636/637: Performed by: INTERNAL MEDICINE

## 2020-08-22 PROCEDURE — 85025 COMPLETE CBC W/AUTO DIFF WBC: CPT

## 2020-08-22 PROCEDURE — 82962 GLUCOSE BLOOD TEST: CPT

## 2020-08-22 PROCEDURE — 74011250637 HC RX REV CODE- 250/637: Performed by: PHYSICIAN ASSISTANT

## 2020-08-22 PROCEDURE — 97530 THERAPEUTIC ACTIVITIES: CPT

## 2020-08-22 PROCEDURE — 65270000029 HC RM PRIVATE

## 2020-08-22 PROCEDURE — 36415 COLL VENOUS BLD VENIPUNCTURE: CPT

## 2020-08-22 PROCEDURE — 80048 BASIC METABOLIC PNL TOTAL CA: CPT

## 2020-08-22 PROCEDURE — 74011000258 HC RX REV CODE- 258: Performed by: INTERNAL MEDICINE

## 2020-08-22 PROCEDURE — 94760 N-INVAS EAR/PLS OXIMETRY 1: CPT

## 2020-08-22 PROCEDURE — 97110 THERAPEUTIC EXERCISES: CPT

## 2020-08-22 RX ORDER — ACETAMINOPHEN 325 MG/1
650 TABLET ORAL
Status: DISCONTINUED | OUTPATIENT
Start: 2020-08-22 | End: 2020-08-24 | Stop reason: HOSPADM

## 2020-08-22 RX ORDER — VANCOMYCIN/0.9 % SOD CHLORIDE 1.5G/250ML
1500 PLASTIC BAG, INJECTION (ML) INTRAVENOUS
Status: DISCONTINUED | OUTPATIENT
Start: 2020-08-23 | End: 2020-08-24

## 2020-08-22 RX ADMIN — SPIRONOLACTONE 25 MG: 25 TABLET ORAL at 09:32

## 2020-08-22 RX ADMIN — INSULIN LISPRO 3 UNITS: 100 INJECTION, SOLUTION INTRAVENOUS; SUBCUTANEOUS at 17:07

## 2020-08-22 RX ADMIN — SODIUM CHLORIDE 75 ML/HR: 900 INJECTION, SOLUTION INTRAVENOUS at 22:09

## 2020-08-22 RX ADMIN — FAMOTIDINE 20 MG: 20 TABLET, FILM COATED ORAL at 09:32

## 2020-08-22 RX ADMIN — INSULIN LISPRO 7 UNITS: 100 INJECTION, SOLUTION INTRAVENOUS; SUBCUTANEOUS at 17:07

## 2020-08-22 RX ADMIN — ATORVASTATIN CALCIUM 40 MG: 40 TABLET, FILM COATED ORAL at 09:32

## 2020-08-22 RX ADMIN — INSULIN LISPRO 7 UNITS: 100 INJECTION, SOLUTION INTRAVENOUS; SUBCUTANEOUS at 09:29

## 2020-08-22 RX ADMIN — Medication 10 ML: at 14:55

## 2020-08-22 RX ADMIN — INSULIN LISPRO 7 UNITS: 100 INJECTION, SOLUTION INTRAVENOUS; SUBCUTANEOUS at 11:59

## 2020-08-22 RX ADMIN — VANCOMYCIN HYDROCHLORIDE 1500 MG: 10 INJECTION, POWDER, LYOPHILIZED, FOR SOLUTION INTRAVENOUS at 16:29

## 2020-08-22 RX ADMIN — POLYETHYLENE GLYCOL 3350 17 G: 17 POWDER, FOR SOLUTION ORAL at 09:28

## 2020-08-22 RX ADMIN — OXYCODONE 10 MG: 5 TABLET ORAL at 17:08

## 2020-08-22 RX ADMIN — DOCUSATE SODIUM - SENNOSIDES 1 TABLET: 50; 8.6 TABLET, FILM COATED ORAL at 17:08

## 2020-08-22 RX ADMIN — FUROSEMIDE 20 MG: 20 TABLET ORAL at 09:31

## 2020-08-22 RX ADMIN — Medication 10 ML: at 04:20

## 2020-08-22 RX ADMIN — RIFAXIMIN 550 MG: 550 TABLET ORAL at 09:31

## 2020-08-22 RX ADMIN — HYDROMORPHONE HYDROCHLORIDE 1 MG: 1 INJECTION, SOLUTION INTRAMUSCULAR; INTRAVENOUS; SUBCUTANEOUS at 14:58

## 2020-08-22 RX ADMIN — RIFAXIMIN 550 MG: 550 TABLET ORAL at 17:08

## 2020-08-22 RX ADMIN — GABAPENTIN 600 MG: 300 CAPSULE ORAL at 22:07

## 2020-08-22 RX ADMIN — INSULIN LISPRO 5 UNITS: 100 INJECTION, SOLUTION INTRAVENOUS; SUBCUTANEOUS at 12:00

## 2020-08-22 RX ADMIN — APIXABAN 2.5 MG: 2.5 TABLET, FILM COATED ORAL at 22:08

## 2020-08-22 RX ADMIN — Medication 10 ML: at 22:08

## 2020-08-22 RX ADMIN — CEFEPIME HYDROCHLORIDE 1 G: 1 INJECTION, POWDER, FOR SOLUTION INTRAMUSCULAR; INTRAVENOUS at 14:54

## 2020-08-22 RX ADMIN — LACTULOSE 30 ML: 20 SOLUTION ORAL at 09:33

## 2020-08-22 RX ADMIN — LACTULOSE 30 ML: 20 SOLUTION ORAL at 17:09

## 2020-08-22 RX ADMIN — DOCUSATE SODIUM - SENNOSIDES 1 TABLET: 50; 8.6 TABLET, FILM COATED ORAL at 09:32

## 2020-08-22 RX ADMIN — FAMOTIDINE 20 MG: 20 TABLET, FILM COATED ORAL at 17:08

## 2020-08-22 RX ADMIN — CEFEPIME HYDROCHLORIDE 1 G: 1 INJECTION, POWDER, FOR SOLUTION INTRAMUSCULAR; INTRAVENOUS at 04:13

## 2020-08-22 RX ADMIN — APIXABAN 2.5 MG: 2.5 TABLET, FILM COATED ORAL at 09:32

## 2020-08-22 RX ADMIN — OXYCODONE 5 MG: 5 TABLET ORAL at 13:00

## 2020-08-22 RX ADMIN — INSULIN LISPRO 2 UNITS: 100 INJECTION, SOLUTION INTRAVENOUS; SUBCUTANEOUS at 09:29

## 2020-08-22 NOTE — PROGRESS NOTES
Pharmacy Automatic Renal Dosing Protocol - Antimicrobials Indication for Antimicrobials: sepsis / right hip wound Current Regimen of Each Antimicrobial: 
Cefepime 1 gm IV q8h (Start Date ; Day 8) Vancomycin 1.5g iv q12h (Start date ; day 5) Previous Antimicrobial Therapy: 
Vancomycin 2g x1 on  Goal Level: VANCOMYCIN TROUGH GOAL RANGE Vancomycin Trough: 15 - 20 mcg/mL  (AUC: 400 - 600 mg/hr/Liter/day) Date Dose & Interval Measured (mcg/mL) Extrapolated (mcg/mL) 129 1.5g iv q12h 18.0 15.1  
 011 1500 mg q12h 26.5 Date & time of next level:  Significant Cultures:  
 blood: serratia, final 
 urine cx NG, final 
 paired blood cx: NGTD, pending Radiology / Imaging results: (X-ray, CT scan or MRI): n/a Labs: 
Recent Labs  
  20 
0112 20 
4586 20 
0431 CREA 0.71 0.83 0.83 BUN 14 18 20 WBC 10.4 11.5* 8.5 Temp (24hrs), Av.5 °F (36.9 °C), Min:97.3 °F (36.3 °C), Max:99.1 °F (37.3 °C) Creatinine Clearance (mL/min):  
CrCl (Actual Body Weight): 157.1 CrCl (Adjusted Body Weight): 122.0 CrCl (Ideal Body Weight): 98.6 Impression/Plan:  
ID consulted Scr stable Vancomycin level above goal range, change to 1500 mg q18h History of right hip infection with Staph Lugdenesis, S lugdenesis IE  at [facility], ABX treated x6 weeks then maintained on prophylactic Cephalexin 500 mg BID PO. Ortho evaluated recurrent dislocation of internal right hip prosthesis. Antimicrobial stop date pending for vanc, 9/3 for cefepime (plan to resume cephalexin after) Continue current dose of cefepime; appropriate for indication/renal function Pharmacy will follow daily and adjust medications as appropriate for renal function and/or serum levels. Thank you, JEMAL Carter

## 2020-08-22 NOTE — PROGRESS NOTES
Bedside and Verbal shift change report given to [address] (oncoming nurse) by James Shrestha (offgoing nurse). Report included the following information SBAR, Kardex, Procedure Summary, Intake/Output, MAR, Accordion and Recent Results

## 2020-08-22 NOTE — PROGRESS NOTES
Hospitalist Progress Note NAME: Kaleb Pepe :  1950 MRN:  [id number] Assessment / Plan: 
Sepsis   with fevers to 101.9, 110, 36 Metabolic encephalopathy likey fever, infection 
  
Continue Empiric vanco and cefepime  
Blood cx growing serratia ID consult Echo show normal EF no vegetation Repeat blood cx no growth As per ID continue cefepime IV for 2 weeks end  then resume po keflex  
  
DM type 2 POA uncont with hyperglycemia Lantus 30 units QHS increase to 40 units Premeal humalog 5  (PTA 8units), increase to 7 units(hold if not eating) , 217, 148, 155 
  
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 
  
Anemia acute blood loss, HG 7.5 S/p 3 units pRBCs  
Resume  eliquis case was discussed with ortho and they are clear about resume eliquis  
No reported gi bleeding per NS Orthopedics to check the wound F/u fecal occult blood stool  
  
BREWER cirrhosis Hx Esophageal varices, s/p TIPS 
-continue PTA  lactulose and xifaxam 
  
Essential HTN 
hold lisinopril with bump in Cr, creatinine improved BP okay 
  
JACOB Does not use CPAP 
  
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 
- unable to reduce hip 
-S/P RIGHT HIP ARTHROPLASTY TOTAL REVISION POSTERIOR APPROACH AND ADDUCTOR RELEASE, SCAR REVISION 2020 
-Eliquis for DVT prophylaxis  
-activity per Ortho 
  
  
30.0 - 39.9 Obese / Body mass index is 36.83 kg/m². 
  
Code status: Full Prophylaxis: Eliquis  
Recommended Disposition: SNF/LTC 
  
 
Stable  to be d/c with IV antibiotics as per ID recommendation Subjective: Chief Complaint / Reason for Physician Visit Discussed with RN events overnight. Feel better , stable to be d/c with IV antibiotics cefepime as per ID Review of Systems: 
Symptom Y/N Comments  Symptom Y/N Comments Fever/Chills n   Chest Pain n   
Poor Appetite n   Edema n   
Cough n   Abdominal Pain n   
Sputum n   Joint Pain SOB/WANG n   Pruritis/Rash Nausea/vomit n   Tolerating PT/OT Diarrhea n   Tolerating Diet y Constipation n   Other Could NOT obtain due to:   
 
Objective: VITALS:  
Last 24hrs VS reviewed since prior progress note. Most recent are: 
Patient Vitals for the past 24 hrs: 
 Temp Pulse Resp BP SpO2  
08/22/20 0754 99.1 °F (37.3 °C) 68 16 125/54 96 % 08/22/20 0411 98.8 °F (37.1 °C) 71 18 120/56 98 % 08/22/20 0328 98.3 °F (36.8 °C) 71 17 99/42 96 % 08/21/20 2319 98.2 °F (36.8 °C) 66 17 121/56 98 % 08/1950 97.3 °F (36.3 °C) 65 18 117/62 98 % 08/21/20 1545 98.2 °F (36.8 °C) 62 18 110/42 98 % 08/21/20 1139 97.4 °F (36.3 °C) 70 18 117/67 98 % Intake/Output Summary (Last 24 hours) at 8/22/2020 1114 Last data filed at 8/22/2020 3547 Gross per 24 hour Intake 2260 ml Output 5000 ml Net -2740 ml PHYSICAL EXAM: 
General: WD, WN. Alert, cooperative, no acute distress   
EENT:  EOMI. Anicteric sclerae. MMM Resp:  CTA bilaterally, no wheezing or rales. No accessory muscle use CV:  Regular  rhythm,  No edema GI:  Soft, Non distended, Non tender.  +Bowel sounds Neurologic:  Alert and oriented X 3, normal speech, Psych:   Good insight. Not anxious nor agitated Skin:  No rashes. No jaundice Reviewed most current lab test results and cultures  YES Reviewed most current radiology test results   YES Review and summation of old records today    NO Reviewed patient's current orders and MAR    YES 
PMH/SH reviewed - no change compared to H&P 
________________________________________________________________________ Care Plan discussed with: 
  Comments Patient y Family RN y   
Care Manager y Consultant Multidiciplinary team rounds were held today with , nursing, pharmacist and clinical coordinator. Patient's plan of care was discussed; medications were reviewed and discharge planning was addressed. ________________________________________________________________________ Total NON critical care TIME:  35    Minutes Total CRITICAL CARE TIME Spent:   Minutes non procedure based Comments >50% of visit spent in counseling and coordination of care y   
________________________________________________________________________ Lori Art MD  
 
Procedures: see electronic medical records for all procedures/Xrays and details which were not copied into this note but were reviewed prior to creation of Plan. LABS: 
I reviewed today's most current labs and imaging studies. Pertinent labs include: 
Recent Labs  
  08/22/20 0112 08/21/20 0439 08/20/20 
0431 WBC 10.4 11.5* 8.5 HGB 7.7* 7.6* 7.4* HCT 24.2* 23.5* 22.9*  
* 105* 97* Recent Labs  
  08/22/20 0112 08/21/20 
0439 08/20/20 
0431  135* 134* K 3.5 3.6 3.8 * 107 106 CO2 26 25 25 * 143* 145* BUN 14 18 20 CREA 0.71 0.83 0.83 CA 7.0* 6.9* 6.8* Signed:  Lori Art MD

## 2020-08-22 NOTE — PROGRESS NOTES
Po constrained liner for total hip dislocation. Pain and swelling improving Mental status better last couple days Drainage has slowed ID managing positive blood CX Patient Vitals for the past 24 hrs: 
 Temp Pulse Resp BP SpO2  
08/22/20 0754 99.1 °F (37.3 °C) 68 16 125/54 96 % 08/22/20 0411 98.8 °F (37.1 °C) 71 18 120/56 98 % 08/22/20 0328 98.3 °F (36.8 °C) 71 17 99/42 96 % 08/21/20 2319 98.2 °F (36.8 °C) 66 17 121/56 98 % 08/1950 97.3 °F (36.3 °C) 65 18 117/62 98 % 08/21/20 1545 98.2 °F (36.8 °C) 62 18 110/42 98 % Wound with serous drainage Mild clear serous drainage Musculoskeletal: Allison's sign negative in bilateral lower extremities. Calves soft, supple, non-tender upon palpation or with passive stretch. Pt 
Dc plan Dc when medically stable

## 2020-08-22 NOTE — PROGRESS NOTES
Infectious Disease Progress Note IMPRESSION:  
 
- Sepsis 
-Serratia bacteremia BC - + for Serratia marcescens  Repeat BC- - NG 
  TTE - negative UA - unremarkable, UC- NG 
- S/p R/hip arthroplasty Total Revision posterior approach  S/p attempted closed reduction under anesthesia on  
- H/o recurrent dislocations of R/hip arthroplasty - H/o  septic srthritis R/hip with Staph lugdudensis   
- H/o Staph lugdudensis bacteremia ( 18)  & MV endocarditis   ( admitted - 3/4/18 [facility]) Treated with Cefazoin IV x 6 weeks & oral suppressive therapy  po Keflex 
- Coag negative Staph bacteremia 18 
- H/o MRSA infection - ( WC + Thio broth only 16( Amilcar Bill) - DM Type 2 A1c 6.6 
- BREWER cirrhosis - JACOB on CPAP PLAN:  
  
 
- Continue Cefepime IV x 2 weeks end date  9/3 
- Resume po Keflex thereafter - Monitor surgical site, change dressings as needed - Blood sugar control - Encourage yogurt, probiotic - Plan of care D/w pt Subjective:  
 
 Pt seen . Feels better. Alert , talking Surgical site appears clean, no drainage D/w RN. S/p hip dressings  wet  following turning pt earlier today Review of Systems:  A comprehensive review of systems was negative except for that written in the History of Present Illness. 10 point ROS obtained . All other systems negative . Objective:  
 
Blood pressure 125/54, pulse 68, temperature 99.1 °F (37.3 °C), resp. rate 16, height 5' 10.5\" (1.791 m), weight 260 lb 5.8 oz (118.1 kg), SpO2 96 %. Temp (24hrs), Av.2 °F (36.8 °C), Min:97.3 °F (36.3 °C), Max:99.1 °F (37.3 °C) Patient Vitals for the past 24 hrs: 
 Temp Pulse Resp BP SpO2  
20 0754 99.1 °F (37.3 °C) 68 16 125/54 96 % 20 0411 98.8 °F (37.1 °C) 71 18 120/56 98 % 20 0328 98.3 °F (36.8 °C) 71 17 99/42 96 % 20 2319 98.2 °F (36.8 °C) 66 17 121/56 98 % 20 1950 °F (36.3 °C) 65 18 117/62 98 % 08/21/20 1545 98.2 °F (36.8 °C) 62 18 110/42 98 % 08/21/20 1139 97.4 °F (36.3 °C) 70 18 117/67 98 % Lines:  Peripheral IV:    
 
Physical Exam:  
General:  Awake, cooperative Eyes:  Sclera anicteric. Pupils equally round and reactive to light. Mouth/Throat: Mucous membranes normal, oral pharynx clear Neck: Supple Lungs:   Reduced auscultation bases CV:  Regular rate and rhythm,no murmur, click, rub or gallop Abdomen:   Soft, non-tender. bowel sounds normal. non-distended Extremities: No edema Skin: Skin color, texture, turgor normal. no acute rash or lesions Lymph nodes: Cervical and supraclavicular normal  
Musculoskeletal: No swelling or deformity, R/hip - no drainage , dressing intact Lines/Devices:  Intact, no erythema, drainage or tenderness Psych: Alert and oriented, normal mood affect Data Review: CBC:  
Recent Labs  
  08/22/20 
0112 08/21/20 0439 08/20/20 
0431 WBC 10.4 11.5* 8.5  
RBC 2.55* 2.51* 2.43* HGB 7.7* 7.6* 7.4* HCT 24.2* 23.5* 22.9*  
* 105* 97* GRANS 69 71 88* LYMPH 10* 9* 2*  
EOS 6 5 3 CMP:  
Recent Labs  
  08/22/20 
0112 08/21/20 
0439 08/20/20 
0431 * 143* 145*  135* 134* K 3.5 3.6 3.8 * 107 106 CO2 26 25 25 BUN 14 18 20 CREA 0.71 0.83 0.83 CA 7.0* 6.9* 6.8* AGAP 4* 3* 3*  
BUCR 20 22* 24* Studies:     
Lab Results Component Value Date/Time Culture result: NO GROWTH 2 DAYS 08/20/2020 07:00 PM  
 Culture result: No growth (<1,000 CFU/ML) 08/17/2020 12:04 PM  
 Culture result: (A) 08/17/2020 11:33 AM  
  SERRATIA MARCESCENS GROWING IN ALL 4 BOTTLES DRAWN (SITES = L ARM AND R WRIST)  Culture result: MRSA NOT PRESENT 07/15/2020 06:50 AM  
 Culture result:  07/15/2020 06:50 AM  
      Screening of patient nares for MRSA is for surveillance purposes and, if positive, to facilitate isolation considerations in high risk settings. It is not intended for automatic decolonization interventions per se as regimens are not sufficiently effective to warrant routine use. XR Results (most recent): 
Results from [facility]  [facility] Encounter encounter on 08/13/20 XR CHEST PORT Narrative INDICATION: Fever. Portable AP semiupright view of the chest. 
 
Direct comparison made to prior chest x-ray dated August 13, 2020. Cardiomediastinal silhouette is stable. There is persistent mild pulmonary 
vascular prominence. No interstitial edema is visualized. There is no focal 
airspace consolidation. No pleural fluid is visualized. There is no 
pneumothorax. Impression IMPRESSION: Persistent, mild pulmonary vascular prominence. Patient Active Problem List  
Diagnosis Code  
 HTN (hypertension) I10  
 Hypercholesterolemia E78.00  
 Osteoarthritis of hip M16.9  Depression F32.9  ED (erectile dysfunction) of organic origin N52.9  GERD (gastroesophageal reflux disease) K21.9  PUD (peptic ulcer disease) K27.9  Hinson's esophagus with esophagitis K22.70, K20.9  Hypogonadism male E29.1  Diabetes mellitus type 2, uncontrolled ([address] E11.65  Benign essential tremor G25.0  Osteoarthritis of right hip M16.11  
 Jessica-prosthetic fracture of femur following total hip arthroplasty M97. 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  
 Insomnia G47.00  Dislocation of prosthetic joint ([address] T84.029A  Esophageal varices with bleeding (HCC) I85.01  
 BREWER (nonalcoholic steatohepatitis) K75.81  
 Hepatic encephalopathy (HCC) K72.90  
 S/P TIPS (transjugular intrahepatic portosystemic shunt) U65.955  Closed dislocation of right hip ([address] S73.004A  Recurrent dislocation of hip joint prosthesis ([address] T84.029A, Q36.239  Dislocation of internal right hip prosthesis (HCC) T84.020A ICD-10-CM ICD-9-CM 1. Closed posterior dislocation of right hip, initial encounter ([facility]  P91.961I 835.01   
2. Fall from ground level  W18.30XA C369.9 3. Acute right hip pain  M25.551 719.45   
4. Uncontrolled type 2 diabetes mellitus with hyperglycemia ([facility])  E11.65 250.02   
5. Anemia of chronic disease  D63.8 285.29 I have discussed the diagnosis with the patient and the intended plan as seen in the above orders. I have discussed medication side effects and warnings with the patient as well. Reviewed test results at length with patient Anti-infectives:  
 
 Cefepime IV  China Rodriguez MD FACP

## 2020-08-22 NOTE — PROGRESS NOTES
Patient requested to change the route of his Tylenol from Rectal to PO as he can take PO meds. MD Tele Hospitalist was notified by page. Success A new connect request was successfully created for: Ricardo Lindsay : 1950 MRN: [id number] ConnectID: [id number] REASON: Other non-Emergent ACUITY: 30 Minutes SUBMITTED: 2020 00:56 EDT CLOSE

## 2020-08-22 NOTE — PROGRESS NOTES
Problem: Mobility Impaired (Adult and Pediatric) Goal: *Acute Goals and Plan of Care (Insert Text) Description: FUNCTIONAL STATUS PRIOR TO ADMISSION: Patient was modified independent using a walker for functional mobility. HOME SUPPORT PRIOR TO ADMISSION: The patient lived with son. Physical Therapy Goals Goals reviewed and remain appropriate 8/19/2020 Initiated 8/14/2020 1. Patient will move from supine to sit and sit to supine  in bed with minimal assistance/contact guard assist within 7 days. 2. Patient will perform sit to stand with minimal assistance/contact guard assist within 7 days. 3. Patient will ambulate with minimal assistance/contact guard assist for 150 feet with the least restrictive device within 7 days. 4. Patient will ascend/descend 2 stairs with 1 handrail(s) with minimal assistance/contact guard assist within 7 days. 5. Patient will verbalize and demonstrate understanding of posterior precautions per protocol within 7 days. 6. Patient will perform all home exercise program per protocol with independence within 7 days. Outcome: Not Met PHYSICAL THERAPY TREATMENT Patient: Gregorio Hilliard (42 y.o. male) Date: 8/22/2020 Diagnosis: Dislocation of internal right hip prosthesis ([address] Mary Pate Procedure(s) (LRB):RIGHT HIP ARTHROPLASTY TOTAL REVISION POSTERIOR APPROACH AND ABDUCTOR RELEASE (Right) 8 Days Post-Op Precautions: WBAT, Fall(Posterior hip) Chart, physical therapy assessment, plan of care and goals were reviewed. ASSESSMENT: Patient continues with skilled PT services and is not progressing towards goals, slight improvement with bed mob, no doing well with transfers, still very little function in RLE, recommend nursing use lift team and david for OOB, large fall risk, max vc's for safety. Current Level of Function Impacting Discharge (mobility/balance): mod->max assist x2 PLAN : Patient continues to benefit from skilled intervention to address the above impairments. Continue treatment per established plan of care 
to address goals. Recommendation for discharge: (in order for the patient to meet his/her long term goals)  Therapy up to 5 days/week in SNF setting This discharge recommendation: Has been made in collaboration with the attending provider and/or case management IF patient discharges home will need the following DME: bedside commode, mechanical lift, rolling walker, and wheelchair OBJECTIVE DATA SUMMARY:  
 
Critical Behavior: 
Neurologic State: Alert Orientation Level: Oriented X4 Cognition: Follows commands Safety/Judgement: Insight into deficits, Awareness of environment Functional Mobility Training: 
Bed Mobility: 
Rolling: Moderate assistance;Assist x2; Additional time Supine to Sit: Maximum assistance;Assist x2; Additional time Scooting: Maximum assistance;Assist x2; Additional time Level of Assistance: Maximum assistance Interventions: Manual cues; Tactile cues; Verbal cues Transfers: 
Sit to Stand: Moderate assistance;Assist x2; Additional time Stand to Sit: Minimum assistance;Assist x2 Bed to Chair: Moderate assistance;Assist x2; Additional time(with orbi-turn) Interventions: Manual cues; Tactile cues; Verbal cues Level of Assistance: Moderate assistance;Assist x2; Additional time Balance: 
Sitting: Impaired; With support Sitting - Static: Fair (occasional); Prop sitting Sitting - Dynamic: Not tested Standing: Impaired; With support(using orbi-turn) Standing - Static: Poor;Constant support Standing - Dynamic : (unable) Ambulation/Gait Training: unable Therapeutic Exercises:  
sitting EXERCISE Sets Reps Active Active Assist  
Passive Comments Ankle pumps 1 10 [x] [] [] LLE Heel raises 1 10 [x] [] [] \" Toe tap 1 10 [x] [] [] \" Knee ext 1 10 [x] [] [] bilat Hip flex 1 10 [x] [x] [] \"  
 
Pain Ratin Activity Tolerance: Poor After treatment patient left in no apparent distress: Sitting in chair and Call bell within reach COMMUNICATION/COLLABORATION:  
The patients plan of care was discussed with: Registered nurse. Brett Whitaker PTA Time Calculation: 30 mins

## 2020-08-22 NOTE — PROGRESS NOTES
Problem: Diabetes Self-Management Goal: *Disease process and treatment process Description: Define diabetes and identify own type of diabetes; list 3 options for treating diabetes. Outcome: Progressing Towards Goal 
Goal: *Incorporating nutritional management into lifestyle Description: Describe effect of type, amount and timing of food on blood glucose; list 3 methods for planning meals. Outcome: Progressing Towards Goal 
Goal: *Incorporating physical activity into lifestyle Description: State effect of exercise on blood glucose levels. Outcome: Progressing Towards Goal 
Goal: *Developing strategies to promote health/change behavior Description: Define the ABC's of diabetes; identify appropriate screenings, schedule and personal plan for screenings. Outcome: Progressing Towards Goal 
Goal: *Using medications safely Description: State effect of diabetes medications on diabetes; name diabetes medication taking, action and side effects. Outcome: Progressing Towards Goal 
Goal: *Monitoring blood glucose, interpreting and using results Description: Identify recommended blood glucose targets  and personal targets. Outcome: Progressing Towards Goal 
Goal: *Prevention, detection, treatment of acute complications Description: List symptoms of hyper- and hypoglycemia; describe how to treat low blood sugar and actions for lowering  high blood glucose level. Outcome: Progressing Towards Goal 
Goal: *Prevention, detection and treatment of chronic complications Description: Define the natural course of diabetes and describe the relationship of blood glucose levels to long term complications of diabetes. Outcome: Progressing Towards Goal 
Goal: *Developing strategies to address psychosocial issues Description: Describe feelings about living with diabetes; identify support needed and support network Outcome: Progressing Towards Goal 
Goal: *Insulin pump training Outcome: Progressing Towards Goal 
 Goal: *Sick day guidelines Outcome: Progressing Towards Goal 
Goal: *Patient Specific Goal (EDIT GOAL, INSERT TEXT) Outcome: Progressing Towards Goal 
  
Problem: Falls - Risk of 
Goal: *Absence of Falls Description: Document Prasad Ellsworth Fall Risk and appropriate interventions in the flowsheet. Outcome: Progressing Towards Goal 
Note: Fall Risk Interventions: 
Mobility Interventions: Communicate number of staff needed for ambulation/transfer Mentation Interventions: Increase mobility Medication Interventions: Patient to call before getting OOB Elimination Interventions: Call light in reach History of Falls Interventions: Investigate reason for fall Problem: Patient Education: Go to Patient Education Activity Goal: Patient/Family Education Outcome: Progressing Towards Goal 
  
Problem: Pressure Injury - Risk of 
Goal: *Prevention of pressure injury Description: Document Geoff Scale and appropriate interventions in the flowsheet. Outcome: Progressing Towards Goal 
Note: Pressure Injury Interventions: 
Sensory Interventions: Discuss PT/OT consult with provider, Keep linens dry and wrinkle-free, Float heels, Maintain/enhance activity level, Minimize linen layers Moisture Interventions: Minimize layers, Internal/External urinary devices, Apply protective barrier, creams and emollients, Absorbent underpads Activity Interventions: Increase time out of bed, PT/OT evaluation Mobility Interventions: PT/OT evaluation Nutrition Interventions: Offer support with meals,snacks and hydration, Document food/fluid/supplement intake Friction and Shear Interventions: Minimize layers Problem: Patient Education: Go to Patient Education Activity Goal: Patient/Family Education Outcome: Progressing Towards Goal 
  
Problem: Patient Education: Go to Patient Education Activity Goal: Patient/Family Education Outcome: Progressing Towards Goal 
  
Problem: Delirium Treatment Goal: *Level of consciousness restored to baseline Outcome: Progressing Towards Goal 
Goal: *Level of environmental perceptions restored to baseline Outcome: Progressing Towards Goal 
Goal: *Sensory perception restored to baseline Outcome: Progressing Towards Goal 
Goal: *Emotional stability restored to baseline Outcome: Progressing Towards Goal 
Goal: *Functional assessment restored to baseline Outcome: Progressing Towards Goal 
Goal: *Absence of falls Outcome: Progressing Towards Goal 
Goal: *Will remain free of delirium, CAM Score negative Outcome: Progressing Towards Goal 
Goal: *Cognitive status will be restored to baseline Outcome: Progressing Towards Goal 
Goal: Interventions Outcome: Progressing Towards Goal 
  
Problem: Patient Education: Go to Patient Education Activity Goal: Patient/Family Education Outcome: Progressing Towards Goal 
  
Problem: Patient Education: Go to Patient Education Activity Goal: Patient/Family Education Outcome: Progressing Towards Goal 
  
Problem: Patient Education: Go to Patient Education Activity Goal: Patient/Family Education Outcome: Progressing Towards Goal

## 2020-08-23 LAB
ANION GAP SERPL CALC-SCNC: 6 MMOL/L (ref 5–15)
BASOPHILS # BLD: 0.1 K/UL (ref 0–0.1)
BASOPHILS NFR BLD: 1 % (ref 0–1)
BUN SERPL-MCNC: 10 MG/DL (ref 6–20)
BUN/CREAT SERPL: 15 (ref 12–20)
CALCIUM SERPL-MCNC: 7.1 MG/DL (ref 8.5–10.1)
CHLORIDE SERPL-SCNC: 108 MMOL/L (ref 97–108)
CO2 SERPL-SCNC: 24 MMOL/L (ref 21–32)
CREAT SERPL-MCNC: 0.66 MG/DL (ref 0.7–1.3)
DIFFERENTIAL METHOD BLD: ABNORMAL
EOSINOPHIL # BLD: 0.3 K/UL (ref 0–0.4)
EOSINOPHIL NFR BLD: 3 % (ref 0–7)
ERYTHROCYTE [DISTWIDTH] IN BLOOD BY AUTOMATED COUNT: 19.5 % (ref 11.5–14.5)
GLUCOSE BLD STRIP.AUTO-MCNC: 141 MG/DL (ref 65–100)
GLUCOSE BLD STRIP.AUTO-MCNC: 147 MG/DL (ref 65–100)
GLUCOSE BLD STRIP.AUTO-MCNC: 201 MG/DL (ref 65–100)
GLUCOSE BLD STRIP.AUTO-MCNC: 254 MG/DL (ref 65–100)
GLUCOSE SERPL-MCNC: 126 MG/DL (ref 65–100)
HCT VFR BLD AUTO: 25.3 % (ref 36.6–50.3)
HGB BLD-MCNC: 8.1 G/DL (ref 12.1–17)
IMM GRANULOCYTES # BLD AUTO: 0 K/UL (ref 0–0.04)
IMM GRANULOCYTES NFR BLD AUTO: 0 % (ref 0–0.5)
LYMPHOCYTES # BLD: 0.8 K/UL (ref 0.8–3.5)
LYMPHOCYTES NFR BLD: 7 % (ref 12–49)
MCH RBC QN AUTO: 30.7 PG (ref 26–34)
MCHC RBC AUTO-ENTMCNC: 32 G/DL (ref 30–36.5)
MCV RBC AUTO: 95.8 FL (ref 80–99)
MONOCYTES # BLD: 0.3 K/UL (ref 0–1)
MONOCYTES NFR BLD: 3 % (ref 5–13)
NEUTS BAND NFR BLD MANUAL: 1 %
NEUTS SEG # BLD: 9.2 K/UL (ref 1.8–8)
NEUTS SEG NFR BLD: 85 % (ref 32–75)
NRBC # BLD: 0 K/UL (ref 0–0.01)
NRBC BLD-RTO: 0 PER 100 WBC
PLATELET # BLD AUTO: 136 K/UL (ref 150–400)
PMV BLD AUTO: 10.5 FL (ref 8.9–12.9)
POTASSIUM SERPL-SCNC: 3.2 MMOL/L (ref 3.5–5.1)
RBC # BLD AUTO: 2.64 M/UL (ref 4.1–5.7)
RBC MORPH BLD: ABNORMAL
SERVICE CMNT-IMP: ABNORMAL
SODIUM SERPL-SCNC: 138 MMOL/L (ref 136–145)
WBC # BLD AUTO: 10.7 K/UL (ref 4.1–11.1)

## 2020-08-23 PROCEDURE — 74011000258 HC RX REV CODE- 258: Performed by: INTERNAL MEDICINE

## 2020-08-23 PROCEDURE — 77010033678 HC OXYGEN DAILY

## 2020-08-23 PROCEDURE — 74011636637 HC RX REV CODE- 636/637: Performed by: INTERNAL MEDICINE

## 2020-08-23 PROCEDURE — 80048 BASIC METABOLIC PNL TOTAL CA: CPT

## 2020-08-23 PROCEDURE — 97116 GAIT TRAINING THERAPY: CPT

## 2020-08-23 PROCEDURE — 74011250636 HC RX REV CODE- 250/636: Performed by: INTERNAL MEDICINE

## 2020-08-23 PROCEDURE — 74011250637 HC RX REV CODE- 250/637: Performed by: INTERNAL MEDICINE

## 2020-08-23 PROCEDURE — 74011250637 HC RX REV CODE- 250/637: Performed by: PHYSICIAN ASSISTANT

## 2020-08-23 PROCEDURE — 65270000029 HC RM PRIVATE

## 2020-08-23 PROCEDURE — 36415 COLL VENOUS BLD VENIPUNCTURE: CPT

## 2020-08-23 PROCEDURE — 94760 N-INVAS EAR/PLS OXIMETRY 1: CPT

## 2020-08-23 PROCEDURE — 85025 COMPLETE CBC W/AUTO DIFF WBC: CPT

## 2020-08-23 PROCEDURE — 74011250637 HC RX REV CODE- 250/637: Performed by: HOSPITALIST

## 2020-08-23 PROCEDURE — 97530 THERAPEUTIC ACTIVITIES: CPT

## 2020-08-23 PROCEDURE — 82962 GLUCOSE BLOOD TEST: CPT

## 2020-08-23 RX ORDER — POTASSIUM CHLORIDE 750 MG/1
20 TABLET, FILM COATED, EXTENDED RELEASE ORAL ONCE
Status: COMPLETED | OUTPATIENT
Start: 2020-08-23 | End: 2020-08-23

## 2020-08-23 RX ADMIN — CEFEPIME HYDROCHLORIDE 1 G: 1 INJECTION, POWDER, FOR SOLUTION INTRAMUSCULAR; INTRAVENOUS at 21:35

## 2020-08-23 RX ADMIN — RIFAXIMIN 550 MG: 550 TABLET ORAL at 09:15

## 2020-08-23 RX ADMIN — ATORVASTATIN CALCIUM 40 MG: 40 TABLET, FILM COATED ORAL at 09:15

## 2020-08-23 RX ADMIN — POLYETHYLENE GLYCOL 3350 17 G: 17 POWDER, FOR SOLUTION ORAL at 09:16

## 2020-08-23 RX ADMIN — INSULIN LISPRO 3 UNITS: 100 INJECTION, SOLUTION INTRAVENOUS; SUBCUTANEOUS at 17:23

## 2020-08-23 RX ADMIN — APIXABAN 2.5 MG: 2.5 TABLET, FILM COATED ORAL at 21:33

## 2020-08-23 RX ADMIN — INSULIN LISPRO 7 UNITS: 100 INJECTION, SOLUTION INTRAVENOUS; SUBCUTANEOUS at 09:17

## 2020-08-23 RX ADMIN — INSULIN LISPRO 2 UNITS: 100 INJECTION, SOLUTION INTRAVENOUS; SUBCUTANEOUS at 09:17

## 2020-08-23 RX ADMIN — OXYCODONE 10 MG: 5 TABLET ORAL at 23:30

## 2020-08-23 RX ADMIN — OXYCODONE 10 MG: 5 TABLET ORAL at 18:21

## 2020-08-23 RX ADMIN — POTASSIUM CHLORIDE 20 MEQ: 750 TABLET, FILM COATED, EXTENDED RELEASE ORAL at 14:22

## 2020-08-23 RX ADMIN — ACETAMINOPHEN 650 MG: 325 TABLET ORAL at 21:42

## 2020-08-23 RX ADMIN — GABAPENTIN 600 MG: 300 CAPSULE ORAL at 21:33

## 2020-08-23 RX ADMIN — CEFEPIME HYDROCHLORIDE 1 G: 1 INJECTION, POWDER, FOR SOLUTION INTRAMUSCULAR; INTRAVENOUS at 00:16

## 2020-08-23 RX ADMIN — SPIRONOLACTONE 25 MG: 25 TABLET ORAL at 09:16

## 2020-08-23 RX ADMIN — LACTULOSE 30 ML: 20 SOLUTION ORAL at 09:16

## 2020-08-23 RX ADMIN — Medication 5 ML: at 06:23

## 2020-08-23 RX ADMIN — OXYCODONE 10 MG: 5 TABLET ORAL at 11:25

## 2020-08-23 RX ADMIN — FAMOTIDINE 20 MG: 20 TABLET, FILM COATED ORAL at 09:15

## 2020-08-23 RX ADMIN — RIFAXIMIN 550 MG: 550 TABLET ORAL at 18:16

## 2020-08-23 RX ADMIN — CEFEPIME HYDROCHLORIDE 1 G: 1 INJECTION, POWDER, FOR SOLUTION INTRAMUSCULAR; INTRAVENOUS at 09:54

## 2020-08-23 RX ADMIN — FAMOTIDINE 20 MG: 20 TABLET, FILM COATED ORAL at 18:16

## 2020-08-23 RX ADMIN — INSULIN LISPRO 5 UNITS: 100 INJECTION, SOLUTION INTRAVENOUS; SUBCUTANEOUS at 12:40

## 2020-08-23 RX ADMIN — Medication 10 ML: at 14:23

## 2020-08-23 RX ADMIN — FUROSEMIDE 20 MG: 20 TABLET ORAL at 09:16

## 2020-08-23 RX ADMIN — INSULIN LISPRO 7 UNITS: 100 INJECTION, SOLUTION INTRAVENOUS; SUBCUTANEOUS at 17:23

## 2020-08-23 RX ADMIN — APIXABAN 2.5 MG: 2.5 TABLET, FILM COATED ORAL at 09:16

## 2020-08-23 RX ADMIN — Medication 10 ML: at 21:34

## 2020-08-23 RX ADMIN — INSULIN LISPRO 7 UNITS: 100 INJECTION, SOLUTION INTRAVENOUS; SUBCUTANEOUS at 12:39

## 2020-08-23 RX ADMIN — VANCOMYCIN HYDROCHLORIDE 1500 MG: 10 INJECTION, POWDER, LYOPHILIZED, FOR SOLUTION INTRAVENOUS at 14:50

## 2020-08-23 RX ADMIN — LACTULOSE 30 ML: 20 SOLUTION ORAL at 18:16

## 2020-08-23 RX ADMIN — DOCUSATE SODIUM - SENNOSIDES 1 TABLET: 50; 8.6 TABLET, FILM COATED ORAL at 09:15

## 2020-08-23 RX ADMIN — CEFEPIME HYDROCHLORIDE 1 G: 1 INJECTION, POWDER, FOR SOLUTION INTRAMUSCULAR; INTRAVENOUS at 23:31

## 2020-08-23 NOTE — PROGRESS NOTES
Problem: Mobility Impaired (Adult and Pediatric) Goal: *Acute Goals and Plan of Care (Insert Text) Description: FUNCTIONAL STATUS PRIOR TO ADMISSION: Patient was modified independent using a walker for functional mobility. HOME SUPPORT PRIOR TO ADMISSION: The patient lived with son. Physical Therapy Goals Goals reviewed and remain appropriate 8/19/2020 Initiated 8/14/2020 1. Patient will move from supine to sit and sit to supine  in bed with minimal assistance/contact guard assist within 7 days. 2. Patient will perform sit to stand with minimal assistance/contact guard assist within 7 days. 3. Patient will ambulate with minimal assistance/contact guard assist for 150 feet with the least restrictive device within 7 days. 4. Patient will ascend/descend 2 stairs with 1 handrail(s) with minimal assistance/contact guard assist within 7 days. 5. Patient will verbalize and demonstrate understanding of posterior precautions per protocol within 7 days. 6. Patient will perform all home exercise program per protocol with independence within 7 days. Outcome: Progressing Towards Goal 
  
PHYSICAL THERAPY TREATMENT Patient: Michelle Kim (1 y.o. male) Date: 8/23/2020 Diagnosis: Dislocation of internal right hip prosthesis ([address] Toshia Kat Dislocation of internal right hip prosthesis ([address] Procedure(s) (LRB): 
RIGHT HIP ARTHROPLASTY TOTAL REVISION POSTERIOR APPROACH AND ABDUCTOR RELEASE (Right) 9 Days Post-Op Precautions: WBAT, Fall(Posterior hip) Chart, physical therapy assessment, plan of care and goals were reviewed. ASSESSMENT Patient continues with skilled PT services and is progressing towards goals. Pt remains motivated for mobility, still having increased difficulty with bed mob. Technique. Pt able to stand with less assist from elevated bed surface, weight-shifting via PT with gait for adequate foot to floor clearance and taking steps to chair. Continue to follow. Current Level of Function Impacting Discharge (mobility/balance): mod./max. for bed mob., min./mod. for sit<>stand, 5ft with RW and mod. Assist ; x 2 for all mobility for safety PLAN : 
Patient continues to benefit from skilled intervention to address the above impairments. Continue treatment per established plan of care. to address goals. Recommendation for discharge: (in order for the patient to meet his/her long term goals) Therapy up to 5 days/week in SNF setting This discharge recommendation: 
Has been made in collaboration with the attending provider and/or case management IF patient discharges home will need the following DME: to be determined (TBD) SUBJECTIVE:  
Patient stated I am done with lunch, so it's a good time.  OBJECTIVE DATA SUMMARY:  
Critical Behavior: 
Neurologic State: Alert Orientation Level: Oriented X4 Cognition: Follows commands Safety/Judgement: Insight into deficits, Awareness of environment Functional Mobility Training: 
Bed Mobility: 
Rolling: Moderate assistance;Assist x2; Additional time Supine to Sit: Maximum assistance;Assist x2; Additional time Scooting: Moderate assistance; Additional time Interventions: Weight shifting training/Pressure relief; Safety awareness training; Tactile cues; Verbal cues Transfers: 
Sit to Stand: Moderate assistance;Assist x2; Other (comment); Additional time(min. from elevated bed surface ; mod. from bedside chair) Stand to Sit: Minimum assistance;Assist x2; Additional time Balance: 
Sitting: Intact; With support(BUE) Standing: Impaired; With support(RW) 
Standing - Static: Fair;Constant support(RW) 
Standing - Dynamic : Constant support;Fair(RW) 
Ambulation/Gait Training: 
Distance (ft): 5 Feet (ft) Assistive Device: Gait belt;Walker, rolling Ambulation - Level of Assistance: Assist x2; Moderate assistance Gait Abnormalities: Antalgic;Decreased step clearance Base of Support: Widened Speed/Clarice: Slow Step Length: Left shortened;Right shortened Interventions: Safety awareness training; Tactile cues; Verbal cues Pain Ratin/10, R hip, nurse notified Activity Tolerance:  
Fair Please refer to the flowsheet for vital signs taken during this treatment. After treatment patient left in no apparent distress:  
Sitting in chair, Call bell within reach, and david pad in chair, nurse notified COMMUNICATION/COLLABORATION:  
The patients plan of care was discussed with: Registered nurse and Rehabilitation technician. Nyasia Lund PT, DPT Time Calculation: 37 mins

## 2020-08-23 NOTE — PROGRESS NOTES
Po constrained liner for instability. Complicated with sepsis and liver failure. Confusion improved and hgb stable 8.1 Patient Vitals for the past 24 hrs: 
 Temp Pulse Resp BP SpO2  
08/23/20 0824 99.1 °F (37.3 °C) 73 16 123/49 99 % 08/23/20 0506 98.6 °F (37 °C) 72 18 127/47 97 % 08/22/20 2025 99.7 °F (37.6 °C) 70 18 140/50 98 % 08/22/20 1300 99 °F (37.2 °C) 84 16 160/61 98 % Dressing clean and dry today Musculoskeletal: Allison's sign negative in bilateral lower extremities. Calves soft, supple, non-tender upon palpation or with passive stretch. oob with PT 
Dc plan - snf 
wbat

## 2020-08-23 NOTE — PROGRESS NOTES
Hospitalist Progress Note NAME: Miguel Hinson :  1950 MRN:  [id number] Assessment / Plan: 
Sepsis   with fevers to 101.9, 110, 36 Metabolic encephalopathy likey fever, infection 
  
Continue Empiric vanco and cefepime  
Blood cx growing serratia ID consult Echo show normal EF no vegetation   
Repeat blood cx no growth As per ID continue cefepime IV for 2 weeks end  then resume po keflex  
  
DM type 2 POA uncont with hyperglycemia Lantus 30 units QHS increase to 40 units Premeal humalog 5  (PTA 8units), increase to 7 units(hold if not eating) , 217, 148, 155 
  
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 
  
Anemia acute blood loss, HG 7.5 S/p 3 units pRBCs  
Resume  eliquis case was discussed with ortho and they are clear about resume eliquis  
No reported gi bleeding per NS Orthopedics to check the wound F/u fecal occult blood stool  
  
BREWER cirrhosis Hx Esophageal varices, s/p TIPS 
-continue PTA  lactulose and xifaxam 
  
Essential HTN 
hold lisinopril with bump in Cr, creatinine improved BP okay 
  
JACOB Does not use CPAP 
  
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 
- unable to reduce hip 
-S/P RIGHT HIP ARTHROPLASTY TOTAL REVISION POSTERIOR APPROACH AND ADDUCTOR RELEASE, SCAR REVISION 2020 
-Eliquis for DVT prophylaxis  
-activity per Ortho 
  
  
30.0 - 39.9 Obese / Body mass index is 36.83 kg/m². 
  
Code status: Full Prophylaxis: Eliquis  
Recommended Disposition: SNF/LTC 
  
  
Stable  to be d/c with IV antibiotics as per ID recommendation  
  
 
  
 
Subjective: Chief Complaint / Reason for Physician Visit .  Discussed with RN events overnight. Feel better , stable to be d/c with IV antibiotics cefepime as per ID Review of Systems: 
Symptom Y/N Comments  Symptom Y/N Comments Fever/Chills n   Chest Pain n   
Poor Appetite n   Edema n   
Cough n   Abdominal Pain n   
Sputum n   Joint Pain y   
SOB/WANG n   Pruritis/Rash Nausea/vomit n   Tolerating PT/OT Diarrhea n   Tolerating Diet Constipation n   Other Could NOT obtain due to:   
 
Objective: VITALS:  
Last 24hrs VS reviewed since prior progress note. Most recent are: 
Patient Vitals for the past 24 hrs: 
 Temp Pulse Resp BP SpO2  
08/23/20 1117 98.4 °F (36.9 °C) 74 16 145/52 100 % 08/23/20 0824 99.1 °F (37.3 °C) 73 16 123/49 99 % 08/23/20 0506 98.6 °F (37 °C) 72 18 127/47 97 % 08/22/20 2025 99.7 °F (37.6 °C) 70 18 140/50 98 % Intake/Output Summary (Last 24 hours) at 8/23/2020 1351 Last data filed at 8/23/2020 2633 Gross per 24 hour Intake 800 ml Output 4100 ml Net -3300 ml PHYSICAL EXAM: 
General: WD, WN. Alert, cooperative, no acute distress   
EENT:  EOMI. Anicteric sclerae. MMM Resp:  CTA bilaterally, no wheezing or rales. No accessory muscle use CV:  Regular  rhythm,  No edema GI:  Soft, Non distended, Non tender.  +Bowel sounds Neurologic:  Alert and oriented X 3, normal speech, Psych:   Good insight. Not anxious nor agitated Skin:  No rashes. No jaundice Reviewed most current lab test results and cultures  YES Reviewed most current radiology test results   YES Review and summation of old records today    NO Reviewed patient's current orders and MAR    YES 
PMH/SH reviewed - no change compared to H&P 
________________________________________________________________________ Care Plan discussed with: 
  Comments Patient y Family RN y   
Care Manager y Consultant     
                 y Multidiciplinary team rounds were held today with case manager, nursing, pharmacist and clinical coordinator. Patient's plan of care was discussed; medications were reviewed and discharge planning was addressed. ________________________________________________________________________ Total NON critical care TIME:  35   Minutes Total CRITICAL CARE TIME Spent:   Minutes non procedure based Comments >50% of visit spent in counseling and coordination of care y   
________________________________________________________________________ Jayna Yancey MD  
 
Procedures: see electronic medical records for all procedures/Xrays and details which were not copied into this note but were reviewed prior to creation of Plan. LABS: 
I reviewed today's most current labs and imaging studies. Pertinent labs include: 
Recent Labs  
  08/23/20 
0444 08/22/20 
0112 08/21/20 
0439 WBC 10.7 10.4 11.5* HGB 8.1* 7.7* 7.6* HCT 25.3* 24.2* 23.5*  
* 115* 105* Recent Labs  
  08/23/20 
0444 08/22/20 
0112 08/21/20 
0439  139 135* K 3.2* 3.5 3.6  109* 107 CO2 24 26 25 * 166* 143* BUN 10 14 18 CREA 0.66* 0.71 0.83 CA 7.1* 7.0* 6.9* Signed:  Jayna Yancey MD

## 2020-08-24 VITALS
OXYGEN SATURATION: 95 % | RESPIRATION RATE: 18 BRPM | BODY MASS INDEX: 36.45 KG/M2 | SYSTOLIC BLOOD PRESSURE: 120 MMHG | HEIGHT: 71 IN | TEMPERATURE: 97.9 F | WEIGHT: 260.36 LBS | HEART RATE: 66 BPM | DIASTOLIC BLOOD PRESSURE: 53 MMHG

## 2020-08-24 LAB
ANION GAP SERPL CALC-SCNC: 4 MMOL/L (ref 5–15)
BASOPHILS # BLD: 0 K/UL (ref 0–0.1)
BASOPHILS NFR BLD: 0 % (ref 0–1)
BUN SERPL-MCNC: 9 MG/DL (ref 6–20)
BUN/CREAT SERPL: 12 (ref 12–20)
CALCIUM SERPL-MCNC: 7.5 MG/DL (ref 8.5–10.1)
CHLORIDE SERPL-SCNC: 108 MMOL/L (ref 97–108)
CO2 SERPL-SCNC: 28 MMOL/L (ref 21–32)
CREAT SERPL-MCNC: 0.77 MG/DL (ref 0.7–1.3)
DIFFERENTIAL METHOD BLD: ABNORMAL
EOSINOPHIL # BLD: 0.2 K/UL (ref 0–0.4)
EOSINOPHIL NFR BLD: 2 % (ref 0–7)
ERYTHROCYTE [DISTWIDTH] IN BLOOD BY AUTOMATED COUNT: 20 % (ref 11.5–14.5)
GLUCOSE BLD STRIP.AUTO-MCNC: 171 MG/DL (ref 65–100)
GLUCOSE BLD STRIP.AUTO-MCNC: 203 MG/DL (ref 65–100)
GLUCOSE BLD STRIP.AUTO-MCNC: 280 MG/DL (ref 65–100)
GLUCOSE SERPL-MCNC: 136 MG/DL (ref 65–100)
HCT VFR BLD AUTO: 27.8 % (ref 36.6–50.3)
HGB BLD-MCNC: 8.8 G/DL (ref 12.1–17)
IMM GRANULOCYTES # BLD AUTO: 0 K/UL (ref 0–0.04)
IMM GRANULOCYTES NFR BLD AUTO: 0 % (ref 0–0.5)
LYMPHOCYTES # BLD: 1 K/UL (ref 0.8–3.5)
LYMPHOCYTES NFR BLD: 12 % (ref 12–49)
MCH RBC QN AUTO: 30.7 PG (ref 26–34)
MCHC RBC AUTO-ENTMCNC: 31.7 G/DL (ref 30–36.5)
MCV RBC AUTO: 96.9 FL (ref 80–99)
METAMYELOCYTES NFR BLD MANUAL: 1 %
MONOCYTES # BLD: 0.3 K/UL (ref 0–1)
MONOCYTES NFR BLD: 3 % (ref 5–13)
NEUTS BAND NFR BLD MANUAL: 1 %
NEUTS SEG # BLD: 6.8 K/UL (ref 1.8–8)
NEUTS SEG NFR BLD: 81 % (ref 32–75)
NRBC # BLD: 0 K/UL (ref 0–0.01)
NRBC BLD-RTO: 0 PER 100 WBC
PLATELET # BLD AUTO: 153 K/UL (ref 150–400)
PMV BLD AUTO: 10.6 FL (ref 8.9–12.9)
POTASSIUM SERPL-SCNC: 3.2 MMOL/L (ref 3.5–5.1)
RBC # BLD AUTO: 2.87 M/UL (ref 4.1–5.7)
RBC MORPH BLD: ABNORMAL
SERVICE CMNT-IMP: ABNORMAL
SODIUM SERPL-SCNC: 140 MMOL/L (ref 136–145)
WBC # BLD AUTO: 8.3 K/UL (ref 4.1–11.1)

## 2020-08-24 PROCEDURE — 76937 US GUIDE VASCULAR ACCESS: CPT

## 2020-08-24 PROCEDURE — 82962 GLUCOSE BLOOD TEST: CPT

## 2020-08-24 PROCEDURE — 74011000258 HC RX REV CODE- 258: Performed by: INTERNAL MEDICINE

## 2020-08-24 PROCEDURE — 97530 THERAPEUTIC ACTIVITIES: CPT

## 2020-08-24 PROCEDURE — 74011250637 HC RX REV CODE- 250/637: Performed by: INTERNAL MEDICINE

## 2020-08-24 PROCEDURE — 74011250637 HC RX REV CODE- 250/637: Performed by: PHYSICIAN ASSISTANT

## 2020-08-24 PROCEDURE — 74011636637 HC RX REV CODE- 636/637: Performed by: INTERNAL MEDICINE

## 2020-08-24 PROCEDURE — 36415 COLL VENOUS BLD VENIPUNCTURE: CPT

## 2020-08-24 PROCEDURE — 97110 THERAPEUTIC EXERCISES: CPT

## 2020-08-24 PROCEDURE — 85025 COMPLETE CBC W/AUTO DIFF WBC: CPT

## 2020-08-24 PROCEDURE — 94760 N-INVAS EAR/PLS OXIMETRY 1: CPT

## 2020-08-24 PROCEDURE — 36573 INSJ PICC RS&I 5 YR+: CPT | Performed by: INTERNAL MEDICINE

## 2020-08-24 PROCEDURE — 97535 SELF CARE MNGMENT TRAINING: CPT

## 2020-08-24 PROCEDURE — 97116 GAIT TRAINING THERAPY: CPT

## 2020-08-24 PROCEDURE — 74011250636 HC RX REV CODE- 250/636: Performed by: INTERNAL MEDICINE

## 2020-08-24 PROCEDURE — 99232 SBSQ HOSP IP/OBS MODERATE 35: CPT | Performed by: INTERNAL MEDICINE

## 2020-08-24 PROCEDURE — 80048 BASIC METABOLIC PNL TOTAL CA: CPT

## 2020-08-24 PROCEDURE — 77030018786 HC NDL GD F/USND BARD -B

## 2020-08-24 PROCEDURE — C1751 CATH, INF, PER/CENT/MIDLINE: HCPCS

## 2020-08-24 RX ORDER — POTASSIUM CHLORIDE 750 MG/1
20 TABLET, FILM COATED, EXTENDED RELEASE ORAL
Status: COMPLETED | OUTPATIENT
Start: 2020-08-24 | End: 2020-08-24

## 2020-08-24 RX ORDER — BACITRACIN 500 UNIT/G
1 PACKET (EA) TOPICAL AS NEEDED
Status: DISCONTINUED | OUTPATIENT
Start: 2020-08-24 | End: 2020-08-24 | Stop reason: HOSPADM

## 2020-08-24 RX ORDER — POLYETHYLENE GLYCOL 3350 17 G/17G
17 POWDER, FOR SOLUTION ORAL DAILY
Qty: 30 EACH | Refills: 0 | Status: ON HOLD
Start: 2020-08-25 | End: 2021-01-01

## 2020-08-24 RX ORDER — AMOXICILLIN 250 MG
1 CAPSULE ORAL 2 TIMES DAILY
Qty: 120 TAB | Refills: 0 | Status: ON HOLD | OUTPATIENT
Start: 2020-08-24 | End: 2021-01-01

## 2020-08-24 RX ORDER — OXYCODONE HYDROCHLORIDE 5 MG/1
5-10 TABLET ORAL
Qty: 60 TAB | Refills: 0 | Status: SHIPPED | OUTPATIENT
Start: 2020-08-24 | End: 2020-08-31

## 2020-08-24 RX ORDER — AMOXICILLIN 250 MG
1 CAPSULE ORAL 2 TIMES DAILY
Qty: 120 TAB | Refills: 0 | Status: SHIPPED
Start: 2020-08-24 | End: 2020-08-24

## 2020-08-24 RX ORDER — OXYCODONE HYDROCHLORIDE 5 MG/1
5-10 TABLET ORAL
Qty: 60 TAB | Refills: 0 | Status: SHIPPED | OUTPATIENT
Start: 2020-08-24 | End: 2020-08-24

## 2020-08-24 RX ADMIN — SPIRONOLACTONE 25 MG: 25 TABLET ORAL at 10:02

## 2020-08-24 RX ADMIN — OXYCODONE 10 MG: 5 TABLET ORAL at 14:43

## 2020-08-24 RX ADMIN — ATORVASTATIN CALCIUM 40 MG: 40 TABLET, FILM COATED ORAL at 10:01

## 2020-08-24 RX ADMIN — OXYCODONE 10 MG: 5 TABLET ORAL at 06:30

## 2020-08-24 RX ADMIN — RIFAXIMIN 550 MG: 550 TABLET ORAL at 10:02

## 2020-08-24 RX ADMIN — LACTULOSE 30 ML: 20 SOLUTION ORAL at 10:02

## 2020-08-24 RX ADMIN — INSULIN LISPRO 2 UNITS: 100 INJECTION, SOLUTION INTRAVENOUS; SUBCUTANEOUS at 10:03

## 2020-08-24 RX ADMIN — VANCOMYCIN HYDROCHLORIDE 1500 MG: 10 INJECTION, POWDER, LYOPHILIZED, FOR SOLUTION INTRAVENOUS at 05:00

## 2020-08-24 RX ADMIN — Medication 10 ML: at 06:10

## 2020-08-24 RX ADMIN — Medication 10 ML: at 13:14

## 2020-08-24 RX ADMIN — INSULIN LISPRO 5 UNITS: 100 INJECTION, SOLUTION INTRAVENOUS; SUBCUTANEOUS at 13:14

## 2020-08-24 RX ADMIN — APIXABAN 2.5 MG: 2.5 TABLET, FILM COATED ORAL at 10:01

## 2020-08-24 RX ADMIN — FUROSEMIDE 20 MG: 20 TABLET ORAL at 10:01

## 2020-08-24 RX ADMIN — INSULIN LISPRO 7 UNITS: 100 INJECTION, SOLUTION INTRAVENOUS; SUBCUTANEOUS at 13:13

## 2020-08-24 RX ADMIN — POTASSIUM CHLORIDE 20 MEQ: 750 TABLET, FILM COATED, EXTENDED RELEASE ORAL at 13:13

## 2020-08-24 RX ADMIN — INSULIN LISPRO 7 UNITS: 100 INJECTION, SOLUTION INTRAVENOUS; SUBCUTANEOUS at 10:03

## 2020-08-24 RX ADMIN — FAMOTIDINE 20 MG: 20 TABLET, FILM COATED ORAL at 10:01

## 2020-08-24 RX ADMIN — POLYETHYLENE GLYCOL 3350 17 G: 17 POWDER, FOR SOLUTION ORAL at 10:03

## 2020-08-24 RX ADMIN — OXYCODONE 10 MG: 5 TABLET ORAL at 10:01

## 2020-08-24 RX ADMIN — CEFEPIME HYDROCHLORIDE 1 G: 1 INJECTION, POWDER, FOR SOLUTION INTRAMUSCULAR; INTRAVENOUS at 10:04

## 2020-08-24 RX ADMIN — DOCUSATE SODIUM - SENNOSIDES 1 TABLET: 50; 8.6 TABLET, FILM COATED ORAL at 10:01

## 2020-08-24 NOTE — PROGRESS NOTES
Problem: Mobility Impaired (Adult and Pediatric) Goal: *Acute Goals and Plan of Care (Insert Text) Description: FUNCTIONAL STATUS PRIOR TO ADMISSION: Patient was modified independent using a walker for functional mobility. HOME SUPPORT PRIOR TO ADMISSION: The patient lived with son. Physical Therapy Goals Goals reviewed and remain appropriate 8/19/2020 Initiated 8/14/2020 1. Patient will move from supine to sit and sit to supine  in bed with minimal assistance/contact guard assist within 7 days. 2. Patient will perform sit to stand with minimal assistance/contact guard assist within 7 days. 3. Patient will ambulate with minimal assistance/contact guard assist for 150 feet with the least restrictive device within 7 days. 4. Patient will ascend/descend 2 stairs with 1 handrail(s) with minimal assistance/contact guard assist within 7 days. 5. Patient will verbalize and demonstrate understanding of posterior precautions per protocol within 7 days. 6. Patient will perform all home exercise program per protocol with independence within 7 days. Outcome: Progressing Towards Goal 
 PHYSICAL THERAPY TREATMENT Patient: Lm Pathak (15 y.o. male) Date: 8/24/2020 Diagnosis: Dislocation of internal right hip prosthesis ([address] Natacha Schmidt Procedure(s) (LRB): RIGHT HIP ARTHROPLASTY TOTAL REVISION POSTERIOR APPROACH AND ABDUCTOR RELEASE (Right) 10 Days Post-Op Precautions: WBAT, Fall(Posterior hip) Chart, physical therapy assessment, plan of care and goals were reviewed. ASSESSMENT: Patient continues with skilled PT services and is progressing slowly towards goals, no gross LOB or SOB, good improvement from last week, able to stand and step to chair, fatigues quickly, much less assistance for bed mob and sitting EOB, good motivation, vc's for safety and proper RW use.  
 
Current Level of Function Impacting Discharge (mobility/balance): mod assist x2 
    
 
 PLAN : Patient continues to benefit from skilled intervention to address the above impairments. Continue treatment per established plan of care 
to address goals. Recommendation for discharge: (in order for the patient to meet his/her long term goals) Therapy up to 5 days/week in SNF setting This discharge recommendation: Has been made in collaboration with the attending provider and/or case management IF patient discharges home will need the following DME: bedside commode and rolling walker OBJECTIVE DATA SUMMARY:  
 
Critical Behavior: 
Neurologic State: Alert Orientation Level: Oriented X4 Cognition: Follows commands Safety/Judgement: Insight into deficits, Awareness of environment Functional Mobility Training: 
Bed Mobility: 
Supine to Sit: Moderate assistance;Assist x1 Scooting: Minimum assistance;Assist x1 Level of Assistance: Moderate assistance Interventions: Tactile cues; Verbal cues Transfers: 
Sit to Stand: Minimum assistance->Moderate assistance;Assist x2 Stand to Sit: Minimum assistance;Assist x2 Bed to Chair: Minimum assistance; Moderate assistance;Assist x2 Interventions: Tactile cues; Verbal cues Level of Assistance: Minimum assistance->Moderate assistance;Assist x2 Balance: 
Sitting: Intact; Without support Sitting - Static: Prop sitting; Fair (occasional) Sitting - Dynamic: Not tested Standing: Impaired; With support Standing - Static: Fair;Constant support Standing - Dynamic : Fair;Constant support Ambulation/Gait Training: 
Distance (ft): 5 Feet (ft) Assistive Device: Gait belt;Walker, rolling Ambulation - Level of Assistance: Minimal assistance->Moderate assistance;Assist x2 Gait Abnormalities: Antalgic;Decreased step clearance; Step to gait Right Side Weight Bearing: Full Left Side Weight Bearing: Full Base of Support: Widened Stance: Right decreased Speed/Clarice: Slow Step Length: Left shortened;Right shortened Interventions: Tactile cues; Verbal cues Therapeutic Exercises:  
sitting EXERCISE Sets Reps Active Active Assist  
Passive Comments Ankle pumps 1 10 [x] [] [] LLE Heel raises 1 10 [x] [] [] \" Toe tap 1 10 [x] [] [] \" Knee ext 1 10 [x] [] [] bilat Hip flex 1 10 [x] [x] [] \"  
 
Pain Ratin Activity Tolerance: Poor After treatment patient left in no apparent distress: Sitting in chair and Call bell within reach COMMUNICATION/COLLABORATION:  
The patients plan of care was discussed with: Registered nurse. Lemuel Jackson PTA Time Calculation: 30 mins

## 2020-08-24 NOTE — DISCHARGE SUMMARY
Hospitalist Discharge Summary Patient ID: 
Tino Monterroso [id number] 
89 y.o. 
1950 8/13/2020 PCP on record: Forest Crowley MD 
 
Admit date: 8/13/2020 Discharge date and time: 8/24/2020 DISCHARGE DIAGNOSIS: 
Dislocation of internal right hip prosthesis CONSULTATIONS: 
IP CONSULT TO ORTHOPEDIC SURGERY 
IP CONSULT TO HOSPITALIST 
IP CONSULT TO UROLOGY 
IP CONSULT TO INFECTIOUS DISEASES Excerpted HPI from H&P of Angelique Dupree MD: 
 
 
______________________________________________________________________ DISCHARGE SUMMARY/HOSPITAL COURSE:  for full details see H&P, daily progress notes, labs, consult notes. Sepsis  8/17 with fevers to 101.9, 110, 36 Metabolic encephalopathy likey fever, infection 
  
Continue Empiric vanco and cefepime  
Blood cx growing serratia ID consult Echo show normal EF no vegetation   
Repeat blood cx no growth As per ID continue cefepime IV for 2 weeks end 09/03 then resume po keflex  
  
DM type 2 POA uncont with hyperglycemia Lantus 30 units QHS increase to 40 units Premeal humalog 5  (PTA 8units), increase to 7 units(hold if not eating) , 217, 148, 155 
  
History of right hip infection with Staph Lugdenesis feb [id number] S lugdenesis IE Feb 2018 at [facility] Treated with 6 weeks IV antibiotics Maintained on suppressive keflex since that time 
     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 
  
Anemia acute blood loss, HG 7.5 S/p 3 units pRBCs  
Resume  eliquis case was discussed with ortho and they are clear about resume eliquis  
No reported gi bleeding per NS Orthopedics to check the wound F/u fecal occult blood stool  
  
BREWER cirrhosis Hx Esophageal varices, s/p TIPS 
-continue PTA  lactulose and xifaxam 
  
Essential HTN 
hold lisinopril with bump in Cr, creatinine improved BP okay 
  
JACOB Does not use CPAP Scrotal edema -as per urology recommendation zeng to remain until patient ambulating then voiding trail  
- need follow up with urology  
- voiding trial at [facility], [place], operated by [facility]    
  
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 
- unable to reduce hip 
-S/P RIGHT HIP ARTHROPLASTY TOTAL REVISION POSTERIOR APPROACH AND ADDUCTOR RELEASE, SCAR REVISION 8/14/2020 
-Eliquis for DVT prophylaxis  
-activity per Ortho 
  
  
30.0 - 39.9 Obese / Body mass index is 36.83 kg/m². 
  
Code status: Full Prophylaxis: Eliquis  
Recommended Disposition: SNF/LTC 
  
- Continue Cefepime IV x 2 weeks end date  9/3 
- Resume po Keflex thereafter - Monitor surgical site, change dressings as needed - Blood sugar control - Encourage yogurt, probiotic 
 
_______________________________________________________________________ Patient seen and examined by me on discharge day. Pertinent Findings: 
Gen:    Not in distress Chest: Clear lungs CVS:   Regular rhythm. No edema Abd:  Soft, not distended, not tender Neuro:  Alert, oriented  
_______________________________________________________________________ DISCHARGE MEDICATIONS:  
Current Discharge Medication List  
  
START taking these medications Details  
polyethylene glycol (MIRALAX) 17 gram packet Take 1 Packet by mouth daily. Qty: 30 Each, Refills: 0  
  
cefepime 1 gram 1 g, ADDaptor 1 Device IVPB 1 g by IntraVENous route every eight (8) hours for 10 days. Indications: ID continue cefepime IV  end 09/03 then resume po keflex Qty: 30 Dose, Refills: 0 CONTINUE these medications which have CHANGED Details  
oxyCODONE IR (ROXICODONE) 5 mg immediate release tablet Take 1-2 Tabs by mouth every three (3) hours as needed for Pain for up to 7 days. Max Daily Amount: 80 mg. 
Qty: 60 Tab, Refills: 0 Associated Diagnoses: Closed posterior dislocation of right [facility] senna-docusate (PERICOLACE) 8.6-50 mg per tablet Take 1 Tab by mouth two (2) times a day. Qty: 120 Tab, Refills: 0 CONTINUE these medications which have NOT CHANGED Details  
insulin glargine (Lantus Solostar U-100 Insulin) 100 unit/mL (3 mL) inpn 30 Units by SubCUTAneous route two (2) times a day. tamsulosin (FLOMAX) 0.4 mg capsule Take 0.4 mg by mouth daily. spironolactone (Aldactone) 25 mg tablet Take 25 mg by mouth daily. lactulose (CHRONULAC) 10 gram/15 mL solution Take 30 mL by mouth two (2) times a day. furosemide (LASIX) 20 mg tablet Take 20 mg by mouth daily. Omeprazole delayed release (PRILOSEC D/R) 20 mg tablet Take 20 mg by mouth daily. apixaban (ELIQUIS) 2.5 mg tablet Take 1 Tab by mouth every twelve (12) hours every twelve (12) hours. Indications: deep vein thrombosis prevention 
Qty: 60 Tab, Refills: 0  
  
insulin aspart U-100 (NovoLOG Flexpen U-100 Insulin) 100 unit/mL (3 mL) inpn 7 Units by SubCUTAneous route Before breakfast, lunch, and dinner for 90 days. 7 units 3 times daily 
Qty: 18.9 mL, Refills: 0  
  
rifAXIMin (XIFAXAN) 550 mg tablet Take 1 Tab by mouth two (2) times a day. Qty: 180 Tab, Refills: 3  
  
potassium chloride SR (KLOR-CON 10) 10 mEq tablet Take 10 mEq by mouth two (2) times a day.  
  
gabapentin (NEURONTIN) 300 mg capsule Take 2 Caps by mouth nightly. Qty: 180 Cap, Refills: 1 Associated Diagnoses: RLS (restless legs syndrome) Patient Follow Up Instructions: Activity: PT/OT Eval and Treat Diet: Diabetic Diet Wound Care: As directed Follow-up with PCP in 3 days. Follow-up tests/labs Follow-up Information Follow up With Specialties Details Why Contact [facility] [address] [facility] 
[phone] Rodney Copeland MD Orthopedic Surgery In 2 weeks  [address] [facility]. 
[phone]  Love Beckham MD Family Medicine In 3 days  [facility] 
 [facility] 
[phone] 
  
  
 
________________________________________________________________ Risk of deterioration: Low 
 
Condition at Discharge:  Stable 
__________________________________________________________________ Disposition SNF/LTC 
 
____________________________________________________________________ Code Status: Full Code 
___________________________________________________________________ Total time in minutes spent coordinating this discharge (includes going over instructions, follow-up, prescriptions, and preparing report for sign off to her PCP) :  60  minutes Signed:  
Val Johnson MD

## 2020-08-24 NOTE — PROGRESS NOTES
PICC (Peripherally Inserted Central Catheter) line insertion  procedure note :  
 
Procedure explained to patient along with risks and benefits  and patient agreed to proceed. Informed consent obtained from  patient. Patient teaching completed. Timeout completed. Pre-procedure assessment done. Maximum sterile barrier precautions observed throughout procedure. Lidocaine 1%  2.0    ml sq given prior to cannulation. Cannulated cephalic  vein using ultrasound guidance and modified seldinger technique. Inserted 4  [place] single  lumen PICC to right arm using Trufa Tip Location System and  [address]. Pt has    sinus   rhythm. PICC tip location was confirmed by 3 CG tip positioning system, indicating tall P wave and no negative deflection before P wave which would indicate that the PICC tip is properly placed in the distal SVC or at the [place]. PICC tip location was  confirmed by 2 PICC nurses and 3CG printout placed on patient's chart. Blood return verified and flushed with 20 ml normal saline in each port. Sterile dressing applied with biopatch, statLock and occlusive dressing as per protocol. Curos caps applied to each port. Patient tolerated procedure well with minimal blood loss ( less than 5 ml.) Patient education material provided. PICC procedure performed by  :  Martha Pinto RN. MERCEDEZ. CARLY. PICC nurse. Vascular Access Team.  
Assisted by : Riana Spence RN  PICC nurse Reason for access : reliable access / MD order /    Long term IV medication administration / Discharge with PICC line Complications related to insertion  : none X-Ray : not applicable Notified primary nurse  [place] FLORES  that  PICC line can be used. Total Trimmed Length :  46   cm External Length : At the Hub   
 PICC line site arm circumference:    33    cm PICC catheter occupies   13   % of vein Type of PICC: [address] [facility] Ref # :    [id number] Lot # :  [id number] Expiration Date :    2021-08-31 Rabia Woodson RN. BSN. MERCEDEZ,CMSRN.  PICC Nurse, Vascular Access Team.

## 2020-08-24 NOTE — DISCHARGE INSTRUCTIONS
[address] [place], [place] [address]y [address]  Orthopedics    [facility]    Discharge Instruction Sheet: Total Hip Replacement    DR. CORDOBA    Blood Clot Prevention     Resume eliquis , that will act as DVT prophylaxis . Pain control:  Medications   Typically, we will prescribe a narcotic usually 1-2 tabs every three to four hours as needed for your pain. Most patients need this only for the first few weeks.  You should discontinue this as the pain decreases.  Some of these medications contain a large dose of Tylenol (acetaminophen). Therefore, you should consult your pharmacist before taking any additional Tylenol at the same time. You should not drive while taking any narcotic pain medications. Take your pain medications with food to prevent nausea! Your last dose of pain medication in the hospital was given @ __________    [address] will be discharged home with ice/gel packs.  Continue using these regularly to minimize pain and swelling, especially after physical activity. Constipation   Pain medication and anesthesia can be constipating-this can be prevented by gentle physical activity and drinking plenty of fluid.  It should be treated with over-the-counter medications such as Dulcolax, Miralax, Magnesium Citrate and/or Fleets enema.  You should have a bowel movement at least every other day following surgery. Incision care   You will be discharged with a transparent and waterproof dressing over your incision. o This should remain in place for 5-7 days after discharge.   You will be given one additional dressing to use at home in 5-7 days if you are still having drainage   You may shower with this dressing in place after you are discharged. o After showering pat the dressing/incision dry.  When the incision is no longer draining, it may be left open to the air.  DO NOT rub the incision.     DO NOT take a tub bath or go swimming until cleared by your doctor.  DO NOT apply lotions, oils, or creams to incision. To increase and promote healing:   Stop Smoking (or at least cut back on smoking).  Eat a well-balanced diet (high in protein and vitamin C)   If your appetite is poor, consider nutritional supplements like Ensure, Glucerna, or Wheaton Instant Breakfast.   If you are diabetic, controlling you blood sugars is very important to prevent infection and promote wound healing. Nutrition:   If you were on a supplement such as Ensure or Glucerna) while in the hospital, please continue using them with each meal for the next 30 days.  Eat a well-balanced diet - High in protein, high in vitamins and minerals, especially vitamin C and zinc.     Home Health:   If you have staples a home health nurse will remove them in about 10 days.  Physical Therapy will come to your home to continue training for walking, transferring and exercises. Physical Activity     You can weight bear as tolerated on your new hip .  Bed-rest may slow your recovery.  Use your walker and take short walks about every 2 hours.  Increase your distance each day.  NO DRIVING until told to do so. Warning signs: Please call your physician immediately at [phone] if you have   Bleeding from incision that is constant.  Change in mental status (unusual behavior or confusion)   If your incision develops redness or swelling   Change in wound drainage (increase in amount, color, or foul odor)   Temperature over 101.5 degrees Fahrenheit    Pain in the calf of your leg   Tenderness or redness in the calf of your leg   Increased swelling of the thigh, ankle, calf, or foot.     Emergency: CALL 911 if you have   Shortness of breath   Chest pain   Localized chest pain when coughing or taking a deep breath    Follow-up    Please call your surgeons office at [phone] for a follow up appointment.   vi Arellano should call as soon as you get home or the next day after discharge. o Ask to make an appointment in 2 weeks.  You can return to work when cleared by a physician. During normal business hours you may reach Dr. Bernabe Marlow' team directly at [phone] if you have concerns or questions.   - Continue Cefepime IV x 2 weeks end date  9/3  - Resume po Keflex thereafter  - Monitor surgical site, change dressings as needed  - Blood sugar control  - Encourage yogurt, probiotic

## 2020-08-24 NOTE — PROGRESS NOTES
Problem: Self Care Deficits Care Plan (Adult) Goal: *Acute Goals and Plan of Care (Insert Text) Description: FUNCTIONAL STATUS PRIOR TO ADMISSION: Pt reports residing with son in 2 story home; however, resides on first floor. Pt reports Mod [place] with self-care routine at RW. Has walk-in shower with seated surface. HOME SUPPORT: The patient lives with son, who is currently working and able to provide PRN ADL/IADL assistance. Occupational Therapy Goals Initiated 8/19/2020 1. Patient will perform seated upper body dressing with supervision/set-up within 7 day(s). 2.  Patient will perform bathing, EOB/RW, with moderate assistance  within 7 day(s). 3.  Patient will perform lower body dressing, EOB/RW, with moderate assistance  within 7 day(s). 4.  Patient will perform toilet transfers to [facility] with moderate assistance  within 7 day(s). 5.  Patient will perform all aspects of toileting with moderate assistance  within 7 day(s). 6.  Patient will utilize energy conservation techniques during functional activities with Min verbal cues within 7 day(s). Outcome: Progressing Towards Goal 
 OCCUPATIONAL THERAPY TREATMENT Patient: Yamileth Minor (46 y.o. male) Date: 8/24/2020 Diagnosis: Dislocation of internal right hip prosthesis ([facility] Leotis Neeru Dislocation of internal right hip prosthesis ([facility] Procedure(s) (LRB): 
RIGHT HIP ARTHROPLASTY TOTAL REVISION POSTERIOR APPROACH AND ABDUCTOR RELEASE (Right) 10 Days Post-Op Precautions: WBAT, Fall(Posterior hip) Chart, occupational therapy assessment, plan of care, and goals were reviewed. ASSESSMENT Patient continues with skilled OT services and is progressing towards goals. Pt was mod of 1 for bed mobility and as able to sit on EOb with less pain and scooted forward with extra time and CGA.   Pt as able to stand with mod to min of 2 and with RW was mod to min of 2 for transfer to recliner. Pt needed VC to follow posterior hip precautions, and min of 2 for stand to sit. He was able to stand with mod assist of 2 from recliner and worked on pt scooting to EOb and leaning forward not back as he is coming to stand. Pt has increased edema in scrotal area and OT assisted pt with donning disposable underwear and  placed a folded sheet under his scrotum for support and comfort. Pt stated that he was not having the burning in that area now with the extra support. OT worked with pt on shoulder retraction and then to push down on walker when he was taking a step with surgical leg. Current Level of Function Impacting Discharge (ADLs): decreased endurance, strength, functional mobility, ADLs and GW PLAN : 
Patient continues to benefit from skilled intervention to address the above impairments. Continue treatment per established plan of care. to address goals. Recommend with staff: Marilyn Grain for at least one meal  
 
Recommend next OT session: work on [address] [facility] [address] sitting in chair or EOB and functional transfers to [facility] and recliner for ADLs Recommendation for discharge: (in order for the patient to meet his/her long term goals) Therapy up to 5 days/week in SNF setting This discharge recommendation: 
Has been made in collaboration with the attending provider and/or case management IF patient discharges home will need the following DME: tbd SUBJECTIVE:  
Patient stated My pain is about a 6 now.  OBJECTIVE DATA SUMMARY:  
Cognitive/Behavioral Status: 
  
  
  
 intact Functional Mobility and Transfers for ADLs: 
Bed Mobility: 
Supine to Sit: Moderate assistance;Assist x1 Scooting: Minimum assistance;Assist x1 Transfers: 
Sit to Stand: Minimum assistance; Moderate assistance;Assist x2 Bed to Chair: Minimum assistance; Moderate assistance;Assist x2 Balance: 
Sitting: Intact; Without support Sitting - Static: Prop sitting; Fair (occasional) Sitting - Dynamic: Not tested Standing: Impaired; With support Standing - Static: Fair;Constant support Standing - Dynamic : Fair;Constant support ADL Intervention: 
 Pt is setup for UB and grooming in bed or in chair and max to total for LB Patient recalled and demonstrated avoiding extreme planes of movement with Right LE during ADLs and functional mobility with verbal and tactile cues. Pain: 
6 Activity Tolerance:  
Fair Please refer to the flowsheet for vital signs taken during this treatment. After treatment patient left in no apparent distress:  
Sitting in chair and Call bell within reach COMMUNICATION/COLLABORATION:  
The patients plan of care was discussed with: Physical therapist and Registered nurse. BRIAN Stephen Time Calculation: 26 mins

## 2020-08-24 NOTE — PROGRESS NOTES
YARELY 
1) [facility] 2) PICC line has been placed 3) [facility] @ 3:00pm 
4) call report [phone]  
 
1:50pm 
CM contacted Joseph Philip MFA/Derek up to inform her of transportation time. CM contacted patient's son, Sibyl Crigler [phone] to inform d/c and time, son agreed with plan. Patient aware of d/c time. PCS placed on bedside chart. RN aware of transport time. 1:30pm 
CM reached out to attending via perfect serve requesting d/c summary. 1:15pm 
CM made room visit with patient who is ready for [place] today [place] Patient requesting BLS transportation be arranged and is aware that he may receive a bill if insurance does not cover the entire cost of the trip. Referral sent to [facility] and arranged for 3:00pm.  
 
Medicare pt has received, reviewed, and signed 2nd IM letter informing them of their right to appeal the discharge. Signed copy has been placed on pt bedside chart. 12:30pm 
Patient has PICC line placed. CM reached out to ortho PA who confirmed patient is cleared from their standpoint and would reach out to attending to make him aware of this. CM waiting for d/c order and summary. 11:00am 
CM received return phone call from Joseph Philip MFA/Derek up who confirmed they can accept patient with current COVID test if patient can d/c today. Still waiting on PICC. 10:45am 
CM spoke to attending who reported that patient could d/c from his standpoint once PICC line is placed and has a bed at [facility], patient will still need to be cleared by ortho. QUINTON acknowledged that COVID test was last completed on 8/20. CM contacted Joseph Philip MFA/Derek liaison inquiring if patient would need a new COVID test completed and resulted prior to d/c. CM informed Joseph Philip that tests are  Per Joseph Philip she will check and get back to CM with an answer. Joseph Philip aware of patient needing IV ABX and confirmed this is available. Magnus Moon, [address], [address]

## 2020-08-24 NOTE — PROGRESS NOTES
Transition of Care Plan to SNF/Rehab 
 
SNF/Rehab Transition: 
Patient has been accepted to [facility] and meets criteria for admission. Patient will transported by [facility] and expected to leave at 3:00pm. 
 
Communication to Patient/Family: Met with patient and son (identified care giver) and they are agreeable to the transition plan. Communication to SNF/Rehab: 
Bedside RN, North, has been notified to update the transition plan to the facility and call report (phone number [phone]). Discharge information has been updated on the AVS. Discharge instructions to be fax'd to facility at [facility] # [phone]). Nursing Please include all hard scripts for controlled substances, med rec and dc summary, and AVS in packet. Reviewed and confirmed with facility, [facility], can manage the patient care needs for the following:  
 
Mary Coats with (X) only those applicable: 
 
Medication: 
[x]  Medications will be available at the facility [x]  IV Antibiotics - Continue Cefepime IV x 2 weeks end date  9/3 
- Resume po Keflex thereafter 
[x]  Controlled Substance - hard copy to be sent with patient [x]  Weekly Labs Documents: 
[x] Hard RX [x] MAR [x] Kardex [x] AVS 
[x]Transfer Summary 
[x]Discharge Equipment: 
[]  CPAP/BiPAP []  Wound Vacuum 
[]  Hoffman or Urinary Device 
[]  PICC/Central Line 
[]  Nebulizer 
[]  Ventilator Treatment: 
[]Isolation (for MRSA, VRE, etc.) []Surgical Drain Management []Tracheostomy Care 
[]Dressing Changes []Dialysis with transportation and chair time. []PEG Care []Oxygen []Daily Weights for Heart Failure Dietary: 
[]Any diet limitations []Tube Feedings []Total Parenteral Management (TPN) Eligible for Medicaid Long Term Services and Supports Yes: 
[] Eligible for medical assistance or will become eligible within 180 days and UAI completed. [] Provider/Patient and/or support system has requested screening. [] UAI copy provided to patient or responsible party. [] UAI unavailable at discharge will send once processed to SNF provider. [] UAI unavailable at discharged mailed to patient No:  
[] Private pay and is not financially eligible for Medicaid within the next 180 days. [] Reside out-of-state. [] A residents of a state owned/operated facility that is licensed 
by [address] [employer] [facility] or [facility] 
[] Enrollment in [facility] hospice services 
[] [facility] 
[] Patient /Family declines to have screening completed or provide financial information for screening Financial Resources: 
Medicaid   
[] Initiated and application pending  
[] Full coverage Advanced Care Plan: 
[]Surrogate Decision Maker of Care 
[]POA []Communicated Code Status Other Robert Hoyos, [address] [facility], [address]

## 2020-08-24 NOTE — PROGRESS NOTES
Bedside and Verbal shift change report given to Tequila Bro RN (oncoming nurse) by Jodie Salinas (offgoing nurse). Report included the following information SBAR, Kardex, Intake/Output, MAR and Recent Results.

## 2020-08-24 NOTE — PROGRESS NOTES
Hospital to [facility] SBAR Handoff - [place] Cheryl Das 
                                                                      65 y.o.   male [address]   Room: 3210/01    [facility] ORTHOPEDICS  Unit Phone# :  [phone] [address] 
[facility] ORTHOPEDICS 
[address] [place] [address] Dept: [phone] Loc: [id number] SITUATION Admitted:  8/13/2020         Attending Provider:  Shelby Gutierrez MD    
 
Consultations:  IP CONSULT TO ORTHOPEDIC SURGERY 
IP CONSULT TO HOSPITALIST 
IP CONSULT TO UROLOGY 
IP CONSULT TO INFECTIOUS DISEASES 
 
PCP:  Debby Weiss MD   [phone] Treatment Team: Attending Provider: Shelby Gutierrez MD; Consulting Provider: Ricardo Tobar; Consulting Provider: Jackie Olsen MD; Utilization Review: Vianey Chung RN; Care Manager: Lilliana Escalona Consulting Provider: Kalin Pinedo MD; Consulting Provider: Shelby Gutierrez MD; Consulting Provider: Juan Weaver MD; Utilization Review: Carlos Tamez RN; Care Manager: Oni Brito Admitting Dx:  Dislocation of internal right hip prosthesis ([address] Dee Spare Principal Problem: Dislocation of internal right hip prosthesis ([address] 10 Days Post-Op of  
Procedure(s): RIGHT HIP ARTHROPLASTY TOTAL REVISION POSTERIOR APPROACH AND ABDUCTOR RELEASE  
BY: Georgia Armstrong MD             ON: 8/14/2020 Code Status: Prior Advance Directives:  
Advance Care Planning 7/22/2020 Patient's Healthcare Decision Maker is: -  
Primary Decision Maker Name -  
Primary Decision Maker Phone Number -  
Primary Decision Maker Relationship to Patient -  
Confirm Advance Directive Yes, on file Does the patient have other document types - (Send w/patient) Yes Not W Pt  
 
 
Isolation:  There are currently no Active Isolations       MDRO: No current active infections Pain Medications given:  oxycodone    Last dose: today 10mg at  1440 Special Equipment needed: yes  Type of equipment: picc line (Not currently on dialysis) (Not currently on dialysis) (Not currently on dialysis) BACKGROUND Allergies: Allergies Allergen Reactions  Nsaids (Non-Steroidal Anti-Inflammatory Drug) Other (comments) Hx of PUD and Hinson's Esophagus Past Medical History:  
Diagnosis Date  ADD (attention deficit disorder)  Adverse effect of anesthesia   
 motion sickness resulting in loss of balance  Anemia due to blood loss  Hinson's esophagus with esophagitis  Benign essential tremor  Cancer [facility]) [facility]  Chronic pain  Cirrhosis ([address]  Depression  Dislocated hip ([address]  DJD (degenerative joint disease) of hip   
 bilat  DM (diabetes mellitus) ([address] 3/17/2010  ED (erectile dysfunction) of organic origin  Esophageal varices determined by endoscopy ([address]  Fall on or from sidewalk curb 9/24/2015  Femur fracture ([address]  Gastritis and duodenitis  GERD (gastroesophageal reflux disease)   
 resolved after discontinuing diclofenac  Hematuria 6/2016  High cholesterol 03/17/2010  
 pt denies 6/19  
 HTN (hypertension) 3/17/2010  Morbid obesity ([address]  Murmur, cardiac 2016  
 PFO (patent foramen ovale) 7/26/2017  PUD (peptic ulcer disease)  Sepsis ([address]  Shingles 6/2016 RESOLVING- NO RASH (HEAD)  Sleep apnea   
 doesnt use CPAP anymore Past Surgical History:  
Procedure Laterality Date  COLONOSCOPY N/A 6/18/2019 COLONOSCOPY AND EGD performed by Anand Llanes MD at [facility] ENDOSCOPY  COLONOSCOPY,DIAGNOSTIC  6/18/2019  ENDOSCOPY, COLON, DIAGNOSTIC    
 HX CATARACT REMOVAL Bilateral   
 HX CHOLECYSTECTOMY  1995  HX GI  1980  
 gastroplasty [address]  HX HIP REPLACEMENT Left  HX ORTHOPAEDIC Right 2011  
 rot cuff repair  HX ORTHOPAEDIC  2016  
 left broken femur  HX ORTHOPAEDIC Right 08/13/2020 Attempt closed reduction of hip dislocation [address]  HX VASCULAR ACCESS    
 picc line and removed after sepsis resolved  IR INSERT TIPS HEPATIC SHUNT  3/28/2020 [facility] ARTHROPLASTY  05/2010  
 right  TOTAL HIP ARTHROPLASTY  08/01/2016  
 left  UPPER GI ENDOSCOPY,BIOPSY  6/18/2019 Medications Prior to Admission Medication Sig  
 insulin glargine (Lantus Solostar U-100 Insulin) 100 unit/mL (3 mL) inpn 30 Units by SubCUTAneous route two (2) times a day.  cholecalciferol (Vitamin D3) 25 mcg (1,000 unit) cap Take 1,000 Units by mouth daily.  HYDROcodone-acetaminophen (Norco) 5-325 mg per tablet Take 1 Tab by mouth every six (6) hours as needed for Pain.  tamsulosin (FLOMAX) 0.4 mg capsule Take 0.4 mg by mouth daily.  melatonin 3 mg tablet Take 3 mg by mouth nightly as needed for Sleep.  spironolactone (Aldactone) 25 mg tablet Take 25 mg by mouth daily.  magnesium oxide 400 mg magnesium tab Take 1 Tab by mouth daily.  lactulose (CHRONULAC) 10 gram/15 mL solution Take 30 mL by mouth two (2) times a day.  diclofenac (VOLTAREN) 1 % gel Apply 2 g to affected area four (4) times daily.  oxyCODONE IR (ROXICODONE) 5 mg immediate release tablet Take 5 mg by mouth every four (4) hours as needed for Pain.  furosemide (LASIX) 20 mg tablet Take 20 mg by mouth daily.  Omeprazole delayed release (PRILOSEC D/R) 20 mg tablet Take 20 mg by mouth daily.  apixaban (ELIQUIS) 2.5 mg tablet Take 1 Tab by mouth every twelve (12) hours every twelve (12) hours. Indications: deep vein thrombosis prevention  senna-docusate (PERICOLACE) 8.6-50 mg per tablet Take 1 Tab by mouth two (2) times a day. Indications: constipation  lisinopriL (PRINIVIL, ZESTRIL) 5 mg tablet Take 10 mg by mouth every morning.  sertraline (Zoloft) 100 mg tablet Take 150 mg by mouth daily.  insulin aspart U-100 (NovoLOG Flexpen U-100 Insulin) 100 unit/mL (3 mL) inpn 7 Units by SubCUTAneous route Before breakfast, lunch, and dinner for 90 days. 7 units 3 times daily  rifAXIMin (XIFAXAN) 550 mg tablet Take 1 Tab by mouth two (2) times a day.  potassium chloride SR (KLOR-CON 10) 10 mEq tablet Take 10 mEq by mouth two (2) times a day.  thiamine hcl 500 mg tablet Take 500 mg by mouth daily.  folic acid (FOLVITE) 1 mg tablet Take 1 Tab by mouth daily.  gabapentin (NEURONTIN) 300 mg capsule Take 2 Caps by mouth nightly.  butalbital-acetaminophen-caffeine (FIORICET, ESGIC) -40 mg per tablet Take 1 Tab by mouth every six (6) hours as needed for Headache.  cephALEXin (KEFLEX) 500 mg capsule Take 1 Cap by mouth two (2) times a day.  ergocalciferol (VITAMIN D2) 50,000 unit capsule TAKE 1 CAPSULE EVERY 7 DAYS (Patient taking differently: Take 50,000 Units by mouth every seven (7) days. Takes on Thursdays)  atorvastatin (LIPITOR) 40 mg tablet Take 1 Tab by mouth daily.  primidone (MYSOLINE) 250 mg tablet TAKE 1 TABLET TWICE A DAY  acetaminophen (TYLENOL) 500 mg tablet Take 2 Tabs by mouth every six (6) hours as needed for Pain.  
 B.infantis-B.ani-B.long-B.bifi (PROBIOTIC 4X) 10-15 mg TbEC Take 1 Tab by mouth daily.  calcium citrate-vitamin D3 (CITRACAL + D) tablet Take 2 Tabs by mouth two (2) times a day. Hard scripts included in transfer packet yes Vaccinations:   
Immunization History Administered Date(s) Administered  Influenza High Dose Vaccine PF 09/22/2016, 09/18/2017, 09/24/2018, 11/18/2018, 09/15/2019  Influenza Vaccine 10/03/2013  Influenza Vaccine Split 10/28/2012  TDAP Vaccine 01/31/2011  Zoster Recombinant 11/28/2018, 03/13/2019 Readmission Risks:    Known Risks: previous fall, weakness  The Charlson CoMorbitiy Index tool is an evidenced based tool that has more automatic generated information. The tool looks at many different items such as the age of the patient, how many times they were admitted in the last calendar year, current length of stay in the hospital and their diagnosis. All of these items are pulled automatically from information documented in the chart from various places and will generate a score that predicts whether a patient is at low (less than 13), medium (13-20) or high (21 or greater) risk of being readmitted. ASSESSMENT Temp: 97.9 °F (36.6 °C) (08/24/20 1235) Pulse (Heart Rate): 66 (08/24/20 1235) Resp Rate: 18 (08/24/20 1235)           BP: 120/53 (08/24/20 1235) O2 Sat (%): 95 % (08/24/20 1235) Weight: 118.1 kg (260 lb 5.8 oz)    Height: 5' 10.5\" (179.1 cm) (08/20/20 1041) If above not within 1 hour of discharge: 
 
BP:_____  P:____  R:____ T:_____ O2 Sat: ___%  O2: ______ Active Orders Diet DIET DIABETIC WITH OPTIONS Consistent Carb 2200kcal; Regular Orientation: oriented to time, place, person and situation Active Behaviors: None Active Lines/Drains:  (Peg Tube / Hoffman / CL or S/L?): yes picc line Urinary Status: Hoffman     Last BM: Last Bowel Movement Date: 08/24/20 Skin Integrity: Incision (comment)(right hip) Mobility: Very limited Weight Bearing Status: PWB (Partial Weight Bearing %) Gait Training Assistive Device: Gait belt, Walker, rolling Ambulation - Level of Assistance: Minimal assistance, Moderate assistance, Assist x2 Distance (ft): 5 Feet (ft) Interventions: Tactile cues, Verbal cues Lab Results Component Value Date/Time  Glucose 136 (H) 08/24/2020 06:22 AM  
 Hemoglobin A1c 6.6 (H) 07/15/2020 10:35 AM  
 INR 1.2 (H) 07/20/2020 11:41 AM  
 INR 1.2 (H) 07/15/2020 10:35 AM  
 HGB 8.8 (L) 08/24/2020 06:22 AM  
 HGB 8.1 (L) 08/23/2020 04:44 AM  
  
  RECOMMENDATION  
 
 See After Visit Summary (AVS) for: · Discharge instructions · After [facility] · Special equipment needed (entered pre-discharge by Care Management) · Medication Reconciliation · Follow up Appointment(s) Report given/sent by:  Fred Reyes RN Verbal report given to: Jan Whitaker  FAXED to:  paper copies delivered with patient Estimated discharge time:  8/24/2020 at 1500

## 2020-08-24 NOTE — PROGRESS NOTES
Infectious Disease Progress Note IMPRESSION:  
 
- Sepsis 
-Serratia bacteremia BC - + for Serratia marcescens  Repeat BC- - NG 
  TTE - negative UA - unremarkable, UC- NG 
- S/p R/hip arthroplasty Total Revision posterior approach  S/p attempted closed reduction under anesthesia on  
- H/o recurrent dislocations of R/hip arthroplasty - H/o  septic srthritis R/hip with Staph lugdudensis   
- H/o Staph lugdudensis bacteremia ( 18)  & MV endocarditis   ( admitted - 3/4/18 [facility]) Treated with Cefazoin IV x 6 weeks & oral suppressive therapy  po Keflex 
- Coag negative Staph bacteremia 18 
- H/o MRSA infection - ( WC + Thio broth only 16( Cristina Caraballo) - DM Type 2 A1c 6.6 
- BREWER cirrhosis - JACOB on CPAP PLAN:  
  
 
- Continue Cefepime 1 G Q8h IV x 2 weeks end date  9/3 Weekly CBC, CMP while on Cefepime Fax results to [phone], call with abnormal results at [phone] 
- Resume po Keflex thereafter- D/w pt & Dr Eunice Quiros - Monitor surgical site, change dressings as needed- D/w Ortho- clean, no drainage - Blood sugar control - Encourage yogurt, probiotic - Plan of care D/w pt , D/w Ortho PA Subjective:  
 
 Pt seen . \" I feel great \" Denies complaints Surgical site appears clean, no drainage Review of Systems:  A comprehensive review of systems was negative except for that written in the History of Present Illness. 10 point ROS obtained . All other systems negative . Objective:  
 
Blood pressure 115/45, pulse 70, temperature 99 °F (37.2 °C), resp. rate 18, height 5' 10.5\" (1.791 m), weight 260 lb 5.8 oz (118.1 kg), SpO2 96 %. Temp (24hrs), Av.9 °F (37.2 °C), Min:98.2 °F (36.8 °C), Max:99.9 °F (37.7 °C) Patient Vitals for the past 24 hrs: 
 Temp Pulse Resp BP SpO2  
20 0820 99 °F (37.2 °C) 70 18 115/45 96 % 20 0608 98.5 °F (36.9 °C) 70 18 109/41 98 % 20 2329 98.9 °F (37.2 °C)     08/23/20 1956 99.9 °F (37.7 °C) 73 18 109/54 97 % 08/23/20 1648 98.2 °F (36.8 °C) 74 16 141/54 98 % Lines:  Peripheral IV:    
 
Physical Exam:  
General:  Awake, cooperative Eyes:  Sclera anicteric. Pupils equally round and reactive to light. Mouth/Throat: Mucous membranes normal, oral pharynx clear Neck: Supple Lungs:   Reduced auscultation bases CV:  Regular rate and rhythm,no murmur, click, rub or gallop Abdomen:   Soft, non-tender. bowel sounds normal. non-distended Extremities: No edema Skin: Skin color, texture, turgor normal. no acute rash or lesions Lymph nodes: Cervical and supraclavicular normal  
Musculoskeletal: No swelling or deformity, R/hip - no drainage , dressing intact Lines/Devices:  Intact, no erythema, drainage or tenderness Psych: Alert and oriented, normal mood affect Data Review: CBC:  
Recent Labs  
  08/24/20 
0622 08/23/20 
0444 08/22/20 
0112 WBC 8.3 10.7 10.4 RBC 2.87* 2.64* 2.55* HGB 8.8* 8.1* 7.7* HCT 27.8* 25.3* 24.2*  
 136* 115* GRANS 81* 85* 69  
LYMPH 12 7* 10* EOS 2 3 6 CMP:  
Recent Labs  
  08/24/20 0622 08/23/20 
0444 08/22/20 
0112 * 126* 166*  138 139  
K 3.2* 3.2* 3.5  108 109* CO2 28 24 26 BUN 9 10 14 CREA 0.77 0.66* 0.71  
CA 7.5* 7.1* 7.0* AGAP 4* 6 4*  
BUCR 12 15 20 Studies:     
Lab Results Component Value Date/Time Culture result: NO GROWTH 4 DAYS 08/20/2020 07:00 PM  
 Culture result: No growth (<1,000 CFU/ML) 08/17/2020 12:04 PM  
 Culture result: (A) 08/17/2020 11:33 AM  
  SERRATIA MARCESCENS GROWING IN ALL 4 BOTTLES DRAWN (SITES = L ARM AND R WRIST) Culture result: MRSA NOT PRESENT 07/15/2020 06:50 AM  
 Culture result:  07/15/2020 06:50 AM  
      Screening of patient nares for MRSA is for surveillance purposes and, if positive, to facilitate isolation considerations in high risk settings.  It is not intended for automatic decolonization interventions per se as regimens are not sufficiently effective to warrant routine use. XR Results (most recent): 
Results from [facility] Encounter encounter on 08/13/20 XR CHEST PORT Narrative INDICATION: Fever. Portable AP semiupright view of the chest. 
 
Direct comparison made to prior chest x-ray dated August 13, 2020. Cardiomediastinal silhouette is stable. There is persistent mild pulmonary 
vascular prominence. No interstitial edema is visualized. There is no focal 
airspace consolidation. No pleural fluid is visualized. There is no 
pneumothorax. Impression IMPRESSION: Persistent, mild pulmonary vascular prominence. Patient Active Problem List  
Diagnosis Code  
 HTN (hypertension) I10  
 Hypercholesterolemia E78.00  
 Osteoarthritis of hip M16.9  Depression F32.9  ED (erectile dysfunction) of organic origin N52.9  GERD (gastroesophageal reflux disease) K21.9  PUD (peptic ulcer disease) K27.9  Hinson's esophagus with esophagitis K22.70, K20.9  Hypogonadism male E29.1  Diabetes mellitus type 2, uncontrolled ([address] E11.65  Benign essential tremor G25.0  Osteoarthritis of right hip M16.11  
 Jessica-prosthetic fracture of femur following total hip arthroplasty M97. Zaheer Penning  Systolic murmur I36.7  PFO (patent foramen ovale) Q21.1  Infected prosthesis of right hip ([address] T84.51XA  Endocarditis of mitral valve I05.8  Obesity E66.9  
 Insomnia G47.00  Dislocation of prosthetic joint ([address] T84.029A  Esophageal varices with bleeding (HCC) I85.01  
 BRWEER (nonalcoholic steatohepatitis) K75.81  
 Hepatic encephalopathy (HCC) K72.90  
 S/P TIPS (transjugular intrahepatic portosystemic shunt) J64.954  Closed dislocation of right hip ([address] S73.004A  Recurrent dislocation of hip joint prosthesis ([address] T84.029A, E71.596  Dislocation of internal right hip prosthesis (HCC) T84.020A ICD-10-CM ICD-9-CM 1. Closed posterior dislocation of right hip, initial encounter ([facility]  S73.014A 835.01 oxyCODONE IR (ROXICODONE) 5 mg immediate release tablet 2. Fall from ground level  W18.30XA L944.0 3. Acute right hip pain  M25.551 719.45   
4. Uncontrolled type 2 diabetes mellitus with hyperglycemia ([facility])  E11.65 250.02   
5. Anemia of chronic disease  D63.8 285.29   
6. Bacteremia  R78.81 790.7 7. H/O staphylococcal infection  Z86.19 V12.09   
8. Sepsis due to Serratia ([facility]  A41.53 038.44   
  995.91   
9. Benign essential tremor  G25.0 333.1 10. Closed dislocation of right hip, sequela  S73.004S 905.6 11. Hinson's esophagus with esophagitis  K22.70 530.85   
 K20.9 530.10   
12. Endocarditis of mitral valve  I05.8 394.9 13. Dislocation of prosthetic joint, subsequent encounter  T84.029D V58.89   
14. Moderate episode of recurrent major depressive disorder ([facility])  F33.1 296.32   
15. Post-traumatic osteoarthritis of left hip  M16.52 715.25 I have discussed the diagnosis with the patient and the intended plan as seen in the above orders. I have discussed medication side effects and warnings with the patient as well. Reviewed test results at length with patient Anti-infectives:  
 
 Cefepime IV  Guevara Soto MD FACP

## 2020-08-24 NOTE — PROGRESS NOTES
Orthopedic NP Progress Note Post Op day: 10 Days Post-Op 2020 10:47 AM  
 
Anibal Clayton Attending Physician: Treatment Team: Attending Provider: Armando Costello MD; Consulting Provider: Sukhwinder Ramos; Consulting Provider: Swapna Cedeno MD; Utilization Review: Angel Ansari RN; Care Manager: Jp Chong Consulting Provider: Karen Sim MD; Consulting Provider: Armando Costello MD; Consulting Provider: Joey Jacob MD; Utilization Review: Haim Kang RN Vital Signs:   
Patient Vitals for the past 8 hrs: 
 BP Temp Pulse Resp SpO2  
20 0820 115/45 99 °F (37.2 °C) 70 18 96 % 20 0608 109/41 98.5 °F (36.9 °C) 70 18 98 % BMI (calculated): 36.8 (20 0500) Intake/Output: 
No intake/output data recorded.  1901 -  0700 In: 800 [I.V.:800] Out: 9400 [MUGM] Pain Control:  
Pain Assessment Pain Scale 1: Numeric (0 - 10) Pain Intensity 1: 7 Pain Onset 1: with activity Pain Location 1: Hip Pain Orientation 1: Right Pain Description 1: Aching Pain Intervention(s) 1: Medication (see MAR), Rest, Repositioned LAB:   
Recent Labs  
  20 
0622 HCT 27.8* HGB 8.8* Lab Results Component Value Date/Time Sodium 140 2020 06:22 AM  
 Potassium 3.2 (L) 2020 06:22 AM  
 Chloride 108 2020 06:22 AM  
 CO2 28 2020 06:22 AM  
 Glucose 136 (H) 2020 06:22 AM  
 BUN 9 2020 06:22 AM  
 Creatinine 0.77 2020 06:22 AM  
 Calcium 7.5 (L) 2020 06:22 AM  
 
 
Subjective:  Anibal Clayton is a 65 y.o. male s/p a  Procedure(s): RIGHT HIP ARTHROPLASTY TOTAL REVISION POSTERIOR APPROACH AND ABDUCTOR RELEASE Procedure(s): RIGHT HIP ARTHROPLASTY TOTAL REVISION POSTERIOR APPROACH AND ABDUCTOR RELEASE. Tolerating diet. Up in chair Objective: General: alert, cooperative, no distress. Neuro/Vascular: CNS Intact. Sensation stable.  Brisk cap refill, 2+ pulses UE/LE 
 Musculoskeletal:  +ROM UE/LE. Skin: Incision - clean, dry and intact. No significant erythema or swelling. Dressing:+ serous drainage Hoffman - n Drain - n 
  
 
PT/OT:  
Gait:  Gait Base of Support: Widened Speed/Clarice: Slow Step Length: Left shortened, Right shortened Gait Abnormalities: Antalgic, Decreased step clearance Ambulation - Level of Assistance: Assist x2, Moderate assistance Distance (ft): 5 Feet (ft) Assistive Device: Gait belt, Walker, rolling Interventions: Safety awareness training, Tactile cues, Verbal cues Interventions: Safety awareness training, Tactile cues, Verbal cues Assessment:  
 s/p Procedure(s): RIGHT HIP ARTHROPLASTY TOTAL REVISION POSTERIOR APPROACH AND ABDUCTOR RELEASE Principal Problem: 
  Dislocation of internal right hip prosthesis ([facility] (8/13/2020) Plan:  
-  Continue PT/OT - WBAT 
-  Continue established methods of pain control 
-  VTE Prophylaxes - TEDS &/or SCDs, please restart eliquis when medically stable - held due to anemia -  Needs daily dressing changes if dressing saturated Discharge To:  Cleared for DC to SNF whne medically cleared Dr. Ruiz Kinds aware and agree with plan. Signed By: Angel Santoyo NP Orthopedic Nurse Practitioner

## 2020-08-25 ENCOUNTER — PATIENT OUTREACH (OUTPATIENT)
Dept: CASE MANAGEMENT | Age: 70
End: 2020-08-25

## 2020-08-25 LAB
BACTERIA SPEC CULT: NORMAL
SERVICE CMNT-IMP: NORMAL

## 2020-08-25 NOTE — PROGRESS NOTES
Community Care Team medical review documentation for patient in [facility]     Patient was discharged from [facility] on 8/24/20 to [address] [facility] ([facility]). Information included in this progress note has been provided to SNF. Discharge Diagnosis: Dislocation of internal right hip prosthesis   PCP : Rickey Echeverria MD  ACP:  ACP on File      Medication Changes at Discharge:   Start: cefepime 1 gram; Miralax  Change: oxycodone IR; senna-docusate  Stop: none    Diet: Diabetic Diet    1 Follow up Appointment Recommendations: Follow up with Anna Pettit MD (Orthopedic Surgery) in 2 weeks (9/7/2020) [phone] ; Follow up with Georgia Mae MD (Urology) in 1 week (8/31/2020) [phone]     PTA Functional Status: HOME SUPPORT PRIOR TO ADMISSION: The patient lived with son; FUNCTIONAL STATUS PRIOR TO ADMISSION: Patient was modified independent using a walker for functional mobility    Community Care Team will follow up weekly with [facility] until discharge. Medications were not reconciled and general patient assessment was not completed during this [facility] outreach.       Shanthi Wyman RN

## 2020-09-02 ENCOUNTER — PATIENT OUTREACH (OUTPATIENT)
Dept: CASE MANAGEMENT | Age: 70
End: 2020-09-02

## 2020-09-08 ENCOUNTER — PATIENT OUTREACH (OUTPATIENT)
Dept: CASE MANAGEMENT | Age: 70
End: 2020-09-08

## 2020-09-08 NOTE — PROGRESS NOTES
Community Care Team Documentation for Patient in [facility]  Subsequent Follow up     Patient remains at [address] ([facility]). See previous [facility] Team notes. Spoke with SNF team. PT/OT update : pt doing ok in therapy. Mod to max assit with  ADL's; ambulating  5 ft with front wheel walker, Goal to get patiient  OOB and sitting up more during the day, continueing to work on ambulation. Disposition:  Date TBD. Medications were not reconciled and general patient assessment was not completed during this skilled nursing facility outreach.      Vivian Montelongo RN

## 2020-09-15 ENCOUNTER — PATIENT OUTREACH (OUTPATIENT)
Dept: CASE MANAGEMENT | Age: 70
End: 2020-09-15

## 2020-09-17 NOTE — PROGRESS NOTES
Community Care Team Documentation for Patient in [facility]  Subsequent Follow up     Patient remains at [address] [facility] ([facility] See previous [facility] Team notes. Spoke with SNF team.  PT/OT update- Currently ambulation varies depending on pain level  from15 to 40 ft with min assist; transfer min assist using a walker,  LB ADL's  and toileting with max assist.    Medications were not reconciled and general patient assessment was not completed during this skilled nursing facility outreach.      Tasneem Padgett

## 2020-09-29 ENCOUNTER — PATIENT OUTREACH (OUTPATIENT)
Dept: CASE MANAGEMENT | Age: 70
End: 2020-09-29

## 2020-09-29 NOTE — PROGRESS NOTES
Community Care Team Documentation for Patient in [facility]  Subsequent Follow up     Patient remains at [address] ([facility]). See previous [facility] Team notes. Spoke with SNF team.  PT/OT update:  Making very slow progress in therapy; avoids  puting wt on effected extremity,  ambulating  20 to 40 ft. Goal is to be able to use the toilet without assistance. LOS: TBD. Medications were not reconciled and general patient assessment was not completed during this skilled nursing facility outreach.      Shanthi Wyman RN

## 2020-10-13 ENCOUNTER — PATIENT OUTREACH (OUTPATIENT)
Dept: CASE MANAGEMENT | Age: 70
End: 2020-10-13

## 2020-10-13 NOTE — PROGRESS NOTES
Community Care Team Documentation for Patient in [facility]  Subsequent Follow up     Patient remains at [address] ([facility]). See previous [facility] Team notes. Spoke with SNF team.  SNF medicical update update: Ptient will have an Ortho appointment today. Christiano Kumar PT/OT udate: Currently daoing about the same as last week no progressing in therapy,  if there is no changes after Ortho appoinmtment LTD will be set for next week. Medications were not reconciled and general patient assessment was not completed during this skilled nursing facility outreach.      Lorraine Coronado RN

## 2020-10-15 ENCOUNTER — TRANSCRIBE ORDER (OUTPATIENT)
Dept: SCHEDULING | Age: 70
End: 2020-10-15

## 2020-10-15 DIAGNOSIS — Z96.698 AFTERCARE FOLLOWING OTHER JOINT REPLACEMENT SURGERY: Primary | ICD-10-CM

## 2020-10-15 DIAGNOSIS — Z47.1 AFTERCARE FOLLOWING OTHER JOINT REPLACEMENT SURGERY: Primary | ICD-10-CM

## 2020-10-19 ENCOUNTER — HOSPITAL ENCOUNTER (OUTPATIENT)
Dept: CT IMAGING | Age: 70
Discharge: HOME | End: 2020-10-19
Attending: INTERNAL MEDICINE
Payer: MEDICARE

## 2020-10-19 DIAGNOSIS — Z96.698 AFTERCARE FOLLOWING OTHER JOINT REPLACEMENT SURGERY: ICD-10-CM

## 2020-10-19 DIAGNOSIS — Z47.1 AFTERCARE FOLLOWING OTHER JOINT REPLACEMENT SURGERY: ICD-10-CM

## 2020-10-19 PROCEDURE — 72131 CT LUMBAR SPINE W/O DYE: CPT

## 2020-10-20 ENCOUNTER — PATIENT OUTREACH (OUTPATIENT)
Dept: CASE MANAGEMENT | Age: 70
End: 2020-10-20

## 2020-10-20 NOTE — PROGRESS NOTES
Community Care Team Documentation for Patient in [facility]  Subsequent Follow up     Patient remains at [address] [facility] ([facility]). See previous [facility] Team notes. Spoke with SNF team.  SNF medical update:  Patient had a CT scan yesterday waiting for results. Possible sciatica causing significant pain, pt refused therapy after test.  PT/OT update: Currently able to tolerate walking 10-20 ft using a walker. Progress with therapy remains variable, this might be patients new baseline. Derek will set up family training with pt's son. Medications were not reconciled and general patient assessment was not completed during this skilled nursing facility outreach.      Martín Whitaker RN

## 2020-11-04 NOTE — PROGRESS NOTES
Community Care Team Documentation for Patient in [facility]  Discharge Note    Patient has been discharged from [address] [facility] ([facility]). See previous [facility] Team notes. PCP : Chriss Barry MD      Spoke with SNF team.  PT/OT update: LCD was 11/2. Disposition:  Patient will discharge today to [facility]. Community Care Team will sign off at this time. Medications were not reconciled and general patient assessment was not completed during this skilled nursing facility outreach.      Bernadette Mackey RN

## 2020-11-20 NOTE — TELEPHONE ENCOUNTER
Patient's daughter called and said that Sienna Pimentel needs a copy of medical records. Dr. Karime NEWTON (Fax number [phone].

## 2020-11-23 NOTE — TELEPHONE ENCOUNTER
Spoke with Daughter and advised to send release for medical records along with copy of POA to [phone]

## 2021-01-01 ENCOUNTER — APPOINTMENT (OUTPATIENT)
Dept: CT IMAGING | Age: 71
DRG: 464 | End: 2021-01-01
Attending: PHYSICIAN ASSISTANT
Payer: MEDICARE

## 2021-01-01 ENCOUNTER — TRANSCRIBE ORDER (OUTPATIENT)
Dept: SCHEDULING | Age: 71
End: 2021-01-01

## 2021-01-01 ENCOUNTER — APPOINTMENT (OUTPATIENT)
Dept: CT IMAGING | Age: 71
End: 2021-01-01
Attending: EMERGENCY MEDICINE
Payer: MEDICARE

## 2021-01-01 ENCOUNTER — PATIENT OUTREACH (OUTPATIENT)
Dept: CASE MANAGEMENT | Age: 71
End: 2021-01-01

## 2021-01-01 ENCOUNTER — HOSPITAL ENCOUNTER (OUTPATIENT)
Dept: GENERAL RADIOLOGY | Age: 71
Discharge: HOME | DRG: 464 | End: 2021-09-09
Attending: ORTHOPAEDIC SURGERY
Payer: MEDICARE

## 2021-01-01 ENCOUNTER — APPOINTMENT (OUTPATIENT)
Dept: GENERAL RADIOLOGY | Age: 71
End: 2021-01-01
Attending: EMERGENCY MEDICINE
Payer: MEDICARE

## 2021-01-01 ENCOUNTER — APPOINTMENT (OUTPATIENT)
Dept: CT IMAGING | Age: 71
DRG: 434 | End: 2021-01-01
Attending: EMERGENCY MEDICINE
Payer: MEDICARE

## 2021-01-01 ENCOUNTER — HOSPITAL ENCOUNTER (EMERGENCY)
Age: 71
Discharge: HOME | End: 2021-05-02
Attending: EMERGENCY MEDICINE
Payer: MEDICARE

## 2021-01-01 ENCOUNTER — HOSPITAL ENCOUNTER (INPATIENT)
Age: 71
LOS: 7 days | Discharge: NURSING, REHAB OR LONG-TERM CARE | DRG: 464 | End: 2021-09-18
Attending: EMERGENCY MEDICINE | Admitting: ORTHOPAEDIC SURGERY
Payer: MEDICARE

## 2021-01-01 ENCOUNTER — ANESTHESIA EVENT (OUTPATIENT)
Dept: SURGERY | Age: 71
DRG: 464 | End: 2021-01-01
Payer: MEDICARE

## 2021-01-01 ENCOUNTER — HOSPITAL ENCOUNTER (OUTPATIENT)
Dept: PREADMISSION TESTING | Age: 71
Discharge: HOME | End: 2021-09-07
Payer: COMMERCIAL

## 2021-01-01 ENCOUNTER — HOSPITAL ENCOUNTER (INPATIENT)
Age: 71
LOS: 2 days | Discharge: HOME | DRG: 434 | End: 2021-01-22
Attending: EMERGENCY MEDICINE | Admitting: STUDENT IN AN ORGANIZED HEALTH CARE EDUCATION/TRAINING PROGRAM
Payer: MEDICARE

## 2021-01-01 ENCOUNTER — APPOINTMENT (OUTPATIENT)
Dept: NON INVASIVE DIAGNOSTICS | Age: 71
DRG: 464 | End: 2021-01-01
Attending: NURSE PRACTITIONER
Payer: MEDICARE

## 2021-01-01 ENCOUNTER — TELEPHONE (OUTPATIENT)
Dept: FAMILY MEDICINE CLINIC | Age: 71
End: 2021-01-01

## 2021-01-01 ENCOUNTER — ANESTHESIA (OUTPATIENT)
Dept: SURGERY | Age: 71
DRG: 464 | End: 2021-01-01
Payer: MEDICARE

## 2021-01-01 ENCOUNTER — HOSPITAL ENCOUNTER (EMERGENCY)
Age: 71
Discharge: HOME | End: 2021-04-13
Attending: EMERGENCY MEDICINE
Payer: MEDICARE

## 2021-01-01 ENCOUNTER — APPOINTMENT (OUTPATIENT)
Dept: GENERAL RADIOLOGY | Age: 71
DRG: 434 | End: 2021-01-01
Attending: STUDENT IN AN ORGANIZED HEALTH CARE EDUCATION/TRAINING PROGRAM
Payer: MEDICARE

## 2021-01-01 ENCOUNTER — TRANSCRIBE ORDER (OUTPATIENT)
Dept: INTERNAL MEDICINE CLINIC | Age: 71
End: 2021-01-01

## 2021-01-01 ENCOUNTER — HOSPITAL ENCOUNTER (EMERGENCY)
Age: 71
Discharge: HOME | End: 2021-06-20
Attending: EMERGENCY MEDICINE
Payer: MEDICARE

## 2021-01-01 VITALS
OXYGEN SATURATION: 98 % | BODY MASS INDEX: 32.2 KG/M2 | HEART RATE: 66 BPM | WEIGHT: 230 LBS | HEIGHT: 71 IN | TEMPERATURE: 98.5 F | DIASTOLIC BLOOD PRESSURE: 67 MMHG | SYSTOLIC BLOOD PRESSURE: 113 MMHG

## 2021-01-01 VITALS
DIASTOLIC BLOOD PRESSURE: 67 MMHG | OXYGEN SATURATION: 99 % | WEIGHT: 229.94 LBS | BODY MASS INDEX: 32.19 KG/M2 | SYSTOLIC BLOOD PRESSURE: 121 MMHG | TEMPERATURE: 97.9 F | HEART RATE: 59 BPM | RESPIRATION RATE: 14 BRPM | HEIGHT: 71 IN

## 2021-01-01 VITALS
OXYGEN SATURATION: 100 % | DIASTOLIC BLOOD PRESSURE: 62 MMHG | TEMPERATURE: 97.7 F | RESPIRATION RATE: 16 BRPM | SYSTOLIC BLOOD PRESSURE: 156 MMHG | HEART RATE: 75 BPM

## 2021-01-01 VITALS
HEIGHT: 71 IN | BODY MASS INDEX: 32.62 KG/M2 | OXYGEN SATURATION: 97 % | HEART RATE: 69 BPM | TEMPERATURE: 98.2 F | WEIGHT: 233 LBS | SYSTOLIC BLOOD PRESSURE: 120 MMHG | DIASTOLIC BLOOD PRESSURE: 49 MMHG | RESPIRATION RATE: 18 BRPM

## 2021-01-01 VITALS
HEIGHT: 71 IN | OXYGEN SATURATION: 66 % | BODY MASS INDEX: 32.62 KG/M2 | RESPIRATION RATE: 19 BRPM | DIASTOLIC BLOOD PRESSURE: 56 MMHG | TEMPERATURE: 98.1 F | SYSTOLIC BLOOD PRESSURE: 110 MMHG | WEIGHT: 233 LBS | HEART RATE: 78 BPM

## 2021-01-01 VITALS
DIASTOLIC BLOOD PRESSURE: 66 MMHG | SYSTOLIC BLOOD PRESSURE: 167 MMHG | WEIGHT: 233 LBS | OXYGEN SATURATION: 100 % | HEIGHT: 71 IN | TEMPERATURE: 97.7 F | RESPIRATION RATE: 16 BRPM | BODY MASS INDEX: 32.62 KG/M2 | HEART RATE: 69 BPM

## 2021-01-01 DIAGNOSIS — Z95.828 S/P TIPS (TRANSJUGULAR INTRAHEPATIC PORTOSYSTEMIC SHUNT): ICD-10-CM

## 2021-01-01 DIAGNOSIS — R41.0 EPISODIC CONFUSION: Primary | ICD-10-CM

## 2021-01-01 DIAGNOSIS — D69.6 THROMBOCYTOPENIA (HCC): ICD-10-CM

## 2021-01-01 DIAGNOSIS — G92.8 ENCEPHALOPATHY DUE TO AMMONIA: ICD-10-CM

## 2021-01-01 DIAGNOSIS — E86.0 MILD DEHYDRATION: Primary | ICD-10-CM

## 2021-01-01 DIAGNOSIS — K75.81 NASH (NONALCOHOLIC STEATOHEPATITIS): ICD-10-CM

## 2021-01-01 DIAGNOSIS — K76.82 HEPATIC ENCEPHALOPATHY: ICD-10-CM

## 2021-01-01 DIAGNOSIS — Z96.649 INFECTION OF PROSTHETIC HIP JOINT, INITIAL ENCOUNTER (HCC): Primary | ICD-10-CM

## 2021-01-01 DIAGNOSIS — E66.01 MORBID OBESITY (HCC): ICD-10-CM

## 2021-01-01 DIAGNOSIS — T84.59XA INFECTION OF PROSTHETIC HIP JOINT, INITIAL ENCOUNTER (HCC): Primary | ICD-10-CM

## 2021-01-01 DIAGNOSIS — T84.50XA INFECTION AND INFLAMMATORY REACTION DUE TO INTERNAL JOINT PROSTHESIS (HCC): ICD-10-CM

## 2021-01-01 DIAGNOSIS — R79.89 INCREASED AMMONIA LEVEL: Primary | ICD-10-CM

## 2021-01-01 DIAGNOSIS — R79.89 INCREASED AMMONIA LEVEL: ICD-10-CM

## 2021-01-01 DIAGNOSIS — T59.891A ENCEPHALOPATHY DUE TO AMMONIA: ICD-10-CM

## 2021-01-01 DIAGNOSIS — D64.9 ANEMIA, UNSPECIFIED TYPE: ICD-10-CM

## 2021-01-01 DIAGNOSIS — K74.60 CIRRHOSIS OF LIVER WITHOUT ASCITES, UNSPECIFIED HEPATIC CIRRHOSIS TYPE (HCC): ICD-10-CM

## 2021-01-01 DIAGNOSIS — I05.9 ENDOCARDITIS OF MITRAL VALVE: ICD-10-CM

## 2021-01-01 DIAGNOSIS — T84.50XA INFECTION AND INFLAMMATORY REACTION DUE TO INTERNAL JOINT PROSTHESIS (HCC): Primary | ICD-10-CM

## 2021-01-01 DIAGNOSIS — I35.0 NONRHEUMATIC AORTIC VALVE STENOSIS: ICD-10-CM

## 2021-01-01 DIAGNOSIS — K76.82 HEPATIC ENCEPHALOPATHY: Primary | ICD-10-CM

## 2021-01-01 LAB
ABO + RH BLD: NORMAL
ABO + RH BLD: NORMAL
ALBUMIN SERPL-MCNC: 1.8 G/DL (ref 3.5–5)
ALBUMIN SERPL-MCNC: 1.9 G/DL (ref 3.5–5)
ALBUMIN SERPL-MCNC: 2 G/DL (ref 3.5–5)
ALBUMIN SERPL-MCNC: 2.1 G/DL (ref 3.5–5)
ALBUMIN SERPL-MCNC: 2.1 G/DL (ref 3.5–5)
ALBUMIN SERPL-MCNC: 2.3 G/DL (ref 3.5–5)
ALBUMIN SERPL-MCNC: 2.4 G/DL (ref 3.5–5)
ALBUMIN SERPL-MCNC: 2.5 G/DL (ref 3.5–5)
ALBUMIN SERPL-MCNC: 2.6 G/DL (ref 3.5–5)
ALBUMIN/GLOB SERPL: 0.4 {RATIO} (ref 1.1–2.2)
ALBUMIN/GLOB SERPL: 0.4 {RATIO} (ref 1.1–2.2)
ALBUMIN/GLOB SERPL: 0.5 {RATIO} (ref 1.1–2.2)
ALBUMIN/GLOB SERPL: 0.5 {RATIO} (ref 1.1–2.2)
ALBUMIN/GLOB SERPL: 0.6 {RATIO} (ref 1.1–2.2)
ALBUMIN/GLOB SERPL: 0.6 {RATIO} (ref 1.1–2.2)
ALBUMIN/GLOB SERPL: 0.7 {RATIO} (ref 1.1–2.2)
ALP SERPL-CCNC: 108 U/L (ref 45–117)
ALP SERPL-CCNC: 110 U/L (ref 45–117)
ALP SERPL-CCNC: 120 U/L (ref 45–117)
ALP SERPL-CCNC: 150 U/L (ref 45–117)
ALP SERPL-CCNC: 166 U/L (ref 45–117)
ALP SERPL-CCNC: 170 U/L (ref 45–117)
ALP SERPL-CCNC: 84 U/L (ref 45–117)
ALP SERPL-CCNC: 92 U/L (ref 45–117)
ALP SERPL-CCNC: 96 U/L (ref 45–117)
ALT SERPL-CCNC: 24 U/L (ref 12–78)
ALT SERPL-CCNC: 27 U/L (ref 12–78)
ALT SERPL-CCNC: 28 U/L (ref 12–78)
ALT SERPL-CCNC: 32 U/L (ref 12–78)
ALT SERPL-CCNC: 36 U/L (ref 12–78)
ALT SERPL-CCNC: 38 U/L (ref 12–78)
ALT SERPL-CCNC: 44 U/L (ref 12–78)
ALT SERPL-CCNC: 48 U/L (ref 12–78)
ALT SERPL-CCNC: 49 U/L (ref 12–78)
AMMONIA PLAS-SCNC: 64 UMOL/L
AMMONIA PLAS-SCNC: 73 UMOL/L
AMMONIA PLAS-SCNC: 73 UMOL/L
AMMONIA PLAS-SCNC: 75 UMOL/L
ANION GAP SERPL CALC-SCNC: 2 MMOL/L (ref 5–15)
ANION GAP SERPL CALC-SCNC: 3 MMOL/L (ref 5–15)
ANION GAP SERPL CALC-SCNC: 4 MMOL/L (ref 5–15)
ANION GAP SERPL CALC-SCNC: 5 MMOL/L (ref 5–15)
ANION GAP SERPL CALC-SCNC: 6 MMOL/L (ref 5–15)
ANION GAP SERPL CALC-SCNC: 7 MMOL/L (ref 5–15)
ANION GAP SERPL CALC-SCNC: 8 MMOL/L (ref 5–15)
APPEARANCE FLD: ABNORMAL
APPEARANCE UR: ABNORMAL
APPEARANCE UR: CLEAR
APTT PPP: 36.7 SEC (ref 22.1–31)
AST SERPL-CCNC: 25 U/L (ref 15–37)
AST SERPL-CCNC: 27 U/L (ref 15–37)
AST SERPL-CCNC: 28 U/L (ref 15–37)
AST SERPL-CCNC: 33 U/L (ref 15–37)
AST SERPL-CCNC: 34 U/L (ref 15–37)
AST SERPL-CCNC: 34 U/L (ref 15–37)
AST SERPL-CCNC: 38 U/L (ref 15–37)
AST SERPL-CCNC: 38 U/L (ref 15–37)
AST SERPL-CCNC: 39 U/L (ref 15–37)
ATRIAL RATE: 63 BPM
ATRIAL RATE: 80 BPM
BACTERIA SPEC CULT: ABNORMAL
BACTERIA SPEC CULT: NORMAL
BACTERIA URNS QL MICRO: NEGATIVE /HPF
BASOPHILS # BLD: 0 K/UL (ref 0–0.1)
BASOPHILS # BLD: 0.1 K/UL (ref 0–0.1)
BASOPHILS NFR BLD: 0 % (ref 0–1)
BASOPHILS NFR BLD: 1 % (ref 0–1)
BILIRUB DIRECT SERPL-MCNC: 0.6 MG/DL (ref 0–0.2)
BILIRUB SERPL-MCNC: 1 MG/DL (ref 0.2–1)
BILIRUB SERPL-MCNC: 1.2 MG/DL (ref 0.2–1)
BILIRUB SERPL-MCNC: 1.2 MG/DL (ref 0.2–1)
BILIRUB SERPL-MCNC: 1.3 MG/DL (ref 0.2–1)
BILIRUB SERPL-MCNC: 1.6 MG/DL (ref 0.2–1)
BILIRUB SERPL-MCNC: 1.7 MG/DL (ref 0.2–1)
BILIRUB SERPL-MCNC: 1.8 MG/DL (ref 0.2–1)
BILIRUB SERPL-MCNC: 2.2 MG/DL (ref 0.2–1)
BILIRUB SERPL-MCNC: 2.2 MG/DL (ref 0.2–1)
BILIRUB UR QL: NEGATIVE
BLD PROD TYP BPU: NORMAL
BLOOD GROUP ANTIBODIES SERPL: NORMAL
BLOOD GROUP ANTIBODIES SERPL: NORMAL
BPU ID: NORMAL
BUN SERPL-MCNC: 15 MG/DL (ref 6–20)
BUN SERPL-MCNC: 17 MG/DL (ref 6–20)
BUN SERPL-MCNC: 18 MG/DL (ref 6–20)
BUN SERPL-MCNC: 19 MG/DL (ref 6–20)
BUN SERPL-MCNC: 19 MG/DL (ref 6–20)
BUN SERPL-MCNC: 20 MG/DL (ref 6–20)
BUN SERPL-MCNC: 21 MG/DL (ref 6–20)
BUN SERPL-MCNC: 22 MG/DL (ref 6–20)
BUN SERPL-MCNC: 23 MG/DL (ref 6–20)
BUN SERPL-MCNC: 23 MG/DL (ref 6–20)
BUN SERPL-MCNC: 24 MG/DL (ref 6–20)
BUN/CREAT SERPL: 15 (ref 12–20)
BUN/CREAT SERPL: 16 (ref 12–20)
BUN/CREAT SERPL: 17 (ref 12–20)
BUN/CREAT SERPL: 17 (ref 12–20)
BUN/CREAT SERPL: 18 (ref 12–20)
BUN/CREAT SERPL: 19 (ref 12–20)
BUN/CREAT SERPL: 19 (ref 12–20)
BUN/CREAT SERPL: 21 (ref 12–20)
BUN/CREAT SERPL: 22 (ref 12–20)
BUN/CREAT SERPL: 22 (ref 12–20)
BUN/CREAT SERPL: 24 (ref 12–20)
BUN/CREAT SERPL: 25 (ref 12–20)
BUN/CREAT SERPL: 26 (ref 12–20)
CALCIUM SERPL-MCNC: 7.5 MG/DL (ref 8.5–10.1)
CALCIUM SERPL-MCNC: 7.6 MG/DL (ref 8.5–10.1)
CALCIUM SERPL-MCNC: 7.7 MG/DL (ref 8.5–10.1)
CALCIUM SERPL-MCNC: 7.8 MG/DL (ref 8.5–10.1)
CALCIUM SERPL-MCNC: 7.9 MG/DL (ref 8.5–10.1)
CALCIUM SERPL-MCNC: 8 MG/DL (ref 8.5–10.1)
CALCIUM SERPL-MCNC: 8.1 MG/DL (ref 8.5–10.1)
CALCIUM SERPL-MCNC: 8.2 MG/DL (ref 8.5–10.1)
CALCIUM SERPL-MCNC: 8.4 MG/DL (ref 8.5–10.1)
CALCIUM SERPL-MCNC: 8.5 MG/DL (ref 8.5–10.1)
CALCIUM SERPL-MCNC: 8.5 MG/DL (ref 8.5–10.1)
CALCULATED P AXIS, ECG09: 51 DEGREES
CALCULATED P AXIS, ECG09: 69 DEGREES
CALCULATED R AXIS, ECG10: -5 DEGREES
CALCULATED T AXIS, ECG11: 44 DEGREES
CALCULATED T AXIS, ECG11: 8 DEGREES
CHLORIDE SERPL-SCNC: 103 MMOL/L (ref 97–108)
CHLORIDE SERPL-SCNC: 104 MMOL/L (ref 97–108)
CHLORIDE SERPL-SCNC: 106 MMOL/L (ref 97–108)
CHLORIDE SERPL-SCNC: 109 MMOL/L (ref 97–108)
CHLORIDE SERPL-SCNC: 110 MMOL/L (ref 97–108)
CHLORIDE SERPL-SCNC: 111 MMOL/L (ref 97–108)
CHLORIDE SERPL-SCNC: 111 MMOL/L (ref 97–108)
CHLORIDE SERPL-SCNC: 112 MMOL/L (ref 97–108)
CHLORIDE SERPL-SCNC: 112 MMOL/L (ref 97–108)
CHLORIDE SERPL-SCNC: 114 MMOL/L (ref 97–108)
CHLORIDE SERPL-SCNC: 114 MMOL/L (ref 97–108)
CHLORIDE SERPL-SCNC: 98 MMOL/L (ref 97–108)
CHLORIDE SERPL-SCNC: 99 MMOL/L (ref 97–108)
CO2 SERPL-SCNC: 19 MMOL/L (ref 21–32)
CO2 SERPL-SCNC: 20 MMOL/L (ref 21–32)
CO2 SERPL-SCNC: 21 MMOL/L (ref 21–32)
CO2 SERPL-SCNC: 22 MMOL/L (ref 21–32)
CO2 SERPL-SCNC: 22 MMOL/L (ref 21–32)
CO2 SERPL-SCNC: 23 MMOL/L (ref 21–32)
CO2 SERPL-SCNC: 24 MMOL/L (ref 21–32)
CO2 SERPL-SCNC: 25 MMOL/L (ref 21–32)
CO2 SERPL-SCNC: 25 MMOL/L (ref 21–32)
CO2 SERPL-SCNC: 27 MMOL/L (ref 21–32)
COLOR FLD: ABNORMAL
COLOR UR: ABNORMAL
COLOR UR: ABNORMAL
COLOR UR: NORMAL
COLOR UR: NORMAL
COMMENT, HOLDF: NORMAL
COVID-19 RAPID TEST, COVR: NOT DETECTED
COVID-19 RAPID TEST, COVR: NOT DETECTED
CREAT SERPL-MCNC: 0.78 MG/DL (ref 0.7–1.3)
CREAT SERPL-MCNC: 0.79 MG/DL (ref 0.7–1.3)
CREAT SERPL-MCNC: 0.86 MG/DL (ref 0.7–1.3)
CREAT SERPL-MCNC: 0.91 MG/DL (ref 0.7–1.3)
CREAT SERPL-MCNC: 0.92 MG/DL (ref 0.7–1.3)
CREAT SERPL-MCNC: 0.92 MG/DL (ref 0.7–1.3)
CREAT SERPL-MCNC: 0.94 MG/DL (ref 0.7–1.3)
CREAT SERPL-MCNC: 0.96 MG/DL (ref 0.7–1.3)
CREAT SERPL-MCNC: 0.96 MG/DL (ref 0.7–1.3)
CREAT SERPL-MCNC: 0.98 MG/DL (ref 0.7–1.3)
CREAT SERPL-MCNC: 1.08 MG/DL (ref 0.7–1.3)
CREAT SERPL-MCNC: 1.1 MG/DL (ref 0.7–1.3)
CREAT SERPL-MCNC: 1.1 MG/DL (ref 0.7–1.3)
CREAT SERPL-MCNC: 1.12 MG/DL (ref 0.7–1.3)
CREAT SERPL-MCNC: 1.13 MG/DL (ref 0.7–1.3)
CROSSMATCH RESULT,%XM: NORMAL
CRP SERPL-MCNC: 12.4 MG/DL (ref 0–0.6)
DIAGNOSIS, 93000: NORMAL
DIAGNOSIS, 93000: NORMAL
DIFFERENTIAL METHOD BLD: ABNORMAL
ECHO AV AREA PEAK VELOCITY: 1.22 CM2
ECHO AV AREA VTI: 1.47 CM2
ECHO AV AREA/BSA PEAK VELOCITY: 0.5 CM2/M2
ECHO AV AREA/BSA VTI: 0.7 CM2/M2
ECHO AV MEAN GRADIENT: 21.69 MMHG
ECHO AV PEAK GRADIENT: 39.11 MMHG
ECHO AV PEAK VELOCITY: 312.69 CM/S
ECHO AV VTI: 62.07 CM
ECHO EST RA PRESSURE: 3 MMHG
ECHO LA MAJOR AXIS: 4.11 CM
ECHO LA MINOR AXIS: 1.83 CM
ECHO LA TO AORTIC ROOT RATIO: 1.5
ECHO LV INTERNAL DIMENSION DIASTOLIC: 5.01 CM (ref 4.2–5.9)
ECHO LV INTERNAL DIMENSION SYSTOLIC: 2.07 CM
ECHO LV IVSD: 1.04 CM (ref 0.6–1)
ECHO LV MASS 2D: 180.1 G (ref 88–224)
ECHO LV MASS INDEX 2D: 80.4 G/M2 (ref 49–115)
ECHO LV POSTERIOR WALL DIASTOLIC: 0.94 CM (ref 0.6–1)
ECHO LVOT DIAM: 2.03 CM
ECHO LVOT PEAK GRADIENT: 5.59 MMHG
ECHO LVOT PEAK VELOCITY: 118.2 CM/S
ECHO LVOT SV: 91 ML
ECHO LVOT VTI: 28.15 CM
ECHO MV A VELOCITY: 113.81 CM/S
ECHO MV AREA PHT: 3.1 CM2
ECHO MV E VELOCITY: 85.05 CM/S
ECHO MV E/A RATIO: 0.75
ECHO MV PRESSURE HALF TIME (PHT): 71.06 MS
ECHO RIGHT VENTRICULAR SYSTOLIC PRESSURE (RVSP): 56.78 MMHG
ECHO RV TAPSE: 2.59 CM (ref 1.5–2)
ECHO TV REGURGITANT MAX VELOCITY: 366.67 CM/S
ECHO TV REGURGITANT PEAK GRADIENT: 53.78 MMHG
EOSINOPHIL # BLD: 0 K/UL (ref 0–0.4)
EOSINOPHIL # BLD: 0.1 K/UL (ref 0–0.4)
EOSINOPHIL # BLD: 0.2 K/UL (ref 0–0.4)
EOSINOPHIL # BLD: 0.3 K/UL (ref 0–0.4)
EOSINOPHIL NFR BLD: 0 % (ref 0–7)
EOSINOPHIL NFR BLD: 0 % (ref 0–7)
EOSINOPHIL NFR BLD: 1 % (ref 0–7)
EOSINOPHIL NFR BLD: 2 % (ref 0–7)
EOSINOPHIL NFR BLD: 3 % (ref 0–7)
EOSINOPHIL NFR BLD: 4 % (ref 0–7)
EPITH CASTS URNS QL MICRO: ABNORMAL /LPF
EPITH CASTS URNS QL MICRO: ABNORMAL /LPF
EPITH CASTS URNS QL MICRO: NORMAL /LPF
EPITH CASTS URNS QL MICRO: NORMAL /LPF
ERYTHROCYTE [DISTWIDTH] IN BLOOD BY AUTOMATED COUNT: 13.3 % (ref 11.5–14.5)
ERYTHROCYTE [DISTWIDTH] IN BLOOD BY AUTOMATED COUNT: 13.9 % (ref 11.5–14.5)
ERYTHROCYTE [DISTWIDTH] IN BLOOD BY AUTOMATED COUNT: 13.9 % (ref 11.5–14.5)
ERYTHROCYTE [DISTWIDTH] IN BLOOD BY AUTOMATED COUNT: 14 % (ref 11.5–14.5)
ERYTHROCYTE [DISTWIDTH] IN BLOOD BY AUTOMATED COUNT: 14.2 % (ref 11.5–14.5)
ERYTHROCYTE [DISTWIDTH] IN BLOOD BY AUTOMATED COUNT: 14.3 % (ref 11.5–14.5)
ERYTHROCYTE [DISTWIDTH] IN BLOOD BY AUTOMATED COUNT: 14.3 % (ref 11.5–14.5)
ERYTHROCYTE [DISTWIDTH] IN BLOOD BY AUTOMATED COUNT: 14.4 % (ref 11.5–14.5)
ERYTHROCYTE [DISTWIDTH] IN BLOOD BY AUTOMATED COUNT: 14.6 % (ref 11.5–14.5)
ERYTHROCYTE [DISTWIDTH] IN BLOOD BY AUTOMATED COUNT: 14.9 % (ref 11.5–14.5)
ERYTHROCYTE [DISTWIDTH] IN BLOOD BY AUTOMATED COUNT: 15 % (ref 11.5–14.5)
ERYTHROCYTE [DISTWIDTH] IN BLOOD BY AUTOMATED COUNT: 15.1 % (ref 11.5–14.5)
ERYTHROCYTE [DISTWIDTH] IN BLOOD BY AUTOMATED COUNT: 15.6 % (ref 11.5–14.5)
ERYTHROCYTE [DISTWIDTH] IN BLOOD BY AUTOMATED COUNT: 15.8 % (ref 11.5–14.5)
ERYTHROCYTE [DISTWIDTH] IN BLOOD BY AUTOMATED COUNT: 16.6 % (ref 11.5–14.5)
ERYTHROCYTE [SEDIMENTATION RATE] IN BLOOD: 63 MM/HR (ref 0–20)
EST. AVERAGE GLUCOSE BLD GHB EST-MCNC: 180 MG/DL
FOLATE SERPL-MCNC: 14.2 NG/ML (ref 5–21)
GLOBULIN SER CALC-MCNC: 2.8 G/DL (ref 2–4)
GLOBULIN SER CALC-MCNC: 3.6 G/DL (ref 2–4)
GLOBULIN SER CALC-MCNC: 3.7 G/DL (ref 2–4)
GLOBULIN SER CALC-MCNC: 3.8 G/DL (ref 2–4)
GLOBULIN SER CALC-MCNC: 4 G/DL (ref 2–4)
GLOBULIN SER CALC-MCNC: 4.5 G/DL (ref 2–4)
GLOBULIN SER CALC-MCNC: 4.5 G/DL (ref 2–4)
GLOBULIN SER CALC-MCNC: 4.6 G/DL (ref 2–4)
GLOBULIN SER CALC-MCNC: 5.2 G/DL (ref 2–4)
GLUCOSE BLD STRIP.AUTO-MCNC: 112 MG/DL (ref 65–100)
GLUCOSE BLD STRIP.AUTO-MCNC: 114 MG/DL (ref 65–117)
GLUCOSE BLD STRIP.AUTO-MCNC: 126 MG/DL (ref 65–117)
GLUCOSE BLD STRIP.AUTO-MCNC: 132 MG/DL (ref 65–117)
GLUCOSE BLD STRIP.AUTO-MCNC: 140 MG/DL (ref 65–117)
GLUCOSE BLD STRIP.AUTO-MCNC: 145 MG/DL (ref 65–117)
GLUCOSE BLD STRIP.AUTO-MCNC: 147 MG/DL (ref 65–117)
GLUCOSE BLD STRIP.AUTO-MCNC: 150 MG/DL (ref 65–117)
GLUCOSE BLD STRIP.AUTO-MCNC: 158 MG/DL (ref 65–100)
GLUCOSE BLD STRIP.AUTO-MCNC: 164 MG/DL (ref 65–100)
GLUCOSE BLD STRIP.AUTO-MCNC: 173 MG/DL (ref 65–117)
GLUCOSE BLD STRIP.AUTO-MCNC: 177 MG/DL (ref 65–117)
GLUCOSE BLD STRIP.AUTO-MCNC: 178 MG/DL (ref 65–117)
GLUCOSE BLD STRIP.AUTO-MCNC: 180 MG/DL (ref 65–117)
GLUCOSE BLD STRIP.AUTO-MCNC: 182 MG/DL (ref 65–117)
GLUCOSE BLD STRIP.AUTO-MCNC: 184 MG/DL (ref 65–117)
GLUCOSE BLD STRIP.AUTO-MCNC: 202 MG/DL (ref 65–100)
GLUCOSE BLD STRIP.AUTO-MCNC: 207 MG/DL (ref 65–117)
GLUCOSE BLD STRIP.AUTO-MCNC: 223 MG/DL (ref 65–117)
GLUCOSE BLD STRIP.AUTO-MCNC: 225 MG/DL (ref 65–100)
GLUCOSE BLD STRIP.AUTO-MCNC: 226 MG/DL (ref 65–117)
GLUCOSE BLD STRIP.AUTO-MCNC: 227 MG/DL (ref 65–117)
GLUCOSE BLD STRIP.AUTO-MCNC: 231 MG/DL (ref 65–117)
GLUCOSE BLD STRIP.AUTO-MCNC: 239 MG/DL (ref 65–117)
GLUCOSE BLD STRIP.AUTO-MCNC: 245 MG/DL (ref 65–117)
GLUCOSE BLD STRIP.AUTO-MCNC: 260 MG/DL (ref 65–117)
GLUCOSE BLD STRIP.AUTO-MCNC: 268 MG/DL (ref 65–117)
GLUCOSE BLD STRIP.AUTO-MCNC: 270 MG/DL (ref 65–117)
GLUCOSE BLD STRIP.AUTO-MCNC: 270 MG/DL (ref 65–117)
GLUCOSE BLD STRIP.AUTO-MCNC: 277 MG/DL (ref 65–117)
GLUCOSE BLD STRIP.AUTO-MCNC: 279 MG/DL (ref 65–117)
GLUCOSE BLD STRIP.AUTO-MCNC: 281 MG/DL (ref 65–117)
GLUCOSE BLD STRIP.AUTO-MCNC: 281 MG/DL (ref 65–117)
GLUCOSE BLD STRIP.AUTO-MCNC: 288 MG/DL (ref 65–117)
GLUCOSE BLD STRIP.AUTO-MCNC: 306 MG/DL (ref 65–117)
GLUCOSE BLD STRIP.AUTO-MCNC: 307 MG/DL (ref 65–117)
GLUCOSE BLD STRIP.AUTO-MCNC: 366 MG/DL (ref 65–117)
GLUCOSE BLD STRIP.AUTO-MCNC: 453 MG/DL (ref 65–117)
GLUCOSE BLD STRIP.AUTO-MCNC: 466 MG/DL (ref 65–117)
GLUCOSE BLD STRIP.AUTO-MCNC: 472 MG/DL (ref 65–117)
GLUCOSE BLD STRIP.AUTO-MCNC: 475 MG/DL (ref 65–117)
GLUCOSE BLD STRIP.AUTO-MCNC: 93 MG/DL (ref 65–100)
GLUCOSE BLD STRIP.AUTO-MCNC: 97 MG/DL (ref 65–117)
GLUCOSE SERPL-MCNC: 136 MG/DL (ref 65–100)
GLUCOSE SERPL-MCNC: 140 MG/DL (ref 65–100)
GLUCOSE SERPL-MCNC: 142 MG/DL (ref 65–100)
GLUCOSE SERPL-MCNC: 151 MG/DL (ref 65–100)
GLUCOSE SERPL-MCNC: 158 MG/DL (ref 65–100)
GLUCOSE SERPL-MCNC: 158 MG/DL (ref 65–100)
GLUCOSE SERPL-MCNC: 160 MG/DL (ref 65–100)
GLUCOSE SERPL-MCNC: 167 MG/DL (ref 65–100)
GLUCOSE SERPL-MCNC: 191 MG/DL (ref 65–100)
GLUCOSE SERPL-MCNC: 215 MG/DL (ref 65–100)
GLUCOSE SERPL-MCNC: 238 MG/DL (ref 65–100)
GLUCOSE SERPL-MCNC: 242 MG/DL (ref 65–100)
GLUCOSE SERPL-MCNC: 245 MG/DL (ref 65–100)
GLUCOSE SERPL-MCNC: 274 MG/DL (ref 65–100)
GLUCOSE SERPL-MCNC: 277 MG/DL (ref 65–100)
GLUCOSE SERPL-MCNC: 448 MG/DL (ref 65–100)
GLUCOSE SERPL-MCNC: 80 MG/DL (ref 65–100)
GLUCOSE UR STRIP.AUTO-MCNC: NEGATIVE MG/DL
GRAM STN SPEC: ABNORMAL
HBA1C MFR BLD: 7.9 % (ref 4–5.6)
HCT VFR BLD AUTO: 20 % (ref 36.6–50.3)
HCT VFR BLD AUTO: 20.8 % (ref 36.6–50.3)
HCT VFR BLD AUTO: 21.6 % (ref 36.6–50.3)
HCT VFR BLD AUTO: 22.9 % (ref 36.6–50.3)
HCT VFR BLD AUTO: 22.9 % (ref 36.6–50.3)
HCT VFR BLD AUTO: 23.5 % (ref 36.6–50.3)
HCT VFR BLD AUTO: 24.2 % (ref 36.6–50.3)
HCT VFR BLD AUTO: 25.9 % (ref 36.6–50.3)
HCT VFR BLD AUTO: 26.1 % (ref 36.6–50.3)
HCT VFR BLD AUTO: 29.8 % (ref 36.6–50.3)
HCT VFR BLD AUTO: 30.2 % (ref 36.6–50.3)
HCT VFR BLD AUTO: 31.6 % (ref 36.6–50.3)
HCT VFR BLD AUTO: 31.8 % (ref 36.6–50.3)
HCT VFR BLD AUTO: 32.5 % (ref 36.6–50.3)
HCT VFR BLD AUTO: 33.1 % (ref 36.6–50.3)
HCT VFR BLD AUTO: 34.2 % (ref 36.6–50.3)
HCT VFR BLD AUTO: 36.3 % (ref 36.6–50.3)
HCT VFR BLD AUTO: 36.8 % (ref 36.6–50.3)
HGB BLD-MCNC: 10.3 G/DL (ref 12.1–17)
HGB BLD-MCNC: 10.6 G/DL (ref 12.1–17)
HGB BLD-MCNC: 10.7 G/DL (ref 12.1–17)
HGB BLD-MCNC: 11.1 G/DL (ref 12.1–17)
HGB BLD-MCNC: 11.3 G/DL (ref 12.1–17)
HGB BLD-MCNC: 11.5 G/DL (ref 12.1–17)
HGB BLD-MCNC: 12.2 G/DL (ref 12.1–17)
HGB BLD-MCNC: 12.4 G/DL (ref 12.1–17)
HGB BLD-MCNC: 6.5 G/DL (ref 12.1–17)
HGB BLD-MCNC: 6.8 G/DL (ref 12.1–17)
HGB BLD-MCNC: 7 G/DL (ref 12.1–17)
HGB BLD-MCNC: 7.1 G/DL (ref 12.1–17)
HGB BLD-MCNC: 7.1 G/DL (ref 12.1–17)
HGB BLD-MCNC: 7.2 G/DL (ref 12.1–17)
HGB BLD-MCNC: 7.6 G/DL (ref 12.1–17)
HGB BLD-MCNC: 7.7 G/DL (ref 12.1–17)
HGB BLD-MCNC: 7.9 G/DL (ref 12.1–17)
HGB BLD-MCNC: 8 G/DL (ref 12.1–17)
HGB BLD-MCNC: 8.3 G/DL (ref 12.1–17)
HGB BLD-MCNC: 8.5 G/DL (ref 12.1–17)
HGB BLD-MCNC: 8.8 G/DL (ref 12.1–17)
HGB BLD-MCNC: 8.8 G/DL (ref 12.1–17)
HGB BLD-MCNC: 9.5 G/DL (ref 12.1–17)
HGB UR QL STRIP: NEGATIVE
HISTORY CHECKED?,CKHIST: NORMAL
HYALINE CASTS URNS QL MICRO: NORMAL /LPF (ref 0–5)
HYALINE CASTS URNS QL MICRO: NORMAL /LPF (ref 0–5)
IMM GRANULOCYTES # BLD AUTO: 0 K/UL
IMM GRANULOCYTES # BLD AUTO: 0 K/UL (ref 0–0.04)
IMM GRANULOCYTES # BLD AUTO: 0.1 K/UL (ref 0–0.04)
IMM GRANULOCYTES # BLD AUTO: 0.5 K/UL (ref 0–0.04)
IMM GRANULOCYTES NFR BLD AUTO: 0 %
IMM GRANULOCYTES NFR BLD AUTO: 0 % (ref 0–0.5)
IMM GRANULOCYTES NFR BLD AUTO: 1 % (ref 0–0.5)
IMM GRANULOCYTES NFR BLD AUTO: 2 % (ref 0–0.5)
IMM GRANULOCYTES NFR BLD AUTO: 2 % (ref 0–0.5)
IMM GRANULOCYTES NFR BLD AUTO: 5 % (ref 0–0.5)
INR PPP: 1.1 (ref 0.9–1.1)
KETONES UR QL STRIP.AUTO: NEGATIVE MG/DL
LEUKOCYTE ESTERASE UR QL STRIP.AUTO: ABNORMAL
LEUKOCYTE ESTERASE UR QL STRIP.AUTO: NEGATIVE
LYMPHOCYTES # BLD: 0.5 K/UL (ref 0.8–3.5)
LYMPHOCYTES # BLD: 0.6 K/UL (ref 0.8–3.5)
LYMPHOCYTES # BLD: 0.8 K/UL (ref 0.8–3.5)
LYMPHOCYTES # BLD: 0.9 K/UL (ref 0.8–3.5)
LYMPHOCYTES # BLD: 1 K/UL (ref 0.8–3.5)
LYMPHOCYTES # BLD: 1 K/UL (ref 0.8–3.5)
LYMPHOCYTES # BLD: 1.1 K/UL (ref 0.8–3.5)
LYMPHOCYTES # BLD: 1.1 K/UL (ref 0.8–3.5)
LYMPHOCYTES # BLD: 1.2 K/UL (ref 0.8–3.5)
LYMPHOCYTES NFR BLD: 10 % (ref 12–49)
LYMPHOCYTES NFR BLD: 11 % (ref 12–49)
LYMPHOCYTES NFR BLD: 12 % (ref 12–49)
LYMPHOCYTES NFR BLD: 14 % (ref 12–49)
LYMPHOCYTES NFR BLD: 14 % (ref 12–49)
LYMPHOCYTES NFR BLD: 15 % (ref 12–49)
LYMPHOCYTES NFR BLD: 16 % (ref 12–49)
LYMPHOCYTES NFR BLD: 18 % (ref 12–49)
LYMPHOCYTES NFR BLD: 19 % (ref 12–49)
LYMPHOCYTES NFR BLD: 21 % (ref 12–49)
LYMPHOCYTES NFR FLD: 10 %
MAGNESIUM SERPL-MCNC: 1.8 MG/DL (ref 1.6–2.4)
MAGNESIUM SERPL-MCNC: 2.1 MG/DL (ref 1.6–2.4)
MCH RBC QN AUTO: 32 PG (ref 26–34)
MCH RBC QN AUTO: 32.2 PG (ref 26–34)
MCH RBC QN AUTO: 32.3 PG (ref 26–34)
MCH RBC QN AUTO: 32.5 PG (ref 26–34)
MCH RBC QN AUTO: 32.6 PG (ref 26–34)
MCH RBC QN AUTO: 32.7 PG (ref 26–34)
MCH RBC QN AUTO: 32.8 PG (ref 26–34)
MCH RBC QN AUTO: 32.9 PG (ref 26–34)
MCH RBC QN AUTO: 33 PG (ref 26–34)
MCH RBC QN AUTO: 33 PG (ref 26–34)
MCH RBC QN AUTO: 33.1 PG (ref 26–34)
MCH RBC QN AUTO: 33.2 PG (ref 26–34)
MCH RBC QN AUTO: 33.5 PG (ref 26–34)
MCH RBC QN AUTO: 33.8 PG (ref 26–34)
MCHC RBC AUTO-ENTMCNC: 31.5 G/DL (ref 30–36.5)
MCHC RBC AUTO-ENTMCNC: 32.5 G/DL (ref 30–36.5)
MCHC RBC AUTO-ENTMCNC: 32.8 G/DL (ref 30–36.5)
MCHC RBC AUTO-ENTMCNC: 32.9 G/DL (ref 30–36.5)
MCHC RBC AUTO-ENTMCNC: 33 G/DL (ref 30–36.5)
MCHC RBC AUTO-ENTMCNC: 33.5 G/DL (ref 30–36.5)
MCHC RBC AUTO-ENTMCNC: 33.6 G/DL (ref 30–36.5)
MCHC RBC AUTO-ENTMCNC: 33.6 G/DL (ref 30–36.5)
MCHC RBC AUTO-ENTMCNC: 33.7 G/DL (ref 30–36.5)
MCHC RBC AUTO-ENTMCNC: 34 G/DL (ref 30–36.5)
MCHC RBC AUTO-ENTMCNC: 34.2 G/DL (ref 30–36.5)
MCHC RBC AUTO-ENTMCNC: 34.3 G/DL (ref 30–36.5)
MCHC RBC AUTO-ENTMCNC: 34.5 G/DL (ref 30–36.5)
MCHC RBC AUTO-ENTMCNC: 34.6 G/DL (ref 30–36.5)
MCHC RBC AUTO-ENTMCNC: 34.7 G/DL (ref 30–36.5)
MCV RBC AUTO: 100.9 FL (ref 80–99)
MCV RBC AUTO: 103.8 FL (ref 80–99)
MCV RBC AUTO: 95.4 FL (ref 80–99)
MCV RBC AUTO: 95.5 FL (ref 80–99)
MCV RBC AUTO: 95.8 FL (ref 80–99)
MCV RBC AUTO: 96.6 FL (ref 80–99)
MCV RBC AUTO: 97.2 FL (ref 80–99)
MCV RBC AUTO: 97.3 FL (ref 80–99)
MCV RBC AUTO: 97.5 FL (ref 80–99)
MCV RBC AUTO: 97.6 FL (ref 80–99)
MCV RBC AUTO: 97.7 FL (ref 80–99)
MCV RBC AUTO: 97.7 FL (ref 80–99)
MCV RBC AUTO: 97.8 FL (ref 80–99)
MCV RBC AUTO: 98.1 FL (ref 80–99)
MCV RBC AUTO: 98.8 FL (ref 80–99)
MCV RBC AUTO: 99 FL (ref 80–99)
MCV RBC AUTO: 99.1 FL (ref 80–99)
METAMYELOCYTES NFR BLD MANUAL: 2 %
MONOCYTES # BLD: 0.2 K/UL (ref 0–1)
MONOCYTES # BLD: 0.3 K/UL (ref 0–1)
MONOCYTES # BLD: 0.4 K/UL (ref 0–1)
MONOCYTES # BLD: 0.5 K/UL (ref 0–1)
MONOCYTES # BLD: 0.6 K/UL (ref 0–1)
MONOCYTES # BLD: 0.7 K/UL (ref 0–1)
MONOCYTES # BLD: 0.8 K/UL (ref 0–1)
MONOCYTES # BLD: 0.8 K/UL (ref 0–1)
MONOCYTES NFR BLD: 10 % (ref 5–13)
MONOCYTES NFR BLD: 5 % (ref 5–13)
MONOCYTES NFR BLD: 7 % (ref 5–13)
MONOCYTES NFR BLD: 8 % (ref 5–13)
MONOS+MACROS NFR FLD: 2 %
MUCOUS THREADS URNS QL MICRO: ABNORMAL /LPF
MUCOUS THREADS URNS QL MICRO: ABNORMAL /LPF
MYELOCYTES NFR BLD MANUAL: 1 %
NEUTROPHILS NFR FLD: 88 %
NEUTS BAND NFR BLD MANUAL: 6 % (ref 0–6)
NEUTS SEG # BLD: 2.7 K/UL (ref 1.8–8)
NEUTS SEG # BLD: 2.8 K/UL (ref 1.8–8)
NEUTS SEG # BLD: 3.5 K/UL (ref 1.8–8)
NEUTS SEG # BLD: 3.7 K/UL (ref 1.8–8)
NEUTS SEG # BLD: 3.9 K/UL (ref 1.8–8)
NEUTS SEG # BLD: 4.4 K/UL (ref 1.8–8)
NEUTS SEG # BLD: 4.4 K/UL (ref 1.8–8)
NEUTS SEG # BLD: 4.5 K/UL (ref 1.8–8)
NEUTS SEG # BLD: 4.8 K/UL (ref 1.8–8)
NEUTS SEG # BLD: 4.9 K/UL (ref 1.8–8)
NEUTS SEG # BLD: 5.2 K/UL (ref 1.8–8)
NEUTS SEG # BLD: 5.8 K/UL (ref 1.8–8)
NEUTS SEG # BLD: 6 K/UL (ref 1.8–8)
NEUTS SEG # BLD: 6 K/UL (ref 1.8–8)
NEUTS SEG # BLD: 6.8 K/UL (ref 1.8–8)
NEUTS SEG # BLD: 6.8 K/UL (ref 1.8–8)
NEUTS SEG NFR BLD: 66 % (ref 32–75)
NEUTS SEG NFR BLD: 67 % (ref 32–75)
NEUTS SEG NFR BLD: 68 % (ref 32–75)
NEUTS SEG NFR BLD: 68 % (ref 32–75)
NEUTS SEG NFR BLD: 69 % (ref 32–75)
NEUTS SEG NFR BLD: 71 % (ref 32–75)
NEUTS SEG NFR BLD: 73 % (ref 32–75)
NEUTS SEG NFR BLD: 74 % (ref 32–75)
NEUTS SEG NFR BLD: 75 % (ref 32–75)
NEUTS SEG NFR BLD: 76 % (ref 32–75)
NEUTS SEG NFR BLD: 78 % (ref 32–75)
NEUTS SEG NFR BLD: 79 % (ref 32–75)
NEUTS SEG NFR BLD: 82 % (ref 32–75)
NEUTS SEG NFR BLD: 82 % (ref 32–75)
NITRITE UR QL STRIP.AUTO: NEGATIVE
NRBC # BLD: 0 K/UL (ref 0–0.01)
NRBC BLD-RTO: 0 PER 100 WBC
NUC CELL # FLD: ABNORMAL /CU MM
P-R INTERVAL, ECG05: 176 MS
P-R INTERVAL, ECG05: 178 MS
PH UR STRIP: 5 [PH] (ref 5–8)
PH UR STRIP: 5.5 [PH] (ref 5–8)
PH UR STRIP: 5.5 [PH] (ref 5–8)
PH UR STRIP: 6 [PH] (ref 5–8)
PHOSPHATE SERPL-MCNC: 2.7 MG/DL (ref 2.6–4.7)
PLATELET # BLD AUTO: 100 K/UL (ref 150–400)
PLATELET # BLD AUTO: 100 K/UL (ref 150–400)
PLATELET # BLD AUTO: 102 K/UL (ref 150–400)
PLATELET # BLD AUTO: 108 K/UL (ref 150–400)
PLATELET # BLD AUTO: 109 K/UL (ref 150–400)
PLATELET # BLD AUTO: 112 K/UL (ref 150–400)
PLATELET # BLD AUTO: 115 K/UL (ref 150–400)
PLATELET # BLD AUTO: 119 K/UL (ref 150–400)
PLATELET # BLD AUTO: 124 K/UL (ref 150–400)
PLATELET # BLD AUTO: 128 K/UL (ref 150–400)
PLATELET # BLD AUTO: 130 K/UL (ref 150–400)
PLATELET # BLD AUTO: 132 K/UL (ref 150–400)
PLATELET # BLD AUTO: 136 K/UL (ref 150–400)
PLATELET # BLD AUTO: 139 K/UL (ref 150–400)
PLATELET # BLD AUTO: 177 K/UL (ref 150–400)
PLATELET # BLD AUTO: 85 K/UL (ref 150–400)
PLATELET # BLD AUTO: 97 K/UL (ref 150–400)
PMV BLD AUTO: 10 FL (ref 8.9–12.9)
PMV BLD AUTO: 10.1 FL (ref 8.9–12.9)
PMV BLD AUTO: 10.2 FL (ref 8.9–12.9)
PMV BLD AUTO: 10.3 FL (ref 8.9–12.9)
PMV BLD AUTO: 10.4 FL (ref 8.9–12.9)
PMV BLD AUTO: 10.4 FL (ref 8.9–12.9)
PMV BLD AUTO: 10.5 FL (ref 8.9–12.9)
PMV BLD AUTO: 9.5 FL (ref 8.9–12.9)
PMV BLD AUTO: 9.6 FL (ref 8.9–12.9)
PMV BLD AUTO: 9.7 FL (ref 8.9–12.9)
PMV BLD AUTO: 9.8 FL (ref 8.9–12.9)
PMV BLD AUTO: 9.8 FL (ref 8.9–12.9)
POTASSIUM SERPL-SCNC: 3.4 MMOL/L (ref 3.5–5.1)
POTASSIUM SERPL-SCNC: 3.5 MMOL/L (ref 3.5–5.1)
POTASSIUM SERPL-SCNC: 3.5 MMOL/L (ref 3.5–5.1)
POTASSIUM SERPL-SCNC: 3.7 MMOL/L (ref 3.5–5.1)
POTASSIUM SERPL-SCNC: 3.7 MMOL/L (ref 3.5–5.1)
POTASSIUM SERPL-SCNC: 3.8 MMOL/L (ref 3.5–5.1)
POTASSIUM SERPL-SCNC: 3.9 MMOL/L (ref 3.5–5.1)
POTASSIUM SERPL-SCNC: 4 MMOL/L (ref 3.5–5.1)
POTASSIUM SERPL-SCNC: 4 MMOL/L (ref 3.5–5.1)
POTASSIUM SERPL-SCNC: 4.2 MMOL/L (ref 3.5–5.1)
POTASSIUM SERPL-SCNC: 4.3 MMOL/L (ref 3.5–5.1)
POTASSIUM SERPL-SCNC: 4.3 MMOL/L (ref 3.5–5.1)
POTASSIUM SERPL-SCNC: 4.4 MMOL/L (ref 3.5–5.1)
PROCALCITONIN SERPL-MCNC: 0.07 NG/ML
PROT SERPL-MCNC: 4.7 G/DL (ref 6.4–8.2)
PROT SERPL-MCNC: 6.1 G/DL (ref 6.4–8.2)
PROT SERPL-MCNC: 6.2 G/DL (ref 6.4–8.2)
PROT SERPL-MCNC: 6.2 G/DL (ref 6.4–8.2)
PROT SERPL-MCNC: 6.4 G/DL (ref 6.4–8.2)
PROT SERPL-MCNC: 6.4 G/DL (ref 6.4–8.2)
PROT SERPL-MCNC: 6.6 G/DL (ref 6.4–8.2)
PROT SERPL-MCNC: 6.6 G/DL (ref 6.4–8.2)
PROT SERPL-MCNC: 7.2 G/DL (ref 6.4–8.2)
PROT UR STRIP-MCNC: NEGATIVE MG/DL
PROTHROMBIN TIME: 11.8 SEC (ref 9–11.1)
Q-T INTERVAL, ECG07: 416 MS
Q-T INTERVAL, ECG07: 468 MS
QRS DURATION, ECG06: 88 MS
QRS DURATION, ECG06: 94 MS
QTC CALCULATION (BEZET), ECG08: 478 MS
QTC CALCULATION (BEZET), ECG08: 479 MS
RBC # BLD AUTO: 2.02 M/UL (ref 4.1–5.7)
RBC # BLD AUTO: 2.14 M/UL (ref 4.1–5.7)
RBC # BLD AUTO: 2.22 M/UL (ref 4.1–5.7)
RBC # BLD AUTO: 2.33 M/UL (ref 4.1–5.7)
RBC # BLD AUTO: 2.39 M/UL (ref 4.1–5.7)
RBC # BLD AUTO: 2.48 M/UL (ref 4.1–5.7)
RBC # BLD AUTO: 2.65 M/UL (ref 4.1–5.7)
RBC # BLD AUTO: 2.66 M/UL (ref 4.1–5.7)
RBC # BLD AUTO: 2.91 M/UL (ref 4.1–5.7)
RBC # BLD AUTO: 3.12 M/UL (ref 4.1–5.7)
RBC # BLD AUTO: 3.2 M/UL (ref 4.1–5.7)
RBC # BLD AUTO: 3.26 M/UL (ref 4.1–5.7)
RBC # BLD AUTO: 3.28 M/UL (ref 4.1–5.7)
RBC # BLD AUTO: 3.47 M/UL (ref 4.1–5.7)
RBC # BLD AUTO: 3.5 M/UL (ref 4.1–5.7)
RBC # BLD AUTO: 3.71 M/UL (ref 4.1–5.7)
RBC # BLD AUTO: 3.81 M/UL (ref 4.1–5.7)
RBC # FLD: >100 /CU MM
RBC #/AREA URNS HPF: ABNORMAL /HPF (ref 0–5)
RBC #/AREA URNS HPF: ABNORMAL /HPF (ref 0–5)
RBC #/AREA URNS HPF: NORMAL /HPF (ref 0–5)
RBC #/AREA URNS HPF: NORMAL /HPF (ref 0–5)
RBC MORPH BLD: ABNORMAL
SAMPLES BEING HELD,HOLD: NORMAL
SARS-COV-2, COV2: NORMAL
SERVICE CMNT-IMP: ABNORMAL
SERVICE CMNT-IMP: NORMAL
SODIUM SERPL-SCNC: 128 MMOL/L (ref 136–145)
SODIUM SERPL-SCNC: 132 MMOL/L (ref 136–145)
SODIUM SERPL-SCNC: 133 MMOL/L (ref 136–145)
SODIUM SERPL-SCNC: 134 MMOL/L (ref 136–145)
SODIUM SERPL-SCNC: 135 MMOL/L (ref 136–145)
SODIUM SERPL-SCNC: 135 MMOL/L (ref 136–145)
SODIUM SERPL-SCNC: 136 MMOL/L (ref 136–145)
SODIUM SERPL-SCNC: 137 MMOL/L (ref 136–145)
SODIUM SERPL-SCNC: 137 MMOL/L (ref 136–145)
SODIUM SERPL-SCNC: 138 MMOL/L (ref 136–145)
SODIUM SERPL-SCNC: 141 MMOL/L (ref 136–145)
SODIUM SERPL-SCNC: 141 MMOL/L (ref 136–145)
SODIUM SERPL-SCNC: 142 MMOL/L (ref 136–145)
SODIUM SERPL-SCNC: 142 MMOL/L (ref 136–145)
SODIUM SERPL-SCNC: 143 MMOL/L (ref 136–145)
SOURCE, COVRS: NORMAL
SOURCE, COVRS: NORMAL
SP GR UR REFRACTOMETRY: 1.01 (ref 1–1.03)
SP GR UR REFRACTOMETRY: 1.01 (ref 1–1.03)
SP GR UR REFRACTOMETRY: 1.02 (ref 1–1.03)
SP GR UR REFRACTOMETRY: 1.03 (ref 1–1.03)
SPECIMEN EXP DATE BLD: NORMAL
SPECIMEN EXP DATE BLD: NORMAL
SPECIMEN SOURCE FLD: ABNORMAL
STATUS OF UNIT,%ST: NORMAL
THERAPEUTIC RANGE,PTTT: ABNORMAL SECS (ref 58–77)
TSH SERPL DL<=0.05 MIU/L-ACNC: 2.96 UIU/ML (ref 0.36–3.74)
UA: UC IF INDICATED,UAUC: ABNORMAL
UA: UC IF INDICATED,UAUC: NORMAL
UA: UC IF INDICATED,UAUC: NORMAL
UNIT DIVISION, %UDIV: 0
UROBILINOGEN UR QL STRIP.AUTO: 0.2 EU/DL (ref 0.2–1)
UROBILINOGEN UR QL STRIP.AUTO: 0.2 EU/DL (ref 0.2–1)
UROBILINOGEN UR QL STRIP.AUTO: 1 EU/DL (ref 0.2–1)
UROBILINOGEN UR QL STRIP.AUTO: 1 EU/DL (ref 0.2–1)
VANCOMYCIN SERPL-MCNC: 11.7 UG/ML
VANCOMYCIN SERPL-MCNC: 18.3 UG/ML
VANCOMYCIN SERPL-MCNC: 32.5 UG/ML
VENTRICULAR RATE, ECG03: 63 BPM
VENTRICULAR RATE, ECG03: 80 BPM
VIT B12 SERPL-MCNC: 664 PG/ML (ref 193–986)
WBC # BLD AUTO: 4 K/UL (ref 4.1–11.1)
WBC # BLD AUTO: 4.1 K/UL (ref 4.1–11.1)
WBC # BLD AUTO: 4.7 K/UL (ref 4.1–11.1)
WBC # BLD AUTO: 4.7 K/UL (ref 4.1–11.1)
WBC # BLD AUTO: 5.1 K/UL (ref 4.1–11.1)
WBC # BLD AUTO: 5.5 K/UL (ref 4.1–11.1)
WBC # BLD AUTO: 5.9 K/UL (ref 4.1–11.1)
WBC # BLD AUTO: 5.9 K/UL (ref 4.1–11.1)
WBC # BLD AUTO: 6.4 K/UL (ref 4.1–11.1)
WBC # BLD AUTO: 6.6 K/UL (ref 4.1–11.1)
WBC # BLD AUTO: 6.6 K/UL (ref 4.1–11.1)
WBC # BLD AUTO: 6.9 K/UL (ref 4.1–11.1)
WBC # BLD AUTO: 7.7 K/UL (ref 4.1–11.1)
WBC # BLD AUTO: 7.7 K/UL (ref 4.1–11.1)
WBC # BLD AUTO: 7.8 K/UL (ref 4.1–11.1)
WBC # BLD AUTO: 8.4 K/UL (ref 4.1–11.1)
WBC # BLD AUTO: 9.2 K/UL (ref 4.1–11.1)
WBC URNS QL MICRO: ABNORMAL /HPF (ref 0–4)
WBC URNS QL MICRO: ABNORMAL /HPF (ref 0–4)
WBC URNS QL MICRO: NORMAL /HPF (ref 0–4)
WBC URNS QL MICRO: NORMAL /HPF (ref 0–4)

## 2021-01-01 PROCEDURE — 74011250637 HC RX REV CODE- 250/637: Performed by: ORTHOPAEDIC SURGERY

## 2021-01-01 PROCEDURE — 96360 HYDRATION IV INFUSION INIT: CPT

## 2021-01-01 PROCEDURE — 74011000258 HC RX REV CODE- 258: Performed by: ORTHOPAEDIC SURGERY

## 2021-01-01 PROCEDURE — 82962 GLUCOSE BLOOD TEST: CPT

## 2021-01-01 PROCEDURE — 85025 COMPLETE CBC W/AUTO DIFF WBC: CPT

## 2021-01-01 PROCEDURE — 74011250636 HC RX REV CODE- 250/636: Performed by: NURSE PRACTITIONER

## 2021-01-01 PROCEDURE — 74011250636 HC RX REV CODE- 250/636: Performed by: NURSE ANESTHETIST, CERTIFIED REGISTERED

## 2021-01-01 PROCEDURE — 74011250636 HC RX REV CODE- 250/636: Performed by: ORTHOPAEDIC SURGERY

## 2021-01-01 PROCEDURE — P9016 RBC LEUKOCYTES REDUCED: HCPCS

## 2021-01-01 PROCEDURE — 76210000000 HC OR PH I REC 2 TO 2.5 HR: Performed by: ORTHOPAEDIC SURGERY

## 2021-01-01 PROCEDURE — 80053 COMPREHEN METABOLIC PANEL: CPT

## 2021-01-01 PROCEDURE — 36415 COLL VENOUS BLD VENIPUNCTURE: CPT

## 2021-01-01 PROCEDURE — 76010000173 HC OR TIME 3 TO 3.5 HR INTENSV-TIER 1: Performed by: ORTHOPAEDIC SURGERY

## 2021-01-01 PROCEDURE — 87077 CULTURE AEROBIC IDENTIFY: CPT

## 2021-01-01 PROCEDURE — 82746 ASSAY OF FOLIC ACID SERUM: CPT

## 2021-01-01 PROCEDURE — 36430 TRANSFUSION BLD/BLD COMPNT: CPT

## 2021-01-01 PROCEDURE — 80076 HEPATIC FUNCTION PANEL: CPT

## 2021-01-01 PROCEDURE — 85027 COMPLETE CBC AUTOMATED: CPT

## 2021-01-01 PROCEDURE — 77030020365 HC SOL INJ SOD CL 0.9% 50ML

## 2021-01-01 PROCEDURE — 85652 RBC SED RATE AUTOMATED: CPT

## 2021-01-01 PROCEDURE — 93005 ELECTROCARDIOGRAM TRACING: CPT

## 2021-01-01 PROCEDURE — 87186 SC STD MICRODIL/AGAR DIL: CPT

## 2021-01-01 PROCEDURE — 65270000029 HC RM PRIVATE

## 2021-01-01 PROCEDURE — 77030002933 HC SUT MCRYL J&J -A: Performed by: ORTHOPAEDIC SURGERY

## 2021-01-01 PROCEDURE — 74011636637 HC RX REV CODE- 636/637: Performed by: HOSPITALIST

## 2021-01-01 PROCEDURE — 99232 SBSQ HOSP IP/OBS MODERATE 35: CPT | Performed by: INTERNAL MEDICINE

## 2021-01-01 PROCEDURE — 80202 ASSAY OF VANCOMYCIN: CPT

## 2021-01-01 PROCEDURE — 74011250636 HC RX REV CODE- 250/636: Performed by: HOSPITALIST

## 2021-01-01 PROCEDURE — 83735 ASSAY OF MAGNESIUM: CPT

## 2021-01-01 PROCEDURE — 97161 PT EVAL LOW COMPLEX 20 MIN: CPT

## 2021-01-01 PROCEDURE — 77030026438 HC STYL ET INTUB CARD -A: Performed by: ANESTHESIOLOGY

## 2021-01-01 PROCEDURE — 87205 SMEAR GRAM STAIN: CPT

## 2021-01-01 PROCEDURE — 74011250636 HC RX REV CODE- 250/636: Performed by: EMERGENCY MEDICINE

## 2021-01-01 PROCEDURE — 70450 CT HEAD/BRAIN W/O DYE: CPT

## 2021-01-01 PROCEDURE — 87635 SARS-COV-2 COVID-19 AMP PRB: CPT

## 2021-01-01 PROCEDURE — 76937 US GUIDE VASCULAR ACCESS: CPT

## 2021-01-01 PROCEDURE — 93306 TTE W/DOPPLER COMPLETE: CPT

## 2021-01-01 PROCEDURE — 0RBJ4ZZ EXCISION OF RIGHT SHOULDER JOINT, PERCUTANEOUS ENDOSCOPIC APPROACH: ICD-10-PCS | Performed by: ORTHOPAEDIC SURGERY

## 2021-01-01 PROCEDURE — 71045 X-RAY EXAM CHEST 1 VIEW: CPT

## 2021-01-01 PROCEDURE — 77030002916 HC SUT ETHLN J&J -A: Performed by: ORTHOPAEDIC SURGERY

## 2021-01-01 PROCEDURE — 99284 EMERGENCY DEPT VISIT MOD MDM: CPT

## 2021-01-01 PROCEDURE — 86923 COMPATIBILITY TEST ELECTRIC: CPT

## 2021-01-01 PROCEDURE — 82140 ASSAY OF AMMONIA: CPT

## 2021-01-01 PROCEDURE — 80048 BASIC METABOLIC PNL TOTAL CA: CPT

## 2021-01-01 PROCEDURE — 77030002986 HC SUT PROL J&J -A: Performed by: ORTHOPAEDIC SURGERY

## 2021-01-01 PROCEDURE — 77030008684 HC TU ET CUF COVD -B: Performed by: ANESTHESIOLOGY

## 2021-01-01 PROCEDURE — 74011000250 HC RX REV CODE- 250

## 2021-01-01 PROCEDURE — 99285 EMERGENCY DEPT VISIT HI MDM: CPT

## 2021-01-01 PROCEDURE — 74011250636 HC RX REV CODE- 250/636: Performed by: PHYSICIAN ASSISTANT

## 2021-01-01 PROCEDURE — 02HV33Z INSERTION OF INFUSION DEVICE INTO SUPERIOR VENA CAVA, PERCUTANEOUS APPROACH: ICD-10-PCS | Performed by: HOSPITALIST

## 2021-01-01 PROCEDURE — 77030013079 HC BLNKT BAIR HGGR 3M -A: Performed by: ANESTHESIOLOGY

## 2021-01-01 PROCEDURE — P9047 ALBUMIN (HUMAN), 25%, 50ML: HCPCS | Performed by: HOSPITALIST

## 2021-01-01 PROCEDURE — 77030036560 HC SHLDR ARM PAD ABDUCTN S2SG -B: Performed by: ORTHOPAEDIC SURGERY

## 2021-01-01 PROCEDURE — 85018 HEMOGLOBIN: CPT

## 2021-01-01 PROCEDURE — 74011636637 HC RX REV CODE- 636/637: Performed by: NURSE PRACTITIONER

## 2021-01-01 PROCEDURE — 96374 THER/PROPH/DIAG INJ IV PUSH: CPT

## 2021-01-01 PROCEDURE — 74011000250 HC RX REV CODE- 250: Performed by: NURSE ANESTHETIST, CERTIFIED REGISTERED

## 2021-01-01 PROCEDURE — 97535 SELF CARE MNGMENT TRAINING: CPT

## 2021-01-01 PROCEDURE — 81001 URINALYSIS AUTO W/SCOPE: CPT

## 2021-01-01 PROCEDURE — 73200 CT UPPER EXTREMITY W/O DYE: CPT

## 2021-01-01 PROCEDURE — 86140 C-REACTIVE PROTEIN: CPT

## 2021-01-01 PROCEDURE — 76210000017 HC OR PH I REC 1.5 TO 2 HR: Performed by: ORTHOPAEDIC SURGERY

## 2021-01-01 PROCEDURE — 77030020847 HC STATLOK BARD -A

## 2021-01-01 PROCEDURE — 97166 OT EVAL MOD COMPLEX 45 MIN: CPT | Performed by: OCCUPATIONAL THERAPIST

## 2021-01-01 PROCEDURE — 87070 CULTURE OTHR SPECIMN AEROBIC: CPT

## 2021-01-01 PROCEDURE — 74011000258 HC RX REV CODE- 258: Performed by: HOSPITALIST

## 2021-01-01 PROCEDURE — 87075 CULTR BACTERIA EXCEPT BLOOD: CPT

## 2021-01-01 PROCEDURE — 74011636637 HC RX REV CODE- 636/637: Performed by: ORTHOPAEDIC SURGERY

## 2021-01-01 PROCEDURE — 77002 NEEDLE LOCALIZATION BY XRAY: CPT

## 2021-01-01 PROCEDURE — 0SP90JZ REMOVAL OF SYNTHETIC SUBSTITUTE FROM RIGHT HIP JOINT, OPEN APPROACH: ICD-10-PCS | Performed by: ORTHOPAEDIC SURGERY

## 2021-01-01 PROCEDURE — 86901 BLOOD TYPING SEROLOGIC RH(D): CPT

## 2021-01-01 PROCEDURE — 99283 EMERGENCY DEPT VISIT LOW MDM: CPT

## 2021-01-01 PROCEDURE — 97530 THERAPEUTIC ACTIVITIES: CPT

## 2021-01-01 PROCEDURE — 77030010507 HC ADH SKN DERMBND J&J -B: Performed by: ORTHOPAEDIC SURGERY

## 2021-01-01 PROCEDURE — P9045 ALBUMIN (HUMAN), 5%, 250 ML: HCPCS | Performed by: NURSE ANESTHETIST, CERTIFIED REGISTERED

## 2021-01-01 PROCEDURE — 2709999900 HC NON-CHARGEABLE SUPPLY: Performed by: ORTHOPAEDIC SURGERY

## 2021-01-01 PROCEDURE — 77030030122: Performed by: ORTHOPAEDIC SURGERY

## 2021-01-01 PROCEDURE — 74011000258 HC RX REV CODE- 258: Performed by: NURSE PRACTITIONER

## 2021-01-01 PROCEDURE — 77030006835 HC BLD SAW SAG STRY -B: Performed by: ORTHOPAEDIC SURGERY

## 2021-01-01 PROCEDURE — 76060000033 HC ANESTHESIA 1 TO 1.5 HR: Performed by: ORTHOPAEDIC SURGERY

## 2021-01-01 PROCEDURE — 51798 US URINE CAPACITY MEASURE: CPT

## 2021-01-01 PROCEDURE — 99218 HC RM OBSERVATION: CPT

## 2021-01-01 PROCEDURE — 74011250637 HC RX REV CODE- 250/637: Performed by: STUDENT IN AN ORGANIZED HEALTH CARE EDUCATION/TRAINING PROGRAM

## 2021-01-01 PROCEDURE — 99223 1ST HOSP IP/OBS HIGH 75: CPT | Performed by: INTERNAL MEDICINE

## 2021-01-01 PROCEDURE — 84145 PROCALCITONIN (PCT): CPT

## 2021-01-01 PROCEDURE — P9045 ALBUMIN (HUMAN), 5%, 250 ML: HCPCS

## 2021-01-01 PROCEDURE — 85610 PROTHROMBIN TIME: CPT

## 2021-01-01 PROCEDURE — 74011250636 HC RX REV CODE- 250/636

## 2021-01-01 PROCEDURE — 96375 TX/PRO/DX INJ NEW DRUG ADDON: CPT

## 2021-01-01 PROCEDURE — P9047 ALBUMIN (HUMAN), 25%, 50ML: HCPCS | Performed by: NURSE PRACTITIONER

## 2021-01-01 PROCEDURE — 99232 SBSQ HOSP IP/OBS MODERATE 35: CPT | Performed by: HOSPITALIST

## 2021-01-01 PROCEDURE — 74011250637 HC RX REV CODE- 250/637: Performed by: ANESTHESIOLOGY

## 2021-01-01 PROCEDURE — 83036 HEMOGLOBIN GLYCOSYLATED A1C: CPT

## 2021-01-01 PROCEDURE — 77030008496 HC TBNG ARTHSC IRR S&N -B: Performed by: ORTHOPAEDIC SURGERY

## 2021-01-01 PROCEDURE — 82607 VITAMIN B-12: CPT

## 2021-01-01 PROCEDURE — 77030031139 HC SUT VCRL2 J&J -A: Performed by: ORTHOPAEDIC SURGERY

## 2021-01-01 PROCEDURE — 84443 ASSAY THYROID STIM HORMONE: CPT

## 2021-01-01 PROCEDURE — 97165 OT EVAL LOW COMPLEX 30 MIN: CPT

## 2021-01-01 PROCEDURE — 74011250637 HC RX REV CODE- 250/637: Performed by: HOSPITALIST

## 2021-01-01 PROCEDURE — 30233N1 TRANSFUSION OF NONAUTOLOGOUS RED BLOOD CELLS INTO PERIPHERAL VEIN, PERCUTANEOUS APPROACH: ICD-10-PCS | Performed by: HOSPITALIST

## 2021-01-01 PROCEDURE — 74011636637 HC RX REV CODE- 636/637: Performed by: STUDENT IN AN ORGANIZED HEALTH CARE EDUCATION/TRAINING PROGRAM

## 2021-01-01 PROCEDURE — 93306 TTE W/DOPPLER COMPLETE: CPT | Performed by: SPECIALIST

## 2021-01-01 PROCEDURE — 2709999900 HC NON-CHARGEABLE SUPPLY

## 2021-01-01 PROCEDURE — 77030018834: Performed by: ORTHOPAEDIC SURGERY

## 2021-01-01 PROCEDURE — 76060000037 HC ANESTHESIA 3 TO 3.5 HR: Performed by: ORTHOPAEDIC SURGERY

## 2021-01-01 PROCEDURE — 99223 1ST HOSP IP/OBS HIGH 75: CPT | Performed by: SPECIALIST

## 2021-01-01 PROCEDURE — 0SP909Z REMOVAL OF LINER FROM RIGHT HIP JOINT, OPEN APPROACH: ICD-10-PCS | Performed by: ORTHOPAEDIC SURGERY

## 2021-01-01 PROCEDURE — 0R9J3ZZ DRAINAGE OF RIGHT SHOULDER JOINT, PERCUTANEOUS APPROACH: ICD-10-PCS | Performed by: ORTHOPAEDIC SURGERY

## 2021-01-01 PROCEDURE — 77030011264 HC ELECTRD BLD EXT COVD -A: Performed by: ORTHOPAEDIC SURGERY

## 2021-01-01 PROCEDURE — 74011250637 HC RX REV CODE- 250/637: Performed by: INTERNAL MEDICINE

## 2021-01-01 PROCEDURE — 97530 THERAPEUTIC ACTIVITIES: CPT | Performed by: OCCUPATIONAL THERAPIST

## 2021-01-01 PROCEDURE — 97110 THERAPEUTIC EXERCISES: CPT

## 2021-01-01 PROCEDURE — 74011250637 HC RX REV CODE- 250/637: Performed by: EMERGENCY MEDICINE

## 2021-01-01 PROCEDURE — 74011250636 HC RX REV CODE- 250/636: Performed by: ANESTHESIOLOGY

## 2021-01-01 PROCEDURE — C1751 CATH, INF, PER/CENT/MIDLINE: HCPCS

## 2021-01-01 PROCEDURE — 87040 BLOOD CULTURE FOR BACTERIA: CPT

## 2021-01-01 PROCEDURE — 77030006884 HC BLD SHV INCIS S&N -B: Performed by: ORTHOPAEDIC SURGERY

## 2021-01-01 PROCEDURE — 77030002966 HC SUT PDS J&J -A: Performed by: ORTHOPAEDIC SURGERY

## 2021-01-01 PROCEDURE — 74011000636 HC RX REV CODE- 636: Performed by: ORTHOPAEDIC SURGERY

## 2021-01-01 PROCEDURE — 77030021162 HC TU IRR ENDOSC BAXT -A: Performed by: ORTHOPAEDIC SURGERY

## 2021-01-01 PROCEDURE — 74011000250 HC RX REV CODE- 250: Performed by: ORTHOPAEDIC SURGERY

## 2021-01-01 PROCEDURE — 36573 INSJ PICC RS&I 5 YR+: CPT | Performed by: HOSPITALIST

## 2021-01-01 PROCEDURE — 74011000636 HC RX REV CODE- 636: Performed by: EMERGENCY MEDICINE

## 2021-01-01 PROCEDURE — 77030038552 HC DRN WND MDII -A: Performed by: ORTHOPAEDIC SURGERY

## 2021-01-01 PROCEDURE — 85730 THROMBOPLASTIN TIME PARTIAL: CPT

## 2021-01-01 PROCEDURE — 96376 TX/PRO/DX INJ SAME DRUG ADON: CPT

## 2021-01-01 PROCEDURE — 84100 ASSAY OF PHOSPHORUS: CPT

## 2021-01-01 PROCEDURE — 74177 CT ABD & PELVIS W/CONTRAST: CPT

## 2021-01-01 PROCEDURE — 97110 THERAPEUTIC EXERCISES: CPT | Performed by: OCCUPATIONAL THERAPIST

## 2021-01-01 PROCEDURE — 89050 BODY FLUID CELL COUNT: CPT

## 2021-01-01 PROCEDURE — 76010000149 HC OR TIME 1 TO 1.5 HR: Performed by: ORTHOPAEDIC SURGERY

## 2021-01-01 PROCEDURE — 76010000133 HC OR TIME 3 TO 3.5 HR: Performed by: ORTHOPAEDIC SURGERY

## 2021-01-01 RX ORDER — ONDANSETRON 2 MG/ML
INJECTION INTRAMUSCULAR; INTRAVENOUS AS NEEDED
Status: DISCONTINUED | OUTPATIENT
Start: 2021-01-01 | End: 2021-01-01 | Stop reason: HOSPADM

## 2021-01-01 RX ORDER — SODIUM CHLORIDE 9 MG/ML
125 INJECTION, SOLUTION INTRAVENOUS CONTINUOUS
Status: DISPENSED | OUTPATIENT
Start: 2021-01-01 | End: 2021-01-01

## 2021-01-01 RX ORDER — FENTANYL CITRATE 50 UG/ML
INJECTION, SOLUTION INTRAMUSCULAR; INTRAVENOUS AS NEEDED
Status: DISCONTINUED | OUTPATIENT
Start: 2021-01-01 | End: 2021-01-01 | Stop reason: HOSPADM

## 2021-01-01 RX ORDER — MIDAZOLAM HYDROCHLORIDE 1 MG/ML
0.5 INJECTION, SOLUTION INTRAMUSCULAR; INTRAVENOUS
Status: DISCONTINUED | OUTPATIENT
Start: 2021-01-01 | End: 2021-01-01 | Stop reason: HOSPADM

## 2021-01-01 RX ORDER — SODIUM CHLORIDE 0.9 % (FLUSH) 0.9 %
5-40 SYRINGE (ML) INJECTION AS NEEDED
Status: DISCONTINUED | OUTPATIENT
Start: 2021-01-01 | End: 2021-01-01 | Stop reason: HOSPADM

## 2021-01-01 RX ORDER — TAMSULOSIN HYDROCHLORIDE 0.4 MG/1
0.4 CAPSULE ORAL DAILY
Status: DISCONTINUED | OUTPATIENT
Start: 2021-01-01 | End: 2021-01-01 | Stop reason: HOSPADM

## 2021-01-01 RX ORDER — SODIUM CHLORIDE 0.9 % (FLUSH) 0.9 %
5-40 SYRINGE (ML) INJECTION EVERY 8 HOURS
Status: DISCONTINUED | OUTPATIENT
Start: 2021-01-01 | End: 2021-01-01 | Stop reason: SDUPTHER

## 2021-01-01 RX ORDER — SODIUM CHLORIDE 9 MG/ML
500 INJECTION, SOLUTION INTRAVENOUS ONCE
Status: COMPLETED | OUTPATIENT
Start: 2021-01-01 | End: 2021-01-01

## 2021-01-01 RX ORDER — INSULIN LISPRO 100 [IU]/ML
10 INJECTION, SOLUTION INTRAVENOUS; SUBCUTANEOUS ONCE
Status: DISCONTINUED | OUTPATIENT
Start: 2021-01-01 | End: 2021-01-01

## 2021-01-01 RX ORDER — POLYETHYLENE GLYCOL 3350 17 G/17G
17 POWDER, FOR SOLUTION ORAL DAILY
Status: DISCONTINUED | OUTPATIENT
Start: 2021-01-01 | End: 2021-01-01 | Stop reason: HOSPADM

## 2021-01-01 RX ORDER — NEOSTIGMINE METHYLSULFATE 1 MG/ML
INJECTION, SOLUTION INTRAVENOUS AS NEEDED
Status: DISCONTINUED | OUTPATIENT
Start: 2021-01-01 | End: 2021-01-01 | Stop reason: HOSPADM

## 2021-01-01 RX ORDER — ACETAMINOPHEN 325 MG/1
650 TABLET ORAL ONCE
Status: COMPLETED | OUTPATIENT
Start: 2021-01-01 | End: 2021-01-01

## 2021-01-01 RX ORDER — MORPHINE SULFATE 2 MG/ML
2 INJECTION, SOLUTION INTRAMUSCULAR; INTRAVENOUS
Status: DISCONTINUED | OUTPATIENT
Start: 2021-01-01 | End: 2021-01-01 | Stop reason: HOSPADM

## 2021-01-01 RX ORDER — ROCURONIUM BROMIDE 10 MG/ML
INJECTION, SOLUTION INTRAVENOUS AS NEEDED
Status: DISCONTINUED | OUTPATIENT
Start: 2021-01-01 | End: 2021-01-01 | Stop reason: HOSPADM

## 2021-01-01 RX ORDER — HYDROXYZINE HYDROCHLORIDE 10 MG/1
10 TABLET, FILM COATED ORAL
Status: DISCONTINUED | OUTPATIENT
Start: 2021-01-01 | End: 2021-01-01 | Stop reason: HOSPADM

## 2021-01-01 RX ORDER — SUCCINYLCHOLINE CHLORIDE 20 MG/ML
INJECTION INTRAMUSCULAR; INTRAVENOUS AS NEEDED
Status: DISCONTINUED | OUTPATIENT
Start: 2021-01-01 | End: 2021-01-01 | Stop reason: HOSPADM

## 2021-01-01 RX ORDER — KETAMINE HYDROCHLORIDE 10 MG/ML
INJECTION, SOLUTION INTRAMUSCULAR; INTRAVENOUS AS NEEDED
Status: DISCONTINUED | OUTPATIENT
Start: 2021-01-01 | End: 2021-01-01 | Stop reason: HOSPADM

## 2021-01-01 RX ORDER — ONDANSETRON 2 MG/ML
4 INJECTION INTRAMUSCULAR; INTRAVENOUS
Status: ACTIVE | OUTPATIENT
Start: 2021-01-01 | End: 2021-01-01

## 2021-01-01 RX ORDER — DEXTROSE 50 % IN WATER (D50W) INTRAVENOUS SYRINGE
25-50 AS NEEDED
Status: DISCONTINUED | OUTPATIENT
Start: 2021-01-01 | End: 2021-01-01 | Stop reason: SDUPTHER

## 2021-01-01 RX ORDER — SPIRONOLACTONE 25 MG/1
25 TABLET ORAL DAILY
Status: DISCONTINUED | OUTPATIENT
Start: 2021-01-01 | End: 2021-01-01 | Stop reason: HOSPADM

## 2021-01-01 RX ORDER — LANOLIN ALCOHOL/MO/W.PET/CERES
3 CREAM (GRAM) TOPICAL
Status: DISCONTINUED | OUTPATIENT
Start: 2021-01-01 | End: 2021-01-01 | Stop reason: HOSPADM

## 2021-01-01 RX ORDER — ASPIRIN 325 MG/1
100 TABLET, FILM COATED ORAL DAILY
Status: DISCONTINUED | OUTPATIENT
Start: 2021-01-01 | End: 2021-01-01 | Stop reason: HOSPADM

## 2021-01-01 RX ORDER — MIDODRINE HYDROCHLORIDE 10 MG/1
10 TABLET ORAL 3 TIMES DAILY
COMMUNITY

## 2021-01-01 RX ORDER — SODIUM CHLORIDE 9 MG/ML
250 INJECTION, SOLUTION INTRAVENOUS AS NEEDED
Status: DISCONTINUED | OUTPATIENT
Start: 2021-01-01 | End: 2021-01-01 | Stop reason: HOSPADM

## 2021-01-01 RX ORDER — LIDOCAINE HYDROCHLORIDE 10 MG/ML
0.1 INJECTION, SOLUTION EPIDURAL; INFILTRATION; INTRACAUDAL; PERINEURAL AS NEEDED
Status: DISCONTINUED | OUTPATIENT
Start: 2021-01-01 | End: 2021-01-01 | Stop reason: ALTCHOICE

## 2021-01-01 RX ORDER — OXYCODONE HYDROCHLORIDE 10 MG/1
10 TABLET ORAL
Status: ON HOLD | COMMUNITY
End: 2021-01-01 | Stop reason: SDUPTHER

## 2021-01-01 RX ORDER — PANTOPRAZOLE SODIUM 40 MG/1
40 TABLET, DELAYED RELEASE ORAL DAILY
Status: DISCONTINUED | OUTPATIENT
Start: 2021-01-01 | End: 2021-01-01 | Stop reason: HOSPADM

## 2021-01-01 RX ORDER — LIDOCAINE HYDROCHLORIDE 10 MG/ML
0.1 INJECTION, SOLUTION EPIDURAL; INFILTRATION; INTRACAUDAL; PERINEURAL AS NEEDED
Status: DISCONTINUED | OUTPATIENT
Start: 2021-01-01 | End: 2021-01-01 | Stop reason: HOSPADM

## 2021-01-01 RX ORDER — VANCOMYCIN HYDROCHLORIDE
1250 ONCE
Status: COMPLETED | OUTPATIENT
Start: 2021-01-01 | End: 2021-01-01

## 2021-01-01 RX ORDER — DIPHENHYDRAMINE HYDROCHLORIDE 50 MG/ML
12.5 INJECTION, SOLUTION INTRAMUSCULAR; INTRAVENOUS AS NEEDED
Status: DISCONTINUED | OUTPATIENT
Start: 2021-01-01 | End: 2021-01-01 | Stop reason: HOSPADM

## 2021-01-01 RX ORDER — DEXAMETHASONE SODIUM PHOSPHATE 4 MG/ML
INJECTION, SOLUTION INTRA-ARTICULAR; INTRALESIONAL; INTRAMUSCULAR; INTRAVENOUS; SOFT TISSUE AS NEEDED
Status: DISCONTINUED | OUTPATIENT
Start: 2021-01-01 | End: 2021-01-01 | Stop reason: HOSPADM

## 2021-01-01 RX ORDER — AMOXICILLIN 250 MG
1 CAPSULE ORAL 2 TIMES DAILY
Status: DISCONTINUED | OUTPATIENT
Start: 2021-01-01 | End: 2021-01-01 | Stop reason: HOSPADM

## 2021-01-01 RX ORDER — OXYCODONE HYDROCHLORIDE 5 MG/1
5 TABLET ORAL AS NEEDED
Status: DISCONTINUED | OUTPATIENT
Start: 2021-01-01 | End: 2021-01-01 | Stop reason: HOSPADM

## 2021-01-01 RX ORDER — NALOXONE HYDROCHLORIDE 0.4 MG/ML
0.4 INJECTION, SOLUTION INTRAMUSCULAR; INTRAVENOUS; SUBCUTANEOUS AS NEEDED
Status: DISCONTINUED | OUTPATIENT
Start: 2021-01-01 | End: 2021-01-01 | Stop reason: HOSPADM

## 2021-01-01 RX ORDER — ONDANSETRON 4 MG/1
4 TABLET, ORALLY DISINTEGRATING ORAL
Status: DISCONTINUED | OUTPATIENT
Start: 2021-01-01 | End: 2021-01-01 | Stop reason: SDUPTHER

## 2021-01-01 RX ORDER — HYDROMORPHONE HYDROCHLORIDE 1 MG/ML
0.2 INJECTION, SOLUTION INTRAMUSCULAR; INTRAVENOUS; SUBCUTANEOUS
Status: DISCONTINUED | OUTPATIENT
Start: 2021-01-01 | End: 2021-01-01 | Stop reason: HOSPADM

## 2021-01-01 RX ORDER — SODIUM CHLORIDE 0.9 % (FLUSH) 0.9 %
5-40 SYRINGE (ML) INJECTION EVERY 8 HOURS
Status: DISCONTINUED | OUTPATIENT
Start: 2021-01-01 | End: 2021-01-01 | Stop reason: HOSPADM

## 2021-01-01 RX ORDER — TRAMADOL HYDROCHLORIDE 50 MG/1
50 TABLET ORAL
Status: DISCONTINUED | OUTPATIENT
Start: 2021-01-01 | End: 2021-01-01 | Stop reason: SDUPTHER

## 2021-01-01 RX ORDER — HYDROMORPHONE HYDROCHLORIDE 1 MG/ML
0.5 INJECTION, SOLUTION INTRAMUSCULAR; INTRAVENOUS; SUBCUTANEOUS
Status: DISPENSED | OUTPATIENT
Start: 2021-01-01 | End: 2021-01-01

## 2021-01-01 RX ORDER — SODIUM CHLORIDE 9 MG/ML
75 INJECTION, SOLUTION INTRAVENOUS CONTINUOUS
Status: DISCONTINUED | OUTPATIENT
Start: 2021-01-01 | End: 2021-01-01 | Stop reason: ALTCHOICE

## 2021-01-01 RX ORDER — ALBUMIN HUMAN 50 G/1000ML
SOLUTION INTRAVENOUS AS NEEDED
Status: DISCONTINUED | OUTPATIENT
Start: 2021-01-01 | End: 2021-01-01 | Stop reason: HOSPADM

## 2021-01-01 RX ORDER — FACIAL-BODY WIPES
10 EACH TOPICAL DAILY PRN
Status: DISCONTINUED | OUTPATIENT
Start: 2021-01-01 | End: 2021-01-01 | Stop reason: HOSPADM

## 2021-01-01 RX ORDER — ALBUMIN HUMAN 250 G/1000ML
25 SOLUTION INTRAVENOUS EVERY 6 HOURS
Status: DISCONTINUED | OUTPATIENT
Start: 2021-01-01 | End: 2021-01-01

## 2021-01-01 RX ORDER — EPHEDRINE SULFATE/0.9% NACL/PF 50 MG/5 ML
5 SYRINGE (ML) INTRAVENOUS AS NEEDED
Status: DISCONTINUED | OUTPATIENT
Start: 2021-01-01 | End: 2021-01-01 | Stop reason: HOSPADM

## 2021-01-01 RX ORDER — MIDODRINE HYDROCHLORIDE 5 MG/1
10 TABLET ORAL 3 TIMES DAILY
Status: DISCONTINUED | OUTPATIENT
Start: 2021-01-01 | End: 2021-01-01 | Stop reason: HOSPADM

## 2021-01-01 RX ORDER — PHENYLEPHRINE HCL IN 0.9% NACL 0.4MG/10ML
SYRINGE (ML) INTRAVENOUS AS NEEDED
Status: DISCONTINUED | OUTPATIENT
Start: 2021-01-01 | End: 2021-01-01 | Stop reason: HOSPADM

## 2021-01-01 RX ORDER — INSULIN GLARGINE 100 [IU]/ML
34 INJECTION, SOLUTION SUBCUTANEOUS
Status: DISCONTINUED | OUTPATIENT
Start: 2021-01-01 | End: 2021-01-01 | Stop reason: HOSPADM

## 2021-01-01 RX ORDER — POTASSIUM CHLORIDE 750 MG/1
10 TABLET, FILM COATED, EXTENDED RELEASE ORAL 2 TIMES DAILY
Status: DISCONTINUED | OUTPATIENT
Start: 2021-01-01 | End: 2021-01-01 | Stop reason: HOSPADM

## 2021-01-01 RX ORDER — SODIUM CHLORIDE 0.9 % (FLUSH) 0.9 %
5-40 SYRINGE (ML) INJECTION AS NEEDED
Status: DISCONTINUED | OUTPATIENT
Start: 2021-01-01 | End: 2021-01-01 | Stop reason: SDUPTHER

## 2021-01-01 RX ORDER — SERTRALINE HYDROCHLORIDE 50 MG/1
150 TABLET, FILM COATED ORAL DAILY
Status: DISCONTINUED | OUTPATIENT
Start: 2021-01-01 | End: 2021-01-01 | Stop reason: HOSPADM

## 2021-01-01 RX ORDER — DEXTROSE 50 % IN WATER (D50W) INTRAVENOUS SYRINGE
12.5-25 AS NEEDED
Status: DISCONTINUED | OUTPATIENT
Start: 2021-01-01 | End: 2021-01-01 | Stop reason: HOSPADM

## 2021-01-01 RX ORDER — SODIUM CHLORIDE, SODIUM LACTATE, POTASSIUM CHLORIDE, CALCIUM CHLORIDE 600; 310; 30; 20 MG/100ML; MG/100ML; MG/100ML; MG/100ML
INJECTION, SOLUTION INTRAVENOUS
Status: DISCONTINUED | OUTPATIENT
Start: 2021-01-01 | End: 2021-01-01 | Stop reason: HOSPADM

## 2021-01-01 RX ORDER — ACETAMINOPHEN 325 MG/1
650 TABLET ORAL EVERY 6 HOURS
Status: DISCONTINUED | OUTPATIENT
Start: 2021-01-01 | End: 2021-01-01 | Stop reason: HOSPADM

## 2021-01-01 RX ORDER — ACETAMINOPHEN 500 MG
1000 TABLET ORAL EVERY 6 HOURS
Status: DISCONTINUED | OUTPATIENT
Start: 2021-01-01 | End: 2021-01-01

## 2021-01-01 RX ORDER — SODIUM BICARBONATE 42 MG/ML
1 INJECTION, SOLUTION INTRAVENOUS
Status: COMPLETED | OUTPATIENT
Start: 2021-01-01 | End: 2021-01-01

## 2021-01-01 RX ORDER — POLYETHYLENE GLYCOL 3350 17 G/17G
17 POWDER, FOR SOLUTION ORAL DAILY PRN
Status: DISCONTINUED | OUTPATIENT
Start: 2021-01-01 | End: 2021-01-01 | Stop reason: HOSPADM

## 2021-01-01 RX ORDER — ACETAMINOPHEN 650 MG/1
650 SUPPOSITORY RECTAL
Status: DISCONTINUED | OUTPATIENT
Start: 2021-01-01 | End: 2021-01-01 | Stop reason: HOSPADM

## 2021-01-01 RX ORDER — SERTRALINE HYDROCHLORIDE 50 MG/1
150 TABLET, FILM COATED ORAL DAILY
COMMUNITY

## 2021-01-01 RX ORDER — INSULIN GLARGINE 100 [IU]/ML
32 INJECTION, SOLUTION SUBCUTANEOUS
Status: DISCONTINUED | OUTPATIENT
Start: 2021-01-01 | End: 2021-01-01

## 2021-01-01 RX ORDER — FENTANYL CITRATE 50 UG/ML
50 INJECTION, SOLUTION INTRAMUSCULAR; INTRAVENOUS AS NEEDED
Status: DISCONTINUED | OUTPATIENT
Start: 2021-01-01 | End: 2021-01-01 | Stop reason: ALTCHOICE

## 2021-01-01 RX ORDER — ALBUMIN HUMAN 50 G/1000ML
SOLUTION INTRAVENOUS
Status: COMPLETED
Start: 2021-01-01 | End: 2021-01-01

## 2021-01-01 RX ORDER — PROPOFOL 10 MG/ML
INJECTION, EMULSION INTRAVENOUS AS NEEDED
Status: DISCONTINUED | OUTPATIENT
Start: 2021-01-01 | End: 2021-01-01 | Stop reason: HOSPADM

## 2021-01-01 RX ORDER — ATORVASTATIN CALCIUM 40 MG/1
40 TABLET, FILM COATED ORAL DAILY
Status: DISCONTINUED | OUTPATIENT
Start: 2021-01-01 | End: 2021-01-01 | Stop reason: HOSPADM

## 2021-01-01 RX ORDER — BALSAM PERU/CASTOR OIL
OINTMENT (GRAM) TOPICAL 2 TIMES DAILY
Status: DISCONTINUED | OUTPATIENT
Start: 2021-01-01 | End: 2021-01-01 | Stop reason: HOSPADM

## 2021-01-01 RX ORDER — OXYCODONE HYDROCHLORIDE 5 MG/1
10 TABLET ORAL
Status: DISCONTINUED | OUTPATIENT
Start: 2021-01-01 | End: 2021-01-01 | Stop reason: HOSPADM

## 2021-01-01 RX ORDER — SODIUM CHLORIDE, SODIUM LACTATE, POTASSIUM CHLORIDE, CALCIUM CHLORIDE 600; 310; 30; 20 MG/100ML; MG/100ML; MG/100ML; MG/100ML
100 INJECTION, SOLUTION INTRAVENOUS CONTINUOUS
Status: DISCONTINUED | OUTPATIENT
Start: 2021-01-01 | End: 2021-01-01 | Stop reason: HOSPADM

## 2021-01-01 RX ORDER — MIDAZOLAM HYDROCHLORIDE 1 MG/ML
INJECTION, SOLUTION INTRAMUSCULAR; INTRAVENOUS AS NEEDED
Status: DISCONTINUED | OUTPATIENT
Start: 2021-01-01 | End: 2021-01-01 | Stop reason: HOSPADM

## 2021-01-01 RX ORDER — ROPIVACAINE HYDROCHLORIDE 5 MG/ML
150 INJECTION, SOLUTION EPIDURAL; INFILTRATION; PERINEURAL AS NEEDED
Status: DISCONTINUED | OUTPATIENT
Start: 2021-01-01 | End: 2021-01-01 | Stop reason: ALTCHOICE

## 2021-01-01 RX ORDER — INSULIN GLARGINE 100 [IU]/ML
30 INJECTION, SOLUTION SUBCUTANEOUS
Status: DISCONTINUED | OUTPATIENT
Start: 2021-01-01 | End: 2021-01-01

## 2021-01-01 RX ORDER — SODIUM CHLORIDE, SODIUM LACTATE, POTASSIUM CHLORIDE, CALCIUM CHLORIDE 600; 310; 30; 20 MG/100ML; MG/100ML; MG/100ML; MG/100ML
1000 INJECTION, SOLUTION INTRAVENOUS CONTINUOUS
Status: DISCONTINUED | OUTPATIENT
Start: 2021-01-01 | End: 2021-01-01 | Stop reason: ALTCHOICE

## 2021-01-01 RX ORDER — SODIUM CHLORIDE 9 MG/ML
75 INJECTION, SOLUTION INTRAVENOUS CONTINUOUS
Status: DISCONTINUED | OUTPATIENT
Start: 2021-01-01 | End: 2021-01-01

## 2021-01-01 RX ORDER — FUROSEMIDE 20 MG/1
20 TABLET ORAL DAILY
Status: DISCONTINUED | OUTPATIENT
Start: 2021-01-01 | End: 2021-01-01 | Stop reason: HOSPADM

## 2021-01-01 RX ORDER — HYDROMORPHONE HYDROCHLORIDE 1 MG/ML
0.2 INJECTION, SOLUTION INTRAMUSCULAR; INTRAVENOUS; SUBCUTANEOUS
Status: ACTIVE | OUTPATIENT
Start: 2021-01-01 | End: 2021-01-01

## 2021-01-01 RX ORDER — INSULIN GLARGINE 100 [IU]/ML
30 INJECTION, SOLUTION SUBCUTANEOUS
Status: DISCONTINUED | OUTPATIENT
Start: 2021-01-01 | End: 2021-01-01 | Stop reason: HOSPADM

## 2021-01-01 RX ORDER — ALBUMIN HUMAN 250 G/1000ML
25 SOLUTION INTRAVENOUS ONCE
Status: DISCONTINUED | OUTPATIENT
Start: 2021-01-01 | End: 2021-01-01

## 2021-01-01 RX ORDER — OXYCODONE HYDROCHLORIDE 10 MG/1
10 TABLET ORAL
Qty: 20 TABLET | Refills: 0 | Status: SHIPPED | OUTPATIENT
Start: 2021-01-01 | End: 2021-01-01

## 2021-01-01 RX ORDER — ONDANSETRON 2 MG/ML
4 INJECTION INTRAMUSCULAR; INTRAVENOUS
Status: DISCONTINUED | OUTPATIENT
Start: 2021-01-01 | End: 2021-01-01 | Stop reason: SDUPTHER

## 2021-01-01 RX ORDER — INSULIN LISPRO 100 [IU]/ML
INJECTION, SOLUTION INTRAVENOUS; SUBCUTANEOUS
Status: DISCONTINUED | OUTPATIENT
Start: 2021-01-01 | End: 2021-01-01 | Stop reason: HOSPADM

## 2021-01-01 RX ORDER — OXYCODONE HYDROCHLORIDE 5 MG/1
5 TABLET ORAL
Status: DISCONTINUED | OUTPATIENT
Start: 2021-01-01 | End: 2021-01-01 | Stop reason: HOSPADM

## 2021-01-01 RX ORDER — SODIUM CHLORIDE 9 MG/ML
3 INJECTION INTRAMUSCULAR; INTRAVENOUS; SUBCUTANEOUS
Status: COMPLETED | OUTPATIENT
Start: 2021-01-01 | End: 2021-01-01

## 2021-01-01 RX ORDER — GABAPENTIN 600 MG/1
600 TABLET ORAL EVERY EVENING
Status: DISCONTINUED | OUTPATIENT
Start: 2021-01-01 | End: 2021-01-01 | Stop reason: HOSPADM

## 2021-01-01 RX ORDER — LIDOCAINE HYDROCHLORIDE 20 MG/ML
INJECTION, SOLUTION EPIDURAL; INFILTRATION; INTRACAUDAL; PERINEURAL AS NEEDED
Status: DISCONTINUED | OUTPATIENT
Start: 2021-01-01 | End: 2021-01-01 | Stop reason: HOSPADM

## 2021-01-01 RX ORDER — FENTANYL CITRATE 50 UG/ML
25 INJECTION, SOLUTION INTRAMUSCULAR; INTRAVENOUS
Status: DISCONTINUED | OUTPATIENT
Start: 2021-01-01 | End: 2021-01-01 | Stop reason: HOSPADM

## 2021-01-01 RX ORDER — SODIUM CHLORIDE 9 MG/ML
25 INJECTION, SOLUTION INTRAVENOUS CONTINUOUS
Status: DISCONTINUED | OUTPATIENT
Start: 2021-01-01 | End: 2021-01-01 | Stop reason: HOSPADM

## 2021-01-01 RX ORDER — ACETAMINOPHEN 325 MG/1
650 TABLET ORAL
Status: DISCONTINUED | OUTPATIENT
Start: 2021-01-01 | End: 2021-01-01

## 2021-01-01 RX ORDER — INSULIN LISPRO 100 [IU]/ML
5 INJECTION, SOLUTION INTRAVENOUS; SUBCUTANEOUS ONCE
Status: COMPLETED | OUTPATIENT
Start: 2021-01-01 | End: 2021-01-01

## 2021-01-01 RX ORDER — MIDAZOLAM HYDROCHLORIDE 1 MG/ML
1 INJECTION, SOLUTION INTRAMUSCULAR; INTRAVENOUS AS NEEDED
Status: DISCONTINUED | OUTPATIENT
Start: 2021-01-01 | End: 2021-01-01 | Stop reason: ALTCHOICE

## 2021-01-01 RX ORDER — GLYCOPYRROLATE 0.2 MG/ML
INJECTION INTRAMUSCULAR; INTRAVENOUS AS NEEDED
Status: DISCONTINUED | OUTPATIENT
Start: 2021-01-01 | End: 2021-01-01 | Stop reason: HOSPADM

## 2021-01-01 RX ORDER — MAGNESIUM SULFATE 100 %
4 CRYSTALS MISCELLANEOUS AS NEEDED
Status: DISCONTINUED | OUTPATIENT
Start: 2021-01-01 | End: 2021-01-01 | Stop reason: HOSPADM

## 2021-01-01 RX ORDER — PRIMIDONE 250 MG/1
250 TABLET ORAL
Status: DISCONTINUED | OUTPATIENT
Start: 2021-01-01 | End: 2021-01-01 | Stop reason: HOSPADM

## 2021-01-01 RX ORDER — TRAMADOL HYDROCHLORIDE 50 MG/1
50 TABLET ORAL
Status: DISCONTINUED | OUTPATIENT
Start: 2021-01-01 | End: 2021-01-01 | Stop reason: HOSPADM

## 2021-01-01 RX ORDER — VANCOMYCIN/0.9 % SOD CHLORIDE 1.5G/250ML
1500 PLASTIC BAG, INJECTION (ML) INTRAVENOUS
Status: DISCONTINUED | OUTPATIENT
Start: 2021-01-01 | End: 2021-01-01

## 2021-01-01 RX ORDER — GABAPENTIN 600 MG/1
600 TABLET ORAL EVERY EVENING
COMMUNITY

## 2021-01-01 RX ORDER — ACETAMINOPHEN 325 MG/1
650 TABLET ORAL
Status: DISCONTINUED | OUTPATIENT
Start: 2021-01-01 | End: 2021-01-01 | Stop reason: HOSPADM

## 2021-01-01 RX ORDER — OXYCODONE HYDROCHLORIDE 5 MG/1
10 TABLET ORAL
Status: DISCONTINUED | OUTPATIENT
Start: 2021-01-01 | End: 2021-01-01 | Stop reason: SDUPTHER

## 2021-01-01 RX ORDER — MECLIZINE HYDROCHLORIDE 25 MG/1
25 TABLET ORAL
COMMUNITY

## 2021-01-01 RX ORDER — ACETAMINOPHEN 650 MG/1
650 SUPPOSITORY RECTAL
Status: DISCONTINUED | OUTPATIENT
Start: 2021-01-01 | End: 2021-01-01

## 2021-01-01 RX ORDER — HYDROMORPHONE HYDROCHLORIDE 2 MG/ML
INJECTION, SOLUTION INTRAMUSCULAR; INTRAVENOUS; SUBCUTANEOUS AS NEEDED
Status: DISCONTINUED | OUTPATIENT
Start: 2021-01-01 | End: 2021-01-01 | Stop reason: HOSPADM

## 2021-01-01 RX ORDER — PREGABALIN 75 MG/1
75 CAPSULE ORAL ONCE
Status: DISCONTINUED | OUTPATIENT
Start: 2021-01-01 | End: 2021-01-01 | Stop reason: HOSPADM

## 2021-01-01 RX ORDER — ONDANSETRON 4 MG/1
4 TABLET, ORALLY DISINTEGRATING ORAL
Status: DISCONTINUED | OUTPATIENT
Start: 2021-01-01 | End: 2021-01-01 | Stop reason: HOSPADM

## 2021-01-01 RX ORDER — TAMSULOSIN HYDROCHLORIDE 0.4 MG/1
0.4 CAPSULE ORAL DAILY
Status: DISCONTINUED | OUTPATIENT
Start: 2021-01-01 | End: 2021-01-01

## 2021-01-01 RX ORDER — INSULIN LISPRO 100 [IU]/ML
10 INJECTION, SOLUTION INTRAVENOUS; SUBCUTANEOUS ONCE
Status: COMPLETED | OUTPATIENT
Start: 2021-01-01 | End: 2021-01-01

## 2021-01-01 RX ORDER — LANOLIN ALCOHOL/MO/W.PET/CERES
400 CREAM (GRAM) TOPICAL DAILY
Status: DISCONTINUED | OUTPATIENT
Start: 2021-01-01 | End: 2021-01-01 | Stop reason: HOSPADM

## 2021-01-01 RX ORDER — ALBUMIN HUMAN 50 G/1000ML
12.5 SOLUTION INTRAVENOUS ONCE
Status: COMPLETED | OUTPATIENT
Start: 2021-01-01 | End: 2021-01-01

## 2021-01-01 RX ORDER — POLYETHYLENE GLYCOL 3350 17 G/17G
17 POWDER, FOR SOLUTION ORAL DAILY PRN
Status: DISCONTINUED | OUTPATIENT
Start: 2021-01-01 | End: 2021-01-01 | Stop reason: SDUPTHER

## 2021-01-01 RX ORDER — EPHEDRINE SULFATE/0.9% NACL/PF 50 MG/5 ML
SYRINGE (ML) INTRAVENOUS AS NEEDED
Status: DISCONTINUED | OUTPATIENT
Start: 2021-01-01 | End: 2021-01-01 | Stop reason: HOSPADM

## 2021-01-01 RX ORDER — FOLIC ACID 1 MG/1
1 TABLET ORAL DAILY
Status: DISCONTINUED | OUTPATIENT
Start: 2021-01-01 | End: 2021-01-01 | Stop reason: HOSPADM

## 2021-01-01 RX ORDER — BUPIVACAINE HYDROCHLORIDE AND EPINEPHRINE 5; 5 MG/ML; UG/ML
30 INJECTION, SOLUTION PERINEURAL ONCE
Status: DISCONTINUED | OUTPATIENT
Start: 2021-01-01 | End: 2021-01-01 | Stop reason: HOSPADM

## 2021-01-01 RX ORDER — MENTHOL 40 MG/ML
GEL TOPICAL
COMMUNITY

## 2021-01-01 RX ORDER — ONDANSETRON 2 MG/ML
4 INJECTION INTRAMUSCULAR; INTRAVENOUS AS NEEDED
Status: DISCONTINUED | OUTPATIENT
Start: 2021-01-01 | End: 2021-01-01 | Stop reason: HOSPADM

## 2021-01-01 RX ORDER — DEXMEDETOMIDINE HYDROCHLORIDE 100 UG/ML
INJECTION, SOLUTION INTRAVENOUS AS NEEDED
Status: DISCONTINUED | OUTPATIENT
Start: 2021-01-01 | End: 2021-01-01 | Stop reason: HOSPADM

## 2021-01-01 RX ORDER — ALBUMIN HUMAN 250 G/1000ML
25 SOLUTION INTRAVENOUS EVERY 6 HOURS
Status: DISPENSED | OUTPATIENT
Start: 2021-01-01 | End: 2021-01-01

## 2021-01-01 RX ORDER — INSULIN LISPRO 100 [IU]/ML
INJECTION, SOLUTION INTRAVENOUS; SUBCUTANEOUS
Status: DISCONTINUED | OUTPATIENT
Start: 2021-01-01 | End: 2021-01-01 | Stop reason: ALTCHOICE

## 2021-01-01 RX ORDER — ASPIRIN 325 MG/1
500 TABLET, FILM COATED ORAL DAILY
Status: DISCONTINUED | OUTPATIENT
Start: 2021-01-01 | End: 2021-01-01

## 2021-01-01 RX ORDER — ENOXAPARIN SODIUM 100 MG/ML
40 INJECTION SUBCUTANEOUS DAILY
Status: DISCONTINUED | OUTPATIENT
Start: 2021-01-01 | End: 2021-01-01

## 2021-01-01 RX ORDER — MELATONIN 5 MG
5 CAPSULE ORAL
COMMUNITY

## 2021-01-01 RX ORDER — SODIUM CHLORIDE 9 MG/ML
500 INJECTION, SOLUTION INTRAVENOUS ONCE
Status: DISCONTINUED | OUTPATIENT
Start: 2021-01-01 | End: 2021-01-01 | Stop reason: HOSPADM

## 2021-01-01 RX ORDER — LIDOCAINE HYDROCHLORIDE 10 MG/ML
INJECTION, SOLUTION EPIDURAL; INFILTRATION; INTRACAUDAL; PERINEURAL
Status: COMPLETED
Start: 2021-01-01 | End: 2021-01-01

## 2021-01-01 RX ORDER — SODIUM CHLORIDE, SODIUM LACTATE, POTASSIUM CHLORIDE, CALCIUM CHLORIDE 600; 310; 30; 20 MG/100ML; MG/100ML; MG/100ML; MG/100ML
50 INJECTION, SOLUTION INTRAVENOUS CONTINUOUS
Status: DISCONTINUED | OUTPATIENT
Start: 2021-01-01 | End: 2021-01-01 | Stop reason: HOSPADM

## 2021-01-01 RX ORDER — SODIUM CHLORIDE 9 MG/ML
75 INJECTION, SOLUTION INTRAVENOUS CONTINUOUS
Status: DISPENSED | OUTPATIENT
Start: 2021-01-01 | End: 2021-01-01

## 2021-01-01 RX ORDER — SPIRONOLACTONE 100 MG/1
100 TABLET, FILM COATED ORAL DAILY
Status: DISCONTINUED | OUTPATIENT
Start: 2021-01-01 | End: 2021-01-01 | Stop reason: HOSPADM

## 2021-01-01 RX ORDER — FAMOTIDINE 20 MG/1
20 TABLET, FILM COATED ORAL
Status: DISCONTINUED | OUTPATIENT
Start: 2021-01-01 | End: 2021-01-01 | Stop reason: HOSPADM

## 2021-01-01 RX ORDER — PANTOPRAZOLE SODIUM 40 MG/1
40 TABLET, DELAYED RELEASE ORAL
Status: DISCONTINUED | OUTPATIENT
Start: 2021-01-01 | End: 2021-01-01 | Stop reason: HOSPADM

## 2021-01-01 RX ORDER — ONDANSETRON 2 MG/ML
4 INJECTION INTRAMUSCULAR; INTRAVENOUS
Status: DISCONTINUED | OUTPATIENT
Start: 2021-01-01 | End: 2021-01-01 | Stop reason: HOSPADM

## 2021-01-01 RX ORDER — CISATRACURIUM BESYLATE 2 MG/ML
INJECTION, SOLUTION INTRAVENOUS AS NEEDED
Status: DISCONTINUED | OUTPATIENT
Start: 2021-01-01 | End: 2021-01-01 | Stop reason: HOSPADM

## 2021-01-01 RX ORDER — TRAMADOL HYDROCHLORIDE 50 MG/1
50 TABLET ORAL
Qty: 15 TABLET | Refills: 0 | Status: SHIPPED | OUTPATIENT
Start: 2021-01-01 | End: 2021-01-01

## 2021-01-01 RX ORDER — ACETAMINOPHEN 500 MG
1000 TABLET ORAL ONCE
Status: DISCONTINUED | OUTPATIENT
Start: 2021-01-01 | End: 2021-01-01 | Stop reason: HOSPADM

## 2021-01-01 RX ORDER — SODIUM CHLORIDE 9 MG/ML
25 INJECTION, SOLUTION INTRAVENOUS CONTINUOUS
Status: DISCONTINUED | OUTPATIENT
Start: 2021-01-01 | End: 2021-01-01 | Stop reason: ALTCHOICE

## 2021-01-01 RX ORDER — FUROSEMIDE 40 MG/1
40 TABLET ORAL DAILY
Status: DISCONTINUED | OUTPATIENT
Start: 2021-01-01 | End: 2021-01-01 | Stop reason: HOSPADM

## 2021-01-01 RX ORDER — MAGNESIUM SULFATE 100 %
4 CRYSTALS MISCELLANEOUS AS NEEDED
Status: DISCONTINUED | OUTPATIENT
Start: 2021-01-01 | End: 2021-01-01 | Stop reason: SDUPTHER

## 2021-01-01 RX ADMIN — INSULIN GLARGINE 34 UNITS: 100 INJECTION, SOLUTION SUBCUTANEOUS at 22:09

## 2021-01-01 RX ADMIN — TAMSULOSIN HYDROCHLORIDE 0.4 MG: 0.4 CAPSULE ORAL at 10:14

## 2021-01-01 RX ADMIN — RIFAXIMIN 550 MG: 550 TABLET ORAL at 10:02

## 2021-01-01 RX ADMIN — SENNOSIDES AND DOCUSATE SODIUM 1 TABLET: 50; 8.6 TABLET ORAL at 08:37

## 2021-01-01 RX ADMIN — PROPOFOL 90 MG: 10 INJECTION, EMULSION INTRAVENOUS at 23:13

## 2021-01-01 RX ADMIN — RIFAXIMIN 550 MG: 550 TABLET ORAL at 08:51

## 2021-01-01 RX ADMIN — TRAMADOL HYDROCHLORIDE 50 MG: 50 TABLET ORAL at 01:23

## 2021-01-01 RX ADMIN — TAMSULOSIN HYDROCHLORIDE 0.4 MG: 0.4 CAPSULE ORAL at 10:03

## 2021-01-01 RX ADMIN — MIDAZOLAM 1 MG: 1 INJECTION INTRAMUSCULAR; INTRAVENOUS at 12:03

## 2021-01-01 RX ADMIN — INSULIN LISPRO 5 UNITS: 100 INJECTION, SOLUTION INTRAVENOUS; SUBCUTANEOUS at 17:11

## 2021-01-01 RX ADMIN — INSULIN GLARGINE 30 UNITS: 100 INJECTION, SOLUTION SUBCUTANEOUS at 00:13

## 2021-01-01 RX ADMIN — RIFAXIMIN 550 MG: 550 TABLET ORAL at 18:25

## 2021-01-01 RX ADMIN — APIXABAN 2.5 MG: 2.5 TABLET, FILM COATED ORAL at 12:33

## 2021-01-01 RX ADMIN — ACETAMINOPHEN 1000 MG: 500 TABLET ORAL at 04:23

## 2021-01-01 RX ADMIN — SODIUM CHLORIDE 500 ML: 900 INJECTION, SOLUTION INTRAVENOUS at 12:00

## 2021-01-01 RX ADMIN — ENOXAPARIN SODIUM 40 MG: 40 INJECTION SUBCUTANEOUS at 10:01

## 2021-01-01 RX ADMIN — Medication 10 ML: at 05:58

## 2021-01-01 RX ADMIN — GABAPENTIN 600 MG: 600 TABLET, FILM COATED ORAL at 17:57

## 2021-01-01 RX ADMIN — SENNOSIDES AND DOCUSATE SODIUM 1 TABLET: 50; 8.6 TABLET ORAL at 17:14

## 2021-01-01 RX ADMIN — FUROSEMIDE 40 MG: 40 TABLET ORAL at 12:31

## 2021-01-01 RX ADMIN — SODIUM CHLORIDE, POTASSIUM CHLORIDE, SODIUM LACTATE AND CALCIUM CHLORIDE 1000 ML: 600; 310; 30; 20 INJECTION, SOLUTION INTRAVENOUS at 14:22

## 2021-01-01 RX ADMIN — ACETAMINOPHEN 650 MG: 325 TABLET ORAL at 17:53

## 2021-01-01 RX ADMIN — MIDODRINE HYDROCHLORIDE 10 MG: 5 TABLET ORAL at 21:35

## 2021-01-01 RX ADMIN — LIDOCAINE HYDROCHLORIDE: 10 INJECTION, SOLUTION EPIDURAL; INFILTRATION; INTRACAUDAL; PERINEURAL at 13:00

## 2021-01-01 RX ADMIN — VANCOMYCIN HYDROCHLORIDE 2500 MG: 10 INJECTION, POWDER, LYOPHILIZED, FOR SOLUTION INTRAVENOUS at 11:52

## 2021-01-01 RX ADMIN — FOLIC ACID 1 MG: 1 TABLET ORAL at 08:51

## 2021-01-01 RX ADMIN — LACTULOSE 30 ML: 20 SOLUTION ORAL at 14:22

## 2021-01-01 RX ADMIN — ACETAMINOPHEN 650 MG: 325 TABLET ORAL at 07:00

## 2021-01-01 RX ADMIN — INSULIN LISPRO 5 UNITS: 100 INJECTION, SOLUTION INTRAVENOUS; SUBCUTANEOUS at 17:55

## 2021-01-01 RX ADMIN — CEFEPIME HYDROCHLORIDE 2 G: 2 INJECTION, POWDER, FOR SOLUTION INTRAVENOUS at 15:14

## 2021-01-01 RX ADMIN — SERTRALINE 150 MG: 50 TABLET, FILM COATED ORAL at 08:37

## 2021-01-01 RX ADMIN — MIDODRINE HYDROCHLORIDE 10 MG: 5 TABLET ORAL at 18:09

## 2021-01-01 RX ADMIN — VANCOMYCIN HYDROCHLORIDE 750 MG: 750 INJECTION, POWDER, LYOPHILIZED, FOR SOLUTION INTRAVENOUS at 07:04

## 2021-01-01 RX ADMIN — INSULIN LISPRO 10 UNITS: 100 INJECTION, SOLUTION INTRAVENOUS; SUBCUTANEOUS at 21:46

## 2021-01-01 RX ADMIN — THIAMINE HCL TAB 100 MG 100 MG: 100 TAB at 08:51

## 2021-01-01 RX ADMIN — MIDODRINE HYDROCHLORIDE 10 MG: 5 TABLET ORAL at 16:00

## 2021-01-01 RX ADMIN — INSULIN LISPRO 2 UNITS: 100 INJECTION, SOLUTION INTRAVENOUS; SUBCUTANEOUS at 07:06

## 2021-01-01 RX ADMIN — INSULIN GLARGINE 30 UNITS: 100 INJECTION, SOLUTION SUBCUTANEOUS at 21:35

## 2021-01-01 RX ADMIN — CEFEPIME HYDROCHLORIDE 2 G: 2 INJECTION, POWDER, FOR SOLUTION INTRAVENOUS at 14:49

## 2021-01-01 RX ADMIN — TAMSULOSIN HYDROCHLORIDE 0.4 MG: 0.4 CAPSULE ORAL at 08:59

## 2021-01-01 RX ADMIN — INSULIN LISPRO 2 UNITS: 100 INJECTION, SOLUTION INTRAVENOUS; SUBCUTANEOUS at 07:34

## 2021-01-01 RX ADMIN — SODIUM CHLORIDE 125 ML/HR: 9 INJECTION, SOLUTION INTRAVENOUS at 16:16

## 2021-01-01 RX ADMIN — CEFEPIME HYDROCHLORIDE 2 G: 2 INJECTION, POWDER, FOR SOLUTION INTRAVENOUS at 08:11

## 2021-01-01 RX ADMIN — LACTULOSE 30 ML: 20 SOLUTION ORAL at 18:25

## 2021-01-01 RX ADMIN — CEFEPIME HYDROCHLORIDE 2 G: 2 INJECTION, POWDER, FOR SOLUTION INTRAVENOUS at 17:34

## 2021-01-01 RX ADMIN — CEFEPIME HYDROCHLORIDE 2 G: 2 INJECTION, POWDER, FOR SOLUTION INTRAVENOUS at 01:00

## 2021-01-01 RX ADMIN — RIFAXIMIN 550 MG: 550 TABLET ORAL at 17:53

## 2021-01-01 RX ADMIN — ROCURONIUM BROMIDE 5 MG: 10 SOLUTION INTRAVENOUS at 23:13

## 2021-01-01 RX ADMIN — Medication: at 19:03

## 2021-01-01 RX ADMIN — GABAPENTIN 600 MG: 600 TABLET, FILM COATED ORAL at 17:14

## 2021-01-01 RX ADMIN — MIDODRINE HYDROCHLORIDE 10 MG: 5 TABLET ORAL at 17:14

## 2021-01-01 RX ADMIN — SODIUM CHLORIDE 3 ML: 9 INJECTION INTRAMUSCULAR; INTRAVENOUS; SUBCUTANEOUS at 12:00

## 2021-01-01 RX ADMIN — FENTANYL CITRATE 25 MCG: 50 INJECTION, SOLUTION INTRAMUSCULAR; INTRAVENOUS at 23:13

## 2021-01-01 RX ADMIN — SERTRALINE 150 MG: 50 TABLET, FILM COATED ORAL at 08:58

## 2021-01-01 RX ADMIN — INSULIN LISPRO 2 UNITS: 100 INJECTION, SOLUTION INTRAVENOUS; SUBCUTANEOUS at 22:00

## 2021-01-01 RX ADMIN — RIFAXIMIN 550 MG: 550 TABLET ORAL at 12:33

## 2021-01-01 RX ADMIN — LACTULOSE 30 ML: 20 SOLUTION ORAL at 18:14

## 2021-01-01 RX ADMIN — TAMSULOSIN HYDROCHLORIDE 0.4 MG: 0.4 CAPSULE ORAL at 14:38

## 2021-01-01 RX ADMIN — Medication 10 ML: at 14:00

## 2021-01-01 RX ADMIN — INSULIN LISPRO 3 UNITS: 100 INJECTION, SOLUTION INTRAVENOUS; SUBCUTANEOUS at 21:30

## 2021-01-01 RX ADMIN — CISATRACURIUM BESYLATE 5 MG: 2 INJECTION INTRAVENOUS at 12:35

## 2021-01-01 RX ADMIN — Medication: at 12:40

## 2021-01-01 RX ADMIN — CEFEPIME HYDROCHLORIDE 2 G: 2 INJECTION, POWDER, FOR SOLUTION INTRAVENOUS at 18:37

## 2021-01-01 RX ADMIN — CISATRACURIUM BESYLATE 2 MG: 2 INJECTION INTRAVENOUS at 14:11

## 2021-01-01 RX ADMIN — CEFEPIME HYDROCHLORIDE 2 G: 2 INJECTION, POWDER, FOR SOLUTION INTRAVENOUS at 02:51

## 2021-01-01 RX ADMIN — ACETAMINOPHEN 650 MG: 325 TABLET ORAL at 00:04

## 2021-01-01 RX ADMIN — ALBUMIN (HUMAN) 25 G: 0.25 INJECTION, SOLUTION INTRAVENOUS at 05:02

## 2021-01-01 RX ADMIN — Medication: at 10:07

## 2021-01-01 RX ADMIN — LACTULOSE 30 ML: 20 SOLUTION ORAL at 10:01

## 2021-01-01 RX ADMIN — SENNOSIDES AND DOCUSATE SODIUM 1 TABLET: 50; 8.6 TABLET ORAL at 17:52

## 2021-01-01 RX ADMIN — CEFEPIME HYDROCHLORIDE 2 G: 2 INJECTION, POWDER, FOR SOLUTION INTRAVENOUS at 18:47

## 2021-01-01 RX ADMIN — CEFEPIME HYDROCHLORIDE 2 G: 2 INJECTION, POWDER, FOR SOLUTION INTRAVENOUS at 11:10

## 2021-01-01 RX ADMIN — LACTULOSE 30 ML: 20 SOLUTION ORAL at 17:53

## 2021-01-01 RX ADMIN — RIFAXIMIN 550 MG: 550 TABLET ORAL at 11:40

## 2021-01-01 RX ADMIN — DEXMEDETOMIDINE HYDROCHLORIDE 8 MCG: 100 INJECTION, SOLUTION, CONCENTRATE INTRAVENOUS at 14:29

## 2021-01-01 RX ADMIN — Medication 10 ML: at 17:58

## 2021-01-01 RX ADMIN — FENTANYL CITRATE 25 MCG: 50 INJECTION, SOLUTION INTRAMUSCULAR; INTRAVENOUS at 23:43

## 2021-01-01 RX ADMIN — FENTANYL CITRATE 25 MCG: 50 INJECTION, SOLUTION INTRAMUSCULAR; INTRAVENOUS at 13:15

## 2021-01-01 RX ADMIN — INSULIN LISPRO 3 UNITS: 100 INJECTION, SOLUTION INTRAVENOUS; SUBCUTANEOUS at 11:50

## 2021-01-01 RX ADMIN — INSULIN LISPRO 4 UNITS: 100 INJECTION, SOLUTION INTRAVENOUS; SUBCUTANEOUS at 22:06

## 2021-01-01 RX ADMIN — ONDANSETRON HYDROCHLORIDE 4 MG: 2 INJECTION, SOLUTION INTRAMUSCULAR; INTRAVENOUS at 14:25

## 2021-01-01 RX ADMIN — PRIMIDONE 250 MG: 250 TABLET ORAL at 01:32

## 2021-01-01 RX ADMIN — INSULIN LISPRO 2 UNITS: 100 INJECTION, SOLUTION INTRAVENOUS; SUBCUTANEOUS at 07:00

## 2021-01-01 RX ADMIN — TAMSULOSIN HYDROCHLORIDE 0.4 MG: 0.4 CAPSULE ORAL at 12:31

## 2021-01-01 RX ADMIN — KETAMINE HYDROCHLORIDE 10 MG: 10 INJECTION, SOLUTION INTRAMUSCULAR; INTRAVENOUS at 13:15

## 2021-01-01 RX ADMIN — SPIRONOLACTONE 100 MG: 100 TABLET ORAL at 14:38

## 2021-01-01 RX ADMIN — MIDODRINE HYDROCHLORIDE 10 MG: 5 TABLET ORAL at 17:56

## 2021-01-01 RX ADMIN — CEFEPIME HYDROCHLORIDE 2 G: 2 INJECTION, POWDER, FOR SOLUTION INTRAVENOUS at 07:08

## 2021-01-01 RX ADMIN — INSULIN LISPRO 2 UNITS: 100 INJECTION, SOLUTION INTRAVENOUS; SUBCUTANEOUS at 00:04

## 2021-01-01 RX ADMIN — APIXABAN 2.5 MG: 2.5 TABLET, FILM COATED ORAL at 11:39

## 2021-01-01 RX ADMIN — ENOXAPARIN SODIUM 40 MG: 40 INJECTION SUBCUTANEOUS at 11:51

## 2021-01-01 RX ADMIN — ACETAMINOPHEN 650 MG: 325 TABLET ORAL at 06:34

## 2021-01-01 RX ADMIN — OXYCODONE 5 MG: 5 TABLET ORAL at 12:39

## 2021-01-01 RX ADMIN — POLYETHYLENE GLYCOL 3350 17 G: 17 POWDER, FOR SOLUTION ORAL at 09:00

## 2021-01-01 RX ADMIN — Medication 10 ML: at 07:06

## 2021-01-01 RX ADMIN — KETAMINE HYDROCHLORIDE 10 MG: 10 INJECTION, SOLUTION INTRAMUSCULAR; INTRAVENOUS at 12:37

## 2021-01-01 RX ADMIN — SERTRALINE HYDROCHLORIDE 150 MG: 50 TABLET ORAL at 10:02

## 2021-01-01 RX ADMIN — CEFEPIME HYDROCHLORIDE 2 G: 2 INJECTION, POWDER, FOR SOLUTION INTRAVENOUS at 00:04

## 2021-01-01 RX ADMIN — TRAMADOL HYDROCHLORIDE 50 MG: 50 TABLET ORAL at 23:43

## 2021-01-01 RX ADMIN — INSULIN LISPRO 3 UNITS: 100 INJECTION, SOLUTION INTRAVENOUS; SUBCUTANEOUS at 12:54

## 2021-01-01 RX ADMIN — CEFEPIME HYDROCHLORIDE 2 G: 2 INJECTION, POWDER, FOR SOLUTION INTRAVENOUS at 10:36

## 2021-01-01 RX ADMIN — PANTOPRAZOLE SODIUM 40 MG: 40 TABLET, DELAYED RELEASE ORAL at 08:37

## 2021-01-01 RX ADMIN — SERTRALINE 150 MG: 50 TABLET, FILM COATED ORAL at 11:51

## 2021-01-01 RX ADMIN — MIDODRINE HYDROCHLORIDE 10 MG: 5 TABLET ORAL at 00:13

## 2021-01-01 RX ADMIN — INSULIN LISPRO 3 UNITS: 100 INJECTION, SOLUTION INTRAVENOUS; SUBCUTANEOUS at 16:30

## 2021-01-01 RX ADMIN — TRAMADOL HYDROCHLORIDE 50 MG: 50 TABLET ORAL at 06:48

## 2021-01-01 RX ADMIN — MIDODRINE HYDROCHLORIDE 10 MG: 5 TABLET ORAL at 21:56

## 2021-01-01 RX ADMIN — Medication 10 ML: at 06:00

## 2021-01-01 RX ADMIN — INSULIN GLARGINE 34 UNITS: 100 INJECTION, SOLUTION SUBCUTANEOUS at 21:56

## 2021-01-01 RX ADMIN — SERTRALINE 150 MG: 50 TABLET, FILM COATED ORAL at 11:47

## 2021-01-01 RX ADMIN — SODIUM CHLORIDE 125 ML/HR: 900 INJECTION, SOLUTION INTRAVENOUS at 06:30

## 2021-01-01 RX ADMIN — APIXABAN 2.5 MG: 2.5 TABLET, FILM COATED ORAL at 01:32

## 2021-01-01 RX ADMIN — SODIUM CHLORIDE, POTASSIUM CHLORIDE, SODIUM LACTATE AND CALCIUM CHLORIDE: 600; 310; 30; 20 INJECTION, SOLUTION INTRAVENOUS at 11:45

## 2021-01-01 RX ADMIN — ATORVASTATIN CALCIUM 40 MG: 40 TABLET, FILM COATED ORAL at 12:30

## 2021-01-01 RX ADMIN — Medication 80 MCG: at 23:14

## 2021-01-01 RX ADMIN — ALBUMIN (HUMAN) 12.5 G: 12.5 INJECTION, SOLUTION INTRAVENOUS at 01:43

## 2021-01-01 RX ADMIN — HYDROMORPHONE HYDROCHLORIDE 0.5 MG: 2 INJECTION, SOLUTION INTRAMUSCULAR; INTRAVENOUS; SUBCUTANEOUS at 14:29

## 2021-01-01 RX ADMIN — INSULIN GLARGINE 30 UNITS: 100 INJECTION, SOLUTION SUBCUTANEOUS at 22:00

## 2021-01-01 RX ADMIN — MIDODRINE HYDROCHLORIDE 10 MG: 5 TABLET ORAL at 21:31

## 2021-01-01 RX ADMIN — SODIUM CHLORIDE 500 ML: 9 INJECTION, SOLUTION INTRAVENOUS at 16:15

## 2021-01-01 RX ADMIN — Medication 10 ML: at 05:36

## 2021-01-01 RX ADMIN — CEFEPIME HYDROCHLORIDE 2 G: 2 INJECTION, POWDER, FOR SOLUTION INTRAVENOUS at 10:00

## 2021-01-01 RX ADMIN — TRAMADOL HYDROCHLORIDE 50 MG: 50 TABLET ORAL at 02:19

## 2021-01-01 RX ADMIN — LACTULOSE 30 ML: 20 SOLUTION ORAL at 12:30

## 2021-01-01 RX ADMIN — CEFEPIME HYDROCHLORIDE 2 G: 2 INJECTION, POWDER, FOR SOLUTION INTRAVENOUS at 01:36

## 2021-01-01 RX ADMIN — SENNOSIDES AND DOCUSATE SODIUM 1 TABLET: 50; 8.6 TABLET ORAL at 08:57

## 2021-01-01 RX ADMIN — ATORVASTATIN CALCIUM 40 MG: 40 TABLET, FILM COATED ORAL at 14:30

## 2021-01-01 RX ADMIN — Medication 10 ML: at 21:03

## 2021-01-01 RX ADMIN — SENNOSIDES AND DOCUSATE SODIUM 1 TABLET: 50; 8.6 TABLET ORAL at 10:03

## 2021-01-01 RX ADMIN — IOHEXOL 5 ML: 300 INJECTION, SOLUTION INTRAVENOUS at 13:00

## 2021-01-01 RX ADMIN — SERTRALINE 150 MG: 50 TABLET, FILM COATED ORAL at 12:30

## 2021-01-01 RX ADMIN — INSULIN LISPRO 3 UNITS: 100 INJECTION, SOLUTION INTRAVENOUS; SUBCUTANEOUS at 06:54

## 2021-01-01 RX ADMIN — PANTOPRAZOLE SODIUM 40 MG: 40 TABLET, DELAYED RELEASE ORAL at 11:40

## 2021-01-01 RX ADMIN — GABAPENTIN 600 MG: 600 TABLET, FILM COATED ORAL at 18:24

## 2021-01-01 RX ADMIN — PANTOPRAZOLE SODIUM 40 MG: 40 TABLET, DELAYED RELEASE ORAL at 08:58

## 2021-01-01 RX ADMIN — LACTULOSE 30 ML: 20 SOLUTION ORAL at 08:50

## 2021-01-01 RX ADMIN — ALBUMIN (HUMAN) 250 ML: 12.5 INJECTION, SOLUTION INTRAVENOUS at 15:20

## 2021-01-01 RX ADMIN — VANCOMYCIN HYDROCHLORIDE 1500 MG: 10 INJECTION, POWDER, LYOPHILIZED, FOR SOLUTION INTRAVENOUS at 05:33

## 2021-01-01 RX ADMIN — Medication 10 ML: at 22:00

## 2021-01-01 RX ADMIN — SODIUM CHLORIDE, POTASSIUM CHLORIDE, SODIUM LACTATE AND CALCIUM CHLORIDE 1000 ML: 600; 310; 30; 20 INJECTION, SOLUTION INTRAVENOUS at 02:23

## 2021-01-01 RX ADMIN — OXYCODONE 5 MG: 5 TABLET ORAL at 04:42

## 2021-01-01 RX ADMIN — ACETAMINOPHEN 650 MG: 325 TABLET ORAL at 13:33

## 2021-01-01 RX ADMIN — INSULIN LISPRO 3 UNITS: 100 INJECTION, SOLUTION INTRAVENOUS; SUBCUTANEOUS at 21:56

## 2021-01-01 RX ADMIN — LIDOCAINE HYDROCHLORIDE 60 MG: 20 INJECTION, SOLUTION EPIDURAL; INFILTRATION; INTRACAUDAL; PERINEURAL at 12:11

## 2021-01-01 RX ADMIN — CISATRACURIUM BESYLATE 2 MG: 2 INJECTION INTRAVENOUS at 13:48

## 2021-01-01 RX ADMIN — Medication: at 10:39

## 2021-01-01 RX ADMIN — INSULIN LISPRO 5 UNITS: 100 INJECTION, SOLUTION INTRAVENOUS; SUBCUTANEOUS at 18:24

## 2021-01-01 RX ADMIN — FUROSEMIDE 20 MG: 20 TABLET ORAL at 08:50

## 2021-01-01 RX ADMIN — CEFEPIME HYDROCHLORIDE 2 G: 2 INJECTION, POWDER, FOR SOLUTION INTRAVENOUS at 05:25

## 2021-01-01 RX ADMIN — GABAPENTIN 600 MG: 600 TABLET, FILM COATED ORAL at 17:53

## 2021-01-01 RX ADMIN — SPIRONOLACTONE 100 MG: 100 TABLET ORAL at 11:41

## 2021-01-01 RX ADMIN — PANTOPRAZOLE SODIUM 40 MG: 40 TABLET, DELAYED RELEASE ORAL at 10:36

## 2021-01-01 RX ADMIN — POLYETHYLENE GLYCOL 3350 17 G: 17 POWDER, FOR SOLUTION ORAL at 08:37

## 2021-01-01 RX ADMIN — FOLIC ACID 1 MG: 1 TABLET ORAL at 10:03

## 2021-01-01 RX ADMIN — CEFEPIME HYDROCHLORIDE 2 G: 2 INJECTION, POWDER, FOR SOLUTION INTRAVENOUS at 01:14

## 2021-01-01 RX ADMIN — LACTULOSE 30 ML: 20 SOLUTION ORAL at 10:34

## 2021-01-01 RX ADMIN — CEFEPIME HYDROCHLORIDE 2 G: 2 INJECTION, POWDER, FOR SOLUTION INTRAVENOUS at 10:02

## 2021-01-01 RX ADMIN — TAMSULOSIN HYDROCHLORIDE 0.4 MG: 0.4 CAPSULE ORAL at 08:51

## 2021-01-01 RX ADMIN — ATORVASTATIN CALCIUM 40 MG: 40 TABLET, FILM COATED ORAL at 08:59

## 2021-01-01 RX ADMIN — INSULIN LISPRO 2 UNITS: 100 INJECTION, SOLUTION INTRAVENOUS; SUBCUTANEOUS at 17:45

## 2021-01-01 RX ADMIN — MIDODRINE HYDROCHLORIDE 10 MG: 5 TABLET ORAL at 21:43

## 2021-01-01 RX ADMIN — TAMSULOSIN HYDROCHLORIDE 0.4 MG: 0.4 CAPSULE ORAL at 11:41

## 2021-01-01 RX ADMIN — ACETAMINOPHEN 650 MG: 325 TABLET ORAL at 17:52

## 2021-01-01 RX ADMIN — MIDODRINE HYDROCHLORIDE 10 MG: 5 TABLET ORAL at 21:40

## 2021-01-01 RX ADMIN — FENTANYL CITRATE 50 MCG: 50 INJECTION, SOLUTION INTRAMUSCULAR; INTRAVENOUS at 23:41

## 2021-01-01 RX ADMIN — MIDODRINE HYDROCHLORIDE 10 MG: 5 TABLET ORAL at 06:31

## 2021-01-01 RX ADMIN — SODIUM CHLORIDE, POTASSIUM CHLORIDE, SODIUM LACTATE AND CALCIUM CHLORIDE: 600; 310; 30; 20 INJECTION, SOLUTION INTRAVENOUS at 23:03

## 2021-01-01 RX ADMIN — SERTRALINE 150 MG: 50 TABLET, FILM COATED ORAL at 10:36

## 2021-01-01 RX ADMIN — INSULIN LISPRO 2 UNITS: 100 INJECTION, SOLUTION INTRAVENOUS; SUBCUTANEOUS at 11:38

## 2021-01-01 RX ADMIN — ATORVASTATIN CALCIUM 40 MG: 40 TABLET, FILM COATED ORAL at 10:03

## 2021-01-01 RX ADMIN — MIDODRINE HYDROCHLORIDE 10 MG: 5 TABLET ORAL at 18:11

## 2021-01-01 RX ADMIN — MIDODRINE HYDROCHLORIDE 10 MG: 5 TABLET ORAL at 10:06

## 2021-01-01 RX ADMIN — Medication 10 ML: at 05:56

## 2021-01-01 RX ADMIN — KETAMINE HYDROCHLORIDE 20 MG: 10 INJECTION, SOLUTION INTRAMUSCULAR; INTRAVENOUS at 12:25

## 2021-01-01 RX ADMIN — INSULIN LISPRO 7 UNITS: 100 INJECTION, SOLUTION INTRAVENOUS; SUBCUTANEOUS at 17:03

## 2021-01-01 RX ADMIN — THIAMINE HCL TAB 100 MG 500 MG: 100 TAB at 10:02

## 2021-01-01 RX ADMIN — ACETAMINOPHEN 650 MG: 325 TABLET ORAL at 18:14

## 2021-01-01 RX ADMIN — SODIUM CHLORIDE 125 ML/HR: 900 INJECTION, SOLUTION INTRAVENOUS at 19:11

## 2021-01-01 RX ADMIN — ACETAMINOPHEN 650 MG: 325 TABLET ORAL at 06:55

## 2021-01-01 RX ADMIN — OXYCODONE 5 MG: 5 TABLET ORAL at 22:10

## 2021-01-01 RX ADMIN — Medication 10 ML: at 18:34

## 2021-01-01 RX ADMIN — OXYCODONE 5 MG: 5 TABLET ORAL at 21:40

## 2021-01-01 RX ADMIN — ACETAMINOPHEN 650 MG: 325 TABLET ORAL at 17:14

## 2021-01-01 RX ADMIN — ACETAMINOPHEN 650 MG: 325 TABLET ORAL at 23:48

## 2021-01-01 RX ADMIN — CEFEPIME HYDROCHLORIDE 2 G: 2 INJECTION, POWDER, FOR SOLUTION INTRAVENOUS at 14:22

## 2021-01-01 RX ADMIN — RIFAXIMIN 550 MG: 550 TABLET ORAL at 14:38

## 2021-01-01 RX ADMIN — POTASSIUM CHLORIDE 10 MEQ: 750 TABLET, FILM COATED, EXTENDED RELEASE ORAL at 10:03

## 2021-01-01 RX ADMIN — CEFEPIME HYDROCHLORIDE 2 G: 2 INJECTION, POWDER, FOR SOLUTION INTRAVENOUS at 21:01

## 2021-01-01 RX ADMIN — INSULIN GLARGINE 30 UNITS: 100 INJECTION, SOLUTION SUBCUTANEOUS at 21:30

## 2021-01-01 RX ADMIN — LACTULOSE 30 ML: 20 SOLUTION ORAL at 17:56

## 2021-01-01 RX ADMIN — PANTOPRAZOLE SODIUM 40 MG: 40 TABLET, DELAYED RELEASE ORAL at 12:31

## 2021-01-01 RX ADMIN — MIDODRINE HYDROCHLORIDE 10 MG: 5 TABLET ORAL at 15:25

## 2021-01-01 RX ADMIN — ACETAMINOPHEN 650 MG: 325 TABLET ORAL at 23:46

## 2021-01-01 RX ADMIN — MIDODRINE HYDROCHLORIDE 10 MG: 5 TABLET ORAL at 12:31

## 2021-01-01 RX ADMIN — LACTULOSE 30 ML: 20 SOLUTION ORAL at 17:46

## 2021-01-01 RX ADMIN — DEXAMETHASONE SODIUM PHOSPHATE 4 MG: 4 INJECTION, SOLUTION INTRAMUSCULAR; INTRAVENOUS at 12:30

## 2021-01-01 RX ADMIN — RIFAXIMIN 550 MG: 550 TABLET ORAL at 10:14

## 2021-01-01 RX ADMIN — ACETAMINOPHEN 650 MG: 325 TABLET ORAL at 22:10

## 2021-01-01 RX ADMIN — CEFEPIME HYDROCHLORIDE 2 G: 2 INJECTION, POWDER, FOR SOLUTION INTRAVENOUS at 20:18

## 2021-01-01 RX ADMIN — SODIUM CHLORIDE 75 ML/HR: 9 INJECTION, SOLUTION INTRAVENOUS at 18:25

## 2021-01-01 RX ADMIN — Medication 10 ML: at 20:20

## 2021-01-01 RX ADMIN — SENNOSIDES AND DOCUSATE SODIUM 1 TABLET: 50; 8.6 TABLET ORAL at 18:14

## 2021-01-01 RX ADMIN — CEFEPIME HYDROCHLORIDE 2 G: 2 INJECTION, POWDER, FOR SOLUTION INTRAVENOUS at 18:14

## 2021-01-01 RX ADMIN — VANCOMYCIN HYDROCHLORIDE 1250 MG: 10 INJECTION, POWDER, LYOPHILIZED, FOR SOLUTION INTRAVENOUS at 03:57

## 2021-01-01 RX ADMIN — Medication: at 22:41

## 2021-01-01 RX ADMIN — INSULIN LISPRO 5 UNITS: 100 INJECTION, SOLUTION INTRAVENOUS; SUBCUTANEOUS at 06:56

## 2021-01-01 RX ADMIN — ACETAMINOPHEN 650 MG: 325 TABLET ORAL at 23:11

## 2021-01-01 RX ADMIN — INSULIN GLARGINE 34 UNITS: 100 INJECTION, SOLUTION SUBCUTANEOUS at 00:04

## 2021-01-01 RX ADMIN — RIFAXIMIN 550 MG: 550 TABLET ORAL at 19:21

## 2021-01-01 RX ADMIN — LACTULOSE 30 ML: 20 SOLUTION ORAL at 10:02

## 2021-01-01 RX ADMIN — Medication 10 ML: at 05:38

## 2021-01-01 RX ADMIN — SPIRONOLACTONE 25 MG: 25 TABLET ORAL at 08:51

## 2021-01-01 RX ADMIN — GLYCOPYRROLATE 0.4 MG: 0.2 INJECTION, SOLUTION INTRAMUSCULAR; INTRAVENOUS at 14:25

## 2021-01-01 RX ADMIN — ALBUMIN (HUMAN) 25 G: 0.25 INJECTION, SOLUTION INTRAVENOUS at 22:00

## 2021-01-01 RX ADMIN — INSULIN LISPRO 2 UNITS: 100 INJECTION, SOLUTION INTRAVENOUS; SUBCUTANEOUS at 17:51

## 2021-01-01 RX ADMIN — CEFEPIME HYDROCHLORIDE 2 G: 2 INJECTION, POWDER, FOR SOLUTION INTRAVENOUS at 04:44

## 2021-01-01 RX ADMIN — INSULIN LISPRO 2 UNITS: 100 INJECTION, SOLUTION INTRAVENOUS; SUBCUTANEOUS at 21:35

## 2021-01-01 RX ADMIN — INSULIN LISPRO 5 UNITS: 100 INJECTION, SOLUTION INTRAVENOUS; SUBCUTANEOUS at 12:31

## 2021-01-01 RX ADMIN — ACETAMINOPHEN 650 MG: 325 TABLET ORAL at 11:47

## 2021-01-01 RX ADMIN — INSULIN LISPRO 3 UNITS: 100 INJECTION, SOLUTION INTRAVENOUS; SUBCUTANEOUS at 22:09

## 2021-01-01 RX ADMIN — INSULIN LISPRO 10 UNITS: 100 INJECTION, SOLUTION INTRAVENOUS; SUBCUTANEOUS at 17:13

## 2021-01-01 RX ADMIN — CEFEPIME HYDROCHLORIDE 2 G: 2 INJECTION, POWDER, FOR SOLUTION INTRAVENOUS at 02:19

## 2021-01-01 RX ADMIN — OXYCODONE 5 MG: 5 TABLET ORAL at 17:53

## 2021-01-01 RX ADMIN — ONDANSETRON HYDROCHLORIDE 4 MG: 2 INJECTION, SOLUTION INTRAMUSCULAR; INTRAVENOUS at 23:46

## 2021-01-01 RX ADMIN — MIDODRINE HYDROCHLORIDE 10 MG: 5 TABLET ORAL at 22:05

## 2021-01-01 RX ADMIN — Medication 10 MG: at 12:17

## 2021-01-01 RX ADMIN — LACTULOSE 30 ML: 20 SOLUTION ORAL at 22:00

## 2021-01-01 RX ADMIN — PANTOPRAZOLE SODIUM 40 MG: 40 TABLET, DELAYED RELEASE ORAL at 06:31

## 2021-01-01 RX ADMIN — POTASSIUM CHLORIDE 10 MEQ: 750 TABLET, FILM COATED, EXTENDED RELEASE ORAL at 17:46

## 2021-01-01 RX ADMIN — Medication 80 MCG: at 23:13

## 2021-01-01 RX ADMIN — FUROSEMIDE 20 MG: 20 TABLET ORAL at 10:03

## 2021-01-01 RX ADMIN — IOPAMIDOL 100 ML: 755 INJECTION, SOLUTION INTRAVENOUS at 22:10

## 2021-01-01 RX ADMIN — HYDROMORPHONE HYDROCHLORIDE 0.5 MG: 1 INJECTION, SOLUTION INTRAMUSCULAR; INTRAVENOUS; SUBCUTANEOUS at 11:08

## 2021-01-01 RX ADMIN — SODIUM CHLORIDE 75 ML/HR: 9 INJECTION, SOLUTION INTRAVENOUS at 19:26

## 2021-01-01 RX ADMIN — LACTULOSE 30 ML: 20 SOLUTION ORAL at 08:59

## 2021-01-01 RX ADMIN — ALBUMIN (HUMAN) 25 G: 0.25 INJECTION, SOLUTION INTRAVENOUS at 22:10

## 2021-01-01 RX ADMIN — Medication 10 MG: at 15:16

## 2021-01-01 RX ADMIN — SODIUM CHLORIDE, POTASSIUM CHLORIDE, SODIUM LACTATE AND CALCIUM CHLORIDE: 600; 310; 30; 20 INJECTION, SOLUTION INTRAVENOUS at 14:52

## 2021-01-01 RX ADMIN — FENTANYL CITRATE 50 MCG: 50 INJECTION, SOLUTION INTRAMUSCULAR; INTRAVENOUS at 12:11

## 2021-01-01 RX ADMIN — ATORVASTATIN CALCIUM 40 MG: 40 TABLET, FILM COATED ORAL at 11:40

## 2021-01-01 RX ADMIN — POTASSIUM CHLORIDE 10 MEQ: 750 TABLET, FILM COATED, EXTENDED RELEASE ORAL at 08:51

## 2021-01-01 RX ADMIN — LACTULOSE 30 ML: 20 SOLUTION ORAL at 19:21

## 2021-01-01 RX ADMIN — PANTOPRAZOLE SODIUM 40 MG: 40 TABLET, DELAYED RELEASE ORAL at 14:30

## 2021-01-01 RX ADMIN — LACTULOSE 45 ML: 20 SOLUTION ORAL at 21:47

## 2021-01-01 RX ADMIN — INSULIN LISPRO 3 UNITS: 100 INJECTION, SOLUTION INTRAVENOUS; SUBCUTANEOUS at 12:32

## 2021-01-01 RX ADMIN — GABAPENTIN 600 MG: 600 TABLET, FILM COATED ORAL at 17:52

## 2021-01-01 RX ADMIN — ATORVASTATIN CALCIUM 40 MG: 40 TABLET, FILM COATED ORAL at 08:51

## 2021-01-01 RX ADMIN — TAMSULOSIN HYDROCHLORIDE 0.4 MG: 0.4 CAPSULE ORAL at 10:36

## 2021-01-01 RX ADMIN — ACETAMINOPHEN 650 MG: 325 TABLET ORAL at 12:39

## 2021-01-01 RX ADMIN — PANTOPRAZOLE SODIUM 40 MG: 40 TABLET, DELAYED RELEASE ORAL at 07:32

## 2021-01-01 RX ADMIN — INSULIN GLARGINE 30 UNITS: 100 INJECTION, SOLUTION SUBCUTANEOUS at 21:33

## 2021-01-01 RX ADMIN — VANCOMYCIN HYDROCHLORIDE 1500 MG: 10 INJECTION, POWDER, LYOPHILIZED, FOR SOLUTION INTRAVENOUS at 19:10

## 2021-01-01 RX ADMIN — Medication 10 ML: at 15:10

## 2021-01-01 RX ADMIN — PHENYLEPHRINE HYDROCHLORIDE 40 MCG/MIN: 10 INJECTION INTRAVENOUS at 12:19

## 2021-01-01 RX ADMIN — MIDODRINE HYDROCHLORIDE 10 MG: 5 TABLET ORAL at 08:38

## 2021-01-01 RX ADMIN — SPIRONOLACTONE 25 MG: 25 TABLET ORAL at 10:03

## 2021-01-01 RX ADMIN — SENNOSIDES AND DOCUSATE SODIUM 1 TABLET: 50; 8.6 TABLET ORAL at 18:11

## 2021-01-01 RX ADMIN — RIFAXIMIN 550 MG: 550 TABLET ORAL at 18:24

## 2021-01-01 RX ADMIN — Medication 10 ML: at 07:33

## 2021-01-01 RX ADMIN — ACETAMINOPHEN 650 MG: 325 TABLET ORAL at 07:08

## 2021-01-01 RX ADMIN — ACETAMINOPHEN 650 MG: 325 TABLET ORAL at 06:57

## 2021-01-01 RX ADMIN — FENTANYL CITRATE 25 MCG: 50 INJECTION, SOLUTION INTRAMUSCULAR; INTRAVENOUS at 12:49

## 2021-01-01 RX ADMIN — POLYETHYLENE GLYCOL 3350 17 G: 17 POWDER, FOR SOLUTION ORAL at 10:01

## 2021-01-01 RX ADMIN — SUCCINYLCHOLINE CHLORIDE 120 MG: 20 INJECTION, SOLUTION INTRAMUSCULAR; INTRAVENOUS at 23:13

## 2021-01-01 RX ADMIN — SUCCINYLCHOLINE CHLORIDE 140 MG: 20 INJECTION, SOLUTION INTRAMUSCULAR; INTRAVENOUS at 12:11

## 2021-01-01 RX ADMIN — RIFAXIMIN 550 MG: 550 TABLET ORAL at 17:14

## 2021-01-01 RX ADMIN — GABAPENTIN 600 MG: 600 TABLET, FILM COATED ORAL at 21:47

## 2021-01-01 RX ADMIN — OXYCODONE 5 MG: 5 TABLET ORAL at 21:56

## 2021-01-01 RX ADMIN — RIFAXIMIN 550 MG: 550 TABLET ORAL at 18:11

## 2021-01-01 RX ADMIN — Medication: at 18:00

## 2021-01-01 RX ADMIN — HYDROMORPHONE HYDROCHLORIDE 0.4 MG: 2 INJECTION, SOLUTION INTRAMUSCULAR; INTRAVENOUS; SUBCUTANEOUS at 00:03

## 2021-01-01 RX ADMIN — LACTULOSE 30 ML: 20 SOLUTION ORAL at 01:32

## 2021-01-01 RX ADMIN — SERTRALINE 150 MG: 50 TABLET, FILM COATED ORAL at 14:23

## 2021-01-01 RX ADMIN — TRAMADOL HYDROCHLORIDE 50 MG: 50 TABLET ORAL at 21:38

## 2021-01-01 RX ADMIN — PHENYLEPHRINE HYDROCHLORIDE 15 MCG/MIN: 10 INJECTION INTRAVENOUS at 23:03

## 2021-01-01 RX ADMIN — CEFEPIME HYDROCHLORIDE 2 G: 2 INJECTION, POWDER, FOR SOLUTION INTRAVENOUS at 04:22

## 2021-01-01 RX ADMIN — POLYETHYLENE GLYCOL 3350 17 G: 17 POWDER, FOR SOLUTION ORAL at 08:58

## 2021-01-01 RX ADMIN — Medication 400 MG: at 08:51

## 2021-01-01 RX ADMIN — ATORVASTATIN CALCIUM 40 MG: 40 TABLET, FILM COATED ORAL at 10:36

## 2021-01-01 RX ADMIN — MIDODRINE HYDROCHLORIDE 10 MG: 5 TABLET ORAL at 11:41

## 2021-01-01 RX ADMIN — RIFAXIMIN 550 MG: 550 TABLET ORAL at 10:35

## 2021-01-01 RX ADMIN — OXYCODONE 5 MG: 5 TABLET ORAL at 10:29

## 2021-01-01 RX ADMIN — OXYCODONE 5 MG: 5 TABLET ORAL at 07:34

## 2021-01-01 RX ADMIN — DAPTOMYCIN 600 MG: 500 INJECTION, POWDER, LYOPHILIZED, FOR SOLUTION INTRAVENOUS at 13:14

## 2021-01-01 RX ADMIN — VANCOMYCIN HYDROCHLORIDE 2500 MG: 10 INJECTION, POWDER, LYOPHILIZED, FOR SOLUTION INTRAVENOUS at 20:11

## 2021-01-01 RX ADMIN — ACETAMINOPHEN 650 MG: 325 TABLET ORAL at 11:50

## 2021-01-01 RX ADMIN — INSULIN LISPRO 3 UNITS: 100 INJECTION, SOLUTION INTRAVENOUS; SUBCUTANEOUS at 06:34

## 2021-01-01 RX ADMIN — SODIUM BICARBONATE 42 MG: 42 INJECTION, SOLUTION INTRAVENOUS at 12:00

## 2021-01-01 RX ADMIN — NEOSTIGMINE METHYLSULFATE 3 MG: 1 INJECTION, SOLUTION INTRAVENOUS at 14:25

## 2021-01-01 RX ADMIN — PROPOFOL 120 MG: 10 INJECTION, EMULSION INTRAVENOUS at 12:11

## 2021-01-01 RX ADMIN — Medication 400 MG: at 10:03

## 2021-01-01 RX ADMIN — INSULIN LISPRO 2 UNITS: 100 INJECTION, SOLUTION INTRAVENOUS; SUBCUTANEOUS at 12:39

## 2021-01-01 RX ADMIN — LIDOCAINE HYDROCHLORIDE 100 MG: 20 INJECTION, SOLUTION EPIDURAL; INFILTRATION; INTRACAUDAL; PERINEURAL at 23:13

## 2021-01-01 RX ADMIN — INSULIN GLARGINE 32 UNITS: 100 INJECTION, SOLUTION SUBCUTANEOUS at 22:05

## 2021-01-01 RX ADMIN — GABAPENTIN 600 MG: 600 TABLET, FILM COATED ORAL at 19:21

## 2021-01-01 RX ADMIN — INSULIN LISPRO 2 UNITS: 100 INJECTION, SOLUTION INTRAVENOUS; SUBCUTANEOUS at 06:59

## 2021-01-01 RX ADMIN — SENNOSIDES AND DOCUSATE SODIUM 1 TABLET: 50; 8.6 TABLET ORAL at 11:47

## 2021-01-01 RX ADMIN — FUROSEMIDE 40 MG: 40 TABLET ORAL at 11:40

## 2021-01-01 RX ADMIN — SENNOSIDES AND DOCUSATE SODIUM 1 TABLET: 50; 8.6 TABLET ORAL at 10:35

## 2021-01-01 RX ADMIN — MIDODRINE HYDROCHLORIDE 10 MG: 5 TABLET ORAL at 19:21

## 2021-01-01 RX ADMIN — ATORVASTATIN CALCIUM 40 MG: 40 TABLET, FILM COATED ORAL at 10:02

## 2021-01-01 RX ADMIN — ACETAMINOPHEN 650 MG: 325 TABLET ORAL at 23:24

## 2021-01-01 RX ADMIN — SENNOSIDES AND DOCUSATE SODIUM 1 TABLET: 50; 8.6 TABLET ORAL at 17:53

## 2021-01-01 RX ADMIN — OXYCODONE 10 MG: 5 TABLET ORAL at 17:00

## 2021-01-01 RX ADMIN — SERTRALINE HYDROCHLORIDE 150 MG: 50 TABLET ORAL at 08:51

## 2021-01-01 RX ADMIN — INSULIN LISPRO 3 UNITS: 100 INJECTION, SOLUTION INTRAVENOUS; SUBCUTANEOUS at 07:04

## 2021-01-01 RX ADMIN — INSULIN LISPRO 5 UNITS: 100 INJECTION, SOLUTION INTRAVENOUS; SUBCUTANEOUS at 13:33

## 2021-01-01 RX ADMIN — RIFAXIMIN 550 MG: 550 TABLET ORAL at 08:59

## 2021-01-01 RX ADMIN — SENNOSIDES AND DOCUSATE SODIUM 1 TABLET: 50; 8.6 TABLET ORAL at 17:57

## 2021-01-01 RX ADMIN — RIFAXIMIN 550 MG: 550 TABLET ORAL at 21:25

## 2021-01-01 RX ADMIN — ACETAMINOPHEN 650 MG: 325 TABLET ORAL at 22:09

## 2021-01-01 RX ADMIN — PANTOPRAZOLE SODIUM 40 MG: 40 TABLET, DELAYED RELEASE ORAL at 10:14

## 2021-01-01 RX ADMIN — CEFEPIME HYDROCHLORIDE 2 G: 2 INJECTION, POWDER, FOR SOLUTION INTRAVENOUS at 19:26

## 2021-01-01 RX ADMIN — APIXABAN 2.5 MG: 2.5 TABLET, FILM COATED ORAL at 23:17

## 2021-01-01 RX ADMIN — INSULIN LISPRO 5 UNITS: 100 INJECTION, SOLUTION INTRAVENOUS; SUBCUTANEOUS at 01:43

## 2021-01-01 RX ADMIN — SPIRONOLACTONE 100 MG: 100 TABLET ORAL at 12:31

## 2021-01-01 RX ADMIN — MIDODRINE HYDROCHLORIDE 10 MG: 5 TABLET ORAL at 10:35

## 2021-01-01 RX ADMIN — Medication 10 ML: at 04:46

## 2021-01-01 RX ADMIN — LACTULOSE 30 ML: 20 SOLUTION ORAL at 21:31

## 2021-01-01 RX ADMIN — LACTULOSE 30 ML: 20 SOLUTION ORAL at 11:40

## 2021-01-01 RX ADMIN — POLYETHYLENE GLYCOL 3350 17 G: 17 POWDER, FOR SOLUTION ORAL at 10:36

## 2021-01-01 RX ADMIN — PRIMIDONE 250 MG: 250 TABLET ORAL at 22:01

## 2021-01-01 RX ADMIN — GABAPENTIN 600 MG: 600 TABLET, FILM COATED ORAL at 17:00

## 2021-01-01 RX ADMIN — OXYCODONE 10 MG: 5 TABLET ORAL at 11:48

## 2021-01-01 RX ADMIN — Medication 1 CAPSULE: at 10:02

## 2021-01-01 RX ADMIN — RIFAXIMIN 550 MG: 550 TABLET ORAL at 17:46

## 2021-01-01 RX ADMIN — LACTULOSE 30 ML: 20 SOLUTION ORAL at 17:14

## 2021-01-01 RX ADMIN — SODIUM CHLORIDE 125 ML/HR: 900 INJECTION, SOLUTION INTRAVENOUS at 15:24

## 2021-01-01 RX ADMIN — ACETAMINOPHEN 650 MG: 325 TABLET ORAL at 17:56

## 2021-01-01 RX ADMIN — TRAMADOL HYDROCHLORIDE 50 MG: 50 TABLET ORAL at 10:14

## 2021-01-01 RX ADMIN — OXYCODONE 10 MG: 5 TABLET ORAL at 10:35

## 2021-01-01 RX ADMIN — ALBUMIN (HUMAN) 25 G: 0.25 INJECTION, SOLUTION INTRAVENOUS at 16:47

## 2021-01-01 RX ADMIN — MIDODRINE HYDROCHLORIDE 10 MG: 5 TABLET ORAL at 08:58

## 2021-01-01 RX ADMIN — SERTRALINE 150 MG: 50 TABLET, FILM COATED ORAL at 10:07

## 2021-01-01 RX ADMIN — VANCOMYCIN HYDROCHLORIDE 1500 MG: 10 INJECTION, POWDER, LYOPHILIZED, FOR SOLUTION INTRAVENOUS at 23:46

## 2021-01-01 RX ADMIN — INSULIN GLARGINE 30 UNITS: 100 INJECTION, SOLUTION SUBCUTANEOUS at 21:18

## 2021-01-01 RX ADMIN — ACETAMINOPHEN 650 MG: 325 TABLET ORAL at 11:40

## 2021-01-01 RX ADMIN — Medication 1 CAPSULE: at 08:50

## 2021-01-01 RX ADMIN — MIDODRINE HYDROCHLORIDE 10 MG: 5 TABLET ORAL at 18:24

## 2021-01-01 RX ADMIN — ALBUMIN HUMAN 12.5 G: 50 SOLUTION INTRAVENOUS at 01:43

## 2021-01-01 RX ADMIN — INSULIN GLARGINE 30 UNITS: 100 INJECTION, SOLUTION SUBCUTANEOUS at 01:32

## 2021-01-01 RX ADMIN — GABAPENTIN 600 MG: 600 TABLET, FILM COATED ORAL at 18:14

## 2021-01-01 RX ADMIN — ACETAMINOPHEN 1000 MG: 500 TABLET ORAL at 14:22

## 2021-01-20 PROBLEM — R79.89 INCREASED AMMONIA LEVEL: Status: ACTIVE | Noted: 2021-01-01

## 2021-01-20 PROBLEM — G93.41 METABOLIC ENCEPHALOPATHY: Status: ACTIVE | Noted: 2021-01-01

## 2021-01-20 PROBLEM — K74.60 CIRRHOSIS (HCC): Status: ACTIVE | Noted: 2021-01-01

## 2021-01-20 NOTE — ED NOTES
1610: Assumed care of patient from EMS at this time. Placed on monitor x 3. SR x 2. Call bell in reach. Patient lethargic but easy to arouse. MD aware of patient's arrival and verbal orders received. 1730: Patient resting in bed at this time. Ammonia level results abnormal. Did not receive notification from lab regarding critical result. MD aware. 1830: This RN and additional RN at bedside to perform straight cath and urine sent. 1945: Spoke to patient's daughter regarding patient's baseline mental status. She reports that patient is typically A/o x 4 and wheelchair dependent r/t R hip dislocation in 2020. This is why is he is currently at the SNF.     2200: Patient to CT at this time. 2230: Hospitalist at bedside. 2245: Xray at bedside. 2330: Bedside shift change report given to Jaun Asif RN (oncoming nurse) by Brittney Christina RN (offgoing nurse). Report included the following information SBAR, Kardex, ED Summary, Intake/Output, MAR and Recent Results.

## 2021-01-21 NOTE — PROGRESS NOTES
"              After Visit Summary   3/30/2018    Yoana Howard    MRN: [id number]           Patient Information     Date Of Birth          1964        Visit Information        Provider Department      3/30/2018 10:10 AM Neema Baeza PA-C [facility]        Today's Diagnoses     Acute sinusitis with coexisting condition, need prophylactic treatment    -  1      Care Instructions    Take antibiotic as directed  Use flonase daily  Probiotic or yogurt during antibiotic course  Apply warm facial compresses/packs for 5-10 minutes three times daily.  Drink plenty of fluids- 6 to 10 glasses of liquids each day. Rest.  Saline drops or nasal sprays as needed.   May use netti pot as needed. Do not use tap water. May use filtered or distilled water.  Steam treatments or humidifier.  Sudafed (decongestant) for congestion.  Mucinex (guaifenesin) to thin out secretions.  Tylenol or ibuprofen as needed for pain or fever  Follow up at your primary care clinic for increasing pain, high fever, vision changes, worsening symptoms, or no relief from symptoms after 7-10 days.  Please follow up with primary care provider if not improving, worsening or new symptoms or for any adverse reactions to medications.               Follow-ups after your visit        Who to contact     You can reach your care team any time of the day by calling [phone].  Notification of test results:  If you have an abnormal lab result, we will notify you by phone as soon as possible.         Additional Information About Your Visit        MyChart Information     [facility] lets you send messages to your doctor, view your test results, renew your prescriptions, schedule appointments and more. To sign up, go to [email or web address] . Click on \"Log in\" on the left side of the screen, which will take you to the Welcome page. Then click on \"Sign up Now\" on the right side of the page.     You will be asked to enter the access code " OCCUPATIONAL THERAPY EVALUATION/DISCHARGE Patient: Ernestine Michael (89 y.o. male) Date: 1/21/2021 Primary Diagnosis: Metabolic encephalopathy [Y15.03] Increased ammonia level [R79.89] Cirrhosis ([address] [B12.24] Precautions:  Fall ASSESSMENT Based on the objective data described below, the patient presents at his baseline level for basic self-care tasks. He lives at a private assisted living home and per staff, required A of 2-3 recently. He is Total A for ADL at baseline and uses a w/c for functional mobility. He is confused but cooperative. He was incontinent of bowel this session and reports Harrieta Ryan is the 6th time today. \" Anticipate patient will return to assisted living upon discharge. No further skilled OT treatment is indicated at this time. Current Level of Function (ADLs/self-care): Total A to Mod A Functional Outcome Measure: The patient scored 20/100 on the Barthel Index outcome measure which is indicative of significant functional deficits. PLAN : 
Recommendation for discharge: (in order for the patient to meet his/her long term goals) No skilled occupational therapy/ follow up rehabilitation needs identified at this time. This discharge recommendation: A follow-up discussion with the attending provider and/or case management is planned IF patient discharges home will need the following DME: none SUBJECTIVE:  
Patient stated I haven't walked in a while.  OBJECTIVE DATA SUMMARY:  
HISTORY:  
Past Medical History:  
Diagnosis Date  ADD (attention deficit disorder)  Adverse effect of anesthesia   
 motion sickness resulting in loss of balance  Anemia due to blood loss  Hinson's esophagus with esophagitis  Benign essential tremor  Cancer [facility]) 2012 [facility]  Chronic pain  [facility] [address] [address]  Depression  Dislocated hip ([facility] [address]  DJD (degenerative joint disease) of hip   
 bilat  DM (diabetes mellitus) ([facility] [address] 3/17/2010 listed below, as well as some personal information. Please follow the directions to create your username and password.     Your access code is: [email or web address]  Expires: 2018 11:01 AM     Your access code will  in 90 days. If you need help or a new code, please call your [place] clinic or [phone].        Care EveryWhere ID     This is your Care EveryWhere ID. This could be used by other organizations to access your [place] medical records  [id number]        Your Vitals Were     Pulse Temperature Pulse Oximetry             62 98.5  F (36.9  C) (Oral) 97%          Blood Pressure from Last 3 Encounters:   18 118/71   16 102/66   16 110/64    Weight from Last 3 Encounters:   16 135 lb (61.2 kg)              Today, you had the following     No orders found for display         Today's Medication Changes          These changes are accurate as of 3/30/18 11:02 AM.  If you have any questions, ask your nurse or doctor.               Start taking these medicines.        Dose/Directions    amoxicillin-clavulanate 875-125 MG per tablet   Commonly known as:  AUGMENTIN   Used for:  Acute sinusitis with coexisting condition, need prophylactic treatment        Dose:  1 tablet   Take 1 tablet by mouth 2 times daily for 10 days   Quantity:  20 tablet   Refills:  0       fluticasone 50 MCG/ACT spray   Commonly known as:  FLONASE   Used for:  Acute sinusitis with coexisting condition, need prophylactic treatment        Dose:  2 spray   Spray 2 sprays into both nostrils daily   Quantity:  1 Bottle   Refills:  6            Where to get your medicines      These medications were sent to [facility] - [place], [place] - [address] [address]  [address] [facility] [address]     Phone:  [phone]     amoxicillin-clavulanate 875-125 MG per tablet    fluticasone 50 MCG/ACT spray                Primary Care Provider Office Phone # Fax #    Walter Manning [phone]          ED (erectile dysfunction) of organic origin  Esophageal varices determined by endoscopy ([facility] [address]  Fall on or from sidewalk curb 9/24/2015  Femur fracture ([address]  Gastritis and duodenitis  GERD (gastroesophageal reflux disease)   
 resolved after discontinuing diclofenac  Hematuria 6/2016  High cholesterol 03/17/2010  
 pt denies 6/19  
 HTN (hypertension) 3/17/2010  Morbid obesity ([address]  Murmur, cardiac 2016  
 PFO (patent foramen ovale) 7/26/2017  PUD (peptic ulcer disease)  Sepsis ([address]  Shingles 6/2016 RESOLVING- NO RASH (HEAD)  Sleep apnea   
 doesnt use CPAP anymore Past Surgical History:  
Procedure Laterality Date  COLONOSCOPY N/A 6/18/2019 COLONOSCOPY AND EGD performed by Dera Reza MD at [facility] ENDOSCOPY  COLONOSCOPY,DIAGNOSTIC  6/18/2019  ENDOSCOPY, COLON, DIAGNOSTIC    
 HX CATARACT REMOVAL Bilateral   
 HX CHOLECYSTECTOMY  1995  HX GI  1980  
 gastroplasty [address]  HX HIP REPLACEMENT Left  HX ORTHOPAEDIC Right 2011  
 rot cuff repair  HX ORTHOPAEDIC  2016  
 left broken femur  HX ORTHOPAEDIC Right 08/13/2020 Attempt closed reduction of hip dislocation [address]  HX VASCULAR ACCESS    
 picc line and removed after sepsis resolved  IR INSERT TIPS HEPATIC SHUNT  3/28/2020 [facility] ARTHROPLASTY  05/2010  
 right  TOTAL HIP ARTHROPLASTY  08/01/2016  
 left  UPPER GI ENDOSCOPY,BIOPSY  6/18/2019 Prior Level of Function/Environment/Context: See above Expanded or extensive additional review of patient history:  
Home Situation Home Environment: Skilled nursing facility One/Two Story Residence: One story Living Alone: No 
Support Systems: Child(dariusz), Family member(s) Current DME Used/Available at Home: Wheelchair, Hospital bed Hand dominance: Right EXAMINATION OF PERFORMANCE DEFICITS: 
Cognitive/Behavioral Status: 
Neurologic State: Alert;Confused [address] [address]        Equal Access to Services     AYANNASHABBIR ANTOINETTE : Alejandrina Nick, paul saunders, chelle martines, hussein omer. So [facility] [phone].    ATENCIÓN: Si habla español, tiene a yoder disposición servicios gratuitos de asistencia lingüística. Sukumarame al [phone].    We comply with applicable federal civil rights laws and [place] laws. We do not discriminate on the basis of race, color, national origin, age, disability, sex, sexual orientation, or gender identity.            Thank you!     Thank you for choosing [facility]  for your care. Our goal is always to provide you with excellent care. Hearing back from our patients is one way we can continue to improve our services. Please take a few minutes to complete the written survey that you may receive in the mail after your visit with us. Thank you!             Your Updated Medication List - Protect others around you: Learn how to safely use, store and throw away your medicines at www.disposemymeds.org.          This list is accurate as of 3/30/18 11:02 AM.  Always use your most recent med list.                   Brand Name Dispense Instructions for use Diagnosis    amoxicillin-clavulanate 875-125 MG per tablet    AUGMENTIN    20 tablet    Take 1 tablet by mouth 2 times daily for 10 days    Acute sinusitis with coexisting condition, need prophylactic treatment       CELEXA PO           FISH OIL PO           fluticasone 50 MCG/ACT spray    FLONASE    1 Bottle    Spray 2 sprays into both nostrils daily    Acute sinusitis with coexisting condition, need prophylactic treatment       FOSAMAX PO           GLUCOSAMINE PO           MULTIVITAMIN ADULTS 50+ PO           PROBIOTIC DAILY PO              Orientation Level: Disoriented to time;Disoriented to situation;Oriented to person;Oriented to place Cognition: Follows commands Hearing: Auditory Auditory Impairment: None Vision/Perceptual:   
    
    
    
  
    
Acuity: Within Defined Limits Range of Motion: 
AROM: Generally decreased, functional 
  
  
  
  
  
  
  
 
Strength: 
Strength: Generally decreased, functional 
  
  
  
  
 
Coordination: Tone & Sensation: 
  
Sensation: Intact Balance: 
Sitting: Impaired(needs support of HOB for balance) Functional Mobility and Transfers for ADLs: 
Bed Mobility: 
Rolling: Moderate assistance;Assist x1 Scooting: Maximum assistance Transfers: 
Sit to Stand: (NT) 
 
ADL Assessment: 
Feeding: Supervision(in bed with HOB elevated) Oral Facial Hygiene/Grooming: Supervision(in bed) Bathing: Total assistance Upper Body Dressing: Moderate assistance Lower Body Dressing: Total assistance Toileting: Total assistance ADL Intervention and task modifications: 
Discussed strategies for bed mobility. Encouraged him to do as much for himself as possible before accepting assistance. Functional Measure: 
Barthel Index: 
 
Bathin Bladder: 5 Bowels: 0 Groomin Dressin Feeding: 10 Mobility: 0 Stairs: 0 Toilet Use: 0 Transfer (Bed to Chair and Back): 0 Total: 20/100 The Barthel ADL Index: Guidelines 1. The index should be used as a record of what a patient does, not as a record of what a patient could do. 2. The main aim is to establish degree of independence from any help, physical or verbal, however minor and for whatever reason. 3. The need for supervision renders the patient not independent. 4. A patient's performance should be established using the best available evidence. Asking the patient, friends/relatives and nurses are the usual sources, but direct observation and common sense are also important. However direct testing is not needed. 5. Usually the patient's performance over the preceding 24-48 hours is important, but occasionally longer periods will be relevant. 6. Middle categories imply that the patient supplies over 50 per cent of the effort. 7. Use of aids to be independent is allowed. Devaughn Gustafson., Barthel, D.W. (1031). Functional evaluation: the Barthel Index. [address] [facility] (14)2. Erika Valdivia gray DAISY PowersF, Dawna Catherine., Candi Gorman., Phyllis, [address] (1999). Measuring the change indisability after inpatient rehabilitation; comparison of the responsiveness of the Barthel Index and Functional [place] Measure. Journal of Neurology, Neurosurgery, and Psychiatry, 66(4), 527-076. Sundar Holley, N.J.A, SHILPI Payan, & Feliberto Rao, M.A. (2004.) Assessment of post-stroke quality of life in cost-effectiveness studies: The usefulness of the Barthel Index and the EuroQoL-5D. [facility], 13, 213-67 Occupational Therapy Evaluation Charge Determination History Examination Decision-Making MEDIUM Complexity : Expanded review of history including physical, cognitive and psychosocial  history  HIGH Complexity : 5 or more performance deficits relating to physical, cognitive , or psychosocial skils that result in activity limitations and / or participation restrictions MEDIUM Complexity : Patient may present with comorbidities that affect occupational performnce. Miniml to moderate modification of tasks or assistance (eg, physical or verbal ) with assesment(s) is necessary to enable patient to complete evaluation Based on the above components, the patient evaluation is determined to be of the following complexity level: MEDIUM Pain Rating: Pain did not interfere with therapy session. Activity Tolerance:  
Poor After treatment patient left in no apparent distress:   
Supine in bed and Call bell within reach COMMUNICATION/EDUCATION:  
The patients plan of care was discussed with: Physical therapist and Registered nurse. Thank you for this referral. 
ANGIE Mendoza/L Time Calculation: 33 mins

## 2021-01-21 NOTE — PROGRESS NOTES
End of Shift Note Bedside shift change report given to Whitney Ocampo (oncoming nurse) by Rakesh Nunez (offgoing nurse). Report included the following information SBAR, Kardex, ED Summary, Intake/Output, MAR and Recent Results Shift worked:  [id number] Shift summary and any significant changes:  
  Pt came onto the unit around 0530. Pt tolerated care very well. Pt had bowel movement. Pt very cooperative with all care. No significant events to report. Concerns for physician to address:  none Zone phone for oncoming shift:   [phone] Activity: 
Activity Level: Up with Assistance Number times ambulated in hallways past shift: 0 Number of times OOB to chair past shift: 0 Cardiac:  
Cardiac Monitoring: No     
  
 
Access:  
Current line(s): PIV Genitourinary:  
Urinary status: voiding Respiratory:  
O2 Device: Room air Chronic home O2 use?: NO Incentive spirometer at bedside: NO 
  
GI: 
Last Bowel Movement Date: 01/21/21 Current diet:  DIET CARDIAC Regular Passing flatus: YES Tolerating current diet: YES Pain Management:  
Patient states pain is manageable on current regimen: N/A Skin: 
Geoff Score: 16 Interventions: turn team, float heels and PT/OT consult Patient Safety: 
Fall Score: Total Score: 3 Interventions: assistive device (walker, cane, etc), gripper socks, pt to call before getting OOB and stay with me (per policy) High Fall Risk: Yes Length of Stay: 
Expected LOS: - - - Actual LOS: 1 Rakesh Nunez

## 2021-01-21 NOTE — ED PROVIDER NOTES
EMERGENCY DEPARTMENT HISTORY AND PHYSICAL EXAM 
 
 
Date: 1/20/2021 Patient Name: Jorge Luis Seals Patient Age and Sex: 65 y.o. male History of Presenting Illness Chief Complaint Patient presents with  Altered mental status PT arrives via EMS with cc of AMS. Coming from SNF, who report that patient is typically a/o x 4 and talkative however today is lethargic. Patient arrives oriented to self only. Per SNF, EMS reports that this has happened before when patient's Ammonia levels were off. History Provided By: Patient, Patient's Daughter and EMS Ability to gather history was limited by: HPI: Jorge Luis Seals, 65 y.o. male with history of liver cirrhosis, diabetes, obesity, mobility issues, who currently lives in a skilled nursing facility, sent to emergency department for increased confusion and lethargy. Reportedly for approximately the past 24 hours he has become more lethargic and withdrawn, disoriented, currently oriented to self only which is not his baseline. No reported headaches or fevers. No reported pain. He has reportedly been taking all medications as prescribed including his lactulose. Prior similar symptoms in the past were attributed to hepatic encephalopathy. Location:   
Quality:     
Severity:   
Duration:  
Timing:     
Context:   
Modifying factors:  
Associated symptoms:  
 
 
The patient's medical, surgical, family, and social history on file were reviewed by me today. Past Medical History:  
Diagnosis Date  ADD (attention deficit disorder)  Adverse effect of anesthesia   
 motion sickness resulting in loss of balance  Anemia due to blood loss  Hinson's esophagus with esophagitis  Benign essential tremor  Cancer [facility]) 2012 [facility]  Chronic pain  Cirrhosis ([facility]  Depression  Dislocated hip ([facility]  DJD (degenerative joint disease) of hip   
 bilat  DM (diabetes mellitus) ([facility] 3/17/2010 • ED (erectile dysfunction) of organic origin   
• Esophageal varices determined by endoscopy ([facility])   
• Fall on or from sidewalk curb 9/24/2015  
• Femur fracture (HCC)   
• Gastritis and duodenitis   
• GERD (gastroesophageal reflux disease)   
 resolved after discontinuing diclofenac  
• Hematuria 6/2016  
• High cholesterol 03/17/2010  
 pt denies 6/19  
• HTN (hypertension) 3/17/2010  
• Morbid obesity (HCC)   
• Murmur, cardiac 2016  
• PFO (patent foramen ovale) 7/26/2017  
• PUD (peptic ulcer disease)   
• Sepsis (HCC)   
• Shingles 6/2016  
 RESOLVING- NO RASH (HEAD)  
• Sleep apnea   
 doesnt use CPAP anymore  
 
Past Surgical History:  
Procedure Laterality Date  
• COLONOSCOPY N/A 6/18/2019  
 COLONOSCOPY AND EGD performed by Lonnie Maldonado MD at [facility] ENDOSCOPY  
• COLONOSCOPY,DIAGNOSTIC  6/18/2019  
    
• ENDOSCOPY, COLON, DIAGNOSTIC    
• HX CATARACT REMOVAL Bilateral   
• HX CHOLECYSTECTOMY  1995  
• HX GI  1980  
 gastroplasty  
• HX HERNIA REPAIR  1995  
• HX HIP REPLACEMENT    
 Left  
• HX ORTHOPAEDIC Right 2011  
 rot cuff repair  
• HX ORTHOPAEDIC  2016  
 left broken femur  
• HX ORTHOPAEDIC Right 08/13/2020  
 Attempt closed reduction of hip dislocation  
• HX TONSILLECTOMY  1950  
• HX VASCULAR ACCESS    
 picc line and removed after sepsis resolved  
• IR INSERT TIPS HEPATIC SHUNT  3/28/2020  
• TOTAL HIP ARTHROPLASTY  05/2010  
 right  
• TOTAL HIP ARTHROPLASTY  08/01/2016  
 left  
• UPPER GI ENDOSCOPY,BIOPSY  6/18/2019  
    
 
 
PCP: Haroldo Jefferson MD 
 
Past History  
 
Past Medical History: 
Past Medical History:  
Diagnosis Date  
• ADD (attention deficit disorder)   
• Adverse effect of anesthesia   
 motion sickness resulting in loss of balance  
• Anemia due to blood loss   
• Hinson's esophagus with esophagitis   
• Benign essential tremor   
• Cancer (HCC) 2012  
 [facility]  
• Chronic pain   
• Cirrhosis (HCC)   
• Depression   
• Dislocated hip (HCC)   
  DJD (degenerative joint disease) of hip   
 bilat  DM (diabetes mellitus) ([address] 3/17/2010  ED (erectile dysfunction) of organic origin  Esophageal varices determined by endoscopy ([address]  Fall on or from sidewalk curb 2015  Femur fracture ([address]  Gastritis and duodenitis  GERD (gastroesophageal reflux disease)   
 resolved after discontinuing diclofenac  Hematuria 2016  High cholesterol 2010  
 pt denies   
 HTN (hypertension) 3/17/2010  Morbid obesity ([address]  Murmur, cardiac   
 PFO (patent foramen ovale) 2017  PUD (peptic ulcer disease)  Sepsis ([address]  Shingles 2016 RESOLVING- NO RASH (HEAD)  Sleep apnea   
 doesnt use CPAP anymore Past Surgical History: 
Past Surgical History:  
Procedure Laterality Date  COLONOSCOPY N/A 2019 COLONOSCOPY AND EGD performed by Freya Laurent MD at [facility] ENDOSCOPY  COLONOSCOPY,DIAGNOSTIC  2019  ENDOSCOPY, COLON, DIAGNOSTIC    
 HX CATARACT REMOVAL Bilateral   
 HX CHOLECYSTECTOMY    HX GI  1980  
 gastroplasty [address]  HX HIP REPLACEMENT Left  HX ORTHOPAEDIC Right   
 rot cuff repair  HX ORTHOPAEDIC    
 left broken femur  HX ORTHOPAEDIC Right 2020 Attempt closed reduction of hip dislocation [address]  HX VASCULAR ACCESS    
 picc line and removed after sepsis resolved  IR INSERT TIPS HEPATIC SHUNT  3/28/2020 [facility] ARTHROPLASTY  2010  
 right  TOTAL HIP ARTHROPLASTY  2016  
 left  UPPER GI ENDOSCOPY,BIOPSY  2019 Family History: 
Family History Problem Relation Age of Onset  Cancer Father   
     multiple myeloma  Stroke Father  Dementia Mother  No Known Problems Brother  COPD Brother  No Known Problems Daughter  Cancer Maternal Grandmother COLON  
 No Known Problems Son  No Known Problems Son   
  Anesth Problems Neg Hx Social History: 
Social History Tobacco Use  Smoking status: Former Smoker Packs/day: 2.00 Years: 15.00 Pack years: 30.00 Quit date: 3/1/1979 Years since quittin.9  Smokeless tobacco: Never Used Substance Use Topics  Alcohol use: No  
  Alcohol/week: 0.0 standard drinks  Drug use: No  
 
 
Allergies: Allergies Allergen Reactions  Nsaids (Non-Steroidal Anti-Inflammatory Drug) Other (comments) Hx of PUD and Hinson's Esophagus Current Medications: No current facility-administered medications on file prior to encounter. Current Outpatient Medications on File Prior to Encounter Medication Sig Dispense Refill  polyethylene glycol (MIRALAX) 17 gram packet Take 1 Packet by mouth daily. 30 Each 0  
 senna-docusate (PERICOLACE) 8.6-50 mg per tablet Take 1 Tab by mouth two (2) times a day. 120 Tab 0  
 insulin glargine (Lantus Solostar U-100 Insulin) 100 unit/mL (3 mL) inpn 30 Units by SubCUTAneous route two (2) times a day.  cholecalciferol (Vitamin D3) 25 mcg (1,000 unit) cap Take 1,000 Units by mouth daily.  HYDROcodone-acetaminophen (Norco) 5-325 mg per tablet Take 1 Tab by mouth every six (6) hours as needed for Pain.  tamsulosin (FLOMAX) 0.4 mg capsule Take 0.4 mg by mouth daily.  melatonin 3 mg tablet Take 3 mg by mouth nightly as needed for Sleep.  spironolactone (Aldactone) 25 mg tablet Take 25 mg by mouth daily.  magnesium oxide 400 mg magnesium tab Take 1 Tab by mouth daily.  lactulose (CHRONULAC) 10 gram/15 mL solution Take 30 mL by mouth two (2) times a day.  diclofenac (VOLTAREN) 1 % gel Apply 2 g to affected area four (4) times daily.  furosemide (LASIX) 20 mg tablet Take 20 mg by mouth daily.  Omeprazole delayed release (PRILOSEC D/R) 20 mg tablet Take 20 mg by mouth daily.  apixaban (ELIQUIS) 2.5 mg tablet Take 1 Tab by mouth every twelve (12) hours every twelve (12) hours. Indications: deep vein thrombosis prevention 60 Tab 0  
 lisinopriL (PRINIVIL, ZESTRIL) 5 mg tablet Take 10 mg by mouth every morning.  sertraline (Zoloft) 100 mg tablet Take 150 mg by mouth daily.  rifAXIMin (XIFAXAN) 550 mg tablet Take 1 Tab by mouth two (2) times a day. 180 Tab 3  potassium chloride SR (KLOR-CON 10) 10 mEq tablet Take 10 mEq by mouth two (2) times a day.  thiamine hcl 500 mg tablet Take 500 mg by mouth daily. 30 Tab 0  
 folic acid (FOLVITE) 1 mg tablet Take 1 Tab by mouth daily. 30 Tab 0  
 gabapentin (NEURONTIN) 300 mg capsule Take 2 Caps by mouth nightly. 180 Cap 1  
 butalbital-acetaminophen-caffeine (FIORICET, ESGIC) -40 mg per tablet Take 1 Tab by mouth every six (6) hours as needed for Headache. 30 Tab 1  cephALEXin (KEFLEX) 500 mg capsule Take 1 Cap by mouth two (2) times a day. 30 Cap 6  ergocalciferol (VITAMIN D2) 50,000 unit capsule TAKE 1 CAPSULE EVERY 7 DAYS (Patient taking differently: Take 50,000 Units by mouth every seven (7) days. Takes on Thursdays) 12 Cap 2  
 atorvastatin (LIPITOR) 40 mg tablet Take 1 Tab by mouth daily. 90 Tab 3  primidone (MYSOLINE) 250 mg tablet TAKE 1 TABLET TWICE A  Tab 3  
 acetaminophen (TYLENOL) 500 mg tablet Take 2 Tabs by mouth every six (6) hours as needed for Pain. 30 Tab 0  
 B.infantis-B.ani-B.long-B.bifi (PROBIOTIC 4X) 10-15 mg TbEC Take 1 Tab by mouth daily.  calcium citrate-vitamin D3 (CITRACAL + D) tablet Take 2 Tabs by mouth two (2) times a day. Review of Systems Review of Systems Constitutional: Negative for fatigue and fever. Respiratory: Negative for shortness of breath. Gastrointestinal: Negative for abdominal pain. Neurological: Negative for headaches. Psychiatric/Behavioral: Positive for confusion. All other systems reviewed and are negative. Physical Exam  
Vital Signs Patient Vitals for the past 8 hrs: 
 Temp Pulse Resp BP SpO2  
01/20/21 2131  77 17    
01/20/21 2130 98.6 °F (37 °C) 77 16 139/79 100 % 01/20/21 1800  75 21 (!) 156/64 99 % 01/20/21 1730  77 13 (!) 142/55 99 % 01/20/21 1700  76 22 (!) 127/49 99 % 01/20/21 1639  76 12 (!) 139/59 99 % 01/20/21 1616 99.6 °F (37.6 °C) 75 19 (!) 151/53 100 % 01/20/21 1612  77 17 (!) 151/53 99 % Physical Exam 
Vitals signs and nursing note reviewed. Constitutional:   
   General: He is not in acute distress. Appearance: Normal appearance. He is well-developed. He is obese. He is not ill-appearing. HENT:  
   Head: Normocephalic and atraumatic. Eyes:  
   General:     
   Right eye: No discharge. Left eye: No discharge. Conjunctiva/sclera: Conjunctivae normal.  
Neck: Musculoskeletal: Normal range of motion and neck supple. Cardiovascular:  
   Rate and Rhythm: Normal rate and regular rhythm. Heart sounds: Normal heart sounds. No murmur. Pulmonary:  
   Effort: Pulmonary effort is normal. No respiratory distress. Breath sounds: Normal breath sounds. No wheezing. Abdominal:  
   General: There is no distension. Palpations: Abdomen is soft. Tenderness: There is no abdominal tenderness. Musculoskeletal: Normal range of motion. General: No deformity. Skin: 
   General: Skin is warm and dry. Coloration: Skin is pale. Findings: No rash. Neurological:  
   General: No focal deficit present. Mental Status: He is lethargic and disoriented. Comments: Lethargic but arousable to voice. Disoriented. Psychiatric:     
   Mood and Affect: Mood normal. Affect is blunt. Speech: He is noncommunicative. Behavior: Behavior is slowed and withdrawn. Cognition and Memory: Cognition is impaired. Diagnostic Study Results Labs Recent Results (from the past 24 hour(s)) CBC WITH AUTOMATED DIFF Collection Time: 01/20/21  4:18 PM  
Result Value Ref Range WBC 5.9 4.1 - 11.1 K/uL  
 RBC 3.81 (L) 4.10 - 5.70 M/uL  
 HGB 12.4 12.1 - 17.0 g/dL HCT 36.8 36.6 - 50.3 % MCV 96.6 80.0 - 99.0 FL  
 MCH 32.5 26.0 - 34.0 PG  
 MCHC 33.7 30.0 - 36.5 g/dL  
 RDW 13.9 11.5 - 14.5 % PLATELET 973 (L) 847 - 400 K/uL MPV 9.8 8.9 - 12.9 FL  
 NRBC 0.0 0  WBC ABSOLUTE NRBC 0.00 0.00 - 0.01 K/uL NEUTROPHILS 74 32 - 75 % LYMPHOCYTES 14 12 - 49 % MONOCYTES 8 5 - 13 % EOSINOPHILS 3 0 - 7 % BASOPHILS 1 0 - 1 % IMMATURE GRANULOCYTES 0 0.0 - 0.5 % ABS. NEUTROPHILS 4.4 1.8 - 8.0 K/UL  
 ABS. LYMPHOCYTES 0.8 0.8 - 3.5 K/UL  
 ABS. MONOCYTES 0.5 0.0 - 1.0 K/UL  
 ABS. EOSINOPHILS 0.2 0.0 - 0.4 K/UL  
 ABS. BASOPHILS 0.0 0.0 - 0.1 K/UL  
 ABS. IMM. GRANS. 0.0 0.00 - 0.04 K/UL  
 DF AUTOMATED METABOLIC PANEL, COMPREHENSIVE Collection Time: 01/20/21  4:18 PM  
Result Value Ref Range Sodium 142 136 - 145 mmol/L Potassium 3.8 3.5 - 5.1 mmol/L Chloride 114 (H) 97 - 108 mmol/L  
 CO2 24 21 - 32 mmol/L Anion gap 4 (L) 5 - 15 mmol/L Glucose 142 (H) 65 - 100 mg/dL BUN 23 (H) 6 - 20 MG/DL Creatinine 0.91 0.70 - 1.30 MG/DL  
 BUN/Creatinine ratio 25 (H) 12 - 20 GFR est AA >60 >60 ml/min/1.73m2 GFR est non-AA >60 >60 ml/min/1.73m2 Calcium 8.5 8.5 - 10.1 MG/DL Bilirubin, total 1.2 (H) 0.2 - 1.0 MG/DL  
 ALT (SGPT) 36 12 - 78 U/L  
 AST (SGOT) 38 (H) 15 - 37 U/L Alk. phosphatase 108 45 - 117 U/L Protein, total 6.4 6.4 - 8.2 g/dL Albumin 2.4 (L) 3.5 - 5.0 g/dL Globulin 4.0 2.0 - 4.0 g/dL A-G Ratio 0.6 (L) 1.1 - 2.2 SAMPLES BEING HELD Collection Time: 01/20/21  4:18 PM  
Result Value Ref Range SAMPLES BEING HELD  RED JOAN   
 COMMENT Add-on orders for these samples will be processed based on acceptable specimen integrity and analyte stability, which may vary by analyte. AMMONIA  
 Collection Time: 01/20/21  4:23 PM  
Result Value Ref Range Ammonia 73 (H) <32 UMOL/L  
URINALYSIS W/ REFLEX CULTURE Collection Time: 01/20/21  6:36 PM  
 Specimen: Urine Result Value Ref Range Color DARK YELLOW Appearance CLEAR CLEAR Specific gravity 1.026 1.003 - 1.030    
 pH (UA) 5.5 5.0 - 8.0 Protein Negative NEG mg/dL Glucose Negative NEG mg/dL Ketone Negative NEG mg/dL Bilirubin Negative NEG Blood Negative NEG Urobilinogen 1.0 0.2 - 1.0 EU/dL Nitrites Negative NEG Leukocyte Esterase Negative NEG    
 WBC 0-4 0 - 4 /hpf  
 RBC 0-5 0 - 5 /hpf Epithelial cells FEW FEW /lpf Bacteria Negative NEG /hpf  
 UA:UC IF INDICATED CULTURE NOT INDICATED BY UA RESULT CNI Hyaline cast 0-2 0 - 5 /lpf Radiologic Studies XR CHEST PORT Final Result Pulmonary vascular congestion similar to 8/17/2020. CT HEAD WO CONT Final Result No acute process or change compared to the prior exam. CT Results  (Last 48 hours) 01/20/21 2227  CT HEAD WO CONT Final result Impression:  No acute process or change compared to the prior exam.  
   
   
  
 Narrative:  EXAM: CT HEAD WO CONT INDICATION: altered mental status, possible hepatic encephalopathy COMPARISON: 8/17/2020. CONTRAST: None. TECHNIQUE: Unenhanced CT of the head was performed using 5 mm images. Brain and  
bone windows were generated. Coronal and sagittal reformats. CT dose reduction  
was achieved through use of a standardized protocol tailored for this  
examination and automatic exposure control for dose modulation. FINDINGS:  
The ventricles and sulci are normal in size, shape and configuration. . There is  
no significant white matter disease. There is no intracranial hemorrhage,  
extra-axial collection, or mass effect. The basilar cisterns are open. No CT  
evidence of acute infarct. The bone windows demonstrate no abnormalities. The visualized portions of the  
paranasal sinuses and mastoid air cells are clear. Vascular calcification is  
noted. CXR Results  (Last 48 hours) 21 2252  XR CHEST PORT Final result Impression:  Pulmonary vascular congestion similar to 2020. Narrative:  EXAM:  CR chest portable INDICATION: Altered mental status COMPARISON: 2020. TECHNIQUE: Portable AP chest view at 2243 hours FINDINGS: There is stable cardiac silhouette enlargement. There is pulmonary  
vascular congestion, similar to 2020. There is no pleural effusion or  
pneumothorax. The bones and upper abdomen are stable. Procedures Procedures Medical Decision Making I reviewed the patient's most recent Emergency Dept notes and diagnostic tests 
in formulating my MDM on today's visit. Provider Notes (Medical Decision Making):  
68-year-old male presenting with confusion and lethargy and altered mental status since yesterday, as reported by the staff at his skilled nursing facility. Normal vital signs, afebrile. Normal blood pressure. Abdomen is soft and nontender. He appears obese, pale, and withdrawn, with blunted affect, seems somewhat confused. He reports feeling confused. No focal neurologic deficits of note. CT scan is negative for acute findings. No significant clinical concern for CVA or meningitis. Ammonia level is 73. I suspect he is having hepatic encephalopathy as the cause of his symptoms today. Patient underwent swallow evaluation and was administered lactulose, admit to medicine. Shannon Mosqueda MD 
11:21 PM 
 
Consults: 
 
Social History Tobacco Use  Smoking status: Former Smoker Packs/day: 2.00 Years: 15.00 Pack years: 30.00 Quit date: 3/1/1979 Years since quittin.9  Smokeless tobacco: Never Used Substance Use Topics  Alcohol use: No  
  Alcohol/week: 0.0 standard drinks  Drug use: No  
 
Patient Vitals for the past 4 hrs: 
 Temp Pulse Resp BP SpO2  
01/20/21 2131  77 17    
01/20/21 2130 98.6 °F (37 °C) 77 16 139/79 100 % Prescriptions from today's ED visit: 
Current Discharge Medication List  
  
  
 
Medications Administered during ED course: 
Medications  
sodium chloride (NS) flush 5-40 mL (has no administration in time range)  
sodium chloride (NS) flush 5-40 mL (has no administration in time range)  
acetaminophen (TYLENOL) tablet 650 mg (has no administration in time range) Or  
acetaminophen (TYLENOL) suppository 650 mg (has no administration in time range)  
polyethylene glycol (MIRALAX) packet 17 g (has no administration in time range)  
ondansetron (ZOFRAN ODT) tablet 4 mg (has no administration in time range) Or  
ondansetron (ZOFRAN) injection 4 mg (has no administration in time range)  
apixaban (ELIQUIS) tablet 2.5 mg (has no administration in time range)  
atorvastatin (LIPITOR) tablet 40 mg (has no administration in time range)  
lactobac ac& pc-s.therm-b.anim (WESLEY Q/RISAQUAD) (has no administration in time range)  
folic acid (FOLVITE) tablet 1 mg (has no administration in time range)  
lactulose (CHRONULAC) 10 gram/15 mL solution 30 mL (has no administration in time range)  
magnesium oxide (MAG-OX) tablet 400 mg (has no administration in time range)  
melatonin tablet 3 mg (has no administration in time range)  
pantoprazole (PROTONIX) tablet 40 mg (has no administration in time range)  
rifAXIMin (XIFAXAN) tablet 550 mg (has no administration in time range)  
primidone (MYSOLINE) tablet 250 mg (has no administration in time range)  
spironolactone (ALDACTONE) tablet 25 mg (has no administration in time range)  
sertraline (ZOLOFT) tablet 150 mg (has no administration in time range) thiamine mononitrate (B-1) tablet 500 mg (has no administration in time range) tamsulosin (FLOMAX) capsule 0.4 mg (has no administration in time range)  
insulin lispro (HUMALOG) injection (has no administration in time range) glucose chewable tablet 16 g (has no administration in time range) dextrose (D50W) injection syrg 12.5-25 g (has no administration in time range) glucagon (GLUCAGEN) injection 1 mg (has no administration in time range)  
insulin glargine (LANTUS) injection 30 Units (has no administration in time range)  
furosemide (LASIX) tablet 20 mg (has no administration in time range) potassium chloride SR (KLOR-CON 10) tablet 10 mEq (has no administration in time range)  
lactulose (CHRONULAC) 10 gram/15 mL solution 45 mL (45 mL Oral Given 1/20/21 2147) Diagnosis and Disposition Disposition:  Admitted Clinical Impression: 1. Hepatic encephalopathy ([address] 2. Encephalopathy due to ammonia 3. Morbid obesity ([address] Attestation: 
I personally performed the services described in this documentation on this date 1/20/2021 for patient Ravi Lockett. Suzie Daigle MD 
 
 
 
I was the first provider for this patient on this visit. To the best of my ability I reviewed relevant prior medical records, electrocardiograms, laboratories, and radiologic studies. The patient's presenting problems were discussed, and the patient was in agreement with the care plan formulated and outlined with them. Suzie Daigle MD 
 
Please note that this dictation was completed with Dragon voice recognition software. Quite often unanticipated grammatical, syntax, homophones, and other interpretive errors are inadvertently transcribed by the computer software. Please disregard these errors and excuse any errors that have escaped final proofreading.

## 2021-01-21 NOTE — PROGRESS NOTES
[address] Pt's daughter Elke Aguilar would like to be updated by Dr. Wayne Snow. [phone] cell End of Shift Note Bedside shift change report given to Soco (oncoming nurse) by Jazmine Caballero (offgoing nurse). Report included the following information [facility], Kardex and [facility] Shift worked:  7a-7p Shift summary and any significant changes:  
 Pt more alert as day wore on. Several incontinent episodes, did use urinal times 3 today. Tolerated diet. Concerns for physician to address:  none Zone phone for oncoming shift:   [phone] Activity: 
Activity Level: Other (comment)(per notes uses wheelchair) Number times ambulated in hallways past shift: 0 Number of times OOB to chair past shift: 0 Cardiac:  
Cardiac Monitoring: No     
  
 
Access:  
Current line(s): PIV Genitourinary:  
Urinary status: voiding and incontinent Respiratory:  
O2 Device: Room air Chronic home O2 use?: NO Incentive spirometer at bedside: NO 
  
GI: 
Last Bowel Movement Date: 01/21/21 Current diet:  DIET CARDIAC Regular Passing flatus: YES Tolerating current diet: YES Pain Management:  
Patient states pain is manageable on current regimen: YES Skin: 
Geoff Score: 16 Interventions: increase time out of bed Patient Safety: 
Fall Score: Total Score: 3 Interventions: bed/chair alarm, assistive device (walker, cane, etc), gripper socks and pt to call before getting OOB High Fall Risk: Yes Length of Stay: 
Expected LOS: 2d 7h Actual LOS: 1 Jazmine Caballero

## 2021-01-21 NOTE — PROGRESS NOTES
Hospitalist Progress Note NAME: Moreno Beasley :  1950 MRN:  [id number] Assessment / Plan: Metabolic encephalopathy Cirrhosis Hyperammonemia 
-CT head shows no acute abnormality. 
-Chest X-ray unremarkable 
-UA within normal limits 
-WBC 5.9, afebrile, procalcitonin level is low at 0.07. 
-Ammonia 73 on admission.   
-Patient was started on lactulose and is having 3-4 bowel movements a day 
-Continue lactulose 30 mg 3 times daily. 
-Rifaximin continued. 
-Patient has history of bacteremia and was on Keflex.   
-Continue Aldactone to 
-TSH, R45 and folic acid unremarkable 
-Status post TIPS procedure in the past 
  
Diabetes type 2 
-Continue Lantus 30 units, SSI. 
  
Hypertension Depression 
-Continue home medication except the lisinopril because of the low blood pressure. 
  
Hyperlipidemia BPH Mood disorder 
-Continue home medications. 
  
  
Code Status: Full code Surrogate Decision Maker: Daughter 
  
DVT Prophylaxis: Eliquis GI Prophylaxis: not indicated 
  
Baseline: Wheelchair-bound at [facility] Subjective: Chief Complaint / Reason for Physician Visit \" Patient is more awake today. He denies any active complaints. He reports he is in the hospital because his ammonia was high. Is having 3-4 bowel movements a day\". Discussed with RN events overnight. Review of Systems: 
Symptom Y/N Comments  Symptom Y/N Comments Fever/Chills n   Chest Pain n   
Poor Appetite n   Edema n   
Cough n   Abdominal Pain n   
Sputum n   Joint Pain n   
SOB/WANG n   Pruritis/Rash Nausea/vomit n   Tolerating PT/OT Diarrhea n   Tolerating Diet Constipation n   Other Could NOT obtain due to:   
 
Objective: VITALS:  
Last 24hrs VS reviewed since prior progress note. Most recent are: 
Patient Vitals for the past 24 hrs: 
 Temp Pulse Resp BP SpO2  
21 0815 98.1 °F (36.7 °C) 70 18 (!) 157/52 100 % 21 0558 98.1 °F (36.7 °C) 70 18 (!) 148/63 100 % 01/21/21 0319    (!) 143/52   
01/21/21 0130  74 20 (!) 151/58 97 % 01/21/21 0100  75 16 (!) 146/62 99 % 01/20/21 2330  74 14 (!) 132/58 98 % 01/20/21 2131  77 17    
01/20/21 2130 98.6 °F (37 °C) 77 16 139/79 100 % 01/20/21 1800  75 21 (!) 156/64 99 % 01/20/21 1730  77 13 (!) 142/55 99 % 01/20/21 1700  76 22 (!) 127/49 99 % 01/20/21 1639  76 12 (!) 139/59 99 % 01/20/21 1616 99.6 °F (37.6 °C) 75 19 (!) 151/53 100 % 01/20/21 1612  77 17 (!) 151/53 99 % Intake/Output Summary (Last 24 hours) at 1/21/2021 1350 Last data filed at 1/21/2021 6056 Gross per 24 hour Intake  Output 100 ml Net -100 ml PHYSICAL EXAM: 
General: WD, WN. Alert, cooperative, no acute distress   
EENT:  EOMI. Anicteric sclerae. MMM Resp:  CTA bilaterally, no wheezing or rales. No accessory muscle use CV:  Regular  rhythm,  No edema GI:  Soft, Non distended, Non tender.  +Bowel sounds Neurologic:  Alert and oriented X 3, normal speech, Psych:   Good insight. Not anxious nor agitated Skin:  No rashes. No jaundice Reviewed most current lab test results and cultures  YES Reviewed most current radiology test results   YES Review and summation of old records today    NO Reviewed patient's current orders and MAR    YES 
PMH/SH reviewed - no change compared to H&P 
________________________________________________________________________ Care Plan discussed with: 
  Comments Patient x Family  x  patient's daughter on the phone RN x Care Manager Consultant Multidiciplinary team rounds were held today with , nursing, pharmacist and clinical coordinator. Patient's plan of care was discussed; medications were reviewed and discharge planning was addressed. ________________________________________________________________________ Total NON critical care TIME: 30   Minutes Total CRITICAL CARE TIME Spent:   Minutes non procedure based   Comments  
>50% of visit spent in counseling and coordination of care    
________________________________________________________________________ 
Solomon Morris MD  
 
Procedures: see electronic medical records for all procedures/Xrays and details which were not copied into this note but were reviewed prior to creation of Plan.   
 
LABS: 
I reviewed today's most current labs and imaging studies. 
Pertinent labs include: 
Recent Labs  
  01/21/21 
0332 01/20/21 
1618  
WBC 6.6 5.9  
HGB 11.3* 12.4  
HCT 34.2* 36.8  
* 139*  
 
Recent Labs  
  01/21/21 
0332 01/20/21 
1618  
 142  
K 3.5 3.8  
* 114*  
CO2 23 24  
* 142*  
BUN 24* 23*  
CREA 0.92 0.91  
CA 8.4* 8.5  
MG 2.1  --   
ALB  --  2.4*  
TBILI  --  1.2*  
ALT  --  36  
 
 
Signed: Solomon Morris MD

## 2021-01-21 NOTE — PROGRESS NOTES
Problem: Mobility Impaired (Adult and Pediatric) Goal: *Acute Goals and Plan of Care (Insert Text) Description: FUNCTIONAL STATUS PRIOR TO ADMISSION: Patient admitted from SNF (not listed in chart at time of evaluation & pt too confused to provide accurate info). Pt reported he needs 2 person assist for bed-->wheelchair transfers and only gets up when therapy sees him 3x/week. Pt with R hip dislocation and surgery August 2020 and appears he was discharged to SNF then HOME SUPPORT PRIOR TO ADMISSION: The pt was admitted from SNF per chart review. Spoke with CM and she is to look into it. Pt has a daughter involved in his care. Physical Therapy Goals Initiated 1/21/2021 1. Patient will move from supine to sit and sit to supine  in bed with moderate assistance  within 7 day(s). 2.  Patient will transfer from bed to chair and chair to bed with maximal assistance using the least restrictive device within 7 day(s). 3.  Patient will perform sit to stand with maximal assistance within 7 day(s). 4. Patient will tolerate seated EOB x 10 minutes within 7 days Outcome: Progressing Towards Goal 
PHYSICAL THERAPY EVALUATION Patient: Justin Steven (32 y.o. male) Date: 1/21/2021 Primary Diagnosis: Metabolic encephalopathy [X45.14] Increased ammonia level [R79.89] Cirrhosis ([facility] [address] [G01.33] Precautions:   Fall ASSESSMENT Based on the objective data described below, the patient presents with confusion, decreased strength and ROM, decreased functional mobility and bowel incontinence (Received lactulose) s/p admission from SNF for AMS and lethargy. Pt oriented to self and location. Pt received supine in bed with large amount of loose stool present. OT and RN present to assist with mobility and care. Pt required mod-max A rolling L and R and scooting in the bed. Pt placed in modified chair position and set up for lunch. Pt is likely close to baseline mobility status and recommend return to prior setting upon discharge with continued therapy services. . 
 
Pt wore a mask during session and PT wore mask and face shield Current Level of Function Impacting Discharge (mobility/balance): mod-max A rolling, repositioning Functional Outcome Measure: The patient scored Total: 20/100 on the Barthel Index which is indicative of 80% impaired ability to care for basic self needs/dependency on others. Other factors to consider for discharge: confusion, unclear where pt resides (CM looking into it) Patient will benefit from skilled therapy intervention to address the above noted impairments. PLAN : 
Recommendations and Planned Interventions: bed mobility training, transfer training, therapeutic exercises, neuromuscular re-education, patient and family training/education, and therapeutic activities Frequency/Duration: Patient will be followed by physical therapy:  2 times a week to address goals. Recommendation for discharge: (in order for the patient to meet his/her long term goals) To be determined: return to prior setting versus SNF  (unclear if pt resides at LTC at [facility] or is at [facility] for rehab temporarily) This discharge recommendation: 
Has been made in collaboration with the attending provider and/or case management IF patient discharges home will need the following DME: to be determined (TBD) SUBJECTIVE:  
Patient stated two people help me get up.  OBJECTIVE DATA SUMMARY:  
HISTORY:   
Past Medical History:  
Diagnosis Date  
 ADD (attention deficit disorder) Adverse effect of anesthesia   
 motion sickness resulting in loss of balance Anemia due to blood loss Hinson's esophagus with esophagitis Benign essential tremor Cancer [facility]) 2012 [address] Chronic pain Cirrhosis ([address] Depression Dislocated hip (HCC)   
 DJD (degenerative joint disease) of hip   
 bilat DM (diabetes mellitus) ([address] 3/17/2010 ED (erectile dysfunction) of organic origin Esophageal varices determined by endoscopy ([address] Fall on or from sidewalk curb 9/24/2015 Femur fracture ([address] Gastritis and duodenitis GERD (gastroesophageal reflux disease)   
 resolved after discontinuing diclofenac Hematuria 6/2016 High cholesterol 03/17/2010  
 pt denies 6/19 HTN (hypertension) 3/17/2010 Morbid obesity ([address] Murmur, cardiac 2016 PFO (patent foramen ovale) 7/26/2017 PUD (peptic ulcer disease) Sepsis ([address] Shingles 6/2016 RESOLVING- NO RASH (HEAD) Sleep apnea   
 doesnt use CPAP anymore Past Surgical History:  
Procedure Laterality Date COLONOSCOPY N/A 6/18/2019 COLONOSCOPY AND EGD performed by Kenzie Baker MD at [address]  6/18/2019 ENDOSCOPY, COLON, DIAGNOSTIC    
 HX CATARACT REMOVAL Bilateral   
 HX CHOLECYSTECTOMY  1995 HX GI  1980  
 gastroplasty [address] 27 HX HIP REPLACEMENT Left HX ORTHOPAEDIC Right 2011  
 rot cuff repair HX ORTHOPAEDIC  2016  
 left broken femur HX ORTHOPAEDIC Right 08/13/2020 Attempt closed reduction of hip dislocation [address] HX VASCULAR ACCESS    
 picc line and removed after sepsis resolved IR INSERT TIPS HEPATIC SHUNT  3/28/2020 TOTAL HIP ARTHROPLASTY  05/2010  
 right TOTAL HIP ARTHROPLASTY  08/01/2016 left  
 UPPER GI ENDOSCOPY,BIOPSY  2019 Personal factors and/or comorbidities impacting plan of care:  
 
Home Situation Home Environment: Skilled nursing facility One/Two Story Residence: One story Living Alone: No 
Support Systems: Child(dariusz), Family member(s) Current DME Used/Available at Home: Wheelchair, Hospital bed EXAMINATION/PRESENTATION/DECISION MAKING:  
Critical Behavior: 
Neurologic State: Alert, Confused Orientation Level: Disoriented to person, Disoriented to place Cognition: Follows commands Hearing: Auditory Auditory Impairment: None Skin:  area of eschar to R lateral malleolus Edema:  
Range Of Motion: 
AROM: Generally decreased, functional 
  
  
  
  
  
  
  
Strength:   
Strength: Generally decreased, functional 
  
  
  
  
  
  
Tone & Sensation:  
  
  
  
  
  
Sensation: Intact Coordination: 
  
Vision:  
  
Functional Mobility: 
Bed Mobility: 
Rolling: Moderate assistance;Assist x1 Scooting: Maximum assistance Transfers: 
Sit to Stand: (NT) 
  
     
  
     
  
  
Balance:  
Sitting: Impaired(needs support of [facility] for larry) Ambulation/Gait Training: 
  
  
  
  
  
  
  
 
Therapeutic Exercises: Ankle pumps, knee flexion with decreased ROM Functional Measure: 
Barthel Index: 
 
Bathin Bladder: 5 Bowels: 0 Groomin Dressin Feeding: 10 Mobility: 0 Stairs: 0 Toilet Use: 0 Transfer (Bed to Chair and Back): 0 Total: 20/100 The Barthel ADL Index: Guidelines 1. The index should be used as a record of what a patient does, not as a record of what a patient could do. 2. The main aim is to establish degree of independence from any help, physical or verbal, however minor and for whatever reason. 3. The need for supervision renders the patient not independent. 4. A patient's performance should be established using the best available evidence. Asking the patient, friends/relatives and nurses are the usual sources, but direct observation and common sense are also important. However direct testing is not needed. 5. Usually the patient's performance over the preceding 24-48 hours is important, but occasionally longer periods will be relevant. 6. Middle categories imply that the patient supplies over 50 per cent of the effort. 7. Use of aids to be independent is allowed. Raffi Sarmiento., Barthel, D.W. (9985). Functional evaluation: the Barthel Index. [facility] (14)2. Ravenel MAX Mclean, Kirk Rasheed., Sharon Madrigal., [place], [address] [place] [address] Ave (1999). Measuring the change indisability after inpatient rehabilitation; comparison of the responsiveness of the Barthel Index and Functional [place] Measure. Journal of Neurology, Neurosurgery, and Psychiatry, 66(4), 573-113. Jesus Thompson, SOCO.J.A, SHILPI Payan, & Lord Rey MMarkA. (2004.) Assessment of post-stroke quality of life in cost-effectiveness studies: The usefulness of the Barthel Index and the EuroQoL-5D. [facility], 13, 127-39 Based on the above components, the patient evaluation is determined to be of the following complexity level: LOW Pain Rating: 
Pt did not report pain Activity Tolerance:  
Fair After treatment patient left in no apparent distress:  
Supine in bed, Call bell within reach, Bed / chair alarm activated, and Side rails x 3 
 
COMMUNICATION/EDUCATION:  
The patients plan of care was discussed with: Occupational therapist and Registered nurse. Fall prevention education was provided and the patient/caregiver indicated understanding., Patient/family have participated as able in goal setting and plan of care. , and Patient/family agree to work toward stated goals and plan of care.  
 
Thank you for this referral. 
Ava Handy, PT, DPT 
 Time Calculation: 23 mins

## 2021-01-21 NOTE — H&P
Hospitalist Admission Note NAME: Ruth Randle :  1950 MRN:  [id number] Date/Time:  2021 10:53 PM 
 
Patient PCP: Malcolm Joy MD 
______________________________________________________________________ Given the patient's current clinical presentation, I have a high level of concern for decompensation if discharged from the emergency department. Complex decision making was performed, which includes reviewing the patient's available past medical records, laboratory results, and x-ray films. My assessment of this patient's clinical condition and my plan of care is as follows. Assessment / Plan: Metabolic encephalopathy Cirrhosis Hyperammonemia 
-CT head shows no acute abnormality. 
-Chest X-ray pending 
-UA within normal limits 
-WBC 5.9, afebrile, procalcitonin pending. 
-Ammonia 73. Check again tomorrow in the a.m. 
-Lactulose 30 mg 3 times daily. 
-Rifaximin continued. 
-Patient has severity of bacteremia and was on Keflex. Pharmacy consulted to let us know whether the patient is still on Keflex or not. -Continue spinal lactone. -TSH, G85 and folic acid ending in a.m. Diabetes type 2 
-Lantus 30 units, SSI. Hypertension Depression 
-Continue home medication except the lisinopril because of the low blood pressure. Hyperlipidemia BPH Mood disorder 
-Continue home medications. Code Status: Full code Surrogate Decision Maker: Daughter DVT Prophylaxis: Eliquis GI Prophylaxis: not indicated Baseline: Wheelchair-bound at [facility] Subjective: CHIEF COMPLAINT: Altered mental status HISTORY OF PRESENT ILLNESS:    
 Tony Leary is a 65 y.o.  male who presents with mental status. Patient past medical history of cirrhosis, depression, mood disorder, hypertension, esophageal varices s/p TIPS surgery, diabetes type 2. Currently the patient is not complaining of anything, did not know why he is in the hospital.  Patient is alert and oriented x2 at the movement. No fever and chills, not complaining of anything. Called her daughter who states that patient unresponsive at care center with the vitals being stable. Daughter states that they give a stat dose of lactulose at the care center. No history of fever and chills in the last 1 week. Has been admitted in the hospital with a similar complaints few months ago and at that time ammonia levels was in 200. Patient has multiple hip surgery last year and was at the [facility] center because of the inability to ambulate. Currently the patient ambulates with the help of wheelchair. We were asked to admit for work up and evaluation of the above problems. Past Medical History:  
Diagnosis Date  ADD (attention deficit disorder)  Adverse effect of anesthesia   
 motion sickness resulting in loss of balance  Anemia due to blood loss  Hinson's esophagus with esophagitis  Benign essential tremor  Cancer [facility]) 2012 [address]  Chronic pain  Cirrhosis ([address]  Depression  Dislocated hip ([address]  DJD (degenerative joint disease) of hip   
 bilat  DM (diabetes mellitus) ([address] 3/17/2010  ED (erectile dysfunction) of organic origin  Esophageal varices determined by endoscopy ([address]  Fall on or from sidewalk curb 9/24/2015  Femur fracture ([address]  Gastritis and duodenitis  GERD (gastroesophageal reflux disease)   
 resolved after discontinuing diclofenac  Hematuria 6/2016  High cholesterol 03/17/2010  
 pt denies 6/19  
 HTN (hypertension) 3/17/2010  Morbid obesity ([address]  Murmur, cardiac 2016  PFO (patent foramen ovale) 2017  PUD (peptic ulcer disease)  Sepsis ([facility]  Shingles 2016 RESOLVING- NO RASH (HEAD)  Sleep apnea   
 doesnt use CPAP anymore Past Surgical History:  
Procedure Laterality Date  COLONOSCOPY N/A 2019 COLONOSCOPY AND EGD performed by Sophronia Dubin, MD at [facility] ENDOSCOPY  COLONOSCOPY,DIAGNOSTIC  2019  ENDOSCOPY, COLON, DIAGNOSTIC    
 HX CATARACT REMOVAL Bilateral   
 HX CHOLECYSTECTOMY    HX GI  1980  
 gastroplasty [address]  HX HIP REPLACEMENT Left  HX ORTHOPAEDIC Right   
 rot cuff repair  HX ORTHOPAEDIC  2016  
 left broken femur  HX ORTHOPAEDIC Right 2020 Attempt closed reduction of hip dislocation [facility]  HX VASCULAR ACCESS    
 picc line and removed after sepsis resolved  IR INSERT TIPS HEPATIC SHUNT  3/28/2020 [facility] ARTHROPLASTY  2010  
 right  TOTAL HIP ARTHROPLASTY  2016  
 left  UPPER GI ENDOSCOPY,BIOPSY  2019 Social History Tobacco Use  Smoking status: Former Smoker Packs/day: 2.00 Years: 15.00 Pack years: 30.00 Quit date: 3/1/1979 Years since quittin.9  Smokeless tobacco: Never Used Substance Use Topics  Alcohol use: No  
  Alcohol/week: 0.0 standard drinks Family History Problem Relation Age of Onset  Cancer Father   
     multiple myeloma  Stroke Father  Dementia Mother  No Known Problems Brother  COPD Brother  No Known Problems Daughter  Cancer Maternal Grandmother COLON  
 No Known Problems Son  No Known Problems Son  Anesth Problems Neg Hx Allergies Allergen Reactions  Nsaids (Non-Steroidal Anti-Inflammatory Drug) Other (comments) Hx of PUD and Hinson's Esophagus Prior to Admission medications Medication Sig Start Date End Date Taking? Authorizing Provider polyethylene glycol (MIRALAX) 17 gram packet Take 1 Packet by mouth daily. 8/25/20   Yuli Jacobsen NP  
senna-docusate (PERICOLACE) 8.6-50 mg per tablet Take 1 Tab by mouth two (2) times a day. 8/24/20   Yuli Jacobsen NP  
insulin glargine (Lantus Solostar U-100 Insulin) 100 unit/mL (3 mL) inpn 30 Units by SubCUTAneous route two (2) times a day. Provider, Historical  
cholecalciferol (Vitamin D3) 25 mcg (1,000 unit) cap Take 1,000 Units by mouth daily. Provider, Historical  
HYDROcodone-acetaminophen (Norco) 5-325 mg per tablet Take 1 Tab by mouth every six (6) hours as needed for Pain. Provider, Historical  
tamsulosin (FLOMAX) 0.4 mg capsule Take 0.4 mg by mouth daily. Provider, Historical  
melatonin 3 mg tablet Take 3 mg by mouth nightly as needed for Sleep. 8/12/20   Provider, Historical  
spironolactone (Aldactone) 25 mg tablet Take 25 mg by mouth daily. 8/12/20   Provider, Historical  
magnesium oxide 400 mg magnesium tab Take 1 Tab by mouth daily. 8/12/20   Provider, Historical  
lactulose (CHRONULAC) 10 gram/15 mL solution Take 30 mL by mouth two (2) times a day. Provider, Historical  
diclofenac (VOLTAREN) 1 % gel Apply 2 g to affected area four (4) times daily. Provider, Historical  
furosemide (LASIX) 20 mg tablet Take 20 mg by mouth daily. Provider, Historical  
Omeprazole delayed release (PRILOSEC D/R) 20 mg tablet Take 20 mg by mouth daily. Provider, Historical  
apixaban (ELIQUIS) 2.5 mg tablet Take 1 Tab by mouth every twelve (12) hours every twelve (12) hours. Indications: deep vein thrombosis prevention 7/22/20   AMAN Ho  
lisinopriL (PRINIVIL, ZESTRIL) 5 mg tablet Take 10 mg by mouth every morning. Provider, Historical  
sertraline (Zoloft) 100 mg tablet Take 150 mg by mouth daily. Provider, Historical  
rifAXIMin (XIFAXAN) 550 mg tablet Take 1 Tab by mouth two (2) times a day.  4/23/20   Stacy Thorne MD  
 potassium chloride SR (KLOR-CON 10) 10 mEq tablet Take 10 mEq by mouth two (2) times a day. 4/18/20   Provider, Historical  
thiamine hcl 500 mg tablet Take 500 mg by mouth daily. 4/6/20   Marline Perez NP  
folic acid (FOLVITE) 1 mg tablet Take 1 Tab by mouth daily. 3/26/20   Tonya Ashraf, AURELIANO  
gabapentin (NEURONTIN) 300 mg capsule Take 2 Caps by mouth nightly. 3/16/20   Windy Lazo MD  
butalbital-acetaminophen-caffeine (FIORICET, ESGIC) -40 mg per tablet Take 1 Tab by mouth every six (6) hours as needed for Headache. 3/6/20   Windy Lazo MD  
cephALEXin (KEFLEX) 500 mg capsule Take 1 Cap by mouth two (2) times a day. 2/24/20   Windy Lazo MD  
ergocalciferol (VITAMIN D2) 50,000 unit capsule TAKE 1 CAPSULE EVERY 7 DAYS Patient taking differently: Take 50,000 Units by mouth every seven (7) days. Takes on Thursdays 1/6/20   Windy Lazo MD  
atorvastatin (LIPITOR) 40 mg tablet Take 1 Tab by mouth daily. 1/4/20   Sandra Drake MD  
primidone (MYSOLINE) 250 mg tablet TAKE 1 TABLET TWICE A DAY 11/7/19   Windy Lazo MD  
acetaminophen (TYLENOL) 500 mg tablet Take 2 Tabs by mouth every six (6) hours as needed for Pain. 10/7/19   Avinash Silva MD  
B.infantis-B.ani-B.long-B.bifi (PROBIOTIC 4X) 10-15 mg TbEC Take 1 Tab by mouth daily. Provider, Historical  
calcium citrate-vitamin D3 (CITRACAL + D) tablet Take 2 Tabs by mouth two (2) times a day. Provider, Historical  
 
 
REVIEW OF SYSTEMS:    
I am not able to complete the review of systems because: The patient is intubated and sedated The patient has altered mental status due to his acute medical problems The patient has baseline aphasia from prior stroke(s) The patient has baseline dementia and is not reliable historian The patient is in acute medical distress and unable to provide information Total of 12 systems reviewed as follows: POSITIVE= underlined text  Negative = text not underlined General:  fever, chills, sweats, generalized weakness, weight loss/gain,  
   loss of appetite Eyes:    blurred vision, eye pain, loss of vision, double vision ENT:    rhinorrhea, pharyngitis Respiratory:   cough, sputum production, SOB, WANG, wheezing, pleuritic pain  
Cardiology:   chest pain, palpitations, orthopnea, PND, edema, syncope Gastrointestinal:  abdominal pain , N/V, diarrhea, dysphagia, constipation, bleeding Genitourinary:  frequency, urgency, dysuria, hematuria, incontinence Muskuloskeletal :  arthralgia, myalgia, back pain Hematology:  easy bruising, nose or gum bleeding, lymphadenopathy Dermatological: rash, ulceration, pruritis, color change / jaundice Endocrine:   hot flashes or polydipsia Neurological:  headache, dizziness, confusion, focal weakness, paresthesia, Speech difficulties, memory loss, gait difficulty Psychological: Feelings of anxiety, depression, agitation Objective: VITALS:   
Visit Vitals /79 Pulse 77 Temp 98.6 °F (37 °C) Resp 17 Ht 5' 10.5\" (1.791 m) Wt 105.7 kg (233 lb) SpO2 100% BMI 32.96 kg/m² PHYSICAL EXAM: 
 
 
_______________________________________________________________________ Care Plan discussed with: 
  Comments Patient x Family  x   
RN x Care Manager Consultant:  x   
_______________________________________________________________________ Expected  Disposition:  
Home with Family HH/PT/OT/RN   
SNF/LTC x  
SHAYNA   
________________________________________________________________________ TOTAL TIME:  61 Minutes Critical Care Provided     Minutes non procedure based Comments Reviewed previous records  
>50% of visit spent in counseling and coordination of care  Discussion with patient and/or family and questions answered 
  
 
________________________________________________________________________ Signed: Gopi Randolph MD 
 
Procedures: see electronic medical records for all procedures/Xrays and details which were not copied into this note but were reviewed prior to creation of Plan. LAB DATA REVIEWED:   
Recent Results (from the past 24 hour(s)) CBC WITH AUTOMATED DIFF Collection Time: 01/20/21  4:18 PM  
Result Value Ref Range WBC 5.9 4.1 - 11.1 K/uL  
 RBC 3.81 (L) 4.10 - 5.70 M/uL  
 HGB 12.4 12.1 - 17.0 g/dL HCT 36.8 36.6 - 50.3 % MCV 96.6 80.0 - 99.0 FL  
 MCH 32.5 26.0 - 34.0 PG  
 MCHC 33.7 30.0 - 36.5 g/dL  
 RDW 13.9 11.5 - 14.5 % PLATELET 885 (L) 514 - 400 K/uL MPV 9.8 8.9 - 12.9 FL  
 NRBC 0.0 0  WBC ABSOLUTE NRBC 0.00 0.00 - 0.01 K/uL NEUTROPHILS 74 32 - 75 % LYMPHOCYTES 14 12 - 49 % MONOCYTES 8 5 - 13 % EOSINOPHILS 3 0 - 7 % BASOPHILS 1 0 - 1 % IMMATURE GRANULOCYTES 0 0.0 - 0.5 % ABS. NEUTROPHILS 4.4 1.8 - 8.0 K/UL  
 ABS. LYMPHOCYTES 0.8 0.8 - 3.5 K/UL  
 ABS. MONOCYTES 0.5 0.0 - 1.0 K/UL  
 ABS. EOSINOPHILS 0.2 0.0 - 0.4 K/UL ABS. BASOPHILS 0.0 0.0 - 0.1 K/UL  
 ABS. IMM. GRANS. 0.0 0.00 - 0.04 K/UL  
 DF AUTOMATED METABOLIC PANEL, COMPREHENSIVE Collection Time: 01/20/21  4:18 PM  
Result Value Ref Range Sodium 142 136 - 145 mmol/L Potassium 3.8 3.5 - 5.1 mmol/L Chloride 114 (H) 97 - 108 mmol/L  
 CO2 24 21 - 32 mmol/L Anion gap 4 (L) 5 - 15 mmol/L Glucose 142 (H) 65 - 100 mg/dL BUN 23 (H) 6 - 20 MG/DL Creatinine 0.91 0.70 - 1.30 MG/DL  
 BUN/Creatinine ratio 25 (H) 12 - 20 GFR est AA >60 >60 ml/min/1.73m2 GFR est non-AA >60 >60 ml/min/1.73m2 Calcium 8.5 8.5 - 10.1 MG/DL Bilirubin, total 1.2 (H) 0.2 - 1.0 MG/DL  
 ALT (SGPT) 36 12 - 78 U/L  
 AST (SGOT) 38 (H) 15 - 37 U/L Alk. phosphatase 108 45 - 117 U/L Protein, total 6.4 6.4 - 8.2 g/dL Albumin 2.4 (L) 3.5 - 5.0 g/dL Globulin 4.0 2.0 - 4.0 g/dL A-G Ratio 0.6 (L) 1.1 - 2.2 SAMPLES BEING HELD Collection Time: 01/20/21  4:18 PM  
Result Value Ref Range SAMPLES BEING HELD  RED JOAN   
 COMMENT Add-on orders for these samples will be processed based on acceptable specimen integrity and analyte stability, which may vary by analyte. AMMONIA Collection Time: 01/20/21  4:23 PM  
Result Value Ref Range Ammonia 73 (H) <32 UMOL/L  
URINALYSIS W/ REFLEX CULTURE Collection Time: 01/20/21  6:36 PM  
 Specimen: Urine Result Value Ref Range Color DARK YELLOW Appearance CLEAR CLEAR Specific gravity 1.026 1.003 - 1.030    
 pH (UA) 5.5 5.0 - 8.0 Protein Negative NEG mg/dL Glucose Negative NEG mg/dL Ketone Negative NEG mg/dL Bilirubin Negative NEG Blood Negative NEG Urobilinogen 1.0 0.2 - 1.0 EU/dL Nitrites Negative NEG Leukocyte Esterase Negative NEG    
 WBC 0-4 0 - 4 /hpf  
 RBC 0-5 0 - 5 /hpf Epithelial cells FEW FEW /lpf Bacteria Negative NEG /hpf  
 UA:UC IF INDICATED CULTURE NOT INDICATED BY UA RESULT CNI Hyaline cast 0-2 0 - 5 /lpf

## 2021-01-21 NOTE — PROGRESS NOTES
Pharmacy Medication Reconciliation The patient was not interviewed regarding current PTA medication list, use and drug allergies. The patient was not questioned regarding use of any other inhalers, topical products, over the counter medications, herbal medications, vitamin products or ophthalmic/nasal/otic medication use. Allergy Update: Nsaids (non-steroidal anti-inflammatory drug) Recommendations/Findings: The following amendments were made to the patient's active medication list on file at [facility]:  
1) Additions: Meclizine, oxycodone 2) Deletions: Fioricet, Citracal +D, vitamin D3, Voltaren, folic acid, gabapentin, Norco, probiotic, lisinopril, magnesium oxide, melatonin, miralax, pericolace, thiamine 3) Changes: (OLD: Apixaban 2.5 mg tab/ NEW: Apixaban 5 mg tab); (OLD: Sertraline 100 mg tab/ NEW: Sertraline 50 mg tab) Pertinent Findings: Patient's Medication list was sent over from [facility] where the patient is a resident. The paperwork confirmed the Keflex dose: 500 mg 1 capsule PO BID. The patient's daughter, Marta Ford, stated that the keflex was prescribed by the patient infectious disease physician and is for a blood infection that he had years ago. -Clarified PTA med list with [facility] (P:[phone]-Anahy Ugalde). PTA medication list was corrected to the following:  
 
Prior to Admission Medications Prescriptions Last Dose Informant Patient Reported? Taking? Omeprazole delayed release (PRILOSEC D/R) 20 mg tablet  Other Yes No  
Sig: Take 20 mg by mouth daily. acetaminophen (TYLENOL) 500 mg tablet  Other No No  
Sig: Take 2 Tabs by mouth every six (6) hours as needed for Pain. apixaban (ELIQUIS) 5 mg tablet  Other Yes Yes Sig: Take 5 mg by mouth two (2) times a day. atorvastatin (LIPITOR) 40 mg tablet  Other No No  
Sig: Take 1 Tab by mouth daily. cephALEXin (KEFLEX) 500 mg capsule  Other No No  
Sig: Take 1 Cap by mouth two (2) times a day. ergocalciferol (VITAMIN D2) 50,000 unit capsule  Other No No  
Sig: TAKE 1 CAPSULE EVERY 7 DAYS Patient taking differently: Take 50,000 Units by mouth every seven (7) days. Takes on   
furosemide (LASIX) 20 mg tablet  Other Yes No  
Sig: Take 20 mg by mouth daily. insulin glargine (Lantus Solostar U-100 Insulin) 100 unit/mL (3 mL) inpn  Other Yes No  
Si Units by SubCUTAneous route two (2) times a day. lactulose (CHRONULAC) 10 gram/15 mL solution  Other Yes No  
Sig: Take 30 mL by mouth two (2) times a day. meclizine (ANTIVERT) 25 mg tablet  Other Yes Yes Sig: Take 25 mg by mouth two (2) times daily as needed for Dizziness. oxyCODONE IR (ROXICODONE) 10 mg tab immediate release tablet  Other Yes Yes Sig: Take 10 mg by mouth every six (6) hours as needed for Pain.  
potassium chloride SR (KLOR-CON 10) 10 mEq tablet  Other Yes No  
Sig: Take 10 mEq by mouth two (2) times a day. primidone (MYSOLINE) 250 mg tablet  Other No No  
Sig: TAKE 1 TABLET TWICE A DAY rifAXIMin (XIFAXAN) 550 mg tablet  Other No No  
Sig: Take 1 Tab by mouth two (2) times a day. sertraline (ZOLOFT) 50 mg tablet  Other Yes Yes Sig: Take 150 mg by mouth daily. spironolactone (Aldactone) 25 mg tablet  Other Yes No  
Sig: Take 25 mg by mouth daily. tamsulosin (FLOMAX) 0.4 mg capsule  Other Yes No  
Sig: Take 0.4 mg by mouth daily. Facility-Administered Medications: None Monik Stone PharmD Candidate

## 2021-01-22 NOTE — PROGRESS NOTES
Problem: Pressure Injury - Risk of 
Goal: *Prevention of pressure injury Description: Document Geoff Scale and appropriate interventions in the flowsheet. Outcome: Progressing Towards Goal 
Note: Pressure Injury Interventions: 
  
 
Moisture Interventions: Absorbent underpads, Apply protective barrier, creams and emollients Activity Interventions: Assess need for specialty bed, PT/OT evaluation Mobility Interventions: Assess need for specialty bed, Float heels, HOB 30 degrees or less, Turn and reposition approx. every two hours(pillow and wedges) Nutrition Interventions: Document food/fluid/supplement intake Friction and Shear Interventions: Apply protective barrier, creams and emollients, Feet elevated on foot rest, HOB 30 degrees or less Problem: Falls - Risk of 
Goal: *Absence of Falls Description: Document Gloria Blood Fall Risk and appropriate interventions in the flowsheet. Outcome: Progressing Towards Goal 
Note: Fall Risk Interventions: 
Mobility Interventions: Communicate number of staff needed for ambulation/transfer, Utilize walker, cane, or other assistive device Mentation Interventions: Adequate sleep, hydration, pain control, Bed/chair exit alarm, Door open when patient unattended, More frequent rounding Elimination Interventions: Bed/chair exit alarm, Call light in reach, Patient to call for help with toileting needs

## 2021-01-22 NOTE — PROGRESS NOTES
.End of Shift Note Bedside shift change report given to Cris Martinez RN(oncoming nurse) by Danny Brown (offgoing nurse). Report included the following information SBAR, Kardex, [facility] and Recent Results Shift worked:  7p-7a Shift summary and any significant changes:  
  Vital signs stable and no c/o pain throughout shift. Concerns for physician to address:  none Zone phone for oncoming shift:   [phone] Activity: 
Activity Level: Other (comment)(wheelchair) Number times ambulated in hallways past shift: 0 Number of times OOB to chair past shift:0 Cardiac:  
Cardiac Monitoring:no Access:  
Current line(s): PIV Genitourinary:  
Urinary status: voiding Respiratory:  
O2 Device: Room air Chronic home O2 use?: N/A Incentive spirometer at bedside: N/A 
  
GI: 
Last Bowel Movement Date: 01/22/21 Current diet:  DIET CARDIAC Regular Passing flatus: YES Tolerating current diet: YES Pain Management:  
Patient states pain is manageable on current regimen: N/A Skin: 
Geoff Score: 16 Interventions: turn team, speciality bed, float heels and increase time out of bed Patient Safety: 
Fall Score: Total Score: 3 Interventions: assistive device (walker, cane, etc), gripper socks and pt to call before getting OOB High Fall Risk: Yes Length of Stay: 
Expected LOS: 2d 7h Actual LOS: 2 Danny Brown

## 2021-01-22 NOTE — PROGRESS NOTES
Patient is ready for discharge from care manager standpoint    3:20PM: Patient's wheelchair is at the [facility]; Rabia Chambers will go to the [facility] and  wheelchair and bring it to [facility] for patient to use in transport to [facility];  Nurse [facility] notified that Rabia Chambers will bring wheelchair to hospital and patient will need to be assisted into  and then taken to front entrance for Rabia Chambers to transport to [facility]; daughter Pollo Bolanos notified and Jerrica Bernal at [address] notified. 2:45PM:  Nurse [facility] feels that patient will not be ready by 3PM pick by the [employer]  transport;  CM called Rabia Chambers and extended the  time to 4PM; Nurse Eddie Shine at [facility] and daughter Hermes zamudio notified. 1:34PM:  Rapid Covid test negative;  CM called Wei Haney at [facility] and advised her of the negative covid test and faxed copy of test results to Jerrica Bernal. Isiswalkerseda Stockmichaela with [facility] transportation will be at [facility] entrance to  patient at [facility]. Patient advised; Nurse [facility] advised of the [address]  time; daughter Amy Rashid [phone] also notified of the transport time. 12:27PM:    YARELY  Placed call to Wei Haney to confirm fax number of [phone] and she confirmed fax number and put paper in the fax machine; discharge instructions and FU appt faxed to Wei Haney at [facility]; Transportation:  CM called Rabia Chambers at the [employer] and he will transport patient back to [facility] this afternoon. Will await CM's call back with a discharge time. 2nd IM letter was given, reviewed and signed by patient. Signed 2nd IM Letter was placed on patient's chart. COVID test has been ordered but has not been collected. Nurse was reminded that rapid covid results are needed before patient can be discharged. 1033AM:  YARELY  Return to [facility] owned and managed by Wei Haney ([phone]); per Wei Haney patient will need negative COVID test; message sent to Dr. Markos Ashraf via [employer]     Transportation to be provided by [employer] transport [phone] and paid for by daughter; will be scheduled by CM once COVID test is negative. Preferred Rx is [id number] [phone] via [facility]     FU appts on AVS    2nd IM Letter will be needed prior to discharge.     Dale Lesch, RN  Care Manager  Ext [phone]

## 2021-01-22 NOTE — PROGRESS NOTES
Problem: Pressure Injury - Risk of 
Goal: *Prevention of pressure injury Description: Document Geoff Scale and appropriate interventions in the flowsheet. 1/22/2021 1357 by Saul Weaver Outcome: Resolved/Met Note: Pressure Injury Interventions: 
  
 
Moisture Interventions: Absorbent underpads, Maintain skin hydration (lotion/cream), Minimize layers Activity Interventions: Assess need for specialty bed, PT/OT evaluation Mobility Interventions: Assess need for specialty bed, Float heels, HOB 30 degrees or less, Turn and reposition approx. every two hours(pillow and wedges) Nutrition Interventions: Document food/fluid/supplement intake Friction and Shear Interventions: Apply protective barrier, creams and emollients, Feet elevated on foot rest, HOB 30 degrees or less 1/22/2021 1228 by Saul Weaver Outcome: Progressing Towards Goal 
Note: Pressure Injury Interventions: 
  
 
Moisture Interventions: Absorbent underpads, Maintain skin hydration (lotion/cream), Minimize layers Activity Interventions: Assess need for specialty bed, PT/OT evaluation Mobility Interventions: Assess need for specialty bed, Float heels, HOB 30 degrees or less, Turn and reposition approx. every two hours(pillow and wedges) Nutrition Interventions: Document food/fluid/supplement intake Friction and Shear Interventions: Apply protective barrier, creams and emollients, Feet elevated on foot rest, HOB 30 degrees or less Problem: Patient Education: Go to Patient Education Activity Goal: Patient/Family Education 1/22/2021 1357 by Saul Weaver Outcome: Resolved/Met 
1/22/2021 1228 by Saul Weaver Outcome: Progressing Towards Goal 
  
Problem: Falls - Risk of 
Goal: *Absence of Falls Description: Document Luis Horvath Fall Risk and appropriate interventions in the flowsheet. 1/22/2021 1357 by Saul Weaver Outcome: Resolved/Met Note: Fall Risk Interventions: 
Mobility Interventions: Communicate number of staff needed for ambulation/transfer Mentation Interventions: Adequate sleep, hydration, pain control, Door open when patient unattended, More frequent rounding, Reorient patient Elimination Interventions: Bed/chair exit alarm, Call light in reach 1/22/2021 1228 by Sunshine Abebe Outcome: Progressing Towards Goal 
Note: Fall Risk Interventions: 
Mobility Interventions: Communicate number of staff needed for ambulation/transfer Mentation Interventions: Adequate sleep, hydration, pain control, Door open when patient unattended, More frequent rounding, Reorient patient Elimination Interventions: Bed/chair exit alarm, Call light in reach Problem: Patient Education: Go to Patient Education Activity Goal: Patient/Family Education 1/22/2021 1357 by Sunshine Abebe Outcome: Resolved/Met 
1/22/2021 1228 by Sunshine Abebe Outcome: Progressing Towards Goal 
  
Problem: Patient Education: Go to Patient Education Activity Goal: Patient/Family Education 1/22/2021 1357 by Sunshine Abebe Outcome: Resolved/Met 
1/22/2021 1228 by Sunshine Abebe Outcome: Progressing Towards Goal

## 2021-01-22 NOTE — DISCHARGE SUMMARY
Hospitalist Discharge Summary Patient ID: 
Asif Zhu [id number] 
61 y.o. 
1950 
1/20/2021 PCP on record: Avelina Hadley MD 
 
Admit date: 1/20/2021 Discharge date and time: 1/22/2021 DISCHARGE DIAGNOSIS: 
 
Metabolic encephalopathy Cirrhosis Hyperammonemia Diabetes type 2 Hypertension Depression Hyperlipidemia BPH Mood disorder CONSULTATIONS: 
IP CONSULT TO HOSPITALIST Excerpted HPI from H&P of Christina Martinez MD: 
Jerry Urias is a 65 y.o.  male who presents with mental status. Patient past medical history of cirrhosis, depression, mood disorder, hypertension, esophageal varices s/p TIPS surgery, diabetes type 2. Currently the patient is not complaining of anything, did not know why he is in the hospital.  Patient is alert and oriented x2 at the movement. No fever and chills, not complaining of anything. Called her daughter who states that patient unresponsive at care center with the vitals being stable. Daughter states that they give a stat dose of lactulose at the care center. No history of fever and chills in the last 1 week. Has been admitted in the hospital with a similar complaints few months ago and at that time ammonia levels was in 200. Patient has multiple hip surgery last year and was at the care center because of the inability to ambulate. Currently the patient ambulates with the help of wheelchair. 
  
We were asked to admit for work up and evaluation of the above problems.  
  
 
______________________________________________________________________ DISCHARGE SUMMARY/HOSPITAL COURSE:  for full details see H&P, daily progress notes, labs, consult notes. Metabolic encephalopathy Cirrhosis Hyperammonemia 
-CT head shows no acute abnormality. 
-Chest X-ray unremarkable 
-UA within normal limits 
-WBC 5.9, afebrile, procalcitonin level is low at 0.07. 
-Ammonia 73 on admission.  It trended down -Patient was started on lactulose and is having 3-4 bowel movements a day 
-Continue lactulose 30 mg 2 times daily on discharge. Titrate for 2-3 Bowel movements per day -Rifaximin continued. 
-Patient has history of bacteremia and is on Keflex as suppressive treatment.   
-Continue Aldactone to 
-TSH, Y32 and folic acid unremarkable 
-Status post TIPS procedure in the past 
-Pt is at his baseline mentation and can be discharged  
  
Diabetes type 2 
-Continue Lantus 30 units, SSI. 
  
Hypertension Depression 
-Continue home medication 
  
Hyperlipidemia BPH Mood disorder 
-Continue home medications. 
  
 
 
_______________________________________________________________________ Patient seen and examined by me on discharge day. Pertinent Findings: 
Gen:    Not in distress Chest: Clear lungs CVS:   Regular rhythm. No edema Abd:  Soft, not distended, not tender Neuro:  Alert,oriented x 3. No asterexis 
_______________________________________________________________________ DISCHARGE MEDICATIONS:  
Current Discharge Medication List  
  
CONTINUE these medications which have NOT CHANGED Details  
apixaban (ELIQUIS) 5 mg tablet Take 5 mg by mouth two (2) times a day. sertraline (ZOLOFT) 50 mg tablet Take 150 mg by mouth daily. meclizine (ANTIVERT) 25 mg tablet Take 25 mg by mouth two (2) times daily as needed for Dizziness. oxyCODONE IR (ROXICODONE) 10 mg tab immediate release tablet Take 10 mg by mouth every six (6) hours as needed for Pain. insulin glargine (Lantus Solostar U-100 Insulin) 100 unit/mL (3 mL) inpn 30 Units by SubCUTAneous route two (2) times a day. tamsulosin (FLOMAX) 0.4 mg capsule Take 0.4 mg by mouth daily. spironolactone (Aldactone) 25 mg tablet Take 25 mg by mouth daily. lactulose (CHRONULAC) 10 gram/15 mL solution Take 30 mL by mouth two (2) times a day. furosemide (LASIX) 20 mg tablet Take 20 mg by mouth daily. Omeprazole delayed release (PRILOSEC D/R) 20 mg tablet Take 20 mg by mouth daily. rifAXIMin (XIFAXAN) 550 mg tablet Take 1 Tab by mouth two (2) times a day. Qty: 180 Tab, Refills: 3  
  
potassium chloride SR (KLOR-CON 10) 10 mEq tablet Take 10 mEq by mouth two (2) times a day. cephALEXin (KEFLEX) 500 mg capsule Take 1 Cap by mouth two (2) times a day. Qty: 30 Cap, Refills: 6  
  
ergocalciferol (VITAMIN D2) 50,000 unit capsule TAKE 1 CAPSULE EVERY 7 DAYS Qty: 12 Cap, Refills: 2  
  
atorvastatin (LIPITOR) 40 mg tablet Take 1 Tab by mouth daily. Qty: 90 Tab, Refills: 3 Associated Diagnoses: Uncontrolled type 2 diabetes mellitus with hyperglycemia (HCC)  
  
primidone (MYSOLINE) 250 mg tablet TAKE 1 TABLET TWICE A DAY Qty: 180 Tab, Refills: 3 Associated Diagnoses: Benign essential tremor  
  
acetaminophen (TYLENOL) 500 mg tablet Take 2 Tabs by mouth every six (6) hours as needed for Pain. Qty: 30 Tab, Refills: 0 STOP taking these medications  
  
 melatonin 3 mg tablet Comments:  
Reason for Stopping:   
   
 magnesium oxide 400 mg magnesium tab Comments:  
Reason for Stopping:   
   
 thiamine hcl 500 mg tablet Comments:  
Reason for Stopping:   
   
 folic acid (FOLVITE) 1 mg tablet Comments:  
Reason for Stopping: B.infantis-B.ani-B.long-B.bifi (PROBIOTIC 4X) 10-15 mg TbEC Comments:  
Reason for Stopping:   
   
  
 
 
 
Patient Follow Up Instructions: Activity: Activity as tolerated Diet: Regular Diet Wound Care: None needed Follow-up with PCP in one week Follow-up tests/labs none Follow-up Information Follow up With Specialties Details Why Contact Info Corina Lieberman MD Family Medicine In 1 week  [address] [place] [address] 
[phone] 
  
  
 
________________________________________________________________ Risk of deterioration: High 
 
Condition at Discharge:  Stable __________________________________________________________________ Disposition SNF/LTC 
 
____________________________________________________________________ Code Status: Full Code 
___________________________________________________________________ Total time in minutes spent coordinating this discharge (includes going over instructions, follow-up, prescriptions, and preparing report for sign off to her PCP) :  38 minutes Signed: 
Prakash Dooley MD

## 2021-01-22 NOTE — DISCHARGE INSTRUCTIONS
HOSPITALIST DISCHARGE INSTRUCTIONS    NAME: Ravi Lockett   :  1950   MRN:  [id number]     Date/Time:  2021 8:45 AM    ADMIT DATE: 2021     DISCHARGE DATE: 2021     DISCHARGE DIAGNOSIS:  Hepatic Encephalopathy    MEDICATIONS:  · It is important that you take the medication exactly as they are prescribed. · Keep your medication in the bottles provided by the pharmacist and keep a list of the medication names, dosages, and times to be taken in your wallet. · Do not take other medications without consulting your doctor. Pain Management: per above medications    What to do at Home    Recommended diet:  Low protein diet    Recommended activity: Activity as tolerated    If you have questions regarding the hospital related prescriptions or hospital related issues please call [facility] at [phone]. If you experience any of the following symptoms then please call your primary care physician or return to the emergency room if you cannot get hold of your doctor:  Fever, chills, nausea, vomiting, diarrhea, change in mentation, falling, bleeding, shortness of breath. Follow Up:  Dr. Brenton Evans MD  you are to call and set up an appointment to see them in 7-10 days. Information obtained by :  I understand that if any problems occur once I am at home I am to contact my physician. I understand and acknowledge receipt of the instructions indicated above.                                                                                                                                            Physician's or R.N.'s Signature                                                                  Date/Time                                                                                                                                              Patient or Representative Signature                                                          Date/Time

## 2021-01-22 NOTE — PROGRESS NOTES
Problem: Pressure Injury - Risk of 
Goal: *Prevention of pressure injury Description: Document Geoff Scale and appropriate interventions in the flowsheet. Outcome: Progressing Towards Goal 
Note: Pressure Injury Interventions: 
  
 
Moisture Interventions: Absorbent underpads, Maintain skin hydration (lotion/cream), Minimize layers Activity Interventions: Assess need for specialty bed, PT/OT evaluation Mobility Interventions: Assess need for specialty bed, Float heels, HOB 30 degrees or less, Turn and reposition approx. every two hours(pillow and wedges) Nutrition Interventions: Document food/fluid/supplement intake Friction and Shear Interventions: Apply protective barrier, creams and emollients, Feet elevated on foot rest, HOB 30 degrees or less Problem: Patient Education: Go to Patient Education Activity Goal: Patient/Family Education Outcome: Progressing Towards Goal 
  
Problem: Falls - Risk of 
Goal: *Absence of Falls Description: Document Isaura Jeronimo Fall Risk and appropriate interventions in the flowsheet. Outcome: Progressing Towards Goal 
Note: Fall Risk Interventions: 
Mobility Interventions: Communicate number of staff needed for ambulation/transfer Mentation Interventions: Adequate sleep, hydration, pain control, Door open when patient unattended, More frequent rounding, Reorient patient Elimination Interventions: Bed/chair exit alarm, Call light in reach Problem: Patient Education: Go to Patient Education Activity Goal: Patient/Family Education Outcome: Progressing Towards Goal 
  
Problem: Patient Education: Go to Patient Education Activity Goal: Patient/Family Education Outcome: Progressing Towards Goal

## 2021-01-22 NOTE — PROGRESS NOTES
PATRICIA printed and sent with patient's transport service. Gave report to [employer] at [facility].

## 2021-02-02 NOTE — PROGRESS NOTES
Physician Progress Note Orion Thompson 
CSN #:                  [id number] :                       1950 ADMIT DATE:       2021 3:57 PM 
[address] [place] DATE:        2021 5:27 PM 
RESPONDING 
PROVIDER #:        Chris Gan MD 
 
 
 
 
QUERY TEXT: 
 
Dr Frances Besupriya: 
 
Patient admitted with admitted with altered mental status and ammonia level 73. 
& has hx of Cirrhosis  . Noted documentation of Hepatic Encephalopathy in ED and Encephalopathy due to ammonia. If possible, please clarify &  document in progress notes and discharge summary  if you are treating this patient for the following: The medical record reflects the following: 
Risk Factors: 63yo M w/ Cirrhosis, Hyperammonemia, DMT2, esophageal varices s/P TIPS. Clinical Indicators: AMS, confused w/ Ammonia level 73 on admit. It trended down after lactulose started & having 3-4BMs a day. Mental status improvedPt alert oriented to name & place. Treatment: Stat dose of lactulose, Continued on lactulose BID , Rifaximin Thank you, 
Lisseth Kamara, [facility], [address] [place] [address] Options provided: 
-- Hepatic encephalopathy confirmed and metabolic encephalopathy ruled out 
-- Metabolic encephalopathy confirmed and Hepatic encephalopathy ruled out 
-- Other - I will add my own diagnosis -- Disagree - Not applicable / Not valid -- Disagree - Clinically unable to determine / Unknown 
-- Refer to Clinical Documentation Reviewer PROVIDER RESPONSE TEXT: 
 
After study, Hepatic encephalopathy confirmed and Metabolic encephalopathy ruled out.  
 
Query created by: Ayesha Mena on 2021 10:19 AM 
 
 
Electronically signed by:  Chris Gan MD 2021 6:31 AM

## 2021-02-15 NOTE — PROGRESS NOTES
Goals  Prevent complications post hospitalization. 02/15/21 Spoke to Hannah Johnson at [address]. She states that patient still has small sore on his ankle that itchy. He is followed by [facility] and they are aware of the sore. Occasionally he will refuse medications but when that happens they call his daughter who is able to convince him to take them. Hannah Johnson states he is having usually a couple Bms a day but the aids usually change him so she didn't know exact count. Reports no red flag s/s at this time. Hannah Johnson states patient has no needs or concerns at this time. CTN to follow up ~7-10 days.  AR

## 2021-02-22 NOTE — PROGRESS NOTES
Patient has graduated from the Transitions of Care Coordination  program on 02/22/21 Patient was not referred to the [facility] team for further management. Goals Addressed This Visit's Progress  COMPLETED: Prevent complications post hospitalization. 02/15/21 Spoke to [employer] at [facility]. She states that patient still has small sore on his ankle that itchy. He is followed by [facility] and they are aware of the sore. Occasionally he will refuse medications but when that happens they call his daughter who is able to convince him to take them. [employer] states he is having usually a couple Bms a day but the aids usually change him so she didn't know exact count. Reports no red flag s/s at this time. [employer] states patient has no needs or concerns at this time. CTN to follow up ~7-10 days. AR Patients upcoming visits:  No future appointments.

## 2021-04-13 NOTE — ED NOTES
Report given to Florentino. RN was informed of patient chief complaint, current status, orders completed (to include IV access/medications/radiology testing), outstanding orders that still need to be completed, and the treatment plan. Ensured no questions or concerns regarding the patient prior to departure.

## 2021-04-13 NOTE — ED NOTES
Brought by ems from a group home for not producing very much urine in the last 24 hours (1500 mL in >50 out). Group home is [address], [phone], [place]. Pt states he does not walk at this time due to recent surgery on his right hip.

## 2021-04-13 NOTE — ED NOTES
Pt has voided several times and his brief has been changed due to saturation. Pt also had a large, soft bowel movement. His brief was changed for this as well.

## 2021-04-14 NOTE — ED NOTES
Niyah Armstrong MD reviewed discharge instructions with the patient. The patient verbalized understanding. All questions and concerns were addressed. The patient is discharged by wheelchair with instructions and prescriptions in hand. Pt is alert and oriented x 4. Respirations are clear and unlabored.

## 2021-04-14 NOTE — ED PROVIDER NOTES
EMERGENCY DEPARTMENT HISTORY AND PHYSICAL EXAM 
 
 
Date: 4/13/2021 Patient Name: Randy Stevenson Patient Age and Sex: 65 y.o. male History of Presenting Illness Chief Complaint Patient presents with  Urinary Retention  
  group home reports pt has had 1500 mL in and >50 mL out over the past 24 hours.  Leg Pain  
  pt complains of bilateral leg pain since last night History Provided By: Patient and Patient's Daughter Ability to gather history was limited by: HPI: Randy Stevenson, 65 y.o. male with history of liver cirrhosis status post TIPS procedure, hepatic encephalopathy, diabetes, hypertension, morbid obesity, sent from his nursing home for concerns for urinary retention. He is reportedly not urinated more than 50 mL over the past 24 hours. He does not have significant bladder pain or pressure symptoms. No reported dysuria. He did not mention bilateral leg pain to me which is mentioned in the triage note. No chest pain or shortness of breath or fevers. No abdominal or pelvic pain. Daughter feels like patient has been more confused than usual.  Nursing home recently increased his lactulose for concerns for hepatic encephalopathy. Location:   
Quality:     
Severity:   
Duration:  
Timing:     
Context:   
Modifying factors:  
Associated symptoms:  The patient's medical, surgical, family, and social history on file were reviewed by me today.  The family history was reviewed by me today and was non-contributory, unless otherwise specified. Past Medical History:  
Diagnosis Date  ADD (attention deficit disorder)  Adverse effect of anesthesia   
 motion sickness resulting in loss of balance  Anemia due to blood loss  Hinson's esophagus with esophagitis  Benign essential tremor  Cancer [facility] 2012 [facility]  Chronic pain  [facility] [address]  Depression  Dislocated hip ([facility]  DJD (degenerative joint disease) of hip   
 bilat  DM (diabetes mellitus) ([address] 3/17/2010  ED (erectile dysfunction) of organic origin  Esophageal varices determined by endoscopy ([address]  Fall on or from sidewalk curb 9/24/2015  Femur fracture ([address]  Gastritis and duodenitis  GERD (gastroesophageal reflux disease)   
 resolved after discontinuing diclofenac  Hematuria 6/2016  High cholesterol 03/17/2010  
 pt denies 6/19  
 HTN (hypertension) 3/17/2010  Morbid obesity ([address]  Murmur, cardiac 2016  
 PFO (patent foramen ovale) 7/26/2017  PUD (peptic ulcer disease)  Sepsis ([address]  Shingles 6/2016 RESOLVING- NO RASH (HEAD)  Sleep apnea   
 doesnt use CPAP anymore Past Surgical History:  
Procedure Laterality Date  COLONOSCOPY N/A 6/18/2019 COLONOSCOPY AND EGD performed by Nolberto Sullivan MD at [facility] ENDOSCOPY  COLONOSCOPY,DIAGNOSTIC  6/18/2019  ENDOSCOPY, COLON, DIAGNOSTIC    
 HX CATARACT REMOVAL Bilateral   
 HX CHOLECYSTECTOMY  1995  HX GI  1980  
 gastroplasty [address]  HX HIP REPLACEMENT Left  HX ORTHOPAEDIC Right 2011  
 rot cuff repair  HX ORTHOPAEDIC  2016  
 left broken femur  HX ORTHOPAEDIC Right 08/13/2020 Attempt closed reduction of hip dislocation [address]  HX VASCULAR ACCESS    
 picc line and removed after sepsis resolved  IR INSERT TIPS HEPATIC SHUNT  3/28/2020 [facility] ARTHROPLASTY  05/2010  
 right  TOTAL HIP ARTHROPLASTY  08/01/2016  
 left  UPPER GI ENDOSCOPY,BIOPSY  6/18/2019 PCP: Fredi Preston MD 
 
Past History Past Medical History: 
Past Medical History:  
Diagnosis Date  ADD (attention deficit disorder)  Adverse effect of anesthesia   
 motion sickness resulting in loss of balance  Anemia due to blood loss  Hinson's esophagus with esophagitis  Benign essential tremor  Cancer [facility]) 2012 [address]  Chronic pain  Cirrhosis ([address]  Depression  Dislocated hip ([address]  DJD (degenerative joint disease) of hip   
 bilat  DM (diabetes mellitus) ([address] 3/17/2010  ED (erectile dysfunction) of organic origin  Esophageal varices determined by endoscopy ([facility]  Fall on or from sidewalk curb 9/24/2015  Femur fracture ([address]  Gastritis and duodenitis  GERD (gastroesophageal reflux disease)   
 resolved after discontinuing diclofenac  Hematuria 6/2016  High cholesterol 03/17/2010  
 pt denies 6/19  
 HTN (hypertension) 3/17/2010  Morbid obesity ([address]  Murmur, cardiac 2016  
 PFO (patent foramen ovale) 7/26/2017  PUD (peptic ulcer disease)  Sepsis ([address]  Shingles 6/2016 RESOLVING- NO RASH (HEAD)  Sleep apnea   
 doesnt use CPAP anymore Past Surgical History: 
Past Surgical History:  
Procedure Laterality Date  COLONOSCOPY N/A 6/18/2019 COLONOSCOPY AND EGD performed by Emilee Hollis MD at [facility] ENDOSCOPY  COLONOSCOPY,DIAGNOSTIC  6/18/2019  ENDOSCOPY, COLON, DIAGNOSTIC    
 HX CATARACT REMOVAL Bilateral   
 HX CHOLECYSTECTOMY  1995  HX GI  1980  
 gastroplasty [address]  HX HIP REPLACEMENT Left  HX ORTHOPAEDIC Right 2011  
 rot cuff repair  HX ORTHOPAEDIC  2016  
 left broken femur  HX ORTHOPAEDIC Right 08/13/2020 Attempt closed reduction of hip dislocation [address]  HX VASCULAR ACCESS    
 picc line and removed after sepsis resolved  IR INSERT TIPS HEPATIC SHUNT  3/28/2020 [facility] ARTHROPLASTY  05/2010  
 right  TOTAL HIP ARTHROPLASTY  08/01/2016  
 left  UPPER GI ENDOSCOPY,BIOPSY  6/18/2019 Family History: 
Family History Problem Relation Age of Onset  Cancer Father   
     multiple myeloma  Stroke Father  Dementia Mother  No Known Problems Brother  COPD Brother  No Known Problems Daughter  Cancer Maternal Grandmother COLON  
 No Known Problems Son  No Known Problems Son  Anesth Problems Neg Hx Social History: 
Social History Tobacco Use  Smoking status: Former Smoker Packs/day: 2.00 Years: 15.00 Pack years: 30.00 Quit date: 3/1/1979 Years since quittin.1  Smokeless tobacco: Never Used Substance Use Topics  Alcohol use: No  
  Alcohol/week: 0.0 standard drinks  Drug use: No  
 
 
Allergies: Allergies Allergen Reactions  Nsaids (Non-Steroidal Anti-Inflammatory Drug) Other (comments) Hx of PUD and Hinson's Esophagus Current Medications: No current facility-administered medications on file prior to encounter. Current Outpatient Medications on File Prior to Encounter Medication Sig Dispense Refill  apixaban (ELIQUIS) 5 mg tablet Take 5 mg by mouth two (2) times a day.  sertraline (ZOLOFT) 50 mg tablet Take 150 mg by mouth daily.  meclizine (ANTIVERT) 25 mg tablet Take 25 mg by mouth two (2) times daily as needed for Dizziness.  oxyCODONE IR (ROXICODONE) 10 mg tab immediate release tablet Take 10 mg by mouth every six (6) hours as needed for Pain.  insulin glargine (Lantus Solostar U-100 Insulin) 100 unit/mL (3 mL) inpn 30 Units by SubCUTAneous route two (2) times a day.  tamsulosin (FLOMAX) 0.4 mg capsule Take 0.4 mg by mouth daily.  spironolactone (Aldactone) 25 mg tablet Take 25 mg by mouth daily.  lactulose (CHRONULAC) 10 gram/15 mL solution Take 30 mL by mouth two (2) times a day.  furosemide (LASIX) 20 mg tablet Take 20 mg by mouth daily.  Omeprazole delayed release (PRILOSEC D/R) 20 mg tablet Take 20 mg by mouth daily.  rifAXIMin (XIFAXAN) 550 mg tablet Take 1 Tab by mouth two (2) times a day. 180 Tab 3  potassium chloride SR (KLOR-CON 10) 10 mEq tablet Take 10 mEq by mouth two (2) times a day.  cephALEXin (KEFLEX) 500 mg capsule Take 1 Cap by mouth two (2) times a day. 30 Cap 6  ergocalciferol (VITAMIN D2) 50,000 unit capsule TAKE 1 CAPSULE EVERY 7 DAYS (Patient taking differently: Take 50,000 Units by mouth every seven (7) days. Takes on Thursdays) 12 Cap 2  
 atorvastatin (LIPITOR) 40 mg tablet Take 1 Tab by mouth daily. 90 Tab 3  primidone (MYSOLINE) 250 mg tablet TAKE 1 TABLET TWICE A  Tab 3  
 acetaminophen (TYLENOL) 500 mg tablet Take 2 Tabs by mouth every six (6) hours as needed for Pain. 30 Tab 0 Review of Systems  A complete ROS was reviewed today by me and was otherwise negative. Review of Systems Constitutional: Negative for fatigue and fever. Respiratory: Negative for shortness of breath. Cardiovascular: Negative for chest pain. Gastrointestinal: Negative for abdominal pain. Genitourinary: Positive for decreased urine volume. Negative for difficulty urinating and dysuria. Neurological: Negative for headaches. Psychiatric/Behavioral: Positive for confusion. All other systems reviewed and are negative. Physical Exam  
Vital Signs Patient Vitals for the past 8 hrs: 
 BP SpO2  
04/13/21 2030 (!) 167/66 100 % 04/13/21 1845  99 % 04/13/21 1830  100 % 04/13/21 1815  100 % 04/13/21 1800  100 % 04/13/21 1745  100 % 04/13/21 1730  100 % 04/13/21 1715  100 % 04/13/21 1710 (!) 167/63 100 % 04/13/21 1700  100 % 04/13/21 1645  100 % 04/13/21 1630  100 % 04/13/21 1620 (!) 158/75 100 % 04/13/21 1615  100 % 04/13/21 1600  100 % 04/13/21 1545  93 % 04/13/21 1530  100 % 04/13/21 1515  100 % Physical Exam 
Vitals signs and nursing note reviewed. Constitutional:   
   General: He is not in acute distress. Appearance: Normal appearance. He is well-developed. He is obese. He is not ill-appearing. HENT:  
   Head: Normocephalic and atraumatic. Eyes:  
   General:     
   Right eye: No discharge. Left eye: No discharge. Conjunctiva/sclera: Conjunctivae normal.  
Neck: Musculoskeletal: Normal range of motion and neck supple. Cardiovascular:  
   Rate and Rhythm: Normal rate and regular rhythm. Heart sounds: Normal heart sounds. No murmur. Pulmonary:  
   Effort: Pulmonary effort is normal. No respiratory distress. Breath sounds: Normal breath sounds. No wheezing. Abdominal:  
   General: There is no distension. Palpations: Abdomen is soft. Tenderness: There is no abdominal tenderness. Comments: Soft nontender. No palpably distended bladder Musculoskeletal: Normal range of motion. General: No deformity. Skin: 
   General: Skin is warm and dry. Findings: No rash. Neurological:  
   General: No focal deficit present. Mental Status: He is alert and oriented to person, place, and time. Comments: Alert and oriented, no significant encephalopathy Psychiatric:     
   Mood and Affect: Mood normal.     
   Behavior: Behavior normal.     
   Thought Content: Thought content normal.  
 
 
 
Diagnostic Study Results Labs Recent Results (from the past 24 hour(s)) CBC WITH AUTOMATED DIFF Collection Time: 04/13/21  3:37 PM  
Result Value Ref Range WBC 5.5 4.1 - 11.1 K/uL  
 RBC 3.26 (L) 4.10 - 5.70 M/uL  
 HGB 10.7 (L) 12.1 - 17.0 g/dL HCT 31.8 (L) 36.6 - 50.3 % MCV 97.5 80.0 - 99.0 FL  
 MCH 32.8 26.0 - 34.0 PG  
 MCHC 33.6 30.0 - 36.5 g/dL  
 RDW 14.9 (H) 11.5 - 14.5 % PLATELET 905 (L) 207 - 400 K/uL MPV 9.5 8.9 - 12.9 FL  
 NRBC 0.0 0  WBC ABSOLUTE NRBC 0.00 0.00 - 0.01 K/uL NEUTROPHILS 67 32 - 75 % LYMPHOCYTES 18 12 - 49 % MONOCYTES 10 5 - 13 % EOSINOPHILS 4 0 - 7 % BASOPHILS 1 0 - 1 % IMMATURE GRANULOCYTES 0 0.0 - 0.5 % ABS. NEUTROPHILS 3.7 1.8 - 8.0 K/UL  
 ABS. LYMPHOCYTES 1.0 0.8 - 3.5 K/UL  
 ABS. MONOCYTES 0.6 0.0 - 1.0 K/UL  
 ABS. EOSINOPHILS 0.2 0.0 - 0.4 K/UL  
 ABS. BASOPHILS 0.1 0.0 - 0.1 K/UL  
 ABS. IMM. GRANS. 0.0 0.00 - 0.04 K/UL  
 DF AUTOMATED METABOLIC PANEL, COMPREHENSIVE  Collection Time: 04/13/21  3:37 PM Result Value Ref Range Sodium 141 136 - 145 mmol/L Potassium 3.4 (L) 3.5 - 5.1 mmol/L Chloride 110 (H) 97 - 108 mmol/L  
 CO2 24 21 - 32 mmol/L Anion gap 7 5 - 15 mmol/L Glucose 160 (H) 65 - 100 mg/dL BUN 23 (H) 6 - 20 MG/DL Creatinine 0.96 0.70 - 1.30 MG/DL  
 BUN/Creatinine ratio 24 (H) 12 - 20 GFR est AA >60 >60 ml/min/1.73m2 GFR est non-AA >60 >60 ml/min/1.73m2 Calcium 8.4 (L) 8.5 - 10.1 MG/DL Bilirubin, total 1.0 0.2 - 1.0 MG/DL  
 ALT (SGPT) 27 12 - 78 U/L  
 AST (SGOT) 34 15 - 37 U/L Alk. phosphatase 110 45 - 117 U/L Protein, total 6.2 (L) 6.4 - 8.2 g/dL Albumin 2.5 (L) 3.5 - 5.0 g/dL Globulin 3.7 2.0 - 4.0 g/dL A-G Ratio 0.7 (L) 1.1 - 2.2 AMMONIA Collection Time: 04/13/21  3:37 PM  
Result Value Ref Range Ammonia 73 (H) <32 UMOL/L  
URINALYSIS W/ RFLX MICROSCOPIC Collection Time: 04/13/21  4:49 PM  
Result Value Ref Range Color YELLOW/STRAW Appearance CLOUDY (A) CLEAR Specific gravity 1.010 1.003 - 1.030    
 pH (UA) 5.0 5.0 - 8.0 Protein Negative NEG mg/dL Glucose Negative NEG mg/dL Ketone Negative NEG mg/dL Bilirubin Negative NEG Blood Negative NEG Urobilinogen 0.2 0.2 - 1.0 EU/dL Nitrites Negative NEG Leukocyte Esterase SMALL (A) NEG    
 WBC 0-4 0 - 4 /hpf  
 RBC 0-5 0 - 5 /hpf Epithelial cells FEW FEW /lpf Bacteria Negative NEG /hpf Mucus TRACE (A) NEG /lpf Radiologic Studies No orders to display CT Results  (Last 48 hours) None CXR Results  (Last 48 hours) None Billable Procedures Procedures Cardiac monitoring was not ordered for this patient. Medical Decision Making I reviewed the patient's most recent Emergency Dept notes and diagnostic tests in formulating my MDM on today's visit.  
 
Provider Notes (Medical Decision Making):  
73-year-old male with multiple medical comorbidities, with some concern for decreased urine output since yesterday. Also a bit more confused than usual. 
 
On my examination he is alert and oriented. Abdomen is soft and nontender, no palpable bladder distention. Bedside ultrasound demonstrates bladder volume of approximately 100 to 150 mL. Reassuring vital signs, afebrile. Laboratories are all reassuring. His only significant finding of note is ammonia level of 73. He does not have clinically significant hepatic encephalopathy. He does seem mildly dehydrated, and he urinated about 400 mL of urine after administration of normal saline. I think he is simply over diuresed and dehydrated due to commendation of furosemide, spironolactone, and increased lactulose. He is stable for discharge back to his nursing home, continue 3 times daily lactulose, try to increase p.o. fluid intake. Linnell Hammans, MD 
11:04 PM 
 
Consults: 
 
Social History Tobacco Use  Smoking status: Former Smoker Packs/day: 2.00 Years: 15.00 Pack years: 30.00 Quit date: 3/1/1979 Years since quittin.1  Smokeless tobacco: Never Used Substance Use Topics  Alcohol use: No  
  Alcohol/week: 0.0 standard drinks  Drug use: No  
 
Patient Vitals for the past 4 hrs: 
 BP SpO2  
21 2030 (!) 167/66 100 % Prescriptions from today's ED visit: 
Discharge Medication List as of 2021  8:13 PM  
  
  
 
Medications Administered during ED course: 
Medications  
0.9% sodium chloride infusion 500 mL (has no administration in time range) 0.9% sodium chloride infusion 500 mL (0 mL IntraVENous IV Completed 21 1650) Diagnosis and Disposition Disposition:  Discharged Clinical Impression: 1. Mild dehydration 2. Increased ammonia level Attestation: 
I personally performed the services described in this documentation on this date 2021 for patient Alfonzo Seals. Linnell Hammans, MD 
 
 
 
I was the first provider for this patient on this visit.   To the best of my ability I reviewed relevant prior medical records, electrocardiograms, laboratories, and radiologic studies. The patient's presenting problems were discussed, and the patient was in agreement with the care plan formulated and outlined with them. Farrah Starr MD 
 
Please note that this dictation was completed with Dragon voice recognition software. Quite often unanticipated grammatical, syntax, homophones, and other interpretive errors are inadvertently transcribed by the computer software. Please disregard these errors and excuse any errors that have escaped final proofreading.

## 2021-05-01 NOTE — ED NOTES
Pt arrives via EMS from [address] [place] [address]. Per EMS, pt has not been eating well at the facility and had bowel issues and not going like he use to, Per nurses at the facility, he has the same nurses and started not recognizing them and started with confusion the last couple of days. \"more confused than usual\". Pt denies cp, sob, n/v/d, fevers, chills    Pt is a/o x2 (no age), pt resting in bed with call bell within reach    2030: pt in ct at this time. 2150: This nurse spoke to pts daughter Asher Szymanski), per daughter, at the facility they doubled his dose of lactulose with no BM for a couple days. (Pt had TIPs procedure done March 2020). 2220: pt had a BM at this time, this nurse cleaned pt up and changed sheets for comfort care. Pt ripped out IV \"by accident\", Rachel Lema at bedside giving another IV.    0001: this nurse gave report to Sarah Alan at [facility] at this time with d/c information and [facility] ETA. 0010: This nurse called [facility] at this time for pt transport home    [phone]: pt resting in bed at this time with call bell within reach. Pt a/o x4 at this time.

## 2021-05-01 NOTE — ED PROVIDER NOTES
EMERGENCY DEPARTMENT HISTORY AND PHYSICAL EXAM 
 
 
Date: 5/1/2021 Patient Name: Enrike Bai History of Presenting Illness Chief Complaint Patient presents with  Altered mental status History Provided By: Patient HPI: Enrike Bai, 65 y.o. male with PMHx significant for cirrhosis, hepatic encephalopathy, status post TIPS procedure, chronic pain, GERD, bedbound at baseline, who presents with concerns for increased confusion in his facility. Patient has apparently had decreased p.o. intake for last several days and has not been having as many bowel movements. Facility reports he does have periodic confusion but has not been recognizing people that he typically would recognize and this is a change. Patient himself has no complaints but states as \"I think I am a little confused. \"  He is able to answer my orientation questions appropriately. Patient denies any chest pain shortness of breath, nausea, vomiting. PCP: Alcon Nielson MD 
 
There are no other complaints, changes, or physical findings at this time. Current Outpatient Medications Medication Sig Dispense Refill  apixaban (ELIQUIS) 5 mg tablet Take 5 mg by mouth two (2) times a day.  sertraline (ZOLOFT) 50 mg tablet Take 150 mg by mouth daily.  meclizine (ANTIVERT) 25 mg tablet Take 25 mg by mouth two (2) times daily as needed for Dizziness.  oxyCODONE IR (ROXICODONE) 10 mg tab immediate release tablet Take 10 mg by mouth every six (6) hours as needed for Pain.  insulin glargine (Lantus Solostar U-100 Insulin) 100 unit/mL (3 mL) inpn 30 Units by SubCUTAneous route two (2) times a day.  tamsulosin (FLOMAX) 0.4 mg capsule Take 0.4 mg by mouth daily.  spironolactone (Aldactone) 25 mg tablet Take 25 mg by mouth daily.  lactulose (CHRONULAC) 10 gram/15 mL solution Take 30 mL by mouth two (2) times a day.  furosemide (LASIX) 20 mg tablet Take 20 mg by mouth daily.     
 Omeprazole delayed release (PRILOSEC D/R) 20 mg tablet Take 20 mg by mouth daily.  rifAXIMin (XIFAXAN) 550 mg tablet Take 1 Tab by mouth two (2) times a day. 180 Tab 3  potassium chloride SR (KLOR-CON 10) 10 mEq tablet Take 10 mEq by mouth two (2) times a day.  cephALEXin (KEFLEX) 500 mg capsule Take 1 Cap by mouth two (2) times a day. 30 Cap 6  ergocalciferol (VITAMIN D2) 50,000 unit capsule TAKE 1 CAPSULE EVERY 7 DAYS (Patient taking differently: Take 50,000 Units by mouth every seven (7) days. Takes on Thursdays) 12 Cap 2  
 atorvastatin (LIPITOR) 40 mg tablet Take 1 Tab by mouth daily. 90 Tab 3  primidone (MYSOLINE) 250 mg tablet TAKE 1 TABLET TWICE A  Tab 3  
 acetaminophen (TYLENOL) 500 mg tablet Take 2 Tabs by mouth every six (6) hours as needed for Pain. 30 Tab 0 Past History Past Medical History: 
Past Medical History:  
Diagnosis Date  ADD (attention deficit disorder)  Adverse effect of anesthesia   
 motion sickness resulting in loss of balance  Anemia due to blood loss  Hinson's esophagus with esophagitis  Benign essential tremor  Cancer [facility]) 2012 [facility]  Chronic pain  Cirrhosis ([address]  Depression  Dislocated hip ([address]  DJD (degenerative joint disease) of hip   
 bilat  DM (diabetes mellitus) ([address] 3/17/2010  ED (erectile dysfunction) of organic origin  Esophageal varices determined by endoscopy ([address]  Fall on or from sidewalk curb 9/24/2015  Femur fracture ([address]  Gastritis and duodenitis  GERD (gastroesophageal reflux disease)   
 resolved after discontinuing diclofenac  Hematuria 6/2016  High cholesterol 03/17/2010  
 pt denies 6/19  
 HTN (hypertension) 3/17/2010  Morbid obesity ([address]  Murmur, cardiac 2016  
 PFO (patent foramen ovale) 7/26/2017  PUD (peptic ulcer disease)  Sepsis ([address]  Shingles 6/2016 RESOLVING- NO RASH (HEAD)  Sleep apnea   
 doesnt use CPAP anymore Past Surgical History: 
Past Surgical History:  
Procedure Laterality Date  COLONOSCOPY N/A 2019 COLONOSCOPY AND EGD performed by Kayleen Patel MD [facility]  COLONOSCOPY,DIAGNOSTIC  2019  ENDOSCOPY, COLON, DIAGNOSTIC    
 HX CATARACT REMOVAL Bilateral   
 HX CHOLECYSTECTOMY    HX GI  1980  
 gastroplasty [address]  HX HIP REPLACEMENT Left  HX ORTHOPAEDIC Right   
 rot cuff repair  HX ORTHOPAEDIC  2016  
 left broken femur  HX ORTHOPAEDIC Right 2020 Attempt closed reduction of hip dislocation [address]  HX VASCULAR ACCESS    
 picc line and removed after sepsis resolved  IR INSERT TIPS HEPATIC SHUNT  3/28/2020 [facility] ARTHROPLASTY  2010  
 right  TOTAL HIP ARTHROPLASTY  2016  
 left  UPPER GI ENDOSCOPY,BIOPSY  2019 Family History: 
Family History Problem Relation Age of Onset  Cancer Father   
     multiple myeloma  Stroke Father  Dementia Mother  No Known Problems Brother  COPD Brother  No Known Problems Daughter  Cancer Maternal Grandmother COLON  
 No Known Problems Son  No Known Problems Son  Anesth Problems Neg Hx Social History: 
Social History Tobacco Use  Smoking status: Former Smoker Packs/day: 2.00 Years: 15.00 Pack years: 30.00 Quit date: 3/1/1979 Years since quittin.2  Smokeless tobacco: Never Used Substance Use Topics  Alcohol use: No  
  Alcohol/week: 0.0 standard drinks  Drug use: No  
 
Allergies: Allergies Allergen Reactions  Nsaids (Non-Steroidal Anti-Inflammatory Drug) Other (comments) Hx of PUD and Hinson's Esophagus Review of Systems Review of Systems Constitutional: Negative for chills and fever. HENT: Negative for congestion, rhinorrhea and sore throat. Respiratory: Negative for cough and shortness of breath.    
Cardiovascular: Negative for chest pain. Gastrointestinal: Negative for abdominal pain, nausea and vomiting. Genitourinary: Negative for dysuria and urgency. Skin: Negative for rash. Neurological: Negative for dizziness, light-headedness and headaches. Psychiatric/Behavioral: Positive for confusion. All other systems reviewed and are negative. Physical Exam  
Physical Exam 
Vitals signs and nursing note reviewed. Constitutional:   
   General: He is not in acute distress. Appearance: He is well-developed. HENT:  
   Head: Normocephalic and atraumatic. Eyes:  
   Conjunctiva/sclera: Conjunctivae normal.  
   Pupils: Pupils are equal, round, and reactive to light. Neck: Musculoskeletal: Normal range of motion. Cardiovascular:  
   Rate and Rhythm: Normal rate and regular rhythm. Pulmonary:  
   Effort: Pulmonary effort is normal. No respiratory distress. Breath sounds: Normal breath sounds. No stridor. Abdominal:  
   General: There is no distension. Palpations: Abdomen is soft. Tenderness: There is no abdominal tenderness. Musculoskeletal: Normal range of motion. Skin: 
   General: Skin is warm and dry. Neurological:  
   Mental Status: He is alert and oriented to person, place, and time. Diagnostic Study Results Labs - Recent Results (from the past 12 hour(s)) CBC WITH AUTOMATED DIFF Collection Time: 05/01/21  7:37 PM  
Result Value Ref Range WBC 6.6 4.1 - 11.1 K/uL  
 RBC 3.28 (L) 4.10 - 5.70 M/uL  
 HGB 11.1 (L) 12.1 - 17.0 g/dL HCT 32.5 (L) 36.6 - 50.3 % MCV 99.1 (H) 80.0 - 99.0 FL  
 MCH 33.8 26.0 - 34.0 PG  
 MCHC 34.2 30.0 - 36.5 g/dL  
 RDW 14.6 (H) 11.5 - 14.5 % PLATELET 331 743 - 798 K/uL MPV 9.8 8.9 - 12.9 FL  
 NRBC 0.0 0  WBC ABSOLUTE NRBC 0.00 0.00 - 0.01 K/uL NEUTROPHILS 73 32 - 75 % LYMPHOCYTES 15 12 - 49 % MONOCYTES 8 5 - 13 % EOSINOPHILS 3 0 - 7 % BASOPHILS 1 0 - 1 % IMMATURE GRANULOCYTES 0 0.0 - 0.5 % ABS. NEUTROPHILS 4.8 1.8 - 8.0 K/UL  
 ABS. LYMPHOCYTES 1.0 0.8 - 3.5 K/UL  
 ABS. MONOCYTES 0.6 0.0 - 1.0 K/UL  
 ABS. EOSINOPHILS 0.2 0.0 - 0.4 K/UL  
 ABS. BASOPHILS 0.1 0.0 - 0.1 K/UL  
 ABS. IMM. GRANS. 0.0 0.00 - 0.04 K/UL  
 DF AUTOMATED METABOLIC PANEL, COMPREHENSIVE Collection Time: 05/01/21  7:37 PM  
Result Value Ref Range Sodium 141 136 - 145 mmol/L Potassium 3.9 3.5 - 5.1 mmol/L Chloride 112 (H) 97 - 108 mmol/L  
 CO2 27 21 - 32 mmol/L Anion gap 2 (L) 5 - 15 mmol/L Glucose 167 (H) 65 - 100 mg/dL BUN 17 6 - 20 MG/DL Creatinine 0.94 0.70 - 1.30 MG/DL  
 BUN/Creatinine ratio 18 12 - 20 GFR est AA >60 >60 ml/min/1.73m2 GFR est non-AA >60 >60 ml/min/1.73m2 Calcium 8.4 (L) 8.5 - 10.1 MG/DL Bilirubin, total 1.3 (H) 0.2 - 1.0 MG/DL  
 ALT (SGPT) 28 12 - 78 U/L  
 AST (SGOT) 25 15 - 37 U/L Alk. phosphatase 96 45 - 117 U/L Protein, total 6.1 (L) 6.4 - 8.2 g/dL Albumin 2.3 (L) 3.5 - 5.0 g/dL Globulin 3.8 2.0 - 4.0 g/dL A-G Ratio 0.6 (L) 1.1 - 2.2 AMMONIA Collection Time: 05/01/21  8:37 PM  
Result Value Ref Range Ammonia 75 (H) <32 UMOL/L  
URINALYSIS W/ REFLEX CULTURE Collection Time: 05/01/21 10:21 PM  
 Specimen: Urine Result Value Ref Range Color YELLOW/STRAW Appearance CLEAR CLEAR Specific gravity 1.024 1.003 - 1.030    
 pH (UA) 6.0 5.0 - 8.0 Protein Negative NEG mg/dL Glucose Negative NEG mg/dL Ketone Negative NEG mg/dL Bilirubin Negative NEG Blood Negative NEG Urobilinogen 1.0 0.2 - 1.0 EU/dL Nitrites Negative NEG Leukocyte Esterase Negative NEG    
 WBC 0-4 0 - 4 /hpf  
 RBC 0-5 0 - 5 /hpf Epithelial cells FEW FEW /lpf Bacteria Negative NEG /hpf  
 UA:UC IF INDICATED CULTURE NOT INDICATED BY UA RESULT CNI Mucus TRACE (A) NEG /lpf Radiologic Studies -  
CT ABD PELV W CONT Final Result 1. No acute abnormality in the abdomen or pelvis.  Stable cirrhotic liver  
morphology with tips and splenomegaly. 2. Partially visualized collection in the right inguinal soft tissues. CT HEAD WO CONT Final Result No acute intracranial abnormality. XR CHEST PORT Final Result No acute process. Ct Head Wo Cont Result Date: 5/1/2021 No acute intracranial abnormality. Ct Abd Pelv W Cont Result Date: 5/1/2021 1. No acute abnormality in the abdomen or pelvis. Stable cirrhotic liver morphology with tips and splenomegaly. 2. Partially visualized collection in the right inguinal soft tissues. Xr Chest Rheta Frohlich Result Date: 5/1/2021 No acute process. Medical Decision Making I am the first provider for this patient. I reviewed the vital signs, available nursing notes, past medical history, past surgical history, family history and social history. Vital Signs-Reviewed the patient's vital signs. Patient Vitals for the past 12 hrs: 
 Temp Pulse Resp BP SpO2  
05/01/21 2116     94 % 05/01/21 2115  71 11 134/79 (!) 85 % 05/01/21 1929 98.1 °F (36.7 °C) 66 15 (!) 129/45 100 % Pulse Oximetry Analysis - 95% on ra Records Reviewed: Nursing Notes and Old Medical Records Provider Notes (Medical Decision Making):  
Patient presents with concerns for increased confusion from his nursing facility. On my evaluation he is overall well-appearing with stable vital signs. Will check basic lab work, urinalysis, ammonia level, CT head. ED Course:  
Initial assessment performed. The patients presenting problems have been discussed, and they are in agreement with the care plan formulated and outlined with them. I have encouraged them to ask questions as they arise throughout their visit. Lab work returned with an elevated ammonia level at 75, however on review of patient's previous ammonia levels this seems to be chronic. I did check a CT scan of patient's abdomen after reports of decreased bowel movements, however this is unremarkable. CT did show an incidental finding of a collection in the right inguinal area. I examined the patient's inguinal area and I do not see any signs of abscess or cellulitis congruent with the finding on CAT scan. Notably, patient had hypoxic vital signs documented, however I feel these are aberrant secondary to poor waveform as the patient had normal oxygenation on my evaluation with waveform was normal. 
 
ED Course as of May 02 0027 Sat May 01, 2021  
2157 Per daughter, pt had a TIPs last year, no BM in couple days despite double the lactulose  
 [JAN] 2334 Patient had a BM in the ED [facility] ED Course User Index Radha Duran MD  
 
 
Procedures: 
Procedures Critical Care: 
none Disposition: 
Discharge Note: The patient has been re-evaluated and is ready for discharge. Reviewed available results with patient. Counseled patient on diagnosis and care plan. Patient has expressed understanding, and all questions have been answered. Patient agrees with plan and agrees to follow up as recommended, or to return to the ED if their symptoms worsen. Discharge instructions have been provided and explained to the patient, along with reasons to return to the ED. PLAN: 
1. Current Discharge Medication List  
  
 
2. Follow-up Information Follow up With Specialties Details Why Contact Info Ju Oconnor MD Family Medicine Schedule an appointment as soon as possible for a visit   [address] [facility] [address] [id number] [phone] [facility] EMERGENCY DEPT Emergency Medicine  As needed, If symptoms worsen [address] [facility] 
[phone] Return to ED if worse Diagnosis Clinical Impression:  
1. Increased ammonia level Please note that this dictation was completed with Blaze, the PostedIn voice recognition software.   Quite often unanticipated grammatical, syntax, homophones, and other interpretive errors are inadvertently transcribed by the computer software. Please disregard these errors. Please excuse any errors that have escaped final proofreading

## 2021-05-02 NOTE — ED NOTES
Morenita GANDHI reviewed discharge instructions with the patient. The patient verbalized understanding. All questions and concerns were addressed. Pt is alert and oriented x 4. Respirations are clear and unlabored.

## 2021-06-20 NOTE — ED NOTES
This nurse assumed care of pt at this time. Pt in CT at this time. 1940: This nurse spoke to daughter of pt with update at this time.

## 2021-06-20 NOTE — DISCHARGE INSTRUCTIONS
You were seen for drowsiness, being less responsive. While here you received some fluids and we monitored your status. Your lab work, urinalysis, CT head, chest x-ray all came back negative. While you were here, your mental status significantly improved and went back to baseline.

## 2021-06-20 NOTE — ED PROVIDER NOTES
EMERGENCY DEPARTMENT HISTORY AND PHYSICAL EXAM 
 
 
Date: 6/20/2021 Patient Name: Haley Vaughan Patient Age and Sex: 65 y.o. male History of Presenting Illness Chief Complaint Patient presents with  Altered mental status EMS called by group home for pt with decreased PO intake and not responding normally. History Provided By: Patient, ems HPI: Haley Vaughan a 68-year-old male with a history of mobility issues at a group home presenting for altered mental status. According to EMS patient went to a family function yesterday and when he came back was kind of red in the face. Then today he has been less responsive. Normally he is conversational but today he was not making very much conversation. Answering in short sentences. Glucose was normal and vitals were normal per EMS. He has been denying any pain. There are no other complaints, changes, or physical findings at this time. PCP: Uriel Camarena MD 
 
No current facility-administered medications on file prior to encounter. Current Outpatient Medications on File Prior to Encounter Medication Sig Dispense Refill  apixaban (ELIQUIS) 5 mg tablet Take 5 mg by mouth two (2) times a day.  sertraline (ZOLOFT) 50 mg tablet Take 150 mg by mouth daily.  meclizine (ANTIVERT) 25 mg tablet Take 25 mg by mouth two (2) times daily as needed for Dizziness.  oxyCODONE IR (ROXICODONE) 10 mg tab immediate release tablet Take 10 mg by mouth every six (6) hours as needed for Pain.  insulin glargine (Lantus Solostar U-100 Insulin) 100 unit/mL (3 mL) inpn 30 Units by SubCUTAneous route two (2) times a day.  tamsulosin (FLOMAX) 0.4 mg capsule Take 0.4 mg by mouth daily.  spironolactone (Aldactone) 25 mg tablet Take 25 mg by mouth daily.  lactulose (CHRONULAC) 10 gram/15 mL solution Take 30 mL by mouth two (2) times a day.  furosemide (LASIX) 20 mg tablet Take 20 mg by mouth daily.     
 Omeprazole delayed release (PRILOSEC D/R) 20 mg tablet Take 20 mg by mouth daily.  rifAXIMin (XIFAXAN) 550 mg tablet Take 1 Tab by mouth two (2) times a day. 180 Tab 3  potassium chloride SR (KLOR-CON 10) 10 mEq tablet Take 10 mEq by mouth two (2) times a day.  cephALEXin (KEFLEX) 500 mg capsule Take 1 Cap by mouth two (2) times a day. 30 Cap 6  ergocalciferol (VITAMIN D2) 50,000 unit capsule TAKE 1 CAPSULE EVERY 7 DAYS (Patient taking differently: Take 50,000 Units by mouth every seven (7) days. Takes on Thursdays) 12 Cap 2  
 atorvastatin (LIPITOR) 40 mg tablet Take 1 Tab by mouth daily. 90 Tab 3  primidone (MYSOLINE) 250 mg tablet TAKE 1 TABLET TWICE A  Tab 3  
 acetaminophen (TYLENOL) 500 mg tablet Take 2 Tabs by mouth every six (6) hours as needed for Pain. 30 Tab 0 Past History Past Medical History: 
Past Medical History:  
Diagnosis Date  ADD (attention deficit disorder)  Adverse effect of anesthesia   
 motion sickness resulting in loss of balance  Anemia due to blood loss  Hinson's esophagus with esophagitis  Benign essential tremor  Cancer [facility]) 2012 [address]  Chronic pain  Cirrhosis ([address]  Depression  Dislocated hip ([address]  DJD (degenerative joint disease) of hip   
 bilat  DM (diabetes mellitus) ([address] 3/17/2010  ED (erectile dysfunction) of organic origin  Esophageal varices determined by endoscopy ([address]  Fall on or from sidewalk curb 9/24/2015  Femur fracture ([address]  Gastritis and duodenitis  GERD (gastroesophageal reflux disease)   
 resolved after discontinuing diclofenac  Hematuria 6/2016  High cholesterol 03/17/2010  
 pt denies 6/19  
 HTN (hypertension) 3/17/2010  Morbid obesity ([address]  Murmur, cardiac 2016  
 PFO (patent foramen ovale) 7/26/2017  PUD (peptic ulcer disease)  Sepsis ([address]  Shingles 6/2016 RESOLVING- NO RASH (HEAD)  Sleep apnea   
 doesnt use CPAP anymore Past Surgical History: 
Past Surgical History:  
Procedure Laterality Date  COLONOSCOPY N/A 2019 COLONOSCOPY AND EGD performed by Helen Mchugh MD at [facility] ENDOSCOPY  COLONOSCOPY,DIAGNOSTIC  2019  ENDOSCOPY, COLON, DIAGNOSTIC    
 HX CATARACT REMOVAL Bilateral   
 HX CHOLECYSTECTOMY    HX GI  1980  
 gastroplasty [address]  HX HIP REPLACEMENT Left  HX ORTHOPAEDIC Right   
 rot cuff repair  HX ORTHOPAEDIC  2016  
 left broken femur  HX ORTHOPAEDIC Right 2020 Attempt closed reduction of hip dislocation [address]  HX VASCULAR ACCESS    
 picc line and removed after sepsis resolved  IR INSERT TIPS HEPATIC SHUNT  3/28/2020  [place] TOTAL HIP ARTHROPLASTY  2010  
 right  [place] TOTAL HIP ARTHROPLASTY  2016  
 left  UPPER GI ENDOSCOPY,BIOPSY  2019 Family History: 
Family History Problem Relation Age of Onset  Cancer Father   
     multiple myeloma  Stroke Father  Dementia Mother  No Known Problems Brother  COPD Brother  No Known Problems Daughter  Cancer Maternal Grandmother COLON  
 No Known Problems Son  No Known Problems Son  Anesth Problems Neg Hx Social History: 
Social History Tobacco Use  Smoking status: Former Smoker Packs/day: 2.00 Years: 15.00 Pack years: 30.00 Quit date: 3/1/1979 Years since quittin.3  Smokeless tobacco: Never Used Substance Use Topics  Alcohol use: No  
  Alcohol/week: 0.0 standard drinks  Drug use: No  
 
 
Allergies: Allergies Allergen Reactions  Nsaids (Non-Steroidal Anti-Inflammatory Drug) Other (comments) Hx of PUD and Hinson's Esophagus Review of Systems Review of Systems Constitutional: Negative for chills and fever. Respiratory: Negative for cough and shortness of breath. Cardiovascular: Negative for chest pain.   
Gastrointestinal: Negative for abdominal pain, constipation, diarrhea, nausea and vomiting. Genitourinary: Negative for dysuria, frequency and hematuria. Neurological: Negative for weakness and numbness. Psychiatric/Behavioral: Positive for confusion. Negative for sleep disturbance. Patient alert stating no pain or nausea All other systems reviewed and are negative. Physical Exam  
Physical Exam 
Vitals and nursing note reviewed. Constitutional:   
   Appearance: He is well-developed. HENT:  
   Head: Normocephalic and atraumatic. Nose: Nose normal.  
   Mouth/Throat:  
   Mouth: Mucous membranes are dry. Eyes:  
   Extraocular Movements: Extraocular movements intact. Conjunctiva/sclera: Conjunctivae normal.  
Cardiovascular:  
   Rate and Rhythm: Normal rate and regular rhythm. Pulmonary:  
   Effort: Pulmonary effort is normal. No respiratory distress. Breath sounds: Normal breath sounds. Abdominal:  
   General: There is no distension. Palpations: Abdomen is soft. Tenderness: There is no abdominal tenderness. Musculoskeletal:     
   General: Normal range of motion. Cervical back: Normal range of motion and neck supple. Skin: 
   General: Skin is warm and dry. Neurological:  
   General: No focal deficit present. Mental Status: He is alert. Comments: Patient is drowsy and his skin is warm to touch. He is however answering my questions and following all my commands. 4-5 strength in all 4 extremities. Psychiatric:     
   Mood and Affect: Mood normal.  
 
  
 
Diagnostic Study Results Labs - Recent Results (from the past 12 hour(s)) CBC WITH AUTOMATED DIFF Collection Time: 06/20/21  5:45 PM  
Result Value Ref Range WBC 4.0 (L) 4.1 - 11.1 K/uL  
 RBC 3.20 (L) 4.10 - 5.70 M/uL  
 HGB 10.6 (L) 12.1 - 17.0 g/dL HCT 31.6 (L) 36.6 - 50.3 %  MCV 98.8 80.0 - 99.0 FL  
 MCH 33.1 26.0 - 34.0 PG  
 MCHC 33.5 30.0 - 36.5 g/dL  
 RDW 14.6 (H) 11.5 - 14.5 % PLATELET 113 (L) 491 - 400 K/uL MPV 9.6 8.9 - 12.9 FL  
 NRBC 0.0 0  WBC ABSOLUTE NRBC 0.00 0.00 - 0.01 K/uL NEUTROPHILS 66 32 - 75 % LYMPHOCYTES 21 12 - 49 % MONOCYTES 8 5 - 13 % EOSINOPHILS 4 0 - 7 % BASOPHILS 1 0 - 1 % IMMATURE GRANULOCYTES 0 0.0 - 0.5 % ABS. NEUTROPHILS 2.7 1.8 - 8.0 K/UL  
 ABS. LYMPHOCYTES 0.8 0.8 - 3.5 K/UL  
 ABS. MONOCYTES 0.3 0.0 - 1.0 K/UL  
 ABS. EOSINOPHILS 0.2 0.0 - 0.4 K/UL  
 ABS. BASOPHILS 0.0 0.0 - 0.1 K/UL  
 ABS. IMM. GRANS. 0.0 0.00 - 0.04 K/UL  
 DF AUTOMATED METABOLIC PANEL, COMPREHENSIVE Collection Time: 06/20/21  5:45 PM  
Result Value Ref Range Sodium 142 136 - 145 mmol/L Potassium 4.0 3.5 - 5.1 mmol/L Chloride 111 (H) 97 - 108 mmol/L  
 CO2 24 21 - 32 mmol/L Anion gap 7 5 - 15 mmol/L Glucose 136 (H) 65 - 100 mg/dL BUN 15 6 - 20 MG/DL Creatinine 0.79 0.70 - 1.30 MG/DL  
 BUN/Creatinine ratio 19 12 - 20 GFR est AA >60 >60 ml/min/1.73m2 GFR est non-AA >60 >60 ml/min/1.73m2 Calcium 8.5 8.5 - 10.1 MG/DL Bilirubin, total 1.2 (H) 0.2 - 1.0 MG/DL  
 ALT (SGPT) 24 12 - 78 U/L  
 AST (SGOT) 27 15 - 37 U/L Alk. phosphatase 92 45 - 117 U/L Protein, total 6.2 (L) 6.4 - 8.2 g/dL Albumin 2.6 (L) 3.5 - 5.0 g/dL Globulin 3.6 2.0 - 4.0 g/dL A-G Ratio 0.7 (L) 1.1 - 2.2 URINALYSIS W/ REFLEX CULTURE Collection Time: 06/20/21  6:55 PM  
 Specimen: Urine Result Value Ref Range Color YELLOW/STRAW Appearance CLEAR CLEAR Specific gravity 1.014 1.003 - 1.030    
 pH (UA) 5.5 5.0 - 8.0 Protein Negative NEG mg/dL Glucose Negative NEG mg/dL Ketone Negative NEG mg/dL Bilirubin Negative NEG Blood Negative NEG Urobilinogen 0.2 0.2 - 1.0 EU/dL Nitrites Negative NEG Leukocyte Esterase Negative NEG    
 WBC 0-4 0 - 4 /hpf  
 RBC 0-5 0 - 5 /hpf Epithelial cells FEW FEW /lpf  Bacteria Negative NEG /hpf  
 UA:UC IF INDICATED CULTURE NOT INDICATED BY UA RESULT CNI Hyaline cast 0-2 0 - 5 /lpf Radiologic Studies -  
CT HEAD WO CONT Final Result 1. No evidence of acute intracranial abnormality. XR CHEST PORT Final Result No acute abnormality CT Results  (Last 48 hours) 06/20/21 1931  CT HEAD WO CONT Final result Impression:  1. No evidence of acute intracranial abnormality. Narrative:  EXAM:  CT HEAD WO CONT INDICATION:   AMS  
   
COMPARISON: CT head 5/1/2021. TECHNIQUE: Unenhanced CT of the head was performed using 5 mm images. Brain and  
bone windows were generated. CT dose reduction was achieved through use of a  
standardized protocol tailored for this examination and automatic exposure  
control for dose modulation. FINDINGS:  
The ventricles are normal in size and position. Basilar cisterns are patent. No  
midline shift. There is no evidence of acute infarct, hemorrhage, or extraaxial  
fluid collection. The paranasal sinuses, mastoid air cells, and middle ears are clear. The orbital  
contents are within normal limits with bilateral lens implants. There are no  
significant osseous or extracranial soft tissue lesions. CXR Results  (Last 48 hours) 06/20/21 1903  XR CHEST PORT Final result Impression:  No acute abnormality Narrative:  EXAM:  XR CHEST PORT INDICATION:  ams COMPARISON:  5/1/2021 FINDINGS: A portable AP radiograph of the chest was obtained at 1848 hours. The  
patient is on a cardiac monitor. The lungs are clear. Heart size is borderline  
enlarged. .  The bones and soft tissues are grossly within normal limits. Medical Decision Making I am the first provider for this patient. I reviewed the vital signs, available nursing notes, past medical history, past surgical history, family history and social history. Vital Signs-Reviewed the patient's vital signs. Patient Vitals for the past 12 hrs: 
 Temp Pulse Resp BP SpO2  
06/20/21 2130  75 16 (!) 156/62 100 % 06/20/21 2045  74 20 (!) 148/71 100 % 06/20/21 2000  68 15 (!) 132/49 100 % 06/20/21 1915  67 15 (!) 150/53 100 % 06/20/21 1907 97.7 °F (36.5 °C) 67 14 (!) 156/53 99 % 06/20/21 1743 98.5 °F (36.9 °C) 70 14 118/62 100 % Records Reviewed: Nursing Notes and Old Medical Records Provider Notes (Medical Decision Making):  
Patient presenting with altered mental status. DDx: medication toxicity, infection, anemia, electrolyte/metabolic anomoly, hypercapnea, stroke/bleed/mass, dehydration, illicit drug intoxication. Will obtain labwork, UA, EKG and imaging. ED Course:  
Initial assessment performed. The patients presenting problems have been discussed, and they are in agreement with the care plan formulated and outlined with them. I have encouraged them to ask questions as they arise throughout their visit. ED Course as of Jun 20 2204 Wilfredo Boo Jun 20, 2021 1951 Reassessed patient and his mental status is gone back to normal.  He is alert, answering all my questions appropriately no longer drowsy. Back to baseline. His chest x-ray, CT head and labs all came back negative. Safe to be discharged home. [JS] ED Course User Index [JS] Erika Taveras MD  
 
Critical Care Time:  
0 Disposition: 
Discharge Note: The patient has been re-evaluated and is ready for discharge. Reviewed available results with patient. Counseled patient on diagnosis and care plan. Patient has expressed understanding, and all questions have been answered. Patient agrees with plan and agrees to follow up as recommended, or to return to the ED if their symptoms worsen. Discharge instructions have been provided and explained to the patient, along with reasons to return to the ED. PLAN: 
Current Discharge Medication List  
  
 
2. Follow-up Information  Follow up With Specialties Details Why Contact Info Francisco Brantley MD Family Medicine  As needed [address] [place] [address] [place] [address] [id number] [phone] 3. Return to ED if worse Diagnosis Clinical Impression: 1. Episodic confusion Attestations: 
 
Hugo Ham M.D. Please note that this dictation was completed with [employer], the computer voice recognition software. Quite often unanticipated grammatical, syntax, homophones, and other interpretive errors are inadvertently transcribed by the computer software. Please disregard these errors. Please excuse any errors that have escaped final proofreading. Thank you.

## 2021-06-20 NOTE — ED NOTES
Assumed care of pt from EMS. They were called for pt with decreased oral intake today as well as not responding normally. Pt able to state location and name. States [id number] for year and [employer] for president. Positioned for comfort on stretcher. Monitor x3. Call bell within reach. Pt without complaint at this time.

## 2021-09-08 NOTE — PERIOP NOTES
LABS AND EKG FAXED TO DR RAMIREZ'S OFFICE. [facility] FOR JEAN-PIERRE GARCIA FOR DR RAMIREZ. ABNORMAL LAB RESULTS CALLED.  SEE ABNORMALS LISTED BELOW:    ALBUMIN-2.4  TOTAL BILI- 2.2  DIRECT BILI- 0.6    PLTS- 130,000  PT- 11.8  PTT- 36.7    HA1c- 7.9  Na+- 132  CALCIUM- 8.1

## 2021-09-09 PROBLEM — T84.59XA PROSTHETIC HIP INFECTION (HCC): Status: ACTIVE | Noted: 2021-01-01

## 2021-09-09 PROBLEM — Z96.649 PROSTHETIC HIP INFECTION (HCC): Status: ACTIVE | Noted: 2021-01-01

## 2021-09-09 NOTE — ED TRIAGE NOTES
Pt sent over from pre-admission testing for admission for possible post-op infection to right leg.  Dr. Sanchez Barrera is surgeon

## 2021-09-09 NOTE — H&P
History & Physical    Primary Care Provider: Maricarmen Millan MD  Source of Information: Patient     History of Presenting Illness:   Mesfin Bueno is a 66 y.o. male who presents with right hip pain and redness      Lyle Das is a 66 y. o. male with h/o endocarditis and right prosthetic hip infection. He was followed by orthopedics Dr. Cristine Quintana, The patient initially had a right total hip replacement around 12 years ago. Jeff Cos 4/19/18 he developed a spontaneous hematogenous right hip infection from endocarditis and underwent an I & D REVISION FEMORAL HEAD AND POLYLINER RIGHT HIP. He completed 6 weeks of IV antibiotics and then on life time po abx kelfex for prophylaxis.  Patient then had a revision of femoral head and tripolar component of right total hip replacement on 07/20/2020 due to recurrent dislocations. Imelda Cardona was also seen by Dr. Twila Yusuf who did a revision of the right CIERRA and a percutaneous adductor release on 8/13/20.    [address]. He developed right hip redness and pain 1-2 weeks ago and he was arrange to have IR aspiration right hip as outpatient. Noticed a draining wound from lateral to the right hip. Post aspiration, Dr. Cristine Quintana recommended admission due to the concerning of hip infection. Review of Systems:  General: hpi, no changes of weight, low appetite  HEENT: no headache, no vision changes, no nose discharge, no hearing changes   RES: no wheezing, no cough, no sob  CVS: no cp, no palpitation.   Muscular: HPI   Skin: no rash, no itching   GI: no vomiting, no diarrhea  : no dysuria, no hematuria  Hemo: no gum bleeding, no petechial   Neuro: no sensation changes, no focal weakness   Endo: no polydipsia   Psych: denied depression     Past Medical History:   Diagnosis Date    ADD (attention deficit disorder)     Adverse effect of anesthesia     motion sickness resulting in loss of balance    Anemia due to blood loss     Hinson's esophagus with esophagitis     Benign essential tremor     Cancer ([address] 2012    BCC    Chronic pain     Cirrhosis ([address]     Depression     Dislocated hip (HCC)     DJD (degenerative joint disease) of hip     bilat    DM (diabetes mellitus) ([address] 3/17/2010    ED (erectile dysfunction) of organic origin     [facility] varices [facility]     Fall on or from sidewalk curb 9/24/2015    Femur fracture ([address]     Gastritis and duodenitis     GERD (gastroesophageal reflux disease)     resolved after discontinuing diclofenac    Hematuria 6/2016    High cholesterol 03/17/2010    pt denies 6/19    HTN (hypertension) 3/17/2010    Morbid obesity ([address]     Murmur, cardiac 2016    PFO (patent foramen ovale) 7/26/2017    PUD (peptic ulcer disease)     Sepsis ([address]     Shingles 6/2016    RESOLVING- NO RASH (HEAD)    Sleep apnea     doesnt use CPAP anymore      Past Surgical History:   Procedure Laterality Date    COLONOSCOPY N/A 6/18/2019    COLONOS[facility]  6/18/2019         ENDOSCOPY, COLON, DIAGNOSTIC      HX CATARACT REMOVAL Bilateral     HX CHOLECYSTECTOMY  1995    HX GI  1980    gastroplasty    HX HERNIA REPAIR  1995    HX HIP REPLACEMENT      Left    HX ORTHOPAEDIC Right 2011    rot cuff repair    HX ORTHOPAEDIC  2016    left broken femur    HX ORTHOPAEDIC Right 08/13/2020    Attempt closed reduction of hip dislocation    HX TONSILLECTOMY  1950    HX VASCULAR ACCESS      picc line and removed after sepsis resolved    IR INSERT TIPS HEPATIC SHUNT  3/28/2020    OH TOTAL HIP ARTHROPLASTY  05/2010    right    OH TOTAL HIP ARTHROPLASTY  08/01/2016    left    UPPER GI ENDOSCOPY,BIOPSY  6/18/2019          Prior to Admission medications    Medication Sig Start Date End Date Taking? Authorizing Provider   menthol (Biofreeze, menthol,) 4 % gel by Apply Externally route. Provider, Historical   gabapentin (NEURONTIN) 600 mg tablet Take 600 mg by mouth every evening. Provider, Historical   melatonin 5 mg cap capsule Take 5 mg by mouth nightly. Provider, Historical   midodrine (PROAMATINE) 10 mg tablet Take 10 mg by mouth three (3) times daily. Provider, Historical   apixaban (ELIQUIS) 5 mg tablet Take 5 mg by mouth two (2) times a day. Patient not taking: Reported on 9/7/2021    Provider, Historical   sertraline (ZOLOFT) 50 mg tablet Take 150 mg by mouth daily. Provider, Historical   meclizine (ANTIVERT) 25 mg tablet Take 25 mg by mouth two (2) times daily as needed for Dizziness. Patient not taking: Reported on 9/7/2021    Provider, Historical   oxyCODONE IR (ROXICODONE) 10 mg tab immediate release tablet Take 10 mg by mouth every six (6) hours as needed for Pain. Patient not taking: Reported on 9/7/2021    Provider, Historical   insulin glargine (Lantus Solostar U-100 Insulin) 100 unit/mL (3 mL) inpn 30 Units by SubCUTAneous route two (2) times a day. Patient not taking: Reported on 9/7/2021    Provider, Historical   tamsulosin (FLOMAX) 0.4 mg capsule Take 0.4 mg by mouth daily. Provider, Historical   spironolactone (Aldactone) 25 mg tablet Take 100 mg by mouth daily. 8/12/20   Provider, Historical   lactulose (CHRONULAC) 10 gram/15 mL solution Take 30 mL by mouth two (2) times a day. Provider, Historical   furosemide (LASIX) 40 mg tablet Take 40 mg by mouth daily. Provider, Historical   Omeprazole delayed release (PRILOSEC D/R) 20 mg tablet Take 20 mg by mouth daily. Provider, Historical   rifAXIMin (XIFAXAN) 550 mg tablet Take 1 Tab by mouth two (2) times a day. 4/23/20   Shubham Overton MD   potassium chloride SR (KLOR-CON 10) 10 mEq tablet Take 10 mEq by mouth two (2) times a day. Patient not taking: Reported on 9/7/2021 4/18/20   Provider, Historical   cephALEXin (KEFLEX) 500 mg capsule Take 1 Cap by mouth two (2) times a day.  2/24/20   Ronny Mery Jade MD   ergocalciferol (VITAMIN D2) 50,000 unit capsule TAKE 1 CAPSULE EVERY 7 DAYS  Patient taking differently: Take 50,000 Units by mouth every seven (7) days. Takes on Thursdays 1/6/20   Martha Gr MD   atorvastatin (LIPITOR) 40 mg tablet Take 1 Tab by mouth daily. 1/4/20   Anamaria Brunner MD   primidone (MYSOLINE) 250 mg tablet TAKE 1 TABLET TWICE A DAY  Patient not taking: Reported on 9/7/2021 11/7/19   Martha Gr MD   acetaminophen (TYLENOL) 500 mg tablet Take 2 Tabs by mouth every six (6) hours as needed for Pain. 10/7/19   Boaz Benjamin MD     Allergies   Allergen Reactions    Nsaids (Non-Steroidal Anti-Inflammatory Drug) Other (comments)     Hx of PUD and Hinson's Esophagus      Family History   Problem Relation Age of Onset   Madelyn Jay Cancer Father         multiple myeloma    Stroke Father     Dementia Mother     No Known Problems Brother     COPD Brother     No Known Problems Daughter     Cancer Maternal Grandmother         COLON    No Known Problems Son     No Known Problems Son     Anesth Problems Neg Hx         SOCIAL HISTORY:  Patient resides:  Independently    Assisted Living    SNF x   With family care       Smoking history:   None x   Former    Chronic      Alcohol history:   None x   Social    Chronic      Ambulates:   Independently    w/cane    w/walker    w/wc x   CODE STATUS:  DNR    Full x   Other      Objective:     Physical Exam:     Visit Vitals  /65   Pulse 69   Temp 97.8 °F (36.6 °C)   Resp 18   Ht 5' 11\" (1.803 m)   Wt 104.3 kg (229 lb 15 oz)   SpO2 99%   BMI 32.07 kg/m²           General:  Alert, cooperative, no distress, appears stated age. Sitting in WC    Head:  Normocephalic, without obvious abnormality, atraumatic. Eyes:  Conjunctivae/corneas clear. PERRL, EOMs intact. Nose: Nares normal. Septum midline. Mucosa normal. No drainage or sinus tenderness.    Throat: Lips, mucosa, and tongue normal. Teeth and gums normal.   Neck: Supple, symmetrical, trachea midline, no adenopathy, thyroid: no enlargement/tenderness/nodules, no carotid bruit and no JVD. Back:   Symmetric, no curvature. ROM normal. No CVA tenderness. Lungs:   Clear to auscultation bilaterally. Chest wall:  No tenderness or deformity. Heart:  Regular rate and rhythm, S1, S2 normal, no murmur, click, rub or gallop. Abdomen:   Soft, non-tender. Bowel sounds normal. No masses,  No organomegaly. Extremities: Difficulty to move right hip due to pain. Tenderness over the hip . Dressing ok    Pulses: 2+ and symmetric all extremities. Skin: Skin  Dry    Neurologic: CNII-XII intact. Data Review:     Recent Days:  Recent Labs     09/09/21  1544 09/07/21  0909   WBC 7.7 7.8   HGB 11.5* 12.2   HCT 33.1* 36.3*   * 130*     Recent Labs     09/09/21  1544 09/07/21  0911 09/07/21  0909   * 132*  --    K 3.9 4.3  --    CL 98 99  --    CO2 23 25  --    * 277*  --    BUN 19 21*  --    CREA 1.12 1.10  --    CA 8.2* 8.1*  --    ALB 2.0* 2.1* 2.1*   ALT 44 49 48   INR  --   --  1.1     No results for input(s): PH, PCO2, PO2, HCO3, FIO2 in the last 72 hours.     24 Hour Results:  Recent Results (from the past 24 hour(s))   SAMPLES BEING HELD    Collection Time: 09/09/21 12:53 PM   Result Value Ref Range    SAMPLES BEING HELD ASPIRATE      COMMENT RIGHT HIP ASPIRATOR    CBC WITH AUTOMATED DIFF    Collection Time: 09/09/21  3:44 PM   Result Value Ref Range    WBC 7.7 4.1 - 11.1 K/uL    RBC 3.47 (L) 4.10 - 5.70 M/uL    HGB 11.5 (L) 12.1 - 17.0 g/dL    HCT 33.1 (L) 36.6 - 50.3 %    MCV 95.4 80.0 - 99.0 FL    MCH 33.1 26.0 - 34.0 PG    MCHC 34.7 30.0 - 36.5 g/dL    RDW 14.4 11.5 - 14.5 %    PLATELET 159 (L) 978 - 400 K/uL    MPV 10.3 8.9 - 12.9 FL    NRBC 0.0 0  WBC    ABSOLUTE NRBC 0.00 0.00 - 0.01 K/uL    NEUTROPHILS 76 (H) 32 - 75 %    LYMPHOCYTES 12 12 - 49 %    MONOCYTES 10 5 - 13 %    EOSINOPHILS 1 0 - 7 %    BASOPHILS 0 0 - 1 %    IMMATURE GRANULOCYTES 1 (H) 0.0 - 0.5 %    ABS. NEUTROPHILS 5.8 1.8 - 8.0 K/UL    ABS. LYMPHOCYTES 0.9 0.8 - 3.5 K/UL    ABS. MONOCYTES 0.8 0.0 - 1.0 K/UL    ABS. EOSINOPHILS 0.1 0.0 - 0.4 K/UL    ABS. BASOPHILS 0.0 0.0 - 0.1 K/UL    ABS. IMM. GRANS. 0.1 (H) 0.00 - 0.04 K/UL    DF AUTOMATED     METABOLIC PANEL, COMPREHENSIVE    Collection Time: 09/09/21  3:44 PM   Result Value Ref Range    Sodium 128 (L) 136 - 145 mmol/L    Potassium 3.9 3.5 - 5.1 mmol/L    Chloride 98 97 - 108 mmol/L    CO2 23 21 - 32 mmol/L    Anion gap 7 5 - 15 mmol/L    Glucose 448 (H) 65 - 100 mg/dL    BUN 19 6 - 20 MG/DL    Creatinine 1.12 0.70 - 1.30 MG/DL    BUN/Creatinine ratio 17 12 - 20      GFR est AA >60 >60 ml/min/1.73m2    GFR est non-AA >60 >60 ml/min/1.73m2    Calcium 8.2 (L) 8.5 - 10.1 MG/DL    Bilirubin, total 1.8 (H) 0.2 - 1.0 MG/DL    ALT (SGPT) 44 12 - 78 U/L    AST (SGOT) 34 15 - 37 U/L    Alk. phosphatase 150 (H) 45 - 117 U/L    Protein, total 7.2 6.4 - 8.2 g/dL    Albumin 2.0 (L) 3.5 - 5.0 g/dL    Globulin 5.2 (H) 2.0 - 4.0 g/dL    A-G Ratio 0.4 (L) 1.1 - 2.2     SAMPLES BEING HELD    Collection Time: 09/09/21  3:44 PM   Result Value Ref Range    SAMPLES BEING HELD 1RED     COMMENT        Add-on orders for these samples will be processed based on acceptable specimen integrity and analyte stability, which may vary by analyte. Imaging:   XR INJ ASP LARGE JOINT / BURSA    Result Date: 9/9/2021  1. Successful right hip aspiration yielding 3 mL of cloudy serosanguineous fluid. Samples were sent for the requested studies. Follow-up with referring physician. 2. Injected contrast is seen extending abnormally superior lateral to the right hip joint space. Assessment:     Active Problems:    Prosthetic hip infection ([facility] (9/9/2021)           Plan:     1. Prosthetic hip infeciton: right, Successful right hip aspiration yielding 3 mL of cloudy serosanguineous fluid today. Cultures pending. Start iv cefepime and vanco. ID consult.  Plan d/w Dr. Emilee Montilla, ortho, plan OR for I&D possible on Monday. 2. DM with hyperglycemia: give sc humalog 10 units now. Start lantus and SSI, will adjust  3. Hyponatremia: due to hyperglycemic, psedohyponatremia. 4. Cirrhosis: detail unclear, pt is on lactulose, lasix and aldactone. Will consult hepatologist.   Sc lovenox for DVT prophylaxis.         Signed By: Danis King MD     September 9, 2021

## 2021-09-09 NOTE — ED PROVIDER NOTES
76-year-old male history of ADD, anemia, Hinson's esophagus, chronic pain, cirrhosis, chronic hip problems, diabetes, GERD, hypertension presents to the emergency department today with chief complaint of right hip pain. He was in the hospital earlier today with aspiration of his right hip. He has a long history of infections of his prosthetic right hip and last grew MSSA per report. The history is provided by the patient, medical records and the EMS personnel (Orthopedic surgeon also called to give history). Post OP Complication  This is a recurrent problem. The problem occurs constantly. The problem has been gradually worsening. Pertinent negatives include no chest pain, no abdominal pain, no headaches and no shortness of breath.         Past Medical History:   Diagnosis Date    ADD (attention deficit disorder)     Adverse effect of anesthesia     motion sickness resulting in loss of balance    Anemia due to blood loss     Hinson's esophagus with esophagitis     Benign essential tremor     Cancer ([address] 2012    BCC    Chronic pain     Cirrhosis ([address]     Depression     Dislocated hip (HCC)     DJD (degenerative joint disease) of hip     bilat    DM (diabetes mellitus) ([address] 3/17/2010    ED (erectile dysfunction) of organic origin     Esophageal varices determined by endoscopy ([address]     Fall on or from sidewalk curb 9/24/2015    Femur fracture ([address]     Gastritis and duodenitis     GERD (gastroesophageal reflux disease)     resolved after discontinuing diclofenac    Hematuria 6/2016    High cholesterol 03/17/2010    pt denies 6/19    HTN (hypertension) 3/17/2010    Morbid obesity ([address]     Murmur, cardiac 2016    PFO (patent foramen ovale) 7/26/2017    PUD (peptic ulcer disease)     Sepsis ([address]     Shingles 6/2016    RESOLVING- NO RASH (HEAD)    Sleep apnea     doesnt use CPAP anymore       Past Surgical History:   Procedure Laterality Date    COLONOSCOPY N/A 6/18/2019 COL[facility]COPY [facility]al [facility]  2019         ENDOSCOPY, COLON, DIAGNOSTIC      HX CATARACT REMOVAL Bilateral     HX CHOLECYSTECTOMY      HX GI  1980    gastroplasty     [facility] [address]    HX HIP REPLACEMENT      Left    HX ORTHOPAEDIC Right     rot cuff repair    HX ORTHOPAEDIC  2016    left broken femur    HX ORTHOPAEDIC Right 2020    Attempt closed reduction of hip dislocation    HX TONSILLECTOMY  1950    HX VASCULAR ACCESS      picc line and removed after sepsis resolved    IR INSERT TIPS HEPATIC SHUNT  3/28/2020    ID TOTAL HIP ARTHROPLASTY  2010    right    ID TOTAL HIP ARTHROPLASTY  2016    left    UPPER GI ENDOSCOPY,BIOPSY  2019              Family History:   Problem Relation Age of Onset    Cancer Father         multiple myeloma    Stroke Father     Dementia Mother     No Known Problems Brother     COPD Brother     No Known Problems Daughter     Cancer Maternal Grandmother         COLON    No Known Problems Son     No Known Problems Son     Anesth Problems Neg Hx        Social History     Socioeconomic History    Marital status:      Spouse name: Not on file    Number of children: Not on file    Years of education: Not on file    Highest education level: Not on file   Occupational History    Not on file   Tobacco Use    Smoking status: Former Smoker     Packs/day: 2.00     Years: 15.00     Pack years: 30.00     Quit date: 3/1/1979     Years since quittin.5    Smokeless tobacco: Never Used   Substance and Sexual Activity    Alcohol use: No     Alcohol/week: 0.0 standard drinks    Drug use: No    Sexual activity: Never   Other Topics Concern    Not on file   Social History Narrative    Not on file     Social Determinants of Health     Financial Resource Strain:     Difficulty of Paying Living Expenses:    Food Insecurity:     Worried About Running Out of Gather in the Last Year:    [address] in the Last Year:    Transportation Needs:     Lack of Transportation (Medical):  Lack of Transportation (Non-Medical):    Physical Activity:     Days of Exercise per Week:     Minutes of Exercise per Session:    Stress:     Feeling of Stress :    Social Connections:     Frequency of Communication with Friends and Family:     Frequency of Social Gatherings with Friends and Family:     Attends Holiness Services:     Active Member of Clubs or Organizations:     Attends Club or Organization Meetings:     Marital Status:    Intimate Partner Violence:     Fear of Current or Ex-Partner:     Emotionally Abused:     Physically Abused:     Sexually Abused: ALLERGIES: Nsaids (non-steroidal anti-inflammatory drug)    Review of Systems   Constitutional: Positive for fatigue. Negative for fever. HENT: Negative for sneezing and sore throat. Respiratory: Negative for cough and shortness of breath. Cardiovascular: Negative for chest pain and leg swelling. Gastrointestinal: Negative for abdominal pain, diarrhea, nausea and vomiting. Genitourinary: Negative for difficulty urinating and dysuria. Musculoskeletal: Negative for arthralgias and myalgias. Skin: Negative for color change and rash. Neurological: Positive for weakness. Negative for headaches. Psychiatric/Behavioral: Negative for agitation and behavioral problems. Vitals:    09/09/21 1518   BP: 103/65   Pulse: 69   Resp: 18   Temp: 97.8 °F (36.6 °C)   SpO2: 99%            Physical Exam  Vitals and nursing note reviewed. Constitutional:       General: He is not in acute distress. Appearance: He is well-developed. He is ill-appearing (Chronically ill-appearing gentleman in a wheelchair). He is not toxic-appearing or diaphoretic. HENT:      Head: Normocephalic and atraumatic.       Nose: Nose normal.      Mouth/Throat:      Mouth: Mucous membranes are moist.      Pharynx: Oropharynx is clear. Eyes:      Extraocular Movements: Extraocular movements intact. Conjunctiva/sclera: Conjunctivae normal.      Pupils: Pupils are equal, round, and reactive to light. Cardiovascular:      Rate and Rhythm: Normal rate and regular rhythm. Pulses: Normal pulses. Heart sounds: No murmur heard. Pulmonary:      Effort: Pulmonary effort is normal. No respiratory distress. Breath sounds: Normal breath sounds. No wheezing. Chest:      Chest wall: No mass or tenderness. Abdominal:      General: There is no distension. Palpations: Abdomen is soft. Tenderness: There is no abdominal tenderness. There is no guarding or rebound. Musculoskeletal:         General: Tenderness present. No swelling, deformity or signs of injury. Normal range of motion. Cervical back: Normal range of motion and neck supple. No rigidity. No muscular tenderness. Right lower leg: No tenderness. No edema. Left lower leg: No tenderness. No edema. Comments: Tenderness over the right hip   Skin:     General: Skin is warm and dry. Capillary Refill: Capillary refill takes less than 2 seconds. Neurological:      General: No focal deficit present. Mental Status: He is alert and oriented to person, place, and time. Mental status is at baseline. Comments: Generalized atrophy and global weakness   Psychiatric:         Mood and Affect: Mood normal.         Behavior: Behavior normal.          MDM  Number of Diagnoses or Management Options  Diagnosis management comments: 75-year-old gentleman presents as above with likely septic arthritis of prosthetic right hip. Plan for admission to the hospitalist for further management. Amount and/or Complexity of Data Reviewed  Clinical lab tests: reviewed      ED Course as of Sep 09 1618   Thu Sep 09, 2021   1616 Discussed with , recommends admit to hospitalist for presumed sepsis of right hip.  Sees Dr. Dorota Montoya of Infectious diseases as well. History of MSSA infection in that hip. [JM]      ED Course User Index  [JM] Kathie Yancey MD       Procedures        Perfect Serve Consult for Admission  4:22 PM    ED Room Number: FT4/RRD  Patient Name and age:  Ravi Lockett 66 y.o.  male  Working Diagnosis:   1.  Infection of prosthetic hip joint, initial encounter ([facility]        COVID-19 Suspicion:  no  Sepsis present:  no  Reassessment needed: N/A  Code Status:  Full Code  Readmission: no  Isolation Requirements:  no  Recommended Level of Care:  med/surg  Department:[facility] Adult ED - [phone]

## 2021-09-09 NOTE — CONSULTS
Chief Complaint: Pain of the Right Hip     HPI: Mesfin Artmo is a 66 y.o. male who returns for follow-up of right hip. The patient initially had a right total hip replacement with us around 12 years ago. 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 Patient then had a revision of femoral head and tripolar component of right total hip replacement on 07/20/2020 with us due to recurrent dislocations. He was also seen by Dr. Twila Yusuf who did a revision of the right CIERRA and a percutaneous adductor release on 8/13/20.       Today he reports that someone at the facility thought the old incision site looks infected recently. He reports that the incision site is only painful if he pushes on it. He is ambulating with wheelchair assistance and reports being unable to walk. He resides at [facility]. Denies fever/chills.     He is currently on Keflex and has been for an extended period of time but the patient does not know how long and why. He was being seen by Dr. Lisandro Guillen for the prior mimi-prosthetic infection. He also notes that he has foot drop and this occurred after one of his surgeries but does not know which one. Objective:      Vitals:      Height: 5' 10\"       Wt Readings from Last 3 Encounters:   09/03/21 250 lb   11/24/20 250 lb   10/29/20 252 lb      Body mass index is 35.87 kg/m².     Constitutional:  No acute distress. Poor historian. Eyes:  Sclera are nonicteric. Respiratory:  No labored breathing. Cardiovascular:  No marked edema. Psychiatric: Alert and oriented x3.     Musculoskeletal :  Generalized weakness, unsteady gait. Right Hip: Healed anterior hip incision with warmth, induration, and erythema but no fluctuance amenable to aspiration. normal ROM of hip with no pain on motion. Normal ROM of bilateral knees with no pain on motion.  Leg lengths appear equal. Strong pedal pulses. No pedal edema. No apparent muscle atrophy.     AFO to right leg. Unable to dorsiflex right foot. Radiographs:     Order: XR HIP RIGHT 2-3 VIEWS W/ PELVIS WHEN PERFORMED - Indication:   Aftercare following right hip joint replacement surgery        X-RAY HIP RIGHT 2-3 VIEWS W/ PELVIS WHEN PERFORMED ([id number])     Result Date: 9/3/2021  AP Pelvis, Lat.      Impression: Well aligned bilateral total hip replacements without apparent complication     Assessment:      1. Aftercare following right hip joint replacement surgery    2. Inflammation of operative incision       Plan:      I reviewed the right hip x-rays with the patient. The x-rays show no acute abnormalities or periprosthetic lucency. The patient has new onset erythema and induration of his old incision site from where a periprosthetic infection was previously. There is concern for recurrent hip infection despite chronic suppressive antibiotics. I am concerned that if left untreated, would progress to sepsis. Will arrange blood work and right hip aspiration under fluoroscopic guidance to confirm infection. Will call daughter and Dr. Gonzalez Both to discuss treatment options. If infected, would consider incision and drainage with washout of the hip over explant of hardware with antibiotic spacer implant given the patient's debilitated state. Ortho Addendum  Patient has now developed drainage from right hip  He is very debilitated and minimal ambulator. Has chronically infected right hip which has been suppressed with oral antibiotics for 2 years, now with drainage  Has multiple medical problems and high surgical risk  Have discussed options with Dr Ion Zurita as well as with his daughter. Definitive solution would be to remove the prosthesis and place an antibiotic spacer however this would be an extensive procedure with high morbidity.   Would favor simple I/D with exchange of poly liner and head followed by course of IV antibiotics followed by chronic suppressive PO antibiotics but if prosthesis is loose will proceed with removal and spacer placement. Will see if we can tune him up over the weekend, begin IV antibiotics, and take to the OR on Monday. Will make intraoperative decision as to the procedure at that time. May have to do a girdlestone procedure given his sciatic nerve palsy and debilitated state.

## 2021-09-09 NOTE — PROGRESS NOTES
Pharmacist Note - Vancomycin Dosing    Consult provided for this 66 y.o. male for indication of  prosthetic hip infeciton. -h/o endocarditis and right prosthetic hip infection    Antibiotic regimen(s): Vanc + Cefepime    Recent Labs     21  1544 21  0911 21  0909   WBC 7.7  --  7.8   CREA 1.12 1.10  --    BUN 19 21*  --      Frequency of BMP: daily x 3  Height: 180.3 cm  Weight: 104.3 kg  Est CrCl: 72 ml/min  Temp (24hrs), Av.8 °F (36.6 °C), Min:97.8 °F (36.6 °C), Max:97.8 °F (36.6 °C)    The plan below is expected to result in a target range of AUC/JOSE ANGEL 400-600    Therapy will be initiated with a loading dose of 2500 mg IV x 1 to be followed by a maintenance dose of 750 mg IV every 12 hours. Pharmacy to follow patient daily and order levels / make dose adjustments as appropriate. Vancomycin has been dosed used Bayesian kinetics software to target an AUC/JOSE ANGEL of 400-600, which provides adequate exposure for an assumed infection due to MRSA with an JOSE ANGEL of 1 or less while reducing the risk of nephrotoxicity as seen with traditional trough based dosing goals.

## 2021-09-10 NOTE — PROGRESS NOTES
Bedside echo shows mild aortic stenosis, normal LV function, no valvular vegetations. Calcification in aortic valve precludes seeing valve structure with high degree of accuracy. Echo findings and clinical history suggest surgical risk is acceptable.

## 2021-09-10 NOTE — ANESTHESIA PREPROCEDURE EVALUATION
Relevant Problems   NEUROLOGY   (+) Depression      CARDIOVASCULAR   (+) HTN (hypertension)   (+) Systolic murmur      GASTROINTESTINAL   (+) Cirrhosis (HCC)   (+) GERD (gastroesophageal reflux disease)   (+) BREWER (nonalcoholic steatohepatitis)   (+) PUD (peptic ulcer disease)      ENDOCRINE   (+) Obesity       Anesthetic History   No history of anesthetic complications            Review of Systems / Medical History  Patient summary reviewed, nursing notes reviewed and pertinent labs reviewed    Pulmonary        Sleep apnea: No treatment           Neuro/Psych         Psychiatric history     Cardiovascular    Hypertension                   GI/Hepatic/Renal     GERD      PUD and liver disease     Endo/Other    Diabetes    Obesity, arthritis and cancer     Other Findings              Physical Exam    Airway  Mallampati: II  TM Distance: > 6 cm  Neck ROM: normal range of motion   Mouth opening: Normal     Cardiovascular  Regular rate and rhythm,  S1 and S2 normal,  no murmur, click, rub, or gallop             Dental  No notable dental hx       Pulmonary  Breath sounds clear to auscultation               Abdominal  GI exam deferred       Other Findings            Anesthetic Plan    ASA: 3  Anesthesia type: general          Induction: Intravenous  Anesthetic plan and risks discussed with: Patient

## 2021-09-10 NOTE — PROGRESS NOTES
Patient ate 3 Dominican toast and and 2 sausages with half cup coffee. Patient was instructed that he would be NPO.

## 2021-09-10 NOTE — PROGRESS NOTES
Ortho Daily Progress Note      Patient: Laila Caldwell                   MRN: [id number]  Sex: male  YOB: 1950           Age: 66 y.o. Subjective:    Patient reporting right hip and right shoulder pain. He rates the right shoulder pain as severe and reports it for the last 1 week. He cannot localize the pain or qualify it. Reports falling backwards over his wheelchair landing on the right shoulder around 1 month ago but denies pain until onset 1 week ago. No surgical hx on the right shoulder. Currently denying fever, chills. Reports continued drainage from the right hip incision  Pt. voiding appropriately  Afebrile. Visit Vitals  /61   Pulse 76   Temp 98.5 °F (36.9 °C)   Resp 18   Ht 5' 11\" (1.803 m)   Wt 104.3 kg (229 lb 15 oz)   SpO2 98%   BMI 32.07 kg/m²        Recent Labs     09/10/21  0313 09/10/21  0305 09/10/21  0305 09/09/21  1544 09/09/21  1544 09/07/21  0911 09/07/21  0909 06/20/21  1745 05/01/21  1937 05/01/21  1937 04/13/21  1537 04/13/21  1537 01/22/21  0502 01/22/21  0502 01/21/21  0332 01/21/21  0332 01/20/21  1618 01/20/21  1618   HGB  --   --  10.3*   < > 11.5*  --    < > 10.6*   < > 11.1*   < > 10.7*   < > 9.5*   < > 11.3*   < > 12.4   CREA 0.94   < > 0.96   < > 1.12 1.10   < > 0.79   < > 0.94   < > 0.96   < > 0.78   < > 0.92   < > 0.91   BUN 20  --  20  --  19 21*  --  15  --  17  --  23*  --  19  --  24*  --  23*    < > = values in this interval not displayed. Physical Exam:    GENERAL: Alert and oriented to person, time, and situation but not to place- states that he is in a friends \"2-story brick house\". In mild distress. Poor historian. NEURO:  Right foot drop, unable to dorsiflex foot or hallux  VASCULAR: DP/TP pulses 2+. R hip DRESSING:  Dry dressing with some old sanguinous drainage  R hip WOUND: Middle 4rd of old healed incision about 2cm in length dehisced superficially with sanguinous drainage.   Did not probe the lesion. No erythema or ecchymosis  MSK:  Right hip with no pain on PROM. Plantarflexion 5/5. Dorsiflexion of hallux and foot 4/5 on left, 0/5 on right. Right shoulder: No ecchymosis, erythema. Generalized tenderness but no crepitus, mass, or bony deformity palpated. Questionable shoulder effusion. Unable to move right shoulder actively or passively due to severe pain on range of motion  DVT EXAM: No evidence of DVT seen on physical exam.  No posterior calf tenderness, tightness, erythema, palpable cords, or pain upon active dorsi/plantar flexion of the foot. Plan:    Periprosthetic Right hip infection: Planned I/D with exchange of poly liner and head on Monday with potential removal of prosthesis with ABX spacer if loose intra-op. Currently on Cefazolin and vancomycin. Gram stain of hip aspiration performed 9/9/21 show rare WBCs. Hip aspirate fluid culture and paired BC are still pending. Right Shoulder Pain: Patient unable to move shoulder due to pain and with questionable effusion. Given history of trauma 1 month ago will order right shoulder CT to assess for injury/effusion. Will aspirate shoulder if effusion present to assess for septic shoulder. Will make NPO and hold LMW heparin. DVT ppx: LMW heparin, will hold until septic shoulder ruled out today. Activity: WBAT  Pain Control: APAP and added Tramadol  Dressing: Continue dry dressing. Change if saturated  Hgb:  Stable, repeat in AM  DM:  Currently on lantus and sliding scale lispro. BG has come down from 466 on admit to 223.     Dianne Edmondson PA-C  9/10/2021   7:28 AM

## 2021-09-10 NOTE — CONSULTS
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 Moderate glenohumeral and acromioclavicular osteoarthrosis with loose bodies. Large effusion of glenohumeral joint and subacromial subdeltoid bursa. Type III acromion undersurface spurring and bony remodeling related to  superior humeral subluxation. S/p aspirated 60ml of purulent fluid from right shoulder(9/10), culture pending    Ortho following    Plan:   · Continue with IV cefepime  · Adjust ABX prn; continue to follow outstanding cx  · Ortho team following; Plan for I/D with exchange of poly liner and head next week  · Fever work up if temp >= 100.4  Above plan d/w and agreed by Dr. Kelli Medina:   · IV cefepime 9/9-    Subjective:   Date of Consultation:  September 10, 2021  Referring Physician: Barbara Hoff MD    Patient is a 66 y.o. male had right hip arthroplasty in 2010 by Dr. Asha Purcell. He reached full recovery from initial surgery until he fell in early 2/2018. He had I & D of hip, was treated for staph lugdensis bacteremia and MV endocarditis with 6 weeks of IV Ancef. On 4/19/18 he developed a spontaneous hematogenous right hip infection from endocarditis and underwent an I & D, revision of femoral head and polyiner of right hip, then completed long term IV ancef then followed by Keflex as suppressive therapy.       Pt dislocated right hip in 2020, had revision of right hip and a percutaneous adductor release on 8/13/20 by Dr. Myles Flores. His blood cx from 8/17/2020 grew Serratia marcescens, was treated with IV cefepime for 2 weeks. Pt has been [facility] rehab for the last 6 month. Pt had another fall at the rehab, developed right hip redness and pain couple weeks ago, was arrange to have IR aspiration right hip as outpatient. IR noticed draining wound from lateral to the right hip. Post aspiration, Dr. Felicitas Bamberger recommended admission due to the concerning of hip infection. Pt has been admitted to the hospital on 9/9 with right prosthetic hip infection. Aspirated fluid is growing GNR. Pt was started on IV cefepime and vancomycin. During visit, pt is c/o generalized weakness, right shoulder and right hip pain. Right shoulder revealed effusion which was aspirated by ortho team on 9/10. No fever, chills, nausea, vomiting, chest pain, or abd pain. Pt's other medical hx include UGIB secondary to esophageal variceal hemorrhage, S/p TIPS (3/28), NSASH cirrhosis, hypertension, & type II DM. ID team was consulted for prosthetic hip infection management. Patient Active Problem List   Diagnosis Code    HTN (hypertension) I10    Hypercholesterolemia E78.00    Osteoarthritis of hip M16.9    Depression F32.9    ED (erectile dysfunction) of organic origin N52.9    GERD (gastroesophageal reflux disease) K21.9    PUD (peptic ulcer disease) K27.9    Hinson's esophagus with esophagitis K22.70, K20.90    Hypogonadism male E29.1    Diabetes mellitus type 2, uncontrolled ([address] E11.65    Benign essential tremor G25.0    Osteoarthritis of right hip M16.11    Jessica-prosthetic fracture of femur following total hip arthroplasty M97. 8XXA, I48.141    Systolic murmur T57.9    PFO (patent foramen ovale) Q21.1    Infected prosthesis of right hip (HCC) T84.51XA    Endocarditis of mitral valve I05.8    Obesity E66.9    Insomnia G47.00    Dislocation of prosthetic joint (HCC) B54.749D    Esophageal varices with bleeding (HCC) I85.01    BREWER (nonalcoholic steatohepatitis) K75.81    Hepatic encephalopathy (HCC) K72.90    S/P TIPS (transjugular intrahepatic portosystemic shunt) Z95.828    Closed dislocation of right hip (HCC) S73.004A    Recurrent dislocation of hip joint prosthesis (HCC) T84.029A, Z96.649    Dislocation of internal right hip prosthesis (HCC) L41.801S    Metabolic encephalopathy S16.03    Cirrhosis (HCC) K74.60    Increased ammonia level R79.89    Prosthetic hip infection (HCC) T84.59XA, Z96.649     Past Medical History:   Diagnosis Date    ADD (attention deficit disorder)     Adverse effect of anesthesia     motion sickness resulting in loss of balance    Anemia due to blood loss     Hinson's esophagus with esophagitis     Benign essential tremor     Cancer ([address] 2012    BCC    Chronic pain     Cirrhosis (HCC)     Depression     Dislocated hip (HCC)     DJD (degenerative joint disease) of hip     bilat    DM (diabetes mellitus) ([address] 3/17/2010    ED (erectile dysfunction) of organic origin     Esophageal varices determined by endoscopy ([address]     Fall on or from sidewalk curb 9/24/2015    Femur fracture ([address]     Gastritis and duodenitis     GERD (gastroesophageal reflux disease)     resolved after discontinuing diclofenac    Hematuria 6/2016    High cholesterol 03/17/2010    pt denies 6/19    HTN (hypertension) 3/17/2010    Morbid obesity ([address]     Murmur, cardiac 2016    PFO (patent foramen ovale) 7/26/2017    PUD (peptic ulcer disease)     Sepsis ([address]     Shingles 6/2016    RESOLVING- NO RASH (HEAD)    Sleep apnea     doesnt use CPAP anymore      Family History   Problem Relation Age of Onset    Cancer Father         multiple myeloma    Stroke Father     Dementia Mother     No Known Problems Brother     COPD Brother     No Known Problems Daughter     Cancer Maternal Grandmother         COLON    No Known Problems Son    [place] No Known Problems Son     Anesth Problems Neg Hx       Social History     Tobacco Use    Smoking status: Former Smoker     Packs/day: 2.00     Years: 15.00     Pack years: 30.00     Quit date: 3/1/1979     Years since quittin.5    Smokeless tobacco: Never Used   Substance Use Topics    Alcohol use: No     Alcohol/week: 0.0 standard drinks     Past Surgical History:   Procedure Laterality Date    COLONOSCOPY N/A 2019    COLONOSCOPY AND EGD performed by Adelaida Frederick MD at [address]  2019         ENDOSCOPY, COLON, DIAGNOSTIC      HX CATARACT REMOVAL Bilateral     HX CHOLECYSTECTOMY      HX GI  1980    gastroplasty    HX HERNIA REPAIR      HX HIP REPLACEMENT      Left    HX ORTHOPAEDIC Right     rot cuff repair    HX ORTHOPAEDIC  2016    left broken femur    HX ORTHOPAEDIC Right 2020    Attempt closed reduction of hip dislocation    HX TONSILLECTOMY  1950    HX VASCULAR ACCESS      picc line and removed after sepsis resolved    IR INSERT TIPS HEPATIC SHUNT  3/28/2020    RI TOTAL HIP ARTHROPLASTY  2010    right    RI TOTAL HIP ARTHROPLASTY  2016    left    UPPER GI ENDOSCOPY,BIOPSY  2019           Prior to Admission medications    Medication Sig Start Date End Date Taking? Authorizing Provider   menthol (Biofreeze, menthol,) 4 % gel by Apply Externally route. Provider, Historical   gabapentin (NEURONTIN) 600 mg tablet Take 600 mg by mouth every evening. Provider, Historical   melatonin 5 mg cap capsule Take 5 mg by mouth nightly. Provider, Historical   midodrine (PROAMATINE) 10 mg tablet Take 10 mg by mouth three (3) times daily. Provider, Historical   apixaban (ELIQUIS) 5 mg tablet Take 5 mg by mouth two (2) times a day. Patient not taking: Reported on 2021    Provider, Historical   sertraline (ZOLOFT) 50 mg tablet Take 150 mg by mouth daily.     Provider, Historical   meclizine (ANTIVERT) 25 mg tablet Take 25 mg by mouth two (2) times daily as needed for Dizziness. Patient not taking: Reported on 9/7/2021    Provider, Historical   oxyCODONE IR (ROXICODONE) 10 mg tab immediate release tablet Take 10 mg by mouth every six (6) hours as needed for Pain. Patient not taking: Reported on 9/7/2021    Provider, Historical   insulin glargine (Lantus Solostar U-100 Insulin) 100 unit/mL (3 mL) inpn 30 Units by SubCUTAneous route two (2) times a day. Patient not taking: Reported on 9/7/2021    Provider, Historical   tamsulosin (FLOMAX) 0.4 mg capsule Take 0.4 mg by mouth daily. Provider, Historical   spironolactone (Aldactone) 25 mg tablet Take 100 mg by mouth daily. 8/12/20   Provider, Historical   lactulose (CHRONULAC) 10 gram/15 mL solution Take 30 mL by mouth two (2) times a day. Provider, Historical   furosemide (LASIX) 40 mg tablet Take 40 mg by mouth daily. Provider, Historical   Omeprazole delayed release (PRILOSEC D/R) 20 mg tablet Take 20 mg by mouth daily. Provider, Historical   rifAXIMin (XIFAXAN) 550 mg tablet Take 1 Tab by mouth two (2) times a day. 4/23/20   Ming Betancourt MD   potassium chloride SR (KLOR-CON 10) 10 mEq tablet Take 10 mEq by mouth two (2) times a day. Patient not taking: Reported on 9/7/2021 4/18/20   Provider, Historical   cephALEXin (KEFLEX) 500 mg capsule Take 1 Cap by mouth two (2) times a day. 2/24/20   Phoebe Limon MD   ergocalciferol (VITAMIN D2) 50,000 unit capsule TAKE 1 CAPSULE EVERY 7 DAYS  Patient taking differently: Take 50,000 Units by mouth every seven (7) days. Takes on Thursdays 1/6/20   Phoebe Limon MD   atorvastatin (LIPITOR) 40 mg tablet Take 1 Tab by mouth daily. 1/4/20   Sandip Lockett MD   primidone (MYSOLINE) 250 mg tablet TAKE 1 TABLET TWICE A DAY  Patient not taking: Reported on 9/7/2021 11/7/19   Phoebe Limon MD   acetaminophen (TYLENOL) 500 mg tablet Take 2 Tabs by mouth every six (6) hours as needed for Pain.  10/7/19   Suraj Chu, Sami Ramsey MD     a  Allergies   Allergen Reactions    Nsaids (Non-Steroidal Anti-Inflammatory Drug) Other (comments)     Hx of PUD and Hinson's Esophagus        REVIEW OF SYSTEMS:     Total of 12 systems reviewed as follows:   I am not able to complete the review of systems because: The patient is intubated and sedated    The patient has altered mental status due to his acute medical problems    The patient has baseline aphasia from prior stroke(s)    The patient has baseline dementia and is not reliable historian                 POSITIVE= underlined text  Negative = text not underlined  General:  fever, chills, sweats, generalized weakness, weight loss/gain,      loss of appetite   Eyes:    blurred vision, eye pain, loss of vision, double vision  ENT:    rhinorrhea, pharyngitis   Respiratory:   cough, sputum production, SOB, wheezing, WANG, pleuritic pain   Cardiology:   chest pain, palpitations, orthopnea, PND, edema, syncope   Gastrointestinal:  abdominal pain , N/V, dysphagia, diarrhea, constipation, bleeding   Genitourinary:  frequency, urgency, dysuria, hematuria, incontinence   Muskuloskeletal :  Arthralgia, right shoulder and right hip pain  Hematology:  easy bruising, nose or gum bleeding, lymphadenopathy   Dermatological: rash, ulceration, pruritis   Endocrine:   hot flashes or polydipsia   Neurological:  headache, dizziness, confusion, focal weakness, paresthesia,     Speech difficulties, memory loss, gait disturbance  Psychological: Feelings of anxiety, depression, agitation    Objective:     Visit Vitals  /61   Pulse 76   Temp 98.5 °F (36.9 °C)   Resp 18   Ht 5' 11\" (1.803 m)   Wt 104.3 kg (229 lb 15 oz)   SpO2 98%   BMI 32.07 kg/m²     Temp (24hrs), Av.4 °F (36.9 °C), Min:97.8 °F (36.6 °C), Max:98.8 °F (37.1 °C)       Lines: peripheral line  PHYSICAL EXAM:   General:    Chronically ill appearing, Alert, cooperative, no distress, appears as stated age.      HEENT: Atraumatic, anicteric sclerae, pink conjunctivae     No oral ulcers, mucosa dry   Neck:  Supple, symmetrical  Lungs:   Clear to auscultation bilaterally. No Wheezing or Rhonchi. No rales. Chest wall:  No tenderness  No Accessory muscle use. Heart:   Regular  rhythm,  No  murmur   Pedal edema  Abdomen:   Soft, non-tender. Not distended. Bowel sounds normal  Extremities: No cyanosis. No clubbing, right shoulder tenderness, right foot drop (POA)  Skin:     Not pale. Not Jaundiced  No rashes    Right hip  Psych:  Fair insight. Feeling down  Neurologic: EOMs intact. No facial asymmetry. No aphasia or slurred speech, Alert and oriented X 4. Data Review:     CBC:  Recent Labs     09/10/21  0305 09/09/21  1544 09/07/21  0909 09/07/21  0909   WBC 7.7 7.7  --  7.8   GRANS 79* 76*   < > 78*   MONOS 10 10   < > 8   EOS 0 1   < > 1   ANEU 6.0 5.8   < > 6.0   ABL 0.8 0.9   < > 0.9   HGB 10.3* 11.5*  --  12.2   HCT 29.8* 33.1*  --  36.3*   * 119*  --  130*    < > = values in this interval not displayed.        BMP:  Recent Labs     09/10/21  0313 09/10/21  0305 09/09/21  1544   CREA 0.94 0.96 1.12   BUN 20 20 19   * 134* 128*   K 3.8 3.8 3.9    104 98   CO2 23 24 23   AGAP 7 6 7   * 238* 448*       LFTS:  Recent Labs     09/10/21  0305 09/09/21  1544 09/07/21  0911   TBILI 1.7* 1.8* 2.2*   ALT 38 44 49   * 150* 170*   TP 6.4 7.2 6.6   ALB 1.8* 2.0* 2.1*       Microbiology:     All Micro Results     Procedure Component Value Units Date/Time    CULTURE, BLOOD, PAIRED [[id number]] Collected: 09/09/21 1906    Order Status: Completed Specimen: Blood Updated: 09/10/21 0526     Special Requests: NO SPECIAL REQUESTS        Culture result: NO GROWTH AFTER 9 HOURS               Signed By: Jono Sy NP     September 10, 2021

## 2021-09-10 NOTE — PROGRESS NOTES
Patient's ring was removed prior to arthrocentesis, coiled in cell cord, placed in patient's belonging bag.

## 2021-09-10 NOTE — ED NOTES
TRANSFER - OUT REPORT:    Verbal report given to Kirk Velazquez RN (name) on Brennan Fel[facility] of care       Report consisted of patients Situation, Background, Assessment and   Recommendations(SBAR). Information from the following report(s) SBAR and Kardex was reviewed with the receiving nurse. Lines:   Peripheral IV 09/09/21 Left Wrist (Active)   Site Assessment Clean, dry, & intact 09/09/21 1550   Phlebitis Assessment 0 09/09/21 1550   Infiltration Assessment 0 09/09/21 1550   Dressing Status Clean, dry, & intact 09/09/21 1550   Dressing Type Transparent 09/09/21 1550   Hub Color/Line Status Pink;Flushed;Patent 09/09/21 1550   Action Taken Blood drawn 09/09/21 1550        Opportunity for questions and clarification was provided.       Patient transported with:   [employer]

## 2021-09-10 NOTE — PROGRESS NOTES
CT scan shows global rotator cuff tear with degenerative changes of GH joint and large joint effusion. Clinically the patient has a large effusion and significant pain with range of motion of the shoulder. 60mL of purulent fluid aspirated from the right [facility] joint via anterior approach. This is indicative of hematogenous seeding of the right shoulder resulting in septic right shoulder. Fluid to be sent for gram stain, culture, and cell count. This warrants arthroscopic irrigation and debridement of the right shoulder.

## 2021-09-10 NOTE — PROGRESS NOTES
Critical result received from lab: blood culture has gram neg on 2 out of 4 bottles collected yesterday on LFA and LA. Candi GANDHI notified.

## 2021-09-10 NOTE — CONSULTS
Sharon Burgess MD, Tay David MD, MPH      GENEVIEVE Gonzalez, [facility]     Ameena JAMES Christa, [facility]   Andrew Choudhury LIBORIO-ANTHONY Neri, [facility]       Dagmar Iniguez [facility] 136    at [address], [facility] Anya Barnard  22.    [phone]    FAX: [address] [place] [address], [address]    [phone]    FAX: [phone]         HEPATOLOGY CONSULT NOTE  The patient is well known to me and [facility] He is a 68 y.o.  male who was found to have chronic liver disease and cirrhosis in 7/2019 when esophageal varices were found on EGD. He developed variceal bleeding in 3/2021. This was treated with emergent banding followed by emergent TIPS placement.     Serologic evaluation for markers of chronic liver disease was negative.       The patient has chronic intermittent infection of the hip which is now infected gain. He also has infection in the shoulder. He is septic with gm negative in the the blood. He needs to go to OR to remove the prosthetic hip joint wash out the space and wash out the shoulder. ASSESSMENT AND PLAN:  Cirrhosis  Cirrhosis is presumed secondary to BREWER. The diagnosis of cirrhosis is based upon imaging, laboratory studies,   Serologic testing for causes of chronic liver disease were negative      The patient has normal liver function. The patient has never developed any complications of cirrhosis to date. The CTP is 8. Child class B. The MELD score is 11. Risk of elective surgery in a patient with cirrhosis  The patient has cirrhosis Child class B and MELD of 10-14.   The risk of post-operative complications including hepatic decompensation is about 40-60%. Operative mortality risk is about 10%. Screening for Esophageal varices   The patient had a TIPS placed for bleeding esophageal varices. EGD to screen for esophageal varices is no longer necessary.     Hepatic encephalopathy   Overt HE has not developed to date. There is no need for treatment with lactulose and/or Xifaxan at this time. There is no need to restrict dietary protein at this time.       Thrombocytopenia   This is secondary to cirrhosis. There is no evidence of overt bleeding. No treatment is required. The platelet count is adequate for the patient to undergo procedures without the need for platelet transfusion or platelet growth factors.     Anemia   This is due to multifactorial causes including portal hypertension with chronic GI blood loss, chronic systemic disease    The most recent FE studies were from 2021. 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     Screening for Hepatocellular Carcinoma  HCC screening has not been not been performed since 2019. AFP was ordered today and ultrasound will be scheduled.       SYSTEM REVIEW:  Constitution systems: Negative for fever, chills, weight gain, weight loss. Eyes: Negative for visual changes. ENT: Negative for sore throat, painful swallowing. Respiratory: Negative for cough, hemoptysis, SOB. Cardiology: Negative for chest pain, palpitations. GI:  Negative for constipation or diarrhea. : Negative for urinary frequency, dysuria, hematuria, nocturia. Skin: Negative for rash. Hematology: Negative for easy bruising, blood clots. Musculo-skelatal: Negative for back pain, muscle pain, weakness. Neurologic: Negative for headaches, dizziness, vertigo, memory problems not related to HE. Psychology: Negative for anxiety, depression. FAMILY HISTORY:  The father  of Multiple myeloma. The mother  of dementia. There is no family history of liver disease.       SOCIAL HISTORY:  The patient is . The patient has 3 children, and 6 grandchildren. The patient stopped using tobacco products in . The patient has been abstinent from alcohol since . The patient used to work for the [employer].         PHYSICAL EXAMINATION:  VS: per nursing note  General:  No acute distress. Eyes:  Sclera anicteric. ENT:  No oral lesions. Thyroid normal.  Nodes:  No adenopathy. Skin:  No spider angiomata. No jaundice. Respiratory:  Lungs clear to auscultation. Cardiovascular:  Regular heart rate. Abdomen:  Soft non-tender, No obvious ascites. Extremities:  No lower extremity edema. Neurologic:  Alert and oriented. Cranial nerves grossly intact. No asterixis. LABORATORY:  Results for Gregory Rodriguez (MRN [id number]) as of 9/10/2021 15:12   Ref. Range 2021 15:44 9/10/2021 03:05   WBC Latest Ref Range: 4.1 - 11.1 K/uL 7.7 7.7   HGB Latest Ref Range: 12.1 - 17.0 g/dL 11.5 (L) 10.3 (L)   PLATELET Latest Ref Range: 150 - 400 K/uL 119 (L) 109 (L)   Sodium Latest Ref Range: 136 - 145 mmol/L 128 (L) 134 (L)   Potassium Latest Ref Range: 3.5 - 5.1 mmol/L 3.9 3.8   Chloride Latest Ref Range: 97 - 108 mmol/L 98 104   CO2 Latest Ref Range: 21 - 32 mmol/L 23 24   Glucose Latest Ref Range: 65 - 100 mg/dL 448 (H) 238 (H)   BUN Latest Ref Range: 6 - 20 MG/DL 19 20   Creatinine Latest Ref Range: 0.70 - 1.30 MG/DL 1.12 0.96   Bilirubin, total Latest Ref Range: 0.2 - 1.0 MG/DL 1.8 (H) 1.7 (H)   Albumin Latest Ref Range: 3.5 - 5.0 g/dL 2.0 (L) 1.8 (L)   ALT Latest Ref Range: 12 - 78 U/L 44 38   AST Latest Ref Range: 15 - 37 U/L 34 33   Alk. phosphatase Latest Ref Range: 45 - 117 U/L 150 (H) 120 (H)       RADIOLOGY:  CT scan abdomen with IV contrast.  Changes consistent with cirrhosis. TIPS in place. No liver mass lesions. No dilated bile ducts.   No ascites.     MD Fariba Alexandre [address], [address] [place] [address] 22.  [phone]  [address] [place] [address]

## 2021-09-10 NOTE — PROCEDURES
Arthrocentesis without Injection Procedural Report    Indications: Unexplained joint effusion and right shoulder pain    Preprocedural Diagnosis: Right shoulder effusion    Postprocedural Diagnosis: Pyogenic arthritis of right shoulder region    Provider: Obdulia Gregory PA-C     Anesthesia:   None    Allergy:   Allergies   Allergen Reactions    Nsaids (Non-Steroidal Anti-Inflammatory Drug) Other (comments)     Hx of PUD and Hinson's Esophagus       Procedure Details: The risks,benefits and alternatives of a joint aspiration were explained and consent was obtained for the procedure. The aspiration is being done for diagnostic purposes. The area was cleansed using Chlorprep. Using 18 gauge needle 60 mLs of cloudy yellow-blood stained fluid was obtained. Aspiration occurred at 13:35. A dry dressing was applied. The patient tolerated the procedure well. The fluid was sent to the lab. Orders on Fluid: synovial fluid for cell count; C&S and gram stain    Estimated Blood Loss:   0mL         Complications:  None; patient tolerated the procedure well. Signed By: Obdulia Gregory PA-C       Ortho Attending  Patient critically ill, growing gram neg rods in blood C/S  Now with septic right shoulder which will need urgent arthroscopic I/D  Awaiting rapid Covid test and cardiac echo  OR not available until sometime tonight  Attempted to call daughter and left voicemail  Discussed with my partner Dr Sandra Lema who has agreed to wash out shoulder tonight  Now with gram neg rods possibly in right hip he will need resection arthroplasty([place]) procedure on Monday, will continue to try and reach daughter.   Discussed with patient

## 2021-09-10 NOTE — CONSULTS
[facility], [address] [facility] [address]   Zulma Pacheco   [phone]                                                                              Cardiology Care Note                          Initial visit      Patient Name: Alon Nuno - VFV:[id number] - HJY:[id number]  Primary Cardiologist: Qasim Flores for venous insufficency 2019  Consulting Cardiologist: Britt Kim MD  Reason for Consult: Hx of endocarditis that seeded right hip perioprosthetic infx. ?possibility of recurrence causing new right hip infection? Assessment and Plan     1. Bacteremia, gram negative rods   -3/4 bottles 9/9/21   -Antibiotics per ID   -Will review ordered TTE prior to considering additional cardiac testing for possible endocarditis    2. History of endocarditis   -2018 per EULALIO, possible small MV vegetation on anterior leaflet    -treated for 6 weeks antibiotics and then Lifelong keflex po.    3. Right prosthetic hip infection   -gm negative rods from aspirate    4. History of aortic stenosis   -moderate per TTE 8/2020 report with mean gradient 20 mmHg   -Repeat echo pending. 4. History of cirrhosis  (BREWER)  5. History of GIB due to esophageal varices s/p TIPS procedure    Cardiology Attending:Patient seen and examined. I agree with NP assessment and plans. He has progression of aortic valve calcification and now has mild stenosis. No vegetations seen on echo, apparently gram negative rods in blood, previously had Serratia. I believe he is an acceptable surgical risk. Jj Ordaz MD 9/10/2021 4:04 PM    ____________________________________________________________      HPI:  Mr. Lisa Slaas is a 66 y.o. male with PMH of  HTN, HLD, DM, small PFO, moderate aortic stenosis, venous insufficiency LLE, bacteremia and endocarditis in 2018, BREWER cirrhosis, Esophageal varices s/p TIPS procedure. He had right total hip replacement 12 years ago.  Subsequently in 2018, he developed a spontaneous hematogenous right hip infection thought to be due to endocarditis. and underwent an I & D REVISION FEMORAL HEAD AND POLYLINER RIGHT HIP. He had a EULALIO on 2/26/28 by Dr. Clayton Garvey that showed a possible, small irregular vegetation (2 mm) on anterior leaflet of the atrial aspect. He was treated for endocarditis with 6 weeks of antibiotics and since has been on lifetime po keflex for PPX. Patient then had a revision of femoral head and tripolar component of right total hip replacement on 07/20/2020 due to recurrent dislocations. He was also seen by Dr. Ruiz England who did a revision of the right CIERRA and a percutaneous adductor release on 8/13/20. He was hospitalized with sepsis from 8//13/20-8/24/20- had serratia marcescens bacteremia at that time. He has been at [facility] last 6 months. He developed right hip redness and pain 1-2 weeks ago. There was a draining right hip wound which was drained with concern for hip infection. He now also has gram negative rods in 3/4 blood culture bottles on 9/9/21 as well as from right hip aspirate specimen. Cardiology has been asked to evaluate for possible endocarditis as etiology of his hip infection. He has no fever or leukocytosis. Cardiac testing history:  08/13/20    ECHO ADULT COMPLETE 08/21/2020 8/21/2020    Interpretation Summary  · LV: Estimated LVEF is 55 - 60%. Normal cavity size and systolic function (ejection fraction normal). Mild concentric hypertrophy. Age-appropriate left ventricular diastolic function. · LA: Mildly dilated left atrium. · AV: Moderate aortic valve focal thickening present. Aortic valve leaflet calcification present. Aortic valve mean gradient is 20 mmHg. Moderate aortic valve stenosis is present. · TV: Mild tricuspid valve regurgitation is present. · PA: Mild to moderate pulmonary hypertension. Pulmonary arterial systolic pressure is 45 mmHg. Signed by:  Kenzie Antonio MD on 8/21/2020  1:29 PM EULALIO 2/26/18: Possible tiny MV vegetation, treat like endocarditis. Echo 9-23-15 =65%, mild LAE  EULALIO 3-22-17= tiny PFO mild TR and mild PA HTN  [facility] 9-26-17 EF 56% no ischemia    Patient Active Problem List   Diagnosis Code    HTN (hypertension) I10    Hypercholesterolemia E78.00    Osteoarthritis of hip M16.9    Depression F32.9    ED (erectile dysfunction) of organic origin N52.9    GERD (gastroesophageal reflux disease) K21.9    PUD (peptic ulcer disease) K27.9    Hinson's esophagus with esophagitis K22.70, K20.90    Hypogonadism male E29.1    Diabetes mellitus type 2, uncontrolled (HCC) E11.65    Benign essential tremor G25.0    Osteoarthritis of right hip M16.11    Jessica-prosthetic fracture of femur following total hip arthroplasty M97. 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    Obesity E66.9    Insomnia G47.00    Dislocation of prosthetic joint (HCC) T84.029A    Esophageal varices with bleeding (HCC) I85.01    BREWER (nonalcoholic steatohepatitis) K75.81    Hepatic encephalopathy (HCC) K72.90    S/P TIPS (transjugular intrahepatic portosystemic shunt) Z95.828    Closed dislocation of right hip (HCC) S73.004A    Recurrent dislocation of hip joint prosthesis ([address] T84.029A, Z96.649    Dislocation of internal right hip prosthesis (HCC) H75.401N    Metabolic encephalopathy Y49.24    Cirrhosis ([address] K74.60    Increased ammonia level R79.89    Prosthetic hip infection ([address] T84.59XA, Z96.649     No specialty comments available.       Review of Systems:    [] Patient unable to provide secondary to condition    CONSTITUTIONAL: negative     ENT/MOUTH: negative     EYES: negative    CARDIOVASCULAR: negative     RESPIRATORY: negative      GASTROINTESTINAL: negative     GENITOURINARY: negative     Musculoskelatal:  Right hip apin      Skin: right hip wound    NEUROLOGICAL: negative PSYCHOLOGICAL: negative      HEME/LYMPH: negative    ENDOCRINE: negative           Past Medical History:   Diagnosis Date    ADD (attention deficit disorder)     Adverse effect of anesthesia     motion sickness resulting in loss of balance    Anemia due to blood loss     Hinson's esophagus with esophagitis     Benign essential tremor     Cancer ([facility] 2012    BCC    Chronic pain     Cirrhosis ([address]     Depression     Dislocated hip (HCC)     DJD (degenerative joint disease) of hip     bilat    DM (diabetes mellitus) ([address] 3/17/2010    ED (erectile dysfunction) of organic origin     Esophageal varices determined by endoscopy ([facility]     Fall on or from sidewalk curb 9/24/2015    Femur fracture ([facility]     Gastritis and duodenitis     GERD (gastroesophageal reflux disease)     resolved after discontinuing diclofenac    Hematuria 6/2016    High cholesterol 03/17/2010    pt denies 6/19    HTN (hypertension) 3/17/2010    Morbid obesity ([facility]     Murmur, cardiac 2016    PFO (patent foramen ovale) 7/26/2017    PUD (peptic ulcer disease)     Sepsis ([facility]     Shingles 6/2016    RESOLVING- NO RASH (HEAD)    Sleep apnea     doesnt use CPAP anymore     Past Surgical History:   Procedure Laterality Date    COLONOSCOPY N/A 6/18/2019    COLONOSCOPY AND EGD performed by Chuy Parry MD at [address]  6/18/2019         ENDOSCOPY, COLON, DIAGNOSTIC      HX CATARACT REMOVAL Bilateral     HX CHOLECYSTECTOMY  1995    HX GI  1980    gastroplasty    [address]    HX HIP REPLACEMENT      Left    HX ORTHOPAEDIC Right 2011    rot cuff repair    HX ORTHOPAEDIC  2016    left broken femur    HX ORTHOPAEDIC Right 08/13/2020    Attempt closed reduction of hip dislocation    HX TONSILLECTOMY  1950    HX VASCULAR ACCESS      picc line and removed after sepsis resolved    IR INSERT TIPS HEPATIC SHUNT  3/28/2020    TN TOTAL HIP ARTHROPLASTY  05/2010    right    TN TOTAL HIP ARTHROPLASTY  08/01/2016    left UPPER GI ENDOSCOPY,BIOPSY  6/18/2019          Current Facility-Administered Medications   Medication Dose Route Frequency    ceFAZolin (ANCEF) 2 g in sterile water (preservative free) 20 mL IV syringe  2 g IntraVENous Q8H    traMADoL (ULTRAM) tablet 50 mg  50 mg Oral Q6H PRN    atorvastatin (LIPITOR) tablet 40 mg  40 mg Oral DAILY    furosemide (LASIX) tablet 40 mg  40 mg Oral DAILY    gabapentin (NEURONTIN) tablet 600 mg  600 mg Oral QPM    lactulose (CHRONULAC) 10 gram/15 mL solution 30 mL  30 mL Oral BID    pantoprazole (PROTONIX) tablet 40 mg  40 mg Oral DAILY    midodrine (PROAMATINE) tablet 10 mg  10 mg Oral TID    rifAXIMin (XIFAXAN) tablet 550 mg  550 mg Oral BID    sertraline (ZOLOFT) tablet 150 mg  150 mg Oral DAILY    spironolactone (ALDACTONE) tablet 100 mg  100 mg Oral DAILY    tamsulosin (FLOMAX) capsule 0.4 mg  0.4 mg Oral DAILY    insulin lispro (HUMALOG) injection   SubCUTAneous AC&HS    insulin glargine (LANTUS) injection 30 Units  30 Units SubCUTAneous QHS    sodium chloride (NS) flush 5-40 mL  5-40 mL IntraVENous Q8H    sodium chloride (NS) flush 5-40 mL  5-40 mL IntraVENous PRN    acetaminophen (TYLENOL) tablet 650 mg  650 mg Oral Q6H PRN    Or    acetaminophen (TYLENOL) suppository 650 mg  650 mg Rectal Q6H PRN    polyethylene glycol (MIRALAX) packet 17 g  17 g Oral DAILY PRN    ondansetron (ZOFRAN ODT) tablet 4 mg  4 mg Oral Q8H PRN    Or    ondansetron (ZOFRAN) injection 4 mg  4 mg IntraVENous Q6H PRN    [Held by provider] enoxaparin (LOVENOX) injection 40 mg  40 mg SubCUTAneous DAILY    0.9% sodium chloride infusion  75 mL/hr IntraVENous CONTINUOUS    glucose chewable tablet 16 g  4 Tablet Oral PRN    dextrose (D50W) injection syrg 12.5-25 g  12.5-25 g IntraVENous PRN    glucagon (GLUCAGEN) injection 1 mg  1 mg IntraMUSCular PRN       Allergies   Allergen Reactions    Nsaids (Non-Steroidal Anti-Inflammatory Drug) Other (comments)     Hx of PUD and Hnison's Esophagus      Family History Problem Relation Age of Onset    Cancer Father         multiple myeloma    Stroke Father     Dementia Mother     No Known Problems Brother     COPD Brother     No Known Problems Daughter     Cancer Maternal Grandmother         COLON    No Known Problems Son     No Known Problems Son     Anesth Problems Neg Hx       Social History     Socioeconomic History    Marital status:      Spouse name: Not on file    Number of children: Not on file    Years of education: Not on file    Highest education level: Not on file   Tobacco Use    Smoking status: Former Smoker     Packs/day: 2.00     Years: 15.00     Pack years: 30.00     Quit date: 3/1/1979     Years since quittin.5    Smokeless tobacco: Never Used   Substance and Sexual Activity    Alcohol use: No     Alcohol/week: 0.0 standard drinks    Drug use: No    Sexual activity: Never     Social Determinants of Health     Financial Resource Strain:     Difficulty of Paying Living Expenses:    Food Insecurity:     Worried About Running Out of Food in the Last Year:     Ran Out of Food in the Last Year:    Transportation Needs:     Lack of Transportation (Medical):     Lack of Transportation (Non-Medical):    Physical Activity:     Days of Exercise per Week:     Minutes of Exercise per Session:    Stress:     Feeling of Stress :    Social Connections:     Frequency of Communication with Friends and Family:     Frequency of Social Gatherings with Friends and Family:     Attends [employer] Services:      Active Member of Clubs or Organizations:     Attends Club or Organization Meetings:     Marital Status:        Objective:    Physical Exam    Vitals:   Vitals:    21 2152 21 2238 09/10/21 0530 09/10/21 0908   BP: (!) 146/63 (!) 145/62 138/61 108/62   Pulse: 78 77 76 73   Resp: 16 18 18 18   Temp: 98.8 °F (37.1 °C) 98.3 °F (36.8 °C) 98.5 °F (36.9 °C) 97.8 °F (36.6 °C)   SpO2: 98% 98% 98% 97%   Weight:       Height:           General:    Alert, cooperative, no distress, appears stated age. Neck:   Supple, no carotid bruit and no JVD. Back:     Symmetric,     Lungs:     Clear t auscultation bilaterally. Heart[de-identified]    Regular rate and rhythm, S1, S2 normal, grade II/VI systolic murmur RUSB. Abdomen:     Soft, non-tender. Bowel sounds normal.    Extremities:   Rt hip dressing noted with serosanguinous drainage. .   Vascular:   Pulses - +   Skin:   Skin color normal. No rashes or lesions on visible areas   Neurologic:   Alert, COKER        Telemetry:     ECG:   EKG Results       Procedure 720 Value Units Date/Time    EKG, 12 LEAD, INITIAL [[id number]] Collected: 09/09/21 2035    Order Status: Completed Updated: 09/10/21 0800     Ventricular Rate 80 BPM      Atrial Rate 80 BPM      P-R Interval 176 ms      QRS Duration 94 ms      Q-T Interval 416 ms      QTC Calculation (Bezet) 479 ms      Calculated P Axis 51 degrees      Calculated T Axis 44 degrees      Diagnosis --     Normal sinus rhythm  Nonspecific T wave abnormality  Prolonged QT  Abnormal ECG  When compared with ECG of 07-SEP-2021 08:47,  Nonspecific T wave abnormality has replaced inverted T waves in Inferior   leads  Nonspecific T wave abnormality now evident in Anterior leads  Confirmed by Aide Nunez ([id number]) on 9/10/2021 7:59:48 AM              Data Review:     Radiology:   XR Results (most recent):  Results from [place] encounter on 09/09/21    XR INJ ASP LARGE JOINT / BURSA    Narrative  INDICATION: Right hip pain with a draining wound from lateral to the right hip    After written and verbal consent was obtained from the patient explaining the  risk and benefits of the procedure, the patient was placed supine on the  fluoroscopy table. The right hip was prepped and draped in the normal sterile  fashion. Skin and subcutaneous tissues were anesthetized with lidocaine with  bicarbonate. Under fluoroscopic guidance a 18-gauge spinal needle was advanced  to the right hip joint.  Needle tip position was confirmed with fluoroscopy. 3 mL  of cloudy serosanguineous fluid was aspirated. Samples were then put on  appropriate swabs and a portion capped for additional studies if needed. Iodinated contrast was injected which demonstrates intra-articular addition of  the needle. Contrast injected easily and is seen extending superior laterally to  the hip joint. The needle was removed and a bandage was placed. The patient  tolerated procedure well without immediate complication    Fluoroscopy time: 0.3 minutes    Fluoroscopy dose: 16.12    Impression  1. Successful right hip aspiration yielding 3 mL of cloudy serosanguineous  fluid. Samples were sent for the requested studies. Follow-up with referring  physician. 2. Injected contrast is seen extending abnormally superior lateral to the right  hip joint space. No results for input(s): CPK, TROIQ in the last 72 hours. No lab exists for component: CKQMB, CPKMB, BMPP  Recent Labs     09/10/21  0313 09/10/21  0305   * 134*   K 3.8 3.8    104   CO2 23 24   BUN 20 20   CREA 0.94 0.96   * 238*   PHOS  --  2.7   CA 8.0* 8.1*     Recent Labs     09/10/21  0305 09/09/21  1544   WBC 7.7 7.7   HGB 10.3* 11.5*   HCT 29.8* 33.1*   * 119*     Recent Labs     09/10/21  0305 09/09/21  1544   * 150*     No results for input(s): CHOL, LDLC in the last 72 hours.     No lab exists for component: TGL, HDLC,  HBA1C  Recent Labs     09/10/21  0305   CRP 12.40*       Elly Bence, ANP-BC  Dillon Winn MD      [facility] [address] [place] [address] [address], [address] [place] [address] [place] [address]  [phone]      CC:Dillan Ponce MD

## 2021-09-10 NOTE — PROGRESS NOTES
[facility] [facility]  Hospitalist Group                                                                                          Hospitalist Progress Note  [address]aguft[address] Beyer [address]  Answering service: [phone] or [phone] from in house phone        Date of Service:  9/10/2021  NAME:  Marisa Warren  :  1950  MRN:  [id number]      Admission Summary:   Neri Bueno a 66 y. o. male with h/o endocarditis and right prosthetic hip infection. presents with right hip pain and redness. He was followed by orthopedics Dr. Felicitas Bamberger, The patient initially had a right total hip replacement around 12 years ago. On 18 he developed a spontaneous hematogenous right hip infection from endocarditis and underwent an I & D REVISION FEMORAL HEAD AND POLYLINER RIGHT HIP. He completed 6 weeks of IV antibiotics and then on life time po abx kelfex for prophylaxis.  Patient then had a revision of femoral head and tripolar component of right total hip replacement on 2020 due to recurrent dislocations. Kerry Boogie was also seen by Dr. Myles Flores who did a revision of the right CIERRA and a percutaneous adductor release on 20.    [address]. He developed right hip redness and pain 1-2 weeks ago and he was arrange to have IR aspiration right hip as outpatient. Noticed a draining wound from lateral to the right hip. Post aspiration, Dr. Felicitas Bamberger recommended admission due to the concerning of hip infection.       Interval history / Subjective:    Patient examined at bedside, reports pain to right shoulder, CT has been completed Ortho following with plans for intervention. Patient report he hurt his shoulder falling out of bed about 1 month ago. Patient reports wound to right hip only hurts when it is being touched. No other complaints noted. Will continue to monitor closely, Ortho, Hepatology and ID following.      Assessment & Plan:     # Prosthetic hip infection      - Right hip aspiration yeiled 3 ml of cloudy serosanguineous fluid. - Cultures + gram negative rods      - Continue IV Cefipime      - ID following      - Ortho following, plan to OR for I&D   # Septic Right Shoulder      - Global rotator cuff tear with degenerative changes of GH joint and large joint effusion seen on CT.      - 60 ml of purulent fluid aspirated, culture pending.      - Ortho following, with plans for arthroscopic irrigation and debridement. # DM II     - Continue SSI and lantus, adjust as needed  # Hyponatremia     - 133, likely due to hyperglycemia     - Daily BMP     - NS at 75ml/hr  # Cirrhosis     - Child class B and MELD of 10 -14.     - Hepatology following  # Thrombocytopenia     - Secondary to cirrhosis     - Daily CBC  # Anemia     - multifactorial, including portal hypertension with chronic GI blood loss, chronic systemic disease  Code status: Full  DVT prophylaxis: Lovenox being held    Care Plan discussed with: Patient/Family  Anticipated Disposition: SNF/LTC  Anticipated Discharge: Greater than 48 hours     Hospital Problems  Date Reviewed: 9/8/2021        Codes Class Noted POA    Prosthetic hip infection ([facility] [address] ICD-10-CM: T84.59XA, U00.556  ICD-9-CM: 996.66, V43.64  9/9/2021 Unknown                Review of Systems:   Pertinent items are noted in HPI. Vital Signs:    Last 24hrs VS reviewed since prior progress note. Most recent are:  Visit Vitals  /62   Pulse 73   Temp 97.8 °F (36.6 °C)   Resp 18   Ht 5' 11\" (1.803 m)   Wt 104.3 kg (229 lb 15 oz)   SpO2 97%   BMI 32.07 kg/m²         Intake/Output Summary (Last 24 hours) at 9/10/2021 1627  Last data filed at 9/10/2021 0909  Gross per 24 hour   Intake 120 ml   Output    Net 120 ml        Physical Examination:     I had a face to face encounter with this patient and independently examined them on 9/10/2021 as outlined below:          Constitutional:  No acute distress, cooperative, pleasant    ENT:  Oral mucosa moist, oropharynx benign. Resp: CTA bilaterally. No wheezing/rhonchi/rales. No accessory muscle use   CV:  Regular rhythm, normal rate, S1, S2 noted    GI:  Soft, non distended, non tender. normoactive bowel sounds, no hepatosplenomegaly     Musculoskeletal:  No edema, limited movement to right shoulder    Neurologic:  Moves all extremities. AAOx3, CN II-XII reviewed            Data Review:    Review and/or order of clinical lab test  Review and/or order of tests in the radiology section of CPT  Review and/or order of tests in the medicine section of CPT      Labs:     Recent Labs     09/10/21  0305 09/09/21  1544   WBC 7.7 7.7   HGB 10.3* 11.5*   HCT 29.8* 33.1*   * 119*     Recent Labs     09/10/21  0313 09/10/21  0305 09/09/21  1544   * 134* 128*   K 3.8 3.8 3.9    104 98   CO2 23 24 23   BUN 20 20 19   CREA 0.94 0.96 1.12   * 238* 448*   CA 8.0* 8.1* 8.2*   MG  --  1.8  --    PHOS  --  2.7  --      Recent Labs     09/10/21  0305 09/09/21  1544   ALT 38 44   * 150*   TBILI 1.7* 1.8*   TP 6.4 7.2   ALB 1.8* 2.0*   GLOB 4.6* 5.2*     No results for input(s): INR, PTP, APTT, INREXT in the last 72 hours. No results for input(s): FE, TIBC, PSAT, FERR in the last 72 hours. Lab Results   Component Value Date/Time    Folate 14.2 01/21/2021 03:32 AM      No results for input(s): PH, PCO2, PO2 in the last 72 hours. No results for input(s): CPK, CKNDX, TROIQ in the last 72 hours.     No lab exists for component: CPKMB  Lab Results   Component Value Date/Time    Cholesterol, total 121 11/07/2019 10:23 AM    HDL Cholesterol 68 11/07/2019 10:23 AM    LDL, calculated 40 11/07/2019 10:23 AM    Triglyceride 66 11/07/2019 10:23 AM    CHOL/HDL Ratio 3.5 11/01/2010 07:07 AM     Lab Results   Component Value Date/Time    Glucose (POC) 270 (H) 09/10/2021 11:41 AM    Glucose (POC) 223 (H) 09/10/2021 06:30 AM    Glucose (POC) 466 (H) 09/09/2021 11:29 PM    Glucose (POC) 475 (H) 09/09/2021 09:43 PM    Glucose (POC) 472 (H) 09/09/2021 09:08 PM     Lab Results   Component Value Date/Time    Color YELLOW/STRAW 06/20/2021 06:55 PM    Appearance CLEAR 06/20/2021 06:55 PM    Specific gravity 1.014 06/20/2021 06:55 PM    Specific gravity 1.020 02/28/2018 05:01 PM    pH (UA) 5.5 06/20/2021 06:55 PM    Protein Negative 06/20/2021 06:55 PM    Glucose Negative 06/20/2021 06:55 PM    Ketone Negative 06/20/2021 06:55 PM    Bilirubin Negative 06/20/2021 06:55 PM    Urobilinogen 0.2 06/20/2021 06:55 PM    Nitrites Negative 06/20/2021 06:55 PM    Leukocyte Esterase Negative 06/20/2021 06:55 PM    Epithelial cells FEW 06/20/2021 06:55 PM    Bacteria Negative 06/20/2021 06:55 PM    WBC 0-4 06/20/2021 06:55 PM    RBC 0-5 06/20/2021 06:55 PM         Medications Reviewed:     Current Facility-Administered Medications   Medication Dose Route Frequency    traMADoL (ULTRAM) tablet 50 mg  50 mg Oral Q6H PRN    cefepime (MAXIPIME) 2 g in 0.9% sodium chloride (MBP/ADV) 100 mL MBP  2 g IntraVENous Q8H    atorvastatin (LIPITOR) tablet 40 mg  40 mg Oral DAILY    furosemide (LASIX) tablet 40 mg  40 mg Oral DAILY    gabapentin (NEURONTIN) tablet 600 mg  600 mg Oral QPM    lactulose (CHRONULAC) 10 gram/15 mL solution 30 mL  30 mL Oral BID    pantoprazole (PROTONIX) tablet 40 mg  40 mg Oral DAILY    midodrine (PROAMATINE) tablet 10 mg  10 mg Oral TID    rifAXIMin (XIFAXAN) tablet 550 mg  550 mg Oral BID    sertraline (ZOLOFT) tablet 150 mg  150 mg Oral DAILY    spironolactone (ALDACTONE) tablet 100 mg  100 mg Oral DAILY    tamsulosin (FLOMAX) capsule 0.4 mg  0.4 mg Oral DAILY    insulin lispro (HUMALOG) injection   SubCUTAneous AC&HS    insulin glargine (LANTUS) injection 30 Units  30 Units SubCUTAneous QHS    sodium chloride (NS) flush 5-40 mL  5-40 mL IntraVENous Q8H    sodium chloride (NS) flush 5-40 mL  5-40 mL IntraVENous PRN    acetaminophen (TYLENOL) tablet 650 mg  650 mg Oral Q6H PRN    Or    acetaminophen (TYLENOL) suppository 650 mg  650 mg Rectal Q6H PRN    polyethylene glycol (MIRALAX) packet 17 g  17 g Oral DAILY PRN    ondansetron (ZOFRAN ODT) tablet 4 mg  4 mg Oral Q8H PRN    Or    ondansetron (ZOFRAN) injection 4 mg  4 mg IntraVENous Q6H PRN    [Held by provider] enoxaparin (LOVENOX) injection 40 mg  40 mg SubCUTAneous DAILY    0.9% sodium chloride infusion  75 mL/hr IntraVENous CONTINUOUS    glucose chewable tablet 16 g  4 Tablet Oral PRN    dextrose (D50W) injection syrg 12.5-25 g  12.5-25 g IntraVENous PRN    glucagon (GLUCAGEN) injection 1 mg  1 mg IntraMUSCular PRN     ______________________________________________________________________  EXPECTED LENGTH OF STAY: - - -  ACTUAL LENGTH OF STAY:          [address] [place] [address],B-1, FNP

## 2021-09-11 PROBLEM — T84.51XA INFECTION ASSOCIATED WITH INTERNAL RIGHT HIP PROSTHESIS (HCC): Status: ACTIVE | Noted: 2021-01-01

## 2021-09-11 NOTE — ANESTHESIA POSTPROCEDURE EVALUATION
Post-Anesthesia Evaluation and Assessment    Patient: Lizz Baptiste MRN: [id number]  SSN: [id number]    YOB: 1950  Age: 66 y.o. Sex: male      I have evaluated the patient and they are stable and ready for discharge from the PACU. Cardiovascular Function/Vital Signs  Visit Vitals  BP (!) 99/54   Pulse 74   Temp 36.6 °C (97.9 °F)   Resp 21   Ht 5' 11\" (1.803 m)   Wt 104.3 kg (229 lb 15 oz)   SpO2 99%   BMI 32.07 kg/m²       Patient is status post General anesthesia for Procedure(s):  RIGHT SHOULDER ARTHROSCOPIC INCISION AND DRAINAGE AND DRAIN PLACEMENT. Nausea/Vomiting: None    Postoperative hydration reviewed and adequate. Pain:  Pain Scale 1: (P) FLACC (09/11/21 0020)  Pain Intensity 1: (P) 0 (09/11/21 0020)   Managed    Neurological Status:   Neuro (WDL): (P) Exceptions to WDL (09/11/21 0020)   At baseline    Mental Status, Level of Consciousness: Alert and  oriented to person, place, and time    Pulmonary Status:   O2 Device: CO2 nasal cannula (09/11/21 0021)   Adequate oxygenation and airway patent    Complications related to anesthesia: None    Post-anesthesia assessment completed. No concerns    Signed By: Pushpa Varghese MD     September 11, 2021              Procedure(s):  RIGHT SHOULDER ARTHROSCOPIC INCISION AND DRAINAGE AND DRAIN PLACEMENT. general    <BSHSIANPOST>    INITIAL Post-op Vital signs:   Vitals Value Taken Time   /49 09/11/21 0030   Temp 36.6 °C (97.9 °F) 09/11/21 0021   Pulse 72 09/11/21 0033   Resp 16 09/11/21 0033   SpO2 99 % 09/11/21 0033   Vitals shown include unvalidated device data.

## 2021-09-11 NOTE — PROGRESS NOTES
Bedside and Verbal shift change report given to Carmella Dai RN (oncoming nurse) by Jammie Pool RN (offgoing nurse). Report included the following information SBAR, Kardex, Intake/Output, MAR and Recent Results.

## 2021-09-11 NOTE — PROGRESS NOTES
[address] [facility]  Hospitalist Group                                                                                          Hospitalist Progress Note  Damari Reardon [address]  Answering service: [phone] OR [phone] from in house phone        Date of Service:  2021  NAME:  Pancho Rodriguez  :  1950  MRN:  [id number]      Admission Summary:   Maryann Patel a 66 y. o. male with h/o endocarditis and right prosthetic hip infection. presents with right hip pain and redness. He was followed by orthopedics Dr. Devon Mattson, The patient initially had a right total hip replacement around 12 years ago. On 18 he developed a spontaneous hematogenous right hip infection from endocarditis and underwent an I & D REVISION FEMORAL HEAD AND POLYLINER RIGHT HIP. He completed 6 weeks of IV antibiotics and then on life time po abx kelfex for prophylaxis.  Patient then had a revision of femoral head and tripolar component of right total hip replacement on 2020 due to recurrent dislocations. Suma Deng was also seen by Dr. Ublado Sam who did a revision of the right CIERRA and a percutaneous adductor release on 20.    [address]. He developed right hip redness and pain 1-2 weeks ago and he was arrange to have IR aspiration right hip as outpatient. Noticed a draining wound from lateral to the right hip. Post aspiration, Dr. Devon Mattson recommended admission due to the concerning of hip infection.       Interval history / Subjective:    Patient examined at bedside, reports feeling sleepy. A&O x 3. Clean, intact dressing and drain noted to right shoulder. Patient underwent irrigation and debridement of right shoulder this AM. Will likely return to OR for irrigations and debridement of Right Hip. Assessment & Plan:     # Prosthetic hip infection      - Right hip aspiration yeiled 3 ml of cloudy serosanguineous fluid.       - Cultures + gram negative rods      - Continue IV Cefipime      - ID following      - Ortho following, plan to OR for I&D   # Septic Right Shoulder      - Global rotator cuff tear with degenerative changes of GH joint and large joint effusion seen on CT.      - 60 ml of purulent fluid aspirated, culture pending.      - Ortho following, I&D completed 9/11/21  # DM II     - Continue SSI and lantus, adjust as needed  # Hyponatremia     - 133, likely due to hyperglycemia     - Daily BMP     - NS at 75ml/hr  # Cirrhosis     - Child class B and MELD of 10 -14.     - Hepatology following  # Thrombocytopenia     - Secondary to cirrhosis     - Daily CBC  # Anemia     - multifactorial, including portal hypertension with chronic GI blood loss, chronic systemic disease  Code status: Full  DVT prophylaxis: Lovenox being held    [address] discussed with: Patient/Family  Anticipated Disposition: SNF/LTC  Anticipated Discharge: Greater than 48 hours     Hospital Problems  Date Reviewed: 9/8/2021        Codes Class Noted POA    Infection associated with internal right hip prosthesis ([facility] [address] ICD-10-CM: T84.51XA  ICD-9-CM: 996.66, V43.64  9/11/2021 Unknown        Prosthetic hip infection ([facility] [address] ICD-10-CM: T84.59XA, C60.940  ICD-9-CM: 996.66, V43.64  9/9/2021 Unknown                Review of Systems:   Pertinent items are noted in HPI. Vital Signs:    Last 24hrs VS reviewed since prior progress note.  Most recent are:  Visit Vitals  BP (!) 103/57   Pulse 67   Temp 97.9 °F (36.6 °C)   Resp 17   Ht 5' 11\" (1.803 m)   Wt 104.3 kg (229 lb 15 oz)   SpO2 95%   BMI 32.07 kg/m²         Intake/Output Summary (Last 24 hours) at 9/11/2021 1457  Last data filed at 9/11/2021 0050  Gross per 24 hour   Intake 400 ml   Output 55 ml   Net 345 ml        Physical Examination:     I had a face to face encounter with this patient and independently examined them on 9/11/2021 as outlined below:          Constitutional:  No acute distress, cooperative, pleasant    ENT:  Oral mucosa moist, oropharynx benign. Resp:  CTA bilaterally. No wheezing/rhonchi/rales. No accessory muscle use   CV:  Regular rhythm, normal rate, S1, S2 noted    GI:  Soft, non distended, non tender. normoactive bowel sounds, no hepatosplenomegaly     Musculoskeletal:  No edema, limited movement to right shoulder    Neurologic:  Moves all extremities. AAOx3, CN II-XII reviewed            Data Review:    Review and/or order of clinical lab test  Review and/or order of tests in the radiology section of CPT  Review and/or order of tests in the medicine section of CPT      Labs:     Recent Labs     09/11/21  0934 09/11/21  0234 09/10/21  0305 09/10/21  0305   WBC  --  4.7  --  7.7   HGB 8.5* 8.8*   < > 10.3*   HCT  --  26.1*  --  29.8*   PLT  --  97*  --  109*    < > = values in this interval not displayed. Recent Labs     09/11/21  0234 09/10/21  0313 09/10/21  0305   * 133* 134*   K 3.7 3.8 3.8    103 104   CO2 21 23 24   BUN 22* 20 20   CREA 0.92 0.94 0.96   * 242* 238*   CA 7.8* 8.0* 8.1*   MG  --   --  1.8   PHOS  --   --  2.7     Recent Labs     09/10/21  0305 09/09/21  1544   ALT 38 44   * 150*   TBILI 1.7* 1.8*   TP 6.4 7.2   ALB 1.8* 2.0*   GLOB 4.6* 5.2*     No results for input(s): INR, PTP, APTT, INREXT, INREXT in the last 72 hours. No results for input(s): FE, TIBC, PSAT, FERR in the last 72 hours. Lab Results   Component Value Date/Time    Folate 14.2 01/21/2021 03:32 AM      No results for input(s): PH, PCO2, PO2 in the last 72 hours. No results for input(s): CPK, CKNDX, TROIQ in the last 72 hours.     No lab exists for component: CPKMB  Lab Results   Component Value Date/Time    Cholesterol, total 121 11/07/2019 10:23 AM    HDL Cholesterol 68 11/07/2019 10:23 AM    LDL, calculated 40 11/07/2019 10:23 AM    Triglyceride 66 11/07/2019 10:23 AM    CHOL/HDL Ratio 3.5 11/01/2010 07:07 AM     Lab Results   Component Value Date/Time    Glucose (POC) 97 09/11/2021 02:34 PM    Glucose (POC) 132 (H) 09/11/2021 06:22 AM    Glucose (POC) 147 (H) 09/11/2021 12:30 AM    Glucose (POC) 182 (H) 09/10/2021 08:59 PM    Glucose (POC) 270 (H) 09/10/2021 04:55 PM     Lab Results   Component Value Date/Time    Color YELLOW/STRAW 06/20/2021 06:55 PM    Appearance CLEAR 06/20/2021 06:55 PM    Specific gravity 1.014 06/20/2021 06:55 PM    Specific gravity 1.020 02/28/2018 05:01 PM    pH (UA) 5.5 06/20/2021 06:55 PM    Protein Negative 06/20/2021 06:55 PM    Glucose Negative 06/20/2021 06:55 PM    Ketone Negative 06/20/2021 06:55 PM    Bilirubin Negative 06/20/2021 06:55 PM    Urobilinogen 0.2 06/20/2021 06:55 PM    Nitrites Negative 06/20/2021 06:55 PM    Leukocyte Esterase Negative 06/20/2021 06:55 PM    Epithelial cells FEW 06/20/2021 06:55 PM    Bacteria Negative 06/20/2021 06:55 PM    WBC 0-4 06/20/2021 06:55 PM    RBC 0-5 06/20/2021 06:55 PM         Medications Reviewed:     Current Facility-Administered Medications   Medication Dose Route Frequency    sodium chloride (NS) flush 5-40 mL  5-40 mL IntraVENous Q8H    sodium chloride (NS) flush 5-40 mL  5-40 mL IntraVENous PRN    acetaminophen (TYLENOL) tablet 1,000 mg  1,000 mg Oral Q6H    oxyCODONE IR (ROXICODONE) tablet 10 mg  10 mg Oral Q3H PRN    ondansetron (ZOFRAN) injection 4 mg  4 mg IntraVENous Q4H PRN    traMADoL (ULTRAM) tablet 50 mg  50 mg Oral Q6H PRN    cefepime (MAXIPIME) 2 g in 0.9% sodium chloride (MBP/ADV) 100 mL MBP  2 g IntraVENous Q8H    atorvastatin (LIPITOR) tablet 40 mg  40 mg Oral DAILY    furosemide (LASIX) tablet 40 mg  40 mg Oral DAILY    gabapentin (NEURONTIN) tablet 600 mg  600 mg Oral QPM    lactulose (CHRONULAC) 10 gram/15 mL solution 30 mL  30 mL Oral BID    pantoprazole (PROTONIX) tablet 40 mg  40 mg Oral DAILY    midodrine (PROAMATINE) tablet 10 mg  10 mg Oral TID    rifAXIMin (XIFAXAN) tablet 550 mg  550 mg Oral BID    sertraline (ZOLOFT) tablet 150 mg  150 mg Oral DAILY    spironolactone (ALDACTONE) tablet 100 mg  100 mg Oral DAILY    tamsulosin (FLOMAX) capsule 0.4 mg  0.4 mg Oral DAILY    insulin lispro (HUMALOG) injection   SubCUTAneous AC&HS    insulin glargine (LANTUS) injection 30 Units  30 Units SubCUTAneous QHS    sodium chloride (NS) flush 5-40 mL  5-40 mL IntraVENous Q8H    sodium chloride (NS) flush 5-40 mL  5-40 mL IntraVENous PRN    acetaminophen (TYLENOL) tablet 650 mg  650 mg Oral Q6H PRN    Or    acetaminophen (TYLENOL) suppository 650 mg  650 mg Rectal Q6H PRN    polyethylene glycol (MIRALAX) packet 17 g  17 g Oral DAILY PRN    ondansetron (ZOFRAN ODT) tablet 4 mg  4 mg Oral Q8H PRN    Or    ondansetron (ZOFRAN) injection 4 mg  4 mg IntraVENous Q6H PRN    [Held by provider] enoxaparin (LOVENOX) injection 40 mg  40 mg SubCUTAneous DAILY    glucose chewable tablet 16 g  4 Tablet Oral PRN    dextrose (D50W) injection syrg 12.5-25 g  12.5-25 g IntraVENous PRN    glucagon (GLUCAGEN) injection 1 mg  1 mg IntraMUSCular PRN     ______________________________________________________________________  EXPECTED LENGTH OF STAY: - - -  ACTUAL LENGTH OF STAY:          [address], FNP

## 2021-09-11 NOTE — PROGRESS NOTES
Nurse Chris Ware gave bedside report to oncoming nurse Panchito Delgado RN by AKILAH and Guillermina

## 2021-09-11 NOTE — PERIOP NOTES
0045 TRANSFER - OUT REPORT:    Verbal report given to [facility] JANICE Rn(name) on Marisa Warren  being transferred to [facility](unit) for routine post - op       Report consisted of patients Situation, Background, Assessment and   Recommendations(SBAR). Time Pre op antibiotic given:2018  Anesthesia Stop time: 0020  Hoffman Present on Transfer to floor:no  Order for Hoffman on Chart:no  Discharge Prescriptions with Chart:no    Information from the following report(s) SBAR, OR Summary, Intake/Output, Cardiac Rhythm NSR and Alarm Parameters  was reviewed with the receiving nurse. Opportunity for questions and clarification was provided. Is the patient on 02? NO       L/Min 0       Other 0    Is the patient on a monitor? NO    Is the nurse transporting with the patient? YES    Surgical Waiting Area notified of patient's transfer from PACU? NO      The following personal items collected during your admission accompanied patient upon transfer:   Dental Appliance: Dental Appliances: (P) None  Vision: Visual Aid: At bedside, Glasses  Hearing Aid:    Jewelry:    Clothing:    Other Valuables:    Valuables sent to safe:       0110 Noted Vitals: 91/46, Hr 69, resp 26, sats 98 on room air. Repeat vitals: Hr 68, bp 100/46, resp 17, sats 97% Pt resting with ou close. Continue to monitor. [facility] 0130 No change in vitals. Called and informed Dr. Anna Abreu of the following. New order for albumin. Order noted. 0200 Vitals wnl. Pt is resting comfortable in bed no s/s of distress. Continue to monitor.

## 2021-09-11 NOTE — BRIEF OP NOTE
Brief Postoperative Note    Patient: Brittanie Lao  YOB: 1950  MRN: [id number]    Date of Procedure: 9/13/2021     Pre-Op Diagnosis: INFECTION RIGHT TOTAL HIP REPLACEMENT    Post-Op Diagnosis: Same as preoperative diagnosis.       Procedure(s):  INCISION AND DRAINAGE OF RIGHT HIP, EXCHANGE OF CONSTRAINED LINER AND FEMORAL HEAD, POSSIBLE RESECTION ARTHROPLASTY    Surgeon(s):  Royanne Buerger, MD    Surgical Assistant: Surg Asst-1: Curtis LUCIA    Anesthesia: General     Estimated Blood Loss (mL): Minimal    Complications: None    Specimens: * No specimens in log *     Implants: * No surgical log found *    Drains:   Hemovac Anterior;Right Other (comment) (Active)       [REMOVED] Orogastric Tube 03/28/20 (Removed)       [REMOVED] Drain 02/26/18 Right Hip (Removed)       Findings: massive rtc tear, grade 4 cartilage loss and copious purulent fluid    Electronically Signed by Deja Estrada MD on 9/11/2021 at 12:07 AM

## 2021-09-11 NOTE — PROGRESS NOTES
TRANSFER - IN REPORT:    Verbal report received from  [facility] RN(name) on Pancho Rodriguez  being received from  PACU(unit) for routine post - op      Report consisted of patients Situation, Background, Assessment and   Recommendations(SBAR). Information from the following report(s) OR Summary and MAR was reviewed with the receiving nurse. Opportunity for questions and clarification was provided. Assessment completed upon patients arrival to unit and care assumed.

## 2021-09-11 NOTE — OP NOTES
[address] [facility]  OPERATIVE REPORT    Name:  Mare Terry  MR#:  [id number]  :  1950  ACCOUNT #:  [de-identified]  DATE OF SERVICE:  09/10/2021      PREOPERATIVE DIAGNOSIS:  Septic right shoulder. POSTOPERATIVE DIAGNOSES:  Septic right shoulder in the setting of massive cuff tear and complete glenohumeral joint space cartilage loss. PROCEDURES PERFORMED:  1. Irrigation and debridement arthroscopically. 2.  Extensive debridement. 3.  Extensive synovectomy. SURGEON:  Kevan Bonilla MD    ASSISTANT:  None    ANESTHESIA:  General.    COMPLICATIONS:  none    SPECIMENS REMOVED:  none    IMPLANTS:  None. ESTIMATED BLOOD LOSS:  Minimal.    INDICATIONS:  This patient has been having longstanding history of problems with his right shoulder for the last couple of weeks. He has a septic right hip that was undergoing I and D, and he developed a septic right shoulder. One of my partners aspirated the shoulder, and copious amounts of purulent fluid was expressed from that. I was asked to consult on him for an irrigation and debridement. I explained the risks and benefits to him including, but not limited to infection, bleeding, damage to nerves and blood vessels, need for further surgery, continued pain, stiffness, and failure to achieve the desired result. The patient agreed to have the procedure done. He was identified as the correct patient and right side as the correct operative side. He was taken back to the operating room, prepped and draped in normal sterile fashion, and given preoperative antibiotics including Ancef. PROCEDURE:  A standard posterior portal was established. The arthroscope was then introduced into the glenohumeral joint. Diagnostic arthroscopy was performed. With cross-cutting technique, I established the anterior portal through which shaver blades were introduced. Upon entry into the joint, a copious amount of purulent fluid was extracted.   This was sent for 3 sets of cultures and gram stains. Once the purulent fluid cleared up, I inserted the camera and made the anterior portal.  The patient had a complete grade IV cartilage loss of the entire humeral head and glenoid. He had no rotator cuff except for very small strip of teres minor posteriorly. I used the riky to do an extensive debridement of the entire synovium removing the entire synovial membrane, and debriding the entire labral tissue and the rotator cuff remnant. I did an extensive debridement of all 3 compartments of the shoulder joint and ultimately irrigated and debrided with 9 L of saline. Under fluoroscopic guidance, I placed a Hemovac drain posterior to anterior, removed the hemorrhage from the shoulder, and closed the wounds with nylon stitches. I did sew the drain in place. Postoperative plan for him is we will follow his cultures and continue to direct his antibiotic care appropriately. The drain could come out when it is less than 10 mL per an 8-hour shift.       Bee Marley MD      [id number]  D:  09/11/2021 0:12  T:  09/11/2021 11:10  JOB #:  [id number]  CC:

## 2021-09-11 NOTE — PROGRESS NOTES
ID Progress Note  2021    Subjective:   Appear very sleepy, limited response when briefly awoke  Review of Systems:            Symptom Y/N Comments   Symptom Y/N Comments   Fever/Chills       Chest Pain        Poor Appetite       Edema        Cough       Abdominal Pain        Sputum       Joint Pain        SOB/WANG       Pruritis/Rash        Nausea/vomit       Tolerating PT/OT        Diarrhea       Tolerating Diet        Constipation       Other           Could NOT obtain due to: sleepy      Objective:     Vitals:   Visit Vitals  BP (!) 92/52   Pulse 70   Temp 97.9 °F (36.6 °C)   Resp 16   Ht 5' 11\" (1.803 m)   Wt 104.3 kg (229 lb 15 oz)   SpO2 97%   BMI 32.07 kg/m²        Tmax:  Temp (24hrs), Av.2 °F (36.8 °C), Min:97.2 °F (36.2 °C), Max:99.7 °F (37.6 °C)      PHYSICAL EXAM:  General: Chronically ill appearing, WD, WN. Alert, cooperative, no acute distress    EENT:  EOMI. Anicteric sclerae. MMM  Resp:  Clear in apex with decreased breath sounds at bases, no wheezing or rales. No accessory muscle use  CV:  Regular  rhythm,  No edema  GI:  Soft, Non distended, Non tender. +Bowel sounds  Neurologic:  Followed simple commends, just said 'ok' when questions were asked, fall back to sleep  Psych:   Difficult to assess insight, pt was very sleepy. Not anxious nor agitated  Skin:  No rashes.   No jaundice    Labs:   Lab Results   Component Value Date/Time    WBC 4.7 2021 02:34 AM    HGB 8.8 (L) 2021 02:34 AM    HCT 26.1 (L) 2021 02:34 AM    PLATELET 97 (L)  02:34 AM    MCV 98.1 2021 02:34 AM     Lab Results   Component Value Date/Time    Sodium 134 (L) 2021 02:34 AM    Potassium 3.7 2021 02:34 AM    Chloride 106 2021 02:34 AM    CO2 21 2021 02:34 AM    Anion gap 7 2021 02:34 AM    Glucose 158 (H) 2021 02:34 AM    BUN 22 (H) 2021 02:34 AM    Creatinine 0.92 2021 02:34 AM    BUN/Creatinine ratio 24 (H) 2021 02:34 AM    GFR est AA >60 09/11/2021 02:34 AM    GFR est non-AA >60 09/11/2021 02:34 AM    Calcium 7.8 (L) 09/11/2021 02:34 AM    Bilirubin, total 1.7 (H) 09/10/2021 03:05 AM    Alk. phosphatase 120 (H) 09/10/2021 03:05 AM    Protein, total 6.4 09/10/2021 03:05 AM    Albumin 1.8 (L) 09/10/2021 03:05 AM    Globulin 4.6 (H) 09/10/2021 03:05 AM    A-G Ratio 0.4 (L) 09/10/2021 03:05 AM    ALT (SGPT) 38 09/10/2021 03:05 AM       Blood cx (8/17/2020) Serratia marcescens, (2/2018) staph lugdudensis   Assessment and Plan   Prosthetic right hip infection  S/p I & D or wash out on right shoulder, hemovac from right shoulder in place (9/11); massive rtc tear, grade 4 cartilage loss and copious purulent fluid  S/p revision of right hip arthroplasty (8/14/2020)  Hx MV endocarditis & bacteremia (2/2018)  - afebrile, WBC 7.7    S/p aspirated 3ml of cloudy serosanguineous fluid from right hip joint (9/9)    Body fluid cx, hip (9/9/2021) GNR    Fluid cx, shoulder (9/10/2021)       Continue with IV cefepime     Right shoulder pain  - CT RUE (9/10) Chronic complete tear of rotator cuff tendons involving supraspinatus, infraspinatus and teres minor with moderate fatty atrophy in humeral head superior subluxation. Moderate glenohumeral and acromioclavicular osteoarthrosis with loose bodies. Large effusion of glenohumeral joint and subacromial subdeltoid bursa. Type III acromion undersurface spurring and bony remodeling related to  superior humeral subluxation.     S/p aspirated 60ml of purulent fluid from right shoulder(9/10), culture pending    ABX as above    Ortho following        Marline Lee NP

## 2021-09-12 NOTE — PROGRESS NOTES
[address] [facility]  Hospitalist Group                                                                                          Hospitalist Progress Note  Jose Ferris [address]  Answering service: [phone] OR [phone] from in house phone        Date of Service:  2021  NAME:  Roxi Narayanan  :  1950  MRN:  [id number]      Admission Summary:   Aly Brown a 66 y. o. male with h/o endocarditis and right prosthetic hip infection. presents with right hip pain and redness. He was followed by orthopedics Dr. Kelly Dowling, The patient initially had a right total hip replacement around 12 years ago. On 18 he developed a spontaneous hematogenous right hip infection from endocarditis and underwent an I & D REVISION FEMORAL HEAD AND POLYLINER RIGHT HIP. He completed 6 weeks of IV antibiotics and then on life time po abx kelfex for prophylaxis.  Patient then had a revision of femoral head and tripolar component of right total hip replacement on 2020 due to recurrent dislocations. [facility] was also seen by Dr. Mari Sandhu who did a revision of the right CIERRA and a percutaneous adductor release on 20.    [address]. He developed right hip redness and pain 1-2 weeks ago and he was arrange to have IR aspiration right hip as outpatient. Noticed a draining wound from lateral to the right hip. Post aspiration, Dr. Kelly Dowling recommended admission due to the concerning of hip infection.       Interval history / Subjective:    Patient examined at bedside, reports feeling sleepy, reports pain is better managed today. A&O x 3. Clean, intact dressing and drain noted to right shoulder. Will likely return to OR for irrigations and debridement of Right Hip. Assessment & Plan:     # Prosthetic hip infection      - Right hip aspiration yeiled 3 ml of cloudy serosanguineous fluid.       - Cultures + gram negative rods      - Continue IV Cefipime      - ID following      - Ortho following, plan to OR for I&D   # Septic Right Shoulder      - Global rotator cuff tear with degenerative changes of GH joint and large joint effusion seen on CT.      - 60 ml of purulent fluid aspirated, culture pending.      - Ortho following, I&D completed 9/11/21  # DM II     - Continue SSI and lantus, adjust as needed  # Hyponatremia     - 133, likely due to hyperglycemia     - Daily BMP     - NS at 75ml/hr  # Cirrhosis     - Child class B and MELD of 10 -14.     - Hepatology following  # Thrombocytopenia     - Secondary to cirrhosis     - Daily CBC  # Anemia     - multifactorial, including portal hypertension with chronic GI blood loss, chronic systemic disease  Code status: Full  DVT prophylaxis: Lovenox being held    [address] discussed with: Patient/Family  Anticipated Disposition: SNF/LTC  Anticipated Discharge: Greater than 48 hours     Hospital Problems  Date Reviewed: 9/8/2021        Codes Class Noted POA    Infection associated with internal right hip prosthesis ([facility] [address] ICD-10-CM: T84.51XA  ICD-9-CM: 996.66, V43.64  9/11/2021 Unknown        Prosthetic hip infection ([address] ICD-10-CM: T84.59XA, X96.675  ICD-9-CM: 996.66, V43.64  9/9/2021 Unknown                Review of Systems:   Pertinent items are noted in HPI. Vital Signs:    Last 24hrs VS reviewed since prior progress note.  Most recent are:  Visit Vitals  BP (!) 98/54 (BP 1 Location: Left upper arm, BP Patient Position: At rest)   Pulse 72   Temp 98.3 °F (36.8 °C)   Resp 18   Ht 5' 11\" (1.803 m)   Wt 104.3 kg (229 lb 15 oz)   SpO2 99%   BMI 32.07 kg/m²         Intake/Output Summary (Last 24 hours) at 9/12/2021 1156  Last data filed at 9/12/2021 0500  Gross per 24 hour   Intake    Output 750 ml   Net -750 ml        Physical Examination:     I had a face to face encounter with this patient and independently examined them on 9/12/2021 as outlined below:          Constitutional:  No acute distress, cooperative, pleasant    ENT:  Oral mucosa moist, oropharynx benign. Resp:  CTA bilaterally. No wheezing/rhonchi/rales. No accessory muscle use   CV:  Regular rhythm, normal rate, S1, S2 noted    GI:  Soft, non distended, non tender. normoactive bowel sounds, no hepatosplenomegaly     Musculoskeletal:  No edema, limited movement to right shoulder    Neurologic:  Moves all extremities. AAOx3, CN II-XII reviewed            Data Review:    Review and/or order of clinical lab test  Review and/or order of tests in the radiology section of CPT  Review and/or order of tests in the medicine section of CPT      Labs:     Recent Labs     09/12/21 0226 09/11/21  0934 09/11/21  0234 09/11/21 0234   WBC 4.1  --   --  4.7   HGB 8.8* 8.5*   < > 8.8*   HCT 25.9*  --   --  26.1*   PLT 85*  --   --  97*    < > = values in this interval not displayed. Recent Labs     09/12/21  0226 09/11/21  0234 09/10/21  0313 09/10/21  0305 09/10/21  0305    134* 133*   < > 134*   K 3.7 3.7 3.8   < > 3.8   * 106 103   < > 104   CO2 23 21 23   < > 24   BUN 20 22* 20   < > 20   CREA 0.94 0.92 0.94   < > 0.96   * 158* 242*   < > 238*   CA 7.9* 7.8* 8.0*   < > 8.1*   MG  --   --   --   --  1.8   PHOS  --   --   --   --  2.7    < > = values in this interval not displayed. Recent Labs     09/10/21  0305 09/09/21  1544   ALT 38 44   * 150*   TBILI 1.7* 1.8*   TP 6.4 7.2   ALB 1.8* 2.0*   GLOB 4.6* 5.2*     No results for input(s): INR, PTP, APTT, INREXT, INREXT in the last 72 hours. No results for input(s): FE, TIBC, PSAT, FERR in the last 72 hours. Lab Results   Component Value Date/Time    Folate 14.2 01/21/2021 03:32 AM      No results for input(s): PH, PCO2, PO2 in the last 72 hours. No results for input(s): CPK, CKNDX, TROIQ in the last 72 hours.     No lab exists for component: CPKMB  Lab Results   Component Value Date/Time    Cholesterol, total 121 11/07/2019 10:23 AM    HDL Cholesterol 68 11/07/2019 10:23 AM    LDL, calculated 40 11/07/2019 10:23 AM    Triglyceride 66 11/07/2019 10:23 AM    CHOL/HDL Ratio 3.5 11/01/2010 07:07 AM     Lab Results   Component Value Date/Time    Glucose (POC) 245 (H) 09/12/2021 11:17 AM    Glucose (POC) 150 (H) 09/12/2021 06:19 AM    Glucose (POC) 114 09/11/2021 09:11 PM    Glucose (POC) 126 (H) 09/11/2021 05:03 PM    Glucose (POC) 97 09/11/2021 02:34 PM     Lab Results   Component Value Date/Time    Color YELLOW/STRAW 06/20/2021 06:55 PM    Appearance CLEAR 06/20/2021 06:55 PM    Specific gravity 1.014 06/20/2021 06:55 PM    Specific gravity 1.020 02/28/2018 05:01 PM    pH (UA) 5.5 06/20/2021 06:55 PM    Protein Negative 06/20/2021 06:55 PM    Glucose Negative 06/20/2021 06:55 PM    Ketone Negative 06/20/2021 06:55 PM    Bilirubin Negative 06/20/2021 06:55 PM    Urobilinogen 0.2 06/20/2021 06:55 PM    Nitrites Negative 06/20/2021 06:55 PM    Leukocyte Esterase Negative 06/20/2021 06:55 PM    Epithelial cells FEW 06/20/2021 06:55 PM    Bacteria Negative 06/20/2021 06:55 PM    WBC 0-4 06/20/2021 06:55 PM    RBC 0-5 06/20/2021 06:55 PM         Medications Reviewed:     Current Facility-Administered Medications   Medication Dose Route Frequency    Vancomycin - Pharmacy Dosing   Other Rx Dosing/Monitoring    vancomycin (VANCOCIN) 2500 mg in  mL infusion  2,500 mg IntraVENous ONCE    sodium chloride (NS) flush 5-40 mL  5-40 mL IntraVENous Q8H    sodium chloride (NS) flush 5-40 mL  5-40 mL IntraVENous PRN    oxyCODONE IR (ROXICODONE) tablet 10 mg  10 mg Oral Q3H PRN    traMADoL (ULTRAM) tablet 50 mg  50 mg Oral Q6H PRN    cefepime (MAXIPIME) 2 g in 0.9% sodium chloride (MBP/ADV) 100 mL MBP  2 g IntraVENous Q8H    atorvastatin (LIPITOR) tablet 40 mg  40 mg Oral DAILY    furosemide (LASIX) tablet 40 mg  40 mg Oral DAILY    gabapentin (NEURONTIN) tablet 600 mg  600 mg Oral QPM    lactulose (CHRONULAC) 10 gram/15 mL solution 30 mL  30 mL Oral BID    pantoprazole (PROTONIX) tablet 40 mg  40 mg Oral DAILY    midodrine (PROAMATINE) tablet 10 mg  10 mg Oral TID    rifAXIMin (XIFAXAN) tablet 550 mg  550 mg Oral BID    sertraline (ZOLOFT) tablet 150 mg  150 mg Oral DAILY    spironolactone (ALDACTONE) tablet 100 mg  100 mg Oral DAILY    tamsulosin (FLOMAX) capsule 0.4 mg  0.4 mg Oral DAILY    insulin lispro (HUMALOG) injection   SubCUTAneous AC&HS    insulin glargine (LANTUS) injection 30 Units  30 Units SubCUTAneous QHS    sodium chloride (NS) flush 5-40 mL  5-40 mL IntraVENous Q8H    sodium chloride (NS) flush 5-40 mL  5-40 mL IntraVENous PRN    acetaminophen (TYLENOL) tablet 650 mg  650 mg Oral Q6H PRN    Or    acetaminophen (TYLENOL) suppository 650 mg  650 mg Rectal Q6H PRN    polyethylene glycol (MIRALAX) packet 17 g  17 g Oral DAILY PRN    ondansetron (ZOFRAN ODT) tablet 4 mg  4 mg Oral Q8H PRN    Or    ondansetron (ZOFRAN) injection 4 mg  4 mg IntraVENous Q6H PRN    [Held by provider] enoxaparin (LOVENOX) injection 40 mg  40 mg SubCUTAneous DAILY    glucose chewable tablet 16 g  4 Tablet Oral PRN    dextrose (D50W) injection syrg 12.5-25 g  12.5-25 g IntraVENous PRN    glucagon (GLUCAGEN) injection 1 mg  1 mg IntraMUSCular PRN     ______________________________________________________________________  EXPECTED LENGTH OF STAY: - - -  ACTUAL LENGTH OF STAY:          Leah 197, FNP

## 2021-09-12 NOTE — PROGRESS NOTES
Pharmacist Note - Vancomycin Dosing    Consult provided for this 66 y.o. male for indication of prosthetic hip infection/ septic right shoulder  Antibiotic regimen(s): vancomycin + cefepime  Patient on vancomycin PTA? Was previously on vancomycin inpatient from  - 9/10    Recent Labs     21  0226 21  0234 09/10/21  0313 09/10/21  0305 09/10/21  0305   WBC 4.1 4.7  --   --  7.7   CREA 0.94 0.92 0.94   < > 0.96   BUN 20 22* 20   < > 20    < > = values in this interval not displayed. Frequency of BMP: daily through   Height: 180.3 cm  Weight: 104.3 kg  Est CrCl: ~85-90 ml/min; UO: 0.3 ml/kg/hr  Temp (24hrs), Av.2 °F (36.8 °C), Min:97.8 °F (36.6 °C), Max:98.3 °F (36.8 °C)    Cultures:   Body fluid, aspirate: light serratia (susc cefepime)   Blood: serratia, poss enterococcus (prelim)  9/10 Joint fluid (1st sample): light GNRs (prelim)  9/10 Joint fluid (2-4 samples): ngtd (prelim)   Blood: pending    MRSA Swab ordered (if applicable)? N/A    The plan below is expected to result in a target range of AUC/JOSE ANGEL 400-600    Therapy will be initiated with a loading dose of 2500 mg IV x 1 to be followed by a maintenance dose of 1500 mg IV every 18 hours. Pharmacy to follow patient daily and order levels / make dose adjustments as appropriate. *Vancomycin has been dosed used Bayesian kinetics software to target an AUC/JOSE ANGEL of 400-600, which provides adequate exposure for an assumed infection due to MRSA with an JOSE ANGEL of 1 or less while reducing the risk of nephrotoxicity as seen with traditional trough based dosing goals.      Thank you,  Adam Allan, PHARMD, BCPS

## 2021-09-12 NOTE — CONSULTS
Leandra Rosado MD, Charley Mejias MD, MPH      GENEVIEVE Truong, [facility]     Ameena Goodwin, [facility]   BRUNO Dyson, [facility]       Dagmar Phillips [address]    at [address], [facility] Anya Barnard  22.    [phone]    FAX: [address] [place] [address], [address]    [phone]    FAX: [phone]         HEPATOLOGY CONSULT NOTE  The patient is well known to me and regularly cared for at [address] [address]. He is a 68 y.o.  male who was found to have chronic liver disease and cirrhosis in 7/2019 when esophageal varices were found on EGD. He developed variceal bleeding in 3/2021. This was treated with emergent banding followed by emergent TIPS placement.     Serologic evaluation for markers of chronic liver disease was negative.       The patient has chronic intermittent infection of the hip which is now infected gain. He also has infection in the shoulder. He is septic with gm negative in the the blood. Hospital course: Had surgery to wash out sholder joint. Tolerlated this well with no evidence of hepatic decompensation. He will go to OR Monday to remove prosthetic hip. Will continue to monitor liver function and assist in management if he develops hepatic decline after surgery. .      ASSESSMENT AND PLAN:  Cirrhosis  Cirrhosis is presumed secondary to BREWER. The diagnosis of cirrhosis is based upon imaging, laboratory studies,   Serologic testing for causes of chronic liver disease were negative      The patient has normal liver function. The patient has never developed any complications of cirrhosis to date. The CTP is 8.   Child class B. The MELD score is 11. Risk of elective surgery in a patient with cirrhosis  The patient has cirrhosis Child class B and MELD of 10-14. The risk of post-operative complications including hepatic decompensation is about 40-60%. Operative mortality risk is about 10%. Screening for Esophageal varices   The patient had a TIPS placed for bleeding esophageal varices. EGD to screen for esophageal varices is no longer necessary.     Hepatic encephalopathy   Overt HE has not developed to date. There is no need for treatment with lactulose and/or Xifaxan at this time. There is no need to restrict dietary protein at this time.       Thrombocytopenia   This is secondary to cirrhosis. There is no evidence of overt bleeding. No treatment is required. The platelet count is adequate for the patient to undergo procedures without the need for platelet transfusion or platelet growth factors.     Anemia   This is due to multifactorial causes including portal hypertension with chronic GI blood loss, chronic systemic disease    The most recent FE studies were from 1/2021. 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     Screening for Hepatocellular Carcinoma  HCC screening has not been not been performed since 7/2019. AFP was ordered today and ultrasound will be scheduled.       SYSTEM REVIEW:  Constitution systems: Negative for fever, chills, weight gain, weight loss. Eyes: Negative for visual changes. ENT: Negative for sore throat, painful swallowing. Respiratory: Negative for cough, hemoptysis, SOB. Cardiology: Negative for chest pain, palpitations. GI:  Negative for constipation or diarrhea. : Negative for urinary frequency, dysuria, hematuria, nocturia. Skin: Negative for rash. Hematology: Negative for easy bruising, blood clots. Musculo-skelatal: Negative for back pain, muscle pain, weakness.   Neurologic: Negative for headaches, dizziness, vertigo, memory problems not related to HE. Psychology: Negative for anxiety, depression. FAMILY HISTORY:  The father  of Multiple myeloma. The mother  of dementia. There is no family history of liver disease.       SOCIAL HISTORY:  The patient is . The patient has 3 children, and 6 grandchildren. The patient stopped using tobacco products in . The patient has been abstinent from alcohol since . The patient used to work for the [employer].         PHYSICAL EXAMINATION:  VS: per nursing note  General:  No acute distress. Eyes:  Sclera anicteric. ENT:  No oral lesions. Thyroid normal.  Nodes:  No adenopathy. Skin:  No spider angiomata. No jaundice. Respiratory:  Lungs clear to auscultation. Cardiovascular:  Regular heart rate. Abdomen:  Soft non-tender, No obvious ascites. Extremities:  No lower extremity edema. Neurologic:  Alert and oriented. Cranial nerves grossly intact. No asterixis. LABORATORY:  Results for Noam Levine (MRN [id number]) as of 2021 17:45   Ref.  Range 9/10/2021 03:05 9/10/2021 03:13 2021 02:34 2021 02:26   WBC Latest Ref Range: 4.1 - 11.1 K/uL 7.7  4.7 4.1   HGB Latest Ref Range: 12.1 - 17.0 g/dL 10.3 (L)  8.8 (L) 8.8 (L)   PLATELET Latest Ref Range: 150 - 400 K/uL 109 (L)  97 (L) 85 (L)   Sodium Latest Ref Range: 136 - 145 mmol/L 134 (L) 133 (L) 134 (L) 137   Potassium Latest Ref Range: 3.5 - 5.1 mmol/L 3.8 3.8 3.7 3.7   Chloride Latest Ref Range: 97 - 108 mmol/L 104 103 106 109 (H)   CO2 Latest Ref Range: 21 - 32 mmol/L 24 23 21 23   Glucose Latest Ref Range: 65 - 100 mg/dL 238 (H) 242 (H) 158 (H) 151 (H)   BUN Latest Ref Range: 6 - 20 MG/DL 20 20 22 (H) 20   Creatinine Latest Ref Range: 0.70 - 1.30 MG/DL 0.96 0.94 0.92 0.94   Calcium Latest Ref Range: 8.5 - 10.1 MG/DL 8.1 (L) 8.0 (L) 7.8 (L) 7.9 (L)   Phosphorus Latest Ref Range: 2.6 - 4.7 MG/DL 2.7      Magnesium Latest Ref Range: 1.6 - 2.4 mg/dL 1.8      Bilirubin, total Latest Ref Range: 0.2 - 1.0 MG/DL 1.7 (H)      Protein, total Latest Ref Range: 6.4 - 8.2 g/dL 6.4      Globulin Latest Ref Range: 2.0 - 4.0 g/dL 4.6 (H)      Albumin Latest Ref Range: 3.5 - 5.0 g/dL 1.8 (L)      ALT Latest Ref Range: 12 - 78 U/L 38      AST Latest Ref Range: 15 - 37 U/L 33      Alk. phosphatase Latest Ref Range: 45 - 117 U/L 120 (H)          RADIOLOGY:  CT scan abdomen with IV contrast.  Changes consistent with cirrhosis. TIPS in place. No liver mass lesions. No dilated bile ducts. No ascites.     Joe Lockett MD  [facility] of [address] [facility] 22.  [phone]  [address] [place] [address]

## 2021-09-12 NOTE — PROGRESS NOTES
Ortho Daily Progress Note      Patient: Stephanie Orta                   MRN: [id number]  Sex: male  YOB: 1950           Age: 66 y.o.         2 Days Post-Op     Procedure(s):  RIGHT SHOULDER ARTHROSCOPIC INCISION AND DRAINAGE AND DRAIN PLACEMENT     Visit Vitals  BP (!) 98/54 (BP 1 Location: Left upper arm, BP Patient Position: At rest)   Pulse 72   Temp 98.3 °F (36.8 °C)   Resp 18   Ht 5' 11\" (1.803 m)   Wt 104.3 kg (229 lb 15 oz)   SpO2 99%   BMI 32.07 kg/m²        Lab Results:  HGB   Date/Time Value Ref Range Status   09/12/2021 02:26 AM 8.8 (L) 12.1 - 17.0 g/dL Final     INR   Date/Time Value Ref Range Status   09/07/2021 09:09 AM 1.1 0.9 - 1.1   Final     Comment:     A single therapeutic range for Vit K antagonists may not be optimal for all indications - see June, 2008 issue of Chest, American College of [employer] Evidence-Based Clinical Practice Guidelines, 8th Edition.   Instrument and technical errors have been ruled out         Physical Exam:     GENERAL: 77yo male, alert, cooperative, no distress  DRESSING: clean/dry  SWELLING: mild  NEUROLOGICAL: intact  PULSE:yes   WOUND: hemovac in place right shoulder      Plan:    Discussed with Dr. Lyric Burton still with significant output, will leave for another day  ID following for ABX recs  Dr. Norma Greenfield tentatively planning to take to OR tomorrow for right hip  NPO after midnight    [address]  9/12/2021   12:13 PM      .

## 2021-09-12 NOTE — PROGRESS NOTES
ID Progress Note  2021    Subjective:   C/o weakness and right shoulder pain  Review of Systems:            Symptom Y/N Comments   Symptom Y/N Comments   Fever/Chills n      Chest Pain n       Poor Appetite y      Edema        Cough       Abdominal Pain        Sputum       Joint Pain y       SOB/WANG  n     Pruritis/Rash        Nausea/vomit  n     Tolerating PT/OT        Diarrhea  n     Tolerating Diet        Constipation  n     Other           Could NOT obtain due to:      Objective:     Vitals:   Visit Vitals  BP (!) 104/55 (BP 1 Location: Left upper arm, BP Patient Position: At rest)   Pulse 66   Temp 98.2 °F (36.8 °C)   Resp 17   Ht 5' 11\" (1.803 m)   Wt 104.3 kg (229 lb 15 oz)   SpO2 100%   BMI 32.07 kg/m²        Tmax:  Temp (24hrs), Av.1 °F (36.7 °C), Min:97.8 °F (36.6 °C), Max:98.3 °F (36.8 °C)      PHYSICAL EXAM:  General: Chronically ill appearing, WD, WN. Alert, cooperative, no acute distress    EENT:  EOMI. Anicteric sclerae. MMM  Resp:  Clear in apex with decreased breath sounds at bases, no wheezing or rales. No accessory muscle use  CV:  Regular  rhythm,  No edema  GI:  Soft, Non distended, Non tender. +Bowel sounds  Neurologic:  Alert and orient x 4, follows commends  Psych:   Good insight, Not anxious nor agitated  Skin:  No rashes.   No jaundice, right shoulder dressing dry and intact, hemovac in place, right hip dressing dry and intact    Labs:   Lab Results   Component Value Date/Time    WBC 4.1 2021 02:26 AM    HGB 8.8 (L) 2021 02:26 AM    HCT 25.9 (L) 2021 02:26 AM    PLATELET 85 (L) 3628 02:26 AM    MCV 97.7 2021 02:26 AM     Lab Results   Component Value Date/Time    Sodium 137 2021 02:26 AM    Potassium 3.7 2021 02:26 AM    Chloride 109 (H) 2021 02:26 AM    CO2 23 2021 02:26 AM    Anion gap 5 2021 02:26 AM    Glucose 151 (H) 2021 02:26 AM    BUN 20 2021 02:26 AM    Creatinine 0.94 2021 02:26 AM BUN/Creatinine ratio 21 (H) 09/12/2021 02:26 AM    GFR est AA >60 09/12/2021 02:26 AM    GFR est non-AA >60 09/12/2021 02:26 AM    Calcium 7.9 (L) 09/12/2021 02:26 AM    Bilirubin, total 1.7 (H) 09/10/2021 03:05 AM    Alk. phosphatase 120 (H) 09/10/2021 03:05 AM    Protein, total 6.4 09/10/2021 03:05 AM    Albumin 1.8 (L) 09/10/2021 03:05 AM    Globulin 4.6 (H) 09/10/2021 03:05 AM    A-G Ratio 0.4 (L) 09/10/2021 03:05 AM    ALT (SGPT) 38 09/10/2021 03:05 AM       Blood cx (8/17/2020) Serratia marcescens, (2/2018) staph lugdudensis   Assessment and Plan   Prosthetic right hip infection  S/p I & D or wash out on right shoulder, hemovac from right shoulder in place (9/11); massive rtc tear, grade 4 cartilage loss and copious purulent fluid  S/p revision of right hip arthroplasty (8/14/2020)  Hx MV endocarditis & bacteremia (2/2018)  - afebrile, WBC 7.7    S/p aspirated 3ml of cloudy serosanguineous fluid from right hip joint (9/9)    Blood cx (9/9) serratia marcescens (all four bottles), and possible enterococcus species (4th bottle)    Body fluid cx, hip (9/9/2021) GNR    Fluid cx, shoulder (9/10/2021)       Continue with IV cefepime & vancomycin    Add IV vancomycin 9/12     Right shoulder pain  - CT RUE (9/10) Chronic complete tear of rotator cuff tendons involving supraspinatus, infraspinatus and teres minor with moderate fatty atrophy in humeral head superior subluxation. Moderate glenohumeral and acromioclavicular osteoarthrosis with loose bodies. Large effusion of glenohumeral joint and subacromial subdeltoid bursa. Type III acromion undersurface spurring and bony remodeling related to  superior humeral subluxation.     S/p aspirated 60ml of purulent fluid from right shoulder(9/10), culture; GNR    ABX as above    Ortho following        Marline Casey, NP

## 2021-09-13 NOTE — ANESTHESIA POSTPROCEDURE EVALUATION
Procedure(s):  INCISION AND DRAINAGE OF RIGHT HIP, EXCHANGE OF CONSTRAINED LINER AND FEMORAL HEAD, POSSIBLE RESECTION ARTHROPLASTY. general    Anesthesia Post Evaluation        Patient location during evaluation: PACU  Patient participation: complete - patient participated  Level of consciousness: awake and alert  Pain management: adequate  Airway patency: patent  Anesthetic complications: no  Cardiovascular status: acceptable  Respiratory status: acceptable  Hydration status: acceptable  Comments: I have seen and evaluated the patient and is ready for discharge. Elvia Martins MD    Post anesthesia nausea and vomiting:  none      INITIAL Post-op Vital signs:   Vitals Value Taken Time   BP 88/35 09/13/21 1525   Temp 36.7 °C (98.1 °F) 09/13/21 1515   Pulse 79 09/13/21 1527   Resp 15 09/13/21 1527   SpO2 95 % 09/13/21 1527   Vitals shown include unvalidated device data.

## 2021-09-13 NOTE — PROGRESS NOTES
Patient's hgb 7.6 in PACU down from 8.3 pre-op following resection arthroplasty. Anticipate significant blood loss post-operatively with large void from removal of hardware, hemovac drains, and thrombocytopenia further contributing. Patient additionally hypotensive. Will transfuse 2 units of PRBCs in anticipation of significant blood loss.

## 2021-09-13 NOTE — PERIOP NOTES
TRANSFER - OUT REPORT:    Verbal report given to Suzi Becerra RN    on [employer]  being transferred to Room 567 for routine progression of care       Report consisted of patients Situation, Background, Assessment and   Recommendations(SBAR). Time Pre op antibiotic given: On Floor   Anesthesia Stop time: 4744  Hoffman Present on Transfer to floor:no  Order for Hoffman on Chart:no   Discharge Prescriptions with Chart:no    Information from the following report(s) SBAR, OR Summary, Intake/Output and MAR was reviewed with the receiving nurse. Opportunity for questions and clarification was provided. Is the patient on 02? NO       L/Min        Other     Is the patient on a monitor? NO    Is the nurse transporting with the patient? NO    Surgical Waiting Area notified of patient's transfer from PACU? YES      The following personal items collected during your admission accompanied patient upon transfer:   Dental Appliance: Dental Appliances: None  Vision: Visual Aid: Glasses, At bedside  Hearing Aid: Hearing Aid: None  Jewelry: Jewelry: None  Clothing: Clothing: None  Other Valuables:  Other Valuables: None  Valuables sent to safe:

## 2021-09-13 NOTE — PROGRESS NOTES
[facility]                                                                                          Hospitalist Progress Note  [place], [place]  Answering service: [phone] OR [phone] from in house phone        Date of Service:  2021  NAME:  Brennan Felty  :  1950  MRN:  [id number]      Admission Summary:   Yvonne Lerner a 66 y. o. male with h/o endocarditis and right prosthetic hip infection. presents with right hip pain and redness. He was followed by orthopedics Dr. Asha Purcell, The patient initially had a right total hip replacement around 12 years ago. On 18 he developed a spontaneous hematogenous right hip infection from endocarditis and underwent an I & D REVISION FEMORAL HEAD AND POLYLINER RIGHT HIP. He completed 6 weeks of IV antibiotics and then on life time po abx kelfex for prophylaxis.  Patient then had a revision of femoral head and tripolar component of right total hip replacement on 2020 due to recurrent dislocations. [facility] was also seen by Dr. Murphy Velasco who did a revision of the right CIERRA and a percutaneous adductor release on 20.    [address] He developed right hip redness and pain 1-2 weeks ago and he was arrange to have IR aspiration right hip as outpatient. Noticed a draining wound from lateral to the right hip. Post aspiration, Dr. Asha Purcell recommended admission due to the concerning of hip infection.       Interval history / Subjective:    Patient examined at bedside, reports feeling well, reports pain to right shoulder continues but is better. A&O x 3. Clean, intact dressing and drain noted to right shoulder. OR planned for this am to address R hip infection. Assessment & Plan:     # Prosthetic hip infection      - Right hip aspiration yeiled 3 ml of cloudy serosanguineous fluid.       - Cultures + gram negative rods      - Continue IV Cefipime, Vanc added by ID      - ID following      - Ortho following, plan to OR for I&D   # Septic Right Shoulder      - Global rotator cuff tear with degenerative changes of GH joint and large joint effusion seen on CT.      - 60 ml of purulent fluid aspirated, culture pending.      - Ortho following, I&D completed 9/11/21  # DM II     - Continue SSI and lantus, adjust as needed  # Hyponatremia     - 135, likely due to hyperglycemia     - Daily BMP     - NS at 75ml/hr  # Cirrhosis     - Child class B and MELD of 10 -14.     - Hepatology following  # Thrombocytopenia     - Secondary to cirrhosis     - Daily CBC  # Anemia     - multifactorial, including portal hypertension with chronic GI blood loss, chronic systemic disease  Code status: Full  DVT prophylaxis: Lovenox being held    [address] discussed with: Patient/Family  Anticipated Disposition: SNF/LTC  Anticipated Discharge: Greater than 48 hours     Hospital Problems  Date Reviewed: 9/8/2021        Codes Class Noted POA    Infection associated with internal right hip prosthesis ([facility] [address] ICD-10-CM: T84.51XA  ICD-9-CM: 996.66, V43.64  9/11/2021 Unknown        Prosthetic hip infection ([facility] [address] ICD-10-CM: T84.59XA, K77.855  ICD-9-CM: 996.66, V43.64  9/9/2021 Unknown                Review of Systems:   Pertinent items are noted in HPI. Vital Signs:    Last 24hrs VS reviewed since prior progress note.  Most recent are:  Visit Vitals  BP (!) 104/54 (BP 1 Location: Left upper arm, BP Patient Position: At rest)   Pulse 67   Temp 98.8 °F (37.1 °C)   Resp 16   Ht 5' 11\" (1.803 m)   Wt 104.3 kg (229 lb 15 oz)   SpO2 96%   BMI 32.07 kg/m²         Intake/Output Summary (Last 24 hours) at 9/13/2021 0841  Last data filed at 9/13/2021 3620  Gross per 24 hour   Intake    Output 670 ml   Net -670 ml        Physical Examination:     I had a face to face encounter with this patient and independently examined them on 9/13/2021 as outlined below:          Constitutional:  No acute distress, cooperative, pleasant ENT:  Oral mucosa moist, oropharynx benign. Resp:  CTA bilaterally. No wheezing/rhonchi/rales. No accessory muscle use   CV:  Regular rhythm, normal rate, S1, S2 noted    GI:  Soft, non distended, non tender. normoactive bowel sounds, no hepatosplenomegaly     Musculoskeletal:  No edema, limited movement to right shoulder    Neurologic:  Moves all extremities. AAOx3, CN II-XII reviewed            Data Review:    Review and/or order of clinical lab test  Review and/or order of tests in the radiology section of CPT  Review and/or order of tests in the medicine section of CPT      Labs:     Recent Labs     09/13/21 0118 09/12/21 0226   WBC 5.1 4.1   HGB 8.3* 8.8*   HCT 24.2* 25.9*   * 85*     Recent Labs     09/13/21 0118 09/12/21 0226 09/11/21  0234   * 137 134*   K 3.9 3.7 3.7    109* 106   CO2 25 23 21   BUN 18 20 22*   CREA 1.13 0.94 0.92   * 151* 158*   CA 8.0* 7.9* 7.8*     No results for input(s): ALT, AP, TBIL, TBILI, TP, ALB, GLOB, GGT, AML, LPSE in the last 72 hours. No lab exists for component: SGOT, GPT, AMYP, HLPSE  No results for input(s): INR, PTP, APTT, INREXT, INREXT in the last 72 hours. No results for input(s): FE, TIBC, PSAT, FERR in the last 72 hours. Lab Results   Component Value Date/Time    Folate 14.2 01/21/2021 03:32 AM      No results for input(s): PH, PCO2, PO2 in the last 72 hours. No results for input(s): CPK, CKNDX, TROIQ in the last 72 hours.     No lab exists for component: CPKMB  Lab Results   Component Value Date/Time    Cholesterol, total 121 11/07/2019 10:23 AM    HDL Cholesterol 68 11/07/2019 10:23 AM    LDL, calculated 40 11/07/2019 10:23 AM    Triglyceride 66 11/07/2019 10:23 AM    CHOL/HDL Ratio 3.5 11/01/2010 07:07 AM     Lab Results   Component Value Date/Time    Glucose (POC) 178 (H) 09/13/2021 06:52 AM    Glucose (POC) 281 (H) 09/12/2021 09:07 PM    Glucose (POC) 268 (H) 09/12/2021 04:22 PM    Glucose (POC) 245 (H) 09/12/2021 11:17 AM    Glucose (POC) 150 (H) 09/12/2021 06:19 AM     Lab Results   Component Value Date/Time    Color YELLOW/STRAW 06/20/2021 06:55 PM    Appearance CLEAR 06/20/2021 06:55 PM    Specific gravity 1.014 06/20/2021 06:55 PM    Specific gravity 1.020 02/28/2018 05:01 PM    pH (UA) 5.5 06/20/2021 06:55 PM    Protein Negative 06/20/2021 06:55 PM    Glucose Negative 06/20/2021 06:55 PM    Ketone Negative 06/20/2021 06:55 PM    Bilirubin Negative 06/20/2021 06:55 PM    Urobilinogen 0.2 06/20/2021 06:55 PM    Nitrites Negative 06/20/2021 06:55 PM    Leukocyte Esterase Negative 06/20/2021 06:55 PM    Epithelial cells FEW 06/20/2021 06:55 PM    Bacteria Negative 06/20/2021 06:55 PM    WBC 0-4 06/20/2021 06:55 PM    RBC 0-5 06/20/2021 06:55 PM         Medications Reviewed:     Current Facility-Administered Medications   Medication Dose Route Frequency    Vancomycin - Pharmacy Dosing   Other Rx Dosing/Monitoring    vancomycin (VANCOCIN) 1500 mg in  ml infusion  1,500 mg IntraVENous Q18H    0.9% sodium chloride infusion  75 mL/hr IntraVENous CONTINUOUS    sodium chloride (NS) flush 5-40 mL  5-40 mL IntraVENous Q8H    sodium chloride (NS) flush 5-40 mL  5-40 mL IntraVENous PRN    oxyCODONE IR (ROXICODONE) tablet 10 mg  10 mg Oral Q3H PRN    traMADoL (ULTRAM) tablet 50 mg  50 mg Oral Q6H PRN    cefepime (MAXIPIME) 2 g in 0.9% sodium chloride (MBP/ADV) 100 mL MBP  2 g IntraVENous Q8H    atorvastatin (LIPITOR) tablet 40 mg  40 mg Oral DAILY    furosemide (LASIX) tablet 40 mg  40 mg Oral DAILY    gabapentin (NEURONTIN) tablet 600 mg  600 mg Oral QPM    lactulose (CHRONULAC) 10 gram/15 mL solution 30 mL  30 mL Oral BID    pantoprazole (PROTONIX) tablet 40 mg  40 mg Oral DAILY    midodrine (PROAMATINE) tablet 10 mg  10 mg Oral TID    rifAXIMin (XIFAXAN) tablet 550 mg  550 mg Oral BID    sertraline (ZOLOFT) tablet 150 mg  150 mg Oral DAILY    spironolactone (ALDACTONE) tablet 100 mg  100 mg Oral DAILY    tamsulosin (FLOMAX) capsule 0.4 mg  0.4 mg Oral DAILY    insulin lispro (HUMALOG) injection   SubCUTAneous AC&HS    insulin glargine (LANTUS) injection 30 Units  30 Units SubCUTAneous QHS    sodium chloride (NS) flush 5-40 mL  5-40 mL IntraVENous Q8H    sodium chloride (NS) flush 5-40 mL  5-40 mL IntraVENous PRN    acetaminophen (TYLENOL) tablet 650 mg  650 mg Oral Q6H PRN    Or    acetaminophen (TYLENOL) suppository 650 mg  650 mg Rectal Q6H PRN    polyethylene glycol (MIRALAX) packet 17 g  17 g Oral DAILY PRN    ondansetron (ZOFRAN ODT) tablet 4 mg  4 mg Oral Q8H PRN    Or    ondansetron (ZOFRAN) injection 4 mg  4 mg IntraVENous Q6H PRN    [Held by provider] enoxaparin (LOVENOX) injection 40 mg  40 mg SubCUTAneous DAILY    glucose chewable tablet 16 g  4 Tablet Oral PRN    dextrose (D50W) injection syrg 12.5-25 g  12.5-25 g IntraVENous PRN    glucagon (GLUCAGEN) injection 1 mg  1 mg IntraMUSCular PRN     ______________________________________________________________________  EXPECTED LENGTH OF STAY: - - -  ACTUAL LENGTH OF STAY:          [address], [facility]

## 2021-09-13 NOTE — ANESTHESIA PREPROCEDURE EVALUATION
Relevant Problems   NEUROLOGY   (+) Depression      CARDIOVASCULAR   (+) HTN (hypertension)   (+) Systolic murmur      GASTROINTESTINAL   (+) Cirrhosis (HCC)   (+) GERD (gastroesophageal reflux disease)   (+) BREWER (nonalcoholic steatohepatitis)   (+) PUD (peptic ulcer disease)      ENDOCRINE   (+) Obesity       Anesthetic History   No history of anesthetic complications            Review of Systems / Medical History  Patient summary reviewed, nursing notes reviewed and pertinent labs reviewed    Pulmonary        Sleep apnea: No treatment           Neuro/Psych         Psychiatric history     Cardiovascular    Hypertension                Comments:  Mod pulm htn  EF 60%   GI/Hepatic/Renal     GERD      PUD and liver disease    Comments: [facility]/Other    Diabetes    Obesity, arthritis and cancer     Other Findings   Comments:  chronic liver disease and cirrhosis in 7/2019 when esophageal varices         Physical Exam    Airway  Mallampati: II  TM Distance: > 6 cm  Neck ROM: normal range of motion   Mouth opening: Normal     Cardiovascular  Regular rate and rhythm,  S1 and S2 normal,  no murmur, click, rub, or gallop             Dental  No notable dental hx       Pulmonary  Breath sounds clear to auscultation               Abdominal  GI exam deferred       Other Findings            Anesthetic Plan    ASA: 4  Anesthesia type: general          Induction: Intravenous  Anesthetic plan and risks discussed with: Patient

## 2021-09-13 NOTE — PROGRESS NOTES
TRANSFER - OUT REPORT:    Verbal report given to Mahesh Schuler (name) on [place]  being transferred to [facility] (unit) for ordered procedure       Report consisted of patients Situation, Background, Assessment and   Recommendations(SBAR). Information from the following report(s) SBAR, Kardex, Intake/Output and MAR was reviewed with the receiving nurse. Lines:   Peripheral IV 09/09/21 Left Wrist (Active)   Site Assessment Clean, dry, & intact 09/12/21 2228   Phlebitis Assessment 0 09/12/21 2228   Infiltration Assessment 0 09/12/21 2228   Dressing Status Clean, dry, & intact 09/12/21 2228   Dressing Type Transparent 09/12/21 2228   Hub Color/Line Status Infusing 09/12/21 2228   Action Taken Open ports on tubing capped 09/11/21 0020   Alcohol Cap Used Yes 09/12/21 1152        Opportunity for questions and clarification was provided.       Patient transported with:   Esphion

## 2021-09-13 NOTE — BRIEF OP NOTE
Brief Postoperative Note    Patient: Alon Nuno  YOB: 1950  MRN: [id number]    Date of Procedure: 9/13/2021     Pre-Op Diagnosis: INFECTION RIGHT TOTAL HIP REPLACEMENT    Post-Op Diagnosis: Same as preoperative diagnosis. Procedure(s): RESECTION ARTHROPLASTY    Surgeon(s):  Sallie Alonso MD    Surgical Assistant: Physician Assistant: Demetrio Alexander PA-C  Surg Asst-1: Blanca LUCIA    Anesthesia: General     Estimated Blood Loss (mL): 578    Complications: None    Specimens:   ID Type Source Tests Collected by Time Destination   1 : Right hip culture Wound Hip, right AEROBIC/ANAEROBIC CULTURE Sallie Alonso MD 9/13/2021 1303 Microbiology        Implants: * No implants in log *    Drains:   Hemovac Anterior;Right Other (comment) (Active)   Site Assessment Clean, dry, & intact 09/13/21 0627   Dressing Status Clean, dry, & intact 09/13/21 0627   Drainage Description Serosanguinous 09/13/21 0627   Status Patent; Charged 09/13/21 0627   Output (ml) 70 ml 09/13/21 0627       External Urinary Catheter 09/11/21 (Active)   Site Assessment Other (Comment) 09/12/21 1254   Repositioned Yes 09/12/21 1254   Perineal Care Yes 09/12/21 1254   Wick Changed Yes 09/12/21 1254   Suction Canister/Tubing Changed Yes 09/12/21 1837   Urine Output (mL) 600 ml 09/12/21 1837       [REMOVED] Orogastric Tube 03/28/20 (Removed)       [REMOVED] Drain 02/26/18 Right Hip (Removed)       Findings: gross purulence right hip joint, well fixed prosthesis    Electronically Signed by Steven Doyle MD on 9/13/2021 at 2:39 PM

## 2021-09-13 NOTE — CONSULTS
Markos Hinsd MD, Juanita Kebede MD, MPH      Kim Herman, GENEVIEVE Willoughby, [facility]     Ameena Goodwin, [facility]   BRUNO Casiano, [facility]       Dagmar Iniguez [facility] 136    at [address], [facility] Anya Barnard  22.    [phone]    FAX: [address] [place] [address]    at [address], [address]    [phone]    FAX: [phone]         HEPATOLOGY PROGRESS NOTE  The patient is well known to me and regularly cared for at [address] [address]. He is a 68 y.o.  male who was found to have chronic liver disease and cirrhosis in 7/2019 when esophageal varices were found on EGD. He developed variceal bleeding in 3/2021. This was treated with emergent banding followed by emergent TIPS placement.     Serologic evaluation for markers of chronic liver disease was negative.       The patient has chronic intermittent infection of the hip which is now infected gain. He also has infection in the shoulder. He is septic with gm negative in the the blood. Hospital course: Had surgery to wash out right sholder joint. Tolerlated this well with no evidence of hepatic decompensation. He is going to surgery to remove prosthetic hip. Liver enzymes and function is stable  Will continue to monitor liver function and assist in management if he develops hepatic decline after surgery. ASSESSMENT AND PLAN:  Cirrhosis  Cirrhosis is presumed secondary to BREWER. The diagnosis of cirrhosis is based upon imaging, laboratory studies,   Serologic testing for causes of chronic liver disease were negative      The patient has normal liver function.     The patient has never developed any complications of cirrhosis to date. The CTP is 8. Child class B. The MELD score is 11. Risk of elective surgery in a patient with cirrhosis  The patient has cirrhosis Child class B and MELD of 10-14. The risk of post-operative complications including hepatic decompensation is about 40-60%. Operative mortality risk is about 10%. Screening for Esophageal varices   The patient had a TIPS placed for bleeding esophageal varices. EGD to screen for esophageal varices is no longer necessary.     Hepatic encephalopathy   Overt HE has not developed to date. There is no need for treatment with lactulose and/or Xifaxan at this time. There is no need to restrict dietary protein at this time.       Thrombocytopenia   This is secondary to cirrhosis. There is no evidence of overt bleeding. No treatment is required. The platelet count is adequate for the patient to undergo procedures without the need for platelet transfusion or platelet growth factors.     Anemia   This is due to multifactorial causes including portal hypertension with chronic GI blood loss, chronic systemic disease    The most recent FE studies were from 1/2021. 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     Screening for Hepatocellular Carcinoma  HCC screening has not been not been performed since 7/2019. AFP was ordered today and ultrasound will be scheduled.       SYSTEM REVIEW:  Constitution systems: Negative for fever, chills, weight gain, weight loss. Eyes: Negative for visual changes. ENT: Negative for sore throat, painful swallowing. Respiratory: Negative for cough, hemoptysis, SOB. Cardiology: Negative for chest pain, palpitations. GI:  Negative for constipation or diarrhea. : Negative for urinary frequency, dysuria, hematuria, nocturia. Skin: Negative for rash. Hematology: Negative for easy bruising, blood clots.     Musculo-skelatal: Negative for back pain, muscle pain, weakness. Neurologic: Negative for headaches, dizziness, vertigo, memory problems not related to HE. Psychology: Negative for anxiety, depression. FAMILY HISTORY:  The father  of Multiple myeloma. The mother  of dementia. There is no family history of liver disease.       SOCIAL HISTORY:  The patient is . The patient has 3 children, and 6 grandchildren. The patient stopped using tobacco products in . The patient has been abstinent from alcohol since . The patient used to work for the [employer]         PHYSICAL EXAMINATION:  VS: per nursing note  General:  No acute distress. Eyes:  Sclera anicteric. ENT:  No oral lesions. Thyroid normal.  Nodes:  No adenopathy. Skin:  No spider angiomata. No jaundice. Respiratory:  Lungs clear to auscultation. Cardiovascular:  Regular heart rate. Abdomen:  Soft non-tender, No obvious ascites. Extremities:  No lower extremity edema. Neurologic:  Alert and oriented. Cranial nerves grossly intact. No asterixis. LABORATORY:  Results for Alf Dunaway (MRN [id number]) as of 2021 12:19   Ref. Range 2021 02:26 2021 01:18   WBC Latest Ref Range: 4.1 - 11.1 K/uL 4.1 5.1   HGB Latest Ref Range: 12.1 - 17.0 g/dL 8.8 (L) 8.3 (L)   PLATELET Latest Ref Range: 150 - 400 K/uL 85 (L) 102 (L)   Sodium Latest Ref Range: 136 - 145 mmol/L 137 135 (L)   Potassium Latest Ref Range: 3.5 - 5.1 mmol/L 3.7 3.9   Chloride Latest Ref Range: 97 - 108 mmol/L 109 (H) 106   CO2 Latest Ref Range: 21 - 32 mmol/L 23 25   Anion gap Latest Ref Range: 5 - 15 mmol/L 5 4 (L)   Glucose Latest Ref Range: 65 - 100 mg/dL 151 (H) 245 (H)   BUN Latest Ref Range: 6 - 20 MG/DL 20 18   Creatinine Latest Ref Range: 0.70 - 1.30 MG/DL 0.94 1.13     RADIOLOGY:  CT scan abdomen with IV contrast.  Changes consistent with cirrhosis. TIPS in place. No liver mass lesions. No dilated bile ducts. No ascites.     Bindu Loo, MD, MPH  Advanced Hepatology  [facility] 13 of [address] [address] Sacha Arreola, [address] [facility], [facility] 22.  [address] [facility]

## 2021-09-14 NOTE — PROGRESS NOTES
Bedside and Verbal shift change report given to GUY SCRUGGSCARE (oncoming nurse) by Jodie Rosas (offgoing nurse). Report included the following information SBAR, Kardex, Intake/Output and MAR.

## 2021-09-14 NOTE — PROGRESS NOTES
09/14/21 1446   Vital Signs   Temp 98.2 °F (36.8 °C)   Temp Source Oral   Pulse (Heart Rate) 65   Heart Rate Source Monitor   Resp Rate 16   O2 Sat (%) 95 %   Level of Consciousness Alert (0)   BP (!) 81/36   MAP (Calculated) (!) 51   BP 1 Method Automatic   BP 1 Location Left upper arm   BP Patient Position At rest;Lying   MEWS Score 2     Patient alert, oriented and asymptomatic. Informed Ethel NP about vitals and hbg of 6.8. Per NP, orders will be placed for 2 units of blood.

## 2021-09-14 NOTE — PROGRESS NOTES
ID Progress Note  2021    Subjective:   C/o right shoulder and right hip pain  Review of Systems:            Symptom Y/N Comments   Symptom Y/N Comments   Fever/Chills n      Chest Pain n       Poor Appetite y      Edema        Cough       Abdominal Pain        Sputum       Joint Pain y       SOB/WANG  n     Pruritis/Rash        Nausea/vomit  n     Tolerating PT/OT        Diarrhea  n     Tolerating Diet        Constipation  n     Other           Could NOT obtain due to:      Objective:     Vitals:   Visit Vitals  BP (!) 102/54 (BP 1 Location: Left upper arm, BP Patient Position: At rest)   Pulse 60   Temp 97.5 °F (36.4 °C)   Resp 17   Ht 5' 11\" (1.803 m)   Wt 104.3 kg (229 lb 15 oz)   SpO2 98%   BMI 32.07 kg/m²        Tmax:  Temp (24hrs), Av.8 °F (36.6 °C), Min:97.4 °F (36.3 °C), Max:98.5 °F (36.9 °C)      PHYSICAL EXAM:  General: Chronically ill appearing, WD, WN. Alert, cooperative, no acute distress    EENT:  EOMI. Anicteric sclerae. MMM  Resp:  Clear in apex with decreased breath sounds at bases, no wheezing or rales. No accessory muscle use  CV:  Regular  rhythm,  No edema  GI:  Soft, Distended, Non tender. hypoactive Bowel sounds  Neurologic:  Alert and orient x 4, follows commends  Psych:   Fair insight, Not anxious nor agitated  Skin:  No rashes.   No jaundice, right shoulder dressing dry and intact, hemovac in place, right hip dressing dry and intact, hemovac in place    Labs:   Lab Results   Component Value Date/Time    WBC 8.4 2021 03:00 AM    HGB 7.9 (L) 2021 03:00 AM    HCT 22.9 (L) 2021 03:00 AM    PLATELET 614 (L)  03:00 AM    MCV 95.8 2021 03:00 AM     Lab Results   Component Value Date/Time    Sodium 134 (L) 2021 03:00 AM    Potassium 4.4 2021 03:00 AM    Chloride 106 2021 03:00 AM    CO2 22 2021 03:00 AM    Anion gap 6 2021 03:00 AM    Glucose 274 (H) 2021 03:00 AM    BUN 24 (H) 2021 03:00 AM    Creatinine 1.08 09/14/2021 03:00 AM    BUN/Creatinine ratio 22 (H) 09/14/2021 03:00 AM    GFR est AA >60 09/14/2021 03:00 AM    GFR est non-AA >60 09/14/2021 03:00 AM    Calcium 7.5 (L) 09/14/2021 03:00 AM    Bilirubin, total 1.7 (H) 09/10/2021 03:05 AM    Alk. 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)  - afebrile, WBC 7.7    S/p aspirated 3ml of cloudy serosanguineous fluid from right hip joint (9/9)    Blood cx (9/9) serratia marcescens (all four bottles), and Enterococcus faecium (4th bottle)    Blood cx (9/12) no growth so far    Body fluid cx, hip (9/9/2021)serratia marcescens      Continue with IV cefepime & vancomycin    Pt will need 2 weeks of IV vancomycin for enterococcus faecium bacteremia, last dose 9/25/2021    Pt will need 6 weeks of IV cefepime for Serratia marcescens from the recent surgery date, last dose 10/24/2021     Right shoulder pain  - CT RUE (9/10) Chronic complete tear of rotator cuff tendons involving supraspinatus, infraspinatus and teres minor with moderate fatty atrophy in humeral head superior subluxation. Moderate glenohumeral and acromioclavicular osteoarthrosis with loose bodies. Large effusion of glenohumeral joint and subacromial subdeltoid bursa. Type III acromion undersurface spurring and bony remodeling related to  superior humeral subluxation.     S/p aspirated 60ml of purulent fluid from right shoulder(9/10), culture; serratia marcescens    ABX as above    Ortho following        Marline Boyce, NP

## 2021-09-14 NOTE — PROGRESS NOTES
Ortho Daily Progress Note    9/14/2021  12:14 PM    POD#1 S/P:  INCISION AND DRAINAGE OF RIGHT HIP, EXCHANGE OF CONSTRAINED LINER AND FEMORAL HEAD, POSSIBLE RESECTION ARTHROPLASTY 9/13/21    POD#3 S/P:  ARTHROSCOPIC IRRIGATION, DEBRIDEMENT AND SYNOVECTOMY SEPTIC RIGHT SHOULDER 9/10/21    RN contacted me to see pt for hypotension & wound drainage. Very pleasant 75yo gentleman resting comfortably in bed. Hypotensive/pale  No nausea or vomiting. Some dizziness. Pain is moderate to severe. Calves soft/NTTP Bilaterally  Denies SOB, chest pain, HA, nausea, vomiting. Lab Results   Component Value Date/Time    HGB 7.9 (L) 09/14/2021 03:00 AM    INR 1.1 09/07/2021 09:09 AM     NAD. Hip incision draining serosanguinous but well-approximated with sutures  Intentional open anterior thigh wound at distal end of old anterior hip incision for draining  Shoulder bandage C/D/I  HVAC drains intact  Moving LE well  Right shoulder ROM diminished secondary to pain  Neurocirculatory exam WNL. PLAN:  Diabetic diet  DVT prophylaxis-LMW heparin while inpatient.  ASA 81mg BID after discharge  WBAT with PT-mobilization  Monitor Hgb  Monitor BP; hold lasix & spironolactone today; IVF bolus  Pain Control  Medical mgmt per hospitalist team  Wound care consult for sacral ulcer/anterior thigh wound (anterior thigh wound to drain and close by secondary intention per Dr. Sanchez Barrera)  Cont home meds  IV cefepime & vanc; ID following  Plan to D/C pending  Hospitalist to take over care if amenable    AMAN Johansen

## 2021-09-14 NOTE — PROGRESS NOTES
Occupational Therapy  09/14/21     Order acknowledged, chart reviewed. Patient noted to be hypotensive with palor at rest, not appropriate for OT evaluation at this time. Will follow up as able & appropriate for OT evaluation.      Thank you,   Mahogany Matthews, OTRICHA, OTR/L

## 2021-09-14 NOTE — OP NOTES
[address]   OPERATIVE REPORT    Name:  Declan Lowe  MR#:  [id number]  :  1950  ACCOUNT #:  [de-identified]  DATE OF SERVICE:  2021      PREOPERATIVE DIAGNOSIS:  Hematogenous seeding of right total hip replacement with gram-negative rods. POSTOPERATIVE DIAGNOSIS:  Hematogenous seeding of right total hip replacement with gram-negative rods. PROCEDURE PERFORMED:  Resection arthroplasty, right hip. SURGEON:  Amisha Khan MD    ASSISTANT:  AMAN Hopkins    ANESTHESIA:  General.    COMPLICATIONS:  Intraoperative fracture of femur while removing femoral prosthesis. SPECIMENS REMOVED:  Right total hip prosthesis. IMPLANTS:  Large Hemovac drain. ESTIMATED BLOOD LOSS:  400 mL. INDICATIONS:  This is a 75-year-old gentleman with nonalcoholic cirrhosis who has gradually become more and more debilitated and residing in a nursing home. I did his right total hip replacement approximately 16 years ago. He developed sepsis approximately 3 or 4 years ago growing staph aureus which responded to irrigation and debridement with exchange of poly liner and femoral head followed by a 6-week course of IV antibiotics and chronic suppressive p.o. antibiotics. He suffered recurrent dislocations and has undergone 2 separate surgeries since that time and now has a constrained liner. On his last operation, he suffered a sciatic nerve palsy with a foot drop and has been a very limited ambulator and primarily restricted to bed-chair transfers. He presented to my office a little over a week ago with some erythema around the right hip. At that time, he was showing no signs or symptoms of infection. I set him up for a right hip aspiration which revealed gross purulence. By that time, he had developed a sinus tract anteriorly. His blood cultures ended up growing gram-negative rods. His hip aspiration also grew gram-negative rods. Cultures ended up growing Serratia. Approximately 3 days ago, he complained of severe right shoulder pain and I aspirated the right shoulder which revealed gross purulence and is also growing Serratia. I discussed all of this with Infectious Disease who did not feel that salvage of the prosthesis and chronic suppressive antibiotics would be successful given the bacteria and generalized sepsis as well as the recurrent sepsis and his liver disease. It was, therefore, decided to go ahead and proceed with resection arthroplasty. He did undergo arthroscopic lavage of his right shoulder Friday evening as well. PROCEDURE:  The patient was taken to the operating room and underwent general inhalational anesthesia. He was placed in lateral position with the right hip up. The sinus tract was anterior. He had had a prior posterior approach. I utilized the posterior approach down through skin and subcutaneous tissue. I dissected the posterior and excised the posterior pseudocapsule. Gross purulence was noted. He had a constrained liner. After further excision of scar tissue and good exposure of the joint, I removed the locking ring from the constrained liner. I was then able to dislocate the hip and remove the femoral head. At this point in time, I decided to turn my attention to the femur. I used flexible osteotomes to go circumferentially around the femoral component. I used the femoral stem extraction device; however, after multiple attempts and continued use of the flexible osteotomes to try and mobilize the prosthesis, we were unsuccessful. It was clear that the prosthesis was ingrown distally. At this point in time, we had a comminuted fracture of the proximal femur. This allowed me exposure of the more distal aspect of the stem. I used an osteotome to go circumferentially around this where cortical bone was well grown onto the distal portion of the prosthesis. This was all removed using an osteotome.   We were then able to get the femoral component out. I then turned my attention to the acetabulum. The poly liner was quite difficult to remove, but we were finally able to get this out. The acetabular component extraction device was then used to go circumferentially around the acetabulum and was removed with relative ease. All of the surrounding soft tissue appeared quite healthy. There was no soft tissue necrosis. The wounds were extensively irrigated with pulsatile lavage system. A large Hemovac drain was placed. The sinus tract was extensively palpated with digital exploration and irrigated out extensively. I closed the fascia using #1 PDS suture in an interrupted fashion. Skin edges were approximated using #1 nylon in a vertical mattress fashion. Drains were sutured in.  Bulky sterile dressing was applied and the patient was awakened, extubated, and transported to the recovery room in stable condition. Complication was fracture of the proximal femur during extraction of the femoral component. Physician assistant was critical throughout the case in assisting with mobilizing and managing positioning of the leg throughout the case as well as retraction and wound closure.         John Hutton MD      [id number]  D:  09/13/2021 15:08  T:  09/13/2021 23:05  JOB #:  [id number]

## 2021-09-14 NOTE — WOUND CARE
WOCN Note:     New consult for sacrum. Chart shows:  Admitted for right hip infection I&D with arthroplasty 9/11with resection arthroplasty 9/13; septic right shoulder irrigation and debridement 9/10    Assessment:   Appropriately conversational and reports pain with fully assisted turn by 2 people.   hemovac to right shoulder and hip  Surface: versacare foam mattress    Bilateral heels intact and without erythema. Heels offloaded with pillows. 1. POA sacral excoriation  6 x 8 cm field of ragged skin with skip areas of denudation; red bases; slight bleeding  Tx: cream applied for comfort until Venlelex is up from pharmacy    Wound Recommendations:    Sacrum: venelex twice daily and cover with sacral foam dressing; change foam as needed    PI Prevention:  Turn/reposition approximately every 2 hours  Offload heels with heels hanging off end of pillow at all times while in bed. Sacral Foam dressing: lift to assess regularly; change as needed. Discontinue if incontinence is frequently soiling dressing. Discussed with RN at bedside.  .    Transition of Care: Plan to follow weekly and as needed while admitted to hospital.      MIRNA Sheffield, RN, [facility]  Certified Wound, Ostomy, Continence Nurse  office [phone]  Available via [facility]

## 2021-09-14 NOTE — PROGRESS NOTES
Ortho Daily Progress Note      Patient: Laila Caldwell                   MRN: [id number]  Sex: male  YOB: 1950           Age: 66 y.o.     1 Day Post-Op     Procedure(s):  INCISION AND DRAINAGE OF RIGHT HIP, EXCHANGE OF CONSTRAINED LINER AND FEMORAL HEAD, POSSIBLE RESECTION ARTHROPLASTY    Subjective:    Pain:  pt in moderate pain  Pt. tolerating PO diet, no nausea. Pain meds tolerated. Pt. voiding appropriately and w/ flatus. Patient hypotensive. Currently on IV fluids 124mL/hour. Shoulder hemovac put out 25mL of sanguinous fluid and hip hemovac put out 250mL of sanguinous discharge. Patient voicing desire to urinate but was unable to while I was in the room. Per nursing patient has yet to urinate but was bladder scanned at 7am with ~300mL in his bladder. Shoulder aspirate on 9/10/21 growing Serratia marcescens. Wound culture of the hip 9/13/21 pending. Patient denies fever, chills, numbness, tingling. Visit Vitals  BP (!) 102/54 (BP 1 Location: Left upper arm, BP Patient Position: At rest)   Pulse 60   Temp 97.5 °F (36.4 °C)   Resp 17   Ht 5' 11\" (1.803 m)   Wt 104.3 kg (229 lb 15 oz)   SpO2 98%   BMI 32.07 kg/m²        Recent Labs     09/14/21  0300 09/13/21  1558 09/13/21  0118 09/12/21  0226 09/12/21  0226 09/11/21  0934 09/11/21  0234 09/10/21  0313 09/10/21  0305 09/10/21  0305 09/09/21  1544 09/09/21  1544 09/07/21  0911 09/07/21  0909 06/20/21  1745 05/01/21  1937 05/01/21  1937   HGB 7.9*   < > 8.3*   < > 8.8*   < > 8.8*  --    < > 10.3*   < > 11.5*  --    < > 10.6*   < > 11.1*   CREA 1.08  --  1.13   < > 0.94  --  0.92 0.94   < > 0.96   < > 1.12 1.10   < > 0.79   < > 0.94   BUN 24*  --  18  --  20  --  22* 20  --  20  --  19 21*  --  15  --  17    < > = values in this interval not displayed. Physical Exam:    GENERAL: Lethargic, oriented x4 after probing. NEURO:  Sensation grossly intact.   Right sided foot drop  VASCULAR: DP/TP pulses 2+. Capillary refill <2 seconds. Extremity warm. moderate edema of RLE  DRESSING:  Clean, dry, and intact and ABD pad in place. Hemovac charged and with sanguinous drainage. WOUND: Shoulder dressing changed; 3 scabbed over port sites without erythema, ecchymosis, or induration. MSK:  Shoulder with pain on range of motion; decreased range of motion and stiff end points on ER, abduction. Hip with pain on PROM, palpable quad contraction. DVT EXAM: No evidence of DVT seen on physical exam.  No posterior calf tenderness, tightness, erythema, palpable cords, or pain upon active dorsi/plantar flexion of the foot. Plan:    DVT ppx: LMW heparin while inpatient. ASA 81mg BID until 9/28/21 after discharge  Activity: WBAT with PT-mobilization  Pain Control: stable, mild-to-moderate joint symptoms intermittently, reasonably well controlled by current meds  Dressing: Changed shoulder dressing with abd pad. Change hip dressing daily. Keep hemovacs charged. Hgb:  Received 1 unit of PRBCs yesterday. Hgb 7.9 after this unit. Expect continued significant blood loss. Will transfuse to keep above 7.0. Urinary: Repeat bladder scan to assess for retention. Hypotension: Continue the maintenance IV fluids. Will deferred to hospitalist on management given patient's cirrhosis. Bolus/albumin? Right hip mimi-prosthetic infection:  Resection arthroplasty performed 9/13/21. Continue hemovac. Continue ID recommendations of IV vancomycin with last dose 9/25/21 and IV cefepime with last dose 10/24/21. Right septic arthritis of the shoulder:  Arthroscopic debridement and irrigation on 9/10/21 with Dr. Terri Oneil. Continue hemovac. Continue ID recommendations of IV vancomycin with last dose 9/25/21 and IV cefepime with last dose 10/24/21.     Daymon Aschoff, PA-C  9/14/2021   8:40 AM

## 2021-09-14 NOTE — PROGRESS NOTES
[facility] Adult  Hospitalist Group                                                                                          Hospitalist Progress Note  Toro Torres [place]  Answering service: [phone] OR [phone] from in house phone        Date of Service:  2021  NAME:  Jake Castañeda  :  1950  MRN:  [id number]      Admission Summary:   Bisi Pierre a 66 y. o. male with h/o endocarditis and right prosthetic hip infection. presents with right hip pain and redness. He was followed by orthopedics Dr. Bishop Arteaga, The patient initially had a right total hip replacement around 12 years ago. On 18 he developed a spontaneous hematogenous right hip infection from endocarditis and underwent an I & D REVISION FEMORAL HEAD AND POLYLINER RIGHT HIP. He completed 6 weeks of IV antibiotics and then on life time po abx kelfex for prophylaxis.  Patient then had a revision of femoral head and tripolar component of right total hip replacement on 2020 due to recurrent dislocations. [facility] was also seen by Dr. Erlinda Lerma who did a revision of the right CIERRA and a percutaneous adductor release on 20.    He is in [place] [address] rehab last 6 month. He developed right hip redness and pain 1-2 weeks ago and he was arrange to have IR aspiration right hip as outpatient. Noticed a draining wound from lateral to the right hip. Post aspiration, Dr. Bishop Arteaga recommended admission due to the concerning of hip infection.       Interval history / Subjective:    Patient examined at bedside, reports feeling well, reports pain to right shoulder continues. Clean, intact dressing and drain noted to Hip and right shoulder.    Patient hypotensive, SBP in the 80s, post fluid bolus, stat hemoglobin of 6.8, orders placed for 2 units PRBCs  Hospital Medicine will resume care, per Ortho's request.         Assessment & Plan:     # Prosthetic hip infection      - Right hip aspiration yeiled 3 ml of cloudy serosanguineous fluid.      - Cultures + gram negative rods      - Continue IV Cefipime, Vanc added by ID      - ID following      - Ortho following  # Septic Right Shoulder      - Global rotator cuff tear with degenerative changes of GH joint and large joint effusion seen on CT.      - 60 ml of purulent fluid aspirated, culture pending.      - Ortho following, I&D completed 9/11/21  # DM II     - Continue SSI      - Increase Lantus to 32 units  # Hyponatremia     - 135, likely due to hyperglycemia     - Daily BMP     - NS at 75ml/hr  # Cirrhosis     - Child class B and MELD of 10 -14.     - Hepatology following  # Thrombocytopenia     - Secondary to cirrhosis     - Daily CBC  # Anemia     - multifactorial, including portal hypertension with chronic GI blood loss, chronic systemic disease  Code status: Full  DVT prophylaxis: Lovenox being held    [address] discussed with: Patient/Family  Anticipated Disposition: SNF/LTC  Anticipated Discharge: Greater than 48 hours     Hospital Problems  Date Reviewed: 9/13/2021        Codes Class Noted POA    * (Principal) Infection associated with internal right hip prosthesis ([facility] [address] ICD-10-CM: T84.51XA  ICD-9-CM: 996.66, V43.64  9/11/2021 Yes        Prosthetic hip infection ([address] ICD-10-CM: T84.59XA, J12.591  ICD-9-CM: 996.66, V43.64  9/9/2021 Yes                Review of Systems:   Pertinent items are noted in HPI. Vital Signs:    Last 24hrs VS reviewed since prior progress note.  Most recent are:  Visit Vitals  BP (!) 92/55 (BP 1 Location: Left upper arm)   Pulse 66   Temp 97.6 °F (36.4 °C)   Resp 18   Ht 5' 11\" (1.803 m)   Wt 104.3 kg (229 lb 15 oz)   SpO2 95%   BMI 32.07 kg/m²         Intake/Output Summary (Last 24 hours) at 9/14/2021 1334  Last data filed at 9/14/2021 1152  Gross per 24 hour   Intake 1756.3 ml   Output 970 ml   Net 786.3 ml        Physical Examination:     I had a face to face encounter with this patient and independently examined them on 9/14/2021 as outlined below:          Constitutional:  No acute distress, cooperative, pleasant    ENT:  Oral mucosa moist, oropharynx benign. Resp:  CTA bilaterally. No wheezing/rhonchi/rales. No accessory muscle use   CV:  Regular rhythm, normal rate, S1, S2 noted    GI:  Soft, non distended, non tender. normoactive bowel sounds, no hepatosplenomegaly     Musculoskeletal:  No edema, limited movement to right shoulder    Neurologic:  Moves all extremities. AAOx3, CN II-XII reviewed            Data Review:    Review and/or order of clinical lab test  Review and/or order of tests in the radiology section of CPT  Review and/or order of tests in the medicine section of CPT      Labs:     Recent Labs     09/14/21  0300 09/13/21  1558 09/13/21 0118 09/13/21  0118   WBC 8.4  --   --  5.1   HGB 7.9* 7.6*   < > 8.3*   HCT 22.9* 22.9*   < > 24.2*   *  --   --  102*    < > = values in this interval not displayed. Recent Labs     09/14/21 0300 09/13/21 0118 09/12/21  0226   * 135* 137   K 4.4 3.9 3.7    106 109*   CO2 22 25 23   BUN 24* 18 20   CREA 1.08 1.13 0.94   * 245* 151*   CA 7.5* 8.0* 7.9*     No results for input(s): ALT, AP, TBIL, TBILI, TP, ALB, GLOB, GGT, AML, LPSE in the last 72 hours. No lab exists for component: SGOT, GPT, AMYP, HLPSE  No results for input(s): INR, PTP, APTT, INREXT, INREXT in the last 72 hours. No results for input(s): FE, TIBC, PSAT, FERR in the last 72 hours. Lab Results   Component Value Date/Time    Folate 14.2 01/21/2021 03:32 AM      No results for input(s): PH, PCO2, PO2 in the last 72 hours. No results for input(s): CPK, CKNDX, TROIQ in the last 72 hours.     No lab exists for component: CPKMB  Lab Results   Component Value Date/Time    Cholesterol, total 121 11/07/2019 10:23 AM    HDL Cholesterol 68 11/07/2019 10:23 AM    LDL, calculated 40 11/07/2019 10:23 AM    Triglyceride 66 11/07/2019 10:23 AM    CHOL/HDL Ratio 3.5 11/01/2010 07:07 AM     Lab Results Component Value Date/Time    Glucose (POC) 245 (H) 09/14/2021 05:57 AM    Glucose (POC) 227 (H) 09/13/2021 08:56 PM    Glucose (POC) 207 (H) 09/13/2021 04:37 PM    Glucose (POC) 140 (H) 09/13/2021 11:15 AM    Glucose (POC) 178 (H) 09/13/2021 06:52 AM     Lab Results   Component Value Date/Time    Color YELLOW/STRAW 06/20/2021 06:55 PM    Appearance CLEAR 06/20/2021 06:55 PM    Specific gravity 1.014 06/20/2021 06:55 PM    Specific gravity 1.020 02/28/2018 05:01 PM    pH (UA) 5.5 06/20/2021 06:55 PM    Protein Negative 06/20/2021 06:55 PM    Glucose Negative 06/20/2021 06:55 PM    Ketone Negative 06/20/2021 06:55 PM    Bilirubin Negative 06/20/2021 06:55 PM    Urobilinogen 0.2 06/20/2021 06:55 PM    Nitrites Negative 06/20/2021 06:55 PM    Leukocyte Esterase Negative 06/20/2021 06:55 PM    Epithelial cells FEW 06/20/2021 06:55 PM    Bacteria Negative 06/20/2021 06:55 PM    WBC 0-4 06/20/2021 06:55 PM    RBC 0-5 06/20/2021 06:55 PM         Medications Reviewed:     Current Facility-Administered Medications   Medication Dose Route Frequency    0.9% sodium chloride infusion  125 mL/hr IntraVENous CONTINUOUS    sodium chloride 0.9 % bolus infusion 500 mL  500 mL IntraVENous ONCE PRN    sodium chloride (NS) flush 5-40 mL  5-40 mL IntraVENous Q8H    sodium chloride (NS) flush 5-40 mL  5-40 mL IntraVENous PRN    acetaminophen (TYLENOL) tablet 650 mg  650 mg Oral Q6H    oxyCODONE IR (ROXICODONE) tablet 5 mg  5 mg Oral Q3H PRN    oxyCODONE IR (ROXICODONE) tablet 10 mg  10 mg Oral Q3H PRN    HYDROmorphone (DILAUDID) injection 0.5 mg  0.5 mg IntraVENous Q4H PRN    naloxone (NARCAN) injection 0.4 mg  0.4 mg IntraVENous PRN    ondansetron (ZOFRAN) injection 4 mg  4 mg IntraVENous Q4H PRN    prochlorperazine (COMPAZINE) with saline injection 5 mg  5 mg IntraVENous Q6H PRN    ondansetron (ZOFRAN ODT) tablet 4 mg  4 mg Oral Q6H PRN    hydrOXYzine HCL (ATARAX) tablet 10 mg  10 mg Oral Q8H PRN    famotidine (PEPCID) tablet 20 mg  20 mg Oral BID PRN    senna-docusate (PERICOLACE) 8.6-50 mg per tablet 1 Tablet  1 Tablet Oral BID    polyethylene glycol (MIRALAX) packet 17 g  17 g Oral DAILY    [START ON 9/15/2021] bisacodyL (DULCOLAX) suppository 10 mg  10 mg Rectal DAILY PRN    0.9% sodium chloride infusion 250 mL  250 mL IntraVENous PRN    Vancomycin - Pharmacy Dosing   Other Rx Dosing/Monitoring    vancomycin (VANCOCIN) 1500 mg in  ml infusion  1,500 mg IntraVENous Q18H    traMADoL (ULTRAM) tablet 50 mg  50 mg Oral Q6H PRN    cefepime (MAXIPIME) 2 g in 0.9% sodium chloride (MBP/ADV) 100 mL MBP  2 g IntraVENous Q8H    atorvastatin (LIPITOR) tablet 40 mg  40 mg Oral DAILY    [Held by provider] furosemide (LASIX) tablet 40 mg  40 mg Oral DAILY    gabapentin (NEURONTIN) tablet 600 mg  600 mg Oral QPM    lactulose (CHRONULAC) 10 gram/15 mL solution 30 mL  30 mL Oral BID    pantoprazole (PROTONIX) tablet 40 mg  40 mg Oral DAILY    midodrine (PROAMATINE) tablet 10 mg  10 mg Oral TID    rifAXIMin (XIFAXAN) tablet 550 mg  550 mg Oral BID    sertraline (ZOLOFT) tablet 150 mg  150 mg Oral DAILY    [Held by provider] spironolactone (ALDACTONE) tablet 100 mg  100 mg Oral DAILY    tamsulosin (FLOMAX) capsule 0.4 mg  0.4 mg Oral DAILY    insulin lispro (HUMALOG) injection   SubCUTAneous AC&HS    insulin glargine (LANTUS) injection 30 Units  30 Units SubCUTAneous QHS    enoxaparin (LOVENOX) injection 40 mg  40 mg SubCUTAneous DAILY    glucose chewable tablet 16 g  4 Tablet Oral PRN    dextrose (D50W) injection syrg 12.5-25 g  12.5-25 g IntraVENous PRN    glucagon (GLUCAGEN) injection 1 mg  1 mg IntraMUSCular PRN     ______________________________________________________________________  EXPECTED LENGTH OF STAY: 3d 7h  ACTUAL LENGTH OF STAY:          [address], FNP

## 2021-09-14 NOTE — PROGRESS NOTES
RUR: 33% High    YARELY: SNF - Return to [facility]. Referral sent via [address]. IV orders pending. Patient will need an extended course of IV treatment. BLS needed at discharge. Primary Contact: Angelita De León, [phone]    Care Management Interventions  PCP Verified by CM:  Yes (Dr. Anthony Painting, last visit about 1 month ago )  Palliative Care Criteria Met (RRAT>21 & CHF Dx)?: No  Mode of Transport at Discharge: BLS  Transition of Care Consult (CM Consult): SNF  Partner SNF: Yes  MyChart Signup: No  Discharge Durable Medical Equipment: No  Health Maintenance Reviewed: Yes  Physical Therapy Consult: Yes  Occupational Therapy Consult: Yes  Speech Therapy Consult: No  Support Systems: Child(dariusz) (Angelita De León)  Confirm Follow Up Transport: Other (see comment) (BLS)  The Plan for Transition of Care is Related to the Following Treatment Goals : SNF   The Patient and/or Patient Representative was Provided with a Choice of Provider and Agrees with the Discharge Plan?: Yes  Name of the Patient Representative Who was Provided with a Choice of Provider and Agrees with the Discharge Plan: Patient and daughterDeborah of Choice List was Provided with Basic Dialogue that Supports the Patient's Individualized Plan of Care/Goals, Treatment Preferences and Shares the Quality Data Associated with the Providers?: Yes   Resource Information Provided?: No  Discharge Location  Discharge Placement: Skilled nursing facility    Reason for Admission:  Right hip infection       RUR Score:  33% High           PCP: First and Last name:  Ирина Small MD     Name of Practice:   Are you a current patient: Yes/No: Yes   Approximate date of last visit: About 1 month ago   Can you do a virtual visit with your PCP: Yes             Resources/supports as identified by patient/family:                   Top Challenges facing patient (as identified by patient/family and CM): Finances/Medication cost?  N/A                  Transportation? N/A              Support system or lack thereof? Plan to move to [facility] once SNF completed. Living arrangements? SNF to [facility]               Self-care/ADLs/Cognition? Mild cognitive deficits, requires minimal assistance with upper body ADL's & moderate to maximum assistance with lower body ADL's. Current Advanced Directive/Advance Care Plan:  Full Code      Healthcare Decision Maker:   Click here to complete HealthCare Decision Makers including selection of the Healthcare Decision Maker Relationship (ie \"Primary\")    Maryse Hale, daughter - [phone]    Payor Source Payor: [place] MEDICARE / Plan: Luis Alberto Muse / Product Type: Medicare /                             Plan for utilizing home health:   SNF     Transition of Care Plan:     The Plan for Transition of Care is related to the following treatment goals: SNF     The Patient and/or patient representative was provided with a choice of provider and agrees  with the discharge plan. Yes [x] No []    A Freedom of choice list was provided with basic dialogue that supports the patient's individualized plan of care/goals and shares the quality data associated with the providers. Yes [x] No []                    Transition of Care Plan:    SNF     CM met with patient at bedside to introduce self and explain role. Patient requested CM to call daughter, Maryse Hale (p: [phone]) to obtain information. Prior to hospitalization patient was receiving skilled nursing services at [facility] [address] [facility]. Resident was requiring minimal assistance with upper body ADL's and maximum assistance with lower body ADL's per daughter. Wheelchair was primary device for ambulation and sliding board used to transfers. Patient owns a wheelchair, cane, walker, and is currently renting a hospital bed.  Patient has used [facility] in the past.    Per daughter, patient's preference for SNF if recommended is to return to [facility]. CM verified patient's demographics, insurance, PCP, and pharmacy. CM to continue to follow for transitions of care.     CHRIS Maya  [phone]

## 2021-09-14 NOTE — PROGRESS NOTES
Bedside shift change report given to Kika Neal and Marilu Marcano RN (oncoming nurse) by Feli Crandall (offgoing nurse). Report included the following information SBAR, Kardex, Procedure Summary, Intake/Output, MAR and Recent Results.

## 2021-09-14 NOTE — PROGRESS NOTES
PT order received and acknowledged, chart reviewed - attempted to see pt for PT eval pt reporting right hip pain 7/10 following pain meds. Patient supine with HOB elevated 40 degrees - Bp 95/53, HR 65 - pale - PA aware of low Bp and at bedside. Pt received 2 units of PRBCs yesterday after surgery.      Will attempt to see pt later this am -     Martha Mendoza, PT

## 2021-09-15 NOTE — PROGRESS NOTES
Ortho Daily Progress Note      Patient: Laila Caldwell                   MRN: [id number]  Sex: male  YOB: 1950           Age: 66 y.o.     2 Days Post-Op     Procedure(s):  INCISION AND DRAINAGE OF RIGHT HIP, EXCHANGE OF CONSTRAINED LINER AND FEMORAL HEAD, POSSIBLE RESECTION ARTHROPLASTY    Subjective:    Pain:  pt in moderate pain with movement of the right leg  No complaints overnight. Pt not yet ambulating with PT/OT due to blood pressures and low hemoglobin  Pt. tolerating PO diet, no nausea. Pain meds tolerated. Pt. voiding appropriately per nursing, incontinent. Patient continues to be hypotensive despite IV fluids at 125mL/hr, 500mL bolus yesterday. Patient's hgb 6.5 despite 2 units of PRBCs being transfused. A 3rd unit is being given currently. Nursing reports that the hip hemovac is pulling large amounts of sanguinous discharge and pulled 100mL in about an hour last night. Hemovac to the hip currently left uncharged. 500mL of sanguinous discharge emptied in the last 12 hours. Right shoulder hemovac with 10mL of sanguinous discharge. Right anterior hip sinus tract continues to drain as well. Shoulder aspirate on 9/10/21 growing Serratia marcescens. Wound culture of the hip 9/13/21 pending.   Patient denies fever, chills, numbness, tingling, dizziness/light-headed  Visit Vitals  BP (!) 100/57   Pulse 68   Temp 98.2 °F (36.8 °C)   Resp 16   Ht 5' 11\" (1.803 m)   Wt 104.3 kg (229 lb 15 oz)   SpO2 98%   BMI 32.07 kg/m²        Recent Labs     09/15/21  0318 09/14/21  1359 09/14/21  0300 09/13/21  1558 09/13/21  0118 09/12/21  0226 09/12/21  0226 09/11/21  0934 09/11/21  0234 09/10/21  0313 09/10/21  0305 09/10/21  0305 09/09/21  1544 09/09/21  1544 09/07/21  0911 09/07/21  0909 06/20/21  1745   HGB 6.5*   < > 7.9*   < > 8.3*   < > 8.8*   < > 8.8*  --    < > 10.3*   < > 11.5*  --    < > 10.6*   CREA 1.10  --  1.08  --  1.13   < > 0.94  --  0.92 0.94   < > 0.96   < > 1.12 1.10   < > 0.79   BUN 24*  --  24*  --  18  --  20  --  22* 20  --  20  --  19 21*  --  15    < > = values in this interval not displayed. Physical Exam:    GENERAL:      Lethargic, oriented x4 after probing. NEURO:          Sensation grossly intact. Right sided foot drop  VASCULAR:    DP/TP pulses 2+. Capillary refill <2 seconds. Extremity warm. moderate edema of right thigh and 1+ pitting edema of the right foot. DRESSING:    Shoulder- Clean, dry, and intact and ABD pad in place. Hemovac charged and with sanguinous drainage. Hip- dry dressing saturated with serosanguinous drainage. WOUND:        Right Hip incision- wound well approximated with vertical mattress sutures. Slow ooze of serosanguinous drainage from incision. No erythema. Indurated around incision site. Hemovac drain in place with sanguinous discharge    Right thigh sinus tract- serosanguinous drainage, no evidence of erythema. MSK:               Shoulder with pain on range of motion; decreased range of motion and stiff end points on ER, abduction. Hip with pain on PROM, palpable quad contraction. DVT EXAM:     No evidence of DVT seen on physical exam.  No posterior calf tenderness, tightness, erythema, palpable cords, or pain upon active dorsi/plantar flexion of the foot. Plan:    DVT ppx:         LMW heparin while inpatient. ASA 81mg BID until 9/28/21 after discharge  Activity:            WBAT with PT-mobilization  Pain Control:   stable, mild-to-moderate joint symptoms intermittently, reasonably well controlled by current meds  Dressing:         Changed hips dressing with abd pads. Change hip and shoulder dressings daily. Keep shoulder hemovac charged but leave hip hemovac uncharged. Hgb:                 6.5 now after 2 units of PRBCs post-op, currently getting 3rd unit.   Repeat hgb after this transfusion and infuse another unit of PRBCs if hgb <7.  Will keep hip hemovac uncharged. Urinary:           Voiding now, incontinent  Hypotension:   Continue the maintenance IV fluids. Will deferred to hospitalist on management given patient's cirrhosis. Albumin? Right hip mimi-prosthetic infection:  Resection arthroplasty performed 9/13/21. Continue hemovac. Continue ID recommendations of IV vancomycin with last dose 9/25/21 and IV cefepime with last dose 10/24/21. Right septic arthritis of the shoulder:  Arthroscopic debridement and irrigation on 9/10/21 with Dr. Tammi Brown. Continue hemovac. Continue ID recommendations of IV vancomycin with last dose 9/25/21 and IV cefepime with last dose 10/24/21.     Ivonne Zavaleta PA-C  9/15/2021   7:17 AM

## 2021-09-15 NOTE — WOUND CARE
WOCN Note:     Follow-up visit for right hip wound next to surgical incision. Chart shows:  Admitted for right hip infection I&D with arthroplasty 9/11with resection arthroplasty 9/13; septic right shoulder irrigation and debridement 9/10     Assessment:   Appropriately conversational and reports pain with fully assisted turn by 2 people. MARK Boykin, in room to help.  hemovac to right shoulder and hip  Surface: [employer]ac[employer] foam mattress     Bilateral heels intact and without erythema. Heels offloaded with pillows. 1. POA right hip wound tunneling wound toward right groin  1 x 1 x 7.5 cm  Visible base is non-granular red  Small amount of sanguinous exudate (site is between surgical incision and Hemovac insertion; no malodor or purulence  Periwound intact & without erythema    Tx: cleaned with saline and packed with saline-moist gauze with dry topper. Wound Recommendations:    Right hip wound: clean with saline, pack with saline-moist gauze and dry topper. Change twice daily to manage drainage. He may be a candidate for wound vac at a later date but vac would likely clog with amount/type of drainage present today. Discussed with AMAN Alvarez, and RN, Luke Martinez. Transition of Care: Plan to follow as needed while admitted to hospital and will reassess on Friday.     MIRNA Zurita, RN, [facility]  Certified Wound, Ostomy, Continence Nurse  office [phone]  Available via [employer]

## 2021-09-15 NOTE — PROGRESS NOTES
ID Progress Note  9/15/2021    Subjective:   C/o right shoulder and right hip pain  Review of Systems:            Symptom Y/N Comments   Symptom Y/N Comments   Fever/Chills n      Chest Pain n       Poor Appetite y      Edema        Cough       Abdominal Pain        Sputum       Joint Pain y       SOB/WANG  n     Pruritis/Rash        Nausea/vomit  n     Tolerating PT/OT        Diarrhea  n     Tolerating Diet        Constipation  n     Other           Could NOT obtain due to:      Objective:     Vitals:   Visit Vitals  BP (!) 100/57   Pulse 68   Temp 98.2 °F (36.8 °C)   Resp 16   Ht 5' 11\" (1.803 m)   Wt 104.3 kg (229 lb 15 oz)   SpO2 98%   BMI 32.07 kg/m²        Tmax:  Temp (24hrs), Av.9 °F (36.6 °C), Min:97.5 °F (36.4 °C), Max:98.2 °F (36.8 °C)      PHYSICAL EXAM:  General: Chronically ill appearing, WD, WN. Alert, cooperative, no acute distress    EENT:  EOMI. Anicteric sclerae. MMM  Resp:  Clear in apex with decreased breath sounds at bases, no wheezing or rales. No accessory muscle use  CV:  Regular  rhythm,  No edema  GI:  Soft, Distended, Non tender. hypoactive Bowel sounds  Neurologic:  Alert and orient x 4, follows commends  Psych:   Fair insight, Not anxious nor agitated  Skin:  No rashes.   No jaundice, right shoulder dressing dry and intact, hemovac in place, right hip dressing dry and intact, hemovac in place    Labs:   Lab Results   Component Value Date/Time    WBC 6.4 09/15/2021 03:18 AM    HGB 6.5 (L) 09/15/2021 03:18 AM    HCT 20.0 (L) 09/15/2021 03:18 AM    PLATELET 625 (L)  03:18 AM    MCV 99.0 09/15/2021 03:18 AM     Lab Results   Component Value Date/Time    Sodium 138 09/15/2021 03:18 AM    Potassium 4.2 09/15/2021 03:18 AM    Chloride 111 (H) 09/15/2021 03:18 AM    CO2 23 09/15/2021 03:18 AM    Anion gap 4 (L) 09/15/2021 03:18 AM    Glucose 215 (H) 09/15/2021 03:18 AM    BUN 24 (H) 09/15/2021 03:18 AM    Creatinine 1.10 09/15/2021 03:18 AM    BUN/Creatinine ratio 22 (H) 09/15/2021 03:18 AM    GFR est AA >60 09/15/2021 03:18 AM    GFR est non-AA >60 09/15/2021 03:18 AM    Calcium 7.6 (L) 09/15/2021 03:18 AM    Bilirubin, total 1.7 (H) 09/10/2021 03:05 AM    Alk. 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)  - afebrile, WBC 7.7    S/p aspirated 3ml of cloudy serosanguineous fluid from right hip joint (9/9)    Blood cx (9/9) serratia marcescens (all four bottles), and Enterococcus faecium (4th bottle)    Blood cx (9/12) no growth so far    Body fluid cx, hip (9/9/2021)serratia marcescens      Continue with IV cefepime & vancomycin    Pt will need 2 weeks of IV vancomycin for enterococcus faecium bacteremia, last dose 9/25/2021    Pt will need 6 weeks of IV cefepime for Serratia marcescens from the recent surgery date, last dose 10/24/2021     Right shoulder pain  - CT RUE (9/10) Chronic complete tear of rotator cuff tendons involving supraspinatus, infraspinatus and teres minor with moderate fatty atrophy in humeral head superior subluxation. Moderate glenohumeral and acromioclavicular osteoarthrosis with loose bodies. Large effusion of glenohumeral joint and subacromial subdeltoid bursa. Type III acromion undersurface spurring and bony remodeling related to  superior humeral subluxation.     S/p aspirated 60ml of purulent fluid from right shoulder(9/10), culture; serratia marcescens    ABX as above    Ortho following    Anemia  - hgb 6.5; transfusion per ortho team      Marline Field NP

## 2021-09-15 NOTE — PROGRESS NOTES
Occupational Therapy    Order's acknowledged, chart reviewed in prep for skilled OT evaluation; however, chart indicates pt's HgB is 6.5. Will hold evaluation and follow up later as able and appropriate.     Thank you,  Katty Sosa, OT

## 2021-09-15 NOTE — ROUTINE PROCESS
Bedside shift change report given to Meche HammRN (oncoming nurse) by Allan Carrillo RN(offgoing nurse). Report given with SBAR.

## 2021-09-15 NOTE — PROGRESS NOTES
Physical Therapy  Chart reviewed in prep for PT evaluation, pt received 1 unit PRBCs this morning, last hgb 7.2 and BP remains hypotensive, 94/40 in supine, will defer PT intervention at this time and follow up tomorrow  Amy Gill Cooks PT

## 2021-09-15 NOTE — PROGRESS NOTES
Pharmacist Note - Vancomycin Dosing  Therapy day 4  Indication: prosthetic hip infection/ septic right shoulder  Current regimen: 1500 mg IV every 18 hours    Recent Labs     09/15/21  0318 09/14/21  0300 09/13/21  0118   WBC 6.4 8.4 5.1   CREA 1.10 1.08 1.13   BUN 24* 24* 18       A random vancomycin level of 32.5 mcg/mL was obtained and from this level, the patient's AUC24 is calculated to be 734 with the current regimen. Goal target range AUC/JOSE ANGEL 400-600      Plan: Will discontinue maintenance regimen for now and plan for random level tomorrow at 04:00 to ensure patient has adequately cleared prior to initiating new maintenance regimen. Currently predicting 1000 mg q18h to achieve a therapeutic AUC24 of 502 mg/L.hr. Pharmacy will continue to monitor this patient daily for changes in clinical status and renal function. *Random vancomycin levels are used to calculate AUC/JOSE ANGEL, this level should not be interpreted as a trough. Vancomycin has been dosed using Bayesian kinetics software to target an AUC24:JOSE ANGEL of 400-600, which provides adequate exposure for as assumed infection due to MRSA with an JOSE ANGEL of 1 or less while reducing the risk of nephrotoxicity as seen with traditional trough based dosing goals.

## 2021-09-15 NOTE — PROGRESS NOTES
Notified blood bank that patient's hgb is at 7.1. They said that patient is technically on the borderline of receiving blood. She stated that as long as patient is stable then can assess 0400 labs and see if patient still needs the other unit of blood since there is a blood shortage. 04:47 - patient's hgb 6.5. Blood consent on chart. Second unit of RBCs started. VSS.

## 2021-09-15 NOTE — PROGRESS NOTES
Bedside shift change report given to Waleska Rodriguez RN (oncoming nurse) by Juanpablo Green RN (offgoing nurse).  Report included the following information SBAR, Kardex, Output

## 2021-09-15 NOTE — PROGRESS NOTES
[facility]  Hospitalist Group                                                                                          Hospitalist Progress Note  [address]  Answering service: [phone] OR [phone] from in house phone        Date of Service:  9/15/2021  NAME:  Mary Jo Templeton  :  1950  MRN:  [id number]      Admission Summary:   Brenda Hoyos a 66 y. o. male with h/o endocarditis and right prosthetic hip infection. presents with right hip pain and redness. He was followed by orthopedics Dr. Alma Rosa Castillo, The patient initially had a right total hip replacement around 12 years ago. On 18 he developed a spontaneous hematogenous right hip infection from endocarditis and underwent an I & D REVISION FEMORAL HEAD AND POLYLINER RIGHT HIP. He completed 6 weeks of IV antibiotics and then on life time po abx kelfex for prophylaxis.  Patient then had a revision of femoral head and tripolar component of right total hip replacement on 2020 due to recurrent dislocations. [facility] was also seen by Dr. Crystal Naranjo who did a revision of the right CIERRA and a percutaneous adductor release on 20.    [address]. He developed right hip redness and pain 1-2 weeks ago and he was arrange to have IR aspiration right hip as outpatient. Noticed a draining wound from lateral to the right hip. Post aspiration, Dr. Alma Rosa Castillo recommended admission due to the concerning of hip infection.       Interval history / Subjective:   Patient examined at bedside, reports feeling well, reports pain at surgical incisions. Clean, intact dressing and drain noted to Hip and right shoulder. Patient reports \" feeling better\" today. Hg 6.5 at 0318, 1 unit PRBCs given, orders placed for repeat Hg at 0800, awaiting labs to be drawn, RN notified. Ortho continues to follow, IV ABX at discharge anticipated. Patient remains hyperglycemic, Lantus increased to 34 units daily.   1000 Hg 7.2, B/P remains soft, will reassess Hg at 1600   Will add Albumin 25 gm Q 6 hours       Assessment & Plan:     # Prosthetic hip infection      - Right hip aspiration yeiled 3 ml of cloudy serosanguineous fluid. - Cultures + gram negative rods      - Continue IV Cefipime, Vanc added by ID      - ID following      - Ortho following  # Septic Right Shoulder      - Global rotator cuff tear with degenerative changes of GH joint and large joint effusion seen on CT.      - 60 ml of purulent fluid aspirated, culture pending.      - Ortho following, I&D completed 9/11/21  # DM II     - Continue SSI      - Increase Lantus to 34 units  # Hyponatremia     - 138 9/15/21, likely due to hyperglycemia, now resolved. - Daily BMP     - NS at 75ml/hr  # Cirrhosis     - Child class B and MELD of 10 -14.     - Hepatology following  # Thrombocytopenia     - Secondary to cirrhosis     - Daily CBC  # Anemia     - multifactorial, including portal hypertension with chronic GI blood loss, chronic systemic           disease     - Patient has received 3 units of PRBCs post surgical intervention     -Patient remains hypotensive, will add albumin Q 6 hours  Code status: Full  DVT prophylaxis: Lovenox being held    [address] discussed with: Patient/Family  Anticipated Disposition: SNF/LTC  Anticipated Discharge: Greater than 48 hours     Hospital Problems  Date Reviewed: 9/13/2021        Codes Class Noted POA    * (Principal) Infection associated with internal right hip prosthesis ([facility] ICD-10-CM: T84.51XA  ICD-9-CM: 996.66, V43.64  9/11/2021 Yes        Prosthetic hip infection ([facility] ICD-10-CM: T84.59XA, P87.479  ICD-9-CM: 996.66, V43.64  9/9/2021 Yes                Review of Systems:   Pertinent items are noted in HPI. Vital Signs:    Last 24hrs VS reviewed since prior progress note.  Most recent are:  Visit Vitals  BP (!) 100/57   Pulse 68   Temp 98.2 °F (36.8 °C)   Resp 16   Ht 5' 11\" (1.803 m)   Wt 104.3 kg (229 lb 15 oz)   SpO2 98% BMI 32.07 kg/m²         Intake/Output Summary (Last 24 hours) at 9/15/2021 1028  Last data filed at 9/15/2021 0744  Gross per 24 hour   Intake 668.4 ml   Output 1150 ml   Net -481.6 ml        Physical Examination:     I had a face to face encounter with this patient and independently examined them on 9/15/2021 as outlined below:          Constitutional:  No acute distress, cooperative, pleasant    ENT:  Oral mucosa moist, oropharynx benign. Resp:  CTA bilaterally. No wheezing/rhonchi/rales. No accessory muscle use   CV:  Regular rhythm, normal rate, S1, S2 noted    GI:  Soft, non distended, non tender. normoactive bowel sounds, no hepatosplenomegaly     Musculoskeletal:  No edema, limited movement to right shoulder    Neurologic:  Moves all extremities. AAOx3, CN II-XII reviewed            Data Review:    Review and/or order of clinical lab test  Review and/or order of tests in the radiology section of CPT  Review and/or order of tests in the medicine section of CPT      Labs:     Recent Labs     09/15/21  0318 09/14/21  2238 09/14/21  1359 09/14/21  0300   WBC 6.4  --   --  8.4   HGB 6.5* 7.1*   < > 7.9*   HCT 20.0*  --   --  22.9*   *  --   --  132*    < > = values in this interval not displayed. Recent Labs     09/15/21  0318 09/14/21  0300 09/13/21  0118    134* 135*   K 4.2 4.4 3.9   * 106 106   CO2 23 22 25   BUN 24* 24* 18   CREA 1.10 1.08 1.13   * 274* 245*   CA 7.6* 7.5* 8.0*     No results for input(s): ALT, AP, TBIL, TBILI, TP, ALB, GLOB, GGT, AML, LPSE in the last 72 hours. No lab exists for component: SGOT, GPT, AMYP, HLPSE  No results for input(s): INR, PTP, APTT, INREXT, INREXT in the last 72 hours. No results for input(s): FE, TIBC, PSAT, FERR in the last 72 hours. Lab Results   Component Value Date/Time    Folate 14.2 01/21/2021 03:32 AM      No results for input(s): PH, PCO2, PO2 in the last 72 hours.   No results for input(s): CPK, CKNDX, TROIQ in the last 72 hours.     No lab exists for component: CPKMB  Lab Results   Component Value Date/Time    Cholesterol, total 121 11/07/2019 10:23 AM    HDL Cholesterol 68 11/07/2019 10:23 AM    LDL, calculated 40 11/07/2019 10:23 AM    Triglyceride 66 11/07/2019 10:23 AM    CHOL/HDL Ratio 3.5 11/01/2010 07:07 AM     Lab Results   Component Value Date/Time    Glucose (POC) 231 (H) 09/15/2021 06:03 AM    Glucose (POC) 307 (H) 09/14/2021 09:06 PM    Glucose (POC) 366 (H) 09/14/2021 04:37 PM    Glucose (POC) 245 (H) 09/14/2021 05:57 AM    Glucose (POC) 227 (H) 09/13/2021 08:56 PM     Lab Results   Component Value Date/Time    Color YELLOW/STRAW 06/20/2021 06:55 PM    Appearance CLEAR 06/20/2021 06:55 PM    Specific gravity 1.014 06/20/2021 06:55 PM    Specific gravity 1.020 02/28/2018 05:01 PM    pH (UA) 5.5 06/20/2021 06:55 PM    Protein Negative 06/20/2021 06:55 PM    Glucose Negative 06/20/2021 06:55 PM    Ketone Negative 06/20/2021 06:55 PM    Bilirubin Negative 06/20/2021 06:55 PM    Urobilinogen 0.2 06/20/2021 06:55 PM    Nitrites Negative 06/20/2021 06:55 PM    Leukocyte Esterase Negative 06/20/2021 06:55 PM    Epithelial cells FEW 06/20/2021 06:55 PM    Bacteria Negative 06/20/2021 06:55 PM    WBC 0-4 06/20/2021 06:55 PM    RBC 0-5 06/20/2021 06:55 PM         Medications Reviewed:     Current Facility-Administered Medications   Medication Dose Route Frequency    insulin glargine (LANTUS) injection 34 Units  34 Units SubCUTAneous QHS    balsam peru-castor oiL (VENELEX) ointment   Topical BID    0.9% sodium chloride infusion 250 mL  250 mL IntraVENous PRN    0.9% sodium chloride infusion  125 mL/hr IntraVENous CONTINUOUS    sodium chloride (NS) flush 5-40 mL  5-40 mL IntraVENous Q8H    sodium chloride (NS) flush 5-40 mL  5-40 mL IntraVENous PRN    acetaminophen (TYLENOL) tablet 650 mg  650 mg Oral Q6H    oxyCODONE IR (ROXICODONE) tablet 5 mg  5 mg Oral Q3H PRN    oxyCODONE IR (ROXICODONE) tablet 10 mg  10 mg Oral Q3H PRN    naloxone (NARCAN) injection 0.4 mg  0.4 mg IntraVENous PRN    ondansetron (ZOFRAN ODT) tablet 4 mg  4 mg Oral Q6H PRN    hydrOXYzine HCL (ATARAX) tablet 10 mg  10 mg Oral Q8H PRN    famotidine (PEPCID) tablet 20 mg  20 mg Oral BID PRN    senna-docusate (PERICOLACE) 8.6-50 mg per tablet 1 Tablet  1 Tablet Oral BID    polyethylene glycol (MIRALAX) packet 17 g  17 g Oral DAILY    bisacodyL (DULCOLAX) suppository 10 mg  10 mg Rectal DAILY PRN    0.9% sodium chloride infusion 250 mL  250 mL IntraVENous PRN    Vancomycin - Pharmacy Dosing   Other Rx Dosing/Monitoring    traMADoL (ULTRAM) tablet 50 mg  50 mg Oral Q6H PRN    cefepime (MAXIPIME) 2 g in 0.9% sodium chloride (MBP/ADV) 100 mL MBP  2 g IntraVENous Q8H    atorvastatin (LIPITOR) tablet 40 mg  40 mg Oral DAILY    [Held by provider] furosemide (LASIX) tablet 40 mg  40 mg Oral DAILY    gabapentin (NEURONTIN) tablet 600 mg  600 mg Oral QPM    lactulose (CHRONULAC) 10 gram/15 mL solution 30 mL  30 mL Oral BID    pantoprazole (PROTONIX) tablet 40 mg  40 mg Oral DAILY    midodrine (PROAMATINE) tablet 10 mg  10 mg Oral TID    rifAXIMin (XIFAXAN) tablet 550 mg  550 mg Oral BID    sertraline (ZOLOFT) tablet 150 mg  150 mg Oral DAILY    [Held by provider] spironolactone (ALDACTONE) tablet 100 mg  100 mg Oral DAILY    tamsulosin (FLOMAX) capsule 0.4 mg  0.4 mg Oral DAILY    insulin lispro (HUMALOG) injection   SubCUTAneous AC&HS    enoxaparin (LOVENOX) injection 40 mg  40 mg SubCUTAneous DAILY    glucose chewable tablet 16 g  4 Tablet Oral PRN    dextrose (D50W) injection syrg 12.5-25 g  12.5-25 g IntraVENous PRN    glucagon (GLUCAGEN) injection 1 mg  1 mg IntraMUSCular PRN     ______________________________________________________________________  EXPECTED LENGTH OF STAY: 3d 7h  ACTUAL LENGTH OF STAY:          [address], FNP

## 2021-09-16 NOTE — PROGRESS NOTES
[address] [facility]  Hospitalist Group                                                                                          Hospitalist Progress Note  Delmy Lucero MD  Answering service: [phone] from in house phone        Date of Service:  2021  NAME:  Brennan Felty  :  1950  MRN:  [id number]      Admission Summary:   Yvonne Lerner a 66 y. o. male with h/o endocarditis and right prosthetic hip infection. presents with right hip pain and redness. He was followed by orthopedics Dr. Asha Purcell, The patient initially had a right total hip replacement around 12 years ago. On 18 he developed a spontaneous hematogenous right hip infection from endocarditis and underwent an I & D REVISION FEMORAL HEAD AND POLYLINER RIGHT HIP. He completed 6 weeks of IV antibiotics and then on life time po abx kelfex for prophylaxis.  Patient then had a revision of femoral head and tripolar component of right total hip replacement on 2020 due to recurrent dislocations. [facility] was also seen by Dr. Murphy Velasco who did a revision of the right CIERRA and a percutaneous adductor release on 20.    [address]. He developed right hip redness and pain 1-2 weeks ago and he was arrange to have IR aspiration right hip as outpatient. Noticed a draining wound from lateral to the right hip. Post aspiration, Dr. Asha Purcell recommended admission due to the concerning of hip infection.       Interval history / Subjective:   F/u Prosthetic hip infection  BP low normal  On albumin/IV fluids/Midodrine       Assessment & Plan:     # Prosthetic hip infection      - Right hip aspiration yeiled 3 ml of cloudy serosanguineous fluid.       - Cultures Serratia      - Continue IV Cefipime, Vanc added by ID      - ID following      - Ortho following  # Septic Right Shoulder      - Global rotator cuff tear with degenerative changes of GH joint and large joint effusion seen on CT.      - 60 ml of purulent fluid aspirated, culture pending.      - Ortho following, I&D completed 9/11/21    Serratia and E fecium bacteremia  -Per ID : Continue with IV cefepime & vancomycin    Pt will need 2 weeks of IV vancomycin for enterococcus faecium bacteremia, last dose 9/25/2021    Pt will need 6 weeks of IV cefepime for Serratia marcescens from the recent surgery date, last dose 10/24/2021    Hypotension:  -sec to ?cirrhosis  -Lasix/aldactone on hold  -on albumin/IV fluids/midodrine  ?may need more blood transfusion    # DM II- Continue SSI- Increase Lantus to 34 units  # Hyponatremia- 138 9/15/21, likely due to hyperglycemia, now resolved. # Cirrhosis- Child class B and MELD of 10 -14- Hepatology following  # Thrombocytopenia- Secondary to cirrhosis-stable  # Anemia     - multifactorial, including portal hypertension with chronic GI blood loss, chronic systemic           disease     - Patient has received 3 units of PRBCs post surgical intervention     -Patient remains hypotensive, will add albumin Q 6 hours  Code status: Full  DVT prophylaxis: Lovenox being held    Care Plan discussed with: Patient/Family  Anticipated Disposition: SNF/LTC  Anticipated Discharge: Greater than 48 hours     Hospital Problems  Date Reviewed: 9/13/2021        Codes Class Noted POA    * (Principal) Infection associated with internal right hip prosthesis ([address] ICD-10-CM: T84.51XA  ICD-9-CM: 996.66, V43.64  9/11/2021 Yes        Prosthetic hip infection ([address] ICD-10-CM: T84.59XA, A84.198  ICD-9-CM: 996.66, V43.64  9/9/2021 Yes                Review of Systems:   Pertinent items are noted in HPI. Vital Signs:    Last 24hrs VS reviewed since prior progress note.  Most recent are:  Visit Vitals  BP (!) 100/53 (BP 1 Location: Left upper arm, BP Patient Position: At rest)   Pulse 64   Temp 97.8 °F (36.6 °C)   Resp 16   Ht 5' 11\" (1.803 m)   Wt 104.3 kg (229 lb 15 oz)   SpO2 99%   BMI 32.07 kg/m²         Intake/Output Summary (Last 24 hours) at 9/16/2021 0859  Last data filed at 9/16/2021 9521  Gross per 24 hour   Intake 320 ml   Output 1085 ml   Net -765 ml        Physical Examination:     I had a face to face encounter with this patient and independently examined them on 9/16/2021 as outlined below:          Constitutional:  No acute distress, cooperative, pleasant    ENT:  Oral mucosa moist, oropharynx benign. Resp:  CTA bilaterally. No wheezing/rhonchi/rales. No accessory muscle use   CV:  Regular rhythm, normal rate, S1, S2 noted    GI:  Soft, non distended, non tender. normoactive bowel sounds, no hepatosplenomegaly     Musculoskeletal:  No edema, limited movement to right shoulder    Neurologic:  Moves all extremities. AAOx3, CN II-XII reviewed            Data Review:    Review and/or order of clinical lab test  Review and/or order of tests in the radiology section of CPT  Review and/or order of tests in the medicine section of CPT      Labs:     Recent Labs     09/16/21  0229 09/15/21  1619 09/15/21  1011 09/15/21  0318   WBC 5.9  --   --  6.4   HGB 7.1* 8.0*   < > 6.5*   HCT 21.6*  --   --  20.0*   *  --   --  115*    < > = values in this interval not displayed. Recent Labs     09/15/21  0318 09/14/21  0300    134*   K 4.2 4.4   * 106   CO2 23 22   BUN 24* 24*   CREA 1.10 1.08   * 274*   CA 7.6* 7.5*     No results for input(s): ALT, AP, TBIL, TBILI, TP, ALB, GLOB, GGT, AML, LPSE in the last 72 hours. No lab exists for component: SGOT, GPT, AMYP, HLPSE  No results for input(s): INR, PTP, APTT, INREXT, INREXT in the last 72 hours. No results for input(s): FE, TIBC, PSAT, FERR in the last 72 hours. Lab Results   Component Value Date/Time    Folate 14.2 01/21/2021 03:32 AM      No results for input(s): PH, PCO2, PO2 in the last 72 hours. No results for input(s): CPK, CKNDX, TROIQ in the last 72 hours.     No lab exists for component: CPKMB  Lab Results   Component Value Date/Time    Cholesterol, total 121 11/07/2019 10:23 AM    HDL Cholesterol 68 11/07/2019 10:23 AM    LDL, calculated 40 11/07/2019 10:23 AM    Triglyceride 66 11/07/2019 10:23 AM    CHOL/HDL Ratio 3.5 11/01/2010 07:07 AM     Lab Results   Component Value Date/Time    Glucose (POC) 281 (H) 09/16/2021 06:17 AM    Glucose (POC) 288 (H) 09/15/2021 08:47 PM    Glucose (POC) 306 (H) 09/15/2021 04:50 PM    Glucose (POC) 279 (H) 09/15/2021 11:29 AM    Glucose (POC) 231 (H) 09/15/2021 06:03 AM     Lab Results   Component Value Date/Time    Color YELLOW/STRAW 06/20/2021 06:55 PM    Appearance CLEAR 06/20/2021 06:55 PM    Specific gravity 1.014 06/20/2021 06:55 PM    Specific gravity 1.020 02/28/2018 05:01 PM    pH (UA) 5.5 06/20/2021 06:55 PM    Protein Negative 06/20/2021 06:55 PM    Glucose Negative 06/20/2021 06:55 PM    Ketone Negative 06/20/2021 06:55 PM    Bilirubin Negative 06/20/2021 06:55 PM    Urobilinogen 0.2 06/20/2021 06:55 PM    Nitrites Negative 06/20/2021 06:55 PM    Leukocyte Esterase Negative 06/20/2021 06:55 PM    Epithelial cells FEW 06/20/2021 06:55 PM    Bacteria Negative 06/20/2021 06:55 PM    WBC 0-4 06/20/2021 06:55 PM    RBC 0-5 06/20/2021 06:55 PM         Medications Reviewed:     Current Facility-Administered Medications   Medication Dose Route Frequency    0.9% sodium chloride infusion 250 mL  250 mL IntraVENous PRN    insulin glargine (LANTUS) injection 34 Units  34 Units SubCUTAneous QHS    albumin human 25% (BUMINATE) solution 25 g  25 g IntraVENous Q6H    balsam peru-castor oiL (VENELEX) ointment   Topical BID    0.9% sodium chloride infusion 250 mL  250 mL IntraVENous PRN    0.9% sodium chloride infusion  125 mL/hr IntraVENous CONTINUOUS    sodium chloride (NS) flush 5-40 mL  5-40 mL IntraVENous Q8H    sodium chloride (NS) flush 5-40 mL  5-40 mL IntraVENous PRN    acetaminophen (TYLENOL) tablet 650 mg  650 mg Oral Q6H    oxyCODONE IR (ROXICODONE) tablet 5 mg  5 mg Oral Q3H PRN    oxyCODONE IR (ROXICODONE) tablet 10 mg  10 mg Oral Q3H PRN    naloxone (NARCAN) injection 0.4 mg  0.4 mg IntraVENous PRN    ondansetron (ZOFRAN ODT) tablet 4 mg  4 mg Oral Q6H PRN    hydrOXYzine HCL (ATARAX) tablet 10 mg  10 mg Oral Q8H PRN    famotidine (PEPCID) tablet 20 mg  20 mg Oral BID PRN    senna-docusate (PERICOLACE) 8.6-50 mg per tablet 1 Tablet  1 Tablet Oral BID    polyethylene glycol (MIRALAX) packet 17 g  17 g Oral DAILY    bisacodyL (DULCOLAX) suppository 10 mg  10 mg Rectal DAILY PRN    0.9% sodium chloride infusion 250 mL  250 mL IntraVENous PRN    Vancomycin - Pharmacy Dosing   Other Rx Dosing/Monitoring    traMADoL (ULTRAM) tablet 50 mg  50 mg Oral Q6H PRN    cefepime (MAXIPIME) 2 g in 0.9% sodium chloride (MBP/ADV) 100 mL MBP  2 g IntraVENous Q8H    atorvastatin (LIPITOR) tablet 40 mg  40 mg Oral DAILY    [Held by provider] furosemide (LASIX) tablet 40 mg  40 mg Oral DAILY    gabapentin (NEURONTIN) tablet 600 mg  600 mg Oral QPM    lactulose (CHRONULAC) 10 gram/15 mL solution 30 mL  30 mL Oral BID    pantoprazole (PROTONIX) tablet 40 mg  40 mg Oral DAILY    midodrine (PROAMATINE) tablet 10 mg  10 mg Oral TID    rifAXIMin (XIFAXAN) tablet 550 mg  550 mg Oral BID    sertraline (ZOLOFT) tablet 150 mg  150 mg Oral DAILY    [Held by provider] spironolactone (ALDACTONE) tablet 100 mg  100 mg Oral DAILY    tamsulosin (FLOMAX) capsule 0.4 mg  0.4 mg Oral DAILY    insulin lispro (HUMALOG) injection   SubCUTAneous AC&HS    enoxaparin (LOVENOX) injection 40 mg  40 mg SubCUTAneous DAILY    glucose chewable tablet 16 g  4 Tablet Oral PRN    dextrose (D50W) injection syrg 12.5-25 g  12.5-25 g IntraVENous PRN    glucagon (GLUCAGEN) injection 1 mg  1 mg IntraMUSCular PRN     ______________________________________________________________________  EXPECTED LENGTH OF STAY: 3d 7h  ACTUAL LENGTH OF STAY:          7                 Darline Naidu MD

## 2021-09-16 NOTE — PROGRESS NOTES
Pharmacist Note - Vancomycin Dosing  Therapy day 5  Indication: E. Faecium bacteremia, prosthetic hip infection/ septic right shoulder  Current regimen: Dosing by levels     Recent Labs     09/16/21  0229 09/15/21  0318 09/14/21  0300   WBC 5.9 6.4 8.4   CREA  --  1.10 1.08   BUN  --  24* 24*       A random vancomycin level of 18.3 mcg/mL was obtained ~31 hours post dose. Goal target range: 15 mcg/mL      Plan: Continue current regimen of dosing by levels. Patient is clearing slower than predicted. Plan to re-dose vancomycin once random level ~15 mcg/mL. Will plan for repeat random level today at 14:00. Pharmacy will continue to monitor this patient daily for changes in clinical status and renal function.

## 2021-09-16 NOTE — PROGRESS NOTES
Physical Therapy 9/16/21    Patient discussed with OT following OT eval this pm - per OT - pt reporting 9/10 pain right hip and 10/10 right shoulder - total assist for rolling and declined attempting sitting edge of bed - OT discussed with patient plan for PT/OT co-treat on 9/17 to progress to sitting edge of bed if appropriate. Will defer PT eval this date secondary to pain and limited activity tolerance.     Minor Lissy, PT

## 2021-09-16 NOTE — PROGRESS NOTES
Bedside shift change report given to Peggy Welch RN (oncoming nurse) by Devi Razo (offgoing nurse). Report included the following information SBAR, Kardex, Procedure Summary, Intake/Output, MAR and Recent Results.

## 2021-09-16 NOTE — PROGRESS NOTES
Ortho Daily Progress Note      Patient: Billy Jimenez                   MRN: [id number]  Sex: male  YOB: 1950           Age: 66 y.o.     3 Days Post-Op     Procedure(s):  INCISION AND DRAINAGE OF RIGHT HIP, EXCHANGE OF CONSTRAINED LINER AND FEMORAL HEAD, POSSIBLE RESECTION ARTHROPLASTY    Subjective:    Pain:  pt in moderate pain with movement of the right leg  No complaints overnight. Pt not yet ambulating with PT/OT due to blood pressures and low hemoglobin  Pt. tolerating PO diet, no nausea. Pain meds tolerated. Pt. voiding appropriately per nursing, incontinent  Patient's hgb 7.1 despite 3 units of PRBCs since surgery. Hemovac pulled around 600mL the last 24 hrs. Right shoulder hemovac with 80mL of sanguinous discharge.     Shoulder aspirate on 9/10/21 growing Serratia marcescens. Wound culture of the hip 9/13/21 pending. Patient denies fever, chills, numbness, tingling, dizziness/light-headed    Visit Vitals  BP (!) 100/53 (BP 1 Location: Left upper arm, BP Patient Position: At rest)   Pulse 64   Temp 97.8 °F (36.6 °C)   Resp 16   Ht 5' 11\" (1.803 m)   Wt 104.3 kg (229 lb 15 oz)   SpO2 99%   BMI 32.07 kg/m²        Recent Labs     09/16/21  0229 09/15/21  1011 09/15/21  0318 09/14/21  1359 09/14/21  0300 09/13/21  1558 09/13/21  0118 09/12/21  0226 09/12/21  0226 09/11/21  0934 09/11/21  0234 09/10/21  0313 09/10/21  0305 09/10/21  0305 09/09/21  1544 09/09/21  1544 09/07/21  0911 09/07/21  0909 06/20/21  1745   HGB 7.1*   < > 6.5*   < > 7.9*   < > 8.3*   < > 8.8*   < > 8.8*  --    < > 10.3*   < > 11.5*  --    < > 10.6*   CREA  --   --  1.10  --  1.08  --  1.13   < > 0.94  --  0.92 0.94   < > 0.96   < > 1.12 1.10   < > 0.79   BUN  --   --  24*  --  24*  --  18  --  20  --  22* 20  --  20  --  19 21*  --  15    < > = values in this interval not displayed.        Physical Exam:    GENERAL:      Lethargic, oriented x4 after probing.   NEURO:          Sensation grossly intact.  Right sided foot drop  VASCULAR:    DP/TP pulses 2+.  Capillary refill <2 seconds.  Extremity warm. moderate edema of right thigh and 1+ pitting edema of the right foot. DRESSING:    Right Shoulder- Clean, dry, and intact and ABD pad in place. Was changed today. Jesse Serrano charged and with sanguinous drainage. Right Hip-  Dry dressing in place, no breakthru drainage. Hemovac uncharged. Right thigh sinus tract- no breakthru drainage. OYG:               [id number] with pain on range of motion; decreased range of motion and stiff end points on ER, abduction.                          Hip with pain on PROM, palpable quad contraction. DVT EXAM:     No evidence of DVT seen on physical exam.  No posterior calf tenderness, tightness, erythema, palpable cords. Plan:    DVT ppx:         LMW heparin while inpatient.  ASA 81mg BID until 9/28/21 when discharged  Activity:            WBAT with PT-mobilization  Pain Control:   stable, mild-to-moderate joint symptoms intermittently, reasonably well controlled by current meds  Dressing:         Change hip incision and shoulder dressings daily. Change dressing for sinus tract BID per woundcare rec.  Keep shoulder hemovac charged but leave hip hemovac UNCHARGED. Hgb:                Keep hip hemovac uncharged.  hgb 7.1 this AM, will transfuse 1 unit of PRBCs today and draw hgb post-transfusion. Has received 3 units since surgery. Hypotension:   Improved slightly. Continue the maintenance IV fluids and albumin.  Will deferred to hospitalist on management given patient's cirrhosis. Right hip mimi-prosthetic infection:  Resection arthroplasty performed 9/13/21.  Continue hemovac.  Continue ID recommendations of IV vancomycin with last dose 9/25/21 and IV cefepime with last dose 10/24/21. Right septic arthritis of the shoulder:  Arthroscopic debridement and irrigation on 9/10/21 with Dr. Pamela Dior. Continue hemovac. Continue ID recommendations of IV vancomycin with last dose 9/25/21 and IV cefepime with last dose 10/24/21.     Aristides Mario PA-C  9/16/2021   6:51 AM

## 2021-09-16 NOTE — PROGRESS NOTES
Problem: Self Care Deficits Care Plan (Adult)  Goal: *Acute Goals and Plan of Care (Insert Text)  Description:   FUNCTIONAL STATUS PRIOR TO ADMISSION: Pt reports he was admitted from SNF, with plans to return for continued rehab and long-term plans to transfer to [facility] near daughter's home. Pt reports Max A bathing/LE dressing and Mod A UB dressing at bed level. Pt reports he was working on [employer] transfers to/from W/C, with Max A, and was tolerating up to 6 hours sitting in W/C.    HOME SUPPORT: The patient lived with SNF staffing to provide heavy ADL assist.    Occupational Therapy Goals  Initiated 9/16/2021  1. Patient will perform inclined self-feeding with set-up within 7 day(s). 2.  Patient will perform inclined upper body dressing with moderate assistance within 7 day(s). 3.  Patient will perform inclined bathing with maximal assistance within 7 day(s). 4.  In prep for increased ADL independence, Patient will perform side rolling with moderate assistance within 7 day(s). 5.  In prep for increased ADL independence, Patient will maintain EOB sitting with moderate assistance and no LOB within 7 day(s). Outcome: Not Met    OCCUPATIONAL THERAPY EVALUATION  Patient: Jorge Luis Seals (23 y.o. male)  Date: 9/16/2021  Primary Diagnosis: Prosthetic hip infection ([facility] [address] [T84.59XA, Z96.649]  Infection associated with internal right hip prosthesis ([facility] [address] Elsa Collet  Procedure(s) (LRB):  INCISION AND DRAINAGE OF RIGHT HIP, EXCHANGE OF CONSTRAINED LINER AND FEMORAL HEAD, POSSIBLE RESECTION ARTHROPLASTY (Right) 3 Days Post-Op   Precautions:        ASSESSMENT  Based on the objective data described below, the patient presents with c/o 10/10 pain in R should with AROM, 9/10 R hip pain with movement, decreased strength/endurance, decreased mobility/balance, decreased activity tolerance and decreased full body reaching. Pt required Total A to L and was unable to engage in R side rolling secondary to high c/o pain. Currently, pt benefits from Min A and extended time for self-feeding, Mod A for grooming and Total A for bathing/dressing/toileting. Pt reports he was engaging in SNF level rehab and had progressed to Max A slide board transfers, tolerating up to 6 hours in W/C on a daily basis. Pt benefits from skilled OT to address functional deficits during acute hospitalization, with reporting therapist believing pt would benefit from SNF rehab upon discharge. Current Level of Function Impacting Discharge (ADLs/self-care): Min A and extended time for self-feeding, Mod A for grooming and Total A for bathing/dressing/toileting- All at bed level    Functional Outcome Measure: The patient scored 10/100 on the Barthel Index outcome measure. Other factors to consider for discharge: none     Patient will benefit from skilled therapy intervention to address the above noted impairments. PLAN :  Recommendations and Planned Interventions: self care training, functional mobility training, therapeutic exercise, balance training, therapeutic activities, cognitive retraining, endurance activities, and patient education    Frequency/Duration: Patient will be followed by occupational therapy 5 times a week to address goals. Recommendation for discharge: (in order for the patient to meet his/her long term goals)  Therapy up to 5 days/week in SNF setting    This discharge recommendation:  Has been made in collaboration with the attending provider and/or case management    IF patient discharges home will need the following DME: patient owns DME required for discharge       SUBJECTIVE:   Patient stated I was working on using to [employer].     OBJECTIVE DATA SUMMARY:   HISTORY:   Past Medical History:   Diagnosis Date    ADD (attention deficit disorder)     Adverse effect of anesthesia     motion sickness resulting in loss of balance    Anemia due to blood loss     Hinson's esophagus with esophagitis     Benign essential tremor     Cancer ([facility] [address] 2012    BCC    Chronic pain     Cirrhosis ([facility] [address]     Depression     Dislocated hip (HCC)     DJD (degenerative joint disease) of hip     bilat    DM (diabetes mellitus) ([facility] [address] 3/17/2010    ED (erectile dysfunction) of organic origin     Esophageal varices determined by endoscopy ([facility] [address]     Fall on or from sidewalk curb 9/24/2015    Femur fracture ([facility] [address]     Gastritis and duodenitis     GERD (gastroesophageal reflux disease)     resolved after discontinuing diclofenac    Hematuria 6/2016    High cholesterol 03/17/2010    pt denies 6/19    HTN (hypertension) 3/17/2010    Morbid obesity ([facility] [address]     Murmur, cardiac 2016    PFO (patent foramen ovale) 7/26/2017    PUD (peptic ulcer disease)     Sepsis ([facility] [address]     Shingles 6/2016    RESOLVING- NO RASH (HEAD)    Sleep apnea     doesnt use CPAP anymore     Past Surgical History:   Procedure Laterality Date    COLONOSCOPY N/A 6/18/2019    COLONOSCOPY AND EGD performed by Avel Aguillon MD at [facility]  6/18/2019         ENDOSCOPY, COLON, DIAGNOSTIC      HX CATARACT REMOVAL Bilateral     HX CHOLECYSTECTOMY  1995    HX GI  1980    gastroplasty    [address] 25    HX HIP REPLACEMENT      Left    HX ORTHOPAEDIC Right 2011    rot cuff repair    HX ORTHOPAEDIC  2016    left broken femur    HX ORTHOPAEDIC Right 08/13/2020    Attempt closed reduction of hip dislocation    HX TONSILLECTOMY  1950    HX VASCULAR ACCESS      picc line and removed after sepsis resolved    IR INSERT TIPS HEPATIC SHUNT  3/28/2020    OH TOTAL HIP ARTHROPLASTY  05/2010    right    OH TOTAL HIP ARTHROPLASTY  08/01/2016    left    UPPER GI ENDOSCOPY,BIOPSY  6/18/2019            Expanded or extensive additional review of patient history:     Home Situation  Home Environment: Rehabilitation facility  Living Alone: No  Support Systems: Child(dariusz) (Daughter, Julien Shabazz \"Lenora\" Marina Barton)  Patient Expects to be Discharged to[de-identified] Rehabilitation facility  Current DME Used/Available at Home: Wheelchair, Walker, rolling  Tub or Shower Type:  (reports bathing in bed at [facility])    Hand dominance: Right    EXAMINATION OF PERFORMANCE DEFICITS:  Cognitive/Behavioral Status:  Neurologic State: Alert  Orientation Level: Oriented X4  Cognition: Appropriate decision making; Appropriate for age attention/concentration; Appropriate safety awareness     Perseveration: No perseveration noted  Safety/Judgement: Awareness of environment    Hearing: Auditory  Auditory Impairment: None    Vision/Perceptual:                                Corrective Lenses: Glasses    Range of Motion:  AROM: Grossly decreased, non-functional (aside from LUE which is WFL and LLE generally decreased)  PROM: Generally decreased, functional (aside from LUE which is [facility]                      Strength:  Strength: Grossly decreased, non-functional (aside from LUE which is WFL and LLE generally decreased)                Coordination:  Coordination: Grossly decreased, non-functional (aside from LUE which is WFL and LLE generally decreased)  Fine Motor Skills-Upper: Left Intact; Right Intact    Gross Motor Skills-Upper: Left Intact; Right Impaired (10/10 pain in RUE with movement)    Tone & Sensation:  Tone: Normal  Sensation: Intact                      Balance:  Sitting:  (pt declined attempts 2/2 pain)    Functional Mobility and Transfers for ADLs:  Bed Mobility:  Rolling: Total assistance    ADL Assessment:  Feeding: Minimum assistance; Additional time    Oral Facial Hygiene/Grooming: Moderate assistance    Bathing: Total assistance    Upper Body Dressing: Total assistance    Lower Body Dressing: Total assistance    Toileting: Total assistance    ADL Intervention and task modifications:  Cognitive Retraining  Safety/Judgement: Awareness of environment    Pt educated on benefits of gentle proximal RUE AROM, as well as, self-ROM with good understanding verbalized/demonstrated.     Functional Measure:  Barthel Index:    Bathing: 0  Bladder: 5  Bowels: 5  Groomin  Dressin  Feedin  Mobility: 0  Stairs: 0  Toilet Use: 0  Transfer (Bed to Chair and Back): 0  Total: 10/100        The Barthel ADL Index: Guidelines  1. The index should be used as a record of what a patient does, not as a record of what a patient could do. 2. The main aim is to establish degree of independence from any help, physical or verbal, however minor and for whatever reason. 3. The need for supervision renders the patient not independent. 4. A patient's performance should be established using the best available evidence. Asking the patient, friends/relatives and nurses are the usual sources, but direct observation and common sense are also important. However direct testing is not needed. 5. Usually the patient's performance over the preceding 24-48 hours is important, but occasionally longer periods will be relevant. 6. Middle categories imply that the patient supplies over 50 per cent of the effort. 7. Use of aids to be independent is allowed. Glen Florez., Barthel, D.W. ([id number]). Functional evaluation: the Barthel Index. [address] [facility] (14)2. Sangita Riding gray DAISY PowersF, Shannon Vincent., Tami Jaurez., [place], [address] [place] [address] (). Measuring the change indisability after inpatient rehabilitation; comparison of the responsiveness of the Barthel Index and Functional [place] Measure. Journal of Neurology, Neurosurgery, and Psychiatry, 66(4), 248-704. Shantel Mendez N.[email or web address].[email or web address], SHILPI Payan, & Kallie White, M.A. (2004.) Assessment of post-stroke quality of life in cost-effectiveness studies: The usefulness of the Barthel Index and the EuroQoL-5D.  Quality of Life Research, 15, 124-66        Occupational Therapy Evaluation Charge Determination   History Examination Decision-Making   LOW Complexity : Brief history review  HIGH Complexity : 5 or more performance deficits relating to physical, cognitive , or psychosocial skils that result in activity limitations and / or participation restrictions LOW Complexity : No comorbidities that affect functional and no verbal or physical assistance needed to complete eval tasks       Based on the above components, the patient evaluation is determined to be of the following complexity level: LOW   Pain Rating:  10/10 R shoulder, 9/10 L shoulder; nursing aware and following    Activity Tolerance:   Poor and requires frequent rest breaks    After treatment patient left in no apparent distress:    Supine in bed, Call bell within reach, Bed / chair alarm activated, and Side rails x 3    COMMUNICATION/EDUCATION:   The patients plan of care was discussed with: Physical therapist, Registered nurse, and Case management. Home safety education was provided and the patient/caregiver indicated understanding., Patient/family have participated as able in goal setting and plan of care. , and Patient/family agree to work toward stated goals and plan of care. This patients plan of care is appropriate for delegation to FLORIAN.     Thank you for this referral.  Danielle Casey OT  Time Calculation: 27 mins

## 2021-09-16 NOTE — PROGRESS NOTES
ID Progress Note  2021    Subjective:   C/o right shoulder and right hip pain  Review of Systems:            Symptom Y/N Comments   Symptom Y/N Comments   Fever/Chills n      Chest Pain n       Poor Appetite y      Edema        Cough       Abdominal Pain        Sputum       Joint Pain y       SOB/WANG  n     Pruritis/Rash        Nausea/vomit  n     Tolerating PT/OT        Diarrhea  n     Tolerating Diet        Constipation  n     Other           Could NOT obtain due to:      Objective:     Vitals:   Visit Vitals  BP (!) 100/53 (BP 1 Location: Left upper arm, BP Patient Position: At rest)   Pulse 64   Temp 97.8 °F (36.6 °C)   Resp 16   Ht 5' 11\" (1.803 m)   Wt 104.3 kg (229 lb 15 oz)   SpO2 99%   BMI 32.07 kg/m²        Tmax:  Temp (24hrs), Av.1 °F (36.7 °C), Min:97.8 °F (36.6 °C), Max:98.2 °F (36.8 °C)      PHYSICAL EXAM:  General: Chronically ill appearing, WD, WN. Alert, cooperative, no acute distress    EENT:  EOMI. Anicteric sclerae. MMM  Resp:  Clear in apex with decreased breath sounds at bases, no wheezing or rales. No accessory muscle use  CV:  Regular  rhythm,  No edema  GI:  Soft, Distended, Non tender. hypoactive Bowel sounds  Neurologic:  Alert and orient x 4, follows commends  Psych:   Fair insight, Not anxious nor agitated  Skin:  No rashes.   No jaundice, right shoulder dressing dry and intact, hemovac in place, right hip dressing dry and intact, hemovac in place    Labs:   Lab Results   Component Value Date/Time    WBC 5.9 2021 02:29 AM    HGB 7.1 (L) 2021 02:29 AM    HCT 21.6 (L) 2021 02:29 AM    PLATELET 485 (L)  02:29 AM    MCV 97.3 2021 02:29 AM     Lab Results   Component Value Date/Time    Sodium 138 09/15/2021 03:18 AM    Potassium 4.2 09/15/2021 03:18 AM    Chloride 111 (H) 09/15/2021 03:18 AM    CO2 23 09/15/2021 03:18 AM    Anion gap 4 (L) 09/15/2021 03:18 AM    Glucose 215 (H) 09/15/2021 03:18 AM    BUN 24 (H) 09/15/2021 03:18 AM    Creatinine 1.10 09/15/2021 03:18 AM    BUN/Creatinine ratio 22 (H) 09/15/2021 03:18 AM    GFR est AA >60 09/15/2021 03:18 AM    GFR est non-AA >60 09/15/2021 03:18 AM    Calcium 7.6 (L) 09/15/2021 03:18 AM    Bilirubin, total 1.7 (H) 09/10/2021 03:05 AM    Alk. 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)  Enterococcus faecium bacteremia  - afebrile, normal    S/p aspirated 3ml of cloudy serosanguineous fluid from right hip joint (9/9)    Blood cx (9/9) serratia marcescens (all four bottles), and Enterococcus faecium (4th bottle)    Blood cx (9/12) no growth so far    Body fluid cx, hip (9/9/2021)serratia marcescens      Continue with IV cefepime & vancomycin    Pt will need 2 weeks of IV vancomycin for enterococcus faecium bacteremia, last dose 9/25/2021    Pt will need 6 weeks of IV cefepime for Serratia marcescens from the recent surgery date, last dose 10/24/2021    Discharge IV ABX order will be provided once we reach stable vanc level; appreciate pharmacy input    Pt will needs PICC line prior to discharge. Please order PICC line placement close to discharge date. Right shoulder pain  - CT RUE (9/10) Chronic complete tear of rotator cuff tendons involving supraspinatus, infraspinatus and teres minor with moderate fatty atrophy in humeral head superior subluxation. Moderate glenohumeral and acromioclavicular osteoarthrosis with loose bodies. Large effusion of glenohumeral joint and subacromial subdeltoid bursa.  Type III acromion undersurface spurring and bony remodeling related to  superior humeral subluxation.     S/p aspirated 60ml of purulent fluid from right shoulder(9/10), culture; serratia marcescens    ABX as above    Ortho following    Anemia  - hgb 7.1      Marline Perez, NP

## 2021-09-17 NOTE — PROGRESS NOTES
The patient has Hgb of 7 and nursing is to give a unit of blood. Will hold on therapy until he is more stable and able to actively participate.

## 2021-09-17 NOTE — PROGRESS NOTES
Ortho Daily Progress Note      Patient: Brandie Santos                   MRN: [id number]  Sex: male  YOB: 1950           Age: 66 y.o.     4 Days Post-Op     Procedure(s):  INCISION AND DRAINAGE OF RIGHT HIP, EXCHANGE OF CONSTRAINED LINER AND FEMORAL HEAD, POSSIBLE RESECTION ARTHROPLASTY    Subjective:    Pain:  pt in moderate pain, controlled when not moving shoulder or hip  Pt not yet ambulating with PT/OT due to blood pressures and low hemoglobin  Pt. tolerating PO diet, no nausea.  Pain meds tolerated. Pt. voiding appropriately per nursing, incontinent  BP more stable today  Hip hemovac removed yesterday due to continuous large sanguinous drainage. PICC placed and about to receive unit of blood. Visit Vitals  /61 (BP 1 Location: Left lower arm, BP Patient Position: At rest)   Pulse 71   Temp 98.2 °F (36.8 °C)   Resp 16   Ht 5' 11\" (1.803 m)   Wt 104.3 kg (229 lb 15 oz)   SpO2 100%   BMI 32.07 kg/m²        Recent Labs     09/17/21  0122 09/15/21  1011 09/15/21  0318 09/14/21  1359 09/14/21  0300 09/13/21  1558 09/13/21  0118 09/12/21  0226 09/12/21  0226 09/11/21  0934 09/11/21  0234 09/10/21  0313 09/10/21  0305 09/10/21  0305 09/09/21  1544 09/09/21  1544 09/07/21  0911 09/07/21  0909   HGB 7.0*   < > 6.5*   < > 7.9*   < > 8.3*   < > 8.8*   < > 8.8*  --    < > 10.3*   < > 11.5*  --    < >   CREA 0.98  --  1.10  --  1.08  --  1.13   < > 0.94  --  0.92 0.94   < > 0.96   < > 1.12 1.10   < >   BUN 15  --  24*  --  24*  --  18  --  20  --  22* 20  --  20  --  19 21*  --     < > = values in this interval not displayed. Physical Exam:    Dock Fleming alert and mental sharp today. Oriented x4  NEURO:          Sensation grossly intact.  Right sided foot drop  VASCULAR:    DP/TP pulses 2+.  Capillary refill <2 seconds.  Extremity warm. moderate edema of right thigh and 1+ pitting edema of the right foot.   DRESSING:    Right Shoulder- Clean, dry, and intact and ABD pad in place.  Hemovac charged and with serous drainage.                          Right Hip-  Dry dressing in place, no small amount of serosanguinous drainage.                          Right thigh sinus tract- no breakthru drainage. YHP:               [id number] with pain on range of motion; decreased range of motion and stiff end points on  abduction.                          Hip with pain on PROM, palpable quad contraction. DVT EXAM:     No evidence of DVT seen on physical exam.  No posterior calf tenderness, tightness, erythema, palpable cords. Plan:    DVT ppx:         Since patient is on eliquis 5mg outpatient will allow him to restart this upon discharge and no need for ASA. LMW heparin while inpatient. Activity:            WBAT with PT-mobilization  Pain Control:   stable, mild-to-moderate joint symptoms intermittently, reasonably well controlled by current meds  Dressing:         Change hip incision and shoulder dressings daily. Change dressing for sinus tract BID per woundcare rec. Discontinue shoulder hemovac in anticipation of discharge tomorrow. Hgb:                7.0 and about to get 1 unit of blood  Hypotension:   Improved.   Baylee Ring deferred to hospitalist on management given patient's cirrhosis. Right hip mimi-prosthetic infection:  Resection arthroplasty performed 9/13/21.  Continue hemovac.  Continue ID recommendations for ABX  Right septic arthritis of the shoulder:  Arthroscopic debridement and irrigation on 9/10/21 with Dr. Belkis Lawson. Continue hemovac.  Continue ID recommendations for ABX    Saad Owusu PA-C  9/17/2021   2:00 PM

## 2021-09-17 NOTE — PROGRESS NOTES
Bedside shift change report given to Danitza Ye RN (oncoming nurse) by Norberto Cruz (offgoing nurse). Report included the following information SBAR, Kardex, Procedure Summary, Intake/Output, MAR and Recent Results.

## 2021-09-17 NOTE — PROGRESS NOTES
Day #6 of Vancomycin  Indication:  prosthetic hip infection/ septic right shoulder  -s/p Resection arthroplasty, right hip 21  -h/o endocarditis and right prosthetic hip infection w/hx numerous hip surgeries  Current regimen:  Dosing by levels- most recent dose 1250 mg on  @ 0357  Abx regimen:  vanc, cefepime  ID Following ?: Yes  Concomitant nephrotoxic drugs (requires more frequent monitoring): Loop diuretics (on hold)  Frequency of BMP?: Tomorrow AM    Recent Labs     21  0122 21  0229 09/15/21  0318   WBC 9.2 5.9 6.4   CREA 0.98  --  1.10   BUN 15  --  24*     Est CrCl: 82.5 ml/min   Temp (24hrs), Av.3 °F (36.8 °C), Min:98.2 °F (36.8 °C), Max:98.4 °F (36.9 °C)    Cultures:    Body fluid (rt hip): light serratia 2 colony types (S cefepime) - final   Blood: serratia 4/4, enterococcus faecium 1/4 - final  9/10 Joint fluid (1st sample): serratia - final  9/10 Joint fluid (rt shoulder 1-3): serratia - final   Blood: NG - final   hip: NGTD - few serratia marcescens (S- cefepime) - final    MRSA Swab ordered (if applicable)? N/A    Goal target range: Trough ~15 mcg/mL    Recent level history:  Date/Time Dose & Interval Measured Level (mcg/mL) Associated AUC/JOSE ANGEL Dose Adjustment    9/15 @ 03:18 1500 mg q18h 32.5 mcg/mL 734 mg/L.hr Hold, dose by levels    @ 02:29 Lase dose 1.5 g on  @ 19:10 18.3 mcg/mL (~31 h post dose) N/A N/A    @ 01:22 -- 11.7 mcg/mL (~54h post dose) N/A 1250 mg x 1 dose                             Plan: Continue current regimen of dosing by levels. Patient is clearing slower than predicted, potentially due to accumulation. Based upon current clearance, could consider discharge dose of vancomycin 1250 mg IV every 48 hours with close level monitoring. Pharmacy will continue to monitor this patient daily for changes in clinical status and renal function.

## 2021-09-17 NOTE — WOUND CARE
WOCN Note:     Follow-up visit for right hip wound next to surgical incision.      Chart shows:  Admitted for right hip infection I&D with arthroplasty 9/11with resection arthroplasty 9/13; septic right shoulder irrigation and debridement 9/10     Assessment:   Appropriately conversational and reports pain with fully assisted turn by 2 people.  hemovac to right shoulder; hip Hemovac removed earlier today by RN  Surface: [facility]acare foam mattress     Bilateral heels intact and without erythema.  Heels offloaded with pillows.     1. POA right hip wound tunneling wound toward right groin  1 x 1 x 7.5 cm  Visible base is non-granular red  Small amount of sanguinous exudate (site is between surgical incision and Hemovac insertion; no malodor or purulence)  Old dressing removed from hip incision was saturated with serosanguinous exudate. No active bleeding form incision or hemovac site currently  Periwound intact & without erythema    Tx: cleaned with saline and packed with saline-moist gauze with dry topper. all of incision and drain sites covered with dry dressing.      Wound Recommendations:    Right hip wound: clean with saline, pack with saline-moist gauze and dry topper.   Change daily to manage drainage.      Transition of Care: Plan to follow as needed while admitted to hospital.    KADEN AlstonN, RN, [facility]  Certified Wound, Ostomy, Continence Nurse  office [phone]  Available via [facility]

## 2021-09-17 NOTE — PROGRESS NOTES
RUR: 29% High     YARELY: Patient has been accepted to return to [facility] and they can accept tomorrow, Saturday, 9/18. Final IV orders written and PICC placed today, 9/17. [facility]) phone [phone]. Transport on will call for tomorrow. CM will need to contact Taisha Vijay (cell: [phone]) from [facility] to confirm the discharge. Discharge folder and ambulance form has been completed and placed on hard chart. Nursing to follow with discharge papers, Kardex, [facility], and call report to [phone] and ask for [facility]. Daughter, Idania Diaz needs to be contacted regarding transportation  time.      Primary Contact: Daughter, Fredi Braxton, [phone]      CM spoke with Kt Herrera from [place] [address] who confirmed they are able to accept patient on Saturday, 9/18. They are aware of antibiotic orders and can accomodate. Kt Herrera informed CM that Taisha Vijay will be the point of contact (cell: [phone]) from [facility] to confirm the discharge prior to return. CM spoke with patient's daughter, Idania Diaz and updated her about patient's discharge. Patient's daughter is in agreeance with plan and prefers to be contacted with transportation  time once confirmed. Medicare pt has received, reviewed, and signed 2nd IM letter informing them of their right to appeal the discharge. Signed copied has been placed on pt bedside chart.     Jessica Rousseau, CHRIS  [phone]

## 2021-09-17 NOTE — PROGRESS NOTES
Occupational Therapy Note:     Pt with a drop in HgB to 7.0 today and continues with low BP. Pt to receive one unit of blood per orders. Will defer at this time and follow up as able.         Karen Dixon, OT

## 2021-09-17 NOTE — PROGRESS NOTES
Pharmacist Note - Vancomycin Dosing  Therapy day 6  Indication: E. Faecium bacteremia, prosthetic hip infection/ septic right shoulder  Current regimen: Dosing by levels    Recent Labs     09/17/21  0122 09/16/21  0229 09/15/21  0318 09/14/21  0300 09/14/21  0300   WBC 9.2 5.9 6.4   < > 8.4   CREA 0.98  --  1.10  --  1.08   BUN 15  --  24*  --  24*    < > = values in this interval not displayed. A random vancomycin level of 11.7 mcg/mL was obtained. Goal target range 15 mcg/dl      Plan: Change to 1250 mg x1 now. Pharmacy will continue to monitor this patient daily for changes in clinical status and renal function.

## 2021-09-17 NOTE — PROGRESS NOTES
ID Progress Note  2021    Subjective:   C/o right shoulder and right hip pain  Review of Systems:            Symptom Y/N Comments   Symptom Y/N Comments   Fever/Chills n      Chest Pain n       Poor Appetite y      Edema        Cough       Abdominal Pain        Sputum       Joint Pain y       SOB/WANG  n     Pruritis/Rash        Nausea/vomit  n     Tolerating PT/OT        Diarrhea  n     Tolerating Diet        Constipation  n     Other           Could NOT obtain due to:      Objective:     Vitals:   Visit Vitals  BP (!) 104/58 (BP 1 Location: Left upper arm, BP Patient Position: At rest)   Pulse 71   Temp 98.4 °F (36.9 °C)   Resp 16   Ht 5' 11\" (1.803 m)   Wt 104.3 kg (229 lb 15 oz)   SpO2 97%   BMI 32.07 kg/m²        Tmax:  Temp (24hrs), Av.3 °F (36.8 °C), Min:98.2 °F (36.8 °C), Max:98.4 °F (36.9 °C)      PHYSICAL EXAM:  General: Chronically ill appearing, WD, WN. Alert, cooperative, no acute distress    EENT:  EOMI. Anicteric sclerae. MMM  Resp:  Clear in apex with decreased breath sounds at bases, no wheezing or rales. No accessory muscle use  CV:  Regular  rhythm,  No edema  GI:  Soft, Distended, Non tender. hypoactive Bowel sounds  Neurologic:  Alert and orient x 3, follows commends  Psych:   Fair insight, Not anxious nor agitated  Skin:  No rashes.   No jaundice, right shoulder dressing dry and intact, hemovac in place, right hip dressing dry and intact    Labs:   Lab Results   Component Value Date/Time    WBC 9.2 2021 01:22 AM    HGB 7.0 (L) 2021 01:22 AM    HCT 20.8 (L) 2021 01:22 AM    PLATELET 508 (L)  01:22 AM    MCV 97.2 2021 01:22 AM     Lab Results   Component Value Date/Time    Sodium 135 (L) 2021 01:22 AM    Potassium 4.3 2021 01:22 AM    Chloride 110 (H) 2021 01:22 AM    CO2 19 (L) 2021 01:22 AM    Anion gap 6 2021 01:22 AM    Glucose 191 (H) 2021 01:22 AM    BUN 15 2021 01:22 AM    Creatinine 0.98 2021 01:22 AM    BUN/Creatinine ratio 15 09/17/2021 01:22 AM    GFR est AA >60 09/17/2021 01:22 AM    GFR est non-AA >60 09/17/2021 01:22 AM    Calcium 8.0 (L) 09/17/2021 01:22 AM    Bilirubin, total 1.7 (H) 09/10/2021 03:05 AM    Alk. 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 Pt will need 2 weeks of IV Daptomycin for enterococcus faecium bacteremia, last dose 9/25/2021    Pt will need 6 weeks of IV cefepime for Serratia marcescens from the recent surgery date, last dose 10/24/2021    Discharge IV ABX order in place 9/17    PICC line placed 9/17    Right shoulder pain  - CT RUE (9/10) Chronic complete tear of rotator cuff tendons involving supraspinatus, infraspinatus and teres minor with moderate fatty atrophy in humeral head superior subluxation. Moderate glenohumeral and acromioclavicular osteoarthrosis with loose bodies. Large effusion of glenohumeral joint and subacromial subdeltoid bursa.  Type III acromion undersurface spurring and bony remodeling related to  superior humeral subluxation. S/p aspirated 60ml of purulent fluid from right shoulder(9/10), culture; serratia marcescens    ABX as above    Ortho following    Anemia  - hgb 7.1    Group will see prn this weekend, call if issues arise.     Marline Gustafson, NP

## 2021-09-17 NOTE — PROGRESS NOTES
Home IV Antibiotic Orders     1. Diagnosis:  Prosthetic right hip infection (serratia marcescens)    Enterococcus faecium/Seratia Marcescens bacteremia  2. Routine PICC care per protocol     3. Antibiotic:   IV cefepime 2gm every 8 hours    IV Daptomycin 600mg every 24 hours    4. Lab each Monday & Thursdays             CBC/diff/platelets             BMP             CRP (every Mondays)     5. Fax lab to Dr. Claudetta Kemps @ [phone]. 6.  Call Dr. Claudetta Kemps @ [phone] for WBC <4, PLT <100, and/or Creat > 1.5    7. Duration of therapy: Daptomycin - 9/25/2021, Cefepime 10/24/2021       Please call Dr. Claudetta Kemps @ [phone] before stopping therapy. 8.  Allergies: NSAIDS    9.   Case management has been instructed to Call [phone] with name of [address] [place] [address] Way       Follow up with Dr. Claudetta Kemps in 2-3 weeks

## 2021-09-17 NOTE — PROGRESS NOTES
[facility]  Hospitalist Group                                                                                          Hospitalist Progress Note  Darline Naidu MD  Answering service: [phone] from in house phone        Date of Service:  2021  NAME:  Pancho Rodriguez  :  1950  MRN:  [id number]      Admission Summary:   Maryann Patel a 66 y. o. male with h/o endocarditis and right prosthetic hip infection. presents with right hip pain and redness. He was followed by orthopedics Dr. Devon Mattson, The patient initially had a right total hip replacement around 12 years ago. On 18 he developed a spontaneous hematogenous right hip infection from endocarditis and underwent an I & D REVISION FEMORAL HEAD AND POLYLINER RIGHT HIP. He completed 6 weeks of IV antibiotics and then on life time po abx kelfex for prophylaxis.  Patient then had a revision of femoral head and tripolar component of right total hip replacement on 2020 due to recurrent dislocations. Suma Deng was also seen by Dr. Ubaldo Sam who did a revision of the right CIERRA and a percutaneous adductor release on 20.    [address]. He developed right hip redness and pain 1-2 weeks ago and he was arrange to have IR aspiration right hip as outpatient. Noticed a draining wound from lateral to the right hip. Post aspiration, Dr. Devon Mattson recommended admission due to the concerning of hip infection.       Interval history / Subjective:   F/u Prosthetic hip infection  BP low normal  On albumin/IV fluids/Midodrine       Assessment & Plan:     # Prosthetic hip infection      - Right hip aspiration yeiled 3 ml of cloudy serosanguineous fluid.       - Cultures Serratia      - Continue IV Cefipime, Vanc added by ID      - ID following      - Ortho following  # Septic Right Shoulder      - Global rotator cuff tear with degenerative changes of GH joint and large joint effusion seen on CT.      - 60 ml of purulent fluid aspirated, culture pending.      - Ortho following, I&D completed 9/11/21    Serratia and E fecium bacteremia  -Per ID : Continue with IV cefepime & vancomycin    Pt will need 2 weeks of IV vancomycin for enterococcus faecium bacteremia, last dose 9/25/2021    Pt will need 6 weeks of IV cefepime for Serratia marcescens from the recent surgery date, last dose 10/24/2021    Hypotension:  -sec to ?cirrhosis  -Lasix/aldactone on hold  -on albumin/IV fluids/midodrine  ?may need more blood transfusion    # DM II- Continue SSI- Increase Lantus to 34 units  # Hyponatremia- 138 9/15/21, likely due to hyperglycemia, now resolved. # Cirrhosis- Child class B and MELD of 10 -14- Hepatology following  # Thrombocytopenia- Secondary to cirrhosis-stable  # Anemia     - multifactorial, including portal hypertension with chronic GI blood loss, chronic systemic           disease     - Patient has received 3 units of PRBCs post surgical intervention     -Will transfuse 1 unit PRBC 9/17  Code status: Full  DVT prophylaxis: Lovenox being held    Plan: Follow Ortho, ID    Care Plan discussed with: Patient/Family  Anticipated Disposition: SNF/LTC  Anticipated Discharge: today or tomorrow     Hospital Problems  Date Reviewed: 9/13/2021        Codes Class Noted POA    * (Principal) Infection associated with internal right hip prosthesis ([address] ICD-10-CM: T84.51XA  ICD-9-CM: 996.66, V43.64  9/11/2021 Yes        Prosthetic hip infection ([address] ICD-10-CM: T84.59XA, P57.438  ICD-9-CM: 996.66, V43.64  9/9/2021 Yes                Review of Systems:   Pertinent items are noted in HPI. Vital Signs:    Last 24hrs VS reviewed since prior progress note.  Most recent are:  Visit Vitals  BP (!) 104/58 (BP 1 Location: Left upper arm, BP Patient Position: At rest)   Pulse 71   Temp 98.4 °F (36.9 °C)   Resp 16   Ht 5' 11\" (1.803 m)   Wt 104.3 kg (229 lb 15 oz)   SpO2 97%   BMI 32.07 kg/m²         Intake/Output Summary (Last 24 hours) at 9/17/2021 0853  Last data filed at 9/16/2021 2300  Gross per 24 hour   Intake    Output 200 ml   Net -200 ml        Physical Examination:     I had a face to face encounter with this patient and independently examined them on 9/17/2021 as outlined below:          Constitutional:  No acute distress, cooperative, pleasant    ENT:  Oral mucosa moist, oropharynx benign. Resp:  CTA bilaterally. No wheezing/rhonchi/rales. No accessory muscle use   CV:  Regular rhythm, normal rate, S1, S2 noted    GI:  Soft, non distended, non tender. normoactive bowel sounds, no hepatosplenomegaly     Musculoskeletal:  No edema, limited movement to right shoulder    Neurologic:  Moves all extremities. AAOx3, CN II-XII reviewed            Data Review:    Review and/or order of clinical lab test  Review and/or order of tests in the radiology section of CPT  Review and/or order of tests in the medicine section of CPT      Labs:     Recent Labs     09/17/21  0122 09/16/21  0229   WBC 9.2 5.9   HGB 7.0* 7.1*   HCT 20.8* 21.6*   * 100*     Recent Labs     09/17/21  0122 09/15/21  0318   * 138   K 4.3 4.2   * 111*   CO2 19* 23   BUN 15 24*   CREA 0.98 1.10   * 215*   CA 8.0* 7.6*     No results for input(s): ALT, AP, TBIL, TBILI, TP, ALB, GLOB, GGT, AML, LPSE in the last 72 hours. No lab exists for component: SGOT, GPT, AMYP, HLPSE  No results for input(s): INR, PTP, APTT, INREXT, INREXT in the last 72 hours. No results for input(s): FE, TIBC, PSAT, FERR in the last 72 hours. Lab Results   Component Value Date/Time    Folate 14.2 01/21/2021 03:32 AM      No results for input(s): PH, PCO2, PO2 in the last 72 hours. No results for input(s): CPK, CKNDX, TROIQ in the last 72 hours.     No lab exists for component: CPKMB  Lab Results   Component Value Date/Time    Cholesterol, total 121 11/07/2019 10:23 AM    HDL Cholesterol 68 11/07/2019 10:23 AM    LDL, calculated 40 11/07/2019 10:23 AM    Triglyceride 66 11/07/2019 10:23 AM    CHOL/HDL Ratio 3.5 11/01/2010 07:07 AM     Lab Results   Component Value Date/Time    Glucose (POC) 177 (H) 09/17/2021 06:16 AM    Glucose (POC) 260 (H) 09/16/2021 09:39 PM    Glucose (POC) 277 (H) 09/16/2021 04:37 PM    Glucose (POC) 245 (H) 09/16/2021 11:39 AM    Glucose (POC) 281 (H) 09/16/2021 06:17 AM     Lab Results   Component Value Date/Time    Color YELLOW/STRAW 06/20/2021 06:55 PM    Appearance CLEAR 06/20/2021 06:55 PM    Specific gravity 1.014 06/20/2021 06:55 PM    Specific gravity 1.020 02/28/2018 05:01 PM    pH (UA) 5.5 06/20/2021 06:55 PM    Protein Negative 06/20/2021 06:55 PM    Glucose Negative 06/20/2021 06:55 PM    Ketone Negative 06/20/2021 06:55 PM    Bilirubin Negative 06/20/2021 06:55 PM    Urobilinogen 0.2 06/20/2021 06:55 PM    Nitrites Negative 06/20/2021 06:55 PM    Leukocyte Esterase Negative 06/20/2021 06:55 PM    Epithelial cells FEW 06/20/2021 06:55 PM    Bacteria Negative 06/20/2021 06:55 PM    WBC 0-4 06/20/2021 06:55 PM    RBC 0-5 06/20/2021 06:55 PM         Medications Reviewed:     Current Facility-Administered Medications   Medication Dose Route Frequency    0.9% sodium chloride infusion 250 mL  250 mL IntraVENous PRN    albumin human 25% (BUMINATE) solution 25 g  25 g IntraVENous Q6H    insulin glargine (LANTUS) injection 34 Units  34 Units SubCUTAneous QHS    balsam peru-castor oiL (VENELEX) ointment   Topical BID    0.9% sodium chloride infusion 250 mL  250 mL IntraVENous PRN    sodium chloride (NS) flush 5-40 mL  5-40 mL IntraVENous Q8H    sodium chloride (NS) flush 5-40 mL  5-40 mL IntraVENous PRN    acetaminophen (TYLENOL) tablet 650 mg  650 mg Oral Q6H    oxyCODONE IR (ROXICODONE) tablet 5 mg  5 mg Oral Q3H PRN    oxyCODONE IR (ROXICODONE) tablet 10 mg  10 mg Oral Q3H PRN    naloxone (NARCAN) injection 0.4 mg  0.4 mg IntraVENous PRN    ondansetron (ZOFRAN ODT) tablet 4 mg  4 mg Oral Q6H PRN    hydrOXYzine HCL (ATARAX) tablet 10 mg  10 mg Oral Q8H PRN    famotidine (PEPCID) tablet 20 mg  20 mg Oral BID PRN    senna-docusate (PERICOLACE) 8.6-50 mg per tablet 1 Tablet  1 Tablet Oral BID    polyethylene glycol (MIRALAX) packet 17 g  17 g Oral DAILY    bisacodyL (DULCOLAX) suppository 10 mg  10 mg Rectal DAILY PRN    0.9% sodium chloride infusion 250 mL  250 mL IntraVENous PRN    Vancomycin - Pharmacy Dosing   Other Rx Dosing/Monitoring    traMADoL (ULTRAM) tablet 50 mg  50 mg Oral Q6H PRN    cefepime (MAXIPIME) 2 g in 0.9% sodium chloride (MBP/ADV) 100 mL MBP  2 g IntraVENous Q8H    atorvastatin (LIPITOR) tablet 40 mg  40 mg Oral DAILY    [Held by provider] furosemide (LASIX) tablet 40 mg  40 mg Oral DAILY    gabapentin (NEURONTIN) tablet 600 mg  600 mg Oral QPM    lactulose (CHRONULAC) 10 gram/15 mL solution 30 mL  30 mL Oral BID    pantoprazole (PROTONIX) tablet 40 mg  40 mg Oral DAILY    midodrine (PROAMATINE) tablet 10 mg  10 mg Oral TID    rifAXIMin (XIFAXAN) tablet 550 mg  550 mg Oral BID    sertraline (ZOLOFT) tablet 150 mg  150 mg Oral DAILY    [Held by provider] spironolactone (ALDACTONE) tablet 100 mg  100 mg Oral DAILY    insulin lispro (HUMALOG) injection   SubCUTAneous AC&HS    enoxaparin (LOVENOX) injection 40 mg  40 mg SubCUTAneous DAILY    glucose chewable tablet 16 g  4 Tablet Oral PRN    dextrose (D50W) injection syrg 12.5-25 g  12.5-25 g IntraVENous PRN    glucagon (GLUCAGEN) injection 1 mg  1 mg IntraMUSCular PRN     ______________________________________________________________________  EXPECTED LENGTH OF STAY: 3d 7h  ACTUAL LENGTH OF STAY:          8                 Brett Ramirez MD independent

## 2021-09-18 NOTE — PROGRESS NOTES
Called [facility] three times, first was on hold for several minutes and called 2nd and 3rd, no answer.  [facility] personnel came and  the pt, vitally stable, conscious and coherent, no c/o pain,

## 2021-09-18 NOTE — PROGRESS NOTES
[facility] Adult  Hospitalist Group                                                                                          Hospitalist Progress Note  Mari Fernandes MD  Answering service: [phone] from in house phone        Date of Service:  2021  NAME:  Sammie Mcdowell  :  1950  MRN:  [id number]      Admission Summary:   Capri Regalado a 66 y. o. male with h/o endocarditis and right prosthetic hip infection. presents with right hip pain and redness. He was followed by orthopedics Dr. Astrid Grande, The patient initially had a right total hip replacement around 12 years ago. On 18 he developed a spontaneous hematogenous right hip infection from endocarditis and underwent an I & D REVISION FEMORAL HEAD AND POLYLINER RIGHT HIP. He completed 6 weeks of IV antibiotics and then on life time po abx kelfex for prophylaxis.  Patient then had a revision of femoral head and tripolar component of right total hip replacement on 2020 due to recurrent dislocations. [facility] was also seen by Dr. Latrell Coleman who did a revision of the right CIERRA and a percutaneous adductor release on 20.    [address]. He developed right hip redness and pain 1-2 weeks ago and he was arrange to have IR aspiration right hip as outpatient. Noticed a draining wound from lateral to the right hip. Post aspiration, Dr. Astrid Grande recommended admission due to the concerning of hip infection.       Interval history / Subjective:   F/u Prosthetic hip infection  Now BP much better despite off albumin/IV fluids  Hemovac taken out right shoulder   Complaining of more pain on his shoulder (8/10) and knee (6/10)       Assessment & Plan:     # Prosthetic hip infection      - Right hip aspiration yeiled 3 ml of cloudy serosanguineous fluid.       - Cultures Serratia      - Continue IV Cefipime, Vanc added by ID      - Appreciate Ortho/ID, cleared for discharge with outpatient antibiotics  # Septic Right Shoulder      - Global rotator cuff tear with degenerative changes of GH joint and large joint effusion seen on CT.      - 60 ml of purulent fluid aspirated      - Ortho following, I&D completed 9/11/21    Serratia and E fecium bacteremia  -Per ID : Continue with IV cefepime & vancomycin    Pt will need 2 weeks of IV vancomycin for enterococcus faecium bacteremia, last dose 9/25/2021    Pt will need 6 weeks of IV cefepime for Serratia marcescens from the recent surgery date, last dose 10/24/2021    Hypotension:  -sec to ?cirrhosis  -Lasix/aldactone on hold  -Now off albumin/IV fluids  -Continue midodrine    # DM II- Continue SSI- Increase Lantus to 34 units  # Hyponatremia- 138 9/15/21, likely due to hyperglycemia, now resolved. # Cirrhosis- Child class B and MELD of 10 -14- Hepatology following  # Thrombocytopenia- Secondary to cirrhosis-stable  # Anemia     - multifactorial, including portal hypertension with chronic GI blood loss, chronic systemic           disease     - Patient has received 3 units of PRBCs post surgical intervention     -s/p 1 unit PRBC 9/17  Code status: Full  DVT prophylaxis: Lovenox being held    Plan: I have conveyed the message to Ortho about patient's pain in shoulder/knee. Discharge today to SNF if cleared by [address] discussed with: Patient/Family  Anticipated Disposition: SNF/LTC  Anticipated Discharge: today or tomorrow     Hospital Problems  Date Reviewed: 9/13/2021        Codes Class Noted POA    * (Principal) Infection associated with internal right hip prosthesis ([facility] [address] [address] ICD-10-CM: T84.51XA  ICD-9-CM: 996.66, V43.64  9/11/2021 Yes        Prosthetic hip infection ([facility] [address] ICD-10-CM: T84.59XA, X04.737  ICD-9-CM: 996.66, V43.64  9/9/2021 Yes                Review of Systems:   Pertinent items are noted in HPI. Vital Signs:    Last 24hrs VS reviewed since prior progress note.  Most recent are:  Visit Vitals  /67 (BP 1 Location: Right upper arm, BP Patient Position: At rest;Prone)   Pulse 70   Temp 98.6 °F (37 °C)   Resp 14   Ht 5' 11\" (1.803 m)   Wt 104.3 kg (229 lb 15 oz)   SpO2 99%   BMI 32.07 kg/m²         Intake/Output Summary (Last 24 hours) at 9/18/2021 1143  Last data filed at 9/17/2021 2230  Gross per 24 hour   Intake 300 ml   Output 350 ml   Net -50 ml        Physical Examination:     I had a face to face encounter with this patient and independently examined them on 9/18/2021 as outlined below:          Constitutional:  No acute distress, cooperative, pleasant    ENT:  Oral mucosa moist, oropharynx benign. Resp:  CTA bilaterally. No wheezing/rhonchi/rales. No accessory muscle use   CV:  Regular rhythm, normal rate, S1, S2 noted    GI:  Soft, non distended, non tender. normoactive bowel sounds, no hepatosplenomegaly     Musculoskeletal:  No edema, limited movement to right shoulder    Neurologic:  Moves all extremities. AAOx3, CN II-XII reviewed            Data Review:    Review and/or order of clinical lab test  Review and/or order of tests in the radiology section of CPT  Review and/or order of tests in the medicine section of CPT      Labs:     Recent Labs     09/18/21 0447 09/17/21  0122   WBC 6.9 9.2   HGB 7.7* 7.0*   HCT 23.5* 20.8*   * 112*     Recent Labs     09/18/21 0447 09/17/21  0122    135*   K 4.0 4.3   * 110*   CO2 22 19*   BUN 15 15   CREA 0.86 0.98   * 191*   CA 7.7* 8.0*     Recent Labs     09/18/21 0447   ALT 32   AP 84   TBILI 1.6*   TP 4.7*   ALB 1.9*   GLOB 2.8     No results for input(s): INR, PTP, APTT, INREXT, INREXT in the last 72 hours. No results for input(s): FE, TIBC, PSAT, FERR in the last 72 hours. Lab Results   Component Value Date/Time    Folate 14.2 01/21/2021 03:32 AM      No results for input(s): PH, PCO2, PO2 in the last 72 hours. No results for input(s): CPK, CKNDX, TROIQ in the last 72 hours.     No lab exists for component: CPKMB  Lab Results   Component Value Date/Time Cholesterol, total 121 11/07/2019 10:23 AM    HDL Cholesterol 68 11/07/2019 10:23 AM    LDL, calculated 40 11/07/2019 10:23 AM    Triglyceride 66 11/07/2019 10:23 AM    CHOL/HDL Ratio 3.5 11/01/2010 07:07 AM     Lab Results   Component Value Date/Time    Glucose (POC) 145 (H) 09/18/2021 06:28 AM    Glucose (POC) 226 (H) 09/17/2021 11:29 PM    Glucose (POC) 239 (H) 09/17/2021 04:55 PM    Glucose (POC) 184 (H) 09/17/2021 11:58 AM    Glucose (POC) 177 (H) 09/17/2021 06:16 AM     Lab Results   Component Value Date/Time    Color YELLOW/STRAW 06/20/2021 06:55 PM    Appearance CLEAR 06/20/2021 06:55 PM    Specific gravity 1.014 06/20/2021 06:55 PM    Specific gravity 1.020 02/28/2018 05:01 PM    pH (UA) 5.5 06/20/2021 06:55 PM    Protein Negative 06/20/2021 06:55 PM    Glucose Negative 06/20/2021 06:55 PM    Ketone Negative 06/20/2021 06:55 PM    Bilirubin Negative 06/20/2021 06:55 PM    Urobilinogen 0.2 06/20/2021 06:55 PM    Nitrites Negative 06/20/2021 06:55 PM    Leukocyte Esterase Negative 06/20/2021 06:55 PM    Epithelial cells FEW 06/20/2021 06:55 PM    Bacteria Negative 06/20/2021 06:55 PM    WBC 0-4 06/20/2021 06:55 PM    RBC 0-5 06/20/2021 06:55 PM         Medications Reviewed:     Current Facility-Administered Medications   Medication Dose Route Frequency    0.9% sodium chloride infusion 250 mL  250 mL IntraVENous PRN    alteplase (CATHFLO) 1 mg in sterile water (preservative free) 1 mL injection  1 mg InterCATHeter PRN    DAPTOmycin (CUBICIN) 600 mg in 0.9% sodium chloride 50 mL IVPB RF formulation  600 mg IntraVENous Q24H    0.9% sodium chloride infusion 250 mL  250 mL IntraVENous PRN    insulin glargine (LANTUS) injection 34 Units  34 Units SubCUTAneous QHS    balsam peru-castor oiL (VENELEX) ointment   Topical BID    0.9% sodium chloride infusion 250 mL  250 mL IntraVENous PRN    sodium chloride (NS) flush 5-40 mL  5-40 mL IntraVENous Q8H    sodium chloride (NS) flush 5-40 mL  5-40 mL IntraVENous PRN    acetaminophen (TYLENOL) tablet 650 mg  650 mg Oral Q6H    oxyCODONE IR (ROXICODONE) tablet 5 mg  5 mg Oral Q3H PRN    oxyCODONE IR (ROXICODONE) tablet 10 mg  10 mg Oral Q3H PRN    naloxone (NARCAN) injection 0.4 mg  0.4 mg IntraVENous PRN    ondansetron (ZOFRAN ODT) tablet 4 mg  4 mg Oral Q6H PRN    hydrOXYzine HCL (ATARAX) tablet 10 mg  10 mg Oral Q8H PRN    famotidine (PEPCID) tablet 20 mg  20 mg Oral BID PRN    senna-docusate (PERICOLACE) 8.6-50 mg per tablet 1 Tablet  1 Tablet Oral BID    polyethylene glycol (MIRALAX) packet 17 g  17 g Oral DAILY    bisacodyL (DULCOLAX) suppository 10 mg  10 mg Rectal DAILY PRN    0.9% sodium chloride infusion 250 mL  250 mL IntraVENous PRN    traMADoL (ULTRAM) tablet 50 mg  50 mg Oral Q6H PRN    cefepime (MAXIPIME) 2 g in 0.9% sodium chloride (MBP/ADV) 100 mL MBP  2 g IntraVENous Q8H    [Held by provider] atorvastatin (LIPITOR) tablet 40 mg  40 mg Oral DAILY    [Held by provider] furosemide (LASIX) tablet 40 mg  40 mg Oral DAILY    gabapentin (NEURONTIN) tablet 600 mg  600 mg Oral QPM    pantoprazole (PROTONIX) tablet 40 mg  40 mg Oral DAILY    midodrine (PROAMATINE) tablet 10 mg  10 mg Oral TID    sertraline (ZOLOFT) tablet 150 mg  150 mg Oral DAILY    [Held by provider] spironolactone (ALDACTONE) tablet 100 mg  100 mg Oral DAILY    insulin lispro (HUMALOG) injection   SubCUTAneous AC&HS    glucose chewable tablet 16 g  4 Tablet Oral PRN    dextrose (D50W) injection syrg 12.5-25 g  12.5-25 g IntraVENous PRN    glucagon (GLUCAGEN) injection 1 mg  1 mg IntraMUSCular PRN     ______________________________________________________________________  EXPECTED LENGTH OF STAY: 3d 7h  ACTUAL LENGTH OF STAY:          9                 Amie Louise MD

## 2021-09-18 NOTE — PROGRESS NOTES
POD#5 s/p I & D of right prosthetic hip infection and hardware removal and resection arthroplasty    S/  Pain controlled    O/  Post op hip and shoulder dressings are clean intact and dry  NVI distally  Calf is soft and non tender    Hgb - 7.7  Creatinine - 0.86  WBC - 6.9    A+P/    DVT ppx:         Since patient is on eliquis 5mg outpatient will allow him to restart this upon discharge and no need for ASA. LMW heparin while inpatient. Activity:            WBAT with PT-mobilization  Pain Control:   stable, mild-to-moderate joint symptoms intermittently, reasonably well controlled by current meds  Dressing:         Change hip incision and shoulder dressings daily.  Change dressing for sinus tract BID per woundcare rec.    Hgb:                7.0 and about to get 1 unit of blood  Hypotension:   Improved.    Will deferred to hospitalist on management given patient's cirrhosis. Right hip mimi-prosthetic infection:  Resection arthroplasty performed 9/13/21.  Continue hemovac.  Continue ID recommendations for ABX  Right septic arthritis of the shoulder:  Arthroscopic debridement and irrigation on 9/10/21 with Dr. Megha Hilario. Continue hemovac.  Continue ID recommendations for ABX    Plan for discharge today to [facility]

## 2021-09-18 NOTE — DISCHARGE INSTRUCTIONS
Ortho D/C Instructions  Follow up appointments:  - Follow up with Dr. Shawan Berkowitz in 3 weeks. Call [phone] to make an appointment. Activity:  - Weight bearing as tolerated with walker or crutches; discontinue as tolerated    Incision Care:  - Change right hip incision daily with dry dressing or PRN if saturated. - Pack right hip sinus tract with saline-moist gauze and dry topper; change BID  - Do not submerge the incision under water until the incision is completely healed (bath tub, hot tub, swimming pool, etc.)  -Sutures to be removed on 9-30-21    Preventing blood clots:  -Patient may resume PTA Eliquis 5mg daily. No need for additional anti-coagulation       Discharge SNF/Rehab Instructions/LTAC       PATIENT ID: Fior Paulino  MRN: [id number]   YOB: 1950    DATE OF ADMISSION: 9/9/2021  4:13 PM    DATE OF DISCHARGE: 9/18/2021    PRIMARY CARE PROVIDER: Tomy Shay MD       ATTENDING PHYSICIAN: Trace Blevins MD  DISCHARGING PROVIDER: Jose Ramon Donohue MD     To contact this individual call [phone] and ask the  to page. If unavailable ask to be transferred the Adult Hospitalist Department. CONSULTATIONS: IP CONSULT TO CARDIOLOGY  IP CONSULT TO INFECTIOUS DISEASES  IP CONSULT TO INFECTIOUS DISEASES  IP CONSULT TO HEPATOLOGY    PROCEDURES/SURGERIES: Procedure(s):  INCISION AND DRAINAGE OF RIGHT HIP, EXCHANGE OF CONSTRAINED LINER AND FEMORAL HEAD, POSSIBLE RESECTION ARTHROPLASTY    ADMITTING DIAGNOSES & HOSPITAL COURSE:   # Prosthetic hip infection      - Right hip aspiration yeiled 3 ml of cloudy serosanguineous fluid.       - Cultures Serratia      - Continue IV Cefipime, Vanc added by ID      - Appreciate Ortho/ID, cleared for discharge with outpatient antibiotics  # Septic Right Shoulder      - Global rotator cuff tear with degenerative changes of GH joint and large joint effusion seen on CT.      - 60 ml of purulent fluid aspirated      - Ortho following, I&D completed 9/11/21    Serratia and E fecium bacteremia  -Per ID : Continue with IV cefepime & vancomycin    Pt will need 2 weeks of IV Daptomycin for enterococcus faecium bacteremia, last dose 9/25/2021    Pt will need 6 weeks of IV cefepime for Serratia marcescens from the recent surgery date, last dose 10/24/2021  Then Keflex throughout life    Hypotension:  -sec to ?cirrhosis  -Lasix/aldactone on hold  -Now off albumin/IV fluids  -Continue midodrine    # DM II- Continue SSI- Increase Lantus to 34 units  # Hyponatremia- 138 9/15/21, likely due to hyperglycemia, now resolved. # Cirrhosis- Child class B and MELD of 10 -14- Hepatology following  # Thrombocytopenia- Secondary to cirrhosis-stable  # Anemia     - multifactorial, including portal hypertension with chronic GI blood loss, chronic systemic           Disease  -now s/p TIPS     - Patient has received 3 units of PRBCs post surgical intervention     -s/p 1 unit PRBC 9/17  Code status: Full  DVT prophylaxis: Eliquis at discharge        [address] [facility] [address] / PLAN:      1. Prosthetic hip infection  2. Bacteremia       PENDING TEST RESULTS:   At the time of discharge the following test results are still pending: none      FOLLOW UP APPOINTMENTS:    Follow-up Information     Follow up With Specialties Details Why Contact Info    [address] [place]    Herminia Kent MD Internal Medicine, Geriatric Medicine In 1 week  [facility] [address]  [phone]      Katie Lake MD Family Medicine In 1 week  [address] [place] [address]  [phone]      Bandar Cummings MD Infectious Disease In 1 week  [address]  [phone]             ADDITIONAL CARE RECOMMENDATIONS:   Follow up with PMD  Follow up with ID  Follow up with Ortho  CBC, CMP in 3-4 days    ID recommendation:  1.  Diagnosis:  Prosthetic right hip infection (serratia marcescens)                          Enterococcus faecium/Seratia Marcescens bacteremia  2.  Routine PICC care per protocol     3.  Antibiotic:   IV cefepime 2gm every 8 hours                          IV Daptomycin 600mg every 24 hours     4.  Lab each Monday & Thursdays             CBC/diff/platelets             BMP             CRP (every Mondays)     5.  Fax lab to Dr. Rosina Mathur @ [phone].  Isai Mathur @ [phone] for WBC <4, PLT <100, and/or Creat > 1.5     7.  Duration of therapy: Daptomycin - 9/25/2021, Cefepime 10/24/2021       Please call Dr. Rosina Mathur @ [phone] before stopping therapy.     8.  Allergies: NSAIDS     9.  Case management has been instructed to Call [phone] with name of [address]      Follow up with Dr. Rosina Mathur in 2-3 weeks    DIET: Cardiac Diet    ACTIVITY: Activity as tolerated      DISCHARGE MEDICATIONS:   See Medication Reconciliation Form      NOTIFY YOUR PHYSICIAN FOR ANY OF THE FOLLOWING:   Fever over 101 degrees for 24 hours. Chest pain, shortness of breath, fever, chills, nausea, vomiting, diarrhea, change in mentation, falling, weakness, bleeding. Severe pain or pain not relieved by medications. Or, any other signs or symptoms that you may have questions about.     DISPOSITION:    Home With:   OT  PT  HH  RN      x SNF/Inpatient Rehab/LTAC    Independent/assisted living    Hospice    Other:       PATIENT CONDITION AT DISCHARGE:     Functional status   x Poor     Deconditioned     Independent      Cognition   x  Lucid     Forgetful     Dementia      Catheters/lines (plus indication)    Hoffman     PICC     PEG    x None      Code status    x Full code     DNR      PHYSICAL EXAMINATION AT DISCHARGE:  Please see progress note      CHRONIC MEDICAL DIAGNOSES:  Problem List as of 9/18/2021 Date Reviewed: 9/13/2021        Codes Class Noted - Resolved    * (Principal) Infection associated with internal right hip prosthesis ([facility] ICD-10-CM: T84.51XA  ICD-9-CM: 996.66, V43.64  9/11/2021 - Present        Prosthetic hip infection ([facility] ICD-10-CM: T84.59XA, Z96.649  ICD-9-CM: 996.66, V43.64  9/9/2021 - Present        Metabolic encephalopathy MRL-90-NW: G93.41  ICD-9-CM: 348.31  1/20/2021 - Present        Cirrhosis ([facility] ICD-10-CM: K74.60  ICD-9-CM: 571.5  1/20/2021 - Present        Increased ammonia level ICD-10-CM: R79.89  ICD-9-CM: 790.6  1/20/2021 - Present        Dislocation of internal right hip prosthesis ([facility] ICD-10-CM: Y12.521X  ICD-9-CM: 996.42, V43.64  8/13/2020 - Present        Recurrent dislocation of hip joint prosthesis ([facility] ICD-10-CM: V12.875B, Z96.649  ICD-9-CM: 996.42, V43.64  7/20/2020 - Present        Closed dislocation of right hip ([facility] ICD-10-CM: S73.004A  ICD-9-CM: 835.00  5/18/2020 - Present        S/P TIPS (transjugular intrahepatic portosystemic shunt) ICD-10-CM: U00.208  ICD-9-CM: V45.89  4/23/2020 - Present        BREWER (nonalcoholic steatohepatitis) ICD-10-CM: K75.81  ICD-9-CM: 571.8  4/6/2020 - Present        Hepatic encephalopathy ([facility] ICD-10-CM: K72.90  ICD-9-CM: 572.2  4/6/2020 - Present        Esophageal varices with bleeding ([facility] ICD-10-CM: I85.01  ICD-9-CM: 456.0  3/28/2020 - Present        Dislocation of prosthetic joint ([facility] ICD-10-CM: T64.450W  ICD-9-CM: 996.42  3/20/2018 - Present    Overview Signed 3/20/2018 11:37 PM by AMAN Sher     Right CIERRA             Endocarditis of mitral valve ICD-10-CM: I05.8  ICD-9-CM: 394.9  3/4/2018 - Present        Obesity ICD-10-CM: E66.9  ICD-9-CM: 278.00  3/4/2018 - Present        Insomnia ICD-10-CM: G47.00  ICD-9-CM: 780.52  3/4/2018 - Present        Infected prosthesis of right hip (HCC) ICD-10-CM: T84.51XA  ICD-9-CM: 996.66, V43.64  2/26/2018 - Present        PFO (patent foramen ovale) ICD-10-CM: Q21.1  ICD-9-CM: 745.5  7/26/2017 - Present        Systolic murmur XNS-77-HH: R01.1  ICD-9-CM: 785.2  3/9/2017 - Present Jessica-prosthetic fracture of femur following total hip arthroplasty ICD-10-CM: M97. Crissy Mueller, [id number]  ICD-9-CM: 996.44, V43.64  8/25/2016 - Present        Osteoarthritis of right hip ICD-10-CM: M16.11  ICD-9-CM: 715.95  8/1/2016 - Present        Benign essential tremor ICD-10-CM: G25.0  ICD-9-CM: 333.1  Unknown - Present        Diabetes mellitus type 2, uncontrolled ([facility] ICD-10-CM: E11.65  ICD-9-CM: 250.02  6/3/2013 - Present        Hypogonadism male ICD-10-CM: E29.1  ICD-9-CM: 257.2  8/29/2012 - Present        Osteoarthritis of hip ICD-10-CM: M16.9  ICD-9-CM: 715.95  Unknown - Present    Overview Signed 4/29/2010  6:55 AM by Francesco Yan             Depression ICD-10-CM: F32.9  ICD-9-CM: 093  Unknown - Present        ED (erectile dysfunction) of organic origin ICD-10-CM: N52.9  ICD-9-CM: 607.84  Unknown - Present        GERD (gastroesophageal reflux disease) ICD-10-CM: K21.9  ICD-9-CM: 530.81  Unknown - Present        PUD (peptic ulcer disease) ICD-10-CM: K27.9  ICD-9-CM: 533.90  Unknown - Present        Hinson's esophagus with esophagitis ICD-10-CM: K22.70, K20.90  ICD-9-CM: 530.85, 530.10  Unknown - Present        HTN (hypertension) ICD-10-CM: I10  ICD-9-CM: 401.9  3/17/2010 - Present        Hypercholesterolemia ICD-10-CM: E78.00  ICD-9-CM: 272.0  3/17/2010 - Present        RESOLVED: Severe obesity ([facility] [address] ICD-10-CM: E66.01  ICD-9-CM: 278.01  3/5/2020 - 4/23/2020        RESOLVED: Leg cramps ICD-10-CM: R25.2  ICD-9-CM: 729.82  6/11/2019 - 2/9/2020        RESOLVED: Leg heaviness ICD-10-CM: M82.490  ICD-9-CM: 729.89  6/11/2019 - 2/9/2020        RESOLVED: Essential hypertension ICD-10-CM: I10  ICD-9-CM: 401.9  6/4/2018 - 2/9/2020        RESOLVED: Mixed hyperlipidemia ICD-10-CM: M75.7  ICD-9-CM: 272.2  6/4/2018 - 2/9/2020        RESOLVED: Severe obesity (BMI 35.0-39. 9) with comorbidity ([facility] ICD-10-CM: E66.01  ICD-9-CM: 278.01  4/17/2018 - 2/9/2020        RESOLVED: Sepsis ([facility] ICD-10-CM: A41.9  ICD-9-CM: 038.9, 995.91  2/27/2018 - 2/9/2020        RESOLVED: Bacteremia due to Staphylococcus ICD-10-CM: R78.81, B95.8  ICD-9-CM: 790.7, 041.10  2/24/2018 - 2/9/2020        RESOLVED: Recurrent depression ([facility] ICD-10-CM: F33.9  ICD-9-CM: 296.30  1/16/2018 - 2/9/2020        RESOLVED: Type 2 diabetes mellitus with diabetic neuropathy ([facility] ICD-10-CM: E11.40  ICD-9-CM: 250.60, 357.2  1/16/2018 - 2/9/2020        RESOLVED: Essential tremor ICD-10-CM: G25.0  ICD-9-CM: 333.1  8/28/2013 - 6/1/2015        RESOLVED: Encounter for long-term (current) use of medications ICD-10-CM: Z06.429  ICD-9-CM: V58.69  7/29/2010 - 4/23/2020                CDMP Checked:   Yes x     PROBLEM LIST Updated:  Yes x         Signed:   Alaina Shi MD  9/18/2021  11:49 AM

## 2021-09-18 NOTE — PROGRESS NOTES
Patient being discharged to [facility] today at 3:45pm via [facility] ambulance. Cm contacted [facility] to move from Will Call to 3:45pm transport time. Cm contacted Kelsey ([phone]) at [facility] [address] [facility] to inform her of the discharge time. Cm contacted patients daughter, Monserrat Dacosta, [phone] and left a message informing her of the discharge time.

## 2021-09-18 NOTE — PROGRESS NOTES
Bedside and Verbal shift change report given to Weston Aparicio RN (oncoming nurse) by Austin Carter RN (offgoing nurse). Report included the following information SBAR, Kardex, MAR and Recent Results.

## 2021-09-18 NOTE — DISCHARGE SUMMARY
Discharge Summary       PATIENT ID: Asif Shoulders  MRN: [id number]   YOB: 1950    DATE OF ADMISSION: 9/9/2021  4:13 PM    DATE OF DISCHARGE: 9/18/2021   PRIMARY CARE PROVIDER: Tony Ordaz MD     ATTENDING PHYSICIAN: Dr Rossy Jenkins  DISCHARGING PROVIDER: Rossy Jenkins MD    To contact this individual call [phone] and ask the  to page. If unavailable ask to be transferred the Adult Hospitalist Department. CONSULTATIONS: IP CONSULT TO CARDIOLOGY  IP CONSULT TO INFECTIOUS DISEASES  IP CONSULT TO INFECTIOUS DISEASES  IP CONSULT TO HEPATOLOGY    PROCEDURES/SURGERIES: Procedure(s):  INCISION AND DRAINAGE OF RIGHT HIP, EXCHANGE OF CONSTRAINED LINER AND FEMORAL HEAD, POSSIBLE RESECTION ARTHROPLASTY    ADMITTING DIAGNOSES & HOSPITAL COURSE:   # Prosthetic hip infection      - Right hip aspiration yeiled 3 ml of cloudy serosanguineous fluid. - Cultures Serratia      - Continue IV Cefipime, Vanc added by ID      - Appreciate Ortho/ID, cleared for discharge with outpatient antibiotics  # Septic Right Shoulder      - Global rotator cuff tear with degenerative changes of GH joint and large joint effusion seen on CT.      - 60 ml of purulent fluid aspirated      - Ortho following, I&D completed 9/11/21    Serratia and E fecium bacteremia  -Per ID : Continue with IV cefepime & vancomycin    Pt will need 2 weeks of IV Daptomycin for enterococcus faecium bacteremia, last dose 9/25/2021    Pt will need 6 weeks of IV cefepime for Serratia marcescens from the recent surgery date, last dose 10/24/2021  Then Keflex throughout life    Hypotension:  -sec to ?cirrhosis  -Lasix/aldactone on hold  -Now off albumin/IV fluids  -Continue midodrine    # DM II- Continue SSI- Increase Lantus to 34 units  # Hyponatremia- 138 9/15/21, likely due to hyperglycemia, now resolved.   # Cirrhosis- Child class B and MELD of 10 -14- Hepatology following  # Thrombocytopenia- Secondary to cirrhosis-stable  # Anemia     - multifactorial, including portal hypertension with chronic GI blood loss, chronic systemic           Disease  -now s/p TIPS     - Patient has received 3 units of PRBCs post surgical intervention     -s/p 1 unit PRBC 9/17  Code status: Full  DVT prophylaxis: Eliquis at discharge        Daisy Whitlock / PLAN:      1. Prosthetic hip infection  2.  Bacteremia       PENDING TEST RESULTS:   At the time of discharge the following test results are still pending: none      FOLLOW UP APPOINTMENTS:    Follow-up Information     Follow up With Specialties Details Why Contact Info    [address]  [address] [facility] Jamshid Kent MD Internal Medicine, Geriatric Medicine In 1 week  [place] [address]  [phone]      Katie Lake MD Family Medicine In 1 week  [address] [facility]  [phone]      Bandar Cummings MD Infectious Disease In 1 week  [address]  [phone]             ADDITIONAL CARE RECOMMENDATIONS:   Follow up with PMD  Follow up with ID  Follow up with Ortho  CBC, CMP in 3-4 days    ID recommendation:  1.  Diagnosis:  Prosthetic right hip infection (serratia marcescens)                          Enterococcus faecium/Seratia Marcescens bacteremia  2.  Routine PICC care per protocol     3.  Antibiotic:   IV cefepime 2gm every 8 hours                          IV Daptomycin 600mg every 24 hours     4.  Lab each Monday & Thursdays             CBC/diff/platelets             BMP             CRP (every Mondays)     5.  Fax lab to Dr. Rosina Mathur @ [phone].  Isai Mathur @ [phone] for WBC <4, PLT <100, and/or Creat > 1.5     7.  Duration of therapy: Daptomycin - 9/25/2021, Cefepime 10/24/2021       Please call Dr. Rosina Mathur @ [phone] before stopping therapy.     8.  Allergies: NSAIDS     9.  Case management has been instructed to Call [phone] with name of [address]      Follow up with Dr. Bridget Chow in 2-3 weeks    Ortho recommendations: Follow up appointments:  - Follow up with Dr. Kenney Paul in 3 weeks. Call [phone] to make an appointment. Activity:  - Weight bearing as tolerated with walker or crutches; discontinue as tolerated    Incision Care:  - Change right hip incision daily with dry dressing or PRN if saturated. - Pack right hip sinus tract with saline-moist gauze and dry topper; change BID  - Do not submerge the incision under water until the incision is completely healed (bath tub, hot tub, swimming pool, etc.)  -Sutures to be removed on 9-30-21    Preventing blood clots:  -Patient may resume PTA Eliquis 5mg daily. No need for additional anti-coagulation    DIET: Cardiac Diet    ACTIVITY: Activity as tolerated      DISCHARGE MEDICATIONS:   See Medication Reconciliation Form      NOTIFY YOUR PHYSICIAN FOR ANY OF THE FOLLOWING:   Fever over 101 degrees for 24 hours. Chest pain, shortness of breath, fever, chills, nausea, vomiting, diarrhea, change in mentation, falling, weakness, bleeding. Severe pain or pain not relieved by medications. Or, any other signs or symptoms that you may have questions about.     DISPOSITION:    Home With:   OT  PT  HH  RN      x SNF/Inpatient Rehab/LTAC    Independent/assisted living    Hospice    Other:       PATIENT CONDITION AT DISCHARGE:     Functional status   x Poor     Deconditioned     Independent      Cognition   x  Lucid     Forgetful     Dementia      Catheters/lines (plus indication)    Hoffman     PICC     PEG    x None      Code status    x Full code     DNR      PHYSICAL EXAMINATION AT DISCHARGE:  Please see progress note      CHRONIC MEDICAL DIAGNOSES:  Problem List as of 9/18/2021 Date Reviewed: 9/13/2021        Codes Class Noted - Resolved    * (Principal) Infection associated with internal right hip prosthesis [facility]) ICD-10-CM: T84.51XA  ICD-9-CM: 996.66, V43.64  9/11/2021 - Present        Prosthetic hip infection ([facility] ICD-10-CM: T84.59XA, Z96.649  ICD-9-CM: 996.66, V43.64  9/9/2021 - Present        Metabolic encephalopathy MAA-88-LA: G93.41  ICD-9-CM: 348.31  1/20/2021 - Present        Cirrhosis (HCC) ICD-10-CM: K74.60  ICD-9-CM: 571.5  1/20/2021 - Present        Increased ammonia level ICD-10-CM: R79.89  ICD-9-CM: 790.6  1/20/2021 - Present        Dislocation of internal right hip prosthesis ([facility] ICD-10-CM: V57.711Z  ICD-9-CM: 996.42, V43.64  8/13/2020 - Present        Recurrent dislocation of hip joint prosthesis ([facility] ICD-10-CM: M07.624I, Z96.649  ICD-9-CM: 996.42, V43.64  7/20/2020 - Present        Closed dislocation of right hip ([facility] ICD-10-CM: S73.004A  ICD-9-CM: 835.00  5/18/2020 - Present        S/P TIPS (transjugular intrahepatic portosystemic shunt) ICD-10-CM: S97.039  ICD-9-CM: V45.89  4/23/2020 - Present        BREWER (nonalcoholic steatohepatitis) ICD-10-CM: K75.81  ICD-9-CM: 571.8  4/6/2020 - Present        Hepatic encephalopathy ([facility] ICD-10-CM: K72.90  ICD-9-CM: 572.2  4/6/2020 - Present        Esophageal varices with bleeding ([facility] ICD-10-CM: I85.01  ICD-9-CM: 456.0  3/28/2020 - Present        Dislocation of prosthetic joint ([facility] ICD-10-CM: O17.757L  ICD-9-CM: 996.42  3/20/2018 - Present    Overview Signed 3/20/2018 11:37 PM by AMAN Sullivan     Right CIERRA             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 Felisa George, [id number]  ICD-9-CM: 996.44, V43.64  8/25/2016 - Present        Osteoarthritis of right hip ICD-10-CM: M16.11  ICD-9-CM: 715.95  8/1/2016 - Present        Benign essential tremor ICD-10-CM: G25.0  ICD-9-CM: 333.1  Unknown - Present        Diabetes mellitus type 2, uncontrolled ([facility] ICD-10-CM: E11.65  ICD-9-CM: 250.02  6/3/2013 - Present        Hypogonadism male ICD-10-CM: E29.1  ICD-9-CM: 257.2  8/29/2012 - Present        Osteoarthritis of hip ICD-10-CM: M16.9  ICD-9-CM: 715.95  Unknown - Present    Overview Signed 4/29/2010  6:55 AM by Amy Gracia ICD-10-CM: F32.9  ICD-9-CM: 019  Unknown - Present        ED (erectile dysfunction) of organic origin ICD-10-CM: N52.9  ICD-9-CM: 607.84  Unknown - Present        GERD (gastroesophageal reflux disease) ICD-10-CM: K21.9  ICD-9-CM: 530.81  Unknown - Present        PUD (peptic ulcer disease) ICD-10-CM: K27.9  ICD-9-CM: 533.90  Unknown - Present        Hinson's esophagus with esophagitis ICD-10-CM: K22.70, K20.90  ICD-9-CM: 530.85, 530.10  Unknown - Present        HTN (hypertension) ICD-10-CM: I10  ICD-9-CM: 401.9  3/17/2010 - Present        Hypercholesterolemia ICD-10-CM: E78.00  ICD-9-CM: 272.0  3/17/2010 - Present        RESOLVED: Severe obesity ([facility] ICD-10-CM: E66.01  ICD-9-CM: 278.01  3/5/2020 - 4/23/2020        RESOLVED: Leg cramps ICD-10-CM: R25.2  ICD-9-CM: 729.82  6/11/2019 - 2/9/2020        RESOLVED: Leg heaviness ICD-10-CM: L63.701  ICD-9-CM: 729.89  6/11/2019 - 2/9/2020        RESOLVED: Essential hypertension ICD-10-CM: I10  ICD-9-CM: 401.9  6/4/2018 - 2/9/2020        RESOLVED: Mixed hyperlipidemia ICD-10-CM: E42.8  ICD-9-CM: 272.2  6/4/2018 - 2/9/2020        RESOLVED: Severe obesity (BMI 35.0-39. 9) with comorbidity ([facility] ICD-10-CM: E66.01  ICD-9-CM: 278.01  4/17/2018 - 2/9/2020        RESOLVED: Sepsis ([facility] ICD-10-CM: A41.9  ICD-9-CM: 038.9, 995.91  2/27/2018 - 2/9/2020        RESOLVED: Bacteremia due to Staphylococcus ICD-10-CM: R78.81, B95.8  ICD-9-CM: 790.7, 041.10  2/24/2018 - 2/9/2020        RESOLVED: Recurrent depression ([facility] ICD-10-CM: F33.9  ICD-9-CM: 296.30  1/16/2018 - 2/9/2020        RESOLVED: Type 2 diabetes mellitus with diabetic neuropathy ([facility] ICD-10-CM: E11.40  ICD-9-CM: 250.60, 357.2  1/16/2018 - 2/9/2020        RESOLVED: Essential tremor ICD-10-CM: G25.0  ICD-9-CM: 333.1  8/28/2013 - 6/1/2015        RESOLVED: Encounter for long-term (current) use of medications ICD-10-CM: C14.140  ICD-9-CM: V58.69  7/29/2010 - 4/23/2020              Greater than 41 minutes were spent with the patient on counseling and coordination of care    Signed:   Cecile Osorio MD  9/18/2021  12:03 PM   .

## 2021-09-24 NOTE — PROGRESS NOTES
Post Acute Facility Update     *The information contained in this note was received during a weekly care coordination call with the post acute facility*    Current Facility: [facility] (SNF)  Anticipated Discharge Date: TBD    Facility Nursing Update: No current updates   Facility Social Work Update: Transfer to [facility] when he meets their criteria (No longer needs skilled nursing and meets their ADL criteria. Bundle Program Status: none  Upper Extremity Assistance: Dependent  Lower Extremity Assistance: Dependent  Bed Mobility: Dependent  Transfers: Dependent  Ambulation:   How far (in feet) is the patient ambulating?  Not walking   Device Used: None  Barriers to Discharge: pt has to get to a level that [facility] will accept him but has limited MCR days          CHRIS Gan  [facility]

## 2021-10-01 NOTE — PROGRESS NOTES
Post Acute Facility Update     *The information contained in this note was received during a weekly care coordination call with the post acute facility*    Current Facility: [facility] ([facility])  Anticipated Discharge Date: TBD    Facility Nursing Update:  Started on Lactulose for ammonia levels. Continue antibiotics until 10/24/21. Ad[facility] to [facility] 9/24/2021 for AMS. Return to  facility on 9/28/2021  Facility Social Work Update: Goal is transfer to [facility]. after completing skilled services. Bundle Program Status: none  Upper Extremity Assistance: Dependent  Lower Extremity Assistance: Dependent  Bed Mobility: Dependent  Transfers: Dependent  Ambulation: Dependent  How far (in feet) is the patient ambulating?  Not walking   Device Used: None  Barriers to Discharge: TBD  Other: Max assist self feeding      Mian Rivera, [facility] Team

## 2021-10-07 NOTE — PROGRESS NOTES
Post Acute Facility Update     *The information contained in this note was received during a weekly care coordination call with the post acute facility*    Current Facility: [facility] ([facility])  Anticipated Discharge Date: TBD    Facility Nursing Update: Medically unstable. Dependent in all areas. Facility Social Work Update: Return home with family   Bundle Program Status: none  Upper Extremity Assistance: Dependent  Lower Extremity Assistance: Dependent  Bed Mobility: Dependent  Transfers: Dependent  Ambulation: Dependent  How far (in feet) is the patient ambulating?  Not walking   Device Used: None   Barriers to Discharge: DEON Azul MSW  [facility]

## 2021-10-15 NOTE — PROGRESS NOTES
[address] [place] [address] Dr Discharge Note    *The information contained in this note was received during a weekly care coordination call with the post acute facility*      Patient was discharged from [address] [facility] ([facility]) on 10/8/21 and reported pt was admitted into [facility] for Pneumonia. PCP: MD LORE Carter appointment:   Future Appointments   Date Time Provider Nabor Valdez   11/19/2021  2:00 PM Stacy Thorne MD [facility] Team will sign off at this time. Medications were not reconciled and general patient assessment was not completed during this skilled nursing facility outreach.      Christophe Jackson   BSN, RN  [facility]

## 2021-10-19 NOTE — PROGRESS NOTES
Physician Progress Note      PATIENT:               Demarco Apariciot  CSN #:                  [id number]  :                       1950  ADMIT DATE:       2021 4:13 PM  [address] [place] DATE:        2021 4:00 PM  RESPONDING  PROVIDER #:        Gato Lemos PA-C          QUERY TEXT:    Dear Dr. Ma Marking:  Pt admitted with Right Hip Infection and underwent Right Hip Resection Arthroplasty. Noted documentation on the  Op Note of Intra-operative Fracture of Femur while removing the Femoral prosthesis', however there is also documentation noting that the prosthesis was ingrown distally. If possible, please document in progress notes and discharge summary:    The medical record reflects the following:  Risk Factors: hx of previous hip replacement  Clinical Indicators: pt admitted with infection of right hip, is s/p previous CIERRA 12 yrs ago, with note that pt has had several infections since. There is documentation on the  Op note under 'Complications' of  Intra-operative Fracture of Distal femur while removing femoral prosthesis, However documentation is noted in the body of the Op note of 'I used the femoral stem extraction device, however after multiple attempts and continued use of flexible osteotomes to try to mobilize the prosthesis, we were unsuccessful. It was clear that the prosthesis was ingrown distally. At this point in time we had a comminuted fx of the proximal femur'. Treatment: underwent Right hip resection arthroplasty, Vanc/ Mxipime/ Ancef/ Cubicin IV, ID consult, PT.     Thank you,  Gilberto Jay RN  Clinical Documentation  [phone]  Options provided:  -- Intra-operative fracture of Right Femur as an intraoperative complication  -- Intra-operative fracture of Right Femur as an expected/inherent condition that occurred intraoperatively and not a complication  -- Intra-operative Fracture of Right Femur related to other incidental risk factor (please specify) occurring during surgery and not a complication, Please document incidental risk factor. -- Other - I will add my own diagnosis  -- Disagree - Not applicable / Not valid  -- Disagree - Clinically unable to determine / Unknown  -- Refer to Clinical Documentation Reviewer    PROVIDER RESPONSE TEXT:    Patient has an Intra-operative fracture of right femur as an inherent condition that occurred intraoperatively and not a complication.     Query created by: Wong Zavaleta on 10/18/2021 11:07 AM      Electronically signed by:  Conner Cuenca PA-C 10/19/2021 12:10 PM

## 2021-10-26 ENCOUNTER — PATIENT OUTREACH (OUTPATIENT)
Dept: CASE MANAGEMENT | Age: 71
End: 2021-10-26

## 2022-02-09 NOTE — H&P
Orthopedic H and P NOTE Subjective:  
 
Date of Consultation:  August 13, 2020 Glendy Valdes is a 65 y.o. male who is being seen for right hip pain and short leg. Injury occurred  This morning  while Pt. was at home slid out of bed onto his right side . Workup has revealed right hip dislocation of revised tripolar prosthesis . Patient's ambulatory status includes None and their living environment is home. Known to DR Elin Velazquez has had previous revisions for instability last one on the right in July Patient Active Problem List  
 Diagnosis Date Noted  Dislocation of internal right hip prosthesis ([address] 08/13/2020  Recurrent dislocation of hip joint prosthesis ([address] 07/20/2020  Closed dislocation of right hip ([address] 05/18/2020  S/P TIPS (transjugular intrahepatic portosystemic shunt) 04/23/2020  
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  Obesity 03/04/2018  Insomnia 03/04/2018  Infected prosthesis of right hip ([address] 02/26/2018  PFO (patent foramen ovale) 07/26/2017  Systolic murmur 66/33/7651  Jessica-prosthetic fracture of femur following total hip arthroplasty 08/25/2016  Osteoarthritis of right hip 08/01/2016  Benign essential tremor  Diabetes mellitus type 2, uncontrolled ([address] 06/03/2013  Hypogonadism male 08/29/2012  Osteoarthritis of hip  Depression  ED (erectile dysfunction) of organic origin  GERD (gastroesophageal reflux disease)  PUD (peptic ulcer disease)  Hinson's esophagus with esophagitis  HTN (hypertension) 03/17/2010  Hypercholesterolemia 03/17/2010 Family History Problem Relation Age of Onset  Cancer Father   
     multiple myeloma  Stroke Father  Dementia Mother  No Known Problems Brother  COPD Brother  No Known Problems Daughter  Cancer Maternal Grandmother COLON  
 No Known Problems Son  No Known Problems Son  Anesth Problems Neg Hx Social History Tobacco Use  Smoking status: Former Smoker Packs/day: 2.00 Years: 15.00 Pack years: 30.00 Last attempt to quit: 3/1/1979 Years since quittin.4  Smokeless tobacco: Never Used Substance Use Topics  Alcohol use: No  
  Alcohol/week: 0.0 standard drinks Past Medical History:  
Diagnosis Date  ADD (attention deficit disorder)  Adverse effect of anesthesia   
 motion sickness resulting in loss of balance  Anemia due to blood loss  Hinson's esophagus with esophagitis  Benign essential tremor  Cancer [facility])  [facility]  Chronic pain  Cirrhosis ([address]  Depression  Dislocated hip ([address]  DJD (degenerative joint disease) of hip   
 bilat  DM (diabetes mellitus) ([address] 3/17/2010  ED (erectile dysfunction) of organic origin  Esophageal varices determined by endoscopy ([address]  Fall on or from sidewalk curb 2015  Femur fracture ([address]  Gastritis and duodenitis  GERD (gastroesophageal reflux disease)   
 resolved after discontinuing diclofenac  Hematuria 2016  High cholesterol 2010  
 pt denies   
 HTN (hypertension) 3/17/2010  Morbid obesity ([address]  Murmur, cardiac   
 PFO (patent foramen ovale) 2017  PUD (peptic ulcer disease)  Sepsis ([address]  Shingles 2016 RESOLVING- NO RASH (HEAD)  Sleep apnea   
 doesnt use CPAP anymore Past Surgical History:  
Procedure Laterality Date  COLONOSCOPY N/A 2019 COLONOSCOPY AND EGD performed by Marquis Peters MD at [facility] ENDOSCOPY  COLONOSCOPY,DIAGNOSTIC  2019  ENDOSCOPY, COLON, DIAGNOSTIC    
 HX CATARACT REMOVAL Bilateral   
 HX CHOLECYSTECTOMY    HX GI    
 gastroplasty [address] [facility] [address]  HX HIP REPLACEMENT Left  HX ORTHOPAEDIC Right 2011  
 rot cuff repair  HX ORTHOPAEDIC  2016  
 left broken femur [address]  HX VASCULAR ACCESS    
 picc line and removed after sepsis resolved  IR INSERT TIPS HEPATIC SHUNT  3/28/2020 [address] [facility] ARTHROPLASTY  05/2010  
 right  TOTAL HIP ARTHROPLASTY  08/01/2016  
 left  UPPER GI ENDOSCOPY,BIOPSY  6/18/2019 Prior to Admission medications Medication Sig Start Date End Date Taking? Authorizing Provider  
melatonin 3 mg tablet Take 3 mg by mouth nightly as needed for Sleep. 8/12/20   Provider, Historical  
spironolactone (Aldactone) 25 mg tablet Take 25 mg by mouth daily. 8/12/20   Provider, Historical  
famotidine (PEPCID) 20 mg tablet Take 20 mg by mouth two (2) times a day. Before meals 8/12/20   Provider, Historical  
magnesium oxide 400 mg magnesium tab Take 1 Tab by mouth daily. 8/12/20   Provider, Historical  
lactulose (CHRONULAC) 10 gram/15 mL solution Take 30 mL by mouth two (2) times a day. Provider, Historical  
diclofenac (VOLTAREN) 1 % gel Apply 2 g to affected area four (4) times daily as needed for Pain. Provider, Historical  
ferrous sulfate 324 mg (65 mg iron) tablet Take  by mouth Daily (before breakfast). Provider, Historical  
oxyCODONE IR (ROXICODONE) 5 mg immediate release tablet Take 5 mg by mouth every four (4) hours as needed for Pain. Provider, Historical  
furosemide (LASIX) 20 mg tablet Take 20 mg by mouth daily. Provider, Historical  
Omeprazole delayed release (PRILOSEC D/R) 20 mg tablet Take 20 mg by mouth daily. Provider, Historical  
insulin glargine (LANTUS) 100 unit/mL injection 20 Units by SubCUTAneous route ACB/HS. Patient taking differently: 36 Units by SubCUTAneous route nightly. 7/23/20   Abelardo Livingston MD  
apixaban (ELIQUIS) 2.5 mg tablet Take 1 Tab by mouth every twelve (12) hours every twelve (12) hours.  Indications: deep vein thrombosis prevention 7/22/20   AMAN Mosqueda  
senna-docusate (PERICOLACE) 8.6-50 mg per tablet Take 1 Tab by mouth two (2) times a day. Indications: constipation 7/22/20   AMAN Mosqueda  
lisinopriL (PRINIVIL, ZESTRIL) 5 mg tablet Take 10 mg by mouth every morning. Provider, Historical  
sertraline (Zoloft) 100 mg tablet Take 150 mg by mouth daily. Provider, Historical  
insulin aspart U-100 (NovoLOG Flexpen U-100 Insulin) 100 unit/mL (3 mL) inpn 7 Units by SubCUTAneous route Before breakfast, lunch, and dinner for 90 days. 7 units 3 times daily 5/27/20 8/25/20  Jeff Hanna MD  
rifAXIMin Marion Monty) 550 mg tablet Take 1 Tab by mouth two (2) times a day. 4/23/20   Rice Brokaw, MD  
potassium chloride SR (KLOR-CON 10) 10 mEq tablet Take 10 mEq by mouth two (2) times a day. 4/18/20   Provider, Historical  
thiamine hcl 500 mg tablet Take 500 mg by mouth daily. 4/6/20   Marline Perez NP  
folic acid (FOLVITE) 1 mg tablet Take 1 Tab by mouth daily. 3/26/20   Laurence Obrien NP  
gabapentin (NEURONTIN) 300 mg capsule Take 2 Caps by mouth nightly. 3/16/20   Jeff Hanna MD  
butalbital-acetaminophen-caffeine (FIORICET, ESGIC) -40 mg per tablet Take 1 Tab by mouth every six (6) hours as needed for Headache. 3/6/20   Jeff Hanna MD  
cephALEXin (KEFLEX) 500 mg capsule Take 1 Cap by mouth two (2) times a day. 2/24/20   Jeff Hanna MD  
ergocalciferol (VITAMIN D2) 50,000 unit capsule TAKE 1 CAPSULE EVERY 7 DAYS Patient taking differently: Take 50,000 Units by mouth every seven (7) days. TAKE 1 CAPSULE EVERY 7 DAYS 1/6/20   Jeff Hanna MD  
atorvastatin (LIPITOR) 40 mg tablet Take 1 Tab by mouth daily. 1/4/20   Joe Lagos MD  
glucose blood VI test strips (ASCENSIA AUTODISC VI, ONE TOUCH ULTRA TEST VI) strip Test blood sugar twice daily Diagnosis E 11.9.  Can use [employer] 12/13/19   Jeff Hanna MD  
 primidone (MYSOLINE) 250 mg tablet TAKE 1 TABLET TWICE A DAY Patient taking differently: Take 250 mg by mouth two (2) times a day. TAKE 1 TABLET TWICE A DAY 11/7/19   Rcio Oseguera MD  
acetaminophen (TYLENOL) 500 mg tablet Take 2 Tabs by mouth every six (6) hours as needed for Pain. 10/7/19   Carlos Cano MD  
Blood-Glucose Meter (ACCU-CHEK SOWMYA PLUS METER) misc Test blood sugar twice daily Diagnosis E 11.9 7/9/19   Rico Oseguera MD  
Blood Glucose Control High&Low (ACCU-CHEK SOWMYA CONTROL SOLN) soln Test blood sugar twice daily Diagnosis E 11.9 7/9/19   Rico Oseguera MD  
B.infantis-B.ani-B.long-B.bifi (PROBIOTIC 4X) 10-15 mg TbEC Take 1 Tab by mouth daily. Provider, Historical  
calcium citrate-vitamin D3 (CITRACAL + D) tablet Take 2 Tabs by mouth two (2) times a day. Provider, Historical  
 
Current Facility-Administered Medications Medication Dose Route Frequency  ceFAZolin (ANCEF) 3 g in 0.9%  ml IVPB  3 g IntraVENous ON CALL TO OR  
 
Current Outpatient Medications Medication Sig  
 melatonin 3 mg tablet Take 3 mg by mouth nightly as needed for Sleep.  spironolactone (Aldactone) 25 mg tablet Take 25 mg by mouth daily.  famotidine (PEPCID) 20 mg tablet Take 20 mg by mouth two (2) times a day. Before meals  magnesium oxide 400 mg magnesium tab Take 1 Tab by mouth daily.  lactulose (CHRONULAC) 10 gram/15 mL solution Take 30 mL by mouth two (2) times a day.  diclofenac (VOLTAREN) 1 % gel Apply 2 g to affected area four (4) times daily as needed for Pain.  ferrous sulfate 324 mg (65 mg iron) tablet Take  by mouth Daily (before breakfast).  oxyCODONE IR (ROXICODONE) 5 mg immediate release tablet Take 5 mg by mouth every four (4) hours as needed for Pain.  furosemide (LASIX) 20 mg tablet Take 20 mg by mouth daily.  Omeprazole delayed release (PRILOSEC D/R) 20 mg tablet Take 20 mg by mouth daily.  insulin glargine (LANTUS) 100 unit/mL injection 20 Units by SubCUTAneous route ACB/HS. (Patient taking differently: 36 Units by SubCUTAneous route nightly.)  apixaban (ELIQUIS) 2.5 mg tablet Take 1 Tab by mouth every twelve (12) hours every twelve (12) hours. Indications: deep vein thrombosis prevention  senna-docusate (PERICOLACE) 8.6-50 mg per tablet Take 1 Tab by mouth two (2) times a day. Indications: constipation  lisinopriL (PRINIVIL, ZESTRIL) 5 mg tablet Take 10 mg by mouth every morning.  sertraline (Zoloft) 100 mg tablet Take 150 mg by mouth daily.  insulin aspart U-100 (NovoLOG Flexpen U-100 Insulin) 100 unit/mL (3 mL) inpn 7 Units by SubCUTAneous route Before breakfast, lunch, and dinner for 90 days. 7 units 3 times daily  rifAXIMin (XIFAXAN) 550 mg tablet Take 1 Tab by mouth two (2) times a day.  potassium chloride SR (KLOR-CON 10) 10 mEq tablet Take 10 mEq by mouth two (2) times a day.  thiamine hcl 500 mg tablet Take 500 mg by mouth daily.  folic acid (FOLVITE) 1 mg tablet Take 1 Tab by mouth daily.  gabapentin (NEURONTIN) 300 mg capsule Take 2 Caps by mouth nightly.  butalbital-acetaminophen-caffeine (FIORICET, ESGIC) -40 mg per tablet Take 1 Tab by mouth every six (6) hours as needed for Headache.  cephALEXin (KEFLEX) 500 mg capsule Take 1 Cap by mouth two (2) times a day.  ergocalciferol (VITAMIN D2) 50,000 unit capsule TAKE 1 CAPSULE EVERY 7 DAYS (Patient taking differently: Take 50,000 Units by mouth every seven (7) days. TAKE 1 CAPSULE EVERY 7 DAYS)  atorvastatin (LIPITOR) 40 mg tablet Take 1 Tab by mouth daily.  glucose blood VI test strips (ASCENSIA AUTODISC VI, ONE TOUCH ULTRA TEST VI) strip Test blood sugar twice daily Diagnosis E 11.9. Can use [employer]  primidone (MYSOLINE) 250 mg tablet TAKE 1 TABLET TWICE A DAY (Patient taking differently: Take 250 mg by mouth two (2) times a day. TAKE 1 TABLET TWICE A DAY)  acetaminophen (TYLENOL) 500 mg tablet Take 2 Tabs by mouth every six (6) hours as needed for Pain.  Blood-Glucose Meter (ACCU-CHEK SOWMYA PLUS METER) misc Test blood sugar twice daily Diagnosis E 11.9  Blood Glucose Control High&Low (ACCU-CHEK SOWMYA CONTROL SOLN) soln Test blood sugar twice daily Diagnosis E 11.9  
 B.infantis-B.ani-B.long-B.bifi (PROBIOTIC 4X) 10-15 mg TbEC Take 1 Tab by mouth daily.  calcium citrate-vitamin D3 (CITRACAL + D) tablet Take 2 Tabs by mouth two (2) times a day. Allergies Allergen Reactions  Nsaids (Non-Steroidal Anti-Inflammatory Drug) Other (comments) Hx of PUD and Hinson's Esophagus Review of Systems:  A comprehensive review of systems was negative except for that written in the HPI. Mental Status: no dementia Objective:  
 
Patient Vitals for the past 8 hrs: 
 BP Temp Pulse Resp SpO2 Weight 20 1230 129/48  87 28    
20 1209 148/63  95 (!) 31 94 %   
20 1200 (!) 124/39  95 26 93 %   
20 1153 147/48  98 29 93 %   
20 1115      115.2 kg (253 lb 15.5 oz) 20 1030 150/50    95 %   
20 1010 139/52 98.3 °F (36.8 °C) 76 16 96 %  Temp (24hrs), Av.3 °F (36.8 °C), Min:98.3 °F (36.8 °C), Max:98.3 °F (36.8 °C) EXAM: alert, cooperative, no distress, oriented, normal, normal mood, clear to auscultation bilaterally, regular rate and rhythm, soft, non-tender. Bowel sounds normal. No masses,  no organomegaly, purpura / ecchymosis noted on face, trunk and extremities, Pt. Is TTP over roght hip with short rotated leg. Spine and Scalp w/o step off TTP or deformity. Capillary refill <2 secs in bilateral  Fingers and toes bilaterally, Sensation intact in bilateralFingers and toes bilaterally. Pulses 2+ in upper/lower extremities.  
 
Imaging Review: XRStudy Result  
 
  
INDICATION: Hip dislocation reduction. 
  
COMPARISON: Earlier the same day. 
  
 FINDINGS: Single frontal view of the pelvis demonstrates no change in the 
posterior right hip dislocation. 
  
IMPRESSION IMPRESSION:  
Stable right posterior hip dislocation. 
  
Billing note: The Facility order (procedure) was incorrect at the time of 
interpretation and signature of this exam.? This discrepancy may have been 
corrected after final signature. Labs:  
Recent Results (from the past 24 hour(s)) EKG, 12 LEAD, INITIAL Collection Time: 08/13/20 10:26 AM  
Result Value Ref Range Ventricular Rate 99 BPM  
 Atrial Rate 99 BPM  
 P-R Interval 150 ms QRS Duration 90 ms Q-T Interval 342 ms QTC Calculation (Bezet) 438 ms Calculated P Axis 17 degrees Calculated R Axis 75 degrees Calculated T Axis 37 degrees Diagnosis Normal sinus rhythm with sinus arrhythmia Normal ECG When compared with ECG of 18-MAY-2020 14:29, 
Questionable change in QRS axis CBC WITH AUTOMATED DIFF Collection Time: 08/13/20 10:30 AM  
Result Value Ref Range WBC 8.3 4.1 - 11.1 K/uL  
 RBC 3.58 (L) 4.10 - 5.70 M/uL  
 HGB 10.7 (L) 12.1 - 17.0 g/dL HCT 33.2 (L) 36.6 - 50.3 % MCV 92.7 80.0 - 99.0 FL  
 MCH 29.9 26.0 - 34.0 PG  
 MCHC 32.2 30.0 - 36.5 g/dL  
 RDW 18.6 (H) 11.5 - 14.5 % PLATELET 777 424 - 790 K/uL MPV 10.3 8.9 - 12.9 FL  
 NRBC 0.0 0  WBC ABSOLUTE NRBC 0.00 0.00 - 0.01 K/uL NEUTROPHILS 89 (H) 32 - 75 % LYMPHOCYTES 4 (L) 12 - 49 % MONOCYTES 4 (L) 5 - 13 % EOSINOPHILS 1 0 - 7 % BASOPHILS 1 0 - 1 % IMMATURE GRANULOCYTES 1 (H) 0.0 - 0.5 % ABS. NEUTROPHILS 7.4 1.8 - 8.0 K/UL  
 ABS. LYMPHOCYTES 0.3 (L) 0.8 - 3.5 K/UL  
 ABS. MONOCYTES 0.3 0.0 - 1.0 K/UL  
 ABS. EOSINOPHILS 0.1 0.0 - 0.4 K/UL  
 ABS. BASOPHILS 0.1 0.0 - 0.1 K/UL  
 ABS. IMM. GRANS. 0.1 (H) 0.00 - 0.04 K/UL  
 DF SMEAR SCANNED    
 RBC COMMENTS ANISOCYTOSIS 
1+ METABOLIC PANEL, COMPREHENSIVE Collection Time: 08/13/20 10:30 AM  
Result Value Ref Range Sodium 138 136 - 145 mmol/L Potassium 4.1 3.5 - 5.1 mmol/L Chloride 108 97 - 108 mmol/L  
 CO2 24 21 - 32 mmol/L Anion gap 6 5 - 15 mmol/L Glucose 196 (H) 65 - 100 mg/dL BUN 13 6 - 20 MG/DL Creatinine 0.98 0.70 - 1.30 MG/DL  
 BUN/Creatinine ratio 13 12 - 20 GFR est AA >60 >60 ml/min/1.73m2 GFR est non-AA >60 >60 ml/min/1.73m2 Calcium 7.4 (L) 8.5 - 10.1 MG/DL Bilirubin, total 1.3 (H) 0.2 - 1.0 MG/DL  
 ALT (SGPT) 21 12 - 78 U/L  
 AST (SGOT) 36 15 - 37 U/L Alk. phosphatase 134 (H) 45 - 117 U/L Protein, total 6.3 (L) 6.4 - 8.2 g/dL Albumin 2.4 (L) 3.5 - 5.0 g/dL Globulin 3.9 2.0 - 4.0 g/dL A-G Ratio 0.6 (L) 1.1 - 2.2 TYPE & SCREEN Collection Time: 08/13/20 10:30 AM  
Result Value Ref Range Crossmatch Expiration 08/16/2020 ABO/Rh(D) O POSITIVE Antibody screen NEG   
SAMPLES BEING HELD Collection Time: 08/13/20 10:30 AM  
Result Value Ref Range SAMPLES BEING HELD JOAN   
 COMMENT Add-on orders for these samples will be processed based on acceptable specimen integrity and analyte stability, which may vary by analyte. Impression:  
 
Patient Active Problem List  
 Diagnosis Date Noted  Dislocation of internal right hip prosthesis ([address] 08/13/2020  Recurrent dislocation of hip joint prosthesis ([address] 07/20/2020  Closed dislocation of right hip ([address] 05/18/2020  S/P TIPS (transjugular intrahepatic portosystemic shunt) 04/23/2020  
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  Obesity 03/04/2018  Insomnia 03/04/2018  Infected prosthesis of right hip ([facility] 02/26/2018  PFO (patent foramen ovale) 07/26/2017  Systolic murmur 26/89/3302  Jessica-prosthetic fracture of femur following total hip arthroplasty 08/25/2016  Osteoarthritis of right hip 08/01/2016  Benign essential tremor  Diabetes mellitus type 2, uncontrolled ([facility] 06/03/2013  Hypogonadism male 08/29/2012  Osteoarthritis of hip  Depression  ED (erectile dysfunction) of organic origin  GERD (gastroesophageal reflux disease)  PUD (peptic ulcer disease)  Hinson's esophagus with esophagitis  HTN (hypertension) 03/17/2010  Hypercholesterolemia 03/17/2010 Principal Problem: 
  Dislocation of internal right hip prosthesis ([facility] [address] (8/13/2020) Plan:  
Admit to ortho Npo Consent for closed right hi[p reduction possible open with constrained liner Dr. Janice Hernandez aware and agree with above plan.  
 
 
Sanchez Jones PA-C 
 
 
 
 English

## 2022-07-19 NOTE — BRIEF OP NOTE
Brief Postoperative Note Patient: Martine Callejas YOB: 1950 MRN: [id number] Date of Procedure: 8/13/2020 Pre-Op Diagnosis: R  Hip dislocation Post-Op Diagnosis: Same as preoperative diagnosis. Procedure(s): CLOSED REDUCTION RIGHT HIP Surgeon(s): 
Brittny Lord MD 
 
Surgical Assistant: None Anesthesia: MAC Estimated Blood Loss (mL): 0 Complications: Other: Failed multiple attempts to reduce Specimens: * No specimens in log * Implants: * No implants in log * Drains:  
[REMOVED] Orogastric Tube 03/28/20 (Removed) [REMOVED] Drain 02/26/18 Right Hip (Removed) Findings: n/a Electronically Signed by Asuncion Rollins MD on 8/13/2020 at 2:15 PM 
 
 Patient prepared for and ready to be discharged. Patient discharged at this time in no acute distress after verbalizing understanding of discharge instructions. Patient left after receiving After Visit Summary instructions.         Araceli Rothman RN  06/13/19 4526 Size Of Margin In Cm: 0.2 X Size Of Lesion In Cm (Optional): 0 Wound Care: Vaseline Bill For Surgical Tray: no Anesthesia Volume In Cc: 1.5 Detail Level: Detailed Size Of Lesion In Cm: 1.8 Post-Care Instructions: I reviewed with the patient in detail post-care instructions. Patient is to keep the office bandage on and dry overnight, and then apply Polysporin, Bacitracin or Vasoline daily until healed.  Patient may irrigate with water to remove any crusting.  Wound Care written instructions given. Consent was obtained from the patient. The risks and benefits to therapy were discussed in detail. Specifically, the risks of infection, scarring, bleeding, prolonged wound healing, incomplete removal, allergy to anesthesia, nerve injury and recurrence were addressed. Prior to the procedure, the treatment site was clearly identified and confirmed by the patient. Medical Necessity Clause: This procedure was medically necessary because the lesion that was treated was: irritated and rubbing. Medical Necessity Information: It is in your best interest to select a reason for this procedure from the list below. All of these items fulfill various CMS LCD requirements except the new and changing color options. Anesthesia Type: 1% lidocaine with epinephrine Hemostasis: Aluminum Chloride and Electrocautery Render Post-Care Instructions In Note?: yes

## 2022-08-25 NOTE — ED NOTES
TRANSFER - OUT REPORT: 
 
Verbal report given to Gretchen Felton Rn(name) on Boris Díaz  being transferred to General Surgery (unit) for routine progression of care Report consisted of patients Situation, Background, Assessment and  
Recommendations(SBAR). Information from the following report(s) SBAR, Kardex, ED Summary, [facility] and Recent Results was reviewed with the receiving nurse. Lines: PICC Single Lumen 50/31/75 Right;Cephalic (Active) Peripheral IV 01/20/21 Left Antecubital (Active) Site Assessment Clean, dry, & intact 01/20/21 1938 Phlebitis Assessment 0 01/20/21 1938 Infiltration Assessment 0 01/20/21 1938 Dressing Status Clean, dry, & intact 01/20/21 1938 Dressing Type Transparent 01/20/21 1938 Hub Color/Line Status Flushed 01/20/21 1938 Opportunity for questions and clarification was provided. Patient transported with: 
 [employer] Render In Strict Bullet Format?: No Continue Regimen: Benzaclin QAM\\nEpiduo Forte QHS as tolerated Detail Level: Zone

## (undated) DEVICE — SUTURE MCRYL SZ 3-0 L27IN ABSRB UD L19MM PS-2 3/8 CIR PRIM Y427H

## (undated) DEVICE — SOLIDIFIER MEDC 1200ML -- CONVERT TO 356117

## (undated) DEVICE — DYONICS 25 INFLOW TUBE SET, 3 PER BOX

## (undated) DEVICE — SUTURE VCRL SZ 2-0 L36IN ABSRB UD L40MM CT 1/2 CIR J957H

## (undated) DEVICE — 4-PORT MANIFOLD: Brand: NEPTUNE 2

## (undated) DEVICE — SUTURE VCRL SZ 2 L54IN ABSRB UD L65MM TP-1 1/2 CIR J880T

## (undated) DEVICE — ELECTRODE BLDE L4IN NONINSULATED EDGE

## (undated) DEVICE — ANTERIOR TOTAL HIP-SMH: Brand: MEDLINE INDUSTRIES, INC.

## (undated) DEVICE — SYR LR LCK 1ML GRAD NSAF 30ML --

## (undated) DEVICE — SCRUB DRY SURG EZ SCRUB BRUSH PREOPERATIVE GRN

## (undated) DEVICE — Device

## (undated) DEVICE — GARMENT,MEDLINE,DVT,INT,CALF,MED, GEN2: Brand: MEDLINE

## (undated) DEVICE — DRAPE,EXTREMITY,89X128,STERILE: Brand: MEDLINE

## (undated) DEVICE — TRAY CATH 16F URIN MTR LTX -- CONVERT TO ITEM 363111

## (undated) DEVICE — YANKAUER,TAPERED BULBOUS TIP,W/O VENT: Brand: MEDLINE

## (undated) DEVICE — SUTURE VCRL SZ 0 L27IN ABSRB UD L36MM CT-1 1/2 CIR J260H

## (undated) DEVICE — 2108 SERIES SAGITTAL BLADE (18.6 X 0.8 X 73.8MM)

## (undated) DEVICE — KENDALL SCD EXPRESS SLEEVES, KNEE LENGTH, MEDIUM: Brand: KENDALL SCD

## (undated) DEVICE — BLOCK BITE ENDOSCP AD 21 MM W/ DIL BLU LF DISP

## (undated) DEVICE — KIT EVAC 400CC DIA1/4IN H PAT 12.5IN 3 SPR RND SHP PVC DRN

## (undated) DEVICE — BLADE ELECTRODE: Brand: EDGE

## (undated) DEVICE — REM POLYHESIVE ADULT PATIENT RETURN ELECTRODE: Brand: VALLEYLAB

## (undated) DEVICE — SOLUTION IRRIG 1000ML H2O STRL BLT

## (undated) DEVICE — MEDI-VAC YANK SUCT HNDL W/TPRD BULBOUS TIP: Brand: CARDINAL HEALTH

## (undated) DEVICE — BASIN EMSIS 16OZ GRAPHITE PLAS KID SHP MOLD GRAD FOR ORAL

## (undated) DEVICE — HANDPIECE SET WITH BONE CLEANING TIP AND SUCTION TUBE: Brand: INTERPULSE

## (undated) DEVICE — CONTAINER SPEC 20 ML LID NEUT BUFF FORMALIN 10 % POLYPR STS

## (undated) DEVICE — CONTAINER,SPECIMEN,OR STERILE,4OZ: Brand: MEDLINE

## (undated) DEVICE — TOWEL 4 PLY TISS 19X30 SUE WHT

## (undated) DEVICE — FLOSEAL MATRIX IS INDICATED IN SURGICAL PROCEDURES (OTHER THAN IN OPHTHALMIC) AS AN ADJUNCT TO HEMOSTASIS WHEN CONTROL OF BLEEDING BY LIGATURE OR CONVENTIONALPROCEDURES IS INEFFECTIVE OR IMPRACTICAL.: Brand: FLOSEAL HEMOSTATIC MATRIX

## (undated) DEVICE — 6619 2 PTNT ISO SYS INCISE AREA&LT;(&GT;&&LT;)&GT;P: Brand: STERI-DRAPE™ IOBAN™ 2

## (undated) DEVICE — SOLUTION IRRIG 3000ML 0.9% SOD CHL FLX CONT 0797208] ICU MEDICAL INC]

## (undated) DEVICE — NEEDLE HYPO 18GA L1.5IN PNK S STL HUB POLYPR SHLD REG BVL

## (undated) DEVICE — TUR SERIES SET

## (undated) DEVICE — SYR 10ML LUER LOK 1/5ML GRAD --

## (undated) DEVICE — SYR 50ML LR LCK 1ML GRAD NSAF --

## (undated) DEVICE — SUT ETHLN 3-0 18IN PS2 BLK --

## (undated) DEVICE — SHOULDER W/POUCH II-LF: Brand: MEDLINE INDUSTRIES, INC.

## (undated) DEVICE — NDL SPNE QNCKE 18GX3.5IN LF --

## (undated) DEVICE — PREP SKN CHLRAPRP APL 26ML STR --

## (undated) DEVICE — SYR 3ML LL TIP 1/10ML GRAD --

## (undated) DEVICE — PENCIL SMK EVAC L10FT DIA95MM TBNG NONSTICK W ADPT TO 22MM

## (undated) DEVICE — SOLUTION IRRIG 3000ML LAC RINGERS ARTHROMTC PLAS CONT

## (undated) DEVICE — 1200 GUARD II KIT W/5MM TUBE W/O VAC TUBE: Brand: GUARDIAN

## (undated) DEVICE — PAD 05IN BASE 3IN PEAK M DENS CONVOLUTED FOAM

## (undated) DEVICE — PREP KIT PEEL PTCH POVIDONE IOD

## (undated) DEVICE — COVER,MAYO STAND,STERILE: Brand: MEDLINE

## (undated) DEVICE — SOLUTION IRRIG 3000ML 0.9% SOD CHL USP UROMATIC PLAS CONT

## (undated) DEVICE — AGENT HEMSTAT 5GM ARISTA AH

## (undated) DEVICE — DRAPE,REIN 53X77,STERILE: Brand: MEDLINE

## (undated) DEVICE — STERILE POLYISOPRENE POWDER-FREE SURGICAL GLOVES WITH EMOLLIENT COATING: Brand: PROTEXIS

## (undated) DEVICE — Z DISCONTINUED PER MEDLINE LINE GAS SAMPLING O2/CO2 LNG AD 13 FT NSL W/ TBNG FILTERLINE

## (undated) DEVICE — DERMABOND SKIN ADH 0.7ML -- DERMABOND ADVANCED 12/BX

## (undated) DEVICE — BLADE SAW W073XL276IN THK0031IN CUT THK0036IN REPL SAG

## (undated) DEVICE — GOWN,SIRUS,POLYRNF,BRTHSLV,XLN/XL,20/CS: Brand: MEDLINE

## (undated) DEVICE — SUTURE NONABSORBABLE L60IN L65MM SZ 1-0 BLK 1 1/2 CIR NYL 824G

## (undated) DEVICE — DRAPE SURG W41XL74IN CLR FULL SZ C ARM 3 ADH POLY STRP E

## (undated) DEVICE — SUTURE STRATAFIX SYMMETRIC PDS + SZ 1 L18IN ABSRB VLT L48MM SXPP1A400

## (undated) DEVICE — GOWN,SIRUS,NONRNF,SETINSLV,2XL,18/CS: Brand: MEDLINE

## (undated) DEVICE — SMOKE EVACUATION TUBING WITH 8 IN INTEGRAL WAND AND SPONGE GUARD: Brand: BUFFALO FILTER

## (undated) DEVICE — HANDLE LT SNAP ON ULT DURABLE LENS FOR TRUMPF ALC DISPOSABLE

## (undated) DEVICE — SLIM BODY SKIN STAPLER: Brand: APPOSE ULC

## (undated) DEVICE — DRESSING HYDROCOLLOID BORDER 35X10 IN ALUM PRIMASEAL

## (undated) DEVICE — 4.5 MM INCISOR PLUS STRAIGHT                                    BLADES, POWER/EP-1, VIOLET, PACKAGED                                    6 PER BOX, STERILE

## (undated) DEVICE — PAD,ABDOMINAL,5"X9",ST,LF,25/BX: Brand: MEDLINE INDUSTRIES, INC.

## (undated) DEVICE — MULTIPLE BAND LIGATOR: Brand: SPEEDBAND SUPERVIEW SUPER 7

## (undated) DEVICE — SUTURE PDS II SZ 1 L36IN ABSRB VLT CT L40MM 1/2 CIR TAPR Z359T

## (undated) DEVICE — INTENDED FOR TISSUE SEPARATION, AND OTHER PROCEDURES THAT REQUIRE A SHARP SURGICAL BLADE TO PUNCTURE OR CUT.: Brand: BARD-PARKER ® CARBON RIB-BACK BLADES

## (undated) DEVICE — 3M™ IOBAN™ 2 ANTIMICROBIAL INCISE DRAPE 6651EZ: Brand: IOBAN™ 2

## (undated) DEVICE — KENDALL RADIOLUCENT FOAM MONITORING ELECTRODE RECTANGULAR SHAPE: Brand: KENDALL

## (undated) DEVICE — INFECTION CONTROL KIT SYS

## (undated) DEVICE — GLOVE ORANGE PI 7   MSG9070

## (undated) DEVICE — CATH IV AUTOGRD BC PNK 20GA 25 -- INSYTE

## (undated) DEVICE — BAG SPEC BIOHZRD 10 X 10 IN --

## (undated) DEVICE — SUTURE VCRL 0 L27IN ABSRB VLT CT L40MM 1/2 CIR TAPERPOINT J352H

## (undated) DEVICE — SUTURE STRATAFIX SYMMETRIC SZ 1 L18IN ABSRB VLT CT1 L36CM SXPP1A404

## (undated) DEVICE — (D)PREP SKN CHLRAPRP APPL 26ML -- CONVERT TO ITEM 371833

## (undated) DEVICE — STRAP,POSITIONING,KNEE/BODY,FOAM,4X60": Brand: MEDLINE

## (undated) DEVICE — SOLUTION SURG PREP 26 CC PURPREP

## (undated) DEVICE — DRAPE XR C ARM 41X74IN LF --

## (undated) DEVICE — NEONATAL-ADULT SPO2 SENSOR: Brand: NELLCOR

## (undated) DEVICE — STERILE POLYISOPRENE POWDER-FREE SURGICAL GLOVES: Brand: PROTEXIS

## (undated) DEVICE — ELECTRODE,RADIOTRANSLUCENT,FOAM,5PK: Brand: MEDLINE

## (undated) DEVICE — NEEDLE SPNL 22GA L3.5IN BLK HUB S STL REG WALL FIT STYL W/

## (undated) DEVICE — BASIN ST MAJOR-NO CAUTERY: Brand: MEDLINE INDUSTRIES, INC.

## (undated) DEVICE — SET ADMIN 16ML TBNG L100IN 2 Y INJ SITE IV PIGGY BK DISP

## (undated) DEVICE — OPTIFOAM GENTLE SA, POSTOP, 4X8: Brand: MEDLINE

## (undated) DEVICE — SOLUTION IV 1000ML 0.9% SOD CHL

## (undated) DEVICE — SUTURE PROL SZ 3-0 L18IN NONABSORBABLE BLU L19MM PC-5 3/8 8632G

## (undated) DEVICE — DEVON™ KNEE AND BODY STRAP 60" X 3" (1.5 M X 7.6 CM): Brand: DEVON

## (undated) DEVICE — FORCEPS BX L160CM DIA8MM GRSP DISECT CUP TIP NONLOCKING ROT

## (undated) DEVICE — SWAB CULT DBL W/O CHAR RAYON TIP AMIES GEL CLMN FOR COLL

## (undated) DEVICE — GLOVE ORANGE PI 7 1/2   MSG9075

## (undated) DEVICE — SUTURE ABSORBABLE MONOFILAMENT 2-0 WND CLOSURE GRN V-LOC 180 VLOCL0315

## (undated) DEVICE — DRSG AQUACEL SURG 3.5 X 10IN -- CONVERT TO ITEM 370183

## (undated) DEVICE — IMMOBILIZER ORTH LIGHTWEIGHT LG UNIV 9X7 IN PREMIERPRO

## (undated) DEVICE — LABEL MED MRMC ORTH STRL

## (undated) DEVICE — SHOULDER SUSPENSION KIT 6 PER BOX

## (undated) DEVICE — SUTURE PDS II SZ 1 L36IN ABSRB VLT L48MM CTX 1/2 CIR Z371T

## (undated) DEVICE — SUT ETHLN 2-0 18IN FS BLK --

## (undated) DEVICE — SUTURE ETHLN SZ 2-0 L30IN NONABSORBABLE BLK L36MM PSLX 3/8 1697H

## (undated) DEVICE — KIT EVAC 0.25IN RECT TB DIA19FR 400CC PVC 3 SPR Y CONN DRN

## (undated) DEVICE — PADDING CAST SPEC 6INX4YD COT --

## (undated) DEVICE — TOWEL SURG W17XL27IN STD BLU COT NONFENESTRATED PREWASHED

## (undated) DEVICE — GLOVE SURG SZ 8 L12IN FNGR THK94MIL STD WHT LTX FREE

## (undated) DEVICE — SOLUTION IRRIG 1000ML STRL H2O USP PLAS POUR BTL